# Patient Record
Sex: FEMALE | Race: WHITE | NOT HISPANIC OR LATINO | Employment: OTHER | URBAN - METROPOLITAN AREA
[De-identification: names, ages, dates, MRNs, and addresses within clinical notes are randomized per-mention and may not be internally consistent; named-entity substitution may affect disease eponyms.]

---

## 2017-02-08 RX ORDER — RIVAROXABAN 20 MG/1
TABLET, FILM COATED ORAL
Qty: 90 TABLET | Refills: 0 | Status: SHIPPED | OUTPATIENT
Start: 2017-02-08 | End: 2017-02-08 | Stop reason: SDUPTHER

## 2017-02-27 ENCOUNTER — OFFICE VISIT (OUTPATIENT)
Dept: CARDIOLOGY | Facility: CLINIC | Age: 57
End: 2017-02-27

## 2017-02-27 VITALS
WEIGHT: 293 LBS | HEIGHT: 63 IN | BODY MASS INDEX: 51.91 KG/M2 | HEART RATE: 84 BPM | SYSTOLIC BLOOD PRESSURE: 126 MMHG | DIASTOLIC BLOOD PRESSURE: 76 MMHG

## 2017-02-27 DIAGNOSIS — I74.9 PARADOXICAL EMBOLISM (HCC): ICD-10-CM

## 2017-02-27 DIAGNOSIS — I27.20 PULMONARY HYPERTENSION (HCC): ICD-10-CM

## 2017-02-27 DIAGNOSIS — I10 ESSENTIAL HYPERTENSION: ICD-10-CM

## 2017-02-27 DIAGNOSIS — Q21.12 PFO (PATENT FORAMEN OVALE): ICD-10-CM

## 2017-02-27 DIAGNOSIS — I50.32 CHRONIC DIASTOLIC CONGESTIVE HEART FAILURE (HCC): Primary | ICD-10-CM

## 2017-02-27 PROCEDURE — 99214 OFFICE O/P EST MOD 30 MIN: CPT | Performed by: INTERNAL MEDICINE

## 2017-02-27 PROCEDURE — 93000 ELECTROCARDIOGRAM COMPLETE: CPT | Performed by: INTERNAL MEDICINE

## 2017-02-27 RX ORDER — IPRATROPIUM BROMIDE AND ALBUTEROL SULFATE 2.5; .5 MG/3ML; MG/3ML
SOLUTION RESPIRATORY (INHALATION) 4 TIMES DAILY
Refills: 0 | COMMUNITY
Start: 2017-01-19 | End: 2019-06-03 | Stop reason: ALTCHOICE

## 2017-02-27 RX ORDER — LOSARTAN POTASSIUM AND HYDROCHLOROTHIAZIDE 12.5; 1 MG/1; MG/1
TABLET ORAL DAILY
Refills: 5 | COMMUNITY
Start: 2017-02-03 | End: 2017-11-15 | Stop reason: HOSPADM

## 2017-02-27 RX ORDER — FUROSEMIDE 40 MG/1
40 TABLET ORAL 3 TIMES WEEKLY
Qty: 15 TABLET | Refills: 11 | Status: SHIPPED | OUTPATIENT
Start: 2017-02-27 | End: 2018-03-17 | Stop reason: SDUPTHER

## 2017-02-27 NOTE — PROGRESS NOTES
Date of Office Visit: 2017  Encounter Provider: Brennan Rogers MD  Place of Service: [facility] CARDIOLOGY  Patient Name: Emely Solomon  :1960    Chief Complaint   Patient presents with   • Congestive Heart Failure   :     HPI: Emely Solomon is a 56 y.o. female who presents today to followup. In the summer of , she developed an acute DVT as well as a pulmonary embolus. She had a previously undiagnosed patent foramen ovale and developed paradoxical embolus to the left lower extremity (arterial) and required prolonged hospitalization for thrombectomy and anticoagulation. She was sent home on coumadin but became subtherapeutic and had recurrent pulmonary embolus after that. She was changed to rivaroxaban and has done well since then. Her IVC filter has been removed. A repeat echo in 2016 showed significant improvement in her pulmonary hypertension, right-sided enlargement and function.      She has chronic exertional dyspnea but feels that it is progressively worsening.  She has chronic leg swelling and lipoedema.  She denies bleeding.  She denies palpitations or syncope.    Past Medical History   Diagnosis Date   • Chronic diastolic congestive heart failure    • Deep venous thrombosis    • Hypertension    • Lipoedema    • Morbid obesity    • Paradoxical embolism      to the LLE due to DVT/PE and PFO   • PFO (patent foramen ovale)    • Pulmonary embolism    • Pulmonary hypertension      multifactorial (dCHF, obesity/ARIEL, hx PE), mild by echo 2016       Past Surgical History   Procedure Laterality Date   • Dilatation and curettage  2011   • Vena cava filter placement       Inferior Vena Cava Filter Placement w/Fluorosc angiogr guidance       Social History     Social History   • Marital status:      Spouse name: N/A   • Number of children: N/A   • Years of education: N/A     Occupational History   • Not on file.     Social History Main Topics   •  "Smoking status: Never Smoker   • Smokeless tobacco: Not on file      Comment: caffeine use/ weekends   • Alcohol use No   • Drug use: No   • Sexual activity: Not on file     Other Topics Concern   • Not on file     Social History Narrative       Family History   Problem Relation Age of Onset   • Hypertension Other        Review of Systems   Cardiovascular: Positive for dyspnea on exertion and leg swelling.   Respiratory: Positive for cough.    All other systems reviewed and are negative.      No Known Allergies      Current Outpatient Prescriptions:   •  fluticasone (FLONASE) 50 MCG/ACT nasal spray, into each nostril., Disp: , Rfl:   •  ipratropium-albuterol (DUO-NEB) 0.5-2.5 mg/mL nebulizer, 4 (Four) Times a Day., Disp: , Rfl: 0  •  losartan-hydrochlorothiazide (HYZAAR) 100-12.5 MG per tablet, Daily., Disp: , Rfl: 5  •  metoprolol succinate XL (TOPROL-XL) 50 MG 24 hr tablet, Take 1 tablet by mouth daily., Disp: , Rfl:   •  rivaroxaban (XARELTO) 20 MG tablet, Take 1 tablet by mouth Daily., Disp: 90 tablet, Rfl: 0  •  furosemide (LASIX) 40 MG tablet, Take 1 tablet by mouth 3 (Three) Times a Week., Disp: 15 tablet, Rfl: 11     Objective:     Vitals:    02/27/17 1018   BP: 126/76   Pulse: 84   Weight: (!) 376 lb (171 kg)   Height: 63\" (160 cm)     Body mass index is 66.61 kg/(m^2).    Physical Exam   Constitutional: She is oriented to person, place, and time.   Obese   HENT:   Head: Normocephalic.   Nose: Nose normal.   Mouth/Throat: Oropharynx is clear and moist.   Eyes: Conjunctivae and EOM are normal. Pupils are equal, round, and reactive to light.   Neck: Normal range of motion.   Cannot assess for JVD due to habitus   Cardiovascular: Normal rate and regular rhythm.  Exam reveals distant heart sounds.    No murmur heard.  Pulmonary/Chest: Effort normal.   Abdominal: Soft.   Obesity limits abdominal exam   Musculoskeletal: Normal range of motion. She exhibits edema (lipoedema, trace pitting).   Neurological: She is " alert and oriented to person, place, and time. No cranial nerve deficit.   Skin: Skin is warm and dry. No rash noted.   Psychiatric: She has a normal mood and affect. Her behavior is normal. Judgment and thought content normal.   Vitals reviewed.        ECG 12 Lead  Date/Time: 2/27/2017 1:44 PM  Performed by: BRENNAN ROGERS  Authorized by: BRENNAN ROGERS   Comparison: compared with previous ECG   Similar to previous ECG  Rhythm: sinus rhythm  Conduction: conduction normal  ST Segments: ST segments normal  T Waves: T waves normal  Other findings: LAE and PRWP  Other findings comments: RVH  Clinical impression: abnormal ECG              Assessment:       Diagnosis Plan   1. Chronic diastolic congestive heart failure     2. Pulmonary hypertension     3. Paradoxical embolism     4. PFO (patent foramen ovale)     5. Essential hypertension            Plan:       She has chronic diastolic CHF and secondary PHTN (due to diastolic dysfunction and her prior PE).  She had VTE with paradoxical embolus to the leg and is s/p thrombectomy.  She had an IVC filter which was been removed.  She is on rivaroxaban as it was too difficult to maintain a therapeutic INR on coumadin.      I suspect her dyspnea is multifactorial (especially due to her weight) but she likely needs a loop diuretic.  She knows she'll have trouble with taking it due to urinary incontinence; she still works full time.  I asked her to take it three days per week for now.      Her BP is within goal.     Sincerely,       Brennan Rogers MD

## 2017-05-08 RX ORDER — RIVAROXABAN 20 MG/1
TABLET, FILM COATED ORAL
Qty: 90 TABLET | Refills: 3 | Status: ON HOLD | OUTPATIENT
Start: 2017-05-08 | End: 2017-07-21 | Stop reason: SDUPTHER

## 2017-06-12 ENCOUNTER — HOSPITAL ENCOUNTER (OUTPATIENT)
Dept: CARDIOLOGY | Facility: HOSPITAL | Age: 57
Discharge: HOME | End: 2017-06-12
Attending: INTERNAL MEDICINE | Admitting: INTERNAL MEDICINE

## 2017-06-12 DIAGNOSIS — M79.89 LEG SWELLING: Primary | ICD-10-CM

## 2017-06-12 DIAGNOSIS — M79.89 LEG SWELLING: ICD-10-CM

## 2017-06-12 LAB
BH CV LOWER VASCULAR LEFT COMMON FEMORAL AUGMENT: NORMAL
BH CV LOWER VASCULAR LEFT COMMON FEMORAL COMPETENT: NORMAL
BH CV LOWER VASCULAR LEFT COMMON FEMORAL COMPRESS: NORMAL
BH CV LOWER VASCULAR LEFT COMMON FEMORAL PHASIC: NORMAL
BH CV LOWER VASCULAR LEFT COMMON FEMORAL SPONT: NORMAL
BH CV LOWER VASCULAR LEFT DISTAL FEMORAL COMPRESS: NORMAL
BH CV LOWER VASCULAR LEFT GASTRONEMIUS COMPRESS: NORMAL
BH CV LOWER VASCULAR LEFT GREATER SAPH AK COMPRESS: NORMAL
BH CV LOWER VASCULAR LEFT GREATER SAPH BK COMPRESS: NORMAL
BH CV LOWER VASCULAR LEFT MID FEMORAL AUGMENT: NORMAL
BH CV LOWER VASCULAR LEFT MID FEMORAL COMPETENT: NORMAL
BH CV LOWER VASCULAR LEFT MID FEMORAL COMPRESS: NORMAL
BH CV LOWER VASCULAR LEFT MID FEMORAL PHASIC: NORMAL
BH CV LOWER VASCULAR LEFT MID FEMORAL SPONT: NORMAL
BH CV LOWER VASCULAR LEFT PERONEAL COMPRESS: NORMAL
BH CV LOWER VASCULAR LEFT POPLITEAL AUGMENT: NORMAL
BH CV LOWER VASCULAR LEFT POPLITEAL COMPETENT: NORMAL
BH CV LOWER VASCULAR LEFT POPLITEAL COMPRESS: NORMAL
BH CV LOWER VASCULAR LEFT POPLITEAL PHASIC: NORMAL
BH CV LOWER VASCULAR LEFT POPLITEAL SPONT: NORMAL
BH CV LOWER VASCULAR LEFT POSTERIOR TIBIAL COMPRESS: NORMAL
BH CV LOWER VASCULAR LEFT PROXIMAL FEMORAL COMPRESS: NORMAL
BH CV LOWER VASCULAR LEFT SAPHENOFEMORAL JUNCTION AUGMENT: NORMAL
BH CV LOWER VASCULAR LEFT SAPHENOFEMORAL JUNCTION COMPETENT: NORMAL
BH CV LOWER VASCULAR LEFT SAPHENOFEMORAL JUNCTION COMPRESS: NORMAL
BH CV LOWER VASCULAR LEFT SAPHENOFEMORAL JUNCTION PHASIC: NORMAL
BH CV LOWER VASCULAR LEFT SAPHENOFEMORAL JUNCTION SPONT: NORMAL
BH CV LOWER VASCULAR RIGHT COMMON FEMORAL AUGMENT: NORMAL
BH CV LOWER VASCULAR RIGHT COMMON FEMORAL COMPETENT: NORMAL
BH CV LOWER VASCULAR RIGHT COMMON FEMORAL COMPRESS: NORMAL
BH CV LOWER VASCULAR RIGHT COMMON FEMORAL PHASIC: NORMAL
BH CV LOWER VASCULAR RIGHT COMMON FEMORAL SPONT: NORMAL

## 2017-06-12 PROCEDURE — 93971 EXTREMITY STUDY: CPT

## 2017-06-12 NOTE — PROGRESS NOTES
Mrs. Solomon is having worsening edema of the left leg with some oozing of clear yellow fluid.  Will check venous doppler.

## 2017-06-15 ENCOUNTER — APPOINTMENT (OUTPATIENT)
Dept: WOMENS IMAGING | Facility: HOSPITAL | Age: 57
End: 2017-06-15

## 2017-06-15 PROCEDURE — 77067 SCR MAMMO BI INCL CAD: CPT | Performed by: RADIOLOGY

## 2017-06-15 PROCEDURE — 77063 BREAST TOMOSYNTHESIS BI: CPT | Performed by: RADIOLOGY

## 2017-06-30 ENCOUNTER — APPOINTMENT (OUTPATIENT)
Dept: WOUND CARE | Facility: HOSPITAL | Age: 57
End: 2017-06-30
Attending: SURGERY

## 2017-06-30 PROCEDURE — G0463 HOSPITAL OUTPT CLINIC VISIT: HCPCS

## 2017-07-14 ENCOUNTER — TRANSCRIBE ORDERS (OUTPATIENT)
Dept: ADMINISTRATIVE | Facility: HOSPITAL | Age: 57
End: 2017-07-14

## 2017-07-14 ENCOUNTER — APPOINTMENT (OUTPATIENT)
Dept: WOUND CARE | Facility: HOSPITAL | Age: 57
End: 2017-07-14
Attending: SURGERY

## 2017-07-14 DIAGNOSIS — L97.221 NON-PRESSURE CHRONIC ULCER OF LEFT CALF, LIMITED TO BREAKDOWN OF SKIN (HCC): Primary | ICD-10-CM

## 2017-07-14 PROCEDURE — G0463 HOSPITAL OUTPT CLINIC VISIT: HCPCS

## 2017-07-19 ENCOUNTER — APPOINTMENT (OUTPATIENT)
Dept: WOUND CARE | Facility: HOSPITAL | Age: 57
End: 2017-07-19
Attending: SURGERY

## 2017-07-19 ENCOUNTER — HOSPITAL ENCOUNTER (OUTPATIENT)
Dept: CARDIOLOGY | Facility: HOSPITAL | Age: 57
Discharge: HOME | End: 2017-07-19
Attending: SURGERY | Admitting: SURGERY

## 2017-07-19 DIAGNOSIS — L97.221 NON-PRESSURE CHRONIC ULCER OF LEFT CALF, LIMITED TO BREAKDOWN OF SKIN (HCC): ICD-10-CM

## 2017-07-19 LAB
BH CV LOWER ARTERIAL LEFT GREAT TOE SYS MAX: 120 MMHG
BH CV LOWER ARTERIAL LEFT TBI RATIO: 1.04
BH CV LOWER ARTERIAL RIGHT GREAT TOE SYS MAX: 128 MMHG
BH CV LOWER ARTERIAL RIGHT TBI RATIO: 1.11
UPPER ARTERIAL LEFT ARM BRACHIAL SYS MAX: 100 MMHG
UPPER ARTERIAL RIGHT ARM BRACHIAL SYS MAX: 111 MMHG

## 2017-07-19 PROCEDURE — 93922 UPR/L XTREMITY ART 2 LEVELS: CPT

## 2017-07-21 ENCOUNTER — ANESTHESIA EVENT (OUTPATIENT)
Dept: GASTROENTEROLOGY | Facility: HOSPITAL | Age: 57
End: 2017-07-21

## 2017-07-21 ENCOUNTER — ANESTHESIA (OUTPATIENT)
Dept: GASTROENTEROLOGY | Facility: HOSPITAL | Age: 57
End: 2017-07-21

## 2017-07-21 ENCOUNTER — HOSPITAL ENCOUNTER (OUTPATIENT)
Facility: HOSPITAL | Age: 57
Setting detail: HOSPITAL OUTPATIENT SURGERY
Discharge: HOME | End: 2017-07-21
Attending: INTERNAL MEDICINE | Admitting: INTERNAL MEDICINE

## 2017-07-21 VITALS
HEIGHT: 63 IN | WEIGHT: 293 LBS | HEART RATE: 88 BPM | SYSTOLIC BLOOD PRESSURE: 92 MMHG | DIASTOLIC BLOOD PRESSURE: 52 MMHG | BODY MASS INDEX: 51.91 KG/M2 | OXYGEN SATURATION: 94 % | TEMPERATURE: 98.7 F | RESPIRATION RATE: 16 BRPM

## 2017-07-21 DIAGNOSIS — R05.9 COUGH: ICD-10-CM

## 2017-07-21 PROBLEM — R05.3 CHRONIC COUGH: Status: ACTIVE | Noted: 2017-07-21

## 2017-07-21 LAB
APPEARANCE FLD: ABNORMAL
COLOR FLD: COLORLESS
EOSINOPHIL NFR FLD MANUAL: 1 %
LYMPHOCYTES NFR FLD MANUAL: 26 %
METHOD: ABNORMAL
MONOS+MACROS NFR FLD: 25 %
NEUTROPHILS NFR FLD MANUAL: 48 %
NUC CELL # FLD: 94 /MM3
OTHER CELLS FLUID PER 100/WBCS: 108 /100 WBCS
RBC # FLD AUTO: 16 /MM3

## 2017-07-21 PROCEDURE — 87116 MYCOBACTERIA CULTURE: CPT | Performed by: INTERNAL MEDICINE

## 2017-07-21 PROCEDURE — 89051 BODY FLUID CELL COUNT: CPT | Performed by: INTERNAL MEDICINE

## 2017-07-21 PROCEDURE — 87102 FUNGUS ISOLATION CULTURE: CPT | Performed by: INTERNAL MEDICINE

## 2017-07-21 PROCEDURE — 88305 TISSUE EXAM BY PATHOLOGIST: CPT | Performed by: INTERNAL MEDICINE

## 2017-07-21 PROCEDURE — 87252 VIRUS INOCULATION TISSUE: CPT | Performed by: INTERNAL MEDICINE

## 2017-07-21 PROCEDURE — 88112 CYTOPATH CELL ENHANCE TECH: CPT | Performed by: INTERNAL MEDICINE

## 2017-07-21 PROCEDURE — 87205 SMEAR GRAM STAIN: CPT | Performed by: INTERNAL MEDICINE

## 2017-07-21 PROCEDURE — 25010000002 PROPOFOL 10 MG/ML EMULSION: Performed by: ANESTHESIOLOGY

## 2017-07-21 PROCEDURE — 87070 CULTURE OTHR SPECIMN AEROBIC: CPT | Performed by: INTERNAL MEDICINE

## 2017-07-21 PROCEDURE — 87206 SMEAR FLUORESCENT/ACID STAI: CPT | Performed by: INTERNAL MEDICINE

## 2017-07-21 RX ORDER — SODIUM CHLORIDE, SODIUM LACTATE, POTASSIUM CHLORIDE, CALCIUM CHLORIDE 600; 310; 30; 20 MG/100ML; MG/100ML; MG/100ML; MG/100ML
1000 INJECTION, SOLUTION INTRAVENOUS CONTINUOUS PRN
Status: DISCONTINUED | OUTPATIENT
Start: 2017-07-21 | End: 2017-07-21 | Stop reason: HOSPADM

## 2017-07-21 RX ORDER — LIDOCAINE HYDROCHLORIDE 20 MG/ML
INJECTION, SOLUTION INFILTRATION; PERINEURAL AS NEEDED
Status: DISCONTINUED | OUTPATIENT
Start: 2017-07-21 | End: 2017-07-21 | Stop reason: SURG

## 2017-07-21 RX ORDER — PROPOFOL 10 MG/ML
VIAL (ML) INTRAVENOUS AS NEEDED
Status: DISCONTINUED | OUTPATIENT
Start: 2017-07-21 | End: 2017-07-21 | Stop reason: SURG

## 2017-07-21 RX ORDER — LIDOCAINE HYDROCHLORIDE 10 MG/ML
INJECTION, SOLUTION EPIDURAL; INFILTRATION; INTRACAUDAL; PERINEURAL AS NEEDED
Status: DISCONTINUED | OUTPATIENT
Start: 2017-07-21 | End: 2017-07-21 | Stop reason: HOSPADM

## 2017-07-21 RX ORDER — PROPOFOL 10 MG/ML
VIAL (ML) INTRAVENOUS CONTINUOUS PRN
Status: DISCONTINUED | OUTPATIENT
Start: 2017-07-21 | End: 2017-07-21 | Stop reason: SURG

## 2017-07-21 RX ADMIN — SODIUM CHLORIDE, POTASSIUM CHLORIDE, SODIUM LACTATE AND CALCIUM CHLORIDE: 600; 310; 30; 20 INJECTION, SOLUTION INTRAVENOUS at 07:02

## 2017-07-21 RX ADMIN — PROPOFOL 140 MCG/KG/MIN: 10 INJECTION, EMULSION INTRAVENOUS at 07:06

## 2017-07-21 RX ADMIN — LIDOCAINE HYDROCHLORIDE 50 MG: 20 INJECTION, SOLUTION INFILTRATION; PERINEURAL at 07:06

## 2017-07-21 RX ADMIN — PROPOFOL 150 MG: 10 INJECTION, EMULSION INTRAVENOUS at 07:06

## 2017-07-21 NOTE — ANESTHESIA POSTPROCEDURE EVALUATION
Patient: Emely Solomon    Procedure Summary     Date Anesthesia Start Anesthesia Stop Room / Location    07/21/17 0702 0726  [facility]       Procedure Diagnosis Surgeon Provider    BRONCHOSCOPY with wash (N/A Bronchus) No diagnosis on file. MD Jack Ontiveros MD          Anesthesia Type: MAC  Last vitals  BP   92/52 (07/21/17 0748)    Temp   37.1 °C (98.7 °F) (07/21/17 0748)    Pulse   88 (07/21/17 0748)   Resp   16 (07/21/17 0748)    SpO2   94 % (07/21/17 0748)      Post Anesthesia Care and Evaluation    Patient location during evaluation: PACU  Patient participation: complete - patient participated  Level of consciousness: awake and alert  Pain score: 0  Pain management: adequate  Airway patency: patent  Anesthetic complications: No anesthetic complications    Cardiovascular status: acceptable  Respiratory status: acceptable  Hydration status: acceptable

## 2017-07-21 NOTE — ANESTHESIA PROCEDURE NOTES
Airway  Urgency: elective    Date/Time: 7/21/2017 7:10 AM  Airway not difficult    General Information and Staff    Patient location: endo.  Anesthesiologist: DUSTIN BHATTI    Indications and Patient Condition  Indications for airway management: airway protection    Preoxygenated: yes      Final Airway Details  Final airway type: supraglottic airway      Successful airway: classic  Size 3    Number of attempts at approach: 1

## 2017-07-21 NOTE — ANESTHESIA PREPROCEDURE EVALUATION
Anesthesia Evaluation     Patient summary reviewed          Airway   Mallampati: III  TM distance: >3 FB  Neck ROM: full  no difficulty expected  Dental - normal exam     Pulmonary     breath sounds clear to auscultation  Cardiovascular   Exercise tolerance: poor (<4 METS)    Rhythm: regular  Rate: normal        Neuro/Psych  GI/Hepatic/Renal/Endo      Musculoskeletal     Abdominal    Substance History      OB/GYN          Other                                        Anesthesia Plan    ASA 3     MAC     intravenous induction   Anesthetic plan and risks discussed with patient.

## 2017-07-23 LAB
BACTERIA SPEC RESP CULT: NORMAL
GRAM STN SPEC: NORMAL

## 2017-07-24 LAB
CYTO UR: NORMAL
LAB AP CASE REPORT: NORMAL
Lab: NORMAL
PATH REPORT.FINAL DX SPEC: NORMAL
PATH REPORT.GROSS SPEC: NORMAL

## 2017-07-26 ENCOUNTER — TRANSCRIBE ORDERS (OUTPATIENT)
Dept: PULMONOLOGY | Facility: HOSPITAL | Age: 57
End: 2017-07-26

## 2017-07-26 DIAGNOSIS — G47.33 OSA (OBSTRUCTIVE SLEEP APNEA): Primary | ICD-10-CM

## 2017-07-28 ENCOUNTER — APPOINTMENT (OUTPATIENT)
Dept: WOUND CARE | Facility: HOSPITAL | Age: 57
End: 2017-07-28
Attending: SURGERY

## 2017-07-28 PROCEDURE — G0463 HOSPITAL OUTPT CLINIC VISIT: HCPCS

## 2017-07-31 ENCOUNTER — TRANSCRIBE ORDERS (OUTPATIENT)
Dept: PHYSICAL THERAPY | Facility: HOSPITAL | Age: 57
End: 2017-07-31

## 2017-07-31 DIAGNOSIS — I89.0 LYMPHEDEMA: Primary | ICD-10-CM

## 2017-07-31 DIAGNOSIS — L97.929 IDIOPATHIC CHRONIC VENOUS HYPERTENSION OF LEFT LOWER EXTREMITY WITH ULCER (HCC): ICD-10-CM

## 2017-07-31 DIAGNOSIS — I87.312 IDIOPATHIC CHRONIC VENOUS HYPERTENSION OF LEFT LOWER EXTREMITY WITH ULCER (HCC): ICD-10-CM

## 2017-07-31 LAB — VIRAL CULTURE, GENERAL: NORMAL

## 2017-08-01 ENCOUNTER — HOSPITAL ENCOUNTER (OUTPATIENT)
Dept: SLEEP MEDICINE | Facility: HOSPITAL | Age: 57
Discharge: HOME | End: 2017-08-01
Admitting: FAMILY MEDICINE

## 2017-08-01 DIAGNOSIS — G47.33 OSA (OBSTRUCTIVE SLEEP APNEA): ICD-10-CM

## 2017-08-01 PROCEDURE — 95811 POLYSOM 6/>YRS CPAP 4/> PARM: CPT

## 2017-08-10 ENCOUNTER — HOSPITAL ENCOUNTER (OUTPATIENT)
Dept: OCCUPATIONAL THERAPY | Facility: HOSPITAL | Age: 57
Setting detail: THERAPIES SERIES
Discharge: HOME | End: 2017-08-10

## 2017-08-10 DIAGNOSIS — I89.0 LYMPHEDEMA OF BOTH LOWER EXTREMITIES: Primary | ICD-10-CM

## 2017-08-10 PROCEDURE — 97165 OT EVAL LOW COMPLEX 30 MIN: CPT

## 2017-08-10 NOTE — THERAPY EVALUATION
Outpatient Occupational Therapy Lymphedema Initial Evaluation  [facility]     Patient Name: Emely Solomon  : 1960  MRN: [id number]  Today's Date: 8/10/2017      Visit Date: 08/10/2017    Patient Active Problem List   Diagnosis   • Chronic diastolic congestive heart failure   • PFO (patent foramen ovale)   • Paradoxical embolism   • Hypertension   • Pulmonary hypertension   • Chronic cough        Past Medical History:   Diagnosis Date   • Chronic diastolic congestive heart failure    • Deep venous thrombosis    • Hypertension    • Lipoedema    • Morbid obesity    • Paradoxical embolism     to the LLE due to DVT/PE and PFO   • PFO (patent foramen ovale)    • Pulmonary embolism    • Pulmonary hypertension     multifactorial (dCHF, obesity/ARIEL, hx PE), mild by echo 2016        Past Surgical History:   Procedure Laterality Date   • BRONCHOSCOPY N/A 2017    Procedure: BRONCHOSCOPY with wash;  Surgeon: Caleb Garcia MD;  Location: [facility] ENDOSCOPY;  Service:    • DILATATION AND CURETTAGE  2011   • VENA CAVA FILTER PLACEMENT      Inferior Vena Cava Filter Placement w/Fluorosc angiogr guidance         Visit Dx:     ICD-10-CM ICD-9-CM   1. Lymphedema of both lower extremities I89.0 457.1             Patient History       08/10/17 1500          History    Chief Complaint Other 1 (comment)   bilateral LE edema  -RE      Date Current Problem(s) Began 17  -RE      Brief Description of Current Complaint Patient presents with a long history of bilateral LE lymphedema.  She had a L LE DVT and PE about 2 years ago which has resulted in a further increase in edema in the L LE.  In  of this year she  developed a wound on the left leg which [facility].   -RE      Previous treatment for THIS PROBLEM Rehabilitation;Other (comment)   Complete Decongestive Therapy - several years ago  -RE      Patient/Caregiver Goals Decrease swelling  -RE      How has  patient tried to help current problem? compression stockings  -RE      What clinical tests have you had for this problem? Other 1 (comment)   Doppler  -RE      Results of Clinical Tests negative  -RE      Are you or can you be pregnant No  -RE      Pain     Pain at Present 0  -RE      Pain at Best 0  -RE      Pain at Worst 0  -RE      Fall Risk Assessment    Any falls in the past year: No  -RE      Does patient have a fear of falling No  -RE      Services    Are you currently receiving Home Health services No  -RE      Daily Activities    Primary Language English  -RE      Are you able to read Yes  -RE      Are you able to write Yes  -RE      How does patient learn best? Listening;Reading  -RE      Teaching needs identified Home Exercise Program;Management of Condition  -RE      Patient is concerned about/has problems with Performing job responsibilities/community activities (work, school,;Walking  -RE      Does patient have problems with the following? None  -RE      Barriers to learning None  -RE      Pt Participated in POC and Goals Yes  -RE      Safety    Are you being hurt, hit, or frightened by anyone at home or in your life? No  -RE      Are you being neglected by a caregiver No  -RE        User Key  (r) = Recorded By, (t) = Taken By, (c) = Cosigned By    Initials Name Provider Type    RE RADHA Esteban Occupational Therapist                Lymphedema       08/10/17 1500          Subjective Comments    Subjective Comments Patient is frustrated by her swelling and weight gain.  -RE      Lymphedema Assessment    Lymphedema Classification RLE:;LLE:;secondary;stage 2 (Spontaneously Irreversible)  -RE      Infections or Cellulitis? no  -RE      Lymphedema Precautions CHF  -RE      Subjective Pain    Able to rate subjective pain? yes  -RE      Pre-Treatment Pain Level 0  -RE      Post-Treatment Pain Level 0  -RE      Lymphedema Edema Assessment    Ptting Edema Category By grade out of 4  -RE      Pitting Edema +  3/4;Severe  -RE      Skin Changes/Observations    Location/Assessment Lower Extremity  -RE      Lower Extremity Conditions bilateral:;scab(s);inflamed;fragile;shiny  -RE      Lower Extremity Color/Pigment left:;erythema  -RE      Lymphedema Sensation    Lymphedema Sensation Reports LLE:;numbness;tingling   in toes  -RE      Lymphedema Measurements    Measurement Type(s) Circumferential  -RE      Circumferential Areas Lower extremities  -RE      LLE Circumferential (cm)    Measurement Location 1 10 cm above knee  -RE      Left 1 93 cm  -RE      Measurement Location 2 Knee (popliteal crease)  -RE      Left 2 75 cm  -RE      Measurement Location 3 10 cm below knee  -RE      Left 3 81 cm  -RE      Measurement Location 4 20 cm below knee  -RE      Left 4 71.5 cm  -RE      Measurement Location 5 30 cm below kne  -RE      Left 5 56 cm  -RE      Measurement Location 6 Ankle  -RE      Left 6 31 cm  -RE      Measurement Location 7 Mid foot  -RE      Left 7 22.8 cm  -RE      Measurement Location 8 Total  -RE      Left 8 430.3 cm  -RE      RLE Circumferential (cm)    Right 1 89 cm  -RE      Right 2 73.5 cm  -RE      Right 3 78 cm  -RE      Right 4 66.5 cm  -RE      Right 5 59.3 cm  -RE      Right 6 28.5 cm  -RE      Right 7 22.8 cm  -RE      Right 8 417.6 cm  -RE        User Key  (r) = Recorded By, (t) = Taken By, (c) = Cosigned By    Initials Name Provider Type    RADHA Arteaga Occupational Therapist                OT Ortho       08/10/17 1500          ROM (Range of Motion)    General ROM no range of motion deficits identified   AROM is functional but limited by body habitus  -RE      MMT (Manual Muscle Testing)    General MMT Assessment Detail bilateral hip flexion is 4-/5. Otherwise LE's are 4+/5  -RE        User Key  (r) = Recorded By, (t) = Taken By, (c) = Cosigned By    Initials Name Provider Type    RADHA Arteaga Occupational Therapist                    Therapy Education       08/10/17 9432           Therapy Education    Education Details Marching (hip flexion) in sitting to fatigue  -RE      Given Symptoms/condition management;Edema management;HEP  -RE      Program New  -RE      How Provided Verbal;Demonstration  -RE      Provided to Patient;Caregiver  -RE      Level of Understanding Teach back education performed;Verbalized;Demonstrated  -RE        User Key  (r) = Recorded By, (t) = Taken By, (c) = Cosigned By    Initials Name Provider Type    RE Martha Valencia OTR Occupational Therapist                        OT Goals       08/10/17 1600       OT Short Term Goals    STG Date to Achieve 08/24/17  -RE     STG 1 Patient independent and compliant with self-wrapping techniques of compression bandages with assistance of caregiver as needed for improved self-management of condition.  -RE     STG 1 Progress New  -RE     STG 2 Patient will demonstrate decreased net edema of bilateral lower extremities >/= 10-20cm  for decrease in edema, symptoms, decreased risk of infection and improved skin care/transition to self-care of condition.   -RE     STG 2 Progress New  -RE     Long Term Goals    LTG Date to Achieve 09/10/17  -RE     LTG 1 Patient will demonstrate decreased net edema of bilateral lower extremities >/= 21--40cm  for decrease in edema, symptoms, decreased risk of infection and improved skin care/transition to self-care of condition.   -RE     LTG 1 Progress New  -RE     LTG 2 Patient independent and compliant with home exercise program (as able) focused on range of motion, flexibility to improve lymphatic flow and LE discomfort.  -RE     LTG 2 Progress New  -RE     LTG 3  Patient/family/caregivers independent and compliant with self-care techniques for self-management of condition.  -RE     LTG 3 Progress New  -RE     LTG 4 Patient independent and compliant with use and care of compression garments with assistance of a caregiver as needed to promote self-care independence.   -RE     LTG 4 Progress New  -RE      Time Calculation    OT Goal Re-Cert Due Date 09/10/17  -RE       User Key  (r) = Recorded By, (t) = Taken By, (c) = Cosigned By    Initials Name Provider Type    RADHA Arteaga Occupational Therapist                OT Assessment/Plan       08/10/17 1613       OT Assessment    Functional Limitations Impaired gait  -RE     Impairments Edema;Gait;Impaired lymphatic circulation;Impaired venous circulation;Integumentary integrity  -RE     Assessment Comments Patient presents with severe bilateral LE lymphedema which extends from feet to groin. The L LE is red, has two scabbed areas, and a history of a recently healed open wound.  Both legs have a  skin fold above the knee which hangs past the medial popliteal crease. Edema is 3+.  She could benefit from Complete Decongestive Therapy to decreasse edema, improve skin integrity, decrease the risk of infection and to learn self care strategies.      -RE     Please refer to paper survey for additional self-reported information Yes  -RE     OT Diagnosis bilateral LE lymphedema  -RE     OT Rehab Potential Good  -RE     Patient/caregiver participated in establishment of treatment plan and goals Yes  -RE     Patient would benefit from skilled therapy intervention Yes  -RE     OT Plan    OT Frequency 4x/week  -RE     Predicted Duration of Therapy Intervention (days/wks) 4-6 weeks  -RE     Planned CPT's? OT EVAL LOW COMPLEXITY: 94703;OT SELF CARE/MGMT/TRAIN 15 MIN: 18364;OT THER PROC EA 15 MIN: [id number];OT THER ACT EA 15 MIN: 06395PG;OT MANUAL THERAPY EA 15 MIN: 85986  -RE     Planned Therapy Interventions (Optional Details) manual therapy techniques;home exercise program;patient/family education;other (see comments)   skin care and compression bandaging  -RE       User Key  (r) = Recorded By, (t) = Taken By, (c) = Cosigned By    Initials Name Provider Type    RADHA Arteaga Occupational Therapist                Time Calculation:   OT Start Time: 1430  OT Stop Time:  1530  OT Time Calculation (min): 60 min     Therapy Charges for Today     Code Description Service Date Service Provider Modifiers Qty    70857974752 HC OT EVAL LOW COMPLEXITY 4 8/10/2017 RADHA Esteban GO 1                    RADHA Esteban  8/10/2017

## 2017-08-15 ENCOUNTER — TELEPHONE (OUTPATIENT)
Dept: SLEEP MEDICINE | Facility: HOSPITAL | Age: 57
End: 2017-08-15

## 2017-08-18 LAB — FUNGUS WND CULT: NORMAL

## 2017-08-28 ENCOUNTER — OFFICE VISIT (OUTPATIENT)
Dept: CARDIOLOGY | Facility: CLINIC | Age: 57
End: 2017-08-28

## 2017-08-28 VITALS
HEIGHT: 63 IN | HEART RATE: 90 BPM | DIASTOLIC BLOOD PRESSURE: 74 MMHG | SYSTOLIC BLOOD PRESSURE: 108 MMHG | WEIGHT: 293 LBS | BODY MASS INDEX: 51.91 KG/M2

## 2017-08-28 DIAGNOSIS — I50.32 CHRONIC DIASTOLIC CONGESTIVE HEART FAILURE (HCC): Primary | ICD-10-CM

## 2017-08-28 DIAGNOSIS — I27.20 PULMONARY HYPERTENSION (HCC): ICD-10-CM

## 2017-08-28 DIAGNOSIS — Q21.12 PFO (PATENT FORAMEN OVALE): ICD-10-CM

## 2017-08-28 DIAGNOSIS — I10 ESSENTIAL HYPERTENSION: ICD-10-CM

## 2017-08-28 DIAGNOSIS — I74.9 PARADOXICAL EMBOLISM (HCC): ICD-10-CM

## 2017-08-28 PROBLEM — R05.3 CHRONIC COUGH: Status: RESOLVED | Noted: 2017-07-21 | Resolved: 2017-08-28

## 2017-08-28 PROCEDURE — 93000 ELECTROCARDIOGRAM COMPLETE: CPT | Performed by: INTERNAL MEDICINE

## 2017-08-28 PROCEDURE — 99213 OFFICE O/P EST LOW 20 MIN: CPT | Performed by: INTERNAL MEDICINE

## 2017-08-28 RX ORDER — MOMETASONE FUROATE AND FORMOTEROL FUMARATE DIHYDRATE 200; 5 UG/1; UG/1
AEROSOL RESPIRATORY (INHALATION)
Refills: 5 | COMMUNITY
Start: 2017-08-11 | End: 2019-06-03 | Stop reason: ALTCHOICE

## 2017-08-28 RX ORDER — AZELASTINE HYDROCHLORIDE AND FLUTICASONE PROPIONATE 137; 50 UG/1; UG/1
SPRAY, METERED NASAL DAILY
Refills: 11 | COMMUNITY
Start: 2017-08-02 | End: 2019-06-03 | Stop reason: ALTCHOICE

## 2017-08-28 NOTE — PROGRESS NOTES
Date of Office Visit: 2017  Encounter Provider: Brennan Rogers MD  Place of Service: [facility] CARDIOLOGY  Patient Name: Emely Solomon  :1960    Chief Complaint   Patient presents with   • Congestive Heart Failure     6 month follow up    :     HPI: Emely Solomon is a 57 y.o. female who presents today to followup. In the summer of , she developed an acute DVT as well as a pulmonary embolus. She had a previously undiagnosed patent foramen ovale and developed paradoxical embolus to the left lower extremity (arterial) and required prolonged hospitalization for thrombectomy and anticoagulation. She was sent home on warfarin but became subtherapeutic and had recurrent pulmonary embolus after that. She was changed to rivaroxaban and has done well since then. Her IVC filter has been removed. A repeat echo in 2016 showed significant improvement in her pulmonary hypertension, right-sided enlargement and function.  A repeat venous doppler in 2017 was negative for DVT.    She was started on furosemide in 2017 and has had a fairly good diuresis.  Unfortunately she can't take it every day due to her job.  She is getting ready to start leg wrapping for lymphedema.      She has mild, stable exertional dyspnea.  She has leg swelling (severe) due to lymphedema/lipoedema.  She denies orthopnea, PND, chest pain, palpitations, lightheadedness, or syncope.     Past Medical History:   Diagnosis Date   • Chronic diastolic congestive heart failure    • Deep venous thrombosis    • Hypertension    • Lipoedema    • Lymphedema    • Morbid obesity    • Paradoxical embolism     to the LLE due to DVT/PE and PFO   • PFO (patent foramen ovale)    • Pulmonary embolism    • Pulmonary hypertension     multifactorial (dCHF, obesity/ARIEL, hx PE), mild by echo 2016       Past Surgical History:   Procedure Laterality Date   • BRONCHOSCOPY N/A 2017    Procedure: BRONCHOSCOPY  "with wash;  Surgeon: Caleb Garcia MD;  Location: [facility] ENDOSCOPY;  Service:    • DILATATION AND CURETTAGE  04/11/2011   • VENA CAVA FILTER PLACEMENT      Inferior Vena Cava Filter Placement w/Fluorosc angiogr guidance       Social History     Social History   • Marital status:      Spouse name: N/A   • Number of children: N/A   • Years of education: N/A     Occupational History   • Not on file.     Social History Main Topics   • Smoking status: Never Smoker   • Smokeless tobacco: Never Used      Comment: caffeine use/ weekends   • Alcohol use No   • Drug use: No   • Sexual activity: Not on file     Other Topics Concern   • Not on file     Social History Narrative       Family History   Problem Relation Age of Onset   • Hypertension Other        Review of Systems   Cardiovascular: Positive for dyspnea on exertion and leg swelling.   Respiratory: Positive for cough.    All other systems reviewed and are negative.      No Known Allergies      Current Outpatient Prescriptions:   •  DULERA 200-5 MCG/ACT inhaler, USE 2 PUFFS PO BID. RINSE MOUTH AFTER EACH USE, Disp: , Rfl: 5  •  DYMISTA 137-50 MCG/ACT suspension, Daily., Disp: , Rfl: 11  •  furosemide (LASIX) 40 MG tablet, Take 1 tablet by mouth 3 (Three) Times a Week., Disp: 15 tablet, Rfl: 11  •  ipratropium-albuterol (DUO-NEB) 0.5-2.5 mg/mL nebulizer, 4 (Four) Times a Day., Disp: , Rfl: 0  •  losartan-hydrochlorothiazide (HYZAAR) 100-12.5 MG per tablet, Daily., Disp: , Rfl: 5  •  metoprolol succinate XL (TOPROL-XL) 50 MG 24 hr tablet, Take 1 tablet by mouth daily., Disp: , Rfl:   •  rivaroxaban (XARELTO) 20 MG tablet, Take 1 tablet by mouth Daily., Disp: 90 tablet, Rfl: 0     Objective:     Vitals:    08/28/17 1012   BP: 108/74   Pulse: 90   Weight: (!) 358 lb 3.2 oz (162 kg)   Height: 63\" (160 cm)     Body mass index is 63.45 kg/(m^2).    Physical Exam   Constitutional: She is oriented to person, place, and time.   Obese   HENT:   Head: Normocephalic.   Nose: " Nose normal.   Mouth/Throat: Oropharynx is clear and moist.   Eyes: Conjunctivae and EOM are normal. Pupils are equal, round, and reactive to light.   Neck: Normal range of motion.   Cannot assess for JVD due to habitus   Cardiovascular: Normal rate and regular rhythm.  Exam reveals distant heart sounds.    No murmur heard.  Pulmonary/Chest: Effort normal.   Abdominal: Soft.   Obesity limits abdominal exam   Musculoskeletal: Normal range of motion. She exhibits edema (lipo/lymphedema, stasis changes of skin).   Neurological: She is alert and oriented to person, place, and time. No cranial nerve deficit.   Skin: Skin is warm and dry.   Psychiatric: She has a normal mood and affect. Her behavior is normal. Judgment and thought content normal.   Vitals reviewed.        ECG 12 Lead  Date/Time: 8/28/2017 11:40 AM  Performed by: BRENNAN ROGERS  Authorized by: BRENNAN ROGERS   Comparison: compared with previous ECG   Similar to previous ECG  Rhythm: sinus rhythm  Conduction: conduction normal  QRS axis: right  Other findings: LAE and PRWP  Other findings comments: [facility]  Clinical impression: abnormal ECG              Assessment:       Diagnosis Plan   1. Chronic diastolic congestive heart failure     2. Pulmonary hypertension     3. PFO (patent foramen ovale)     4. Paradoxical embolism     5. Essential hypertension            Plan:       She has chronic diastolic CHF and secondary PHTN (due to diastolic dysfunction and her prior PE).  She had VTE with paradoxical embolus to the leg and is s/p thrombectomy.  She had an IVC filter which was been removed.  She is on rivaroxaban as it was too difficult to maintain a therapeutic INR on warfarin.      She is doing well on her current regimen. She has excellent BP control.  I think leg wrapping will help her greatly.    Sincerely,       Brennan Rogers MD

## 2017-08-29 ENCOUNTER — HOSPITAL ENCOUNTER (OUTPATIENT)
Dept: OCCUPATIONAL THERAPY | Facility: HOSPITAL | Age: 57
Setting detail: THERAPIES SERIES
Discharge: HOME | End: 2017-08-29

## 2017-08-29 DIAGNOSIS — I89.0 LYMPHEDEMA OF BOTH LOWER EXTREMITIES: Primary | ICD-10-CM

## 2017-08-29 DIAGNOSIS — Z74.09 DECREASED MOBILITY AND ENDURANCE: ICD-10-CM

## 2017-08-29 DIAGNOSIS — R60.9 LIPOEDEMA: ICD-10-CM

## 2017-08-29 PROCEDURE — 97140 MANUAL THERAPY 1/> REGIONS: CPT

## 2017-08-30 ENCOUNTER — HOSPITAL ENCOUNTER (OUTPATIENT)
Dept: OCCUPATIONAL THERAPY | Facility: HOSPITAL | Age: 57
Setting detail: THERAPIES SERIES
Discharge: HOME | End: 2017-08-30

## 2017-08-30 DIAGNOSIS — I89.0 LYMPHEDEMA OF BOTH LOWER EXTREMITIES: Primary | ICD-10-CM

## 2017-08-30 DIAGNOSIS — R60.9 LIPOEDEMA: ICD-10-CM

## 2017-08-30 DIAGNOSIS — Z74.09 DECREASED MOBILITY AND ENDURANCE: ICD-10-CM

## 2017-08-30 PROCEDURE — 97535 SELF CARE MNGMENT TRAINING: CPT

## 2017-09-01 ENCOUNTER — APPOINTMENT (OUTPATIENT)
Dept: OCCUPATIONAL THERAPY | Facility: HOSPITAL | Age: 57
End: 2017-09-01

## 2017-09-01 LAB
MYCOBACTERIUM SPEC CULT: NORMAL
NIGHT BLUE STAIN TISS: NORMAL

## 2017-09-05 ENCOUNTER — HOSPITAL ENCOUNTER (OUTPATIENT)
Dept: OCCUPATIONAL THERAPY | Facility: HOSPITAL | Age: 57
Setting detail: THERAPIES SERIES
Discharge: HOME | End: 2017-09-05

## 2017-09-05 DIAGNOSIS — R60.9 LIPOEDEMA: ICD-10-CM

## 2017-09-05 DIAGNOSIS — I89.0 LYMPHEDEMA OF BOTH LOWER EXTREMITIES: Primary | ICD-10-CM

## 2017-09-05 DIAGNOSIS — L03.116 CELLULITIS OF LEFT LOWER EXTREMITY: ICD-10-CM

## 2017-09-05 DIAGNOSIS — Z74.09 DECREASED MOBILITY AND ENDURANCE: ICD-10-CM

## 2017-09-05 PROCEDURE — 97140 MANUAL THERAPY 1/> REGIONS: CPT

## 2017-09-05 NOTE — THERAPY TREATMENT NOTE
Outpatient Occupational Therapy Lymphedema Treatment Note  [facility]     Patient Name: Emely Solomon  : 1960  MRN: [id number]  Today's Date: 2017      Visit Date: 2017    Patient Active Problem List   Diagnosis   • Chronic diastolic congestive heart failure   • PFO (patent foramen ovale)   • Paradoxical embolism   • Hypertension   • Pulmonary hypertension        Past Medical History:   Diagnosis Date   • Chronic diastolic congestive heart failure    • Deep venous thrombosis    • Hypertension    • Lipoedema    • Lymphedema    • Morbid obesity    • Paradoxical embolism     to the LLE due to DVT/PE and PFO   • PFO (patent foramen ovale)    • Pulmonary embolism    • Pulmonary hypertension     multifactorial (dCHF, obesity/ARIEL, hx PE), mild by echo 2016        Past Surgical History:   Procedure Laterality Date   • BRONCHOSCOPY N/A 2017    Procedure: BRONCHOSCOPY with wash;  Surgeon: Caleb Garcia MD;  Location: [facility] ENDOSCOPY;  Service:    • DILATATION AND CURETTAGE  2011   • VENA CAVA FILTER PLACEMENT      Inferior Vena Cava Filter Placement w/Fluorosc angiogr guidance         Visit Dx:      ICD-10-CM ICD-9-CM   1. Lymphedema of both lower extremities I89.0 457.1   2. Lipoedema R60.9 782.3   3. Decreased mobility and endurance Z74.09 780.99   4. Cellulitis of left lower extremity L03.116 682.6             Lymphedema       17 1300          Subjective Comments    Subjective Comments --   No pain c/o at present BLE's. Went to see MD last week.   -CW      Subjective Pain    Able to rate subjective pain? yes  -CW      Pre-Treatment Pain Level 0  -CW      Post-Treatment Pain Level 0  -CW      Pain Assessment    Pain Assessment No/denies pain  -CW      Skin Changes/Observations    Location/Assessment Lower Extremity  -CW      Lower Extremity Conditions bilateral:;shiny;scab(s);inflamed;fragile  -CW      Lower Extremity Color/Pigment left:  -CW      Lower Extremity Skin Details --    LLE skin not as red and inflamed.   -CW      Skin Observations Comment --   Rash behind L knee red and irritated.   -CW      Manual Lymphatic Drainage    Manual Lymphatic Drainage --   Neck, axilla, abd., groin, RLE. Focused on prox. and RLE.   -CW      Compression/Skin Care    Compression/Skin Care skin care;wrapping location;bandaging;compression garment  -CW      Skin Care moisturizing lotion applied  -CW      Wrapping Location lower extremity  -CW      Wrapping Location LE right:;ankle;knee   wrapped R ankle to knee so her shoes fit.   -CW      Wrapping Comments Did not bandage LLE due to recent infection.   -CW      Bandage Layers cotton liner;soft foam- 1/2 inch;short-stretch bandages (comment size/quantity)  -CW      Bandaging Technique circumferential/spiral;moderate compression  -CW      Compression Garment Comments Pt. has garments from home, but do not fit per pt.   -CW      Bandaging Comments RLE-K1, 2- Rosidal soft, 1- 8cm. 2- 10 cm. Rosidal.   -CW        User Key  (r) = Recorded By, (t) = Taken By, (c) = Cosigned By    Initials Name Provider Type    RADHA Acosta Occupational Therapist                        OT Assessment/Plan       09/05/17 1351       OT Assessment    Assessment Comments Pt. went to see MD last week and has  LLE cellulitis. Pt. reports she is taking antibiotics and is elevating her let. No pain c/o. Applied compression bandages RLE ankle to knee (so pt. can wear her shoe). Pt. brought in bandages from home. Her LLE is not a warm/inflamed as last week. Reviewed precautions and wearing schedule for the bandages.   -CW     OT Plan    OT Plan Comments Plan to cont. tx.   -CW       User Key  (r) = Recorded By, (t) = Taken By, (c) = Cosigned By    Initials Name Provider Type    RADHA Acosta Occupational Therapist                                Therapy Education       09/05/17 4589          Therapy Education    Given Bandaging/dressing change   Reviewed skin care, bandage  precautions and wearing schedule.   -CW      Program New  -CW      How Provided Verbal;Demonstration  -CW      Provided to Patient  -CW      Level of Understanding Verbalized  -CW        User Key  (r) = Recorded By, (t) = Taken By, (c) = Cosigned By    Initials Name Provider Type    CW Colleen Hidalgo, OTR Occupational Therapist                  Time Calculation:   OT Start Time: 0912  OT Stop Time: 1006  OT Time Calculation (min): 54 min       Therapy Charges for Today     Code Description Service Date Service Provider Modifiers Qty    [id number]  OT MANUAL THERAPY EA 15 MIN 9/5/2017 RADHA Miller GO 4                      RADHA Miller  9/5/2017

## 2017-09-06 ENCOUNTER — HOSPITAL ENCOUNTER (OUTPATIENT)
Dept: OCCUPATIONAL THERAPY | Facility: HOSPITAL | Age: 57
Setting detail: THERAPIES SERIES
Discharge: HOME | End: 2017-09-06

## 2017-09-06 DIAGNOSIS — Z74.09 DECREASED MOBILITY AND ENDURANCE: ICD-10-CM

## 2017-09-06 DIAGNOSIS — R60.9 LIPOEDEMA: ICD-10-CM

## 2017-09-06 DIAGNOSIS — I89.0 LYMPHEDEMA OF BOTH LOWER EXTREMITIES: Primary | ICD-10-CM

## 2017-09-06 DIAGNOSIS — L03.116 CELLULITIS OF LEFT LOWER EXTREMITY: ICD-10-CM

## 2017-09-06 PROCEDURE — 97140 MANUAL THERAPY 1/> REGIONS: CPT

## 2017-09-06 NOTE — THERAPY TREATMENT NOTE
Outpatient Occupational Therapy Lymphedema Treatment Note  [facility]     Patient Name: Emely Solomon  : 1960  MRN: [id number]  Today's Date: 2017      Visit Date: 2017    Patient Active Problem List   Diagnosis   • Chronic diastolic congestive heart failure   • PFO (patent foramen ovale)   • Paradoxical embolism   • Hypertension   • Pulmonary hypertension        Past Medical History:   Diagnosis Date   • Chronic diastolic congestive heart failure    • Deep venous thrombosis    • Hypertension    • Lipoedema    • Lymphedema    • Morbid obesity    • Paradoxical embolism     to the LLE due to DVT/PE and PFO   • PFO (patent foramen ovale)    • Pulmonary embolism    • Pulmonary hypertension     multifactorial (dCHF, obesity/ARIEL, hx PE), mild by echo 2016        Past Surgical History:   Procedure Laterality Date   • BRONCHOSCOPY N/A 2017    Procedure: BRONCHOSCOPY with wash;  Surgeon: Caleb Garcia MD;  Location: [facility] ENDOSCOPY;  Service:    • DILATATION AND CURETTAGE  2011   • VENA CAVA FILTER PLACEMENT      Inferior Vena Cava Filter Placement w/Fluorosc angiogr guidance         Visit Dx:      ICD-10-CM ICD-9-CM   1. Lymphedema of both lower extremities I89.0 457.1   2. Lipoedema R60.9 782.3   3. Decreased mobility and endurance Z74.09 780.99   4. Cellulitis of left lower extremity L03.116 682.6             Lymphedema       17 1300          Subjective Comments    Subjective Comments Bandages did not slide down per pt. No pain c/o.   -CW      Subjective Pain    Able to rate subjective pain? yes  -CW      Pre-Treatment Pain Level 0  -CW      Post-Treatment Pain Level 0  -CW      Pain Assessment    Pain Assessment No/denies pain  -CW      Skin Changes/Observations    Location/Assessment Lower Extremity  -CW      Lower Extremity Conditions bilateral:;shiny;scab(s);inflamed;fragile  -CW      Lower Extremity Color/Pigment left:  -CW      Lower Extremity Skin Details --   LLE skin not  as red or inflamed.   -CW      Skin Observations Comment --   Rash behind L knee red and irritated.   -CW      Manual Lymphatic Drainage    Manual Lymphatic Drainage --   Neck, axilla, abd., groin, RLE. Focused on prox. and RLE.   -CW      Manual Lymphatic Drainage Comments --   No pain during MLD.   -CW      Compression/Skin Care    Compression/Skin Care skin care;wrapping location;bandaging;compression garment  -CW      Skin Care moisturizing lotion applied  -CW      Wrapping Location lower extremity  -CW      Wrapping Location LE right:;ankle;knee   wrapped R ankle to knee so her shoes fit.   -CW      Wrapping Comments Bandages were removed at home.   -CW      Bandage Layers cotton liner;soft foam- 1/2 inch;short-stretch bandages (comment size/quantity)  -CW      Bandaging Technique circumferential/spiral;moderate compression  -CW      Bandaging Comments RLE-K1, 2- Rosidal soft, 1- 8cm. 3- 10 cm. Rosidal.   -CW        User Key  (r) = Recorded By, (t) = Taken By, (c) = Cosigned By    Initials Name Provider Type    CW Colleen Hidalgo, OTR Occupational Therapist                        OT Assessment/Plan       09/06/17 1325 09/05/17 1351    OT Assessment    Assessment Comments Pt. tolerated the compression bandages well last night. No pain c/o at present. Applied the bandages RLE with moderate compression and added another Rosidal bandage.  Pt. to see MD tomorrow regarding cellulits LLE. Skin LLE not as red or inflamed today and is intact with no weeping at this time. Reviewed bandage precautions and wearing schedule.   -CW Pt. went to see MD last week and has  LLE cellulitis. Pt. reports she is taking antibiotics and is elevating her let. No pain c/o. Applied compression bandages RLE ankle to knee (so pt. can wear her shoe). Pt. brought in bandages from home. Her LLE is not a warm/inflamed as last week. Reviewed precautions and wearing schedule for the bandages.   -CW    OT Plan    OT Plan Comments Plan to cont tx.    -CW Plan to cont. tx.   -CW      User Key  (r) = Recorded By, (t) = Taken By, (c) = Cosigned By    Initials Name Provider Type    RADHA Acosta Occupational Therapist                                Therapy Education       09/06/17 1328 09/05/17 1354       Therapy Education    Given Bandaging/dressing change;Edema management   Reviewed bandage precautions and wearing schedule. Discussed skin care and importance of cleaning between skin folds..  -CW Bandaging/dressing change   Reviewed skin care, bandage precautions and wearing schedule.   -CW     Program Reinforced  -CW New  -CW     How Provided Verbal;Demonstration  -CW Verbal;Demonstration  -CW     Provided to Patient  -CW Patient  -CW     Level of Understanding Verbalized  -CW Verbalized  -CW       User Key  (r) = Recorded By, (t) = Taken By, (c) = Cosigned By    Initials Name Provider Type    RADHA Acosta Occupational Therapist                  Time Calculation:   OT Start Time: 0856  OT Stop Time: 1000  OT Time Calculation (min): 64 min       Therapy Charges for Today     Code Description Service Date Service Provider Modifiers Qty    44824242818  OT MANUAL THERAPY EA 15 MIN 9/6/2017 RADHA Miller GO 4                      RADHA Miller  9/6/2017

## 2017-09-08 ENCOUNTER — HOSPITAL ENCOUNTER (OUTPATIENT)
Dept: OCCUPATIONAL THERAPY | Facility: HOSPITAL | Age: 57
Setting detail: THERAPIES SERIES
Discharge: HOME | End: 2017-09-08

## 2017-09-08 DIAGNOSIS — Z74.09 DECREASED MOBILITY AND ENDURANCE: ICD-10-CM

## 2017-09-08 DIAGNOSIS — R60.9 LIPOEDEMA: ICD-10-CM

## 2017-09-08 DIAGNOSIS — L03.116 CELLULITIS OF LEFT LOWER EXTREMITY: ICD-10-CM

## 2017-09-08 DIAGNOSIS — I89.0 LYMPHEDEMA OF BOTH LOWER EXTREMITIES: Primary | ICD-10-CM

## 2017-09-08 PROCEDURE — 97140 MANUAL THERAPY 1/> REGIONS: CPT

## 2017-09-08 NOTE — THERAPY PROGRESS REPORT/RE-CERT
Outpatient Occupational Therapy Lymphedema Progress Note  [facility]     Patient Name: Emely Solomon  : 1960  MRN: [id number]  Today's Date: 2017      Visit Date: 2017    Patient Active Problem List   Diagnosis   • Chronic diastolic congestive heart failure   • PFO (patent foramen ovale)   • Paradoxical embolism   • Hypertension   • Pulmonary hypertension        Past Medical History:   Diagnosis Date   • Chronic diastolic congestive heart failure    • Deep venous thrombosis    • Hypertension    • Lipoedema    • Lymphedema    • Morbid obesity    • Paradoxical embolism     to the LLE due to DVT/PE and PFO   • PFO (patent foramen ovale)    • Pulmonary embolism    • Pulmonary hypertension     multifactorial (dCHF, obesity/ARIEL, hx PE), mild by echo 2016        Past Surgical History:   Procedure Laterality Date   • BRONCHOSCOPY N/A 2017    Procedure: BRONCHOSCOPY with wash;  Surgeon: Caleb Garcia MD;  Location: [facility] ENDOSCOPY;  Service:    • DILATATION AND CURETTAGE  2011   • VENA CAVA FILTER PLACEMENT      Inferior Vena Cava Filter Placement w/Fluorosc angiogr guidance         Visit Dx:      ICD-10-CM ICD-9-CM   1. Lymphedema of both lower extremities I89.0 457.1   2. Lipoedema R60.9 782.3   3. Decreased mobility and endurance Z74.09 780.99   4. Cellulitis of left lower extremity L03.116 682.6             Lymphedema       17 1300          Subjective Comments    Subjective Comments No pain c/o at present. Pt. went to see MD yesterday.   -CW      Subjective Pain    Able to rate subjective pain? yes  -CW      Pre-Treatment Pain Level 0  -CW      Post-Treatment Pain Level 0  -CW      Pain Assessment    Pain Assessment No/denies pain  -CW      Skin Changes/Observations    Location/Assessment Lower Extremity  -CW      Lower Extremity Conditions bilateral:;shiny;scab(s);inflamed;fragile  -CW      Lower Extremity Color/Pigment left:  -CW      Lower Extremity Skin Details rash behind L  knee red/irritated.    LLE redness diminished. No weeping.   -CW      Skin Observations Comment --   Very small blister noted R lower leg toward ant. ankle.   -CW      Manual Lymphatic Drainage    Manual Lymphatic Drainage --   Neck, axilla, abd., groin, RLE. Focused on prox. and RLE.   -CW      Manual Lymphatic Drainage Comments No pain during MLD.   -CW      Compression/Skin Care    Compression/Skin Care skin care;wrapping location;bandaging;compression garment  -CW      Skin Care moisturizing lotion applied  -CW      Wrapping Location lower extremity  -CW      Wrapping Location LE bilateral:;ankle;knee   wrapped R ankle to knee so her shoes fit.   -CW      Wrapping Comments Bandages were removed at home.   -CW      Bandage Layers cotton liner;soft foam- 1/2 inch;short-stretch bandages (comment size/quantity)  -CW      Bandaging Technique circumferential/spiral;moderate compression  -CW      Bandaging Comments BLE-K1, 2- Rosidal soft, 1- 8cm. 3- 10 cm. Rosidal. Artiflex 10 cm. to ankles.   -CW        User Key  (r) = Recorded By, (t) = Taken By, (c) = Cosigned By    Initials Name Provider Type    RADHA Acosta Occupational Therapist                        OT Assessment/Plan       09/08/17 1342       OT Assessment    Assessment Comments Pt. is tolerating the compression bandages well. She is able to wear the bandages all night and remove for bathing. Reviewed how to apply the bandages at home with hopefully assistance from her spouse. No  pain c/o. LLE cellulitis-skin not as red or inflamed and is intact with no drainage. Applied the bandage to BLE's today and reviewed precautions. Instructed HEP and issued handouts.     -CW     OT Plan    OT Plan Comments Plan to cont. tx.   -CW       User Key  (r) = Recorded By, (t) = Taken By, (c) = Cosigned By    Initials Name Provider Type    RADHA Acosta Occupational Therapist                            OT Goals       09/08/17 1300       OT Short Term Goals     STG Date to Achieve 09/22/17  -CW     STG 1 Patient independent and compliant with self-wrapping techniques of compression bandages with assistance of caregiver as needed for improved self-management of condition.  -CW     STG 1 Progress Progressing  -CW     STG 1 Progress Comments Instructed pt. how to apply the bandages and reviewed skin care.   -CW     STG 2 Patient will demonstrate decreased net edema of bilateral lower extremities >/= 10-20cm  for decrease in edema, symptoms, decreased risk of infection and improved skin care/transition to self-care of condition.   -CW     STG 2 Progress Progressing  -CW     Long Term Goals    LTG Date to Achieve 10/06/17  -CW     LTG 1 Patient will demonstrate decreased net edema of bilateral lower extremities >/= 21--40cm  for decrease in edema, symptoms, decreased risk of infection and improved skin care/transition to self-care of condition.   -CW     LTG 1 Progress Progressing  -CW     LTG 2 Patient independent and compliant with home exercise program (as able) focused on range of motion, flexibility to improve lymphatic flow and LE discomfort.  -CW     LTG 2 Progress New  -CW     LTG 3  Patient/family/caregivers independent and compliant with self-care techniques for self-management of condition.  -CW     LTG 3 Progress New  -CW     LTG 4 Patient independent and compliant with use and care of compression garments with assistance of a caregiver as needed to promote self-care independence.   -CW     LTG 4 Progress New  -CW       User Key  (r) = Recorded By, (t) = Taken By, (c) = Cosigned By    Initials Name Provider Type    CW RADHA Miller Occupational Therapist                Therapy Education       09/08/17 4805          Therapy Education    Given Bandaging/dressing change;HEP;Edema management   Reviewed HEP, bandage precautions, how to apply the bandages and wearing schedule.   -CW      Program Reinforced  -CW      How Provided Verbal;Demonstration;Written  -CW       Provided to Patient  -CW      Level of Understanding Verbalized  -CW        User Key  (r) = Recorded By, (t) = Taken By, (c) = Cosigned By    Initials Name Provider Type    CW RADHA Miller Occupational Therapist                  Time Calculation:   OT Start Time: 0903  OT Stop Time: 1005  OT Time Calculation (min): 62 min       Therapy Charges for Today     Code Description Service Date Service Provider Modifiers Qty    [id number] HC OT MANUAL THERAPY EA 15 MIN 9/8/2017 RADHA Miller GO 4                      RADHA Miller  9/8/2017

## 2017-09-11 ENCOUNTER — HOSPITAL ENCOUNTER (OUTPATIENT)
Dept: OCCUPATIONAL THERAPY | Facility: HOSPITAL | Age: 57
Setting detail: THERAPIES SERIES
Discharge: HOME | End: 2017-09-11

## 2017-09-11 DIAGNOSIS — L03.116 CELLULITIS OF LEFT LOWER EXTREMITY: ICD-10-CM

## 2017-09-11 DIAGNOSIS — R60.9 LIPOEDEMA: ICD-10-CM

## 2017-09-11 DIAGNOSIS — Z74.09 DECREASED MOBILITY AND ENDURANCE: ICD-10-CM

## 2017-09-11 DIAGNOSIS — I89.0 LYMPHEDEMA OF BOTH LOWER EXTREMITIES: Primary | ICD-10-CM

## 2017-09-11 PROCEDURE — 97140 MANUAL THERAPY 1/> REGIONS: CPT

## 2017-09-11 NOTE — THERAPY TREATMENT NOTE
"Outpatient Occupational Therapy Lymphedema Treatment Note  [facility]     Patient Name: Emely Solomon  : 1960  MRN: [id number]  Today's Date: 2017      Visit Date: 2017    Patient Active Problem List   Diagnosis   • Chronic diastolic congestive heart failure   • PFO (patent foramen ovale)   • Paradoxical embolism   • Hypertension   • Pulmonary hypertension        Past Medical History:   Diagnosis Date   • Chronic diastolic congestive heart failure    • Deep venous thrombosis    • Hypertension    • Lipoedema    • Lymphedema    • Morbid obesity    • Paradoxical embolism     to the LLE due to DVT/PE and PFO   • PFO (patent foramen ovale)    • Pulmonary embolism    • Pulmonary hypertension     multifactorial (dCHF, obesity/ARIEL, hx PE), mild by echo 2016        Past Surgical History:   Procedure Laterality Date   • BRONCHOSCOPY N/A 2017    Procedure: BRONCHOSCOPY with wash;  Surgeon: Caleb Garcia MD;  Location: [facility] ENDOSCOPY;  Service:    • DILATATION AND CURETTAGE  2011   • VENA CAVA FILTER PLACEMENT      Inferior Vena Cava Filter Placement w/Fluorosc angiogr guidance         Visit Dx:      ICD-10-CM ICD-9-CM   1. Lymphedema of both lower extremities I89.0 457.1   2. Lipoedema R60.9 782.3   3. Decreased mobility and endurance Z74.09 780.99   4. Cellulitis of left lower extremity L03.116 682.6             Lymphedema       17 1000          Subjective Comments    Subjective Comments No pain c/o at present. Pt. reports bandages were \"sort of ok\" over the weekend.   -CW      Subjective Pain    Able to rate subjective pain? yes  -CW      Pre-Treatment Pain Level 0  -CW      Post-Treatment Pain Level 0  -CW      Pain Assessment    Pain Assessment No/denies pain  -CW      Skin Changes/Observations    Location/Assessment Lower Extremity  -CW      Lower Extremity Conditions bilateral:;shiny;scab(s);inflamed;fragile  -CW      Lower Extremity Color/Pigment left:  -CW      Lower Extremity " Skin Details rash behind L knee not as irritated.   -CW      Skin Observations Comment --   No weeping or drainage. Blister improved-skin patch.   -CW      Manual Lymphatic Drainage    Manual Lymphatic Drainage --   Neck, axilla, abd., groin, RLE. Focused on prox. and RLE.   -CW      Manual Lymphatic Drainage Comments No pain during MLD.   -CW      Compression/Skin Care    Compression/Skin Care skin care;wrapping location;bandaging;compression garment  -CW      Skin Care moisturizing lotion applied   zinc oxide to skin folds behind L knee.   -CW      Wrapping Location lower extremity  -CW      Wrapping Location LE bilateral:;ankle;knee   wrapped R ankle to knee so her shoes fit.   -CW      Wrapping Comments Bandages were removed at home.   -CW      Bandage Layers cotton liner;soft foam- 1/2 inch;short-stretch bandages (comment size/quantity)  -CW      Bandaging Technique circumferential/spiral;moderate compression  -CW      Bandaging Comments BLE-K1, 2- Rosidal soft, 1- 8cm. 3- 10 cm. Rosidal.   -CW        User Key  (r) = Recorded By, (t) = Taken By, (c) = Cosigned By    Initials Name Provider Type    CW Colleen Hidalgo OTR Occupational Therapist                        OT Assessment/Plan       09/11/17 1033       OT Assessment    Assessment Comments  Pt. attempted to apply the bandages over the weekend herself. Reviewed the sequence on how to apply. RLE blister improved with no drainage. Pt. tolerated the compression bandages LLE. Added zinc oxide to skin folds behind L knee. Reviewed skin care with pt. and precautions for bandages.   -CW     OT Plan    OT Plan Comments Plan to cont. tx.   -CW       User Key  (r) = Recorded By, (t) = Taken By, (c) = Cosigned By    Initials Name Provider Type    GIULIANA Hidalgo OTR Occupational Therapist                                Therapy Education       09/11/17 1044          Therapy Education    Given Bandaging/dressing change;Edema management   Reviewed skin care, bandage  precautions and wearing schedule.   -CW      Program Reinforced  -CW      How Provided Verbal  -CW      Provided to Patient  -CW      Level of Understanding Verbalized  -CW        User Key  (r) = Recorded By, (t) = Taken By, (c) = Cosigned By    Initials Name Provider Type    CW Colleen Hidalgo OTR Occupational Therapist                  Time Calculation:   OT Start Time: 0913  OT Stop Time: 1013  OT Time Calculation (min): 60 min       Therapy Charges for Today     Code Description Service Date Service Provider Modifiers Qty    [id number]  OT MANUAL THERAPY EA 15 MIN 9/11/2017 RADHA Miller GO 4                      RADHA Miller  9/11/2017

## 2017-09-12 ENCOUNTER — HOSPITAL ENCOUNTER (OUTPATIENT)
Dept: OCCUPATIONAL THERAPY | Facility: HOSPITAL | Age: 57
Setting detail: THERAPIES SERIES
Discharge: HOME | End: 2017-09-12

## 2017-09-12 DIAGNOSIS — Z74.09 DECREASED MOBILITY AND ENDURANCE: ICD-10-CM

## 2017-09-12 DIAGNOSIS — I89.0 LYMPHEDEMA OF BOTH LOWER EXTREMITIES: Primary | ICD-10-CM

## 2017-09-12 DIAGNOSIS — L03.116 CELLULITIS OF LEFT LOWER EXTREMITY: ICD-10-CM

## 2017-09-12 DIAGNOSIS — R60.9 LIPOEDEMA: ICD-10-CM

## 2017-09-12 PROCEDURE — 97140 MANUAL THERAPY 1/> REGIONS: CPT

## 2017-09-12 NOTE — THERAPY TREATMENT NOTE
Outpatient Occupational Therapy Lymphedema Treatment Note  [facility]     Patient Name: Emely Solomon  : 1960  MRN: [id number]  Today's Date: 2017      Visit Date: 2017    Patient Active Problem List   Diagnosis   • Chronic diastolic congestive heart failure   • PFO (patent foramen ovale)   • Paradoxical embolism   • Hypertension   • Pulmonary hypertension        Past Medical History:   Diagnosis Date   • Chronic diastolic congestive heart failure    • Deep venous thrombosis    • Hypertension    • Lipoedema    • Lymphedema    • Morbid obesity    • Paradoxical embolism     to the LLE due to DVT/PE and PFO   • PFO (patent foramen ovale)    • Pulmonary embolism    • Pulmonary hypertension     multifactorial (dCHF, obesity/ARIEL, hx PE), mild by echo 2016        Past Surgical History:   Procedure Laterality Date   • BRONCHOSCOPY N/A 2017    Procedure: BRONCHOSCOPY with wash;  Surgeon: Caleb Garcia MD;  Location: [facility] ENDOSCOPY;  Service:    • DILATATION AND CURETTAGE  2011   • VENA CAVA FILTER PLACEMENT      Inferior Vena Cava Filter Placement w/Fluorosc angiogr guidance         Visit Dx:      ICD-10-CM ICD-9-CM   1. Lymphedema of both lower extremities I89.0 457.1   2. Lipoedema R60.9 782.3   3. Decreased mobility and endurance Z74.09 780.99   4. Cellulitis of left lower extremity L03.116 682.6             Lymphedema       17 1000          Subjective Comments    Subjective Comments No pain at present, but had some discomfort BLE's last night with the bandages.   -CW      Subjective Pain    Able to rate subjective pain? yes  -CW      Pre-Treatment Pain Level 0  -CW      Post-Treatment Pain Level 0  -CW      Pain Assessment    Pain Assessment No/denies pain  -CW      Skin Changes/Observations    Location/Assessment Lower Extremity  -CW      Lower Extremity Conditions bilateral:;shiny;scab(s);inflamed;fragile  -CW      Lower Extremity Color/Pigment left:  -CW      Lower Extremity  Skin Details --   Pt. bruises easily.Skin behind L knee red.   -CW      Skin Observations Comment No weeping or drainage. Some red skin patches noted LLE. Blister R lower leg not draining.   -CW      Manual Lymphatic Drainage    Manual Lymphatic Drainage --   Neck, axilla, abd., groin, RLE. Focused on prox. and RLE.   -CW      Manual Lymphatic Drainage Comments No pain during MLD.   -CW      Compression/Skin Care    Compression/Skin Care skin care;wrapping location;bandaging;compression garment  -CW      Skin Care moisturizing lotion applied   zinc oxide to skin folds behind L knee.   -CW      Wrapping Location lower extremity  -CW      Wrapping Location LE bilateral:;ankle;knee   wrapped R ankle to knee so her shoes fit.   -CW      Wrapping Comments Bandages were removed at home.   -CW      Bandage Layers cotton liner;soft foam- 1/2 inch;short-stretch bandages (comment size/quantity)  -CW      Bandaging Technique circumferential/spiral;moderate compression  -CW      Compression Garment Comments --   Tried on size K tubigrip LLE/thigh for possible night wear.   -CW      Bandaging Comments BLE-K1, 2- Rosidal soft, 1- 8cm. 3- 10 cm. Rosidal.    Extra Artiflex to B ankles.   -CW        User Key  (r) = Recorded By, (t) = Taken By, (c) = Cosigned By    Initials Name Provider Type    GIULIANA Hidalgo OTR Occupational Therapist                        OT Assessment/Plan       09/12/17 1330       OT Assessment    Assessment Comments Pt. was able to wear the bandages all night, but had some discomfort. No pain c/o at present. RLE blister with no drainage. Red patches noted LLE. Applied zinc oxide to skin  folds behind L knee. Discussed skin care during bathing with regard to skin folds and bruising.  LLE skin same as far as redness. Skin softer LLE during MLD.   -CW     OT Plan    OT Plan Comments Plan to cont. tx.   -CW       User Key  (r) = Recorded By, (t) = Taken By, (c) = Cosigned By    Initials Name Provider Type    CW  RADHA Miller Occupational Therapist                                Therapy Education       09/12/17 7086          Therapy Education    Given Bandaging/dressing change;Edema management   Reviewed skin care, bandage precautions and wearing schedule.   -CW      Program Reinforced  -CW      How Provided Verbal;Demonstration  -CW      Provided to Patient  -CW      Level of Understanding Verbalized  -CW        User Key  (r) = Recorded By, (t) = Taken By, (c) = Cosigned By    Initials Name Provider Type    CW RADHA Miller Occupational Therapist                  Time Calculation:   OT Start Time: 0911  OT Stop Time: 1014  OT Time Calculation (min): 63 min       Therapy Charges for Today     Code Description Service Date Service Provider Modifiers Qty    64265337619 HC OT MANUAL THERAPY EA 15 MIN 9/12/2017 RADHA Miller GO 4                      RADHA Miller  9/12/2017

## 2017-09-13 ENCOUNTER — HOSPITAL ENCOUNTER (OUTPATIENT)
Dept: OCCUPATIONAL THERAPY | Facility: HOSPITAL | Age: 57
Setting detail: THERAPIES SERIES
Discharge: HOME | End: 2017-09-13

## 2017-09-13 DIAGNOSIS — R60.9 LIPOEDEMA: ICD-10-CM

## 2017-09-13 DIAGNOSIS — I89.0 LYMPHEDEMA OF BOTH LOWER EXTREMITIES: Primary | ICD-10-CM

## 2017-09-13 DIAGNOSIS — Z74.09 DECREASED MOBILITY AND ENDURANCE: ICD-10-CM

## 2017-09-13 DIAGNOSIS — L03.116 CELLULITIS OF LEFT LOWER EXTREMITY: ICD-10-CM

## 2017-09-13 PROCEDURE — 97140 MANUAL THERAPY 1/> REGIONS: CPT

## 2017-09-13 NOTE — THERAPY TREATMENT NOTE
Outpatient Occupational Therapy Lymphedema Treatment Note  [facility]     Patient Name: Emely Solomon  : 1960  MRN: [id number]  Today's Date: 2017      Visit Date: 2017    Patient Active Problem List   Diagnosis   • Chronic diastolic congestive heart failure   • PFO (patent foramen ovale)   • Paradoxical embolism   • Hypertension   • Pulmonary hypertension        Past Medical History:   Diagnosis Date   • Chronic diastolic congestive heart failure    • Deep venous thrombosis    • Hypertension    • Lipoedema    • Lymphedema    • Morbid obesity    • Paradoxical embolism     to the LLE due to DVT/PE and PFO   • PFO (patent foramen ovale)    • Pulmonary embolism    • Pulmonary hypertension     multifactorial (dCHF, obesity/ARIEL, hx PE), mild by echo 2016        Past Surgical History:   Procedure Laterality Date   • BRONCHOSCOPY N/A 2017    Procedure: BRONCHOSCOPY with wash;  Surgeon: Caleb Garcia MD;  Location: [facility] ENDOSCOPY;  Service:    • DILATATION AND CURETTAGE  2011   • VENA CAVA FILTER PLACEMENT      Inferior Vena Cava Filter Placement w/Fluorosc angiogr guidance         Visit Dx:      ICD-10-CM ICD-9-CM   1. Lymphedema of both lower extremities I89.0 457.1   2. Lipoedema R60.9 782.3   3. Decreased mobility and endurance Z74.09 780.99   4. Cellulitis of left lower extremity L03.116 682.6             Lymphedema       17 1300          Subjective Comments    Subjective Comments Some discomfort yesterday at night, but was able to wear the bandages at work.   -CW      Subjective Pain    Able to rate subjective pain? yes  -CW      Pre-Treatment Pain Level 0  -CW      Post-Treatment Pain Level 0  -CW      Pain Assessment    Pain Assessment No/denies pain  -CW      Skin Changes/Observations    Location/Assessment Lower Extremity  -CW      Lower Extremity Conditions bilateral:;shiny;scab(s);inflamed;fragile  -CW      Lower Extremity Color/Pigment left:   skin not as inflammed.    -CW      Skin Observations Comment No weeping or drainage. Some red skin patches noted LLE. Blister R lower leg not draining.   -CW      Lymphedema Measurements    Measurement Type(s) Circumferential  -CW      Circumferential Areas Lower extremities  -CW      LLE Circumferential (cm)    Measurement Location 1 10 cm above knee  -CW      Left 1 87.8 cm  -CW      Measurement Location 2 Knee (popliteal crease)  -CW      Left 2 74.2 cm  -CW      Measurement Location 3 10 cm below knee  -CW      Left 3 76 cm  -CW      Measurement Location 4 20 cm below knee  -CW      Left 4 54 cm  -CW      Measurement Location 5 30 cm below kne  -CW      Left 5 33.3 cm  -CW      Measurement Location 6 Ankle  -CW      Left 6 33 cm  -CW      Measurement Location 7 Mid foot  -CW      Left 7 23 cm  -CW      Measurement Location 8 Total  -CW      Left 8 381.3 cm  -CW      RLE Circumferential (cm)    Measurement Location 1 10 cm above knee  -CW      Right 1 86.5 cm  -CW      Measurement Location 2 Knee (popliteal crease)  -CW      Right 2 78.7 cm  -CW      Measurement Location 3 10 cm below knee  -CW      Right 3 76.5 cm  -CW      Measurement Location 4 20 cm below knee  -CW      Right 4 59 cm  -CW      Measurement Location 5 30 cm below kne  -CW      Right 5 40 cm  -CW      Measurement Location 6 Ankle  -CW      Right 6 33 cm  -CW      Measurement Location 7 Mid foot  -CW      Right 7 23.3 cm  -CW      Measurement Location 8 Total  -CW      Right 8 397 cm  -CW      Manual Lymphatic Drainage    Manual Lymphatic Drainage --   Neck, axilla, abd., groin, RLE. Focused on prox. and RLE.   -CW      Manual Lymphatic Drainage Comments No pain during MLD.   -CW      Compression/Skin Care    Compression/Skin Care skin care;wrapping location;bandaging;compression garment  -CW      Skin Care moisturizing lotion applied   zinc oxide to skin folds behind L knee.   -CW      Wrapping Location lower extremity  -CW      Wrapping Location LE bilateral:;ankle;knee    wrapped R ankle to knee so her shoes fit.   -CW      Wrapping Comments Bandages were removed at home.   -CW      Bandage Layers cotton liner;soft foam- 1/2 inch;short-stretch bandages (comment size/quantity)  -CW      Bandaging Technique circumferential/spiral;moderate compression  -CW      Bandaging Comments BLE-K1, 2- Rosidal soft, 1- 8cm. 3- 10 cm. Rosidal. Extra Artiflex to B ankle area.   -CW        User Key  (r) = Recorded By, (t) = Taken By, (c) = Cosigned By    Initials Name Provider Type    RADHA Acosta Occupational Therapist                        OT Assessment/Plan       09/13/17 1347       OT Assessment    Assessment Comments Measurements taken. See flow sheet. Total circumference .0 cm. and .3 cm. (4.6 cm. R and 41.9 cm. L net decrease). Pt. was able to wear the bandages at work, but had some discomfort at night BLE's. No pain at present. Added  padding around B ankles for comfort. No drainage or weeping wounds. Reviewed progress and poc. Is ambulating with the bandages on, but overall endurance/mobility is decreased.   -CW     OT Plan    OT Plan Comments Plan to cont. tx.   -CW       User Key  (r) = Recorded By, (t) = Taken By, (c) = Cosigned By    Initials Name Provider Type    RADHA Acosta Occupational Therapist                            OT Goals       09/13/17 1300       OT Short Term Goals    STG Date to Achieve 09/22/17  -CW     STG 1 Patient independent and compliant with self-wrapping techniques of compression bandages with assistance of caregiver as needed for improved self-management of condition.  -CW     STG 1 Progress Progressing  -CW     STG 2 Patient will demonstrate decreased net edema of bilateral lower extremities >/= 10-20cm  for decrease in edema, symptoms, decreased risk of infection and improved skin care/transition to self-care of condition.   -CW     STG 2 Progress Progressing  -CW     STG 2 Progress Comments Total circumference .0 cm. and  .3 cm.   -CW     Long Term Goals    LTG Date to Achieve 10/06/17  -CW     LTG 1 Patient will demonstrate decreased net edema of bilateral lower extremities >/= 21--40cm  for decrease in edema, symptoms, decreased risk of infection and improved skin care/transition to self-care of condition.   -CW     LTG 1 Progress Progressing  -CW     LTG 2 Patient independent and compliant with home exercise program (as able) focused on range of motion, flexibility to improve lymphatic flow and LE discomfort.  -CW     LTG 2 Progress Progressing  -CW     LTG 3  Patient/family/caregivers independent and compliant with self-care techniques for self-management of condition.  -CW     LTG 3 Progress New  -CW     LTG 4 Patient independent and compliant with use and care of compression garments with assistance of a caregiver as needed to promote self-care independence.   -CW     LTG 4 Progress New  -CW     Time Calculation    OT Goal Re-Cert Due Date 12/06/17  -CW       User Key  (r) = Recorded By, (t) = Taken By, (c) = Cosigned By    Initials Name Provider Type    RADHA Acosta Occupational Therapist                Therapy Education       09/13/17 1351          Therapy Education    Given Bandaging/dressing change;Edema management   Reviewed progress, poc, bandage precautions and wearing schedule.   -CW      Program Reinforced  -CW      How Provided Verbal  -CW      Provided to Patient  -CW      Level of Understanding Verbalized  -CW        User Key  (r) = Recorded By, (t) = Taken By, (c) = Cosigned By    Initials Name Provider Type    RADHA Acosta Occupational Therapist                  Time Calculation:   OT Start Time: 0905  OT Stop Time: 1007  OT Time Calculation (min): 62 min       Therapy Charges for Today     Code Description Service Date Service Provider Modifiers Qty    99877367974  OT MANUAL THERAPY EA 15 MIN 9/13/2017 RADHA Miller GO 4                      RADHA Miller  9/13/2017

## 2017-09-15 ENCOUNTER — HOSPITAL ENCOUNTER (OUTPATIENT)
Dept: OCCUPATIONAL THERAPY | Facility: HOSPITAL | Age: 57
Setting detail: THERAPIES SERIES
Discharge: HOME | End: 2017-09-15

## 2017-09-15 DIAGNOSIS — Z74.09 DECREASED MOBILITY AND ENDURANCE: ICD-10-CM

## 2017-09-15 DIAGNOSIS — I89.0 LYMPHEDEMA OF BOTH LOWER EXTREMITIES: Primary | ICD-10-CM

## 2017-09-15 DIAGNOSIS — L03.116 CELLULITIS OF LEFT LOWER EXTREMITY: ICD-10-CM

## 2017-09-15 DIAGNOSIS — R60.9 LIPOEDEMA: ICD-10-CM

## 2017-09-15 PROCEDURE — 97140 MANUAL THERAPY 1/> REGIONS: CPT

## 2017-09-15 NOTE — THERAPY TREATMENT NOTE
Outpatient Occupational Therapy Lymphedema Treatment Note  [facility]     Patient Name: Emely Solomon  : 1960  MRN: [id number]  Today's Date: 9/15/2017      Visit Date: 09/15/2017    Patient Active Problem List   Diagnosis   • Chronic diastolic congestive heart failure   • PFO (patent foramen ovale)   • Paradoxical embolism   • Hypertension   • Pulmonary hypertension        Past Medical History:   Diagnosis Date   • Chronic diastolic congestive heart failure    • Deep venous thrombosis    • Hypertension    • Lipoedema    • Lymphedema    • Morbid obesity    • Paradoxical embolism     to the LLE due to DVT/PE and PFO   • PFO (patent foramen ovale)    • Pulmonary embolism    • Pulmonary hypertension     multifactorial (dCHF, obesity/ARIEL, hx PE), mild by echo 2016        Past Surgical History:   Procedure Laterality Date   • BRONCHOSCOPY N/A 2017    Procedure: BRONCHOSCOPY with wash;  Surgeon: Caleb Garcia MD;  Location: [facility] ENDOSCOPY;  Service:    • DILATATION AND CURETTAGE  2011   • VENA CAVA FILTER PLACEMENT      Inferior Vena Cava Filter Placement w/Fluorosc angiogr guidance         Visit Dx:      ICD-10-CM ICD-9-CM   1. Lymphedema of both lower extremities I89.0 457.1   2. Lipoedema R60.9 782.3   3. Decreased mobility and endurance Z74.09 780.99   4. Cellulitis of left lower extremity L03.116 682.6             Lymphedema       09/15/17 1000          Subjective Comments    Subjective Comments Mild itching per pt. Pt. reports she is able to get out of the car easier.   -CW      Subjective Pain    Able to rate subjective pain? yes  -CW      Pre-Treatment Pain Level 0  -CW      Post-Treatment Pain Level 0  -CW      Pain Assessment    Pain Assessment No/denies pain  -CW      Skin Changes/Observations    Location/Assessment Lower Extremity  -CW      Lower Extremity Conditions bilateral:;shiny;scab(s);inflamed;fragile  -CW      Lower Extremity Color/Pigment left:   skin not as inflammed.   -CW   "    Lower Extremity Skin Details red scratches from pt. itching R lower leg.   -CW      Skin Observations Comment No weeping or drainage. Some red skin patches/sores noted LLE. Blister R lower leg not draining.   -CW      Manual Lymphatic Drainage    Manual Lymphatic Drainage --   Neck, axilla, abd., groin, RLE. Focused on prox. and RLE.   -CW      Manual Lymphatic Drainage Comments No pain during MLD.   -CW      Compression/Skin Care    Compression/Skin Care skin care;wrapping location;bandaging;compression garment  -CW      Skin Care moisturizing lotion applied   zinc oxide to skin folds behind L knee and B shin area.   -CW      Wrapping Location lower extremity  -CW      Wrapping Location LE bilateral:;ankle;knee   wrapped R ankle to knee so her shoes fit.   -CW      Wrapping Comments Bandages were removed at home.   -CW      Bandage Layers cotton liner;soft foam- 1/2 inch;short-stretch bandages (comment size/quantity)  -CW      Bandaging Technique circumferential/spiral;moderate compression  -CW      Compression Garment Comments --   Showed pt. examples of velcro compression.   -CW      Bandaging Comments BLE-K1, 2- Rosidal soft, 1- 8cm. 3- 10 cm. Rosidal. Extra Artiflex to B ankle area.   -CW        User Key  (r) = Recorded By, (t) = Taken By, (c) = Cosigned By    Initials Name Provider Type    GIULIANA Hidalgo OTR Occupational Therapist                        OT Assessment/Plan       09/15/17 1352       OT Assessment    Assessment Comments Pt. is progressing with decreased edema LLE>RLE. Pt. was able to wear the bandages at work yesterday. No pain c/o at present. Skin LLE red, but not as inflammed. Pt. reports she is able to get out of the car easier and can lift her legs a \"little\"' better. Reviewed precautions and wearing schedule for the weekend.   -CW     OT Plan    OT Plan Comments Plan to cont. tx.   -CW       User Key  (r) = Recorded By, (t) = Taken By, (c) = Cosigned By    Initials Name Provider Type "    CW RADHA Miller Occupational Therapist                                Therapy Education       09/15/17 6198          Therapy Education    Given Bandaging/dressing change;Edema management   Reviewed bandage precautions, wearing schedule, and precautions.   -CW      Program Reinforced  -CW      How Provided Verbal  -CW      Provided to Patient  -CW      Level of Understanding Verbalized  -CW        User Key  (r) = Recorded By, (t) = Taken By, (c) = Cosigned By    Initials Name Provider Type    CW RADHA Miller Occupational Therapist                  Time Calculation:   OT Start Time: 0904  OT Stop Time: 1007  OT Time Calculation (min): 63 min       Therapy Charges for Today     Code Description Service Date Service Provider Modifiers Qty    53292575524 HC OT MANUAL THERAPY EA 15 MIN 9/15/2017 RADHA Miller GO 4                      RADHA Miller  9/15/2017

## 2017-09-18 ENCOUNTER — HOSPITAL ENCOUNTER (OUTPATIENT)
Dept: OCCUPATIONAL THERAPY | Facility: HOSPITAL | Age: 57
Setting detail: THERAPIES SERIES
Discharge: HOME | End: 2017-09-18

## 2017-09-18 DIAGNOSIS — Z74.09 DECREASED MOBILITY AND ENDURANCE: ICD-10-CM

## 2017-09-18 DIAGNOSIS — R60.9 LIPOEDEMA: ICD-10-CM

## 2017-09-18 DIAGNOSIS — I89.0 LYMPHEDEMA OF BOTH LOWER EXTREMITIES: Primary | ICD-10-CM

## 2017-09-18 DIAGNOSIS — L03.116 CELLULITIS OF LEFT LOWER EXTREMITY: ICD-10-CM

## 2017-09-18 PROCEDURE — 97140 MANUAL THERAPY 1/> REGIONS: CPT

## 2017-09-18 NOTE — THERAPY TREATMENT NOTE
Outpatient Occupational Therapy Lymphedema Treatment Note  [facility]     Patient Name: Emely Solomon  : 1960  MRN: [id number]  Today's Date: 2017      Visit Date: 2017    Patient Active Problem List   Diagnosis   • Chronic diastolic congestive heart failure   • PFO (patent foramen ovale)   • Paradoxical embolism   • Hypertension   • Pulmonary hypertension        Past Medical History:   Diagnosis Date   • Chronic diastolic congestive heart failure    • Deep venous thrombosis    • Hypertension    • Lipoedema    • Lymphedema    • Morbid obesity    • Paradoxical embolism     to the LLE due to DVT/PE and PFO   • PFO (patent foramen ovale)    • Pulmonary embolism    • Pulmonary hypertension     multifactorial (dCHF, obesity/ARIEL, hx PE), mild by echo 2016        Past Surgical History:   Procedure Laterality Date   • BRONCHOSCOPY N/A 2017    Procedure: BRONCHOSCOPY with wash;  Surgeon: Caleb Garcia MD;  Location: [facility] ENDOSCOPY;  Service:    • DILATATION AND CURETTAGE  2011   • VENA CAVA FILTER PLACEMENT      Inferior Vena Cava Filter Placement w/Fluorosc angiogr guidance         Visit Dx:      ICD-10-CM ICD-9-CM   1. Lymphedema of both lower extremities I89.0 457.1   2. Lipoedema R60.9 782.3   3. Decreased mobility and endurance Z74.09 780.99   4. Cellulitis of left lower extremity L03.116 682.6             Lymphedema       17 1300          Subjective Comments    Subjective Comments Pt. can lift legs out of the car better. No pain c/o at present.   -CW      Subjective Pain    Able to rate subjective pain? yes  -CW      Pre-Treatment Pain Level 0  -CW      Post-Treatment Pain Level 0  -CW      Pain Assessment    Pain Assessment No/denies pain  -CW      Skin Changes/Observations    Location/Assessment Lower Extremity  -CW      Lower Extremity Conditions bilateral:;shiny;scab(s);inflamed;fragile  -CW      Lower Extremity Color/Pigment left:   skin not as inflammed.   -CW      Lower  Extremity Skin Details Red scratches from pt. itching R lower leg diminished.   -CW      Skin Observations Comment No weeping or drainage. Some red skin patches/sores noted LLE. Blister R lower leg no drainage.   -CW      Manual Lymphatic Drainage    Manual Lymphatic Drainage --   Neck, axilla, abd., groin, RLE. Focused on prox. and RLE.   -CW      Manual Lymphatic Drainage Comments No pain during MLD.   -CW      Compression/Skin Care    Compression/Skin Care skin care;wrapping location;bandaging;compression garment  -CW      Skin Care moisturizing lotion applied   zinc oxide to skin folds behind L knee and B shin area.   -CW      Wrapping Location lower extremity  -CW      Wrapping Location LE bilateral:;ankle;knee   wrapped R ankle to knee so her shoes fit.   -CW      Wrapping Comments Bandages were removed at home.   -CW      Bandage Layers cotton liner;soft foam- 1/2 inch;short-stretch bandages (comment size/quantity)  -CW      Bandaging Technique circumferential/spiral;moderate compression  -CW      Bandaging Comments BLE-K1, 2- Rosidal soft, 1- 8cm. 4- 10 cm. Rosidal. Extra Artiflex to B ankle area.   -CW        User Key  (r) = Recorded By, (t) = Taken By, (c) = Cosigned By    Initials Name Provider Type    RADHA Acosta Occupational Therapist                        OT Assessment/Plan       09/18/17 1332       OT Assessment    Assessment Comments Added another Rosidal 10 cm. bandage to B ankles. No pain c/o at present. Asked pt. if she was doing her HEP. Pt. reports she has tried. Skin LLE is red, but not warm/hot or as inflammed. Pt. can lift her legs slightly easier on the tx. mat for positioning. Reviewed bandage precautions and wearing schedule. Filled out form for her FMLA .   -CW     OT Plan    OT Plan Comments Plan to cont. tx.   -CW       User Key  (r) = Recorded By, (t) = Taken By, (c) = Cosigned By    Initials Name Provider Type    RADHA Acosta Occupational Therapist                                 Therapy Education       09/18/17 1340          Therapy Education    Given Bandaging/dressing change;Edema management   Reviewd bandage precautions and wearing schedule . Encouraged pt. to complete HEP daily.   -CW      Program Reinforced  -CW      How Provided Verbal;Demonstration  -CW      Provided to Patient  -CW      Level of Understanding Verbalized  -CW        User Key  (r) = Recorded By, (t) = Taken By, (c) = Cosigned By    Initials Name Provider Type    CW RADHA Miller Occupational Therapist                  Time Calculation:   OT Start Time: 0907  OT Stop Time: 1009  OT Time Calculation (min): 62 min  OT Non-Billable Time (min): 15 min       Therapy Charges for Today     Code Description Service Date Service Provider Modifiers Qty    09912735922 HC OT MANUAL THERAPY EA 15 MIN 9/18/2017 RADHA Miller GO 4    [id number] HC OT CARE PLAN EA 15 MIN 9/18/2017 RADHA Miller GO 1                      RADHA Miller  9/18/2017

## 2017-09-19 ENCOUNTER — HOSPITAL ENCOUNTER (OUTPATIENT)
Dept: OCCUPATIONAL THERAPY | Facility: HOSPITAL | Age: 57
Setting detail: THERAPIES SERIES
Discharge: HOME | End: 2017-09-19

## 2017-09-19 DIAGNOSIS — I89.0 LYMPHEDEMA OF BOTH LOWER EXTREMITIES: Primary | ICD-10-CM

## 2017-09-19 DIAGNOSIS — L03.116 CELLULITIS OF LEFT LOWER EXTREMITY: ICD-10-CM

## 2017-09-19 DIAGNOSIS — R60.9 LIPOEDEMA: ICD-10-CM

## 2017-09-19 DIAGNOSIS — Z74.09 DECREASED MOBILITY AND ENDURANCE: ICD-10-CM

## 2017-09-19 PROCEDURE — 97140 MANUAL THERAPY 1/> REGIONS: CPT

## 2017-09-20 ENCOUNTER — HOSPITAL ENCOUNTER (OUTPATIENT)
Dept: OCCUPATIONAL THERAPY | Facility: HOSPITAL | Age: 57
Setting detail: THERAPIES SERIES
Discharge: HOME | End: 2017-09-20

## 2017-09-20 DIAGNOSIS — L03.116 CELLULITIS OF LEFT LOWER EXTREMITY: ICD-10-CM

## 2017-09-20 DIAGNOSIS — R60.9 LIPOEDEMA: ICD-10-CM

## 2017-09-20 DIAGNOSIS — Z74.09 DECREASED MOBILITY AND ENDURANCE: ICD-10-CM

## 2017-09-20 DIAGNOSIS — I89.0 LYMPHEDEMA OF BOTH LOWER EXTREMITIES: Primary | ICD-10-CM

## 2017-09-20 PROCEDURE — 97535 SELF CARE MNGMENT TRAINING: CPT

## 2017-09-20 PROCEDURE — 97140 MANUAL THERAPY 1/> REGIONS: CPT

## 2017-09-20 NOTE — THERAPY TREATMENT NOTE
Outpatient Occupational Therapy Lymphedema Treatment Note  [facility]     Patient Name: Emely Solomon  : 1960  MRN: [id number]  Today's Date: 2017      Visit Date: 2017    Patient Active Problem List   Diagnosis   • Chronic diastolic congestive heart failure   • PFO (patent foramen ovale)   • Paradoxical embolism   • Hypertension   • Pulmonary hypertension        Past Medical History:   Diagnosis Date   • Chronic diastolic congestive heart failure    • Deep venous thrombosis    • Hypertension    • Lipoedema    • Lymphedema    • Morbid obesity    • Paradoxical embolism     to the LLE due to DVT/PE and PFO   • PFO (patent foramen ovale)    • Pulmonary embolism    • Pulmonary hypertension     multifactorial (dCHF, obesity/ARIEL, hx PE), mild by echo 2016        Past Surgical History:   Procedure Laterality Date   • BRONCHOSCOPY N/A 2017    Procedure: BRONCHOSCOPY with wash;  Surgeon: Caleb Garcia MD;  Location: [facility] ENDOSCOPY;  Service:    • DILATATION AND CURETTAGE  2011   • VENA CAVA FILTER PLACEMENT      Inferior Vena Cava Filter Placement w/Fluorosc angiogr guidance         Visit Dx:      ICD-10-CM ICD-9-CM   1. Lymphedema of both lower extremities I89.0 457.1   2. Lipoedema R60.9 782.3   3. Decreased mobility and endurance Z74.09 780.99   4. Cellulitis of left lower extremity L03.116 682.6             Lymphedema       17 1400          Subjective Comments    Subjective Comments No pain c/o. Pt. reports she did not sleep well.   -CW      Subjective Pain    Able to rate subjective pain? yes  -CW      Pre-Treatment Pain Level 0  -CW      Post-Treatment Pain Level 0  -CW      Pain Assessment    Pain Assessment No/denies pain  -CW      Skin Changes/Observations    Location/Assessment Lower Extremity  -CW      Lower Extremity Conditions bilateral:;shiny;scab(s);inflamed;fragile  -CW      Lower Extremity Color/Pigment left:   Skin red L lower leg.   -CW      Lower Extremity Skin  Details Small 1 cm. skin tear medial L calf area with some irritation around it.   -CW      Skin Observations Comment No weeping or drainage. Some red skin patches/sores noted LLE. Blister R lower leg no drainage.   -CW      Lymphedema Measurements    Measurement Type(s) Circumferential  -CW      Circumferential Areas Lower extremities  -CW      LLE Circumferential (cm)    Measurement Location 1 10 cm above knee  -CW      Left 1 86 cm  -CW      Measurement Location 2 Knee   -CW      Left 2 71.3 cm  -CW      Measurement Location 3 10 cm below knee  -CW      Left 3 75 cm  -CW      Measurement Location 4 20 cm below knee  -CW      Left 4 56 cm  -CW      Measurement Location 5 30 cm below kne  -CW      Left 5 33.2 cm  -CW      Measurement Location 6 Ankle  -CW      Left 6 32.5 cm  -CW      Measurement Location 7 Mid foot  -CW      Left 7 230 cm  -CW      Measurement Location 8 Total  -CW      Left 8 377 cm  -CW      RLE Circumferential (cm)    Measurement Location 1 10 cm above knee  -CW      Right 1 85 cm  -CW      Measurement Location 2 Knee (popliteal crease)  -CW      Right 2 77 cm  -CW      Measurement Location 3 10 cm below knee  -CW      Right 3 75 cm  -CW      Measurement Location 4 20 cm below knee  -CW      Right 4 59 cm  -CW      Measurement Location 5 30 cm below kne  -CW      Right 5 37.5 cm  -CW      Measurement Location 6 Ankle  -CW      Right 6 32.5 cm  -CW      Measurement Location 7 Mid foot  -CW      Right 7 23.3 cm  -CW      Measurement Location 8 Total  -CW      Right 8 389.3 cm  -CW      Manual Lymphatic Drainage    Manual Lymphatic Drainage --   Neck, axilla, abd., groin, RLE. Focused on prox. and RLE.   -CW      Manual Lymphatic Drainage Comments No pain during MLD.   -CW      Compression/Skin Care    Compression/Skin Care skin care;wrapping location;bandaging;compression garment  -CW      Skin Care moisturizing lotion applied   zinc oxide to skin folds behind L knee and B shin area.   -CW       Wrapping Location lower extremity  -CW      Wrapping Location LE bilateral:;ankle;knee   wrapped R ankle to knee so her shoes fit.   -CW      Wrapping Comments Bandages were removed at home.   -CW      Bandage Layers cotton liner;soft foam- 1/2 inch;short-stretch bandages (comment size/quantity)  -CW      Bandaging Technique circumferential/spiral;moderate compression  -CW      Bandaging Comments BLE-K1, 2- Rosidal soft, 1- 8cm. 4- 10 cm. Rosidal. Extra Artiflex to B ankle area. Telfa pad and neosporin to small skin tear L ant. calf   -CW        User Key  (r) = Recorded By, (t) = Taken By, (c) = Cosigned By    Initials Name Provider Type    RADHA Acosta Occupational Therapist                        OT Assessment/Plan       09/20/17 1422       OT Assessment    Assessment Comments Measurements taken. See flow sheet. Total circumference .3 cm. and .0 cm. (12.3 cm. R and 46.2 cm. L net decrease). Pt. is progressing slowly with decreased edema LLE>RLE (LLE-recent infection). Reviewed bandage precautions and wearing schedule. Small skin tear noted medial L calf area. Pt. is tolerating the extra bandage well. Obesity is a major limiting factor. Pt. reports though she is able to lift her legs better when getting out of the car. Faxed info to [employer] regarding insurance coverage for garments. Goals updated.   -CW     OT Plan    OT Plan Comments Plan to cont. tx.   -CW       User Key  (r) = Recorded By, (t) = Taken By, (c) = Cosigned By    Initials Name Provider Type    RAHDA Acosta Occupational Therapist                            OT Goals       09/20/17 1400       OT Short Term Goals    STG Date to Achieve 09/22/17  -CW     STG 1 Patient independent and compliant with self-wrapping techniques of compression bandages with assistance of caregiver as needed for improved self-management of condition.  -CW     STG 1 Progress Progressing  -CW     STG 2 Patient will demonstrate decreased net edema of  bilateral lower extremities >/= 10-20cm  for decrease in edema, symptoms, decreased risk of infection and improved skin care/transition to self-care of condition.   -CW     STG 2 Progress Met;Ongoing  -CW     STG 2 Progress Comments Total circumference .3 cm. and .0 cm. on 9/20/17   -CW     Long Term Goals    LTG Date to Achieve 10/06/17  -CW     LTG 1 Patient will demonstrate decreased net edema of bilateral lower extremities >/= 21--40cm  for decrease in edema, symptoms, decreased risk of infection and improved skin care/transition to self-care of condition.   -CW     LTG 1 Progress Progressing;Goal Revised  -CW     LTG 1 Progress Comments Patient will demonstrate decreased net edema of LLE lower extremity >/= 21-40cm  and RLE 10-20 cm. for decrease in edema, symptoms, decreased risk of infection and improved skin care/transition to self-care of condition.   -CW     LTG 2 Patient independent and compliant with home exercise program (as able) focused on range of motion, flexibility to improve lymphatic flow and LE discomfort.  -CW     LTG 2 Progress Progressing  -CW     LTG 3  Patient/family/caregivers independent and compliant with self-care techniques for self-management of condition.  -CW     LTG 3 Progress New  -CW     LTG 4 Patient independent and compliant with use and care of compression garments with assistance of a caregiver as needed to promote self-care independence.   -CW     LTG 4 Progress New  -CW       User Key  (r) = Recorded By, (t) = Taken By, (c) = Cosigned By    Initials Name Provider Type    RADHA Acosta Occupational Therapist                Therapy Education       09/20/17 9551          Therapy Education    Given Bandaging/dressing change;Edema management   Reviewed skin care for LLE small skin tear and bandage precautions/wearing schedule. Ed on how to apply the bandages.   -CW      Program Reinforced  -CW      How Provided Verbal  -CW      Provided to Patient  -CW       Level of Understanding Verbalized  -CW        User Key  (r) = Recorded By, (t) = Taken By, (c) = Cosigned By    Initials Name Provider Type    CW RADHA Miller Occupational Therapist                  Time Calculation:   OT Start Time: 0901  OT Stop Time: 1018  OT Time Calculation (min): 77 min       Therapy Charges for Today     Code Description Service Date Service Provider Modifiers Qty    17110916895  OT MANUAL THERAPY EA 15 MIN 9/20/2017 RADHA Miller GO 4    [id number]  OT SELF CARE/MGMT/TRAIN EA 15 MIN 9/20/2017 RADHA Miller GO 1                      RADHA Miller  9/20/2017

## 2017-09-22 ENCOUNTER — APPOINTMENT (OUTPATIENT)
Dept: OCCUPATIONAL THERAPY | Facility: HOSPITAL | Age: 57
End: 2017-09-22

## 2017-09-25 ENCOUNTER — HOSPITAL ENCOUNTER (OUTPATIENT)
Dept: OCCUPATIONAL THERAPY | Facility: HOSPITAL | Age: 57
Setting detail: THERAPIES SERIES
Discharge: HOME | End: 2017-09-25

## 2017-09-25 DIAGNOSIS — Z74.09 DECREASED MOBILITY AND ENDURANCE: ICD-10-CM

## 2017-09-25 DIAGNOSIS — I89.0 LYMPHEDEMA OF BOTH LOWER EXTREMITIES: Primary | ICD-10-CM

## 2017-09-25 DIAGNOSIS — L03.116 CELLULITIS OF LEFT LOWER EXTREMITY: ICD-10-CM

## 2017-09-25 DIAGNOSIS — R60.9 LIPOEDEMA: ICD-10-CM

## 2017-09-25 PROCEDURE — 97140 MANUAL THERAPY 1/> REGIONS: CPT

## 2017-09-25 NOTE — THERAPY TREATMENT NOTE
Outpatient Occupational Therapy Lymphedema Treatment Note  [facility]     Patient Name: Emely Solomon  : 1960  MRN: [id number]  Today's Date: 2017      Visit Date: 2017    Patient Active Problem List   Diagnosis   • Chronic diastolic congestive heart failure   • PFO (patent foramen ovale)   • Paradoxical embolism   • Hypertension   • Pulmonary hypertension        Past Medical History:   Diagnosis Date   • Chronic diastolic congestive heart failure    • Deep venous thrombosis    • Hypertension    • Lipoedema    • Lymphedema    • Morbid obesity    • Paradoxical embolism     to the LLE due to DVT/PE and PFO   • PFO (patent foramen ovale)    • Pulmonary embolism    • Pulmonary hypertension     multifactorial (dCHF, obesity/ARIEL, hx PE), mild by echo 2016        Past Surgical History:   Procedure Laterality Date   • BRONCHOSCOPY N/A 2017    Procedure: BRONCHOSCOPY with wash;  Surgeon: Caleb Garcia MD;  Location: [facility] ENDOSCOPY;  Service:    • DILATATION AND CURETTAGE  2011   • VENA CAVA FILTER PLACEMENT      Inferior Vena Cava Filter Placement w/Fluorosc angiogr guidance         Visit Dx:      ICD-10-CM ICD-9-CM   1. Lymphedema of both lower extremities I89.0 457.1   2. Lipoedema R60.9 782.3   3. Decreased mobility and endurance Z74.09 780.99   4. Cellulitis of left lower extremity L03.116 682.6             Lymphedema       17 1000          Subjective Comments    Subjective Comments Pt.  went out of town over the weekend and her spouse attempted to assist with bandaging.   -CW      Subjective Pain    Able to rate subjective pain? yes  -CW      Pre-Treatment Pain Level 0  -CW      Post-Treatment Pain Level 0  -CW      Pain Assessment    Pain Assessment No/denies pain  -CW      Skin Changes/Observations    Location/Assessment Lower Extremity  -CW      Lower Extremity Conditions bilateral:;shiny;scab(s);inflamed;fragile  -CW      Lower Extremity Color/Pigment left:   Skin red L  "lower leg the same.   -CW      Lower Extremity Skin Details Red scratches from pt. itching B lower legs noted.    Sores and red patches no change.   -CW      Skin Observations Comment No weeping or drainage. Some red skin patches/sores noted LLE. Blister R lower leg no drainage.    Small skin tear L lower leg with no drainage.   -CW      Manual Lymphatic Drainage    Manual Lymphatic Drainage --   Neck, axilla, abd., groin, BLE's. Focused on proximal area.  -CW      Manual Lymphatic Drainage Comments No pain during MLD.   -CW      Compression/Skin Care    Compression/Skin Care skin care;wrapping location;bandaging;compression garment  -CW      Skin Care moisturizing lotion applied   zinc oxide to skin folds behind L knee and B shin area.   -CW      Wrapping Location lower extremity  -CW      Wrapping Location LE bilateral:;ankle;knee   wrapped R ankle to knee so her shoes fit.   -CW      Wrapping Comments Bandages were removed at home.   -CW      Bandage Layers cotton liner;soft foam- 1/2 inch;short-stretch bandages (comment size/quantity)  -CW      Bandaging Technique circumferential/spiral;moderate compression  -CW      Bandaging Comments BLE-K1, 2- Rosidal soft, 1- 8cm. 4- 10 cm. Rosidal. Extra Artiflex to B ankle area.    -CW        User Key  (r) = Recorded By, (t) = Taken By, (c) = Cosigned By    Initials Name Provider Type    CW Colleen Hidalgo OTR Occupational Therapist                        OT Assessment/Plan       09/25/17 1357       OT Assessment    Assessment Comments Pt. went out of town for a wedding over the weekend and her spouse attempted to assist with bandaging. No pain c/o at present. Small skin tear LLE lower leg with no drainage. Red patches same B lower legs. LLE skin not as red or inflammed. Encouraged pt. to  follow through with HEP at home. Was able to walk \"ok\" over the weekend  per pt.    -CW     OT Plan    OT Plan Comments Plan to cont. tx.   -CW       User Key  (r) = Recorded By, (t) = " Taken By, (c) = Cosigned By    Initials Name Provider Type    CW RADHA Miller Occupational Therapist                                Therapy Education       09/25/17 1401          Therapy Education    Given Bandaging/dressing change;Edema management;HEP  -CW      Program Reinforced  -CW      How Provided Verbal;Demonstration  -CW      Provided to Patient  -CW      Level of Understanding Verbalized  -CW        User Key  (r) = Recorded By, (t) = Taken By, (c) = Cosigned By    Initials Name Provider Type    CW RADHA Miller Occupational Therapist                  Time Calculation:   OT Start Time: 0903  OT Stop Time: 1006  OT Time Calculation (min): 63 min       Therapy Charges for Today     Code Description Service Date Service Provider Modifiers Qty    58751705273 HC OT MANUAL THERAPY EA 15 MIN 9/25/2017 RADHA Miller GO 4                      RADHA Miller  9/25/2017

## 2017-09-26 ENCOUNTER — HOSPITAL ENCOUNTER (OUTPATIENT)
Dept: OCCUPATIONAL THERAPY | Facility: HOSPITAL | Age: 57
Setting detail: THERAPIES SERIES
Discharge: HOME | End: 2017-09-26

## 2017-09-26 DIAGNOSIS — I89.0 LYMPHEDEMA OF BOTH LOWER EXTREMITIES: Primary | ICD-10-CM

## 2017-09-26 DIAGNOSIS — Z74.09 DECREASED MOBILITY AND ENDURANCE: ICD-10-CM

## 2017-09-26 DIAGNOSIS — R60.9 LIPOEDEMA: ICD-10-CM

## 2017-09-26 DIAGNOSIS — L03.116 CELLULITIS OF LEFT LOWER EXTREMITY: ICD-10-CM

## 2017-09-26 PROCEDURE — 97140 MANUAL THERAPY 1/> REGIONS: CPT

## 2017-09-26 NOTE — THERAPY TREATMENT NOTE
Outpatient Occupational Therapy Lymphedema Treatment Note  [facility]     Patient Name: Emely Solomon  : 1960  MRN: [id number]  Today's Date: 2017      Visit Date: 2017    Patient Active Problem List   Diagnosis   • Chronic diastolic congestive heart failure   • PFO (patent foramen ovale)   • Paradoxical embolism   • Hypertension   • Pulmonary hypertension        Past Medical History:   Diagnosis Date   • Chronic diastolic congestive heart failure    • Deep venous thrombosis    • Hypertension    • Lipoedema    • Lymphedema    • Morbid obesity    • Paradoxical embolism     to the LLE due to DVT/PE and PFO   • PFO (patent foramen ovale)    • Pulmonary embolism    • Pulmonary hypertension     multifactorial (dCHF, obesity/ARIEL, hx PE), mild by echo 2016        Past Surgical History:   Procedure Laterality Date   • BRONCHOSCOPY N/A 2017    Procedure: BRONCHOSCOPY with wash;  Surgeon: Caleb Garcia MD;  Location: [facility] ENDOSCOPY;  Service:    • DILATATION AND CURETTAGE  2011   • VENA CAVA FILTER PLACEMENT      Inferior Vena Cava Filter Placement w/Fluorosc angiogr guidance         Visit Dx:      ICD-10-CM ICD-9-CM   1. Lymphedema of both lower extremities I89.0 457.1   2. Lipoedema R60.9 782.3   3. Decreased mobility and endurance Z74.09 780.99   4. Cellulitis of left lower extremity L03.116 682.6             Lymphedema       17 1300          Subjective Comments    Subjective Comments Bottom R foot hurt yesterday  from shoe per pt.   -CW      Subjective Pain    Able to rate subjective pain? yes  -CW      Pre-Treatment Pain Level 0  -CW      Post-Treatment Pain Level 0  -CW      Pain Assessment    Pain Assessment No/denies pain   At present.   -CW      Skin Changes/Observations    Location/Assessment Lower Extremity  -CW      Lower Extremity Conditions bilateral:;shiny;scab(s);inflamed;fragile  -CW      Lower Extremity Color/Pigment left:   Skin red L lower leg the same.   -CW       Lower Extremity Skin Details Red scratches from pt. itching B lower legs noted.   -CW      Skin Observations Comment No weeping or drainage. Some red skin patches/sores noted LLE. Blister R lower leg no drainage.    Small skin tear no drainage L ant. shin area.   -CW      Manual Lymphatic Drainage    Manual Lymphatic Drainage --   Neck, axilla, abd., groin, BLE's. Focused on proximal area.  -CW      Manual Lymphatic Drainage Comments No pain during MLD.   -CW      Compression/Skin Care    Compression/Skin Care skin care;wrapping location;bandaging;compression garment  -CW      Skin Care moisturizing lotion applied   zinc oxide to skin folds behind L knee and B shin area.   -CW      Wrapping Location lower extremity  -CW      Wrapping Location LE bilateral:;ankle;knee   wrapped R ankle to knee so her shoes fit.   -CW      Wrapping Comments Bandages were removed at home.   -CW      Bandage Layers cotton liner;soft foam- 1/2 inch;short-stretch bandages (comment size/quantity)  -CW      Bandaging Technique circumferential/spiral;moderate compression  -CW      Bandaging Comments BLE-K1, 2- Rosidal soft, 1- 8cm. 4- 10 cm. Rosidal. Extra Artiflex to B ankle area.     Tubigrip size L to B thighs.   -CW        User Key  (r) = Recorded By, (t) = Taken By, (c) = Cosigned By    Initials Name Provider Type    CW Colleen Hidalgo OTR Occupational Therapist                        OT Assessment/Plan       09/26/17 1357       OT Assessment    Assessment Comments Pt. was able to wear the bandages all night. Applied the bandages with moderate compression and added tubigrip size L to B thighs. Reviewed skin care and recommended no scratching to prevent open areas. Discussed LE positioning at home and elevation. Pt. to cont. HEP and encouraged walking. LLE redness the same. No weeping or drainage noted.   -CW     OT Plan    OT Plan Comments Plan to cont. tx.   -CW       User Key  (r) = Recorded By, (t) = Taken By, (c) = Cosigned By     Initials Name Provider Type    CW RADHA Miller Occupational Therapist                                Therapy Education       09/26/17 1401          Therapy Education    Given Bandaging/dressing change;Edema management   Reviewed bandage precautions, skin care, and wearing schedule.  Discussed LE postioning and elevation at home.   -CW      Program Reinforced  -CW      How Provided Verbal  -CW      Provided to Patient  -CW      Level of Understanding Verbalized  -CW        User Key  (r) = Recorded By, (t) = Taken By, (c) = Cosigned By    Initials Name Provider Type    CW RADHA Miller Occupational Therapist                  Time Calculation:   OT Start Time: 0905  OT Stop Time: 1007  OT Time Calculation (min): 62 min       Therapy Charges for Today     Code Description Service Date Service Provider Modifiers Qty    59299064195 HC OT MANUAL THERAPY EA 15 MIN 9/26/2017 RADHA Miller GO 4                      RADHA Miller  9/26/2017

## 2017-09-27 ENCOUNTER — HOSPITAL ENCOUNTER (OUTPATIENT)
Dept: OCCUPATIONAL THERAPY | Facility: HOSPITAL | Age: 57
Setting detail: THERAPIES SERIES
Discharge: HOME | End: 2017-09-27

## 2017-09-27 DIAGNOSIS — R60.9 LIPOEDEMA: ICD-10-CM

## 2017-09-27 DIAGNOSIS — L03.116 CELLULITIS OF LEFT LOWER EXTREMITY: ICD-10-CM

## 2017-09-27 DIAGNOSIS — Z74.09 DECREASED MOBILITY AND ENDURANCE: ICD-10-CM

## 2017-09-27 DIAGNOSIS — I89.0 LYMPHEDEMA OF BOTH LOWER EXTREMITIES: Primary | ICD-10-CM

## 2017-09-27 PROCEDURE — 97535 SELF CARE MNGMENT TRAINING: CPT

## 2017-09-27 PROCEDURE — 97140 MANUAL THERAPY 1/> REGIONS: CPT

## 2017-09-27 NOTE — THERAPY TREATMENT NOTE
"Outpatient Occupational Therapy Lymphedema Treatment Note  [facility]     Patient Name: Emely Solomon  : 1960  MRN: [id number]  Today's Date: 2017      Visit Date: 2017    Patient Active Problem List   Diagnosis   • Chronic diastolic congestive heart failure   • PFO (patent foramen ovale)   • Paradoxical embolism   • Hypertension   • Pulmonary hypertension        Past Medical History:   Diagnosis Date   • Chronic diastolic congestive heart failure    • Deep venous thrombosis    • Hypertension    • Lipoedema    • Lymphedema    • Morbid obesity    • Paradoxical embolism     to the LLE due to DVT/PE and PFO   • PFO (patent foramen ovale)    • Pulmonary embolism    • Pulmonary hypertension     multifactorial (dCHF, obesity/ARILE, hx PE), mild by echo 2016        Past Surgical History:   Procedure Laterality Date   • BRONCHOSCOPY N/A 2017    Procedure: BRONCHOSCOPY with wash;  Surgeon: Caleb Garcia MD;  Location: [facility] ENDOSCOPY;  Service:    • DILATATION AND CURETTAGE  2011   • VENA CAVA FILTER PLACEMENT      Inferior Vena Cava Filter Placement w/Fluorosc angiogr guidance         Visit Dx:      ICD-10-CM ICD-9-CM   1. Lymphedema of both lower extremities I89.0 457.1   2. Lipoedema R60.9 782.3   3. Decreased mobility and endurance Z74.09 780.99   4. Cellulitis of left lower extremity L03.116 682.6             Lymphedema       17 1600          Subjective Comments    Subjective Comments No pain c/o at present. R heel \"irritating \" yesterday.   -CW      Subjective Pain    Able to rate subjective pain? yes  -CW      Pre-Treatment Pain Level 0  -CW      Post-Treatment Pain Level 0  -CW      Pain Assessment    Pain Assessment No/denies pain  -CW      Skin Changes/Observations    Location/Assessment Lower Extremity  -CW      Lower Extremity Conditions bilateral:;shiny;scab(s);inflamed;fragile  -CW      Lower Extremity Color/Pigment left:   Skin red L lower leg the same.   -CW      Lower " Extremity Skin Details Red scratches from pt. itching B lower legs improved/clearing. .   -CW      Skin Observations Comment No weeping or drainage. Some red skin patches/sores noted LLE. Blister R lower leg no drainage.    Small skin tear LLE/ant. with no drainage.   -CW      Lymphedema Measurements    Measurement Type(s) Circumferential  -CW      Circumferential Areas Lower extremities  -CW      LLE Circumferential (cm)    Measurement Location 1 10 cm above knee  -CW      Left 1 84.5 cm  -CW      Measurement Location 2 Knee   -CW      Left 2 71.3 cm  -CW      Measurement Location 3 10 cm below knee  -CW      Left 3 72 cm  -CW      Measurement Location 4 20 cm below knee  -CW      Left 4 55 cm  -CW      Measurement Location 5 30 cm below kne  -CW      Left 5 32.5 cm  -CW      Measurement Location 6 Ankle  -CW      Left 6 32 cm  -CW      Measurement Location 7 Mid foot  -CW      Left 7 23 cm  -CW      Measurement Location 8 Total  -CW      Left 8 370.3 cm  -CW      RLE Circumferential (cm)    Measurement Location 1 10 cm above knee  -CW      Right 1 85 cm  -CW      Measurement Location 2 Knee (popliteal crease)  -CW      Right 2 77 cm  -CW      Measurement Location 3 10 cm below knee  -CW      Right 3 73.5 cm  -CW      Measurement Location 4 20 cm below knee  -CW      Right 4 57.7 cm  -CW      Measurement Location 5 30 cm below kne  -CW      Right 5 37.5 cm  -CW      Measurement Location 6 Ankle  -CW      Right 6 32.5 cm  -CW      Measurement Location 7 Mid foot  -CW      Right 7 23.2 cm  -CW      Measurement Location 8 Total  -CW      Right 8 386.4 cm  -CW      Manual Lymphatic Drainage    Manual Lymphatic Drainage --   Neck, axilla, abd., groin, BLE's. Focused on proximal area.  -CW      Manual Lymphatic Drainage Comments No pain during MLD.   -CW      Compression/Skin Care    Compression/Skin Care skin care;wrapping location;bandaging;compression garment  -CW      Skin Care moisturizing lotion applied   zinc  oxide to skin folds behind L knee and B shin area.   -CW      Wrapping Location lower extremity  -CW      Wrapping Location LE bilateral:;ankle;knee   wrapped R ankle to knee so her shoes fit.   -CW      Wrapping Comments Bandages were removed at home.   -CW      Bandage Layers cotton liner;soft foam- 1/2 inch;short-stretch bandages (comment size/quantity)  -CW      Bandaging Technique circumferential/spiral;moderate compression  -CW      Bandaging Comments Tubigrip size K to thighs.BLE-K1, 2- Rosidal soft, 1- 8cm. 4- 10 cm. Rosidal. Extra Artiflex to B ankle area.    -CW        User Key  (r) = Recorded By, (t) = Taken By, (c) = Cosigned By    Initials Name Provider Type    RADHA Acosta Occupational Therapist                        OT Assessment/Plan       09/27/17 1625       OT Assessment    Assessment Comments Measurements taken. See flow sheet. Total circumference .4 cm. and .3 cm. (15.2 cm. R and 52.9 cm. L net decrease). Pt. tolerated the bandages all day/night including the thigh tubigrip which stayed up. Reviewed progress and poc. Pt. reports she is able to lift her legs and walk a little better. Checked measurements for a austin length thigh compression by [employer]. Called [employer] regarding coverage for stockings/velcro compression. Velcro compression is not covered per Lore Mendez at [employer].   -CW     OT Plan    OT Plan Comments Plan to cont. tx.   -CW       User Key  (r) = Recorded By, (t) = Taken By, (c) = Cosigned By    Initials Name Provider Type    RADHA Acosta Occupational Therapist                                Therapy Education       09/27/17 1632 09/27/17 1630       Therapy Education    Education Details Discussed LE positioning during self banaging and techniques to make it easier for her to bandage independently.   -CW      Given  Bandaging/dressing change;Edema management   reviewed bandage precautions, wearing schedule and skin care   -CW     Program  Reinforced  -CW      How Provided  Verbal  -CW     Provided to  Patient  -CW     Level of Understanding  Verbalized  -CW       User Key  (r) = Recorded By, (t) = Taken By, (c) = Cosigned By    Initials Name Provider Type    CW RADHA Miller Occupational Therapist                  Time Calculation:   OT Start Time: 0906  OT Stop Time: 1021  OT Time Calculation (min): 75 min       Therapy Charges for Today     Code Description Service Date Service Provider Modifiers Qty    51000968976 HC OT MANUAL THERAPY EA 15 MIN 9/27/2017 RADHA Miller GO 4    94329715714 HC OT SELF CARE/MGMT/TRAIN EA 15 MIN 9/27/2017 RADHA Milelr GO 1                      RADHA Miller  9/27/2017

## 2017-09-29 ENCOUNTER — HOSPITAL ENCOUNTER (OUTPATIENT)
Dept: OCCUPATIONAL THERAPY | Facility: HOSPITAL | Age: 57
Setting detail: THERAPIES SERIES
Discharge: HOME | End: 2017-09-29

## 2017-09-29 DIAGNOSIS — L03.116 CELLULITIS OF LEFT LOWER EXTREMITY: ICD-10-CM

## 2017-09-29 DIAGNOSIS — I89.0 LYMPHEDEMA OF BOTH LOWER EXTREMITIES: Primary | ICD-10-CM

## 2017-09-29 DIAGNOSIS — Z74.09 DECREASED MOBILITY AND ENDURANCE: ICD-10-CM

## 2017-09-29 DIAGNOSIS — R60.9 LIPOEDEMA: ICD-10-CM

## 2017-09-29 PROCEDURE — 97140 MANUAL THERAPY 1/> REGIONS: CPT

## 2017-09-29 NOTE — THERAPY TREATMENT NOTE
"Outpatient Occupational Therapy Lymphedema Treatment Note  [facility]     Patient Name: Emely Solomon  : 1960  MRN: [id number]  Today's Date: 2017      Visit Date: 2017    Patient Active Problem List   Diagnosis   • Chronic diastolic congestive heart failure   • PFO (patent foramen ovale)   • Paradoxical embolism   • Hypertension   • Pulmonary hypertension        Past Medical History:   Diagnosis Date   • Chronic diastolic congestive heart failure    • Deep venous thrombosis    • Hypertension    • Lipoedema    • Lymphedema    • Morbid obesity    • Paradoxical embolism     to the LLE due to DVT/PE and PFO   • PFO (patent foramen ovale)    • Pulmonary embolism    • Pulmonary hypertension     multifactorial (dCHF, obesity/ARIEL, hx PE), mild by echo 2016        Past Surgical History:   Procedure Laterality Date   • BRONCHOSCOPY N/A 2017    Procedure: BRONCHOSCOPY with wash;  Surgeon: Caleb Garcia MD;  Location: [facility] ENDOSCOPY;  Service:    • DILATATION AND CURETTAGE  2011   • VENA CAVA FILTER PLACEMENT      Inferior Vena Cava Filter Placement w/Fluorosc angiogr guidance         Visit Dx:      ICD-10-CM ICD-9-CM   1. Lymphedema of both lower extremities I89.0 457.1   2. Lipoedema R60.9 782.3   3. Decreased mobility and endurance Z74.09 780.99   4. Cellulitis of left lower extremity L03.116 682.6             Lymphedema       17 1400          Subjective Comments    Subjective Comments Some itching BLE's. R heel still \"irritating\" yesterday towards the end of the day.   -CW      Subjective Pain    Able to rate subjective pain? yes  -CW      Pre-Treatment Pain Level 0  -CW      Post-Treatment Pain Level 0  -CW      Pain Assessment    Pain Assessment No/denies pain  -CW      Skin Changes/Observations    Location/Assessment Lower Extremity  -CW      Lower Extremity Conditions bilateral:;shiny;scab(s);inflamed;fragile  -CW      Lower Extremity Color/Pigment left:   Skin red L lower leg " "the same.   -CW      Lower Extremity Skin Details Red scratches from pt. itching B lower legs improved/clearing.    Mild itching per pt.   -CW      Skin Observations Comment No weeping or drainage. Some red skin patches/sores noted LLE. Blister R lower leg no drainage.   -CW      Manual Lymphatic Drainage    Manual Lymphatic Drainage --   Neck, axilla, abd., groin, BLE's. Focused on proximal area.  -CW      Manual Lymphatic Drainage Comments No pain during MLD.   -CW      Compression/Skin Care    Compression/Skin Care skin care;wrapping location;bandaging;compression garment  -CW      Skin Care moisturizing lotion applied   Hydrocortisone to skin folds and B shin/ankle area.   -CW      Wrapping Location lower extremity  -CW      Wrapping Location LE bilateral:;ankle;knee   wrapped R ankle to knee so her shoes fit.   -CW      Wrapping Comments Bandages were removed at home.   -CW      Bandage Layers cotton liner;soft foam- 1/2 inch;short-stretch bandages (comment size/quantity)  -CW      Bandaging Technique circumferential/spiral;moderate compression  -CW      Bandaging Comments Tubigrip size K to thighs.BLE-K1, 2- Rosidal soft, 1- 8cm. 4- 10 cm. Rosidal. Extra Artiflex to B ankle area.    -CW        User Key  (r) = Recorded By, (t) = Taken By, (c) = Cosigned By    Initials Name Provider Type    CW Colleen Hidalgo OTR Occupational Therapist                        OT Assessment/Plan       09/29/17 2488       OT Assessment    Assessment Comments Pt. tolerated the bandages yesterday. No pain at present. Showed pt. a sample of [employer]aid Comfort Daja and discussed velcro compression. Pt.' s insurance does not cover velcro compression, but will with authorization. Tried on pt.'s old stockings from home on RLE. Overall fit seems tight around the ankle area and per pt. it tends to slide down.  Can try 'It Stays\" adhesive. Reviewed bandage precautions and wearing scheudule. Goals updated.   -CW     OT Plan    OT Plan Comments " Plan to cont. tx.   -CW       User Key  (r) = Recorded By, (t) = Taken By, (c) = Cosigned By    Initials Name Provider Type    GIULIANA Hidalgo OTR Occupational Therapist                            OT Goals       09/29/17 1400       OT Short Term Goals    STG Date to Achieve 09/22/17  -CW     STG 1 Patient independent and compliant with self-wrapping techniques of compression bandages with assistance of caregiver as needed for improved self-management of condition.  -CW     STG 1 Progress Progressing  -CW     STG 1 Progress Comments Pt. attempted to apply the bandages at home. Spouse not able to help that much per pt.   -CW     STG 2 Patient will demonstrate decreased net edema of bilateral lower extremities >/= 10-20cm  for decrease in edema, symptoms, decreased risk of infection and improved skin care/transition to self-care of condition.   -CW     STG 2 Progress Met;Ongoing  -     Long Term Goals    LTG Date to Achieve 10/06/17  -CW     LTG 1 Patient will demonstrate decreased net edema of bilateral lower extremities >/= 21--40cm  for decrease in edema, symptoms, decreased risk of infection and improved skin care/transition to self-care of condition.   -CW     LTG 1 Progress Progressing;Goal Revised  -CW     LTG 1 Progress Comments Patient will demonstrate decreased net edema of LLE lower extremity >/= 21-40cm  and RLE 10-20 cm. for decrease in edema, symptoms, decreased risk of infection and improved skin care/transition to self-care of condition.   -CW     LTG 2 Patient independent and compliant with home exercise program (as able) focused on range of motion, flexibility to improve lymphatic flow and LE discomfort.  -CW     LTG 2 Progress Progressing  -CW     LTG 3  Patient/family/caregivers independent and compliant with self-care techniques for self-management of condition.  -CW     LTG 3 Progress New  -CW     LTG 4 Patient independent and compliant with use and care of compression garments with assistance  of a caregiver as needed to promote self-care independence.   -CW     LTG 4 Progress New  -CW       User Key  (r) = Recorded By, (t) = Taken By, (c) = Cosigned By    Initials Name Provider Type    RADHA Acosta Occupational Therapist                Therapy Education       09/29/17 5732          Therapy Education    Given Edema management;Bandaging/dressing change   Reviewed skin care, bandage precautions and wearing schedule. Discussed velcro compression vs. stockings.   -CW      Program Reinforced  -CW      How Provided Verbal;Demonstration  -CW      Provided to Patient  -CW      Level of Understanding Verbalized  -CW        User Key  (r) = Recorded By, (t) = Taken By, (c) = Cosigned By    Initials Name Provider Type    RADHA Acosta Occupational Therapist                  Time Calculation:   OT Start Time: 0910  OT Stop Time: 1014  OT Time Calculation (min): 64 min       Therapy Charges for Today     Code Description Service Date Service Provider Modifiers Qty    63769279097 HC OT MANUAL THERAPY EA 15 MIN 9/29/2017 RADHA Miller GO 4                      RADHA Miller  9/29/2017

## 2017-10-02 ENCOUNTER — HOSPITAL ENCOUNTER (OUTPATIENT)
Dept: OCCUPATIONAL THERAPY | Facility: HOSPITAL | Age: 57
Setting detail: THERAPIES SERIES
Discharge: HOME | End: 2017-10-02

## 2017-10-02 DIAGNOSIS — R60.9 LIPOEDEMA: ICD-10-CM

## 2017-10-02 DIAGNOSIS — I89.0 LYMPHEDEMA OF BOTH LOWER EXTREMITIES: Primary | ICD-10-CM

## 2017-10-02 DIAGNOSIS — Z74.09 DECREASED MOBILITY AND ENDURANCE: ICD-10-CM

## 2017-10-02 DIAGNOSIS — L03.116 CELLULITIS OF LEFT LOWER EXTREMITY: ICD-10-CM

## 2017-10-02 PROCEDURE — 97140 MANUAL THERAPY 1/> REGIONS: CPT

## 2017-10-02 PROCEDURE — 97535 SELF CARE MNGMENT TRAINING: CPT

## 2017-10-02 NOTE — THERAPY TREATMENT NOTE
Outpatient Occupational Therapy Lymphedema Treatment Note  [facility]     Patient Name: Emely Solomon  : 1960  MRN: [id number]  Today's Date: 10/2/2017      Visit Date: 10/02/2017    Patient Active Problem List   Diagnosis   • Chronic diastolic congestive heart failure   • PFO (patent foramen ovale)   • Paradoxical embolism   • Hypertension   • Pulmonary hypertension        Past Medical History:   Diagnosis Date   • Chronic diastolic congestive heart failure    • Deep venous thrombosis    • Hypertension    • Lipoedema    • Lymphedema    • Morbid obesity    • Paradoxical embolism     to the LLE due to DVT/PE and PFO   • PFO (patent foramen ovale)    • Pulmonary embolism    • Pulmonary hypertension     multifactorial (dCHF, obesity/ARIEL, hx PE), mild by echo 2016        Past Surgical History:   Procedure Laterality Date   • BRONCHOSCOPY N/A 2017    Procedure: BRONCHOSCOPY with wash;  Surgeon: Caleb Garcia MD;  Location: [facility] ENDOSCOPY;  Service:    • DILATATION AND CURETTAGE  2011   • VENA CAVA FILTER PLACEMENT      Inferior Vena Cava Filter Placement w/Fluorosc angiogr guidance         Visit Dx:      ICD-10-CM ICD-9-CM   1. Lymphedema of both lower extremities I89.0 457.1   2. Lipoedema R60.9 782.3   3. Decreased mobility and endurance Z74.09 780.99   4. Cellulitis of left lower extremity L03.116 682.6             Lymphedema       10/02/17 1300          Subjective Comments    Subjective Comments No pain c/o. Tubigrip B thighs did not slide down. Itching improved.   -CW      Subjective Pain    Able to rate subjective pain? yes  -CW      Pre-Treatment Pain Level 0  -CW      Post-Treatment Pain Level 0  -CW      Pain Assessment    Pain Assessment No/denies pain  -CW      Skin Changes/Observations    Location/Assessment Lower Extremity  -CW      Lower Extremity Conditions bilateral:;shiny;scab(s);inflamed;fragile  -CW      Lower Extremity Color/Pigment left:   Skin red L lower leg the same.    -CW      Lower Extremity Skin Details Red scratches from pt. itching B lower legs improved/clearing.   -CW      Skin Observations Comment No weeping or drainage. Some red skin patches/sores noted LLE. Blister R lower leg no drainage.   -CW      Manual Lymphatic Drainage    Manual Lymphatic Drainage --   Neck, axilla, abd., groin, BLE's. Focused on proximal area.  -CW      Manual Lymphatic Drainage Comments No pain during MLD.   -CW      Compression/Skin Care    Compression/Skin Care skin care;wrapping location;bandaging;compression garment  -CW      Skin Care moisturizing lotion applied  -CW      Wrapping Location lower extremity  -CW      Wrapping Location LE bilateral:;ankle;knee   wrapped R ankle to knee so her shoes fit.   -CW      Wrapping Comments Bandages were removed at home.   -CW      Bandage Layers cotton liner;soft foam- 1/2 inch;short-stretch bandages (comment size/quantity)  -CW      Bandaging Technique circumferential/spiral;moderate compression  -CW      Compression Garment Comments Tried on pt.'s custom garment LLE.   -CW      Bandaging Comments Tubigrip size K to thighs.BLE-K1, 2- Rosidal soft, 1- 8cm. 4- 10 cm. Rosidal. Extra Artiflex to B ankle area.    -CW        User Key  (r) = Recorded By, (t) = Taken By, (c) = Cosigned By    Initials Name Provider Type    CW Colleen Hidalgo OTR Occupational Therapist                        OT Assessment/Plan       10/02/17 1401       OT Assessment    Assessment Comments Pt. was able to wear the bandages over the weekend. No pain c/o at present. Tried on pt.'s custom stockings from home LLE which seemed small and difficult to apply. The ankle area is tight and seems to irritate the skin with sliding toward the calf area. Pt. will need new custom stockings for a more precise fit. Skin LLE not as red, but is at risk for skin breakdown.   -CW     OT Plan    OT Plan Comments Plan to cont. tx.   -CW       User Key  (r) = Recorded By, (t) = Taken By, (c) = Cosigned By     Initials Name Provider Type    CW RADHA Miller Occupational Therapist                                Therapy Education       10/02/17 1406          Therapy Education    Given Bandaging/dressing change;Edema management   Reviewed bandage precautions and wearing schedule. DIscussed stockings vs. velcro compression.   -CW      Program Reinforced  -CW      How Provided Demonstration  -CW      Provided to Patient  -CW      Level of Understanding Verbalized  -CW        User Key  (r) = Recorded By, (t) = Taken By, (c) = Cosigned By    Initials Name Provider Type    CW RADHA Miller Occupational Therapist                  Time Calculation:   OT Start Time: 0907  OT Stop Time: 1020  OT Time Calculation (min): 73 min       Therapy Charges for Today     Code Description Service Date Service Provider Modifiers Qty    99489339332 HC OT MANUAL THERAPY EA 15 MIN 10/2/2017 RADHA Miller GO 4    06123088935 HC OT SELF CARE/MGMT/TRAIN EA 15 MIN 10/2/2017 RADHA Miller GO 1                      RADHA Miller  10/2/2017

## 2017-10-03 ENCOUNTER — HOSPITAL ENCOUNTER (OUTPATIENT)
Dept: OCCUPATIONAL THERAPY | Facility: HOSPITAL | Age: 57
Setting detail: THERAPIES SERIES
Discharge: HOME | End: 2017-10-03

## 2017-10-03 DIAGNOSIS — R60.9 LIPOEDEMA: ICD-10-CM

## 2017-10-03 DIAGNOSIS — Z74.09 DECREASED MOBILITY AND ENDURANCE: ICD-10-CM

## 2017-10-03 DIAGNOSIS — I89.0 LYMPHEDEMA OF BOTH LOWER EXTREMITIES: Primary | ICD-10-CM

## 2017-10-03 DIAGNOSIS — L03.116 CELLULITIS OF LEFT LOWER EXTREMITY: ICD-10-CM

## 2017-10-03 PROCEDURE — 97140 MANUAL THERAPY 1/> REGIONS: CPT

## 2017-10-03 NOTE — THERAPY TREATMENT NOTE
Outpatient Occupational Therapy Lymphedema Treatment Note  [facility]     Patient Name: Emely Solomon  : 1960  MRN: [id number]  Today's Date: 10/3/2017      Visit Date: 10/03/2017    Patient Active Problem List   Diagnosis   • Chronic diastolic congestive heart failure   • PFO (patent foramen ovale)   • Paradoxical embolism   • Hypertension   • Pulmonary hypertension        Past Medical History:   Diagnosis Date   • Chronic diastolic congestive heart failure    • Deep venous thrombosis    • Hypertension    • Lipoedema    • Lymphedema    • Morbid obesity    • Paradoxical embolism     to the LLE due to DVT/PE and PFO   • PFO (patent foramen ovale)    • Pulmonary embolism    • Pulmonary hypertension     multifactorial (dCHF, obesity/ARIEL, hx PE), mild by echo 2016        Past Surgical History:   Procedure Laterality Date   • BRONCHOSCOPY N/A 2017    Procedure: BRONCHOSCOPY with wash;  Surgeon: Caleb Garcia MD;  Location: [facility] ENDOSCOPY;  Service:    • DILATATION AND CURETTAGE  2011   • VENA CAVA FILTER PLACEMENT      Inferior Vena Cava Filter Placement w/Fluorosc angiogr guidance         Visit Dx:      ICD-10-CM ICD-9-CM   1. Lymphedema of both lower extremities I89.0 457.1   2. Lipoedema R60.9 782.3   3. Decreased mobility and endurance Z74.09 780.99   4. Cellulitis of left lower extremity L03.116 682.6             Lymphedema       10/03/17 1300          Subjective Comments    Subjective Comments No pain c/o at present. Itching decreased.   -CW      Subjective Pain    Able to rate subjective pain? yes  -CW      Pre-Treatment Pain Level 0  -CW      Post-Treatment Pain Level 0  -CW      Pain Assessment    Pain Assessment No/denies pain  -CW      Skin Changes/Observations    Location/Assessment Lower Extremity  -CW      Lower Extremity Conditions bilateral:;shiny;scab(s);inflamed;fragile  -CW      Lower Extremity Color/Pigment left:   Skin red L lower leg the same.   -CW      Lower Extremity  Skin Details Red scratches B lower legs diminished.   -CW      Skin Observations Comment No weeping or drainage. Some red skin patches/sores noted LLE. Blister R lower leg no drainage.   -CW      Manual Lymphatic Drainage    Manual Lymphatic Drainage --   Neck, axilla, abd., groin, BLE's. Focused on proximal area.  -CW      Manual Lymphatic Drainage Comments No pain during MLD.   -CW      Compression/Skin Care    Compression/Skin Care skin care;wrapping location;bandaging;compression garment  -CW      Skin Care moisturizing lotion applied  -CW      Wrapping Location lower extremity  -CW      Wrapping Location LE bilateral:;ankle;knee   wrapped R ankle to knee so her shoes fit.   -CW      Wrapping Comments Bandages were removed at home.   -CW      Bandage Layers cotton liner;soft foam- 1/2 inch;short-stretch bandages (comment size/quantity)  -CW      Bandaging Technique circumferential/spiral;moderate compression  -CW      Bandaging Comments Tubigrip size K to thighs.BLE-K1, 2- Rosidal soft, 1- 8cm. 4- 10 cm. Rosidal. Extra Artiflex to B ankle area.    -CW        User Key  (r) = Recorded By, (t) = Taken By, (c) = Cosigned By    Initials Name Provider Type    CW Colleen Hidalgo OTR Occupational Therapist                        OT Assessment/Plan       10/03/17 1322 10/02/17 1401    OT Assessment    Assessment Comments Overall itching B lower legs is decreased. No pain c/o at present. Skin intact with no wounds or skin irritation. Discussed stockings vs. velcro compression. Velcro compression will be easier for pt. to apply independently and stockings if not fitting right can cause skin irritation. Pt.'s insurance does not cover velcro compression so pt. will have to pay out of pocket. Encouraged pt. to complete HEP.  Is now able to independently don/doff socks and shoes.    -CW Pt. was able to wear the bandages over the weekend. No pain c/o at present. Tried on pt.'s custom stockings from home LLE which seemed small and  difficult to apply. The ankle area is tight and seems to irritate the skin with sliding toward the calf area. Pt. will need new custom stockings for a more precise fit. Skin LLE not as red, but is at risk for skin breakdown.   -CW    OT Plan    OT Plan Comments Plan to cont. tx.   -CW Plan to cont. tx.   -CW      User Key  (r) = Recorded By, (t) = Taken By, (c) = Cosigned By    Initials Name Provider Type    RADHA Acosta Occupational Therapist                                Therapy Education       10/03/17 1327 10/02/17 1406       Therapy Education    Given Edema management;Bandaging/dressing change   Reviewed bandage precautions, wearing schedule and skin care. Encouraged pt. to inspect  skin for pressure areas.   -CW Bandaging/dressing change;Edema management   Reviewed bandage precautions and wearing schedule. DIscussed stockings vs. velcro compression.   -CW     Program Reinforced  -CW Reinforced  -CW     How Provided Verbal  -CW Demonstration  -CW     Provided to Patient  -CW Patient  -CW     Level of Understanding Verbalized  -CW Verbalized  -CW       User Key  (r) = Recorded By, (t) = Taken By, (c) = Cosigned By    Initials Name Provider Type    RADHA Acosta Occupational Therapist                  Time Calculation:   OT Start Time: 0905  OT Stop Time: 1008  OT Time Calculation (min): 63 min       Therapy Charges for Today     Code Description Service Date Service Provider Modifiers Qty    90282170165 HC OT MANUAL THERAPY EA 15 MIN 10/3/2017 RADHA Miller GO 4                      RADHA Miller  10/3/2017

## 2017-10-04 ENCOUNTER — HOSPITAL ENCOUNTER (OUTPATIENT)
Dept: OCCUPATIONAL THERAPY | Facility: HOSPITAL | Age: 57
Setting detail: THERAPIES SERIES
Discharge: HOME | End: 2017-10-04

## 2017-10-04 DIAGNOSIS — R60.9 LIPOEDEMA: ICD-10-CM

## 2017-10-04 DIAGNOSIS — L03.116 CELLULITIS OF LEFT LOWER EXTREMITY: ICD-10-CM

## 2017-10-04 DIAGNOSIS — Z74.09 DECREASED MOBILITY AND ENDURANCE: ICD-10-CM

## 2017-10-04 DIAGNOSIS — I89.0 LYMPHEDEMA OF BOTH LOWER EXTREMITIES: Primary | ICD-10-CM

## 2017-10-04 PROCEDURE — 97535 SELF CARE MNGMENT TRAINING: CPT

## 2017-10-04 PROCEDURE — 97140 MANUAL THERAPY 1/> REGIONS: CPT

## 2017-10-04 NOTE — THERAPY TREATMENT NOTE
"Outpatient Occupational Therapy Lymphedema Treatment Note  [facility]     Patient Name: Emely Solomon  : 1960  MRN: [id number]  Today's Date: 10/4/2017      Visit Date: 10/04/2017    Patient Active Problem List   Diagnosis   • Chronic diastolic congestive heart failure   • PFO (patent foramen ovale)   • Paradoxical embolism   • Hypertension   • Pulmonary hypertension        Past Medical History:   Diagnosis Date   • Chronic diastolic congestive heart failure    • Deep venous thrombosis    • Hypertension    • Lipoedema    • Lymphedema    • Morbid obesity    • Paradoxical embolism     to the LLE due to DVT/PE and PFO   • PFO (patent foramen ovale)    • Pulmonary embolism    • Pulmonary hypertension     multifactorial (dCHF, obesity/ARIEL, hx PE), mild by echo 2016        Past Surgical History:   Procedure Laterality Date   • BRONCHOSCOPY N/A 2017    Procedure: BRONCHOSCOPY with wash;  Surgeon: Caleb Garcia MD;  Location: [facility] ENDOSCOPY;  Service:    • DILATATION AND CURETTAGE  2011   • VENA CAVA FILTER PLACEMENT      Inferior Vena Cava Filter Placement w/Fluorosc angiogr guidance         Visit Dx:      ICD-10-CM ICD-9-CM   1. Lymphedema of both lower extremities I89.0 457.1   2. Lipoedema R60.9 782.3   3. Decreased mobility and endurance Z74.09 780.99   4. Cellulitis of left lower extremity L03.116 682.6             Lymphedema       10/04/17 1400          Subjective Comments    Subjective Comments Bandages felt \"hot\" per pt. yesterday. Went for a walk at work . No muscle cramps or spasms.   -CW      Subjective Pain    Able to rate subjective pain? yes  -CW      Pre-Treatment Pain Level 0  -CW      Post-Treatment Pain Level 0  -CW      Pain Assessment    Pain Assessment No/denies pain  -CW      Skin Changes/Observations    Location/Assessment Lower Extremity  -CW      Lower Extremity Conditions bilateral:;shiny;scab(s);inflamed;fragile  -CW      Lower Extremity Color/Pigment left:   Skin red L " lower leg.  -CW      Lower Extremity Skin Details Red scratches/skin irritation L lower leg noted.   -CW      Skin Observations Comment No weeping or drainage. Some red skin patches/sores noted LLE. Blister R lower leg no drainage.   -CW      Lymphedema Measurements    Measurement Type(s) Circumferential  -CW      Circumferential Areas Lower extremities  -CW      LLE Circumferential (cm)    Measurement Location 1 10 cm above knee  -CW      Left 1 82.9 cm  -CW      Measurement Location 2 Knee   -CW      Left 2 71 cm  -CW      Measurement Location 3 10 cm below knee  -CW      Left 3 72 cm  -CW      Measurement Location 4 20 cm below knee  -CW      Left 4 57 cm  -CW      Measurement Location 5 30 cm below kne  -CW      Left 5 32.5 cm  -CW      Measurement Location 6 Ankle  -CW      Left 6 32 cm  -CW      Measurement Location 7 Mid foot  -CW      Left 7 23 cm  -CW      Measurement Location 8 Total  -CW      Left 8 370.4 cm  -CW      RLE Circumferential (cm)    Measurement Location 1 10 cm above knee  -CW      Right 1 85 cm  -CW      Measurement Location 2 Knee (popliteal crease)  -CW      Right 2 76.5 cm  -CW      Measurement Location 3 10 cm below knee  -CW      Right 3 72.4 cm  -CW      Measurement Location 4 20 cm below knee  -CW      Right 4 57.5 cm  -CW      Measurement Location 5 30 cm below kne  -CW      Right 5 36 cm  -CW      Measurement Location 6 Ankle  -CW      Right 6 32.3 cm  -CW      Measurement Location 7 Mid foot  -CW      Right 7 23 cm  -CW      Measurement Location 8 Total  -CW      Right 8 382.7 cm  -CW      Manual Lymphatic Drainage    Manual Lymphatic Drainage --   Neck, axilla, abd., groin, BLE's. Focused on proximal area.  -CW      Manual Lymphatic Drainage Comments No pain during MLD.   -CW      Compression/Skin Care    Compression/Skin Care skin care;wrapping location;bandaging;compression garment  -CW      Skin Care moisturizing lotion applied   neosporin to L lower leg scratched area.   -CW       Wrapping Location lower extremity  -CW      Wrapping Location LE bilateral:;ankle;knee   wrapped R ankle to knee so her shoes fit.   -CW      Wrapping Comments Bandages were removed at home.   -CW      Bandage Layers cotton liner;soft foam- 1/2 inch;short-stretch bandages (comment size/quantity)  -CW      Bandaging Technique circumferential/spiral;moderate compression  -CW      Bandaging Comments Tubigrip size K to thighs.BLE-K1, 2- Rosidal soft, 1- 8cm. 4- 10 cm. Rosidal. Extra Artiflex to B ankle area.    -CW        User Key  (r) = Recorded By, (t) = Taken By, (c) = Cosigned By    Initials Name Provider Type    RADHA Acosta Occupational Therapist                        OT Assessment/Plan       10/04/17 1428       OT Assessment    Assessment Comments Measurements taken.See flow sheet. Total circumference .7 cm. and .4 cm. (18.9 cm R and 52.8 cm. L net decrease). Pt. is slowly progressing with decreased edema BLE's RLE >LLE today per measurements. Skin LLE with some scratch marks and sores. Explained the importance of skin care and watching for sores/weeping B LE's.  Discussed at length the benefits of consistent compression and recommend velcro compression.   -CW     OT Plan    OT Plan Comments Plan to cont. tx.   -CW       User Key  (r) = Recorded By, (t) = Taken By, (c) = Cosigned By    Initials Name Provider Type    RADHA Acosta Occupational Therapist                            OT Goals       10/04/17 1400       OT Short Term Goals    STG Date to Achieve 10/18/17  -CW     STG 1 Patient independent and compliant with self-wrapping techniques of compression bandages with assistance of caregiver as needed for improved self-management of condition.  -CW     STG 1 Progress Ongoing;Progressing  -CW     STG 2 Patient will demonstrate decreased net edema of bilateral lower extremities >/= 10-20cm  for decrease in edema, symptoms, decreased risk of infection and improved skin  care/transition to self-care of condition.   -CW     STG 2 Progress Met;Ongoing  -CW     Long Term Goals    LTG Date to Achieve 12/07/17  -CW     LTG 1 Patient will demonstrate decreased net edema of bilateral lower extremities >/= 21--40cm  for decrease in edema, symptoms, decreased risk of infection and improved skin care/transition to self-care of condition.   -CW     LTG 1 Progress Progressing;Goal Revised   Total circumference .7 cm. and .4 cm. 10/4/17.  -CW     LTG 1 Progress Comments Patient will demonstrate decreased net edema of LLE lower extremity >/= 21-40cm  and RLE 10-20 cm. for decrease in edema, symptoms, decreased risk of infection and improved skin care/transition to self-care of condition.   -CW     LTG 2 Patient independent and compliant with home exercise program (as able) focused on range of motion, flexibility to improve lymphatic flow and LE discomfort.  -CW     LTG 2 Progress Progressing  -CW     LTG 3  Patient/family/caregivers independent and compliant with self-care techniques for self-management of condition.  -CW     LTG 3 Progress New  -CW     LTG 4 Patient independent and compliant with use and care of compression garments with assistance of a caregiver as needed to promote self-care independence.   -CW     LTG 4 Progress New  -CW       User Key  (r) = Recorded By, (t) = Taken By, (c) = Cosigned By    Initials Name Provider Type    RADHA Acosta Occupational Therapist                Therapy Education       10/04/17 1432          Therapy Education    Given Bandaging/dressing change;Edema management   Reviewed bandage precautions, wearing schedule,  skin care and recommendations for long term edema management.  -CW      Program Reinforced  -CW      How Provided Verbal  -CW      Level of Understanding Verbalized  -CW        User Key  (r) = Recorded By, (t) = Taken By, (c) = Cosigned By    Initials Name Provider Type    RADHA Acosta Occupational Therapist                   Time Calculation:   OT Start Time: 0904  OT Stop Time: 1021  OT Time Calculation (min): 77 min       Therapy Charges for Today     Code Description Service Date Service Provider Modifiers Qty    96334356404  OT MANUAL THERAPY EA 15 MIN 10/4/2017 RADHA Miller GO 4    [id number]  OT SELF CARE/MGMT/TRAIN EA 15 MIN 10/4/2017 RADHA Miller GO 1                      RADHA Miller  10/4/2017

## 2017-10-06 ENCOUNTER — HOSPITAL ENCOUNTER (OUTPATIENT)
Dept: OCCUPATIONAL THERAPY | Facility: HOSPITAL | Age: 57
Setting detail: THERAPIES SERIES
Discharge: HOME | End: 2017-10-06

## 2017-10-06 DIAGNOSIS — Z74.09 DECREASED MOBILITY AND ENDURANCE: ICD-10-CM

## 2017-10-06 DIAGNOSIS — R60.9 LIPOEDEMA: ICD-10-CM

## 2017-10-06 DIAGNOSIS — I89.0 LYMPHEDEMA OF BOTH LOWER EXTREMITIES: Primary | ICD-10-CM

## 2017-10-06 DIAGNOSIS — L03.116 CELLULITIS OF LEFT LOWER EXTREMITY: ICD-10-CM

## 2017-10-06 PROCEDURE — 97140 MANUAL THERAPY 1/> REGIONS: CPT

## 2017-10-06 NOTE — THERAPY TREATMENT NOTE
Outpatient Occupational Therapy Lymphedema Treatment Note  [facility]     Patient Name: Emely Solomon  : 1960  MRN: [id number]  Today's Date: 10/6/2017      Visit Date: 10/06/2017    Patient Active Problem List   Diagnosis   • Chronic diastolic congestive heart failure   • PFO (patent foramen ovale)   • Paradoxical embolism   • Hypertension   • Pulmonary hypertension        Past Medical History:   Diagnosis Date   • Chronic diastolic congestive heart failure    • Deep venous thrombosis    • Hypertension    • Lipoedema    • Lymphedema    • Morbid obesity    • Paradoxical embolism     to the LLE due to DVT/PE and PFO   • PFO (patent foramen ovale)    • Pulmonary embolism    • Pulmonary hypertension     multifactorial (dCHF, obesity/ARIEL, hx PE), mild by echo 2016        Past Surgical History:   Procedure Laterality Date   • BRONCHOSCOPY N/A 2017    Procedure: BRONCHOSCOPY with wash;  Surgeon: Caleb Garcia MD;  Location: [facility] ENDOSCOPY;  Service:    • DILATATION AND CURETTAGE  2011   • VENA CAVA FILTER PLACEMENT      Inferior Vena Cava Filter Placement w/Fluorosc angiogr guidance         Visit Dx:      ICD-10-CM ICD-9-CM   1. Lymphedema of both lower extremities I89.0 457.1   2. Lipoedema R60.9 782.3   3. Decreased mobility and endurance Z74.09 780.99   4. Cellulitis of left lower extremity L03.116 682.6             Lymphedema       10/06/17 1400          Subjective Comments    Subjective Comments R 4th toe sore. Was able to leave the bandages on all day yesterday.   -CW      Subjective Pain    Able to rate subjective pain? yes  -CW      Pre-Treatment Pain Level 0  -CW      Post-Treatment Pain Level 0  -CW      Pain Assessment    Pain Assessment No/denies pain  -CW      Skin Changes/Observations    Location/Assessment Lower Extremity  -CW      Lower Extremity Conditions bilateral:;shiny;scab(s);inflamed;fragile  -CW      Lower Extremity Color/Pigment left:   Skin red L lower leg.  -CW       Lower Extremity Skin Details Red scratches/skin irritation L lower leg noted.    Small blister between R 4th toe.   -CW      Skin Observations Comment No weeping or drainage. Some red skin patches/sores noted LLE. Blister R lower leg no drainage.   -CW      Manual Lymphatic Drainage    Manual Lymphatic Drainage --   Neck, axilla, abd., groin, BLE's. Focused on proximal area.  -CW      Manual Lymphatic Drainage Comments Slight tenderness R foot.   -CW      Compression/Skin Care    Compression/Skin Care skin care;wrapping location;bandaging;compression garment  -CW      Skin Care moisturizing lotion applied   neosporin to R 4th toe.   -CW      Wrapping Location lower extremity  -CW      Wrapping Location LE bilateral:;ankle;knee   wrapped R ankle to knee so her shoes fit.   -CW      Wrapping Comments Bandages were removed at home.   -CW      Bandage Layers cotton liner;soft foam- 1/2 inch;short-stretch bandages (comment size/quantity)  -CW      Bandaging Technique circumferential/spiral;moderate compression  -CW      Bandaging Comments Tubigrip size K to thighs.BLE-K1, 2- Rosidal soft, 1- 8cm. 4- 10 cm. Rosidal. Extra Artiflex to B ankle area.    -CW        User Key  (r) = Recorded By, (t) = Taken By, (c) = Cosigned By    Initials Name Provider Type    RADHA Acosta Occupational Therapist                        OT Assessment/Plan       10/06/17 1408       OT Assessment    Assessment Comments Applied the bandages with slightly more compression today. R  with small blister in between R 4th toe). Pt. was able to wear the bandages all day yesterday. Reviewed how to apply the bandages for over the weekend with technique. Discussed LE positioning,  elevation and reviewed skin care.   -CW     OT Plan    OT Plan Comments Plan to cont. tx.   -CW       User Key  (r) = Recorded By, (t) = Taken By, (c) = Cosigned By    Initials Name Provider Type    RADHA Acosta Occupational Therapist                                 Therapy Education       10/06/17 1411          Therapy Education    Given Bandaging/dressing change;Edema management  -CW      Program Reinforced  -CW      How Provided Verbal;Demonstration  -CW      Provided to Patient  -CW      Level of Understanding Verbalized  -CW        User Key  (r) = Recorded By, (t) = Taken By, (c) = Cosigned By    Initials Name Provider Type    CW RADHA Miller Occupational Therapist                  Time Calculation:   OT Start Time: 0905  OT Stop Time: 1008  OT Time Calculation (min): 63 min       Therapy Charges for Today     Code Description Service Date Service Provider Modifiers Qty    22986879420  OT MANUAL THERAPY EA 15 MIN 10/6/2017 RADHA Miller GO 4                      RADHA Miller  10/6/2017

## 2017-10-09 ENCOUNTER — HOSPITAL ENCOUNTER (OUTPATIENT)
Dept: OCCUPATIONAL THERAPY | Facility: HOSPITAL | Age: 57
Setting detail: THERAPIES SERIES
Discharge: HOME | End: 2017-10-09

## 2017-10-09 DIAGNOSIS — Z74.09 DECREASED MOBILITY AND ENDURANCE: ICD-10-CM

## 2017-10-09 DIAGNOSIS — I89.0 LYMPHEDEMA OF BOTH LOWER EXTREMITIES: Primary | ICD-10-CM

## 2017-10-09 DIAGNOSIS — L03.116 CELLULITIS OF LEFT LOWER EXTREMITY: ICD-10-CM

## 2017-10-09 DIAGNOSIS — R60.9 LIPOEDEMA: ICD-10-CM

## 2017-10-09 PROCEDURE — 97140 MANUAL THERAPY 1/> REGIONS: CPT

## 2017-10-09 NOTE — THERAPY TREATMENT NOTE
Outpatient Occupational Therapy Lymphedema Treatment Note  [facility]     Patient Name: Emely Solomon  : 1960  MRN: [id number]  Today's Date: 10/9/2017      Visit Date: 10/09/2017    Patient Active Problem List   Diagnosis   • Chronic diastolic congestive heart failure   • PFO (patent foramen ovale)   • Paradoxical embolism   • Hypertension   • Pulmonary hypertension        Past Medical History:   Diagnosis Date   • Chronic diastolic congestive heart failure    • Deep venous thrombosis    • Hypertension    • Lipoedema    • Lymphedema    • Morbid obesity    • Paradoxical embolism     to the LLE due to DVT/PE and PFO   • PFO (patent foramen ovale)    • Pulmonary embolism    • Pulmonary hypertension     multifactorial (dCHF, obesity/ARIEL, hx PE), mild by echo 2016        Past Surgical History:   Procedure Laterality Date   • BRONCHOSCOPY N/A 2017    Procedure: BRONCHOSCOPY with wash;  Surgeon: Caleb Garcia MD;  Location: [facility] ENDOSCOPY;  Service:    • DILATATION AND CURETTAGE  2011   • VENA CAVA FILTER PLACEMENT      Inferior Vena Cava Filter Placement w/Fluorosc angiogr guidance         Visit Dx:      ICD-10-CM ICD-9-CM   1. Lymphedema of both lower extremities I89.0 457.1   2. Lipoedema R60.9 782.3   3. Decreased mobility and endurance Z74.09 780.99   4. Cellulitis of left lower extremity L03.116 682.6             Lymphedema       10/09/17 1000          Subjective Comments    Subjective Comments No pain c/o at present. Itching improved B lower legs.   -CW      Subjective Pain    Able to rate subjective pain? yes  -CW      Pre-Treatment Pain Level 0  -CW      Post-Treatment Pain Level 0  -CW      Pain Assessment    Pain Assessment No/denies pain  -CW      Skin Changes/Observations    Location/Assessment Lower Extremity  -CW      Lower Extremity Conditions bilateral:;shiny;scab(s);inflamed;fragile  -CW      Lower Extremity Color/Pigment left:   Skin red L lower leg.  -CW      Lower Extremity  "Skin Details Skin improved BLE's. LLE skin is not as red and (is more clear of sores).   -CW      Skin Observations Comment Blister R 4th toe not draining at present.   -CW      Manual Lymphatic Drainage    Manual Lymphatic Drainage --   Neck, axilla, abd., groin, BLE's. Focused on proximal area.  -CW      Manual Lymphatic Drainage Comments No pain during MLD.   -CW      Compression/Skin Care    Compression/Skin Care skin care;wrapping location;bandaging;compression garment  -CW      Skin Care moisturizing lotion applied  -CW      Wrapping Location lower extremity  -CW      Wrapping Location LE bilateral:;ankle;knee   wrapped R ankle to knee so her shoes fit.   -CW      Wrapping Comments Bandages were removed at home.   -CW      Bandage Layers cotton liner;soft foam- 1/2 inch;short-stretch bandages (comment size/quantity)  -CW      Bandaging Technique circumferential/spiral;moderate compression  -CW      Bandaging Comments Tubigrip size K to thighs.BLE-K1, 2- Rosidal soft, 1- 8cm. 4- 10 cm. Rosidal. Extra Artiflex to B ankle area.    -CW        User Key  (r) = Recorded By, (t) = Taken By, (c) = Cosigned By    Initials Name Provider Type    RADHA Acosta Occupational Therapist                        OT Assessment/Plan       10/09/17 1056       OT Assessment    Assessment Comments Pt. was able to wear the compression bandages over the weekend. Skin improved B  lower legs especially LLE. LLE skin not as red and appears to be clearing of sores. Applied the bandages with moderate compression. Recommended velcro compression B lower legs and custom [place] lenght panty hose to wear during the day. Pt. says she may look at [employer] to see about [place]  panty hose. Pt. report her walking is getting a \"little\" easier.   -CW     OT Plan    OT Plan Comments Plan to cont. tx.   -CW       User Key  (r) = Recorded By, (t) = Taken By, (c) = Cosigned By    Initials Name Provider Type    RADHA Acosta Occupational " Therapist                                Therapy Education       10/09/17 1059          Therapy Education    Given Bandaging/dressing change;Edema management   Reviewed skin care, precautions, how to apply the bandages, and options for long term edema management.   -CW      Program Reinforced  -CW      How Provided Verbal;Demonstration  -CW      Provided to Patient  -CW      Level of Understanding Verbalized  -CW        User Key  (r) = Recorded By, (t) = Taken By, (c) = Cosigned By    Initials Name Provider Type    CW RADHA Miller Occupational Therapist                  Time Calculation:   OT Start Time: 0904  OT Stop Time: 1009  OT Time Calculation (min): 65 min       Therapy Charges for Today     Code Description Service Date Service Provider Modifiers Qty    11830429931 HC OT MANUAL THERAPY EA 15 MIN 10/9/2017 RADHA Miller GO 4                      RADHA Miller  10/9/2017

## 2017-10-10 ENCOUNTER — HOSPITAL ENCOUNTER (OUTPATIENT)
Dept: OCCUPATIONAL THERAPY | Facility: HOSPITAL | Age: 57
Setting detail: THERAPIES SERIES
Discharge: HOME | End: 2017-10-10

## 2017-10-10 DIAGNOSIS — Z74.09 DECREASED MOBILITY AND ENDURANCE: ICD-10-CM

## 2017-10-10 DIAGNOSIS — I89.0 LYMPHEDEMA OF BOTH LOWER EXTREMITIES: Primary | ICD-10-CM

## 2017-10-10 DIAGNOSIS — L03.116 CELLULITIS OF LEFT LOWER EXTREMITY: ICD-10-CM

## 2017-10-10 DIAGNOSIS — R60.9 LIPOEDEMA: ICD-10-CM

## 2017-10-10 PROCEDURE — 97140 MANUAL THERAPY 1/> REGIONS: CPT

## 2017-10-10 NOTE — THERAPY TREATMENT NOTE
Outpatient Occupational Therapy Lymphedema Treatment Note  [facility]     Patient Name: Emely Solomon  : 1960  MRN: [id number]  Today's Date: 10/10/2017      Visit Date: 10/10/2017    Patient Active Problem List   Diagnosis   • Chronic diastolic congestive heart failure   • PFO (patent foramen ovale)   • Paradoxical embolism   • Hypertension   • Pulmonary hypertension        Past Medical History:   Diagnosis Date   • Chronic diastolic congestive heart failure    • Deep venous thrombosis    • Hypertension    • Lipoedema    • Lymphedema    • Morbid obesity    • Paradoxical embolism     to the LLE due to DVT/PE and PFO   • PFO (patent foramen ovale)    • Pulmonary embolism    • Pulmonary hypertension     multifactorial (dCHF, obesity/ARIEL, hx PE), mild by echo 2016        Past Surgical History:   Procedure Laterality Date   • BRONCHOSCOPY N/A 2017    Procedure: BRONCHOSCOPY with wash;  Surgeon: Caleb Garcia MD;  Location: [facility] ENDOSCOPY;  Service:    • DILATATION AND CURETTAGE  2011   • VENA CAVA FILTER PLACEMENT      Inferior Vena Cava Filter Placement w/Fluorosc angiogr guidance         Visit Dx:      ICD-10-CM ICD-9-CM   1. Lymphedema of both lower extremities I89.0 457.1   2. Lipoedema R60.9 782.3   3. Decreased mobility and endurance Z74.09 780.99   4. Cellulitis of left lower extremity L03.116 682.6             Lymphedema       10/10/17 1000          Subjective Comments    Subjective Comments Bandages were bothering her yesterday.   -CW      Subjective Pain    Able to rate subjective pain? yes  -CW      Pre-Treatment Pain Level 0  -CW      Post-Treatment Pain Level 0  -CW      Pain Assessment    Pain Assessment No/denies pain  -CW      Skin Changes/Observations    Location/Assessment Lower Extremity  -CW      Lower Extremity Conditions bilateral:;shiny;scab(s);inflamed;fragile  -CW      Lower Extremity Color/Pigment left:   Skin red L lower leg.  -CW      Lower Extremity Skin Details  Skin improved BLE's. LLE skin is not as red and (is more clear of sores).   -CW      Manual Lymphatic Drainage    Manual Lymphatic Drainage --   Neck, axilla, abd., groin, BLE's. Focused on proximal area.  -CW      Manual Lymphatic Drainage Comments No pain during MLD.   -CW      Compression/Skin Care    Compression/Skin Care skin care;wrapping location;bandaging;compression garment  -CW      Skin Care moisturizing lotion applied  -CW      Wrapping Location lower extremity  -CW      Wrapping Location LE bilateral:;ankle;knee   wrapped R ankle to knee so her shoes fit.   -CW      Wrapping Comments Bandages were removed at home.    Had to remove at night.  -CW      Bandage Layers cotton liner;soft foam- 1/2 inch;short-stretch bandages (comment size/quantity)  -CW      Bandaging Technique circumferential/spiral;moderate compression  -CW      Bandaging Comments Tubigrip size K to thighs.BLE-K1, 2- Rosidal soft, 1- 8cm. 4- 10 cm. Rosidal. Extra Artiflex to B ankle area.     Extra Artiflex to behind knees and ankles.   -CW        User Key  (r) = Recorded By, (t) = Taken By, (c) = Cosigned By    Initials Name Provider Type    CW Colleen Hidalgo, OTR Occupational Therapist                        OT Assessment/Plan       10/10/17 1043 10/09/17 1056    OT Assessment    Assessment Comments Pt. had to remove the bandages last night. Added more padding to behind knees and applied  longer tubigrip to thighs for comfort. Skin not as red LLE and sores appear to be clearing up.  No pain at present, but pt. did have discomfort last night. Pt. reports her legs feel better with compression. Reviewed options for velcro compression. Pt. to check [employer] to see about austin length compression for thighs. Pt. to practice bandaging at home. Reviewed how to apply the bandages.   -CW Pt. was able to wear the compression bandages over the weekend. Skin improved B  lower legs especially LLE. LLE skin not as red and appears to be clearing of  "sores. Applied the bandages with moderate compression. Recommended velcro compression B lower legs and custom austin lenght panty hose to wear during the day. Pt. says she may look at [employer] to see about austin  panty hose. Pt. report her walking is getting a \"little\" easier.   -CW    OT Plan    OT Plan Comments Plan to cont. tx.   -CW Plan to cont. tx.   -CW      User Key  (r) = Recorded By, (t) = Taken By, (c) = Cosigned By    Initials Name Provider Type    CW Colleen Hidalgo, OTR Occupational Therapist                            OT Goals       10/10/17 1000       OT Short Term Goals    STG Date to Achieve 10/18/17  -CW     STG 1 Patient independent and compliant with self-wrapping techniques of compression bandages with assistance of caregiver as needed for improved self-management of condition.  -CW     STG 1 Progress Ongoing;Progressing  -CW     STG 2 Patient will demonstrate decreased net edema of bilateral lower extremities >/= 10-20cm  for decrease in edema, symptoms, decreased risk of infection and improved skin care/transition to self-care of condition.   -CW     STG 2 Progress Met;Ongoing  -CW     Long Term Goals    LTG Date to Achieve 12/07/17  -CW     LTG 1 Patient will demonstrate decreased net edema of bilateral lower extremities >/= 21--40cm  for decrease in edema, symptoms, decreased risk of infection and improved skin care/transition to self-care of condition.   -CW     LTG 1 Progress Progressing;Goal Revised   Total circumference .7 cm. and .4 cm. 10/4/17.  -CW     LTG 2 Patient independent and compliant with home exercise program (as able) focused on range of motion, flexibility to improve lymphatic flow and LE discomfort.  -CW     LTG 2 Progress Progressing  -CW     LTG 3  Patient/family/caregivers independent and compliant with self-care techniques for self-management of condition.  -CW     LTG 3 Progress Progressing  -CW     LTG 4 Patient independent and compliant with use and " care of compression garments with assistance of a caregiver as needed to promote self-care independence.   -CW     LTG 4 Progress New  -CW     LTG 4 Progress Comments Pending velcro compression B lower legs.   -CW       User Key  (r) = Recorded By, (t) = Taken By, (c) = Cosigned By    Initials Name Provider Type    RADHA Acosta Occupational Therapist                Therapy Education       10/10/17 1050 10/09/17 1059       Therapy Education    Given Bandaging/dressing change;Edema management   Reviewed how to apply the bandages, sequence of bandaging, skin care and techniques for extra padding. Reviewed bandage precautions.   -CW Bandaging/dressing change;Edema management   Reviewed skin care, precautions, how to apply the bandages, and options for long term edema management.   -CW     Program Reinforced  -CW Reinforced  -CW     How Provided Verbal;Demonstration  -CW Verbal;Demonstration  -CW     Provided to Patient  -CW Patient  -CW     Level of Understanding Verbalized  -CW Verbalized  -CW       User Key  (r) = Recorded By, (t) = Taken By, (c) = Cosigned By    Initials Name Provider Type    RADHA Acosta Occupational Therapist                  Time Calculation:   OT Start Time: 0905  OT Stop Time: 1008  OT Time Calculation (min): 63 min       Therapy Charges for Today     Code Description Service Date Service Provider Modifiers Qty    30250766454 HC OT MANUAL THERAPY EA 15 MIN 10/10/2017 RADHA Miller GO 4                      RADHA Miller  10/10/2017

## 2017-10-11 ENCOUNTER — APPOINTMENT (OUTPATIENT)
Dept: OCCUPATIONAL THERAPY | Facility: HOSPITAL | Age: 57
End: 2017-10-11

## 2017-10-13 ENCOUNTER — APPOINTMENT (OUTPATIENT)
Dept: OCCUPATIONAL THERAPY | Facility: HOSPITAL | Age: 57
End: 2017-10-13

## 2017-10-16 ENCOUNTER — APPOINTMENT (OUTPATIENT)
Dept: OCCUPATIONAL THERAPY | Facility: HOSPITAL | Age: 57
End: 2017-10-16

## 2017-10-17 ENCOUNTER — HOSPITAL ENCOUNTER (OUTPATIENT)
Dept: OCCUPATIONAL THERAPY | Facility: HOSPITAL | Age: 57
Setting detail: THERAPIES SERIES
Discharge: HOME | End: 2017-10-17

## 2017-10-17 DIAGNOSIS — I89.0 LYMPHEDEMA OF BOTH LOWER EXTREMITIES: Primary | ICD-10-CM

## 2017-10-17 DIAGNOSIS — Z74.09 DECREASED MOBILITY AND ENDURANCE: ICD-10-CM

## 2017-10-17 DIAGNOSIS — L03.116 CELLULITIS OF LEFT LOWER EXTREMITY: ICD-10-CM

## 2017-10-17 DIAGNOSIS — R60.9 LIPOEDEMA: ICD-10-CM

## 2017-10-17 PROCEDURE — 97140 MANUAL THERAPY 1/> REGIONS: CPT

## 2017-10-17 NOTE — THERAPY TREATMENT NOTE
Outpatient Occupational Therapy Lymphedema Treatment Note  [facility]     Patient Name: Emely Solomon  : 1960  MRN: [id number]  Today's Date: 10/17/2017      Visit Date: 10/17/2017    Patient Active Problem List   Diagnosis   • Chronic diastolic congestive heart failure   • PFO (patent foramen ovale)   • Paradoxical embolism   • Hypertension   • Pulmonary hypertension        Past Medical History:   Diagnosis Date   • Chronic diastolic congestive heart failure    • Deep venous thrombosis    • Hypertension    • Lipoedema    • Lymphedema    • Morbid obesity    • Paradoxical embolism     to the LLE due to DVT/PE and PFO   • PFO (patent foramen ovale)    • Pulmonary embolism    • Pulmonary hypertension     multifactorial (dCHF, obesity/ARIEL, hx PE), mild by echo 2016        Past Surgical History:   Procedure Laterality Date   • BRONCHOSCOPY N/A 2017    Procedure: BRONCHOSCOPY with wash;  Surgeon: Caleb Garcia MD;  Location: [facility] ENDOSCOPY;  Service:    • DILATATION AND CURETTAGE  2011   • VENA CAVA FILTER PLACEMENT      Inferior Vena Cava Filter Placement w/Fluorosc angiogr guidance         Visit Dx:      ICD-10-CM ICD-9-CM   1. Lymphedema of both lower extremities I89.0 457.1   2. Lipoedema R60.9 782.3   3. Decreased mobility and endurance Z74.09 780.99   4. Cellulitis of left lower extremity L03.116 682.6             Lymphedema       10/17/17 1400          Subjective Comments    Subjective Comments End of the day bandages would bunch around the ankle per pt.   -CW      Subjective Pain    Able to rate subjective pain? yes  -CW      Pre-Treatment Pain Level 0  -CW      Post-Treatment Pain Level 0  -CW      Pain Assessment    Pain Assessment No/denies pain  -CW      Skin Changes/Observations    Location/Assessment Lower Extremity  -CW      Lower Extremity Conditions bilateral:;shiny;scab(s);inflamed;fragile  -CW      Lower Extremity Color/Pigment left:   Skin red L lower leg.  -CW      Lower  "Extremity Skin Details LLE sore are diminshing and skin not as red   -CW      Skin Observations Comment No weeping or drainage.   -CW      Manual Lymphatic Drainage    Manual Lymphatic Drainage --   Neck, axilla, abd., groin, BLE's. Focused on proximal area.  -CW      Manual Lymphatic Drainage Comments No pain during MLD.   -CW      Compression/Skin Care    Compression/Skin Care skin care;wrapping location;bandaging;compression garment  -CW      Skin Care moisturizing lotion applied  -CW      Wrapping Location lower extremity  -CW      Wrapping Location LE bilateral:;ankle;knee   wrapped R ankle to knee so her shoes fit.   -CW      Wrapping Comments Bandages were removed at home.   -CW      Bandage Layers cotton liner;soft foam- 1/2 inch;short-stretch bandages (comment size/quantity)  -CW      Bandaging Technique circumferential/spiral;moderate compression  -CW      Bandaging Comments Tubigrip size K to thighs.BLE-K1, 2- Rosidal soft, 1- 8cm. 4- 10 cm. Rosidal. Extra Artiflex to B ankle area.    -CW        User Key  (r) = Recorded By, (t) = Taken By, (c) = Cosigned By    Initials Name Provider Type    RADHA Acosta Occupational Therapist                        OT Assessment/Plan       10/17/17 1405       OT Assessment    Assessment Comments Pt. attempted to apply the compression bandages over the weekend. Pt. reports the bandages would \"bunch around the ankle\" at the end of the day. Skin intact with no weeping or wounds. Sores LLE are clearing and skin not as red. Pt. reports again that her legs feel better with compression. Discussed options for long term edema management with velcro compression. Pt. went to [employer] and did not find any compression austin length pants.   -CW     OT Plan    OT Plan Comments Plan to cont. tx.   -CW       User Key  (r) = Recorded By, (t) = Taken By, (c) = Cosigned By    Initials Name Provider Type    RADHA Acosta Occupational Therapist                              "   Therapy Education       10/17/17 1406          Therapy Education    Given Bandaging/dressing change;Edema management  -CW      Program Reinforced  -CW      How Provided Verbal  -CW      Provided to Patient  -CW      Level of Understanding Verbalized  -CW        User Key  (r) = Recorded By, (t) = Taken By, (c) = Cosigned By    Initials Name Provider Type    CW Colleen Hidalgo, OTSIMON Occupational Therapist                  Time Calculation:   OT Start Time: 0906  OT Stop Time: 1011  OT Time Calculation (min): 65 min       Therapy Charges for Today     Code Description Service Date Service Provider Modifiers Qty    58822858493  OT MANUAL THERAPY EA 15 MIN 10/17/2017 RADHA Miller GO 4                      RADHA Miller  10/17/2017

## 2017-10-18 ENCOUNTER — HOSPITAL ENCOUNTER (OUTPATIENT)
Dept: OCCUPATIONAL THERAPY | Facility: HOSPITAL | Age: 57
Setting detail: THERAPIES SERIES
Discharge: HOME | End: 2017-10-18

## 2017-10-18 DIAGNOSIS — Z74.09 DECREASED MOBILITY AND ENDURANCE: ICD-10-CM

## 2017-10-18 DIAGNOSIS — I89.0 LYMPHEDEMA OF BOTH LOWER EXTREMITIES: Primary | ICD-10-CM

## 2017-10-18 DIAGNOSIS — L03.116 CELLULITIS OF LEFT LOWER EXTREMITY: ICD-10-CM

## 2017-10-18 DIAGNOSIS — R60.9 LIPOEDEMA: ICD-10-CM

## 2017-10-18 PROCEDURE — 97140 MANUAL THERAPY 1/> REGIONS: CPT

## 2017-10-18 NOTE — THERAPY TREATMENT NOTE
Outpatient Occupational Therapy Lymphedema Treatment Note  [facility]     Patient Name: Emely Solomon  : 1960  MRN: [id number]  Today's Date: 10/18/2017      Visit Date: 10/18/2017    Patient Active Problem List   Diagnosis   • Chronic diastolic congestive heart failure   • PFO (patent foramen ovale)   • Paradoxical embolism   • Hypertension   • Pulmonary hypertension        Past Medical History:   Diagnosis Date   • Chronic diastolic congestive heart failure    • Deep venous thrombosis    • Hypertension    • Lipoedema    • Lymphedema    • Morbid obesity    • Paradoxical embolism     to the LLE due to DVT/PE and PFO   • PFO (patent foramen ovale)    • Pulmonary embolism    • Pulmonary hypertension     multifactorial (dCHF, obesity/ARIEL, hx PE), mild by echo 2016        Past Surgical History:   Procedure Laterality Date   • BRONCHOSCOPY N/A 2017    Procedure: BRONCHOSCOPY with wash;  Surgeon: Caleb Garcia MD;  Location: [facility] ENDOSCOPY;  Service:    • DILATATION AND CURETTAGE  2011   • VENA CAVA FILTER PLACEMENT      Inferior Vena Cava Filter Placement w/Fluorosc angiogr guidance         Visit Dx:      ICD-10-CM ICD-9-CM   1. Lymphedema of both lower extremities I89.0 457.1   2. Lipoedema R60.9 782.3   3. Decreased mobility and endurance Z74.09 780.99   4. Cellulitis of left lower extremity L03.116 682.6             Lymphedema       10/18/17 1300 10/17/17 1400       Subjective Comments    Subjective Comments No pain c/o at present, but had L foot soreness yesterday.   -CW End of the day bandages would bunch around the ankle per pt.   -CW     Subjective Pain    Able to rate subjective pain? yes  -CW yes  -CW     Pre-Treatment Pain Level 0  -CW 0  -CW     Post-Treatment Pain Level 0  -CW 0  -CW     Pain Assessment    Pain Assessment No/denies pain  -CW No/denies pain  -CW     Skin Changes/Observations    Location/Assessment Lower Extremity  -CW Lower Extremity  -CW     Lower Extremity  Conditions bilateral:;shiny;scab(s);inflamed;fragile  -CW bilateral:;shiny;scab(s);inflamed;fragile  -CW     Lower Extremity Color/Pigment left:   Skin red L lower leg.  -CW left:   Skin red L lower leg.  -CW     Lower Extremity Skin Details LLE sore are diminshing and skin not as red   -CW LLE sore are diminshing and skin not as red   -CW     Skin Observations Comment No weeping or drainage.   -CW No weeping or drainage.   -CW     Manual Lymphatic Drainage    Manual Lymphatic Drainage --   Neck, axilla, abd., groin, BLE's. Focused on proximal area.  -CW --   Neck, axilla, abd., groin, BLE's. Focused on proximal area.  -CW     Manual Lymphatic Drainage Comments No pain or tenderness during MLD.   -CW No pain during MLD.   -CW     Compression/Skin Care    Compression/Skin Care skin care;wrapping location;bandaging;compression garment  -CW skin care;wrapping location;bandaging;compression garment  -CW     Skin Care moisturizing lotion applied  -CW moisturizing lotion applied  -CW     Wrapping Location lower extremity  -CW lower extremity  -CW     Wrapping Location LE bilateral:;ankle;knee   wrapped R ankle to knee so her shoes fit.   -CW bilateral:;ankle;knee   wrapped R ankle to knee so her shoes fit.   -CW     Wrapping Comments Bandages were removed at home.   -CW Bandages were removed at home.   -CW     Bandage Layers cotton liner;soft foam- 1/2 inch;short-stretch bandages (comment size/quantity)  -CW cotton liner;soft foam- 1/2 inch;short-stretch bandages (comment size/quantity)  -CW     Bandaging Technique circumferential/spiral;moderate compression  -CW circumferential/spiral;moderate compression  -CW     Bandaging Comments Tubigrip size K to thighs.BLE-K1, 2- Rosidal soft, 1- 8cm. 4- 10 cm. Rosidal. Extra Artiflex to B ankle area.     Extra padding/Artiflex to B ankles.   -CW Tubigrip size K to thighs.BLE-K1, 2- Rosidal soft, 1- 8cm. 4- 10 cm. Rosidal. Extra Artiflex to B ankle area.    -CW       User Key  (r)  "= Recorded By, (t) = Taken By, (c) = Cosigned By    Initials Name Provider Type    CW RADHA Miller Occupational Therapist                        OT Assessment/Plan       10/18/17 1316 10/17/17 1405    OT Assessment    Assessment Comments Pt. was able to wear the bandages during the day, but had to remove late last night due to L foot/ankle pain. No pain at present. Skin intact and is not as fibrotic L lower leg. Pt. states her walking is slightly easier and is able to participate with  more with housework. Pt. is interested to obtain B LE velcro compression and will see about [employer] length compression. LLE sores are clearing and skin is not as red. No weeping or drainage.    -CW Pt. attempted to apply the compression bandages over the weekend. Pt. reports the bandages would \"bunch around the ankle\" at the end of the day. Skin intact with no weeping or wounds. Sores LLE are clearing and skin not as red. Pt. reports again that her legs feel better with compression. Discussed options for long term edema management with velcro compression. Pt. went to [employer] and did not find any compression austin length pants.   -CW    OT Plan    OT Plan Comments Plan to cont. tx.   -CW Plan to cont. tx.   -CW      User Key  (r) = Recorded By, (t) = Taken By, (c) = Cosigned By    Initials Name Provider Type    CW RADHA Miller Occupational Therapist                                Therapy Education       10/18/17 1320 10/17/17 1408       Therapy Education    Given Bandaging/dressing change;Edema management   Discussed bandage precautions, wearing schedule and velcro compression.   -CW Bandaging/dressing change;Edema management  -CW     Program Reinforced  -CW Reinforced  -CW     How Provided Verbal  -CW Verbal  -CW     Provided to Patient  -CW Patient  -CW     Level of Understanding Verbalized  -CW Verbalized  -CW       User Key  (r) = Recorded By, (t) = Taken By, (c) = Cosigned By    Initials Name Provider Type    CW " RADHA Miller Occupational Therapist                  Time Calculation:   OT Start Time: 0905  OT Stop Time: 1007  OT Time Calculation (min): 62 min       Therapy Charges for Today     Code Description Service Date Service Provider Modifiers Qty    89599153319 HC OT MANUAL THERAPY EA 15 MIN 10/18/2017 RADHA Miller GO 4                      RADHA Miller  10/18/2017

## 2017-10-20 ENCOUNTER — HOSPITAL ENCOUNTER (OUTPATIENT)
Dept: OCCUPATIONAL THERAPY | Facility: HOSPITAL | Age: 57
Setting detail: THERAPIES SERIES
Discharge: HOME | End: 2017-10-20

## 2017-10-20 DIAGNOSIS — Z74.09 DECREASED MOBILITY AND ENDURANCE: ICD-10-CM

## 2017-10-20 DIAGNOSIS — L03.116 CELLULITIS OF LEFT LOWER EXTREMITY: ICD-10-CM

## 2017-10-20 DIAGNOSIS — I89.0 LYMPHEDEMA OF BOTH LOWER EXTREMITIES: Primary | ICD-10-CM

## 2017-10-20 DIAGNOSIS — R60.9 LIPOEDEMA: ICD-10-CM

## 2017-10-20 PROCEDURE — 97140 MANUAL THERAPY 1/> REGIONS: CPT

## 2017-10-20 NOTE — THERAPY TREATMENT NOTE
Outpatient Occupational Therapy Lymphedema Treatment Note  [facility]     Patient Name: Emely Solomon  : 1960  MRN: [id number]  Today's Date: 10/20/2017      Visit Date: 10/20/2017    Patient Active Problem List   Diagnosis   • Chronic diastolic congestive heart failure   • PFO (patent foramen ovale)   • Paradoxical embolism   • Hypertension   • Pulmonary hypertension        Past Medical History:   Diagnosis Date   • Chronic diastolic congestive heart failure    • Deep venous thrombosis    • Hypertension    • Lipoedema    • Lymphedema    • Morbid obesity    • Paradoxical embolism     to the LLE due to DVT/PE and PFO   • PFO (patent foramen ovale)    • Pulmonary embolism    • Pulmonary hypertension     multifactorial (dCHF, obesity/ARIEL, hx PE), mild by echo 2016        Past Surgical History:   Procedure Laterality Date   • BRONCHOSCOPY N/A 2017    Procedure: BRONCHOSCOPY with wash;  Surgeon: Caleb Garcia MD;  Location: [facility] ENDOSCOPY;  Service:    • DILATATION AND CURETTAGE  2011   • VENA CAVA FILTER PLACEMENT      Inferior Vena Cava Filter Placement w/Fluorosc angiogr guidance         Visit Dx:      ICD-10-CM ICD-9-CM   1. Lymphedema of both lower extremities I89.0 457.1   2. Lipoedema R60.9 782.3   3. Decreased mobility and endurance Z74.09 780.99   4. Cellulitis of left lower extremity L03.116 682.6             Lymphedema       10/20/17 1100          Subjective Comments    Subjective Comments No pain c/o at present when at rest.   -CW      Subjective Pain    Able to rate subjective pain? yes  -CW      Pre-Treatment Pain Level 0  -CW      Post-Treatment Pain Level 0  -CW      Pain Assessment    Pain Assessment No/denies pain  -CW      Skin Changes/Observations    Location/Assessment Lower Extremity  -CW      Lower Extremity Conditions bilateral:;shiny;scab(s);inflamed;fragile  -CW      Lower Extremity Color/Pigment left:   Skin red L lower leg.  -CW      Lower Extremity Skin Details Skin  dry LLE and inflamed.   -CW      Skin Observations Comment No weeping or drainage.   -CW      Lymphedema Measurements    Measurement Type(s) Circumferential  -CW      Circumferential Areas Lower extremities  -CW      LLE Circumferential (cm)    Measurement Location 1 10 cm above knee  -CW      Left 1 82.9 cm  -CW      Measurement Location 2 Knee   -CW      Left 2 70 cm  -CW      Measurement Location 3 10 cm below knee  -CW      Left 3 73 cm  -CW      Measurement Location 4 20 cm below knee  -CW      Left 4 57 cm  -CW      Measurement Location 5 30 cm below kne  -CW      Left 5 32.5 cm  -CW      Measurement Location 6 Ankle  -CW      Left 6 32 cm  -CW      Measurement Location 7 Mid foot  -CW      Left 7 230 cm  -CW      Measurement Location 8 Total  -CW      Left 8 370.4 cm  -CW      RLE Circumferential (cm)    Measurement Location 1 10 cm above knee  -CW      Right 1 84 cm  -CW      Measurement Location 2 Knee (popliteal crease)  -CW      Right 2 76.5 cm  -CW      Measurement Location 3 10 cm below knee  -CW      Right 3 72 cm  -CW      Measurement Location 4 20 cm below knee  -CW      Right 4 56.5 cm  -CW      Measurement Location 5 30 cm below kne  -CW      Right 5 35 cm  -CW      Measurement Location 6 Ankle  -CW      Right 6 32.3 cm  -CW      Measurement Location 7 Mid foot  -CW      Right 7 23 cm  -CW      Measurement Location 8 Total  -CW      Right 8 379.3 cm  -CW      Manual Lymphatic Drainage    Manual Lymphatic Drainage --   Neck, axilla, abd., groin, BLE's. Focused on proximal area.  -CW      Manual Lymphatic Drainage Comments No pain or tenderness during MLD.   -CW      Compression/Skin Care    Compression/Skin Care skin care;wrapping location;bandaging;compression garment  -CW      Skin Care moisturizing lotion applied  -CW      Wrapping Location lower extremity  -CW      Wrapping Location LE bilateral:;ankle;knee   wrapped R ankle to knee so her shoes fit.   -CW      Wrapping Comments Bandages were  removed at home.   -CW      Bandage Layers cotton liner;soft foam- 1/2 inch;short-stretch bandages (comment size/quantity)  -CW      Bandaging Technique circumferential/spiral;moderate compression  -CW      Bandaging Comments Tubigrip size K to thighs.BLE-K1, 2- Rosidal soft, 1- 8cm. 4- 10 cm. Rosidal. Extra Artiflex to B ankle area.     Extra  padding to B ankles for comfort.   -CW        User Key  (r) = Recorded By, (t) = Taken By, (c) = Cosigned By    Initials Name Provider Type    RADHA Acosta Occupational Therapist                        OT Assessment/Plan       10/20/17 1152       OT Assessment    Assessment Comments Measurements taken. See flow sheet. Total circumference .3 cm. and YPW204.4 cm. (22.3 cm. R and 52.8 cm. L net decrease). Pt. is tolerating the bandages well. Discussed options for long term compression at home. Pt is interested in velcro compression B lower legs, but cost is an issue since her insurance does not cover it. Pt. to get measured Monday at [facility] for velcro compression since it is close to her house. Reviewed self MLD.    -CW     OT Plan    OT Plan Comments Plan to cont. tx.   -CW       User Key  (r) = Recorded By, (t) = Taken By, (c) = Cosigned By    Initials Name Provider Type    RADHA Acosta Occupational Therapist                            OT Goals       10/20/17 1100       OT Short Term Goals    STG Date to Achieve 10/18/17  -CW     STG 1 Patient independent and compliant with self-wrapping techniques of compression bandages with assistance of caregiver as needed for improved self-management of condition.  -CW     STG 1 Progress Ongoing;Progressing  -CW     STG 2 Patient will demonstrate decreased net edema of bilateral lower extremities >/= 10-20cm  for decrease in edema, symptoms, decreased risk of infection and improved skin care/transition to self-care of condition.   -CW     STG 2 Progress Met;Ongoing  -CW     Long Term Goals    LTG Date to  Achieve 12/07/17  -CW     LTG 1 Patient will demonstrate decreased net edema of bilateral lower extremities >/= 21--40cm  for decrease in edema, symptoms, decreased risk of infection and improved skin care/transition to self-care of condition.   -CW     LTG 1 Progress Progressing;Goal Revised   Total circumference .7 cm. and .4 cm.10/10/17.  -CW     LTG 2 Patient independent and compliant with home exercise program (as able) focused on range of motion, flexibility to improve lymphatic flow and LE discomfort.  -CW     LTG 2 Progress Progressing  -CW     LTG 3  Patient/family/caregivers independent and compliant with self-care techniques for self-management of condition.  -CW     LTG 3 Progress Progressing  -CW     LTG 4 Patient independent and compliant with use and care of compression garments with assistance of a caregiver as needed to promote self-care independence.   -CW     LTG 4 Progress New  -CW     LTG 4 Progress Comments Pt. is interested in velcro compression, but cost is a problem.   -CW       User Key  (r) = Recorded By, (t) = Taken By, (c) = Cosigned By    Initials Name Provider Type    RADHA Acosta Occupational Therapist                Therapy Education       10/20/17 1155          Therapy Education    Given Bandaging/dressing change;Edema management   Self MLD, compression bandage precautions, wearing schedule and skin care. Discussed knee high velcro compression.   -CW        User Key  (r) = Recorded By, (t) = Taken By, (c) = Cosigned By    Initials Name Provider Type    RADHA Acosta Occupational Therapist                  Time Calculation:   OT Start Time: 0906  OT Stop Time: 1009  OT Time Calculation (min): 63 min       Therapy Charges for Today     Code Description Service Date Service Provider Modifiers Qty    06113860900  OT MANUAL THERAPY EA 15 MIN 10/20/2017 RADHA Miller GO 4                      RADHA Miller  10/20/2017

## 2017-10-23 ENCOUNTER — HOSPITAL ENCOUNTER (OUTPATIENT)
Dept: OCCUPATIONAL THERAPY | Facility: HOSPITAL | Age: 57
Setting detail: THERAPIES SERIES
Discharge: HOME | End: 2017-10-23

## 2017-10-23 DIAGNOSIS — I89.0 LYMPHEDEMA OF BOTH LOWER EXTREMITIES: Primary | ICD-10-CM

## 2017-10-23 DIAGNOSIS — Z74.09 DECREASED MOBILITY AND ENDURANCE: ICD-10-CM

## 2017-10-23 DIAGNOSIS — R60.9 LIPOEDEMA: ICD-10-CM

## 2017-10-23 DIAGNOSIS — L03.116 CELLULITIS OF LEFT LOWER EXTREMITY: ICD-10-CM

## 2017-10-23 PROCEDURE — 97140 MANUAL THERAPY 1/> REGIONS: CPT

## 2017-10-23 NOTE — THERAPY TREATMENT NOTE
Outpatient Occupational Therapy Lymphedema Treatment Note  [facility]     Patient Name: Emely Solomon  : 1960  MRN: [id number]  Today's Date: 10/23/2017      Visit Date: 10/23/2017    Patient Active Problem List   Diagnosis   • Chronic diastolic congestive heart failure   • PFO (patent foramen ovale)   • Paradoxical embolism   • Hypertension   • Pulmonary hypertension        Past Medical History:   Diagnosis Date   • Chronic diastolic congestive heart failure    • Deep venous thrombosis    • Hypertension    • Lipoedema    • Lymphedema    • Morbid obesity    • Paradoxical embolism     to the LLE due to DVT/PE and PFO   • PFO (patent foramen ovale)    • Pulmonary embolism    • Pulmonary hypertension     multifactorial (dCHF, obesity/ARIEL, hx PE), mild by echo 2016        Past Surgical History:   Procedure Laterality Date   • BRONCHOSCOPY N/A 2017    Procedure: BRONCHOSCOPY with wash;  Surgeon: Caleb Garcia MD;  Location: [facility] ENDOSCOPY;  Service:    • DILATATION AND CURETTAGE  2011   • VENA CAVA FILTER PLACEMENT      Inferior Vena Cava Filter Placement w/Fluorosc angiogr guidance         Visit Dx:      ICD-10-CM ICD-9-CM   1. Lymphedema of both lower extremities I89.0 457.1   2. Lipoedema R60.9 782.3   3. Decreased mobility and endurance Z74.09 780.99   4. Cellulitis of left lower extremity L03.116 682.6             Lymphedema       10/23/17 1400          Subjective Comments    Subjective Comments Removed bandages . No pain c/o at present.   -CW      Subjective Pain    Able to rate subjective pain? yes  -CW      Pre-Treatment Pain Level 0  -CW      Post-Treatment Pain Level 0  -CW      Pain Assessment    Pain Assessment No/denies pain  -CW      Skin Changes/Observations    Location/Assessment Lower Extremity  -CW      Lower Extremity Conditions bilateral:;shiny;scab(s);inflamed;fragile  -CW      Lower Extremity Color/Pigment left:   Skin red L lower leg.  -CW      Lower Extremity Skin  Details Skin dry LLE with some redness. Sores appear to be clearing up.   -CW      Skin Observations Comment No weeping or drainage.   -CW      Manual Lymphatic Drainage    Manual Lymphatic Drainage --   Neck, axilla, abd., groin, BLE's. Focused on proximal area.  -CW      Manual Lymphatic Drainage Comments No pain or tenderness during MLD.   -CW      Compression/Skin Care    Compression/Skin Care skin care;wrapping location;bandaging;compression garment  -CW      Skin Care moisturizing lotion applied  -CW      Wrapping Location lower extremity  -CW      Wrapping Location LE bilateral:;ankle;knee   wrapped R ankle to knee so her shoes fit.   -CW      Wrapping Comments Bandages were removed at home.   -CW      Bandage Layers cotton liner;soft foam- 1/2 inch;short-stretch bandages (comment size/quantity)  -CW      Bandaging Technique circumferential/spiral;moderate compression  -CW      Bandaging Comments Tubigrip size K to thighs.BLE-K1, 2- Rosidal soft, 1- 8cm. 4- 10 cm. Rosidal. Extra Artiflex to B ankle area.     More bandage layers applied to B ankles.   -CW        User Key  (r) = Recorded By, (t) = Taken By, (c) = Cosigned By    Initials Name Provider Type    RADHA Acosta Occupational Therapist                        OT Assessment/Plan       10/23/17 1413       OT Assessment    Assessment Comments Pt. was able to re-apply the bandages over the weekend. Reviewed sequence for self MLD and technique. Pt. had to cancel her appt. to get measured for velcro compression and change it to Thursday. Discussed importance of consistent compression to help maintain the edema reductions. Pt. went to [employer] to order shape wear austin length for thights. No pain c/o at present.   -CW     OT Plan    OT Plan Comments Plan to D/c pt. 10/27/17.   -CW       User Key  (r) = Recorded By, (t) = Taken By, (c) = Cosigned By    Initials Name Provider Type    RADHA Acosta Occupational Therapist                                 Therapy Education       10/23/17 2727          Therapy Education    Given Bandaging/dressing change;Edema management   Reviewed self MLD, how to apply the bandages and precautions.   -CW      Program Reinforced  -CW      How Provided Verbal  -CW      Provided to Patient  -CW      Level of Understanding Verbalized  -CW        User Key  (r) = Recorded By, (t) = Taken By, (c) = Cosigned By    Initials Name Provider Type    CW RADHA Miller Occupational Therapist                  Time Calculation:   OT Start Time: 0905  OT Stop Time: 1006  OT Time Calculation (min): 61 min       Therapy Charges for Today     Code Description Service Date Service Provider Modifiers Qty    68948367107 HC OT MANUAL THERAPY EA 15 MIN 10/23/2017 RADHA Miller GO 4                      RADHA Miller  10/23/2017

## 2017-10-24 ENCOUNTER — HOSPITAL ENCOUNTER (OUTPATIENT)
Dept: OCCUPATIONAL THERAPY | Facility: HOSPITAL | Age: 57
Setting detail: THERAPIES SERIES
Discharge: HOME | End: 2017-10-24

## 2017-10-24 DIAGNOSIS — R60.9 LIPOEDEMA: ICD-10-CM

## 2017-10-24 DIAGNOSIS — Z74.09 DECREASED MOBILITY AND ENDURANCE: ICD-10-CM

## 2017-10-24 DIAGNOSIS — I89.0 LYMPHEDEMA OF BOTH LOWER EXTREMITIES: Primary | ICD-10-CM

## 2017-10-24 DIAGNOSIS — L03.116 CELLULITIS OF LEFT LOWER EXTREMITY: ICD-10-CM

## 2017-10-24 PROCEDURE — 97140 MANUAL THERAPY 1/> REGIONS: CPT

## 2017-10-24 NOTE — THERAPY TREATMENT NOTE
Outpatient Occupational Therapy Lymphedema Treatment Note  [facility]     Patient Name: Emely Solomon  : 1960  MRN: [id number]  Today's Date: 10/24/2017      Visit Date: 10/24/2017    Patient Active Problem List   Diagnosis   • Chronic diastolic congestive heart failure   • PFO (patent foramen ovale)   • Paradoxical embolism   • Hypertension   • Pulmonary hypertension        Past Medical History:   Diagnosis Date   • Chronic diastolic congestive heart failure    • Deep venous thrombosis    • Hypertension    • Lipoedema    • Lymphedema    • Morbid obesity    • Paradoxical embolism     to the LLE due to DVT/PE and PFO   • PFO (patent foramen ovale)    • Pulmonary embolism    • Pulmonary hypertension     multifactorial (dCHF, obesity/ARIEL, hx PE), mild by echo 2016        Past Surgical History:   Procedure Laterality Date   • BRONCHOSCOPY N/A 2017    Procedure: BRONCHOSCOPY with wash;  Surgeon: Caleb Garcia MD;  Location: [facility] ENDOSCOPY;  Service:    • DILATATION AND CURETTAGE  2011   • VENA CAVA FILTER PLACEMENT      Inferior Vena Cava Filter Placement w/Fluorosc angiogr guidance         Visit Dx:      ICD-10-CM ICD-9-CM   1. Lymphedema of both lower extremities I89.0 457.1   2. Lipoedema R60.9 782.3   3. Decreased mobility and endurance Z74.09 780.99   4. Cellulitis of left lower extremity L03.116 682.6             Lymphedema       10/24/17 1100 10/23/17 1400       Subjective Comments    Subjective Comments No itching or discomfort today.   -CW Removed bandages . No pain c/o at present.   -CW     Subjective Pain    Able to rate subjective pain? yes  -CW yes  -CW     Pre-Treatment Pain Level 0  -CW 0  -CW     Post-Treatment Pain Level 0  -CW 0  -CW     Pain Assessment    Pain Assessment No/denies pain  -CW No/denies pain  -CW     Skin Changes/Observations    Location/Assessment Lower Extremity  -CW Lower Extremity  -CW     Lower Extremity Conditions  bilateral:;shiny;scab(s);inflamed;fragile  -CW bilateral:;shiny;scab(s);inflamed;fragile  -CW     Lower Extremity Color/Pigment left:   Skin red L lower leg.  -CW left:   Skin red L lower leg.  -CW     Lower Extremity Skin Details Skin dry LLE with some redness. Sores appear to be clearing up.   -CW Skin dry LLE with some redness. Sores appear to be clearing up.   -CW     Skin Observations Comment No weeping or drainage.   -CW No weeping or drainage.   -CW     Manual Lymphatic Drainage    Manual Lymphatic Drainage --   Neck, axilla, abd., groin, BLE's. Focused on proximal area.  -CW --   Neck, axilla, abd., groin, BLE's. Focused on proximal area.  -CW     Manual Lymphatic Drainage Comments No pain or tenderness during MLD.   -CW No pain or tenderness during MLD.   -CW     Compression/Skin Care    Compression/Skin Care skin care;wrapping location;bandaging;compression garment  -CW skin care;wrapping location;bandaging;compression garment  -CW     Skin Care moisturizing lotion applied  -CW moisturizing lotion applied  -CW     Wrapping Location lower extremity  -CW lower extremity  -CW     Wrapping Location LE bilateral:;ankle;knee   wrapped R ankle to knee so her shoes fit.   -CW bilateral:;ankle;knee   wrapped R ankle to knee so her shoes fit.   -CW     Wrapping Comments Bandages were removed at home.   -CW Bandages were removed at home.   -CW     Bandage Layers cotton liner;soft foam- 1/2 inch;short-stretch bandages (comment size/quantity)  -CW cotton liner;soft foam- 1/2 inch;short-stretch bandages (comment size/quantity)  -CW     Bandaging Technique circumferential/spiral;moderate compression  -CW circumferential/spiral;moderate compression  -CW     Bandaging Comments Tubigrip size K to thighs.BLE-K1, 2- Rosidal soft, 1- 8cm. 4- 10 cm. Rosidal. Extra Artiflex to B ankle area.     Applied the bandages tighter around lower calf/ankle.   -CW Tubigrip size K to thighs.BLE-K1, 2- Rosidal soft, 1- 8cm. 4- 10 cm.  Rosidal. Extra Artiflex to B ankle area.     More bandage layers applied to B ankles.   -CW       User Key  (r) = Recorded By, (t) = Taken By, (c) = Cosigned By    Initials Name Provider Type    RADHA Acosta Occupational Therapist                        OT Assessment/Plan       10/24/17 1142 10/23/17 1413    OT Assessment    Assessment Comments Pt. tolerated the bandages yesterday. Some bandage slippage yesterday, but was able to adjust. No pain c/o at present. Reviewed self MLD with sequence and technique. Instructed skin care.  LLE cellulitis-skin is much better and not as red. Sores are clearing with no scabs. Reviewed care of bandages.   -CW Pt. was able to re-apply the bandages over the weekend. Reviewed sequence for self MLD and technique. Pt. had to cancel her appt. to get measured for velcro compression and change it to Thursday. Discussed importance of consistent compression to help maintain the edema reductions. Pt. went to [employer] to order shape wear austin length for thights. No pain c/o at present.   -CW    OT Plan    OT Plan Comments Plan to D/c pt. 10/27/17.  -CW Plan to D/c pt. 10/27/17.   -CW      User Key  (r) = Recorded By, (t) = Taken By, (c) = Cosigned By    Initials Name Provider Type    RADHA Acosta Occupational Therapist                                Therapy Education       10/24/17 1147 10/23/17 1417       Therapy Education    Given Bandaging/dressing change;Edema management  -CW Bandaging/dressing change;Edema management   Reviewed self MLD, how to apply the bandages and precautions.   -CW     Program Reinforced  -CW Reinforced  -CW     How Provided Verbal;Demonstration  -CW Verbal  -CW     Provided to Patient  -CW Patient  -CW     Level of Understanding Verbalized  -CW Verbalized  -CW       User Key  (r) = Recorded By, (t) = Taken By, (c) = Cosigned By    Initials Name Provider Type    RADHA Acosta Occupational Therapist                  Time Calculation:   OT  Start Time: 0905  OT Stop Time: 1008  OT Time Calculation (min): 63 min       Therapy Charges for Today     Code Description Service Date Service Provider Modifiers Qty    23674040130 HC OT MANUAL THERAPY EA 15 MIN 10/24/2017 RADHA Miller GO 4                      RADHA Miller  10/24/2017

## 2017-10-25 ENCOUNTER — HOSPITAL ENCOUNTER (OUTPATIENT)
Dept: OCCUPATIONAL THERAPY | Facility: HOSPITAL | Age: 57
Setting detail: THERAPIES SERIES
Discharge: HOME | End: 2017-10-25

## 2017-10-25 DIAGNOSIS — Z74.09 DECREASED MOBILITY AND ENDURANCE: ICD-10-CM

## 2017-10-25 DIAGNOSIS — L03.116 CELLULITIS OF LEFT LOWER EXTREMITY: ICD-10-CM

## 2017-10-25 DIAGNOSIS — R60.9 LIPOEDEMA: ICD-10-CM

## 2017-10-25 DIAGNOSIS — I89.0 LYMPHEDEMA OF BOTH LOWER EXTREMITIES: Primary | ICD-10-CM

## 2017-10-25 PROCEDURE — 97140 MANUAL THERAPY 1/> REGIONS: CPT

## 2017-10-27 ENCOUNTER — HOSPITAL ENCOUNTER (OUTPATIENT)
Dept: OCCUPATIONAL THERAPY | Facility: HOSPITAL | Age: 57
Setting detail: THERAPIES SERIES
Discharge: HOME | End: 2017-10-27

## 2017-10-27 DIAGNOSIS — L03.116 CELLULITIS OF LEFT LOWER EXTREMITY: ICD-10-CM

## 2017-10-27 DIAGNOSIS — Z74.09 DECREASED MOBILITY AND ENDURANCE: ICD-10-CM

## 2017-10-27 DIAGNOSIS — R60.9 LIPOEDEMA: ICD-10-CM

## 2017-10-27 DIAGNOSIS — I89.0 LYMPHEDEMA OF BOTH LOWER EXTREMITIES: Primary | ICD-10-CM

## 2017-10-27 PROCEDURE — 97535 SELF CARE MNGMENT TRAINING: CPT

## 2017-10-27 PROCEDURE — 97140 MANUAL THERAPY 1/> REGIONS: CPT

## 2017-10-27 NOTE — THERAPY DISCHARGE NOTE
Outpatient Occupational Therapy Lymphedema Treatment Note/Discharge Summary  [facility]     Patient Name: Emely Solomon  : 1960  MRN: [id number]  Today's Date: 10/27/2017      Visit Date: 10/27/2017    Patient Active Problem List   Diagnosis   • Chronic diastolic congestive heart failure   • PFO (patent foramen ovale)   • Paradoxical embolism   • Hypertension   • Pulmonary hypertension        Past Medical History:   Diagnosis Date   • Chronic diastolic congestive heart failure    • Deep venous thrombosis    • Hypertension    • Lipoedema    • Lymphedema    • Morbid obesity    • Paradoxical embolism     to the LLE due to DVT/PE and PFO   • PFO (patent foramen ovale)    • Pulmonary embolism    • Pulmonary hypertension     multifactorial (dCHF, obesity/ARIEL, hx PE), mild by echo 2016        Past Surgical History:   Procedure Laterality Date   • BRONCHOSCOPY N/A 2017    Procedure: BRONCHOSCOPY with wash;  Surgeon: Caleb Garcia MD;  Location: [facility] ENDOSCOPY;  Service:    • DILATATION AND CURETTAGE  2011   • VENA CAVA FILTER PLACEMENT      Inferior Vena Cava Filter Placement w/Fluorosc angiogr guidance         Visit Dx:      ICD-10-CM ICD-9-CM   1. Lymphedema of both lower extremities I89.0 457.1   2. Lipoedema R60.9 782.3   3. Decreased mobility and endurance Z74.09 780.99   4. Cellulitis of left lower extremity L03.116 682.6             Lymphedema       10/27/17 1300          Subjective Comments    Subjective Comments No pain c/o.   -CW      Subjective Pain    Able to rate subjective pain? yes  -CW      Pre-Treatment Pain Level 0  -CW      Post-Treatment Pain Level 0  -CW      Pain Assessment    Pain Assessment No/denies pain  -CW      Skin Changes/Observations    Location/Assessment Lower Extremity  -CW      Lower Extremity Conditions bilateral:;shiny;scab(s);inflamed;fragile  -CW      Lower Extremity Color/Pigment left:   Skin red L lower leg.  -CW      Lower Extremity Skin Details Skin dry  LLE with less redness. Sores appear to be clearing up.   -CW      Skin Observations Comment No weeping or drainage.   -CW      Manual Lymphatic Drainage    Manual Lymphatic Drainage --   Neck, axilla, abd., groin, BLE's. Focused on proximal area.  -CW      Manual Lymphatic Drainage Comments Reviewed self MLD.  -CW      Compression/Skin Care    Compression/Skin Care skin care;wrapping location;bandaging;compression garment  -CW      Skin Care moisturizing lotion applied  -CW      Wrapping Location lower extremity  -CW      Wrapping Location LE bilateral:;ankle;knee   wrapped R ankle to knee so her shoes fit.   -CW      Wrapping Comments Bandages were removed at home.   -CW      Bandage Layers cotton liner;soft foam- 1/2 inch;short-stretch bandages (comment size/quantity)  -CW      Bandaging Technique circumferential/spiral;moderate compression  -CW      Bandaging Comments Tubigrip size K to thighs.BLE-K1, 2- Rosidal soft, 1- 8cm. 4- 10 cm. Rosidal. Extra Artiflex to B ankle area.    -CW        User Key  (r) = Recorded By, (t) = Taken By, (c) = Cosigned By    Initials Name Provider Type    RADHA Acosta Occupational Therapist                        OT Assessment/Plan       10/27/17 1334       OT Assessment    Assessment Comments Pt. was able to wear the compression bandages yesterday. No pain c/o at present. Enncouraged pt. to cont. HEP and walking. Reviewed self MLD, HEP, skin care, how to apply the bandages and overall lymph. management. BLE velcro compression has been ordered. Reviewed precautions, 18 steps for prevention, and infection info. Pt. to order size 3 or 4  Active short (austin length) compression from [employer].   -CW     OT Plan    OT Plan Comments D/c  -CW       User Key  (r) = Recorded By, (t) = Taken By, (c) = Cosigned By    Initials Name Provider Type    RADHA Acosta Occupational Therapist                            OT Goals       10/27/17 1300       OT Short Term Goals    STG Date  to Achieve 10/18/17  -CW     STG 1 Patient independent and compliant with self-wrapping techniques of compression bandages with assistance of caregiver as needed for improved self-management of condition.  -CW     STG 1 Progress Met  -CW     STG 2 Patient will demonstrate decreased net edema of bilateral lower extremities >/= 10-20cm  for decrease in edema, symptoms, decreased risk of infection and improved skin care/transition to self-care of condition.   -CW     STG 2 Progress Met;Ongoing  -CW     Long Term Goals    LTG Date to Achieve 12/07/17  -CW     LTG 1 Patient will demonstrate decreased net edema of bilateral lower extremities >/= 21--40cm  for decrease in edema, symptoms, decreased risk of infection and improved skin care/transition to self-care of condition.   -CW     LTG 1 Progress Progressing;Goal Revised   Total circumference .7 cm. and .4 cm.10/10/17.  -CW     LTG 2 Patient independent and compliant with home exercise program (as able) focused on range of motion, flexibility to improve lymphatic flow and LE discomfort.  -CW     LTG 2 Progress Met  -CW     LTG 3  Patient/family/caregivers independent and compliant with self-care techniques for self-management of condition.  -CW     LTG 3 Progress Met  -CW     LTG 4 Patient independent and compliant with use and care of compression garments with assistance of a caregiver as needed to promote self-care independence.   -CW     LTG 4 Progress Ongoing  -CW     LTG 4 Progress Comments Pending. BLE knee high velcro compression has been ordered.   -CW       User Key  (r) = Recorded By, (t) = Taken By, (c) = Cosigned By    Initials Name Provider Type    CW RADHA Miller Occupational Therapist                Therapy Education       10/27/17 2006          Therapy Education    Given Bandaging/dressing change;HEP;Edema management  -CW      Program Reinforced  -CW      How Provided Verbal;Demonstration;Written  -CW      Provided to Patient  -CW       Level of Understanding Verbalized  -CW        User Key  (r) = Recorded By, (t) = Taken By, (c) = Cosigned By    Initials Name Provider Type    CW RADHA Miller Occupational Therapist                  Time Calculation:   OT Start Time: 0907  OT Stop Time: 1023  OT Time Calculation (min): 76 min       Therapy Charges for Today     Code Description Service Date Service Provider Modifiers Qty    99326384684 HC OT MANUAL THERAPY EA 15 MIN 10/27/2017 RADHA Miller GO 4    54091186003 HC OT SELF CARE/MGMT/TRAIN EA 15 MIN 10/27/2017 RADHA Miller GO 1                  OP OT Discharge Summary  Date of Discharge: 10/27/17  Reason for Discharge: other (comment) (Pt. plateaued and is ready for phase 2 of CDT. )  Outcomes Achieved:  (Pt. achieved most LTG's. Velcro compression has been ordered..)  Discharge Destination: Home with home program  Discharge Instructions: Velcro compression BLE reduction kit knee high length has been ordered. Pt. to apply bandages during the interim before receiving the velcro compression. Pt. to obtain size 3 or 4 Active short (austin length) from [email or web address] for thighs. Recommend pt. to wear the velcro compression and austin bike shorts during the day and velcro compression or bandages at night as tolerated. Reviewed HEP, skin care, how to apply bandages, precaution, self MLD, and overall lymph.management. Encouraged pt. to cont. compression , HEP, and walking for improved mobility.      RADHA Miller  10/27/2017

## 2017-11-11 ENCOUNTER — APPOINTMENT (OUTPATIENT)
Dept: CT IMAGING | Facility: HOSPITAL | Age: 57
End: 2017-11-11

## 2017-11-11 ENCOUNTER — HOSPITAL ENCOUNTER (INPATIENT)
Facility: HOSPITAL | Age: 57
LOS: 3 days | Discharge: HOME | End: 2017-11-15
Attending: EMERGENCY MEDICINE | Admitting: HOSPITALIST

## 2017-11-11 ENCOUNTER — APPOINTMENT (OUTPATIENT)
Dept: GENERAL RADIOLOGY | Facility: HOSPITAL | Age: 57
End: 2017-11-11

## 2017-11-11 DIAGNOSIS — A41.9 SEPSIS, DUE TO UNSPECIFIED ORGANISM: Primary | ICD-10-CM

## 2017-11-11 LAB
ALBUMIN SERPL-MCNC: 4.4 G/DL (ref 3.5–5.2)
ALBUMIN/GLOB SERPL: 1.2 G/DL
ALP SERPL-CCNC: 96 U/L (ref 39–117)
ALT SERPL W P-5'-P-CCNC: 19 U/L (ref 1–33)
ANION GAP SERPL CALCULATED.3IONS-SCNC: 17.6 MMOL/L
AST SERPL-CCNC: 18 U/L (ref 1–32)
BASOPHILS # BLD AUTO: 0.02 10*3/MM3 (ref 0–0.2)
BASOPHILS NFR BLD AUTO: 0.1 % (ref 0–1.5)
BILIRUB SERPL-MCNC: 1.1 MG/DL (ref 0.1–1.2)
BUN BLD-MCNC: 30 MG/DL (ref 6–20)
BUN/CREAT SERPL: 20.5 (ref 7–25)
CALCIUM SPEC-SCNC: 10.1 MG/DL (ref 8.6–10.5)
CHLORIDE SERPL-SCNC: 97 MMOL/L (ref 98–107)
CO2 SERPL-SCNC: 23.4 MMOL/L (ref 22–29)
CREAT BLD-MCNC: 1.46 MG/DL (ref 0.57–1)
D-LACTATE SERPL-SCNC: 3 MMOL/L (ref 0.5–2)
DEPRECATED RDW RBC AUTO: 45.6 FL (ref 37–54)
EOSINOPHIL # BLD AUTO: 0 10*3/MM3 (ref 0–0.7)
EOSINOPHIL NFR BLD AUTO: 0 % (ref 0.3–6.2)
ERYTHROCYTE [DISTWIDTH] IN BLOOD BY AUTOMATED COUNT: 14.4 % (ref 11.7–13)
GFR SERPL CREATININE-BSD FRML MDRD: 37 ML/MIN/1.73
GLOBULIN UR ELPH-MCNC: 3.8 GM/DL
GLUCOSE BLD-MCNC: 124 MG/DL (ref 65–99)
HCT VFR BLD AUTO: 40.1 % (ref 35.6–45.5)
HGB BLD-MCNC: 12.9 G/DL (ref 11.9–15.5)
IMM GRANULOCYTES # BLD: 0.05 10*3/MM3 (ref 0–0.03)
IMM GRANULOCYTES NFR BLD: 0.3 % (ref 0–0.5)
LYMPHOCYTES # BLD AUTO: 0.27 10*3/MM3 (ref 0.9–4.8)
LYMPHOCYTES NFR BLD AUTO: 1.6 % (ref 19.6–45.3)
MCH RBC QN AUTO: 28 PG (ref 26.9–32)
MCHC RBC AUTO-ENTMCNC: 32.2 G/DL (ref 32.4–36.3)
MCV RBC AUTO: 87.2 FL (ref 80.5–98.2)
MONOCYTES # BLD AUTO: 0.57 10*3/MM3 (ref 0.2–1.2)
MONOCYTES NFR BLD AUTO: 3.4 % (ref 5–12)
NEUTROPHILS # BLD AUTO: 15.83 10*3/MM3 (ref 1.9–8.1)
NEUTROPHILS NFR BLD AUTO: 94.6 % (ref 42.7–76)
PLATELET # BLD AUTO: 255 10*3/MM3 (ref 140–500)
PMV BLD AUTO: 8.8 FL (ref 6–12)
POTASSIUM BLD-SCNC: 4 MMOL/L (ref 3.5–5.2)
PROT SERPL-MCNC: 8.2 G/DL (ref 6–8.5)
RBC # BLD AUTO: 4.6 10*6/MM3 (ref 3.9–5.2)
SODIUM BLD-SCNC: 138 MMOL/L (ref 136–145)
WBC NRBC COR # BLD: 16.74 10*3/MM3 (ref 4.5–10.7)

## 2017-11-11 PROCEDURE — 87150 DNA/RNA AMPLIFIED PROBE: CPT | Performed by: EMERGENCY MEDICINE

## 2017-11-11 PROCEDURE — 87147 CULTURE TYPE IMMUNOLOGIC: CPT | Performed by: EMERGENCY MEDICINE

## 2017-11-11 PROCEDURE — 71010 HC CHEST PA OR AP: CPT

## 2017-11-11 PROCEDURE — 80053 COMPREHEN METABOLIC PANEL: CPT | Performed by: EMERGENCY MEDICINE

## 2017-11-11 PROCEDURE — 87186 SC STD MICRODIL/AGAR DIL: CPT | Performed by: EMERGENCY MEDICINE

## 2017-11-11 PROCEDURE — 87040 BLOOD CULTURE FOR BACTERIA: CPT | Performed by: EMERGENCY MEDICINE

## 2017-11-11 PROCEDURE — 83605 ASSAY OF LACTIC ACID: CPT | Performed by: EMERGENCY MEDICINE

## 2017-11-11 PROCEDURE — 74176 CT ABD & PELVIS W/O CONTRAST: CPT

## 2017-11-11 PROCEDURE — 85025 COMPLETE CBC W/AUTO DIFF WBC: CPT | Performed by: EMERGENCY MEDICINE

## 2017-11-11 PROCEDURE — 99284 EMERGENCY DEPT VISIT MOD MDM: CPT

## 2017-11-11 RX ORDER — CEFTRIAXONE SODIUM 1 G/50ML
1 INJECTION, SOLUTION INTRAVENOUS ONCE
Status: COMPLETED | OUTPATIENT
Start: 2017-11-11 | End: 2017-11-12

## 2017-11-11 RX ORDER — SODIUM CHLORIDE 0.9 % (FLUSH) 0.9 %
10 SYRINGE (ML) INJECTION AS NEEDED
Status: DISCONTINUED | OUTPATIENT
Start: 2017-11-11 | End: 2017-11-12

## 2017-11-11 RX ADMIN — SODIUM CHLORIDE 4770 ML: 9 INJECTION, SOLUTION INTRAVENOUS at 23:47

## 2017-11-12 ENCOUNTER — APPOINTMENT (OUTPATIENT)
Dept: MRI IMAGING | Facility: HOSPITAL | Age: 57
End: 2017-11-12

## 2017-11-12 PROBLEM — A41.9 SEPSIS (HCC): Status: ACTIVE | Noted: 2017-11-12

## 2017-11-12 PROBLEM — M54.9 BACK PAIN: Status: ACTIVE | Noted: 2017-11-12

## 2017-11-12 PROBLEM — N17.9 AKI (ACUTE KIDNEY INJURY) (HCC): Status: ACTIVE | Noted: 2017-11-12

## 2017-11-12 PROBLEM — R50.9 FEVER AND CHILLS: Status: ACTIVE | Noted: 2017-11-12

## 2017-11-12 LAB
ALBUMIN SERPL-MCNC: 3.4 G/DL (ref 3.5–5.2)
ALBUMIN/GLOB SERPL: 1 G/DL
ALP SERPL-CCNC: 75 U/L (ref 39–117)
ALT SERPL W P-5'-P-CCNC: 17 U/L (ref 1–33)
ANION GAP SERPL CALCULATED.3IONS-SCNC: 15 MMOL/L
AST SERPL-CCNC: 15 U/L (ref 1–32)
B PERT DNA SPEC QL NAA+PROBE: NOT DETECTED
BILIRUB SERPL-MCNC: 0.8 MG/DL (ref 0.1–1.2)
BILIRUB UR QL STRIP: NEGATIVE
BUN BLD-MCNC: 28 MG/DL (ref 6–20)
BUN/CREAT SERPL: 20.7 (ref 7–25)
C PNEUM DNA NPH QL NAA+NON-PROBE: NOT DETECTED
CALCIUM SPEC-SCNC: 8.4 MG/DL (ref 8.6–10.5)
CHLORIDE SERPL-SCNC: 102 MMOL/L (ref 98–107)
CLARITY UR: CLEAR
CO2 SERPL-SCNC: 22 MMOL/L (ref 22–29)
COLOR UR: YELLOW
CREAT BLD-MCNC: 1.35 MG/DL (ref 0.57–1)
D-LACTATE SERPL-SCNC: 1.8 MMOL/L (ref 0.5–2)
D-LACTATE SERPL-SCNC: 1.9 MMOL/L (ref 0.5–2)
D-LACTATE SERPL-SCNC: 2.3 MMOL/L (ref 0.5–2)
DEPRECATED RDW RBC AUTO: 46.2 FL (ref 37–54)
ERYTHROCYTE [DISTWIDTH] IN BLOOD BY AUTOMATED COUNT: 14.5 % (ref 11.7–13)
FLUAV H1 2009 PAND RNA NPH QL NAA+PROBE: NOT DETECTED
FLUAV H1 HA GENE NPH QL NAA+PROBE: NOT DETECTED
FLUAV H3 RNA NPH QL NAA+PROBE: NOT DETECTED
FLUAV SUBTYP SPEC NAA+PROBE: NOT DETECTED
FLUBV RNA ISLT QL NAA+PROBE: NOT DETECTED
GFR SERPL CREATININE-BSD FRML MDRD: 40 ML/MIN/1.73
GLOBULIN UR ELPH-MCNC: 3.4 GM/DL
GLUCOSE BLD-MCNC: 121 MG/DL (ref 65–99)
GLUCOSE UR STRIP-MCNC: NEGATIVE MG/DL
HADV DNA SPEC NAA+PROBE: NOT DETECTED
HCOV 229E RNA SPEC QL NAA+PROBE: NOT DETECTED
HCOV HKU1 RNA SPEC QL NAA+PROBE: NOT DETECTED
HCOV NL63 RNA SPEC QL NAA+PROBE: NOT DETECTED
HCOV OC43 RNA SPEC QL NAA+PROBE: NOT DETECTED
HCT VFR BLD AUTO: 35 % (ref 35.6–45.5)
HGB BLD-MCNC: 11.3 G/DL (ref 11.9–15.5)
HGB UR QL STRIP.AUTO: NEGATIVE
HMPV RNA NPH QL NAA+NON-PROBE: NOT DETECTED
HOLD SPECIMEN: NORMAL
HPIV1 RNA SPEC QL NAA+PROBE: NOT DETECTED
HPIV2 RNA SPEC QL NAA+PROBE: NOT DETECTED
HPIV3 RNA NPH QL NAA+PROBE: NOT DETECTED
HPIV4 P GENE NPH QL NAA+PROBE: NOT DETECTED
KETONES UR QL STRIP: NEGATIVE
LEUKOCYTE ESTERASE UR QL STRIP.AUTO: NEGATIVE
M PNEUMO IGG SER IA-ACNC: NOT DETECTED
MCH RBC QN AUTO: 28.3 PG (ref 26.9–32)
MCHC RBC AUTO-ENTMCNC: 32.3 G/DL (ref 32.4–36.3)
MCV RBC AUTO: 87.5 FL (ref 80.5–98.2)
NITRITE UR QL STRIP: NEGATIVE
PH UR STRIP.AUTO: 5.5 [PH] (ref 5–8)
PLATELET # BLD AUTO: 190 10*3/MM3 (ref 140–500)
PMV BLD AUTO: 8.2 FL (ref 6–12)
POTASSIUM BLD-SCNC: 4.9 MMOL/L (ref 3.5–5.2)
PROT SERPL-MCNC: 6.8 G/DL (ref 6–8.5)
PROT UR QL STRIP: NEGATIVE
RBC # BLD AUTO: 4 10*6/MM3 (ref 3.9–5.2)
RHINOVIRUS RNA SPEC NAA+PROBE: NOT DETECTED
RSV RNA NPH QL NAA+NON-PROBE: NOT DETECTED
SODIUM BLD-SCNC: 139 MMOL/L (ref 136–145)
SP GR UR STRIP: 1.02 (ref 1–1.03)
UROBILINOGEN UR QL STRIP: NORMAL
WBC NRBC COR # BLD: 14.14 10*3/MM3 (ref 4.5–10.7)
WHOLE BLOOD HOLD SPECIMEN: NORMAL
WHOLE BLOOD HOLD SPECIMEN: NORMAL

## 2017-11-12 PROCEDURE — 72157 MRI CHEST SPINE W/O & W/DYE: CPT

## 2017-11-12 PROCEDURE — 36415 COLL VENOUS BLD VENIPUNCTURE: CPT | Performed by: EMERGENCY MEDICINE

## 2017-11-12 PROCEDURE — 25010000002 CEFEPIME PER 500 MG: Performed by: HOSPITALIST

## 2017-11-12 PROCEDURE — 25010000002 CEFTRIAXONE PER 250 MG: Performed by: EMERGENCY MEDICINE

## 2017-11-12 PROCEDURE — 72158 MRI LUMBAR SPINE W/O & W/DYE: CPT

## 2017-11-12 PROCEDURE — 81003 URINALYSIS AUTO W/O SCOPE: CPT | Performed by: EMERGENCY MEDICINE

## 2017-11-12 PROCEDURE — 87633 RESP VIRUS 12-25 TARGETS: CPT | Performed by: HOSPITALIST

## 2017-11-12 PROCEDURE — 83605 ASSAY OF LACTIC ACID: CPT | Performed by: INTERNAL MEDICINE

## 2017-11-12 PROCEDURE — 80053 COMPREHEN METABOLIC PANEL: CPT | Performed by: HOSPITALIST

## 2017-11-12 PROCEDURE — 94799 UNLISTED PULMONARY SVC/PX: CPT

## 2017-11-12 PROCEDURE — 94640 AIRWAY INHALATION TREATMENT: CPT

## 2017-11-12 PROCEDURE — 87486 CHLMYD PNEUM DNA AMP PROBE: CPT | Performed by: HOSPITALIST

## 2017-11-12 PROCEDURE — A9577 INJ MULTIHANCE: HCPCS | Performed by: INTERNAL MEDICINE

## 2017-11-12 PROCEDURE — 0 GADOBENATE DIMEGLUMINE 529 MG/ML SOLUTION: Performed by: INTERNAL MEDICINE

## 2017-11-12 PROCEDURE — 87581 M.PNEUMON DNA AMP PROBE: CPT | Performed by: HOSPITALIST

## 2017-11-12 PROCEDURE — 25010000002 VANCOMYCIN: Performed by: EMERGENCY MEDICINE

## 2017-11-12 PROCEDURE — 85027 COMPLETE CBC AUTOMATED: CPT | Performed by: HOSPITALIST

## 2017-11-12 PROCEDURE — 87798 DETECT AGENT NOS DNA AMP: CPT | Performed by: HOSPITALIST

## 2017-11-12 PROCEDURE — 83605 ASSAY OF LACTIC ACID: CPT | Performed by: EMERGENCY MEDICINE

## 2017-11-12 PROCEDURE — 25010000002 VANCOMYCIN 10 G RECONSTITUTED SOLUTION: Performed by: HOSPITALIST

## 2017-11-12 RX ORDER — ACETAMINOPHEN 325 MG/1
650 TABLET ORAL EVERY 6 HOURS PRN
Status: DISCONTINUED | OUTPATIENT
Start: 2017-11-12 | End: 2017-11-15 | Stop reason: HOSPADM

## 2017-11-12 RX ORDER — BUDESONIDE AND FORMOTEROL FUMARATE DIHYDRATE 160; 4.5 UG/1; UG/1
2 AEROSOL RESPIRATORY (INHALATION)
Status: DISCONTINUED | OUTPATIENT
Start: 2017-11-12 | End: 2017-11-15 | Stop reason: HOSPADM

## 2017-11-12 RX ORDER — METOPROLOL SUCCINATE 50 MG/1
50 TABLET, EXTENDED RELEASE ORAL DAILY
Status: DISCONTINUED | OUTPATIENT
Start: 2017-11-12 | End: 2017-11-15 | Stop reason: HOSPADM

## 2017-11-12 RX ORDER — ONDANSETRON 2 MG/ML
4 INJECTION INTRAMUSCULAR; INTRAVENOUS EVERY 6 HOURS PRN
Status: DISCONTINUED | OUTPATIENT
Start: 2017-11-12 | End: 2017-11-15 | Stop reason: HOSPADM

## 2017-11-12 RX ORDER — NITROGLYCERIN 0.4 MG/1
0.4 TABLET SUBLINGUAL
Status: DISCONTINUED | OUTPATIENT
Start: 2017-11-12 | End: 2017-11-15 | Stop reason: HOSPADM

## 2017-11-12 RX ORDER — IPRATROPIUM BROMIDE AND ALBUTEROL SULFATE 2.5; .5 MG/3ML; MG/3ML
1.5 SOLUTION RESPIRATORY (INHALATION)
Status: DISCONTINUED | OUTPATIENT
Start: 2017-11-12 | End: 2017-11-15 | Stop reason: HOSPADM

## 2017-11-12 RX ORDER — SODIUM CHLORIDE 9 MG/ML
75 INJECTION, SOLUTION INTRAVENOUS CONTINUOUS
Status: DISCONTINUED | OUTPATIENT
Start: 2017-11-12 | End: 2017-11-14

## 2017-11-12 RX ADMIN — IPRATROPIUM BROMIDE AND ALBUTEROL SULFATE 3 ML: .5; 3 SOLUTION RESPIRATORY (INHALATION) at 16:00

## 2017-11-12 RX ADMIN — GADOBENATE DIMEGLUMINE 20 ML: 529 INJECTION, SOLUTION INTRAVENOUS at 12:31

## 2017-11-12 RX ADMIN — CEFTRIAXONE SODIUM 1 G: 1 INJECTION, SOLUTION INTRAVENOUS at 00:20

## 2017-11-12 RX ADMIN — VANCOMYCIN HYDROCHLORIDE 3000 MG: 1 INJECTION, POWDER, LYOPHILIZED, FOR SOLUTION INTRAVENOUS at 02:27

## 2017-11-12 RX ADMIN — SODIUM CHLORIDE 150 ML/HR: 9 INJECTION, SOLUTION INTRAVENOUS at 03:21

## 2017-11-12 RX ADMIN — ACETAMINOPHEN 650 MG: 325 TABLET ORAL at 15:42

## 2017-11-12 RX ADMIN — VANCOMYCIN HYDROCHLORIDE 1750 MG: 10 INJECTION, POWDER, LYOPHILIZED, FOR SOLUTION INTRAVENOUS at 15:39

## 2017-11-12 RX ADMIN — SODIUM CHLORIDE 1000 ML: 9 INJECTION, SOLUTION INTRAVENOUS at 00:15

## 2017-11-12 RX ADMIN — IPRATROPIUM BROMIDE AND ALBUTEROL SULFATE 1.5 ML: .5; 3 SOLUTION RESPIRATORY (INHALATION) at 11:19

## 2017-11-12 RX ADMIN — WATER 2 G: 1 INJECTION INTRAMUSCULAR; INTRAVENOUS; SUBCUTANEOUS at 15:39

## 2017-11-12 RX ADMIN — ACETAMINOPHEN 650 MG: 325 TABLET ORAL at 07:55

## 2017-11-12 RX ADMIN — SODIUM CHLORIDE 1000 ML: 9 INJECTION, SOLUTION INTRAVENOUS at 00:22

## 2017-11-12 RX ADMIN — WATER 2 G: 1 INJECTION INTRAMUSCULAR; INTRAVENOUS; SUBCUTANEOUS at 04:24

## 2017-11-12 RX ADMIN — BUDESONIDE AND FORMOTEROL FUMARATE DIHYDRATE 2 PUFF: 160; 4.5 AEROSOL RESPIRATORY (INHALATION) at 20:12

## 2017-11-12 RX ADMIN — BUDESONIDE AND FORMOTEROL FUMARATE DIHYDRATE 2 PUFF: 160; 4.5 AEROSOL RESPIRATORY (INHALATION) at 11:20

## 2017-11-12 RX ADMIN — IPRATROPIUM BROMIDE AND ALBUTEROL SULFATE 1.5 ML: .5; 3 SOLUTION RESPIRATORY (INHALATION) at 20:11

## 2017-11-12 NOTE — PLAN OF CARE
Problem: Patient Care Overview (Adult)  Goal: Plan of Care Review  Outcome: Ongoing (interventions implemented as appropriate)    11/12/17 0350   Coping/Psychosocial Response Interventions   Plan Of Care Reviewed With patient   Patient Care Overview   Progress no change   Outcome Evaluation   Outcome Summary/Follow up Plan admitted tonight with fever and chills, no complaints currently, she is alert and oriented and on 1L O2 NC, will continue to monitor       Goal: Adult Individualization and Mutuality  Outcome: Ongoing (interventions implemented as appropriate)  Goal: Discharge Needs Assessment  Outcome: Ongoing (interventions implemented as appropriate)    Problem: Sepsis (Adult)  Goal: Signs and Symptoms of Listed Potential Problems Will be Absent or Manageable (Sepsis)  Outcome: Ongoing (interventions implemented as appropriate)    Problem: Fall Risk (Adult)  Goal: Identify Related Risk Factors and Signs and Symptoms  Outcome: Outcome(s) achieved Date Met:  11/12/17  Goal: Absence of Falls  Outcome: Ongoing (interventions implemented as appropriate)

## 2017-11-12 NOTE — H&P
[facility] Admission H&P    Patient Care Team:  RENAY Smith MD as PCP - General (General Practice)    Chief complaint: Chills and back pain    History of Present Illness    This is a 57-year-old white female with history of diastolic heart failure, DVT, hypertension, bilateral lower extremity lymphedema who presented to the emergency room after she began experiencing sudden onset of chills yesterday with associated mid back pain.  She felt okay when she first woke up.  She drove down to a family member[place] the [place] and at some point while she was there she noticed she started to feel very chilled.  She began to notice that she was having mid back pain that was mostly over the midline with some radiation out to the flanks bilaterally.  This pain lasted for most of the afternoon.  It is largely resolved at this time.  She states that she feels somewhat chilled still but denies any fevers.  Upon presentation to the ER she had a fever 102.9.  She denies any cough, shortness of breath, nausea, vomiting, abdominal pain, diarrhea.  No dysuria.  She does work in a  and is exposed to sick children.  As far as her lymphedema is concerned she states this is improving but does not believe that her skin is normally warm to the touch or significantly erythematous.    Past Medical History:   Diagnosis Date   • Chronic diastolic congestive heart failure    • Deep venous thrombosis    • Hypertension    • Lipoedema    • Lymphedema    • Morbid obesity    • Paradoxical embolism     to the LLE due to DVT/PE and PFO   • PFO (patent foramen ovale)    • Pulmonary embolism    • Pulmonary hypertension     multifactorial (dCHF, obesity/ARIEL, hx PE), mild by echo 1/2016     Past Surgical History:   Procedure Laterality Date   • BRONCHOSCOPY N/A 7/21/2017    Procedure: BRONCHOSCOPY with wash;  Surgeon: Caleb Garcia MD;  Location: [facility] ENDOSCOPY;  Service:    • DILATATION AND CURETTAGE  04/11/2011   • VENA CAVA FILTER PLACEMENT       Inferior Vena Cava Filter Placement w/Fluorosc angiogr guidance     Family History   Problem Relation Age of Onset   • Hypertension Other      Social History   Substance Use Topics   • Smoking status: Never Smoker   • Smokeless tobacco: Never Used      Comment: caffeine use/ weekends   • Alcohol use No     Prescriptions Prior to Admission   Medication Sig Dispense Refill Last Dose   • DULERA 200-5 MCG/ACT inhaler USE 2 PUFFS PO BID. RINSE MOUTH AFTER EACH USE  5 Taking   • DYMISTA 137-50 MCG/ACT suspension Daily.  11 Taking   • furosemide (LASIX) 40 MG tablet Take 1 tablet by mouth 3 (Three) Times a Week. 15 tablet 11 Taking   • ipratropium-albuterol (DUO-NEB) 0.5-2.5 mg/mL nebulizer 4 (Four) Times a Day.  0 Taking   • losartan-hydrochlorothiazide (HYZAAR) 100-12.5 MG per tablet Daily.  5 Taking   • metoprolol succinate XL (TOPROL-XL) 50 MG 24 hr tablet Take 1 tablet by mouth daily.   Taking   • rivaroxaban (XARELTO) 20 MG tablet Take 1 tablet by mouth Daily. 90 tablet 0 Taking     Allergies:  Review of patient's allergies indicates no known allergies.    Review of Systems   Constitutional: Positive for chills. Negative for fever.   HENT: Negative for congestion and sore throat.    Eyes: Negative for visual disturbance.   Respiratory: Positive for wheezing. Negative for cough, chest tightness and shortness of breath.    Cardiovascular: Negative for chest pain, palpitations and leg swelling.   Gastrointestinal: Negative for abdominal distention, abdominal pain, diarrhea, nausea and vomiting.   Endocrine: Negative for polydipsia and polyuria.   Genitourinary: Negative for difficulty urinating, dysuria, frequency and urgency.   Musculoskeletal: Positive for back pain. Negative for arthralgias and myalgias.   Skin: Negative for color change and rash.   Neurological: Negative for dizziness and light-headedness.        PHYSICAL EXAM    Vital Signs  tMax 24 hrs:  Temp (24hrs), Av.5 °F (38.1 °C), Min:99.4 °F (37.4  °C), Max:102.9 °F (39.4 °C)    Vitals Ranges:  Temp:  [99.4 °F (37.4 °C)-102.9 °F (39.4 °C)] 99.5 °F (37.5 °C)  Heart Rate:  [] 95  Resp:  [18-22] 20  BP: ()/(63-70) 109/66    Physical Exam   Constitutional: She is oriented to person, place, and time. She appears well-developed and well-nourished.   Sickly appearing   HENT:   Head: Normocephalic and atraumatic.   Eyes: EOM are normal. Pupils are equal, round, and reactive to light.   Neck: Neck supple. No tracheal deviation present.   Cardiovascular: Normal rate and regular rhythm.  Exam reveals no gallop.    No murmur heard.  Pulmonary/Chest: Effort normal. No respiratory distress. She has wheezes.   She has some upper airway wheezing but lung exam is clear from the posterior side.   Abdominal: Soft. Bowel sounds are normal. She exhibits no distension. There is no tenderness.   Morbidly obese   Musculoskeletal: She exhibits edema. She exhibits no tenderness.   Significant lower extremity lymphedema bilaterally.    Neurological: She is alert and oriented to person, place, and time. No cranial nerve deficit.   Skin: Skin is warm and dry.   Erythema and warmth to the bilateral distal lower extremities just above the ankles.  Left is worse than the right.   Nursing note and vitals reviewed.      Results Review:    Results from last 7 days  Lab Units 11/12/17  0313   WBC 10*3/mm3 14.14*   HEMOGLOBIN g/dL 11.3*   HEMATOCRIT % 35.0*   PLATELETS 10*3/mm3 190       Results from last 7 days  Lab Units 11/12/17  0313   SODIUM mmol/L 139   POTASSIUM mmol/L 4.9   CHLORIDE mmol/L 102   CO2 mmol/L 22.0   BUN mg/dL 28*   CREATININE mg/dL 1.35*   CALCIUM mg/dL 8.4*   BILIRUBIN mg/dL 0.8   ALK PHOS U/L 75   ALT (SGPT) U/L 17   AST (SGOT) U/L 15   GLUCOSE mg/dL 121*     Respiratory viral panel negative    Lactic acid has trended from 3.0 down to 2.3    CT scan of the abdomen and pelvis:  1.  Stable left renal atrophy with solitary nonobstructing renal  calculus.  2.  Interval increase in left inguinal region inflammatory changes at a  site of prior surgery with development of some associated adenopathy.  This will require follow-up.  3. Stable enlarged, lobular uterus, likely leiomyomas.    Chest x-ray shows no active cardiopulmonary disease     I reviewed the patient's new clinical results.  I reviewed the patient's new imaging results and agree with the interpretation.      Principal Problem:    Fever and chills  Active Problems:    Chronic diastolic congestive heart failure    Hypertension    Sepsis    Back pain    STEFANIA (acute kidney injury)      Assessment & Plan    The patient has been admitted.  The etiology of her fever and chills is not clear at this time.  She does not appear to have a urinary tract infection or pneumonia.  Respiratory viral panel is negative.  Given her back pain I will order an MRI of the thoracic and lumbar spine to rule out discitis, abscess, or other infection in that region.  It's possible she has some cellulitis of the lower extremities associated with her lymphedema.  Given her parameters on admission she classifies as sepsis and was started on IV fluids and antibiotics.  We'll continue these and await blood cultures along with additional imaging.  I will hold off on her diuretic and antihypertensives given her elevated creatinine that appears to be responding to IV fluids.  I've also placed her Xarelto on hold but if there is no evidence of infectious source on her MRI then this will be restarted.  All plans based on her clinical course.    I discussed the patients findings and my recommendations with patient    Jaime Kaur MD  11/12/17  8:59 AM

## 2017-11-12 NOTE — PROGRESS NOTES
"Pharmacokinetic Consult - Vancomycin Dosing     Emely Solomon is on day #1  pharmacy to dose vancomycin for sepsis.  Pharmacy dosing vancomycin per Dr Fajardo's request.   Goal trough: 15-20  mg/L     Relevant clinical data and objective history reviewed:  57 y.o. female 63\" (160 cm) (!) 350 lb (159 kg)    Past Medical History:   Diagnosis Date   • Chronic diastolic congestive heart failure    • Deep venous thrombosis    • Hypertension    • Lipoedema    • Lymphedema    • Morbid obesity    • Paradoxical embolism     to the LLE due to DVT/PE and PFO   • PFO (patent foramen ovale)    • Pulmonary embolism    • Pulmonary hypertension     multifactorial (dCHF, obesity/ARIEL, hx PE), mild by echo 1/2016     Creatinine   Date Value Ref Range Status   11/11/2017 1.46 (H) 0.57 - 1.00 mg/dL Final     BUN   Date Value Ref Range Status   11/11/2017 30 (H) 6 - 20 mg/dL Final     Estimated Creatinine Clearance: 63 mL/min (by C-G formula based on Cr of 1.46).    Lab Results   Component Value Date    WBC 16.74 (H) 11/11/2017     Temp Readings from Last 3 Encounters:   11/12/17 99.4 °F (37.4 °C) (Tympanic)   07/21/17 98.7 °F (37.1 °C)   09/15/16 97.2 °F (36.2 °C) (Tympanic)     Baseline culture/source/susceptibility: 11/11   BC x2  In process    IV Anti-Infectives     Ordered     Dose/Rate Route Frequency Start Stop    11/12/17 0312  vancomycin 1750 mg/500 mL 0.9% NS IVPB ([facility])     Ordering Provider:  Eduard Fajardo MD    1,750 mg  over 180 Minutes Intravenous Every 12 Hours 11/12/17 1600 11/19/17 1559    11/12/17 0305  vancomycin 3000 mg/500 mL 0.9% NS IVPB ([facility])     Ordering Provider:  Eduard Fajardo MD    20 mg/kg × 159 kg  over 300 Minutes Intravenous Once 11/12/17 0400      11/12/17 0255  ceFEPime (MAXIPIME) in SWFI 2g/10ml IV PUSH syringe     Ordering Provider:  Eduard Fajardo MD    2 g  over 5 Minutes Intravenous Every 12 Hours 11/12/17 0330 11/19/17 0329    11/12/17 0255  Pharmacy to dose vancomycin     Ordering Provider:  Eduard" MD Scotty     Does not apply Continuous PRN 11/12/17 0255 11/19/17 0254    11/12/17 0131  vancomycin 3000 mg/500 mL 0.9% NS IVPB ([facility])     Ordering Provider:  Mervin Tapia MD    20 mg/kg × 159 kg Intravenous Once 11/12/17 0133 11/12/17 0227    11/11/17 2347  cefTRIAXone (ROCEPHIN) IVPB 1 g     Ordering Provider:  Mervin Tapia MD    1 g  100 mL/hr over 30 Minutes Intravenous Once 11/11/17 2348 11/12/17 0050             Assessment/Plan   1. Vancomycin 3000mg iv load ( 20mg/kg) followed by Vancomycin 1750mg ( 11mg/kg) iv q 12 hours    Trough prior to 4th overall dose   1600  11/13   2. Monitor renal function…serum creatinine in am   3. Encourage hydration to prevent toxic accumulation; monitor for s/sx of toxicity including increase in SCr and decrease in UOP.                                                         4. Pharmacy will continue to follow and adjust accordingly.      Halina Mauro, [facility]

## 2017-11-12 NOTE — ED NOTES
"Pt reports being at [facility] today when she began to have chills and \"coudnt get warm.\" She reports taking tylenol about 1 hour ago. Reassurance given; call light in reach. Pts breathing even and unlabored. Pt appears in NAD at this time. Family at bedside.        Radha Contreras RN  11/11/17 4262    "

## 2017-11-12 NOTE — ED TRIAGE NOTES
"Upper center back pain that radiates down to hips that started without injury today. Pt able to transfer self from car to . No pain w urination. No known fevers. No cough.     Took 2 tylenol 2200 for \"shakiness\" all over  "

## 2017-11-12 NOTE — PLAN OF CARE
Problem: Patient Care Overview (Adult)  Goal: Plan of Care Review  Outcome: Ongoing (interventions implemented as appropriate)    11/12/17 0350 11/12/17 1354 11/12/17 1613   Coping/Psychosocial Response Interventions   Plan Of Care Reviewed With --  patient --    Patient Care Overview   Progress no change --  --    Outcome Evaluation   Outcome Summary/Follow up Plan --  --  LLE redden, warm to touch, celluiltis in appearance, IV vanco and cefepime, MRI of spine today, vss, will continue to monitor       Goal: Adult Individualization and Mutuality  Outcome: Ongoing (interventions implemented as appropriate)  Goal: Discharge Needs Assessment  Outcome: Ongoing (interventions implemented as appropriate)    Problem: Sepsis (Adult)  Goal: Signs and Symptoms of Listed Potential Problems Will be Absent or Manageable (Sepsis)  Outcome: Ongoing (interventions implemented as appropriate)    Problem: Fall Risk (Adult)  Goal: Absence of Falls  Outcome: Ongoing (interventions implemented as appropriate)

## 2017-11-12 NOTE — ED PROVIDER NOTES
" EMERGENCY DEPARTMENT ENCOUNTER    CHIEF COMPLAINT  Chief Complaint: back pain  History given by: patient  History limited by: none  Room Number: 31/31  PMD: PIERRE Smith MD      HPI:  Pt is a 57 y.o. female with a h/o DVT and hypertension who presents complaining of mid-thoracic back pain that gradually onset earlier today. Associated symptoms included chills, but no cough, sore throat, dysuria or other symptoms. Patient was asymptomatic yesterday and earlier this morning. She has no h/o ureterolithiasis or diabetes. Her flu-shot status is up-to-date.     Duration:  Since earlier today  Onset: gradual  Timing: constant  Location: mid-thoracic  Radiation: none stated  Quality: \"pain\"  Intensity/Severity: moderate  Progression: unchanged  Associated Symptoms: chills  Aggravating Factors: none stated  Alleviating Factors: none stated  Previous Episodes: patient has a h/o DVT and hypertension  Treatment before arrival: none stated    PAST MEDICAL HISTORY  Active Ambulatory Problems     Diagnosis Date Noted   • Chronic diastolic congestive heart failure    • PFO (patent foramen ovale)    • Paradoxical embolism    • Hypertension    • Pulmonary hypertension      Resolved Ambulatory Problems     Diagnosis Date Noted   • Chronic cough 07/21/2017     Past Medical History:   Diagnosis Date   • Chronic diastolic congestive heart failure    • Deep venous thrombosis    • Hypertension    • Lipoedema    • Lymphedema    • Morbid obesity    • Paradoxical embolism    • PFO (patent foramen ovale)    • Pulmonary embolism    • Pulmonary hypertension        PAST SURGICAL HISTORY  Past Surgical History:   Procedure Laterality Date   • BRONCHOSCOPY N/A 7/21/2017    Procedure: BRONCHOSCOPY with wash;  Surgeon: Caleb Garcia MD;  Location: [facility] ENDOSCOPY;  Service:    • DILATATION AND CURETTAGE  04/11/2011   • VENA CAVA FILTER PLACEMENT      Inferior Vena Cava Filter Placement w/Fluorosc angiogr guidance       FAMILY HISTORY  Family " History   Problem Relation Age of Onset   • Hypertension Other        SOCIAL HISTORY  Social History     Social History   • Marital status:      Spouse name: N/A   • Number of children: N/A   • Years of education: N/A     Occupational History   • Not on file.     Social History Main Topics   • Smoking status: Never Smoker   • Smokeless tobacco: Never Used      Comment: caffeine use/ weekends   • Alcohol use No   • Drug use: No   • Sexual activity: Not on file     Other Topics Concern   • Not on file     Social History Narrative       ALLERGIES  Review of patient's allergies indicates no known allergies.    REVIEW OF SYSTEMS  Review of Systems   Constitutional: Positive for chills. Negative for fever.   HENT: Negative for sore throat.    Eyes: Negative.    Respiratory: Negative for cough and shortness of breath.    Cardiovascular: Negative for chest pain.   Gastrointestinal: Negative for abdominal pain, diarrhea and vomiting.   Genitourinary: Negative for dysuria.   Musculoskeletal: Positive for back pain (mid thoracic). Negative for neck pain.   Skin: Negative for rash.   Allergic/Immunologic: Negative.    Neurological: Negative for weakness, numbness and headaches.   Hematological: Negative.    Psychiatric/Behavioral: Negative.    All other systems reviewed and are negative.      PHYSICAL EXAM  ED Triage Vitals   Temp Heart Rate Resp BP SpO2   11/11/17 2243 11/11/17 2243 11/11/17 2243 -- 11/11/17 2243   102.9 °F (39.4 °C) 113 19  95 %      Temp src Heart Rate Source Patient Position BP Location FiO2 (%)   11/11/17 2243 -- -- -- --   Tympanic           Physical Exam   Constitutional: She is oriented to person, place, and time and well-developed, well-nourished, and in no distress. She has a sickly appearance. No distress.   HENT:   Head: Normocephalic and atraumatic.   Eyes: EOM are normal. Pupils are equal, round, and reactive to light.   Neck: Normal range of motion. Neck supple.   Cardiovascular: Regular  rhythm, normal heart sounds and intact distal pulses.  Tachycardia present.    Pulmonary/Chest: Effort normal and breath sounds normal. No respiratory distress.   Abdominal: Soft. There is no tenderness. There is CVA tenderness (bilateral). There is no rebound and no guarding.   Severely obese   Musculoskeletal: Normal range of motion. She exhibits edema (chronic lymphedema bilaterally).   No midline spinal tenderness   Neurological: She is alert and oriented to person, place, and time. She has normal sensation and normal strength.   Skin: Skin is warm and dry. No rash noted.   Above the left ankle there is redness and warmth concerning for acute cellultiis    Psychiatric: Mood and affect normal.   Nursing note and vitals reviewed.      LAB RESULTS  Lab Results (last 24 hours)     Procedure Component Value Units Date/Time    CBC & Differential [[id number]] Collected:  11/11/17 2304    Specimen:  Blood Updated:  11/11/17 2319    Narrative:       The following orders were created for panel order CBC & Differential.  Procedure                               Abnormality         Status                     ---------                               -----------         ------                     CBC Auto Differential[[id number]]        Abnormal            Final result                 Please view results for these tests on the individual orders.    Comprehensive Metabolic Panel [[id number]]  (Abnormal) Collected:  11/11/17 2304    Specimen:  Blood Updated:  11/11/17 2336     Glucose 124 (H) mg/dL      BUN 30 (H) mg/dL      Creatinine 1.46 (H) mg/dL      Sodium 138 mmol/L      Potassium 4.0 mmol/L      Chloride 97 (L) mmol/L      CO2 23.4 mmol/L      Calcium 10.1 mg/dL      Total Protein 8.2 g/dL      Albumin 4.40 g/dL      ALT (SGPT) 19 U/L      AST (SGOT) 18 U/L      Alkaline Phosphatase 96 U/L      Total Bilirubin 1.1 mg/dL      eGFR Non African Amer 37 (L) mL/min/1.73      Globulin 3.8 gm/dL      A/G Ratio 1.2 g/dL       BUN/Creatinine Ratio 20.5     Anion Gap 17.6 mmol/L     Lactic Acid, Plasma [[id number]]  (Abnormal) Collected:  11/11/17 2304    Specimen:  Blood Updated:  11/11/17 2328     Lactate 3.0 (C) mmol/L     Blood Culture - Blood, [[id number]] Collected:  11/11/17 2304    Specimen:  Blood from Arm, Left Updated:  11/11/17 2308    CBC Auto Differential [[id number]]  (Abnormal) Collected:  11/11/17 2304    Specimen:  Blood Updated:  11/11/17 2319     WBC 16.74 (H) 10*3/mm3      RBC 4.60 10*6/mm3      Hemoglobin 12.9 g/dL      Hematocrit 40.1 %      MCV 87.2 fL      MCH 28.0 pg      MCHC 32.2 (L) g/dL      RDW 14.4 (H) %      RDW-SD 45.6 fl      MPV 8.8 fL      Platelets 255 10*3/mm3      Neutrophil % 94.6 (H) %      Lymphocyte % 1.6 (L) %      Monocyte % 3.4 (L) %      Eosinophil % 0.0 (L) %      Basophil % 0.1 %      Immature Grans % 0.3 %      Neutrophils, Absolute 15.83 (H) 10*3/mm3      Lymphocytes, Absolute 0.27 (L) 10*3/mm3      Monocytes, Absolute 0.57 10*3/mm3      Eosinophils, Absolute 0.00 10*3/mm3      Basophils, Absolute 0.02 10*3/mm3      Immature Grans, Absolute 0.05 (H) 10*3/mm3     Lactic Acid, Reflex Timer [[id number]] Collected:  11/11/17 2304    Specimen:  Blood Updated:  11/11/17 2328    Blood Culture - Blood, [[id number]] Collected:  11/11/17 2327    Specimen:  Blood from Arm, Left Updated:  11/11/17 2330    Urinalysis With / Culture If Indicated - Urine, Catheter [[id number]]  (Normal) Collected:  11/12/17 0043    Specimen:  Urine from Urine, Catheter Updated:  11/12/17 0056     Color, UA Yellow     Appearance, UA Clear     pH, UA 5.5     Specific Gravity, UA 1.019     Glucose, UA Negative     Ketones, UA Negative     Bilirubin, UA Negative     Blood, UA Negative     Protein, UA Negative     Leuk Esterase, UA Negative     Nitrite, UA Negative     Urobilinogen, UA 0.2 E.U./dL    Narrative:       Urine microscopic not indicated.          I ordered the above labs and reviewed the results    RADIOLOGY  CT  Abdomen Pelvis Without Contrast   Final Result   1.  Stable left renal atrophy with solitary nonobstructing renal   calculus.   2. Interval increase in left inguinal region inflammatory changes at a   site of prior surgery with development of some associated adenopathy.   This will require follow-up.   3. Stable enlarged, lobular uterus, likely leiomyomas.                   This study was performed with techniques to keep radiation doses as low   as reasonably achievable (ALARA). Individualized dose reduction   techniques using automated exposure control or adjustment of mA and/or   kV according to the patient size were employed.        This report was finalized on 11/12/2017 12:27 AM by Juan West MD.          XR Chest 1 View   Final Result       No active disease by portable imaging.       This report was finalized on 11/11/2017 11:41 PM by Juan West MD.               I ordered the above noted radiological studies. Interpreted by radiologist.  Reviewed by me in PACS.       PROCEDURES  Critical Care  Performed by: KEY ALVA  Authorized by: KEY ALVA   Total critical care time: 30 minutes  Critical care was necessary to treat or prevent imminent or life-threatening deterioration of the following conditions: sepsis.  Critical care was time spent personally by me on the following activities: blood draw for specimens, development of treatment plan with patient or surrogate, evaluation of patient's response to treatment, examination of patient, obtaining history from patient or surrogate, ordering and performing treatments and interventions, ordering and review of laboratory studies, ordering and review of radiographic studies and re-evaluation of patient's condition.            PROGRESS AND CONSULTS  ED Course   Comment By Time   The etiology of the patient's sepsis not clear.  She does have back pain, and I'm not able to exclude discitis or epidural abscess at this time.  The CT scan does not show  pyelonephritis, and the urinalysis clean.  There is chronic lymphedema with some redness and tenderness particularly on the left lower extremity, and cannot exclude cellulitis. Mervin Tapia MD 11/12 0317   2246: Ordered CBC, blood culture, lactic acid and CMP per triage protocol.     2325: Ordered UA, CXR and CT Abd/Pelvis to r/o ureterolithiasis and pneumonia. Ordered IVF for hydration and rocephin for infection due to elevated lactate and WBC.      0135: Rechecked patient who is resting in NAD. She has been given rocephin and vancomycin. The etiology of her infection is unclear. It could be cellulitis in the left leg but cannot exclude discitis or epidural abscess. Thus she will be admitted. Pt and family understand and agree with the plan. All questions answered.    0146: Discussed patient's case with SHADIA Salgado, including concerns regarding patient's pertinent evaluation and workup which indicated sepsis and possible cellulitis. He agrees to admit to telemetry.      MEDICAL DECISION MAKING  Results were reviewed/discussed with the patient and they were also made aware of online access. Pt also made aware that some labs, such as cultures, will not be resulted during ER visit and follow up with PMD is necessary.     MDM  Number of Diagnoses or Management Options     Amount and/or Complexity of Data Reviewed  Clinical lab tests: ordered and reviewed (WBC-16.74; lactate-3.0)  Tests in the radiology section of CPT®: ordered and reviewed (CT Abd/Pelvis-left renal atrophy; CXR is unremarkable)  Independent visualization of images, tracings, or specimens: yes    Patient Progress  Patient progress: stable         DIAGNOSIS  Final diagnoses:   Sepsis, due to unspecified organism       DISPOSITION  ADMISSION    Discussed treatment plan and reason for admission with pt/family and admitting physician.  Pt/family voiced understanding of the plan for admission for further testing/treatment as needed.         Latest  Documented Vital Signs:  As of 1:53 AM  BP- 105/63 HR- 97 Temp- 99.4 °F (37.4 °C) (Tympanic) O2 sat- 94%    --  Documentation assistance provided by landry Charles for Dr. Tapia.  Information recorded by the scribe was done at my direction and has been verified and validated by me.       Neda Charles  11/12/17 0155       Mervin Tapia MD  11/12/17 0317

## 2017-11-13 PROBLEM — R78.81 BACTEREMIA: Status: ACTIVE | Noted: 2017-11-13

## 2017-11-13 LAB
ANION GAP SERPL CALCULATED.3IONS-SCNC: 15.4 MMOL/L
BACTERIA BLD CULT: ABNORMAL
BASOPHILS # BLD AUTO: 0.02 10*3/MM3 (ref 0–0.2)
BASOPHILS NFR BLD AUTO: 0.2 % (ref 0–1.5)
BUN BLD-MCNC: 19 MG/DL (ref 6–20)
BUN/CREAT SERPL: 19.8 (ref 7–25)
CALCIUM SPEC-SCNC: 8.6 MG/DL (ref 8.6–10.5)
CHLORIDE SERPL-SCNC: 103 MMOL/L (ref 98–107)
CO2 SERPL-SCNC: 17.6 MMOL/L (ref 22–29)
CREAT BLD-MCNC: 0.96 MG/DL (ref 0.57–1)
DEPRECATED RDW RBC AUTO: 48.7 FL (ref 37–54)
EOSINOPHIL # BLD AUTO: 0 10*3/MM3 (ref 0–0.7)
EOSINOPHIL NFR BLD AUTO: 0 % (ref 0.3–6.2)
ERYTHROCYTE [DISTWIDTH] IN BLOOD BY AUTOMATED COUNT: 15.1 % (ref 11.7–13)
GFR SERPL CREATININE-BSD FRML MDRD: 60 ML/MIN/1.73
GLUCOSE BLD-MCNC: 106 MG/DL (ref 65–99)
HCT VFR BLD AUTO: 34.6 % (ref 35.6–45.5)
HGB BLD-MCNC: 10.8 G/DL (ref 11.9–15.5)
IMM GRANULOCYTES # BLD: 0.17 10*3/MM3 (ref 0–0.03)
IMM GRANULOCYTES NFR BLD: 1.4 % (ref 0–0.5)
LYMPHOCYTES # BLD AUTO: 0.6 10*3/MM3 (ref 0.9–4.8)
LYMPHOCYTES NFR BLD AUTO: 4.9 % (ref 19.6–45.3)
MCH RBC QN AUTO: 27.6 PG (ref 26.9–32)
MCHC RBC AUTO-ENTMCNC: 31.2 G/DL (ref 32.4–36.3)
MCV RBC AUTO: 88.5 FL (ref 80.5–98.2)
MONOCYTES # BLD AUTO: 0.43 10*3/MM3 (ref 0.2–1.2)
MONOCYTES NFR BLD AUTO: 3.5 % (ref 5–12)
NEUTROPHILS # BLD AUTO: 11.06 10*3/MM3 (ref 1.9–8.1)
NEUTROPHILS NFR BLD AUTO: 90 % (ref 42.7–76)
PLATELET # BLD AUTO: 178 10*3/MM3 (ref 140–500)
PMV BLD AUTO: 8.8 FL (ref 6–12)
POTASSIUM BLD-SCNC: 3.8 MMOL/L (ref 3.5–5.2)
RBC # BLD AUTO: 3.91 10*6/MM3 (ref 3.9–5.2)
SODIUM BLD-SCNC: 136 MMOL/L (ref 136–145)
WBC NRBC COR # BLD: 12.28 10*3/MM3 (ref 4.5–10.7)

## 2017-11-13 PROCEDURE — 87040 BLOOD CULTURE FOR BACTERIA: CPT | Performed by: INTERNAL MEDICINE

## 2017-11-13 PROCEDURE — 25010000002 CEFEPIME PER 500 MG: Performed by: HOSPITALIST

## 2017-11-13 PROCEDURE — 85025 COMPLETE CBC W/AUTO DIFF WBC: CPT | Performed by: INTERNAL MEDICINE

## 2017-11-13 PROCEDURE — 94799 UNLISTED PULMONARY SVC/PX: CPT

## 2017-11-13 PROCEDURE — 25010000002 PENICILLIN G POTASSIUM PER 600000 UNITS: Performed by: INTERNAL MEDICINE

## 2017-11-13 PROCEDURE — 80048 BASIC METABOLIC PNL TOTAL CA: CPT | Performed by: INTERNAL MEDICINE

## 2017-11-13 PROCEDURE — 25010000002 VANCOMYCIN 10 G RECONSTITUTED SOLUTION: Performed by: HOSPITALIST

## 2017-11-13 RX ADMIN — ACETAMINOPHEN 650 MG: 325 TABLET ORAL at 00:24

## 2017-11-13 RX ADMIN — IPRATROPIUM BROMIDE AND ALBUTEROL SULFATE 1.5 ML: .5; 3 SOLUTION RESPIRATORY (INHALATION) at 15:14

## 2017-11-13 RX ADMIN — IPRATROPIUM BROMIDE AND ALBUTEROL SULFATE 1.5 ML: .5; 3 SOLUTION RESPIRATORY (INHALATION) at 19:06

## 2017-11-13 RX ADMIN — IPRATROPIUM BROMIDE AND ALBUTEROL SULFATE 1.5 ML: .5; 3 SOLUTION RESPIRATORY (INHALATION) at 07:30

## 2017-11-13 RX ADMIN — IPRATROPIUM BROMIDE AND ALBUTEROL SULFATE 1.5 ML: .5; 3 SOLUTION RESPIRATORY (INHALATION) at 11:54

## 2017-11-13 RX ADMIN — SODIUM CHLORIDE 4 MILLION UNITS: 9 INJECTION, SOLUTION INTRAVENOUS at 15:41

## 2017-11-13 RX ADMIN — SODIUM CHLORIDE 4 MILLION UNITS: 9 INJECTION, SOLUTION INTRAVENOUS at 23:30

## 2017-11-13 RX ADMIN — METOPROLOL SUCCINATE 50 MG: 50 TABLET, FILM COATED, EXTENDED RELEASE ORAL at 09:00

## 2017-11-13 RX ADMIN — BUDESONIDE AND FORMOTEROL FUMARATE DIHYDRATE 2 PUFF: 160; 4.5 AEROSOL RESPIRATORY (INHALATION) at 07:30

## 2017-11-13 RX ADMIN — SODIUM CHLORIDE 100 ML/HR: 9 INJECTION, SOLUTION INTRAVENOUS at 04:12

## 2017-11-13 RX ADMIN — WATER 2 G: 1 INJECTION INTRAMUSCULAR; INTRAVENOUS; SUBCUTANEOUS at 04:12

## 2017-11-13 RX ADMIN — BUDESONIDE AND FORMOTEROL FUMARATE DIHYDRATE 2 PUFF: 160; 4.5 AEROSOL RESPIRATORY (INHALATION) at 19:07

## 2017-11-13 RX ADMIN — SODIUM CHLORIDE 4 MILLION UNITS: 9 INJECTION, SOLUTION INTRAVENOUS at 20:26

## 2017-11-13 RX ADMIN — VANCOMYCIN HYDROCHLORIDE 1750 MG: 10 INJECTION, POWDER, LYOPHILIZED, FOR SOLUTION INTRAVENOUS at 04:12

## 2017-11-13 NOTE — PLAN OF CARE
Problem: Patient Care Overview (Adult)  Goal: Plan of Care Review    11/13/17 3213   Coping/Psychosocial Response Interventions   Plan Of Care Reviewed With patient;spouse   Patient Care Overview   Progress no change   Outcome Evaluation   Outcome Summary/Follow up Plan order placed for OT lymph wraps. pt was just d/c by out patient lymph and was provided leg wraps that have velcro to make it easier for her to tomas/doff. OT signing off.

## 2017-11-13 NOTE — PROGRESS NOTES
" LOS: 1 day     Name: Emely Solomon  Age: 57 y.o.  Sex: female  :  1960  MRN: [id number]         Primary Care Physician: PIERRE Smith MD    Subjective   Subjective  Feels a little better today.  Denies fevers or chills.  No back pain since admission.    Objective   Vital Signs  Temp:  [99.2 °F (37.3 °C)-99.7 °F (37.6 °C)] 99.7 °F (37.6 °C)  Heart Rate:  [] 108  Resp:  [18-20] 18  BP: ()/(52-89) 127/89  Body mass index is 62 kg/(m^2).    Objective:  General Appearance:  Comfortable and in no acute distress.    Vital signs: (most recent): Blood pressure 127/89, pulse 108, temperature 99.7 °F (37.6 °C), temperature source Oral, resp. rate 18, height 63\" (160 cm), weight (!) 350 lb (159 kg), SpO2 91 %.    Lungs:  Normal respiratory rate and normal effort.    Heart: Normal rate.  Regular rhythm.  (She may have a faint murmur but I can't be for certain)  Abdomen: Abdomen is soft.  Bowel sounds are normal.   There is no abdominal tenderness.     Extremities: There is no local swelling or dependent edema.  (Bilateral lower extremity lymphedema, severe)  Neurological: Patient is alert and oriented to person, place and time.    Skin:  Warm and dry.  (Erythema and warmth to the distal lower extremities, left greater than right.  Question cellulitis)            Results Review:       I reviewed the patient's new clinical results.      Results from last 7 days  Lab Units 17  0609 17  0313 17  2304   WBC 10*3/mm3 12.28* 14.14* 16.74*   HEMOGLOBIN g/dL 10.8* 11.3* 12.9   PLATELETS 10*3/mm3 178 190 255       Results from last 7 days  Lab Units 17  0609 17  0313 17  2304   SODIUM mmol/L 136 139 138   POTASSIUM mmol/L 3.8 4.9 4.0   CHLORIDE mmol/L 103 102 97*   CO2 mmol/L 17.6* 22.0 23.4   BUN mg/dL 19 28* 30*   CREATININE mg/dL 0.96 1.35* 1.46*   CALCIUM mg/dL 8.6 8.4* 10.1   GLUCOSE mg/dL 106* 121* 124*                 Scheduled Meds:     budesonide-formoterol 2 puff " Inhalation BID - RT   cefepime 2 g Intravenous Q12H   ipratropium-albuterol 1.5 mL Nebulization 4x Daily - RT   metoprolol succinate XL 50 mg Oral Daily   vancomycin 1,750 mg Intravenous Q12H     PRN Meds:   •  acetaminophen  •  nitroglycerin  •  ondansetron  •  [DISCONTINUED] vancomycin **AND** Pharmacy to dose vancomycin  Continuous Infusions:    Pharmacy to dose vancomycin     sodium chloride 100 mL/hr Last Rate: 100 mL/hr (11/13/17 4606)       Assessment/Plan   Principal Problem:    Fever and chills  Active Problems:    Chronic diastolic congestive heart failure    Hypertension    Sepsis    Back pain    STEFANIA (acute kidney injury)    Bacteremia  Lymphedema    Assessment & Plan    - Fever curve has improved.  Continue current antibiotics.  MRI of the thoracic and lumbar spine did not show any definitive evidence of discitis, osteomyelitis, or abscess.  It is noted that the study was limited.  She has not had any recurrence of back pain since admission.  Blood culture from admission growing streptococcus.  I will ask infectious disease to evaluate her.  - Acute kidney injury has resolved with IV fluids.  Blood pressure still a little low at times and thus will continue fluids but decrease the rate to 75 cc per hour.  Diuretics and antihypertensives are currently on hold.  - Lactic acid has normalized and her sepsis appears to be improving with fluids and antibiotics  - Xarelto is currently on hold.  Can probably restart soon but we'll wait to see what infectious disease recommends moving forward.  - Will ask OT to see her for lymphedema    Dispo  Plans to return home at discharge    Jaime Kaur MD  [facility]  11/13/17  10:51 AM

## 2017-11-13 NOTE — PROGRESS NOTES
Discharge Planning Assessment  [facility]     Patient Name: Emely Solomon  MRN: [id number]  Today's Date: 11/13/2017    Admit Date: 11/11/2017          Discharge Needs Assessment       11/13/17 1421    Living Environment    Lives With spouse    Living Arrangements house   single story    [place] Accessibility stairs to enter home    Number of Stairs to Enter Home 3    Stair Railings at Home outside, present on right side    Type of Financial/Environmental Concern none    Transportation Available car;family or friend will provide    Living Environment    Provides Primary Care For no one    Quality Of Family Relationships supportive    Discharge Needs Assessment    Concerns To Be Addressed no discharge needs identified;denies needs/concerns at this time    Readmission Within The Last 30 Days no previous admission in last 30 days    Anticipated Changes Related to Illness none    Equipment Currently Used at Home bipap/ cpap;nebulizer    Equipment Needed After Discharge none    Discharge Disposition home or self-care            Discharge Plan       11/13/17 1422    Case Management/Social Work Plan    Plan Home with spouse    Patient/Family In Agreement With Plan yes    Additional Comments Introduced self and explained role of CCP and facesheet verified with patient, who is alert and oriented x 4, at bedside.  Patient states she lives in a single story [facility] with her spouse, Ghanshyam and is independent with all ADLs and is not current with any home health and currently uses nebulizer and CPAP and denies DME and home health needs at discharge.  Patient states she uses [facility] pharmacy on [place], [place] and denies any issues with affording or obtaining medications.  Patient states she plans to return home with spouse at discharge and he will provide transportation at discharge.  CCP will continue to follow and assist as needed.....Rohini Decker RN,CCP         Discharge Placement     No  information found                Demographic Summary       11/13/17 1420    Referral Information    Admission Type inpatient    Arrived From home or self-care    Contact Information    Permission Granted to Share Information With family/designee   Ghanshyam Solomon(spouse) [phone]    Primary Care Physician Information    Name PIERRE Smith MD            Functional Status       11/13/17 1421    Functional Status Current    Ambulation 2-->assistive person    Transferring 2-->assistive person    Toileting 2-->assistive person    Bathing 2-->assistive person    Dressing 0-->independent    Eating 0-->independent    Communication 0-->understands/communicates without difficulty    Change in Functional Status Since Onset of Current Illness/Injury yes    Functional Status Prior    Ambulation 0-->independent    Transferring 0-->independent    Toileting 0-->independent    Bathing 0-->independent    Dressing 0-->independent    Eating 0-->independent    Communication 0-->understands/communicates without difficulty    IADL    Medications independent    Meal Preparation independent    Housekeeping independent    Laundry independent    Shopping independent    Oral Care independent    Activity Tolerance    Current Activity Limitations none    Usual Activity Tolerance good    Current Activity Tolerance good    Cognitive/Perceptual/Developmental    Current Mental Status/Cognitive Functioning no deficits noted    Recent Changes in Mental Status/Cognitive Functioning no changes    Developmental Stage (Eriksson's Stages of Development) Stage 7 (35-65 years/Middle Adulthood) Generativity vs. Stagnation            Psychosocial     None            Abuse/Neglect     None            Legal     None            Substance Abuse     None            Patient Forms     None          Rohini Cuoch RN

## 2017-11-13 NOTE — SIGNIFICANT NOTE
11/13/17 1553   Rehab Treatment   Discipline occupational therapist   Rehab Evaluation   Evaluation Not Performed other (see comments)  (OT talked w. pt. pt states she was just d/c by Colleen in out patient lymph. she provided pt with a different type of wrap w. velco. ed her to have  bring it in tomorrow.  agree. OT signing off.  1423)

## 2017-11-13 NOTE — SIGNIFICANT NOTE
11/13/17 1523   Rehab Treatment   Discipline physical therapist   Rehab Evaluation   Evaluation Not Performed patient unavailable for evaluation  (Pt w/ respiratory therapy, plan to attempt again today as time allows.   )

## 2017-11-13 NOTE — CONSULTS
CONSULT NOTE    Infectious Diseases - Wendy Lieberman MD  [facility]       Patient Identification:  Name: Emely Solomon  Age: 57 y.o.  Sex: female  :  1960  MRN: [id number]             Date of Consultation:  2017       Primary Care Physician: PIERRE Smith MD                               Requesting Physician: Dr. 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  She did not have bacteremia presentation and her cellulitis could be treated with oral penicillin or cephalosporin.  Given the fact that she is bacteremic and the fact that she has endovascular device present would recommend 2-4 weeks of IV penicillin for the last negative blood culture.  Would recommend 4 weeks of IV penicillin from the last negative blood culture. The likelihood of endocarditis is low with this pathogen but needs to be watched for if she fails to resolve or shows persistent bacteremia.  She is at high risk of recurrent group B strep sepsis with bacteremia in the future.  I explained to the patient about importance of attempts to prevent future infection and that would include:   -Attempts to decrease swelling by elevating the legs above the level of the heart as I showed them which is laying in the bed flexing her hip and flexing her knees.  And when she is ambulating she needs to use graded pressure wrapping.  Following up with the lymphedema clinic is also indicated.   -Attempts to decrease bacterial  burden with whole-body Hibiclens wash on a weekly basis.    Thank you Dr. Kaur for letting me be the part of the patient care please see above impression and recommendation        History of Present Illness:   Patient is a 57-year-old female who presented on 11/11/2017 with fever chills generalized body aches including back pain nausea vomiting and not feeling very well.  She has bilateral chronic lymphedema.  All of this started various suddenly and when she arrived in the emergency room her temperature was 102.9 she underwent appropriate workup and was started on broad-spectrum antibiotic therapy with blood culture now showing evidence of gram-positive cocci identified as group B strep.  48 hours into her symptoms now she has evidence of redness of the lower extremities extending evolving cellulitis and bacteremia as a cause of her presentation.      Past Medical History:  Past Medical History:   Diagnosis Date   • Chronic diastolic congestive heart failure    • Deep venous thrombosis    • Hypertension    • Lipoedema    • Lymphedema    • Morbid obesity    • Paradoxical embolism     to the LLE due to DVT/PE and PFO   • PFO (patent foramen ovale)    • Pulmonary embolism    • Pulmonary hypertension     multifactorial (dCHF, obesity/ARIEL, hx PE), mild by echo 1/2016     Past Surgical History:  Past Surgical History:   Procedure Laterality Date   • BRONCHOSCOPY N/A 7/21/2017    Procedure: BRONCHOSCOPY with wash;  Surgeon: Caleb Garcia MD;  Location: [facility] ENDOSCOPY;  Service:    • DILATATION AND CURETTAGE  04/11/2011   • VENA CAVA FILTER PLACEMENT      Inferior Vena Cava Filter Placement w/Fluorosc angiogr guidance      Home Meds:  Prescriptions Prior to Admission   Medication Sig Dispense Refill Last Dose   • DULERA 200-5 MCG/ACT inhaler USE 2 PUFFS PO BID. RINSE MOUTH AFTER EACH USE  5 Taking   • DYMISTA 137-50 MCG/ACT suspension Daily.  11 Taking   • furosemide (LASIX) 40 MG tablet Take 1 tablet by mouth 3  (Three) Times a Week. 15 tablet 11 Taking   • ipratropium-albuterol (DUO-NEB) 0.5-2.5 mg/mL nebulizer 4 (Four) Times a Day.  0 Taking   • losartan-hydrochlorothiazide (HYZAAR) 100-12.5 MG per tablet Daily.  5 Taking   • metoprolol succinate XL (TOPROL-XL) 50 MG 24 hr tablet Take 1 tablet by mouth daily.   Taking   • rivaroxaban (XARELTO) 20 MG tablet Take 1 tablet by mouth Daily. 90 tablet 0 Taking     Current Meds:     Current Facility-Administered Medications:   •  acetaminophen (TYLENOL) tablet 650 mg, 650 mg, Oral, Q6H PRN, Eduard Fajardo MD, 650 mg at 11/13/17 0024  •  budesonide-formoterol (SYMBICORT) 160-4.5 MCG/ACT inhaler 2 puff, 2 puff, Inhalation, BID - RT, Jaime Kaur MD, 2 puff at 11/13/17 0730  •  ipratropium-albuterol (DUO-NEB) nebulizer solution 1.5 mL, 1.5 mL, Nebulization, 4x Daily - RT, Jaime Kaur MD, 1.5 mL at 11/13/17 1154  •  metoprolol succinate XL (TOPROL-XL) 24 hr tablet 50 mg, 50 mg, Oral, Daily, Jaime Kaur MD, 50 mg at 11/13/17 0900  •  nitroglycerin (NITROSTAT) SL tablet 0.4 mg, 0.4 mg, Sublingual, Q5 Min PRN, Eduard Fajardo MD  •  ondansetron (ZOFRAN) injection 4 mg, 4 mg, Intravenous, Q6H PRN, Eduard Fajardo MD  •  penicillin G potassium 4 Million Units in sodium chloride 0.9 % 100 mL IVPB, 4 Million Units, Intravenous, Q4H, Wendy Champion MD  •  sodium chloride 0.9 % infusion, 75 mL/hr, Intravenous, Continuous, Jaime Kaur MD, Last Rate: 75 mL/hr at 11/13/17 1208, 75 mL/hr at 11/13/17 1208  Allergies:  No Known Allergies  Social History:   Social History   Substance Use Topics   • Smoking status: Never Smoker   • Smokeless tobacco: Never Used      Comment: caffeine use/ weekends   • Alcohol use No      Family History:  Family History   Problem Relation Age of Onset   • Hypertension Other           Review of Systems: Overall much improved at the time of presentation her review system was as follows  See history of present illness and  "past medical history.    Constitutional: Positive for chills. Negative for fever.   HENT: Negative for congestion and sore throat.    Eyes: Negative for visual disturbance.   Respiratory: Positive for wheezing. Negative for cough, chest tightness and shortness of breath.    Cardiovascular: Negative for chest pain, palpitations and leg swelling.   Gastrointestinal: Negative for abdominal distention, abdominal pain, diarrhea, nausea and vomiting.   Endocrine: Negative for polydipsia and polyuria.   Genitourinary: Negative for difficulty urinating, dysuria, frequency and urgency.   Musculoskeletal: Positive for back pain. Negative for arthralgias and myalgias.   Skin: Negative for color change and rash.     Vitals:   /89 (BP Location: Left arm, Patient Position: Lying)  Pulse 94  Temp 99.7 °F (37.6 °C) (Oral)   Resp 18  Ht 63\" (160 cm)  Wt (!) 350 lb (159 kg)  SpO2 91%  BMI 62 kg/m2  I/O:   Intake/Output Summary (Last 24 hours) at 11/13/17 1409  Last data filed at 11/13/17 0535   Gross per 24 hour   Intake                0 ml   Output             1800 ml   Net            -1800 ml     Exam:  General Appearance:    Alert, cooperative, no distress, appears stated age, Morbidly obese female    Head:    Normocephalic, without obvious abnormality, atraumatic   Eyes:    PERRL, conjunctiva/corneas clear, EOM's intact, both eyes   Ears:    Normal external ear canals, both ears   Nose:   Nares normal, septum midline, mucosa normal, no drainage    or sinus tenderness   Throat:   Lips, tongue, gums normal; oral mucosa pink and moist   Neck:   Supple, symmetrical, trachea midline, no adenopathy;     thyroid:  no enlargement/tenderness/nodules; no carotid    bruit or JVD   Back:     Symmetric, no curvature, ROM normal, no CVA tenderness   Lungs:     Clear to auscultation bilaterally, respirations unlabored   Chest Wall:    No tenderness or deformity    Heart:    Regular rate and rhythm, S1 and S2 normal, no murmur, rub  "  or gallop   Abdomen:     Soft, non-tender, bowel sounds active all four quadrants,     no masses, no hepatomegaly, no splenomegaly   Extremities:   Bilateral lower extremity edema noted left lower extremity to the distal to third of the leg is extremely erythematous and warm with no obvious fluctuation noted    Pulses:   Pulses palpable in all extremities; symmetric all extremities   Skin:   Skin color normal, Skin is warm and dry,  no rashes or palpable lesions   Neurologic:   CNII-XII intact, motor strength grossly intact, sensation grossly intact to light touch, no focal deficits noted       Data Review:    I reviewed the patient's new clinical results.    Results from last 7 days  Lab Units 11/13/17  0609 11/12/17 0313 11/11/17  2304   WBC 10*3/mm3 12.28* 14.14* 16.74*   HEMOGLOBIN g/dL 10.8* 11.3* 12.9   PLATELETS 10*3/mm3 178 190 255       Results from last 7 days  Lab Units 11/13/17  0609 11/12/17 0313 11/11/17  2304   SODIUM mmol/L 136 139 138   POTASSIUM mmol/L 3.8 4.9 4.0   CHLORIDE mmol/L 103 102 97*   CO2 mmol/L 17.6* 22.0 23.4   BUN mg/dL 19 28* 30*   CREATININE mg/dL 0.96 1.35* 1.46*   CALCIUM mg/dL 8.6 8.4* 10.1   GLUCOSE mg/dL 106* 121* 124*     Microbiology Results (last 10 days)     Procedure Component Value - Date/Time    Respiratory Panel, PCR - Swab, Nasopharynx [[id number]]  (Normal) Collected:  11/12/17 0316    Lab Status:  Final result Specimen:  Swab from Nasopharynx Updated:  11/12/17 0841     ADENOVIRUS, PCR Not Detected     Coronavirus 229E Not Detected     Coronavirus HKU1 Not Detected     Coronavirus NL63 Not Detected     Coronavirus OC43 Not Detected     Human Metapneumovirus Not Detected     Human Rhinovirus/Enterovirus Not Detected     Influenza B PCR Not Detected     Parainfluenza Virus 1 Not Detected     Parainfluenza Virus 2 Not Detected     Parainfluenza Virus 3 Not Detected     Parainfluenza Virus 4 Not Detected     Bordetella pertussis pcr Not Detected     Influenza 2009 H1N1  by PCR Not Detected     Chlamydophila pneumoniae PCR Not Detected     Mycoplasma pneumo by PCR Not Detected     Influenza A PCR Not Detected     Influenza A H3 Not Detected     Influenza A H1 Not Detected     RSV, PCR Not Detected    Blood Culture - Blood, [[id number]]  (Normal) Collected:  11/11/17 2327    Lab Status:  Preliminary result Specimen:  Blood from Arm, Left Updated:  11/12/17 2331     Blood Culture No growth at 24 hours    Blood Culture - Blood, [[id number]]  (Abnormal) Collected:  11/11/17 2304    Lab Status:  Preliminary result Specimen:  Blood from Arm, Left Updated:  11/13/17 0726     Blood Culture Abnormal Stain (A)     Gram Stain Result Aerobic Bottle Gram positive cocci in pairs      Anaerobic Bottle Gram positive cocci in pairs    Blood Culture ID, PCR - Blood, [[id number]]  (Abnormal) Collected:  11/11/17 2304    Lab Status:  Final result Specimen:  Blood from Arm, Left Updated:  11/13/17 0936     BCID, PCR Streptococcus agalactiae (Group B). Identification by BCID PCR. (C)              Assessment:  Active Hospital Problems (** Indicates Principal Problem)    Diagnosis Date Noted   • **Fever and chills [R50.9] 11/12/2017   • Bacteremia [R78.81] 11/13/2017   • Sepsis [A41.9] 11/12/2017   • Back pain [M54.9] 11/12/2017   • STEFANIA (acute kidney injury) [N17.9] 11/12/2017   • Hypertension [I10]    • Chronic diastolic congestive heart failure [I50.32]       Resolved Hospital Problems    Diagnosis Date Noted Date Resolved   No resolved problems to display.         Plan:  See above  Wendy Champion MD   11/13/2017  2:09 PM    Much of this encounter note is an electronic transcription/translation of spoken language to printed text. The electronic translation of spoken language may permit erroneous, or at times, nonsensical words or phrases to be inadvertently transcribed; Although I have reviewed the note for such errors, some may still exist\

## 2017-11-13 NOTE — PLAN OF CARE
Problem: Patient Care Overview (Adult)  Goal: Plan of Care Review  Outcome: Ongoing (interventions implemented as appropriate)    11/13/17 0448   Coping/Psychosocial Response Interventions   Plan Of Care Reviewed With patient   Patient Care Overview   Progress improving   Outcome Evaluation   Outcome Summary/Follow up Plan no complaints, BLE still red and hot to the touch, will continue to monitor       Goal: Adult Individualization and Mutuality  Outcome: Ongoing (interventions implemented as appropriate)  Goal: Discharge Needs Assessment  Outcome: Ongoing (interventions implemented as appropriate)    Problem: Sepsis (Adult)  Goal: Signs and Symptoms of Listed Potential Problems Will be Absent or Manageable (Sepsis)  Outcome: Ongoing (interventions implemented as appropriate)    Problem: Fall Risk (Adult)  Goal: Absence of Falls  Outcome: Ongoing (interventions implemented as appropriate)

## 2017-11-13 NOTE — PLAN OF CARE
Problem: Patient Care Overview (Adult)  Goal: Plan of Care Review  Outcome: Ongoing (interventions implemented as appropriate)    11/13/17 1517   Coping/Psychosocial Response Interventions   Plan Of Care Reviewed With patient   Patient Care Overview   Progress improving   Outcome Evaluation   Outcome Summary/Follow up Plan pt fluids decreased to 75mls/hr; pt/ot consults; lle red and warm; d/c maxipime and vanc; pcn/potassium ivpb initiated r/t pos blood cult; full code         Problem: Fall Risk (Adult)  Goal: Absence of Falls  Outcome: Ongoing (interventions implemented as appropriate)

## 2017-11-14 ENCOUNTER — APPOINTMENT (OUTPATIENT)
Dept: GENERAL RADIOLOGY | Facility: HOSPITAL | Age: 57
End: 2017-11-14

## 2017-11-14 PROBLEM — R09.02 HYPOXIA: Status: ACTIVE | Noted: 2017-11-14

## 2017-11-14 PROBLEM — L03.90 CELLULITIS: Status: ACTIVE | Noted: 2017-11-14

## 2017-11-14 PROBLEM — G47.33 OSA (OBSTRUCTIVE SLEEP APNEA): Status: ACTIVE | Noted: 2017-11-14

## 2017-11-14 LAB
ANION GAP SERPL CALCULATED.3IONS-SCNC: 14.9 MMOL/L
ANISOCYTOSIS BLD QL: NORMAL
BASOPHILS # BLD AUTO: 0.03 10*3/MM3 (ref 0–0.2)
BASOPHILS NFR BLD AUTO: 0.3 % (ref 0–1.5)
BUN BLD-MCNC: 13 MG/DL (ref 6–20)
BUN/CREAT SERPL: 15.3 (ref 7–25)
CALCIUM SPEC-SCNC: 8.9 MG/DL (ref 8.6–10.5)
CHLORIDE SERPL-SCNC: 101 MMOL/L (ref 98–107)
CO2 SERPL-SCNC: 19.1 MMOL/L (ref 22–29)
CREAT BLD-MCNC: 0.85 MG/DL (ref 0.57–1)
DEPRECATED RDW RBC AUTO: 47.3 FL (ref 37–54)
EOSINOPHIL # BLD AUTO: 0.05 10*3/MM3 (ref 0–0.7)
EOSINOPHIL NFR BLD AUTO: 0.5 % (ref 0.3–6.2)
ERYTHROCYTE [DISTWIDTH] IN BLOOD BY AUTOMATED COUNT: 14.9 % (ref 11.7–13)
GFR SERPL CREATININE-BSD FRML MDRD: 69 ML/MIN/1.73
GLUCOSE BLD-MCNC: 89 MG/DL (ref 65–99)
HCT VFR BLD AUTO: 32.1 % (ref 35.6–45.5)
HGB BLD-MCNC: 10.3 G/DL (ref 11.9–15.5)
IMM GRANULOCYTES # BLD: 0.09 10*3/MM3 (ref 0–0.03)
IMM GRANULOCYTES NFR BLD: 1 % (ref 0–0.5)
LYMPHOCYTES # BLD AUTO: 0.66 10*3/MM3 (ref 0.9–4.8)
LYMPHOCYTES NFR BLD AUTO: 7.1 % (ref 19.6–45.3)
MCH RBC QN AUTO: 27.5 PG (ref 26.9–32)
MCHC RBC AUTO-ENTMCNC: 32.1 G/DL (ref 32.4–36.3)
MCV RBC AUTO: 85.6 FL (ref 80.5–98.2)
MONOCYTES # BLD AUTO: 0.65 10*3/MM3 (ref 0.2–1.2)
MONOCYTES NFR BLD AUTO: 7 % (ref 5–12)
NEUTROPHILS # BLD AUTO: 7.83 10*3/MM3 (ref 1.9–8.1)
NEUTROPHILS NFR BLD AUTO: 84.1 % (ref 42.7–76)
NRBC BLD MANUAL-RTO: 0 /100 WBC (ref 0–0)
NT-PROBNP SERPL-MCNC: 329.7 PG/ML (ref 0–900)
PLAT MORPH BLD: NORMAL
PLATELET # BLD AUTO: 219 10*3/MM3 (ref 140–500)
PMV BLD AUTO: 9.2 FL (ref 6–12)
POTASSIUM BLD-SCNC: 4.1 MMOL/L (ref 3.5–5.2)
RBC # BLD AUTO: 3.75 10*6/MM3 (ref 3.9–5.2)
SODIUM BLD-SCNC: 135 MMOL/L (ref 136–145)
WBC MORPH BLD: NORMAL
WBC NRBC COR # BLD: 9.31 10*3/MM3 (ref 4.5–10.7)

## 2017-11-14 PROCEDURE — 85007 BL SMEAR W/DIFF WBC COUNT: CPT | Performed by: INTERNAL MEDICINE

## 2017-11-14 PROCEDURE — 85025 COMPLETE CBC W/AUTO DIFF WBC: CPT | Performed by: INTERNAL MEDICINE

## 2017-11-14 PROCEDURE — 25010000003 PENICILLIN G POTASSIUM PER 600000 UNITS: Performed by: INTERNAL MEDICINE

## 2017-11-14 PROCEDURE — 71020 HC CHEST PA AND LATERAL: CPT

## 2017-11-14 PROCEDURE — 80048 BASIC METABOLIC PNL TOTAL CA: CPT | Performed by: INTERNAL MEDICINE

## 2017-11-14 PROCEDURE — 94799 UNLISTED PULMONARY SVC/PX: CPT

## 2017-11-14 PROCEDURE — 97110 THERAPEUTIC EXERCISES: CPT

## 2017-11-14 PROCEDURE — 25010000002 PENICILLIN G POTASSIUM PER 600000 UNITS: Performed by: INTERNAL MEDICINE

## 2017-11-14 PROCEDURE — 83880 ASSAY OF NATRIURETIC PEPTIDE: CPT | Performed by: INTERNAL MEDICINE

## 2017-11-14 PROCEDURE — 97161 PT EVAL LOW COMPLEX 20 MIN: CPT

## 2017-11-14 RX ORDER — BENZONATATE 200 MG/1
200 CAPSULE ORAL 3 TIMES DAILY PRN
Status: DISCONTINUED | OUTPATIENT
Start: 2017-11-14 | End: 2017-11-15 | Stop reason: HOSPADM

## 2017-11-14 RX ORDER — FUROSEMIDE 40 MG/1
40 TABLET ORAL 3 TIMES WEEKLY
Status: DISCONTINUED | OUTPATIENT
Start: 2017-11-14 | End: 2017-11-15 | Stop reason: HOSPADM

## 2017-11-14 RX ADMIN — SODIUM CHLORIDE 4 MILLION UNITS: 9 INJECTION, SOLUTION INTRAVENOUS at 14:02

## 2017-11-14 RX ADMIN — SODIUM CHLORIDE 4 MILLION UNITS: 9 INJECTION, SOLUTION INTRAVENOUS at 23:48

## 2017-11-14 RX ADMIN — RIVAROXABAN 20 MG: 20 TABLET, FILM COATED ORAL at 14:01

## 2017-11-14 RX ADMIN — IPRATROPIUM BROMIDE AND ALBUTEROL SULFATE: .5; 3 SOLUTION RESPIRATORY (INHALATION) at 11:01

## 2017-11-14 RX ADMIN — SODIUM CHLORIDE 4 MILLION UNITS: 9 INJECTION, SOLUTION INTRAVENOUS at 18:43

## 2017-11-14 RX ADMIN — SODIUM CHLORIDE 4 MILLION UNITS: 9 INJECTION, SOLUTION INTRAVENOUS at 03:45

## 2017-11-14 RX ADMIN — METOPROLOL SUCCINATE 50 MG: 50 TABLET, FILM COATED, EXTENDED RELEASE ORAL at 10:26

## 2017-11-14 RX ADMIN — IPRATROPIUM BROMIDE AND ALBUTEROL SULFATE: .5; 3 SOLUTION RESPIRATORY (INHALATION) at 07:21

## 2017-11-14 RX ADMIN — IPRATROPIUM BROMIDE AND ALBUTEROL SULFATE 1.5 ML: .5; 3 SOLUTION RESPIRATORY (INHALATION) at 19:54

## 2017-11-14 RX ADMIN — BUDESONIDE AND FORMOTEROL FUMARATE DIHYDRATE 2 PUFF: 160; 4.5 AEROSOL RESPIRATORY (INHALATION) at 19:54

## 2017-11-14 RX ADMIN — BENZONATATE 200 MG: 200 CAPSULE, LIQUID FILLED ORAL at 15:37

## 2017-11-14 RX ADMIN — FUROSEMIDE 40 MG: 40 TABLET ORAL at 14:01

## 2017-11-14 RX ADMIN — BUDESONIDE AND FORMOTEROL FUMARATE DIHYDRATE 2 PUFF: 160; 4.5 AEROSOL RESPIRATORY (INHALATION) at 07:23

## 2017-11-14 RX ADMIN — IPRATROPIUM BROMIDE AND ALBUTEROL SULFATE 1.5 ML: .5; 3 SOLUTION RESPIRATORY (INHALATION) at 15:46

## 2017-11-14 RX ADMIN — SODIUM CHLORIDE 75 ML/HR: 9 INJECTION, SOLUTION INTRAVENOUS at 03:45

## 2017-11-14 NOTE — PROGRESS NOTES
"  Infectious Diseases Progress Note    Wendy Champion MD     [facility]  [place]: 2 days  Patient Identification:  Name: Emely Solomon  Age: 57 y.o.  Sex: female  :  1960  MRN: [id number]         Primary Care Physician: PIERRE Smith MD            Subjective: Feeling better denies any fever and chills.  Interval History: See consultation note    Objective:    Scheduled Meds:  budesonide-formoterol 2 puff Inhalation BID - RT   furosemide 40 mg Oral Once per day on    ipratropium-albuterol 1.5 mL Nebulization 4x Daily - RT   metoprolol succinate XL 50 mg Oral Daily   penicillin g (potassium) 4 Million Units Intravenous Q4H   rivaroxaban 20 mg Oral Daily With Breakfast     Continuous Infusions:     Vital signs in last 24 hours:  Temp:  [98.1 °F (36.7 °C)-99.6 °F (37.6 °C)] 98.1 °F (36.7 °C)  Heart Rate:  [79-97] 80  Resp:  [18-22] 18  BP: (101-128)/(58-79) 101/61    Intake/Output:    Intake/Output Summary (Last 24 hours) at 17 1801  Last data filed at 17 0345   Gross per 24 hour   Intake             1300 ml   Output                0 ml   Net             1300 ml       Exam:  /61 (BP Location: Left arm, Patient Position: Lying)  Pulse 80  Temp 98.1 °F (36.7 °C) (Oral)   Resp 18  Ht 63\" (160 cm)  Wt (!) 350 lb (159 kg)  SpO2 92%  BMI 62 kg/m2    General Appearance:    Alert, cooperative, no distress, AAOx3, Morbidly obese                          Head:    Normocephalic, without obvious abnormality, atraumatic                           Eyes:    PERRL, conjunctiva/corneas clear, EOM's intact, both eyes                         Throat:   Lips, tongue, gums normal; oral mucosa pink and moist                           Neck:   Supple, symmetrical, trachea midline, no JVD                         Lungs:    Clear to auscultation bilaterally, respirations unlabored                 Chest Wall:    No tenderness or deformity                          Heart:    Regular rate and " rhythm, S1 and S2 normal, no murmur,no  Rub                                      or gallop                  Abdomen:     Soft, non-tender, bowel sounds active, no masses, no                                                        organomegaly                  Extremities:   Bilateral lower extremity edema with significant erythema of the left leg.                        Pulses:   Pulses palpable in all extremities                            Skin:   Skin is warm and dry,  no rashes or palpable lesions                  Data Review:    I reviewed the patient's new clinical results.    Results from last 7 days  Lab Units 11/14/17 0528 11/13/17 0609 11/12/17 0313 11/11/17  2304   WBC 10*3/mm3 9.31 12.28* 14.14* 16.74*   HEMOGLOBIN g/dL 10.3* 10.8* 11.3* 12.9   PLATELETS 10*3/mm3 219 178 190 255       Results from last 7 days  Lab Units 11/14/17 0528 11/13/17 0609 11/12/17 0313 11/11/17  2304   SODIUM mmol/L 135* 136 139 138   POTASSIUM mmol/L 4.1 3.8 4.9 4.0   CHLORIDE mmol/L 101 103 102 97*   CO2 mmol/L 19.1* 17.6* 22.0 23.4   BUN mg/dL 13 19 28* 30*   CREATININE mg/dL 0.85 0.96 1.35* 1.46*   CALCIUM mg/dL 8.9 8.6 8.4* 10.1   GLUCOSE mg/dL 89 106* 121* 124*       Microbiology Results (last 10 days)     Procedure Component Value - Date/Time    Blood Culture - Blood, [[id number]]  (Normal) Collected:  11/13/17 2001    Lab Status:  Preliminary result Specimen:  Blood from Arm, Right Updated:  11/14/17 0816     Blood Culture No growth at less than 24 hours    Blood Culture - Blood, [[id number]]  (Normal) Collected:  11/13/17 1857    Lab Status:  Preliminary result Specimen:  Blood from Arm, Left Updated:  11/14/17 0716     Blood Culture No growth at less than 24 hours    Respiratory Panel, PCR - Swab, Nasopharynx [[id number]]  (Normal) Collected:  11/12/17 0316    Lab Status:  Final result Specimen:  Swab from Nasopharynx Updated:  11/12/17 0841     ADENOVIRUS, PCR Not Detected     Coronavirus 229E Not Detected      Coronavirus HKU1 Not Detected     Coronavirus NL63 Not Detected     Coronavirus OC43 Not Detected     Human Metapneumovirus Not Detected     Human Rhinovirus/Enterovirus Not Detected     Influenza B PCR Not Detected     Parainfluenza Virus 1 Not Detected     Parainfluenza Virus 2 Not Detected     Parainfluenza Virus 3 Not Detected     Parainfluenza Virus 4 Not Detected     Bordetella pertussis pcr Not Detected     Influenza 2009 H1N1 by PCR Not Detected     Chlamydophila pneumoniae PCR Not Detected     Mycoplasma pneumo by PCR Not Detected     Influenza A PCR Not Detected     Influenza A H3 Not Detected     Influenza A H1 Not Detected     RSV, PCR Not Detected    Blood Culture - Blood, [[id number]]  (Normal) Collected:  11/11/17 2327    Lab Status:  Preliminary result Specimen:  Blood from Arm, Left Updated:  11/13/17 2331     Blood Culture No growth at 2 days    Blood Culture - Blood, [[id number]]  (Abnormal) Collected:  11/11/17 2304    Lab Status:  Preliminary result Specimen:  Blood from Arm, Left Updated:  11/14/17 0831     Blood Culture Abnormal Stain (A)      Streptococcus agalactiae (Group B) (A)     Isolated from Aerobic Bottle     STREP GROUPING B     Gram Stain Result Aerobic Bottle Gram positive cocci in pairs      Anaerobic Bottle Gram positive cocci in pairs    Blood Culture ID, PCR - Blood, [[id number]]  (Abnormal) Collected:  11/11/17 2304    Lab Status:  Final result Specimen:  Blood from Arm, Left Updated:  11/13/17 0936     BCID, PCR Streptococcus agalactiae (Group B). Identification by BCID PCR. 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  PICC line in a.m. and hopefully discharge tomorrow.    Wendy Champion MD  11/14/2017  6:01 PM    Much of this encounter note is an electronic transcription/translation of spoken language to printed text. The electronic translation of spoken language may permit erroneous, or at times, nonsensical words or phrases to be inadvertently transcribed; Although I have reviewed the note for such errors, some may still exist

## 2017-11-14 NOTE — PROGRESS NOTES
Continued Stay Note  [facility]     Patient Name: Emely Solomon  MRN: [id number]  Today's Date: 11/14/2017    Admit Date: 11/11/2017          Discharge Plan       11/14/17 1218    Case Management/Social Work Plan    Plan Home with spouse and  [facility] has been notified for IV antibiotic at home    Patient/Family In Agreement With Plan yes    Additional Comments Spoke with patient,who is alert and oriented x 4, at bedside and explained that she will probably need IV antibiotics at discharge and she is agreeable to use [facility](Crystal notified at  [phone])  Re: PCN-G  IV every 4 hours for  2- 4 weeks.  CCP will continue to follow and assist as needed..... Rohini Couch RN,CCP               Discharge Codes     None            Rohini Couch RN

## 2017-11-14 NOTE — DISCHARGE PLACEMENT REQUEST
"Emely Solomon (57 y.o. Female)     Date of Birth Social Security Number Address Home Phone MRN    1960  [id number] [phone] [id number]    [employer] Marital Status          Unknown        Admission Date Admission Type Admitting Provider Attending Provider Department, Room/Bed    11/11/17 Emergency Eduard Fajardo MD [place], Jaime Gimenez MD [address], [address]/1    Discharge Date Discharge Disposition Discharge Destination                      Attending Provider: Jaime Kaur MD     Allergies:  No Known Allergies    Isolation:  None   Infection:  None   Code Status:  FULL    Ht:  63\" (160 cm)   Wt:  350 lb (159 kg)    Admission Cmt:  None   Principal Problem:  Fever and chills [R50.9]                 Active Insurance as of 11/11/2017     Primary Coverage     Payor Plan Insurance Group Employer/Plan Group    [employer] EM[employer] [id number]     Payor Plan Address Payor Plan Phone Number Effective From Effective To    [address] [phone] 1/1/2016     [place] [address]       Subscriber Name Subscriber Birth Date Member ID       EMELY SOLOMON 1960 [id number]                 Emergency Contacts      (Rel.) Home Phone Work Phone Mobile Phone    Ghanshyam Solomon (Spouse) [phone] -- --    Adilson Cintron (Brother) -- -- [phone]              "

## 2017-11-14 NOTE — THERAPY EVALUATION
Acute Care - Physical Therapy Initial Evaluation  [facility]     Patient Name: Emely Solomon  : 1960  MRN: [id number]  Today's Date: 2017   Onset of Illness/Injury or Date of Surgery Date: 17            Admit Date: 2017     Visit Dx:    ICD-10-CM ICD-9-CM   1. Sepsis, due to unspecified organism A41.9 038.9     995.91     Patient Active Problem List   Diagnosis   • Chronic diastolic congestive heart failure   • PFO (patent foramen ovale)   • Paradoxical embolism   • Hypertension   • Pulmonary hypertension   • Sepsis   • Back pain   • Fever and chills   • STEFANIA (acute kidney injury)   • Bacteremia     Past Medical History:   Diagnosis Date   • Chronic diastolic congestive heart failure    • Deep venous thrombosis    • Hypertension    • Lipoedema    • Lymphedema    • Morbid obesity    • Paradoxical embolism     to the LLE due to DVT/PE and PFO   • PFO (patent foramen ovale)    • Pulmonary embolism    • Pulmonary hypertension     multifactorial (dCHF, obesity/ARIEL, hx PE), mild by echo 2016     Past Surgical History:   Procedure Laterality Date   • BRONCHOSCOPY N/A 2017    Procedure: BRONCHOSCOPY with wash;  Surgeon: Caleb Garcia MD;  Location: [facility] ENDOSCOPY;  Service:    • DILATATION AND CURETTAGE  2011   • VENA CAVA FILTER PLACEMENT      Inferior Vena Cava Filter Placement w/Fluorosc angiogr guidance          PT ASSESSMENT (last 72 hours)      PT Evaluation       17 1152 17 1551    Rehab Evaluation    Document Type evaluation  -AR     Subjective Information agree to therapy  -AR     Evaluation Not Performed  other (see comments)   OT talked w. pt. pt states she was just d/c by Colleen in out patient lymph. she provided pt with a different type of wrap w. velco. ed her to have  bring it in tomorrow.  agree. OT signing off.  1423  -    Patient Effort, Rehab Treatment good  -AR     Symptoms Noted During/After Treatment fatigue;shortness of breath   -AR     General Information    Patient Profile Review yes  -AR     Onset of Illness/Injury or Date of Surgery Date 11/11/17  -AR     Precautions/Limitations fall precautions  -AR     Prior Level of Function independent:;work  -AR     Equipment Currently Used at Home none  -AR     Barriers to Rehab none identified  -AR     Living Environment    Lives With spouse  -AR     Living Arrangements house  -AR     Home Accessibility stairs to enter home  -AR     Number of Stairs to Enter Home 3  -AR     Stair Railings at Home none  -AR     Clinical Impression    Patient/Family Goals Statement DC home  -AR     Criteria for Skilled Therapeutic Interventions Met yes  -AR     Pathology/Pathophysiology Noted (Describe Specifically for Each System) musculoskeletal;pulmonary  -AR     Impairments Found (describe specific impairments) aerobic capacity/endurance;gait, locomotion, and balance  -AR     Rehab Potential good, to achieve stated therapy goals  -AR     Vital Signs    O2 Delivery Pre Treatment supplemental O2  -AR     Pain Assessment    Pain Assessment No/denies pain  -AR     Cognitive Assessment/Intervention    Current Cognitive/Communication Assessment functional  -AR     Orientation Status oriented x 4  -AR     Follows Commands/Answers Questions 100% of the time  -AR     ROM (Range of Motion)    General ROM --   B LE WFL, limited by body habitus  -AR     MMT (Manual Muscle Testing)    General MMT Assessment --   B LE 4/5  -AR     Bed Mobility, Assessment/Treatment    Bed Mobility, Assistive Device head of bed elevated;bed rails  -AR     Bed Mob, Supine to Sit, [place] supervision required  -AR     Bed Mob, Sit to Supine, [place] not tested  -AR     Transfer Assessment/Treatment    Transfers, Sit-Stand [place] contact guard assist  -AR     Transfers, Stand-Sit [place] contact guard assist  -AR     Transfers, Sit-Stand-Sit, Assist Device rolling walker  -AR     Transfer, Comment 1 failed attempt to stand   -AR     Gait Assessment/Treatment    Gait, [place] contact guard assist  -AR     Gait, Assistive Device rolling walker  -AR     Gait, Distance (Feet) 150  -AR     Gait, Gait Pattern Analysis swing-through gait  -AR     Gait, Gait Deviations urvashi decreased;decreased heel strike  -AR     Gait, Safety Issues step length decreased  -AR     Gait, Impairments strength decreased;impaired balance  -AR     Gait, Comment wide MARK, increased lateral sway, 1 standing brek  -AR     Positioning and Restraints    Pre-Treatment Position in bed  -AR     Post Treatment Position bed  -AR     In Bed sitting EOB;call light within reach;encouraged to call for assist;with family/caregiver  -AR       11/13/17 1523 11/13/17 1421    Rehab Evaluation    Evaluation Not Performed patient unavailable for evaluation   Pt w/ respiratory therapy, plan to attempt again today as time allows.     -AR     General Information    Equipment Currently Used at Home  bipap/ cpap;nebulizer  -SM    Living Environment    Lives With  spouse  -SM    Living Arrangements  house   single story  -SM    Home Accessibility  stairs to enter home  -    Number of Stairs to Enter Home  3  -SM    Stair Railings at Home  outside, present on right side  -    Type of Financial/Environmental Concern  none  -SM    Transportation Available  car;family or friend will provide  -SM      11/12/17 0306       General Information    Equipment Currently Used at Home none  -CS     Living Environment    Lives With spouse  -CS     Living Arrangements house  -CS     Home Accessibility no concerns  -CS     Stair Railings at Home none  -CS     Type of Financial/Environmental Concern none  -CS     Transportation Available car;family or friend will provide  -CS       User Key  (r) = Recorded By, (t) = Taken By, (c) = Cosigned By    Initials Name Provider Type     Karmen Penn, OTR Occupational Therapist     Rohini Couch, RN Case Manager     Claudio Mcghee,  RN Registered Nurse    MARC Gimenez PT Physical Therapist          Physical Therapy Education     Title: PT OT SLP Therapies (Done)     Topic: Physical Therapy (Done)     Point: Mobility training (Done)    Learning Progress Summary    Learner Readiness Method Response Comment Documented by Status   Patient Acceptance E VU  AR 11/14/17 1157 Done   Family Acceptance E VU  AR 11/14/17 1157 Done               Point: Home exercise program (Done)    Learning Progress Summary    Learner Readiness Method Response Comment Documented by Status   Patient Acceptance E VU  AR 11/14/17 1157 Done   Family Acceptance E VU  AR 11/14/17 1157 Done               Point: Body mechanics (Done)    Learning Progress Summary    Learner Readiness Method Response Comment Documented by Status   Patient Acceptance E VU  AR 11/14/17 1157 Done   Family Acceptance E VU  AR 11/14/17 1157 Done               Point: Precautions (Done)    Learning Progress Summary    Learner Readiness Method Response Comment Documented by Status   Patient Acceptance E VU  AR 11/14/17 1157 Done   Family Acceptance E VU  AR 11/14/17 1157 Done                      User Key     Initials Effective Dates Name Provider Type Discipline    AR 06/27/16 -  Suly Gimenez PT Physical Therapist PT                PT Recommendation and Plan  Anticipated Equipment Needs At Discharge:  (possible need for RW or cane)  Anticipated Discharge Disposition: home with assist  Planned Therapy Interventions: balance training, bed mobility training, gait training, home exercise program, patient/family education, ROM (Range of Motion), stair training, strengthening  PT Frequency: 5 times/wk  Plan of Care Review  Plan Of Care Reviewed With: patient  Outcome Summary/Follow up Plan: Pt admitted 11/11 with bacteremia, sepsis, STEFANIA and h/o lymphedema.  Pt reports independence, works at  here, no use of AD.  Today, pt demonstrates generalized weakness, limited activity tolerance, impaired  standing balance and gait pattern.  Ambulating 150' w/ contact assist and requiring UE support -using RW.  Discussed activity recommendations while inpatient and PT POC.            IP PT Goals       11/14/17 1157          Transfer Training PT LTG    Transfer Training PT LTG, Date Established 11/14/17  -AR      Transfer Training PT LTG, Time to Achieve 1 wk  -AR      Transfer Training PT LTG, Activity Type sit to stand/stand to sit  -AR      Transfer Training PT LTG, [place] Level supervision required  -AR      Gait Training PT LTG    Gait Training Goal PT LTG, Date Established 11/14/17  -AR      Gait Training Goal PT LTG, Time to Achieve 1 wk  -AR      Gait Training Goal PT LTG, [place] Level supervision required  -AR      Gait Training Goal PT LTG, Distance to Achieve 300  -AR        User Key  (r) = Recorded By, (t) = Taken By, (c) = Cosigned By    Initials Name Provider Type    AR Suly Gimenez PT Physical Therapist                Outcome Measures       11/14/17 1200          How much help from another person do you currently need...    Turning from your back to your side while in flat bed without using bedrails? 3  -AR      Moving from lying on back to sitting on the side of a flat bed without bedrails? 3  -AR      Moving to and from a bed to a chair (including a wheelchair)? 3  -AR      Standing up from a chair using your arms (e.g., wheelchair, bedside chair)? 3  -AR      Climbing 3-5 steps with a railing? 2  -AR      To walk in hospital room? 3  -AR      AM-PAC 6 Clicks Score 17  -AR      Functional Assessment    Outcome Measure Options AM-PAC 6 Clicks Basic Mobility (PT)  -AR        User Key  (r) = Recorded By, (t) = Taken By, (c) = Cosigned By    Initials Name Provider Type    AR Suly Gimenez PT Physical Therapist           Time Calculation:         PT Charges       11/14/17 1200          Time Calculation    Start Time 1140  -AR      Stop Time 1201  -AR      Time Calculation (min) 21 min   -AR      PT Received On 11/14/17  -AR      PT - Next Appointment 11/15/17  -AR      PT Goal Re-Cert Due Date 11/21/17  -AR        User Key  (r) = Recorded By, (t) = Taken By, (c) = Cosigned By    Initials Name Provider Type    MARC Gimenez PT Physical Therapist          Therapy Charges for Today     Code Description Service Date Service Provider Modifiers Qty    54832185683 HC PT EVAL LOW COMPLEXITY 2 11/14/2017 Suly Gimenez, PT GP 1    08326653291 HC PT THER PROC EA 15 MIN 11/14/2017 Suly Gimenez, PT GP 1    22787065948 HC PT THER SUPP EA 15 MIN 11/14/2017 Suly Gimenez, PT GP 1          PT G-Codes  Outcome Measure Options: AM-PAC 6 Clicks Basic Mobility (PT)      Suly Gimenez PT  11/14/2017

## 2017-11-14 NOTE — PROGRESS NOTES
" LOS: 2 days     Name: Emely Solomon  Age: 57 y.o.  Sex: female  :  1960  MRN: [id number]         Primary Care Physician: PIERRE Smith MD    Subjective   Subjective  Feels better today.  Denies fevers or chills.  Denies shortness of breath but has had a mild cough.    Objective   Vital Signs  Temp:  [98.7 °F (37.1 °C)-99.6 °F (37.6 °C)] 98.7 °F (37.1 °C)  Heart Rate:  [] 79  Resp:  [20-22] 20  BP: (116-128)/(58-79) 119/61  Body mass index is 62 kg/(m^2).    Objective:  General Appearance:  Comfortable and in no acute distress.    Vital signs: (most recent): Blood pressure 119/61, pulse 79, temperature 98.7 °F (37.1 °C), temperature source Oral, resp. rate 20, height 63\" (160 cm), weight (!) 350 lb (159 kg), SpO2 93 %.    Lungs:  Normal respiratory rate and normal effort.  There are decreased breath sounds.    Heart: Normal rate.  Regular rhythm.    Abdomen: Abdomen is soft.  (Morbidly obese)Bowel sounds are normal.   There is no abdominal tenderness.     Extremities: There is no local swelling or dependent edema.  (Severe bilateral lower extremity edema)  Neurological: Patient is alert and oriented to person, place and time.    Skin:  Warm and dry.  (Left distal lower extremity cellulitis unchanged today)            Results Review:       I reviewed the patient's new clinical results.      Results from last 7 days  Lab Units 17  2304   WBC 10*3/mm3 9.31 12.28* 14.14* 16.74*   HEMOGLOBIN g/dL 10.3* 10.8* 11.3* 12.9   PLATELETS 10*3/mm3 219 178 190 255       Results from last 7 days  Lab Units 17  2304   SODIUM mmol/L 135* 136 139 138   POTASSIUM mmol/L 4.1 3.8 4.9 4.0   CHLORIDE mmol/L 101 103 102 97*   CO2 mmol/L 19.1* 17.6* 22.0 23.4   BUN mg/dL 13 19 28* 30*   CREATININE mg/dL 0.85 0.96 1.35* 1.46*   CALCIUM mg/dL 8.9 8.6 8.4* 10.1   GLUCOSE mg/dL 89 106* 121* 124*                 Scheduled Meds: "     budesonide-formoterol 2 puff Inhalation BID - RT   ipratropium-albuterol 1.5 mL Nebulization 4x Daily - RT   metoprolol succinate XL 50 mg Oral Daily   penicillin g (potassium) 4 Million Units Intravenous Q4H   rivaroxaban 20 mg Oral Daily With Breakfast     PRN Meds:   •  acetaminophen  •  benzonatate  •  nitroglycerin  •  ondansetron  Continuous Infusions:       Assessment/Plan   Principal Problem:    Fever and chills  Active Problems:    Chronic diastolic congestive heart failure    Hypertension    Sepsis    Back pain    STEFANIA (acute kidney injury)    Bacteremia  ARIEL  Hypoxia    Assessment & Plan    - Infectious disease recommends 4 weeks of IV penicillin for group B strep bacteremia and cellulitis in the presence of an endovascular device.  Repeat blood cultures are negative at 24 hours.  Place PICC line when okay with ID.  Care coordination following for coordination of outpatient antibiotics.  - Acute kidney injury resolved.  Going to stop her IV fluids.  Antihypertensives currently on hold.  - Check chest x-ray and BNP for her complaints of cough and increased oxygen requirement today.  - I will restart her diuretic.  - Continue CPAP at night for sleep apnea  - Restart Xarelto  - Continue lymphedema treatment with Velcro leg wraps    Dispo  Will return home at discharge    Jaime Kaur MD  [facility]  11/14/17  12:52 PM

## 2017-11-14 NOTE — PLAN OF CARE
Problem: Patient Care Overview (Adult)  Goal: Plan of Care Review  Outcome: Ongoing (interventions implemented as appropriate)    11/14/17 0530   Coping/Psychosocial Response Interventions   Plan Of Care Reviewed With patient   Patient Care Overview   Progress no change         Problem: Sepsis (Adult)  Goal: Signs and Symptoms of Listed Potential Problems Will be Absent or Manageable (Sepsis)  Outcome: Ongoing (interventions implemented as appropriate)    11/14/17 0530   Sepsis   Problems Assessed (Sepsis) all   Problems Present (Sepsis) situational response;progression of infection;hypoxia/hypoxemia         Problem: Fall Risk (Adult)  Goal: Absence of Falls  Outcome: Ongoing (interventions implemented as appropriate)    11/14/17 0530   Fall Risk (Adult)   Absence of Falls making progress toward outcome

## 2017-11-14 NOTE — PLAN OF CARE
Problem: Patient Care Overview (Adult)  Goal: Plan of Care Review    11/14/17 1157   Coping/Psychosocial Response Interventions   Plan Of Care Reviewed With patient   Outcome Evaluation   Outcome Summary/Follow up Plan Pt admitted 11/11 with bacteremia, sepsis, STEFANIA and h/o lymphedema. Pt reports independence, works at  here, no use of AD. Today, pt demonstrates generalized weakness, limited activity tolerance, impaired standing balance and gait pattern. Ambulating 150' w/ contact assist and requiring UE support -using RW. Discussed activity recommendations while inpatient and PT POC.          Problem: Inpatient Physical Therapy  Goal: Transfer Training Goal 1 LTG- PT    11/14/17 1157   Transfer Training PT LTG   Transfer Training PT LTG, Date Established 11/14/17   Transfer Training PT LTG, Time to Achieve 1 wk   Transfer Training PT LTG, Activity Type sit to stand/stand to sit   Transfer Training PT LTG, [place] Level supervision required       Goal: Gait Training Goal LTG- PT    11/14/17 1157   Gait Training PT LTG   Gait Training Goal PT LTG, Date Established 11/14/17   Gait Training Goal PT LTG, Time to Achieve 1 wk   Gait Training Goal PT LTG, [place] Level supervision required   Gait Training Goal PT LTG, Distance to Achieve 300

## 2017-11-15 VITALS
DIASTOLIC BLOOD PRESSURE: 84 MMHG | RESPIRATION RATE: 20 BRPM | OXYGEN SATURATION: 94 % | HEIGHT: 63 IN | TEMPERATURE: 98.1 F | WEIGHT: 293 LBS | SYSTOLIC BLOOD PRESSURE: 144 MMHG | HEART RATE: 83 BPM | BODY MASS INDEX: 51.91 KG/M2

## 2017-11-15 LAB
ANION GAP SERPL CALCULATED.3IONS-SCNC: 16.8 MMOL/L
BACTERIA SPEC AEROBE CULT: ABNORMAL
BACTERIA SPEC AEROBE CULT: ABNORMAL
BASOPHILS # BLD AUTO: 0.03 10*3/MM3 (ref 0–0.2)
BASOPHILS NFR BLD AUTO: 0.6 % (ref 0–1.5)
BUN BLD-MCNC: 12 MG/DL (ref 6–20)
BUN/CREAT SERPL: 14.8 (ref 7–25)
CALCIUM SPEC-SCNC: 9.4 MG/DL (ref 8.6–10.5)
CHLORIDE SERPL-SCNC: 103 MMOL/L (ref 98–107)
CO2 SERPL-SCNC: 19.2 MMOL/L (ref 22–29)
CREAT BLD-MCNC: 0.81 MG/DL (ref 0.57–1)
DEPRECATED RDW RBC AUTO: 47.4 FL (ref 37–54)
EOSINOPHIL # BLD AUTO: 0.23 10*3/MM3 (ref 0–0.7)
EOSINOPHIL NFR BLD AUTO: 4.5 % (ref 0.3–6.2)
ERYTHROCYTE [DISTWIDTH] IN BLOOD BY AUTOMATED COUNT: 14.9 % (ref 11.7–13)
GFR SERPL CREATININE-BSD FRML MDRD: 73 ML/MIN/1.73
GLUCOSE BLD-MCNC: 95 MG/DL (ref 65–99)
GRAM STN SPEC: ABNORMAL
GRAM STN SPEC: ABNORMAL
HCT VFR BLD AUTO: 32.7 % (ref 35.6–45.5)
HGB BLD-MCNC: 10.4 G/DL (ref 11.9–15.5)
IMM GRANULOCYTES # BLD: 0.03 10*3/MM3 (ref 0–0.03)
IMM GRANULOCYTES NFR BLD: 0.6 % (ref 0–0.5)
LYMPHOCYTES # BLD AUTO: 0.95 10*3/MM3 (ref 0.9–4.8)
LYMPHOCYTES NFR BLD AUTO: 18.5 % (ref 19.6–45.3)
MCH RBC QN AUTO: 27.6 PG (ref 26.9–32)
MCHC RBC AUTO-ENTMCNC: 31.8 G/DL (ref 32.4–36.3)
MCV RBC AUTO: 86.7 FL (ref 80.5–98.2)
MONOCYTES # BLD AUTO: 0.48 10*3/MM3 (ref 0.2–1.2)
MONOCYTES NFR BLD AUTO: 9.4 % (ref 5–12)
NEUTROPHILS # BLD AUTO: 3.41 10*3/MM3 (ref 1.9–8.1)
NEUTROPHILS NFR BLD AUTO: 66.4 % (ref 42.7–76)
PLATELET # BLD AUTO: 227 10*3/MM3 (ref 140–500)
PMV BLD AUTO: 9.2 FL (ref 6–12)
POTASSIUM BLD-SCNC: 4 MMOL/L (ref 3.5–5.2)
RBC # BLD AUTO: 3.77 10*6/MM3 (ref 3.9–5.2)
SODIUM BLD-SCNC: 139 MMOL/L (ref 136–145)
WBC NRBC COR # BLD: 5.13 10*3/MM3 (ref 4.5–10.7)

## 2017-11-15 PROCEDURE — 25010000003 PENICILLIN G POTASSIUM PER 600000 UNITS: Performed by: INTERNAL MEDICINE

## 2017-11-15 PROCEDURE — C1751 CATH, INF, PER/CENT/MIDLINE: HCPCS

## 2017-11-15 PROCEDURE — 85025 COMPLETE CBC W/AUTO DIFF WBC: CPT | Performed by: INTERNAL MEDICINE

## 2017-11-15 PROCEDURE — 97110 THERAPEUTIC EXERCISES: CPT

## 2017-11-15 PROCEDURE — 94799 UNLISTED PULMONARY SVC/PX: CPT

## 2017-11-15 PROCEDURE — 80048 BASIC METABOLIC PNL TOTAL CA: CPT | Performed by: INTERNAL MEDICINE

## 2017-11-15 PROCEDURE — 02HV33Z INSERTION OF INFUSION DEVICE INTO SUPERIOR VENA CAVA, PERCUTANEOUS APPROACH: ICD-10-PCS | Performed by: INTERNAL MEDICINE

## 2017-11-15 RX ORDER — ACETAMINOPHEN 325 MG/1
650 TABLET ORAL EVERY 6 HOURS PRN
Start: 2017-11-15 | End: 2020-11-16

## 2017-11-15 RX ADMIN — SODIUM CHLORIDE 4 MILLION UNITS: 9 INJECTION, SOLUTION INTRAVENOUS at 03:00

## 2017-11-15 RX ADMIN — METOPROLOL SUCCINATE 50 MG: 50 TABLET, FILM COATED, EXTENDED RELEASE ORAL at 08:26

## 2017-11-15 RX ADMIN — RIVAROXABAN 20 MG: 20 TABLET, FILM COATED ORAL at 08:26

## 2017-11-15 RX ADMIN — BUDESONIDE AND FORMOTEROL FUMARATE DIHYDRATE 2 PUFF: 160; 4.5 AEROSOL RESPIRATORY (INHALATION) at 07:22

## 2017-11-15 RX ADMIN — IPRATROPIUM BROMIDE AND ALBUTEROL SULFATE 1.5 ML: .5; 3 SOLUTION RESPIRATORY (INHALATION) at 07:21

## 2017-11-15 RX ADMIN — SODIUM CHLORIDE 4 MILLION UNITS: 9 INJECTION, SOLUTION INTRAVENOUS at 11:02

## 2017-11-15 RX ADMIN — IPRATROPIUM BROMIDE AND ALBUTEROL SULFATE 1.5 ML: .5; 3 SOLUTION RESPIRATORY (INHALATION) at 11:03

## 2017-11-15 RX ADMIN — SODIUM CHLORIDE 4 MILLION UNITS: 9 INJECTION, SOLUTION INTRAVENOUS at 06:47

## 2017-11-15 NOTE — PROGRESS NOTES
Continued Stay Note  [facility]     Patient Name: Emely Solomon  MRN: [id number]  Today's Date: 11/15/2017    Admit Date: 11/11/2017          Discharge Plan       11/15/17 1248    Case Management/Social Work Plan    Plan Home with spouse and [facility] for IV PCN-G for 4 weeks    Additional Comments Spoke with Cyndie at [facility] in CCP office and informed that patient will need IV PCN-G IV every 4 hours continuous or intermittent at home and will need CBC, CMP, U/A ESR and CRP and call results to Dr. Champion at [phone] and she states they are ready when patient is discharged....CCP will continue to follow and assist as needed.....Rohini Couch RN,CCP               Discharge Codes     None        Expected Discharge Date and Time     Expected Discharge Date Expected Discharge Time    Nov 15, 2017             Rohini Couch RN

## 2017-11-15 NOTE — THERAPY TREATMENT NOTE
Acute Care - Physical Therapy Treatment Note  [facility]     Patient Name: Emely Solomon  : 1960  MRN: [id number]  Today's Date: 11/15/2017  Onset of Illness/Injury or Date of Surgery Date: 17          Admit Date: 2017    Visit Dx:    ICD-10-CM ICD-9-CM   1. Sepsis, due to unspecified organism A41.9 038.9     995.91     Patient Active Problem List   Diagnosis   • Chronic diastolic congestive heart failure   • PFO (patent foramen ovale)   • Paradoxical embolism   • Hypertension   • Pulmonary hypertension   • Sepsis   • Back pain   • Fever and chills   • STEFANIA (acute kidney injury)   • Bacteremia   • ARIEL (obstructive sleep apnea)   • Hypoxia   • Cellulitis               Adult Rehabilitation Note       11/15/17 1100          Rehab Assessment/Intervention    Discipline physical therapy assistant  -CW      Document Type therapy note (daily note)  -CW      Subjective Information agree to therapy  -CW      Patient Effort, Rehab Treatment good  -CW      Precautions/Limitations fall precautions  -CW      Recorded by [CW] Mehrdad Owen PTA      Vital Signs    O2 Delivery Pre Treatment room air  -CW      Recorded by [CW] Mehrdad Owen PTA      Pain Assessment    Pain Assessment No/denies pain  -CW      Recorded by [CW] Mehrdad Owen PTA      Cognitive Assessment/Intervention    Current Cognitive/Communication Assessment functional  -CW      Orientation Status oriented x 4  -CW      Follows Commands/Answers Questions 100% of the time  -CW      Personal Safety WNL/WFL  -CW      Personal Safety Interventions fall prevention program maintained;gait belt;muscle strengthening facilitated;nonskid shoes/slippers when out of bed  -CW      Recorded by [CW] Mehrdad Owen PTA      Bed Mobility, Assessment/Treatment    Bed Mob, Sit to Supine, Geary not tested  -CW      Bed Mobility, Comment sitting EOB  -CW      Recorded by [CW] Mehrdad Owen PTA      Transfer Assessment/Treatment     Transfers, Sit-Stand Saline contact guard assist  -CW      Transfers, Stand-Sit Saline contact guard assist  -CW      Transfers, Sit-Stand-Sit, Assist Device rolling walker  -CW      Recorded by [CW] Mehrdad Owen PTA      Gait Assessment/Treatment    Gait, Saline Level contact guard assist  -CW      Gait, Assistive Device rolling walker  -CW      Gait, Distance (Feet) 150  -CW      Gait, Gait Pattern Analysis swing-through gait  -CW      Gait, Gait Deviations urvashi decreased;step length decreased;stride length decreased  -CW      Recorded by [CW] Mehrdad Owen PTA      Positioning and Restraints    Pre-Treatment Position in bed  -CW      Post Treatment Position bed  -CW      In Bed notified nsg;sitting EOB;call light within reach;encouraged to call for assist;with family/caregiver  -CW      Recorded by [CW] Mehrdad Owen PTA        User Key  (r) = Recorded By, (t) = Taken By, (c) = Cosigned By    Initials Name Effective Dates    CW Mehrdad Owen PTA 12/13/16 -                 IP PT Goals       11/14/17 1157          Transfer Training PT LTG    Transfer Training PT LTG, Date Established 11/14/17  -AR      Transfer Training PT LTG, Time to Achieve 1 wk  -AR      Transfer Training PT LTG, Activity Type sit to stand/stand to sit  -AR      Transfer Training PT LTG, Saline Level supervision required  -AR      Gait Training PT LTG    Gait Training Goal PT LTG, Date Established 11/14/17  -AR      Gait Training Goal PT LTG, Time to Achieve 1 wk  -AR      Gait Training Goal PT LTG, Saline Level supervision required  -AR      Gait Training Goal PT LTG, Distance to Achieve 300  -AR        User Key  (r) = Recorded By, (t) = Taken By, (c) = Cosigned By    Initials Name Provider Type    AR Suly Gimenez PT Physical Therapist          Physical Therapy Education     Title: PT OT SLP Therapies (Done)     Topic: Physical Therapy (Done)     Point: Mobility training (Done)     Learning Progress Summary    Learner Readiness Method Response Comment Documented by Status   Patient Acceptance E,TB VU,DU  CW 11/15/17 1136 Done    Acceptance E VU  AR 11/14/17 1157 Done   Family Acceptance E VU  AR 11/14/17 1157 Done               Point: Home exercise program (Done)    Learning Progress Summary    Learner Readiness Method Response Comment Documented by Status   Patient Acceptance E,TB VU,DU  CW 11/15/17 1136 Done    Acceptance E VU  AR 11/14/17 1157 Done   Family Acceptance E VU  AR 11/14/17 1157 Done               Point: Body mechanics (Done)    Learning Progress Summary    Learner Readiness Method Response Comment Documented by Status   Patient Acceptance E,TB VU,DU  CW 11/15/17 1136 Done    Acceptance E VU  AR 11/14/17 1157 Done   Family Acceptance E VU  AR 11/14/17 1157 Done               Point: Precautions (Done)    Learning Progress Summary    Learner Readiness Method Response Comment Documented by Status   Patient Acceptance E,TB VU,DU  CW 11/15/17 1136 Done    Acceptance E VU  AR 11/14/17 1157 Done   Family Acceptance E VU  AR 11/14/17 1157 Done                      User Key     Initials Effective Dates Name Provider Type Discipline    AR 06/27/16 -  Suly Gimenez, PT Physical Therapist PT    CW 12/13/16 -  Mehrdad Owen, PTA Physical Therapy Assistant PT                    PT Recommendation and Plan  Anticipated Equipment Needs At Discharge:  (possible need for RW or cane)  Anticipated Discharge Disposition: home with assist  Planned Therapy Interventions: balance training, bed mobility training, gait training, home exercise program, patient/family education, ROM (Range of Motion), stair training, strengthening  PT Frequency: 5 times/wk  Plan of Care Review  Plan Of Care Reviewed With: patient  Progress: improving  Outcome Summary/Follow up Plan: Pt improving with amb safety I with bed mobiltiy and amb with RWX          Outcome Measures       11/15/17 1100 11/14/17 1200        How much help from another person do you currently need...    Turning from your back to your side while in flat bed without using bedrails? 3  -CW 3  -AR     Moving from lying on back to sitting on the side of a flat bed without bedrails? 3  -CW 3  -AR     Moving to and from a bed to a chair (including a wheelchair)? 3  -CW 3  -AR     Standing up from a chair using your arms (e.g., wheelchair, bedside chair)? 3  -CW 3  -AR     Climbing 3-5 steps with a railing? 2  -CW 2  -AR     To walk in hospital room? 3  -CW 3  -AR     AM-PAC 6 Clicks Score 17  -CW 17  -AR     Functional Assessment    Outcome Measure Options AM-PAC 6 Clicks Basic Mobility (PT)  -CW AM-PAC 6 Clicks Basic Mobility (PT)  -AR       User Key  (r) = Recorded By, (t) = Taken By, (c) = Cosigned By    Initials Name Provider Type    AR Suly Gimenez, PT Physical Therapist    CW Mehrdad Owen PTA Physical Therapy Assistant           Time Calculation:         PT Charges       11/15/17 1138          Time Calculation    Start Time 1120  -CW      Stop Time 1138  -CW      Time Calculation (min) 18 min  -CW      PT Received On 11/15/17  -CW      PT - Next Appointment 11/16/17  -CW        User Key  (r) = Recorded By, (t) = Taken By, (c) = Cosigned By    Initials Name Provider Type    GIULIANA Owen PTA Physical Therapy Assistant          Therapy Charges for Today     Code Description Service Date Service Provider Modifiers Qty    62918842498 HC PT THER PROC EA 15 MIN 11/15/2017 Mehrdad Owen PTA GP 1    09936211391 HC PT THER SUPP EA 15 MIN 11/15/2017 Mehrdad Owen PTA GP 1          PT G-Codes  Outcome Measure Options: AM-PAC 6 Clicks Basic Mobility (PT)    Mehrdad Owen PTA  11/15/2017

## 2017-11-15 NOTE — PLAN OF CARE
Problem: Patient Care Overview (Adult)  Goal: Plan of Care Review  Outcome: Ongoing (interventions implemented as appropriate)    11/14/17 2426   Coping/Psychosocial Response Interventions   Plan Of Care Reviewed With patient   Patient Care Overview   Progress improving   Outcome Evaluation   Outcome Summary/Follow up Plan Pt. VS WNL. No c/o pain. Pt. recieving IV abx for infection. Pt. Will have to have PICC placed for 4 weeks of abx per MD. Pt. resting comfortably, will continue to monitor closely.       Goal: Adult Individualization and Mutuality  Outcome: Ongoing (interventions implemented as appropriate)  Goal: Discharge Needs Assessment  Outcome: Ongoing (interventions implemented as appropriate)    Problem: Sepsis (Adult)  Goal: Signs and Symptoms of Listed Potential Problems Will be Absent or Manageable (Sepsis)  Outcome: Ongoing (interventions implemented as appropriate)    Problem: Fall Risk (Adult)  Goal: Absence of Falls  Outcome: Ongoing (interventions implemented as appropriate)    Problem: Skin Integrity Impairment, Risk/Actual (Adult)  Goal: Skin Integrity/Wound Healing  Outcome: Ongoing (interventions implemented as appropriate)    Problem: Cellulitis (Adult)  Goal: Signs and Symptoms of Listed Potential Problems Will be Absent or Manageable (Cellulitis)  Outcome: Ongoing (interventions implemented as appropriate)

## 2017-11-15 NOTE — PLAN OF CARE
Problem: Patient Care Overview (Adult)  Goal: Plan of Care Review  Outcome: Ongoing (interventions implemented as appropriate)    11/15/17 9929   Coping/Psychosocial Response Interventions   Plan Of Care Reviewed With patient;spouse;family   Patient Care Overview   Progress improving   Outcome Evaluation   Outcome Summary/Follow up Plan D/C orders per Dr. Smith--PICC placement today prior to returning home w/  at home. PCN therapy x4 weeks--prevent another blood infection.       Goal: Adult Individualization and Mutuality  Outcome: Ongoing (interventions implemented as appropriate)  Goal: Discharge Needs Assessment  Outcome: Ongoing (interventions implemented as appropriate)    Problem: Sepsis (Adult)  Goal: Signs and Symptoms of Listed Potential Problems Will be Absent or Manageable (Sepsis)  Outcome: Ongoing (interventions implemented as appropriate)    Problem: Fall Risk (Adult)  Goal: Absence of Falls  Outcome: Ongoing (interventions implemented as appropriate)    Problem: Skin Integrity Impairment, Risk/Actual (Adult)  Goal: Skin Integrity/Wound Healing  Outcome: Ongoing (interventions implemented as appropriate)    Problem: Cellulitis (Adult)  Goal: Signs and Symptoms of Listed Potential Problems Will be Absent or Manageable (Cellulitis)  Outcome: Ongoing (interventions implemented as appropriate)

## 2017-11-15 NOTE — PLAN OF CARE
Problem: Patient Care Overview (Adult)  Goal: Plan of Care Review  Outcome: Ongoing (interventions implemented as appropriate)    11/14/17 1943   Coping/Psychosocial Response Interventions   Plan Of Care Reviewed With patient;spouse;family   Patient Care Overview   Progress no change   Outcome Evaluation   Outcome Summary/Follow up Plan Pt ambulated to bathroom--unsteady gait, slow speed. PICC placement soon--IV antibiotics needed for 4 weeks. 4+ edema of lower extremities--LLE redness, warmth (cellulitic infection). No c/o pain. Incessant cough--tx tessalon pearls, throat lozenges.       Goal: Adult Individualization and Mutuality  Outcome: Ongoing (interventions implemented as appropriate)  Goal: Discharge Needs Assessment  Outcome: Ongoing (interventions implemented as appropriate)    Problem: Sepsis (Adult)  Goal: Signs and Symptoms of Listed Potential Problems Will be Absent or Manageable (Sepsis)  Outcome: Ongoing (interventions implemented as appropriate)    Problem: Fall Risk (Adult)  Goal: Absence of Falls  Outcome: Ongoing (interventions implemented as appropriate)    Problem: Skin Integrity Impairment, Risk/Actual (Adult)  Goal: Identify Related Risk Factors and Signs and Symptoms  Outcome: Outcome(s) achieved Date Met:  11/14/17  Goal: Skin Integrity/Wound Healing  Outcome: Ongoing (interventions implemented as appropriate)    Problem: Cellulitis (Adult)  Goal: Signs and Symptoms of Listed Potential Problems Will be Absent or Manageable (Cellulitis)  Outcome: Ongoing (interventions implemented as appropriate)

## 2017-11-15 NOTE — PLAN OF CARE
Problem: Patient Care Overview (Adult)  Goal: Plan of Care Review  Outcome: Ongoing (interventions implemented as appropriate)    11/15/17 1137   Coping/Psychosocial Response Interventions   Plan Of Care Reviewed With patient   Patient Care Overview   Progress improving   Outcome Evaluation   Outcome Summary/Follow up Plan Pt improving with amb safety I with bed mobiltiy and amb with RWX

## 2017-11-15 NOTE — PROGRESS NOTES
Continued Stay Note  [facility]     Patient Name: Emely Solomon  MRN: [id number]  Today's Date: 11/15/2017    Admit Date: 11/11/2017          Discharge Plan       11/15/17 1315    Case Management/Social Work Plan    Plan Home with spouse and [facility] for IV PCN-G for 4 weeks    Additional Comments Informed Crystal at [facility] in CCP office that patient has discharge orders today and she states they are ready for patient.....Rohini Couch RN,[facility]                   Discharge Codes     None        Expected Discharge Date and Time     Expected Discharge Date Expected Discharge Time    Nov 15, 2017             Rohini Couch RN

## 2017-11-15 NOTE — PROGRESS NOTES
"  Infectious Diseases Progress Note    Wendy Champion MD     [facility]  [place]: 3 days  Patient Identification:  Name: Emely Solomon  Age: 57 y.o.  Sex: female  :  1960  MRN: [id number]         Primary Care Physician: PIERRE Smith MD            Subjective: Feeling better denies any fever and chills.  Interval History: See consultation note    Objective:    Scheduled Meds:    budesonide-formoterol 2 puff Inhalation BID - RT   furosemide 40 mg Oral Once per day on    ipratropium-albuterol 1.5 mL Nebulization 4x Daily - RT   metoprolol succinate XL 50 mg Oral Daily   penicillin g (potassium) 4 Million Units Intravenous Q4H   rivaroxaban 20 mg Oral Daily With Breakfast     Continuous Infusions:     Vital signs in last 24 hours:  Temp:  [98.1 °F (36.7 °C)-98.7 °F (37.1 °C)] 98.2 °F (36.8 °C)  Heart Rate:  [73-88] 88  Resp:  [18-20] 20  BP: (101-132)/(61-87) 132/87    Intake/Output:  No intake or output data in the 24 hours ending 11/15/17 1236    Exam:  /87 (BP Location: Left arm, Patient Position: Sitting)  Pulse 88  Temp 98.2 °F (36.8 °C) (Oral)   Resp 20  Ht 63\" (160 cm)  Wt (!) 350 lb (159 kg)  SpO2 95%  BMI 62 kg/m2    General Appearance:    Alert, cooperative, no distress, AAOx3, Morbidly obese                          Head:    Normocephalic, without obvious abnormality, atraumatic                           Eyes:    PERRL, conjunctiva/corneas clear, EOM's intact, both eyes                         Throat:   Lips, tongue, gums normal; oral mucosa pink and moist                           Neck:   Supple, symmetrical, trachea midline, no JVD                         Lungs:    Clear to auscultation bilaterally, respirations unlabored                 Chest Wall:    No tenderness or deformity                          Heart:    Regular rate and rhythm, S1 and S2 normal, no murmur,no  Rub                                      or gallop                  Abdomen:     Soft, " non-tender, bowel sounds active, no masses, no                                                        organomegaly                  Extremities:   Bilateral lower extremity edema with significant erythema of the left leg.                        Pulses:   Pulses palpable in all extremities                            Skin:   Skin is warm and dry,  no rashes or palpable lesions                  Data Review:    I reviewed the patient's new clinical results.    Results from last 7 days  Lab Units 11/15/17  0435 11/14/17  0528 11/13/17  0609 11/12/17 0313 11/11/17  2304   WBC 10*3/mm3 5.13 9.31 12.28* 14.14* 16.74*   HEMOGLOBIN g/dL 10.4* 10.3* 10.8* 11.3* 12.9   PLATELETS 10*3/mm3 227 219 178 190 255       Results from last 7 days  Lab Units 11/15/17  0435 11/14/17  0528 11/13/17  0609 11/12/17  0313 11/11/17  2304   SODIUM mmol/L 139 135* 136 139 138   POTASSIUM mmol/L 4.0 4.1 3.8 4.9 4.0   CHLORIDE mmol/L 103 101 103 102 97*   CO2 mmol/L 19.2* 19.1* 17.6* 22.0 23.4   BUN mg/dL 12 13 19 28* 30*   CREATININE mg/dL 0.81 0.85 0.96 1.35* 1.46*   CALCIUM mg/dL 9.4 8.9 8.6 8.4* 10.1   GLUCOSE mg/dL 95 89 106* 121* 124*       Microbiology Results (last 10 days)     Procedure Component Value - Date/Time    Blood Culture - Blood, [[id number]]  (Normal) Collected:  11/13/17 2001    Lab Status:  Preliminary result Specimen:  Blood from Arm, Right Updated:  11/14/17 2016     Blood Culture No growth at 24 hours    Blood Culture - Blood, [[id number]]  (Normal) Collected:  11/13/17 1857    Lab Status:  Preliminary result Specimen:  Blood from Arm, Left Updated:  11/14/17 1916     Blood Culture No growth at 24 hours    Respiratory Panel, PCR - Swab, Nasopharynx [[id number]]  (Normal) Collected:  11/12/17 0316    Lab Status:  Final result Specimen:  Swab from Nasopharynx Updated:  11/12/17 0841     ADENOVIRUS, PCR Not Detected     Coronavirus 229E Not Detected     Coronavirus HKU1 Not Detected     Coronavirus NL63 Not Detected      "Coronavirus OC43 Not Detected     Human Metapneumovirus Not Detected     Human Rhinovirus/Enterovirus Not Detected     Influenza B PCR Not Detected     Parainfluenza Virus 1 Not Detected     Parainfluenza Virus 2 Not Detected     Parainfluenza Virus 3 Not Detected     Parainfluenza Virus 4 Not Detected     Bordetella pertussis pcr Not Detected     Influenza 2009 H1N1 by PCR Not Detected     Chlamydophila pneumoniae PCR Not Detected     Mycoplasma pneumo by PCR Not Detected     Influenza A PCR Not Detected     Influenza A H3 Not Detected     Influenza A H1 Not Detected     RSV, PCR Not Detected    Blood Culture - Blood, [[id number]]  (Normal) Collected:  11/11/17 2327    Lab Status:  Preliminary result Specimen:  Blood from Arm, Left Updated:  11/14/17 2331     Blood Culture No growth at 3 days    Blood Culture - Blood, [[id number]]  (Abnormal)  (Susceptibility) Collected:  11/11/17 2304    Lab Status:  Final result Specimen:  Blood from Arm, Left Updated:  11/15/17 0714     Blood Culture --      Streptococcus agalactiae (Group B) (A)      D test is positive. Isolate exhibits \"inducible\" resistance to Clindamycin.          Gram Stain Result Aerobic Bottle Gram positive cocci in pairs      Anaerobic Bottle Gram positive cocci in pairs    Susceptibility      Streptococcus agalactiae (Group B)     SATNAM     Ceftriaxone <=0.12 ug/ml Susceptible     Clindamycin <=0.25 ug/ml Resistant     Erythromycin 2 ug/ml Resistant     Levofloxacin 1 ug/ml Susceptible     Penicillin G <=0.06 ug/ml Susceptible     Vancomycin 0.5 ug/ml Susceptible                    Blood Culture ID, PCR - Blood, [[id number]]  (Abnormal) Collected:  11/11/17 2304    Lab Status:  Final result Specimen:  Blood from Arm, Left Updated:  11/13/17 0936     BCID, PCR Streptococcus agalactiae (Group B). Identification by BCID PCR. 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  PICC line in a.m. and hopefully discharge tomorrow.    Wendy Champion MD  11/15/2017  12:36 PM    Much of this encounter note is an electronic transcription/translation of spoken language to printed text. The electronic translation of spoken language may permit erroneous, or at times, nonsensical words or phrases to be inadvertently transcribed; Although I have reviewed the note for such errors, some may still exist

## 2017-11-16 LAB — BACTERIA SPEC AEROBE CULT: NORMAL

## 2017-11-16 NOTE — PROGRESS NOTES
Case Management Discharge Note    Final Note: Discharged  11/15/17 to home with [facility]    Discharge Placement     Facility/Agency Request Status Selected? Address Phone Number Fax Number    [facility] Accepted    Yes [address] [place] [address] [phone]        Other: Other (family provided transportation at discharge)    Discharge Codes: 01  Discharge to home

## 2017-11-16 NOTE — DISCHARGE SUMMARY
[facility]  [phone]    DISCHARGE SUMMARY  [facility]    Patient Identification:  Name: Emely Solomon  Age: 57 y.o.  Sex: female  :  1960  MRN: [id number]  Primary Care Physician: PIERRE Smith MD    Admit date: 2017  Discharge date and time: 11/15/2017  3:32 PM     Discharge Diagnoses:Principal Problem:    Cellulitis  Active Problems:    Chronic diastolic congestive heart failure    Hypertension    Sepsis    Back pain    Fever and chills    STEFANIA (acute kidney injury)    Bacteremia    ARIEL (obstructive sleep apnea)    Hypoxia       History of present illness from H&P:    This is a 57-year-old white female with history of diastolic heart failure, DVT, hypertension, bilateral lower extremity lymphedema who presented to the emergency room after she began experiencing sudden onset of chills yesterday with associated mid back pain.  She felt okay when she first woke up.  She drove down to a family member[place] the [place] and at some point while she was there she noticed she started to feel very chilled.  She began to notice that she was having mid back pain that was mostly over the midline with some radiation out to the flanks bilaterally.  This pain lasted for most of the afternoon.  It is largely resolved at this time.  She states that she feels somewhat chilled still but denies any fevers.  Upon presentation to the ER she had a fever 102.9.  She denies any cough, shortness of breath, nausea, vomiting, abdominal pain, diarrhea.  No dysuria.  She does work in a  and is exposed to sick children.  As far as her lymphedema is concerned she states this is improving but does not believe that her skin is normally warm to the touch or significantly erythematous.    Hospital Course:     Ms Solomon was admitted to the hospital started on antibiotics found to be bacteremic with group B strep thought to be coming from cellulitis in her left  lower extremity.  Patient is feeling much better at this point is anxious for discharge.  Patient was seen in consultation by infectious disease specialist who feels the patient is stable for discharge.    Further hospital course by problem list:    Fever and chills- now resolved       Chronic diastolic congestive heart failure- stable, stable despite the large volume fluid      Hypertension      Sepsis- fluid bolus on admission      Back pain      STEFANIA (acute kidney injury)- resolved with large bolus of fluids    ARIEL-cpap on discharge    Hypoxia    Inguinal adenopathy from cellulitis    Consults:     Consults     Date and Time Order Name Status Description    11/13/2017 1051 Inpatient Consult to Infectious Diseases Completed             Results from last 7 days  Lab Units 11/15/17  0435   WBC 10*3/mm3 5.13   HEMOGLOBIN g/dL 10.4*   HEMATOCRIT % 32.7*   PLATELETS 10*3/mm3 227         Results from last 7 days  Lab Units 11/15/17  0435   SODIUM mmol/L 139   POTASSIUM mmol/L 4.0   CHLORIDE mmol/L 103   CO2 mmol/L 19.2*   BUN mg/dL 12   CREATININE mg/dL 0.81   GLUCOSE mg/dL 95   CALCIUM mg/dL 9.4       Significant Diagnostic Studies:   Lab Results   Component Value Date    WBC 5.13 11/15/2017    HGB 10.4 (L) 11/15/2017    HCT 32.7 (L) 11/15/2017     11/15/2017     Lab Results   Component Value Date     11/15/2017    K 4.0 11/15/2017     11/15/2017    CO2 19.2 (L) 11/15/2017    BUN 12 11/15/2017    CREATININE 0.81 11/15/2017    GLUCOSE 95 11/15/2017     Lab Results   Component Value Date    CALCIUM 9.4 11/15/2017     No results found for: AST, ALT, ALKPHOS  No results found for: APTT, INR  No results found for: COLORU, CLARITYU, SPECGRAV, PHUR, PROTEINUR, GLUCOSEU, KETONESU, BLOODU, NITRITE, LEUKOCYTESUR, BILIRUBINUR, UROBILINOGEN, RBCUA, WBCUA, BACTERIA  No results found for: TROPONINT, TROPONINI, BNP  No components found for: HGBA1C;2  No components found for: TSH;2    Imaging Results (all)      Procedure Component Value Units Date/Time    XR Chest 1 View [[id number]] Collected:  11/11/17 2340     Updated:  11/11/17 2344    Narrative:       PORTABLE CHEST X-RAY     CLINICAL HISTORY: fever     COMPARISON:  09/25/2015.     FINDINGS:  Single portable view of the chest obtained.  There are  various overlying monitor leads/artifacts in place. The lungs are well  expanded and clear where they can be visualized.  Cardiac size is within  normal limits.  Vascularity is normal considering technique.  No pleural  fluid is demonstrated by portable imaging.  Stable mild thoracic aortic  ectasia. Right IJ central line has been removed.                Impression:          No active disease by portable imaging.     This report was finalized on 11/11/2017 11:41 PM by Juan West MD.       CT Abdomen Pelvis Without Contrast [[id number]] Collected:  11/12/17 0021     Updated:  11/12/17 0030    Narrative:       NONCONTRAST CT SCANS ABDOMEN AND PELVIS     HISTORY: flank/back pain     COMPARISON: 07/17/2015.     TECHNIQUE:Radiation dose reduction techniques were utilized, including  automated exposure control and exposure modulation based on body size.   Axial images were obtained from the lung bases to the symphysis pubis  without oral or IV contrast per request.      FINDINGS ABDOMEN CT:  There is body habitus related artifact. The heart  is enlarged. The left kidney is small and atrophic with a solitary  nonobstructing upper pole calculus on image 51. Right kidney  unremarkable by noncontrast imaging. The remaining solid organs have an  otherwise unremarkable appearance without the benefit of intravenous  contrast. Aorta nonaneurysmal. Normal appendix. The GI tract was not  opacified for assessment but is non obstructive in appearance.     FINDINGS PELVIS CT:  Again there is body habitus related artifact.  Uterus is enlarged and somewhat lobular likely due to presence of  leiomyomas. This appears similar to previous.  Numerous calcified  phleboliths. There are inflammatory changes in the left inguinal region  adjacent to a multitude of surgical clips. There is adenopathy, largest  measuring 2.4 cm diameter. Some inflammatory changes were present on the  prior examination but the degree of inflammation has increased in the  adenopathy appears to be new. Favor infectious or inflammatory etiology  but follow-up will be needed to ensure resolution and exclude neoplastic  process.                Impression:       1.  Stable left renal atrophy with solitary nonobstructing renal  calculus.  2. Interval increase in left inguinal region inflammatory changes at a  site of prior surgery with development of some associated adenopathy.  This will require follow-up.  3. Stable enlarged, lobular uterus, likely leiomyomas.              This study was performed with techniques to keep radiation doses as low  as reasonably achievable (ALARA). Individualized dose reduction  techniques using automated exposure control or adjustment of mA and/or  kV according to the patient size were employed.      This report was finalized on 11/12/2017 12:27 AM by Juan West MD.       MRI Thoracic Spine With & Without Contrast [[id number]] Collected:  11/12/17 1340     Updated:  11/12/17 1419    Narrative:       MRI THORACIC SPINE W WO CONTRAST-, MRI LUMBAR SPINE W WO CONTRAST-     INDICATIONS:     Back pain, fever, possible discitis or abscess     TECHNIQUE: NONCONTRAST AND ENHANCED MRI OF THE THORACIC AND LUMBAR  SPINE.     COMPARISON: CT from 11/11/2017, 09/24/2015     FINDINGS:           The exam is significantly limited by artifact related to motion and body  habitus.     Multilevel endplate changes are seen. Marrow signal is otherwise in  range of normal. No acute fracture or destructive osseous lesion is  identified. Alignment is in the range of normal.     The patient has a hypoplastic 13 rib pair.     The disc spaces show no abnormal edema or  enhancement.     Cord signal is unremarkable.     No enhancing intracanalicular collection lesions are identified.     Some fluid accumulation/edema in the subcutaneous soft tissues of the  lower back appears similar from the CT from 07/17/2015, suggesting  chronic change.     On sagittal images, disc osteophyte complex C4-C7 narrows the anterior  thecal space, although these levels are not well evaluated on this exam;  if indicated, MRI of the cervical spine could be considered for further  evaluation.     Axial levels:      C7/T1, T1/2, T2/3: No significant disc bulge, central or neuroforaminal  stenosis.     T3/4: Small right paracentral endplate spurring and disc bulge mildly  narrow the right anterior thecal space. No significant neuroforaminal  narrowing.     C4/5, T5/6, T6/7, T7/8, T8/9, T9/10: No significant disc bulge, central  or neural foraminal stenosis.        T10/11: Small central disc bulge mildly narrows the anterior thecal  space, and facet hypertrophy slightly narrows the left neural foramen.  No significant right neuroforaminal narrowing.     T11/12: Slight central disc bulge without significant central or neural  foraminal stenosis.        T12/T13: Small central disc bulge mildly narrows the anterior thecal  space, without significant neuroforaminal narrowing.     T13/L1: No significant disc bulge, central or foraminal stenosis.     L1/2: Right paracentral disc bulge slightly effaces the right lateral  recess and continues to mild subarticular neuroforaminal narrowing. No  significant left neural foraminal narrowing.     L2/3: Hypertrophic overgrowth of the L2 inferior endplate results in  moderate central stenosis and, with facet and ligament flavum  hypertrophy, mild right, moderate to prominent left neural foraminal  narrowing.     L3/4: Mild broad-based disc bulge narrows the anterior thecal space.  Small focal disc bulge at the right neural foramen, in combination with  facet and ligament  flavum hypertrophy, results in mild right neural  foraminal narrowing. Endplate spurring and facet hypertrophy contribute  to mild-to-moderate left neural foraminal narrowing.     L4/5: Small central disc protrusion mildly narrows the anterior thecal  space. Endplate spurring and facet and ligamentum flavum hypertrophy  result in moderate to prominent right moderate left neuroforaminal  narrowing.     L5/S1: Endplate spurring and mild broad-based disc bulge without  significant central stenosis. Endplate spurring and facet hypertrophy  contribute to mild left, moderate to prominent right neural foraminal  narrowing.                            Impression:          The exam is limited. No definite abscess or discitis is identified.  However, owing to limitations of the exam, consider further evaluation  such as nuclear scintigraphy; additionally, short interval follow-up  imaging would be suggested if indications persist.      Multilevel spondyloarthropathy, as detailed above, likely contributing  to patient's back pain.     This report was finalized on 11/12/2017 2:16 PM by Dr. Damon Chavez MD.       MRI Lumbar Spine With & Without Contrast [[id number]] Collected:  11/12/17 1340     Updated:  11/12/17 1419    Narrative:       MRI THORACIC SPINE W WO CONTRAST-, MRI LUMBAR SPINE W WO CONTRAST-     INDICATIONS:     Back pain, fever, possible discitis or abscess     TECHNIQUE: NONCONTRAST AND ENHANCED MRI OF THE THORACIC AND LUMBAR  SPINE.     COMPARISON: CT from 11/11/2017, 09/24/2015     FINDINGS:           The exam is significantly limited by artifact related to motion and body  habitus.     Multilevel endplate changes are seen. Marrow signal is otherwise in  range of normal. No acute fracture or destructive osseous lesion is  identified. Alignment is in the range of normal.     The patient has a hypoplastic 13 rib pair.     The disc spaces show no abnormal edema or enhancement.     Cord signal is  unremarkable.     No enhancing intracanalicular collection lesions are identified.     Some fluid accumulation/edema in the subcutaneous soft tissues of the  lower back appears similar from the CT from 07/17/2015, suggesting  chronic change.     On sagittal images, disc osteophyte complex C4-C7 narrows the anterior  thecal space, although these levels are not well evaluated on this exam;  if indicated, MRI of the cervical spine could be considered for further  evaluation.     Axial levels:      C7/T1, T1/2, T2/3: No significant disc bulge, central or neuroforaminal  stenosis.     T3/4: Small right paracentral endplate spurring and disc bulge mildly  narrow the right anterior thecal space. No significant neuroforaminal  narrowing.     C4/5, T5/6, T6/7, T7/8, T8/9, T9/10: No significant disc bulge, central  or neural foraminal stenosis.        T10/11: Small central disc bulge mildly narrows the anterior thecal  space, and facet hypertrophy slightly narrows the left neural foramen.  No significant right neuroforaminal narrowing.     T11/12: Slight central disc bulge without significant central or neural  foraminal stenosis.        T12/T13: Small central disc bulge mildly narrows the anterior thecal  space, without significant neuroforaminal narrowing.     T13/L1: No significant disc bulge, central or foraminal stenosis.     L1/2: Right paracentral disc bulge slightly effaces the right lateral  recess and continues to mild subarticular neuroforaminal narrowing. No  significant left neural foraminal narrowing.     L2/3: Hypertrophic overgrowth of the L2 inferior endplate results in  moderate central stenosis and, with facet and ligament flavum  hypertrophy, mild right, moderate to prominent left neural foraminal  narrowing.     L3/4: Mild broad-based disc bulge narrows the anterior thecal space.  Small focal disc bulge at the right neural foramen, in combination with  facet and ligament flavum hypertrophy, results in  mild right neural  foraminal narrowing. Endplate spurring and facet hypertrophy contribute  to mild-to-moderate left neural foraminal narrowing.     L4/5: Small central disc protrusion mildly narrows the anterior thecal  space. Endplate spurring and facet and ligamentum flavum hypertrophy  result in moderate to prominent right moderate left neuroforaminal  narrowing.     L5/S1: Endplate spurring and mild broad-based disc bulge without  significant central stenosis. Endplate spurring and facet hypertrophy  contribute to mild left, moderate to prominent right neural foraminal  narrowing.                            Impression:          The exam is limited. No definite abscess or discitis is identified.  However, owing to limitations of the exam, consider further evaluation  such as nuclear scintigraphy; additionally, short interval follow-up  imaging would be suggested if indications persist.      Multilevel spondyloarthropathy, as detailed above, likely contributing  to patient's back pain.     This report was finalized on 11/12/2017 2:16 PM by Dr. Damon Chavez MD.       XR Chest PA & Lateral [[id number]] Collected:  11/14/17 1525     Updated:  11/14/17 1531    Narrative:       TWO-VIEW CHEST     HISTORY: Cough and shortness of breath.     The lungs are well-expanded and clear. There is mild cardiomegaly but no  evidence of overt CHF or change from 3 days ago.     This report was finalized on 11/14/2017 3:28 PM by Dr. Wade Preciado MD.             TEST  RESULTS PENDING AT DISCHARGE   Order Current Status    Blood Culture - Blood, Preliminary result    Blood Culture - Blood, Preliminary result    Blood Culture - Blood, Preliminary result          Patient Instructions:    Emely Solomon   Home Medication Instructions KENNEDI:[id number]    Printed on:11/15/17 8555   Medication Information                      acetaminophen (TYLENOL) 325 MG tablet  Take 2 tablets by mouth Every 6 (Six) Hours As Needed for Mild  Pain  (pain or symptomatic fever).             DULERA 200-5 MCG/ACT inhaler  USE 2 PUFFS PO BID. RINSE MOUTH AFTER EACH USE             DYMISTA 137-50 MCG/ACT suspension  Daily.             furosemide (LASIX) 40 MG tablet  Take 1 tablet by mouth 3 (Three) Times a Week.             ipratropium-albuterol (DUO-NEB) 0.5-2.5 mg/mL nebulizer  4 (Four) Times a Day.             metoprolol succinate XL (TOPROL-XL) 50 MG 24 hr tablet  Take 1 tablet by mouth daily.             penicillin G potassium 49340 UNIT/ML  Infuse 100 mL into a venous catheter Every 4 (Four) Hours for 26 days.             rivaroxaban (XARELTO) 20 MG tablet  Take 1 tablet by mouth Daily.               Future Appointments  Date Time Provider Department Center   5/30/2018 10:00 AM Brennan Rogers MD [facility] None     Follow-up Information     Follow up with Wendy Cox MD .    Specialty:  Infectious Diseases    Why:  Follow-up with Dr. cox at this address on 11/22/2017 at 10 AM.  Call at [phone] if there is any question.    Contact information:    [address]  [facility] [address]  [phone]          Follow up with PIERRE Smith MD .    Specialty:  General Practice    Contact information:    [address]  [facility] [id number] [phone]          Follow up with MIRTHA MARES .    Specialties:  Home Health Services, Infusion Clinic, Infusion Therapy    Contact information:    [address]  [facility] [id number] [phone]        Discharge Order     Start     Ordered    11/15/17 1244  Discharge patient  Once     Comments:  Penicillin infusion x 4 weeks   Expected Discharge Date:  11/15/17    Discharge Disposition:  Home or Self Care        11/15/17 1248        pmd      Total time spent discharging patient including evaluation, post hospitalization follow up,  medication and post hospitalization instructions and education, total time exceeds 30 minutes.    Signed:  Ghanshyam JAY  MD Sarah  11/15/2017  7:06 PM

## 2017-11-18 LAB
BACTERIA SPEC AEROBE CULT: NORMAL
BACTERIA SPEC AEROBE CULT: NORMAL

## 2017-11-22 ENCOUNTER — DOCUMENTATION (OUTPATIENT)
Dept: INTERNAL MEDICINE | Facility: HOSPITAL | Age: 57
End: 2017-11-22

## 2017-11-22 NOTE — PROGRESS NOTES
Infectious Diseases Progress Note    Wendy Champion MD     [facility]  Los: 0 days  Patient Identification:  Name: Emely Solomon  Age: 57 y.o.  Sex: female  :  1960  MRN: [id number]         Primary Care Physician: PIERRE Smith MD      Here for the follow-up.      Subjective: Feeling better denies any fever and chills.  Tolerating antibiotics without any problem.  Interval History: 57-year-old female who was discharged on 11/15/2017 after being treated in the hospital for cellulitis of the left leg in the context of lymphedema and sepsis with bacteremia due to it.  Patient repeat blood cultures were negative on 2017 and had a PICC line placed in the left arm with instructions to continue 4 weeks of IV penicillin.    Objective:    Afebrile vital signs stable    General Appearance:    Alert, cooperative, no distress, AAOx3, morbidly obese and does not appear to be in any acute distress has a pleasant and friendly.  Disposition.                          Head:    Normocephalic, without obvious abnormality, atraumatic                           Eyes:    PERRL, conjunctiva/corneas clear, EOM's intact, both eyes                         Throat:   Lips, tongue, gums normal; oral mucosa pink and moist                           Neck:   Supple, symmetrical, trachea midline, no JVD                         Lungs:    Clear to auscultation bilaterally, respirations unlabored                 Chest Wall:    No tenderness or deformity                          Heart:    Regular rate and rhythm, S1 and S2 normal, no murmur                  Abdomen:     Soft, non-tender, bowel sounds active, no masses                 Extremities:   Bilateral lower extremity lymphedema with significant improvement in erythema of the left leg.                        Pulses:   Pulses palpable in all extremities                            Skin:   Skin is warm and dry,  no rashes or palpable lesions                   Neurologic:   CNII-XII intact, motor strength grossly intact.       Data Review:    I reviewed the patient's new clinical results.  No lab data available at this visit.    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  Check weekly CBC CMP UA ESR and CRP and call abnormal results to Dr. champion at [phone].  Emphasize importance of leg wraps elevation and Hibiclens whole-body wash to prevent future episodes of systemic sepsis due to cellulitis of the leg.  Follow up with me on 12/6/2017 with lab work.  Wendy Champion MD  11/22/2017  11:44 AM    Much of this encounter note is an electronic transcription/translation of spoken language to printed text. The electronic translation of spoken language may permit erroneous, or at times, nonsensical words or phrases to be inadvertently transcribed; Although I have reviewed the note for such errors, some may still exist

## 2017-12-06 ENCOUNTER — DOCUMENTATION (OUTPATIENT)
Dept: INTERNAL MEDICINE | Facility: HOSPITAL | Age: 57
End: 2017-12-06

## 2017-12-06 NOTE — PROGRESS NOTES
Infectious Diseases Progress Note    Wendy Champion MD     [facility]  [place]: 0 days  Patient Identification:  Name: Emely Solomon  Age: 57 y.o.  Sex: female  :  1960  MRN: [id number]         Primary Care Physician: PIERRE Smith MD      Here for the follow-up for group B strep bacteremia.      Subjective: Feeling better denies any fever and chills.  Tolerating antibiotics without any problem.  Having lots of issues related to her work logistics and recent layoff that her  had to endure.  Also other hardships that was major focus of conversation with Mrs. Solomon.  Interval History: 57-year-old female who was discharged on 11/15/2017 after being treated in the hospital for cellulitis of the left leg in the context of lymphedema and sepsis with bacteremia due to it.  Patient repeat blood cultures were negative on 2017 and had a PICC line placed in the left arm with instructions to continue 4 weeks of IV penicillin.    Objective:    Afebrile vital signs stable    General Appearance:    Alert, cooperative, no distress, AAOx3, morbidly obese and does not appear to be in any acute distress has a pleasant and friendly.  Disposition.                          Head:    Normocephalic, without obvious abnormality, atraumatic                           Eyes:    PERRL, conjunctiva/corneas clear, EOM's intact, both eyes                         Throat:   Lips, tongue, gums normal; oral mucosa pink and moist                           Neck:   Supple, symmetrical, trachea midline, no JVD                         Lungs:    Clear to auscultation bilaterally, respirations unlabored                 Chest Wall:    No tenderness or deformity                          Heart:    Regular rate and rhythm, S1 and S2 normal, no murmur                  Abdomen:     Soft, non-tender, bowel sounds active, no masses                 Extremities:   Significant improvement in edema and erythema bilateral lower  extremities.                        Pulses:   Pulses palpable in all extremities                            Skin:   Skin is warm and dry,  no rashes or palpable lesions                  Neurologic:   CNII-XII intact, motor strength grossly intact.       Data Review:    I reviewed the patient's new clinical results.  No lab data available at this visit.    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  Check weekly CBC CMP UA ESR and CRP and call abnormal results to Dr. champion at [phone].    Again I have emphasized importance of leg wraps elevation and Hibiclens whole-body wash to prevent future episodes of systemic sepsis due to cellulitis of the leg.  She was discharged from the IV clinic and after removal of antibiotics I have educated her to call me if there is any question or concern at [phone].    Wendy Champion MD  12/6/2017  2:46 PM    Much of this encounter note is an electronic transcription/translation of spoken language to printed text. The electronic translation of spoken language may permit erroneous, or at times, nonsensical words or phrases to be inadvertently transcribed; Although I have reviewed the note for such errors, some may still exist

## 2018-03-19 RX ORDER — FUROSEMIDE 40 MG/1
TABLET ORAL
Qty: 15 TABLET | Refills: 2 | Status: SHIPPED | OUTPATIENT
Start: 2018-03-19 | End: 2018-05-30 | Stop reason: SDUPTHER

## 2018-05-10 RX ORDER — RIVAROXABAN 20 MG/1
TABLET, FILM COATED ORAL
Qty: 90 TABLET | Refills: 1 | Status: SHIPPED | OUTPATIENT
Start: 2018-05-10 | End: 2018-05-30 | Stop reason: SDUPTHER

## 2018-05-29 NOTE — PROGRESS NOTES
Date of Office Visit: 2018  Encounter Provider: Brennan Rogers MD  Place of Service: [facility] CARDIOLOGY  Patient Name: Emely Solomon  :1960    Chief Complaint   Patient presents with   • Congestive Heart Failure     9 month follow up    :     HPI: Emely Solomon is a 57 y.o. female who presents today to followup. In the summer of , she developed an acute DVT as well as a pulmonary embolus. She had a previously undiagnosed patent foramen ovale and developed paradoxical embolus to the left lower extremity (arterial) and required prolonged hospitalization for thrombectomy and anticoagulation. She was sent home on warfarin but became subtherapeutic and had recurrent pulmonary embolus after that. She was changed to rivaroxaban and has done well since then. Her IVC filter has been removed. A repeat echo in 2016 showed significant improvement in her pulmonary hypertension, right-sided enlargement and function.  A repeat venous doppler in 2017 was negative for DVT.    She was started on furosemide in 2017 and has had a fairly good diuresis.  Unfortunately she can't take it every day due to her job.      She has mild, stable exertional dyspnea.  She has leg swelling (severe) due to lymphedema/lipoedema.  She denies orthopnea, PND, chest pain, palpitations, lightheadedness, or syncope.     In 2017 she was admitted with sepsis and Group B strep bacteremia due to cellulitis.    Past Medical History:   Diagnosis Date   • Cellulitis     2017, with Group B Strep bacteremia and sepsis   • Chronic diastolic congestive heart failure    • Deep venous thrombosis    • Hypertension    • Lipoedema    • Lymphedema    • Morbid obesity    • Paradoxical embolism     to the LLE due to DVT/PE and PFO   • PFO (patent foramen ovale)    • Pulmonary embolism    • Pulmonary hypertension     multifactorial (dCHF, obesity/ARIEL, hx PE), mild by echo 2016       Past  Surgical History:   Procedure Laterality Date   • BRONCHOSCOPY N/A 7/21/2017    Procedure: BRONCHOSCOPY with wash;  Surgeon: Caleb Garcia MD;  Location: [facility];  Service:    • DILATATION AND CURETTAGE  04/11/2011   • VENA CAVA FILTER PLACEMENT      Inferior Vena Cava Filter Placement w/Fluorosc angiogr guidance       Social History     Social History   • Marital status:      Spouse name: N/A   • Number of children: N/A   • Years of education: N/A     Occupational History   • Not on file.     Social History Main Topics   • Smoking status: Never Smoker   • Smokeless tobacco: Never Used      Comment: caffeine use/ weekends   • Alcohol use No   • Drug use: No   • Sexual activity: Defer     Other Topics Concern   • Not on file     Social History Narrative   • No narrative on file       Family History   Problem Relation Age of Onset   • Hypertension Other        Review of Systems   Constitution: Negative for malaise/fatigue.   Cardiovascular: Positive for dyspnea on exertion and leg swelling. Negative for chest pain and palpitations.   Neurological: Negative for dizziness and light-headedness.   All other systems reviewed and are negative.      No Known Allergies      Current Outpatient Prescriptions:   •  acetaminophen (TYLENOL) 325 MG tablet, Take 2 tablets by mouth Every 6 (Six) Hours As Needed for Mild Pain  (pain or symptomatic fever)., Disp: , Rfl:   •  DYMISTA 137-50 MCG/ACT suspension, Daily., Disp: , Rfl: 11  •  furosemide (LASIX) 40 MG tablet, TAKE ONE TABLET BY MOUTH 3 TIMES A WEEK, Disp: 45 tablet, Rfl: 3  •  losartan-hydrochlorothiazide (HYZAAR) 100-25 MG per tablet, Take  by mouth Daily., Disp: , Rfl: 2  •  metoprolol succinate XL (TOPROL-XL) 50 MG 24 hr tablet, Take 1 tablet by mouth daily., Disp: , Rfl:   •  rivaroxaban (XARELTO) 20 MG tablet, Take 1 tablet by mouth Daily., Disp: 90 tablet, Rfl: 3  •  DULERA 200-5 MCG/ACT inhaler, USE 2 PUFFS PO BID. RINSE MOUTH AFTER EACH USE, Disp: , Rfl:  "5  •  ipratropium-albuterol (DUO-NEB) 0.5-2.5 mg/mL nebulizer, 4 (Four) Times a Day., Disp: , Rfl: 0     Objective:     Vitals:    05/30/18 1022   BP: 116/64   BP Location: Right arm   Pulse: 76   Weight: (!) 161 kg (354 lb)   Height: 160 cm (63\")     Body mass index is 62.71 kg/m².    Physical Exam   Constitutional: She is oriented to person, place, and time.   Obese   HENT:   Head: Normocephalic.   Nose: Nose normal.   Mouth/Throat: Oropharynx is clear and moist.   Eyes: Conjunctivae and EOM are normal. Pupils are equal, round, and reactive to light.   Neck: Normal range of motion.   Cannot assess for JVD due to habitus   Cardiovascular: Normal rate and regular rhythm.  Exam reveals distant heart sounds.    No murmur heard.  Pulmonary/Chest: Effort normal.   Abdominal: Soft.   Obesity limits abdominal exam   Musculoskeletal: Normal range of motion. She exhibits edema (lipo/lymphedema, stasis changes of skin).   Neurological: She is alert and oriented to person, place, and time. No cranial nerve deficit.   Skin: Skin is warm and dry.   Psychiatric: She has a normal mood and affect. Her behavior is normal. Judgment and thought content normal.   Vitals reviewed.        ECG 12 Lead  Date/Time: 5/30/2018 11:40 AM  Performed by: STEVE JAMA  Authorized by: STEVE JAMA   Comparison: compared with previous ECG   Similar to previous ECG  Rhythm: sinus rhythm  Conduction: LAFB  ST Segments: ST segments normal  T Waves: T waves normal  QRS axis: left  Other findings: PRWP  Clinical impression: abnormal ECG              Assessment:       Diagnosis Plan   1. Chronic diastolic congestive heart failure     2. Pulmonary hypertension     3. PFO (patent foramen ovale)     4. Paradoxical embolism     5. Essential hypertension            Plan:       She has chronic diastolic CHF and secondary PHTN (due to diastolic dysfunction and her prior PE).  She had VTE with paradoxical embolus to the leg and is s/p thrombectomy.  She had an " IVC filter which was been removed.  She is on rivaroxaban as it was too difficult to maintain a therapeutic INR on warfarin.      She is doing well on her current regimen. She has excellent BP control.      Sincerely,       Brennan Rogers MD

## 2018-05-30 ENCOUNTER — OFFICE VISIT (OUTPATIENT)
Dept: CARDIOLOGY | Facility: CLINIC | Age: 58
End: 2018-05-30

## 2018-05-30 VITALS
WEIGHT: 293 LBS | HEIGHT: 63 IN | DIASTOLIC BLOOD PRESSURE: 64 MMHG | SYSTOLIC BLOOD PRESSURE: 116 MMHG | HEART RATE: 76 BPM | BODY MASS INDEX: 51.91 KG/M2

## 2018-05-30 DIAGNOSIS — Q21.12 PFO (PATENT FORAMEN OVALE): ICD-10-CM

## 2018-05-30 DIAGNOSIS — I27.20 PULMONARY HYPERTENSION (HCC): ICD-10-CM

## 2018-05-30 DIAGNOSIS — I50.32 CHRONIC DIASTOLIC CONGESTIVE HEART FAILURE (HCC): Primary | ICD-10-CM

## 2018-05-30 DIAGNOSIS — I74.9 PARADOXICAL EMBOLISM (HCC): ICD-10-CM

## 2018-05-30 DIAGNOSIS — I10 ESSENTIAL HYPERTENSION: ICD-10-CM

## 2018-05-30 PROBLEM — A41.9 SEPSIS: Status: RESOLVED | Noted: 2017-11-12 | Resolved: 2018-05-30

## 2018-05-30 PROBLEM — R09.02 HYPOXIA: Status: RESOLVED | Noted: 2017-11-14 | Resolved: 2018-05-30

## 2018-05-30 PROBLEM — N17.9 AKI (ACUTE KIDNEY INJURY) (HCC): Status: RESOLVED | Noted: 2017-11-12 | Resolved: 2018-05-30

## 2018-05-30 PROBLEM — L03.90 CELLULITIS: Status: RESOLVED | Noted: 2017-11-14 | Resolved: 2018-05-30

## 2018-05-30 PROBLEM — R78.81 BACTEREMIA: Status: RESOLVED | Noted: 2017-11-13 | Resolved: 2018-05-30

## 2018-05-30 PROBLEM — R50.9 FEVER AND CHILLS: Status: RESOLVED | Noted: 2017-11-12 | Resolved: 2018-05-30

## 2018-05-30 PROCEDURE — 99213 OFFICE O/P EST LOW 20 MIN: CPT | Performed by: INTERNAL MEDICINE

## 2018-05-30 PROCEDURE — 93000 ELECTROCARDIOGRAM COMPLETE: CPT | Performed by: INTERNAL MEDICINE

## 2018-05-30 RX ORDER — LOSARTAN POTASSIUM AND HYDROCHLOROTHIAZIDE 25; 100 MG/1; MG/1
TABLET ORAL DAILY
Refills: 2 | Status: ON HOLD | COMMUNITY
Start: 2018-05-02 | End: 2020-06-04

## 2018-05-30 RX ORDER — FUROSEMIDE 40 MG/1
TABLET ORAL
Qty: 45 TABLET | Refills: 3 | Status: SHIPPED | OUTPATIENT
Start: 2018-05-30 | End: 2019-08-21 | Stop reason: SDUPTHER

## 2018-11-12 RX ORDER — RIVAROXABAN 20 MG/1
TABLET, FILM COATED ORAL
Qty: 90 TABLET | Refills: 1 | Status: SHIPPED | OUTPATIENT
Start: 2018-11-12 | End: 2019-05-07 | Stop reason: SDUPTHER

## 2019-02-08 ENCOUNTER — TRANSCRIBE ORDERS (OUTPATIENT)
Dept: ADMINISTRATIVE | Facility: HOSPITAL | Age: 59
End: 2019-02-08

## 2019-02-08 ENCOUNTER — HOSPITAL ENCOUNTER (OUTPATIENT)
Dept: CARDIOLOGY | Facility: HOSPITAL | Age: 59
Discharge: HOME | End: 2019-02-08
Admitting: PHYSICIAN ASSISTANT

## 2019-02-08 DIAGNOSIS — R60.0 LOCALIZED EDEMA: ICD-10-CM

## 2019-02-08 DIAGNOSIS — R60.0 LOCALIZED EDEMA: Primary | ICD-10-CM

## 2019-02-08 PROCEDURE — 93971 EXTREMITY STUDY: CPT

## 2019-02-08 NOTE — PROGRESS NOTES
Left lower extremity venous doppler performed.Preliminary was negative for dvt. Preliminary called to Devora ALVA. Devora ALVA spoke with patient. Patient was released.

## 2019-02-09 LAB
BH CV LOW VAS LEFT POPLITEAL SPONT: 1
BH CV LOWER VASCULAR LEFT COMMON FEMORAL AUGMENT: NORMAL
BH CV LOWER VASCULAR LEFT COMMON FEMORAL COMPETENT: NORMAL
BH CV LOWER VASCULAR LEFT COMMON FEMORAL COMPRESS: NORMAL
BH CV LOWER VASCULAR LEFT COMMON FEMORAL PHASIC: NORMAL
BH CV LOWER VASCULAR LEFT COMMON FEMORAL SPONT: NORMAL
BH CV LOWER VASCULAR LEFT DISTAL FEMORAL COMPRESS: NORMAL
BH CV LOWER VASCULAR LEFT GASTRONEMIUS COMPRESS: NORMAL
BH CV LOWER VASCULAR LEFT GREATER SAPH AK COMPRESS: NORMAL
BH CV LOWER VASCULAR LEFT GREATER SAPH BK COMPRESS: NORMAL
BH CV LOWER VASCULAR LEFT LESSER SAPH COMPRESS: NORMAL
BH CV LOWER VASCULAR LEFT MID FEMORAL AUGMENT: NORMAL
BH CV LOWER VASCULAR LEFT MID FEMORAL COMPETENT: NORMAL
BH CV LOWER VASCULAR LEFT MID FEMORAL COMPRESS: NORMAL
BH CV LOWER VASCULAR LEFT MID FEMORAL PHASIC: NORMAL
BH CV LOWER VASCULAR LEFT MID FEMORAL SPONT: NORMAL
BH CV LOWER VASCULAR LEFT POPLITEAL AUGMENT: NORMAL
BH CV LOWER VASCULAR LEFT POPLITEAL COMPETENT: NORMAL
BH CV LOWER VASCULAR LEFT POPLITEAL COMPRESS: NORMAL
BH CV LOWER VASCULAR LEFT POPLITEAL PHASIC: NORMAL
BH CV LOWER VASCULAR LEFT POPLITEAL SPONT: NORMAL
BH CV LOWER VASCULAR LEFT PROXIMAL FEMORAL COMPRESS: NORMAL
BH CV LOWER VASCULAR LEFT SAPHENOFEMORAL JUNCTION AUGMENT: NORMAL
BH CV LOWER VASCULAR LEFT SAPHENOFEMORAL JUNCTION COMPETENT: NORMAL
BH CV LOWER VASCULAR LEFT SAPHENOFEMORAL JUNCTION COMPRESS: NORMAL
BH CV LOWER VASCULAR LEFT SAPHENOFEMORAL JUNCTION PHASIC: NORMAL
BH CV LOWER VASCULAR LEFT SAPHENOFEMORAL JUNCTION SPONT: NORMAL
BH CV LOWER VASCULAR RIGHT COMMON FEMORAL AUGMENT: NORMAL
BH CV LOWER VASCULAR RIGHT COMMON FEMORAL COMPETENT: NORMAL
BH CV LOWER VASCULAR RIGHT COMMON FEMORAL COMPRESS: NORMAL
BH CV LOWER VASCULAR RIGHT COMMON FEMORAL PHASIC: NORMAL
BH CV LOWER VASCULAR RIGHT COMMON FEMORAL SPONT: NORMAL
BH CV POP FLUID COLLECT LEFT: 1

## 2019-03-07 ENCOUNTER — APPOINTMENT (OUTPATIENT)
Dept: WOMENS IMAGING | Facility: HOSPITAL | Age: 59
End: 2019-03-07

## 2019-03-07 PROCEDURE — 77063 BREAST TOMOSYNTHESIS BI: CPT | Performed by: RADIOLOGY

## 2019-03-07 PROCEDURE — 77067 SCR MAMMO BI INCL CAD: CPT | Performed by: RADIOLOGY

## 2019-03-20 ENCOUNTER — HOSPITAL ENCOUNTER (EMERGENCY)
Facility: HOSPITAL | Age: 59
Discharge: HOME | End: 2019-03-20
Attending: EMERGENCY MEDICINE | Admitting: EMERGENCY MEDICINE

## 2019-03-20 ENCOUNTER — APPOINTMENT (OUTPATIENT)
Dept: CT IMAGING | Facility: HOSPITAL | Age: 59
End: 2019-03-20

## 2019-03-20 VITALS
RESPIRATION RATE: 18 BRPM | HEART RATE: 83 BPM | DIASTOLIC BLOOD PRESSURE: 81 MMHG | SYSTOLIC BLOOD PRESSURE: 130 MMHG | OXYGEN SATURATION: 98 % | HEIGHT: 63 IN | TEMPERATURE: 97.2 F | BODY MASS INDEX: 51.91 KG/M2 | WEIGHT: 293 LBS

## 2019-03-20 DIAGNOSIS — S00.93XA CONTUSION OF HEAD, UNSPECIFIED PART OF HEAD, INITIAL ENCOUNTER: Primary | ICD-10-CM

## 2019-03-20 PROCEDURE — 70450 CT HEAD/BRAIN W/O DYE: CPT

## 2019-03-20 PROCEDURE — 99283 EMERGENCY DEPT VISIT LOW MDM: CPT

## 2019-03-20 RX ORDER — DEXTROSE AND SODIUM CHLORIDE 5; .9 G/100ML; G/100ML
100 INJECTION, SOLUTION INTRAVENOUS CONTINUOUS
Status: DISCONTINUED | OUTPATIENT
Start: 2019-03-20 | End: 2019-03-20

## 2019-03-20 NOTE — ED PROVIDER NOTES
EMERGENCY DEPARTMENT ENCOUNTER    CHIEF COMPLAINT  Chief Complaint: fall  History given by: patient  History limited by: none  Room Number: 12/12  PMD: RENAY Smith MD      HPI:  Pt is a 58 y.o. female who presents complaining of trip and fall that happened earlier today around 1000. Pt states she tripped and hit the back of her head on a cabinet when she fell and now c/o occipital head pain. Pt states she was carrying a baby when she fell. Pt is currently taking xarelto.     Duration: fall happened earlier today around 1000  Onset: sudden  Timing: brief  Location: n/a  Radiation: n/a  Quality: fall  Intensity/Severity: moderate  Progression: resolved  Associated Symptoms: occipital head pain  Aggravating Factors: none  Alleviating Factors: none  Previous Episodes: none  Treatment before arrival: Pt is currently taking xarelto.     PAST MEDICAL HISTORY  Active Ambulatory Problems     Diagnosis Date Noted   • Chronic diastolic congestive heart failure (CMS/HCC)    • PFO (patent foramen ovale)    • Paradoxical embolism (CMS/HCC)    • Hypertension    • Pulmonary hypertension (CMS/HCC)    • Back pain 11/12/2017   • ARIEL (obstructive sleep apnea) 11/14/2017     Resolved Ambulatory Problems     Diagnosis Date Noted   • Chronic cough 07/21/2017   • Sepsis (CMS/HCC) 11/12/2017   • Fever and chills 11/12/2017   • STEFANIA (acute kidney injury) (CMS/HCC) 11/12/2017   • Bacteremia 11/13/2017   • Hypoxia 11/14/2017   • Cellulitis 11/14/2017     Past Medical History:   Diagnosis Date   • Cellulitis    • Chronic diastolic congestive heart failure (CMS/HCC)    • Deep venous thrombosis (CMS/HCC)    • Hypertension    • Lipoedema    • Lymphedema    • Morbid obesity (CMS/HCC)    • Paradoxical embolism (CMS/HCC)    • PFO (patent foramen ovale)    • Pulmonary embolism (CMS/HCC)    • Pulmonary hypertension (CMS/HCC)        PAST SURGICAL HISTORY  Past Surgical History:   Procedure Laterality Date   • BRONCHOSCOPY N/A 7/21/2017    Procedure:  BRONCHOSCOPY with wash;  Surgeon: Caleb Garcia MD;  Location: [facility] ENDOSCOPY;  Service:    • DILATATION AND CURETTAGE  04/11/2011   • VENA CAVA FILTER PLACEMENT      Inferior Vena Cava Filter Placement w/Fluorosc angiogr guidance       FAMILY HISTORY  Family History   Problem Relation Age of Onset   • Hypertension Other        SOCIAL HISTORY      ALLERGIES  Patient has no known allergies.    REVIEW OF SYSTEMS  Review of Systems   Constitutional: Negative for fever.   HENT:        Pt c/o occipital head pain.   Respiratory: Negative for shortness of breath.    Cardiovascular: Negative for chest pain.       PHYSICAL EXAM  ED Triage Vitals   Temp Heart Rate Resp BP SpO2   03/20/19 1101 03/20/19 1101 03/20/19 1101 03/20/19 1108 03/20/19 1101   97.2 °F (36.2 °C) 80 18 124/76 94 %      Temp src Heart Rate Source Patient Position BP Location FiO2 (%)   -- -- -- -- --              Physical Exam   Constitutional: She is oriented to person, place, and time. No distress.   HENT:   Mouth/Throat: Mucous membranes are normal.   There is a hematoma on occiput and a bruise underneath pt's L cheek. Head is otherwise atraumatic.    Eyes: EOM are normal.   Neck: Normal range of motion.   Cardiovascular: Normal rate and regular rhythm.   Pulmonary/Chest: Effort normal and breath sounds normal. No respiratory distress.   Musculoskeletal:        Cervical back: She exhibits no tenderness.   Neurological: She is alert and oriented to person, place, and time. She has normal sensation and normal strength.   Skin: Skin is warm and dry.   Psychiatric: Affect normal.   Nursing note and vitals reviewed.    RADIOLOGY  CT Head Without Contrast   Preliminary Result   1. No evidence of acute infarction or hemorrhage. 6 mm area of increased   attenuation suggesting a cavernous malformation involving the   subcortical white matter of the left frontal lobe similar to the CT   examination of 09/15/2016.   2. Mildly prominent CSF overlying the right  frontal lobe and parietal   lobe superolaterally, more prominent as compared to prior examination   suggesting a small hygroma. There is no evidence of an acute subdural   hematoma.       Further evaluation could be performed with MRI examination of the brain   as indicated.       The above information was called to and discussed with Dr. Davis.               Radiation dose reduction techniques were utilized, including automated   exposure control and exposure modulation based on body size.                   I ordered the above noted radiological studies. Interpreted by radiologist. Discussed with radiologist Dr. Tamayo. Reviewed by me in PACS.       PROCEDURES  Procedures      PROGRESS AND CONSULTS     1126 CT Head ordered for further evaluation.     1246 Rechecked pt. Pt is resting comfortably. Notified pt of unremarkable Head CT (negative for bleeding and fx). Discussed the plan to discharge the pt home. I instructed the pt to follow up with her PCP in 2 days if symptoms worsen. Pt understands and agrees with the plan, all questions answered.    MEDICAL DECISION MAKING  Results were reviewed/discussed with the patient and they were also made aware of online access. Pt also made aware that some labs, such as cultures, will not be resulted during ER visit and follow up with PMD is necessary.     MDM  Number of Diagnoses or Management Options  Contusion of head, unspecified part of head, initial encounter:      Amount and/or Complexity of Data Reviewed  Tests in the radiology section of CPT®: ordered and reviewed (Head CT - negative acute)  Discussion of test results with the performing providers: yes (Dr. Tamayo (Radiologist))  Independent visualization of images, tracings, or specimens: yes           DIAGNOSIS  Final diagnoses:   Contusion of head, unspecified part of head, initial encounter       DISPOSITION  DISCHARGE    Patient discharged in stable condition.    Reviewed implications of results, diagnosis, meds,  responsibility to follow up, warning signs and symptoms of possible worsening, potential complications and reasons to return to ER.     Patient/Family voiced understanding of above instructions.    Discussed plan for discharge, as there is no emergent indication for admission. Patient referred to primary care provider for BP management due to today's BP. Pt/family is agreeable and understands need for follow up and repeat testing.  Pt is aware that discharge does not mean that nothing is wrong but it indicates no emergency is present that requires admission and they must continue care with follow-up as given below or physician of their choice.     FOLLOW-UP  RENAY Smith MD  [address] [address]  [phone]    In 2 days  If symptoms worsen         Medication List      No changes were made to your prescriptions during this visit.           Latest Documented Vital Signs:  As of 12:49 PM  BP- 124/76 HR- 80 Temp- 97.2 °F (36.2 °C) O2 sat- 94%    -  Documentation assistance provided by landry Rdz for Dr. Davis.  Information recorded by the scribe was done at my direction and has been verified and validated by me.          Eulalio Rdz  03/20/19 1250       Rusty Davis MD  03/20/19 3697

## 2019-05-07 RX ORDER — RIVAROXABAN 20 MG/1
TABLET, FILM COATED ORAL
Qty: 90 TABLET | Refills: 1 | Status: SHIPPED | OUTPATIENT
Start: 2019-05-07 | End: 2019-06-03

## 2019-06-03 ENCOUNTER — OFFICE VISIT (OUTPATIENT)
Dept: CARDIOLOGY | Facility: CLINIC | Age: 59
End: 2019-06-03

## 2019-06-03 VITALS
HEIGHT: 63 IN | BODY MASS INDEX: 51.91 KG/M2 | HEART RATE: 76 BPM | SYSTOLIC BLOOD PRESSURE: 144 MMHG | DIASTOLIC BLOOD PRESSURE: 90 MMHG | WEIGHT: 293 LBS

## 2019-06-03 DIAGNOSIS — I27.20 PULMONARY HYPERTENSION (HCC): ICD-10-CM

## 2019-06-03 DIAGNOSIS — E66.01 MORBID OBESITY (HCC): ICD-10-CM

## 2019-06-03 DIAGNOSIS — R35.0 URINARY FREQUENCY: ICD-10-CM

## 2019-06-03 DIAGNOSIS — G47.33 OSA (OBSTRUCTIVE SLEEP APNEA): ICD-10-CM

## 2019-06-03 DIAGNOSIS — I74.9 PARADOXICAL EMBOLISM (HCC): ICD-10-CM

## 2019-06-03 DIAGNOSIS — I50.32 CHRONIC DIASTOLIC CONGESTIVE HEART FAILURE (HCC): Primary | ICD-10-CM

## 2019-06-03 DIAGNOSIS — Q21.12 PFO (PATENT FORAMEN OVALE): ICD-10-CM

## 2019-06-03 DIAGNOSIS — I10 ESSENTIAL HYPERTENSION: ICD-10-CM

## 2019-06-03 PROCEDURE — 93000 ELECTROCARDIOGRAM COMPLETE: CPT | Performed by: NURSE PRACTITIONER

## 2019-06-03 PROCEDURE — 99214 OFFICE O/P EST MOD 30 MIN: CPT | Performed by: NURSE PRACTITIONER

## 2019-06-03 NOTE — PROGRESS NOTES
Date of Office Visit: 2019  Encounter Provider: LUCÍA Hernández  Place of Service: [facility] CARDIOLOGY  Patient Name: Emely Solomon  :1960      Chief Complaint   Patient presents with   • Follow-up   :     Dear Dr. Smith,     HPI: Emely Solomon is a pleasant 58 y.o. female who presents today for cardiac follow up. She is a new patient to me and her previous records have been reviewed.  She has a history of hypertension and morbid obesity.    In , she developed an acute DVT and pulmonary embolus.  During her hospitalization she was noted to have a patent he ovale per echocardiogram and developed paradoxical embolus to the left lower extremity requiring prolonged hospitalization for thrombectomy and anticoagulation.  She was sent home on warfarin but then became subtherapeutic and had a reoccurrence pulmonary embolus after that.  She has been changed to rivaroxaban and done well since then.  She had an echocardiogram in 2016 which showed normal ejection fraction, no significant improvement with pulmonary hypertension, right sided enlargement and function.  Repeat venous Doppler in 2017 was negative for DVT.  She was started on furosemide in 2017 and now takes it 3 times per week.      She is an established patient of Brennan Rogers and was last seen in our office in May 2018.  She had a repeat lower extremity duplex completed on 2019 which showed left popliteal fossa fluid collection and normal left lower extremity venous duplex scan.    She presents today for her 9-month follow-up.  Her biggest concern is that she is having to go to the bathroom frequently during the daytime and nighttime.  She is taking losartan-HCTZ daily in the mornings and furosemide in the evenings on , , and .  She feels that her urination pattern is the same even on the days when she does not take the furosemide.  She has chronic  shortness of breath when she overexerts and lower extremity edema.  She denies chest pain, PND, orthopnea, palpitations, dizziness, or syncope.  Her blood pressure is elevated today and heart rate normal.  Her weight is up about 15 pounds since her last visit.  She really wants to lose weight and is thinking about restarting Weight Watchers program in which she was successful in the past.    Past Medical History:   Diagnosis Date   • Cellulitis     11/2017, with Group B Strep bacteremia and sepsis   • Chronic diastolic congestive heart failure (CMS/HCC)    • Deep venous thrombosis (CMS/HCC)    • Hypertension    • Lipoedema    • Lymphedema    • Morbid obesity (CMS/HCC)    • Paradoxical embolism (CMS/HCC)     to the LLE due to DVT/PE and PFO   • PFO (patent foramen ovale)    • Pulmonary embolism (CMS/HCC)    • Pulmonary hypertension (CMS/HCC)     multifactorial (dCHF, obesity/ARIEL, hx PE), mild by echo 1/2016       Past Surgical History:   Procedure Laterality Date   • BRONCHOSCOPY N/A 7/21/2017    Procedure: BRONCHOSCOPY with wash;  Surgeon: Caleb Garcia MD;  Location: [facility] ENDOSCOPY;  Service:    • DILATATION AND CURETTAGE  04/11/2011   • VENA CAVA FILTER PLACEMENT      Inferior Vena Cava Filter Placement w/Fluorosc angiogr guidance       Social History     Socioeconomic History   • Marital status:      Spouse name: Not on file   • Number of children: Not on file   • Years of education: Not on file   • Highest education level: Not on file   Tobacco Use   • Smoking status: Never Smoker   • Smokeless tobacco: Never Used   Substance and Sexual Activity   • Alcohol use: Yes     Comment: caffeine use   • Drug use: No   • Sexual activity: Defer       Family History   Problem Relation Age of Onset   • Hypertension Other        The following portion of the patient's history were reviewed and updated as appropriate: past medical history, past surgical history, past social history, past family history, allergies,  current medications, and problem list.    Review of Systems   Constitution: Negative for chills, diaphoresis, fever, malaise/fatigue, night sweats, weight gain and weight loss.   HENT: Negative for hearing loss, nosebleeds, sore throat and tinnitus.    Eyes: Negative for blurred vision, double vision, pain and visual disturbance.   Cardiovascular: Positive for dyspnea on exertion and leg swelling. Negative for chest pain, claudication, cyanosis, irregular heartbeat, near-syncope, orthopnea, palpitations, paroxysmal nocturnal dyspnea and syncope.   Respiratory: Negative for cough, hemoptysis, shortness of breath, snoring and wheezing.    Endocrine: Negative for cold intolerance, heat intolerance and polyuria.   Hematologic/Lymphatic: Negative for bleeding problem. Does not bruise/bleed easily.   Skin: Negative for color change, dry skin, flushing and itching.   Musculoskeletal: Negative for falls, joint pain, joint swelling, muscle cramps, muscle weakness and myalgias.   Gastrointestinal: Negative for abdominal pain, constipation, heartburn, melena, nausea and vomiting.   Genitourinary: Positive for frequency. Negative for dysuria and hematuria.   Neurological: Negative for excessive daytime sleepiness, dizziness, light-headedness, loss of balance, numbness, paresthesias, seizures and vertigo.   Psychiatric/Behavioral: Negative for altered mental status, depression, memory loss and substance abuse. The patient does not have insomnia and is not nervous/anxious.    Allergic/Immunologic: Negative for environmental allergies.       No Known Allergies      Current Outpatient Medications:   •  acetaminophen (TYLENOL) 325 MG tablet, Take 2 tablets by mouth Every 6 (Six) Hours As Needed for Mild Pain  (pain or symptomatic fever)., Disp: , Rfl:   •  furosemide (LASIX) 40 MG tablet, TAKE ONE TABLET BY MOUTH 3 TIMES A WEEK, Disp: 45 tablet, Rfl: 3  •  losartan-hydrochlorothiazide (HYZAAR) 100-25 MG per tablet, Take  by mouth  "Daily., Disp: , Rfl: 2  •  metoprolol succinate XL (TOPROL-XL) 50 MG 24 hr tablet, Take 1 tablet by mouth daily., Disp: , Rfl:   •  rivaroxaban (XARELTO) 20 MG tablet, Take 1 tablet by mouth Daily., Disp: 90 tablet, Rfl: 3        Objective:     Vitals:    06/03/19 1006   BP: 144/90   BP Location: Left arm   Pulse: 76   Weight: (!) 168 kg (371 lb)   Height: 160 cm (63\")     Body mass index is 65.72 kg/m².    PHYSICAL EXAM:    Vitals Reviewed.   General Appearance: No acute distress, well developed and well nourished.  Morbidly obese and this limits examination.  Eyes: Conjunctiva and lids: No erythema, swelling, or discharge. Sclera non-icteric.   HENT: Atraumatic, normocephalic. External eyes, ears, and nose normal. No hearing loss noted. Mucous membranes normal. Lips not cyanotic. Neck supple with no tenderness.  Respiratory: No signs of respiratory distress. Respiration rhythm and depth normal.   Clear to auscultation. No rales, crackles, rhonchi, or wheezing auscultated.   Cardiovascular:  Jugular Venous Pressure: Normal  Heart Rate and Rhythm: Normal, Heart Sounds: Normal S1 and S2. No S3 or S4 noted.  Murmurs: No murmurs noted. No rubs, thrills, or gallops.   Arterial Pulses: Carotid pulses normal. No carotid bruit noted.   Lower Extremities: Bilateral lower extremity edema noted.  Gastrointestinal:  Abdomen soft, non-distended, non-tender. Normal bowel sounds. No hepatomegaly.   Musculoskeletal: Normal movement of extremities  Skin and Nails: General appearance normal. No pallor, cyanosis, diaphoresis. Skin temperature normal. No clubbing of fingernails.   Psychiatric: Patient alert and oriented to person, place, and time. Speech and behavior appropriate. Normal mood and affect.       ECG 12 Lead  Date/Time: 6/3/2019 10:13 AM  Performed by: Tessy Fink APRN  Authorized by: Tessy Fink APRN   Comparison: compared with previous ECG from 5/30/2018  Similar to previous ECG  Rhythm: sinus rhythm  Rate: " normal  BPM: 76  Conduction: left anterior fascicular block  QRS axis: normal  Other findings: non-specific ST-T wave changes    Clinical impression: abnormal EKG              Assessment:       Diagnosis Plan   1. Chronic diastolic congestive heart failure (CMS/HCC)     2. Essential hypertension     3. Morbid obesity (CMS/HCC)     4. Paradoxical embolism (CMS/HCC)     5. PFO (patent foramen ovale)     6. Pulmonary hypertension (CMS/HCC)     7. ARIEL (obstructive sleep apnea)     8. Urinary frequency            Plan:       1.  Chronic Diastolic Heart Failure: Appears fairly well compensated today.  I think she will always have an element of shortness of breath if she overexerts in the lower extremity edema.    Heart Failure  Assessment  • NYHA class II - There is slight limitation of physical activity. The patient is comfortable at rest, but physical activity results in fatigue, palpitations or shortness of breath.  • Beta blocker prescribed  • Diuretics prescribed  • Angiotensin receptor blocker (ARB) prescribed  • Left ventricular function is normal by qualitative assessment    Plan  • The patient has received heart failure education on the following topics: dietary sodium restriction, medication instructions, symptom management and physical activity  • The heart failure care plan was discussed with the patient today including: continuing the current program    Subjective/Objective  • Physical exam findings positive for peripheral edema.       2.  Hypertension: Blood pressure is elevated today.  She is going to continue to monitor and if it remains elevated, adjustments will need to be made to her medication regimen.    3.  Morbid Obesity: BMI 65.7.  She would benefit from exercise and weight loss.  She thinks she is going to start Weight Watchers.    4.  Paradoxical Embolism: Remains on rivaroxaban.    5.  PFO: Remains on rivaroxaban.    6.  Pulmonary Hypertension: Improved.    7.  Obstructive Sleep Apnea: Compliant  with CPAP machine.    8.  Urinary Frequency: This very well could be from both of her diuretic therapies.  Interestingly enough, she does not notice a change of her urinary frequency or pattern on the days she takes furosemide which is Monday, Wednesday, and Friday.  I have low suspicion that she is urinating all day and all night with just hydrochlorothiazide 25 mg a day.  I wonder if she could have some type of urinary issue and I recommended further discussion with her PCP.  I am really hesitant to stop her hydrochlorothiazide or furosemide because of her blood pressure and lower extremity edema.  She also informed me that she is taking the furosemide at nighttime and I recommended that she start taking that in the morning.    9.  I recommend follow-up with Brennan Rogers in 9 months, unless otherwise needed sooner.    As always, it has been a pleasure to participate in your patient's care. Thank you.       Sincerely,         LUCÍA Brito        **Dragon Disclaimer:**  Much of this encounter note is an electronic transcription/translation of spoken language to printed text. The electronic translation of spoken language may permit erroneous, or at times, nonsensical words or phrases to be inadvertently transcribed. Although I have reviewed the note for such errors, some may still exist.

## 2019-08-21 RX ORDER — FUROSEMIDE 40 MG/1
TABLET ORAL
Qty: 45 TABLET | Refills: 0 | Status: SHIPPED | OUTPATIENT
Start: 2019-08-21 | End: 2019-12-17 | Stop reason: SDUPTHER

## 2019-09-14 NOTE — THERAPY TREATMENT NOTE
Outpatient Occupational Therapy Lymphedema Treatment Note  [facility]     Patient Name: Emely Solomon  : 1960  MRN: [id number]  Today's Date: 10/25/2017      Visit Date: 10/25/2017    Patient Active Problem List   Diagnosis   • Chronic diastolic congestive heart failure   • PFO (patent foramen ovale)   • Paradoxical embolism   • Hypertension   • Pulmonary hypertension        Past Medical History:   Diagnosis Date   • Chronic diastolic congestive heart failure    • Deep venous thrombosis    • Hypertension    • Lipoedema    • Lymphedema    • Morbid obesity    • Paradoxical embolism     to the LLE due to DVT/PE and PFO   • PFO (patent foramen ovale)    • Pulmonary embolism    • Pulmonary hypertension     multifactorial (dCHF, obesity/ARIEL, hx PE), mild by echo 2016        Past Surgical History:   Procedure Laterality Date   • BRONCHOSCOPY N/A 2017    Procedure: BRONCHOSCOPY with wash;  Surgeon: Caleb Garcia MD;  Location: [facility] ENDOSCOPY;  Service:    • DILATATION AND CURETTAGE  2011   • VENA CAVA FILTER PLACEMENT      Inferior Vena Cava Filter Placement w/Fluorosc angiogr guidance         Visit Dx:      ICD-10-CM ICD-9-CM   1. Lymphedema of both lower extremities I89.0 457.1   2. Lipoedema R60.9 782.3   3. Decreased mobility and endurance Z74.09 780.99   4. Cellulitis of left lower extremity L03.116 682.6             Lymphedema       10/25/17 1300          Subjective Comments    Subjective Comments Pt. tolerated the bandages better yesterday. No pain at present BLE's   -CW      Subjective Pain    Able to rate subjective pain? yes  -CW      Pre-Treatment Pain Level 0  -CW      Post-Treatment Pain Level 0  -CW      Pain Assessment    Pain Assessment No/denies pain  -CW      Skin Changes/Observations    Location/Assessment Lower Extremity  -CW      Lower Extremity Conditions bilateral:;shiny;scab(s);inflamed;fragile  -CW      Lower Extremity Color/Pigment left:   Skin red L lower leg.  -CW       "Lower Extremity Skin Details Skin dry LLE with less redness. Sores appear to be clearing up.   -CW      Skin Observations Comment No weeping or drainage.   -CW      Manual Lymphatic Drainage    Manual Lymphatic Drainage --   Neck, axilla, abd., groin, BLE's. Focused on proximal area.  -CW      Manual Lymphatic Drainage Comments No pain or tenderness during MLD.   -CW      Compression/Skin Care    Compression/Skin Care skin care;wrapping location;bandaging;compression garment  -CW      Skin Care moisturizing lotion applied  -CW      Wrapping Location lower extremity  -CW      Wrapping Location LE bilateral:;ankle;knee   wrapped R ankle to knee so her shoes fit.   -CW      Wrapping Comments Bandages were removed at home.   -CW      Bandage Layers cotton liner;soft foam- 1/2 inch;short-stretch bandages (comment size/quantity)  -CW      Bandaging Technique circumferential/spiral;moderate compression  -CW      Bandaging Comments Tubigrip size K to thighs.BLE-K1, 2- Rosidal soft, 1- 8cm. 4- 10 cm. Rosidal. Extra Artiflex to B ankle area.    -CW        User Key  (r) = Recorded By, (t) = Taken By, (c) = Cosigned By    Initials Name Provider Type    RADHA Acosta Occupational Therapist                        OT Assessment/Plan       10/25/17 1402       OT Assessment    Assessment Comments Pt. reports she is able to squat and \"bend legs\" better when reaching towards the floor. Pt. works at [employer] and she states she can  babies easier. Skin LLE redness diminished with sores cleared. Reviewed skin care and washing between skin folds. Discussed self MLD and reviewed sequence. Pt. is interested in velcro compression and austin panty hose length compression for thighs.   -CW     OT Plan    OT Plan Comments Plan to D/c pt. 10/27/17.   -CW       User Key  (r) = Recorded By, (t) = Taken By, (c) = Cosigned By    Initials Name Provider Type    RADHA Acosta Occupational Therapist                              " [de-identified] : 22 year old female who has a history of SLE who had a flair of the lupus in the beginning of June.  \par \par Diagnosis in 8/2017, went to [facility] after she had very swollen knuckles, saw rheumatologist, and they admitted her for further work up.  After that started plaquenila nd prednisone- she took it for 1 year, and then she decided to stop in January of 2019 but stayed on the prednisone.  Before this, she had a flare with her eyes causing blurry vision and headaches.  June, had a flair - high fevers, weakness, joint pain and stiffness. \par \par In Feb 18, had a flair also, was found to be pancytopenic at that time, she was given outpatient heme follow up for further management but did not come.  At this time she was referred for anemia.  They want to put her on Benlisya. \par \par No periods since may of 2019.    Therapy Education       10/25/17 9695          Therapy Education    Given Bandaging/dressing change;Edema management   Reviewed self MLD, bandage precautions, skin care and wearing schedule.   -CW      Program Reinforced  -CW      How Provided Verbal;Demonstration  -CW      Provided to Patient  -CW      Level of Understanding Verbalized  -CW        User Key  (r) = Recorded By, (t) = Taken By, (c) = Cosigned By    Initials Name Provider Type    CW Colleen Hidalgo, FABYR Occupational Therapist                  Time Calculation:   OT Start Time: 0907  OT Stop Time: 1009  OT Time Calculation (min): 62 min       Therapy Charges for Today     Code Description Service Date Service Provider Modifiers Qty    [id number]  OT MANUAL THERAPY EA 15 MIN 10/25/2017 RADHA Miller GO 4                      RADHA Miller  10/25/2017

## 2019-10-08 RX ORDER — RIVAROXABAN 20 MG/1
TABLET, FILM COATED ORAL
Qty: 90 TABLET | Refills: 0 | Status: SHIPPED | OUTPATIENT
Start: 2019-10-08 | End: 2020-01-07 | Stop reason: SDUPTHER

## 2019-12-17 RX ORDER — FUROSEMIDE 40 MG/1
TABLET ORAL
Qty: 45 TABLET | Refills: 0 | Status: SHIPPED | OUTPATIENT
Start: 2019-12-17 | End: 2020-03-12 | Stop reason: SDUPTHER

## 2020-01-07 RX ORDER — RIVAROXABAN 20 MG/1
TABLET, FILM COATED ORAL
Qty: 90 TABLET | Refills: 0 | OUTPATIENT
Start: 2020-01-07

## 2020-03-11 NOTE — PROGRESS NOTES
RM:________     PCP: RENAY Smith MD    : 1960  AGE: 59 y.o.    REASON FOR VISIT/  CC:  CHRONIC DIASTOLIC HF   Wt Readings from Last 3 Encounters:   19 (!) 168 kg (371 lb)   19 (!) 166 kg (366 lb 9 oz)   18 (!) 161 kg (354 lb)      BP Readings from Last 3 Encounters:   19 144/90   19 130/81   18 116/64      WT: ____________ BP: __________L __________R HR______    CHEST PAIN: _____________    SOA: _____________PALPS: _______________     LIGHTHEADED: ___________FATIGUE: ________________ EDEMA __________    ALLERGIES:Patient has no known allergies. SMOKING HISTORY:  Social History     Tobacco Use   • Smoking status: Never Smoker   • Smokeless tobacco: Never Used   Substance Use Topics   • Alcohol use: Yes     Comment: caffeine use   • Drug use: No     CAFFEINE USE_________________  ALCOHOL ______________________    Below is the patient's most recent value for Albumin, ALT, AST, BUN, Calcium, Chloride, Cholesterol, CO2, Creatinine, GFR, Glucose, HDL, Hematocrit, Hemoglobin, Hemoglobin A1C, LDL, Magnesium, Phosphorus, Platelets, Potassium, PSA, Sodium, Triglycerides, TSH and WBC.   Lab Results   Component Value Date    ALBUMIN 3.40 (L) 2017    ALT 17 2017    AST 15 2017    BUN 12 11/15/2017    CALCIUM 9.4 11/15/2017     11/15/2017    CO2 19.2 (L) 11/15/2017    CREATININE 0.81 11/15/2017    HCT 32.7 (L) 11/15/2017    HGB 10.4 (L) 11/15/2017    MG 1.9 2015     11/15/2017    K 4.0 11/15/2017     11/15/2017    WBC 5.13 11/15/2017          NEW DIAGNOSIS/ SURGERY/ HOSP OR ED VISITS: ______________________    __________________________________________________________________      RECENT LABS OR DIAGNOSTIC TESTING:  _____________________________    __________________________________________________________________      ASSESSMENT/ PLAN:  _______________________________________________    __________________________________________________________________

## 2020-03-12 ENCOUNTER — OFFICE VISIT (OUTPATIENT)
Dept: CARDIOLOGY | Facility: CLINIC | Age: 60
End: 2020-03-12

## 2020-03-12 VITALS
HEART RATE: 60 BPM | HEIGHT: 63 IN | DIASTOLIC BLOOD PRESSURE: 60 MMHG | BODY MASS INDEX: 51.91 KG/M2 | SYSTOLIC BLOOD PRESSURE: 122 MMHG | WEIGHT: 293 LBS

## 2020-03-12 DIAGNOSIS — I10 ESSENTIAL HYPERTENSION: ICD-10-CM

## 2020-03-12 DIAGNOSIS — I50.32 CHRONIC DIASTOLIC CONGESTIVE HEART FAILURE (HCC): Primary | ICD-10-CM

## 2020-03-12 DIAGNOSIS — I27.20 PULMONARY HYPERTENSION (HCC): ICD-10-CM

## 2020-03-12 DIAGNOSIS — I74.9 PARADOXICAL EMBOLISM (HCC): ICD-10-CM

## 2020-03-12 DIAGNOSIS — E66.01 MORBID OBESITY (HCC): ICD-10-CM

## 2020-03-12 DIAGNOSIS — Q21.12 PFO (PATENT FORAMEN OVALE): ICD-10-CM

## 2020-03-12 PROCEDURE — 99213 OFFICE O/P EST LOW 20 MIN: CPT | Performed by: INTERNAL MEDICINE

## 2020-03-12 PROCEDURE — 93000 ELECTROCARDIOGRAM COMPLETE: CPT | Performed by: INTERNAL MEDICINE

## 2020-03-12 RX ORDER — IPRATROPIUM BROMIDE AND ALBUTEROL SULFATE 2.5; .5 MG/3ML; MG/3ML
SOLUTION RESPIRATORY (INHALATION)
COMMUNITY
End: 2020-09-10

## 2020-03-12 RX ORDER — FUROSEMIDE 40 MG/1
40 TABLET ORAL 3 TIMES WEEKLY
Qty: 45 TABLET | Refills: 3 | Status: ON HOLD | OUTPATIENT
Start: 2020-03-13 | End: 2020-06-18 | Stop reason: SDUPTHER

## 2020-03-12 NOTE — PROGRESS NOTES
Date of Office Visit: 2020  Encounter Provider: Brennan Rogers MD  Place of Service: [facility] CARDIOLOGY  Patient Name: Emely Solomon  :1960    Chief Complaint   Patient presents with   • Congestive Heart Failure   :     HPI: Emely Solomon is a 59 y.o. female who presents today to followup.     In the summer of , she developed an acute DVT as well as a pulmonary embolus. She had a previously undiagnosed patent foramen ovale and developed paradoxical embolus to the left lower extremity (arterial) and required prolonged hospitalization for thrombectomy and anticoagulation. She was sent home on warfarin but became subtherapeutic and had recurrent pulmonary embolus after that. She was changed to rivaroxaban and has done well since then. Her IVC filter has been removed. A repeat echo in 2016 showed significant improvement in her pulmonary hypertension, right-sided enlargement and function.  A repeat venous doppler in 2017 was negative for DVT.    She was started on furosemide in 2017 and has had a fairly good diuresis.  Unfortunately she can't take it every day due to urinary frequency.     She has mild, stable exertional dyspnea.  She has leg swelling (severe) due to lymphedema/lipoedema.  She denies orthopnea, PND, chest pain, palpitations, lightheadedness, or syncope.     Past Medical History:   Diagnosis Date   • Cellulitis     2017, with Group B Strep bacteremia and sepsis   • Chronic diastolic congestive heart failure (CMS/HCC)    • Deep venous thrombosis (CMS/HCC)    • Hypertension    • Lipoedema    • Lymphedema    • Morbid obesity (CMS/HCC)    • Paradoxical embolism (CMS/HCC)     to the LLE due to DVT/PE and PFO   • PFO (patent foramen ovale)    • Pulmonary embolism (CMS/HCC)    • Pulmonary hypertension (CMS/HCC)     multifactorial (dCHF, obesity/ARIEL, hx PE), mild by echo 2016       Past Surgical History:   Procedure Laterality Date    • BRONCHOSCOPY N/A 7/21/2017    Procedure: BRONCHOSCOPY with wash;  Surgeon: Caleb Garcia MD;  Location: [facility] ENDOSCOPY;  Service:    • DILATATION AND CURETTAGE  04/11/2011   • VENA CAVA FILTER PLACEMENT      Inferior Vena Cava Filter Placement w/Fluorosc angiogr guidance       Social History     Socioeconomic History   • Marital status:      Spouse name: Not on file   • Number of children: Not on file   • Years of education: Not on file   • Highest education level: Not on file   Tobacco Use   • Smoking status: Never Smoker   • Smokeless tobacco: Never Used   Substance and Sexual Activity   • Alcohol use: Yes     Comment: caffeine use:1 cup daily.    • Drug use: No   • Sexual activity: Defer       Family History   Problem Relation Age of Onset   • Hypertension Other        Review of Systems   Constitution: Negative for malaise/fatigue.   Cardiovascular: Positive for dyspnea on exertion and leg swelling. Negative for chest pain and palpitations.   Neurological: Negative for dizziness and light-headedness.   All other systems reviewed and are negative.      No Active Allergies      Current Outpatient Medications:   •  acetaminophen (TYLENOL) 325 MG tablet, Take 2 tablets by mouth Every 6 (Six) Hours As Needed for Mild Pain  (pain or symptomatic fever)., Disp: , Rfl:   •  [START ON 3/13/2020] furosemide (LASIX) 40 MG tablet, Take 1 tablet by mouth 3 (Three) Times a Week., Disp: 45 tablet, Rfl: 3  •  ipratropium-albuterol (DUO-NEB) 0.5-2.5 mg/3 ml nebulizer, ipratropium 0.5 mg-albuterol 3 mg (2.5 mg base)/3 mL nebulization soln, Disp: , Rfl:   •  losartan-hydrochlorothiazide (HYZAAR) 100-25 MG per tablet, Take  by mouth Daily., Disp: , Rfl: 2  •  metoprolol succinate XL (TOPROL-XL) 50 MG 24 hr tablet, Take 1 tablet by mouth daily., Disp: , Rfl:   •  rivaroxaban (XARELTO) 20 MG tablet, Take 1 tablet by mouth Daily., Disp: 90 tablet, Rfl: 3     Objective:     Vitals:    03/12/20 1033   BP: 122/60   BP Location:  "Right arm   Pulse: 60   Weight: (!) 167 kg (367 lb 9.6 oz)   Height: 160 cm (63\")     Body mass index is 65.12 kg/m².    Physical Exam   Constitutional: She is oriented to person, place, and time.   Obese   HENT:   Head: Normocephalic.   Nose: Nose normal.   Mouth/Throat: Oropharynx is clear and moist.   Eyes: Pupils are equal, round, and reactive to light. Conjunctivae and EOM are normal.   Neck: Normal range of motion.   Cannot assess for JVD due to habitus   Cardiovascular: Normal rate and regular rhythm. Exam reveals distant heart sounds.   No murmur heard.  Pulmonary/Chest: Effort normal.   Abdominal: Soft. There is no tenderness.   Obesity limits abdominal exam   Musculoskeletal: Normal range of motion. She exhibits edema (lipo/lymphedema, stasis changes of skin).   Neurological: She is alert and oriented to person, place, and time. No cranial nerve deficit.   Skin: Skin is warm and dry.   Psychiatric: She has a normal mood and affect. Her behavior is normal. Judgment and thought content normal.   Vitals reviewed.        ECG 12 Lead  Date/Time: 3/12/2020 2:46 PM  Performed by: Brennan Rogers MD  Authorized by: Brennan Rogers MD   Comparison: compared with previous ECG   Similar to previous ECG  Rhythm: sinus rhythm  Conduction: non-specific intraventricular conduction delay  ST Segments: ST segments normal  T Waves: T waves normal  QRS axis: normal  Other findings: low voltage and poor R wave progression    Clinical impression: abnormal EKG              Assessment:       Diagnosis Plan   1. Chronic diastolic congestive heart failure (CMS/HCC)     2. Pulmonary hypertension (CMS/HCC)     3. PFO (patent foramen ovale)     4. Paradoxical embolism (CMS/HCC)     5. Essential hypertension     6. Morbid obesity (CMS/HCC)            Plan:       She has chronic diastolic CHF and secondary PHTN (due to diastolic dysfunction and her prior PE).  She had VTE with paradoxical embolus to the leg and is s/p thrombectomy.  She " had an IVC filter which has been removed.  She is on rivaroxaban as it was too difficult to maintain a therapeutic INR on warfarin.      She is doing well on her current regimen. She has excellent BP control.      Sincerely,       Brennan Rogers MD

## 2020-06-03 PROCEDURE — 99284 EMERGENCY DEPT VISIT MOD MDM: CPT

## 2020-06-04 ENCOUNTER — APPOINTMENT (OUTPATIENT)
Dept: CARDIOLOGY | Facility: HOSPITAL | Age: 60
End: 2020-06-04

## 2020-06-04 ENCOUNTER — APPOINTMENT (OUTPATIENT)
Dept: CT IMAGING | Facility: HOSPITAL | Age: 60
End: 2020-06-04

## 2020-06-04 ENCOUNTER — APPOINTMENT (OUTPATIENT)
Dept: GENERAL RADIOLOGY | Facility: HOSPITAL | Age: 60
End: 2020-06-04

## 2020-06-04 ENCOUNTER — HOSPITAL ENCOUNTER (INPATIENT)
Facility: HOSPITAL | Age: 60
LOS: 14 days | Discharge: NURSING, REHAB OR LONG-TERM CARE | End: 2020-06-18
Attending: EMERGENCY MEDICINE | Admitting: INTERNAL MEDICINE

## 2020-06-04 DIAGNOSIS — A41.9 SEPSIS, DUE TO UNSPECIFIED ORGANISM, UNSPECIFIED WHETHER ACUTE ORGAN DYSFUNCTION PRESENT (HCC): ICD-10-CM

## 2020-06-04 DIAGNOSIS — N17.9 AKI (ACUTE KIDNEY INJURY) (HCC): ICD-10-CM

## 2020-06-04 DIAGNOSIS — L03.115 CELLULITIS OF RIGHT ANTERIOR LOWER LEG: Primary | ICD-10-CM

## 2020-06-04 DIAGNOSIS — N17.9 ACUTE RENAL FAILURE, UNSPECIFIED ACUTE RENAL FAILURE TYPE (HCC): ICD-10-CM

## 2020-06-04 DIAGNOSIS — I89.0 LYMPHEDEMA: ICD-10-CM

## 2020-06-04 PROBLEM — D63.8 ANEMIA, CHRONIC DISEASE: Status: ACTIVE | Noted: 2020-06-04

## 2020-06-04 PROBLEM — Z86.718 HISTORY OF DVT OF LOWER EXTREMITY: Status: ACTIVE | Noted: 2020-06-04

## 2020-06-04 PROBLEM — E87.1 HYPONATREMIA: Status: ACTIVE | Noted: 2020-06-04

## 2020-06-04 LAB
ALBUMIN SERPL-MCNC: 3.5 G/DL (ref 3.5–5.2)
ALBUMIN/GLOB SERPL: 0.8 G/DL
ALP SERPL-CCNC: 111 U/L (ref 39–117)
ALT SERPL W P-5'-P-CCNC: 33 U/L (ref 1–33)
ANION GAP SERPL CALCULATED.3IONS-SCNC: 18.1 MMOL/L (ref 5–15)
ANION GAP SERPL CALCULATED.3IONS-SCNC: 19.2 MMOL/L (ref 5–15)
ANION GAP SERPL CALCULATED.3IONS-SCNC: 21.6 MMOL/L (ref 5–15)
ANISOCYTOSIS BLD QL: ABNORMAL
AORTIC DIMENSIONLESS INDEX: 0.6 (DI)
ARTERIAL PATENCY WRIST A: POSITIVE
AST SERPL-CCNC: 53 U/L (ref 1–32)
ATMOSPHERIC PRESS: 741.5 MMHG
BASE EXCESS BLDA CALC-SCNC: -6 MMOL/L (ref 0–2)
BDY SITE: ABNORMAL
BH CV ECHO MEAS - ACS: 1.9 CM
BH CV ECHO MEAS - AO ARCH DIAM (PROXIMAL TRANS.): 2.7 CM
BH CV ECHO MEAS - AO MAX PG (FULL): 22.2 MMHG
BH CV ECHO MEAS - AO MAX PG: 34.3 MMHG
BH CV ECHO MEAS - AO MEAN PG (FULL): 11 MMHG
BH CV ECHO MEAS - AO MEAN PG: 18 MMHG
BH CV ECHO MEAS - AO ROOT AREA (BSA CORRECTED): 1.2
BH CV ECHO MEAS - AO ROOT AREA: 7.5 CM^2
BH CV ECHO MEAS - AO ROOT DIAM: 3.1 CM
BH CV ECHO MEAS - AO V2 MAX: 293 CM/SEC
BH CV ECHO MEAS - AO V2 MEAN: 197 CM/SEC
BH CV ECHO MEAS - AO V2 VTI: 60.6 CM
BH CV ECHO MEAS - ASC AORTA: 3.2 CM
BH CV ECHO MEAS - AVA(I,A): 1.8 CM^2
BH CV ECHO MEAS - AVA(I,D): 1.8 CM^2
BH CV ECHO MEAS - AVA(V,A): 1.9 CM^2
BH CV ECHO MEAS - AVA(V,D): 1.9 CM^2
BH CV ECHO MEAS - BSA(HAYCOCK): 2.9 M^2
BH CV ECHO MEAS - BSA: 2.5 M^2
BH CV ECHO MEAS - BZI_BMI: 67.1 KILOGRAMS/M^2
BH CV ECHO MEAS - BZI_METRIC_HEIGHT: 160 CM
BH CV ECHO MEAS - BZI_METRIC_WEIGHT: 171.9 KG
BH CV ECHO MEAS - DESC AORTA: 2 CM
BH CV ECHO MEAS - EDV(CUBED): 166.4 ML
BH CV ECHO MEAS - EDV(MOD-SP2): 145 ML
BH CV ECHO MEAS - EDV(MOD-SP4): 130 ML
BH CV ECHO MEAS - EDV(TEICH): 147.4 ML
BH CV ECHO MEAS - EF(CUBED): 64.3 %
BH CV ECHO MEAS - EF(MOD-BP): 64.5 %
BH CV ECHO MEAS - EF(MOD-SP2): 64.1 %
BH CV ECHO MEAS - EF(MOD-SP4): 63.8 %
BH CV ECHO MEAS - EF(TEICH): 55.3 %
BH CV ECHO MEAS - ESV(CUBED): 59.3 ML
BH CV ECHO MEAS - ESV(MOD-SP2): 52 ML
BH CV ECHO MEAS - ESV(MOD-SP4): 47 ML
BH CV ECHO MEAS - ESV(TEICH): 65.9 ML
BH CV ECHO MEAS - FS: 29.1 %
BH CV ECHO MEAS - IVS/LVPW: 1.3
BH CV ECHO MEAS - IVSD: 1.2 CM
BH CV ECHO MEAS - LAT PEAK E' VEL: 11.9 CM/SEC
BH CV ECHO MEAS - LV DIASTOLIC VOL/BSA (35-75): 51.2 ML/M^2
BH CV ECHO MEAS - LV MASS(C)D: 227.4 GRAMS
BH CV ECHO MEAS - LV MASS(C)DI: 89.6 GRAMS/M^2
BH CV ECHO MEAS - LV MAX PG: 12.1 MMHG
BH CV ECHO MEAS - LV MEAN PG: 7 MMHG
BH CV ECHO MEAS - LV SYSTOLIC VOL/BSA (12-30): 18.5 ML/M^2
BH CV ECHO MEAS - LV V1 MAX: 174 CM/SEC
BH CV ECHO MEAS - LV V1 MEAN: 120 CM/SEC
BH CV ECHO MEAS - LV V1 VTI: 34.9 CM
BH CV ECHO MEAS - LVIDD: 5.5 CM
BH CV ECHO MEAS - LVIDS: 3.9 CM
BH CV ECHO MEAS - LVLD AP2: 8.2 CM
BH CV ECHO MEAS - LVLD AP4: 8.4 CM
BH CV ECHO MEAS - LVLS AP2: 7 CM
BH CV ECHO MEAS - LVLS AP4: 7 CM
BH CV ECHO MEAS - LVOT AREA (M): 3.1 CM^2
BH CV ECHO MEAS - LVOT AREA: 3.1 CM^2
BH CV ECHO MEAS - LVOT DIAM: 2 CM
BH CV ECHO MEAS - LVPWD: 0.9 CM
BH CV ECHO MEAS - MED PEAK E' VEL: 7.1 CM/SEC
BH CV ECHO MEAS - MV A DUR: 0.2 SEC
BH CV ECHO MEAS - MV A MAX VEL: 97.5 CM/SEC
BH CV ECHO MEAS - MV DEC SLOPE: 408 CM/SEC^2
BH CV ECHO MEAS - MV DEC TIME: 190 SEC
BH CV ECHO MEAS - MV E MAX VEL: 99.9 CM/SEC
BH CV ECHO MEAS - MV E/A: 1
BH CV ECHO MEAS - MV MAX PG: 6.6 MMHG
BH CV ECHO MEAS - MV MEAN PG: 2 MMHG
BH CV ECHO MEAS - MV P1/2T MAX VEL: 102 CM/SEC
BH CV ECHO MEAS - MV P1/2T: 73.2 MSEC
BH CV ECHO MEAS - MV V2 MAX: 128 CM/SEC
BH CV ECHO MEAS - MV V2 MEAN: 62.1 CM/SEC
BH CV ECHO MEAS - MV V2 VTI: 33.4 CM
BH CV ECHO MEAS - MVA P1/2T LCG: 2.2 CM^2
BH CV ECHO MEAS - MVA(P1/2T): 3 CM^2
BH CV ECHO MEAS - MVA(VTI): 3.3 CM^2
BH CV ECHO MEAS - PA ACC TIME: 0.07 SEC
BH CV ECHO MEAS - PA MAX PG (FULL): 7.2 MMHG
BH CV ECHO MEAS - PA MAX PG: 10.5 MMHG
BH CV ECHO MEAS - PA PR(ACCEL): 45.7 MMHG
BH CV ECHO MEAS - PA V2 MAX: 162 CM/SEC
BH CV ECHO MEAS - PULM A REVS DUR: 0.15 SEC
BH CV ECHO MEAS - PULM A REVS VEL: 43.8 CM/SEC
BH CV ECHO MEAS - PULM DIAS VEL: 51.4 CM/SEC
BH CV ECHO MEAS - PULM S/D: 1.3
BH CV ECHO MEAS - PULM SYS VEL: 65.2 CM/SEC
BH CV ECHO MEAS - PVA(V,A): 2.5 CM^2
BH CV ECHO MEAS - PVA(V,D): 2.5 CM^2
BH CV ECHO MEAS - QP/QS: 0.54
BH CV ECHO MEAS - RAP SYSTOLE: 15 MMHG
BH CV ECHO MEAS - RV MAX PG: 3.3 MMHG
BH CV ECHO MEAS - RV MEAN PG: 1 MMHG
BH CV ECHO MEAS - RV V1 MAX: 90.9 CM/SEC
BH CV ECHO MEAS - RV V1 MEAN: 55.4 CM/SEC
BH CV ECHO MEAS - RV V1 VTI: 13 CM
BH CV ECHO MEAS - RVOT AREA: 4.5 CM^2
BH CV ECHO MEAS - RVOT DIAM: 2.4 CM
BH CV ECHO MEAS - RVSP: 65.1 MMHG
BH CV ECHO MEAS - SI(AO): 180.3 ML/M^2
BH CV ECHO MEAS - SI(CUBED): 42.2 ML/M^2
BH CV ECHO MEAS - SI(LVOT): 43.2 ML/M^2
BH CV ECHO MEAS - SI(MOD-SP2): 36.7 ML/M^2
BH CV ECHO MEAS - SI(MOD-SP4): 32.7 ML/M^2
BH CV ECHO MEAS - SI(TEICH): 32.1 ML/M^2
BH CV ECHO MEAS - SV(AO): 457.4 ML
BH CV ECHO MEAS - SV(CUBED): 107.1 ML
BH CV ECHO MEAS - SV(LVOT): 109.6 ML
BH CV ECHO MEAS - SV(MOD-SP2): 93 ML
BH CV ECHO MEAS - SV(MOD-SP4): 83 ML
BH CV ECHO MEAS - SV(RVOT): 58.8 ML
BH CV ECHO MEAS - SV(TEICH): 81.5 ML
BH CV ECHO MEAS - TAPSE (>1.6): 3.2 CM
BH CV ECHO MEAS - TR MAX VEL: 354 CM/SEC
BH CV ECHO MEASUREMENTS AVERAGE E/E' RATIO: 10.52
BH CV VAS BP LEFT ARM: NORMAL MMHG
BH CV XLRA - RV BASE: 4.9 CM
BH CV XLRA - RV LENGTH: 9.1 CM
BH CV XLRA - RV MID: 3.5 CM
BH CV XLRA - TDI S': 19 CM/SEC
BILIRUB SERPL-MCNC: 1 MG/DL (ref 0.2–1.2)
BUN BLD-MCNC: 77 MG/DL (ref 8–23)
BUN BLD-MCNC: 78 MG/DL (ref 8–23)
BUN BLD-MCNC: 80 MG/DL (ref 8–23)
BUN/CREAT SERPL: 13.2 (ref 7–25)
BUN/CREAT SERPL: 14.1 (ref 7–25)
BUN/CREAT SERPL: 15.4 (ref 7–25)
CALCIUM SPEC-SCNC: 8 MG/DL (ref 8.6–10.5)
CALCIUM SPEC-SCNC: 8.2 MG/DL (ref 8.6–10.5)
CALCIUM SPEC-SCNC: 8.8 MG/DL (ref 8.6–10.5)
CHLORIDE SERPL-SCNC: 84 MMOL/L (ref 98–107)
CHLORIDE SERPL-SCNC: 88 MMOL/L (ref 98–107)
CHLORIDE SERPL-SCNC: 91 MMOL/L (ref 98–107)
CO2 SERPL-SCNC: 16.4 MMOL/L (ref 22–29)
CO2 SERPL-SCNC: 16.8 MMOL/L (ref 22–29)
CO2 SERPL-SCNC: 17.9 MMOL/L (ref 22–29)
CREAT BLD-MCNC: 5.05 MG/DL (ref 0.57–1)
CREAT BLD-MCNC: 5.69 MG/DL (ref 0.57–1)
CREAT BLD-MCNC: 5.85 MG/DL (ref 0.57–1)
CRP SERPL-MCNC: 34.57 MG/DL (ref 0–0.5)
D-LACTATE SERPL-SCNC: 1.3 MMOL/L (ref 0.5–2)
D-LACTATE SERPL-SCNC: 4.3 MMOL/L (ref 0.5–2)
DEPRECATED RDW RBC AUTO: 42.3 FL (ref 37–54)
DEPRECATED RDW RBC AUTO: 44.9 FL (ref 37–54)
ERYTHROCYTE [DISTWIDTH] IN BLOOD BY AUTOMATED COUNT: 14.3 % (ref 12.3–15.4)
ERYTHROCYTE [DISTWIDTH] IN BLOOD BY AUTOMATED COUNT: 15 % (ref 12.3–15.4)
GAS FLOW AIRWAY: 3 LPM
GFR SERPL CREATININE-BSD FRML MDRD: 7 ML/MIN/1.73
GFR SERPL CREATININE-BSD FRML MDRD: 8 ML/MIN/1.73
GFR SERPL CREATININE-BSD FRML MDRD: 9 ML/MIN/1.73
GFR SERPL CREATININE-BSD FRML MDRD: ABNORMAL ML/MIN/{1.73_M2}
GLOBULIN UR ELPH-MCNC: 4.4 GM/DL
GLUCOSE BLD-MCNC: 100 MG/DL (ref 65–99)
GLUCOSE BLD-MCNC: 114 MG/DL (ref 65–99)
GLUCOSE BLD-MCNC: 90 MG/DL (ref 65–99)
GLUCOSE BLDC GLUCOMTR-MCNC: 110 MG/DL (ref 70–130)
GLUCOSE BLDC GLUCOMTR-MCNC: 123 MG/DL (ref 70–130)
GLUCOSE BLDC GLUCOMTR-MCNC: 142 MG/DL (ref 70–130)
HCO3 BLDA-SCNC: 18.8 MMOL/L (ref 22–28)
HCT VFR BLD AUTO: 28.9 % (ref 34–46.6)
HCT VFR BLD AUTO: 31.5 % (ref 34–46.6)
HGB BLD-MCNC: 10.7 G/DL (ref 12–15.9)
HGB BLD-MCNC: 9.8 G/DL (ref 12–15.9)
INR PPP: 2.02 (ref 0.9–1.1)
LACTATE HOLD SPECIMEN: NORMAL
LEFT ATRIUM VOLUME INDEX: 28 ML/M2
LEFT ATRIUM VOLUME: 69 CM3
LYMPHOCYTES # BLD MANUAL: 0.32 10*3/MM3 (ref 0.7–3.1)
LYMPHOCYTES # BLD MANUAL: 0.39 10*3/MM3 (ref 0.7–3.1)
LYMPHOCYTES NFR BLD MANUAL: 2 % (ref 19.6–45.3)
LYMPHOCYTES NFR BLD MANUAL: 2 % (ref 19.6–45.3)
LYMPHOCYTES NFR BLD MANUAL: 4 % (ref 5–12)
LYMPHOCYTES NFR BLD MANUAL: 5.1 % (ref 5–12)
MAXIMAL PREDICTED HEART RATE: 160 BPM
MCH RBC QN AUTO: 27.5 PG (ref 26.6–33)
MCH RBC QN AUTO: 28.2 PG (ref 26.6–33)
MCHC RBC AUTO-ENTMCNC: 33.9 G/DL (ref 31.5–35.7)
MCHC RBC AUTO-ENTMCNC: 34 G/DL (ref 31.5–35.7)
MCV RBC AUTO: 81.2 FL (ref 79–97)
MCV RBC AUTO: 83.1 FL (ref 79–97)
MODALITY: ABNORMAL
MONOCYTES # BLD AUTO: 0.77 10*3/MM3 (ref 0.1–0.9)
MONOCYTES # BLD AUTO: 0.81 10*3/MM3 (ref 0.1–0.9)
NEUTROPHILS # BLD AUTO: 14.76 10*3/MM3 (ref 1.7–7)
NEUTROPHILS # BLD AUTO: 17.96 10*3/MM3 (ref 1.7–7)
NEUTROPHILS NFR BLD MANUAL: 92.9 % (ref 42.7–76)
NEUTROPHILS NFR BLD MANUAL: 93 % (ref 42.7–76)
NT-PROBNP SERPL-MCNC: 1598 PG/ML (ref 5–900)
PCO2 BLDA: 33.8 MM HG (ref 35–45)
PH BLDA: 7.36 PH UNITS (ref 7.35–7.45)
PLAT MORPH BLD: NORMAL
PLAT MORPH BLD: NORMAL
PLATELET # BLD AUTO: 215 10*3/MM3 (ref 140–450)
PLATELET # BLD AUTO: 257 10*3/MM3 (ref 140–450)
PMV BLD AUTO: 8.9 FL (ref 6–12)
PMV BLD AUTO: 9.4 FL (ref 6–12)
PO2 BLDA: 100.1 MM HG (ref 80–100)
POLYCHROMASIA BLD QL SMEAR: ABNORMAL
POTASSIUM BLD-SCNC: 3.5 MMOL/L (ref 3.5–5.2)
POTASSIUM BLD-SCNC: 3.6 MMOL/L (ref 3.5–5.2)
POTASSIUM BLD-SCNC: 3.9 MMOL/L (ref 3.5–5.2)
PROT SERPL-MCNC: 7.9 G/DL (ref 6–8.5)
PROTHROMBIN TIME: 22.6 SECONDS (ref 11.7–14.2)
RBC # BLD AUTO: 3.56 10*6/MM3 (ref 3.77–5.28)
RBC # BLD AUTO: 3.79 10*6/MM3 (ref 3.77–5.28)
RBC MORPH BLD: NORMAL
SAO2 % BLDCOA: 97.5 % (ref 92–99)
SARS-COV-2 RNA PNL SPEC NAA+PROBE: NOT DETECTED
SODIUM BLD-SCNC: 122 MMOL/L (ref 136–145)
SODIUM BLD-SCNC: 124 MMOL/L (ref 136–145)
SODIUM BLD-SCNC: 127 MMOL/L (ref 136–145)
STRESS TARGET HR: 136 BPM
TOTAL RATE: 20 BREATHS/MINUTE
VANCOMYCIN SERPL-MCNC: 27.2 MCG/ML (ref 5–40)
VARIANT LYMPHS NFR BLD MANUAL: 1 % (ref 0–5)
WBC MORPH BLD: NORMAL
WBC MORPH BLD: NORMAL
WBC NRBC COR # BLD: 15.89 10*3/MM3 (ref 3.4–10.8)
WBC NRBC COR # BLD: 19.31 10*3/MM3 (ref 3.4–10.8)

## 2020-06-04 PROCEDURE — 25010000002 MORPHINE PER 10 MG: Performed by: INTERNAL MEDICINE

## 2020-06-04 PROCEDURE — 93306 TTE W/DOPPLER COMPLETE: CPT | Performed by: INTERNAL MEDICINE

## 2020-06-04 PROCEDURE — 87147 CULTURE TYPE IMMUNOLOGIC: CPT | Performed by: EMERGENCY MEDICINE

## 2020-06-04 PROCEDURE — 25010000002 FUROSEMIDE PER 20 MG: Performed by: INTERNAL MEDICINE

## 2020-06-04 PROCEDURE — 83605 ASSAY OF LACTIC ACID: CPT | Performed by: EMERGENCY MEDICINE

## 2020-06-04 PROCEDURE — 87186 SC STD MICRODIL/AGAR DIL: CPT | Performed by: EMERGENCY MEDICINE

## 2020-06-04 PROCEDURE — 87635 SARS-COV-2 COVID-19 AMP PRB: CPT | Performed by: NURSE PRACTITIONER

## 2020-06-04 PROCEDURE — 87205 SMEAR GRAM STAIN: CPT | Performed by: EMERGENCY MEDICINE

## 2020-06-04 PROCEDURE — 93306 TTE W/DOPPLER COMPLETE: CPT

## 2020-06-04 PROCEDURE — 85025 COMPLETE CBC W/AUTO DIFF WBC: CPT | Performed by: NURSE PRACTITIONER

## 2020-06-04 PROCEDURE — 99254 IP/OBS CNSLTJ NEW/EST MOD 60: CPT | Performed by: SURGERY

## 2020-06-04 PROCEDURE — 85610 PROTHROMBIN TIME: CPT | Performed by: NURSE PRACTITIONER

## 2020-06-04 PROCEDURE — 74176 CT ABD & PELVIS W/O CONTRAST: CPT

## 2020-06-04 PROCEDURE — 93010 ELECTROCARDIOGRAM REPORT: CPT | Performed by: INTERNAL MEDICINE

## 2020-06-04 PROCEDURE — 80048 BASIC METABOLIC PNL TOTAL CA: CPT | Performed by: NURSE PRACTITIONER

## 2020-06-04 PROCEDURE — 82803 BLOOD GASES ANY COMBINATION: CPT

## 2020-06-04 PROCEDURE — 87040 BLOOD CULTURE FOR BACTERIA: CPT | Performed by: EMERGENCY MEDICINE

## 2020-06-04 PROCEDURE — 82962 GLUCOSE BLOOD TEST: CPT

## 2020-06-04 PROCEDURE — 85007 BL SMEAR W/DIFF WBC COUNT: CPT | Performed by: EMERGENCY MEDICINE

## 2020-06-04 PROCEDURE — 80048 BASIC METABOLIC PNL TOTAL CA: CPT | Performed by: INTERNAL MEDICINE

## 2020-06-04 PROCEDURE — 73700 CT LOWER EXTREMITY W/O DYE: CPT

## 2020-06-04 PROCEDURE — 25010000002 VANCOMYCIN 10 G RECONSTITUTED SOLUTION: Performed by: NURSE PRACTITIONER

## 2020-06-04 PROCEDURE — 86140 C-REACTIVE PROTEIN: CPT | Performed by: INTERNAL MEDICINE

## 2020-06-04 PROCEDURE — 99255 IP/OBS CONSLTJ NEW/EST HI 80: CPT | Performed by: INTERNAL MEDICINE

## 2020-06-04 PROCEDURE — 71045 X-RAY EXAM CHEST 1 VIEW: CPT

## 2020-06-04 PROCEDURE — 36600 WITHDRAWAL OF ARTERIAL BLOOD: CPT

## 2020-06-04 PROCEDURE — 99254 IP/OBS CNSLTJ NEW/EST MOD 60: CPT | Performed by: INTERNAL MEDICINE

## 2020-06-04 PROCEDURE — 25010000002 CEFTRIAXONE PER 250 MG: Performed by: EMERGENCY MEDICINE

## 2020-06-04 PROCEDURE — 93005 ELECTROCARDIOGRAM TRACING: CPT | Performed by: INTERNAL MEDICINE

## 2020-06-04 PROCEDURE — 80202 ASSAY OF VANCOMYCIN: CPT | Performed by: INTERNAL MEDICINE

## 2020-06-04 PROCEDURE — 85025 COMPLETE CBC W/AUTO DIFF WBC: CPT | Performed by: EMERGENCY MEDICINE

## 2020-06-04 PROCEDURE — 25010000002 PIPERACILLIN SOD-TAZOBACTAM PER 1 G: Performed by: NURSE PRACTITIONER

## 2020-06-04 PROCEDURE — 87070 CULTURE OTHR SPECIMN AEROBIC: CPT | Performed by: EMERGENCY MEDICINE

## 2020-06-04 PROCEDURE — 25010000002 HYDROMORPHONE 1 MG/ML SOLUTION: Performed by: INTERNAL MEDICINE

## 2020-06-04 PROCEDURE — 83880 ASSAY OF NATRIURETIC PEPTIDE: CPT | Performed by: NURSE PRACTITIONER

## 2020-06-04 PROCEDURE — 25010000003 CEFTRIAXONE PER 250 MG: Performed by: INTERNAL MEDICINE

## 2020-06-04 PROCEDURE — 85007 BL SMEAR W/DIFF WBC COUNT: CPT | Performed by: NURSE PRACTITIONER

## 2020-06-04 PROCEDURE — 80053 COMPREHEN METABOLIC PANEL: CPT | Performed by: EMERGENCY MEDICINE

## 2020-06-04 PROCEDURE — 94799 UNLISTED PULMONARY SVC/PX: CPT

## 2020-06-04 RX ORDER — SODIUM CHLORIDE 9 MG/ML
125 INJECTION, SOLUTION INTRAVENOUS CONTINUOUS
Status: DISCONTINUED | OUTPATIENT
Start: 2020-06-04 | End: 2020-06-05

## 2020-06-04 RX ORDER — OXYBUTYNIN CHLORIDE 5 MG/1
5 TABLET ORAL DAILY
Status: DISCONTINUED | OUTPATIENT
Start: 2020-06-04 | End: 2020-06-04

## 2020-06-04 RX ORDER — ACETAMINOPHEN 325 MG/1
650 TABLET ORAL EVERY 4 HOURS PRN
Status: DISCONTINUED | OUTPATIENT
Start: 2020-06-04 | End: 2020-06-18 | Stop reason: HOSPADM

## 2020-06-04 RX ORDER — ACETAMINOPHEN 650 MG/1
650 SUPPOSITORY RECTAL EVERY 4 HOURS PRN
Status: DISCONTINUED | OUTPATIENT
Start: 2020-06-04 | End: 2020-06-18 | Stop reason: HOSPADM

## 2020-06-04 RX ORDER — MIDODRINE HYDROCHLORIDE 5 MG/1
10 TABLET ORAL
Status: DISCONTINUED | OUTPATIENT
Start: 2020-06-04 | End: 2020-06-05

## 2020-06-04 RX ORDER — IPRATROPIUM BROMIDE AND ALBUTEROL SULFATE 2.5; .5 MG/3ML; MG/3ML
3 SOLUTION RESPIRATORY (INHALATION) EVERY 6 HOURS PRN
Status: DISCONTINUED | OUTPATIENT
Start: 2020-06-04 | End: 2020-06-18 | Stop reason: HOSPADM

## 2020-06-04 RX ORDER — SODIUM CHLORIDE 0.9 % (FLUSH) 0.9 %
10 SYRINGE (ML) INJECTION AS NEEDED
Status: DISCONTINUED | OUTPATIENT
Start: 2020-06-04 | End: 2020-06-16

## 2020-06-04 RX ORDER — HYDROCHLOROTHIAZIDE 25 MG/1
25 TABLET ORAL DAILY
COMMUNITY
End: 2020-06-18 | Stop reason: HOSPADM

## 2020-06-04 RX ORDER — OXYBUTYNIN CHLORIDE 5 MG/1
5 TABLET ORAL DAILY
COMMUNITY
End: 2020-11-18

## 2020-06-04 RX ORDER — SODIUM CHLORIDE 9 MG/ML
125 INJECTION, SOLUTION INTRAVENOUS CONTINUOUS
Status: DISCONTINUED | OUTPATIENT
Start: 2020-06-04 | End: 2020-06-04

## 2020-06-04 RX ORDER — CEFTRIAXONE SODIUM 2 G/50ML
2 INJECTION, SOLUTION INTRAVENOUS EVERY 24 HOURS
Status: DISCONTINUED | OUTPATIENT
Start: 2020-06-04 | End: 2020-06-05

## 2020-06-04 RX ORDER — LOSARTAN POTASSIUM 50 MG/1
100 TABLET ORAL DAILY
COMMUNITY
End: 2020-06-18 | Stop reason: HOSPADM

## 2020-06-04 RX ORDER — MORPHINE SULFATE 2 MG/ML
4 INJECTION, SOLUTION INTRAMUSCULAR; INTRAVENOUS EVERY 4 HOURS PRN
Status: DISCONTINUED | OUTPATIENT
Start: 2020-06-04 | End: 2020-06-04

## 2020-06-04 RX ORDER — CLINDAMYCIN PHOSPHATE 900 MG/50ML
900 INJECTION INTRAVENOUS EVERY 8 HOURS
Status: DISCONTINUED | OUTPATIENT
Start: 2020-06-04 | End: 2020-06-05

## 2020-06-04 RX ORDER — NITROGLYCERIN 0.4 MG/1
0.4 TABLET SUBLINGUAL
Status: DISCONTINUED | OUTPATIENT
Start: 2020-06-04 | End: 2020-06-18 | Stop reason: HOSPADM

## 2020-06-04 RX ORDER — ONDANSETRON 2 MG/ML
4 INJECTION INTRAMUSCULAR; INTRAVENOUS EVERY 6 HOURS PRN
Status: DISCONTINUED | OUTPATIENT
Start: 2020-06-04 | End: 2020-06-18 | Stop reason: HOSPADM

## 2020-06-04 RX ORDER — ACETAMINOPHEN 160 MG/5ML
650 SOLUTION ORAL EVERY 4 HOURS PRN
Status: DISCONTINUED | OUTPATIENT
Start: 2020-06-04 | End: 2020-06-18 | Stop reason: HOSPADM

## 2020-06-04 RX ORDER — SODIUM CHLORIDE 0.9 % (FLUSH) 0.9 %
10 SYRINGE (ML) INJECTION EVERY 12 HOURS SCHEDULED
Status: DISCONTINUED | OUTPATIENT
Start: 2020-06-04 | End: 2020-06-18 | Stop reason: HOSPADM

## 2020-06-04 RX ORDER — FUROSEMIDE 10 MG/ML
40 INJECTION INTRAMUSCULAR; INTRAVENOUS ONCE
Status: COMPLETED | OUTPATIENT
Start: 2020-06-04 | End: 2020-06-04

## 2020-06-04 RX ORDER — CEFTRIAXONE SODIUM 1 G/50ML
1 INJECTION, SOLUTION INTRAVENOUS ONCE
Status: COMPLETED | OUTPATIENT
Start: 2020-06-04 | End: 2020-06-04

## 2020-06-04 RX ADMIN — TAZOBACTAM SODIUM AND PIPERACILLIN SODIUM 3.38 G: 375; 3 INJECTION, SOLUTION INTRAVENOUS at 04:09

## 2020-06-04 RX ADMIN — MIDODRINE HYDROCHLORIDE 10 MG: 5 TABLET ORAL at 15:00

## 2020-06-04 RX ADMIN — CLINDAMYCIN IN 5 PERCENT DEXTROSE 900 MG: 18 INJECTION, SOLUTION INTRAVENOUS at 18:47

## 2020-06-04 RX ADMIN — HYDROMORPHONE HYDROCHLORIDE 1 MG: 10 INJECTION INTRAMUSCULAR; INTRAVENOUS; SUBCUTANEOUS at 17:55

## 2020-06-04 RX ADMIN — FUROSEMIDE 40 MG: 10 INJECTION, SOLUTION INTRAMUSCULAR; INTRAVENOUS at 16:14

## 2020-06-04 RX ADMIN — HYDROMORPHONE HYDROCHLORIDE 1 MG: 10 INJECTION INTRAMUSCULAR; INTRAVENOUS; SUBCUTANEOUS at 20:15

## 2020-06-04 RX ADMIN — SODIUM CHLORIDE 75 ML/HR: 9 INJECTION, SOLUTION INTRAVENOUS at 18:47

## 2020-06-04 RX ADMIN — MORPHINE SULFATE 4 MG: 2 INJECTION, SOLUTION INTRAMUSCULAR; INTRAVENOUS at 16:14

## 2020-06-04 RX ADMIN — SODIUM CHLORIDE, PRESERVATIVE FREE 10 ML: 5 INJECTION INTRAVENOUS at 20:16

## 2020-06-04 RX ADMIN — SODIUM CHLORIDE 1572 ML: 9 INJECTION, SOLUTION INTRAVENOUS at 01:41

## 2020-06-04 RX ADMIN — CEFTRIAXONE SODIUM 2 G: 2 INJECTION, SOLUTION INTRAVENOUS at 16:23

## 2020-06-04 RX ADMIN — SODIUM CHLORIDE 1000 ML: 9 INJECTION, SOLUTION INTRAVENOUS at 01:21

## 2020-06-04 RX ADMIN — SODIUM CHLORIDE, PRESERVATIVE FREE 10 ML: 5 INJECTION INTRAVENOUS at 03:20

## 2020-06-04 RX ADMIN — CEFTRIAXONE SODIUM 1 G: 1 INJECTION, SOLUTION INTRAVENOUS at 01:58

## 2020-06-04 RX ADMIN — SODIUM CHLORIDE, PRESERVATIVE FREE 10 ML: 5 INJECTION INTRAVENOUS at 08:09

## 2020-06-04 RX ADMIN — SODIUM CHLORIDE 125 ML/HR: 9 INJECTION, SOLUTION INTRAVENOUS at 03:20

## 2020-06-04 RX ADMIN — VANCOMYCIN HYDROCHLORIDE 2500 MG: 10 INJECTION, POWDER, LYOPHILIZED, FOR SOLUTION INTRAVENOUS at 03:20

## 2020-06-04 NOTE — NURSING NOTE
CWOCN consult- patient with chronic lymphedema to BLE. She has not used wraps for a while, she states. Right leg larger than left and with cellulitis and open, weeping blister. Patient being transferred to ICU and not all consults have assessed the wound yet. Placed an absorbent dressing, abd, and kerlix to the wound. Will plan to wrap with a 3 layer compression tomorrow so that providers may assess. Compression should then stay on until Monday or Tues if patient tolerates it. Long term, would recommend OT lymphedema eval. They could actually see patient any time for the LLE lymphedema wrapping. While RLE continues to have this much drainage, lymphedema wraps are not appropriate.   Discussed plan with patient and Dr Arrington.      06/04/20 1101   Wound Right calf Blisters   No Date first assessed or Time first assessed found.   Side: Right  Location: calf  Primary Wound Type: Blisters   Dressing Appearance open to air   Base maroon/purple;moist;red   Periwound macerated;moist;redness;swelling   Periwound Temperature warm   Periwound Skin Turgor firm   Edges irregular   Wound Length (cm) 15 cm   Wound Width (cm) 18 cm   Drainage Characteristics/Odor serous   Drainage Amount large   Care, Wound cleansed with;sterile normal saline   Dressing Care, Wound dressing applied;hydrofiber

## 2020-06-04 NOTE — ED NOTES
IV team paged at this time. MD notified of cyanotic fingertips      Eduard Tapia, RN  06/04/20 0125       Eduard Tapia RN  06/04/20 0126

## 2020-06-04 NOTE — PROGRESS NOTES
"Pharmacokinetic Evaluation - Vancomycin    Emely Solomon is a 59 y.o. female on vancomycin pharmacy to dose.  MRN: [id number]  : 1960    Day of vancomycin therapy: 1  Indication: RLE cellultitis  Consulted by: Dr. Flores  Goal trough: 15-20    Current dose: intermittent dosing  Other antimicrobials: CTX 2g q24h, clindamycin 900mg q8h    Blood pressure 96/49, pulse 84, temperature 98.8 °F (37.1 °C), temperature source Oral, resp. rate 22, height 160 cm (63\"), weight (!) 172 kg (379 lb), SpO2 95 %.  Results from last 7 days   Lab Units 20  1530 20  0434 20  0035   CREATININE mg/dL 5.05* 5.69* 5.85*     Estimated Creatinine Clearance: 18.7 mL/min (A) (by C-G formula based on SCr of 5.05 mg/dL (H)).  Results from last 7 days   Lab Units 20  0434 20  0035   WBC 10*3/mm3 15.89* 19.31*   HEMOGLOBIN g/dL 9.8* 10.7*   HEMATOCRIT % 28.9* 31.5*   PLATELETS 10*3/mm3 215 257     Cultures:    COVID: negative   bcx: in process   Wcx: in process    H/x of group b strep in bcx in     Dosing hx (include troughs if drawn):  Vancomycin 2500mg loading dose    0320     1734 random: 27.2mcg/mL (14 hrs from last dose)    Assessment:  Level is supra therapeutic after one dose, but only has been 14 hrs from last dose.   Renal function is unstable. No CRRT at this time however nephro following closely. Will plan to continue with intermittent dosing and check another random with am labs.     Plan:  1) Continue vancomycin intermittent dosing.  2) Next random in am   3) Monitor for UOP and scr .    Bhanu Moore, [facility]    "

## 2020-06-04 NOTE — NURSING NOTE
Rapid called early this morning d/t low BP readings. 3 different RNs tried to get manual BPs on pt and could not hear or palpate the rate in pts arm; pt is cold and clammy; since putting 4L NC O2 on pt her fingers are no longer cyanotic - pt does not use O2 at home but does use CPAP; pt received 1.5L bolus in ER to get BP up before coming to floor; when pt arrived to floor she began a very loud, croupy sounding cough that was productive with white and yellow sputum; I paged [facility] - rapid called;

## 2020-06-04 NOTE — CONSULTS
Group: [facility]         CONSULT NOTE    Patient Identification:  Emely Solomon  59 y.o.  female  1960  [id number]            Requesting physician: Dr. Arrington, hospitalist    Reason for Consultation: Hypotension, sepsis    CC: Lower extremity swelling, redness and pain    History of Present Illness:  59-year-old morbidly obese female who presents with leg pain.  This started several days ago.  Still present.  She has swelling of both legs, has had lymphedema for a long time but does not see anybody at the lymphedema clinic.  She is complaining of swelling and pain and redness as well as oozing and blistering of the skin of her right leg.  The symptoms are still present, moderate, worsening.  Associated with hypotension.  The patient has a history of streptococcal cellulitis diagnosed November 2017.  She was admitted to telemetry where the patient developed worsening hypotension despite IV fluid boluses.  Due to her morbid obesity it is very difficult to obtain accurate blood pressure measurements with a noninvasive cuff in either upper extremity.  Patient has been seen by nephrology.  She is currently with acute renal failure and and septic shock.  She has been transferred to our intensive care unit.  She does complain of some pain in her right leg.  She has massive lymphedema.  I reviewed H&P dictated earlier by Dr. Arrington, as well as cardiology consult note and consultation by Dr. Flores from infectious diseases.    Review of Systems   Constitutional: Positive for fatigue. Negative for diaphoresis and fever.   HENT: Negative for ear discharge and sore throat.    Eyes: Negative for pain and visual disturbance.   Respiratory: Positive for shortness of breath. Negative for cough.    Cardiovascular: Positive for leg swelling. Negative for chest pain.   Gastrointestinal: Negative for abdominal pain and diarrhea.   Endocrine: Negative for cold intolerance and polyuria.   Genitourinary:  Negative for dysuria and hematuria.   Musculoskeletal: Negative for joint swelling and myalgias.        Lymphedema and swelling of both legs, pain in the right leg   Skin: Negative for rash and wound.        Redness, blistering of the skin of the right leg   Neurological: Positive for weakness. Negative for speech difficulty and numbness.   Hematological: Negative for adenopathy. Does not bruise/bleed easily.   Psychiatric/Behavioral: Negative for agitation and confusion.       Past Medical History:  Past Medical History:   Diagnosis Date   • Cellulitis     11/2017, with Group B Strep bacteremia and sepsis   • Chronic diastolic congestive heart failure (CMS/HCC)    • Deep venous thrombosis (CMS/HCC)    • Hypertension    • Lipoedema    • Lymphedema    • Morbid obesity (CMS/HCC)    • Paradoxical embolism (CMS/HCC)     to the LLE due to DVT/PE and PFO   • PFO (patent foramen ovale)    • Pulmonary embolism (CMS/HCC)    • Pulmonary hypertension (CMS/HCC)     multifactorial (dCHF, obesity/ARIEL, hx PE), mild by echo 1/2016       Past Surgical History:  Past Surgical History:   Procedure Laterality Date   • BRONCHOSCOPY N/A 7/21/2017    Procedure: BRONCHOSCOPY with wash;  Surgeon: Caleb Garcia MD;  Location: [facility] ENDOSCOPY;  Service:    • DILATATION AND CURETTAGE  04/11/2011   • VENA CAVA FILTER PLACEMENT      Inferior Vena Cava Filter Placement w/Fluorosc angiogr guidance        Home Meds:  Medications Prior to Admission   Medication Sig Dispense Refill Last Dose   • acetaminophen (TYLENOL) 325 MG tablet Take 2 tablets by mouth Every 6 (Six) Hours As Needed for Mild Pain  (pain or symptomatic fever).   Taking   • furosemide (LASIX) 40 MG tablet Take 1 tablet by mouth 3 (Three) Times a Week. 45 tablet 3    • hydroCHLOROthiazide (HYDRODIURIL) 25 MG tablet Take 25 mg by mouth Daily.      • ipratropium-albuterol (DUO-NEB) 0.5-2.5 mg/3 ml nebulizer ipratropium 0.5 mg-albuterol 3 mg (2.5 mg base)/3 mL nebulization soln   Taking  "  • losartan (COZAAR) 50 MG tablet Take 100 mg by mouth Daily.      • metoprolol succinate XL (TOPROL-XL) 50 MG 24 hr tablet Take 1 tablet by mouth 2 (Two) Times a Day.   Taking   • oxybutynin (DITROPAN) 5 MG tablet Take 5 mg by mouth Daily.      • rivaroxaban (XARELTO) 20 MG tablet Take 1 tablet by mouth Daily. 90 tablet 3        Allergies:  No Known Allergies    Social History:   Social History     Socioeconomic History   • Marital status:      Spouse name: Not on file   • Number of children: Not on file   • Years of education: Not on file   • Highest education level: Not on file   Tobacco Use   • Smoking status: Never Smoker   • Smokeless tobacco: Never Used   Substance and Sexual Activity   • Alcohol use: Yes     Comment: caffeine use:1 cup daily.    • Drug use: No   • Sexual activity: Defer       Family History:  Family History   Problem Relation Age of Onset   • Hypertension Other        Physical Exam:  BP (!) 82/45 (BP Location: Left arm, Patient Position: Lying)   Pulse 83   Temp 97.3 °F (36.3 °C) (Temporal)   Resp 20   Ht 160 cm (63\")   Wt (!) 172 kg (379 lb)   SpO2 100%   BMI 67.14 kg/m²  Body mass index is 67.14 kg/m². 100% (!) 172 kg (379 lb)  Physical Exam   Constitutional: She is oriented to person, place, and time.   Morbidly obese patient, in no distress   HENT:   Right Ear: External ear normal.   Left Ear: External ear normal.   Nose: Nose normal.   Mouth/Throat: Oropharynx is clear and moist.   Eyes: Pupils are equal, round, and reactive to light. Conjunctivae and EOM are normal.   Neck: No JVD present. No tracheal deviation present. No thyromegaly present.   Cardiovascular:   Diminished and distant heart sounds bilaterally.  I do not hear any murmurs.  Massive lymphedema bilaterally   Pulmonary/Chest: Effort normal and breath sounds normal.   Diminished breath sounds bilaterally, no crackles.  She was coughing during interview.  Some mild wheezes.  No use of accessory muscles, " percussion is hampered due to very large body habitus.   Abdominal:   Morbid obesity.  Soft, large body habitus hampers rest of the examination   Musculoskeletal: She exhibits deformity.   Massive lymphedema and limited range of motion in both lower extremities   Neurological: She is alert and oriented to person, place, and time. No cranial nerve deficit or sensory deficit.   Skin:   She has redness and swelling and blisters over both lower extremities, most of these areas are wrapped with bandage.  She has massive lymphedema in both legs   Psychiatric: She has a normal mood and affect. Thought content normal.       LABS:  COVID19   Date Value Ref Range Status   06/04/2020 Not Detected Not Detected - Ref. Range Final       Lab Results   Component Value Date    CALCIUM 8.0 (L) 06/04/2020     Results from last 7 days   Lab Units 06/04/20 0434 06/04/20  0035   SODIUM mmol/L 124* 122*   POTASSIUM mmol/L 3.5 3.9   CHLORIDE mmol/L 88* 84*   CO2 mmol/L 16.8* 16.4*   BUN mg/dL 80* 77*   CREATININE mg/dL 5.69* 5.85*   GLUCOSE mg/dL 100* 114*   CALCIUM mg/dL 8.0* 8.8   WBC 10*3/mm3 15.89* 19.31*   HEMOGLOBIN g/dL 9.8* 10.7*   PLATELETS 10*3/mm3 215 257   ALT (SGPT) U/L  --  33   AST (SGOT) U/L  --  53*   PROBNP pg/mL 1,598.0*  --      Lab Results   Component Value Date    TROPONINT <0.01 07/13/2015             Results from last 7 days   Lab Units 06/04/20 0434 06/04/20  0035   LACTATE mmol/L 1.3 4.3*     Results from last 7 days   Lab Units 06/04/20  0556   PH, ARTERIAL pH units 7.355   PCO2, ARTERIAL mm Hg 33.8*   PO2 ART mm Hg 100.1*   FLOW RATE lpm 3   MODALITY  Cannula   O2 SATURATION CALC % 97.5         Results from last 7 days   Lab Units 06/04/20  0434   INR  2.02*         No results found for: TSH  Estimated Creatinine Clearance: 16.6 mL/min (A) (by C-G formula based on SCr of 5.69 mg/dL (H)).         Imaging: I personally visualized the images of chest x-ray showing right perihilar infiltrate.  There is some  cephalization of pulmonary vasculature.      Assessment:  Sepsis with septic shock  Hyponatremia  Cellulitis lower extremities  Super morbid obesity  Acute kidney injury  Anemia, chronicity unknown  Chronic lymphedema  History of DVT in lower extremities  Chronic right heart failure with pulmonary hypertension likely due to previous pulmonary emboli.      Recommendations:  Patient is critically ill.  She will be admitted to the intensive care unit.  We will request anesthesiology to place arterial line.  Infectious diseases has started antibiotics.  We will monitor the patient closely and place a Salvador urinary bladder catheter for accurate urine output measurements.  Nephrology believes the patient may need some form of renal replacement therapy soon  Chest x-ray shows right perihilar infiltrate.  I think the patient does have a pneumonia.  IV Rocephin should cover both skin venus causing cellulitis as well as respiratory pathogens causing pneumonia especially community-acquired pneumonia.  We will follow blood culture results.    Total critical care time 50 minutes, excluding any separately billable procedure time      Adryan King MD  6/4/2020  14:57      Much of this encounter note is an electronic transcription/translation of spoken language to printed text using Dragon Software.

## 2020-06-04 NOTE — ED PROVIDER NOTES
EMERGENCY DEPARTMENT ENCOUNTER    Room Number:  21/21  Date of encounter:  6/4/2020  PCP: RENAY Smith MD  Historian: Patient      HPI:  Chief Complaint: Bilateral leg redness  A complete HPI/ROS/PMH/PSH/SH/FH are unobtainable due to: N/A    Context: Emely Solomon is a 59 y.o. female who presents to the ED c/o bilateral leg redness.  She has a history of chronic lymphedema in her bilateral lower extremities.  She states they have seemed a little more red the past few days.  No known injury.  She denies chest pain.  She is chronically short of breath due to her obesity, but states this is no worse than normal.  She states she is compliant with all her medications, including anticoagulation.  She has had mild nonbloody diarrhea that seems to be getting better after taking Pepto-Bismol.  No abdominal pain.  No nausea or vomiting.  No fevers or chills.      PAST MEDICAL HISTORY  Active Ambulatory Problems     Diagnosis Date Noted   • Chronic diastolic CHF (congestive heart failure) (CMS/HCC)    • PFO (patent foramen ovale)    • Paradoxical embolism (CMS/HCC)    • Hypertension    • Pulmonary hypertension (CMS/HCC)    • Sepsis (CMS/HCC) 11/12/2017   • Back pain 11/12/2017   • ARIEL (obstructive sleep apnea) 11/14/2017   • Morbid obesity (CMS/HCC) 06/03/2019   • Urinary frequency 06/03/2019     Resolved Ambulatory Problems     Diagnosis Date Noted   • Chronic cough 07/21/2017   • Fever and chills 11/12/2017   • STEFANIA (acute kidney injury) (CMS/HCC) 11/12/2017   • Bacteremia 11/13/2017   • Hypoxia 11/14/2017   • Cellulitis 11/14/2017     Past Medical History:   Diagnosis Date   • Chronic diastolic congestive heart failure (CMS/HCC)    • Deep venous thrombosis (CMS/HCC)    • Lipoedema    • Lymphedema    • Pulmonary embolism (CMS/HCC)          PAST SURGICAL HISTORY  Past Surgical History:   Procedure Laterality Date   • BRONCHOSCOPY N/A 7/21/2017    Procedure: BRONCHOSCOPY with wash;  Surgeon: Caleb Garcia MD;  Location:   [facility];  Service:    • DILATATION AND CURETTAGE  04/11/2011   • VENA CAVA FILTER PLACEMENT      Inferior Vena Cava Filter Placement w/Fluorosc angiogr guidance         FAMILY HISTORY  Family History   Problem Relation Age of Onset   • Hypertension Other          SOCIAL HISTORY  Social History     Socioeconomic History   • Marital status:      Spouse name: Not on file   • Number of children: Not on file   • Years of education: Not on file   • Highest education level: Not on file   Tobacco Use   • Smoking status: Never Smoker   • Smokeless tobacco: Never Used   Substance and Sexual Activity   • Alcohol use: Yes     Comment: caffeine use:1 cup daily.    • Drug use: No   • Sexual activity: Defer         ALLERGIES  Patient has no known allergies.        REVIEW OF SYSTEMS  Review of Systems   Constitutional: Negative for fever.   HENT: Negative for sore throat.    Eyes: Negative.    Respiratory: Negative for cough and shortness of breath.    Cardiovascular: Negative for chest pain.   Gastrointestinal: Positive for diarrhea. Negative for abdominal pain, blood in stool, nausea and vomiting.   Genitourinary: Negative for dysuria.   Musculoskeletal: Negative for neck pain.   Skin: Positive for color change and wound. Negative for rash.   Allergic/Immunologic: Negative.    Neurological: Negative for weakness, numbness and headaches.   Hematological: Negative.    Psychiatric/Behavioral: Negative.    All other systems reviewed and are negative.       All systems reviewed and negative except for those discussed in HPI.       PHYSICAL EXAM    I have reviewed the triage vital signs and nursing notes.    ED Triage Vitals [06/04/20 0001]   Temp Heart Rate Resp BP SpO2   97.4 °F (36.3 °C) 100 20 -- 96 %      Temp src Heart Rate Source Patient Position BP Location FiO2 (%)   Tympanic Monitor -- -- --       Physical Exam   Constitutional: Pt. is oriented to person, place, and time and well-developed, well-nourished, and in  no distress. No distress.   HENT: Normocephalic and atraumatic,  EOM are normal. Pupils are equal, round, and reactive to light. Oropharynx moist/nonerythematous.  Neck: Normal range of motion. Neck supple. No JVD present. No tracheal deviation present. No thyromegaly present.   Cardiovascular: Normal rate, regular rhythm and normal heart sounds. Exam reveals no gallop and no friction rub.   No murmur heard.  Pulmonary/Chest: Effort normal and breath sounds normal. No stridor. No respiratory distress. No wheezes, no rales.   Abdominal: Soft, morbidly obese. Bowel sounds are normal. No distension. There is no tenderness. There is no rebound and no guarding.   Musculoskeletal: The lower extremities exhibit marked severe lymphedema.  There is a large blister over the right distal medial lower extremity that has ruptured and soaked her close.  There is surrounding erythema on this area that is tender to touch.  There is only mild erythema of the left lower extremity.  Pulses are palpable.    Neurological: Pt. is alert and oriented to person, place, and time. Pt. has normal sensation and normal strength. No cranial nerve deficit. GCS score is 15.   Skin: Skin described above. No rash noted. Pt. is not diaphoretic. No erythema.   Psychiatric: Mood, affect and judgment normal.   Nursing note and vitals reviewed.        LAB RESULTS  Recent Results (from the past 24 hour(s))   Comprehensive Metabolic Panel    Collection Time: 06/04/20 12:35 AM   Result Value Ref Range    Glucose 114 (H) 65 - 99 mg/dL    BUN 77 (H) 8 - 23 mg/dL    Creatinine 5.85 (H) 0.57 - 1.00 mg/dL    Sodium 122 (L) 136 - 145 mmol/L    Potassium 3.9 3.5 - 5.2 mmol/L    Chloride 84 (L) 98 - 107 mmol/L    CO2 16.4 (L) 22.0 - 29.0 mmol/L    Calcium 8.8 8.6 - 10.5 mg/dL    Total Protein 7.9 6.0 - 8.5 g/dL    Albumin 3.50 3.50 - 5.20 g/dL    ALT (SGPT) 33 1 - 33 U/L    AST (SGOT) 53 (H) 1 - 32 U/L    Alkaline Phosphatase 111 39 - 117 U/L    Total Bilirubin 1.0  0.2 - 1.2 mg/dL    eGFR Non African Amer 7 (L) >60 mL/min/1.73    eGFR  African Amer      Globulin 4.4 gm/dL    A/G Ratio 0.8 g/dL    BUN/Creatinine Ratio 13.2 7.0 - 25.0    Anion Gap 21.6 (H) 5.0 - 15.0 mmol/L   Lactic Acid, Plasma    Collection Time: 06/04/20 12:35 AM   Result Value Ref Range    Lactate 4.3 (C) 0.5 - 2.0 mmol/L   CBC Auto Differential    Collection Time: 06/04/20 12:35 AM   Result Value Ref Range    WBC 19.31 (H) 3.40 - 10.80 10*3/mm3    RBC 3.79 3.77 - 5.28 10*6/mm3    Hemoglobin 10.7 (L) 12.0 - 15.9 g/dL    Hematocrit 31.5 (L) 34.0 - 46.6 %    MCV 83.1 79.0 - 97.0 fL    MCH 28.2 26.6 - 33.0 pg    MCHC 34.0 31.5 - 35.7 g/dL    RDW 15.0 12.3 - 15.4 %    RDW-SD 44.9 37.0 - 54.0 fl    MPV 9.4 6.0 - 12.0 fL    Platelets 257 140 - 450 10*3/mm3   Manual Differential    Collection Time: 06/04/20 12:35 AM   Result Value Ref Range    Neutrophil % 93.0 (H) 42.7 - 76.0 %    Lymphocyte % 2.0 (L) 19.6 - 45.3 %    Monocyte % 4.0 (L) 5.0 - 12.0 %    Atypical Lymphocyte % 1.0 0.0 - 5.0 %    Neutrophils Absolute 17.96 (H) 1.70 - 7.00 10*3/mm3    Lymphocytes Absolute 0.39 (L) 0.70 - 3.10 10*3/mm3    Monocytes Absolute 0.77 0.10 - 0.90 10*3/mm3    Anisocytosis Mod/2+ None Seen    Polychromasia Mod/2+ None Seen    WBC Morphology Normal Normal    Platelet Morphology Normal Normal       Ordered the above labs and independently reviewed the results.        RADIOLOGY  No Radiology Exams Resulted Within Past 24 Hours    I ordered the above noted radiological studies. Reviewed by me and discussed with radiologist.  See dictation for official radiology interpretation.      PROCEDURES    Procedures      MEDICATIONS GIVEN IN ER    Medications   cefTRIAXone (ROCEPHIN) IVPB 1 g (1 g Intravenous New Bag 6/4/20 0158)   sodium chloride 0.9 % infusion (has no administration in time range)   sodium chloride 0.9% - IBW for BMI > 30 bolus 1,572 mL (1,572 mL Intravenous New Bag 6/4/20 0141)   sodium chloride 0.9 % flush 10 mL (has no  administration in time range)   sodium chloride 0.9 % flush 10 mL (has no administration in time range)   nitroglycerin (NITROSTAT) SL tablet 0.4 mg (has no administration in time range)   sodium chloride 0.9 % infusion (has no administration in time range)   piperacillin-tazobactam (ZOSYN) 3.375 g in iso-osmotic dextrose 50 ml (premix) (has no administration in time range)   piperacillin-tazobactam (ZOSYN) 3.375 g in iso-osmotic dextrose 50 ml (premix) (has no administration in time range)   Pharmacy to dose vancomycin (has no administration in time range)   acetaminophen (TYLENOL) tablet 650 mg (has no administration in time range)     Or   acetaminophen (TYLENOL) 160 MG/5ML solution 650 mg (has no administration in time range)     Or   acetaminophen (TYLENOL) suppository 650 mg (has no administration in time range)   ondansetron (ZOFRAN) injection 4 mg (has no administration in time range)   vancomycin 2500 mg/500 mL 0.9% NS IVPB ([facility]) (has no administration in time range)   sodium chloride 0.9 % bolus 1,000 mL (1,000 mL Intravenous New Bag 6/4/20 0121)         PROGRESS, DATA ANALYSIS, CONSULTS, AND MEDICAL DECISION MAKING    Any/all labs have been independently reviewed by me.  Any/all radiology studies have been reviewed by me and discussed with radiologist dictating the report.   EKG's independently viewed and interpreted by me.  Discussion below represents my analysis of pertinent findings related to patient's condition, differential diagnosis, treatment plan and final disposition.      ED Course as of Jun 04 0211   Thu Jun 04, 2020   0032 Prior record review: The patient has history of group B step bacteremia due to lower extremity cellulitis in the past.     CBC, CMP, lactic acid and a wound culture will be obtained.  Blood cultures will be drawn as well.    [WC]   0113 BUN(!): 77 [WC]   0114 Creatinine(!): 5.85 [WC]   0114 Last BUN and creatinine on epic were 2 years ago and were normal.      [WC]   0114  Elevated   WBC(!): 19.31 [WC]   0114 Elevated   Lactate(!!): 4.3 [WC]   0114 IV Rocephin has been ordered.    [WC]   0114 Blood pressure is on the low side-I am going to order the normal saline sepsis bolus and continue IV fluids..  She will need to be admitted.    [WC]   0125 Case discussed with LETICIA Garcia (on-call for A)-she accepts the patient on behalf of Dr. Smith.    [WC]   0204 BP 99/74.      [WC]   0211 /43.    [WC]      ED Course User Index  [WC] Louie Sutton MD       AS OF 02:11 VITALS:    BP - 99/74  HR - 80  TEMP - 97.4 °F (36.3 °C) (Tympanic)  02 SATS - 96%        DIAGNOSIS  Final diagnoses:   Cellulitis of right anterior lower leg   STEFANIA (acute kidney injury) (CMS/HCC)   Lymphedema   Sepsis, due to unspecified organism, unspecified whether acute organ dysfunction present (CMS/[facility])         DISPOSITION  Admitted           Louie Sutton MD  06/04/20 0212

## 2020-06-04 NOTE — ED NOTES
Pt just mentioned to nurse that she has a h/a now and blurred vision.     Jocelyn Ocampo, RN  06/04/20 0001

## 2020-06-04 NOTE — ED NOTES
Nursing report ED to floor  Emely Solomon  59 y.o.  female    HPI (triage note):   Chief Complaint   Patient presents with   • Diarrhea   • Legs Swollen       Admitting doctor:   Ghanshyam Smith MD    Admitting diagnosis:   The primary encounter diagnosis was Cellulitis of right anterior lower leg. Diagnoses of STEFANIA (acute kidney injury) (CMS/HCC), Lymphedema, and Sepsis, due to unspecified organism, unspecified whether acute organ dysfunction present (CMS/HCC) were also pertinent to this visit.    Code status:   Current Code Status     Date Active Code Status Order ID Comments User Context       6/4/2020 0143 CPR [id number]  Radha Salmeron, APRN ED       Questions for Current Code Status     Question Answer Comment    Code Status CPR     Medical Interventions (Level of Support Prior to Arrest) Full           Allergies:   Patient has no known allergies.    Weight:       06/04/20  0040   Weight: (!) 176 kg (388 lb 12.8 oz)       Most recent vitals:   Vitals:    06/04/20 0119 06/04/20 0120 06/04/20 0125 06/04/20 0130   BP:  (!) 76/33 (!) 81/22 (!) 84/53   BP Location:   Left arm    Patient Position:   Lying    Pulse:    83   Resp:       Temp:       TempSrc:       SpO2: 100%   96%   Weight:       Height:           Active LDAs/IV Access:   Lines, Drains & Airways    Active LDAs     Name:   Placement date:   Placement time:   Site:   Days:    Peripheral IV 06/04/20 0035 Right Antecubital   06/04/20    0035    Antecubital   less than 1    Peripheral IV 06/04/20 0141 Right Wrist   06/04/20    0141    Wrist   less than 1                Labs (abnormal labs have a star):   Labs Reviewed   COMPREHENSIVE METABOLIC PANEL - Abnormal; Notable for the following components:       Result Value    Glucose 114 (*)     BUN 77 (*)     Creatinine 5.85 (*)     Sodium 122 (*)     Chloride 84 (*)     CO2 16.4 (*)     AST (SGOT) 53 (*)     eGFR Non  Amer 7 (*)     Anion Gap 21.6 (*)     All other components within normal limits     Narrative:     GFR Normal >60  Chronic Kidney Disease <60  Kidney Failure <15     LACTIC ACID, PLASMA - Abnormal; Notable for the following components:    Lactate 4.3 (*)     All other components within normal limits   CBC WITH AUTO DIFFERENTIAL - Abnormal; Notable for the following components:    WBC 19.31 (*)     Hemoglobin 10.7 (*)     Hematocrit 31.5 (*)     All other components within normal limits   MANUAL DIFFERENTIAL - Abnormal; Notable for the following components:    Neutrophil % 93.0 (*)     Lymphocyte % 2.0 (*)     Monocyte % 4.0 (*)     Neutrophils Absolute 17.96 (*)     Lymphocytes Absolute 0.39 (*)     All other components within normal limits   BLOOD CULTURE   BLOOD CULTURE   WOUND CULTURE   LACTIC ACID REFLEX TIMER   BASIC METABOLIC PANEL   CBC WITH AUTO DIFFERENTIAL   PROTIME-INR   CBC AND DIFFERENTIAL    Narrative:     The following orders were created for panel order CBC & Differential.  Procedure                               Abnormality         Status                     ---------                               -----------         ------                     CBC Auto Differential[[id number]]        Abnormal            Final result                 Please view results for these tests on the individual orders.       EKG:   No orders to display       Meds given in ED:   Medications   cefTRIAXone (ROCEPHIN) IVPB 1 g (has no administration in time range)   sodium chloride 0.9 % infusion (has no administration in time range)   sodium chloride 0.9 % bolus 1,000 mL (1,000 mL Intravenous New Bag 6/4/20 0121)   sodium chloride 0.9% - IBW for BMI > 30 bolus 1,572 mL (1,572 mL Intravenous New Bag 6/4/20 0141)   sodium chloride 0.9 % flush 10 mL (has no administration in time range)   sodium chloride 0.9 % flush 10 mL (has no administration in time range)   nitroglycerin (NITROSTAT) SL tablet 0.4 mg (has no administration in time range)   sodium chloride 0.9 % infusion (has no administration in time range)    piperacillin-tazobactam (ZOSYN) 3.375 g in iso-osmotic dextrose 50 ml (premix) (has no administration in time range)   piperacillin-tazobactam (ZOSYN) 3.375 g in iso-osmotic dextrose 50 ml (premix) (has no administration in time range)   vancomycin 3000 mg/500 mL 0.9% NS IVPB ([facility]) (has no administration in time range)     And   Pharmacy to dose vancomycin (has no administration in time range)   acetaminophen (TYLENOL) tablet 650 mg (has no administration in time range)     Or   acetaminophen (TYLENOL) 160 MG/5ML solution 650 mg (has no administration in time range)     Or   acetaminophen (TYLENOL) suppository 650 mg (has no administration in time range)   ondansetron (ZOFRAN) injection 4 mg (has no administration in time range)       Imaging results:  No radiology results for the last day    Ambulatory status:   - bedrest    Social issues:   Social History     Socioeconomic History   • Marital status:      Spouse name: Not on file   • Number of children: Not on file   • Years of education: Not on file   • Highest education level: Not on file   Tobacco Use   • Smoking status: Never Smoker   • Smokeless tobacco: Never Used   Substance and Sexual Activity   • Alcohol use: Yes     Comment: caffeine use:1 cup daily.    • Drug use: No   • Sexual activity: Eduard Arce RN  06/04/20 0145

## 2020-06-04 NOTE — PROGRESS NOTES
"Pharmacokinetic Consult - Vancomycin Dosing (Initial Note)    Pharmacy has been consulted to dose vancomycin for Emely Solomon for an indication of sepsis secondary to RLE cellulitis per LUCÍA Salmeron's request. Goal trough:    Duration of Therapy: 5 days    Other Antimicrobials: Zosyn 4.5 g IV q12h (dose optimized based on BMI >40)    Relevant clinical data and objective history reviewed:  59 y.o. female 160 cm (63\") (!) 176 kg (388 lb 12.8 oz)    Vitals:    06/04/20 0200 06/04/20 0210 06/04/20 0213 06/04/20 0421   BP: 99/74 100/43 100/43 (!) 88/61   BP Location:       Pulse: 80 81  80   Resp:    20   Temp:    97.3 °F (36.3 °C)   TempSrc:    Temporal   SpO2: 96% 96%  99%     Creatinine   Date Value Ref Range Status   06/04/2020 5.85 (H) 0.57 - 1.00 mg/dL Final     BUN   Date Value Ref Range Status   06/04/2020 77 (H) 8 - 23 mg/dL Final     Estimated Creatinine Clearance: 16.5 mL/min (A) (by C-G formula based on SCr of 5.85 mg/dL (H)).    Lab Results   Component Value Date    WBC 19.31 (H) 06/04/2020     Temp Readings from Last 3 Encounters:   06/04/20 97.3 °F (36.3 °C) (Temporal)      Baseline culture/source/susceptibility:   6/4: Bcx-pending  6/4: R leg [id number]    Assessment/Plan    1. Will give a vancomycin dose of 2500 mg (~14 mg/kg) IV now, then schedule a 12-hr level for this afternoon at 1530 to determine further dosing (pulse dosing) due to STEFANIA.    2. Will monitor serum creatinine every 24 hours since the patient is on both vancomycin and Zosyn. SCr early this morning was 5.85 (very elevated from normal renal function) before any antibiotics were given. Encourage adequate hydration if appropriate to prevent nephrotoxicities. Monitor for decreased UOP, rash or other signs of vancomycin intolerance.    3. Pharmacy will continue to follow daily while on vancomycin and adjust as needed.     Thank you for allowing me to participate in your patient's care,  Irenesonam Charles Pharm.D., [facility]  Clinical " Staff Pharmacist

## 2020-06-04 NOTE — CONSULTS
Referring Provider: LUCÍA Whitney    Reason for Consultation: sepsis, cellulitis    History of present illness:  Emely Solomon is a 59 y.o. who I am asked to evaluate and give opinion for sepsis, cellulitis. History is obtained from the patient, her , and review of the old medical records which I summarize/synthesize as follows: She has a longstanding history of super obesity and BLE lymphedema. She has been followed at the [facility], [place], operated by [facility] OT lymphedema clinic in the past. Back in November 2017 she had Group B Strep septicemia due to cellulitis and was treated with IV penicillin with full resolution.     She says 2-3 days ago she developed worsening erythema, pain, swelling, and heat in the right leg. Her left leg is at its baseline. She felt alternating sensation of being hot and cold. No alleviating factors. No injury to the leg. She's had a dry cough. No COVID contacts and COVID test was negative.     She came to the ER last night and was afebrile but tachycardic and hypotensive. Labs were notable for WBC 19, Na 122, Crt 5.85 (baseline 0.8) and LA 4.3. She was given ceftriaxone in the ER and then transitioned to vancomycin and Zosyn by admitting team.     ID asked to provide further recommendations.     PMH:  Group B Strep septicemia and cellulitis  Diastolic heart failure  DVT and PE  Lymphedema  Super obesity  ARIEL   Pulmonary HTN  PFO    Past Surgical History:   Procedure Laterality Date   • BRONCHOSCOPY N/A 7/21/2017    Procedure: BRONCHOSCOPY with wash;  Surgeon: Caleb Garcia MD;  Location: [facility] ENDOSCOPY;  Service:    • DILATATION AND CURETTAGE  04/11/2011   • VENA CAVA FILTER PLACEMENT      Inferior Vena Cava Filter Placement w/Fluorosc angiogr guidance       Social History:    Retired from  at [employer], [place], operated by [facility]    Family History:  No 1st degree relatives w/ recurrent cellulitis    Antibiotic allergies and intolerances:  None    Medications:    Current Facility-Administered Medications:   •   acetaminophen (TYLENOL) tablet 650 mg, 650 mg, Oral, Q4H PRN **OR** acetaminophen (TYLENOL) 160 MG/5ML solution 650 mg, 650 mg, Oral, Q4H PRN **OR** acetaminophen (TYLENOL) suppository 650 mg, 650 mg, Rectal, Q4H PRN, Radha Salmeron APRN  •  ipratropium-albuterol (DUO-NEB) nebulizer solution 3 mL, 3 mL, Nebulization, Q6H PRN, Barbra Lee APRN  •  nitroglycerin (NITROSTAT) SL tablet 0.4 mg, 0.4 mg, Sublingual, Q5 Min PRN, Radha Salmeron APRN  •  ondansetron (ZOFRAN) injection 4 mg, 4 mg, Intravenous, Q6H PRN, Radha Salmeron APRN  •  [DISCONTINUED] vancomycin 3000 mg/500 mL 0.9% NS IVPB ([facility]), 20 mg/kg, Intravenous, Once **AND** Pharmacy to dose vancomycin, , Does not apply, Continuous PRN, Radha Salmeron APRN  •  piperacillin-tazobactam (ZOSYN) 4.5 g in iso-osmotic dextrose 100 mL IVPB (premix), 4.5 g, Intravenous, Q12H, Radha Salmeron APRN  •  sodium chloride 0.9 % flush 10 mL, 10 mL, Intravenous, Q12H, Radha Salmeron APRN, 10 mL at 06/04/20 0809  •  sodium chloride 0.9 % flush 10 mL, 10 mL, Intravenous, PRN, Radha Salmeron APRN  •  sodium chloride 0.9 % infusion, 125 mL/hr, Intravenous, Continuous, Radha Salmeron APRN, Last Rate: 125 mL/hr at 06/04/20 0320, 125 mL/hr at 06/04/20 0320  •  Vancomycin Pharmacy Intermittent Dosing, , Does not apply, Daily, Radha Salmeron APRN    Review of Systems  All systems were reviewed and are negative unless otherwise stated above in the HPI    Objective   Vital Signs   Temp:  [97.3 °F (36.3 °C)-97.4 °F (36.3 °C)] 97.3 °F (36.3 °C)  Heart Rate:  [] 86  Resp:  [20] 20  BP: ()/(20-74) 104/43    Physical Exam:   General: awake, alert, very nice   Head: Normocephalic, atraumatic  Eyes: Pupils reactive, no scleral icterus  ENT: MMM, OP clear, no thrush  Neck: Supple  Cardiovascular: NR, RR, no murmurs, massive BLE lymphedema  Respiratory: Lungs with faint wheeze, coughing; on 3-4L NC  GI: Abdomen is obese but soft,  non-tender  : no Salvador catheter present  Musculoskeletal: normal musculature  Skin: right lower extremity is hot, red, with a superficial ulceration; no purulent drainage; left lower extremity has venous stasis changes  Neurological: Alert and oriented x 3  Psychiatric: Normal mood and affect   Vasc: no cyanosis; PIV w/o erythema    Labs:     Lab Results   Component Value Date    WBC 15.89 (H) 06/04/2020    HGB 9.8 (L) 06/04/2020    HCT 28.9 (L) 06/04/2020    MCV 81.2 06/04/2020     06/04/2020       Lab Results   Component Value Date    GLUCOSE 100 (H) 06/04/2020    BUN 80 (H) 06/04/2020    CREATININE 5.69 (H) 06/04/2020    EGFRIFNONA 8 (L) 06/04/2020    EGFRIFAFRI  06/04/2020      Comment:      <15 Indicative of kidney failure.    BCR 14.1 06/04/2020    CO2 16.8 (L) 06/04/2020    CALCIUM 8.0 (L) 06/04/2020    ALBUMIN 3.50 06/04/2020    AST 53 (H) 06/04/2020    ALT 33 06/04/2020     No results found for: HGBA1C    Microbiology:  6/4 BCx: pending  6/4 Wound Cx Right Leg: pending; gram stain negative  6/4 COVID: negative    Radiology (personally reviewed images and report):  CXR with cardiomegaly and vascular congestion; patchy perihilar densities could be asymmetric edema or pneumonia per radiology    Assessment/Plan   1. Sepsis due to right lower extremity cellulitis  2. Chronic lymphedema  3. Super obesity  3. Acute renal failure  4. Hyponatremia  5. History of DVT in LLE    I recommend adjusting antibiotic to ceftriaxone 2 g IV q24h. This will provide broad coverage for common skin and soft tissue pathogens without the potential nephrotoxic effects of vancomycin and Zosyn. She has no history reported or documented history of MRSA. I think there's a very good chance that Group B Strep could be the pathogen as it was back in 2017. Follow-up blood cultures. Repeat CBC and BMP in AM. I'll follow-up cardiology and nephrology recommendations. I agree with wound care consultation. Given the large size of her  leg, this is likely something that is going to take 1-2 weeks to see substantial improvement.     Thank you for this consult. ID will follow.

## 2020-06-04 NOTE — ED NOTES
I wore full protective equipment throughout this patient encounter including a face mask, eye shield and gloves. Hand hygiene/washing of hands was performed before donning protective equipment and after removal when leaving the room.     Katherine Arevalo RN  06/04/20 0210

## 2020-06-04 NOTE — CONSULTS
General Surgery Consultation    Consulting Physician: Radha Bashir MD  Referring Physician: Adryan King MD    Reason for consultation: Possible necrotizing fasciitis of right leg    CC: Right leg erythema    HPI:   The patient is a very pleasant 59 y.o. female that presented to the hospital emergency room today for right leg pain and worsening erythema that began within the last few days.  She has chronic lymphedema and always has some rickey erythematous discoloration of her bilateral ankles but she noticed a couple of days ago that the erythema began spreading more proximally up the right calf to the level of the knee.  She then developed a large blister/bulla of the medial right calf that popped and has had serous fluid discharge.  She came to the emergency room and was admitted to a telemetry floor but quickly transferred to the intensive care unit due to hypotension and cold/clammy appearance.  Since transferring to the ICU, her blood pressure has been stable although difficult to obtain given her morbid obesity.  Her heart rate is stable in the 80s and she is awake, alert, and pleasantly conversant.  She says that she has never had symptoms like this before and states that her right leg is mildly tender to the touch mostly along the posterior ankle.  She can wiggle her toes and has full sensation in the bilateral lower extremities.  She just had a CT of the abdomen/pelvis, which demonstrated bulky lymphadenopathy within the right inguinal region concerning for a distal severe infection.  I was consulted to evaluate for any signs of necrotizing fasciitis.  She is currently in acute renal failure with profound hyponatremia, although her creatinine and sodium levels are improving with IV fluid resuscitation.    Past Medical History:  Lymphedema  Hypertension  Morbid obesity  History of DVT/PE  History of embolism to the left lower extremity through PFO  Pulmonary hypertension  Patent foramen ovale    Past  Surgical History:  Dilation and curettage  IVC filter placement  Bronchoscopy    Medications:  Medications Prior to Admission   Medication Sig Dispense Refill Last Dose   • acetaminophen (TYLENOL) 325 MG tablet Take 2 tablets by mouth Every 6 (Six) Hours As Needed for Mild Pain  (pain or symptomatic fever).   Taking   • furosemide (LASIX) 40 MG tablet Take 1 tablet by mouth 3 (Three) Times a Week. 45 tablet 3    • hydroCHLOROthiazide (HYDRODIURIL) 25 MG tablet Take 25 mg by mouth Daily.      • ipratropium-albuterol (DUO-NEB) 0.5-2.5 mg/3 ml nebulizer ipratropium 0.5 mg-albuterol 3 mg (2.5 mg base)/3 mL nebulization soln   Taking   • losartan (COZAAR) 50 MG tablet Take 100 mg by mouth Daily.      • metoprolol succinate XL (TOPROL-XL) 50 MG 24 hr tablet Take 1 tablet by mouth 2 (Two) Times a Day.   Taking   • oxybutynin (DITROPAN) 5 MG tablet Take 5 mg by mouth Daily.      • rivaroxaban (XARELTO) 20 MG tablet Take 1 tablet by mouth Daily. 90 tablet 3        Allergies: No known drug allergies    Social History: , non-smoker, no regular alcohol use    Family History: No family history of gastrointestinal malignancy, skin malignancy, etc. in her parents or siblings    Review of Systems:  Constitutional: Positive for chronic weight gain; denies any fevers or chills.  Eyes: denies blurred or double vision; denies scleral icterus  Cardiovascular: denies chest pain, palpitations, edemas.  Respiratory: Positive for shortness of breath this morning that has since resolved; denies cough, sputum, or wheezing  Gastrointestinal:  denies nausea, vomiting, abdominal pain, diarrhea, or constipation  Genitourinary: Positive for oliguria and trouble initiating urination today; denies dysuria or hematuria.  Endocrine: denies cold intolerance, lethargy and flushing.  Hematologic: denies excessive bruising or bleeding.  Musculoskeletal: Positive for lymphedema of the bilateral lower extremities (chronic) as well as acute difficulty  with ambulation; denies joint stiffness.  Neurologic: denies seizures, CVA, paresthesia, or peripheral neuropathy.   Skin: Positive for erythema of the bilateral lower extremities, right greater than left     All other systems reviewed and were negative.    Physical Exam:   Vitals:    06/04/20 1700   BP: 96/49   Pulse: 89   Resp: 22   Temp:    SpO2: 98%     Height: 160 cm  Weight: 172 kg  BMI: 67  GENERAL: awake and alert, no acute distress, oriented to person, place, and time  HEENT: normocephalic, atraumatic, no scleral icterus, moist mucous membranes.  NECK: Supple, there is no thyromegaly or lymphadenopathy  RESPIRATORY: clear to auscultation, no wheezes, rales or rhonchi, symmetric air entry  CARDIOVASCULAR: regular rate and rhythm    GASTROINTESTINAL: soft, morbidly obese, nontender, nondistended  MUSCULOSKELETAL: bilateral legs with significant chronic lymphedema and chronic skin thickening as well as rickey induration along the ankles and lower calves, there is blanching erythema of the right foot and lower calf with a medial large blister that has popped and there is serous fluid on the overlying gauze, the erythema extends along her posterior right leg to her mid thigh but is nonblanching posteriorly most consistent with pressure changes from laying supine, the is no palpable crepitus in the right leg, pulses are not palpable due to the pitting edema bilaterally from her lymphedema  NEUROLOGIC: alert and oriented, normal speech, cranial nerves 2-12 grossly intact, no focal deficits   SKIN: Moist, warm, no rashes, no jaundice.      Diagnostic workup:     Pertinent labs:   Results from last 7 days   Lab Units 06/04/20  0434 06/04/20  0035   WBC 10*3/mm3 15.89* 19.31*   HEMOGLOBIN g/dL 9.8* 10.7*   HEMATOCRIT % 28.9* 31.5*   PLATELETS 10*3/mm3 215 257     Results from last 7 days   Lab Units 06/04/20  1530 06/04/20  0434 06/04/20  0035   SODIUM mmol/L 127* 124* 122*   POTASSIUM mmol/L 3.6 3.5 3.9   CHLORIDE  mmol/L 91* 88* 84*   CO2 mmol/L 17.9* 16.8* 16.4*   BUN mg/dL 78* 80* 77*   CREATININE mg/dL 5.05* 5.69* 5.85*   CALCIUM mg/dL 8.2* 8.0* 8.8   BILIRUBIN mg/dL  --   --  1.0   ALK PHOS U/L  --   --  111   ALT (SGPT) U/L  --   --  33   AST (SGOT) U/L  --   --  53*   GLUCOSE mg/dL 90 100* 114*       IMAGING:  CT ABD/PELVIS:  CONCLUSION:  1. No obstructive uropathy, the bladder is moderately distended without intraluminal filling defect.  2. There is right inguinal lymphadenopathy with edema/phlegmon along the common femoral region of the upper thigh extending to the external iliac chain where there is a vague, sizable masslike area of phlegmon or adenopathy or ill-defined tumor enveloping the external iliac artery and vein. Favor phlegmon, the appearance is worrisome for potential necrotizing fasciitis.    I personally have reviewed the above imaging and found the following additional findings: The CT abdomen/pelvis does demonstrate some bulky lymphadenopathy within the right groin, most likely reactive to the severe cellulitis of the right leg.  I see no sign of gas in her soft tissues within the groin    Assessment and plan:     The patient is a 59 y.o. female with severe cellulitis of the right leg, with no current signs of necrotizing fasciitis on exam although the exam is significantly limited by her morbid obesity and lower extremity lymphedema.  I have ordered a stat CT of the right lower extremity to assess for any gas pockets within the subcutaneous tissues of the right lower extremity.  If there are none, this likely represents a severe case of cellulitis which is already responding well to IV antibiotics and fluid resuscitation.  Her hyponatremia, acidosis, and elevated lactate are all improving with IV fluids.  I will follow-up on the CT results tonight and if there is any sign of gas accumulation in the soft tissues of the right leg I will plan for wide debridement of any devitalized tissues.  She  understands that this operation would carry a significantly high morbidity due to her underlying lymphedema and significant impedance to healing the lymphedema creates.    Radha Bashir MD  General and Endoscopic Surgery  [facility]    [address], [address]  [place], [place], [address]  P: [phone]  F: [phone]

## 2020-06-04 NOTE — ED NOTES
Lab notified to collect second set of blood cultures. Lab acknowledged orders at this time.      Tapia, Eduard, RN  06/04/20 0114

## 2020-06-04 NOTE — PROGRESS NOTES
"Pharmacokinetic Consult - Vancomycin Dosing    Emely Solomon is a 59 y.o. female who is on day 1 pharmacy to dose vancomycin for sepsis, right lower extremity cellulitis.  Pharmacy dosing vancomycin per Dr. Flores's request.   Other antimicrobials: Ceftrriaxone 2 g q24h, clindamycin 900 mg q8h  Goal trough: 15-20 mg/L    Current Vancomycin dose: Vancomycin pulse dosing    CC:   Sepsis (CMS/HCC)    Chronic diastolic CHF (congestive heart failure) (CMS/HCC)    PFO (patent foramen ovale)    Hypertension    Pulmonary hypertension (CMS/HCC)    ARIEL (obstructive sleep apnea)    Morbid obesity (CMS/HCC)    History of DVT of lower extremity    Cellulitis of right anterior lower leg    Lymphedema    Cellulitis of right lower extremity    Hyponatremia    Acute renal failure (ARF) (CMS/HCC)    Anemia, chronic disease      Relevant clinical data and objective history reviewed:  160 cm (63\")  (!) 172 kg (379 lb)  Body mass index is 67.14 kg/m².     She has a past medical history of Cellulitis, Chronic diastolic congestive heart failure (CMS/HCC), Deep venous thrombosis (CMS/HCC), Hypertension, Lipoedema, Lymphedema, Morbid obesity (CMS/HCC), Paradoxical embolism (CMS/HCC), PFO (patent foramen ovale), Pulmonary embolism (CMS/HCC), and Pulmonary hypertension (CMS/HCC).    Allergies as of 06/03/2020   • (No Known Allergies)     Vital Signs (last 24 hours)       06/03 0700  -  06/04 0659 06/04 0700  -  06/04 1615   Most Recent    Temp (°F) 97.3 -  97.4      98.8     98.8 (37.1)    Heart Rate 79 -  100    77 -  87     85    Resp   20    18 -  22     22    BP 73/64 -  100/43    82/45 -  104/43     96/48    SpO2 (%) 94 -  100    97 -  100     100        Estimated Creatinine Clearance: 16.6 mL/min (A) (by C-G formula based on SCr of 5.69 mg/dL (H)).      Baseline culture/source/susceptibility:   Microbiology Results (last 10 days)     Procedure Component Value - Date/Time    COVID PRE-OP / PRE-PROCEDURE SCREENING ORDER (NO " ISOLATION) - Swab, Nasopharynx [[id number]] Collected:  20 0550    Lab Status:  Final result Specimen:  Swab from Nasopharynx Updated:  20    Narrative:       The following orders were created for panel order COVID PRE-OP / PRE-PROCEDURE SCREENING ORDER (NO ISOLATION) - Swab, Nasopharynx.  Procedure                               Abnormality         Status                     ---------                               -----------         ------                     COVID-19,[facility]-HOUSE...[[id number]]  Normal              Final result                 Please view results for these tests on the individual orders.    COVID-19,[facility], NP SWAB IN TRANSPORT MEDIA 8-12 HR TAT - Swab, Nasopharynx [[id number]]  (Normal) Collected:  20 0550    Lab Status:  Final result Specimen:  Swab from Nasopharynx Updated:  20     COVID19 Not Detected    Wound Culture - Swab, Leg, Right [[id number]] Collected:  20 0049    Lab Status:  Preliminary result Specimen:  Swab from Leg, Right Updated:  20 0409     Gram Stain No WBCs or organisms seen      : BC x 2 in process, wound cx NGTD    Imagin/4   CT abdomen:  FINDINGS: No obstructive uropathy is demonstrated. The left kidney is  small in size similar to the previous examination. Compensatory  hypertrophy of the right kidney. No right or left sided renal calculus.  The ureters are satisfactory in course and caliber. Urinary bladder is  moderately distended. No intraluminal filling defect.     The uterus is enlarged for the patient's age measuring 11 cm transverse  and at least 7.8 cm AP. Size and shape is quite similar to the previous  examination. Suspect underlying fibroids. No adnexal abnormality.     The right inguinal lymph nodes are enlarged compared to the previous  examination. There is vague subcutaneous edema in the common femoral  region extending to the external iliac chain. At this location there is  an ill-defined  masslike density enveloping the external iliac artery and  vein. This area measures approximately 10 cm AP, 5.4 cm transverse and  approximately 8 cm craniocaudal. This could simply represent focal  edema/phlegmon, adenopathy or ill-defined tumor. The enlarged left  inguinal lymph nodes seen on the previous examination have diminished in  size within the interim.     There is a sliding-type small hiatal hernia. There is cardiac  enlargement. The stomach is collapsed, its contour is within normal  limits. The pancreas is satisfactory in appearance. The liver and spleen  have a satisfactory appearance. The gallbladder is unremarkable, no  biliary duct dilatation. Diameter of the aorta is within normal limits.  No free air or free intraperitoneal fluid. Caliber the large and small  bowel is normal. No bowel wall thickening or induration of the mesentery  to suggest an inflammatory process.    TTE:  · Left ventricular wall thickness is consistent with mild concentric hypertrophy.  · Left ventricular systolic function is normal.  · Calculated EF = 64.5%.  · Right ventricular cavity is moderately dilated.  · Right atrial cavity size is mild-to-moderately dilated.  · Mild tricuspid valve regurgitation is present.  · There is calcification of the aortic valve.  · Severe pulmonary hypertension is present.      Labs:  Results from last 7 days   Lab Units 06/04/20  0434 06/04/20  0035   CREATININE mg/dL 5.69* 5.85*     Results from last 7 days   Lab Units 06/04/20  0434 06/04/20  0035   WBC 10*3/mm3 15.89* 19.31*   HEMOGLOBIN g/dL 9.8* 10.7*   PLATELETS 10*3/mm3 215 257   LACTATE mmol/L 1.3 4.3*           Recent Vancomycin dosing history:  6/4: Vancomycin 2500 mg (~14 mg/kg) loading dose @0320      No results found for: VANCOTROUGH  No results found for: VANCORANDOM    Assessment/Plan  It was noted that the patient has history of Group B Strep septicemia due to cellulitis (2017) and that patient's presentation this  hospitalization may paint a similar clinical picture. Patient's baseline Scr is 0.8 - 1.1 mg/dL and today it is 5.05 mg/dL. Vancomycin pulse dosing has been initiated since the patient is in acute renal failure. Given the severity of the patient's infection, I have ordered a random ~13 hour vancomycin level to be drawn today at 1630 to assess the clearance of her vancomycin loading dose that was given this morning. If the random level is >20 mcg/mL which I suspect that it will be, a ~24 hour level will be drawn with AM labs to guide further dosing requirements.     1) Vancomycin intermittent pulse dosing    2) Will monitor serum creatinine at least every 24h for first 72h followed by at least q48 hours per dosing recommendations.      3) Encourage hydration as allowed by MD to help prevent toxic accumulation; monitor for s/sxn of toxicity including increase in SCr and decrease in UOP.    Pharmacy will continue to follow daily while on vancomycin and adjust as needed.     Thanks, Usha Castro, PharmD

## 2020-06-04 NOTE — CONSULTS
Patient Name: Emely Solomon  :1960  59 y.o.    Date of Admission: 2020  Date of Consultation:  20  Encounter Provider: Neena Rosenthal MD  Place of Service: [facility] CARDIOLOGY  Referring Provider: Ghanshyam Smith MD  Patient Care Team:  RENAY Smith MD as PCP - General (General Practice)      Chief complaint: Sepsis    Reason for Consult:  CHF    History of Present Illness:    This is a 59-year-old patient follows Dr. Rogers.  She has a history of congestive heart failure, hypertension and patent foramen ovale.  She has had previous DVT and pulmonary embolism for which she is on rivaroxaban.      She had a DVT and pulmonary embolism in .  She developed a paradoxical arterial embolus to her left lower extremity requiring thrombectomy and this is when her PFO was diagnosed.  She was initially treated with warfarin but her warfarin levels were subtherapeutic and she had recurrent pulmonary embolism.  That is when she was placed on rivaroxaban.  At one point she had an IVC filter placed which has subsequently been removed.      She is on Lasix for congestive heart failure but she does not take this regularly due to urinary frequency.  She has chronic lower extremity swelling due to lymphedema.  She was evaluated in the office 2020 was doing well.  No changes were made at that time.    She was admitted on 2020 with bilateral leg redness and worsening short windedness.  She had been taking her medications as directed.  In the past, she has had bacteremia due to cellulitis.  On arrival, her creatinine was 5.5, BUN 77, white count 19, lactate 4.3.  Her blood pressure systolic was 80-90 and she was given IV fluid.  She was admitted to the floor and had rapid response called her this morning due to hypotension.  Her systolic blood pressure was as low as 65 mmHg.  She was cold and clammy.  She got 1.5 L of IV fluid in the emergency department.  She  has been started on IV antibiotics.  Chest x-ray shows cardiomegaly and vascular congestion.  There are patchy perihilar densities.  Her COVID-19 is negative.  Her blood and wound cultures are pending.      We were then consulted for heart failure and hypotension.  She is being transferred to the intensive care unit.  She has had normal renal function in the past, last creatinine 2 years ago.    She has been having progressive weakness and short windedness.  In addition she is noticed her legs becoming more swollen and erythematous.  In addition she had a cough at home which seem to be getting worse.  She denied fevers, chills, vomiting or diarrhea.  Her  was urging her to come to the hospital but she did not want to come finally when she got here, it appeared that she was septic.    She had an echocardiogram done today which shows ejection fraction 65% but RVSP 65 mmHg right ventricular dilation and normal function and right atrial dilation.  There was no pericardial effusion or valve disease.     Previous Cardiac Testing  ECHO 01/2016          Past Medical History:   Diagnosis Date   • Cellulitis     11/2017, with Group B Strep bacteremia and sepsis   • Chronic diastolic congestive heart failure (CMS/HCC)    • Deep venous thrombosis (CMS/HCC)    • Hypertension    • Lipoedema    • Lymphedema    • Morbid obesity (CMS/HCC)    • Paradoxical embolism (CMS/HCC)     to the LLE due to DVT/PE and PFO   • PFO (patent foramen ovale)    • Pulmonary embolism (CMS/HCC)    • Pulmonary hypertension (CMS/HCC)     multifactorial (dCHF, obesity/ARIEL, hx PE), mild by echo 1/2016       Past Surgical History:   Procedure Laterality Date   • BRONCHOSCOPY N/A 7/21/2017    Procedure: BRONCHOSCOPY with wash;  Surgeon: Caleb Garcia MD;  Location: [facility] ENDOSCOPY;  Service:    • DILATATION AND CURETTAGE  04/11/2011   • VENA CAVA FILTER PLACEMENT      Inferior Vena Cava Filter Placement w/Fluorosc angiogr guidance         Prior to  Admission medications    Medication Sig Start Date End Date Taking? Authorizing Provider   acetaminophen (TYLENOL) 325 MG tablet Take 2 tablets by mouth Every 6 (Six) Hours As Needed for Mild Pain  (pain or symptomatic fever). 11/15/17  Yes Ghanshyam Smith MD   furosemide (LASIX) 40 MG tablet Take 1 tablet by mouth 3 (Three) Times a Week. 3/13/20  Yes Brennan Rogers MD   hydroCHLOROthiazide (HYDRODIURIL) 25 MG tablet Take 25 mg by mouth Daily.   Yes Jerome Hancock MD   ipratropium-albuterol (DUO-NEB) 0.5-2.5 mg/3 ml nebulizer ipratropium 0.5 mg-albuterol 3 mg (2.5 mg base)/3 mL nebulization soln   Yes Jerome Hancock MD   losartan (COZAAR) 50 MG tablet Take 100 mg by mouth Daily.   Yes Jerome Hancock MD   metoprolol succinate XL (TOPROL-XL) 50 MG 24 hr tablet Take 1 tablet by mouth 2 (Two) Times a Day. 3/13/13  Yes Bhanu Corbin MD   oxybutynin (DITROPAN) 5 MG tablet Take 5 mg by mouth Daily.   Yes Jerome Hancock MD   rivaroxaban (XARELTO) 20 MG tablet Take 1 tablet by mouth Daily. 3/12/20  Yes Brennan Rogers MD   losartan-hydrochlorothiazide (HYZAAR) 100-25 MG per tablet Take  by mouth Daily. 5/2/18 6/4/20  Jerome Hancock MD       No Known Allergies    Social History     Socioeconomic History   • Marital status:      Spouse name: Not on file   • Number of children: Not on file   • Years of education: Not on file   • Highest education level: Not on file   Tobacco Use   • Smoking status: Never Smoker   • Smokeless tobacco: Never Used   Substance and Sexual Activity   • Alcohol use: Yes     Comment: caffeine use:1 cup daily.    • Drug use: No   • Sexual activity: Defer       Family History   Problem Relation Age of Onset   • Hypertension Other        REVIEW OF SYSTEMS:   All systems reviewed.  Pertinent positives identified in HPI.  All other systems are negative.      Objective:     Vitals:    06/04/20 0551 06/04/20 0553 06/04/20 0554 06/04/20 0644   BP:  95/50     BP  Location:  Left arm     Patient Position:       Pulse: 85  83    Resp:       Temp:       TempSrc:       SpO2: 96%  95%    Weight:    (!) 172 kg (378 lb 12 oz)   Height:         Body mass index is 67.09 kg/m².    General Appearance:    Alert, cooperative, in no acute distress   Head:    Normocephalic, without obvious abnormality, atraumatic   Eyes:            Lids and lashes normal, conjunctivae and sclerae normal, no icterus, no pallor, corneas clear   Ears:    Ears appear intact with no abnormalities noted   Throat:   No oral lesions, no thrush, oral mucosa moist   Neck:  Cannot see JVP due to obesity       Lungs:    Expiratory rhonchi, respirations regular, even and unlabored    Heart:    Regular rhythm and normal rate, normal S1 and S2, 2/6 ARIELLE at right upper sternal border, no gallop, no rub, no click   Chest Wall:    No abnormalities observed   Abdomen:     Normal bowel sounds, no masses, no organomegaly, soft, nontender, nondistended, no guarding, no rebound  tenderness   Extremities:   Moves all extremities well, 2+ edema edema, no cyanosis, bilateral lower extremity erythema   Pulses:   Pulses palpable and equal bilaterally. Normal radial, carotid, dorsalis pedis and posterior tibial pulses bilaterally.    Skin:  Psychiatric:   No bleeding, bruising or rash    Alert and oriented x 3, normal mood and affect   Lab Review:     Results from last 7 days   Lab Units 06/04/20 0434 06/04/20  0035   SODIUM mmol/L 124* 122*   POTASSIUM mmol/L 3.5 3.9   CHLORIDE mmol/L 88* 84*   CO2 mmol/L 16.8* 16.4*   BUN mg/dL 80* 77*   CREATININE mg/dL 5.69* 5.85*   CALCIUM mg/dL 8.0* 8.8   BILIRUBIN mg/dL  --  1.0   ALK PHOS U/L  --  111   ALT (SGPT) U/L  --  33   AST (SGOT) U/L  --  53*   GLUCOSE mg/dL 100* 114*         Results from last 7 days   Lab Units 06/04/20 0434   WBC 10*3/mm3 15.89*   HEMOGLOBIN g/dL 9.8*   HEMATOCRIT % 28.9*   PLATELETS 10*3/mm3 215     Results from last 7 days   Lab Units 06/04/20 0434   INR  2.02*                      Telemetry      EKG baseline    I personally viewed and interpreted the patient's EKG/Telemetry data.        Assessment and Plan:       1. Sepsis syndrome likely due to cellulitis.  2. Cellulitis bilateral lower extremities.  3. Acute kidney injury.  4. Hypotension.  5. 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  6. Morbid obesity  7. Chronic right heart failure with pulmonary hypertension likely due to previous pulmonary emboli.      Would continue IV fluid to maintain her pressure.  We will treat her underlying infection.  Her cardiac status right now is stable.  Will follow.    Neena Rosenthal MD  06/04/20  10:02

## 2020-06-04 NOTE — CONSULTS
Referring Provider: A  Reason for Consultation: STEFANIA    Subjective     Chief complaint   Chief Complaint   Patient presents with   • Diarrhea   • Legs Swollen       History of present illness:  59 year old WF with severe morbid obesity and trouble walking due to massive lymphedema, diastolic dysfunction, hypertension, history of DVT, pulmonary hypertension, history of streptococcal cellulitis with sepsis 11/2017.  She has had very little routine health care in the past several years.  Her  brought her to the hospital due to weakness and lethargy and decreased mobility over baseline.  She is mostly chair bound at baseline, but it had worsened over the past few months.  She has evidence today of severe cellulitis.  Due to her severe STEFANIA she under went noncontrast CT abd and no hydronephrosis was found but she does have a phlegmon worrisome for necrotizing fasciitis.  She has been moved to the ICU due to hypotension and confusion, vanc/zosyn and rocephin given.  IVF going and she has made 200cc uop.    Past Medical History:   Diagnosis Date   • Cellulitis     11/2017, with Group B Strep bacteremia and sepsis   • Chronic diastolic congestive heart failure (CMS/HCC)    • Deep venous thrombosis (CMS/HCC)    • Hypertension    • Lipoedema    • Lymphedema    • Morbid obesity (CMS/HCC)    • Paradoxical embolism (CMS/HCC)     to the LLE due to DVT/PE and PFO   • PFO (patent foramen ovale)    • Pulmonary embolism (CMS/HCC)    • Pulmonary hypertension (CMS/HCC)     multifactorial (dCHF, obesity/ARIEL, hx PE), mild by echo 1/2016     Past Surgical History:   Procedure Laterality Date   • BRONCHOSCOPY N/A 7/21/2017    Procedure: BRONCHOSCOPY with wash;  Surgeon: Caleb Garcia MD;  Location: [facility] ENDOSCOPY;  Service:    • DILATATION AND CURETTAGE  04/11/2011   • VENA CAVA FILTER PLACEMENT      Inferior Vena Cava Filter Placement w/Fluorosc angiogr guidance     Family History   Problem Relation Age of Onset   • Hypertension  "Other      Lives with  and used to work in the [employer].  Social History     Tobacco Use   • Smoking status: Never Smoker   • Smokeless tobacco: Never Used   Substance Use Topics   • Alcohol use: Yes     Comment: caffeine use:1 cup daily.    • Drug use: No     Medications Prior to Admission   Medication Sig Dispense Refill Last Dose   • acetaminophen (TYLENOL) 325 MG tablet Take 2 tablets by mouth Every 6 (Six) Hours As Needed for Mild Pain  (pain or symptomatic fever).   Taking   • furosemide (LASIX) 40 MG tablet Take 1 tablet by mouth 3 (Three) Times a Week. 45 tablet 3    • hydroCHLOROthiazide (HYDRODIURIL) 25 MG tablet Take 25 mg by mouth Daily.      • ipratropium-albuterol (DUO-NEB) 0.5-2.5 mg/3 ml nebulizer ipratropium 0.5 mg-albuterol 3 mg (2.5 mg base)/3 mL nebulization soln   Taking   • losartan (COZAAR) 50 MG tablet Take 100 mg by mouth Daily.      • metoprolol succinate XL (TOPROL-XL) 50 MG 24 hr tablet Take 1 tablet by mouth 2 (Two) Times a Day.   Taking   • oxybutynin (DITROPAN) 5 MG tablet Take 5 mg by mouth Daily.      • rivaroxaban (XARELTO) 20 MG tablet Take 1 tablet by mouth Daily. 90 tablet 3      Allergies:  Patient has no known allergies.    Review of Systems  Limited due to confusion and sleepiness.      Objective     Vital Signs  Temp:  [97.3 °F (36.3 °C)-98.8 °F (37.1 °C)] 98.8 °F (37.1 °C)  Heart Rate:  [] 89  Resp:  [18-22] 22  BP: ()/(20-79) 96/49    Flowsheet Rows      First Filed Value   Admission Height  160 cm (63\") Documented at 06/04/2020 0001   Admission Weight  (!) 176 kg (388 lb 12.8 oz) Documented at 06/04/2020 0040           I/O this shift:  In: 480 [P.O.:480]  Out: -   No intake/output data recorded.    Intake/Output Summary (Last 24 hours) at 6/4/2020 1744  Last data filed at 6/4/2020 0940  Gross per 24 hour   Intake 480 ml   Output --   Net 480 ml       Physical Exam:   General Appearance: alert, oriented x 3, no acute distress, a " little confused and sleepy.  Skin: warm and dry  HEENT: pupils round and reactive to light, oral mucosa normal,   Neck: supple, no JVD, trachea midline  Lungs: diminished, unlabored breathing effort  Heart: RRR, normal S1 and S2, no S3, no rub  Abdomen: soft, non-tender,  present bowel sounds to auscultation  : no palpable bladder,  Extremities: massive legs with anasarca, redness bilaterally but worse on the right. cyanosis or clubbing  Neuro: normal speech and mental status      Results Review:  Results for orders placed or performed during the hospital encounter of 06/04/20   Wound Culture - Swab, Leg, Right   Result Value Ref Range    Gram Stain No WBCs or organisms seen    COVID-19,[facility]-HOUSE, NP SWAB IN TRANSPORT MEDIA 8-12 HR TAT - Swab, Nasopharynx   Result Value Ref Range    COVID19 Not Detected Not Detected - Ref. Range   Comprehensive Metabolic Panel   Result Value Ref Range    Glucose 114 (H) 65 - 99 mg/dL    BUN 77 (H) 8 - 23 mg/dL    Creatinine 5.85 (H) 0.57 - 1.00 mg/dL    Sodium 122 (L) 136 - 145 mmol/L    Potassium 3.9 3.5 - 5.2 mmol/L    Chloride 84 (L) 98 - 107 mmol/L    CO2 16.4 (L) 22.0 - 29.0 mmol/L    Calcium 8.8 8.6 - 10.5 mg/dL    Total Protein 7.9 6.0 - 8.5 g/dL    Albumin 3.50 3.50 - 5.20 g/dL    ALT (SGPT) 33 1 - 33 U/L    AST (SGOT) 53 (H) 1 - 32 U/L    Alkaline Phosphatase 111 39 - 117 U/L    Total Bilirubin 1.0 0.2 - 1.2 mg/dL    eGFR Non African Amer 7 (L) >60 mL/min/1.73    eGFR  African Amer      Globulin 4.4 gm/dL    A/G Ratio 0.8 g/dL    BUN/Creatinine Ratio 13.2 7.0 - 25.0    Anion Gap 21.6 (H) 5.0 - 15.0 mmol/L   Lactic Acid, Plasma   Result Value Ref Range    Lactate 4.3 (C) 0.5 - 2.0 mmol/L   CBC Auto Differential   Result Value Ref Range    WBC 19.31 (H) 3.40 - 10.80 10*3/mm3    RBC 3.79 3.77 - 5.28 10*6/mm3    Hemoglobin 10.7 (L) 12.0 - 15.9 g/dL    Hematocrit 31.5 (L) 34.0 - 46.6 %    MCV 83.1 79.0 - 97.0 fL    MCH 28.2 26.6 - 33.0 pg    MCHC 34.0 31.5 - 35.7 g/dL     RDW 15.0 12.3 - 15.4 %    RDW-SD 44.9 37.0 - 54.0 fl    MPV 9.4 6.0 - 12.0 fL    Platelets 257 140 - 450 10*3/mm3   Manual Differential   Result Value Ref Range    Neutrophil % 93.0 (H) 42.7 - 76.0 %    Lymphocyte % 2.0 (L) 19.6 - 45.3 %    Monocyte % 4.0 (L) 5.0 - 12.0 %    Atypical Lymphocyte % 1.0 0.0 - 5.0 %    Neutrophils Absolute 17.96 (H) 1.70 - 7.00 10*3/mm3    Lymphocytes Absolute 0.39 (L) 0.70 - 3.10 10*3/mm3    Monocytes Absolute 0.77 0.10 - 0.90 10*3/mm3    Anisocytosis Mod/2+ None Seen    Polychromasia Mod/2+ None Seen    WBC Morphology Normal Normal    Platelet Morphology Normal Normal   Lactic Acid, Reflex Timer (This will reflex a repeat order 3-3:15 hours after ordered.)   Result Value Ref Range    Hold Tube Hold for add-ons.    Basic Metabolic Panel   Result Value Ref Range    Glucose 100 (H) 65 - 99 mg/dL    BUN 80 (H) 8 - 23 mg/dL    Creatinine 5.69 (H) 0.57 - 1.00 mg/dL    Sodium 124 (L) 136 - 145 mmol/L    Potassium 3.5 3.5 - 5.2 mmol/L    Chloride 88 (L) 98 - 107 mmol/L    CO2 16.8 (L) 22.0 - 29.0 mmol/L    Calcium 8.0 (L) 8.6 - 10.5 mg/dL    eGFR  African Amer      eGFR Non African Amer 8 (L) >60 mL/min/1.73    BUN/Creatinine Ratio 14.1 7.0 - 25.0    Anion Gap 19.2 (H) 5.0 - 15.0 mmol/L   CBC Auto Differential   Result Value Ref Range    WBC 15.89 (H) 3.40 - 10.80 10*3/mm3    RBC 3.56 (L) 3.77 - 5.28 10*6/mm3    Hemoglobin 9.8 (L) 12.0 - 15.9 g/dL    Hematocrit 28.9 (L) 34.0 - 46.6 %    MCV 81.2 79.0 - 97.0 fL    MCH 27.5 26.6 - 33.0 pg    MCHC 33.9 31.5 - 35.7 g/dL    RDW 14.3 12.3 - 15.4 %    RDW-SD 42.3 37.0 - 54.0 fl    MPV 8.9 6.0 - 12.0 fL    Platelets 215 140 - 450 10*3/mm3   Protime-INR   Result Value Ref Range    Protime 22.6 (H) 11.7 - 14.2 Seconds    INR 2.02 (H) 0.90 - 1.10   Lactic Acid, Reflex   Result Value Ref Range    Lactate 1.3 0.5 - 2.0 mmol/L   BNP   Result Value Ref Range    proBNP 1,598.0 (H) 5.0 - 900.0 pg/mL   Manual Differential   Result Value Ref Range    Neutrophil  % 92.9 (H) 42.7 - 76.0 %    Lymphocyte % 2.0 (L) 19.6 - 45.3 %    Monocyte % 5.1 5.0 - 12.0 %    Neutrophils Absolute 14.76 (H) 1.70 - 7.00 10*3/mm3    Lymphocytes Absolute 0.32 (L) 0.70 - 3.10 10*3/mm3    Monocytes Absolute 0.81 0.10 - 0.90 10*3/mm3    RBC Morphology Normal Normal    WBC Morphology Normal Normal    Platelet Morphology Normal Normal   Blood Gas, Arterial   Result Value Ref Range    Site Arterial: left radial     Hammad's Test Positive     pH, Arterial 7.355 7.350 - 7.450 pH units    pCO2, Arterial 33.8 (L) 35.0 - 45.0 mm Hg    pO2, Arterial 100.1 (H) 80.0 - 100.0 mm Hg    HCO3, Arterial 18.8 (L) 22.0 - 28.0 mmol/L    Base Excess, Arterial -6.0 (L) 0.0 - 2.0 mmol/L    O2 Saturation Calculated 97.5 92.0 - 99.0 %    Barometric Pressure for Blood Gas 741.5 mmHg    Modality Cannula     Flow Rate 3 lpm    Rate 20 Breaths/minute   C-reactive Protein   Result Value Ref Range    C-Reactive Protein 34.57 (H) 0.00 - 0.50 mg/dL   Basic Metabolic Panel   Result Value Ref Range    Glucose 90 65 - 99 mg/dL    BUN 78 (H) 8 - 23 mg/dL    Creatinine 5.05 (H) 0.57 - 1.00 mg/dL    Sodium 127 (L) 136 - 145 mmol/L    Potassium 3.6 3.5 - 5.2 mmol/L    Chloride 91 (L) 98 - 107 mmol/L    CO2 17.9 (L) 22.0 - 29.0 mmol/L    Calcium 8.2 (L) 8.6 - 10.5 mg/dL    eGFR  African Amer      eGFR Non African Amer 9 (L) >60 mL/min/1.73    BUN/Creatinine Ratio 15.4 7.0 - 25.0    Anion Gap 18.1 (H) 5.0 - 15.0 mmol/L   POC Glucose Once   Result Value Ref Range    Glucose 110 70 - 130 mg/dL   POC Glucose Once   Result Value Ref Range    Glucose 123 70 - 130 mg/dL   STAT Adult Transthoracic Echo Complete W/ Cont if Necessary Per Protocol   Result Value Ref Range    BSA 2.5 m^2    IVSd 1.2 cm    LVIDd 5.5 cm    LVIDs 3.9 cm    LVPWd 0.9 cm    IVS/LVPW 1.3     FS 29.1 %    EDV(Teich) 147.4 ml    ESV(Teich) 65.9 ml    EF(Teich) 55.3 %    EDV(cubed) 166.4 ml    ESV(cubed) 59.3 ml    EF(cubed) 64.3 %    LV mass(C)d 227.4 grams    LV mass(C)dI  89.6 grams/m^2    SV(Teich) 81.5 ml    SI(Teich) 32.1 ml/m^2    SV(cubed) 107.1 ml    SI(cubed) 42.2 ml/m^2    Ao root diam 3.1 cm    Ao root area 7.5 cm^2    ACS 1.9 cm    asc Aorta Diam 3.2 cm    Ao Arch Diam (Proximal trans.) 2.7 cm    desc Ao Diam 2.0 cm    LVOT diam 2.0 cm    LVOT area 3.1 cm^2    LVOT area(traced) 3.1 cm^2    RVOT diam 2.4 cm    RVOT area 4.5 cm^2    LVLd ap4 8.4 cm    EDV(MOD-sp4) 130.0 ml    LVLs ap4 7.0 cm    ESV(MOD-sp4) 47.0 ml    EF(MOD-sp4) 63.8 %    LVLd ap2 8.2 cm    EDV(MOD-sp2) 145.0 ml    LVLs ap2 7.0 cm    ESV(MOD-sp2) 52.0 ml    EF(MOD-sp2) 64.1 %    SV(MOD-sp4) 83.0 ml    SI(MOD-sp4) 32.7 ml/m^2    SV(MOD-sp2) 93.0 ml    SI(MOD-sp2) 36.7 ml/m^2    Ao root area (BSA corrected) 1.2     LV Brown Vol (BSA corrected) 51.2 ml/m^2    LV Sys Vol (BSA corrected) 18.5 ml/m^2    TAPSE (>1.6) 3.2 cm    EF(MOD-bp) 64.5 %    MV A dur 0.2 sec    MV E max scooby 99.9 cm/sec    MV A max scooby 97.5 cm/sec    MV E/A 1.0     MV V2 max 128.0 cm/sec    MV max PG 6.6 mmHg    MV V2 mean 62.1 cm/sec    MV mean PG 2.0 mmHg    MV V2 VTI 33.4 cm    MVA(VTI) 3.3 cm^2    MV P1/2t max scooby 102.0 cm/sec    MV P1/2t 73.2 msec    MVA(P1/2t) 3.0 cm^2    MV dec slope 408.0 cm/sec^2    MV dec time 190 sec    Ao pk scooby 293.0 cm/sec    Ao max PG 34.3 mmHg    Ao max PG (full) 22.2 mmHg    Ao V2 mean 197.0 cm/sec    Ao mean PG 18.0 mmHg    Ao mean PG (full) 11.0 mmHg    Ao V2 VTI 60.6 cm    KEN(I,A) 1.8 cm^2    KEN(I,D) 1.8 cm^2    KEN(V,A) 1.9 cm^2    KEN(V,D) 1.9 cm^2    LV V1 max PG 12.1 mmHg    LV V1 mean PG 7.0 mmHg    LV V1 max 174.0 cm/sec    LV V1 mean 120.0 cm/sec    LV V1 VTI 34.9 cm    SV(Ao) 457.4 ml    SI(Ao) 180.3 ml/m^2    SV(LVOT) 109.6 ml    SV(RVOT) 58.8 ml    SI(LVOT) 43.2 ml/m^2    PA V2 max 162.0 cm/sec    PA max PG 10.5 mmHg    PA max PG (full) 7.2 mmHg    BH CV ECHO JEOVANNY - PVA(V,A) 2.5 cm^2    BH CV ECHO JEOVANNY - PVA(V,D) 2.5 cm^2    PA acc time 0.07 sec    RV V1 max PG 3.3 mmHg    RV V1 mean PG 1.0 mmHg     RV V1 max 90.9 cm/sec    RV V1 mean 55.4 cm/sec    RV V1 VTI 13.0 cm    TR max camacho 354.0 cm/sec    RVSP(TR) 65.1 mmHg    RAP systole 15.0 mmHg    PA pr(Accel) 45.7 mmHg    Pulm Sys Camacho 65.2 cm/sec    Pulm Brown Camacho 51.4 cm/sec    Pulm S/D 1.3     Qp/Qs 0.54     Pulm A Revs Dur 0.15 sec    Pulm A Revs Camacho 43.8 cm/sec    MVA P1/2T LCG 2.2 cm^2    Lat Peak E' Camacho 11.9 cm/sec    Med Peak E' Camacho 7.1 cm/sec     CV ECHO JEOVANNY - BZI_BMI 67.1 kilograms/m^2     CV ECHO JEOVANNY - BSA(HAYCOCK) 2.9 m^2     CV ECHO JEOVANNY - BZI_METRIC_WEIGHT 171.9 kg     CV ECHO JEOVANNY - BZI_METRIC_HEIGHT 160.0 cm    Avg E/e' ratio 10.52     Target HR (85%) 136 bpm    Max. Pred. HR (100%) 160 bpm     CV VAS BP LEFT /43 mmHg    RV S' 19.00 cm/sec    RV Base 4.90 cm    RV Length 9.10 cm    RV Mid 3.50 cm    LA volume 69.0 cm3    Dimensionless Index 0.6 (DI)    LA Volume Index 28.0 mL/m2     Imaging Results (Last 72 Hours)     Procedure Component Value Units Date/Time    CT Abdomen Pelvis Without Contrast [[id number]] Collected:  06/04/20 1516     Updated:  06/04/20 1602    Narrative:       CT ABDOMEN PELVIS WO CONTRAST-     Radiation dose reduction techniques were utilized, including automated  exposure control and exposure modulation based on body size.     Clinical: Acute renal failure, erythema and pain right leg. Morbid  obesity.     COMPARISON 11/11/2017     FINDINGS: No obstructive uropathy is demonstrated. The left kidney is  small in size similar to the previous examination. Compensatory  hypertrophy of the right kidney. No right or left sided renal calculus.  The ureters are satisfactory in course and caliber. Urinary bladder is  moderately distended. No intraluminal filling defect.     The uterus is enlarged for the patient's age measuring 11 cm transverse  and at least 7.8 cm AP. Size and shape is quite similar to the previous  examination. Suspect underlying fibroids. No adnexal abnormality.     The right inguinal lymph nodes  are enlarged compared to the previous  examination. There is vague subcutaneous edema in the common femoral  region extending to the external iliac chain. At this location there is  an ill-defined masslike density enveloping the external iliac artery and  vein. This area measures approximately 10 cm AP, 5.4 cm transverse and  approximately 8 cm craniocaudal. This could simply represent focal  edema/phlegmon, adenopathy or ill-defined tumor. The enlarged left  inguinal lymph nodes seen on the previous examination have diminished in  size within the interim.     There is a sliding-type small hiatal hernia. There is cardiac  enlargement. The stomach is collapsed, its contour is within normal  limits. The pancreas is satisfactory in appearance. The liver and spleen  have a satisfactory appearance. The gallbladder is unremarkable, no  biliary duct dilatation. Diameter of the aorta is within normal limits.  No free air or free intraperitoneal fluid. Caliber the large and small  bowel is normal. No bowel wall thickening or induration of the mesentery  to suggest an inflammatory process.     CONCLUSION:  1. No obstructive uropathy, the bladder is moderately distended without  intraluminal filling defect.  2. There is right inguinal lymphadenopathy with edema/phlegmon along the  common femoral region of the upper thigh extending to the external iliac  chain where there is a vague, sizable masslike area of phlegmon or  adenopathy or ill-defined tumor enveloping the external iliac artery and  vein. Favor phlegmon, the appearance is worrisome for potential  necrotizing fasciitis.     FINDINGS of this report called to the ICU nursing station at the time of  completion, 3:40 PM. Dr. Flores also notified.        This report was finalized on 6/4/2020 3:58 PM by Dr. Dhruv Sanchez M.D.       XR Chest 1 View [[id number]] Collected:  06/04/20 0504     Updated:  06/04/20 0509    Narrative:       PORTABLE CHEST RADIOGRAPH      HISTORY: Cough and congestion     COMPARISON: 11/11/2017     FINDINGS:  Cardiomegaly is present. There may be some vascular congestion. Patchy  perihilar infiltrates are seen. Appearance is nonspecific, and may  reflect asymmetric edema, although pneumonia is also in the  differential. No pneumothorax is seen. There is some blunting of the  left costophrenic angle, which may reflect a small effusion. Right hilum  does appear enlarged, which can be seen with pulmonary arterial  hypertension.       Impression:          1. Cardiomegaly and vascular congestion.  2. Patchy perihilar densities are nonspecific. Asymmetric edema and  pneumonia would be in the differential.     This report was finalized on 6/4/2020 5:06 AM by Dr. Rose Zamudio M.D.                 cefTRIAXone 2 g Intravenous Q24H   clindamycin 900 mg Intravenous Q8H   midodrine 10 mg Oral TID AC   sodium chloride 10 mL Intravenous Q12H   Vancomycin Pharmacy Intermittent Dosing  Does not apply Daily       Pharmacy to dose vancomycin    sodium chloride 75 mL/hr       Assessment/Plan       Sepsis (CMS/HCC)    Chronic diastolic CHF (congestive heart failure) (CMS/HCC)    PFO (patent foramen ovale)    Hypertension    Pulmonary hypertension (CMS/HCC)    ARIEL (obstructive sleep apnea)    Morbid obesity (CMS/HCC)    History of DVT of lower extremity    Cellulitis of right anterior lower leg    Lymphedema    Cellulitis of right lower extremity    Hyponatremia    Acute renal failure (ARF) (CMS/HCC)    Anemia, chronic disease  Ct abd/pelvis  1. No obstructive uropathy, the bladder is moderately distended without  intraluminal filling defect.  2. There is right inguinal lymphadenopathy with edema/phlegmon along the  common femoral region of the upper thigh extending to the external iliac  chain where there is a vague, sizable masslike area of phlegmon or  adenopathy or ill-defined tumor enveloping the external iliac artery and  vein. Favor phlegmon, the appearance  is worrisome for potential  necrotizing fasciitis.    AP:    1.  STEFANIA severe, making some urine.  She has some overload but is so hypotensive will continue to gently hydrate, oxygenation seems fine.  No hydronephrosis.  Cardiology on board.  Concern for additional antibiotic injury from vancomycin/zosyn.    2.  Sepsis syndrome:  Likely from cellulitis, CT worrisome for phlegmon.  She has leukocytosis.  ID switched her to rocephin from vanc/zosyn and general surgery has been consulted.  3.  Hyponatremia:  Due to ARF, monitor with serial labs  4.  Diastolic heart failure/ PFO:  Appreciate Dr. Rosenthal's recs  5.  History of DVT/PE agree hold xeralto  6.  Severe morbid obesity  7.  Poor adherence to health care maintenance    Plan:  Pt is critically ill and may have serious infection in her leg.  Keep in ICU.  No indication at present for CRRT but patient may end up requiring it.  Prognosis is guarded.    I discussed the patients findings and my recommendations with patient    Raquel Hemphill MD  06/04/20  17:44      Much of this encounter note is an electronic transcription/translation of spoken language to printed text. The electronic translation of spoken language may permit erroneous, or at times, nonsensical words or phrases to be inadvertently transcribed; Although I have reviewed the note for such errors, some may still exist

## 2020-06-04 NOTE — PROGRESS NOTES
Was called the rapid response for patient.  Upon my assessment of patient, she appeared to be stable, in no acute distress.  As reported by the nurse they were having trouble getting patient's blood pressure, she is very obese and not sure all blood pressures were accurate.  Taking blood pressure on left forearm seem to have the best readings, her blood pressure was 95/50, she did not feel dizzy, she was alert and oriented, complaining of no chest pain or shortness of breath.  She is on 2 L nasal cannula with sats 97%.  While I was in room stat ABGs were done and they were within normal limits.  Rapid response team, to critical care nurses also in the room during evaluation.  Reviewed labs from emergency room looks like patient with an acute renal failure with creatinine 5.85.  She does have sepsis and was given fluid boluses in the emergency room, has a history of chronic CHF, so there was some worry if patient was fluid overloaded, she began to desat and was coughing prior to my arrival, I did order stat chest x-ray which showed some cardiomegaly and mild vascular congestion.  Consult placed to nephrology at this time to try to manage fluid volume.  Patient also did have a large blister on her right lower leg that is draining fluid, hard to tell whether patient is fluid overload or dehydrated at this point.  With discussion with the critical care nurses, patient seems stable at this time to remain on telemetry unit, with close monitoring of her blood pressure.  Notified RN on the unit that if blood pressure began to drop more to page nephrology for recommendation on treating blood pressure given elevated creatinine.  Awaiting creatinine results from this a.m. patient's lactate is much improved was 4.3, this a.m. is 1.3.

## 2020-06-04 NOTE — ED TRIAGE NOTES
Pt assisted out of car into w/c, placed in mask, nurse in mask. Pt reports for several days she has had marked redness and a rash to both legs. Pt has hx of DVT and lymphedema but does not get any treatment for her lymphedema. Pt is on blood thinners. Denies pain.

## 2020-06-04 NOTE — ED NOTES
Lab called for second time regarding 2nd set of cultures. IV team at bedside.       Eduard Tapia RN  06/04/20 0132

## 2020-06-05 ENCOUNTER — APPOINTMENT (OUTPATIENT)
Dept: GENERAL RADIOLOGY | Facility: HOSPITAL | Age: 60
End: 2020-06-05

## 2020-06-05 LAB
ALBUMIN SERPL-MCNC: 2.2 G/DL (ref 3.5–5.2)
ALBUMIN SERPL-MCNC: 2.6 G/DL (ref 3.5–5.2)
ANION GAP SERPL CALCULATED.3IONS-SCNC: 15.8 MMOL/L (ref 5–15)
ANION GAP SERPL CALCULATED.3IONS-SCNC: 16.1 MMOL/L (ref 5–15)
ANION GAP SERPL CALCULATED.3IONS-SCNC: 16.1 MMOL/L (ref 5–15)
BUN BLD-MCNC: 81 MG/DL (ref 8–23)
BUN BLD-MCNC: 83 MG/DL (ref 8–23)
BUN BLD-MCNC: 84 MG/DL (ref 8–23)
BUN/CREAT SERPL: 15.6 (ref 7–25)
BUN/CREAT SERPL: 16.7 (ref 7–25)
BUN/CREAT SERPL: 17 (ref 7–25)
CA-I BLD-MCNC: 4.6 MG/DL (ref 4.6–5.4)
CA-I SERPL ISE-MCNC: 1.14 MMOL/L (ref 1.15–1.35)
CALCIUM SPEC-SCNC: 7.7 MG/DL (ref 8.6–10.5)
CALCIUM SPEC-SCNC: 7.8 MG/DL (ref 8.6–10.5)
CALCIUM SPEC-SCNC: 8 MG/DL (ref 8.6–10.5)
CHLORIDE SERPL-SCNC: 90 MMOL/L (ref 98–107)
CHLORIDE SERPL-SCNC: 92 MMOL/L (ref 98–107)
CHLORIDE SERPL-SCNC: 93 MMOL/L (ref 98–107)
CK SERPL-CCNC: 1204 U/L (ref 20–180)
CO2 SERPL-SCNC: 16.9 MMOL/L (ref 22–29)
CO2 SERPL-SCNC: 17.9 MMOL/L (ref 22–29)
CO2 SERPL-SCNC: 19.2 MMOL/L (ref 22–29)
CREAT BLD-MCNC: 4.94 MG/DL (ref 0.57–1)
CREAT BLD-MCNC: 4.97 MG/DL (ref 0.57–1)
CREAT BLD-MCNC: 5.18 MG/DL (ref 0.57–1)
D-LACTATE SERPL-SCNC: 0.8 MMOL/L (ref 0.5–2)
DEPRECATED RDW RBC AUTO: 43.3 FL (ref 37–54)
ERYTHROCYTE [DISTWIDTH] IN BLOOD BY AUTOMATED COUNT: 14.7 % (ref 12.3–15.4)
GFR SERPL CREATININE-BSD FRML MDRD: 8 ML/MIN/1.73
GFR SERPL CREATININE-BSD FRML MDRD: 9 ML/MIN/1.73
GFR SERPL CREATININE-BSD FRML MDRD: 9 ML/MIN/1.73
GFR SERPL CREATININE-BSD FRML MDRD: ABNORMAL ML/MIN/{1.73_M2}
GLUCOSE BLD-MCNC: 100 MG/DL (ref 65–99)
GLUCOSE BLD-MCNC: 109 MG/DL (ref 65–99)
GLUCOSE BLD-MCNC: 114 MG/DL (ref 65–99)
GLUCOSE BLDC GLUCOMTR-MCNC: 106 MG/DL (ref 70–130)
GLUCOSE BLDC GLUCOMTR-MCNC: 136 MG/DL (ref 70–130)
HBA1C MFR BLD: 5.4 % (ref 4.8–5.6)
HBV SURFACE AG SERPL QL IA: NORMAL
HCT VFR BLD AUTO: 25.2 % (ref 34–46.6)
HGB BLD-MCNC: 8.7 G/DL (ref 12–15.9)
INR PPP: 1.41 (ref 0.9–1.1)
MAGNESIUM SERPL-MCNC: 2.2 MG/DL (ref 1.6–2.4)
MCH RBC QN AUTO: 28.1 PG (ref 26.6–33)
MCHC RBC AUTO-ENTMCNC: 34.5 G/DL (ref 31.5–35.7)
MCV RBC AUTO: 81.3 FL (ref 79–97)
PHOSPHATE SERPL-MCNC: 5.7 MG/DL (ref 2.5–4.5)
PHOSPHATE SERPL-MCNC: 6.1 MG/DL (ref 2.5–4.5)
PLATELET # BLD AUTO: 258 10*3/MM3 (ref 140–450)
PMV BLD AUTO: 9.2 FL (ref 6–12)
POTASSIUM BLD-SCNC: 3.4 MMOL/L (ref 3.5–5.2)
POTASSIUM BLD-SCNC: 3.5 MMOL/L (ref 3.5–5.2)
POTASSIUM BLD-SCNC: 3.5 MMOL/L (ref 3.5–5.2)
PROTHROMBIN TIME: 16.9 SECONDS (ref 11.7–14.2)
RBC # BLD AUTO: 3.1 10*6/MM3 (ref 3.77–5.28)
SODIUM BLD-SCNC: 123 MMOL/L (ref 136–145)
SODIUM BLD-SCNC: 127 MMOL/L (ref 136–145)
SODIUM BLD-SCNC: 127 MMOL/L (ref 136–145)
VANCOMYCIN SERPL-MCNC: 21.9 MCG/ML (ref 5–40)
VANCOMYCIN SERPL-MCNC: 24.3 MCG/ML (ref 5–40)
WBC NRBC COR # BLD: 16.28 10*3/MM3 (ref 3.4–10.8)

## 2020-06-05 PROCEDURE — 02HV33Z INSERTION OF INFUSION DEVICE INTO SUPERIOR VENA CAVA, PERCUTANEOUS APPROACH: ICD-10-PCS | Performed by: SURGERY

## 2020-06-05 PROCEDURE — 25010000002 HYDROMORPHONE 1 MG/ML SOLUTION: Performed by: INTERNAL MEDICINE

## 2020-06-05 PROCEDURE — 85027 COMPLETE CBC AUTOMATED: CPT | Performed by: NURSE PRACTITIONER

## 2020-06-05 PROCEDURE — 5A1D70Z PERFORMANCE OF URINARY FILTRATION, INTERMITTENT, LESS THAN 6 HOURS PER DAY: ICD-10-PCS | Performed by: INTERNAL MEDICINE

## 2020-06-05 PROCEDURE — 87340 HEPATITIS B SURFACE AG IA: CPT | Performed by: INTERNAL MEDICINE

## 2020-06-05 PROCEDURE — 83605 ASSAY OF LACTIC ACID: CPT | Performed by: INTERNAL MEDICINE

## 2020-06-05 PROCEDURE — 25010000002 CEFEPIME PER 500 MG: Performed by: INTERNAL MEDICINE

## 2020-06-05 PROCEDURE — 82962 GLUCOSE BLOOD TEST: CPT

## 2020-06-05 PROCEDURE — 80048 BASIC METABOLIC PNL TOTAL CA: CPT | Performed by: INTERNAL MEDICINE

## 2020-06-05 PROCEDURE — 83036 HEMOGLOBIN GLYCOSYLATED A1C: CPT | Performed by: INTERNAL MEDICINE

## 2020-06-05 PROCEDURE — 25010000002 HEPARIN (PORCINE) PER 1000 UNITS: Performed by: INTERNAL MEDICINE

## 2020-06-05 PROCEDURE — 80069 RENAL FUNCTION PANEL: CPT | Performed by: INTERNAL MEDICINE

## 2020-06-05 PROCEDURE — 83735 ASSAY OF MAGNESIUM: CPT | Performed by: INTERNAL MEDICINE

## 2020-06-05 PROCEDURE — 99233 SBSQ HOSP IP/OBS HIGH 50: CPT | Performed by: SURGERY

## 2020-06-05 PROCEDURE — 80202 ASSAY OF VANCOMYCIN: CPT | Performed by: INTERNAL MEDICINE

## 2020-06-05 PROCEDURE — 82330 ASSAY OF CALCIUM: CPT | Performed by: INTERNAL MEDICINE

## 2020-06-05 PROCEDURE — 25010000002 FUROSEMIDE PER 20 MG: Performed by: INTERNAL MEDICINE

## 2020-06-05 PROCEDURE — 82550 ASSAY OF CK (CPK): CPT | Performed by: INTERNAL MEDICINE

## 2020-06-05 PROCEDURE — 99232 SBSQ HOSP IP/OBS MODERATE 35: CPT | Performed by: INTERNAL MEDICINE

## 2020-06-05 PROCEDURE — 25010000002 PHENYLEPHRINE 10 MG/ML SOLUTION 5 ML VIAL: Performed by: INTERNAL MEDICINE

## 2020-06-05 PROCEDURE — 85610 PROTHROMBIN TIME: CPT | Performed by: INTERNAL MEDICINE

## 2020-06-05 PROCEDURE — 99233 SBSQ HOSP IP/OBS HIGH 50: CPT | Performed by: INTERNAL MEDICINE

## 2020-06-05 RX ORDER — POTASSIUM CHLORIDE 29.8 MG/ML
20 INJECTION INTRAVENOUS AS NEEDED
Status: DISCONTINUED | OUTPATIENT
Start: 2020-06-05 | End: 2020-06-18 | Stop reason: HOSPADM

## 2020-06-05 RX ORDER — MAGNESIUM SULFATE HEPTAHYDRATE 40 MG/ML
2 INJECTION, SOLUTION INTRAVENOUS AS NEEDED
Status: ACTIVE | OUTPATIENT
Start: 2020-06-05 | End: 2020-06-07

## 2020-06-05 RX ORDER — ALBUMIN (HUMAN) 12.5 G/50ML
12.5 SOLUTION INTRAVENOUS AS NEEDED
Status: CANCELLED | OUTPATIENT
Start: 2020-06-05 | End: 2020-06-06

## 2020-06-05 RX ORDER — HEPARIN SODIUM 5000 [USP'U]/ML
40-58.5 INJECTION, SOLUTION INTRAVENOUS; SUBCUTANEOUS EVERY 6 HOURS PRN
Status: DISCONTINUED | OUTPATIENT
Start: 2020-06-05 | End: 2020-06-10

## 2020-06-05 RX ORDER — VANCOMYCIN HYDROCHLORIDE 1 G/200ML
1000 INJECTION, SOLUTION INTRAVENOUS ONCE
Status: COMPLETED | OUTPATIENT
Start: 2020-06-05 | End: 2020-06-06

## 2020-06-05 RX ORDER — MIDODRINE HYDROCHLORIDE 5 MG/1
5 TABLET ORAL ONCE
Status: COMPLETED | OUTPATIENT
Start: 2020-06-05 | End: 2020-06-05

## 2020-06-05 RX ORDER — ALBUMIN (HUMAN) 12.5 G/50ML
12.5 SOLUTION INTRAVENOUS AS NEEDED
Status: DISCONTINUED | OUTPATIENT
Start: 2020-06-05 | End: 2020-06-18 | Stop reason: HOSPADM

## 2020-06-05 RX ORDER — HEPARIN SODIUM 1000 [USP'U]/ML
3000 INJECTION, SOLUTION INTRAVENOUS; SUBCUTANEOUS ONCE
Status: DISCONTINUED | OUTPATIENT
Start: 2020-06-05 | End: 2020-06-10

## 2020-06-05 RX ORDER — HEPARIN SODIUM 10000 [USP'U]/100ML
18 INJECTION, SOLUTION INTRAVENOUS
Status: DISCONTINUED | OUTPATIENT
Start: 2020-06-05 | End: 2020-06-09

## 2020-06-05 RX ORDER — FUROSEMIDE 10 MG/ML
40 INJECTION INTRAMUSCULAR; INTRAVENOUS ONCE
Status: COMPLETED | OUTPATIENT
Start: 2020-06-05 | End: 2020-06-05

## 2020-06-05 RX ORDER — HEPARIN SODIUM 1000 [USP'U]/ML
INJECTION, SOLUTION INTRAVENOUS; SUBCUTANEOUS AS NEEDED
Status: DISCONTINUED | OUTPATIENT
Start: 2020-06-05 | End: 2020-06-18 | Stop reason: HOSPADM

## 2020-06-05 RX ORDER — MIDODRINE HYDROCHLORIDE 2.5 MG/1
15 TABLET ORAL
Status: DISCONTINUED | OUTPATIENT
Start: 2020-06-05 | End: 2020-06-18 | Stop reason: HOSPADM

## 2020-06-05 RX ADMIN — CLINDAMYCIN IN 5 PERCENT DEXTROSE 900 MG: 18 INJECTION, SOLUTION INTRAVENOUS at 08:46

## 2020-06-05 RX ADMIN — MIDODRINE HYDROCHLORIDE 15 MG: 5 TABLET ORAL at 13:23

## 2020-06-05 RX ADMIN — CEFEPIME HYDROCHLORIDE 2 G: 2 INJECTION, POWDER, FOR SOLUTION INTRAVENOUS at 10:24

## 2020-06-05 RX ADMIN — MIDODRINE HYDROCHLORIDE 10 MG: 5 TABLET ORAL at 06:33

## 2020-06-05 RX ADMIN — CLINDAMYCIN IN 5 PERCENT DEXTROSE 900 MG: 18 INJECTION, SOLUTION INTRAVENOUS at 00:31

## 2020-06-05 RX ADMIN — HEPARIN SODIUM 18 UNITS/KG/HR: 10000 INJECTION, SOLUTION INTRAVENOUS at 20:53

## 2020-06-05 RX ADMIN — PHENYLEPHRINE HYDROCHLORIDE 0.5 MCG/KG/MIN: 10 INJECTION INTRAVENOUS at 19:42

## 2020-06-05 RX ADMIN — MIDODRINE HYDROCHLORIDE 5 MG: 5 TABLET ORAL at 07:57

## 2020-06-05 RX ADMIN — SODIUM CHLORIDE, PRESERVATIVE FREE 10 ML: 5 INJECTION INTRAVENOUS at 12:10

## 2020-06-05 RX ADMIN — HYDROMORPHONE HYDROCHLORIDE 1 MG: 10 INJECTION INTRAMUSCULAR; INTRAVENOUS; SUBCUTANEOUS at 06:34

## 2020-06-05 RX ADMIN — FUROSEMIDE 40 MG: 10 INJECTION, SOLUTION INTRAMUSCULAR; INTRAVENOUS at 15:21

## 2020-06-05 NOTE — PLAN OF CARE
Problem: Patient Care Overview  Goal: Plan of Care Review  Outcome: Ongoing (interventions implemented as appropriate)  Note:   Patient remains in ICU overnight. Blood pressures remain difficult to obtain and are reading low, Dr. Hendricks notified- said to monitor for symptoms- awaiting A-line placement. VSS. Assessments are as charted.   Goal: Individualization and Mutuality  Outcome: Ongoing (interventions implemented as appropriate)  Goal: Discharge Needs Assessment  Outcome: Ongoing (interventions implemented as appropriate)  Goal: Interprofessional Rounds/Family Conf  Outcome: Ongoing (interventions implemented as appropriate)     Problem: Fall Risk (Adult)  Goal: Identify Related Risk Factors and Signs and Symptoms  Outcome: Ongoing (interventions implemented as appropriate)  Goal: Absence of Fall  Outcome: Ongoing (interventions implemented as appropriate)     Problem: Skin Injury Risk (Adult)  Goal: Identify Related Risk Factors and Signs and Symptoms  Outcome: Ongoing (interventions implemented as appropriate)  Goal: Skin Health and Integrity  Outcome: Ongoing (interventions implemented as appropriate)

## 2020-06-05 NOTE — SIGNIFICANT NOTE
06/05/20 1152   Rehab Time/Intention   Evaluation Not Performed other (see comments)  (pt is not appropriate for PT. She is 400lbs and it takes 2 people to lift her leg. She is bed bound at baseline. Will take pt off list.)   Rehab Treatment   Discipline physical therapist

## 2020-06-05 NOTE — PROGRESS NOTES
"   LOS: 1 day    Patient Care Team:  RENAY Smith MD as PCP - General (General Practice)    Chief Complaint:    Chief Complaint   Patient presents with   • Diarrhea   • Legs Swollen     Follow UP ARF  Subjective     Interval History:   She is more awake and alert.   at bedside.   RN at bedside.  She has had weeping from her open ankle bulla.    Objective     Vital Signs  Temp:  [98.3 °F (36.8 °C)-99.5 °F (37.5 °C)] 98.3 °F (36.8 °C)  Heart Rate:  [70-91] 83  Resp:  [20-22] 22  BP: ()/(30-85) 103/32    Flowsheet Rows      First Filed Value   Admission Height  160 cm (63\") Documented at 06/04/2020 0001   Admission Weight  (!) 176 kg (388 lb 12.8 oz) Documented at 06/04/2020 0040          No intake/output data recorded.  I/O last 3 completed shifts:  In: 480 [P.O.:480]  Out: 1550 [Urine:1550]    Intake/Output Summary (Last 24 hours) at 6/5/2020 1303  Last data filed at 6/5/2020 0700  Gross per 24 hour   Intake --   Output 1550 ml   Net -1550 ml       Physical Exam:  General Appearance: alert, oriented x 3, no acute distress, a little confused and sleepy.  Skin: warm and dry  HEENT: pupils round and reactive to light, oral mucosa normal,   Neck: supple, no JVD, trachea midline  Lungs: diminished, unlabored breathing effort  Heart: RRR, normal S1 and S2, no S3, no rub  Abdomen: soft, non-tender,  present bowel sounds to auscultation  : no palpable bladder,  Extremities: massive legs with anasarca, redness bilaterally but worse on the right. cyanosis or clubbing  Neuro: normal speech and mental status         Results Review:    Results from last 7 days   Lab Units 06/05/20  0608 06/04/20  1530 06/04/20  0434 06/04/20  0035   SODIUM mmol/L 127* 127* 124* 122*   POTASSIUM mmol/L 3.5 3.6 3.5 3.9   CHLORIDE mmol/L 92* 91* 88* 84*   CO2 mmol/L 19.2* 17.9* 16.8* 16.4*   BUN mg/dL 81* 78* 80* 77*   CREATININE mg/dL 5.18* 5.05* 5.69* 5.85*   CALCIUM mg/dL 8.0* 8.2* 8.0* 8.8   BILIRUBIN mg/dL  --   --   --  1.0 "   ALK PHOS U/L  --   --   --  111   ALT (SGPT) U/L  --   --   --  33   AST (SGOT) U/L  --   --   --  53*   GLUCOSE mg/dL 114* 90 100* 114*       Estimated Creatinine Clearance: 18.2 mL/min (A) (by C-G formula based on SCr of 5.18 mg/dL (H)).    Results from last 7 days   Lab Units 06/05/20  0608   PHOSPHORUS mg/dL 6.1*             Results from last 7 days   Lab Units 06/05/20  0608 06/04/20  0434 06/04/20  0035   WBC 10*3/mm3 16.28* 15.89* 19.31*   HEMOGLOBIN g/dL 8.7* 9.8* 10.7*   PLATELETS 10*3/mm3 258 215 257       Results from last 7 days   Lab Units 06/04/20  0434   INR  2.02*         Imaging Results (Last 24 Hours)     Procedure Component Value Units Date/Time    CT Lower Extremity Right Without Contrast [[id number]] Collected:  06/04/20 1852     Updated:  06/04/20 1911    Narrative:       CT LOWER EXTREMITY RIGHT WO CONTRAST-     INDICATIONS: Redness, swelling, possible necrotizing fasciitis of the  right leg  Radiation dose reduction techniques were utilized, including  automated exposure control and exposure modulation based on body size.     TECHNIQUE: Unenhanced CT of the right leg     COMPARISON: Correlated with CT of the abdomen and pelvis from 06/04/2020     FINDINGS:     Extensive subcutaneous edema/fat stranding is seen in the right leg,  with diffuse skin thickening in the right lower leg and foot, and  medially in the right thigh; this appearance is similar to that of the  partly included medial left thigh, and suggests diffuse body wall  edema/anasarca, although an infectious process such as cellulitis or  fasciitis could be associated with similar appearance. No soft tissue  gas is noted. Some areas of fluid tracking along fascial are noted, for  example medially in the left lower leg on image 321 of the axial series  4, laterally in the thigh on image 172, and areas of borderline fascial  thickening are apparent, for example laterally in the distal thigh, 3 mm  on image 243, that certainly could  be associated with fasciitis,  including the possibility of necrotizing fasciitis, which is not  excluded on the basis of this exam. For further evaluation, MRI is  advised if not contraindicated, close interval follow-up can  characterize change.     On the CT of the abdomen and pelvis from earlier today, right groin  adenopathy and edema/phlegmon along the right common femoral region  extending to the external iliac chain, where a masslike area of phlegmon  or adenopathy or ill-defined tumor was noted enveloping the right  external iliac artery and vein; no interval change in this appearance is  noted.     No acute fracture is identified in right leg. Moderate to prominent  degenerative changes are seen at the right knee.             Impression:          As described.     Discussed by telephone with the patient's nurse, Mallika, at time of  interpretation, 1902, 06/04/2020.           This report was finalized on 6/4/2020 7:08 PM by Dr. Damon Chavez M.D.       CT Abdomen Pelvis Without Contrast [[id number]] Collected:  06/04/20 1516     Updated:  06/04/20 1602    Narrative:       CT ABDOMEN PELVIS WO CONTRAST-     Radiation dose reduction techniques were utilized, including automated  exposure control and exposure modulation based on body size.     Clinical: Acute renal failure, erythema and pain right leg. Morbid  obesity.     COMPARISON 11/11/2017     FINDINGS: No obstructive uropathy is demonstrated. The left kidney is  small in size similar to the previous examination. Compensatory  hypertrophy of the right kidney. No right or left sided renal calculus.  The ureters are satisfactory in course and caliber. Urinary bladder is  moderately distended. No intraluminal filling defect.     The uterus is enlarged for the patient's age measuring 11 cm transverse  and at least 7.8 cm AP. Size and shape is quite similar to the previous  examination. Suspect underlying fibroids. No adnexal abnormality.     The right  inguinal lymph nodes are enlarged compared to the previous  examination. There is vague subcutaneous edema in the common femoral  region extending to the external iliac chain. At this location there is  an ill-defined masslike density enveloping the external iliac artery and  vein. This area measures approximately 10 cm AP, 5.4 cm transverse and  approximately 8 cm craniocaudal. This could simply represent focal  edema/phlegmon, adenopathy or ill-defined tumor. The enlarged left  inguinal lymph nodes seen on the previous examination have diminished in  size within the interim.     There is a sliding-type small hiatal hernia. There is cardiac  enlargement. The stomach is collapsed, its contour is within normal  limits. The pancreas is satisfactory in appearance. The liver and spleen  have a satisfactory appearance. The gallbladder is unremarkable, no  biliary duct dilatation. Diameter of the aorta is within normal limits.  No free air or free intraperitoneal fluid. Caliber the large and small  bowel is normal. No bowel wall thickening or induration of the mesentery  to suggest an inflammatory process.     CONCLUSION:  1. No obstructive uropathy, the bladder is moderately distended without  intraluminal filling defect.  2. There is right inguinal lymphadenopathy with edema/phlegmon along the  common femoral region of the upper thigh extending to the external iliac  chain where there is a vague, sizable masslike area of phlegmon or  adenopathy or ill-defined tumor enveloping the external iliac artery and  vein. Favor phlegmon, the appearance is worrisome for potential  necrotizing fasciitis.     FINDINGS of this report called to the ICU nursing station at the time of  completion, 3:40 PM. Dr. Flores also notified.        This report was finalized on 6/4/2020 3:58 PM by Dr. Dhruv Sanchez M.D.             cefepime 2 g Intravenous Q24H   midodrine 15 mg Oral TID AC   sodium chloride 10 mL Intravenous Q12H    Vancomycin Pharmacy Intermittent Dosing  Does not apply Daily       Pharmacy to dose vancomycin     sodium chloride 125 mL/hr Last Rate: 125 mL/hr (06/05/20 0725)       Medication Review:   Current Facility-Administered Medications   Medication Dose Route Frequency Provider Last Rate Last Dose   • acetaminophen (TYLENOL) tablet 650 mg  650 mg Oral Q4H PRN Radha Salmeron APRN        Or   • acetaminophen (TYLENOL) 160 MG/5ML solution 650 mg  650 mg Oral Q4H PRN Radha Salmeron APRN        Or   • acetaminophen (TYLENOL) suppository 650 mg  650 mg Rectal Q4H PRN Radha Salmeron APRN       • cefepime (MAXIPIME) 2 g/100 mL 0.9% NS (mbp)  2 g Intravenous Q24H Tariq Flores MD   2 g at 06/05/20 1024   • HYDROmorphone (DILAUDID) injection 1 mg  1 mg Intravenous Q2H PRN Adryan King MD   1 mg at 06/05/20 0634   • ipratropium-albuterol (DUO-NEB) nebulizer solution 3 mL  3 mL Nebulization Q6H PRN Barbra Lee APRN       • midodrine (PROAMATINE) tablet 15 mg  15 mg Oral TID AC Raquel Hemphill MD       • nitroglycerin (NITROSTAT) SL tablet 0.4 mg  0.4 mg Sublingual Q5 Min PRN Radha Salmeron APRN       • ondansetron (ZOFRAN) injection 4 mg  4 mg Intravenous Q6H PRN Radha Salmeron APRN       • Pharmacy to dose vancomycin   Does not apply Continuous PRN Tariq Flores MD       • sodium chloride 0.9 % flush 10 mL  10 mL Intravenous Q12H Radha Salmeron APRN   10 mL at 06/05/20 1210   • sodium chloride 0.9 % flush 10 mL  10 mL Intravenous PRN Radha Salmeron APRN       • sodium chloride 0.9 % infusion  125 mL/hr Intravenous Continuous Raquel Hemphill  mL/hr at 06/05/20 0725 125 mL/hr at 06/05/20 0725   • Vancomycin Pharmacy Intermittent Dosing   Does not apply Daily Tariq Flores MD           Assessment/Plan         Sepsis (CMS/[facility])    Chronic diastolic CHF (congestive heart failure) (CMS/[facility])    PFO (patent foramen ovale)     Hypertension    Pulmonary hypertension (CMS/[facility])    ARIEL (obstructive sleep apnea)    Morbid obesity (CMS/[facility])    History of DVT of lower extremity    Cellulitis of right anterior lower leg    Lymphedema    Cellulitis of right lower extremity    Hyponatremia    Acute renal failure (ARF) (CMS/[facility])    Anemia, chronic disease       1.  STEFANIA severe, making some urine.  She has some overload but is so hypotensive will continue to gently hydrate, oxygenation seems fine.  No hydronephrosis seen on noncontrast CT. Concern for additional antibiotic injury from vancomycin, following levels for dosing.      2.  Sepsis syndrome:  Likely from cellulitis, CT worrisome for phlegmon.  She has leukocytosis.  ID switched her to rocephin from vanc/zosyn and general surgery has been consulted.  3.  Hyponatremia:  Due to ARF, monitor with serial labs  4.  Diastolic heart failure/ PFO:  Appreciate Dr. Rosenthal's recs  5.  History of DVT/PE agree hold xeralto  6.  Severe morbid obesity  7.  Poor adherence to health care maintenance     Plan:  Pt is critically ill with severe cellulitis and acute renal failure.  Continue IVF and try to keep bp up.  No indication at present for CRRT but patient may end up requiring it.  Bmp ordered for noon.  Prognosis is guarded.     I discussed the patients findings and my recommendations with patient, RN, and .       Raquel Hemphill MD  06/05/20  13:03  Patient has fluid overload, not responding well to measures in terms of urine output and correction of electrolytes.  Sodium dropping and likely more acidemic.    Will ask vascular to place shiley and proceed with CRRT, may be able to convert to hemodialysis.  Understand bp not measured easily due to body habitus.

## 2020-06-05 NOTE — NURSING NOTE
CWOCN follow up for right leg compression.  Received verbal authorization from Dr. Flores and Dr. Bashir to proceed with compression wrap.  Large open blistered areas to right medial leg and thigh cleaned with saline.  Large amount of serous drainage present.  Covered areas with Maxorb AG sheets for absorption, covered with ABD pads.  Applied layer of unna wrap, then profore compression layers 2,3,and 4.    Blistered areas to right upper thigh covered with opticell ag and pad for absorption.  If compression wraps have to be removed prior to Monday follow up; please keep leg clean and use dry flow pads under legs to absorb drainage.   Will follow up on Monday.

## 2020-06-05 NOTE — PROCEDURES
"Insert Central Line At Bedside  Date/Time: 6/5/2020 4:48 PM  Performed by: Abilio Rock MD  Authorized by: Abilio Rock MD   Consent: Verbal consent obtained. Written consent obtained.  Risks and benefits: risks, benefits and alternatives were discussed  Consent given by: patient and spouse  Patient identity confirmed: verbally with patient and arm band  Time out: Immediately prior to procedure a \"time out\" was called to verify the correct patient, procedure, equipment, support staff and site/side marked as required.  Indications: vascular access  Anesthesia: local infiltration    Anesthesia:  Local Anesthetic: lidocaine 1% without epinephrine  Anesthetic total: 10 mL    Sedation:  Patient sedated: no    Preparation: skin prepped with 2% chlorhexidine  Sterile barriers: all five maximum sterile barriers used - cap, mask, sterile gown, sterile gloves, and large sterile sheet  Hand hygiene: hand hygiene performed prior to central venous catheter insertion  Location details: right internal jugular  Patient position: flat  Catheter type: triple lumen  Pre-procedure: landmarks identified  Ultrasound guidance: yes  Sterile ultrasound techniques: sterile gel and sterile probe covers were used  Number of attempts: 1  Post-procedure: line sutured and dressing applied  Assessment: blood return through all ports and placement verified by x-ray  Patient tolerance: Patient tolerated the procedure well with no immediate complications  Comments: Chest x-ray obtained immediately following procedure.  Catheter in appropriate position.  No pneumothorax.        "

## 2020-06-05 NOTE — PROGRESS NOTES
LOS: 1 day   Patient Care Team:  RENAY Smith MD as PCP - General (General Practice)    Chief Complaint:     F/u right heart failure and hypotension.     Interval History:     She denies chest pain, nausea, tachycardia, dizziness or syncope.    Echo 6/4/20  Interpretation Summary     · Left ventricular wall thickness is consistent with mild concentric hypertrophy.  · Left ventricular systolic function is normal.  · Calculated EF = 64.5%.  · Right ventricular cavity is moderately dilated.  · Right atrial cavity size is mild-to-moderately dilated.  · Mild tricuspid valve regurgitation is present.  · There is calcification of the aortic valve.  · Severe pulmonary hypertension is present.         Objective   Vital Signs  Temp:  [98.6 °F (37 °C)-99.5 °F (37.5 °C)] 98.8 °F (37.1 °C)  Heart Rate:  [70-91] 83  Resp:  [18-22] 22  BP: ()/(30-85) 103/32    Intake/Output Summary (Last 24 hours) at 6/5/2020 1113  Last data filed at 6/5/2020 0700  Gross per 24 hour   Intake --   Output 1550 ml   Net -1550 ml       Comfortable NAD  conjunctivae clear  Neck supple, no JVD or thyromegaly appreciated  S1/S2 RRR, no m/r/g  Lungs CTA B, normal effort  Abdomen S/NT/ND (+) BS, no HSM appreciated  Extremities warm, no clubbing, cyanosis, 2+ right lower extremity edema  Weeping skin lesions over the right leg  A/Ox4, mood and affect appropriate    Results Review:      Results from last 7 days   Lab Units 06/05/20  0608 06/04/20  1530 06/04/20  0434   SODIUM mmol/L 127* 127* 124*   POTASSIUM mmol/L 3.5 3.6 3.5   CHLORIDE mmol/L 92* 91* 88*   CO2 mmol/L 19.2* 17.9* 16.8*   BUN mg/dL 81* 78* 80*   CREATININE mg/dL 5.18* 5.05* 5.69*   GLUCOSE mg/dL 114* 90 100*   CALCIUM mg/dL 8.0* 8.2* 8.0*         Results from last 7 days   Lab Units 06/05/20  0608 06/04/20  0434 06/04/20  0035   WBC 10*3/mm3 16.28* 15.89* 19.31*   HEMOGLOBIN g/dL 8.7* 9.8* 10.7*   HEMATOCRIT % 25.2* 28.9* 31.5*   PLATELETS 10*3/mm3 258 215 257     Results from  last 7 days   Lab Units 06/04/20  0434   INR  2.02*                   I reviewed the patient's new clinical results.  I personally viewed and interpreted the patient's EKG/Telemetry data        Medication Review:     cefepime 2 g Intravenous Q24H   midodrine 15 mg Oral TID AC   sodium chloride 10 mL Intravenous Q12H   Vancomycin Pharmacy Intermittent Dosing  Does not apply Daily         Pharmacy to dose vancomycin     sodium chloride 125 mL/hr Last Rate: 125 mL/hr (06/05/20 0725)       Assessment/Plan       Sepsis (CMS/[facility])    Chronic diastolic CHF (congestive heart failure) (CMS/[facility])    PFO (patent foramen ovale)    Hypertension    Pulmonary hypertension (CMS/[facility])    ARIEL (obstructive sleep apnea)    Morbid obesity (CMS/[facility])    History of DVT of lower extremity    Cellulitis of right anterior lower leg    Lymphedema    Cellulitis of right lower extremity    Hyponatremia    Acute renal failure (ARF) (CMS/[facility])    Anemia, chronic disease    1. Sepsis syndrome likely due to cellulitis.  2. Cellulitis bilateral lower extremities. CT shows phlegmon right thigh but no soft tissue gas.  ID and surgery following.   3. Acute kidney injury, stable.  May be due to sepsis and hypotension.  Nephrology following.   4. Hypotension.  Very hypotensive this am.  Midodrine started.   5. 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  6. Morbid obesity  7. Chronic right heart failure with pulmonary hypertension likely due to previous pulmonary emboli.  8. Anemia, worsening.     Her cardiac status at this point is stable except for the hypotension which I agree with Midodrine.  Hopefully this will help her renal function improve as well.  We will follow.    Neena Rosenthal MD  06/05/20  11:13

## 2020-06-05 NOTE — CONSULTS
Name: Emely Solomon ADMIT: 2020   : 1960  PCP: RENAY Smith MD    MRN: [id number] LOS: 1 days   AGE/SEX: 59 y.o. female  ROOM: [facility]/[address] [place] [address]    Patient Care Team:  RENAY Smith MD as PCP - General (General Practice)  Chief Complaint   Patient presents with   • Diarrhea   • Legs Swollen     CC: Acute kidney injury    Subjective     Consults  Emely Solomon is a 59-year-old woman with right heart failure and lower extremity swelling with cellulitis, who has sepsis syndrome with acute kidney injury.  Has worsening metabolic acidosis, and is felt to require renal replacement therapy.  Asked to place temporary catheter for CRRT or hemodialysis.  Patient is somewhat confused at this time.  She may have knowledge of chronic kidney disease as well.  Has history of DVT and pulmonary embolism, previously anticoagulated.  Has not received anticoagulants recently.  I telephoned the patient's  to explain the situation and obtain informed consent for temporary dialysis catheter placement.    Review of Systems   Unable to perform ROS: Mental status change     Past Medical History:   Diagnosis Date   • Cellulitis     2017, with Group B Strep bacteremia and sepsis   • Chronic diastolic congestive heart failure (CMS/HCC)    • Deep venous thrombosis (CMS/HCC)    • Hypertension    • Lipoedema    • Lymphedema    • Morbid obesity (CMS/HCC)    • Paradoxical embolism (CMS/HCC)     to the LLE due to DVT/PE and PFO   • PFO (patent foramen ovale)    • Pulmonary embolism (CMS/HCC)    • Pulmonary hypertension (CMS/HCC)     multifactorial (dCHF, obesity/ARIEL, hx PE), mild by echo 2016     Past Surgical History:   Procedure Laterality Date   • BRONCHOSCOPY N/A 2017    Procedure: BRONCHOSCOPY with wash;  Surgeon: Caleb Garcia MD;  Location: [facility] ENDOSCOPY;  Service:    • DILATATION AND CURETTAGE  2011   • VENA CAVA FILTER PLACEMENT      Inferior Vena Cava Filter Placement w/Fluorosc angiogr  guidance     Family History   Problem Relation Age of Onset   • Hypertension Other      Social History     Tobacco Use   • Smoking status: Never Smoker   • Smokeless tobacco: Never Used   Substance Use Topics   • Alcohol use: Yes     Comment: caffeine use:1 cup daily.    • Drug use: No     Medications Prior to Admission   Medication Sig Dispense Refill Last Dose   • acetaminophen (TYLENOL) 325 MG tablet Take 2 tablets by mouth Every 6 (Six) Hours As Needed for Mild Pain  (pain or symptomatic fever).   Taking   • furosemide (LASIX) 40 MG tablet Take 1 tablet by mouth 3 (Three) Times a Week. 45 tablet 3    • hydroCHLOROthiazide (HYDRODIURIL) 25 MG tablet Take 25 mg by mouth Daily.      • ipratropium-albuterol (DUO-NEB) 0.5-2.5 mg/3 ml nebulizer ipratropium 0.5 mg-albuterol 3 mg (2.5 mg base)/3 mL nebulization soln   Taking   • losartan (COZAAR) 50 MG tablet Take 100 mg by mouth Daily.      • metoprolol succinate XL (TOPROL-XL) 50 MG 24 hr tablet Take 1 tablet by mouth 2 (Two) Times a Day.   Taking   • oxybutynin (DITROPAN) 5 MG tablet Take 5 mg by mouth Daily.      • rivaroxaban (XARELTO) 20 MG tablet Take 1 tablet by mouth Daily. 90 tablet 3        cefepime 2 g Intravenous Q24H   heparin (porcine) 3,000 Units Intravenous Once   midodrine 15 mg Oral TID AC   sodium chloride 10 mL Intravenous Q12H   vancomycin 1,000 mg Intravenous Once   Vancomycin Pharmacy Intermittent Dosing  Does not apply Daily       Pharmacy to dose vancomycin    Phoxillum 4 K/2.5 CA 1,000 mL/hr   Phoxillum 4 K/2.5 CA 1,000 mL/hr   Phoxillum 4 K/2.5 CA 1,000 mL/hr     Patient has no known allergies.    Objective     Physical Exam:  Physical Exam   Constitutional: She appears well-developed and well-nourished. No distress.   HENT:   Head: Normocephalic and atraumatic.   Right Ear: External ear normal.   Left Ear: External ear normal.   Nose: Nose normal.   Eyes: Pupils are equal, round, and reactive to light. EOM are normal.   Neck: Normal range of  motion. Neck supple.   B-mode ultrasound demonstrates right internal jugular vein is patent.   Cardiovascular: Normal rate and regular rhythm.   Pulmonary/Chest: Effort normal. No stridor. No respiratory distress.   Abdominal: Soft.   Exam limited by body habitus.   Neurological: She is alert. No cranial nerve deficit.   Confused, somewhat perseverating.   Skin: Skin is warm and dry. Capillary refill takes less than 2 seconds. No rash noted. She is not diaphoretic. No erythema. No pallor.   Psychiatric: She has a normal mood and affect. Her behavior is normal.     Vital Signs and Labs:  Vital Signs Patient Vitals for the past 24 hrs:   BP Temp Temp src Pulse Resp SpO2 Weight   06/05/20 1300 -- 98.7 °F (37.1 °C) Oral -- -- -- --   06/05/20 1100 -- 98.3 °F (36.8 °C) Oral -- -- -- --   06/05/20 0800 (!) 103/32 -- -- 83 -- 94 % --   06/05/20 0757 92/42 -- -- 82 -- -- --   06/05/20 0600 -- -- -- -- -- -- (!) 171 kg (377 lb 3.3 oz)   06/05/20 0545 93/45 -- -- 75 -- 97 % --   06/05/20 0515 104/60 -- -- 80 -- (!) 88 % --   06/05/20 0500 92/65 -- -- 79 -- 96 % --   06/05/20 0445 97/54 -- -- 80 -- 98 % --   06/05/20 0430 111/46 -- -- 79 -- 97 % --   06/05/20 0415 117/85 -- -- 80 -- 99 % --   06/05/20 0400 105/61 -- -- 79 -- 96 % --   06/05/20 0315 (!) 72/52 -- -- 91 -- 90 % --   06/05/20 0300 -- 98.8 °F (37.1 °C) -- -- -- -- --   06/05/20 0215 (!) 71/41 -- -- 83 -- 91 % --   06/05/20 0115 (!) 57/33 -- -- 80 -- 96 % --   06/05/20 0045 (!) 83/30 -- -- 70 -- 98 % --   06/05/20 0015 (!) 77/51 -- -- 75 -- 93 % --   06/04/20 2345 (!) 93/39 -- -- 80 -- 91 % --   06/04/20 2300 -- 99.5 °F (37.5 °C) -- -- -- -- --   06/04/20 2230 (!) 84/40 -- -- 80 -- 90 % --   06/04/20 2100 (!) 80/41 -- -- 81 -- 92 % --   06/04/20 1945 (!) 89/46 -- -- 86 -- 99 % --   06/04/20 1900 (!) 74/36 98.6 °F (37 °C) -- 76 -- 99 % --   06/04/20 1800 -- -- -- 84 -- 95 % --   06/04/20 1700 96/49 -- -- 89 22 98 % --     BMI:  Body mass index is 66.82 kg/m².    CBC     Results from last 7 days   Lab Units 06/05/20  0608 06/04/20  0434 06/04/20  0035   WBC 10*3/mm3 16.28* 15.89* 19.31*   HEMOGLOBIN g/dL 8.7* 9.8* 10.7*   PLATELETS 10*3/mm3 258 215 257     BMP   Results from last 7 days   Lab Units 06/05/20  1220 06/05/20  0608 06/04/20  1530 06/04/20  0434 06/04/20  0035   SODIUM mmol/L 123* 127* 127* 124* 122*   POTASSIUM mmol/L 3.5 3.5 3.6 3.5 3.9   CHLORIDE mmol/L 90* 92* 91* 88* 84*   CO2 mmol/L 16.9* 19.2* 17.9* 16.8* 16.4*   BUN mg/dL 83* 81* 78* 80* 77*   CREATININE mg/dL 4.97* 5.18* 5.05* 5.69* 5.85*   GLUCOSE mg/dL 100* 114* 90 100* 114*   PHOSPHORUS mg/dL  --  6.1*  --   --   --      Coag   Results from last 7 days   Lab Units 06/05/20  1458 06/04/20  0434   INR  1.41* 2.02*       Active Hospital Problems    Diagnosis  POA   • **Sepsis (CMS/[facility]) [A41.9]  Yes   • History of DVT of lower extremity [Z86.718]  Not Applicable   • Cellulitis of right anterior lower leg [L03.115]  Yes   • Lymphedema [I89.0]  Yes   • Cellulitis of right lower extremity [L03.115]  Yes   • Hyponatremia [E87.1]  Yes   • Acute renal failure (ARF) (CMS/[facility]) [N17.9]  Yes   • Anemia, chronic disease [D63.8]  Yes   • Morbid obesity (CMS/[facility]) [E66.01]  Yes   • ARIEL (obstructive sleep apnea) [G47.33]  Yes   • Hypertension [I10]  Yes   • PFO (patent foramen ovale) [Q21.1]  Not Applicable   • Chronic diastolic CHF (congestive heart failure) (CMS/[facility]) [I50.32]  Unknown   • Pulmonary hypertension (CMS/[facility]) [I27.20]  Yes      Resolved Hospital Problems   No resolved problems to display.     Problem Points:  2:  Patient has an established problem, worsening  Total problem points:2    Data Points:  1:  I have reviewed or order clinical lab test  1:  I have personally decided to obtain of records  Total data points:2    Risk Points:  Moderate: New diagnosis with unknown prognosis    MDM requires 2/3 (Problem points, Data points and Risk)  MDM Prob point Data point Risk   SF 1 1 Minimal   Low 2 2 Low   Mod 3 3  Moderate   High 4 4 High     Code requires 3/3 (MDM, History and Exam)  Code MDM History Exam Time   42707 SF/Low Detailed Detailed 30   60115 Mod Comprehensive Comprehensive 50   70552 High Comprehensive Comprehensive 70     Detailed history:  4 elements HPI or status of 3 chronic problems; 2-9 system ROS  Comprehensive:  4 elements HPI or status of 3 chronic problems;  10 system ROS    Detailed Exam:  12 findings from any organ system  Comprehensive Exam:  2 findings from each of 9 systems.   18471    Assessment/Plan       Sepsis (CMS/[facility])    Chronic diastolic CHF (congestive heart failure) (CMS/[facility])    PFO (patent foramen ovale)    Hypertension    Pulmonary hypertension (CMS/[facility])    ARIEL (obstructive sleep apnea)    Morbid obesity (CMS/[facility])    History of DVT of lower extremity    Cellulitis of right anterior lower leg    Lymphedema    Cellulitis of right lower extremity    Hyponatremia    Acute renal failure (ARF) (CMS/[facility])    Anemia, chronic disease      59 y.o. female with lower extremity cellulitis, sepsis syndrome and acute kidney injury.  Patient requires renal replacement therapy and we have been asked to place a temporary dialysis catheter.  I have discussed this with both the patient and her , and both agreed to proceed.    I discussed the patients findings and my recommendations with patient, family and nursing staff.    Abilio Rock MD  06/05/20  16:39    Please call my office with any question: [phone]

## 2020-06-05 NOTE — NURSING NOTE
Pt has significant blistering on the front and back of her right calf. She has copious volume loss from weeping at ruptured blister spots. Absorbant pads changed at lease Q1hour. Pt unable to lift her own legs but can wiggle toes bilaterally. Two nurses required to life her leg for dressing changes. Pt has barking constant cough. Negative for covid. She also drops her oxygen levels quickly if she drifts off to sleep. Pt educated on hypoxia related to obstructive sleep apnea. Pt has cpap at bedside. She is reminded to wear cpap during any periods of sleep.

## 2020-06-05 NOTE — PROGRESS NOTES
LOS: 1 day     Chief Complaint:  Follow-up RLE cellulitis    Interval History:  Moved to ICU last evening. She is awake and eating breakfast today. She denies much in the way of leg pain. She is tolerating antibiotics w/o rash or diarrhea. Nec fasc felt to be unlikely. There is more weeping from the skin today. Na and Crt not much better.     ROS: no n/v/d; no chest pain    Vital Signs  Temp:  [98.6 °F (37 °C)-99.5 °F (37.5 °C)] 98.8 °F (37.1 °C)  Heart Rate:  [70-91] 83  Resp:  [18-22] 22  BP: ()/(30-85) 103/32    Physical Exam:  General: awake, alert, very nice   Head: atraumatic  Eyes: no scleral icterus  ENT: MMM, OP clear, no thrush  Cardiovascular: NR, RR, no murmurs, massive BLE lymphedema  Respiratory: Lungs clear, mild dry cough; on nasal cannula  GI: Abdomen is obese but soft, non-tender  : no Salvador catheter present  Musculoskeletal: normal musculature  Skin: right lower extremity is hot, red, swollen with a large superficial ulceration; blister formation and weeping drainage today; left lower extremity has venous stasis changes  Neurological: Alert and oriented x 3  Psychiatric: Normal mood and affect   Vasc:  PIV w/o erythema    Antibiotics:  •  cefTRIAXone (ROCEPHIN) IVPB 2 g, 2 g, Intravenous, Q24H  •  clindamycin (CLEOCIN) 900 mg in dextrose 5% 50 mL IVPB (premix), 900 mg, Intravenous, Q8H  •  Vancomycin Pharmacy Intermittent Dosing    LABS:  CBC, BMP, A1c, VTr, CRP, micro reviewed today  Lab Results   Component Value Date    WBC 16.28 (H) 06/05/2020    HGB 8.7 (L) 06/05/2020    HCT 25.2 (L) 06/05/2020    MCV 81.3 06/05/2020     06/05/2020     Lab Results   Component Value Date    GLUCOSE 114 (H) 06/05/2020    CALCIUM 8.0 (L) 06/05/2020     (L) 06/05/2020    K 3.5 06/05/2020    CO2 19.2 (L) 06/05/2020    CL 92 (L) 06/05/2020    BUN 81 (H) 06/05/2020    CREATININE 5.18 (H) 06/05/2020    EGFRIFAFRI  06/05/2020      Comment:      <15 Indicative of kidney failure.    EGFRIFNONA 8 (L)  06/05/2020    BCR 15.6 06/05/2020    ANIONGAP 15.8 (H) 06/05/2020     Lab Results   Component Value Date    CRP 34.57 (H) 06/04/2020     Lab Results   Component Value Date    HGBA1C 5.40 06/05/2020     Lab Results   Component Value Date    VANCORANDOM 24.30 06/05/2020     Microbiology:  6/4 BCx: NGTD  6/4 Wound Cx Right Leg: pending; gram stain negative  6/4 COVID: negative    Radiology (personally reviewed report):   CT RLE with extensive s ub-q edema and fat stranding with skin thickening; no gas; fluid tracking along fascia are noted; phlegmon in the area of right common femoral artery, right external iliac artery and vein, and external iliac chain    Assessment/Plan   1. Sepsis due to right lower extremity cellulitis with possible phlegmon  2. Chronic lymphedema  3. Super obesity  3. Acute renal failure  4. Hyponatremia  5. History of DVT in E     Busy afternoon yesterday. CT A/P showed possible phlegmon in the right thigh area and couldn't exclude necrotizing fasciitis. Patient promptly seen by general surgeon and extremity scan ordered which did not have any soft tissue gas present. No immediate indication for the OR. She will be monitored closely. She is afebrile. WBC about the same today (down from admission). Due to swelling, there are some blisters and oozing on the lower extremity today more pronounced than yesterday. Several reasonable options with her antibiotics. I am going to continue intermittent vancomycin dosing given poor renal function, stop ceftriaxone and clindamycin, then start cefepime 2 g IV q24h for slightly broader gram negative coverage. Follow-up blood and wound cultures.     Thank you for this consult. ID will follow.

## 2020-06-05 NOTE — PROGRESS NOTES
Danilo Croft MD                          [phone]      Patient ID:    Name:  Emely Solomon    MRN:  [id number]    1960   59 y.o.  female            Patient Care Team:  RENAY Smith MD as PCP - General (General Practice)    CC/ Reason for visit: Acute right lower extremity cellulitis, septic shock, respiratory failure, pulmonary hypertension, renal failure    Subjective: Pt seen and examined this AM.  Patient underwent extensive work-up yesterday including CT abdomen pelvis and lower extremity.  There is no evidence of necrotizing fasciitis but does show evidence of cellulitis and anasarca.  She is not requiring vasopressor support now but is requiring midodrine and noted some concern for low blood pressures but unsure of much of this is reliable.  She does have improvement in urine output of 1.5 L.  Still getting IV fluids due to concern for hypotension.  Noted to have severe hypoxemia when not on CPAP.  Echocardiogram showed severe pulmonary hypertension    ROS: Denies any subjective fevers, syncope or presyncopal events, new neurological deficits, nausea or vomiting currently    Objective     Vital Signs past 24hrs    BP range: BP: ()/(30-85) 103/32  Pulse range: Heart Rate:  [70-91] 83  Resp rate range: Resp:  [20-22] 22  Temp range: Temp (24hrs), Av.8 °F (37.1 °C), Min:98.3 °F (36.8 °C), Max:99.5 °F (37.5 °C)      Ventilator/Non-Invasive Ventilation Settings (From admission, onward)     Start     Ordered    20 1735  NIPPV (CPAP or BIPAP)  At Bedtime As Needed-RT     Comments:  Home CPAP   Question Answer Comment   Indication: Sleep Apnea    Type: CPAP    NIPPV Mask Interface: Per Patient Preference    Titrate for SPO2 90%        20 1734                Device (Oxygen Therapy): nasal cannula       (!) 171 kg (377 lb 3.3 oz); Body mass index is 66.82 kg/m².      Intake/Output Summary (Last 24 hours) at 2020 1342  Last data filed at  6/5/2020 0700  Gross per 24 hour   Intake --   Output 1550 ml   Net -1550 ml       PHYSICAL EXAM   Constitutional: Middle aged super morbidly obese white female pt in bed, No acute respiratory distress, + accessory muscle use  Head: - NCAT  Eyes: No pallor.  Anicteric sclerae, EOMI.  ENMT:  Mallampati 4, no oral thrush. Moist MM.   NECK: Trachea midline, No thyromegaly, no palpable cervical lymphadenopathy  Heart: RRR, no murmur. 2+ pedal edema   Lungs: YANNA +, decreased breath sounds at the bases, no wheezes/ crackles heard    Abdomen: Soft. No tenderness, guarding or rigidity. No palpable masses  Extremities: Extremities warm and well perfused.  Right lower extremity with significant erythema and some tenderness to palpation along with some subcutaneous discharge.  Neuro: Conscious, answers appropriately, no gross focal neuro deficits  Psych: Mood and affect appropriate    Scheduled meds:    cefepime 2 g Intravenous Q24H   midodrine 15 mg Oral TID AC   sodium chloride 10 mL Intravenous Q12H   Vancomycin Pharmacy Intermittent Dosing  Does not apply Daily       IV meds:                        Pharmacy to dose vancomycin     sodium chloride 125 mL/hr Last Rate: 125 mL/hr (06/05/20 0789)       Data Review:      Results from last 7 days   Lab Units 06/05/20  0608 06/04/20  1530 06/04/20  0434 06/04/20  0035   SODIUM mmol/L 127* 127* 124* 122*   POTASSIUM mmol/L 3.5 3.6 3.5 3.9   CHLORIDE mmol/L 92* 91* 88* 84*   CO2 mmol/L 19.2* 17.9* 16.8* 16.4*   BUN mg/dL 81* 78* 80* 77*   CREATININE mg/dL 5.18* 5.05* 5.69* 5.85*   CALCIUM mg/dL 8.0* 8.2* 8.0* 8.8   BILIRUBIN mg/dL  --   --   --  1.0   ALK PHOS U/L  --   --   --  111   ALT (SGPT) U/L  --   --   --  33   AST (SGOT) U/L  --   --   --  53*   GLUCOSE mg/dL 114* 90 100* 114*   WBC 10*3/mm3 16.28*  --  15.89* 19.31*   HEMOGLOBIN g/dL 8.7*  --  9.8* 10.7*   PLATELETS 10*3/mm3 258  --  215 257   INR   --   --  2.02*  --    PROBNP pg/mL  --   --  1,598.0*  --        Lab  Results   Component Value Date    CALCIUM 8.0 (L) 06/05/2020    PHOS 6.1 (H) 06/05/2020       Results from last 7 days   Lab Units 06/04/20  0153 06/04/20  0049 06/04/20  0030   BLOODCX  No growth at 24 hours  --  No growth at 24 hours   WOUNDCX   --  Scant growth (1+) Gram Positive Cocci*  Scant growth (1+) Streptococcus, Beta Hemolytic, Group C*  Scant growth (1+) Normal Skin Aliyah  --        Results from last 7 days   Lab Units 06/04/20  0556   PH, ARTERIAL pH units 7.355   PO2 ART mm Hg 100.1*   PCO2, ARTERIAL mm Hg 33.8*   HCO3 ART mmol/L 18.8*        Results Review:    I have reviewed the relevant laboratory results and independently reviewed the chest imaging from this hospitalization including the available echocardiogram reports personally and summarized it if/ when appropriate below    Assessment    Septic shock; resolved   Beta-hemolytic streptococcal SSI  RLE cellulitis  Acute renal failure; severe  Anasarca  Right groin adenopathy/ Phlegmon along the right common femoral vein  Chronic lymphedema  Hyponatremia  Pulmonary hypertension per echo -RVSP 65  Severe ARIEL on CPAP -Dr. Garcia  History of DVT/PE on rivaroxaban    PLAN:  Patient transferred to the ICU due to concern for persistent hypotension and concern for septic shock in the setting of bilateral lower extremity cellulitis.  Lactic acid was elevated but this has resolved now. blood pressure has been low but this is not reliable due to her body habitus and multiple issues with getting an accurate blood pressure.  Lactic acid done and is normal. I do not suspect any hypoperfusion at this point.  She is significantly volume overloaded and also has pulmonary hypertension which will contribute to worsening lower extremity edema and difficulty in healing cellulitis.  Hence will discontinue IV fluids.  Acute renal failure is significantly worse and this is being managed by nephrology service.  Understand the concern for possible need for dialysis  soon.  Chest x-ray read noted and does not show obvious pneumonia concerns.  We will wean supplemental oxygen.  She will need to be compliant with her CPAP for her severe ARIEL.  Cellulitis with no evidence of necrotizing fasciitis but with some right femoral area phlegmon per CT. noted general surgery input.  Patient is a set up for DVT/PE.  Will recheck INR and start anticoagulation if subtherapeutic with heparin drip.  Likely not a candidate for oral anticoagulation with Xarelto given her weight.  Lovenox contraindicated due to renal failure.    We will continue to watch the patient closely in ICU given concern for limb loss.    CC - 32 mins    I have discussed my findings and recommendations with patient and nursing staff.     Danilo Croft MD  6/5/2020

## 2020-06-05 NOTE — PROGRESS NOTES
"Pharmacokinetic Evaluation - Vancomycin    Emely Solomon is a 59 y.o. female on vancomycin pharmacy to dose.  MRN: [id number]  : 1960    Day of vancomycin therapy: 2  Indication: RLE cellultitis  Consulted by: Dr. Flores  Goal trough: 15-20     Current dose: intermittent dosing  Other antimicrobials: cefepime 2g iv q24    Blood pressure (!) 103/32, pulse 83, temperature 98.3 °F (36.8 °C), temperature source Oral, resp. rate 22, height 160 cm (63\"), weight (!) 171 kg (377 lb 3.3 oz), SpO2 94 %.  Results from last 7 days   Lab Units 20  0608 20  1530 20  0434   CREATININE mg/dL 5.18* 5.05* 5.69*     Estimated Creatinine Clearance: 18.2 mL/min (A) (by C-G formula based on SCr of 5.18 mg/dL (H)).  Results from last 7 days   Lab Units 20  1220 20  0608 20  0434 20  0035   WBC 10*3/mm3  --  16.28* 15.89* 19.31*   HEMOGLOBIN g/dL  --  8.7* 9.8* 10.7*   HEMATOCRIT %  --  25.2* 28.9* 31.5*   PLATELETS 10*3/mm3  --  258 215 257   LACTATE mmol/L 0.8  --  1.3 4.3*     Cultures:    COVID: negative   bcx: in process   Wcx: in process     H/x of group b strep in bcx in      Dosing hx (include troughs if drawn):  Vancomycin 2500mg loading dose               0320                           1734 random: 27.2mcg/mL (14 hrs from last dose)     0608 random=24.1 mcg/ml.  1220 random=21.9 mcg/ml       Assessment:   Patient transferred to ICU  and is on vanc/cefepime for sepsis due to RLE cellulitis.  CRRT is not currently planned but it has been discussed as a potential option.  Random level drawn at 1220 is 21.9 mcg/ml.New plan is for HD today after cath placed. Will redose w/ 1g after HD and check random level tomorrow am.    Plan:  1) Give vancomycin 1g iv after HD today.  2) random vancomycin level tomorrow am.  3) Encourage adequate hydration if appropriate. Monitor for decreased UOP, rash or other signs of vancomycin intolerance.    Thanks for this " consult, will follow until dc,  Miguel Brown Pharm.D, BCCCP

## 2020-06-05 NOTE — PROGRESS NOTES
"General Surgery  Progress Note    CC: Follow-up right leg cellulitis and open wound        S: She is resting comfortably and states that she has only a mild amount of discomfort in her right ankle but is otherwise feeling fine.  There has been weeping of serous fluid from the open bulla along the medial right ankle but no real change in the erythema along the right foot and calf.    ROS: Positive for erythema, open wound, and serous fluid drainage from the right ankle    O:BP (!) 103/32 Comment: difficult to obtain accurate bp r/t morbid obesity  Pulse 83   Temp 98.3 °F (36.8 °C) (Oral)   Resp 22   Ht 160 cm (63\")   Wt (!) 171 kg (377 lb 3.3 oz)   SpO2 94%   BMI 66.82 kg/m²     Intake & Output:  UOP: 1550 cc/24 hrs    GENERAL: alert, well appearing, and in no distress  HEENT: normocephalic, atraumatic, moist mucous membranes, clear sclerae   CHEST: clear to auscultation, no wheezes, rales or rhonchi, symmetric air entry  CARDIAC: regular rate and rhythm    ABDOMEN: Soft, obese, nondistended, nontender  EXTREMITIES: Significant bilateral lower extremity lymphedema with chronic skin thickening, rickey induration of the bilateral ankles, and pitting edema of the feet with unchanged blanching erythema of the right foot and right medial ankle surrounding an open bulla with serous fluid drainage, no palpable crepitus or areas of skin necrosis    LABS  Results from last 7 days   Lab Units 06/05/20  0608 06/04/20  0434 06/04/20  0035   WBC 10*3/mm3 16.28* 15.89* 19.31*   HEMOGLOBIN g/dL 8.7* 9.8* 10.7*   HEMATOCRIT % 25.2* 28.9* 31.5*   PLATELETS 10*3/mm3 258 215 257   MONOCYTES % %  --  5.1 4.0*     Results from last 7 days   Lab Units 06/05/20  0608 06/04/20  1530 06/04/20  0434 06/04/20  0035   SODIUM mmol/L 127* 127* 124* 122*   POTASSIUM mmol/L 3.5 3.6 3.5 3.9   CHLORIDE mmol/L 92* 91* 88* 84*   CO2 mmol/L 19.2* 17.9* 16.8* 16.4*   BUN mg/dL 81* 78* 80* 77*   CREATININE mg/dL 5.18* 5.05* 5.69* 5.85*   CALCIUM " mg/dL 8.0* 8.2* 8.0* 8.8   BILIRUBIN mg/dL  --   --   --  1.0   ALK PHOS U/L  --   --   --  111   ALT (SGPT) U/L  --   --   --  33   AST (SGOT) U/L  --   --   --  53*   GLUCOSE mg/dL 114* 90 100* 114*     Results from last 7 days   Lab Units 06/04/20  0434   INR  2.02*         A/P: 59 y.o. female with severe cellulitis of the right lower extremity and underlying chronic lymphedema    I reviewed her CT right lower extremity done last night, which shows no signs of necrotizing fasciitis.  This coincides with my clinical exam which seems to demonstrate only a severe cellulitis but no signs of necrotizing fasciitis warranting surgical intervention.  Continue IV antibiotics.  I spoke with Dr. Flores this morning and also DNI with the wound care team who plans to apply compression stockings to her bilateral lower extremities to minimize fluid weepage due to the lymphedema swelling.  I will continue to follow along for now in case there is any acute change in her exam.    Radha Bashir MD  General and Endoscopic Surgery  [facility]    [address], [address]  [place], [place], [address]  P: [phone]  F: [phone]

## 2020-06-05 NOTE — PAYOR COMM NOTE
"Emely Solomon (59 y.o. Female)                          ATTENTION;    INITIAL CLINICAL FOR NURSE REVIEW, AUTH [id number]                         REPLY TO UR DEPT, MULUGETA RODRÍGUEZ LPN  [phone] OR CALL          Date of Birth Social Security Number Address Home Phone MRN    1960  [id number] [facility] [phone] [id number]    Restoration Marital Status          Non-Jewish        Admission Date Admission Type Admitting Provider Attending Provider Department, Room/Bed    6/4/20 Emergency Daria Arrington MD Hayden, Juliana, MD [facility] INTENSIVE CARE, I389/1    Discharge Date Discharge Disposition Discharge Destination                       Attending Provider:  Daria Arrington MD    Allergies:  No Known Allergies    Isolation:  None   Infection:  None   Code Status:  CPR    Ht:  160 cm (63\")   Wt:  171 kg (377 lb 3.3 oz)    Admission Cmt:  None   Principal Problem:  Sepsis (CMS/[facility]) [A41.9]                 Active Insurance as of 6/4/2020     Primary Coverage     Payor Plan Insurance Group Employer/Plan Group    [facility] [employer] [id number]     Payor Plan Address Payor Plan Phone Number Payor Plan Fax Number Effective Dates    [address] [phone]  5/1/2019 - None Entered    [place] [address]       Subscriber Name Subscriber Birth Date Member ID       EMELY SOLOMON 1960 [id number]                 Emergency Contacts      (Rel.) Home Phone Work Phone Mobile Phone    Ghanshyam Solomon (Spouse) [phone] -- --    Adilson Cintron (Brother) -- -- [phone]               History & Physical      Barbra Lee APRN at 06/04/20 0917     Attestation signed by Daria Arrington MD at 06/04/20 1309      Brief Attending Admission Attestation     I have seen and examined the patient, performing an independent face-to-face diagnostic evaluation with plan of care reviewed and developed with the " "advanced practice registered nurse (APRN).      Brief Summary Statement/HPI  Ms. Solomon is a 59 y.o. woman admitted early this morning for right leg pain and redness.  She also had complaints of diarrhea.  I saw the patient this morning on 6N, prior to her transfer to the unit.   present at the bedside.   was unaware that the right leg was so bad.  Patient has been very short of breath-\"she cannot take 2 steps\".  Remainder of detailed HPI is as noted above and has been reviewed and/or edited by me for completeness.    Attending Physical Exam  Temp:  [97.3 °F (36.3 °C)-97.4 °F (36.3 °C)] 97.3 °F (36.3 °C)  Heart Rate:  [] 85  Resp:  [18-20] 18  BP: ()/(20-74) 94/57  Constitutional: alert, appears older than stated age, cooperative, no distress and morbidly obese  Neck: no JVD.  Neck is thick.  I did not see any JVD.  Supple.  Trachea midline  Respiratory: clear to auscultation, unlabored and diminished breath sounds  Cardiovascular: regular rate and rhythm, S1, S2 normal, no murmur  Abdominal: Obese and nontender.  Difficult to determine if any organomegaly because of its size.  No mass.  Soft with positive bowel sounds  Musculoskeletal: Very prominent erythema and swelling right leg with broken blister.  Mild erythema left distal leg  Neurological: alert and oriented.  Not able to lift either leg on her own in the bed    Assessment/Plan  --Sepsis secondary to cellulitis right leg.  Previous history of streptococcal cellulitis and sepsis November 2017.  Hypotension with acute kidney injury and impending shock.  Nephrology is considering CRRT for patient.  She is critically ill and has been moved to the unit.  Antibiotics changed to Rocephin.  Appreciate all consultants.  We will not see her in the unit; await her transfer back to the floor when stable.  See above section for further detailed assessment and plan developed with APRN which I have reviewed and/or edited.      Electronically " "signed by Daria Arrington MD, 6/4/2020, 13:00.                           Patient Name:  Emely Solomon  YOB: 1960  MRN:  [id number]  Admit Date:  6/4/2020  Patient Care Team:  RENAY Smith MD as PCP - General (General Practice)      Subjective   History Present Illness     Chief Complaint   Patient presents with   • Diarrhea   • Legs Swollen     History of Present Illness   Ms. Solomon is a 59 y.o. 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 The history is somewhat limited as the patient is a poor historian as to the details leading up to her hospitalization. Her  at bedside helps with history. The patient states she has had new diarrhea for the last couple days. This was non-bloody in nature and not associated with nausea, vomiting or abdominal pain. She states she was able to get some \"pink medicine\" (Pepto-Bismol) for this and it helped.    She has also had some worsening right leg redness and swelling with a new open ulceration to the lower calf. The patient and her  are unsure how long it was been like this, but think maybe a week or so. She has had a prior DVT in the left leg and has chronic venous stasis to that area as a result. She denies any known fever, but has had chills. She denies any chest pain, but is short of breath at baseline. She does not wear any oxygen at home, but has been having worsening dyspnea on exertion per her  as she states the patient can barely move around in their home without getting winded. He states the patient has had some productive sputum as well and states this was \"black.\" The patient denies any reduced urination, but states she has had frequency and foggy urine.    In the ED, the patient's creatinine was noted to be 5.85 " (prior 0.8 in 2017) and BUN 77. Sodium 122 and potassium 3.9. Her WBC were 19.31 and lactic 4.3. She was given a sepsis fluid bolus and started on IV antibiotics. She was sent to the floor where RRT was called for low blood pressures. It was unclear how accurate these were related to her obesity. CXR was performed and showed cardiomegaly with vascular congestion. Patchy perihilar densities were noted and nonspecific. ABGs were drawn and showed normal pH, CO2 33.8 and P02 of 100. COVID19 testing was negative. She remained stable on the floor, but given her STEFANIA, Nephrology plans on transfer to the ICU for initiation of CRRT.     Review of Systems   Constitutional: Positive for activity change and chills. Negative for fever.   HENT: Negative for congestion and ear pain.    Eyes: Negative for pain and discharge.   Respiratory: Positive for cough and shortness of breath. Negative for choking and chest tightness.    Cardiovascular: Positive for leg swelling. Negative for chest pain.   Gastrointestinal: Positive for diarrhea. Negative for abdominal distention, abdominal pain, constipation, nausea and vomiting.   Endocrine: Negative for cold intolerance and heat intolerance.   Genitourinary: Positive for frequency. Negative for difficulty urinating and dysuria.   Musculoskeletal: Positive for gait problem. Negative for arthralgias and back pain.   Skin: Positive for color change and wound.   Neurological: Positive for weakness. Negative for speech difficulty and headaches.   Psychiatric/Behavioral: Negative for confusion. The patient is not nervous/anxious.       Personal History     Past Medical History:   Diagnosis Date   • Cellulitis     11/2017, with Group B Strep bacteremia and sepsis   • Chronic diastolic congestive heart failure (CMS/HCC)    • Deep venous thrombosis (CMS/HCC)    • Hypertension    • Lipoedema    • Lymphedema    • Morbid obesity (CMS/HCC)    • Paradoxical embolism (CMS/HCC)     to the LLE due to DVT/PE  and PFO   • PFO (patent foramen ovale)    • Pulmonary embolism (CMS/HCC)    • Pulmonary hypertension (CMS/HCC)     multifactorial (dCHF, obesity/ARIEL, hx PE), mild by echo 1/2016     Past Surgical History:   Procedure Laterality Date   • BRONCHOSCOPY N/A 7/21/2017    Procedure: BRONCHOSCOPY with wash;  Surgeon: Caleb Garcia MD;  Location: [facility] ENDOSCOPY;  Service:    • DILATATION AND CURETTAGE  04/11/2011   • VENA CAVA FILTER PLACEMENT      Inferior Vena Cava Filter Placement w/Fluorosc angiogr guidance     Family History   Problem Relation Age of Onset   • Hypertension Other      Social History     Tobacco Use   • Smoking status: Never Smoker   • Smokeless tobacco: Never Used   Substance Use Topics   • Alcohol use: Yes     Comment: caffeine use:1 cup daily.    • Drug use: No     Medications Prior to Admission   Medication Sig Dispense Refill Last Dose   • acetaminophen (TYLENOL) 325 MG tablet Take 2 tablets by mouth Every 6 (Six) Hours As Needed for Mild Pain  (pain or symptomatic fever).   Taking   • furosemide (LASIX) 40 MG tablet Take 1 tablet by mouth 3 (Three) Times a Week. 45 tablet 3    • hydroCHLOROthiazide (HYDRODIURIL) 25 MG tablet Take 25 mg by mouth Daily.      • ipratropium-albuterol (DUO-NEB) 0.5-2.5 mg/3 ml nebulizer ipratropium 0.5 mg-albuterol 3 mg (2.5 mg base)/3 mL nebulization soln   Taking   • losartan (COZAAR) 50 MG tablet Take 100 mg by mouth Daily.      • metoprolol succinate XL (TOPROL-XL) 50 MG 24 hr tablet Take 1 tablet by mouth 2 (Two) Times a Day.   Taking   • oxybutynin (DITROPAN) 5 MG tablet Take 5 mg by mouth Daily.      • rivaroxaban (XARELTO) 20 MG tablet Take 1 tablet by mouth Daily. 90 tablet 3      Allergies:  No Known Allergies    Objective    Objective     Vital Signs  Temp:  [97.3 °F (36.3 °C)-97.4 °F (36.3 °C)] 97.3 °F (36.3 °C)  Heart Rate:  [] 83  Resp:  [20] 20  BP: ()/(20-74) 95/50  SpO2:  [94 %-100 %] 95 %  on  Flow (L/min):  [3-4] 3;   Device (Oxygen  Therapy): nasal cannula  Body mass index is 67.09 kg/m².    Physical Exam   Constitutional: She is oriented to person, place, and time. She appears well-developed. No distress.   Acutely ill appearing.   HENT:   Head: Normocephalic and atraumatic.   Nose: Nose normal.   Mouth/Throat: Oropharynx is clear and moist.   Eyes: Conjunctivae are normal. Right eye exhibits no discharge. Left eye exhibits no discharge.   Neck: Normal range of motion. Neck supple.   Cardiovascular: Normal rate, regular rhythm, normal heart sounds and intact distal pulses.   Pulmonary/Chest: Effort normal and breath sounds normal. No respiratory distress.   Abdominal: Soft. Bowel sounds are normal. She exhibits no distension. There is no tenderness.   obese   Musculoskeletal: She exhibits edema (R>L swelling noted in lower extremity). She exhibits no tenderness.   Bilateral lower extremity weakness. Can move lower extremities, but does not lift them.   Neurological: She is alert and oriented to person, place, and time. She exhibits normal muscle tone. Coordination normal.   Skin: Skin is warm and dry. She is not diaphoretic. There is erythema.   Ulceration present to RLE with serous drainage on pad. Wound bed beefy red. Significant erythema and warmth on calf and some extension to medial thigh.   Psychiatric: She has a normal mood and affect. Her behavior is normal.   Nursing note and vitals reviewed.     Results Review:  I reviewed the patient's new clinical results.  I reviewed the patient's new imaging results and agree with the interpretation.  I reviewed the patient's other test results and agree with the interpretation  I personally viewed and interpreted the patient's EKG/Telemetry data    Lab Results (last 24 hours)     Procedure Component Value Units Date/Time    Blood Culture - Blood, Arm, Right [[id number]] Collected:  06/04/20 0030    Specimen:  Blood from Arm, Right Updated:  06/04/20 0044    CBC & Differential [[id number]]  Collected:  06/04/20 0035    Specimen:  Blood from Arm, Right Updated:  06/04/20 0056    Narrative:       The following orders were created for panel order CBC & Differential.  Procedure                               Abnormality         Status                     ---------                               -----------         ------                     CBC Auto Differential[[id number]]        Abnormal            Final result                 Please view results for these tests on the individual orders.    Comprehensive Metabolic Panel [[id number]]  (Abnormal) Collected:  06/04/20 0035    Specimen:  Blood from Arm, Right Updated:  06/04/20 0111     Glucose 114 mg/dL      BUN 77 mg/dL      Creatinine 5.85 mg/dL      Sodium 122 mmol/L      Potassium 3.9 mmol/L      Chloride 84 mmol/L      CO2 16.4 mmol/L      Calcium 8.8 mg/dL      Total Protein 7.9 g/dL      Albumin 3.50 g/dL      ALT (SGPT) 33 U/L      AST (SGOT) 53 U/L      Alkaline Phosphatase 111 U/L      Total Bilirubin 1.0 mg/dL      eGFR Non African Amer 7 mL/min/1.73      Comment: <15 Indicative of kidney failure.        eGFR   Amer --     Comment: <15 Indicative of kidney failure.        Globulin 4.4 gm/dL      A/G Ratio 0.8 g/dL      BUN/Creatinine Ratio 13.2     Anion Gap 21.6 mmol/L     Narrative:       GFR Normal >60  Chronic Kidney Disease <60  Kidney Failure <15      Lactic Acid, Plasma [[id number]]  (Abnormal) Collected:  06/04/20 0035    Specimen:  Blood from Arm, Right Updated:  06/04/20 0108     Lactate 4.3 mmol/L     CBC Auto Differential [[id number]]  (Abnormal) Collected:  06/04/20 0035    Specimen:  Blood from Arm, Right Updated:  06/04/20 0056     WBC 19.31 10*3/mm3      RBC 3.79 10*6/mm3      Hemoglobin 10.7 g/dL      Hematocrit 31.5 %      MCV 83.1 fL      MCH 28.2 pg      MCHC 34.0 g/dL      RDW 15.0 %      RDW-SD 44.9 fl      MPV 9.4 fL      Platelets 257 10*3/mm3     Manual Differential [[id number]]  (Abnormal) Collected:  06/04/20 0035     Specimen:  Blood from Arm, Right Updated:  06/04/20 0120     Neutrophil % 93.0 %      Lymphocyte % 2.0 %      Monocyte % 4.0 %      Atypical Lymphocyte % 1.0 %      Neutrophils Absolute 17.96 10*3/mm3      Lymphocytes Absolute 0.39 10*3/mm3      Monocytes Absolute 0.77 10*3/mm3      Anisocytosis Mod/2+     Polychromasia Mod/2+     WBC Morphology Normal     Platelet Morphology Normal    Lactic Acid, Reflex Timer (This will reflex a repeat order 3-3:15 hours after ordered.) [[id number]] Collected:  06/04/20 0035    Specimen:  Blood from Arm, Right Updated:  06/04/20 0415     Hold Tube Hold for add-ons.     Comment: Auto resulted.       Wound Culture - Swab, Leg, Right [[id number]] Collected:  06/04/20 0049    Specimen:  Swab from Leg, Right Updated:  06/04/20 0409     Gram Stain No WBCs or organisms seen    Blood Culture - Blood, Arm, Left [[id number]] Collected:  06/04/20 0153    Specimen:  Blood from Arm, Left Updated:  06/04/20 0200    Basic Metabolic Panel [[id number]]  (Abnormal) Collected:  06/04/20 0434    Specimen:  Blood Updated:  06/04/20 0543     Glucose 100 mg/dL      BUN 80 mg/dL      Creatinine 5.69 mg/dL      Sodium 124 mmol/L      Potassium 3.5 mmol/L      Chloride 88 mmol/L      CO2 16.8 mmol/L      Calcium 8.0 mg/dL      eGFR   Amer --     Comment: <15 Indicative of kidney failure.        eGFR Non African Amer 8 mL/min/1.73      Comment: <15 Indicative of kidney failure.        BUN/Creatinine Ratio 14.1     Anion Gap 19.2 mmol/L     Narrative:       GFR Normal >60  Chronic Kidney Disease <60  Kidney Failure <15      CBC Auto Differential [[id number]]  (Abnormal) Collected:  06/04/20 0434    Specimen:  Blood Updated:  06/04/20 0549     WBC 15.89 10*3/mm3      RBC 3.56 10*6/mm3      Hemoglobin 9.8 g/dL      Hematocrit 28.9 %      MCV 81.2 fL      MCH 27.5 pg      MCHC 33.9 g/dL      RDW 14.3 %      RDW-SD 42.3 fl      MPV 8.9 fL      Platelets 215 10*3/mm3     Protime-INR [[id number]]  (Abnormal)  Collected:  06/04/20 0434    Specimen:  Blood Updated:  06/04/20 0520     Protime 22.6 Seconds      INR 2.02    Lactic Acid, Reflex [[id number]]  (Normal) Collected:  06/04/20 0434    Specimen:  Blood Updated:  06/04/20 0513     Lactate 1.3 mmol/L     BNP [[id number]]  (Abnormal) Collected:  06/04/20 0434    Specimen:  Blood Updated:  06/04/20 0539     proBNP 1,598.0 pg/mL     Narrative:       Among patients with dyspnea, NT-proBNP is highly sensitive for the detection of acute congestive heart failure. In addition NT-proBNP of <300 pg/ml effectively rules out acute congestive heart failure with 99% negative predictive value.    Results may be falsely decreased if patient taking Biotin.      Manual Differential [[id number]]  (Abnormal) Collected:  06/04/20 0434    Specimen:  Blood Updated:  06/04/20 0549     Neutrophil % 92.9 %      Lymphocyte % 2.0 %      Monocyte % 5.1 %      Neutrophils Absolute 14.76 10*3/mm3      Lymphocytes Absolute 0.32 10*3/mm3      Monocytes Absolute 0.81 10*3/mm3      RBC Morphology Normal     WBC Morphology Normal     Platelet Morphology Normal    POC Glucose Once [[id number]]  (Normal) Collected:  06/04/20 0507    Specimen:  Blood Updated:  06/04/20 0509     Glucose 110 mg/dL     COVID PRE-OP / PRE-PROCEDURE SCREENING ORDER (NO ISOLATION) - Swab, Nasopharynx [[id number]] Collected:  06/04/20 0550    Specimen:  Swab from Nasopharynx Updated:  06/04/20 0730    Narrative:       The following orders were created for panel order COVID PRE-OP / PRE-PROCEDURE SCREENING ORDER (NO ISOLATION) - Swab, Nasopharynx.  Procedure                               Abnormality         Status                     ---------                               -----------         ------                     COVID-19,[facility]HOU[facility]...[[id number]]  Normal              Final result                 Please view results for these tests on the individual orders.    COVID-19,[facility]-HOU[facility], NP SWAB IN TRANSPORT MEDIA 8-12 HR TAT -  Swab, Nasopharynx [[id number]]  (Normal) Collected:  06/04/20 0550    Specimen:  Swab from Nasopharynx Updated:  06/04/20 0730     COVID19 Not Detected    Blood Gas, Arterial [[id number]]  (Abnormal) Collected:  06/04/20 0556    Specimen:  Arterial Blood Updated:  06/04/20 0558     Site Arterial: left radial     Hammad's Test Positive     pH, Arterial 7.355 pH units      pCO2, Arterial 33.8 mm Hg      pO2, Arterial 100.1 mm Hg      HCO3, Arterial 18.8 mmol/L      Base Excess, Arterial -6.0 mmol/L      O2 Saturation Calculated 97.5 %      Barometric Pressure for Blood Gas 741.5 mmHg      Modality Cannula     Flow Rate 3 lpm      Rate 20 Breaths/minute           Imaging Results (Last 24 Hours)     Procedure Component Value Units Date/Time    XR Chest 1 View [[id number]] Collected:  06/04/20 0504     Updated:  06/04/20 0509    Narrative:       PORTABLE CHEST RADIOGRAPH     HISTORY: Cough and congestion     COMPARISON: 11/11/2017     FINDINGS:  Cardiomegaly is present. There may be some vascular congestion. Patchy  perihilar infiltrates are seen. Appearance is nonspecific, and may  reflect asymmetric edema, although pneumonia is also in the  differential. No pneumothorax is seen. There is some blunting of the  left costophrenic angle, which may reflect a small effusion. Right hilum  does appear enlarged, which can be seen with pulmonary arterial  hypertension.       Impression:          1. Cardiomegaly and vascular congestion.  2. Patchy perihilar densities are nonspecific. Asymmetric edema and  pneumonia would be in the differential.     This report was finalized on 6/4/2020 5:06 AM by Dr. Rose Zamudio M.D.              Assessment/Plan     Active Hospital Problems    Diagnosis POA   • **Sepsis (CMS/HCC) [A41.9] Yes   • History of DVT of lower extremity [Z86.718] Not Applicable   • Cellulitis of right anterior lower leg [L03.115] Yes   • Lymphedema [I89.0] Yes   • Cellulitis of right lower extremity [L03.115] Yes   •  Hyponatremia [E87.1] Yes   • Acute renal failure (ARF) (CMS/[facility]) [N17.9] Yes   • Anemia, chronic disease [D63.8] Yes   • Morbid obesity (CMS/[facility]) [E66.01] Yes   • ARIEL (obstructive sleep apnea) [G47.33] Yes   • Hypertension [I10] Yes   • PFO (patent foramen ovale) [Q21.1] Not Applicable   • Chronic diastolic CHF (congestive heart failure) (CMS/[facility]) [I50.32] Unknown   • Pulmonary hypertension (CMS/[facility]) [I27.20] Yes     multifactorial (dCHF, obesity/ARIEL, hx PE), mild by echo 1/2016       Sepsis/cellulitis of right lower extremity  -IV Zosyn and Vancomycin initiated. Pharmacy dosing.  -IVF boluses given on arrival. BP borderline. Given STEFANIA/chronic diastolic heart failure, plans to transfer to ICU for CRRT today. Nephrology consulted and managing.   -Wound culture with no WBCs. Blood cultures pending at this time. History of group B bactermia from cellulitis in the past. Will ask Infectious disease to weigh in.  -WOCN to see for local wound care.  -Diarrhea x 2 days prior. Currently resolved. Monitor.    ARF  -Creatinine 5.85 on admission. Nephrology consulted.  -Transfer to ICU for CRRT today.  -Renally dose medications. Hold ARB and diuretics from home.  -Bladder scan.    History of DVT/PE  -On Xarelto, but kidney function currently impaired for this. Will defer further anticoagulation to intensivist.    Chronic diastolic heart failure/PFO  -Cardiology consulted. Follows with Dr. Rogers.  -Daily weights. Strict intake and output.  -Hold home lasix, HCTZ and ARB given low BP/STEFANIA.    ARIEL/pulmonary HTN  -BiPAP at bedside.  -Wean oxygen as able.    Hyponatremia  -Nephrology to manage.  -Monitor closely with labs.    · I discussed the patients findings and my recommendations with patient, family, nursing staff and Dr. Arrington.    VTE Prophylaxis - Xarelto (home med).  Code Status - Full code. Confirmed with patient and .       LUCÍA Lloyd  [facility]  06/04/20  09:17      Electronically  signed by Daria Arrington MD at 06/04/20 1309          Emergency Department Notes      Jocelyn Ocampo, RN at 06/03/20 2356        Pt assisted out of car into w/c, placed in mask, nurse in mask. Pt reports for several days she has had marked redness and a rash to both legs. Pt has hx of DVT and lymphedema but does not get any treatment for her lymphedema. Pt is on blood thinners. Denies pain.    Electronically signed by Jocelyn Ocampo RN at 06/03/20 2359     Jocelyn Ocampo RN at 06/04/20 0001        Pt just mentioned to nurse that she has a h/a now and blurred vision.     Jocelyn Ocampo RN  06/04/20 0001      Electronically signed by Jocelyn Ocampo RN at 06/04/20 0001     Louie Sutton MD at 06/04/20 0029           EMERGENCY DEPARTMENT ENCOUNTER    Room Number:  21/21  Date of encounter:  6/4/2020  PCP: RENAY Smith MD  Historian: Patient      HPI:  Chief Complaint: Bilateral leg redness  A complete HPI/ROS/PMH/PSH/SH/FH are unobtainable due to: N/A    Context: Emely Solomon is a 59 y.o. female who presents to the ED c/o bilateral leg redness.  She has a history of chronic lymphedema in her bilateral lower extremities.  She states they have seemed a little more red the past few days.  No known injury.  She denies chest pain.  She is chronically short of breath due to her obesity, but states this is no worse than normal.  She states she is compliant with all her medications, including anticoagulation.  She has had mild nonbloody diarrhea that seems to be getting better after taking Pepto-Bismol.  No abdominal pain.  No nausea or vomiting.  No fevers or chills.      PAST MEDICAL HISTORY  Active Ambulatory Problems     Diagnosis Date Noted   • Chronic diastolic CHF (congestive heart failure) (CMS/HCC)    • PFO (patent foramen ovale)    • Paradoxical embolism (CMS/HCC)    • Hypertension    • Pulmonary hypertension (CMS/HCC)    • Sepsis (CMS/HCC) 11/12/2017   • Back pain 11/12/2017   • ARIEL  (obstructive sleep apnea) 11/14/2017   • Morbid obesity (CMS/HCC) 06/03/2019   • Urinary frequency 06/03/2019       Past Medical History:   Diagnosis Date   • Chronic diastolic congestive heart failure (CMS/HCC)    • Deep venous thrombosis (CMS/HCC)    • Lipoedema    • Lymphedema    • Pulmonary embolism (CMS/HCC)          PAST SURGICAL HISTORY  Past Surgical History:   Procedure Laterality Date   • BRONCHOSCOPY N/A 7/21/2017    Procedure: BRONCHOSCOPY with wash;  Surgeon: Caleb Garcia MD;  Location: [place] [facility] END[facility]COPY;  Service:    • DILATATION AND CURETTAGE  04/11/2011   • VENA CAVA FILTER PLACEMENT      Inferior Vena Cava Filter Placement w/Fluorosc angiogr guidance         FAMILY HISTORY  Family History   Problem Relation Age of Onset   • Hypertension Other          SOCIAL HISTORY  Social History     Socioeconomic History   • Marital status:      Spouse name: Not on file   • Number of children: Not on file   • Years of education: Not on file   • Highest education level: Not on file   Tobacco Use   • Smoking status: Never Smoker   • Smokeless tobacco: Never Used   Substance and Sexual Activity   • Alcohol use: Yes     Comment: caffeine use:1 cup daily.    • Drug use: No   • Sexual activity: Defer         ALLERGIES  Patient has no known allergies.        REVIEW OF SYSTEMS  Review of Systems   Constitutional: Negative for fever.   HENT: Negative for sore throat.    Eyes: Negative.    Respiratory: Negative for cough and shortness of breath.    Cardiovascular: Negative for chest pain.   Gastrointestinal: Positive for diarrhea. Negative for abdominal pain, blood in stool, nausea and vomiting.   Genitourinary: Negative for dysuria.   Musculoskeletal: Negative for neck pain.   Skin: Positive for color change and wound. Negative for rash.   Allergic/Immunologic: Negative.    Neurological: Negative for weakness, numbness and headaches.   Hematological: Negative.    Psychiatric/Behavioral: Negative.    All other systems  reviewed and are negative.       All systems reviewed and negative except for those discussed in HPI.       PHYSICAL EXAM    I have reviewed the triage vital signs and nursing notes.    ED Triage Vitals [06/04/20 0001]   Temp Heart Rate Resp BP SpO2   97.4 °F (36.3 °C) 100 20 -- 96 %      Temp src Heart Rate Source Patient Position BP Location FiO2 (%)   Tympanic Monitor -- -- --       Physical Exam   Constitutional: Pt. is oriented to person, place, and time and well-developed, well-nourished, and in no distress. No distress.   HENT: Normocephalic and atraumatic,  EOM are normal. Pupils are equal, round, and reactive to light. Oropharynx moist/nonerythematous.  Neck: Normal range of motion. Neck supple. No JVD present. No tracheal deviation present. No thyromegaly present.   Cardiovascular: Normal rate, regular rhythm and normal heart sounds. Exam reveals no gallop and no friction rub.   No murmur heard.  Pulmonary/Chest: Effort normal and breath sounds normal. No stridor. No respiratory distress. No wheezes, no rales.   Abdominal: Soft, morbidly obese. Bowel sounds are normal. No distension. There is no tenderness. There is no rebound and no guarding.   Musculoskeletal: The lower extremities exhibit marked severe lymphedema.  There is a large blister over the right distal medial lower extremity that has ruptured and soaked her close.  There is surrounding erythema on this area that is tender to touch.  There is only mild erythema of the left lower extremity.  Pulses are palpable.    Neurological: Pt. is alert and oriented to person, place, and time. Pt. has normal sensation and normal strength. No cranial nerve deficit. GCS score is 15.   Skin: Skin described above. No rash noted. Pt. is not diaphoretic. No erythema.   Psychiatric: Mood, affect and judgment normal.   Nursing note and vitals reviewed.        LAB RESULTS  Recent Results (from the past 24 hour(s))   Comprehensive Metabolic Panel    Collection Time:  06/04/20 12:35 AM   Result Value Ref Range    Glucose 114 (H) 65 - 99 mg/dL    BUN 77 (H) 8 - 23 mg/dL    Creatinine 5.85 (H) 0.57 - 1.00 mg/dL    Sodium 122 (L) 136 - 145 mmol/L    Potassium 3.9 3.5 - 5.2 mmol/L    Chloride 84 (L) 98 - 107 mmol/L    CO2 16.4 (L) 22.0 - 29.0 mmol/L    Calcium 8.8 8.6 - 10.5 mg/dL    Total Protein 7.9 6.0 - 8.5 g/dL    Albumin 3.50 3.50 - 5.20 g/dL    ALT (SGPT) 33 1 - 33 U/L    AST (SGOT) 53 (H) 1 - 32 U/L    Alkaline Phosphatase 111 39 - 117 U/L    Total Bilirubin 1.0 0.2 - 1.2 mg/dL    eGFR Non African Amer 7 (L) >60 mL/min/1.73    eGFR  African Amer      Globulin 4.4 gm/dL    A/G Ratio 0.8 g/dL    BUN/Creatinine Ratio 13.2 7.0 - 25.0    Anion Gap 21.6 (H) 5.0 - 15.0 mmol/L   Lactic Acid, Plasma    Collection Time: 06/04/20 12:35 AM   Result Value Ref Range    Lactate 4.3 (C) 0.5 - 2.0 mmol/L   CBC Auto Differential    Collection Time: 06/04/20 12:35 AM   Result Value Ref Range    WBC 19.31 (H) 3.40 - 10.80 10*3/mm3    RBC 3.79 3.77 - 5.28 10*6/mm3    Hemoglobin 10.7 (L) 12.0 - 15.9 g/dL    Hematocrit 31.5 (L) 34.0 - 46.6 %    MCV 83.1 79.0 - 97.0 fL    MCH 28.2 26.6 - 33.0 pg    MCHC 34.0 31.5 - 35.7 g/dL    RDW 15.0 12.3 - 15.4 %    RDW-SD 44.9 37.0 - 54.0 fl    MPV 9.4 6.0 - 12.0 fL    Platelets 257 140 - 450 10*3/mm3   Manual Differential    Collection Time: 06/04/20 12:35 AM   Result Value Ref Range    Neutrophil % 93.0 (H) 42.7 - 76.0 %    Lymphocyte % 2.0 (L) 19.6 - 45.3 %    Monocyte % 4.0 (L) 5.0 - 12.0 %    Atypical Lymphocyte % 1.0 0.0 - 5.0 %    Neutrophils Absolute 17.96 (H) 1.70 - 7.00 10*3/mm3    Lymphocytes Absolute 0.39 (L) 0.70 - 3.10 10*3/mm3    Monocytes Absolute 0.77 0.10 - 0.90 10*3/mm3    Anisocytosis Mod/2+ None Seen    Polychromasia Mod/2+ None Seen    WBC Morphology Normal Normal    Platelet Morphology Normal Normal       Ordered the above labs and independently reviewed the results.        RADIOLOGY  No Radiology Exams Resulted Within Past 24 Hours    I  ordered the above noted radiological studies. Reviewed by me and discussed with radiologist.  See dictation for official radiology interpretation.      PROCEDURES    Procedures      MEDICATIONS GIVEN IN ER    Medications   cefTRIAXone (ROCEPHIN) IVPB 1 g (1 g Intravenous New Bag 6/4/20 0158)   sodium chloride 0.9 % infusion (has no administration in time range)   sodium chloride 0.9% - IBW for BMI > 30 bolus 1,572 mL (1,572 mL Intravenous New Bag 6/4/20 0141)   sodium chloride 0.9 % flush 10 mL (has no administration in time range)   sodium chloride 0.9 % flush 10 mL (has no administration in time range)   nitroglycerin (NITROSTAT) SL tablet 0.4 mg (has no administration in time range)   sodium chloride 0.9 % infusion (has no administration in time range)   piperacillin-tazobactam (ZOSYN) 3.375 g in iso-osmotic dextrose 50 ml (premix) (has no administration in time range)   piperacillin-tazobactam (ZOSYN) 3.375 g in iso-osmotic dextrose 50 ml (premix) (has no administration in time range)   Pharmacy to dose vancomycin (has no administration in time range)   acetaminophen (TYLENOL) tablet 650 mg (has no administration in time range)     Or   acetaminophen (TYLENOL) 160 MG/5ML solution 650 mg (has no administration in time range)     Or   acetaminophen (TYLENOL) suppository 650 mg (has no administration in time range)   ondansetron (ZOFRAN) injection 4 mg (has no administration in time range)   vancomycin 2500 mg/500 mL 0.9% NS IVPB ([facility]) (has no administration in time range)   sodium chloride 0.9 % bolus 1,000 mL (1,000 mL Intravenous New Bag 6/4/20 0121)         PROGRESS, DATA ANALYSIS, CONSULTS, AND MEDICAL DECISION MAKING    Any/all labs have been independently reviewed by me.  Any/all radiology studies have been reviewed by me and discussed with radiologist dictating the report.   EKG's independently viewed and interpreted by me.  Discussion below represents my analysis of pertinent findings related to patient's  condition, differential diagnosis, treatment plan and final disposition.      ED Course as of Jun 04 0211   Thu Jun 04, 2020   0032 Prior record review: The patient has history of group B step bacteremia due to lower extremity cellulitis in the past.     CBC, CMP, lactic acid and a wound culture will be obtained.  Blood cultures will be drawn as well.    [WC]   0113 BUN(!): 77 [WC]   0114 Creatinine(!): 5.85 [WC]   0114 Last BUN and creatinine on epic were 2 years ago and were normal.      [WC]   0114 Elevated   WBC(!): 19.31 [WC]   0114 Elevated   Lactate(!!): 4.3 [WC]   0114 IV Rocephin has been ordered.    [WC]   0114 Blood pressure is on the low side-I am going to order the normal saline sepsis bolus and continue IV fluids..  She will need to be admitted.    [WC]   0125 Case discussed with LETICIA Garcia (on-call for A)-she accepts the patient on behalf of Dr. Smith.    [WC]   0204 BP 99/74.      [WC]   0211 /43.    [WC]      ED Course User Index  [WC] Louie Sutton MD       AS OF 02:11 VITALS:    BP - 99/74  HR - 80  TEMP - 97.4 °F (36.3 °C) (Tympanic)  02 SATS - 96%        DIAGNOSIS  Final diagnoses:   Cellulitis of right anterior lower leg   STEFANIA (acute kidney injury) (CMS/HCC)   Lymphedema   Sepsis, due to unspecified organism, unspecified whether acute organ dysfunction present (CMS/[facility])         DISPOSITION  Admitted           Louie Sutton MD  06/04/20 0212      Electronically signed by Louie Sutton MD at 06/04/20 0212     Katherine Arevalo RN at 06/04/20 0035        I wore full protective equipment throughout this patient encounter including a face mask, eye shield and gloves. Hand hygiene/washing of hands was performed before donning protective equipment and after removal when leaving the room.     Katherine Arevalo RN  06/04/20 0218      Electronically signed by Katherine Arevalo RN at 06/04/20 0218                       Vital Signs     Date/Time   Temp   Temp src   Pulse    Resp   BP   Patient Position   SpO2    06/05/20 0800   --   --   83   --   (!) 103/32 difficult to obtain accurate bp r/t morbid obesity   --   94    BP: difficult to obtain accurate bp r/t morbid obesity at 06/05/20 0800    06/05/20 0757   --   --   82   --   92/42   --   --    06/05/20 0545   --   --   75   --   93/45   --   97    06/05/20 0515   --   --   80   --   104/60   --   (!) 88    06/05/20 0500   --   --   79   --   92/65   --   96    06/05/20 0445   --   --   80   --   97/54   --   98    06/05/20 0430   --   --   79   --   111/46   --   97    06/05/20 0415   --   --   80   --   117/85   --   99    06/05/20 0400   --   --   79   --   105/61   --   96    06/05/20 0315   --   --   91   --   (!) 72/52   --   90    06/05/20 0300   98.8 (37.1)   --   --   --   --   --   --    06/05/20 0215   --   --   83   --   (!) 71/41   --   91    06/05/20 0115   --   --   80   --   (!) 57/33   --   96    06/05/20 0045   --   --   70   --   (!) 83/30   --   98    06/05/20 0015   --   --   75   --   (!) 77/51   --   93    06/04/20 2345   --   --   80   --   (!) 93/39   --   91    06/04/20 2300   99.5 (37.5)   --   --   --   --   --   --    06/04/20 2230   --   --   80   --   (!) 84/40   --   90    06/04/20 2100   --   --   81   --   (!) 80/41   --   92    06/04/20 1945   --   --   86   --   (!) 89/46   --   99    06/04/20 1900   98.6 (37)   --   76   --   (!) 74/36   Lying   99    06/04/20 1800   --   --   84   --   --   Lying   95    06/04/20 1700   --   --   89   22   96/49   Lying   98    06/04/20 1600   --   --   85   22   96/48   Lying   100    06/04/20 1541   98.8 (37.1)   Oral   --   --   --   --   --    06/04/20 1500   --   --   87   22   96/79   Lying   98    06/04/20 1400   --   --   83   20   (!) 82/45   Lying   100    06/04/20 1300   --   --   77   20   (!) 83/62   Lying   97    06/04/20 1200   --   --   85   18   94/57   Lying   100    06/04/20 0950   --   --   86   --   104/43   --   100    06/04/20 0554   --    --   83   --   --   --   95 06/04/20 05:53:59   --   --   --   --   95/50   --   --    06/04/20 0551   --   --   85   --   --   --   96 06/04/20 0548   --   --   87   --   --   --   97 06/04/20 0545   --   --   91   --   --   --   98 06/04/20 0543   --   --   84   --   --   --   --    06/04/20 0542   --   --   --   --   --   --   96 06/04/20 0539   --   --   79   --   --   --   95 06/04/20 0536   --   --   79   --   96/51   --   97 06/04/20 0533   --   --   83   --   --   --   96 06/04/20 0530   --   --   82   --   --   --   94 06/04/20 0527   --   --   85   --   --   --   95 06/04/20 0525   --   --   86   --   --   --   --    06/04/20 0524   --   --   --   --   --   --   94 06/04/20 0521   --   --   84   --   --   --   97 06/04/20 0520   --   --   --   --   (!) 98/20   --   --    06/04/20 0519   --   --   84   --   --   --   98 06/04/20 0515   --   --   86   --   --   --   94 06/04/20 0513   --   --   86   --   --   --   --    06/04/20 0512   --   --   --   --   (!) 86/33   --   99    06/04/20 0509   --   --   88   --   (!) 89/20   --   99    06/04/20 0421   97.3 (36.3)   Temporal   80   20   (!) 88/61   --   99    06/04/20 02:13:06   --   --   --   --   100/43   --   --    06/04/20 0210   --   --   81   --   100/43   --   96    06/04/20 0200   --   --   80   --   99/74   --   96    06/04/20 01:59:24   --   --   83   20   (!) 73/64   Lying   96    06/04/20 0130   --   --   83   --   (!) 84/53   --   96    06/04/20 01:25:33   --   --   --   --   (!) 81/22   Lying   --    06/04/20 0120   --   --   --   --   (!) 76/33   --   --    06/04/20 0119   --   --   --   --   --   --   100    06/04/20 0117   --   --   --   --   (!) 81/22   --   --    06/04/20 0100   --   --   --   --   97/44   --   96    06/04/20 0001   97.4 (36.3)   Tympanic   100   20   --   --   96              Oxygen Therapy (last 3 days)     Date/Time   SpO2   Device (Oxygen Therapy)   Flow (L/min)   Oxygen  Concentration (%)   ETCO2 (mmHg)    06/05/20 0810   --   nasal cannula   3   --   --    06/05/20 0800   94   nasal cannula   3   --   --    06/05/20 0545   97   --   --   --   --    06/05/20 0515   (!) 88   --   --   --   --    06/05/20 0500   96   --   --   --   --    06/05/20 0445   98   --   --   --   --    06/05/20 0430   97   --   --   --   --    06/05/20 0415   99   --   --   --   --    06/05/20 0400   96   nasal cannula   3   --   --    06/05/20 0315   90   --   --   --   --    06/05/20 0215   91   --   --   --   --    06/05/20 0115   96   --   --   --   --    06/05/20 0045   98   --   --   --   --    06/05/20 0015   93   --   --   --   --    06/05/20 0000   --   nasal cannula   3   --   --    06/04/20 2345   91   --   --   --   --    06/04/20 2230   90   --   --   --   --    06/04/20 2100   92   --   --   --   --    06/04/20 2000   --   nasal cannula   3   --   --    06/04/20 1945   99   --   --   --   --    06/04/20 1900   99   nasal cannula   3   --   --    06/04/20 1800   95   nasal cannula   3   --   --    06/04/20 1700   98   nasal cannula   3   --   --    06/04/20 1600   100   nasal cannula   3   --   --    06/04/20 1500   98   nasal cannula   3   --   --    06/04/20 1400   100   nasal cannula   3   --   --    06/04/20 1300   97   nasal cannula   3   --   --    06/04/20 1200   100   nasal cannula   3   --   --    06/04/20 0950   100   --   --   --   --    06/04/20 0800   --   nasal cannula   3   --   --    06/04/20 0554   95   --   --   --   --    06/04/20 0551   96   --   --   --   --    06/04/20 0548   97   --   --   --   --    06/04/20 0545   98   --   --   --   --    06/04/20 0542   96   --   --   --   --    06/04/20 0539   95   --   --   --   --    06/04/20 0536   97   --   --   --   --    06/04/20 0533   96   --   --   --   --    06/04/20 0530   94   --   --   --   --    06/04/20 0527   95   --   --   --   --    06/04/20 0524   94   --   --   --   --    06/04/20 0521   97   --   --   --   --     06/04/20 0519   98   --   --   --   --    06/04/20 0515   94   --   --   --   --    06/04/20 0512   99   --   --   --   --    06/04/20 0509   99   --   --   --   --    06/04/20 0421   99   nasal cannula   4   --   --    06/04/20 0310   --   nasal cannula   4   --   --    06/04/20 0210   96   --   --   --   --    06/04/20 0200   96   --   --   --   --    06/04/20 01:59:24   96   room air   --   --   --    06/04/20 0130   96   --   --   --   --    06/04/20 0119   100   --   --   --   --    06/04/20 0100   96   --   --   --   --    06/04/20 0001   96   --   --   --   --            Lines, Drains & Airways    Active LDAs     Name:   Placement date:   Placement time:   Site:   Days:    Peripheral IV 06/04/20 0035 Right Antecubital   06/04/20    0035    Antecubital   1    Peripheral IV 06/04/20 0141 Right Wrist   06/04/20    0141    Wrist   1         Inactive LDAs     None                  Facility-Administered Medications as of 6/5/2020   Medication Dose Route Frequency Provider Last Rate Last Dose   • acetaminophen (TYLENOL) tablet 650 mg  650 mg Oral Q4H PRN Radha Salmeron APRN        Or   • acetaminophen (TYLENOL) 160 MG/5ML solution 650 mg  650 mg Oral Q4H PRN Radha Salmeron APRN        Or   • acetaminophen (TYLENOL) suppository 650 mg  650 mg Rectal Q4H PRN Radha Salmeron APRN       • cefepime (MAXIPIME) 2 g/100 mL 0.9% NS (mbp)  2 g Intravenous Q24H Tariq Flores MD       • [COMPLETED] cefTRIAXone (ROCEPHIN) IVPB 1 g  1 g Intravenous Once Louie Sutton  mL/hr at 06/04/20 0158 1 g at 06/04/20 0158   • [COMPLETED] furosemide (LASIX) injection 40 mg  40 mg Intravenous Once Raquel Hemphill MD   40 mg at 06/04/20 1614   • HYDROmorphone (DILAUDID) injection 1 mg  1 mg Intravenous Q2H PRN Adryan King MD   1 mg at 06/05/20 0634   • ipratropium-albuterol (DUO-NEB) nebulizer solution 3 mL  3 mL Nebulization Q6H PRN Barbra Lee, APRN       • midodrine (PROAMATINE)  tablet 15 mg  15 mg Oral TID AC Raquel Hemphill MD       • [COMPLETED] midodrine (PROAMATINE) tablet 5 mg  5 mg Oral Once Raquel Hemphill MD   5 mg at 06/05/20 0757   • nitroglycerin (NITROSTAT) SL tablet 0.4 mg  0.4 mg Sublingual Q5 Min PRN Radha Salmeron APRN       • ondansetron (ZOFRAN) injection 4 mg  4 mg Intravenous Q6H PRN Radha Salmeron APRN       • Pharmacy to dose vancomycin   Does not apply Continuous PRN Tariq Flores MD       • [COMPLETED] piperacillin-tazobactam (ZOSYN) 3.375 g in iso-osmotic dextrose 50 ml (premix)  3.375 g Intravenous Once Radha Salmeron APRN   3.375 g at 06/04/20 0409   • [COMPLETED] sodium chloride 0.9 % bolus 1,000 mL  1,000 mL Intravenous Once Louie Sutton MD 2,000 mL/hr at 06/04/20 0121 1,000 mL at 06/04/20 0121   • sodium chloride 0.9 % flush 10 mL  10 mL Intravenous Q12H Radha Salmeron APRN   10 mL at 06/04/20 2016   • sodium chloride 0.9 % flush 10 mL  10 mL Intravenous PRN Radha Salmeron APRN       • sodium chloride 0.9 % infusion  125 mL/hr Intravenous Continuous Raquel Hemphill  mL/hr at 06/05/20 0725 125 mL/hr at 06/05/20 0725   • [COMPLETED] sodium chloride 0.9% - IBW for BMI > 30 bolus 1,572 mL  30 mL/kg (Ideal) Intravenous Once Louie Sutton MD 1,572 mL/hr at 06/04/20 0141 1,572 mL at 06/04/20 0141   • [COMPLETED] vancomycin 2500 mg/500 mL 0.9% NS IVPB ([facility])  2,500 mg Intravenous Once Radha Salmeron APRN   2,500 mg at 06/04/20 0320   • Vancomycin Pharmacy Intermittent Dosing   Does not apply Daily Tariq Flores MD             Imaging Results (Last 24 Hours)     Procedure Component Value Units Date/Time    CT Lower Extremity Right Without Contrast [[id number]] Collected:  06/04/20 1852     Updated:  06/04/20 1911    Narrative:       CT LOWER EXTREMITY RIGHT WO CONTRAST-     INDICATIONS: Redness, swelling, possible necrotizing fasciitis of the  right leg  Radiation  dose reduction techniques were utilized, including  automated exposure control and exposure modulation based on body size.     TECHNIQUE: Unenhanced CT of the right leg     COMPARISON: Correlated with CT of the abdomen and pelvis from 06/04/2020     FINDINGS:     Extensive subcutaneous edema/fat stranding is seen in the right leg,  with diffuse skin thickening in the right lower leg and foot, and  medially in the right thigh; this appearance is similar to that of the  partly included medial left thigh, and suggests diffuse body wall  edema/anasarca, although an infectious process such as cellulitis or  fasciitis could be associated with similar appearance. No soft tissue  gas is noted. Some areas of fluid tracking along fascial are noted, for  example medially in the left lower leg on image 321 of the axial series  4, laterally in the thigh on image 172, and areas of borderline fascial  thickening are apparent, for example laterally in the distal thigh, 3 mm  on image 243, that certainly could be associated with fasciitis,  including the possibility of necrotizing fasciitis, which is not  excluded on the basis of this exam. For further evaluation, MRI is  advised if not contraindicated, close interval follow-up can  characterize change.     On the CT of the abdomen and pelvis from earlier today, right groin  adenopathy and edema/phlegmon along the right common femoral region  extending to the external iliac chain, where a masslike area of phlegmon  or adenopathy or ill-defined tumor was noted enveloping the right  external iliac artery and vein; no interval change in this appearance is  noted.     No acute fracture is identified in right leg. Moderate to prominent  degenerative changes are seen at the right knee.             Impression:          As described.     Discussed by telephone with the patient's nurse, Mallika, at time of  interpretation, 1902, 06/04/2020.           This report was finalized on 6/4/2020 7:08  PM by Dr. Damon Chavez M.D.       CT Abdomen Pelvis Without Contrast [[id number]] Collected:  06/04/20 1516     Updated:  06/04/20 1602    Narrative:       CT ABDOMEN PELVIS WO CONTRAST-     Radiation dose reduction techniques were utilized, including automated  exposure control and exposure modulation based on body size.     Clinical: Acute renal failure, erythema and pain right leg. Morbid  obesity.     COMPARISON 11/11/2017     FINDINGS: No obstructive uropathy is demonstrated. The left kidney is  small in size similar to the previous examination. Compensatory  hypertrophy of the right kidney. No right or left sided renal calculus.  The ureters are satisfactory in course and caliber. Urinary bladder is  moderately distended. No intraluminal filling defect.     The uterus is enlarged for the patient's age measuring 11 cm transverse  and at least 7.8 cm AP. Size and shape is quite similar to the previous  examination. Suspect underlying fibroids. No adnexal abnormality.     The right inguinal lymph nodes are enlarged compared to the previous  examination. There is vague subcutaneous edema in the common femoral  region extending to the external iliac chain. At this location there is  an ill-defined masslike density enveloping the external iliac artery and  vein. This area measures approximately 10 cm AP, 5.4 cm transverse and  approximately 8 cm craniocaudal. This could simply represent focal  edema/phlegmon, adenopathy or ill-defined tumor. The enlarged left  inguinal lymph nodes seen on the previous examination have diminished in  size within the interim.     There is a sliding-type small hiatal hernia. There is cardiac  enlargement. The stomach is collapsed, its contour is within normal  limits. The pancreas is satisfactory in appearance. The liver and spleen  have a satisfactory appearance. The gallbladder is unremarkable, no  biliary duct dilatation. Diameter of the aorta is within normal limits.  No free  air or free intraperitoneal fluid. Caliber the large and small  bowel is normal. No bowel wall thickening or induration of the mesentery  to suggest an inflammatory process.     CONCLUSION:  1. No obstructive uropathy, the bladder is moderately distended without  intraluminal filling defect.  2. There is right inguinal lymphadenopathy with edema/phlegmon along the  common femoral region of the upper thigh extending to the external iliac  chain where there is a vague, sizable masslike area of phlegmon or  adenopathy or ill-defined tumor enveloping the external iliac artery and  vein. Favor phlegmon, the appearance is worrisome for potential  necrotizing fasciitis.     FINDINGS of this report called to the ICU nursing station at the time of  completion, 3:40 PM. Dr. Flores also notified.        This report was finalized on 6/4/2020 3:58 PM by Dr. Dhruv Sanchez M.D.           Orders (last 72 hrs)      Start     Ordered    06/06/20 0600  CBC (No Diff)  Morning Draw      06/05/20 0857    06/06/20 0600  Basic Metabolic Panel  Morning Draw      06/05/20 0857    06/05/20 1130  midodrine (PROAMATINE) tablet 15 mg  3 Times Daily Before Meals      06/05/20 0650    06/05/20 1000  Compression  Continuous     Comments:  WOCN to initiate compression wrap on Friday after assessment.    06/04/20 1313    06/05/20 1000  cefepime (MAXIPIME) 2 g/100 mL 0.9% NS (mbp)  Every 24 Hours      06/05/20 0856    06/05/20 0830  midodrine (PROAMATINE) tablet 5 mg  Once      06/05/20 0650    06/05/20 0804  POC Glucose Once  Once      06/05/20 0758    06/05/20 0600  Basic Metabolic Panel  Morning Draw,   Status:  Canceled      06/04/20 1010    06/05/20 0600  CBC (No Diff)  Morning Draw      06/04/20 1010    06/05/20 0600  Hemoglobin A1c  Morning Draw      06/04/20 1146    06/05/20 0600  Renal Function Panel  Morning Draw      06/04/20 1807    06/05/20 0600  Vancomycin, Random  Morning Draw      06/04/20 1903    06/05/20 0534  POC Glucose Once   Once      06/05/20 0517    06/04/20 2313  POC Glucose Once  Once      06/04/20 2309    06/04/20 1830  sodium chloride 0.9 % infusion  Continuous      06/04/20 1741    06/04/20 1735  HYDROmorphone (DILAUDID) injection 1 mg  Every 2 Hours PRN      06/04/20 1735    06/04/20 1735  NIPPV (CPAP or BIPAP)  At Bedtime As Needed-RT     Comments:  Home CPAP    06/04/20 1734    06/04/20 1715  Vancomycin Pharmacy Intermittent Dosing  Daily      06/04/20 1626    06/04/20 1701  Opioid Administration - Continuous Pulse Oximetry (SpO2) Monitoring  Continuous      06/04/20 1700    06/04/20 1701  Opioid Administration - Document SpO2 Value With Each Set of Vitals & Any Change in Patient Status  Continuous      06/04/20 1700    06/04/20 1701  Opioid Administration - Notify Provider Pulse Oximetry (SpO2)  Until Discontinued      06/04/20 1700    06/04/20 1700  clindamycin (CLEOCIN) 900 mg in dextrose 5% 50 mL IVPB (premix)  Every 8 Hours,   Status:  Discontinued      06/04/20 1610    06/04/20 1630  Vancomycin, Random  Once      06/04/20 1627    06/04/20 1629  CT Lower Extremity Right Without Contrast  1 Time Imaging      06/04/20 1629    06/04/20 1608  Pharmacy to dose vancomycin  Continuous PRN      06/04/20 1610    06/04/20 1604  Inpatient General Surgery Consult  Once     Specialty:  General Surgery  Provider:  Radha Bashir MD    06/04/20 1603    06/04/20 1600  piperacillin-tazobactam (ZOSYN) 3.375 g in iso-osmotic dextrose 50 ml (premix)  Every 12 Hours,   Status:  Discontinued      06/04/20 0143    06/04/20 1600  piperacillin-tazobactam (ZOSYN) 4.5 g in iso-osmotic dextrose 100 mL IVPB (premix)  Every 12 Hours,   Status:  Discontinued      06/04/20 0521    06/04/20 1557  morphine injection 4 mg  Every 4 Hours PRN,   Status:  Discontinued      06/04/20 1558    06/04/20 1530  Vancomycin, Random  Timed,   Status:  Canceled     Comments:  Please draw at specified time      06/04/20 0517    06/04/20 1528  Urinalysis With  Microscopic If Indicated (No Culture) - Urine, Clean Catch  Once      06/04/20 1527    06/04/20 1528  Sodium, Urine, Random - Urine, Clean Catch  Once      06/04/20 1528    06/04/20 1528  Creatinine, Urine, Random - Urine, Clean Catch  Once      06/04/20 1528    06/04/20 1528  Protein, Urine, Random - Urine, Clean Catch  Once      06/04/20 1528    06/04/20 1500  furosemide (LASIX) injection 40 mg  Once      06/04/20 1331    06/04/20 1430  midodrine (PROAMATINE) tablet 10 mg  3 Times Daily Before Meals,   Status:  Discontinued      06/04/20 1331    06/04/20 1331  CT Abdomen Pelvis Without Contrast  1 Time Imaging      06/04/20 1331    06/04/20 1330  Basic Metabolic Panel  STAT      06/04/20 1331    06/04/20 1300  cefTRIAXone (ROCEPHIN) IVPB 2 g  Every 24 Hours,   Status:  Discontinued      06/04/20 1118    06/04/20 1201  POC Glucose Once  Once      06/04/20 1157    06/04/20 1147  C-reactive Protein  Once      06/04/20 1146    06/04/20 1100  oxybutynin (DITROPAN) tablet 5 mg  Daily,   Status:  Discontinued      06/04/20 1008    06/04/20 1054  Inpatient Infectious Diseases Consult  Once     Specialty:  Infectious Diseases  Provider:  Geoff Cordon MD    06/04/20 1053    06/04/20 1011  Bladder Scan  Once      06/04/20 1010    06/04/20 1009  ECG 12 Lead  Once      06/04/20 1008    06/04/20 1006  ipratropium-albuterol (DUO-NEB) nebulizer solution 3 mL  Every 6 Hours PRN      06/04/20 1008    06/04/20 0930  Discontinue Patient Isolation  Once      06/04/20 0929    06/04/20 0900  Vancomycin Pharmacy Intermittent Dosing  Daily,   Status:  Discontinued      06/04/20 0518    06/04/20 0854  STAT Adult Transthoracic Echo Complete W/ Cont if Necessary Per Protocol  Once     Comments:  Going to ICU    06/04/20 0854    06/04/20 0826  Transfer Patient  Once      06/04/20 0829    06/04/20 0811  Inpatient Cardiology Consult  STAT     Specialty:  Cardiology  Provider:  Brennan Rogers MD    06/04/20 0810    06/04/20 0702   Inpatient Nephrology Consult  IN AM     Specialty:  Nephrology  Provider:  Ashutosh Garcia MD    06/04/20 0556    06/04/20 0600  Basic Metabolic Panel  Morning Draw      06/04/20 0143    06/04/20 0600  CBC Auto Differential  Morning Draw      06/04/20 0143    06/04/20 0600  Protime-INR  Morning Draw      06/04/20 0143    06/04/20 0559  Blood Gas, Arterial  Once      06/04/20 0556    06/04/20 0535  COVID PRE-OP / PRE-PROCEDURE SCREENING ORDER (NO ISOLATION) - Swab, Nasopharynx  STAT      06/04/20 0534    06/04/20 0535  COVID-19,[facility], NP SWAB IN TRANSPORT MEDIA 8-12 HR TAT - Swab, Nasopharynx  PROCEDURE ONCE      06/04/20 0534    06/04/20 0534  Blood Gas, Arterial  Once      06/04/20 0534    06/04/20 0510  POC Glucose Once  Once      06/04/20 0507    06/04/20 0510  Manual Differential  Once      06/04/20 0509    06/04/20 0425  XR Chest 1 View  1 Time Imaging      06/04/20 0425    06/04/20 0425  BNP  Once      06/04/20 0425    06/04/20 0425  PT Consult: Eval & Treat As Tolerated; Functional Mobility Below Baseline  Once     Comments:  Reason Why PT Needed: weakness from leg swelling    06/04/20 0425    06/04/20 0416  Lactic Acid, Reflex  STAT      06/04/20 0415    06/04/20 0400  Vital Signs  Every 4 Hours      06/04/20 0143    06/04/20 0159  vancomycin 2500 mg/500 mL 0.9% NS IVPB ([facility])  Once      06/04/20 0157    06/04/20 0145  sodium chloride 0.9 % flush 10 mL  Every 12 Hours Scheduled      06/04/20 0143    06/04/20 0145  sodium chloride 0.9 % infusion  Continuous,   Status:  Discontinued      06/04/20 0143    06/04/20 0145  piperacillin-tazobactam (ZOSYN) 3.375 g in iso-osmotic dextrose 50 ml (premix)  Once      06/04/20 0143    06/04/20 0145  vancomycin 3000 mg/500 mL 0.9% NS IVPB ([facility])  Once,   Status:  Discontinued      06/04/20 0143    06/04/20 0144  Daily Weights  Daily      06/04/20 0143    06/04/20 0141  Wound Ostomy Eval & Treat  Once      06/04/20 0143    06/04/20 0139  ondansetron (ZOFRAN)  injection 4 mg  Every 6 Hours PRN      06/04/20 0143    06/04/20 0138  acetaminophen (TYLENOL) tablet 650 mg  Every 4 Hours PRN      06/04/20 0143    06/04/20 0138  acetaminophen (TYLENOL) 160 MG/5ML solution 650 mg  Every 4 Hours PRN      06/04/20 0143    06/04/20 0138  acetaminophen (TYLENOL) suppository 650 mg  Every 4 Hours PRN      06/04/20 0143    06/04/20 0137  Pharmacy to dose vancomycin  Continuous PRN,   Status:  Discontinued      06/04/20 0143    06/04/20 0136  Pulse Oximetry, Continuous  Continuous,   Status:  Canceled      06/04/20 0143    06/04/20 0136  Fall Precautions  Continuous      06/04/20 0143    06/04/20 0136  Diet Regular; Cardiac  Diet Effective Now      06/04/20 0143    06/04/20 0135  VTE Prophylaxis Not Indicated: Other: Patient Currently Anticoagulated / Receiving Prophylaxis  Once      06/04/20 0143    06/04/20 0135  Telemetry - Maintain IV Access  Continuous      06/04/20 0143    06/04/20 0135  Continuous Cardiac Monitoring  Continuous      06/04/20 0143    06/04/20 0135  May Be Off Telemetry for Tests  Continuous      06/04/20 0143    06/04/20 0135  ACLS Protocol For Life Threatening Dysrhythmias (Unless Code Status Indicates Otherwise)  Continuous      06/04/20 0143    06/04/20 0135  Notify Provider if ACLS Protocol Activated  Until Discontinued      06/04/20 0143    06/04/20 0134  Telemetry - Pulse Oximetry  Continuous PRN,   Status:  Canceled     Comments:  If Patient Develops Unresponsiveness, Acute Dyspnea, Cyanosis or Suspected Hypoxemia Start Continuous Pulse Ox Monitoring, Apply Oxygen & Notify Provider    06/04/20 0143    06/04/20 0134  nitroglycerin (NITROSTAT) SL tablet 0.4 mg  Every 5 Minutes PRN      06/04/20 0143    06/04/20 0134  Code Status and Medical Interventions:  Continuous      06/04/20 0143    06/04/20 0134  Intake & Output  Every Shift      06/04/20 0143    06/04/20 0134  Weigh Patient  Once      06/04/20 0143    06/04/20 0134  Oxygen Therapy- Nasal Cannula;  Titrate for SPO2: 90% - 95%  Continuous      06/04/20 0143    06/04/20 0134  Insert Peripheral IV  Once      06/04/20 0143    06/04/20 0134  Saline Lock & Maintain IV Access  Continuous,   Status:  Canceled      06/04/20 0143    06/04/20 0133  sodium chloride 0.9 % flush 10 mL  As Needed      06/04/20 0143    06/04/20 0128  sodium chloride 0.9% - IBW for BMI > 30 bolus 1,572 mL  Once      06/04/20 0126    06/04/20 0128  Inpatient Admission  Once      06/04/20 0128    06/04/20 0119  sodium chloride 0.9 % bolus 1,000 mL  Once      06/04/20 0117    06/04/20 0115  sodium chloride 0.9 % infusion  Continuous,   Status:  Discontinued      06/04/20 0113    06/04/20 0115  sodium chloride 0.9 % bolus 500 mL  Once,   Status:  Discontinued      06/04/20 0113    06/04/20 0114  [facility] (on-call MD unless specified) Details  Once     Specialty:  Hospitalist  Provider:  (Not yet assigned)    06/04/20 0113    06/04/20 0109  Lactic Acid, Reflex Timer (This will reflex a repeat order 3-3:15 hours after ordered.)  Once      06/04/20 0108    06/04/20 0106  cefTRIAXone (ROCEPHIN) IVPB 1 g  Once      06/04/20 0104    06/04/20 0053  Manual Differential  Once      06/04/20 0052    06/04/20 0030  CBC & Differential  Once      06/04/20 0029    06/04/20 0030  Comprehensive Metabolic Panel  Once      06/04/20 0029    06/04/20 0030  Blood Culture - Blood,  Once      06/04/20 0029    06/04/20 0030  Blood Culture - Blood,  Once      06/04/20 0029    06/04/20 0030  Lactic Acid, Plasma  Once      06/04/20 0029    06/04/20 0030  Wound Culture - Swab, Leg, Right  Once      06/04/20 0029    06/04/20 0030  CBC Auto Differential  PROCEDURE ONCE      06/04/20 0029    Unscheduled  Oxygen Therapy- Nasal Cannula; Titrate for SPO2: 90% - 95%  Continuous PRN     Comments:  If Patient Develops Unresponsiveness, Acute Dyspnea, Cyanosis or Suspected Hypoxemia Start Continuous Pulse Ox Monitoring, Apply Oxygen & Notify Provider    06/04/20 0140                 Physician Progress Notes         Radha Salmeron APRN at 06/04/20 4760        Was called the rapid response for patient.  Upon my assessment of patient, she appeared to be stable, in no acute distress.  As reported by the nurse they were having trouble getting patient's blood pressure, she is very obese and not sure all blood pressures were accurate.  Taking blood pressure on left forearm seem to have the best readings, her blood pressure was 95/50, she did not feel dizzy, she was alert and oriented, complaining of no chest pain or shortness of breath.  She is on 2 L nasal cannula with sats 97%.  While I was in room stat ABGs were done and they were within normal limits.  Rapid response team, to critical care nurses also in the room during evaluation.  Reviewed labs from emergency room looks like patient with an acute renal failure with creatinine 5.85.  She does have sepsis and was given fluid boluses in the emergency room, has a history of chronic CHF, so there was some worry if patient was fluid overloaded, she began to desat and was coughing prior to my arrival, I did order stat chest x-ray which showed some cardiomegaly and mild vascular congestion.  Consult placed to nephrology at this time to try to manage fluid volume.  Patient also did have a large blister on her right lower leg that is draining fluid, hard to tell whether patient is fluid overload or dehydrated at this point.  With discussion with the critical care nurses, patient seems stable at this time to remain on telemetry unit, with close monitoring of her blood pressure.  Notified RN on the unit that if blood pressure began to drop more to page nephrology for recommendation on treating blood pressure given elevated creatinine.  Awaiting creatinine results from this a.m. patient's lactate is much improved was 4.3, this a.m. is 1.3.    Electronically signed by Radha Salmeron APRN at 06/04/20 2767          Consult Notes       Kanchan  Raquel Jolley MD at 06/04/20 1744      Consult Orders    1. Inpatient Nephrology Consult [[id number]] ordered by Radha Salmeron APRN at 06/04/20 0556                  Referring Provider: A  Reason for Consultation: STEFANIA    Subjective     Chief complaint   Chief Complaint   Patient presents with   • Diarrhea   • Legs Swollen       History of present illness:  59 year old WF with severe morbid obesity and trouble walking due to massive lymphedema, diastolic dysfunction, hypertension, history of DVT, pulmonary hypertension, history of streptococcal cellulitis with sepsis 11/2017.  She has had very little routine health care in the past several years.  Her  brought her to the hospital due to weakness and lethargy and decreased mobility over baseline.  She is mostly chair bound at baseline, but it had worsened over the past few months.  She has evidence today of severe cellulitis.  Due to her severe STEFANIA she under went noncontrast CT abd and no hydronephrosis was found but she does have a phlegmon worrisome for necrotizing fasciitis.  She has been moved to the ICU due to hypotension and confusion, vanc/zosyn and rocephin given.  IVF going and she has made 200cc uop.    Past Medical History:   Diagnosis Date   • Cellulitis     11/2017, with Group B Strep bacteremia and sepsis   • Chronic diastolic congestive heart failure (CMS/HCC)    • Deep venous thrombosis (CMS/HCC)    • Hypertension    • Lipoedema    • Lymphedema    • Morbid obesity (CMS/HCC)    • Paradoxical embolism (CMS/HCC)     to the LLE due to DVT/PE and PFO   • PFO (patent foramen ovale)    • Pulmonary embolism (CMS/HCC)    • Pulmonary hypertension (CMS/HCC)     multifactorial (dCHF, obesity/ARIEL, hx PE), mild by echo 1/2016   Lives with  and used to work in the [employer].  Social History     Allergies:  Patient has no known allergies.    Review of Systems  Limited due to confusion and sleepiness.      Objective  "    Vital Signs  Temp:  [97.3 °F (36.3 °C)-98.8 °F (37.1 °C)] 98.8 °F (37.1 °C)  Heart Rate:  [] 89  Resp:  [18-22] 22  BP: ()/(20-79) 96/49    Flowsheet Rows      First Filed Value   Admission Height  160 cm (63\") Documented at 06/04/2020 0001   Admission Weight  (!) 176 kg (388 lb 12.8 oz) Documented at 06/04/2020 0040           I/O this shift:  In: 480 [P.O.:480]  Out: -   No intake/output data recorded.    Intake/Output Summary (Last 24 hours) at 6/4/2020 1744  Last data filed at 6/4/2020 0940  Gross per 24 hour   Intake 480 ml   Output --   Net 480 ml       Physical Exam:   General Appearance: alert, oriented x 3, no acute distress, a little confused and sleepy.  Skin: warm and dry  HEENT: pupils round and reactive to light, oral mucosa normal,   Neck: supple, no JVD, trachea midline  Lungs: diminished, unlabored breathing effort  Heart: RRR, normal S1 and S2, no S3, no rub  Abdomen: soft, non-tender,  present bowel sounds to auscultation  : no palpable bladder,  Extremities: massive legs with anasarca, redness bilaterally but worse on the right. cyanosis or clubbing  Neuro: normal speech and mental status      Results Review:  Results for orders placed or performed during the hospital encounter of 06/04/20   Wound Culture - Swab, Leg, Right   Result Value Ref Range    Gram Stain No WBCs or organisms seen    COVID-19, [facility], NP SWAB IN TRANSPORT MEDIA 8-12 HR TAT - Swab, Nasopharynx   Result Value Ref Range    COVID19 Not Detected Not Detected - Ref. Range   Comprehensive Metabolic Panel   Result Value Ref Range    Glucose 114 (H) 65 - 99 mg/dL    BUN 77 (H) 8 - 23 mg/dL    Creatinine 5.85 (H) 0.57 - 1.00 mg/dL    Sodium 122 (L) 136 - 145 mmol/L    Potassium 3.9 3.5 - 5.2 mmol/L    Chloride 84 (L) 98 - 107 mmol/L    CO2 16.4 (L) 22.0 - 29.0 mmol/L    Calcium 8.8 8.6 - 10.5 mg/dL    Total Protein 7.9 6.0 - 8.5 g/dL    Albumin 3.50 3.50 - 5.20 g/dL    ALT (SGPT) 33 1 - 33 U/L    AST (SGOT) " 53 (H) 1 - 32 U/L    Alkaline Phosphatase 111 39 - 117 U/L    Total Bilirubin 1.0 0.2 - 1.2 mg/dL    eGFR Non African Amer 7 (L) >60 mL/min/1.73    eGFR  African Amer      Globulin 4.4 gm/dL    A/G Ratio 0.8 g/dL    BUN/Creatinine Ratio 13.2 7.0 - 25.0    Anion Gap 21.6 (H) 5.0 - 15.0 mmol/L   Lactic Acid, Plasma   Result Value Ref Range    Lactate 4.3 (C) 0.5 - 2.0 mmol/L   CBC Auto Differential   Result Value Ref Range    WBC 19.31 (H) 3.40 - 10.80 10*3/mm3    RBC 3.79 3.77 - 5.28 10*6/mm3    Hemoglobin 10.7 (L) 12.0 - 15.9 g/dL    Hematocrit 31.5 (L) 34.0 - 46.6 %    MCV 83.1 79.0 - 97.0 fL    MCH 28.2 26.6 - 33.0 pg    MCHC 34.0 31.5 - 35.7 g/dL    RDW 15.0 12.3 - 15.4 %    RDW-SD 44.9 37.0 - 54.0 fl    MPV 9.4 6.0 - 12.0 fL    Platelets 257 140 - 450 10*3/mm3   Manual Differential   Result Value Ref Range    Neutrophil % 93.0 (H) 42.7 - 76.0 %    Lymphocyte % 2.0 (L) 19.6 - 45.3 %    Monocyte % 4.0 (L) 5.0 - 12.0 %    Atypical Lymphocyte % 1.0 0.0 - 5.0 %    Neutrophils Absolute 17.96 (H) 1.70 - 7.00 10*3/mm3    Lymphocytes Absolute 0.39 (L) 0.70 - 3.10 10*3/mm3    Monocytes Absolute 0.77 0.10 - 0.90 10*3/mm3    Anisocytosis Mod/2+ None Seen    Polychromasia Mod/2+ None Seen    WBC Morphology Normal Normal    Platelet Morphology Normal Normal   Lactic Acid, Reflex Timer (This will reflex a repeat order 3-3:15 hours after ordered.)   Result Value Ref Range    Hold Tube Hold for add-ons.    Basic Metabolic Panel   Result Value Ref Range    Glucose 100 (H) 65 - 99 mg/dL    BUN 80 (H) 8 - 23 mg/dL    Creatinine 5.69 (H) 0.57 - 1.00 mg/dL    Sodium 124 (L) 136 - 145 mmol/L    Potassium 3.5 3.5 - 5.2 mmol/L    Chloride 88 (L) 98 - 107 mmol/L    CO2 16.8 (L) 22.0 - 29.0 mmol/L    Calcium 8.0 (L) 8.6 - 10.5 mg/dL    eGFR  African Amer      eGFR Non African Amer 8 (L) >60 mL/min/1.73    BUN/Creatinine Ratio 14.1 7.0 - 25.0    Anion Gap 19.2 (H) 5.0 - 15.0 mmol/L   CBC Auto Differential   Result Value Ref Range    WBC  15.89 (H) 3.40 - 10.80 10*3/mm3    RBC 3.56 (L) 3.77 - 5.28 10*6/mm3    Hemoglobin 9.8 (L) 12.0 - 15.9 g/dL    Hematocrit 28.9 (L) 34.0 - 46.6 %    MCV 81.2 79.0 - 97.0 fL    MCH 27.5 26.6 - 33.0 pg    MCHC 33.9 31.5 - 35.7 g/dL    RDW 14.3 12.3 - 15.4 %    RDW-SD 42.3 37.0 - 54.0 fl    MPV 8.9 6.0 - 12.0 fL    Platelets 215 140 - 450 10*3/mm3   Protime-INR   Result Value Ref Range    Protime 22.6 (H) 11.7 - 14.2 Seconds    INR 2.02 (H) 0.90 - 1.10   Lactic Acid, Reflex   Result Value Ref Range    Lactate 1.3 0.5 - 2.0 mmol/L   BNP   Result Value Ref Range    proBNP 1,598.0 (H) 5.0 - 900.0 pg/mL   Manual Differential   Result Value Ref Range    Neutrophil % 92.9 (H) 42.7 - 76.0 %    Lymphocyte % 2.0 (L) 19.6 - 45.3 %    Monocyte % 5.1 5.0 - 12.0 %    Neutrophils Absolute 14.76 (H) 1.70 - 7.00 10*3/mm3    Lymphocytes Absolute 0.32 (L) 0.70 - 3.10 10*3/mm3    Monocytes Absolute 0.81 0.10 - 0.90 10*3/mm3    RBC Morphology Normal Normal    WBC Morphology Normal Normal    Platelet Morphology Normal Normal   Blood Gas, Arterial   Result Value Ref Range    Site Arterial: left radial     Hammad's Test Positive     pH, Arterial 7.355 7.350 - 7.450 pH units    pCO2, Arterial 33.8 (L) 35.0 - 45.0 mm Hg    pO2, Arterial 100.1 (H) 80.0 - 100.0 mm Hg    HCO3, Arterial 18.8 (L) 22.0 - 28.0 mmol/L    Base Excess, Arterial -6.0 (L) 0.0 - 2.0 mmol/L    O2 Saturation Calculated 97.5 92.0 - 99.0 %    Barometric Pressure for Blood Gas 741.5 mmHg    Modality Cannula     Flow Rate 3 lpm    Rate 20 Breaths/minute   C-reactive Protein   Result Value Ref Range    C-Reactive Protein 34.57 (H) 0.00 - 0.50 mg/dL   Basic Metabolic Panel   Result Value Ref Range    Glucose 90 65 - 99 mg/dL    BUN 78 (H) 8 - 23 mg/dL    Creatinine 5.05 (H) 0.57 - 1.00 mg/dL    Sodium 127 (L) 136 - 145 mmol/L    Potassium 3.6 3.5 - 5.2 mmol/L    Chloride 91 (L) 98 - 107 mmol/L    CO2 17.9 (L) 22.0 - 29.0 mmol/L    Calcium 8.2 (L) 8.6 - 10.5 mg/dL    eGFR  African  Amer      eGFR Non African Amer 9 (L) >60 mL/min/1.73    BUN/Creatinine Ratio 15.4 7.0 - 25.0    Anion Gap 18.1 (H) 5.0 - 15.0 mmol/L   POC Glucose Once   Result Value Ref Range    Glucose 110 70 - 130 mg/dL   POC Glucose Once   Result Value Ref Range    Glucose 123 70 - 130 mg/dL                cefTRIAXone 2 g Intravenous Q24H   clindamycin 900 mg Intravenous Q8H   midodrine 10 mg Oral TID AC   sodium chloride 10 mL Intravenous Q12H   Vancomycin Pharmacy Intermittent Dosing  Does not apply Daily       Pharmacy to dose vancomycin    sodium chloride 75 mL/hr       Assessment/Plan       Sepsis (CMS/[facility])    Chronic diastolic CHF (congestive heart failure) (CMS/[facility])    PFO (patent foramen ovale)    Hypertension    Pulmonary hypertension (CMS/[facility])    ARIEL (obstructive sleep apnea)    Morbid obesity (CMS/[facility])    History of DVT of lower extremity    Cellulitis of right anterior lower leg    Lymphedema    Cellulitis of right lower extremity    Hyponatremia    Acute renal failure (ARF) (CMS/[facility])    Anemia, chronic disease  Ct abd/pelvis  1. No obstructive uropathy, the bladder is moderately distended without  intraluminal filling defect.  2. There is right inguinal lymphadenopathy with edema/phlegmon along the  common femoral region of the upper thigh extending to the external iliac  chain where there is a vague, sizable masslike area of phlegmon or  adenopathy or ill-defined tumor enveloping the external iliac artery and  vein. Favor phlegmon, the appearance is worrisome for potential  necrotizing fasciitis.    AP:    1.  STEFANIA severe, making some urine.  She has some overload but is so hypotensive will continue to gently hydrate, oxygenation seems fine.  No hydronephrosis.  Cardiology on board.  Concern for additional antibiotic injury from vancomycin/zosyn.    2.  Sepsis syndrome:  Likely from cellulitis, CT worrisome for phlegmon.  She has leukocytosis.  ID switched her to rocephin from vanc/zosyn and general surgery has  been consulted.  3.  Hyponatremia:  Due to ARF, monitor with serial labs  4.  Diastolic heart failure/ PFO:  Appreciate Dr. Rosenthal's recs  5.  History of DVT/PE agree hold xeralto  6.  Severe morbid obesity  7.  Poor adherence to health care maintenance    Plan:  Pt is critically ill and may have serious infection in her leg.  Keep in ICU.  No indication at present for CRRT but patient may end up requiring it.  Prognosis is guarded.    I discussed the patients findings and my recommendations with patient    Raquel Hemphill MD  06/04/20  17:44      Much of this encounter note is an electronic transcription/translation of spoken language to printed text. The electronic translation of spoken language may permit erroneous, or at times, nonsensical words or phrases to be inadvertently transcribed; Although I have reviewed the note for such errors, some may still exist        Electronically signed by Raquel Hemphill MD at 06/04/20 1806     Radha Bashir MD at 06/04/20 1743      Consult Orders    1. Inpatient General Surgery Consult [[id number]] ordered by Adryan King MD at 06/04/20 1603                General Surgery Consultation    Consulting Physician: Radha Bashir MD  Referring Physician: Adryan King MD    Reason for consultation: Possible necrotizing fasciitis of right leg    CC: Right leg erythema    HPI:   The patient is a very pleasant 59 y.o. female that presented to the hospital emergency room today for right leg pain and worsening erythema that began within the last few days.  She has chronic lymphedema and always has some rickey erythematous discoloration of her bilateral ankles but she noticed a couple of days ago that the erythema began spreading more proximally up the right calf to the level of the knee.  She then developed a large blister/bulla of the medial right calf that popped and has had serous fluid discharge.  She came to the emergency room and was admitted to a  telemetry floor but quickly transferred to the intensive care unit due to hypotension and cold/clammy appearance.  Since transferring to the ICU, her blood pressure has been stable although difficult to obtain given her morbid obesity.  Her heart rate is stable in the 80s and she is awake, alert, and pleasantly conversant.  She says that she has never had symptoms like this before and states that her right leg is mildly tender to the touch mostly along the posterior ankle.  She can wiggle her toes and has full sensation in the bilateral lower extremities.  She just had a CT of the abdomen/pelvis, which demonstrated bulky lymphadenopathy within the right inguinal region concerning for a distal severe infection.  I was consulted to evaluate for any signs of necrotizing fasciitis.  She is currently in acute renal failure with profound hyponatremia, although her creatinine and sodium levels are improving with IV fluid resuscitation.    Past Medical History:  Lymphedema  Hypertension  Morbid obesity  History of DVT/PE  History of embolism to the left lower extremity through PFO  Pulmonary hypertension  Patent foramen ovale    Past Surgical History:  Dilation and curettage  IVC filter placement  Bronchoscopy    Medications:  Medications Prior to Admission   Medication Sig Dispense Refill Last Dose   • acetaminophen (TYLENOL) 325 MG tablet Take 2 tablets by mouth Every 6 (Six) Hours As Needed for Mild Pain  (pain or symptomatic fever).   Taking   • furosemide (LASIX) 40 MG tablet Take 1 tablet by mouth 3 (Three) Times a Week. 45 tablet 3    • hydroCHLOROthiazide (HYDRODIURIL) 25 MG tablet Take 25 mg by mouth Daily.      • ipratropium-albuterol (DUO-NEB) 0.5-2.5 mg/3 ml nebulizer ipratropium 0.5 mg-albuterol 3 mg (2.5 mg base)/3 mL nebulization soln   Taking   • losartan (COZAAR) 50 MG tablet Take 100 mg by mouth Daily.      • metoprolol succinate XL (TOPROL-XL) 50 MG 24 hr tablet Take 1 tablet by mouth 2 (Two) Times a Day.    Taking   • oxybutynin (DITROPAN) 5 MG tablet Take 5 mg by mouth Daily.      • rivaroxaban (XARELTO) 20 MG tablet Take 1 tablet by mouth Daily. 90 tablet 3        Allergies: No known drug allergies    Social History: , non-smoker, no regular alcohol use    Family History: No family history of gastrointestinal malignancy, skin malignancy, etc. in her parents or siblings    Review of Systems:  Constitutional: Positive for chronic weight gain; denies any fevers or chills.  Eyes: denies blurred or double vision; denies scleral icterus  Cardiovascular: denies chest pain, palpitations, edemas.  Respiratory: Positive for shortness of breath this morning that has since resolved; denies cough, sputum, or wheezing  Gastrointestinal:  denies nausea, vomiting, abdominal pain, diarrhea, or constipation  Genitourinary: Positive for oliguria and trouble initiating urination today; denies dysuria or hematuria.  Endocrine: denies cold intolerance, lethargy and flushing.  Hematologic: denies excessive bruising or bleeding.  Musculoskeletal: Positive for lymphedema of the bilateral lower extremities (chronic) as well as acute difficulty with ambulation; denies joint stiffness.  Neurologic: denies seizures, CVA, paresthesia, or peripheral neuropathy.   Skin: Positive for erythema of the bilateral lower extremities, right greater than left     All other systems reviewed and were negative.    Physical Exam:   Vitals:    06/04/20 1700   BP: 96/49   Pulse: 89   Resp: 22   Temp:    SpO2: 98%     Height: 160 cm  Weight: 172 kg  BMI: 67  GENERAL: awake and alert, no acute distress, oriented to person, place, and time  HEENT: normocephalic, atraumatic, no scleral icterus, moist mucous membranes.  NECK: Supple, there is no thyromegaly or lymphadenopathy  RESPIRATORY: clear to auscultation, no wheezes, rales or rhonchi, symmetric air entry  CARDIOVASCULAR: regular rate and rhythm    GASTROINTESTINAL: soft, morbidly obese, nontender,  nondistended  MUSCULOSKELETAL: bilateral legs with significant chronic lymphedema and chronic skin thickening as well as rickey induration along the ankles and lower calves, there is blanching erythema of the right foot and lower calf with a medial large blister that has popped and there is serous fluid on the overlying gauze, the erythema extends along her posterior right leg to her mid thigh but is nonblanching posteriorly most consistent with pressure changes from laying supine, the is no palpable crepitus in the right leg, pulses are not palpable due to the pitting edema bilaterally from her lymphedema  NEUROLOGIC: alert and oriented, normal speech, cranial nerves 2-12 grossly intact, no focal deficits   SKIN: Moist, warm, no rashes, no jaundice.      Diagnostic workup:     Pertinent labs:   Results from last 7 days   Lab Units 06/04/20  0434 06/04/20  0035   WBC 10*3/mm3 15.89* 19.31*   HEMOGLOBIN g/dL 9.8* 10.7*   HEMATOCRIT % 28.9* 31.5*   PLATELETS 10*3/mm3 215 257     Results from last 7 days   Lab Units 06/04/20  1530 06/04/20  0434 06/04/20  0035   SODIUM mmol/L 127* 124* 122*   POTASSIUM mmol/L 3.6 3.5 3.9   CHLORIDE mmol/L 91* 88* 84*   CO2 mmol/L 17.9* 16.8* 16.4*   BUN mg/dL 78* 80* 77*   CREATININE mg/dL 5.05* 5.69* 5.85*   CALCIUM mg/dL 8.2* 8.0* 8.8   BILIRUBIN mg/dL  --   --  1.0   ALK PHOS U/L  --   --  111   ALT (SGPT) U/L  --   --  33   AST (SGOT) U/L  --   --  53*   GLUCOSE mg/dL 90 100* 114*       IMAGING:  CT ABD/PELVIS:  CONCLUSION:  1. No obstructive uropathy, the bladder is moderately distended without intraluminal filling defect.  2. There is right inguinal lymphadenopathy with edema/phlegmon along the common femoral region of the upper thigh extending to the external iliac chain where there is a vague, sizable masslike area of phlegmon or adenopathy or ill-defined tumor enveloping the external iliac artery and vein. Favor phlegmon, the appearance is worrisome for potential necrotizing  fasciitis.    I personally have reviewed the above imaging and found the following additional findings: The CT abdomen/pelvis does demonstrate some bulky lymphadenopathy within the right groin, most likely reactive to the severe cellulitis of the right leg.  I see no sign of gas in her soft tissues within the groin    Assessment and plan:     The patient is a 59 y.o. female with severe cellulitis of the right leg, with no current signs of necrotizing fasciitis on exam although the exam is significantly limited by her morbid obesity and lower extremity lymphedema.  I have ordered a stat CT of the right lower extremity to assess for any gas pockets within the subcutaneous tissues of the right lower extremity.  If there are none, this likely represents a severe case of cellulitis which is already responding well to IV antibiotics and fluid resuscitation.  Her hyponatremia, acidosis, and elevated lactate are all improving with IV fluids.  I will follow-up on the CT results tonight and if there is any sign of gas accumulation in the soft tissues of the right leg I will plan for wide debridement of any devitalized tissues.  She understands that this operation would carry a significantly high morbidity due to her underlying lymphedema and significant impedance to healing the lymphedema creates.    Radha Bashir MD  General and Endoscopic Surgery  [facility]    [address], [address]  [place], [place], [address]  P: [phone]  F: [phone]       Electronically signed by Radha Bashir MD at 06/04/20 9819     Adryan King MD at 06/04/20 0670          Group: [facility]         CONSULT NOTE    Patient Identification:  Emely Solomon  59 y.o.  female  1960  [id number]            Requesting physician: Dr. Arrington, hospitalist    Reason for Consultation: Hypotension, sepsis    CC: Lower extremity swelling, redness and pain    History of Present Illness:  59-year-old morbidly  obese female who presents with leg pain.  This started several days ago.  Still present.  She has swelling of both legs, has had lymphedema for a long time but does not see anybody at the lymphedema clinic.  She is complaining of swelling and pain and redness as well as oozing and blistering of the skin of her right leg.  The symptoms are still present, moderate, worsening.  Associated with hypotension.  The patient has a history of streptococcal cellulitis diagnosed November 2017.  She was admitted to telemetry where the patient developed worsening hypotension despite IV fluid boluses.  Due to her morbid obesity it is very difficult to obtain accurate blood pressure measurements with a noninvasive cuff in either upper extremity.  Patient has been seen by nephrology.  She is currently with acute renal failure and and septic shock.  She has been transferred to our intensive care unit.  She does complain of some pain in her right leg.  She has massive lymphedema.  I reviewed H&P dictated earlier by Dr. Arrington, as well as cardiology consult note and consultation by Dr. Flores from infectious diseases.    Review of Systems   Constitutional: Positive for fatigue. Negative for diaphoresis and fever.   HENT: Negative for ear discharge and sore throat.    Eyes: Negative for pain and visual disturbance.   Respiratory: Positive for shortness of breath. Negative for cough.    Cardiovascular: Positive for leg swelling. Negative for chest pain.   Gastrointestinal: Negative for abdominal pain and diarrhea.   Endocrine: Negative for cold intolerance and polyuria.   Genitourinary: Negative for dysuria and hematuria.   Musculoskeletal: Negative for joint swelling and myalgias.        Lymphedema and swelling of both legs, pain in the right leg   Skin: Negative for rash and wound.        Redness, blistering of the skin of the right leg   Neurological: Positive for weakness. Negative for speech difficulty and numbness.  "  Hematological: Negative for adenopathy. Does not bruise/bleed easily.   Psychiatric/Behavioral: Negative for agitation and confusion.       Past Medical History:  Past Medical History:   Diagnosis Date   • Cellulitis     11/2017, with Group B Strep bacteremia and sepsis   • Chronic diastolic congestive heart failure (CMS/HCC)    • Deep venous thrombosis (CMS/HCC)    • Hypertension    • Lipoedema    • Lymphedema    • Morbid obesity (CMS/HCC)    • Paradoxical embolism (CMS/HCC)     to the LLE due to DVT/PE and PFO   • PFO (patent foramen ovale)    • Pulmonary embolism (CMS/HCC)    • Pulmonary hypertension (CMS/HCC)     multifactorial (dCHF, obesity/ARIEL, hx PE), mild by echo 1/2016       Past Surgical History:  Past Surgical History:   Procedure Laterality Date   • BRONCHOSCOPY N/A 7/21/2017    Procedure: BRONCHOSCOPY with wash;  Surgeon: Caleb Garcia MD;  Location: [facility] ENDOSCOPY;  Service:    • DILATATION AND CURETTAGE  04/11/2011   • VENA CAVA FILTER PLACEMENT      Inferior Vena Cava Filter Placement w/Fluorosc angiogr guidance            Allergies:  No Known Allergies    Physical Exam:  BP (!) 82/45 (BP Location: Left arm, Patient Position: Lying)   Pulse 83   Temp 97.3 °F (36.3 °C) (Temporal)   Resp 20   Ht 160 cm (63\")   Wt (!) 172 kg (379 lb)   SpO2 100%   BMI 67.14 kg/m²   Body mass index is 67.14 kg/m². 100% (!) 172 kg (379 lb)  Physical Exam   Constitutional: She is oriented to person, place, and time.   Morbidly obese patient, in no distress   HENT:   Right Ear: External ear normal.   Left Ear: External ear normal.   Nose: Nose normal.   Mouth/Throat: Oropharynx is clear and moist.   Eyes: Pupils are equal, round, and reactive to light. Conjunctivae and EOM are normal.   Neck: No JVD present. No tracheal deviation present. No thyromegaly present.   Cardiovascular:   Diminished and distant heart sounds bilaterally.  I do not hear any murmurs.  Massive lymphedema bilaterally   Pulmonary/Chest: Effort " normal and breath sounds normal.   Diminished breath sounds bilaterally, no crackles.  She was coughing during interview.  Some mild wheezes.  No use of accessory muscles, percussion is hampered due to very large body habitus.   Abdominal:   Morbid obesity.  Soft, large body habitus hampers rest of the examination   Musculoskeletal: She exhibits deformity.   Massive lymphedema and limited range of motion in both lower extremities   Neurological: She is alert and oriented to person, place, and time. No cranial nerve deficit or sensory deficit.   Skin:   She has redness and swelling and blisters over both lower extremities, most of these areas are wrapped with bandage.  She has massive lymphedema in both legs   Psychiatric: She has a normal mood and affect. Thought content normal.       LABS:  COVID19   Date Value Ref Range Status   06/04/2020 Not Detected Not Detected - Ref. Range Final       Lab Results   Component Value Date    CALCIUM 8.0 (L) 06/04/2020     Results from last 7 days   Lab Units 06/04/20  0434 06/04/20  0035   SODIUM mmol/L 124* 122*   POTASSIUM mmol/L 3.5 3.9   CHLORIDE mmol/L 88* 84*   CO2 mmol/L 16.8* 16.4*   BUN mg/dL 80* 77*   CREATININE mg/dL 5.69* 5.85*   GLUCOSE mg/dL 100* 114*   CALCIUM mg/dL 8.0* 8.8   WBC 10*3/mm3 15.89* 19.31*   HEMOGLOBIN g/dL 9.8* 10.7*   PLATELETS 10*3/mm3 215 257   ALT (SGPT) U/L  --  33   AST (SGOT) U/L  --  53*   PROBNP pg/mL 1,598.0*  --      Lab Results   Component Value Date    TROPONINT <0.01 07/13/2015             Results from last 7 days   Lab Units 06/04/20  0434 06/04/20  0035   LACTATE mmol/L 1.3 4.3*     Results from last 7 days   Lab Units 06/04/20  0556   PH, ARTERIAL pH units 7.355   PCO2, ARTERIAL mm Hg 33.8*   PO2 ART mm Hg 100.1*   FLOW RATE lpm 3   MODALITY  Cannula   O2 SATURATION CALC % 97.5         Results from last 7 days   Lab Units 06/04/20  0434   INR  2.02*         No results found for: TSH  Estimated Creatinine Clearance: 16.6 mL/min (A)  (by C-G formula based on SCr of 5.69 mg/dL (H)).         Imaging: I personally visualized the images of chest x-ray showing right perihilar infiltrate.  There is some cephalization of pulmonary vasculature.      Assessment:  Sepsis with septic shock  Hyponatremia  Cellulitis lower extremities  Super morbid obesity  Acute kidney injury  Anemia, chronicity unknown  Chronic lymphedema  History of DVT in lower extremities  Chronic right heart failure with pulmonary hypertension likely due to previous pulmonary emboli.      Recommendations:  Patient is critically ill.  She will be admitted to the intensive care unit.  We will request anesthesiology to place arterial line.  Infectious diseases has started antibiotics.  We will monitor the patient closely and place a Salvador urinary bladder catheter for accurate urine output measurements.  Nephrology believes the patient may need some form of renal replacement therapy soon  Chest x-ray shows right perihilar infiltrate.  I think the patient does have a pneumonia.  IV Rocephin should cover both skin venus causing cellulitis as well as respiratory pathogens causing pneumonia especially community-acquired pneumonia.  We will follow blood culture results.    Total critical care time 50 minutes, excluding any separately billable procedure time      Adryan King MD  6/4/2020  14:57      Much of this encounter note is an electronic transcription/translation of spoken language to printed text using Dragon Software.    Electronically signed by Adryan King MD at 06/04/20 1516     Tariq Flores MD at 06/04/20 1112      Consult Orders    1. Inpatient Infectious Diseases Consult [[id number]] ordered by Barbra Lee APRN at 06/04/20 1053                Referring Provider: LUCÍA Whitney    Reason for Consultation: sepsis, cellulitis    History of present illness:  Emely Solomon is a 59 y.o. who I am asked to evaluate and give opinion for sepsis, cellulitis.  History is obtained from the patient, her , and review of the old medical records which I summarize/synthesize as follows: She has a longstanding history of super obesity and BLE lymphedema. She has been followed at the [facility] OT lymphedema clinic in the past. Back in November 2017 she had Group B Strep septicemia due to cellulitis and was treated with IV penicillin with full resolution.     She says 2-3 days ago she developed worsening erythema, pain, swelling, and heat in the right leg. Her left leg is at its baseline. She felt alternating sensation of being hot and cold. No alleviating factors. No injury to the leg. She's had a dry cough. No COVID contacts and COVID test was negative.     She came to the ER last night and was afebrile but tachycardic and hypotensive. Labs were notable for WBC 19, Na 122, Crt 5.85 (baseline 0.8) and LA 4.3. She was given ceftriaxone in the ER and then transitioned to vancomycin and Zosyn by admitting team.     ID asked to provide further recommendations.     PMH:  Group B Strep septicemia and cellulitis  Diastolic heart failure  DVT and PE  Lymphedema  Super obesity  ARIEL   Pulmonary HTN  PFO    Past Surgical History:   Procedure Laterality Date   • BRONCHOSCOPY N/A 7/21/2017    Procedure: BRONCHOSCOPY with wash;  Surgeon: Caleb Garcia MD;  Location: [facility] ENDOSCOPY;  Service:    • DILATATION AND CURETTAGE  04/11/2011   • VENA CAVA FILTER PLACEMENT      Inferior Vena Cava Filter Placement w/Fluorosc angiogr guidance       Social History:    Retired from  at [employer]    Family History:  No 1st degree relatives w/ recurrent cellulitis    Antibiotic allergies and intolerances:  None    Medications:    Current Facility-Administered Medications:   •  acetaminophen (TYLENOL) tablet 650 mg, 650 mg, Oral, Q4H PRN **OR** acetaminophen (TYLENOL) 160 MG/5ML solution 650 mg, 650 mg, Oral, Q4H PRN **OR** acetaminophen (TYLENOL) suppository 650 mg, 650 mg, Rectal, Q4H  PRN, Radha Salmeron APRN  •  ipratropium-albuterol (DUO-NEB) nebulizer solution 3 mL, 3 mL, Nebulization, Q6H PRN, Barbra Lee APRN  •  nitroglycerin (NITROSTAT) SL tablet 0.4 mg, 0.4 mg, Sublingual, Q5 Min PRN, Radha Salmeron APRN  •  ondansetron (ZOFRAN) injection 4 mg, 4 mg, Intravenous, Q6H PRN, Radha Salmeron APRN  •  [DISCONTINUED] vancomycin 3000 mg/500 mL 0.9% NS IVPB ([facility]), 20 mg/kg, Intravenous, Once **AND** Pharmacy to dose vancomycin, , Does not apply, Continuous PRN, Radha Salmeron APRN  •  piperacillin-tazobactam (ZOSYN) 4.5 g in iso-osmotic dextrose 100 mL IVPB (premix), 4.5 g, Intravenous, Q12H, Radha Salmeron APRN  •  sodium chloride 0.9 % flush 10 mL, 10 mL, Intravenous, Q12H, Radha Salmeron APRN, 10 mL at 06/04/20 0809  •  sodium chloride 0.9 % flush 10 mL, 10 mL, Intravenous, PRN, Radha Salmeron APRN  •  sodium chloride 0.9 % infusion, 125 mL/hr, Intravenous, Continuous, Radha Salmeron APRN, Last Rate: 125 mL/hr at 06/04/20 0320, 125 mL/hr at 06/04/20 0320  •  Vancomycin Pharmacy Intermittent Dosing, , Does not apply, Daily, Radha Salmeron APRN    Review of Systems  All systems were reviewed and are negative unless otherwise stated above in the HPI    Objective   Vital Signs   Temp:  [97.3 °F (36.3 °C)-97.4 °F (36.3 °C)] 97.3 °F (36.3 °C)  Heart Rate:  [] 86  Resp:  [20] 20  BP: ()/(20-74) 104/43    Physical Exam:   General: awake, alert, very nice   Head: Normocephalic, atraumatic  Eyes: Pupils reactive, no scleral icterus  ENT: MMM, OP clear, no thrush  Neck: Supple  Cardiovascular: NR, RR, no murmurs, massive BLE lymphedema  Respiratory: Lungs with faint wheeze, coughing; on 3-4L NC  GI: Abdomen is obese but soft, non-tender  : no Salvador catheter present  Musculoskeletal: normal musculature  Skin: right lower extremity is hot, red, with a superficial ulceration; no purulent drainage; left lower extremity has venous stasis  changes  Neurological: Alert and oriented x 3  Psychiatric: Normal mood and affect   Vasc: no cyanosis; PIV w/o erythema    Labs:     Lab Results   Component Value Date    WBC 15.89 (H) 06/04/2020    HGB 9.8 (L) 06/04/2020    HCT 28.9 (L) 06/04/2020    MCV 81.2 06/04/2020     06/04/2020       Lab Results   Component Value Date    GLUCOSE 100 (H) 06/04/2020    BUN 80 (H) 06/04/2020    CREATININE 5.69 (H) 06/04/2020    EGFRIFNONA 8 (L) 06/04/2020    EGFRIFAFRI  06/04/2020      Comment:      <15 Indicative of kidney failure.    BCR 14.1 06/04/2020    CO2 16.8 (L) 06/04/2020    CALCIUM 8.0 (L) 06/04/2020    ALBUMIN 3.50 06/04/2020    AST 53 (H) 06/04/2020    ALT 33 06/04/2020     No results found for: HGBA1C    Microbiology:  6/4 BCx: pending  6/4 Wound Cx Right Leg: pending; gram stain negative  6/4 COVID: negative    Radiology (personally reviewed images and report):  CXR with cardiomegaly and vascular congestion; patchy perihilar densities could be asymmetric edema or pneumonia per radiology    Assessment/Plan   1. Sepsis due to right lower extremity cellulitis  2. Chronic lymphedema  3. Super obesity  3. Acute renal failure  4. Hyponatremia  5. History of DVT in LLE    I recommend adjusting antibiotic to ceftriaxone 2 g IV q24h. This will provide broad coverage for common skin and soft tissue pathogens without the potential nephrotoxic effects of vancomycin and Zosyn. She has no history reported or documented history of MRSA. I think there's a very good chance that Group B Strep could be the pathogen as it was back in 2017. Follow-up blood cultures. Repeat CBC and BMP in AM. I'll follow-up cardiology and nephrology recommendations. I agree with wound care consultation. Given the large size of her leg, this is likely something that is going to take 1-2 weeks to see substantial improvement.     Thank you for this consult. ID will follow.       Electronically signed by Tariq Flores MD at 06/04/20  1145     Neena Rosenthal MD at 20 0843              Patient Name: Emely Solomon  :1960  59 y.o.    Date of Admission: 2020  Date of Consultation:  20  Encounter Provider: Neena Rosenthal MD  Place of Service: [facility] CARDIOLOGY  Referring Provider: Ghanshyam Smith MD  Patient Care Team:  RENAY Smith MD as PCP - General (General Practice)      Chief complaint: Sepsis    Reason for Consult:  CHF    History of Present Illness:    This is a 59-year-old patient follows Dr. Rogers.  She has a history of congestive heart failure, hypertension and patent foramen ovale.  She has had previous DVT and pulmonary embolism for which she is on rivaroxaban.      She had a DVT and pulmonary embolism in .  She developed a paradoxical arterial embolus to her left lower extremity requiring thrombectomy and this is when her PFO was diagnosed.  She was initially treated with warfarin but her warfarin levels were subtherapeutic and she had recurrent pulmonary embolism.  That is when she was placed on rivaroxaban.  At one point she had an IVC filter placed which has subsequently been removed.      She is on Lasix for congestive heart failure but she does not take this regularly due to urinary frequency.  She has chronic lower extremity swelling due to lymphedema.  She was evaluated in the office 2020 was doing well.  No changes were made at that time.    She was admitted on 2020 with bilateral leg redness and worsening short windedness.  She had been taking her medications as directed.  In the past, she has had bacteremia due to cellulitis.  On arrival, her creatinine was 5.5, BUN 77, white count 19, lactate 4.3.  Her blood pressure systolic was 80-90 and she was given IV fluid.  She was admitted to the floor and had rapid response called her this morning due to hypotension.  Her systolic blood pressure was as low as 65 mmHg.  She was cold and clammy.   She got 1.5 L of IV fluid in the emergency department.  She has been started on IV antibiotics.  Chest x-ray shows cardiomegaly and vascular congestion.  There are patchy perihilar densities.  Her COVID-19 is negative.  Her blood and wound cultures are pending.      We were then consulted for heart failure and hypotension.  She is being transferred to the intensive care unit.  She has had normal renal function in the past, last creatinine 2 years ago.    She has been having progressive weakness and short windedness.  In addition she is noticed her legs becoming more swollen and erythematous.  In addition she had a cough at home which seem to be getting worse.  She denied fevers, chills, vomiting or diarrhea.  Her  was urging her to come to the hospital but she did not want to come finally when she got here, it appeared that she was septic.    She had an echocardiogram done today which shows ejection fraction 65% but RVSP 65 mmHg right ventricular dilation and normal function and right atrial dilation.  There was no pericardial effusion or valve disease.     Previous Cardiac Testing  ECHO 01/2016          Past Medical History:   Diagnosis Date   • Cellulitis     11/2017, with Group B Strep bacteremia and sepsis   • Chronic diastolic congestive heart failure (CMS/HCC)    • Deep venous thrombosis (CMS/HCC)    • Hypertension    • Lipoedema    • Lymphedema    • Morbid obesity (CMS/HCC)    • Paradoxical embolism (CMS/HCC)     to the LLE due to DVT/PE and PFO   • PFO (patent foramen ovale)    • Pulmonary embolism (CMS/HCC)    • Pulmonary hypertension (CMS/HCC)     multifactorial (dCHF, obesity/ARIEL, hx PE), mild by echo 1/2016       Past Surgical History:   Procedure Laterality Date   • BRONCHOSCOPY N/A 7/21/2017    Procedure: BRONCHOSCOPY with wash;  Surgeon: Caleb Garcia MD;  Location: [facility] ENDOSCOPY;  Service:    • DILATATION AND CURETTAGE  04/11/2011   • VENA CAVA FILTER PLACEMENT      Inferior Vena Cava Filter  Placement w/Fluorosc angiogr guidance           No Known Allergies    Social History     Socioeconomic History   • Marital status:      Spouse name: Not on file   • Number of children: Not on file   • Years of education: Not on file   • Highest education level: Not on file   Tobacco Use   • Smoking status: Never Smoker   • Smokeless tobacco: Never Used   Substance and Sexual Activity   • Alcohol use: Yes     Comment: caffeine use:1 cup daily.    • Drug use: No   • Sexual activity: Defer       Family History   Problem Relation Age of Onset   • Hypertension Other        REVIEW OF SYSTEMS:   All systems reviewed.  Pertinent positives identified in HPI.  All other systems are negative.      Objective:     Vitals:    06/04/20 0551 06/04/20 0553 06/04/20 0554 06/04/20 0644   BP:  95/50     BP Location:  Left arm     Patient Position:       Pulse: 85  83    Resp:       Temp:       TempSrc:       SpO2: 96%  95%    Weight:    (!) 172 kg (378 lb 12 oz)   Height:         Body mass index is 67.09 kg/m².    General Appearance:    Alert, cooperative, in no acute distress   Head:    Normocephalic, without obvious abnormality, atraumatic   Eyes:            Lids and lashes normal, conjunctivae and sclerae normal, no icterus, no pallor, corneas clear   Ears:    Ears appear intact with no abnormalities noted   Throat:   No oral lesions, no thrush, oral mucosa moist   Neck:  Cannot see JVP due to obesity       Lungs:    Expiratory rhonchi, respirations regular, even and unlabored    Heart:    Regular rhythm and normal rate, normal S1 and S2, 2/6 ARIELLE at right upper sternal border, no gallop, no rub, no click   Chest Wall:    No abnormalities observed   Abdomen:     Normal bowel sounds, no masses, no organomegaly, soft, nontender, nondistended, no guarding, no rebound  tenderness   Extremities:   Moves all extremities well, 2+ edema edema, no cyanosis, bilateral lower extremity erythema   Pulses:   Pulses palpable and equal  bilaterally. Normal radial, carotid, dorsalis pedis and posterior tibial pulses bilaterally.    Skin:  Psychiatric:   No bleeding, bruising or rash    Alert and oriented x 3, normal mood and affect   Lab Review:     Results from last 7 days   Lab Units 06/04/20 0434 06/04/20  0035   SODIUM mmol/L 124* 122*   POTASSIUM mmol/L 3.5 3.9   CHLORIDE mmol/L 88* 84*   CO2 mmol/L 16.8* 16.4*   BUN mg/dL 80* 77*   CREATININE mg/dL 5.69* 5.85*   CALCIUM mg/dL 8.0* 8.8   BILIRUBIN mg/dL  --  1.0   ALK PHOS U/L  --  111   ALT (SGPT) U/L  --  33   AST (SGOT) U/L  --  53*   GLUCOSE mg/dL 100* 114*         Results from last 7 days   Lab Units 06/04/20  0434   WBC 10*3/mm3 15.89*   HEMOGLOBIN g/dL 9.8*   HEMATOCRIT % 28.9*   PLATELETS 10*3/mm3 215     Results from last 7 days   Lab Units 06/04/20  0434   INR  2.02*                     Telemetry      EKG baseline    I personally viewed and interpreted the patient's EKG/Telemetry data.        Assessment and Plan:       1. Sepsis syndrome likely due to cellulitis.  2. Cellulitis bilateral lower extremities.  3. Acute kidney injury.  4. Hypotension.  5. 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  6. Morbid obesity  7. Chronic right heart failure with pulmonary hypertension likely due to previous pulmonary emboli.      Would continue IV fluid to maintain her pressure.  We will treat her underlying infection.  Her cardiac status right now is stable.  Will follow.    Neena Rosenthal MD  06/04/20  10:02                Electronically signed by Neena Rosenthal MD at 06/04/20 0745

## 2020-06-06 ENCOUNTER — APPOINTMENT (OUTPATIENT)
Dept: GENERAL RADIOLOGY | Facility: HOSPITAL | Age: 60
End: 2020-06-06

## 2020-06-06 LAB
ALBUMIN SERPL-MCNC: 2.4 G/DL (ref 3.5–5.2)
ALBUMIN SERPL-MCNC: 2.6 G/DL (ref 3.5–5.2)
ALBUMIN/GLOB SERPL: 0.7 G/DL
ALP SERPL-CCNC: 152 U/L (ref 39–117)
ALT SERPL W P-5'-P-CCNC: 30 U/L (ref 1–33)
ANION GAP SERPL CALCULATED.3IONS-SCNC: 10.8 MMOL/L (ref 5–15)
ANION GAP SERPL CALCULATED.3IONS-SCNC: 12.4 MMOL/L (ref 5–15)
APTT PPP: 70.4 SECONDS (ref 22.7–35.4)
APTT PPP: 94.5 SECONDS (ref 22.7–35.4)
AST SERPL-CCNC: 30 U/L (ref 1–32)
BILIRUB SERPL-MCNC: 0.7 MG/DL (ref 0.2–1.2)
BUN BLD-MCNC: 38 MG/DL (ref 8–23)
BUN BLD-MCNC: 41 MG/DL (ref 8–23)
BUN/CREAT SERPL: 19.4 (ref 7–25)
BUN/CREAT SERPL: 20.8 (ref 7–25)
CALCIUM SPEC-SCNC: 8.6 MG/DL (ref 8.6–10.5)
CALCIUM SPEC-SCNC: 8.7 MG/DL (ref 8.6–10.5)
CHLORIDE SERPL-SCNC: 96 MMOL/L (ref 98–107)
CHLORIDE SERPL-SCNC: 97 MMOL/L (ref 98–107)
CO2 SERPL-SCNC: 26.6 MMOL/L (ref 22–29)
CO2 SERPL-SCNC: 28.2 MMOL/L (ref 22–29)
CREAT BLD-MCNC: 1.83 MG/DL (ref 0.57–1)
CREAT BLD-MCNC: 2.11 MG/DL (ref 0.57–1)
DEPRECATED RDW RBC AUTO: 43.7 FL (ref 37–54)
ERYTHROCYTE [DISTWIDTH] IN BLOOD BY AUTOMATED COUNT: 14.7 % (ref 12.3–15.4)
GFR SERPL CREATININE-BSD FRML MDRD: 24 ML/MIN/1.73
GFR SERPL CREATININE-BSD FRML MDRD: 28 ML/MIN/1.73
GLOBULIN UR ELPH-MCNC: 4 GM/DL
GLUCOSE BLD-MCNC: 138 MG/DL (ref 65–99)
GLUCOSE BLD-MCNC: 165 MG/DL (ref 65–99)
HCT VFR BLD AUTO: 28.2 % (ref 34–46.6)
HGB BLD-MCNC: 9.5 G/DL (ref 12–15.9)
HOLD SPECIMEN: NORMAL
INR PPP: 1.29 (ref 0.9–1.1)
MAGNESIUM SERPL-MCNC: 1.8 MG/DL (ref 1.6–2.4)
MAGNESIUM SERPL-MCNC: 1.9 MG/DL (ref 1.6–2.4)
MCH RBC QN AUTO: 27.6 PG (ref 26.6–33)
MCHC RBC AUTO-ENTMCNC: 33.7 G/DL (ref 31.5–35.7)
MCV RBC AUTO: 82 FL (ref 79–97)
NT-PROBNP SERPL-MCNC: 2359 PG/ML (ref 5–900)
PHOSPHATE SERPL-MCNC: 2.7 MG/DL (ref 2.5–4.5)
PHOSPHATE SERPL-MCNC: 2.7 MG/DL (ref 2.5–4.5)
PHOSPHATE SERPL-MCNC: 3 MG/DL (ref 2.5–4.5)
PLATELET # BLD AUTO: 314 10*3/MM3 (ref 140–450)
PMV BLD AUTO: 8.7 FL (ref 6–12)
POTASSIUM BLD-SCNC: 3 MMOL/L (ref 3.5–5.2)
POTASSIUM BLD-SCNC: 3.1 MMOL/L (ref 3.5–5.2)
PROT SERPL-MCNC: 6.6 G/DL (ref 6–8.5)
PROTHROMBIN TIME: 15.8 SECONDS (ref 11.7–14.2)
RBC # BLD AUTO: 3.44 10*6/MM3 (ref 3.77–5.28)
SODIUM BLD-SCNC: 135 MMOL/L (ref 136–145)
SODIUM BLD-SCNC: 136 MMOL/L (ref 136–145)
VANCOMYCIN SERPL-MCNC: 42.7 MCG/ML (ref 5–40)
WBC NRBC COR # BLD: 22.14 10*3/MM3 (ref 3.4–10.8)

## 2020-06-06 PROCEDURE — 80053 COMPREHEN METABOLIC PANEL: CPT | Performed by: INTERNAL MEDICINE

## 2020-06-06 PROCEDURE — 93010 ELECTROCARDIOGRAM REPORT: CPT | Performed by: INTERNAL MEDICINE

## 2020-06-06 PROCEDURE — 83880 ASSAY OF NATRIURETIC PEPTIDE: CPT | Performed by: INTERNAL MEDICINE

## 2020-06-06 PROCEDURE — 80202 ASSAY OF VANCOMYCIN: CPT | Performed by: INTERNAL MEDICINE

## 2020-06-06 PROCEDURE — 85730 THROMBOPLASTIN TIME PARTIAL: CPT | Performed by: INTERNAL MEDICINE

## 2020-06-06 PROCEDURE — 93005 ELECTROCARDIOGRAM TRACING: CPT | Performed by: INTERNAL MEDICINE

## 2020-06-06 PROCEDURE — 80069 RENAL FUNCTION PANEL: CPT | Performed by: INTERNAL MEDICINE

## 2020-06-06 PROCEDURE — 85610 PROTHROMBIN TIME: CPT | Performed by: INTERNAL MEDICINE

## 2020-06-06 PROCEDURE — 94799 UNLISTED PULMONARY SVC/PX: CPT

## 2020-06-06 PROCEDURE — 83735 ASSAY OF MAGNESIUM: CPT | Performed by: INTERNAL MEDICINE

## 2020-06-06 PROCEDURE — 25010000002 PHENYLEPHRINE 10 MG/ML SOLUTION 5 ML VIAL: Performed by: INTERNAL MEDICINE

## 2020-06-06 PROCEDURE — 71045 X-RAY EXAM CHEST 1 VIEW: CPT

## 2020-06-06 PROCEDURE — 99232 SBSQ HOSP IP/OBS MODERATE 35: CPT | Performed by: INTERNAL MEDICINE

## 2020-06-06 PROCEDURE — 99232 SBSQ HOSP IP/OBS MODERATE 35: CPT | Performed by: SURGERY

## 2020-06-06 PROCEDURE — 25010000002 VANCOMYCIN PER 500 MG: Performed by: INTERNAL MEDICINE

## 2020-06-06 PROCEDURE — 84100 ASSAY OF PHOSPHORUS: CPT | Performed by: INTERNAL MEDICINE

## 2020-06-06 PROCEDURE — 85027 COMPLETE CBC AUTOMATED: CPT | Performed by: INTERNAL MEDICINE

## 2020-06-06 PROCEDURE — 25010000002 CEFEPIME PER 500 MG: Performed by: INTERNAL MEDICINE

## 2020-06-06 PROCEDURE — 25010000002 HEPARIN (PORCINE) PER 1000 UNITS: Performed by: INTERNAL MEDICINE

## 2020-06-06 RX ORDER — SACCHAROMYCES BOULARDII 250 MG
500 CAPSULE ORAL 2 TIMES DAILY
Status: DISPENSED | OUTPATIENT
Start: 2020-06-06 | End: 2020-06-15

## 2020-06-06 RX ORDER — PANTOPRAZOLE SODIUM 40 MG/10ML
40 INJECTION, POWDER, LYOPHILIZED, FOR SOLUTION INTRAVENOUS EVERY 24 HOURS
Status: DISCONTINUED | OUTPATIENT
Start: 2020-06-06 | End: 2020-06-07

## 2020-06-06 RX ORDER — POTASSIUM CHLORIDE 750 MG/1
40 CAPSULE, EXTENDED RELEASE ORAL ONCE
Status: COMPLETED | OUTPATIENT
Start: 2020-06-06 | End: 2020-06-06

## 2020-06-06 RX ADMIN — CEFEPIME HYDROCHLORIDE 2 G: 2 INJECTION, POWDER, FOR SOLUTION INTRAVENOUS at 11:08

## 2020-06-06 RX ADMIN — POTASSIUM CHLORIDE 40 MEQ: 10 CAPSULE, COATED, EXTENDED RELEASE ORAL at 22:18

## 2020-06-06 RX ADMIN — MIDODRINE HYDROCHLORIDE 15 MG: 5 TABLET ORAL at 17:04

## 2020-06-06 RX ADMIN — MIDODRINE HYDROCHLORIDE 15 MG: 5 TABLET ORAL at 13:09

## 2020-06-06 RX ADMIN — PHENYLEPHRINE HYDROCHLORIDE 0.5 MCG/KG/MIN: 10 INJECTION INTRAVENOUS at 01:55

## 2020-06-06 RX ADMIN — HEPARIN SODIUM 20 UNITS/KG/HR: 10000 INJECTION, SOLUTION INTRAVENOUS at 11:56

## 2020-06-06 RX ADMIN — MIDODRINE HYDROCHLORIDE 15 MG: 5 TABLET ORAL at 09:14

## 2020-06-06 RX ADMIN — POTASSIUM CHLORIDE 40 MEQ: 10 CAPSULE, COATED, EXTENDED RELEASE ORAL at 13:06

## 2020-06-06 RX ADMIN — PHENYLEPHRINE HYDROCHLORIDE 0.8 MCG/KG/MIN: 10 INJECTION INTRAVENOUS at 10:49

## 2020-06-06 RX ADMIN — PANTOPRAZOLE SODIUM 40 MG: 40 INJECTION, POWDER, FOR SOLUTION INTRAVENOUS at 13:12

## 2020-06-06 RX ADMIN — SODIUM CHLORIDE, PRESERVATIVE FREE 10 ML: 5 INJECTION INTRAVENOUS at 22:19

## 2020-06-06 RX ADMIN — POTASSIUM CHLORIDE 40 MEQ: 750 CAPSULE, EXTENDED RELEASE ORAL at 19:55

## 2020-06-06 RX ADMIN — VANCOMYCIN HYDROCHLORIDE 1000 MG: 1 INJECTION, SOLUTION INTRAVENOUS at 01:18

## 2020-06-06 RX ADMIN — Medication 500 MG: at 22:19

## 2020-06-06 RX ADMIN — BUMETANIDE 1 MG/HR: 0.25 INJECTION INTRAMUSCULAR; INTRAVENOUS at 11:53

## 2020-06-06 RX ADMIN — HEPARIN SODIUM 20 UNITS/KG/HR: 10000 INJECTION, SOLUTION INTRAVENOUS at 23:56

## 2020-06-06 RX ADMIN — BUMETANIDE 1 MG/HR: 0.25 INJECTION INTRAMUSCULAR; INTRAVENOUS at 23:53

## 2020-06-06 NOTE — PROGRESS NOTES
LOS: 2 days     Chief Complaint:  Follow-up RLE cellulitis    Interval History:  Afebrile, states she feels a little bit better.  Denies any leg pain.  Denies any rashes or diarrhea.  Denies shortness of breath or cough    Vital Signs  Temp:  [98.3 °F (36.8 °C)-99.1 °F (37.3 °C)] 98.8 °F (37.1 °C)  Heart Rate:  [64-89] 85  Resp:  [20-21] 21  BP: ()/() 111/65    Physical Exam:  General: in NAD  Cardiovascular: RRR,  BLE lymphedema  Respiratory: Lungs clear  GI: Abdomen is obese but soft, non-tender  Skin: right lower extremity is hot, red, swollen with a large superficial ulceration; blister formation and weeping drainage today; left lower extremity has venous stasis changes    Antibiotics:  Cefepime 2 g IV every 24 hours  •  Vancomycin Pharmacy Intermittent Dosing    LABS:  CBC, BMP, A1c, VTr, CRP, micro reviewed today  Lab Results   Component Value Date    WBC 22.14 (H) 06/06/2020    HGB 9.5 (L) 06/06/2020    HCT 28.2 (L) 06/06/2020    MCV 82.0 06/06/2020     06/06/2020     Lab Results   Component Value Date    GLUCOSE 109 (H) 06/05/2020    CALCIUM 7.8 (L) 06/05/2020     (L) 06/05/2020    K 3.4 (L) 06/05/2020    CO2 17.9 (L) 06/05/2020    CL 93 (L) 06/05/2020    BUN 84 (H) 06/05/2020    CREATININE 4.94 (H) 06/05/2020    EGFRIFAFRI  06/05/2020      Comment:      <15 Indicative of kidney failure.    EGFRIFNONA 9 (L) 06/05/2020    BCR 17.0 06/05/2020    ANIONGAP 16.1 (H) 06/05/2020     Microbiology:  6/4 BCx: NGTD c2  6/4 Wound Cx Right Leg: Staph aureus and group C strep  6/4 COVID: negative    Assessment/Plan   1. Sepsis due to right lower extremity cellulitis with possible phlegmon  2. Chronic lymphedema  3. Super obesity  3. Acute renal failure  4. Hyponatremia  5. History of DVT in LLE     Patient remains afebrile but white blood cell count is climbing.  Cultures with staph aureus and group C strep.  Continue vancomycin dosing per pharmacy.  If there is no evidence of gram-negative's we  will discontinue cefepime in the next 24 hours.  Continue to monitor white blood cell count.

## 2020-06-06 NOTE — PROGRESS NOTES
"General Surgery  Progress Note    CC: Follow-up right leg cellulitis and open wound        S: The wound nurses wrapped the right lower extremity in standard lymphedema compression wrap yesterday.  She says that she has always struggled to get an adequate compression wrap to stay in place, so has never really used it routinely to treat her lymphedema.  She denies any new pains and states that the mild discomfort in her ankle is tolerable.    ROS: Positive for erythema, open wound, and serous fluid drainage from the right ankle    O:/65   Pulse 85   Temp 98.8 °F (37.1 °C) (Oral)   Resp 21   Ht 160 cm (63\")   Wt (!) 171 kg (377 lb 3.3 oz)   SpO2 93%   BMI 66.82 kg/m²     GENERAL: alert, well appearing, and in no distress  HEENT: normocephalic, atraumatic, moist mucous membranes, clear sclerae   CHEST: clear to auscultation, no wheezes, rales or rhonchi, symmetric air entry  CARDIAC: regular rate and rhythm    ABDOMEN: Soft, obese, nondistended, nontender  EXTREMITIES: Significant bilateral lower extremity lymphedema with chronic skin thickening, rickey induration of the left ankle, and pitting edema of the feet, right lower extremity is wrapped from the foot up to the knee with some exposed bulla along the right medial knee showing serous fluid beneath the skin    LABS  Results from last 7 days   Lab Units 06/06/20  0316 06/05/20  0608 06/04/20  0434 06/04/20  0035   WBC 10*3/mm3 22.14* 16.28* 15.89* 19.31*   HEMOGLOBIN g/dL 9.5* 8.7* 9.8* 10.7*   HEMATOCRIT % 28.2* 25.2* 28.9* 31.5*   PLATELETS 10*3/mm3 314 258 215 257   MONOCYTES % %  --   --  5.1 4.0*     Results from last 7 days   Lab Units 06/06/20  1036 06/05/20  1600 06/05/20  1220  06/04/20  0035   SODIUM mmol/L 136 127* 123*   < > 122*   POTASSIUM mmol/L 3.0* 3.4* 3.5   < > 3.9   CHLORIDE mmol/L 97* 93* 90*   < > 84*   CO2 mmol/L 26.6 17.9* 16.9*   < > 16.4*   BUN mg/dL 41* 84* 83*   < > 77*   CREATININE mg/dL 2.11* 4.94* 4.97*   < > 5.85* "   CALCIUM mg/dL 8.7 7.8* 7.7*   < > 8.8   BILIRUBIN mg/dL 0.7  --   --   --  1.0   ALK PHOS U/L 152*  --   --   --  111   ALT (SGPT) U/L 30  --   --   --  33   AST (SGOT) U/L 30  --   --   --  53*   GLUCOSE mg/dL 165* 109* 100*   < > 114*    < > = values in this interval not displayed.     Results from last 7 days   Lab Units 06/06/20  1036 06/06/20  0316 06/05/20  1458 06/04/20  0434   INR   --  1.29* 1.41* 2.02*   APTT seconds 94.5* 70.4*  --   --          A/P: 59 y.o. female with severe cellulitis of the right lower extremity and underlying chronic lymphedema    I will sign off but be available if she has any change in her clinical status warranting a second look at the wounds along her right lower extremity.  She has shown no signs of necrotizing fasciitis, but she will have significant issues moving forward with wound healing given her underlying lymphedema.  Please call back if there is any further concern regarding possible need of surgical debridement.    Radha Bashir MD  General and Endoscopic Surgery  [facility]    [address], [address]  [place], [place], [address]  P: [phone]  F: [phone]

## 2020-06-06 NOTE — PROGRESS NOTES
"   LOS: 2 days    Patient Care Team:  RENAY Smith MD as PCP - General (General Practice)    Chief Complaint:    Chief Complaint   Patient presents with   • Diarrhea   • Legs Swollen     Follow UP ARF  Subjective     Interval History:   She is more awake and alert.   at bedside.   Had dialysis last night. On Andry today. Urine output is picking up. Not sure if BP is reliable      Objective     Vital Signs  Temp:  [98.3 °F (36.8 °C)-99.1 °F (37.3 °C)] 98.8 °F (37.1 °C)  Heart Rate:  [64-89] 85  Resp:  [20-21] 21  BP: ()/() 111/65    Flowsheet Rows      First Filed Value   Admission Height  160 cm (63\") Documented at 06/04/2020 0001   Admission Weight  (!) 176 kg (388 lb 12.8 oz) Documented at 06/04/2020 0040          No intake/output data recorded.  I/O last 3 completed shifts:  In: 1592.1 [P.O.:840; I.V.:652.1; IV Piggyback:100]  Out: 6450 [Urine:3450; Other:3000]    Intake/Output Summary (Last 24 hours) at 6/6/2020 1030  Last data filed at 6/6/2020 0600  Gross per 24 hour   Intake 1592.08 ml   Output 5500 ml   Net -3907.92 ml       Physical Exam:  General Appearance: alert, oriented x 3, no acute distress  Skin: warm and dry  HEENT: pupils round and reactive to light, oral mucosa normal,   Neck: supple, no JVD, trachea midline  Lungs: diminished, unlabored breathing effort  Heart: RRR, normal S1 and S2, no S3, no rub  Abdomen: soft, non-tender,  present bowel sounds to auscultation  : no palpable bladder,  Extremities: massive legs with anasarca, redness bilaterally but worse on the right. No cyanosis or clubbing  Neuro: normal speech and mental status         Results Review:    Results from last 7 days   Lab Units 06/05/20  1600 06/05/20  1220 06/05/20  0608  06/04/20  0035   SODIUM mmol/L 127* 123* 127*   < > 122*   POTASSIUM mmol/L 3.4* 3.5 3.5   < > 3.9   CHLORIDE mmol/L 93* 90* 92*   < > 84*   CO2 mmol/L 17.9* 16.9* 19.2*   < > 16.4*   BUN mg/dL 84* 83* 81*   < > 77*   CREATININE mg/dL " 4.94* 4.97* 5.18*   < > 5.85*   CALCIUM mg/dL 7.8* 7.7* 8.0*   < > 8.8   BILIRUBIN mg/dL  --   --   --   --  1.0   ALK PHOS U/L  --   --   --   --  111   ALT (SGPT) U/L  --   --   --   --  33   AST (SGOT) U/L  --   --   --   --  53*   GLUCOSE mg/dL 109* 100* 114*   < > 114*    < > = values in this interval not displayed.       Estimated Creatinine Clearance: 19.1 mL/min (A) (by C-G formula based on SCr of 4.94 mg/dL (H)).    Results from last 7 days   Lab Units 06/05/20  1600 06/05/20  0608   MAGNESIUM mg/dL 2.2  --    PHOSPHORUS mg/dL 5.7* 6.1*             Results from last 7 days   Lab Units 06/06/20  0316 06/05/20  0608 06/04/20  0434 06/04/20  0035   WBC 10*3/mm3 22.14* 16.28* 15.89* 19.31*   HEMOGLOBIN g/dL 9.5* 8.7* 9.8* 10.7*   PLATELETS 10*3/mm3 314 258 215 257       Results from last 7 days   Lab Units 06/06/20  0316 06/05/20  1458 06/04/20  0434   INR  1.29* 1.41* 2.02*         Imaging Results (Last 24 Hours)     Procedure Component Value Units Date/Time    XR Chest Post CVA Port [[id number]] Collected:  06/05/20 1645     Updated:  06/05/20 1650    Narrative:       XR CHEST POST CVA PORT-     INDICATIONS: Central line placement     TECHNIQUE: Frontal view of the chest     COMPARISON: 06/04/2020     FINDINGS:     Right IJ dual-lumen central venous catheter extends to the superior vena  cava. Heart is enlarged. Aorta is tortuous. Mild prominence of vascular  and interstitial markings. Previously noted perihilar patchy density  show no obvious interval change. No pleural effusion, or pneumothorax.  Otherwise stable.             Impression:          Central line placement. No pneumothorax. Otherwise, no significant  change.                 This report was finalized on 6/5/2020 4:47 PM by Dr. Damon Chavez M.D.             cefepime 2 g Intravenous Q24H   heparin (porcine) 3,000 Units Intravenous Once   midodrine 15 mg Oral TID AC   sodium chloride 10 mL Intravenous Q12H   Vancomycin Pharmacy Intermittent  Dosing  Does not apply Daily       bumetanide 1 mg/hr    heparin (porcine) 18 Units/kg/hr Last Rate: 20 Units/kg/hr (06/06/20 0416)   Pharmacy to dose vancomycin     phenylephrine 0.5-3 mcg/kg/min Last Rate: 0.8 mcg/kg/min (06/06/20 0911)   Phoxillum 4 K/2.5 CA 1,000 mL/hr    Phoxillum 4 K/2.5 CA 1,000 mL/hr    Phoxillum 4 K/2.5 CA 1,000 mL/hr        Medication Review:   Current Facility-Administered Medications   Medication Dose Route Frequency Provider Last Rate Last Dose   • acetaminophen (TYLENOL) tablet 650 mg  650 mg Oral Q4H PRN Radha Salmeron APRN        Or   • acetaminophen (TYLENOL) 160 MG/5ML solution 650 mg  650 mg Oral Q4H PRN Radha Salmeron APRN        Or   • acetaminophen (TYLENOL) suppository 650 mg  650 mg Rectal Q4H PRN Radha Salmeron APRN       • albumin human 25 % IV SOLN 12.5 g  12.5 g Intravenous PRN Raquel Hemphill MD       • bumetanide (BUMEX) 12.5 mg in sodium chloride 0.9 % 125 mL (0.1 mg/mL) infusion  1 mg/hr Intravenous Continuous Agnes Lutz MD       • calcium gluconate 1 g in sodium chloride 0.9 % 50 mL IVPB  1 g Intravenous PRN Raquel Hemphill MD        Or   • calcium gluconate 2 g in sodium chloride 0.9 % 50 mL IVPB  2 g Intravenous PRN Raquel Hemphill MD       • cefepime (MAXIPIME) 2 g/100 mL 0.9% NS (mbp)  2 g Intravenous Q24H Tariq Flores MD   2 g at 06/05/20 1024   • heparin (porcine) 5000 UNIT/ML injection 6,800-10,000 Units  40-58.5 Units/kg Intravenous Q6H PRN Danilo Croft MD       • heparin (porcine) injection 1,000-2,000 Units  1,000-2,000 Units Intravenous PRN Raquel Hemphill MD       • heparin (porcine) injection 3,000 Units  3,000 Units Intravenous Once Abilio Rock MD       • heparin 57064 units/250 mL (100 units/mL) in 0.45 % NaCl infusion  18 Units/kg/hr Intravenous Titrated Danilo Croft MD 34.2 mL/hr at 06/06/20 0416 20 Units/kg/hr at 06/06/20 0416   •  HYDROmorphone (DILAUDID) injection 1 mg  1 mg Intravenous Q2H PRN Adryan King MD   1 mg at 06/05/20 0634   • ipratropium-albuterol (DUO-NEB) nebulizer solution 3 mL  3 mL Nebulization Q6H PRN Barbra Lee APRN       • magnesium sulfate 2g/50 mL (PREMIX) infusion  2 g Intravenous PRN Raquel Hemphill MD       • midodrine (PROAMATINE) tablet 15 mg  15 mg Oral TID AC Raquel Hemphill MD   15 mg at 06/06/20 0914   • nitroglycerin (NITROSTAT) SL tablet 0.4 mg  0.4 mg Sublingual Q5 Min PRN Radha Salmeron APRN       • ondansetron (ZOFRAN) injection 4 mg  4 mg Intravenous Q6H PRN Radha Salmeron APRN       • Pharmacy to dose vancomycin   Does not apply Continuous PRN Tariq lFores MD       • phenylephrine (ANNABEL-SYNEPHRINE) 50 mg in sodium chloride 0.9 % 250 mL (0.2 mg/mL) infusion  0.5-3 mcg/kg/min Intravenous Titrated Danilo Croft MD 41 mL/hr at 06/06/20 0911 0.8 mcg/kg/min at 06/06/20 0911   • Phoxillum 4 K/2.5 CA dialysis solution  1,000 mL/hr CRRT Continuous Raquel Hemphill MD       • Phoxillum 4 K/2.5 CA dialysis solution  1,000 mL/hr CRRT Continuous Raquel Hemphill MD       • Phoxillum 4 K/2.5 CA dialysis solution  1,000 mL/hr CRRT Continuous Raquel Hemphill MD       • potassium chloride 20 mEq in 50 mL IVPB  20 mEq Intravenous PRN Raquel Hemphill MD       • sodium chloride 0.9 % bolus 200 mL  200 mL Intravenous PRN Raquel Hemphill MD       • sodium chloride 0.9 % flush 10 mL  10 mL Intravenous Q12H Radha Salmeron APRN   Stopped at 06/06/20 0317   • sodium chloride 0.9 % flush 10 mL  10 mL Intravenous PRN Jaron, Radha M, APRN       • sodium phosphates 10 mmol in sodium chloride 0.9 % 100 mL IVPB  10 mmol Intravenous PRN Raquel Hemphill MD       • Vancomycin Pharmacy Intermittent Dosing   Does not apply Daily Tariq Flores MD           Assessment/Plan         Sepsis (CMS/[facility])     Chronic diastolic CHF (congestive heart failure) (CMS/[facility])    PFO (patent foramen ovale)    Hypertension    Pulmonary hypertension (CMS/[facility])    ARIEL (obstructive sleep apnea)    Morbid obesity (CMS/[facility])    History of DVT of lower extremity    Cellulitis of right anterior lower leg    Lymphedema    Cellulitis of right lower extremity    Hyponatremia    Acute renal failure (ARF) (CMS/[facility])    Anemia, chronic disease       1.  TSEFANIA severe, making better urine. No hydronephrosis seen on noncontrast CT. Concern for additional antibiotic injury from vancomycin. Had dialysis 06/05    2.  Sepsis syndrome:  Likely from cellulitis, CT worrisome for phlegmon.    3.  Hyponatremia:  Due to ARF, monitor with serial labs  4.  Diastolic heart failure/ PFO:  Appreciate Dr. Rosenthal's recs  5.  History of DVT/PE   6.  Severe morbid obesity  7.  Poor adherence to health care maintenance     Plan:    Will hold on dialysis for now.  Start patient on Bumex drip.  Repeat lab and replete potassium as needed.  Blood pressure is likely unreliable and we do not need to push on pressors unless there is change in her other vital.  Discussed with nurse and pulmonary   surveillance labs      Agnes Lutz MD  06/06/20  10:30

## 2020-06-06 NOTE — PROGRESS NOTES
Danilo Croft MD                          [phone]      Patient ID:    Name:  Emely Solomon    MRN:  [id number]    1960   59 y.o.  female            Patient Care Team:  RENAY Smith MD as PCP - General (General Practice)    CC/ Reason for visit: Acute right lower extremity cellulitis, septic shock, respiratory failure, pulmonary hypertension, renal failure    Subjective: Pt seen and examined this AM.  Patient underwent dialysis yesterday with the help of HD catheter placed by vascular surgery.  Significant volume removal done successfully with some response.  Plan today is to hold dialysis and consider Bumex drip.  She was placed on vasopressor support and we will try to wean that down.  Denies any new concerns.  Started on heparin drip    ROS: Denies any subjective fevers, syncope or presyncopal events, new neurological deficits, nausea or vomiting currently    Objective     Vital Signs past 24hrs    BP range: BP: ()/() 111/65  Pulse range: Heart Rate:  [64-89] 85  Resp rate range: Resp:  [20-21] 21  Temp range: Temp (24hrs), Av.7 °F (37.1 °C), Min:98 °F (36.7 °C), Max:99.1 °F (37.3 °C)      Ventilator/Non-Invasive Ventilation Settings (From admission, onward)     Start     Ordered    20 1735  NIPPV (CPAP or BIPAP)  At Bedtime As Needed-RT     Comments:  Home CPAP   Question Answer Comment   Indication: Sleep Apnea    Type: CPAP    NIPPV Mask Interface: Per Patient Preference    Titrate for SPO2 90%        20 1734                Device (Oxygen Therapy): CPAP       (!) 171 kg (377 lb 3.3 oz); Body mass index is 66.82 kg/m².      Intake/Output Summary (Last 24 hours) at 2020 1551  Last data filed at 2020 0600  Gross per 24 hour   Intake 1592.08 ml   Output 5500 ml   Net -3907.92 ml       PHYSICAL EXAM   Constitutional: Middle aged super morbidly obese white female pt in bed, No acute respiratory distress, + accessory muscle  use  Head: - NCAT  Eyes: No pallor.  Anicteric sclerae, EOMI.  ENMT:  Mallampati 4, no oral thrush. Moist MM.   NECK: Trachea midline, No thyromegaly, no palpable cervical lymphadenopathy  Heart: RRR, no murmur. 2+ pedal edema   Lungs: YANNA +, decreased breath sounds at the bases, no wheezes/ crackles heard    Abdomen: Soft. No tenderness, guarding or rigidity. No palpable masses  Extremities: Extremities warm and well perfused.  Right lower extremity with significant erythema and some tenderness to palpation along with some subcutaneous discharge.  Neuro: Conscious, answers appropriately, no gross focal neuro deficits  Psych: Mood and affect appropriate    Scheduled meds:      cefepime 2 g Intravenous Q24H   heparin (porcine) 3,000 Units Intravenous Once   midodrine 15 mg Oral TID AC   pantoprazole 40 mg Intravenous Q24H   saccharomyces boulardii 500 mg Oral BID   sodium chloride 10 mL Intravenous Q12H   Vancomycin Pharmacy Intermittent Dosing  Does not apply Daily       IV meds:                          bumetanide 1 mg/hr Last Rate: 1 mg/hr (06/06/20 1153)   heparin (porcine) 18 Units/kg/hr Last Rate: 20 Units/kg/hr (06/06/20 1156)   Pharmacy to dose vancomycin     phenylephrine 0.5-3 mcg/kg/min Last Rate: Stopped (06/06/20 1127)   Phoxillum 4 K/2.5 CA 1,000 mL/hr    Phoxillum 4 K/2.5 CA 1,000 mL/hr    Phoxillum 4 K/2.5 CA 1,000 mL/hr        Data Review:      Results from last 7 days   Lab Units 06/06/20  1036 06/06/20  0316 06/05/20  1600 06/05/20  1458 06/05/20  1220 06/05/20  0608  06/04/20  0434 06/04/20  0035   SODIUM mmol/L 136  --  127*  --  123* 127*   < > 124* 122*   POTASSIUM mmol/L 3.0*  --  3.4*  --  3.5 3.5   < > 3.5 3.9   CHLORIDE mmol/L 97*  --  93*  --  90* 92*   < > 88* 84*   CO2 mmol/L 26.6  --  17.9*  --  16.9* 19.2*   < > 16.8* 16.4*   BUN mg/dL 41*  --  84*  --  83* 81*   < > 80* 77*   CREATININE mg/dL 2.11*  --  4.94*  --  4.97* 5.18*   < > 5.69* 5.85*   CALCIUM mg/dL 8.7  --  7.8*  --  7.7*  8.0*   < > 8.0* 8.8   BILIRUBIN mg/dL 0.7  --   --   --   --   --   --   --  1.0   ALK PHOS U/L 152*  --   --   --   --   --   --   --  111   ALT (SGPT) U/L 30  --   --   --   --   --   --   --  33   AST (SGOT) U/L 30  --   --   --   --   --   --   --  53*   GLUCOSE mg/dL 165*  --  109*  --  100* 114*   < > 100* 114*   WBC 10*3/mm3  --  22.14*  --   --   --  16.28*  --  15.89* 19.31*   HEMOGLOBIN g/dL  --  9.5*  --   --   --  8.7*  --  9.8* 10.7*   PLATELETS 10*3/mm3  --  314  --   --   --  258  --  215 257   INR   --  1.29*  --  1.41*  --   --   --  2.02*  --    PROBNP pg/mL  --   --   --   --   --   --   --  1,598.0*  --     < > = values in this interval not displayed.       Lab Results   Component Value Date    CALCIUM 8.7 06/06/2020    PHOS 3.0 06/06/2020       Results from last 7 days   Lab Units 06/04/20  0153 06/04/20  0049 06/04/20  0030   BLOODCX  No growth at 2 days  --  No growth at 2 days   WOUNDCX   --  Scant growth (1+) Staphylococcus aureus*  Scant growth (1+) Streptococcus, Beta Hemolytic, Group C*  Scant growth (1+) Normal Skin Aliyah  --        Results from last 7 days   Lab Units 06/04/20  0556   PH, ARTERIAL pH units 7.355   PO2 ART mm Hg 100.1*   PCO2, ARTERIAL mm Hg 33.8*   HCO3 ART mmol/L 18.8*        Results Review:    I have reviewed the relevant laboratory results and independently reviewed the chest imaging from this hospitalization including the available echocardiogram reports personally and summarized it if/ when appropriate below    Assessment    Septic shock  Beta-hemolytic streptococcal and staph SSI  RLE cellulitis  Acute renal failure; severe  Anasarca  Right groin adenopathy/ Phlegmon along the right common femoral vein  Chronic lymphedema  Hyponatremia  Pulmonary hypertension per echo -RVSP 65  Severe ARIEL on CPAP -Dr. 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  Noted to be hypotensive and was placed on  vasopressor support to support dialysis and volume removal.  Still think that her blood pressure is not reliable with a cuff but would not consider arterial line placement at this point given risk for wound infection and nonhealing due to anasarca.  We will need to wean that down and add midodrine.  Lactic acid has been negative at last check  Discussed with nephrology and plan is to hold dialysis for now and start Bumex drip.  Agree with assessment.  She will need to be significantly volume negative to make recovery  Appreciate general surgery and ID input.  ID is managing antibiotics.  Appreciate cardiology input and agree with the plan and need for volume removal.  Pulmonary pretension likely multifactorial from history of DVT/PE and severe sleep apnea.  She will need to continue follow-up with Dr. Garcia  Continue with heparin drip given DVT/PE.  C/w CPAP for her severe ARIEL.    We will continue to watch the patient closely in ICU given concern for severe sepsis along with vasopressor need and possible risk of limb loss.    CC - 32 mins    I have discussed my findings and recommendations with patient and nursing staff.     Danilo Croft MD  6/6/2020

## 2020-06-06 NOTE — PROGRESS NOTES
LOS: 2 days   Patient Care Team:  RENAY Smith MD as PCP - General (General Practice)    Chief Complaint:     F/u right heart failure and hypotension.     Interval History:     She denies chest pain, nausea, tachycardia, dizziness or syncope.    Echo 6/4/20  Interpretation Summary     · Left ventricular wall thickness is consistent with mild concentric hypertrophy.  · Left ventricular systolic function is normal.  · Calculated EF = 64.5%.  · Right ventricular cavity is moderately dilated.  · Right atrial cavity size is mild-to-moderately dilated.  · Mild tricuspid valve regurgitation is present.  · There is calcification of the aortic valve.  · Severe pulmonary hypertension is present.         Objective   Vital Signs  Temp:  [98 °F (36.7 °C)-99.1 °F (37.3 °C)] 98 °F (36.7 °C)  Heart Rate:  [64-89] 85  Resp:  [20-21] 21  BP: ()/() 111/65    Intake/Output Summary (Last 24 hours) at 6/6/2020 1509  Last data filed at 6/6/2020 0600  Gross per 24 hour   Intake 1592.08 ml   Output 5500 ml   Net -3907.92 ml       Comfortable NAD  conjunctivae clear  Neck supple, no JVD or thyromegaly appreciated  S1/S2 RRR, no m/r/g  Lungs CTA B, normal effort  Abdomen S/NT/ND (+) BS, no HSM appreciated  Extremities warm, no clubbing, cyanosis, 2+ right lower extremity edema  Weeping skin lesions over the right leg  A/Ox4, mood and affect appropriate    Results Review:      Results from last 7 days   Lab Units 06/06/20  1036 06/05/20  1600 06/05/20  1220   SODIUM mmol/L 136 127* 123*   POTASSIUM mmol/L 3.0* 3.4* 3.5   CHLORIDE mmol/L 97* 93* 90*   CO2 mmol/L 26.6 17.9* 16.9*   BUN mg/dL 41* 84* 83*   CREATININE mg/dL 2.11* 4.94* 4.97*   GLUCOSE mg/dL 165* 109* 100*   CALCIUM mg/dL 8.7 7.8* 7.7*     Results from last 7 days   Lab Units 06/05/20  1220   CK TOTAL U/L 1,204*     Results from last 7 days   Lab Units 06/06/20  0316 06/05/20  0608 06/04/20  0434   WBC 10*3/mm3 22.14* 16.28* 15.89*   HEMOGLOBIN g/dL 9.5* 8.7* 9.8*    HEMATOCRIT % 28.2* 25.2* 28.9*   PLATELETS 10*3/mm3 314 258 215     Results from last 7 days   Lab Units 06/06/20  1036 06/06/20  0316 06/05/20  1458 06/04/20  0434   INR   --  1.29* 1.41* 2.02*   APTT seconds 94.5* 70.4*  --   --          Results from last 7 days   Lab Units 06/06/20  1036   MAGNESIUM mg/dL 1.9           I reviewed the patient's new clinical results.  I personally viewed and interpreted the patient's EKG/Telemetry data        Medication Review:     cefepime 2 g Intravenous Q24H   heparin (porcine) 3,000 Units Intravenous Once   midodrine 15 mg Oral TID AC   pantoprazole 40 mg Intravenous Q24H   saccharomyces boulardii 500 mg Oral BID   sodium chloride 10 mL Intravenous Q12H   Vancomycin Pharmacy Intermittent Dosing  Does not apply Daily         bumetanide 1 mg/hr Last Rate: 1 mg/hr (06/06/20 1153)   heparin (porcine) 18 Units/kg/hr Last Rate: 20 Units/kg/hr (06/06/20 1156)   Pharmacy to dose vancomycin     phenylephrine 0.5-3 mcg/kg/min Last Rate: Stopped (06/06/20 1127)   Phoxillum 4 K/2.5 CA 1,000 mL/hr    Phoxillum 4 K/2.5 CA 1,000 mL/hr    Phoxillum 4 K/2.5 CA 1,000 mL/hr        Assessment/Plan       Sepsis (CMS/[facility])    Chronic diastolic CHF (congestive heart failure) (CMS/HCC)    PFO (patent foramen ovale)    Hypertension    Pulmonary hypertension (CMS/HCC)    ARIEL (obstructive sleep apnea)    Morbid obesity (CMS/[facility])    History of DVT of lower extremity    Cellulitis of right anterior lower leg    Lymphedema    Cellulitis of right lower extremity    Hyponatremia    Acute renal failure (ARF) (CMS/[facility])    Anemia, chronic disease    1. Sepsis syndrome likely due to cellulitis.  2. Cellulitis bilateral lower extremities. CT shows phlegmon right thigh but no soft tissue gas.  ID and surgery following.   3. Acute kidney injury, stable.  May be due to sepsis and hypotension.  Nephrology following.   4. Hypotension.  Very hypotensive this am.  Midodrine started.   5. History of DVTs, pulmonary  embolisms and paradoxical arterial emboli on chronic rivaroxaban due to this.  6. Morbid obesity  7. Chronic right heart failure with pulmonary hypertension likely due to previous pulmonary emboli.  8. Anemia, worsening.     Patient still have generalized anasarca now on the Bumex drip    Patient has developed cough may be due to volume overload    We will check EKG and troponin in the morning    Silvana Jeff MD  06/06/20  15:09

## 2020-06-06 NOTE — PROGRESS NOTES
"Pharmacokinetic Evaluation - Vancomycin    Emely Solomon is a 59 y.o. female on vancomycin pharmacy to dose.  MRN: [id number]  : 1960    Day of vancomycin therapy: 3  Indication: RLE cellultitis  Consulted by: Dr. Flores  Goal pre-IHD level: 20-25 mcg/ml     Current dose: intermittent dosing  Other antimicrobials: cefepime 2g iv q24    Blood pressure 111/65, pulse 85, temperature 98.9 °F (37.2 °C), resp. rate 21, height 160 cm (63\"), weight (!) 171 kg (377 lb 3.3 oz), SpO2 93 %.  Results from last 7 days   Lab Units 20  1600 20  1220 20  0608   CREATININE mg/dL 4.94* 4.97* 5.18*     Estimated Creatinine Clearance: 19.1 mL/min (A) (by C-G formula based on SCr of 4.94 mg/dL (H)).  Results from last 7 days   Lab Units 20  0316 20  1220 20  0608 20  0434 20  0035   WBC 10*3/mm3 22.14*  --  16.28* 15.89* 19.31*   HEMOGLOBIN g/dL 9.5*  --  8.7* 9.8* 10.7*   HEMATOCRIT % 28.2*  --  25.2* 28.9* 31.5*   PLATELETS 10*3/mm3 314  --  258 215 257   LACTATE mmol/L  --  0.8  --  1.3 4.3*       Cultures:    COVID: negative   bcx: ng x 2 days   Wcx: 1+ staph aureus, 1+ strep group c     H/x of group b strep in bcx in 2017     Dosing hx (include troughs if drawn):  Vancomycin 2500mg loading dose               0320                           1734 random: 27.2mcg/mL (14 hrs from last dose)               0608 random=24.1 mcg/ml.  1220 random=21.9 mcg/ml    0118 1 g.  0316 \"random\"=42.7 mcg/ml     Assessment:   AM \"random\"  level is high at 42.7 mcg/ml. However, level is actually a peak since dose was just infused at 0118 and 1g was given after HD according to our dosing protocol.  No plans for CRRT or HD today per Dr. Del Valle.  Will repeat random vanc level tomorrow am. Note that no scr drawn this am.        Plan:  1) No vanc needed today.  2)  Random vanc level tomorrow am  3) Encourage adequate hydration if appropriate. Monitor for decreased UOP, rash or " other signs of vancomycin intolerance.    Thanks for this consult, will follow until jenna,  Miguel Brown Pharm.D, BCCCP

## 2020-06-07 LAB
ALBUMIN SERPL-MCNC: 2.6 G/DL (ref 3.5–5.2)
ANION GAP SERPL CALCULATED.3IONS-SCNC: 11.6 MMOL/L (ref 5–15)
ANION GAP SERPL CALCULATED.3IONS-SCNC: 13.4 MMOL/L (ref 5–15)
APTT PPP: 70.6 SECONDS (ref 22.7–35.4)
BUN BLD-MCNC: 30 MG/DL (ref 8–23)
BUN BLD-MCNC: 34 MG/DL (ref 8–23)
BUN/CREAT SERPL: 21.3 (ref 7–25)
BUN/CREAT SERPL: 21.3 (ref 7–25)
CALCIUM SPEC-SCNC: 8.5 MG/DL (ref 8.6–10.5)
CALCIUM SPEC-SCNC: 9 MG/DL (ref 8.6–10.5)
CHLORIDE SERPL-SCNC: 90 MMOL/L (ref 98–107)
CHLORIDE SERPL-SCNC: 95 MMOL/L (ref 98–107)
CO2 SERPL-SCNC: 28.6 MMOL/L (ref 22–29)
CO2 SERPL-SCNC: 32.4 MMOL/L (ref 22–29)
CREAT BLD-MCNC: 1.41 MG/DL (ref 0.57–1)
CREAT BLD-MCNC: 1.6 MG/DL (ref 0.57–1)
DACRYOCYTES BLD QL SMEAR: ABNORMAL
DEPRECATED RDW RBC AUTO: 44.6 FL (ref 37–54)
EOSINOPHIL # BLD MANUAL: 0.19 10*3/MM3 (ref 0–0.4)
EOSINOPHIL NFR BLD MANUAL: 1 % (ref 0.3–6.2)
ERYTHROCYTE [DISTWIDTH] IN BLOOD BY AUTOMATED COUNT: 14.5 % (ref 12.3–15.4)
GFR SERPL CREATININE-BSD FRML MDRD: 33 ML/MIN/1.73
GFR SERPL CREATININE-BSD FRML MDRD: 38 ML/MIN/1.73
GLUCOSE BLD-MCNC: 113 MG/DL (ref 65–99)
GLUCOSE BLD-MCNC: 118 MG/DL (ref 65–99)
HCT VFR BLD AUTO: 29.5 % (ref 34–46.6)
HGB BLD-MCNC: 9.6 G/DL (ref 12–15.9)
HYPOCHROMIA BLD QL: ABNORMAL
INR PPP: 1.3 (ref 0.9–1.1)
LYMPHOCYTES # BLD MANUAL: 1.49 10*3/MM3 (ref 0.7–3.1)
LYMPHOCYTES NFR BLD MANUAL: 8 % (ref 19.6–45.3)
MAGNESIUM SERPL-MCNC: 1.6 MG/DL (ref 1.6–2.4)
MCH RBC QN AUTO: 27.4 PG (ref 26.6–33)
MCHC RBC AUTO-ENTMCNC: 32.5 G/DL (ref 31.5–35.7)
MCV RBC AUTO: 84 FL (ref 79–97)
METAMYELOCYTES NFR BLD MANUAL: 2 % (ref 0–0)
NEUTROPHILS # BLD AUTO: 16.55 10*3/MM3 (ref 1.7–7)
NEUTROPHILS NFR BLD MANUAL: 89 % (ref 42.7–76)
NT-PROBNP SERPL-MCNC: 1311 PG/ML (ref 5–900)
PHOSPHATE SERPL-MCNC: 2.5 MG/DL (ref 2.5–4.5)
PLAT MORPH BLD: NORMAL
PLATELET # BLD AUTO: 394 10*3/MM3 (ref 140–450)
PMV BLD AUTO: 9.1 FL (ref 6–12)
POTASSIUM BLD-SCNC: 3.1 MMOL/L (ref 3.5–5.2)
POTASSIUM BLD-SCNC: 3.4 MMOL/L (ref 3.5–5.2)
PROTHROMBIN TIME: 15.9 SECONDS (ref 11.7–14.2)
RBC # BLD AUTO: 3.51 10*6/MM3 (ref 3.77–5.28)
SODIUM BLD-SCNC: 134 MMOL/L (ref 136–145)
SODIUM BLD-SCNC: 137 MMOL/L (ref 136–145)
TROPONIN T SERPL-MCNC: <0.01 NG/ML (ref 0–0.03)
VANCOMYCIN SERPL-MCNC: 15.1 MCG/ML (ref 5–40)
WBC MORPH BLD: NORMAL
WBC NRBC COR # BLD: 18.6 10*3/MM3 (ref 3.4–10.8)

## 2020-06-07 PROCEDURE — 80202 ASSAY OF VANCOMYCIN: CPT | Performed by: INTERNAL MEDICINE

## 2020-06-07 PROCEDURE — 94799 UNLISTED PULMONARY SVC/PX: CPT

## 2020-06-07 PROCEDURE — 80069 RENAL FUNCTION PANEL: CPT | Performed by: INTERNAL MEDICINE

## 2020-06-07 PROCEDURE — 83880 ASSAY OF NATRIURETIC PEPTIDE: CPT | Performed by: INTERNAL MEDICINE

## 2020-06-07 PROCEDURE — C1751 CATH, INF, PER/CENT/MIDLINE: HCPCS

## 2020-06-07 PROCEDURE — 84484 ASSAY OF TROPONIN QUANT: CPT | Performed by: INTERNAL MEDICINE

## 2020-06-07 PROCEDURE — 25010000002 HEPARIN (PORCINE) 25000-0.45 UT/250ML-% SOLUTION: Performed by: INTERNAL MEDICINE

## 2020-06-07 PROCEDURE — 85025 COMPLETE CBC W/AUTO DIFF WBC: CPT | Performed by: INTERNAL MEDICINE

## 2020-06-07 PROCEDURE — 85007 BL SMEAR W/DIFF WBC COUNT: CPT | Performed by: INTERNAL MEDICINE

## 2020-06-07 PROCEDURE — 80048 BASIC METABOLIC PNL TOTAL CA: CPT | Performed by: INTERNAL MEDICINE

## 2020-06-07 PROCEDURE — 83735 ASSAY OF MAGNESIUM: CPT | Performed by: INTERNAL MEDICINE

## 2020-06-07 PROCEDURE — 85610 PROTHROMBIN TIME: CPT | Performed by: INTERNAL MEDICINE

## 2020-06-07 PROCEDURE — 25010000002 MAGNESIUM SULFATE 2 GM/50ML SOLUTION: Performed by: INTERNAL MEDICINE

## 2020-06-07 PROCEDURE — 99232 SBSQ HOSP IP/OBS MODERATE 35: CPT | Performed by: INTERNAL MEDICINE

## 2020-06-07 PROCEDURE — 02HV33Z INSERTION OF INFUSION DEVICE INTO SUPERIOR VENA CAVA, PERCUTANEOUS APPROACH: ICD-10-PCS | Performed by: HOSPITALIST

## 2020-06-07 PROCEDURE — 25010000002 VANCOMYCIN 10 G RECONSTITUTED SOLUTION: Performed by: INTERNAL MEDICINE

## 2020-06-07 PROCEDURE — 25010000002 CEFEPIME PER 500 MG: Performed by: INTERNAL MEDICINE

## 2020-06-07 PROCEDURE — 85730 THROMBOPLASTIN TIME PARTIAL: CPT | Performed by: INTERNAL MEDICINE

## 2020-06-07 PROCEDURE — 25010000002 HEPARIN (PORCINE) PER 1000 UNITS: Performed by: INTERNAL MEDICINE

## 2020-06-07 RX ORDER — POTASSIUM CHLORIDE 750 MG/1
40 CAPSULE, EXTENDED RELEASE ORAL DAILY
Status: DISCONTINUED | OUTPATIENT
Start: 2020-06-07 | End: 2020-06-07

## 2020-06-07 RX ORDER — PANTOPRAZOLE SODIUM 40 MG/1
40 TABLET, DELAYED RELEASE ORAL
Status: DISCONTINUED | OUTPATIENT
Start: 2020-06-07 | End: 2020-06-18 | Stop reason: HOSPADM

## 2020-06-07 RX ORDER — POTASSIUM CHLORIDE 750 MG/1
40 CAPSULE, EXTENDED RELEASE ORAL ONCE
Status: COMPLETED | OUTPATIENT
Start: 2020-06-07 | End: 2020-06-07

## 2020-06-07 RX ORDER — POTASSIUM CHLORIDE 750 MG/1
60 CAPSULE, EXTENDED RELEASE ORAL DAILY
Status: DISCONTINUED | OUTPATIENT
Start: 2020-06-07 | End: 2020-06-07

## 2020-06-07 RX ORDER — SODIUM CHLORIDE 0.9 % (FLUSH) 0.9 %
10 SYRINGE (ML) INJECTION AS NEEDED
Status: DISCONTINUED | OUTPATIENT
Start: 2020-06-07 | End: 2020-06-18 | Stop reason: HOSPADM

## 2020-06-07 RX ORDER — MAGNESIUM SULFATE HEPTAHYDRATE 40 MG/ML
2 INJECTION, SOLUTION INTRAVENOUS ONCE
Status: COMPLETED | OUTPATIENT
Start: 2020-06-07 | End: 2020-06-07

## 2020-06-07 RX ORDER — POTASSIUM CHLORIDE 750 MG/1
60 CAPSULE, EXTENDED RELEASE ORAL ONCE
Status: COMPLETED | OUTPATIENT
Start: 2020-06-07 | End: 2020-06-07

## 2020-06-07 RX ORDER — SODIUM CHLORIDE 0.9 % (FLUSH) 0.9 %
10 SYRINGE (ML) INJECTION EVERY 12 HOURS SCHEDULED
Status: DISCONTINUED | OUTPATIENT
Start: 2020-06-07 | End: 2020-06-18 | Stop reason: HOSPADM

## 2020-06-07 RX ORDER — SODIUM CHLORIDE 0.9 % (FLUSH) 0.9 %
20 SYRINGE (ML) INJECTION AS NEEDED
Status: DISCONTINUED | OUTPATIENT
Start: 2020-06-07 | End: 2020-06-18 | Stop reason: HOSPADM

## 2020-06-07 RX ADMIN — Medication 500 MG: at 11:58

## 2020-06-07 RX ADMIN — SODIUM CHLORIDE, PRESERVATIVE FREE 10 ML: 5 INJECTION INTRAVENOUS at 21:35

## 2020-06-07 RX ADMIN — SODIUM CHLORIDE, PRESERVATIVE FREE 10 ML: 5 INJECTION INTRAVENOUS at 21:36

## 2020-06-07 RX ADMIN — SODIUM CHLORIDE, PRESERVATIVE FREE 10 ML: 5 INJECTION INTRAVENOUS at 09:00

## 2020-06-07 RX ADMIN — POTASSIUM CHLORIDE 60 MEQ: 10 CAPSULE, COATED, EXTENDED RELEASE ORAL at 18:03

## 2020-06-07 RX ADMIN — MIDODRINE HYDROCHLORIDE 15 MG: 5 TABLET ORAL at 07:05

## 2020-06-07 RX ADMIN — MIDODRINE HYDROCHLORIDE 15 MG: 5 TABLET ORAL at 18:04

## 2020-06-07 RX ADMIN — POTASSIUM CHLORIDE 40 MEQ: 10 CAPSULE, COATED, EXTENDED RELEASE ORAL at 07:05

## 2020-06-07 RX ADMIN — MIDODRINE HYDROCHLORIDE 15 MG: 5 TABLET ORAL at 11:58

## 2020-06-07 RX ADMIN — PANTOPRAZOLE SODIUM 40 MG: 40 TABLET, DELAYED RELEASE ORAL at 09:18

## 2020-06-07 RX ADMIN — POTASSIUM CHLORIDE 60 MEQ: 10 CAPSULE, COATED, EXTENDED RELEASE ORAL at 09:18

## 2020-06-07 RX ADMIN — HEPARIN SODIUM 20 UNITS/KG/HR: 10000 INJECTION, SOLUTION INTRAVENOUS at 07:04

## 2020-06-07 RX ADMIN — SODIUM CHLORIDE, PRESERVATIVE FREE 10 ML: 5 INJECTION INTRAVENOUS at 21:34

## 2020-06-07 RX ADMIN — BUMETANIDE 1 MG/HR: 0.25 INJECTION INTRAMUSCULAR; INTRAVENOUS at 23:17

## 2020-06-07 RX ADMIN — HEPARIN SODIUM 20 UNITS/KG/HR: 10000 INJECTION, SOLUTION INTRAVENOUS at 14:42

## 2020-06-07 RX ADMIN — HEPARIN SODIUM 20 UNITS/KG/HR: 10000 INJECTION, SOLUTION INTRAVENOUS at 21:35

## 2020-06-07 RX ADMIN — VANCOMYCIN HYDROCHLORIDE 1500 MG: 10 INJECTION, POWDER, LYOPHILIZED, FOR SOLUTION INTRAVENOUS at 12:44

## 2020-06-07 RX ADMIN — MAGNESIUM SULFATE HEPTAHYDRATE 2 G: 40 INJECTION, SOLUTION INTRAVENOUS at 09:18

## 2020-06-07 RX ADMIN — CEFEPIME HYDROCHLORIDE 2 G: 2 INJECTION, POWDER, FOR SOLUTION INTRAVENOUS at 09:20

## 2020-06-07 RX ADMIN — POTASSIUM CHLORIDE 40 MEQ: 10 CAPSULE, COATED, EXTENDED RELEASE ORAL at 21:30

## 2020-06-07 RX ADMIN — BUMETANIDE 1 MG/HR: 0.25 INJECTION INTRAMUSCULAR; INTRAVENOUS at 12:28

## 2020-06-07 NOTE — PROGRESS NOTES
LOS: 3 days   Patient Care Team:  RENAY Smith MD as PCP - General (General Practice)    Chief Complaint:     F/u right heart failure     terval History:     She denies chest pain, nausea, tachycardia, dizziness or syncope.    Better with the Bumex drip    Echo 6/4/20  Interpretation Summary     · Left ventricular wall thickness is consistent with mild concentric hypertrophy.  · Left ventricular systolic function is normal.  · Calculated EF = 64.5%.  · Right ventricular cavity is moderately dilated.  · Right atrial cavity size is mild-to-moderately dilated.  · Mild tricuspid valve regurgitation is present.  · There is calcification of the aortic valve.  · Severe pulmonary hypertension is present.         Objective   Vital Signs  Temp:  [98.4 °F (36.9 °C)-98.6 °F (37 °C)] 98.4 °F (36.9 °C)  Heart Rate:  [71-92] 92  Resp:  [20] 20  BP: ()/(34-66) 118/63    Intake/Output Summary (Last 24 hours) at 6/7/2020 1351  Last data filed at 6/7/2020 0704  Gross per 24 hour   Intake 1403.41 ml   Output 6700 ml   Net -5296.59 ml       Comfortable NAD  conjunctivae clear  Neck supple, no JVD or thyromegaly appreciated  S1/S2 RRR, no m/r/g  Lungs CTA B, normal effort  Abdomen S/NT/ND (+) BS, no HSM appreciated  Extremities warm, no clubbing, cyanosis, 2+ right lower extremity edema  Weeping skin lesions over the right leg  A/Ox4, mood and affect appropriate    Results Review:      Results from last 7 days   Lab Units 06/07/20  0201 06/06/20  1633 06/06/20  1036   SODIUM mmol/L 137 135* 136   POTASSIUM mmol/L 3.1* 3.1* 3.0*   CHLORIDE mmol/L 95* 96* 97*   CO2 mmol/L 28.6 28.2 26.6   BUN mg/dL 34* 38* 41*   CREATININE mg/dL 1.60* 1.83* 2.11*   GLUCOSE mg/dL 113* 138* 165*   CALCIUM mg/dL 8.5* 8.6 8.7     Results from last 7 days   Lab Units 06/07/20  0201 06/05/20  1220   CK TOTAL U/L  --  1,204*   TROPONIN T ng/mL <0.010  --      Results from last 7 days   Lab Units 06/07/20  1202 06/06/20  0316 06/05/20  0608   WBC  10*3/mm3 18.60* 22.14* 16.28*   HEMOGLOBIN g/dL 9.6* 9.5* 8.7*   HEMATOCRIT % 29.5* 28.2* 25.2*   PLATELETS 10*3/mm3 394 314 258     Results from last 7 days   Lab Units 06/07/20  0201 06/06/20  1036 06/06/20  0316 06/05/20  1458   INR  1.30*  --  1.29* 1.41*   APTT seconds 70.6* 94.5* 70.4*  --          Results from last 7 days   Lab Units 06/07/20  0201   MAGNESIUM mg/dL 1.6           I reviewed the patient's new clinical results.  I personally viewed and interpreted the patient's EKG/Telemetry data        Medication Review:     heparin (porcine) 3,000 Units Intravenous Once   midodrine 15 mg Oral TID AC   pantoprazole 40 mg Oral Q AM   saccharomyces boulardii 500 mg Oral BID   sodium chloride 10 mL Intravenous Q12H   vancomycin 1,500 mg Intravenous Q12H         bumetanide 1 mg/hr Last Rate: 1 mg/hr (06/07/20 1228)   heparin (porcine) 18 Units/kg/hr Last Rate: 20 Units/kg/hr (06/07/20 0704)   Pharmacy to dose vancomycin     phenylephrine 0.5-3 mcg/kg/min Last Rate: Stopped (06/06/20 1127)       Assessment/Plan       Sepsis (CMS/[facility])    Chronic diastolic CHF (congestive heart failure) (CMS/[facility])    PFO (patent foramen ovale)    Hypertension    Pulmonary hypertension (CMS/[facility])    ARIEL (obstructive sleep apnea)    Morbid obesity (CMS/[facility])    History of DVT of lower extremity    Cellulitis of right anterior lower leg    Lymphedema    Cellulitis of right lower extremity    Hyponatremia    Acute renal failure (ARF) (CMS/[facility])    Anemia, chronic disease    1. Sepsis syndrome likely due to cellulitis.  2. Cellulitis bilateral lower extremities. CT shows phlegmon right thigh but no soft tissue gas.  ID and surgery following.   3. Acute kidney injury, stable.  May be due to sepsis and hypotension.  Nephrology following.   4. Hypotension.  Very hypotensive this am.  Midodrine started.   5. 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  6. Morbid obesity  7. Chronic  right heart failure with pulmonary hypertension likely due to previous pulmonary emboli.  8. Anemia, worsening.     Patient still have generalized anasarca now on the Bumex drip    Patient has developed cough may be due to volume overload    Continue diuretics has markedly improved with Bumex drip    BNP in the morning    Silvana Jeff MD  06/07/20  13:51

## 2020-06-07 NOTE — PROGRESS NOTES
"Pharmacokinetic Evaluation - Vancomycin    Emely Solomon is a 59 y.o. female on vancomycin pharmacy to dose.  MRN: [id number]  : 1960    Day of vancomycin therapy: 4  Indication: RLE cellultitis  Consulted by: Dr. Flores  Goal pre-IHD level: 20-25 mcg/ml     Current dose: intermittent dosing  Other antimicrobials: cefepime 2g iv q24    Blood pressure 118/63, pulse 85, temperature 98.4 °F (36.9 °C), temperature source Temporal, resp. rate 21, height 160 cm (63\"), weight (!) 171 kg (377 lb 3.3 oz), SpO2 90 %.  Results from last 7 days   Lab Units 20  0201 20  1633 20  1036   CREATININE mg/dL 1.60* 1.83* 2.11*     Estimated Creatinine Clearance: 58.9 mL/min (A) (by C-G formula based on SCr of 1.6 mg/dL (H)).  Results from last 7 days   Lab Units 20  0316 20  1220 20  0608 20  0434 20  0035   WBC 10*3/mm3 22.14*  --  16.28* 15.89* 19.31*   HEMOGLOBIN g/dL 9.5*  --  8.7* 9.8* 10.7*   HEMATOCRIT % 28.2*  --  25.2* 28.9* 31.5*   PLATELETS 10*3/mm3 314  --  258 215 257   LACTATE mmol/L  --  0.8  --  1.3 4.3*     Cultures:    COVID: negative   bcx: ng x 3 days   Wcx: 1+ staph aureus, 1+ strep group c     H/x of group b strep in bcx in 2017     Dosing hx (include troughs if drawn):  Vancomycin 2500mg loading dose               0320                           1734 random: 27.2mcg/mL (14 hrs from last dose)               06 random=24.1 mcg/ml.  HD x 2.5 hrs.          0118    1 g after HD.      0316 \"random\"=42.7 mcg/ml (peak level drawn incorrectly)    0201 random=15.1 mcg/ml. No hd/crrt. 1500 q12: 1244     Assessment:   AM \"random\"  level was high at 42.7 mcg/ml. However, level was actually a peak since dose was just infused at 0118.  Today, dialysis cath has been removed, renal function is improved and patient will transfer out of icu. Scr 1.6 and Clcr estimated at 59 ml/min.  AM random vanc=15.1 mcg/ml, drawn 23 hrs after  level of " 42.7 mcg/ml.   Will start 1500 mg q12 this am and check trough level prior to 2nd dose  to be safe. RN to hold iv >20 mcg/ml,        Plan:  1) Start vancomycin 1500 mg every 12 hours.  2) check trough prior to 2nd dose, RN to hold iv >20 mcg/ml   3) Encourage adequate hydration if appropriate. Monitor for decreased UOP, rash or other signs of vancomycin intolerance.    Thanks for this consult, will follow until Miguel west Pharm.D, BCCCP

## 2020-06-07 NOTE — PROGRESS NOTES
"   LOS: 3 days    Patient Care Team:  RENAY Smith MD as PCP - General (General Practice)    Chief Complaint:    Chief Complaint   Patient presents with   • Diarrhea   • Legs Swollen     Follow UP ARF  Subjective     Interval History:   She is more awake and alert.   at bedside.   Had dialysis last night. On Andry today. Urine output is picking up. Not sure if BP is reliable      Objective     Vital Signs  Temp:  [98 °F (36.7 °C)-98.8 °F (37.1 °C)] 98.4 °F (36.9 °C)  Heart Rate:  [68-92] 85  BP: ()/(34-73) 118/63    Flowsheet Rows      First Filed Value   Admission Height  160 cm (63\") Documented at 06/04/2020 0001   Admission Weight  (!) 176 kg (388 lb 12.8 oz) Documented at 06/04/2020 0040          I/O this shift:  In: -   Out: 2000 [Urine:2000]  I/O last 3 completed shifts:  In: 2995.5 [P.O.:1190; I.V.:1548.5; IV Piggyback:257]  Out: 56665 [Urine:7200; Other:3000]    Intake/Output Summary (Last 24 hours) at 6/7/2020 0803  Last data filed at 6/7/2020 0704  Gross per 24 hour   Intake 1403.41 ml   Output 6700 ml   Net -5296.59 ml       Physical Exam:  General Appearance: alert, oriented x 3, no acute distress  Skin: warm and dry  HEENT: pupils round and reactive to light, oral mucosa normal,   Neck: supple, no JVD, trachea midline  Lungs: diminished, unlabored breathing effort  Heart: RRR, normal S1 and S2, no S3, no rub  Abdomen: soft, non-tender,  present bowel sounds to auscultation  : no palpable bladder,  Extremities: massive legs with anasarca, redness bilaterally but worse on the right. No cyanosis or clubbing  Neuro: normal speech and mental status         Results Review:    Results from last 7 days   Lab Units 06/07/20  0201 06/06/20  1633 06/06/20  1036  06/04/20  0035   SODIUM mmol/L 137 135* 136   < > 122*   POTASSIUM mmol/L 3.1* 3.1* 3.0*   < > 3.9   CHLORIDE mmol/L 95* 96* 97*   < > 84*   CO2 mmol/L 28.6 28.2 26.6   < > 16.4*   BUN mg/dL 34* 38* 41*   < > 77*   CREATININE mg/dL 1.60* " 1.83* 2.11*   < > 5.85*   CALCIUM mg/dL 8.5* 8.6 8.7   < > 8.8   BILIRUBIN mg/dL  --   --  0.7  --  1.0   ALK PHOS U/L  --   --  152*  --  111   ALT (SGPT) U/L  --   --  30  --  33   AST (SGOT) U/L  --   --  30  --  53*   GLUCOSE mg/dL 113* 138* 165*   < > 114*    < > = values in this interval not displayed.       Estimated Creatinine Clearance: 58.9 mL/min (A) (by C-G formula based on SCr of 1.6 mg/dL (H)).    Results from last 7 days   Lab Units 06/07/20  0201 06/06/20  1633 06/06/20  1036   MAGNESIUM mg/dL 1.6 1.8 1.9   PHOSPHORUS mg/dL 2.5 2.7  2.7 3.0             Results from last 7 days   Lab Units 06/06/20  0316 06/05/20  0608 06/04/20  0434 06/04/20  0035   WBC 10*3/mm3 22.14* 16.28* 15.89* 19.31*   HEMOGLOBIN g/dL 9.5* 8.7* 9.8* 10.7*   PLATELETS 10*3/mm3 314 258 215 257       Results from last 7 days   Lab Units 06/07/20  0201 06/06/20  0316 06/05/20  1458 06/04/20  0434   INR  1.30* 1.29* 1.41* 2.02*         Imaging Results (Last 24 Hours)     Procedure Component Value Units Date/Time    XR Chest 1 View [[id number]] Collected:  06/06/20 1452     Updated:  06/06/20 1523    Narrative:       SINGLE VIEW OF THE CHEST     HISTORY: 59-year-old female with history of a cough.     FINDINGS: An upright AP portable chest radiograph was obtained.  Comparison is made to a chest radiograph dated 06/05/2020. The lungs are  normal in volume and are clear of consolidation. A right internal  jugular catheter is positioned with the tip in the SVC. The  cardiomediastinal silhouette is stable with mild prominence of the  cardiac silhouette. The visualized osseous structures appear normal.       Impression:       There is no evidence for an acute cardiopulmonary process.  Stable mild cardiomegaly is noted.     This report was finalized on 6/6/2020 3:20 PM by Dr. Phil Griffith M.D.             cefepime 2 g Intravenous Q24H   heparin (porcine) 3,000 Units Intravenous Once   magnesium sulfate 2 g Intravenous Once   midodrine  15 mg Oral TID AC   pantoprazole 40 mg Intravenous Q24H   potassium chloride 60 mEq Oral Once   saccharomyces boulardii 500 mg Oral BID   sodium chloride 10 mL Intravenous Q12H   Vancomycin Pharmacy Intermittent Dosing  Does not apply Daily       bumetanide 1 mg/hr Last Rate: 1 mg/hr (06/06/20 2353)   heparin (porcine) 18 Units/kg/hr Last Rate: 20 Units/kg/hr (06/07/20 0704)   Pharmacy to dose vancomycin     phenylephrine 0.5-3 mcg/kg/min Last Rate: Stopped (06/06/20 1127)   Phoxillum 4 K/2.5 CA 1,000 mL/hr    Phoxillum 4 K/2.5 CA 1,000 mL/hr    Phoxillum 4 K/2.5 CA 1,000 mL/hr        Medication Review:   Current Facility-Administered Medications   Medication Dose Route Frequency Provider Last Rate Last Dose   • acetaminophen (TYLENOL) tablet 650 mg  650 mg Oral Q4H PRN Radha Salmeron APRN        Or   • acetaminophen (TYLENOL) 160 MG/5ML solution 650 mg  650 mg Oral Q4H PRN Radha Salmeron APRN        Or   • acetaminophen (TYLENOL) suppository 650 mg  650 mg Rectal Q4H PRN Radha Salmeron APRN       • albumin human 25 % IV SOLN 12.5 g  12.5 g Intravenous PRN Raquel Hemphill MD       • bumetanide (BUMEX) 12.5 mg in sodium chloride 0.9 % 125 mL (0.1 mg/mL) infusion  1 mg/hr Intravenous Continuous Agnes Lutz MD 10 mL/hr at 06/06/20 2353 1 mg/hr at 06/06/20 2353   • calcium gluconate 1 g in sodium chloride 0.9 % 50 mL IVPB  1 g Intravenous PRN Raquel Hemphill MD        Or   • calcium gluconate 2 g in sodium chloride 0.9 % 50 mL IVPB  2 g Intravenous PRN Raquel Hemphill MD       • cefepime (MAXIPIME) 2 g/100 mL 0.9% NS (mbp)  2 g Intravenous Q24H Tariq Flores MD   2 g at 06/06/20 1108   • heparin (porcine) 5000 UNIT/ML injection 6,800-10,000 Units  40-58.5 Units/kg Intravenous Q6H PRN Danilo Croft MD       • heparin (porcine) injection 1,000-2,000 Units  1,000-2,000 Units Intravenous PRN Raquel Hemphill MD       • heparin (porcine)  injection 3,000 Units  3,000 Units Intravenous Once Abilio Rock MD       • heparin 46376 units/250 mL (100 units/mL) in 0.45 % NaCl infusion  18 Units/kg/hr Intravenous Titrated Danilo Croft MD 34.2 mL/hr at 06/07/20 0704 20 Units/kg/hr at 06/07/20 0704   • HYDROmorphone (DILAUDID) injection 1 mg  1 mg Intravenous Q2H PRN Adryan King MD   1 mg at 06/05/20 0634   • ipratropium-albuterol (DUO-NEB) nebulizer solution 3 mL  3 mL Nebulization Q6H PRN Barbra Lee, APRN       • magnesium sulfate 2g/50 mL (PREMIX) infusion  2 g Intravenous PRN Raquel Hemphill MD       • magnesium sulfate 2g/50 mL (PREMIX) infusion  2 g Intravenous Once Agnes Lutz MD       • midodrine (PROAMATINE) tablet 15 mg  15 mg Oral TID AC Raquel Hemphill MD   15 mg at 06/07/20 0705   • nitroglycerin (NITROSTAT) SL tablet 0.4 mg  0.4 mg Sublingual Q5 Min PRN Radha Salmeron APRN       • ondansetron (ZOFRAN) injection 4 mg  4 mg Intravenous Q6H PRN Radha Salmeron APRN       • pantoprazole (PROTONIX) injection 40 mg  40 mg Intravenous Q24H Danilo Croft MD   40 mg at 06/06/20 1312   • Pharmacy to dose vancomycin   Does not apply Continuous PRN Tariq Flores MD       • phenylephrine (ANNABEL-SYNEPHRINE) 50 mg in sodium chloride 0.9 % 250 mL (0.2 mg/mL) infusion  0.5-3 mcg/kg/min Intravenous Titrated Danilo Croft MD   Stopped at 06/06/20 1127   • Phoxillum 4 K/2.5 CA dialysis solution  1,000 mL/hr CRRT Continuous Raquel Hemphill MD       • Phoxillum 4 K/2.5 CA dialysis solution  1,000 mL/hr CRRT Continuous Raquel Hemphill MD       • Phoxillum 4 K/2.5 CA dialysis solution  1,000 mL/hr CRRT Continuous Raquel Hemphill MD       • potassium chloride (MICRO-K) CR capsule 60 mEq  60 mEq Oral Once Agnes Lutz MD       • potassium chloride 20 mEq in 50 mL IVPB  20 mEq Intravenous PRN Raquel Hemphill MD       •  saccharomyces boulardii (FLORASTOR) capsule 500 mg  500 mg Oral BID Tania Nunez MD   500 mg at 06/06/20 2219   • sodium chloride 0.9 % bolus 200 mL  200 mL Intravenous PRN Raquel Hemphill MD       • sodium chloride 0.9 % flush 10 mL  10 mL Intravenous Q12H Radha Salmeron APRN   10 mL at 06/06/20 2219   • sodium chloride 0.9 % flush 10 mL  10 mL Intravenous PRN Radha Salmeron APRN       • sodium phosphates 10 mmol in sodium chloride 0.9 % 100 mL IVPB  10 mmol Intravenous PRN Raquel Hemphill MD       • Vancomycin Pharmacy Intermittent Dosing   Does not apply Daily Tariq Flores MD           Assessment/Plan         Sepsis (CMS/[facility])    Chronic diastolic CHF (congestive heart failure) (CMS/[facility])    PFO (patent foramen ovale)    Hypertension    Pulmonary hypertension (CMS/[facility])    ARIEL (obstructive sleep apnea)    Morbid obesity (CMS/[facility])    History of DVT of lower extremity    Cellulitis of right anterior lower leg    Lymphedema    Cellulitis of right lower extremity    Hyponatremia    Acute renal failure (ARF) (CMS/[facility])    Anemia, chronic disease       1.  STEFANIA severe, making better urine. No hydronephrosis seen on noncontrast CT. Concern for additional antibiotic injury from vancomycin. Had dialysis 06/05    2.  Sepsis syndrome:  Likely from cellulitis, CT worrisome for phlegmon.    3.  Hyponatremia:  Due to ARF, Improving  4.  Diastolic heart failure/ PFO:  Appreciate Dr. Rosenthal's recs  5. Severe cellulitis of the right lower extremity and underlying chronic lymphedema  6.  Severe morbid obesity  7.  Poor adherence to health care maintenance  8. Hx of DVT/PE     Plan:  Kidney function is stable. No further need for dialysis. Will remove shiley   Responsing very well to Bumex, We will continue drip for now.   Will place blackburn catheter given her severe cellulitis  Will replete potassium and magnesium  Repeat labs in the afternoon  Discussed with nurse and pulmonary    surveillance labs      Agnes Lutz MD  06/07/20  08:03

## 2020-06-07 NOTE — SIGNIFICANT NOTE
06/07/20 1345   PICC Triple Lumen 06/07/20 Left Basilic   Placement Date/Time: 06/07/20 1345   Hand Hygiene Completed: Yes  Size (Fr): 5  Description (optional): triple lumen  Length (cm): 55 cm  Orientation: Left  Location: Basilic  Site Prep: Chlorhexidine  All 5 Sterile Barriers Used (Gloves, Gown, Cap, M...   Site Assessment Clean;Dry;Intact   #1 Lumen Status Blood return noted;Capped;Flushed;Normal saline locked   #2 Lumen Status Blood return noted;Capped;Flushed;Normal saline locked   #3 Lumen Status Blood return noted;Capped;Flushed;Normal saline locked   Length richelle (cm) 0 cm   Extremity Circumference (cm) 58 cm   Dressing Type Border Dressing;Securing device;Antimicrobial dressing/disc   Dressing Status Clean;Dry;Intact   Dressing Intervention New dressing   Liquid Adhesive Applied   Dressing Change Due 06/14/20   Indication/Daily Review of Necessity blood sampling;intravenous fluid therapy;intravenous medication therapy   LOT# [id number] EXPIRES 2021-06-30    PICC PLACED WITHOUT DIFFICULTY SO JAY COULD BE REMOVED DUE TO NO MORE DAILYSIS. DR SÁNCHEZ NEPHROLOGIST OK'D PICC PLACEMENT. PICC TIP WAS CONFIRMED SVC VIA JIM BOWIE RN AWARE PICC IS OK TO USE NOW.

## 2020-06-07 NOTE — PROGRESS NOTES
Danilo Croft MD                          [phone]      Patient ID:    Name:  Emely Solomon    MRN:  [id number]    1960   59 y.o.  female            Patient Care Team:  RENAY Smith MD as PCP - General (General Practice)    CC/ Reason for visit: Acute right lower extremity cellulitis, septic shock, respiratory failure, pulmonary hypertension, renal failure    Subjective: Pt seen and examined this AM.  Patient continues to make progress with Bumex drip and has diuresed very well with negative close to 6 L.  She is responding well with the current treatment plan.  Not requiring vasopressor support.  No new fevers noted.  Tolerating antibiotics well.  Using APAP at night when she remembers.    ROS: Denies any subjective fevers, syncope or presyncopal events, new neurological deficits, nausea or vomiting currently    Objective     Vital Signs past 24hrs    BP range: BP: ()/(34-66) 118/63  Pulse range: Heart Rate:  [72-92] 92  Resp rate range: Resp:  [20] 20  Temp range: Temp (24hrs), Av.5 °F (36.9 °C), Min:98.4 °F (36.9 °C), Max:98.6 °F (37 °C)      Ventilator/Non-Invasive Ventilation Settings (From admission, onward)     Start     Ordered    20 1735  NIPPV (CPAP or BIPAP)  At Bedtime As Needed-RT     Comments:  Home CPAP   Question Answer Comment   Indication: Sleep Apnea    Type: CPAP    NIPPV Mask Interface: Per Patient Preference    Titrate for SPO2 90%        20 1734                Device (Oxygen Therapy): room air       (!) 171 kg (377 lb 3.3 oz); Body mass index is 66.82 kg/m².      Intake/Output Summary (Last 24 hours) at 2020 1526  Last data filed at 2020 0704  Gross per 24 hour   Intake 1403.41 ml   Output 6700 ml   Net -5296.59 ml       PHYSICAL EXAM   Constitutional: Middle aged super morbidly obese white female pt in bed, No acute respiratory distress, + accessory muscle use  Head: - NCAT  Eyes: No pallor.  Anicteric sclerae,  EOMI.  ENMT:  Mallampati 4, no oral thrush. Moist MM.   NECK: Trachea midline, No thyromegaly, no palpable cervical lymphadenopathy  Heart: RRR, no murmur. 2+ pedal edema   Lungs: YANNA +, decreased breath sounds at the bases, no wheezes/ crackles heard    Abdomen: Soft. No tenderness, guarding or rigidity. No palpable masses  Extremities: Extremities warm and well perfused.  Right lower extremity with significant erythema and some tenderness to palpation along with some subcutaneous discharge.  Neuro: Conscious, answers appropriately, no gross focal neuro deficits  Psych: Mood and affect appropriate    Scheduled meds:      heparin (porcine) 3,000 Units Intravenous Once   midodrine 15 mg Oral TID AC   pantoprazole 40 mg Oral Q AM   saccharomyces boulardii 500 mg Oral BID   sodium chloride 10 mL Intravenous Q12H   vancomycin 1,500 mg Intravenous Q12H       IV meds:                          bumetanide 1 mg/hr Last Rate: 1 mg/hr (06/07/20 1228)   heparin (porcine) 18 Units/kg/hr Last Rate: 20 Units/kg/hr (06/07/20 1442)   Pharmacy to dose vancomycin     phenylephrine 0.5-3 mcg/kg/min Last Rate: Stopped (06/06/20 1127)       Data Review:      Results from last 7 days   Lab Units 06/07/20  1202 06/07/20  0201 06/06/20  1633 06/06/20  1036 06/06/20  0316  06/05/20  1458  06/05/20  0608  06/04/20  0434  06/04/20  0035   SODIUM mmol/L  --  137 135* 136  --    < >  --    < > 127*   < > 124*  --  122*   POTASSIUM mmol/L  --  3.1* 3.1* 3.0*  --    < >  --    < > 3.5   < > 3.5  --  3.9   CHLORIDE mmol/L  --  95* 96* 97*  --    < >  --    < > 92*   < > 88*  --  84*   CO2 mmol/L  --  28.6 28.2 26.6  --    < >  --    < > 19.2*   < > 16.8*  --  16.4*   BUN mg/dL  --  34* 38* 41*  --    < >  --    < > 81*   < > 80*  --  77*   CREATININE mg/dL  --  1.60* 1.83* 2.11*  --    < >  --    < > 5.18*   < > 5.69*  --  5.85*   CALCIUM mg/dL  --  8.5* 8.6 8.7  --    < >  --    < > 8.0*   < > 8.0*  --  8.8   BILIRUBIN mg/dL  --   --   --  0.7  --    --   --   --   --   --   --   --  1.0   ALK PHOS U/L  --   --   --  152*  --   --   --   --   --   --   --   --  111   ALT (SGPT) U/L  --   --   --  30  --   --   --   --   --   --   --   --  33   AST (SGOT) U/L  --   --   --  30  --   --   --   --   --   --   --   --  53*   GLUCOSE mg/dL  --  113* 138* 165*  --    < >  --    < > 114*   < > 100*  --  114*   WBC 10*3/mm3 18.60*  --   --   --  22.14*  --   --   --  16.28*  --  15.89*  --  19.31*   HEMOGLOBIN g/dL 9.6*  --   --   --  9.5*  --   --   --  8.7*  --  9.8*  --  10.7*   PLATELETS 10*3/mm3 394  --   --   --  314  --   --   --  258  --  215  --  257   INR   --  1.30*  --   --  1.29*  --  1.41*  --   --   --  2.02*   < >  --    PROBNP pg/mL  --   --  2,359.0*  --   --   --   --   --   --   --  1,598.0*  --   --     < > = values in this interval not displayed.       Lab Results   Component Value Date    CALCIUM 8.5 (L) 06/07/2020    PHOS 2.5 06/07/2020       Results from last 7 days   Lab Units 06/04/20  0153 06/04/20  0049 06/04/20  0030   BLOODCX  No growth at 3 days  --  No growth at 3 days   WOUNDCX   --  Scant growth (1+) Staphylococcus aureus*  Scant growth (1+) Streptococcus, Beta Hemolytic, Group C*  Scant growth (1+) Normal Skin Aliyah  --        Results from last 7 days   Lab Units 06/04/20  0556   PH, ARTERIAL pH units 7.355   PO2 ART mm Hg 100.1*   PCO2, ARTERIAL mm Hg 33.8*   HCO3 ART mmol/L 18.8*        Results Review:    I have reviewed the relevant laboratory results and independently reviewed the chest imaging from this hospitalization including the available echocardiogram reports personally and summarized it if/ when appropriate below    Assessment    Septic shock  Beta-hemolytic streptococcal and staph SSI  RLE cellulitis  Acute renal failure; severe  Anasarca  Right groin adenopathy/ Phlegmon along the right common femoral vein  Chronic lymphedema  Hyponatremia  Pulmonary hypertension per echo -RVSP 65  Severe ARIEL on CPAP -  Jose  History of DVT/PE on rivaroxaban    PLAN:  Patient diuresing very well and has responded to Bumex drip.  Electrolytes have been corrected appropriately as well.  Appreciate nephrology input.  Noted plan for discontinuing dialysis catheter and agree with that.  Agree with Salvador cath  We will place PICC line given likely need for long-term antibiotics.  No further hypotension noted.  Pulmonary hypertension improving with aggressive diuresis.  Multifactorial.  Continue with compliance with PAP nocturnally.  Appreciate general surgery and ID input.  ID is managing antibiotics.  Appreciate cardiology input and agree with the plan and need for volume removal.  Continue with heparin drip given DVT/PE.  Will transition to Xarelto if she continues to make progress    We will transfer to the floor and [facility] will take over as primary.  We will continue for pulmonary issues    I have discussed my findings and recommendations with patient and nursing staff.     Danilo Croft MD  6/7/2020

## 2020-06-08 LAB
APTT PPP: 76.7 SECONDS (ref 22.7–35.4)
APTT PPP: 98.5 SECONDS (ref 22.7–35.4)
BACTERIA SPEC AEROBE CULT: ABNORMAL
GRAM STN SPEC: ABNORMAL
INR PPP: 1.23 (ref 0.9–1.1)
PROTHROMBIN TIME: 15.2 SECONDS (ref 11.7–14.2)
VANCOMYCIN TROUGH SERPL-MCNC: 22 MCG/ML (ref 5–20)

## 2020-06-08 PROCEDURE — 85730 THROMBOPLASTIN TIME PARTIAL: CPT | Performed by: INTERNAL MEDICINE

## 2020-06-08 PROCEDURE — 99232 SBSQ HOSP IP/OBS MODERATE 35: CPT | Performed by: INTERNAL MEDICINE

## 2020-06-08 PROCEDURE — 25010000002 CEFAZOLIN PER 500 MG: Performed by: INTERNAL MEDICINE

## 2020-06-08 PROCEDURE — 97110 THERAPEUTIC EXERCISES: CPT

## 2020-06-08 PROCEDURE — 97162 PT EVAL MOD COMPLEX 30 MIN: CPT

## 2020-06-08 PROCEDURE — 25010000002 HEPARIN (PORCINE) PER 1000 UNITS: Performed by: INTERNAL MEDICINE

## 2020-06-08 PROCEDURE — 80202 ASSAY OF VANCOMYCIN: CPT | Performed by: INTERNAL MEDICINE

## 2020-06-08 PROCEDURE — 85610 PROTHROMBIN TIME: CPT | Performed by: INTERNAL MEDICINE

## 2020-06-08 RX ORDER — CEFAZOLIN SODIUM 2 G/100ML
2 INJECTION, SOLUTION INTRAVENOUS EVERY 8 HOURS
Status: DISCONTINUED | OUTPATIENT
Start: 2020-06-08 | End: 2020-06-08

## 2020-06-08 RX ORDER — BUMETANIDE 0.25 MG/ML
4 INJECTION INTRAMUSCULAR; INTRAVENOUS EVERY 8 HOURS
Status: COMPLETED | OUTPATIENT
Start: 2020-06-08 | End: 2020-06-09

## 2020-06-08 RX ORDER — CEFAZOLIN SODIUM IN 0.9 % NACL 3 G/100 ML
3 INTRAVENOUS SOLUTION, PIGGYBACK (ML) INTRAVENOUS EVERY 8 HOURS
Status: COMPLETED | OUTPATIENT
Start: 2020-06-08 | End: 2020-06-11

## 2020-06-08 RX ADMIN — Medication 500 MG: at 21:18

## 2020-06-08 RX ADMIN — MIDODRINE HYDROCHLORIDE 15 MG: 5 TABLET ORAL at 11:12

## 2020-06-08 RX ADMIN — SODIUM CHLORIDE, PRESERVATIVE FREE 10 ML: 5 INJECTION INTRAVENOUS at 21:19

## 2020-06-08 RX ADMIN — PANTOPRAZOLE SODIUM 40 MG: 40 TABLET, DELAYED RELEASE ORAL at 06:37

## 2020-06-08 RX ADMIN — CEFAZOLIN 3 G: 10 INJECTION, POWDER, FOR SOLUTION INTRAVENOUS at 18:15

## 2020-06-08 RX ADMIN — BUMETANIDE 1 MG/HR: 0.25 INJECTION INTRAMUSCULAR; INTRAVENOUS at 01:58

## 2020-06-08 RX ADMIN — SODIUM CHLORIDE, PRESERVATIVE FREE 10 ML: 5 INJECTION INTRAVENOUS at 09:03

## 2020-06-08 RX ADMIN — MIDODRINE HYDROCHLORIDE 15 MG: 5 TABLET ORAL at 18:15

## 2020-06-08 RX ADMIN — BUMETANIDE 4 MG: 0.25 INJECTION INTRAMUSCULAR; INTRAVENOUS at 18:15

## 2020-06-08 RX ADMIN — MIDODRINE HYDROCHLORIDE 15 MG: 5 TABLET ORAL at 09:02

## 2020-06-08 RX ADMIN — HEPARIN SODIUM 18 UNITS/KG/HR: 10000 INJECTION, SOLUTION INTRAVENOUS at 22:58

## 2020-06-08 RX ADMIN — SODIUM CHLORIDE, PRESERVATIVE FREE 10 ML: 5 INJECTION INTRAVENOUS at 21:20

## 2020-06-08 RX ADMIN — SODIUM CHLORIDE, PRESERVATIVE FREE 10 ML: 5 INJECTION INTRAVENOUS at 11:13

## 2020-06-08 RX ADMIN — BUMETANIDE 4 MG: 0.25 INJECTION INTRAMUSCULAR; INTRAVENOUS at 11:12

## 2020-06-08 RX ADMIN — HEPARIN SODIUM 18 UNITS/KG/HR: 10000 INJECTION, SOLUTION INTRAVENOUS at 04:57

## 2020-06-08 RX ADMIN — HEPARIN SODIUM 18 UNITS/KG/HR: 10000 INJECTION, SOLUTION INTRAVENOUS at 14:37

## 2020-06-08 RX ADMIN — Medication 500 MG: at 09:02

## 2020-06-08 RX ADMIN — CEFAZOLIN 3 G: 10 INJECTION, POWDER, FOR SOLUTION INTRAVENOUS at 11:12

## 2020-06-08 RX ADMIN — SODIUM CHLORIDE, PRESERVATIVE FREE 10 ML: 5 INJECTION INTRAVENOUS at 09:02

## 2020-06-08 NOTE — THERAPY EVALUATION
Patient Name: Emely Solomon  : 1960    MRN: [id number]                              Today's Date: 2020       Admit Date: 2020    Visit Dx:     ICD-10-CM ICD-9-CM   1. Cellulitis of right anterior lower leg L03.115 682.6   2. STEFANIA (acute kidney injury) (CMS/[facility]) N17.9 584.9   3. Lymphedema I89.0 457.1   4. Sepsis, due to unspecified organism, unspecified whether acute organ dysfunction present (CMS/[facility]) A41.9 038.9     995.91   5. Acute renal failure, unspecified acute renal failure type (CMS/[facility]) N17.9 584.9     Patient Active Problem List   Diagnosis   • Chronic diastolic CHF (congestive heart failure) (CMS/[facility])   • PFO (patent foramen ovale)   • Paradoxical embolism (CMS/[facility])   • Hypertension   • Pulmonary hypertension (CMS/[facility])   • Sepsis (CMS/[facility])   • Back pain   • ARIEL (obstructive sleep apnea)   • Morbid obesity (CMS/[facility])   • Urinary frequency   • History of DVT of lower extremity   • Cellulitis of right anterior lower leg   • Lymphedema   • Cellulitis of right lower extremity   • Hyponatremia   • Acute renal failure (ARF) (CMS/[facility])   • Anemia, chronic disease     Past Medical History:   Diagnosis Date   • Cellulitis     2017, with Group B Strep bacteremia and sepsis   • Chronic diastolic congestive heart failure (CMS/[facility])    • Deep venous thrombosis (CMS/[facility])    • Hypertension    • Lipoedema    • Lymphedema    • Morbid obesity (CMS/[facility])    • Paradoxical embolism (CMS/[facility])     to the LLE due to DVT/PE and PFO   • PFO (patent foramen ovale)    • Pulmonary embolism (CMS/[facility])    • Pulmonary hypertension (CMS/[facility])     multifactorial (dCHF, obesity/ARIEL, hx PE), mild by echo 2016     Past Surgical History:   Procedure Laterality Date   • BRONCHOSCOPY N/A 2017    Procedure: BRONCHOSCOPY with wash;  Surgeon: Caleb Garcia MD;  Location: [facility] ENDOSCOPY;  Service:    • DILATATION AND CURETTAGE  2011   • VENA CAVA FILTER PLACEMENT      Inferior Vena Cava Filter Placement w/Fluorosc angiogr  guidance     General Information     Row Name 06/08/20 1516          PT Evaluation Time/Intention    Document Type  evaluation  -CS     Mode of Treatment  physical therapy  -CS     Row Name 06/08/20 1516          General Information    Patient Profile Reviewed?  yes  -CS     Existing Precautions/Restrictions  fall  -CS     Row Name 06/08/20 1516          Relationship/Environment    Lives With  spouse  -CS     Row Name 06/08/20 1516          Resource/Environmental Concerns    Current Living Arrangements  home/apartment/condo  -CS     Row Name 06/08/20 1516          Cognitive Assessment/Intervention- PT/OT    Orientation Status (Cognition)  oriented x 3  -CS     Row Name 06/08/20 1516          Safety Issues, Functional Mobility    Impairments Affecting Function (Mobility)  range of motion (ROM);endurance/activity tolerance  -CS       User Key  (r) = Recorded By, (t) = Taken By, (c) = Cosigned By    Initials Name Provider Type    Tank Edwards, PT Physical Therapist        Mobility     Row Name 06/08/20 1517          Bed Mobility Assessment/Treatment    Bed Mobility Assessment/Treatment  supine-sit;sit-supine  -CS     Supine-Sit [place] (Bed Mobility)  maximum assist (25% patient effort);moderate assist (50% patient effort);2 person assist  -CS     Sit-Supine [place] (Bed Mobility)  moderate assist (50% patient effort);maximum assist (25% patient effort);2 person assist  -CS     Assistive Device (Bed Mobility)  bed rails;head of bed elevated  -     Row Name 06/08/20 1517          Sit-Stand Transfer    Sit-Stand [place] (Transfers)  not tested  -CS       User Key  (r) = Recorded By, (t) = Taken By, (c) = Cosigned By    Initials Name Provider Type    Tank Edwards, PT Physical Therapist        Obj/Interventions     Row Name 06/08/20 1519          General ROM    GENERAL ROM COMMENTS  BLE Lymphedema limits ROM (R side>L)  -CS     Row Name 06/08/20 1519          MMT (Manual Muscle Testing)     General MMT Comments  Generally 2+/5  -CS     Row Name 06/08/20 1519          Therapeutic Exercise    Comment (Therapeutic Exercise)  shd punches, shd abd, shd rolls, AP x10 bilateral  -CS       User Key  (r) = Recorded By, (t) = Taken By, (c) = Cosigned By    Initials Name Provider Type    Tank Edwards, PT Physical Therapist        Goals/Plan     Row Name 06/08/20 1521          Bed Mobility Goal 1 (PT)    Activity/Assistive Device (Bed Mobility Goal 1, PT)  sit to supine;supine to sit  -CS     [place] Level/Cues Needed (Bed Mobility Goal 1, PT)  minimum assist (75% or more patient effort)  -CS     Time Frame (Bed Mobility Goal 1, PT)  1 week  -CS     Row Name 06/08/20 1521          Transfer Goal 1 (PT)    Activity/Assistive Device (Transfer Goal 1, PT)  bed-to-chair/chair-to-bed;sit-to-stand/stand-to-sit  -CS     [place] Level/Cues Needed (Transfer Goal 1, PT)  minimum assist (75% or more patient effort)  -CS     Time Frame (Transfer Goal 1, PT)  1 week  -CS     Row Name 06/08/20 1521          Gait Training Goal 1 (PT)    Activity/Assistive Device (Gait Training Goal 1, PT)  assistive device use  -CS     [place] Level (Gait Training Goal 1, PT)  minimum assist (75% or more patient effort);moderate assist (50-74% patient effort)  -CS     Distance (Gait Goal 1, PT)  10  -CS     Time Frame (Gait Training Goal 1, PT)  1 week  -CS       User Key  (r) = Recorded By, (t) = Taken By, (c) = Cosigned By    Initials Name Provider Type    Tank Edwards, PT Physical Therapist        Clinical Impression     Row Name 06/08/20 1520          Pain Assessment    Additional Documentation  Pain Scale: FACES Pre/Post-Treatment (Group)  -     Row Name 06/08/20 1520          Pain Scale: Numbers Pre/Post-Treatment    Pain Location - Side  Right  -CS     Pain Location - Orientation  lower  -CS     Pain Location  extremity  -CS     Pain Intervention(s)  Repositioned;Ambulation/increased activity  -     Row  Name 06/08/20 1520          Pain Scale: FACES Pre/Post-Treatment    Pain: FACES Scale, Pretreatment  4-->hurts little more  -CS     Pain: FACES Scale, Post-Treatment  4-->hurts little more  -CS     Row Name 06/08/20 1520          Plan of Care Review    Plan of Care Reviewed With  patient  -CS     Row Name 06/08/20 1520          Physical Therapy Clinical Impression    Patient/Family Goals Statement (PT Clinical Impression)  home  -CS     Criteria for Skilled Interventions Met (PT Clinical Impression)  yes  -CS     Rehab Potential (PT Clinical Summary)  good, to achieve stated therapy goals  -CS     Row Name 06/08/20 1520          Vital Signs    O2 Delivery Pre Treatment  room air  -CS     Row Name 06/08/20 1520          Positioning and Restraints    Pre-Treatment Position  in bed  -CS     Post Treatment Position  bed  -CS     In Bed  supine;call light within reach;encouraged to call for assist;exit alarm on  -CS       User Key  (r) = Recorded By, (t) = Taken By, (c) = Cosigned By    Initials Name Provider Type    Tank Edwards, PT Physical Therapist        Outcome Measures     Row Name 06/08/20 1521          How much help from another person do you currently need...    Turning from your back to your side while in flat bed without using bedrails?  2  -CS     Moving from lying on back to sitting on the side of a flat bed without bedrails?  2  -CS     Moving to and from a bed to a chair (including a wheelchair)?  1  -CS     Standing up from a chair using your arms (e.g., wheelchair, bedside chair)?  1  -CS     Climbing 3-5 steps with a railing?  1  -CS     To walk in hospital room?  1  -CS     AM-PAC 6 Clicks Score (PT)  8  -CS     Row Name 06/08/20 1521          Functional Assessment    Outcome Measure Options  AM-PAC 6 Clicks Basic Mobility (PT)  -CS       User Key  (r) = Recorded By, (t) = Taken By, (c) = Cosigned By    Initials Name Provider Type    Tank Edwards, PT Physical Therapist        Physical  Therapy Education                 Title: PT OT SLP Therapies (Done)     Topic: Physical Therapy (Done)     Point: Mobility training (Done)     Description:   Instruct learner(s) on safety and technique for assisting patient out of bed, chair or wheelchair.  Instruct in the proper use of assistive devices, such as walker, crutches, cane or brace.              Patient Friendly Description:   It's important to get you on your feet again, but we need to do so in a way that is safe for you. Falling has serious consequences, and your personal safety is the most important thing of all.        When it's time to get out of bed, one of us or a family member will sit next to you on the bed to give you support.     If your doctor or nurse tells you to use a walker, crutches, a cane, or a brace, be sure you use it every time you get out of bed, even if you think you don't need it.    Learning Progress Summary           Patient Acceptance, E,TB, VU,NR by  at 6/8/2020 1522                   Point: Home exercise program (Done)     Description:   Instruct learner(s) on appropriate technique for monitoring, assisting and/or progressing patient with therapeutic exercises and activities.              Learning Progress Summary           Patient Acceptance, E,TB, VU,NR by  at 6/8/2020 1522                   Point: Body mechanics (Done)     Description:   Instruct learner(s) on proper positioning and spine alignment for patient and/or caregiver during mobility tasks and/or exercises.              Learning Progress Summary           Patient Acceptance, E,TB, VU,NR by  at 6/8/2020 1522                   Point: Precautions (Done)     Description:   Instruct learner(s) on prescribed precautions during mobility and gait tasks              Learning Progress Summary           Patient Acceptance, E,TB, VU,NR by  at 6/8/2020 1522                               User Key     Initials Effective Dates Name Provider Type Discipline    CS  05/14/18 -  Tank Lucas PT Physical Therapist PT              PT Recommendation and Plan  Planned Therapy Interventions (PT Eval): balance training, bed mobility training, gait training, transfer training  Outcome Summary/Treatment Plan (PT)  Anticipated Discharge Disposition (PT): home with 24/7 care, skilled nursing facility  Plan of Care Reviewed With: patient     Time Calculation:   PT Charges     Row Name 06/08/20 1531             Time Calculation    Start Time  1501  -CS      Stop Time  1520  -CS      Time Calculation (min)  19 min  -CS      PT Received On  06/08/20  -      PT - Next Appointment  06/09/20  -      PT Goal Re-Cert Due Date  06/15/20  -        User Key  (r) = Recorded By, (t) = Taken By, (c) = Cosigned By    Initials Name Provider Type    CS Tank Lucas, PT Physical Therapist        Therapy Charges for Today     Code Description Service Date Service Provider Modifiers Qty    84776115623 HC PT EVAL MOD COMPLEXITY 2 6/8/2020 Tank Lucas, PT GP 1    95837338552 HC PT THER PROC EA 15 MIN 6/8/2020 Tank Lucas, PT GP 1    06084693815 HC PT THER SUPP EA 15 MIN 6/8/2020 Tank Lucas, PT GP 1          PT G-Codes  Outcome Measure Options: AM-PAC 6 Clicks Basic Mobility (PT)  AM-PAC 6 Clicks Score (PT): 8    Tank Lucas PT  6/8/2020

## 2020-06-08 NOTE — PROGRESS NOTES
Discharge Planning Assessment  [facility]     Patient Name: Emely Solomon  MRN: [id number]  Today's Date: 6/8/2020    Admit Date: 6/4/2020    Discharge Needs Assessment     Row Name 06/08/20 1412       Living Environment    Lives With  spouse    Current Living Arrangements  home/apartment/condo    Primary Care Provided by  self    Provides Primary Care For  no one    Family Caregiver if Needed  spouse    Family Caregiver Names  Ghanshyam Solomon, spouse, [phone]    Quality of Family Relationships  unable to assess    Able to Return to Prior Arrangements  yes       Resource/Environmental Concerns    Resource/Environmental Concerns  home accessibility    Home Accessibility Concerns  stairs to enter home       Transition Planning    Patient/Family Anticipates Transition to  home with family;inpatient rehabilitation facility    Patient/Family Anticipated Services at Transition  home health care;skilled nursing    Transportation Anticipated  family or friend will provide       Discharge Needs Assessment    Readmission Within the Last 30 Days  no previous admission in last 30 days    Concerns to be Addressed  care coordination/care conferences;decision making;discharge planning    Equipment Currently Used at Home  bipap/cpap;nebulizer;cane, straight    Anticipated Changes Related to Illness  none    Equipment Needed After Discharge  none None at this time    Discharge Facility/Level of Care Needs  home with home health;nursing facility, skilled    Provided Post Acute Provider List?  Refused    Refused Provider List Comment  Refused at this time stating she wants everything to be [facility]         Discharge Plan     Row Name 06/08/20 1517       Plan    Plan  Home with spouse and [facility] HH vs SNF. Follow PT/OT for D/C needs.     Patient/Family in Agreement with Plan  yes    Plan Comments  Met with pt. at bedside. Explained roll of . Face sheet and pharmacy verified. Pt lives with Ghanshyam Solomon, spouse,  "[phone]. There are 3 steps to enter home.  DME equipment includes a CPAP, nebulizer, and cane. Pt states she is independent with ADLs. Pt states she has been [facility] in the past. States she has used HH in the past but does not recall which agency used. Requests referral for [facility] at D/C. Referral placed in [facility].  Pt's PCP is Dr. Smith. Uses [facility] on [address]. Pt drives herself to appointments. At discharge, family will transport. Pt worked here at [employer] in the  for 30 yrs. States she recently retired. Pt saw pt. on 6/5. PT notes state \"Pt is bed bound at baseline.\" with PT signing off on PT.  Pt is not bed bound and walks without assistive device at home. New order for PT/OT placed to evaluate for discharge need.  Explained that CCP would follow to assess for discharge needs.  Red Doe RN-BC        Destination      Coordination has not been started for this encounter.      Durable Medical Equipment      Coordination has not been started for this encounter.      Dialysis/Infusion      Coordination has not been started for this encounter.      Home Medical Care      Service Provider Request Status Selected Services Address Phone Number Fax Number    [facility] Pending - Request Sent N/A [address] [address] [phone]      Therapy      Coordination has not been started for this encounter.      Community Resources      Coordination has not been started for this encounter.          Demographic Summary     Row Name 06/08/20 1407       General Information    Admission Type  inpatient    Arrived From  emergency department    Reason for Consult  decision making;care coordination/care conference;discharge planning    Preferred Language  English     Used During This Interaction  no        Functional Status     Row Name 06/08/20 1410       Functional Status    Usual Activity Tolerance "  moderate    Current Activity Tolerance  poor       Functional Status, IADL    Medications  independent    Meal Preparation  independent    Housekeeping  independent    Laundry  independent    Shopping  independent       Mental Status    General Appearance WDL  WDL       Mental Status Summary    Recent Changes in Mental Status/Cognitive Functioning  no changes       Employment/    Employment Status  retired    Current or Previous Occupation  other (see comments) Worked in the  at [employer] for 30 yrs before retiring        Psychosocial    No documentation.       Abuse/Neglect    No documentation.       Legal    No documentation.       Substance Abuse    No documentation.       Patient Forms    No documentation.           Red Doe RN

## 2020-06-08 NOTE — PROGRESS NOTES
"   LOS: 4 days    Patient Care Team:  RENAY Smith MD as PCP - General (General Practice)    Chief Complaint:    Chief Complaint   Patient presents with   • Diarrhea   • Legs Swollen     Follow-up acute kidney injury  Subjective     Interval History:   The patient is feeling better, no shortness of air, no abdominal pain, she has Salvador catheter anchored in place, she continued to have significant edema, urine output in the past 24 hours was 9525 cc she has negative net fluid balance of 15,950 cc.  He is currently on Bumex    Objective     Vital Signs  Temp:  [98.5 °F (36.9 °C)-98.9 °F (37.2 °C)] 98.5 °F (36.9 °C)  Heart Rate:  [80-98] 91  Resp:  [18-20] 18  BP: (105-134)/() 134/69    Flowsheet Rows      First Filed Value   Admission Height  160 cm (63\") Documented at 06/04/2020 0001   Admission Weight  (!) 176 kg (388 lb 12.8 oz) Documented at 06/04/2020 0040          No intake/output data recorded.  I/O last 3 completed shifts:  In: 1884 [P.O.:740; I.V.:446; IV Piggyback:698]  Out: 43749 [Urine:23335]    Intake/Output Summary (Last 24 hours) at 6/8/2020 0919  Last data filed at 6/8/2020 0500  Gross per 24 hour   Intake 1884 ml   Output 7525 ml   Net -5641 ml       Physical Exam:  General Appearance: alert, oriented x 3, no acute distress  Skin: warm and dry  HEENT: pupils round and reactive to light, oral mucosa normal,   Neck: supple, no JVD, trachea midline  Lungs: diminished, unlabored breathing effort  Heart: RRR, normal S1 and S2, no S3, no rub  Abdomen: soft, non-tender,  present bowel sounds to auscultation, abdominal wall edema  : no palpable bladder, Salvador catheter is anchored in place  Extremities: massive legs with anasarca, redness bilaterally but worse on the right. No cyanosis or clubbing  Neuro: normal speech and mental status         Results Review:    Results from last 7 days   Lab Units 06/07/20  1532 06/07/20  0201 06/06/20  1633 06/06/20  1036  06/04/20  0035   SODIUM mmol/L 134* 137 " 135* 136   < > 122*   POTASSIUM mmol/L 3.4* 3.1* 3.1* 3.0*   < > 3.9   CHLORIDE mmol/L 90* 95* 96* 97*   < > 84*   CO2 mmol/L 32.4* 28.6 28.2 26.6   < > 16.4*   BUN mg/dL 30* 34* 38* 41*   < > 77*   CREATININE mg/dL 1.41* 1.60* 1.83* 2.11*   < > 5.85*   CALCIUM mg/dL 9.0 8.5* 8.6 8.7   < > 8.8   BILIRUBIN mg/dL  --   --   --  0.7  --  1.0   ALK PHOS U/L  --   --   --  152*  --  111   ALT (SGPT) U/L  --   --   --  30  --  33   AST (SGOT) U/L  --   --   --  30  --  53*   GLUCOSE mg/dL 118* 113* 138* 165*   < > 114*    < > = values in this interval not displayed.       Estimated Creatinine Clearance: 75.7 mL/min (A) (by C-G formula based on SCr of 1.41 mg/dL (H)).    Results from last 7 days   Lab Units 06/07/20  0201 06/06/20  1633 06/06/20  1036   MAGNESIUM mg/dL 1.6 1.8 1.9   PHOSPHORUS mg/dL 2.5 2.7  2.7 3.0             Results from last 7 days   Lab Units 06/07/20  1202 06/06/20  0316 06/05/20  0608 06/04/20  0434 06/04/20  0035   WBC 10*3/mm3 18.60* 22.14* 16.28* 15.89* 19.31*   HEMOGLOBIN g/dL 9.6* 9.5* 8.7* 9.8* 10.7*   PLATELETS 10*3/mm3 394 314 258 215 257       Results from last 7 days   Lab Units 06/08/20  0205 06/07/20  0201 06/06/20  0316 06/05/20  1458 06/04/20  0434   INR  1.23* 1.30* 1.29* 1.41* 2.02*         Imaging Results (Last 24 Hours)     ** No results found for the last 24 hours. **          ceFAZolin 2 g Intravenous Q8H   heparin (porcine) 3,000 Units Intravenous Once   midodrine 15 mg Oral TID AC   pantoprazole 40 mg Oral Q AM   saccharomyces boulardii 500 mg Oral BID   sodium chloride 10 mL Intravenous Q12H   sodium chloride 10 mL Intravenous Q12H   sodium chloride 10 mL Intravenous Q12H   sodium chloride 10 mL Intravenous Q12H       bumetanide 1 mg/hr Last Rate: 1 mg/hr (06/08/20 0158)   heparin (porcine) 18 Units/kg/hr Last Rate: 18 Units/kg/hr (06/08/20 0457)   phenylephrine 0.5-3 mcg/kg/min Last Rate: Stopped (06/06/20 1127)       Medication Review:   Current Facility-Administered  Medications   Medication Dose Route Frequency Provider Last Rate Last Dose   • acetaminophen (TYLENOL) tablet 650 mg  650 mg Oral Q4H PRN Radha Salmeron APRN        Or   • acetaminophen (TYLENOL) 160 MG/5ML solution 650 mg  650 mg Oral Q4H PRN Radha Salmeron APRN        Or   • acetaminophen (TYLENOL) suppository 650 mg  650 mg Rectal Q4H PRN Radha Salmeron APRN       • albumin human 25 % IV SOLN 12.5 g  12.5 g Intravenous PRN Raquel Hemphill MD       • bumetanide (BUMEX) 12.5 mg in sodium chloride 0.9 % 125 mL (0.1 mg/mL) infusion  1 mg/hr Intravenous Continuous Agnes Lutz MD 10 mL/hr at 06/08/20 0158 1 mg/hr at 06/08/20 0158   • calcium gluconate 1 g in sodium chloride 0.9 % 50 mL IVPB  1 g Intravenous PRN Raquel Hemphill MD        Or   • calcium gluconate 2 g in sodium chloride 0.9 % 50 mL IVPB  2 g Intravenous PRN Raquel Hemphill MD       • ceFAZolin in dextrose (ANCEF) IVPB solution 2 g  2 g Intravenous Q8H Tariq Flores MD       • heparin (porcine) 5000 UNIT/ML injection 6,800-10,000 Units  40-58.5 Units/kg Intravenous Q6H PRN Danilo Croft MD       • heparin (porcine) injection 1,000-2,000 Units  1,000-2,000 Units Intravenous PRN Raquel Hemphill MD       • heparin (porcine) injection 3,000 Units  3,000 Units Intravenous Once Abilio Rock MD       • heparin 74251 units/250 mL (100 units/mL) in 0.45 % NaCl infusion  18 Units/kg/hr Intravenous Titrated Danilo Croft MD 30.7 mL/hr at 06/08/20 0457 18 Units/kg/hr at 06/08/20 0457   • HYDROmorphone (DILAUDID) injection 1 mg  1 mg Intravenous Q2H PRN Adryan King MD   1 mg at 06/05/20 0634   • ipratropium-albuterol (DUO-NEB) nebulizer solution 3 mL  3 mL Nebulization Q6H PRN Barbra Lee APRN       • midodrine (PROAMATINE) tablet 15 mg  15 mg Oral TID AC Raquel Hemphill MD   15 mg at 06/08/20 0902   • nitroglycerin (NITROSTAT) SL tablet 0.4 mg   0.4 mg Sublingual Q5 Min PRN Radha Salmeron APRN       • ondansetron (ZOFRAN) injection 4 mg  4 mg Intravenous Q6H PRN Radha Salmeron APRN       • pantoprazole (PROTONIX) EC tablet 40 mg  40 mg Oral Q AM Danilo Croft MD   40 mg at 06/08/20 0637   • phenylephrine (ANNABEL-SYNEPHRINE) 50 mg in sodium chloride 0.9 % 250 mL (0.2 mg/mL) infusion  0.5-3 mcg/kg/min Intravenous Titrated Danilo Croft MD   Stopped at 06/06/20 1127   • potassium chloride 20 mEq in 50 mL IVPB  20 mEq Intravenous PRN Raquel Hemphill MD       • saccharomyces boulardii (FLORASTOR) capsule 500 mg  500 mg Oral BID Tania Nunez MD   500 mg at 06/08/20 0902   • sodium chloride 0.9 % bolus 200 mL  200 mL Intravenous PRN Raquel Hemphill MD       • sodium chloride 0.9 % flush 10 mL  10 mL Intravenous Q12H Radha Salmeron APRN   10 mL at 06/07/20 2135   • sodium chloride 0.9 % flush 10 mL  10 mL Intravenous PRN Radha Salmeron APRN       • sodium chloride 0.9 % flush 10 mL  10 mL Intravenous Q12H Danilo Croft MD   10 mL at 06/08/20 0903   • sodium chloride 0.9 % flush 10 mL  10 mL Intravenous Q12H Danilo Croft MD   10 mL at 06/08/20 0903   • sodium chloride 0.9 % flush 10 mL  10 mL Intravenous Q12H Danilo Croft MD   10 mL at 06/08/20 0902   • sodium chloride 0.9 % flush 10 mL  10 mL Intravenous PRN Danilo Croft MD       • sodium chloride 0.9 % flush 20 mL  20 mL Intravenous PRN Danilo Croft MD       • sodium phosphates 10 mmol in sodium chloride 0.9 % 100 mL IVPB  10 mmol Intravenous PRN Raquel Hemphill MD           Assessment/Plan   1.  Acute kidney injury, improving, creatinine down to 1.41, electrolytes within acceptable range with increased total CO2 up to 32.4 and potassium down to 3.4.  2.  Hyponatremia, associated with fluid excess, sodium is 134.  3.  Sepsis with cellulitis, of the right lower extremity  4.  Morbid  obesity  5.  Medical noncompliance  6.  Diastolic heart failure, with PFO  7.  History of DVT/PE          Plan:  1.  We will switch to intermittent bumetanide 4 mg every 8 hours x 3 doses and readjust as needed  2.  Replete potassium  3.  Restrict sodium to 2 g daily  4.  Surveillance labs        Ashutosh Garcia MD  06/08/20  09:19

## 2020-06-08 NOTE — PROGRESS NOTES
[facility]  [phone]  Caleb Garcia MD    Subjective:  LOS: 4    Appears comfortable. Using her CPAP at night. Denies pain or soa at this time.    Objective   Vital Signs past 24hrs    Temp range: Temp (24hrs), Av.6 °F (37 °C), Min:98.4 °F (36.9 °C), Max:98.9 °F (37.2 °C)    BP range: BP: (105-134)/(56-79) 118/67  Pulse range: Heart Rate:  [] 100  Resp rate range: Resp:  [18-20] 18    Device (Oxygen Therapy): nasal cannulaFlow (L/min):  [2] 2  Oxygen range:SpO2:  [90 %-98 %] 93 %      (!) 204 kg (448 lb 10.2 oz); Body mass index is 79.47 kg/m².    Intake/Output Summary (Last 24 hours) at 2020 1452  Last data filed at 2020 1042  Gross per 24 hour   Intake 1884 ml   Output 9175 ml   Net -7291 ml       Physical Exam   Constitutional: She appears well-developed.   HENT:   Head: Normocephalic.   Cardiovascular: Normal rate and regular rhythm.   No murmur heard.  Pulmonary/Chest: Effort normal. No respiratory distress. She has no wheezes. She has no rales.   Abdominal: Soft. Bowel sounds are normal. She exhibits no mass. There is no tenderness.   Musculoskeletal: She exhibits edema (Lymphedema with wraps).   Skin: No rash noted.     Results Review:    I have reviewed the laboratory and imaging data since the last note by [facility] physician.  My annotations are noted in assessment and plan.    Medication Review:  I have reviewed the current MAR.  My annotations are noted in assessment and plan.      heparin (porcine) 18 Units/kg/hr Last Rate: 18 Units/kg/hr (20 1437)     Plan   PCCM Problems  Septic shock, resolved  Beta-hemolytic streptococcal and staph SSI  RLE cellulitis  Acute renal failure; severe  Anasarca  Right groin adenopathy/ Phlegmon along the right common femoral vein  Chronic lymphedema  Hyponatremia  Pulmonary hypertension per echo -RVSP 65  Severe ARIEL on CPAP -Dr. 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    Plan of Treatment    On  midodrine to support BP.    Continue abx per ID for cellulitis.    ARF. Now diuresing with Bumex. Continue same per renal.    Hx DVT. On heparin drip. Transition back to Xarelto. Previously failed warfarin.    PHT on echo. Multifactorial.    Severe ARIEL. Compliant with CPAP. Does not have OHV.    Caleb Garcia MD  06/08/20  14:52    Part of this note may be an electronic transcription/translation of spoken language to printed text using the Dragon Dictation System.

## 2020-06-08 NOTE — PROGRESS NOTES
Name: Emely Solomon ADMIT: 2020   : 1960  PCP: RENAY Smith MD    MRN: [id number] LOS: 4 days   AGE/SEX: 59 y.o. female  ROOM: [facility]/     Subjective   Subjective   I saw the patient earlier this afternoon.  No shortness of breath.  No chest pain.    Review of Systems     Objective   Objective   Vital Signs  Temp:  [98.4 °F (36.9 °C)-98.9 °F (37.2 °C)] 98.4 °F (36.9 °C)  Heart Rate:  [] 100  Resp:  [18-20] 18  BP: (105-134)/(56-79) 118/67  SpO2:  [90 %-98 %] 93 %  on  Flow (L/min):  [2] 2;   Device (Oxygen Therapy): nasal cannula  Body mass index is 79.47 kg/m².  Physical Exam   Constitutional: She appears well-developed and well-nourished. No distress.   Morbid obesity with BMI 79.  Looks much older than age   HENT:   Head: Normocephalic.   Neck: Neck supple. No JVD present.   Cardiovascular: Normal rate and regular rhythm.   Pulmonary/Chest: No stridor. No respiratory distress. She has no wheezes. She has no rales.   Diminished but clear   Abdominal: Soft. Bowel sounds are normal. She exhibits no distension. There is no tenderness.   Obese.  Unable to detect any mass but exam is difficult   Musculoskeletal: She exhibits edema.   Right leg is wrapped.  Tender to touch   Neurological: She is alert.   Skin: She is not diaphoretic.   Nursing note and vitals reviewed.      Results Review:       I reviewed the patient's new clinical results.  Results from last 7 days   Lab Units 20  1202 20  0316 20  0608 20  0434   WBC 10*3/mm3 18.60* 22.14* 16.28* 15.89*   HEMOGLOBIN g/dL 9.6* 9.5* 8.7* 9.8*   PLATELETS 10*3/mm3 394 314 258 215     Results from last 7 days   Lab Units 20  1532 20  0201 20  1633 20  1036   SODIUM mmol/L 134* 137 135* 136   POTASSIUM mmol/L 3.4* 3.1* 3.1* 3.0*   CHLORIDE mmol/L 90* 95* 96* 97*   CO2 mmol/L 32.4* 28.6 28.2 26.6   BUN mg/dL 30* 34* 38* 41*   CREATININE mg/dL 1.41* 1.60* 1.83* 2.11*   GLUCOSE mg/dL 118* 113* 138*  165*   Estimated Creatinine Clearance: 75.7 mL/min (A) (by C-G formula based on SCr of 1.41 mg/dL (H)).  Results from last 7 days   Lab Units 06/07/20  0201 06/06/20  1633 06/06/20  1036 06/05/20  1600  06/04/20  0035   ALBUMIN g/dL 2.60* 2.40* 2.60* 2.20*   < > 3.50   BILIRUBIN mg/dL  --   --  0.7  --   --  1.0   ALK PHOS U/L  --   --  152*  --   --  111   AST (SGOT) U/L  --   --  30  --   --  53*   ALT (SGPT) U/L  --   --  30  --   --  33    < > = values in this interval not displayed.     Results from last 7 days   Lab Units 06/07/20  1532 06/07/20  0201 06/06/20  1633 06/06/20  1036 06/05/20  1600   CALCIUM mg/dL 9.0 8.5* 8.6 8.7 7.8*   ALBUMIN g/dL  --  2.60* 2.40* 2.60* 2.20*   MAGNESIUM mg/dL  --  1.6 1.8 1.9 2.2   PHOSPHORUS mg/dL  --  2.5 2.7  2.7 3.0 5.7*     Results from last 7 days   Lab Units 06/05/20  1220 06/04/20  0434 06/04/20  0035   LACTATE mmol/L 0.8 1.3 4.3*     No results found for: HGBA1C, POCGLU      bumetanide 4 mg Intravenous Q8H   ceFAZolin 3 g Intravenous Q8H   heparin (porcine) 3,000 Units Intravenous Once   midodrine 15 mg Oral TID AC   pantoprazole 40 mg Oral Q AM   saccharomyces boulardii 500 mg Oral BID   sodium chloride 10 mL Intravenous Q12H   sodium chloride 10 mL Intravenous Q12H   sodium chloride 10 mL Intravenous Q12H   sodium chloride 10 mL Intravenous Q12H       heparin (porcine) 18 Units/kg/hr Last Rate: 18 Units/kg/hr (06/08/20 1437)   Diet Regular; Cardiac, Low Sodium; 2,000 mg Na       Assessment/Plan     Active Hospital Problems    Diagnosis  POA   • **Sepsis (CMS/HCC) [A41.9]  Yes   • History of DVT of lower extremity [Z86.718]  Not Applicable   • Cellulitis of right anterior lower leg [L03.115]  Yes   • Lymphedema [I89.0]  Yes   • Cellulitis of right lower extremity [L03.115]  Yes   • Hyponatremia [E87.1]  Yes   • Acute renal failure (ARF) (CMS/[facility]) [N17.9]  Yes   • Anemia, chronic disease [D63.8]  Yes   • Morbid obesity (CMS/[facility]) [E66.01]  Yes   • ARIEL (obstructive  sleep apnea) [G47.33]  Yes   • Hypertension [I10]  Yes   • PFO (patent foramen ovale) [Q21.1]  Not Applicable   • Chronic diastolic CHF (congestive heart failure) (CMS/HCC) [I50.32]  Unknown   • Pulmonary hypertension (CMS/HCC) [I27.20]  Yes      Resolved Hospital Problems   No resolved problems to display.       · Septic shock secondary to group C beta hemolytic strep and MSSA cellulitis right leg.  Out of the unit now.  Vancomycin and cefazolin while inpatient.  Change to oral antibiotic at discharge.  10-14 day course recommended by ID  · History of PE and DVT.  Reported to have failed Coumadin.  Was on Xarelto although her BMI is so high, not sure it is effective.  On IV heparin at this time.  Limited options  · Acute on chronic diastolic congestive heart failure.  Pulmonary hypertension.  IV Bumex per nephrology.  Sodium restriction  · Acute kidney injury, improving.    Appreciate all consultants    Daria Arrington MD  [facility]  06/08/20  15:54

## 2020-06-08 NOTE — PROGRESS NOTES
"Pharmacokinetic Consult - Vancomycin Dosing (Follow-up Note)    Emely Solomon is a 59 y.o. female on vancomycin pharmacy to dose.  MRN: [id number]  : 1960     Day of vancomycin therapy: 5  Indication: RLE cellultitis  Consulted by: Dr. Flores  Goal level: 15-20 mcg/mL     Current dose: vancomycin 1500 mg IV q12h  Other antimicrobials: cefepime 2g iv q24L     Relevant clinical data and objective history reviewed:  59 y.o. female 160 cm (63\") (!) 171 kg (377 lb 3.3 oz)    Past Medical History:   Diagnosis Date   • Cellulitis     2017, with Group B Strep bacteremia and sepsis   • Chronic diastolic congestive heart failure (CMS/HCC)    • Deep venous thrombosis (CMS/HCC)    • Hypertension    • Lipoedema    • Lymphedema    • Morbid obesity (CMS/HCC)    • Paradoxical embolism (CMS/HCC)     to the LLE due to DVT/PE and PFO   • PFO (patent foramen ovale)    • Pulmonary embolism (CMS/HCC)    • Pulmonary hypertension (CMS/HCC)     multifactorial (dCHF, obesity/ARIEL, hx PE), mild by echo 2016     Creatinine   Date Value Ref Range Status   2020 1.41 (H) 0.57 - 1.00 mg/dL Final   2020 1.60 (H) 0.57 - 1.00 mg/dL Final   2020 1.83 (H) 0.57 - 1.00 mg/dL Final     BUN   Date Value Ref Range Status   2020 30 (H) 8 - 23 mg/dL Final     Estimated Creatinine Clearance: 66.8 mL/min (A) (by C-G formula based on SCr of 1.41 mg/dL (H)).    Lab Results   Component Value Date    WBC 18.60 (H) 2020     Temp Readings from Last 3 Encounters:   20 98.6 °F (37 °C) (Oral)   19 97.2 °F (36.2 °C)   11/15/17 98.1 °F (36.7 °C) (Oral)     Cultures:    COVID: negative   bcx: ng x 3 days   Wcx: 1+ staph aureus, 1+ strep group c     H/x of group b strep in bcx in 2017     Dosing hx (include troughs if drawn):  Vancomycin 2500mg loading dose               173 random: 27.2mcg/mL (14 hrs from last dose)               06 random=24.1 mcg/ml.  HD x 2.5 " "hrs.   6/6       0118    1 g after HD.      0316 \"random\"=42.7 mcg/ml (peak level drawn incorrectly)  6/7                   0201 random=15.1 mcg/ml. No hd/crrt.   1500 q12: 1244   6/8 0205 trough= 22.0 mcg/mL (~13.5 hr level)       Lab Results   Component Value Date    [facility] 22.00 (H) 06/08/2020         Assessment/Plan  6/8 trough level was drawn ~1.5 hours past due time and resulted above goal range of 15-20 mcg/mL.     1. Discontinue current dosing regimen and begin intermittent dosing.   2. Will check a random level today 6/8 @ 1400.   3. Will monitor serum creatinine at least every 48 hours per dosing recommendations.   4. Pharmacy will continue to follow daily while on vancomycin and adjust as needed.     Thank you for allowing me to participate in the care of your patient,    Pete Wasserman, PharmD  6/8/2020      "

## 2020-06-08 NOTE — PROGRESS NOTES
"Emely Solomon  1960 59 y.o.  [id number]      Patient Care Team:  RENAY Smith MD as PCP - General (General Practice)    CC: Morbid obesity, history of pulmonary emboli, pulmonary hypertension, right heart failure    Interval History: No significant change little bit less weight      Objective   Vital Signs  Temp:  [98.5 °F (36.9 °C)-98.9 °F (37.2 °C)] 98.5 °F (36.9 °C)  Heart Rate:  [80-98] 91  Resp:  [18-20] 18  BP: (105-134)/() 134/69    Intake/Output Summary (Last 24 hours) at 6/8/2020 1258  Last data filed at 6/8/2020 1042  Gross per 24 hour   Intake 1884 ml   Output 9175 ml   Net -7291 ml     Flowsheet Rows      First Filed Value   Admission Height  160 cm (63\") Documented at 06/04/2020 0001   Admission Weight  (!) 176 kg (388 lb 12.8 oz) Documented at 06/04/2020 0040          Physical Exam:   General Appearance:    Alert,oriented, in no acute distress   Lungs:     Clear to auscultation,BS are equal    Heart:    Normal S1 and S2, RRR without murmur, gallop or rub   HEENT:    Sclerae are clear, no JVD or adenopathy   Abdomen:     Normal bowel sounds, soft non-tender, non-distended, no HSM   Extremities:   Moves all extremities well, no edema, no cyanosis, no             Redness, no rash, morbidly obese     Medication Review:        bumetanide 4 mg Intravenous Q8H   ceFAZolin 3 g Intravenous Q8H   heparin (porcine) 3,000 Units Intravenous Once   midodrine 15 mg Oral TID AC   pantoprazole 40 mg Oral Q AM   saccharomyces boulardii 500 mg Oral BID   sodium chloride 10 mL Intravenous Q12H   sodium chloride 10 mL Intravenous Q12H   sodium chloride 10 mL Intravenous Q12H   sodium chloride 10 mL Intravenous Q12H       heparin (porcine) 18 Units/kg/hr Last Rate: 18 Units/kg/hr (06/08/20 8876)         I reviewed the patient's new clinical results.  I personally viewed and interpreted the patient's EKG/Telemetry data    Assessment/Plan  Active Hospital Problems    Diagnosis  POA   • **Sepsis (CMS/HCC) " [A41.9]  Yes   • History of DVT of lower extremity [Z86.718]  Not Applicable   • Cellulitis of right anterior lower leg [L03.115]  Yes   • Lymphedema [I89.0]  Yes   • Cellulitis of right lower extremity [L03.115]  Yes   • Hyponatremia [E87.1]  Yes   • Acute renal failure (ARF) (CMS/[facility]) [N17.9]  Yes   • Anemia, chronic disease [D63.8]  Yes   • Morbid obesity (CMS/[facility]) [E66.01]  Yes   • ARIEL (obstructive sleep apnea) [G47.33]  Yes   • Hypertension [I10]  Yes   • PFO (patent foramen ovale) [Q21.1]  Not Applicable   • Chronic diastolic CHF (congestive heart failure) (CMS/[facility]) [I50.32]  Unknown   • Pulmonary hypertension (CMS/[facility]) [I27.20]  Yes      Resolved Hospital Problems   No resolved problems to display.       This is a really difficult situation she has severe pulmonary hypertension possibly related to thromboembolic disease morbidly obese certainly has a degree of lymphedema and right heart failure and she is being diuresed she is on Midrin to support her blood pressure will continue with IV diuretics today    Louie Oconnell MD  06/08/20  12:58

## 2020-06-08 NOTE — NURSING NOTE
CWOCN follow up for right lower extremity.  Profore wrap removed for MD team assessment today.  Skin sloughing over large blistered area.  Wound bed red, moist.  Edema and erythema lessened.    Will plan to wrap leg later after seen by physicians.

## 2020-06-08 NOTE — PLAN OF CARE
Problem: Patient Care Overview  Goal: Plan of Care Review  Outcome: Ongoing (interventions implemented as appropriate)  Flowsheets (Taken 6/8/2020 3733)  Progress: improving  Outcome Summary: VSS. Bumex gtt d/c and started IV bumex. Heparin gtt therapuetic. Recheck PTT in am. Pt worked with PT. Legs rewrapped by wound care nurses. Salvador catheter continues. Will continue to monitor.

## 2020-06-08 NOTE — NURSING NOTE
CWOCN follow up for compression wrap to right leg.    Leg wounds cleaned with saline.  Open blistered areas covered with maxorb ag.  ABD pads over wound and around ankle for shaping.  Profore compression wraps (4 layer) applied.  Will plan to change again on Thursday.

## 2020-06-08 NOTE — PLAN OF CARE
Pt admitted with RLE cellulitis, rapid was called on 6/4/20 for hypotension, history of chronic BLE lymphedema. This visit she needed mod-maxAx2 with supine<>sit and sit<>supine, she did exercises sitting EOB mostly with her BUE. Her BLE range of motion limited by lymphedema. PLOF is walks independently at home, and lives with . Plan is d/c snf vs home with v24/7 care and HHPT depending on progression with mobility.

## 2020-06-08 NOTE — DISCHARGE PLACEMENT REQUEST
"Emely Solomon (59 y.o. Female)     Date of Birth Social Security Number Address Home Phone MRN    1960  [id number] [id number] [phone] [id number]    [place] Marital Status          Non-Adventist        Admission Date Admission Type Admitting Provider Attending Provider Department, Room/Bed    6/4/20 Emergency Daria Arrington MD Hayden, Juliana, MD [address], [address]/1    Discharge Date Discharge Disposition Discharge Destination                       Attending Provider:  Daria Arrington MD    Allergies:  No Known Allergies    Isolation:  None   Infection:  None   Code Status:  CPR    Ht:  160 cm (63\")   Wt:  204 kg (448 lb 10.2 oz)    Admission Cmt:  None   Principal Problem:  Sepsis (CMS/HCC) [A41.9]                 Active Insurance as of 6/4/2020     Primary Coverage     Payor Plan Insurance Group Employer/Plan Group    [facility] BLUE [facility] EMPL[employer]EE [id number]     Payor Plan Address Payor Plan Phone Number Payor Plan Fax Number Effective Dates    [address] [phone]  5/1/2019 - None Entered    [facility] [id number]       Subscriber Name Subscriber Birth Date Member ID       EMELY SOLOMON 1960 [id number]                 Emergency Contacts      (Rel.) Home Phone Work Phone Mobile Phone    Ghanshyam Solomon (Spouse) [phone] -- --    Adilson Cintron (Brother) -- -- [phone]              "

## 2020-06-08 NOTE — PROGRESS NOTES
LOS: 4 days     Chief Complaint:  Follow-up RLE cellulitis due to MSSA and Group C Strep    Interval History:  No fever. WBC down to 18 on 6/7 compared to 22 about two days ago. Overall she says she is feeling much improved. She is tolerating vancomycin w/o diarrhea.     ROS: no n/v; no rash    Vital Signs  Temp:  [98.5 °F (36.9 °C)-98.9 °F (37.2 °C)] 98.5 °F (36.9 °C)  Heart Rate:  [80-98] 91  Resp:  [18-20] 18  BP: (105-134)/() 134/69    Physical Exam:  General: awake, alert, very nice, in NAD  Cardiovascular: NR, RR,  BLE lymphedema  Respiratory: no wheezing; on 1-2L NC  GI: Abdomen is obese but soft, non-tender  Skin: right leg is wrapped, there is improved erythema and induration above the knee    Antibiotics:  •  Vancomycin Pharmacy Intermittent Dosing    LABS:  CBC, BMP, VTr, micro reviewed today  Lab Results   Component Value Date    WBC 18.60 (H) 06/07/2020    HGB 9.6 (L) 06/07/2020    HCT 29.5 (L) 06/07/2020    MCV 84.0 06/07/2020     06/07/2020     Lab Results   Component Value Date    GLUCOSE 118 (H) 06/07/2020    CALCIUM 9.0 06/07/2020     (L) 06/07/2020    K 3.4 (L) 06/07/2020    CO2 32.4 (H) 06/07/2020    CL 90 (L) 06/07/2020    BUN 30 (H) 06/07/2020    CREATININE 1.41 (H) 06/07/2020    EGFRIFAFRI  06/05/2020      Comment:      <15 Indicative of kidney failure.    EGFRIFNONA 38 (L) 06/07/2020    BCR 21.3 06/07/2020    ANIONGAP 11.6 06/07/2020     Lab Results   Component Value Date    CRP 34.57 (H) 06/04/2020     Lab Results   Component Value Date    VANCOTROUGH 22.00 (H) 06/08/2020    VANCORANDOM 15.10 06/07/2020       Microbiology:  6/4 BCx: NGTD  6/4 Wound Cx Right Leg: MSSA and Group C Strep  6/4 COVID: negative    Assessment/Plan   1. Sepsis due to right lower extremity cellulitis  2. Chronic lymphedema  3. Super obesity  3. Acute renal failure  4. Hyponatremia  5. History of DVT in LLE     She is improving. WBC trending down and no fever. Wound culture with MSSA and Group C  Strep. Therefore stop vancomycin and start cefazolin 3 g IV q8h which is dosed for obesity. I'll plan to keep her on this while in the hospital but closer to time of discharge will switch to oral antibiotic therapy to complete a 10-14 day course pending clinical recovery. Thanks to wound care team and general surgery for their care of the patient's leg also.     ID will follow.

## 2020-06-08 NOTE — PROGRESS NOTES
"Continued Stay Note  [facility]     Patient Name: Emely Solomon  MRN: [id number]  Today's Date: 6/8/2020    Admit Date: 6/4/2020    Discharge Plan     Row Name 06/08/20 1517       Plan    Plan  Home with spouse and [facility] vs SNF. Follow PT/OT for D/C needs.     Patient/Family in Agreement with Plan  yes    Plan Comments  Met with pt. at bedside. Explained roll of . Face sheet and pharmacy verified. Pt lives with Ghanshyam Solomon, spouse, [phone]. There are 3 steps to enter home.  DME equipment includes a CPAP, nebulizer, and cane. Pt states she is independent with ADLs. Pt states she has been [facility] in the past. States she has used HH in the past but does not recall which agency used. Requests referral for [facility] at D/C. Referral placed in [facility].  Pt's PCP is Dr. Smith. Uses [facility] on [address] Pt drives herself to appointments. At discharge, family will transport. Pt worked here at [employer] in the  for 30 yrs. States she recently retired. Pt saw pt. on 6/5. PT notes state \"Pt is bed bound at baseline.\" with PT signing off on PT.  Pt is not bed bound and walks without assistive device at home. New order for PT/OT placed to evaluate for discharge need.  Explained that CCP would follow to assess for discharge needs.  Red Doe RN-BC        Discharge Codes    No documentation.             Red Doe RN    "

## 2020-06-09 LAB
ALBUMIN SERPL-MCNC: 3 G/DL (ref 3.5–5.2)
ALBUMIN/GLOB SERPL: 0.7 G/DL
ALP SERPL-CCNC: 136 U/L (ref 39–117)
ALT SERPL W P-5'-P-CCNC: 15 U/L (ref 1–33)
ANION GAP SERPL CALCULATED.3IONS-SCNC: 14.3 MMOL/L (ref 5–15)
APTT PPP: 106.9 SECONDS (ref 22.7–35.4)
APTT PPP: 68.8 SECONDS (ref 22.7–35.4)
APTT PPP: >200 SECONDS (ref 22.7–35.4)
AST SERPL-CCNC: 19 U/L (ref 1–32)
BACTERIA SPEC AEROBE CULT: NORMAL
BACTERIA SPEC AEROBE CULT: NORMAL
BILIRUB SERPL-MCNC: 0.5 MG/DL (ref 0.2–1.2)
BUN BLD-MCNC: 35 MG/DL (ref 8–23)
BUN/CREAT SERPL: 26.3 (ref 7–25)
CALCIUM SPEC-SCNC: 8.9 MG/DL (ref 8.6–10.5)
CHLORIDE SERPL-SCNC: 86 MMOL/L (ref 98–107)
CO2 SERPL-SCNC: 33.7 MMOL/L (ref 22–29)
CREAT BLD-MCNC: 1.33 MG/DL (ref 0.57–1)
DEPRECATED RDW RBC AUTO: 46.5 FL (ref 37–54)
EOSINOPHIL # BLD MANUAL: 0.46 10*3/MM3 (ref 0–0.4)
EOSINOPHIL NFR BLD MANUAL: 3 % (ref 0.3–6.2)
ERYTHROCYTE [DISTWIDTH] IN BLOOD BY AUTOMATED COUNT: 14.6 % (ref 12.3–15.4)
GFR SERPL CREATININE-BSD FRML MDRD: 41 ML/MIN/1.73
GLOBULIN UR ELPH-MCNC: 4.5 GM/DL
GLUCOSE BLD-MCNC: 97 MG/DL (ref 65–99)
HCT VFR BLD AUTO: 29.8 % (ref 34–46.6)
HGB BLD-MCNC: 9.6 G/DL (ref 12–15.9)
INR PPP: 1.46 (ref 0.9–1.1)
LYMPHOCYTES # BLD MANUAL: 2.01 10*3/MM3 (ref 0.7–3.1)
LYMPHOCYTES NFR BLD MANUAL: 10 % (ref 5–12)
LYMPHOCYTES NFR BLD MANUAL: 13 % (ref 19.6–45.3)
MAGNESIUM SERPL-MCNC: 1.5 MG/DL (ref 1.6–2.4)
MCH RBC QN AUTO: 27.7 PG (ref 26.6–33)
MCHC RBC AUTO-ENTMCNC: 32.2 G/DL (ref 31.5–35.7)
MCV RBC AUTO: 85.9 FL (ref 79–97)
MONOCYTES # BLD AUTO: 1.55 10*3/MM3 (ref 0.1–0.9)
NEUTROPHILS # BLD AUTO: 11.44 10*3/MM3 (ref 1.7–7)
NEUTROPHILS NFR BLD MANUAL: 74 % (ref 42.7–76)
PHOSPHATE SERPL-MCNC: 3.7 MG/DL (ref 2.5–4.5)
PLAT MORPH BLD: NORMAL
PLATELET # BLD AUTO: 379 10*3/MM3 (ref 140–450)
PMV BLD AUTO: 9.2 FL (ref 6–12)
POTASSIUM BLD-SCNC: 3.5 MMOL/L (ref 3.5–5.2)
PROT SERPL-MCNC: 7.5 G/DL (ref 6–8.5)
PROTHROMBIN TIME: 17.4 SECONDS (ref 11.7–14.2)
RBC # BLD AUTO: 3.47 10*6/MM3 (ref 3.77–5.28)
RBC MORPH BLD: NORMAL
SODIUM BLD-SCNC: 134 MMOL/L (ref 136–145)
URATE SERPL-MCNC: 9.8 MG/DL (ref 2.4–5.7)
WBC MORPH BLD: NORMAL
WBC NRBC COR # BLD: 15.46 10*3/MM3 (ref 3.4–10.8)

## 2020-06-09 PROCEDURE — 99255 IP/OBS CONSLTJ NEW/EST HI 80: CPT | Performed by: INTERNAL MEDICINE

## 2020-06-09 PROCEDURE — 85007 BL SMEAR W/DIFF WBC COUNT: CPT | Performed by: INTERNAL MEDICINE

## 2020-06-09 PROCEDURE — 85610 PROTHROMBIN TIME: CPT | Performed by: INTERNAL MEDICINE

## 2020-06-09 PROCEDURE — 97110 THERAPEUTIC EXERCISES: CPT

## 2020-06-09 PROCEDURE — 85025 COMPLETE CBC W/AUTO DIFF WBC: CPT | Performed by: INTERNAL MEDICINE

## 2020-06-09 PROCEDURE — 83735 ASSAY OF MAGNESIUM: CPT | Performed by: INTERNAL MEDICINE

## 2020-06-09 PROCEDURE — 84100 ASSAY OF PHOSPHORUS: CPT | Performed by: INTERNAL MEDICINE

## 2020-06-09 PROCEDURE — 80053 COMPREHEN METABOLIC PANEL: CPT | Performed by: INTERNAL MEDICINE

## 2020-06-09 PROCEDURE — 97535 SELF CARE MNGMENT TRAINING: CPT

## 2020-06-09 PROCEDURE — 99232 SBSQ HOSP IP/OBS MODERATE 35: CPT | Performed by: INTERNAL MEDICINE

## 2020-06-09 PROCEDURE — 25010000002 CEFAZOLIN PER 500 MG: Performed by: INTERNAL MEDICINE

## 2020-06-09 PROCEDURE — 85730 THROMBOPLASTIN TIME PARTIAL: CPT | Performed by: INTERNAL MEDICINE

## 2020-06-09 PROCEDURE — 97166 OT EVAL MOD COMPLEX 45 MIN: CPT

## 2020-06-09 PROCEDURE — 25010000002 HEPARIN (PORCINE) PER 1000 UNITS: Performed by: INTERNAL MEDICINE

## 2020-06-09 PROCEDURE — 84550 ASSAY OF BLOOD/URIC ACID: CPT | Performed by: INTERNAL MEDICINE

## 2020-06-09 PROCEDURE — 25010000002 MAGNESIUM SULFATE IN D5W 1G/100ML (PREMIX) 1-5 GM/100ML-% SOLUTION: Performed by: INTERNAL MEDICINE

## 2020-06-09 RX ORDER — TORSEMIDE 100 MG/1
100 TABLET ORAL
Status: DISCONTINUED | OUTPATIENT
Start: 2020-06-09 | End: 2020-06-10

## 2020-06-09 RX ORDER — POTASSIUM CHLORIDE 750 MG/1
40 CAPSULE, EXTENDED RELEASE ORAL ONCE
Status: COMPLETED | OUTPATIENT
Start: 2020-06-09 | End: 2020-06-09

## 2020-06-09 RX ORDER — MAGNESIUM SULFATE 1 G/100ML
1 INJECTION INTRAVENOUS
Status: COMPLETED | OUTPATIENT
Start: 2020-06-09 | End: 2020-06-09

## 2020-06-09 RX ADMIN — HEPARIN SODIUM 16 UNITS/KG/HR: 10000 INJECTION, SOLUTION INTRAVENOUS at 07:14

## 2020-06-09 RX ADMIN — ACETAMINOPHEN 650 MG: 325 TABLET, FILM COATED ORAL at 03:00

## 2020-06-09 RX ADMIN — Medication 500 MG: at 09:28

## 2020-06-09 RX ADMIN — SODIUM CHLORIDE, PRESERVATIVE FREE 10 ML: 5 INJECTION INTRAVENOUS at 20:04

## 2020-06-09 RX ADMIN — MAGNESIUM SULFATE HEPTAHYDRATE 1 G: 1 INJECTION, SOLUTION INTRAVENOUS at 09:28

## 2020-06-09 RX ADMIN — SODIUM CHLORIDE, PRESERVATIVE FREE 10 ML: 5 INJECTION INTRAVENOUS at 09:29

## 2020-06-09 RX ADMIN — MAGNESIUM SULFATE HEPTAHYDRATE 1 G: 1 INJECTION, SOLUTION INTRAVENOUS at 11:47

## 2020-06-09 RX ADMIN — SODIUM CHLORIDE, PRESERVATIVE FREE 10 ML: 5 INJECTION INTRAVENOUS at 20:05

## 2020-06-09 RX ADMIN — TORSEMIDE 100 MG: 100 TABLET ORAL at 17:16

## 2020-06-09 RX ADMIN — CEFAZOLIN 3 G: 10 INJECTION, POWDER, FOR SOLUTION INTRAVENOUS at 20:04

## 2020-06-09 RX ADMIN — MAGNESIUM SULFATE HEPTAHYDRATE 1 G: 1 INJECTION, SOLUTION INTRAVENOUS at 10:55

## 2020-06-09 RX ADMIN — MIDODRINE HYDROCHLORIDE 15 MG: 5 TABLET ORAL at 11:47

## 2020-06-09 RX ADMIN — CEFAZOLIN 3 G: 10 INJECTION, POWDER, FOR SOLUTION INTRAVENOUS at 02:13

## 2020-06-09 RX ADMIN — RIVAROXABAN 20 MG: 20 TABLET, FILM COATED ORAL at 15:40

## 2020-06-09 RX ADMIN — POTASSIUM CHLORIDE 40 MEQ: 10 CAPSULE, COATED, EXTENDED RELEASE ORAL at 09:28

## 2020-06-09 RX ADMIN — MIDODRINE HYDROCHLORIDE 15 MG: 5 TABLET ORAL at 06:31

## 2020-06-09 RX ADMIN — Medication 500 MG: at 20:03

## 2020-06-09 RX ADMIN — PANTOPRAZOLE SODIUM 40 MG: 40 TABLET, DELAYED RELEASE ORAL at 06:16

## 2020-06-09 RX ADMIN — BUMETANIDE 4 MG: 0.25 INJECTION INTRAMUSCULAR; INTRAVENOUS at 02:13

## 2020-06-09 RX ADMIN — TORSEMIDE 100 MG: 100 TABLET ORAL at 09:28

## 2020-06-09 RX ADMIN — CEFAZOLIN 3 G: 10 INJECTION, POWDER, FOR SOLUTION INTRAVENOUS at 10:55

## 2020-06-09 RX ADMIN — MIDODRINE HYDROCHLORIDE 15 MG: 5 TABLET ORAL at 17:16

## 2020-06-09 NOTE — PROGRESS NOTES
LOS: 5 days   Patient Care Team:  RENAY Smith MD as PCP - General (General Practice)    Chief Complaint: Follow-up for right-sided heart failure, pulmonary hypertension.    Interval History: Her main complaint continues to be fatigue.  She denied any shortness of breath or chest pain today.    Vital Signs:  Temp:  [97.4 °F (36.3 °C)-98.8 °F (37.1 °C)] 98.5 °F (36.9 °C)  Heart Rate:  [84-99] 92  Resp:  [18] 18  BP: ()/(59-72) 123/72    Intake/Output Summary (Last 24 hours) at 6/9/2020 1636  Last data filed at 6/9/2020 1420  Gross per 24 hour   Intake 520 ml   Output 2900 ml   Net -2380 ml       Physical Exam:   General Appearance:    No acute distress, alert and oriented x4, morbid obesity   Lungs:     Clear to auscultation bilaterally     Heart:    Regular rhythm and normal rate.  No murmurs, gallops, or       rubs.   Abdomen:     Soft, non-tender, non-distended, obese   Extremities:   Lymphedema with venous stasis changes     Results Review:    Results from last 7 days   Lab Units 06/09/20  0315   SODIUM mmol/L 134*   POTASSIUM mmol/L 3.5   CHLORIDE mmol/L 86*   CO2 mmol/L 33.7*   BUN mg/dL 35*   CREATININE mg/dL 1.33*   GLUCOSE mg/dL 97   CALCIUM mg/dL 8.9     Results from last 7 days   Lab Units 06/07/20  0201 06/05/20  1220   CK TOTAL U/L  --  1,204*   TROPONIN T ng/mL <0.010  --      Results from last 7 days   Lab Units 06/09/20  0315   WBC 10*3/mm3 15.46*   HEMOGLOBIN g/dL 9.6*   HEMATOCRIT % 29.8*   PLATELETS 10*3/mm3 379     Results from last 7 days   Lab Units 06/09/20  1207 06/09/20  0538 06/09/20  0315  06/08/20  0205 06/07/20  0201   INR   --   --  1.46*  --  1.23* 1.30*   APTT seconds 68.8* 106.9* >200.0*   < > 98.5* 70.6*    < > = values in this interval not displayed.         Results from last 7 days   Lab Units 06/09/20  0315   MAGNESIUM mg/dL 1.5*           I reviewed the patient's new clinical results.        Assessment:  1.  Septic shock secondary to group C beta-hemolytic strep and  MSSA  2.  Right lower extremity cellulitis  3.  Severe morbid obesity  4.  History of DVT and pulmonary embolism remotely  5.  Acute on chronic right-sided CHF  6.  Pulmonary hypertension  7.  Chronic lymphedema  8.  Acute kidney injury  9.  Severe obstructive sleep apnea, on CPAP    Plan:  -Transition from heparin drip to Xarelto.    -Nephrology stopped IV diuretics and started torsemide 100 mg twice a day.  Obviously, her volume status will need to be watched closely.    -Her morbid obesity and lymphedema definitely contribute to her overall picture.  At this point, I think most of the lower extremity issues are from lymphedema.    -She is on Midodrine 15 mg 3 times a day to keep her blood pressure up.    Gamaliel Lawton MD  06/09/20  16:36

## 2020-06-09 NOTE — PLAN OF CARE
Problem: Patient Care Overview  Goal: Plan of Care Review  Outcome: Ongoing (interventions implemented as appropriate)  Flowsheets (Taken 6/9/2020 1801)  Progress: improving  Outcome Summary: VSS. D/c heparin gtt and started xerelto. Pt worked with PT and OT. IV abx continue. Will continue to monitor.

## 2020-06-09 NOTE — THERAPY TREATMENT NOTE
Patient Name: Emely Solomon  : 1960    MRN: [id number]                              Today's Date: 2020       Admit Date: 2020    Visit Dx:     ICD-10-CM ICD-9-CM   1. Cellulitis of right anterior lower leg L03.115 682.6   2. STEFANIA (acute kidney injury) (CMS/[facility]) N17.9 584.9   3. Lymphedema I89.0 457.1   4. Sepsis, due to unspecified organism, unspecified whether acute organ dysfunction present (CMS/[facility]) A41.9 038.9     995.91   5. Acute renal failure, unspecified acute renal failure type (CMS/[facility]) N17.9 584.9     Patient Active Problem List   Diagnosis   • Chronic diastolic CHF (congestive heart failure) (CMS/[facility])   • PFO (patent foramen ovale)   • Paradoxical embolism (CMS/[facility])   • Hypertension   • Pulmonary hypertension (CMS/[facility])   • Sepsis (CMS/[facility])   • Back pain   • ARIEL (obstructive sleep apnea)   • Morbid obesity (CMS/[facility])   • Urinary frequency   • History of DVT of lower extremity   • Cellulitis of right anterior lower leg   • Lymphedema   • Cellulitis of right lower extremity   • Hyponatremia   • Acute renal failure (ARF) (CMS/[facility])   • Anemia, chronic disease     Past Medical History:   Diagnosis Date   • Cellulitis     2017, with Group B Strep bacteremia and sepsis   • Chronic diastolic congestive heart failure (CMS/[facility])    • Deep venous thrombosis (CMS/[facility])    • Hypertension    • Lipoedema    • Lymphedema    • Morbid obesity (CMS/[facility])    • Paradoxical embolism (CMS/[facility])     to the LLE due to DVT/PE and PFO   • PFO (patent foramen ovale)    • Pulmonary embolism (CMS/[facility])    • Pulmonary hypertension (CMS/[facility])     multifactorial (dCHF, obesity/ARIEL, hx PE), mild by echo 2016     Past Surgical History:   Procedure Laterality Date   • BRONCHOSCOPY N/A 2017    Procedure: BRONCHOSCOPY with wash;  Surgeon: Caleb Garcia MD;  [facility] ENDOSCOPY;  Service:    • DILATATION AND CURETTAGE  2011   • VENA CAVA FILTER PLACEMENT      Inferior Vena Cava Filter Placement w/Fluorosc angiogr  guidance     General Information     Row Name 06/09/20 1252          PT Evaluation Time/Intention    Document Type  therapy note (daily note)  -PC     Mode of Treatment  physical therapy  -PC     Row Name 06/09/20 1252          General Information    Existing Precautions/Restrictions  fall  -PC     Row Name 06/09/20 1252          Cognitive Assessment/Intervention- PT/OT    Orientation Status (Cognition)  oriented x 3  -PC       User Key  (r) = Recorded By, (t) = Taken By, (c) = Cosigned By    Initials Name Provider Type    PC Esperanza Larios PT Physical Therapist        Mobility     Row Name 06/09/20 1252          Bed Mobility Assessment/Treatment    Supine-Sit [place] (Bed Mobility)  moderate assist (50% patient effort);2 person assist  -PC     Sit-Supine [place] (Bed Mobility)  moderate assist (50% patient effort);maximum assist (25% patient effort);2 person assist  -PC     [facility] Name 06/09/20 1252          Transfer Assessment/Treatment    Comment (Transfers)  3 sit to stand transfers, not able to come to full stand on first two attempts, able to stand on 3rd attempt  -PC     [facility] Name 06/09/20 1252          Sit-Stand Transfer    Sit-Stand [place] (Transfers)  moderate assist (50% patient effort);2 person assist;maximum assist (25% patient effort)  -PC       User Key  (r) = Recorded By, (t) = Taken By, (c) = Cosigned By    Initials Name Provider Type    PC Esperanza Larios, PT Physical Therapist        Obj/Interventions    No documentation.       Goals/Plan    No documentation.       Clinical Impression     Row Name 06/09/20 1254          Pain Assessment    Additional Documentation  Pain Scale: Numbers Pre/Post-Treatment (Group)  -PC     [facility] Name 06/09/20 1254          Pain Scale: Numbers Pre/Post-Treatment    Pain Location - Side  Right  -PC     Pain Location - Orientation  lower  -PC     Pain Location  extremity  -PC     Pain Intervention(s)  Medication (See MAR);Repositioned  -PC     Row Name  06/09/20 1254          Pain Scale: FACES Pre/Post-Treatment    Pain: FACES Scale, Pretreatment  4-->hurts little more  -PC     Pain: FACES Scale, Post-Treatment  4-->hurts little more  -PC     Row Name 06/09/20 1254          Plan of Care Review    Plan of Care Reviewed With  patient  -PC     Progress  improving  -PC     Outcome Summary  pt showing steady progress with mobility, able to stand at bedside with assist, will progress as tolerated  -PC     Row Name 06/09/20 1254          Positioning and Restraints    Pre-Treatment Position  in bed  -PC     Post Treatment Position  bed  -PC     In Bed  supine;call light within reach;encouraged to call for assist  -PC       User Key  (r) = Recorded By, (t) = Taken By, (c) = Cosigned By    Initials Name Provider Type    PC Esperanza Larios PT Physical Therapist        Outcome Measures     Row Name 06/09/20 1255          How much help from another person do you currently need...    Turning from your back to your side while in flat bed without using bedrails?  2  -PC     Moving from lying on back to sitting on the side of a flat bed without bedrails?  2  -PC     Moving to and from a bed to a chair (including a wheelchair)?  1  -PC     Standing up from a chair using your arms (e.g., wheelchair, bedside chair)?  1  -PC     Climbing 3-5 steps with a railing?  1  -PC     To walk in hospital room?  1  -PC     AM-PAC 6 Clicks Score (PT)  8  -PC       User Key  (r) = Recorded By, (t) = Taken By, (c) = Cosigned By    Initials Name Provider Type    PC Esperanza Larios PT Physical Therapist        Physical Therapy Education                 Title: PT OT SLP Therapies (In Progress)     Topic: Physical Therapy (In Progress)     Point: Mobility training (In Progress)     Description:   Instruct learner(s) on safety and technique for assisting patient out of bed, chair or wheelchair.  Instruct in the proper use of assistive devices, such as walker, crutches, cane or brace.               Patient Friendly Description:   It's important to get you on your feet again, but we need to do so in a way that is safe for you. Falling has serious consequences, and your personal safety is the most important thing of all.        When it's time to get out of bed, one of us or a family member will sit next to you on the bed to give you support.     If your doctor or nurse tells you to use a walker, crutches, a cane, or a brace, be sure you use it every time you get out of bed, even if you think you don't need it.    Learning Progress Summary           Patient Acceptance, E,D, NR by PC at 6/9/2020 1255    Acceptance, E,TB, VU,NR by CS at 6/8/2020 1522                   Point: Home exercise program (In Progress)     Description:   Instruct learner(s) on appropriate technique for monitoring, assisting and/or progressing patient with therapeutic exercises and activities.              Learning Progress Summary           Patient Acceptance, E,D, NR by PC at 6/9/2020 1255    Acceptance, E,TB, VU,NR by CS at 6/8/2020 1522                   Point: Body mechanics (In Progress)     Description:   Instruct learner(s) on proper positioning and spine alignment for patient and/or caregiver during mobility tasks and/or exercises.              Learning Progress Summary           Patient Acceptance, E,D, NR by PC at 6/9/2020 1255    Acceptance, E,TB, VU,NR by  at 6/8/2020 1522                   Point: Precautions (In Progress)     Description:   Instruct learner(s) on prescribed precautions during mobility and gait tasks              Learning Progress Summary           Patient Acceptance, E,D, NR by  at 6/9/2020 1255    Acceptance, E,TB, VU,NR by  at 6/8/2020 1522                               User Key     Initials Effective Dates Name Provider Type Discipline    PC 04/03/18 -  Esperanza Larios, PT Physical Therapist PT    CS 05/14/18 -  Tank Lucas, PT Physical Therapist PT              PT Recommendation and Plan     Plan  of Care Reviewed With: patient  Progress: improving  Outcome Summary: pt showing steady progress with mobility, able to stand at bedside with assist, will progress as tolerated     Time Calculation:   PT Charges     Row Name 06/09/20 1200             Time Calculation    Start Time  1131  -PC      Stop Time  1154  -PC      Time Calculation (min)  23 min  -PC      PT Received On  06/09/20  -PC      PT - Next Appointment  06/10/20  -PC        User Key  (r) = Recorded By, (t) = Taken By, (c) = Cosigned By    Initials Name Provider Type    PC Esperanza Lariso, PT Physical Therapist        Therapy Charges for Today     Code Description Service Date Service Provider Modifiers Qty    [id number]  PT THER PROC EA 15 MIN 6/9/2020 Esperanza Larios, PT GP 2    38704632172 HC PT THER SUPP EA 15 MIN 6/9/2020 Esperanza Larios, PT GP 2          PT G-Codes  Outcome Measure Options: AM-PAC 6 Clicks Basic Mobility (PT)  AM-PAC 6 Clicks Score (PT): 8    Esperanza Larios PT  6/9/2020

## 2020-06-09 NOTE — PROGRESS NOTES
"Adult Nutrition  Assessment/PES    Patient Name:  Emely Solomon  YOB: 1960  MRN: [id number]  Admit Date:  6/4/2020    Assessment Date:  6/9/2020    Comments:  Nutri. Screen: B=11 w/skin issues. R calf blisters & devora perineal skin tear with RLE cellulitis. Intake % on 2gNa/HH diet. 2-3+ BLE edema with wt increase from 388# on admission to CBW of 454#. Hx of lymphedema. Plan for pt to d/c to SNF. Will continue to follow.     Reason for Assessment     Row Name 06/09/20 1627          Reason for Assessment    Reason For Assessment  identified at risk by screening criteria     Diagnosis  infection/sepsis Septic shock d/t Group C beta hemolytic strep and MSSA RLE cellulitis with hyponatremia, lymphedema, A/C CHF, ARF, anemia, ARIEL, and HTN with hx of PE and DVT.      Identified At Risk by Screening Criteria  large or nonhealing wound, burn or pressure injury         Nutrition/Diet History     Row Name 06/09/20 1629          Nutrition/Diet History    Typical Food/Fluid Intake  B=11 w/skin issues. R calf blisters & devora perineal skin tear. Intake % on 2gNa/HH diet. 2-3+ BLE edema with wt increase from 388# on adm. to CBW of 454#.          Anthropometrics     Row Name 06/09/20 1634          Anthropometrics    Height  160 cm (63\")        Ideal Body Weight (IBW)    Ideal Body Weight (IBW) (kg)  52.72         Labs/Tests/Procedures/Meds     Row Name 06/09/20 1630          Labs/Procedures/Meds    Lab Results Reviewed  reviewed, pertinent     Lab Results Comments  Na 134, BUN 34, Cr 1.33, GFR 41, , Alb 3, Mg 1.5, WBC 15.46, Hgb 9.6/Hct 29.8        Diagnostic Tests/Procedures    Diagnostic Test/Procedure Reviewed  reviewed        Medications    Pertinent Medications Reviewed  reviewed, pertinent     Pertinent Medications Comments  IV ancef, protonix, florastor         Physical Findings     Row Name 06/09/20 1632          Physical Findings    Overall Physical Appearance  edematous;obese;on oxygen " "therapy     Skin  other (see comments);non-healing wound(s) RLE celllulitis w/R calf blisters & Noé perineal skin tear, B=11         Estimated/Assessed Needs     Row Name 06/09/20 1634          Calculation Measurements    Weight Used For Calculations  52.4 kg (115 lb 8.3 oz)     Height  160 cm (63\")        Estimated/Assessed Needs    Additional Documentation  Fluid Requirements (Group);Protein Requirements (Group);KCAL/KG (Group)        KCAL/KG    KCAL/KG  30 Kcal/Kg (kcal);35 Kcal/Kg (kcal)     30 Kcal/Kg (kcal)  1572     35 Kcal/Kg (kcal)  1834        Protein Requirements    Weight Used For Protein Calculations  52.4 kg (115 lb 8.3 oz)     Est Protein Requirement Amount (gms/kg)  2.0 gm protein     Estimated Protein Requirements (gms/day)  104.8        Fluid Requirements    Estimated Fluid Requirements (mL/day)  1834     Estimated Fluid Requirement Method  RDA Method     RDA Method (mL)  1834         Nutrition Prescription Ordered     Row Name 06/09/20 1634          Nutrition Prescription PO    Current PO Diet  Regular     Fluid Consistency  Thin     Common Modifiers  Cardiac;Low Sodium     Low Sodium Details  2,000 mg Sodium         Problem/Interventions:  Problem 1     Row Name 06/09/20 1634          Nutrition Diagnoses Problem 1    Problem 1  Impaired Nutrient Utilization     Etiology (related to)  Medical Diagnosis     Endocrine  Other (comment) Lymphedema     Infectious Disease  Septic shock;MSSA     Skin  Cellulitis     Signs/Symptoms (evidenced by)  Unintended Weight Change     Unintended Weight Change  Gain     Number of Pounds Gained  66#     Weight gain time period  5 days           Intervention Goal     Row Name 06/09/20 1636          Intervention Goal    General  Maintain nutrition;Improved nutrition related lab(s);Reduce/improve symptoms;Meet nutritional needs for age/condition;Disease management/therapy     PO  Maintain intake;Meet estimated needs     Weight  Appropriate weight loss         Nutrition " Intervention     Row Name 06/09/20 1636          Nutrition Intervention    RD/Tech Action  Care plan reviewd;Follow Tx progress           Education/Evaluation     Row Name 06/09/20 1636          Education    Education  Will Instruct as appropriate        Monitor/Evaluation    Monitor  Per protocol;PO intake;Pertinent labs;Weight;Skin status;Symptoms     Education Follow-up  Reinforce PRN           Electronically signed by:  Amanda Gottlieb MS,RD,LD  06/09/20 16:37

## 2020-06-09 NOTE — PROGRESS NOTES
"    DAILY PROGRESS NOTE  [facility]    Patient Identification:  Name: Emely Solomon  Age: 59 y.o.  Sex: female  :  1960  MRN: [id number]         Primary Care Physician: RENAY Smith MD    Subjective:  Interval History: Still complains of generalized weakness.  Denies any chest pain fever chills or night sweats.  Complain of any current leg pain.  Denies diarrhea    Objective: Resting in bariatric bed comfortably.  No family at bedside.  Nontoxic-appearing    Scheduled Meds:  ceFAZolin 3 g Intravenous Q8H   heparin (porcine) 3,000 Units Intravenous Once   midodrine 15 mg Oral TID AC   pantoprazole 40 mg Oral Q AM   saccharomyces boulardii 500 mg Oral BID   sodium chloride 10 mL Intravenous Q12H   sodium chloride 10 mL Intravenous Q12H   sodium chloride 10 mL Intravenous Q12H   sodium chloride 10 mL Intravenous Q12H   torsemide 100 mg Oral BID     Continuous Infusions:  heparin (porcine) 18 Units/kg/hr Last Rate: 16 Units/kg/hr (20 0714)       Vital signs in last 24 hours:  Temp:  [97.4 °F (36.3 °C)-98.8 °F (37.1 °C)] 97.4 °F (36.3 °C)  Heart Rate:  [] 84  Resp:  [18] 18  BP: ()/(59-67) 113/61    Intake/Output:    Intake/Output Summary (Last 24 hours) at 2020 1207  Last data filed at 2020 1029  Gross per 24 hour   Intake 520 ml   Output 3550 ml   Net -3030 ml       Exam:  /61 (BP Location: Right arm, Patient Position: Lying)   Pulse 84   Temp 97.4 °F (36.3 °C) (Oral)   Resp 18   Ht 160 cm (63\")   Wt (!) 206 kg (454 lb 2.4 oz)   SpO2 95%   BMI 80.45 kg/m²     General Appearance:    Alert, cooperative, no distress, AAOx3                          Head:    Normocephalic, without obvious abnormality, atraumatic                        Throat:   Oral mucosa pink and moist                           Neck: Unable to visualize JVD secondary to morbid obesity                         Lungs:    Decreased bases otherwise clear to auscultation bilaterally, " respirations unlabored                          Heart:    Regular rate and rhythm, S1 and S2 normal                  Abdomen:     Soft, nontender, bowel sounds active, morbidly obese               extremities: RLE is wrapped with extensive lymphedema changes noted in bilateral legs                        Pulses:   Pulses palpable in all extremities                  Neurologic:   CNII-XII intact, grossly weak though no focal deficits noted     Data Review:  Labs in chart were reviewed.    Assessment:  Active Hospital Problems    Diagnosis  POA   • **Sepsis (CMS/[facility]) [A41.9]  Yes   • History of DVT of lower extremity [Z86.718]  Not Applicable   • Cellulitis of right anterior lower leg [L03.115]  Yes   • Lymphedema [I89.0]  Yes   • Cellulitis of right lower extremity [L03.115]  Yes   • Hyponatremia [E87.1]  Yes   • Acute renal failure (ARF) (CMS/[facility]) [N17.9]  Yes   • Anemia, chronic disease [D63.8]  Yes   • Morbid obesity (CMS/[facility]) [E66.01]  Yes   • ARIEL (obstructive sleep apnea) [G47.33]  Yes   • Hypertension [I10]  Yes   • PFO (patent foramen ovale) [Q21.1]  Not Applicable   • Chronic diastolic CHF (congestive heart failure) (CMS/[facility]) [I50.32]  Unknown   • Pulmonary hypertension (CMS/[facility]) [I27.20]  Yes      Resolved Hospital Problems   No resolved problems to display.       Plan:    Resolved septic shock secondary to group C beta-hemolytic strep/MSSA due to cellulitis of the right leg compounded by severe morbid obesity with resulting lymphedema   -ID dosing antibiotics and will maintain on cefazolin while inpatient completing a 14-day course of total antibiotics switching to Keflex with a stop date of 6/18/2020.  Also on Florastor to help prevent GI aversion   -Agree with necessity for ongoing wound care as well as management of lymphedema    ARF resolving as sepsis is also resolving   -Diuretic switch to p.o. today per nephrology for A/C D-CHF/Pulm HTN    -On Midodrine drain 3 times daily    Morbid obesity/medical  noncompliance complicates all the above    Has history of PE and DVT in which the patient tells me this is nearly 5 years removed.  She denies any known cardiac history.  She tells me she is a case of Coumadin failure   -Heparin drip currently in place and previously was switched to Xarelto but her BMI is too high.  I would consider switching to Eliquis but to be honest I cannot determine exactly where the Coumadin failure came from.  I placed a consult for hematology to ask their opinion on what the safest choice of anticoagulation would be going forward versus even if she needs any further anticoagulation.  She does have severe morbid obesity and lymphedema so assuming she has very bad valves in her lower extremities which will increase her risk of recurrence.  We will otherwise appreciate hematological recommendations.   -Patient denies ever being anticoagulated due to cardiac arrhythmia      Input and assistance appreciated from all    Disposition -patient continues to clinically improve.  CCP working on discharge needs.  I anticipate this patient will definitely need a subacute rehab as currently a 2 person assist    Toño Giron MD  6/9/2020  12:07

## 2020-06-09 NOTE — PROGRESS NOTES
"   LOS: 5 days    Patient Care Team:  RENAY Smith MD as PCP - General (General Practice)    Chief Complaint:    Chief Complaint   Patient presents with   • Diarrhea   • Legs Swollen     Follow-up acute kidney injury  Subjective     Interval History:   The patient is feeling better, no shortness of air, no abdominal pain, she has Salvador catheter anchored in place, she continued to have significant edema, urine output in the past 24 hours was 4700 cc she has negative net fluid balance of 20,295 cc.  She was switched to intermittent diuretics yesterday morning    Objective     Vital Signs  Temp:  [98.4 °F (36.9 °C)-98.8 °F (37.1 °C)] 98.8 °F (37.1 °C)  Heart Rate:  [] 96  Resp:  [18] 18  BP: ()/(59-67) 114/59    Flowsheet Rows      First Filed Value   Admission Height  160 cm (63\") Documented at 06/04/2020 0001   Admission Weight  (!) 176 kg (388 lb 12.8 oz) Documented at 06/04/2020 0040          I/O this shift:  In: -   Out: 200 [Urine:200]  I/O last 3 completed shifts:  In: 760 [P.O.:660; IV Piggyback:100]  Out: 7400 [Urine:7400]    Intake/Output Summary (Last 24 hours) at 6/9/2020 0724  Last data filed at 6/9/2020 0715  Gross per 24 hour   Intake 520 ml   Output 4900 ml   Net -4380 ml       Physical Exam:  General Appearance: alert, oriented x 3, no acute distress  Skin: warm and dry  HEENT: pupils round and reactive to light, oral mucosa normal,   Neck: supple, no JVD, trachea midline  Lungs: diminished, unlabored breathing effort  Heart: RRR, normal S1 and S2, no S3, no rub  Abdomen: soft, non-tender,  present bowel sounds to auscultation, abdominal wall edema  : no palpable bladder, Salvador catheter is anchored in place  Extremities: massive legs with anasarca, redness bilaterally but worse on the left. No cyanosis or clubbing  Neuro: normal speech and mental status         Results Review:    Results from last 7 days   Lab Units 06/09/20  0315 06/07/20  1532 06/07/20  0201  06/06/20  1036  " 06/04/20  0035   SODIUM mmol/L 134* 134* 137   < > 136   < > 122*   POTASSIUM mmol/L 3.5 3.4* 3.1*   < > 3.0*   < > 3.9   CHLORIDE mmol/L 86* 90* 95*   < > 97*   < > 84*   CO2 mmol/L 33.7* 32.4* 28.6   < > 26.6   < > 16.4*   BUN mg/dL 35* 30* 34*   < > 41*   < > 77*   CREATININE mg/dL 1.33* 1.41* 1.60*   < > 2.11*   < > 5.85*   CALCIUM mg/dL 8.9 9.0 8.5*   < > 8.7   < > 8.8   BILIRUBIN mg/dL 0.5  --   --   --  0.7  --  1.0   ALK PHOS U/L 136*  --   --   --  152*  --  111   ALT (SGPT) U/L 15  --   --   --  30  --  33   AST (SGOT) U/L 19  --   --   --  30  --  53*   GLUCOSE mg/dL 97 118* 113*   < > 165*   < > 114*    < > = values in this interval not displayed.       Estimated Creatinine Clearance: 81 mL/min (A) (by C-G formula based on SCr of 1.33 mg/dL (H)).    Results from last 7 days   Lab Units 06/09/20 0315 06/07/20  0201 06/06/20  1633   MAGNESIUM mg/dL 1.5* 1.6 1.8   PHOSPHORUS mg/dL 3.7 2.5 2.7  2.7       Results from last 7 days   Lab Units 06/09/20  0315   URIC ACID mg/dL 9.8*       Results from last 7 days   Lab Units 06/09/20  0315 06/07/20  1202 06/06/20  0316 06/05/20  0608 06/04/20  0434   WBC 10*3/mm3 15.46* 18.60* 22.14* 16.28* 15.89*   HEMOGLOBIN g/dL 9.6* 9.6* 9.5* 8.7* 9.8*   PLATELETS 10*3/mm3 379 394 314 258 215       Results from last 7 days   Lab Units 06/09/20 0315 06/08/20  0205 06/07/20  0201 06/06/20  0316 06/05/20  1458   INR  1.46* 1.23* 1.30* 1.29* 1.41*         Imaging Results (Last 24 Hours)     ** No results found for the last 24 hours. **          ceFAZolin 3 g Intravenous Q8H   heparin (porcine) 3,000 Units Intravenous Once   midodrine 15 mg Oral TID AC   pantoprazole 40 mg Oral Q AM   saccharomyces boulardii 500 mg Oral BID   sodium chloride 10 mL Intravenous Q12H   sodium chloride 10 mL Intravenous Q12H   sodium chloride 10 mL Intravenous Q12H   sodium chloride 10 mL Intravenous Q12H       heparin (porcine) 18 Units/kg/hr Last Rate: 16 Units/kg/hr (06/09/20 0714)        Medication Review:   Current Facility-Administered Medications   Medication Dose Route Frequency Provider Last Rate Last Dose   • acetaminophen (TYLENOL) tablet 650 mg  650 mg Oral Q4H PRN Danilo Croft MD   650 mg at 06/09/20 0300    Or   • acetaminophen (TYLENOL) 160 MG/5ML solution 650 mg  650 mg Oral Q4H PRN Danilo Croft MD        Or   • acetaminophen (TYLENOL) suppository 650 mg  650 mg Rectal Q4H PRN Danilo Croft MD       • albumin human 25 % IV SOLN 12.5 g  12.5 g Intravenous PRN Danilo Croft MD       • calcium gluconate 1 g in sodium chloride 0.9 % 50 mL IVPB  1 g Intravenous PRN Danilo Croft MD        Or   • calcium gluconate 2 g in sodium chloride 0.9 % 50 mL IVPB  2 g Intravenous PRN Danilo Croft MD       • ceFAZolin in Sodium Chloride (ANCEF) IVPB solution 3 g  3 g Intravenous Q8H Tariq Flores  mL/hr at 06/09/20 0213 3 g at 06/09/20 0213   • heparin (porcine) 5000 UNIT/ML injection 6,800-10,000 Units  40-58.5 Units/kg Intravenous Q6H PRN Danilo Croft MD       • heparin (porcine) injection 1,000-2,000 Units  1,000-2,000 Units Intravenous PRN Danilo Croft MD       • heparin (porcine) injection 3,000 Units  3,000 Units Intravenous Once Danilo Croft MD       • heparin 39682 units/250 mL (100 units/mL) in 0.45 % NaCl infusion  18 Units/kg/hr Intravenous Titrated Danilo Croft MD 27.3 mL/hr at 06/09/20 0714 16 Units/kg/hr at 06/09/20 0714   • ipratropium-albuterol (DUO-NEB) nebulizer solution 3 mL  3 mL Nebulization Q6H PRN Danilo Croft MD       • midodrine (PROAMATINE) tablet 15 mg  15 mg Oral TID AC Danilo Croft MD   15 mg at 06/09/20 0631   • nitroglycerin (NITROSTAT) SL tablet 0.4 mg  0.4 mg Sublingual Q5 Min PRN Danilo Croft MD       • ondansetron (ZOFRAN) injection 4 mg  4 mg Intravenous Q6H PRN Danilo Croft MD        • pantoprazole (PROTONIX) EC tablet 40 mg  40 mg Oral Q AM Danilo Croft MD   40 mg at 06/09/20 0616   • potassium chloride 20 mEq in 50 mL IVPB  20 mEq Intravenous PRN Danilo Croft MD       • saccharomyces boulardii (FLORASTOR) capsule 500 mg  500 mg Oral BID Tariq Flores MD   500 mg at 06/08/20 2118   • sodium chloride 0.9 % bolus 200 mL  200 mL Intravenous PRN Danilo Croft MD       • sodium chloride 0.9 % flush 10 mL  10 mL Intravenous Q12H Danilo Croft MD   10 mL at 06/08/20 2120   • sodium chloride 0.9 % flush 10 mL  10 mL Intravenous PRN Danilo Croft MD       • sodium chloride 0.9 % flush 10 mL  10 mL Intravenous Q12H Danilo Croft MD   10 mL at 06/08/20 2120   • sodium chloride 0.9 % flush 10 mL  10 mL Intravenous Q12H Danilo Croft MD   10 mL at 06/08/20 2119   • sodium chloride 0.9 % flush 10 mL  10 mL Intravenous Q12H Danilo Croft MD   10 mL at 06/08/20 2119   • sodium chloride 0.9 % flush 10 mL  10 mL Intravenous PRN Danilo Croft MD       • sodium chloride 0.9 % flush 20 mL  20 mL Intravenous PRN Danilo Croft MD       • sodium phosphates 10 mmol in sodium chloride 0.9 % 100 mL IVPB  10 mmol Intravenous PRN Danilo Croft MD           Assessment/Plan   1.  Acute kidney injury, improving, creatinine down to 1.33, electrolytes within acceptable range with increased total CO2 up to 33.7 and potassium stable today at 3.5  2.  Hyponatremia, associated with fluid excess, sodium is 134.  3.  Sepsis with cellulitis, of the right lower extremity  4.  Morbid obesity  5.  Medical noncompliance  6.  Diastolic heart failure, with PFO  7.  History of DVT/PE  8.  Hypomagnesemia associated with diuretics, magnesium today is 1.5          Plan:  1.  To switch to torsemide today 100 mg twice daily  2.  Replete potassium and magnesium  3.  Surveillance labs        Ashutosh Garcia,  MD  06/09/20  07:24

## 2020-06-09 NOTE — PROGRESS NOTES
LOS: 5 days     Chief Complaint:  Follow-up RLE cellulitis due to MSSA and Group C Strep    Interval History:  No fever. WBC down to 15. She denies leg pain. She is tolerating cefazolin w/o rash or diarrhea. She is being diuresed and feels less SOA.     ROS: no n/v; no rash    Vital Signs  Temp:  [97.4 °F (36.3 °C)-98.8 °F (37.1 °C)] 97.4 °F (36.3 °C)  Heart Rate:  [] 84  Resp:  [18] 18  BP: ()/(59-67) 113/61    Physical Exam:  General: awake, alert, very nice, in NAD  Cardiovascular: NR, RR,  BLE lymphedema  Respiratory: no wheezing; on 2L NC  GI: Abdomen is obese but soft, non-tender  Skin: right leg is wrapped, there is improved erythema and induration above the knee    Antibiotics:  Cefazolin 3 g IV q8h    LABS:  CBC, BMP reviewed today  Lab Results   Component Value Date    WBC 15.46 (H) 06/09/2020    HGB 9.6 (L) 06/09/2020    HCT 29.8 (L) 06/09/2020    MCV 85.9 06/09/2020     06/09/2020     Lab Results   Component Value Date    GLUCOSE 97 06/09/2020    CALCIUM 8.9 06/09/2020     (L) 06/09/2020    K 3.5 06/09/2020    CO2 33.7 (H) 06/09/2020    CL 86 (L) 06/09/2020    BUN 35 (H) 06/09/2020    CREATININE 1.33 (H) 06/09/2020    EGFRIFAFRI  06/05/2020      Comment:      <15 Indicative of kidney failure.    EGFRIFNONA 41 (L) 06/09/2020    BCR 26.3 (H) 06/09/2020    ANIONGAP 14.3 06/09/2020     Lab Results   Component Value Date    CRP 34.57 (H) 06/04/2020     Microbiology:  6/4 BCx: negative  6/4 Wound Cx Right Leg: MSSA and Group C Strep  6/4 COVID: negative    Assessment/Plan   1. Sepsis due to right lower extremity cellulitis  2. Chronic lymphedema  3. Super obesity  3. Acute renal failure  4. Hyponatremia  5. History of DVT in LLE     She is improving. WBC trending down and no fever. Wound culture with MSSA and Group C Strep. Continue cefazolin 3 g IV q8h which is dosed for obesity. I'll plan to keep her on this while in the hospital but closer to time of discharge will switch to  cephalexin 1 g PO q6h with stop date 6/18/20 which will complete a 14 day course of antibiotic therapy. Thanks to wound care team for their recommendations. Ongoing local wound care and management of lymphedema will be of the utmost importance.     ID will follow.

## 2020-06-09 NOTE — CONSULTS
Subjective     REASON FOR CONSULTATION: chronic anticoagulation for dvt, morbid obesity weight 450 lb,question coumadin failure, extensive cellulitis rle and lymphedema bilaterally   Provide an opinion on any further workup or treatment                             REQUESTING PHYSICIAN:  DR MOJGAN SWEET    RECORDS OBTAINED:  Records of the patients history including those obtained from the referring provider were reviewed and summarized in detail.    HISTORY OF PRESENT ILLNESS:  The patient is a 59 y.o. year old female who is here for an opinion about the above issue.    I have been asked to see this patient in consultation today a 59-year-old white female morbidly obese, weight 450 pounds who is lying in a bariatric bed after she was admitted to the hospital 5 days ago with a blister and infection, cellulitis in the right lower extremity. She has been taking care of this locally by Dr. Jane and primary team. In any event I have been consulted to answer the question in regard anticoagulation in this patient who has remained on Xarelto since a blood clot was diagnosed on her 4-5 years ago. Pertinent information to this is not available in Epic as far as I can tell. In any event the patient states that she has had 1 single blood clot in the right lower extremity. She initially was placed on heparin then transferred to Coumadin and according to her she was a Coumadin failure. On further questioning documents that the dose was all over the place and the INR was all over the place without any full anticoagulation. Given that fact the patient was placed on Xarelto and she has been taking this medicine since then not having any other episodes. In this hospitalization the patient has been receiving IV heparin. She has not had any documented new blood clots. Obviously clinically with the size of her legs and her lymphedema it will be impossible to tell if she has any new blood clots anyway.    The patient states that her  weight remains about the same, she is able to function at home up to a certain degree walking around and taking care of her special needs. Her bowel function is appropriate, she has NO bleeding. Her urination is ongoing with no infection or problems. She has no shortness of breath. She has sleep apnea and she remains with a CPAP machine. The patient is fully competent and capable of making all of her decisions.          Past Medical History:   Diagnosis Date   • Cellulitis     11/2017, with Group B Strep bacteremia and sepsis   • Chronic diastolic congestive heart failure (CMS/HCC)    • Deep venous thrombosis (CMS/HCC)    • Hypertension    • Lipoedema    • Lymphedema    • Morbid obesity (CMS/HCC)    • Paradoxical embolism (CMS/HCC)     to the LLE due to DVT/PE and PFO   • PFO (patent foramen ovale)    • Pulmonary embolism (CMS/HCC)    • Pulmonary hypertension (CMS/HCC)     multifactorial (dCHF, obesity/ARIEL, hx PE), mild by echo 1/2016        Past Surgical History:   Procedure Laterality Date   • BRONCHOSCOPY N/A 7/21/2017    Procedure: BRONCHOSCOPY with wash;  Surgeon: Caleb Garcia MD;  Location: [facility] ENDOSCOPY;  Service:    • DILATATION AND CURETTAGE  04/11/2011   • VENA CAVA FILTER PLACEMENT      Inferior Vena Cava Filter Placement w/Fluorosc angiogr guidance        No current facility-administered medications on file prior to encounter.      Current Outpatient Medications on File Prior to Encounter   Medication Sig Dispense Refill   • acetaminophen (TYLENOL) 325 MG tablet Take 2 tablets by mouth Every 6 (Six) Hours As Needed for Mild Pain  (pain or symptomatic fever).     • furosemide (LASIX) 40 MG tablet Take 1 tablet by mouth 3 (Three) Times a Week. 45 tablet 3   • hydroCHLOROthiazide (HYDRODIURIL) 25 MG tablet Take 25 mg by mouth Daily.     • ipratropium-albuterol (DUO-NEB) 0.5-2.5 mg/3 ml nebulizer ipratropium 0.5 mg-albuterol 3 mg (2.5 mg base)/3 mL nebulization soln     • losartan (COZAAR) 50 MG tablet  Take 100 mg by mouth Daily.     • metoprolol succinate XL (TOPROL-XL) 50 MG 24 hr tablet Take 1 tablet by mouth 2 (Two) Times a Day.     • oxybutynin (DITROPAN) 5 MG tablet Take 5 mg by mouth Daily.     • rivaroxaban (XARELTO) 20 MG tablet Take 1 tablet by mouth Daily. 90 tablet 3        ALLERGIES:  No Known Allergies     Social History     Socioeconomic History   • Marital status:      Spouse name: Not on file   • Number of children: Not on file   • Years of education: Not on file   • Highest education level: Not on file   Tobacco Use   • Smoking status: Never Smoker   • Smokeless tobacco: Never Used   Substance and Sexual Activity   • Alcohol use: Yes     Comment: caffeine use:1 cup daily.    • Drug use: No   • Sexual activity: Defer        Family History   Problem Relation Age of Onset   • Hypertension Other         Review of Systems   Constitutional: Positive for fatigue.   HENT: Negative.    Respiratory: Positive for shortness of breath.    Cardiovascular: Negative.    Gastrointestinal: Negative.    Endocrine: Negative.    Genitourinary: Negative.    Musculoskeletal:        LYMPHEDEMA BOTH LE   Skin: Positive for wound.        R LEG BLISTER PAIN AND ERYTHEMA PICTURE SEEN   Neurological: Negative.    Hematological: Negative.    Psychiatric/Behavioral: Negative.         Objective     Vitals:    06/09/20 0215 06/09/20 0532 06/09/20 0632 06/09/20 0738   BP: 119/65  114/59 113/61   BP Location: Right arm  Right arm Right arm   Patient Position: Lying  Lying Lying   Pulse: 92  96 84   Resp:    18   Temp:    97.4 °F (36.3 °C)   TempSrc:    Oral   SpO2: 94%  93% 95%   Weight:  (!) 206 kg (454 lb 2.4 oz)     Height:         No flowsheet data found.    Physical Exam   Constitutional: She is oriented to person, place, and time. She appears well-developed. No distress.   HENT:   Head: Normocephalic.   Eyes: Pupils are equal, round, and reactive to light. EOM are normal. Right eye exhibits no discharge. Left eye  exhibits no discharge. No scleral icterus.   Neck: No JVD present. No tracheal deviation present. No thyromegaly present.   Cardiovascular: Normal rate, regular rhythm and normal heart sounds.   Pulmonary/Chest: Effort normal.   Abdominal: Soft. She exhibits no distension and no mass. There is no tenderness. There is no guarding.   Musculoskeletal:   LYMPHEDEMA GRADE 3 BOTH LE   Lymphadenopathy:     She has no cervical adenopathy.   Neurological: She is alert and oriented to person, place, and time.   Skin: Skin is warm and dry. She is not diaphoretic.   Psychiatric: She has a normal mood and affect. Her behavior is normal. Judgment and thought content normal.         RECENT LABS:  Hematology WBC   Date Value Ref Range Status   06/09/2020 15.46 (H) 3.40 - 10.80 10*3/mm3 Final     RBC   Date Value Ref Range Status   06/09/2020 3.47 (L) 3.77 - 5.28 10*6/mm3 Final     Hemoglobin   Date Value Ref Range Status   06/09/2020 9.6 (L) 12.0 - 15.9 g/dL Final     Hematocrit   Date Value Ref Range Status   06/09/2020 29.8 (L) 34.0 - 46.6 % Final     Platelets   Date Value Ref Range Status   06/09/2020 379 140 - 450 10*3/mm3 Final      CT ABDOMEN PELVIS WO CONTRAST-     Radiation dose reduction techniques were utilized, including automated  exposure control and exposure modulation based on body size.     Clinical: Acute renal failure, erythema and pain right leg. Morbid  obesity.     COMPARISON 11/11/2017     FINDINGS: No obstructive uropathy is demonstrated. The left kidney is  small in size similar to the previous examination. Compensatory  hypertrophy of the right kidney. No right or left sided renal calculus.  The ureters are satisfactory in course and caliber. Urinary bladder is  moderately distended. No intraluminal filling defect.     The uterus is enlarged for the patient's age measuring 11 cm transverse  and at least 7.8 cm AP. Size and shape is quite similar to the previous  examination. Suspect underlying fibroids. No  adnexal abnormality.     The right inguinal lymph nodes are enlarged compared to the previous  examination. There is vague subcutaneous edema in the common femoral  region extending to the external iliac chain. At this location there is  an ill-defined masslike density enveloping the external iliac artery and  vein. This area measures approximately 10 cm AP, 5.4 cm transverse and  approximately 8 cm craniocaudal. This could simply represent focal  edema/phlegmon, adenopathy or ill-defined tumor. The enlarged left  inguinal lymph nodes seen on the previous examination have diminished in  size within the interim.     There is a sliding-type small hiatal hernia. There is cardiac  enlargement. The stomach is collapsed, its contour is within normal  limits. The pancreas is satisfactory in appearance. The liver and spleen  have a satisfactory appearance. The gallbladder is unremarkable, no  biliary duct dilatation. Diameter of the aorta is within normal limits.  No free air or free intraperitoneal fluid. Caliber the large and small  bowel is normal. No bowel wall thickening or induration of the mesentery  to suggest an inflammatory process.     CONCLUSION:  1. No obstructive uropathy, the bladder is moderately distended without  intraluminal filling defect.  2. There is right inguinal lymphadenopathy with edema/phlegmon along the  common femoral region of the upper thigh extending to the external iliac  chain where there is a vague, sizable masslike area of phlegmon or  adenopathy or ill-defined tumor enveloping the external iliac artery and  vein. Favor phlegmon, the appearance is worrisome for potential  necrotizing fasciitis.     FINDINGS of this report called to the ICU nursing station at the time of  completion, 3:40 PM. Dr. Flores also notified.        This report was finalized on 6/4/2020 3:58 PM by Dr. Dhruv Sanchez M.D.     CT LOWER EXTREMITY RIGHT WO CONTRAST-     INDICATIONS: Redness, swelling, possible  necrotizing fasciitis of the  right leg  Radiation dose reduction techniques were utilized, including  automated exposure control and exposure modulation based on body size.     TECHNIQUE: Unenhanced CT of the right leg     COMPARISON: Correlated with CT of the abdomen and pelvis from 06/04/2020     FINDINGS:     Extensive subcutaneous edema/fat stranding is seen in the right leg,  with diffuse skin thickening in the right lower leg and foot, and  medially in the right thigh; this appearance is similar to that of the  partly included medial left thigh, and suggests diffuse body wall  edema/anasarca, although an infectious process such as cellulitis or  fasciitis could be associated with similar appearance. No soft tissue  gas is noted. Some areas of fluid tracking along fascial are noted, for  example medially in the left lower leg on image 321 of the axial series  4, laterally in the thigh on image 172, and areas of borderline fascial  thickening are apparent, for example laterally in the distal thigh, 3 mm  on image 243, that certainly could be associated with fasciitis,  including the possibility of necrotizing fasciitis, which is not  excluded on the basis of this exam. For further evaluation, MRI is  advised if not contraindicated, close interval follow-up can  characterize change.     On the CT of the abdomen and pelvis from earlier today, right groin  adenopathy and edema/phlegmon along the right common femoral region  extending to the external iliac chain, where a masslike area of phlegmon  or adenopathy or ill-defined tumor was noted enveloping the right  external iliac artery and vein; no interval change in this appearance is  noted.     No acute fracture is identified in right leg. Moderate to prominent  degenerative changes are seen at the right knee.           IMPRESSION:     As described.     Discussed by telephone with the patient's nurse, Mallika, at time of  interpretation, 1902, 06/04/2020.        duplex lle 2/8/19  R60.0 (ICD-10-CM)]   Interpretation Summary     · Left popliteal fossa fluid collection.  · Normal left lower extremity venous duplex scan.     PATHOLOGY 7/16/15 ARTERIAL CLOTH LLE : MATERIAL CONSISTENT WITH EMBOLUS, PROCEDURE DONE BY DR CAMPO    Assessment/Plan     In summary, this patient has thrombophilia associated with morbid obesity.  She has had not only had a blood clot in the right leg, according to her happened at [facility], I do not have any documentation of this available in Epic as far as I was able to see until 2016.  The patient says it was more or less about that time.  I am not sure if she is confused in regards to what happened to a clot that was removed by Dr. Campo in the arterial system in the left lower extremity where the pathology report in 07/16/2015 documented embolus material.  In any event, the patient was surrendered to be Coumadin failure, but I do not think it was Coumadin failure; it was lack of consistency in regards to Coumadin dosing more than anything else.  Obviously, I will not put this patient who has issues pertinent to compliance back on Coumadin, because this will be more than a nightmare handling this situation at this point.  It would be tremendously difficulty in the logistics of Coumadin dosing.  It would be almost impossible to follow her through.  Her morbid obesity will remain a major problem and I think this patient will be better off instead of receiving heparin in the hospital to go back on Xarelto; that is the medicine that she has been taking since the event 3 or 4 years ago.  She has not had any recurrence of arterial clots as far as we know and she has not had any knew deep vein thrombosis.  Obviously, analysis on clinical settings of vein thrombosis in the lower extremities is useless because the physical examination of the extremities is so modified by the lymphedema that nobody will be able to document on clinical grounds,  thrombophlebitis, DVT, or anything of this nature.  On the other hand arterial issues will be easy to perceive because obviously the clinical consequences.  In any event, I do not venice the need for any thrombophilia workup as this is not going to change anything.  I think the obesity is the one that plays most of the role in regards to her condition.  The patient also has anemia associated with infection and her inflammatory condition related to this.    I do not believe that the patient needs to be on heparin anymore and I will discontinue this medicine.  I discussed this with the nurse taking care of the patient and discussed this with the patient and she is happy to go back on Xarelto.  I will not follow this patient as an outpatient in the future for any reasons.  The patient is in the process of getting acquainted with [facility] and she will find a new primary physician.    I will not see her in the hospital anymore.  I went ahead and wrote the dose of Xarelto 20 mg a day.  Finally, Xarelto has no issues in regards to kidney dysfunction or weight, different than Eliquis.  This medicine is safe under these circumstances.  I think this dose does not need to be modified.    DURING THE VISIT WITH THE PATIENT TODAY , I HAD PROPPER PROTECTIVE EQUIPMENT, AND I DID HAND HYGIENE WITH SOAP AND WATER BEFORE AND AFTER THE VISIT.

## 2020-06-09 NOTE — PROGRESS NOTES
[facility]  [phone]  Caleb Garcia MD    Subjective:  LOS: 5    No complaints. Plan is rehab. On low flow O2. Using her CPAP at night.    Objective   Vital Signs past 24hrs    Temp range: Temp (24hrs), Av.3 °F (36.8 °C), Min:97.4 °F (36.3 °C), Max:98.8 °F (37.1 °C)    BP range: BP: ()/(59-72) 123/72  Pulse range: Heart Rate:  [84-99] 92  Resp rate range: Resp:  [18] 18    Device (Oxygen Therapy): nasal cannulaFlow (L/min):  [2] 2  Oxygen range:SpO2:  [90 %-99 %] 90 %      (!) 206 kg (454 lb 2.4 oz); Body mass index is 80.45 kg/m².    Intake/Output Summary (Last 24 hours) at 2020 1545  Last data filed at 2020 1420  Gross per 24 hour   Intake 520 ml   Output 2900 ml   Net -2380 ml       Physical Exam   Constitutional: She appears well-developed.   HENT:   Head: Normocephalic.   Cardiovascular: Normal rate and regular rhythm.   No murmur heard.  Pulmonary/Chest: Effort normal. No respiratory distress. She has no wheezes. She has no rales.   Abdominal: Soft. Bowel sounds are normal. She exhibits no mass. There is no tenderness.   Musculoskeletal: She exhibits edema (Lymphedema with wraps).   Skin: No rash noted.     Results Review:    I have reviewed the laboratory and imaging data since the last note by [facility] physician.  My annotations are noted in assessment and plan.    Medication Review:  I have reviewed the current MAR.  My annotations are noted in assessment and plan.       Plan   PCCM Problems  Septic shock, resolved  Beta-hemolytic streptococcal and staph SSI  RLE cellulitis  Acute renal failure; severe  Anasarca  Right groin adenopathy/ Phlegmon along the right common femoral vein  Chronic lymphedema  Hyponatremia  Pulmonary hypertension per echo -RVSP 65  Severe ARIEL on CPAP -Dr. Garcia  History of DVT/PE on rivaroxaban      Plan of Treatment    On midodrine to support BP.    Continue abx per ID for cellulitis.    ARF. Now diuresing with Bumex. Continue same per renal.    Hx DVT.  On heparin drip. Transition back to Xarelto. Previously failed warfarin.    PHT on echo. Multifactorial.    Severe ARIEL. Compliant with CPAP. Does not have OHV.    Caleb Garcia MD  06/09/20  15:45    Part of this note may be an electronic transcription/translation of spoken language to printed text using the Dragon Dictation System.

## 2020-06-09 NOTE — THERAPY EVALUATION
Acute Care - Occupational Therapy Initial Evaluation  [facility]     Patient Name: Emely Solomon  : 1960  MRN: [id number]  Today's Date: 2020             Admit Date: 2020       ICD-10-CM ICD-9-CM   1. Cellulitis of right anterior lower leg L03.115 682.6   2. STEFANIA (acute kidney injury) (CMS/[facility]) N17.9 584.9   3. Lymphedema I89.0 457.1   4. Sepsis, due to unspecified organism, unspecified whether acute organ dysfunction present (CMS/[facility]) A41.9 038.9     995.91   5. Acute renal failure, unspecified acute renal failure type (CMS/[facility]) N17.9 584.9     Patient Active Problem List   Diagnosis   • Chronic diastolic CHF (congestive heart failure) (CMS/[facility])   • PFO (patent foramen ovale)   • Paradoxical embolism (CMS/[facility])   • Hypertension   • Pulmonary hypertension (CMS/[facility])   • Sepsis (CMS/[facility])   • Back pain   • ARIEL (obstructive sleep apnea)   • Morbid obesity (CMS/[facility])   • Urinary frequency   • History of DVT of lower extremity   • Cellulitis of right anterior lower leg   • Lymphedema   • Cellulitis of right lower extremity   • Hyponatremia   • Acute renal failure (ARF) (CMS/[facility])   • Anemia, chronic disease     Past Medical History:   Diagnosis Date   • Cellulitis     2017, with Group B Strep bacteremia and sepsis   • Chronic diastolic congestive heart failure (CMS/[facility])    • Deep venous thrombosis (CMS/[facility])    • Hypertension    • Lipoedema    • Lymphedema    • Morbid obesity (CMS/[facility])    • Paradoxical embolism (CMS/[facility])     to the LLE due to DVT/PE and PFO   • PFO (patent foramen ovale)    • Pulmonary embolism (CMS/[facility])    • Pulmonary hypertension (CMS/[facility])     multifactorial (dCHF, obesity/ARIEL, hx PE), mild by echo 2016     Past Surgical History:   Procedure Laterality Date   • BRONCHOSCOPY N/A 2017    Procedure: BRONCHOSCOPY with wash;  Surgeon: Caleb Garcai MD;  Location: [facility] ENDOSCOPY;  Service:    • DILATATION AND CURETTAGE  2011   • VENA CAVA FILTER PLACEMENT      Inferior Vena  Cava Filter Placement w/Fluorosc angiogr guidance          OT ASSESSMENT FLOWSHEET (last 12 hours)      Occupational Therapy Evaluation     Row Name 06/09/20 1100                   OT Evaluation Time/Intention    Subjective Information  complains of;weakness;fatigue  -CW        Document Type  evaluation  -CW        Mode of Treatment  occupational therapy  -CW        Patient Effort  good  -CW        Symptoms Noted During/After Treatment  none  -CW           General Information    Patient Profile Reviewed?  yes  -CW        Patient Observations  alert;cooperative;agree to therapy  -CW        General Observations of Patient  Pt. awake supine bed level.   -CW        Prior Level of Function  independent:;ADL's  -CW        Equipment Currently Used at Home  none per pt.   -CW        Existing Precautions/Restrictions  fall  -CW           Cognitive Assessment/Intervention- PT/OT    Orientation Status (Cognition)  oriented x 3  -CW        Follows Commands (Cognition)  WNL  -CW           Bed Mobility Assessment/Treatment    Supine-Sit [place] (Bed Mobility)  verbal cues;maximum assist (25% patient effort)  -CW        Sit-Supine [place] (Bed Mobility)  verbal cues;moderate assist (50% patient effort)  -CW        Assistive Device (Bed Mobility)  bed rails;head of bed elevated;overhead trapeze  -CW           ADL Assessment/Intervention    BADL Assessment/Intervention  lower body dressing;grooming  -CW           Lower Body Dressing Assessment/Training    Lower Body Dressing [place] Level  lower body dressing skills;doff;socks;dependent (less than 25% patient effort)  -CW        Lower Body Dressing Position  edge of bed sitting  -CW           Grooming Assessment/Training    [place] Level (Grooming)  hair care, combing/brushing;wash face, hands;set up;supervision  -CW        Grooming Position  sitting up in bed  -CW           General ROM    GENERAL ROM COMMENTS  BUE AROM WFL's  -CW           MMT (Manual Muscle  Testing)    General MMT Comments  3+/4 BUE's weak  -CW           Motor Assessment/Interventions    Additional Documentation  Functional Endurance Training (Group)  -CW           Functional Endurance Training    Comment, Functional Endurance  fair-  -CW           Positioning and Restraints    Pre-Treatment Position  in bed  -CW        Post Treatment Position  bed  -CW        In Bed  supine;call light within reach;encouraged to call for assist;with nsg  -CW           Pain Assessment    Additional Documentation  -- No LE pain c/o at rest.   -CW           Edema Assessment & Management    Additional Documentation  -- Severe BLE lymphedema noted. RLE with coban wrap.   -CW           Wound Right calf Blisters    Wound - Properties Group Side: Right  -OSMANY Location: calf  -OSMANY Primary Wound Type: Blisters  -OSMANY       Wound 06/04/20 0330 Bilateral perineum Skin Tear    Wound - Properties Group Date first assessed: 06/04/20  -OSMANY Time first assessed: 0330  -OSMANY Present on Hospital Admission: Y  -OSMANY Side: Bilateral  -OSMANY Location: perineum  -OSMANY Primary Wound Type: Skin tear  -OSMANY       Plan of Care Review    Plan of Care Reviewed With  patient  -CW           Clinical Impression (OT)    Therapy Frequency (OT Eval)  5 times/wk  -CW        Anticipated Discharge Disposition (OT)  inpatient rehabilitation facility;skilled nursing facility  -CW           Vital Signs    O2 Delivery Pre Treatment  supplemental O2  -CW        O2 Delivery Intra Treatment  supplemental O2  -CW        O2 Delivery Post Treatment  supplemental O2  -CW           Planned OT Interventions    Planned Therapy Interventions (OT Eval)  activity tolerance training;BADL retraining;transfer/mobility retraining  -CW           OT Goals    Bed Mobility Goal Selection (OT)  bed mobility, OT goal 1  -CW        Dressing Goal Selection (OT)  dressing, OT goal 1  -CW        Endurance Goal Selection (OT)  endurance, OT goal 1  -CW        Additional Documentation  Endurance Goal  Selection (OT) (Row)  -CW           Bed Mobility Goal 1 (OT)    Activity/Assistive Device (Bed Mobility Goal 1, OT)  rolling to left;rolling to right;sit to supine/supine to sit;bed rails  -CW        [place] Level/Cues Needed (Bed Mobility Goal 1, OT)  minimum assist (75% or more patient effort)  -CW        Time Frame (Bed Mobility Goal 1, OT)  5 days  -CW           Dressing Goal 1 (OT)    Activity/Assistive Device (Dressing Goal 1, OT)  upper body dressing;lower body dressing  -CW        [place]/Cues Needed (Dressing Goal 1, OT)  set-up required;moderate assist (50-74% patient effort)  -CW        Time Frame (Dressing Goal 1, OT)  5 days  -CW            Endurance Goal 1 (OT)    Endurance Goal 1 (OT)  Increasce endurance to F+ during self care seated EOB.   -CW        Activity Level (Endurance Goal 1, OT)  endurance 2 fair +  -CW        Time Frame (Endurance Goal 1, OT)  5 days  -CW          User Key  (r) = Recorded By, (t) = Taken By, (c) = Cosigned By    Initials Name Effective Dates     Colleen Hidalgo OTR 06/08/18 -     Raquel Corrales RN 01/21/17 -          Occupational Therapy Education                 Title: PT OT SLP Therapies (In Progress)     Topic: Occupational Therapy (Done)     Point: ADL training (Done)     Description:   Instruct learner(s) on proper safety adaptation and remediation techniques during self care or transfers.   Instruct in proper use of assistive devices.              Learning Progress Summary           Patient Acceptance, E, VU by  at 6/9/2020 5484    Comment:  Reviewed role of OT and poc. Discussed safety. Pt. declines having DME at home.                               User Key     Initials Effective Dates Name Provider Type Discipline     06/08/18 -  Colleen Hidalgo OTR Occupational Therapist OT                  OT Recommendation and Plan  Outcome Summary/Treatment Plan (OT)  Anticipated Discharge Disposition (OT): inpatient rehabilitation facility, skilled nursing  facility  Planned Therapy Interventions (OT Eval): activity tolerance training, BADL retraining, transfer/mobility retraining  Therapy Frequency (OT Eval): 5 times/wk  Plan of Care Review  Plan of Care Reviewed With: patient  Plan of Care Reviewed With: patient  Outcome Summary: OT eval completed. Pt. presents with overall weakness, decreased endurance, self care/ mobility. LE edema severe. Grooming completed bed level with set up and supervision. Bed mobility mod/max A. LE dressing dependent.    Outcome Measures     Row Name 06/09/20 1300             How much help from another is currently needed...    Putting on and taking off regular lower body clothing?  1  -CW      Bathing (including washing, rinsing, and drying)  2  -CW      Toileting (which includes using toilet bed pan or urinal)  1  -CW      Putting on and taking off regular upper body clothing  2  -CW      Taking care of personal grooming (such as brushing teeth)  3  -CW      Eating meals  3  -CW      AM-PAC 6 Clicks Score (OT)  12  -CW         Functional Assessment    Outcome Measure Options  AM-PAC 6 Clicks Daily Activity (OT)  -CW        User Key  (r) = Recorded By, (t) = Taken By, (c) = Cosigned By    Initials Name Provider Type    Colleen Cleary OTR Occupational Therapist          Time Calculation:   Time Calculation- OT     Row Name 06/09/20 1327             Time Calculation- OT    OT Start Time  0855  -CW      OT Stop Time  0920  -CW      OT Time Calculation (min)  25 min  -CW      Total Timed Code Minutes- OT  15 minute(s)  -CW        User Key  (r) = Recorded By, (t) = Taken By, (c) = Cosigned By    Initials Name Provider Type    Colleen Cleary OTR Occupational Therapist        Therapy Charges for Today     Code Description Service Date Service Provider Modifiers Qty    57669011703  OT EVAL MOD COMPLEXITY 2 6/9/2020 Colleen Hidalgo OTR GO 1    [id number]  OT SELF CARE/MGMT/TRAIN EA 15 MIN 6/9/2020 Colleen Hidalgo OTR GO 1                Colleen Hidalgo, OTR  6/9/2020

## 2020-06-09 NOTE — PLAN OF CARE
Problem: Patient Care Overview  Goal: Plan of Care Review  Flowsheets (Taken 6/9/2020 1320)  Plan of Care Reviewed With: patient  Outcome Summary: OT eval completed. Pt. presents with overall weakness, decreased endurance, self care/ mobility. LE edema is severe. Grooming completed bed level with set up and supervision. Bed mobility mod/max A using trapeze bar and side rails. LE dressing dependent. Pt reports no LE pain at rest. Per pt she has no DME or AE at home. Lives with spouse who may be able to assist her at home. Anticipate D/c to rehab or SNF. Pt may benefit from OT to address deficits and maximize indep with self care/functional mobility.    Patient was not wearing a face mask during this therapy encounter. Therapist used appropriate personal protective equipment including mask and gloves.  Mask used was standard procedure mask. Appropriate PPE was worn during the entire therapy session. Hand hygiene was completed before and after therapy session. Patient is not in enhanced droplet precautions.

## 2020-06-09 NOTE — PLAN OF CARE
Problem: Patient Care Overview  Goal: Plan of Care Review  Outcome: Ongoing (interventions implemented as appropriate)  Flowsheets (Taken 6/9/2020 7942)  Progress: no change  Plan of Care Reviewed With: patient  Outcome Summary: meds per order and request, fc in place, picc in place, skin care per order and protocol, no falls, see labs and vs

## 2020-06-09 NOTE — PROGRESS NOTES
Continued Stay Note  [facility]     Patient Name: Emely Solomon  MRN: [id number]  Today's Date: 6/9/2020    Admit Date: 6/4/2020    Discharge Plan     Row Name 06/09/20 1259       Plan    Plan  Anticipate SNF, follow up for selection of referrals    Provided Post Acute Provider List?  Yes    Post Acute Provider List  Nursing Home;Home Health    Provided Post Acute Provider Quality & Resource List?  Yes    Post Acute Provider Quality and Resource List  Nursing Home    Delivered To  Patient    Method of Delivery  In person    Plan Comments  CCP met with patient at bedside. CCP discussed recommendations for SNF with patient. CCP provided HH/SNF list and printout of CMS ratings of nursing facilities. Patient requested time to review the ratings and facilities with her spouse. CCP will follow up with patient for selection of referrals for SNF. Antonieta DSOUZA        Discharge Codes    No documentation.             NEENA Jaramillo

## 2020-06-09 NOTE — PLAN OF CARE
Problem: Patient Care Overview  Goal: Plan of Care Review  Outcome: Ongoing (interventions implemented as appropriate)  Flowsheets (Taken 6/9/2020 1921)  Outcome Summary: pt showing steady progress with mobility, able to stand at bedside with assist, will progress as tolerated   ... Therapist used appropriate personal protective equipment including mask and gloves.  Mask used was standard procedure mask. Appropriate PPE was worn during the entire therapy session. Hand hygiene was completed before and after therapy session. Patient is not in enhanced droplet precautions.

## 2020-06-10 LAB
ALBUMIN SERPL-MCNC: 3 G/DL (ref 3.5–5.2)
ANION GAP SERPL CALCULATED.3IONS-SCNC: 14.9 MMOL/L (ref 5–15)
APTT PPP: 38.8 SECONDS (ref 22.7–35.4)
BUN BLD-MCNC: 38 MG/DL (ref 8–23)
BUN/CREAT SERPL: 24.8 (ref 7–25)
CALCIUM SPEC-SCNC: 9.2 MG/DL (ref 8.6–10.5)
CHLORIDE SERPL-SCNC: 86 MMOL/L (ref 98–107)
CO2 SERPL-SCNC: 34.1 MMOL/L (ref 22–29)
CREAT BLD-MCNC: 1.53 MG/DL (ref 0.57–1)
GFR SERPL CREATININE-BSD FRML MDRD: 35 ML/MIN/1.73
GLUCOSE BLD-MCNC: 97 MG/DL (ref 65–99)
INR PPP: 1.62 (ref 0.9–1.1)
MAGNESIUM SERPL-MCNC: 2.1 MG/DL (ref 1.6–2.4)
PHOSPHATE SERPL-MCNC: 4.5 MG/DL (ref 2.5–4.5)
POTASSIUM BLD-SCNC: 3.5 MMOL/L (ref 3.5–5.2)
PROTHROMBIN TIME: 18.9 SECONDS (ref 11.7–14.2)
SODIUM BLD-SCNC: 135 MMOL/L (ref 136–145)
URATE SERPL-MCNC: 11.4 MG/DL (ref 2.4–5.7)

## 2020-06-10 PROCEDURE — 80069 RENAL FUNCTION PANEL: CPT | Performed by: INTERNAL MEDICINE

## 2020-06-10 PROCEDURE — 97110 THERAPEUTIC EXERCISES: CPT

## 2020-06-10 PROCEDURE — 99232 SBSQ HOSP IP/OBS MODERATE 35: CPT | Performed by: INTERNAL MEDICINE

## 2020-06-10 PROCEDURE — 83735 ASSAY OF MAGNESIUM: CPT | Performed by: INTERNAL MEDICINE

## 2020-06-10 PROCEDURE — 97535 SELF CARE MNGMENT TRAINING: CPT

## 2020-06-10 PROCEDURE — 85730 THROMBOPLASTIN TIME PARTIAL: CPT | Performed by: INTERNAL MEDICINE

## 2020-06-10 PROCEDURE — 84550 ASSAY OF BLOOD/URIC ACID: CPT | Performed by: INTERNAL MEDICINE

## 2020-06-10 PROCEDURE — 85610 PROTHROMBIN TIME: CPT | Performed by: INTERNAL MEDICINE

## 2020-06-10 PROCEDURE — 25010000002 CEFAZOLIN PER 500 MG: Performed by: INTERNAL MEDICINE

## 2020-06-10 RX ORDER — POTASSIUM CHLORIDE 750 MG/1
40 CAPSULE, EXTENDED RELEASE ORAL ONCE
Status: COMPLETED | OUTPATIENT
Start: 2020-06-10 | End: 2020-06-10

## 2020-06-10 RX ORDER — CEPHALEXIN 500 MG/1
1000 CAPSULE ORAL EVERY 6 HOURS SCHEDULED
Status: DISCONTINUED | OUTPATIENT
Start: 2020-06-11 | End: 2020-06-17

## 2020-06-10 RX ORDER — TORSEMIDE 100 MG/1
100 TABLET ORAL DAILY
Status: DISCONTINUED | OUTPATIENT
Start: 2020-06-11 | End: 2020-06-11

## 2020-06-10 RX ADMIN — RIVAROXABAN 20 MG: 20 TABLET, FILM COATED ORAL at 18:00

## 2020-06-10 RX ADMIN — SODIUM CHLORIDE, PRESERVATIVE FREE 10 ML: 5 INJECTION INTRAVENOUS at 08:27

## 2020-06-10 RX ADMIN — PANTOPRAZOLE SODIUM 40 MG: 40 TABLET, DELAYED RELEASE ORAL at 05:11

## 2020-06-10 RX ADMIN — Medication 500 MG: at 08:26

## 2020-06-10 RX ADMIN — SODIUM CHLORIDE, PRESERVATIVE FREE 10 ML: 5 INJECTION INTRAVENOUS at 21:06

## 2020-06-10 RX ADMIN — CEFAZOLIN 3 G: 10 INJECTION, POWDER, FOR SOLUTION INTRAVENOUS at 18:00

## 2020-06-10 RX ADMIN — TORSEMIDE 100 MG: 100 TABLET ORAL at 08:26

## 2020-06-10 RX ADMIN — CEFAZOLIN 3 G: 10 INJECTION, POWDER, FOR SOLUTION INTRAVENOUS at 02:31

## 2020-06-10 RX ADMIN — CEFAZOLIN 3 G: 10 INJECTION, POWDER, FOR SOLUTION INTRAVENOUS at 11:54

## 2020-06-10 RX ADMIN — SODIUM CHLORIDE, PRESERVATIVE FREE 10 ML: 5 INJECTION INTRAVENOUS at 08:26

## 2020-06-10 RX ADMIN — MIDODRINE HYDROCHLORIDE 15 MG: 5 TABLET ORAL at 18:00

## 2020-06-10 RX ADMIN — MIDODRINE HYDROCHLORIDE 15 MG: 5 TABLET ORAL at 08:26

## 2020-06-10 RX ADMIN — MIDODRINE HYDROCHLORIDE 15 MG: 5 TABLET ORAL at 11:51

## 2020-06-10 RX ADMIN — SODIUM CHLORIDE, PRESERVATIVE FREE 10 ML: 5 INJECTION INTRAVENOUS at 21:05

## 2020-06-10 RX ADMIN — Medication 500 MG: at 21:05

## 2020-06-10 RX ADMIN — POTASSIUM CHLORIDE 40 MEQ: 10 CAPSULE, COATED, EXTENDED RELEASE ORAL at 11:51

## 2020-06-10 NOTE — PLAN OF CARE
Problem: Patient Care Overview  Goal: Plan of Care Review  Outcome: Ongoing (interventions implemented as appropriate)  Flowsheets (Taken 6/10/2020 0156)  Outcome Summary: Pt has no complaints of pain. Triple lumen PICC. Right leg wrapped. PO bumex started. Xarelto started and heparin gtt dc.VSS. Continue to monitor. Safety maintained.

## 2020-06-10 NOTE — PROGRESS NOTES
"   LOS: 6 days    Patient Care Team:  RENAY Smith MD as PCP - General (General Practice)    Chief Complaint:    Chief Complaint   Patient presents with   • Diarrhea   • Legs Swollen     Follow-up acute kidney injury  Subjective     Interval History:   The patient is feeling better, no shortness of air, no abdominal pain, she has Salvador catheter anchored in place, she continued to have significant edema, urine output in the past 24 hours was 3650 cc she has negative net fluid balance of 22,095 cc.  She was switched to oral diuretics yesterday morning    Objective     Vital Signs  Temp:  [97.6 °F (36.4 °C)-99.3 °F (37.4 °C)] 97.6 °F (36.4 °C)  Heart Rate:  [92-96] 96  Resp:  [18] 18  BP: (123-126)/(59-72) 124/59    Flowsheet Rows      First Filed Value   Admission Height  160 cm (63\") Documented at 06/04/2020 0001   Admission Weight  (!) 176 kg (388 lb 12.8 oz) Documented at 06/04/2020 0040          No intake/output data recorded.  I/O last 3 completed shifts:  In: 2170 [P.O.:2070; IV Piggyback:100]  Out: 5300 [Urine:5300]    Intake/Output Summary (Last 24 hours) at 6/10/2020 0856  Last data filed at 6/10/2020 0742  Gross per 24 hour   Intake 1650 ml   Output 3450 ml   Net -1800 ml       Physical Exam:  General Appearance: alert, oriented x 3, no acute distress  Skin: warm and dry  HEENT: pupils round and reactive to light, oral mucosa normal,   Neck: supple, no JVD, trachea midline  Lungs: diminished, unlabored breathing effort  Heart: RRR, normal S1 and S2, no S3, no rub  Abdomen: soft, non-tender,  present bowel sounds to auscultation, abdominal wall edema  : no palpable bladder, Salvador catheter is anchored in place  Extremities: massive legs with anasarca, redness bilaterally but worse on the left. No cyanosis or clubbing, the edema is better  Neuro: normal speech and mental status         Results Review:    Results from last 7 days   Lab Units 06/10/20  0633 06/09/20  0315 06/07/20  1532  06/06/20  1036  " 06/04/20  0035   SODIUM mmol/L 135* 134* 134*   < > 136   < > 122*   POTASSIUM mmol/L 3.5 3.5 3.4*   < > 3.0*   < > 3.9   CHLORIDE mmol/L 86* 86* 90*   < > 97*   < > 84*   CO2 mmol/L 34.1* 33.7* 32.4*   < > 26.6   < > 16.4*   BUN mg/dL 38* 35* 30*   < > 41*   < > 77*   CREATININE mg/dL 1.53* 1.33* 1.41*   < > 2.11*   < > 5.85*   CALCIUM mg/dL 9.2 8.9 9.0   < > 8.7   < > 8.8   BILIRUBIN mg/dL  --  0.5  --   --  0.7  --  1.0   ALK PHOS U/L  --  136*  --   --  152*  --  111   ALT (SGPT) U/L  --  15  --   --  30  --  33   AST (SGOT) U/L  --  19  --   --  30  --  53*   GLUCOSE mg/dL 97 97 118*   < > 165*   < > 114*    < > = values in this interval not displayed.       Estimated Creatinine Clearance: 70.4 mL/min (A) (by C-G formula based on SCr of 1.53 mg/dL (H)).    Results from last 7 days   Lab Units 06/10/20  0633 06/09/20  0315 06/07/20  0201   MAGNESIUM mg/dL 2.1 1.5* 1.6   PHOSPHORUS mg/dL 4.5 3.7 2.5       Results from last 7 days   Lab Units 06/10/20  0633 06/09/20  0315   URIC ACID mg/dL 11.4* 9.8*       Results from last 7 days   Lab Units 06/09/20 0315 06/07/20  1202 06/06/20  0316 06/05/20  0608 06/04/20  0434   WBC 10*3/mm3 15.46* 18.60* 22.14* 16.28* 15.89*   HEMOGLOBIN g/dL 9.6* 9.6* 9.5* 8.7* 9.8*   PLATELETS 10*3/mm3 379 394 314 258 215       Results from last 7 days   Lab Units 06/09/20 0315 06/08/20  0205 06/07/20  0201 06/06/20  0316 06/05/20  1458   INR  1.46* 1.23* 1.30* 1.29* 1.41*         Imaging Results (Last 24 Hours)     ** No results found for the last 24 hours. **          ceFAZolin 3 g Intravenous Q8H   heparin (porcine) 3,000 Units Intravenous Once   midodrine 15 mg Oral TID AC   pantoprazole 40 mg Oral Q AM   rivaroxaban 20 mg Oral Daily With Dinner   saccharomyces boulardii 500 mg Oral BID   sodium chloride 10 mL Intravenous Q12H   sodium chloride 10 mL Intravenous Q12H   sodium chloride 10 mL Intravenous Q12H   sodium chloride 10 mL Intravenous Q12H   torsemide 100 mg Oral BID           Medication Review:   Current Facility-Administered Medications   Medication Dose Route Frequency Provider Last Rate Last Dose   • acetaminophen (TYLENOL) tablet 650 mg  650 mg Oral Q4H PRN Danilo Croft MD   650 mg at 06/09/20 0300    Or   • acetaminophen (TYLENOL) 160 MG/5ML solution 650 mg  650 mg Oral Q4H PRN Danilo Croft MD        Or   • acetaminophen (TYLENOL) suppository 650 mg  650 mg Rectal Q4H PRN Danilo Croft MD       • albumin human 25 % IV SOLN 12.5 g  12.5 g Intravenous PRN Danilo Croft MD       • calcium gluconate 1 g in sodium chloride 0.9 % 50 mL IVPB  1 g Intravenous PRN Danilo Croft MD        Or   • calcium gluconate 2 g in sodium chloride 0.9 % 50 mL IVPB  2 g Intravenous PRN Danilo Croft MD       • ceFAZolin in Sodium Chloride (ANCEF) IVPB solution 3 g  3 g Intravenous Q8H Tariq Flores  mL/hr at 06/10/20 0231 3 g at 06/10/20 0231   • heparin (porcine) 5000 UNIT/ML injection 6,800-10,000 Units  40-58.5 Units/kg Intravenous Q6H PRN Danilo Croft MD       • heparin (porcine) injection 1,000-2,000 Units  1,000-2,000 Units Intravenous PRN Danilo Croft MD       • heparin (porcine) injection 3,000 Units  3,000 Units Intravenous Once Danilo Croft MD       • ipratropium-albuterol (DUO-NEB) nebulizer solution 3 mL  3 mL Nebulization Q6H PRN Danilo Croft MD       • midodrine (PROAMATINE) tablet 15 mg  15 mg Oral TID AC Danilo Croft MD   15 mg at 06/10/20 0826   • nitroglycerin (NITROSTAT) SL tablet 0.4 mg  0.4 mg Sublingual Q5 Min PRN Danilo Croft MD       • ondansetron (ZOFRAN) injection 4 mg  4 mg Intravenous Q6H PRN Danilo Croft MD       • pantoprazole (PROTONIX) EC tablet 40 mg  40 mg Oral Q AM Danilo Croft MD   40 mg at 06/10/20 0511   • potassium chloride 20 mEq in 50 mL IVPB  20 mEq Intravenous PRN Lang,  Danilo Mcpherson MD       • rivaroxaban (XARELTO) tablet 20 mg  20 mg Oral Daily With Dinner Nelson Orellana MD   20 mg at 06/09/20 1540   • saccharomyces boulardii (FLORASTOR) capsule 500 mg  500 mg Oral BID Tariq Flores MD   500 mg at 06/10/20 0826   • sodium chloride 0.9 % bolus 200 mL  200 mL Intravenous PRN Danilo Croft MD       • sodium chloride 0.9 % flush 10 mL  10 mL Intravenous Q12H MarimarvolDanilo huerta MD   10 mL at 06/10/20 0827   • sodium chloride 0.9 % flush 10 mL  10 mL Intravenous PRN Danilo Croft MD       • sodium chloride 0.9 % flush 10 mL  10 mL Intravenous Q12H MarimarvoluDanilo MD   10 mL at 06/10/20 0827   • sodium chloride 0.9 % flush 10 mL  10 mL Intravenous Q12H MarimarvoluDanilo MD   10 mL at 06/10/20 0827   • sodium chloride 0.9 % flush 10 mL  10 mL Intravenous Q12H MarimarvoluDanilo MD   10 mL at 06/10/20 0826   • sodium chloride 0.9 % flush 10 mL  10 mL Intravenous PRN Danilo Croft MD       • sodium chloride 0.9 % flush 20 mL  20 mL Intravenous PRN Danilo Croft MD       • sodium phosphates 10 mmol in sodium chloride 0.9 % 100 mL IVPB  10 mmol Intravenous PRN Danilo Croft MD       • torsemide (DEMADEX) tablet 100 mg  100 mg Oral BID Ashutosh Garcia MD   100 mg at 06/10/20 0826       Assessment/Plan   1.  Acute kidney injury, improving, creatinine is up to 1.53, the total CO2 increased up to 34.1, potassium 3.5.  2.  Hyponatremia, associated with fluid excess, sodium is 135.  3.  Sepsis with cellulitis, of the right lower extremity  4.  Morbid obesity  5.  Medical noncompliance  6.  Diastolic heart failure, with PFO  7.  History of DVT/PE  8.  Hypomagnesemia associated with diuretics, magnesium today is up to 2.1          Plan:  1.  Decrease the torsemide to once daily  2.  Replete potassium  3.  Surveillance labs        Ashutosh Garcia MD  06/10/20  08:56

## 2020-06-10 NOTE — PLAN OF CARE
Problem: Patient Care Overview  Goal: Plan of Care Review  Flowsheets (Taken 6/10/2020 1223)  Plan of Care Reviewed With: patient  Outcome Summary: OT-Pt. participated  with UE therap ex. bed level. Endurance fair-. Grooming hair and oral hygiene completed with set up/supervision. Bed mobility improved to min A supine to sit using bed rails and trapeze bar. O2 sats dropped to 87 after sitting, but increased shortly after. Plan to cont. OT to increase functional endurance and indep with self care.      Patient was not wearing a face mask during this therapy encounter. Therapist used appropriate personal protective equipment including mask and gloves.  Mask used was standard procedure mask. Appropriate PPE was worn during the entire therapy session. Hand hygiene was completed before and after therapy session. Patient is not in enhanced droplet precautions.

## 2020-06-10 NOTE — THERAPY TREATMENT NOTE
Acute Care - Physical Therapy Treatment Note  [facility]     Patient Name: Emely Solomon  : 1960  MRN: [id number]  Today's Date: 6/10/2020             Admit Date: 2020    Visit Dx:    ICD-10-CM ICD-9-CM   1. Cellulitis of right anterior lower leg L03.115 682.6   2. STEFANIA (acute kidney injury) (CMS/HCC) N17.9 584.9   3. Lymphedema I89.0 457.1   4. Sepsis, due to unspecified organism, unspecified whether acute organ dysfunction present (CMS/HCC) A41.9 038.9     995.91   5. Acute renal failure, unspecified acute renal failure type (CMS/HCC) N17.9 584.9     Patient Active Problem List   Diagnosis   • Chronic diastolic CHF (congestive heart failure) (CMS/HCC)   • PFO (patent foramen ovale)   • Paradoxical embolism (CMS/HCC)   • Hypertension   • Pulmonary hypertension (CMS/HCC)   • Sepsis (CMS/HCC)   • Back pain   • ARIEL (obstructive sleep apnea)   • Morbid obesity (CMS/HCC)   • Urinary frequency   • History of DVT of lower extremity   • Cellulitis of right anterior lower leg   • Lymphedema   • Cellulitis of right lower extremity   • Hyponatremia   • Acute renal failure (ARF) (CMS/HCC)   • Anemia, chronic disease       Therapy Treatment    Rehabilitation Treatment Summary     Row Name 06/10/20 1700 06/10/20 1213          Treatment Time/Intention    Discipline  physical therapy assistant  -  occupational therapist  -     Document Type  therapy note (daily note)  -  therapy note (daily note)  -     Subjective Information  complains of;weakness;fatigue;pain  -  --     Mode of Treatment  individual therapy;physical therapy  -  occupational therapy  -CW     Patient/Family Observations  --  Spouse present. Pt. resting supine bed level   -     Care Plan Review  patient/other agree to care plan  -  --     Patient Effort  --  good  -     Comment  decr skin integrity  -  --     Existing Precautions/Restrictions  fall  -  fall  -     Treatment Considerations/Comments  on marcus bed  -  --      Recorded by [JM] Raquel Todd, AZEB 06/10/20 1712 [CW] Colleen Hidalgo OTR 06/10/20 1218     Row Name 06/10/20 1213             Vital Signs    O2 Delivery Pre Treatment  supplemental O2  -CW      O2 Delivery Intra Treatment  supplemental O2  -CW      O2 Delivery Post Treatment  supplemental O2  -CW      Recorded by [CW] Colleen Hidalgo OTR 06/10/20 1218      Row Name 06/10/20 1213             Cognitive Assessment/Intervention- PT/OT    Orientation Status (Cognition)  oriented x 3  -CW      Follows Commands (Cognition)  WNL  -CW      Recorded by [CW] Colleen Hidalgo OTR 06/10/20 1218      Row Name 06/10/20 1700 06/10/20 1213          Bed Mobility Assessment/Treatment    Bed Mobility Assessment/Treatment  --  rolling left  -CW     Rolling Left [place] (Bed Mobility)  contact guard  -JM  verbal cues;contact guard  -CW     Rolling Right [place] (Bed Mobility)  contact guard  -  --     Supine-Sit [place] (Bed Mobility)  2 person assist;minimum assist (75% patient effort);moderate assist (50% patient effort)  -  minimum assist (75% patient effort)  -     Sit-Supine [place] (Bed Mobility)  2 person assist;maximum assist (25% patient effort) pt scooted too far to EOB, req more assist to scoot safely  -  verbal cues;minimum assist (75% patient effort)  -CW     Bed Mobility, Safety Issues  decreased use of arms for pushing/pulling;decreased use of legs for bridging/pushing;impaired trunk control for bed mobility  -  --     Assistive Device (Bed Mobility)  bed rails;draw sheet;head of bed elevated;overhead trapeze  -  bed rails;head of bed elevated;overhead trapeze  -     Comment (Bed Mobility)  pt having diff using RLE to assist due to pain on back of leg, skin irritated area  -  --     Recorded by [JM] Raquel Todd, AZEB 06/10/20 1712 [CW] Colleen Hidalgo OTR 06/10/20 1218     Row Name 06/10/20 1700             Sit-Stand Transfer    Sit-Stand [place] (Transfers)  2 person  assist;maximum assist (25% patient effort);verbal cues;nonverbal cues (demo/gesture) perf x2, difficulty getting safely back on EOB ea time  -JM      Recorded by [JM] Raquel Todd PTA 06/10/20 1712      Row Name 06/10/20 1700             Gait/Stairs Assessment/Training    Comment (Gait/Stairs)  unsafe to attempt from high marcus bed  -JM      Recorded by [JM] Raquel Todd PTA 06/10/20 1712      Row Name 06/10/20 1213             Lower Body Dressing Assessment/Training    Lower Body Dressing [place] Level  lower body dressing skills;doff;don;socks;dependent (less than 25% patient effort)  -CW      Lower Body Dressing Position  edge of bed sitting  -CW      Recorded by [CW] Colleen Hidalgo OTR 06/10/20 1218      Row Name 06/10/20 1213             Grooming Assessment/Training    [place] Level (Grooming)  grooming skills;hair care, combing/brushing;oral care regimen;set up;supervision  -CW      Grooming Position  sitting up in bed  -CW      Recorded by [CW] Colleen Hidalgo OTR 06/10/20 1234      Row Name 06/10/20 1213             Motor Skills Assessment/Interventions    Additional Documentation  Therapeutic Exercise (Group);Therapeutic Exercise Interventions (Group)  -CW      Recorded by [CW] Colleen Hidalgo OTR 06/10/20 1221      Row Name 06/10/20 1213             Therapeutic Exercise    Upper Extremity Range of Motion (Therapeutic Exercise)  shoulder flexion/extension, bilateral;shoulder horizontal abduction/adduction, bilateral;elbow flexion/extension, bilateral;forearm supination/pronation, bilateral;wrist flexion/extension, bilateral  -CW      Hand (Therapeutic Exercise)  finger flexion/extension, bilateral;thumb finger opposition, bilateral  -CW      Exercise Type (Therapeutic Exercise)  AROM (active range of motion)  -CW      Position (Therapeutic Exercise)  supine  -CW      Sets/Reps (Therapeutic Exercise)  10x/set with rest breaks  -CW      Expected Outcome (Therapeutic Exercise)  improve  functional tolerance, self-care activity;improve performance, BADLs  -CW      Recorded by [CW] Colleen Hidalgo OTR 06/10/20 1221      Row Name 06/10/20 1213             Functional Endurance Training    Comment, Functional Endurance  fair-  -CW      Recorded by [CW] Colleen Hidalgo OTR 06/10/20 1221      Row Name 06/10/20 1700 06/10/20 1213          Positioning and Restraints    Pre-Treatment Position  in bed  -  in bed  -CW     Post Treatment Position  bed  -  bed  -CW     In Bed  supine;call light within reach;encouraged to call for assist;notified nsg  -JM  supine;call light within reach;encouraged to call for assist;with family/caregiver  -CW     Recorded by [JM] Raquel Todd PTA 06/10/20 1712 [CW] Colleen Hidalgo OTR 06/10/20 1221     Row Name 06/10/20 1213             Pain Assessment    Additional Documentation  -- No pain c/o at rest.   -CW      Recorded by [CW] Colleen Hidalgo OTR 06/10/20 1221      Row Name 06/10/20 1700             Pain Scale: Numbers Pre/Post-Treatment    Pain Location - Side  Right  -      Pain Location - Orientation  lower  -      Pain Location  extremity  -      Pain Intervention(s)  Repositioned alerted RN as pt had not c/o earlier when asked  -      Recorded by [JM] Raquel Todd PTA 06/10/20 1712      Row Name 06/10/20 1700             Pain Scale: Word Pre/Post-Treatment    Pain: Word Scale, Pretreatment  4 - moderate pain  -      Pain: Word Scale, Post-Treatment  6 - moderate-severe pain  -      Recorded by [JM] Raquel Todd PTA 06/10/20 1712      Row Name                Wound Right calf Blisters    Wound - Properties Group Side: Right [OSMANY] Location: calf [OSMANY] Primary Wound Type: Blisters [OSMANY] Recorded by:  [OSMANY] Raquel Webb RN 06/04/20 0536    Row Name                Wound 06/04/20 0330 Bilateral perineum Skin Tear    Wound - Properties Group Date first assessed: 06/04/20 [OSMANY] Time first assessed: 0330 [OSMANY] Present on Hospital Admission: Y [OSMANY] Side:  Bilateral [OSMANY] Location: perineum [OSMANY] Primary Wound Type: Skin tear [OSMANY] Recorded by:  [OSMANY] Raquel Webb RN 06/04/20 0537    Row Name 06/10/20 1213             Plan of Care Review    Plan of Care Reviewed With  patient  -CW      Recorded by [CW] Colleen Hidalgo OTR 06/10/20 1221        User Key  (r) = Recorded By, (t) = Taken By, (c) = Cosigned By    Initials Name Effective Dates Discipline    CW Colleen Hidalgo OTR 06/08/18 -  OT    Raquel Matt PTA 03/07/18 -  PT    Raquel Corrales RN 01/21/17 -  Nurse          Wound Right calf Blisters (Active)   Dressing Appearance dry;intact 6/10/2020  1:55 PM   Closure JOYCE 6/10/2020  1:55 PM   Drainage Amount none 6/10/2020  1:55 PM       Wound 06/04/20 0330 Bilateral perineum Skin Tear (Active)   Dressing Appearance open to air 6/10/2020 12:57 AM   Closure None 6/10/2020 12:57 AM   Drainage Amount scant 6/10/2020 12:57 AM           Physical Therapy Education                 Title: PT OT SLP Therapies (Done)     Topic: Physical Therapy (Done)     Point: Mobility training (Done)     Description:   Instruct learner(s) on safety and technique for assisting patient out of bed, chair or wheelchair.  Instruct in the proper use of assistive devices, such as walker, crutches, cane or brace.              Patient Friendly Description:   It's important to get you on your feet again, but we need to do so in a way that is safe for you. Falling has serious consequences, and your personal safety is the most important thing of all.        When it's time to get out of bed, one of us or a family member will sit next to you on the bed to give you support.     If your doctor or nurse tells you to use a walker, crutches, a cane, or a brace, be sure you use it every time you get out of bed, even if you think you don't need it.    Learning Progress Summary           Patient Acceptance, E,D, VU,NR by PINA at 6/10/2020 1715    Acceptance, E,D, NR by ZULY at 6/9/2020 1255    Acceptance,  E,TB, VU,NR by  at 6/8/2020 1522                   Point: Home exercise program (Done)     Description:   Instruct learner(s) on appropriate technique for monitoring, assisting and/or progressing patient with therapeutic exercises and activities.              Learning Progress Summary           Patient Acceptance, E,D, VU,NR by  at 6/10/2020 1715    Acceptance, E,D, NR by  at 6/9/2020 1255    Acceptance, E,TB, VU,NR by CS at 6/8/2020 1522                   Point: Body mechanics (Done)     Description:   Instruct learner(s) on proper positioning and spine alignment for patient and/or caregiver during mobility tasks and/or exercises.              Learning Progress Summary           Patient Acceptance, E,D, VU,NR by  at 6/10/2020 1715    Acceptance, E,D, NR by  at 6/9/2020 1255    Acceptance, E,TB, VU,NR by  at 6/8/2020 1522                   Point: Precautions (Done)     Description:   Instruct learner(s) on prescribed precautions during mobility and gait tasks              Learning Progress Summary           Patient Acceptance, E,D, VU,NR by  at 6/10/2020 1715    Acceptance, E,D, NR by  at 6/9/2020 1255    Acceptance, E,TB, VU,NR by  at 6/8/2020 1522                               User Key     Initials Effective Dates Name Provider Type Discipline     04/03/18 -  Esperanza Larios, PT Physical Therapist PT     03/07/18 -  Raquel Todd PTA Physical Therapy Assistant PT     05/14/18 -  Tank Lucas, PT Physical Therapist PT                PT Recommendation and Plan     Plan of Care Reviewed With: patient  Outcome Summary: pt able to stand x2 w/max assist of 2 required; height of bed and wkness in LEs making this task more difficult due to safety; pt very willing to try but fatigued quickly; plans SNU at [place]  Outcome Measures     Row Name 06/10/20 1700 06/10/20 1200 06/09/20 1300       How much help from another person do you currently need...    Turning from your back to your side while in  flat bed without using bedrails?  3  -JM  --  --    Moving from lying on back to sitting on the side of a flat bed without bedrails?  2  -JM  --  --    Moving to and from a bed to a chair (including a wheelchair)?  1  -JM  --  --    Standing up from a chair using your arms (e.g., wheelchair, bedside chair)?  2  -JM  --  --    Climbing 3-5 steps with a railing?  1  -JM  --  --    To walk in hospital room?  1  -JM  --  --    AM-PAC 6 Clicks Score (PT)  10  -JM  --  --       How much help from another is currently needed...    Putting on and taking off regular lower body clothing?  --  1  -CW  1  -CW    Bathing (including washing, rinsing, and drying)  --  2  -CW  2  -CW    Toileting (which includes using toilet bed pan or urinal)  --  1  -CW  1  -CW    Putting on and taking off regular upper body clothing  --  2  -CW  2  -CW    Taking care of personal grooming (such as brushing teeth)  --  3  -CW  3  -CW    Eating meals  --  4  -CW  3  -CW    AM-PAC 6 Clicks Score (OT)  --  13  -CW  12  -CW       Functional Assessment    Outcome Measure Options  --  AM-PAC 6 Clicks Daily Activity (OT)  -CW  AM-PAC 6 Clicks Daily Activity (OT)  -CW      User Key  (r) = Recorded By, (t) = Taken By, (c) = Cosigned By    Initials Name Provider Type    Colleen Cleary, OTR Occupational Therapist    Raquel Matt PTA Physical Therapy Assistant         Time Calculation:   PT Charges     Row Name 06/10/20 1706             Time Calculation    Start Time  1541  -      Stop Time  1605  -      Time Calculation (min)  24 min  -PINA      PT Received On  06/10/20  -      PT - Next Appointment  06/11/20  -PINA        User Key  (r) = Recorded By, (t) = Taken By, (c) = Cosigned By    Initials Name Provider Type    Raquel Matt PTA Physical Therapy Assistant        Therapy Charges for Today     Code Description Service Date Service Provider Modifiers Qty    [id number] HC PT THER PROC EA 15 MIN 6/10/2020 Raquel Todd PTA GP 2     [id number]  PT THER SUPP EA 15 MIN 6/10/2020 Raquel Todd, AZEB GP 2          PT G-Codes  Outcome Measure Options: AM-PAC 6 Clicks Daily Activity (OT)  AM-PAC 6 Clicks Score (PT): 10  AM-PAC 6 Clicks Score (OT): 13    Raquel Todd PTA  6/10/2020

## 2020-06-10 NOTE — PLAN OF CARE
Problem: Patient Care Overview  Goal: Plan of Care Review  Outcome: Ongoing (interventions implemented as appropriate)  Flowsheets (Taken 6/10/2020 2507)  Plan of Care Reviewed With: patient  Outcome Summary: pt able to stand x2 w/max assist of 2 required; height of bed and wkness in LEs making this task more difficult due to safety; pt very willing to try but fatigued quickly; plans SNU at [place]

## 2020-06-10 NOTE — THERAPY TREATMENT NOTE
Acute Care - Occupational Therapy Treatment Note  [facility]     Patient Name: Emely Solomon  : 1960  MRN: [id number]  Today's Date: 6/10/2020             Admit Date: 2020       ICD-10-CM ICD-9-CM   1. Cellulitis of right anterior lower leg L03.115 682.6   2. STEFANIA (acute kidney injury) (CMS/[facility]) N17.9 584.9   3. Lymphedema I89.0 457.1   4. Sepsis, due to unspecified organism, unspecified whether acute organ dysfunction present (CMS/[facility]) A41.9 038.9     995.91   5. Acute renal failure, unspecified acute renal failure type (CMS/[facility]) N17.9 584.9     Patient Active Problem List   Diagnosis   • Chronic diastolic CHF (congestive heart failure) (CMS/[facility])   • PFO (patent foramen ovale)   • Paradoxical embolism (CMS/[facility])   • Hypertension   • Pulmonary hypertension (CMS/[facility])   • Sepsis (CMS/[facility])   • Back pain   • ARIEL (obstructive sleep apnea)   • Morbid obesity (CMS/[facility])   • Urinary frequency   • History of DVT of lower extremity   • Cellulitis of right anterior lower leg   • Lymphedema   • Cellulitis of right lower extremity   • Hyponatremia   • Acute renal failure (ARF) (CMS/[facility])   • Anemia, chronic disease     Past Medical History:   Diagnosis Date   • Cellulitis     2017, with Group B Strep bacteremia and sepsis   • Chronic diastolic congestive heart failure (CMS/[facility])    • Deep venous thrombosis (CMS/[facility])    • Hypertension    • Lipoedema    • Lymphedema    • Morbid obesity (CMS/[facility])    • Paradoxical embolism (CMS/[facility])     to the LLE due to DVT/PE and PFO   • PFO (patent foramen ovale)    • Pulmonary embolism (CMS/[facility])    • Pulmonary hypertension (CMS/[facility])     multifactorial (dCHF, obesity/ARIEL, hx PE), mild by echo 2016     Past Surgical History:   Procedure Laterality Date   • BRONCHOSCOPY N/A 2017    Procedure: BRONCHOSCOPY with wash;  Surgeon: Caleb Garcia MD;  Location: [facility] ENDOSCOPY;  Service:    • DILATATION AND CURETTAGE  2011   • VENA CAVA FILTER PLACEMENT      Inferior Vena Cava  Filter Placement w/Fluorosc angiogr guidance       Therapy Treatment    Rehabilitation Treatment Summary     Row Name 06/10/20 1213             Treatment Time/Intention    Discipline  occupational therapist  -CW      Document Type  therapy note (daily note)  -CW      Mode of Treatment  occupational therapy  -CW      Patient/Family Observations  Spouse present. Pt. resting supine bed level   -CW      Patient Effort  good  -CW      Existing Precautions/Restrictions  fall  -CW      Recorded by [CW] Colleen Hidalgo OTR 06/10/20 1218      Row Name 06/10/20 1213             Vital Signs    O2 Delivery Pre Treatment  supplemental O2  -CW      O2 Delivery Intra Treatment  supplemental O2  -CW      O2 Delivery Post Treatment  supplemental O2  -CW      Recorded by [CW] Colleen Hidalgo OTR 06/10/20 1218      Row Name 06/10/20 1213             Cognitive Assessment/Intervention- PT/OT    Orientation Status (Cognition)  oriented x 3  -CW      Follows Commands (Cognition)  WNL  -CW      Recorded by [CW] Colleen Hidalgo OTR 06/10/20 1218      Row Name 06/10/20 1213             Bed Mobility Assessment/Treatment    Bed Mobility Assessment/Treatment  rolling left  -CW      Rolling Left [place] (Bed Mobility)  verbal cues;contact guard  -CW      Supine-Sit [place] (Bed Mobility)  minimum assist (75% patient effort)  -CW      Sit-Supine [place] (Bed Mobility)  verbal cues;minimum assist (75% patient effort)  -CW      Assistive Device (Bed Mobility)  bed rails;head of bed elevated;overhead trapeze  -CW      Recorded by [CW] Colleen Hidalgo OTR 06/10/20 1218      Row Name 06/10/20 1213             Lower Body Dressing Assessment/Training    Lower Body Dressing [place] Level  lower body dressing skills;doff;don;socks;dependent (less than 25% patient effort)  -CW      Lower Body Dressing Position  edge of bed sitting  -CW      Recorded by [CW] Colleen Hidalgo OTR 06/10/20 1218      Row Name 06/10/20 1213             Grooming  Assessment/Training    [place] Level (Grooming)  grooming skills;hair care, combing/brushing;oral care regimen;set up;supervision  -CW      Grooming Position  sitting up in bed  -CW      Recorded by [CW] Colleen Hidalgo OTR 06/10/20 1234      Row Name 06/10/20 1213             Motor Skills Assessment/Interventions    Additional Documentation  Therapeutic Exercise (Group);Therapeutic Exercise Interventions (Group)  -CW      Recorded by [CW] Colleen Hidalgo OTR 06/10/20 1221      Row Name 06/10/20 1213             Therapeutic Exercise    Upper Extremity Range of Motion (Therapeutic Exercise)  shoulder flexion/extension, bilateral;shoulder horizontal abduction/adduction, bilateral;elbow flexion/extension, bilateral;forearm supination/pronation, bilateral;wrist flexion/extension, bilateral  -CW      Hand (Therapeutic Exercise)  finger flexion/extension, bilateral;thumb finger opposition, bilateral  -CW      Exercise Type (Therapeutic Exercise)  AROM (active range of motion)  -CW      Position (Therapeutic Exercise)  supine  -CW      Sets/Reps (Therapeutic Exercise)  10x/set with rest breaks  -CW      Expected Outcome (Therapeutic Exercise)  improve functional tolerance, self-care activity;improve performance, BADLs  -CW      Recorded by [CW] Colleen Hidalgo OTR 06/10/20 1221      Row Name 06/10/20 1213             Functional Endurance Training    Comment, Functional Endurance  fair-  -CW      Recorded by [CW] Colleen Hidalgo OTR 06/10/20 1221      Row Name 06/10/20 1213             Positioning and Restraints    Pre-Treatment Position  in bed  -CW      Post Treatment Position  bed  -CW      In Bed  supine;call light within reach;encouraged to call for assist;with family/caregiver  -CW      Recorded by [CW] Colleen Hidalgo OTR 06/10/20 1221      Row Name 06/10/20 1213             Pain Assessment    Additional Documentation  -- No pain c/o at rest.   -CW      Recorded by [CW] Colleen Hidalgo OTR 06/10/20 1221      Row Name                 Wound Right calf Blisters    Wound - Properties Group Side: Right [OSMANY] Location: calf [OSMANY] Primary Wound Type: Blisters [OSMANY] Recorded by:  [OSMANY] Raquel Webb RN 06/04/20 0536    Row Name                Wound 06/04/20 0330 Bilateral perineum Skin Tear    Wound - Properties Group Date first assessed: 06/04/20 [OSMANY] Time first assessed: 0330 [OSMANY] Present on Hospital Admission: Y [OSMANY] Side: Bilateral [OSMANY] Location: perineum [OSMANY] Primary Wound Type: Skin tear [OSMANY] Recorded by:  [OSMANY] Raquel Webb RN 06/04/20 0537    Row Name 06/10/20 1213             Plan of Care Review    Plan of Care Reviewed With  patient  -CW      Recorded by [GIULIANA] Colleen Hidalgo OTR 06/10/20 1221        User Key  (r) = Recorded By, (t) = Taken By, (c) = Cosigned By    Initials Name Effective Dates Discipline    CW Colleen Hidalgo OTR 06/08/18 -  OT    Raquel Corrales RN 01/21/17 -  Nurse        Wound Right calf Blisters (Active)   Dressing Appearance dry;intact 6/10/2020  8:26 AM   Closure JOYCE 6/10/2020  8:26 AM   Drainage Amount none 6/10/2020  8:26 AM       Wound 06/04/20 0330 Bilateral perineum Skin Tear (Active)   Dressing Appearance open to air 6/10/2020 12:57 AM   Closure None 6/10/2020 12:57 AM   Drainage Characteristics/Odor serosanguineous 6/9/2020  1:44 PM   Drainage Amount scant 6/10/2020 12:57 AM       Occupational Therapy Education                 Title: PT OT SLP Therapies (In Progress)     Topic: Occupational Therapy (Done)     Point: ADL training (Done)     Description:   Instruct learner(s) on proper safety adaptation and remediation techniques during self care or transfers.   Instruct in proper use of assistive devices.              Learning Progress Summary           Patient Acceptance, E, VU by CW at 6/10/2020 1221    Comment:  Reviewed safety sitting EOB. Encouraged increased indep with self care.    Acceptance, E, VU by CW at 6/9/2020 1319    Comment:  Reviewed role of OT and poc. Discussed safety. Pt.  declines having DME at home.                               User Key     Initials Effective Dates Name Provider Type Discipline     06/08/18 -  Colleen Hidalgo OTR Occupational Therapist OT                OT Recommendation and Plan  Outcome Summary/Treatment Plan (OT)  Anticipated Discharge Disposition (OT): inpatient rehabilitation facility, skilled nursing facility  Planned Therapy Interventions (OT Eval): activity tolerance training, BADL retraining, transfer/mobility retraining  Therapy Frequency (OT Eval): 5 times/wk  Plan of Care Review  Plan of Care Reviewed With: patient  Plan of Care Reviewed With: patient  Outcome Summary: OT-Pt. participated  with UE therap ex. bed level. Endurance fair-. Grooming hair and oral hygiene completed with set up and supervision. Bed mobility improved to min A supine to sit using bed rails and trapeze bar. O2 sats dropped to 87 after sitting, but increased shortly after.  Outcome Measures     Row Name 06/10/20 1200 06/09/20 1300          How much help from another is currently needed...    Putting on and taking off regular lower body clothing?  1  -CW  1  -CW     Bathing (including washing, rinsing, and drying)  2  -CW  2  -CW     Toileting (which includes using toilet bed pan or urinal)  1  -CW  1  -CW     Putting on and taking off regular upper body clothing  2  -CW  2  -CW     Taking care of personal grooming (such as brushing teeth)  3  -CW  3  -CW     Eating meals  4  -CW  3  -CW     AM-PAC 6 Clicks Score (OT)  13  -CW  12  -CW        Functional Assessment    Outcome Measure Options  AM-PAC 6 Clicks Daily Activity (OT)  -CW  AM-PAC 6 Clicks Daily Activity (OT)  -CW       User Key  (r) = Recorded By, (t) = Taken By, (c) = Cosigned By    Initials Name Provider Type    CW Colleen Hidalgo OTR Occupational Therapist           Time Calculation:   Time Calculation- OT     Row Name 06/10/20 1232             Time Calculation- OT    OT Start Time  1137  -CW      OT Stop Time  1203   -CW      OT Time Calculation (min)  26 min  -CW      Total Timed Code Minutes- OT  26 minute(s)  -CW        User Key  (r) = Recorded By, (t) = Taken By, (c) = Cosigned By    Initials Name Provider Type    Colleen Cleary OTR Occupational Therapist        Therapy Charges for Today     Code Description Service Date Service Provider Modifiers Qty    31691635986 HC OT EVAL MOD COMPLEXITY 2 6/9/2020 Colleen Hidalgo OTR GO 1    17640141359 HC OT SELF CARE/MGMT/TRAIN EA 15 MIN 6/9/2020 Colleen Hidalgo OTR GO 1    96843095558 HC OT SELF CARE/MGMT/TRAIN EA 15 MIN 6/10/2020 Colleen Hidalgo OTR GO 1    34795866603 HC OT THER PROC EA 15 MIN 6/10/2020 Colleen Hidalgo OTR GO 1               RADHA Miller  6/10/2020

## 2020-06-10 NOTE — PROGRESS NOTES
"    DAILY PROGRESS NOTE  [facility]    Patient Identification:  Name: Emely Solomon  Age: 59 y.o.  Sex: female  :  1960  MRN: [id number]         Primary Care Physician: RENAY Smith MD    Subjective:  Interval History: No new complaints today per patient.  She is overall feeling better.  Still some pronounced generalized weakness.  Denies any nausea vomiting chest pain or troubles breathing.    Objective: Resting in bed.  No family at bedside.  Patient conversational and nontoxic-appearing    Scheduled Meds:  ceFAZolin 3 g Intravenous Q8H   [START ON 2020] cephalexin 1,000 mg Oral Q6H   midodrine 15 mg Oral TID AC   pantoprazole 40 mg Oral Q AM   rivaroxaban 20 mg Oral Daily With Dinner   saccharomyces boulardii 500 mg Oral BID   sodium chloride 10 mL Intravenous Q12H   sodium chloride 10 mL Intravenous Q12H   sodium chloride 10 mL Intravenous Q12H   sodium chloride 10 mL Intravenous Q12H   [START ON 2020] torsemide 100 mg Oral Daily     Continuous Infusions:     Vital signs in last 24 hours:  Temp:  [97.6 °F (36.4 °C)-99.3 °F (37.4 °C)] 98.7 °F (37.1 °C)  Heart Rate:  [87-96] 87  Resp:  [18] 18  BP: (119-126)/(59-79) 119/79    Intake/Output:    Intake/Output Summary (Last 24 hours) at 6/10/2020 1419  Last data filed at 6/10/2020 1300  Gross per 24 hour   Intake 1870 ml   Output 3150 ml   Net -1280 ml       Exam:  /79 (BP Location: Right arm, Patient Position: Lying)   Pulse 87   Temp 98.7 °F (37.1 °C) (Oral)   Resp 18   Ht 160 cm (63\")   Wt (!) 207 kg (456 lb 12.7 oz)   SpO2 90%   BMI 80.92 kg/m²     General Appearance:    Alert, cooperative, no distress, AAOx3                          Throat:   Oral mucosa pink and moist                           Neck:   No JVD                         Lungs:    Clear to auscultation bilaterally, respirations unlabored                          Heart:    Regular rate and rhythm, S1 and S2 normal                 Abdomen:     Soft, " nontender, bowel sounds active                 Extremities: Right lower extremity wrapped, lymphedema noted though additional fluid retention is definitely improved                         Data Review:  Labs in chart were reviewed.    Assessment:  Active Hospital Problems    Diagnosis  POA   • **Sepsis (CMS/[facility]) [A41.9]  Yes   • History of DVT of lower extremity [Z86.718]  Not Applicable   • Cellulitis of right anterior lower leg [L03.115]  Yes   • Lymphedema [I89.0]  Yes   • Cellulitis of right lower extremity [L03.115]  Yes   • Hyponatremia [E87.1]  Yes   • Acute renal failure (ARF) (CMS/[facility]) [N17.9]  Yes   • Anemia, chronic disease [D63.8]  Yes   • Morbid obesity (CMS/[facility]) [E66.01]  Yes   • ARIEL (obstructive sleep apnea) [G47.33]  Yes   • Hypertension [I10]  Yes   • PFO (patent foramen ovale) [Q21.1]  Not Applicable   • Chronic diastolic CHF (congestive heart failure) (CMS/[facility]) [I50.32]  Unknown   • Pulmonary hypertension (CMS/[facility]) [I27.20]  Yes      Resolved Hospital Problems   No resolved problems to display.       Plan:    Continue antibiotics per ID maintaining cefazolin while inpatient with plans to switch to Keflex with a stop date of 6/18/2020    ARF resolving   -Diuretic switch to p.o. and decreased   -K replacement per renal   -Remains on 3 times daily midodrine    Past history of PE/DVT   -Xarelto reinstated per heme      Disposition - CCP working on needs but I anticipate this patient will be ready by tomorrow if no objection from any consultants involved    Toño Giron MD  6/10/2020  14:19

## 2020-06-10 NOTE — PLAN OF CARE
Problem: Patient Care Overview  Goal: Plan of Care Review  Outcome: Ongoing (interventions implemented as appropriate)   Vss. F/c remains in place. Worked with PT. No c/o pain or n/v. Possible d/c in am

## 2020-06-10 NOTE — PROGRESS NOTES
[facility]  [phone]  Caleb Garcia MD    Subjective:  LOS: 6    Denies new complaints.  Remains on low-flow oxygen.  Diuresing with oral meds now.    Objective   Vital Signs past 24hrs    Temp range: Temp (24hrs), Av.4 °F (36.9 °C), Min:97.6 °F (36.4 °C), Max:99.3 °F (37.4 °C)    BP range: BP: (119-126)/(59-79) 119/79  Pulse range: Heart Rate:  [87-96] 87  Resp rate range: Resp:  [18] 18    Device (Oxygen Therapy): nasal cannulaFlow (L/min):  [2] 2  Oxygen range:       (!) 207 kg (456 lb 12.7 oz); Body mass index is 80.92 kg/m².    Intake/Output Summary (Last 24 hours) at 6/10/2020 1710  Last data filed at 6/10/2020 1300  Gross per 24 hour   Intake 1870 ml   Output 2400 ml   Net -530 ml       Physical Exam   Constitutional: She appears well-developed.   HENT:   Head: Normocephalic.   Cardiovascular: Normal rate and regular rhythm.   No murmur heard.  Pulmonary/Chest: Effort normal. No respiratory distress. She has no wheezes. She has no rales.   Abdominal: Soft. Bowel sounds are normal. She exhibits no mass. There is no tenderness.   Musculoskeletal: She exhibits edema (Lymphedema with wraps).   Skin: No rash noted.     Results Review:    I have reviewed the laboratory and imaging data since the last note by LPC physician.  My annotations are noted in assessment and plan.    Medication Review:  I have reviewed the current MAR.  My annotations are noted in assessment and plan.       Plan   PCCM Problems  Septic shock, resolved  Beta-hemolytic streptococcal and staph SSI  RLE cellulitis  Acute renal failure; severe  Anasarca  Right groin adenopathy/ Phlegmon along the right common femoral vein  Chronic lymphedema  Hyponatremia  Pulmonary hypertension per echo -RVSP 65  Severe ARIEL on CPAP -Dr. Garcia  History of DVT/PE on rivaroxaban      Plan of Treatment    On midodrine to support BP.    Continue abx per ID for cellulitis.    ARF. Now diuresing with Bumex. Continue same per renal.    Hx DVT. On  heparin drip. Transition back to Xarelto. Previously failed warfarin.    PHT on echo. Multifactorial.    Severe ARIEL. Compliant with CPAP. Does not have OHV.    Nothing much else to add.  We will sign off and see as needed.  Note patient is possibly being discharged tomorrow or day after.    Caleb Garcia MD  06/10/20  17:10    Part of this note may be an electronic transcription/translation of spoken language to printed text using the Dragon Dictation System.

## 2020-06-10 NOTE — DISCHARGE PLACEMENT REQUEST
"Emely Solomon (59 y.o. Female)     Date of Birth Social Security Number Address [place] Phone MRN    1960  [address] [address] [phone] [id number]    [employer] Marital Status          Non-Hindu        Admission Date Admission Type Admitting Provider Attending Provider Department, Room/Bed    6/4/20 Emergency Daria Arrington MD Masden, Troy Andrew, MD [address], [address]    Discharge Date Discharge Disposition Discharge Destination                       Attending Provider:  Toño Giron MD    Allergies:  No Known Allergies    Isolation:  None   Infection:  None   Code Status:  CPR    Ht:  160 cm (63\")   Wt:  207 kg (456 lb 12.7 oz)    Admission Cmt:  None   Principal Problem:  Sepsis (CMS/HCC) [A41.9]                 Active Insurance as of 6/4/2020     Primary Coverage     Payor Plan Insurance Group Employer/Plan Group    [employer] [facility] [id number]     Payor Plan Address Payor Plan Phone Number Payor Plan Fax Number Effective Dates    [address] [phone]  5/1/2019 - None Entered    [place] [address]       Subscriber Name Subscriber Birth Date Member ID       EMELY SOLOMON 1960 [id number]                 Emergency Contacts      (Rel.) Home Phone Work Phone Mobile Phone    Ghanshyam Solomon (Spouse) [phone] -- --    Adilson Cintron (Brother) -- -- [phone]              "

## 2020-06-10 NOTE — PROGRESS NOTES
"   LOS: 6 days   Patient Care Team:  RENAY Smith MD as PCP - General (General Practice)    Chief Complaint: sepsis     Interval History: Weak, but doing okay in general.  RLE is wrapped; marked  Improvement in pain/swelling.        Objective   Vital Signs  Temp:  [97.6 °F (36.4 °C)-99.3 °F (37.4 °C)] 97.6 °F (36.4 °C)  Heart Rate:  [92-96] 96  Resp:  [18] 18  BP: (123-126)/(59-72) 124/59    Intake/Output Summary (Last 24 hours) at 6/10/2020 1048  Last data filed at 6/10/2020 0742  Gross per 24 hour   Intake 1650 ml   Output 3150 ml   Net -1500 ml       Last Weight and Admission Weight        06/10/20  0500   Weight: (!) 207 kg (456 lb 12.7 oz)     Flowsheet Rows      First Filed Value   Admission Height  160 cm (63\") Documented at 06/04/2020 0001   Admission Weight  (!) 176 kg (388 lb 12.8 oz) Documented at 06/04/2020 0040                  Physical Exam   Constitutional: She is oriented to person, place, and time.   obese   HENT:   Head: Normocephalic.   Nose: Nose normal.   Mouth/Throat: Oropharynx is clear and moist.   Eyes: Pupils are equal, round, and reactive to light. Conjunctivae and EOM are normal.   Neck: Normal range of motion.   Cannot assess JVD due to body habitus   Cardiovascular: Normal rate and regular rhythm. Exam reveals distant heart sounds. Gallop: severe lipoedema/lymphedema; RLE wrapped.   Pulmonary/Chest: Effort normal.   Difficult exam   Abdominal: Soft. There is no tenderness.   Cannot feel organs or aorta   Musculoskeletal: She exhibits edema.   Neurological: She is alert and oriented to person, place, and time. No cranial nerve deficit.   Skin: Skin is warm and dry. No erythema.   Psychiatric: She has a normal mood and affect. Her behavior is normal. Judgment and thought content normal.   Vitals reviewed.      Results Review:      Results from last 7 days   Lab Units 06/10/20  0633 06/09/20  0315 06/07/20  1532   SODIUM mmol/L 135* 134* 134*   POTASSIUM mmol/L 3.5 3.5 3.4*   CHLORIDE " mmol/L 86* 86* 90*   CO2 mmol/L 34.1* 33.7* 32.4*   BUN mg/dL 38* 35* 30*   CREATININE mg/dL 1.53* 1.33* 1.41*   GLUCOSE mg/dL 97 97 118*   CALCIUM mg/dL 9.2 8.9 9.0     Results from last 7 days   Lab Units 06/07/20  0201 06/05/20  1220   CK TOTAL U/L  --  1,204*   TROPONIN T ng/mL <0.010  --      Results from last 7 days   Lab Units 06/09/20  0315 06/07/20  1202 06/06/20  0316   WBC 10*3/mm3 15.46* 18.60* 22.14*   HEMOGLOBIN g/dL 9.6* 9.6* 9.5*   HEMATOCRIT % 29.8* 29.5* 28.2*   PLATELETS 10*3/mm3 379 394 314     Results from last 7 days   Lab Units 06/10/20  0633 06/09/20  1207 06/09/20  0538 06/09/20  0315  06/08/20  0205   INR  1.62*  --   --  1.46*  --  1.23*   APTT seconds 38.8* 68.8* 106.9* >200.0*   < > 98.5*    < > = values in this interval not displayed.         Results from last 7 days   Lab Units 06/10/20  0633   MAGNESIUM mg/dL 2.1           I reviewed the patient's new clinical results.  I personally viewed and interpreted the patient's EKG/Telemetry data        Medication Review:     ceFAZolin 3 g Intravenous Q8H   [START ON 6/11/2020] cephalexin 1,000 mg Oral Q6H   heparin (porcine) 3,000 Units Intravenous Once   midodrine 15 mg Oral TID AC   pantoprazole 40 mg Oral Q AM   potassium chloride 40 mEq Oral Once   rivaroxaban 20 mg Oral Daily With Dinner   saccharomyces boulardii 500 mg Oral BID   sodium chloride 10 mL Intravenous Q12H   sodium chloride 10 mL Intravenous Q12H   sodium chloride 10 mL Intravenous Q12H   sodium chloride 10 mL Intravenous Q12H   [START ON 6/11/2020] torsemide 100 mg Oral Daily            Assessment/Plan     1.  Sepsis (CMS/HCC)  2.  Cellulitis  3.  Lymphedema AND lipoedema  4.  Morbid obesity  5.  STEFANIA  6.  PFO/DVT and history of paradoxical embolism to leg   7.  HTN  8.  PHTN    She did not do well with warfarin AT ALL.  Her INRs were all over the place, and she had a second event while subtherapeutic.  Her immobility and lack of a PCP complicated matters.  That is why she's  on rivaroxaban, despite her weight.    At home she was on metoprolol, losartan, HCTZ, and furosemide.  Currently she's on midodrine and torsemide, with normal BP.      Appreciate nephrology's assistance in diuresis.     Brennan Rogers MD  06/10/20  10:48

## 2020-06-10 NOTE — PROGRESS NOTES
LOS: 6 days     Chief Complaint:  Follow-up RLE cellulitis due to MSSA and Group C Strep    Interval History:  No fever. Leg feels about the same. Tolerating cefazolin w/o rash. On RA-2L.     ROS: no n/v; no rash    Vital Signs  Temp:  [97.6 °F (36.4 °C)-99.3 °F (37.4 °C)] 97.6 °F (36.4 °C)  Heart Rate:  [92-96] 96  Resp:  [18] 18  BP: (123-126)/(59-72) 124/59    Physical Exam:  General: awake, alert, very nice, in NAD, watching TV  Cardiovascular: NR, BLE lymphedema  Respiratory: no wheezing; on RA-2L NC  GI: Abdomen is obese but soft, non-tender  Skin: right leg is wrapped, there is improved erythema and induration above the knee    Antibiotics:  Cefazolin 3 g IV q8h    LABS:  BMP reviewed today  Lab Results   Component Value Date    WBC 15.46 (H) 06/09/2020    HGB 9.6 (L) 06/09/2020    HCT 29.8 (L) 06/09/2020    MCV 85.9 06/09/2020     06/09/2020     Lab Results   Component Value Date    GLUCOSE 97 06/10/2020    CALCIUM 9.2 06/10/2020     (L) 06/10/2020    K 3.5 06/10/2020    CO2 34.1 (H) 06/10/2020    CL 86 (L) 06/10/2020    BUN 38 (H) 06/10/2020    CREATININE 1.53 (H) 06/10/2020    EGFRIFAFRI  06/05/2020      Comment:      <15 Indicative of kidney failure.    EGFRIFNONA 35 (L) 06/10/2020    BCR 24.8 06/10/2020    ANIONGAP 14.9 06/10/2020     Lab Results   Component Value Date    CRP 34.57 (H) 06/04/2020     Microbiology:  6/4 BCx: negative  6/4 Wound Cx Right Leg: MSSA and Group C Strep  6/4 COVID: negative    Assessment/Plan   1. Sepsis due to right lower extremity cellulitis  2. Chronic lymphedema  3. Super obesity BMI 80  3. Acute renal failure  4. Hyponatremia  5. History of DVT in LLE     She is improving from an infection standpoint. WBC has been trending down and she has not had fever. Wound culture with MSSA and Group C Strep. Continue cefazolin 3 g IV q8h through tomorrow's dose around 0300. Then tomorrow around 1100 I have set her to start cephalexin 1 g PO q6h with stop date 6/18/20  which will complete a 14 day course of antibiotic therapy. Thanks to wound care team for their recommendations. Ongoing local wound care and management of lymphedema will be of the utmost importance.     Thank you for allowing me to be involved in the care of this patient. Infectious diseases will sign off at this time with antibiotics plan in place but please call me at [phone] if any further questions.

## 2020-06-11 LAB
ALBUMIN SERPL-MCNC: 3.2 G/DL (ref 3.5–5.2)
ANION GAP SERPL CALCULATED.3IONS-SCNC: 14.3 MMOL/L (ref 5–15)
APTT PPP: 42.1 SECONDS (ref 22.7–35.4)
BUN BLD-MCNC: 38 MG/DL (ref 8–23)
BUN/CREAT SERPL: 25.5 (ref 7–25)
CALCIUM SPEC-SCNC: 9.2 MG/DL (ref 8.6–10.5)
CHLORIDE SERPL-SCNC: 88 MMOL/L (ref 98–107)
CO2 SERPL-SCNC: 32.7 MMOL/L (ref 22–29)
CREAT BLD-MCNC: 1.49 MG/DL (ref 0.57–1)
GFR SERPL CREATININE-BSD FRML MDRD: 36 ML/MIN/1.73
GLUCOSE BLD-MCNC: 94 MG/DL (ref 65–99)
INR PPP: 1.88 (ref 0.9–1.1)
MAGNESIUM SERPL-MCNC: 2.1 MG/DL (ref 1.6–2.4)
PHOSPHATE SERPL-MCNC: 4.4 MG/DL (ref 2.5–4.5)
POTASSIUM BLD-SCNC: 3.6 MMOL/L (ref 3.5–5.2)
PROTHROMBIN TIME: 21.3 SECONDS (ref 11.7–14.2)
SODIUM BLD-SCNC: 135 MMOL/L (ref 136–145)
URATE SERPL-MCNC: 11.8 MG/DL (ref 2.4–5.7)

## 2020-06-11 PROCEDURE — 97530 THERAPEUTIC ACTIVITIES: CPT

## 2020-06-11 PROCEDURE — 85730 THROMBOPLASTIN TIME PARTIAL: CPT | Performed by: INTERNAL MEDICINE

## 2020-06-11 PROCEDURE — 80069 RENAL FUNCTION PANEL: CPT | Performed by: INTERNAL MEDICINE

## 2020-06-11 PROCEDURE — 85610 PROTHROMBIN TIME: CPT | Performed by: INTERNAL MEDICINE

## 2020-06-11 PROCEDURE — 84550 ASSAY OF BLOOD/URIC ACID: CPT | Performed by: INTERNAL MEDICINE

## 2020-06-11 PROCEDURE — 83735 ASSAY OF MAGNESIUM: CPT | Performed by: INTERNAL MEDICINE

## 2020-06-11 PROCEDURE — 99232 SBSQ HOSP IP/OBS MODERATE 35: CPT | Performed by: INTERNAL MEDICINE

## 2020-06-11 PROCEDURE — 97110 THERAPEUTIC EXERCISES: CPT

## 2020-06-11 PROCEDURE — 25010000002 CEFAZOLIN PER 500 MG: Performed by: INTERNAL MEDICINE

## 2020-06-11 RX ORDER — TORSEMIDE 20 MG/1
40 TABLET ORAL
Status: DISCONTINUED | OUTPATIENT
Start: 2020-06-11 | End: 2020-06-12

## 2020-06-11 RX ORDER — POTASSIUM CHLORIDE 750 MG/1
40 CAPSULE, EXTENDED RELEASE ORAL ONCE
Status: COMPLETED | OUTPATIENT
Start: 2020-06-11 | End: 2020-06-11

## 2020-06-11 RX ADMIN — RIVAROXABAN 20 MG: 20 TABLET, FILM COATED ORAL at 17:10

## 2020-06-11 RX ADMIN — PANTOPRAZOLE SODIUM 40 MG: 40 TABLET, DELAYED RELEASE ORAL at 06:16

## 2020-06-11 RX ADMIN — SODIUM CHLORIDE, PRESERVATIVE FREE 10 ML: 5 INJECTION INTRAVENOUS at 09:58

## 2020-06-11 RX ADMIN — SODIUM CHLORIDE, PRESERVATIVE FREE 10 ML: 5 INJECTION INTRAVENOUS at 20:49

## 2020-06-11 RX ADMIN — SODIUM CHLORIDE, PRESERVATIVE FREE 10 ML: 5 INJECTION INTRAVENOUS at 20:51

## 2020-06-11 RX ADMIN — TORSEMIDE 40 MG: 20 TABLET ORAL at 09:55

## 2020-06-11 RX ADMIN — CEFAZOLIN 3 G: 10 INJECTION, POWDER, FOR SOLUTION INTRAVENOUS at 03:13

## 2020-06-11 RX ADMIN — MIDODRINE HYDROCHLORIDE 15 MG: 5 TABLET ORAL at 12:09

## 2020-06-11 RX ADMIN — TORSEMIDE 40 MG: 20 TABLET ORAL at 17:23

## 2020-06-11 RX ADMIN — SODIUM CHLORIDE, PRESERVATIVE FREE 10 ML: 5 INJECTION INTRAVENOUS at 09:57

## 2020-06-11 RX ADMIN — SODIUM CHLORIDE, PRESERVATIVE FREE 10 ML: 5 INJECTION INTRAVENOUS at 20:50

## 2020-06-11 RX ADMIN — CEPHALEXIN 1000 MG: 500 CAPSULE ORAL at 09:55

## 2020-06-11 RX ADMIN — POTASSIUM CHLORIDE 40 MEQ: 10 CAPSULE, COATED, EXTENDED RELEASE ORAL at 10:00

## 2020-06-11 RX ADMIN — SODIUM CHLORIDE, PRESERVATIVE FREE 10 ML: 5 INJECTION INTRAVENOUS at 20:48

## 2020-06-11 RX ADMIN — MIDODRINE HYDROCHLORIDE 15 MG: 5 TABLET ORAL at 09:55

## 2020-06-11 RX ADMIN — Medication 500 MG: at 20:50

## 2020-06-11 RX ADMIN — MIDODRINE HYDROCHLORIDE 15 MG: 5 TABLET ORAL at 17:10

## 2020-06-11 RX ADMIN — CEPHALEXIN 1000 MG: 500 CAPSULE ORAL at 17:10

## 2020-06-11 RX ADMIN — Medication 500 MG: at 09:55

## 2020-06-11 NOTE — PROGRESS NOTES
"   LOS: 7 days    Patient Care Team:  RENAY Smith MD as PCP - General (General Practice)    Chief Complaint:    Chief Complaint   Patient presents with   • Diarrhea   • Legs Swollen     Follow-up acute kidney injury  Subjective     Interval History:   The patient is feeling better, no shortness of air, no abdominal pain, she has Salvador catheter anchored in place, she continued to have significant edema, urine output in the past 24 hours was 2200 cc she has negative net fluid balance of 24,075 cc.      Objective     Vital Signs  Temp:  [97.6 °F (36.4 °C)-98.7 °F (37.1 °C)] 98 °F (36.7 °C)  Heart Rate:  [85-96] 85  Resp:  [18] 18  BP: (105-124)/(59-79) 117/75    Flowsheet Rows      First Filed Value   Admission Height  160 cm (63\") Documented at 06/04/2020 0001   Admission Weight  (!) 176 kg (388 lb 12.8 oz) Documented at 06/04/2020 0040          No intake/output data recorded.  I/O last 3 completed shifts:  In: 1870 [P.O.:1870]  Out: 3900 [Urine:3900]    Intake/Output Summary (Last 24 hours) at 6/11/2020 0733  Last data filed at 6/11/2020 0722  Gross per 24 hour   Intake 220 ml   Output 2200 ml   Net -1980 ml       Physical Exam:  General Appearance: alert, oriented x 3, no acute distress  Skin: warm and dry  HEENT: pupils round and reactive to light, oral mucosa normal,   Neck: supple, no JVD, trachea midline  Lungs: diminished, unlabored breathing effort  Heart: RRR, normal S1 and S2, no S3, no rub  Abdomen: soft, non-tender,  present bowel sounds to auscultation, abdominal wall edema  : no palpable bladder, Salvador catheter is anchored in place  Extremities:  4+ lower extremity edema, slowly improving. No cyanosis or clubbing.  Neuro: normal speech and mental status         Results Review:    Results from last 7 days   Lab Units 06/11/20  0302 06/10/20  0633 06/09/20  0315  06/06/20  1036   SODIUM mmol/L 135* 135* 134*   < > 136   POTASSIUM mmol/L 3.6 3.5 3.5   < > 3.0*   CHLORIDE mmol/L 88* 86* 86*   < > 97* "   CO2 mmol/L 32.7* 34.1* 33.7*   < > 26.6   BUN mg/dL 38* 38* 35*   < > 41*   CREATININE mg/dL 1.49* 1.53* 1.33*   < > 2.11*   CALCIUM mg/dL 9.2 9.2 8.9   < > 8.7   BILIRUBIN mg/dL  --   --  0.5  --  0.7   ALK PHOS U/L  --   --  136*  --  152*   ALT (SGPT) U/L  --   --  15  --  30   AST (SGOT) U/L  --   --  19  --  30   GLUCOSE mg/dL 94 97 97   < > 165*    < > = values in this interval not displayed.       Estimated Creatinine Clearance: 70.4 mL/min (A) (by C-G formula based on SCr of 1.49 mg/dL (H)).    Results from last 7 days   Lab Units 06/11/20  0302 06/10/20  0633 06/09/20  0315   MAGNESIUM mg/dL 2.1 2.1 1.5*   PHOSPHORUS mg/dL 4.4 4.5 3.7       Results from last 7 days   Lab Units 06/11/20  0302 06/10/20  0633 06/09/20  0315   URIC ACID mg/dL 11.8* 11.4* 9.8*       Results from last 7 days   Lab Units 06/09/20  0315 06/07/20  1202 06/06/20  0316 06/05/20  0608   WBC 10*3/mm3 15.46* 18.60* 22.14* 16.28*   HEMOGLOBIN g/dL 9.6* 9.6* 9.5* 8.7*   PLATELETS 10*3/mm3 379 394 314 258       Results from last 7 days   Lab Units 06/11/20  0302 06/10/20  0633 06/09/20  0315 06/08/20  0205 06/07/20  0201   INR  1.88* 1.62* 1.46* 1.23* 1.30*         Imaging Results (Last 24 Hours)     ** No results found for the last 24 hours. **          cephalexin 1,000 mg Oral Q6H   midodrine 15 mg Oral TID AC   pantoprazole 40 mg Oral Q AM   rivaroxaban 20 mg Oral Daily With Dinner   saccharomyces boulardii 500 mg Oral BID   sodium chloride 10 mL Intravenous Q12H   sodium chloride 10 mL Intravenous Q12H   sodium chloride 10 mL Intravenous Q12H   sodium chloride 10 mL Intravenous Q12H   torsemide 100 mg Oral Daily          Medication Review:   Current Facility-Administered Medications   Medication Dose Route Frequency Provider Last Rate Last Dose   • acetaminophen (TYLENOL) tablet 650 mg  650 mg Oral Q4H PRN Danilo Croft MD   650 mg at 06/09/20 0300    Or   • acetaminophen (TYLENOL) 160 MG/5ML solution 650 mg  650 mg Oral  Q4H PRN Danilo Croft MD        Or   • acetaminophen (TYLENOL) suppository 650 mg  650 mg Rectal Q4H PRN Danilo Croft MD       • albumin human 25 % IV SOLN 12.5 g  12.5 g Intravenous PRN Danilo Croft MD       • calcium gluconate 1 g in sodium chloride 0.9 % 50 mL IVPB  1 g Intravenous PRN Danilo Croft MD        Or   • calcium gluconate 2 g in sodium chloride 0.9 % 50 mL IVPB  2 g Intravenous PRN Danilo Croft MD       • cephalexin (KEFLEX) capsule 1,000 mg  1,000 mg Oral Q6H Tariq Flores MD       • heparin (porcine) injection 1,000-2,000 Units  1,000-2,000 Units Intravenous PRN Danilo Croft MD       • ipratropium-albuterol (DUO-NEB) nebulizer solution 3 mL  3 mL Nebulization Q6H PRN Danilo Croft MD       • midodrine (PROAMATINE) tablet 15 mg  15 mg Oral TID AC Danilo Croft MD   15 mg at 06/10/20 1800   • nitroglycerin (NITROSTAT) SL tablet 0.4 mg  0.4 mg Sublingual Q5 Min PRN Danilo Croft MD       • ondansetron (ZOFRAN) injection 4 mg  4 mg Intravenous Q6H PRN Danilo Croft MD       • pantoprazole (PROTONIX) EC tablet 40 mg  40 mg Oral Q AM Danilo Croft MD   40 mg at 06/11/20 0616   • potassium chloride 20 mEq in 50 mL IVPB  20 mEq Intravenous PRN Danilo Croft MD       • rivaroxaban (XARELTO) tablet 20 mg  20 mg Oral Daily With Dinner Nelson Orellana MD   20 mg at 06/10/20 1800   • saccharomyces boulardii (FLORASTOR) capsule 500 mg  500 mg Oral BID Tariq Flores MD   500 mg at 06/10/20 2105   • sodium chloride 0.9 % bolus 200 mL  200 mL Intravenous PRN Danilo Croft MD       • sodium chloride 0.9 % flush 10 mL  10 mL Intravenous Q12H Danilo Croft MD   10 mL at 06/10/20 2106   • sodium chloride 0.9 % flush 10 mL  10 mL Intravenous PRN Danilo Croft MD       • sodium chloride 0.9 % flush 10 mL  10 mL Intravenous Q12H  Danilo Croft MD   10 mL at 06/10/20 2106   • sodium chloride 0.9 % flush 10 mL  10 mL Intravenous Q12H Danilo Croft MD   10 mL at 06/10/20 2106   • sodium chloride 0.9 % flush 10 mL  10 mL Intravenous Q12H Danilo Croft MD   10 mL at 06/10/20 2105   • sodium chloride 0.9 % flush 10 mL  10 mL Intravenous PRN Danilo Croft MD       • sodium chloride 0.9 % flush 20 mL  20 mL Intravenous PRN Danilo Croft MD       • sodium phosphates 10 mmol in sodium chloride 0.9 % 100 mL IVPB  10 mmol Intravenous PRN Danilo Croft MD       • torsemide (DEMADEX) tablet 100 mg  100 mg Oral Daily Ashutosh Garcia MD           Assessment/Plan   1.  Acute kidney injury, improving, creatinine is 1.49, stable, the total CO2 increased slightly down to 32.7, potassium 3.6.  2.  Hyponatremia, associated with fluid excess, sodium is 135.  3.  Sepsis with cellulitis, of the right lower extremity  4.  Morbid obesity  5.  Medical noncompliance  6.  Diastolic heart failure, with PFO  7.  History of DVT/PE  8.  Hypomagnesemia associated with diuretics, magnesium today is 2.1  9.  Hyperuricemia, uric acid up to 11.8, associated with diuresis and decrease effective enteral arterial volume, will monitor  10.  Anemia, hemoglobin is 9.6.          Plan:  1.  Change torsemide to 40 mg twice daily  2.  Replete potassium  3.  Surveillance labs        Ashutosh Garcia MD  06/11/20  07:33

## 2020-06-11 NOTE — PLAN OF CARE
Problem: Patient Care Overview  Goal: Plan of Care Review  Outcome: Ongoing (interventions implemented as appropriate)  Flowsheets  Taken 6/9/2020 1801 by Ilana Armstrong, RN  Progress: improving  Taken 6/10/2020 0639 by Queta Pacheco, RN  Plan of Care Reviewed With: patient  Taken 6/11/2020 0338 by Queta Pacheco, RN  Outcome Summary: No PRN meds given; Wound RN to see and change dressing today per notes; Pt still has FC and PICC line; no s/s of distress

## 2020-06-11 NOTE — THERAPY TREATMENT NOTE
Acute Care - Occupational Therapy Progress Note  [facility]     Patient Name: Emely Solomon  : 1960  MRN: [id number]  Today's Date: 2020             Admit Date: 2020       ICD-10-CM ICD-9-CM   1. Cellulitis of right anterior lower leg L03.115 682.6   2. STEFANIA (acute kidney injury) (CMS/[facility]) N17.9 584.9   3. Lymphedema I89.0 457.1   4. Sepsis, due to unspecified organism, unspecified whether acute organ dysfunction present (CMS/[facility]) A41.9 038.9     995.91   5. Acute renal failure, unspecified acute renal failure type (CMS/[facility]) N17.9 584.9     Patient Active Problem List   Diagnosis   • Chronic diastolic CHF (congestive heart failure) (CMS/[facility])   • PFO (patent foramen ovale)   • Paradoxical embolism (CMS/[facility])   • Hypertension   • Pulmonary hypertension (CMS/[facility])   • Sepsis (CMS/[facility])   • Back pain   • ARIEL (obstructive sleep apnea)   • Morbid obesity (CMS/[facility])   • Urinary frequency   • History of DVT of lower extremity   • Cellulitis of right anterior lower leg   • Lymphedema   • Cellulitis of right lower extremity   • Hyponatremia   • Acute renal failure (ARF) (CMS/[facility])   • Anemia, chronic disease     Past Medical History:   Diagnosis Date   • Cellulitis     2017, with Group B Strep bacteremia and sepsis   • Chronic diastolic congestive heart failure (CMS/[facility])    • Deep venous thrombosis (CMS/[facility])    • Hypertension    • Lipoedema    • Lymphedema    • Morbid obesity (CMS/[facility])    • Paradoxical embolism (CMS/[facility])     to the LLE due to DVT/PE and PFO   • PFO (patent foramen ovale)    • Pulmonary embolism (CMS/[facility])    • Pulmonary hypertension (CMS/[facility])     multifactorial (dCHF, obesity/ARIEL, hx PE), mild by echo 2016     Past Surgical History:   Procedure Laterality Date   • BRONCHOSCOPY N/A 2017    Procedure: BRONCHOSCOPY with wash;  Surgeon: Caleb Garcia MD;  [facility] ENDOSCOPY;  Service:    • DILATATION AND CURETTAGE  2011   • VENA CAVA FILTER PLACEMENT      Inferior Vena Cava  Filter Placement w/Fluorosc angiogr guidance       Therapy Treatment    Rehabilitation Treatment Summary     Row Name 06/11/20 1544 06/11/20 1514          Treatment Time/Intention    Discipline  physical therapy assistant  -PINA  occupational therapist  -CANDACE     Document Type  therapy note (daily note)  -PINA  therapy note (daily note)  -CANDACE     Subjective Information  complains of;weakness;fatigue  -PINA  --     Mode of Treatment  co-treatment;physical therapy  -  individual therapy;occupational therapy;co-treatment co tx last 10 min with PT for mobility to EOB  -LE2     Patient/Family Observations  --  fowlers, bariatric air bed  -LE     Care Plan Review  --  care plan/treatment goals reviewed  -LE2     Patient Effort  --  good  -LE     Existing Precautions/Restrictions  fall;oxygen therapy device and L/min  -PINA  fall  -LE     Recorded by [JM] Raquel Todd, AZEB 06/11/20 1545 [LE] Antonia Gaviria, OTR 06/11/20 1528  [LE2] Antonia Gaviria, OTR 06/11/20 1544     Row Name 06/11/20 1514             Vital Signs    Pre SpO2 (%)  91  -LE      O2 Delivery Pre Treatment  room air O2 around neck when enter  -LE      Intra SpO2 (%)  94  -LE      O2 Delivery Intra Treatment  supplemental O2  -LE      O2 Delivery Post Treatment  room air  -LE      Pre Patient Position  Supine  -LE      Recorded by [LE] Antonia Gaviria, OTR 06/11/20 1528      Row Name 06/11/20 1514             Cognitive Assessment/Intervention- PT/OT    Orientation Status (Cognition)  oriented x 4  -LE      Recorded by [LE] Antonia Gaviria OTR 06/11/20 1528      Row Name 06/11/20 1514             Bed Mobility Assessment/Treatment    Supine-Sit [place] (Bed Mobility)  minimum assist (75% patient effort);2 person assist  -LE      Sit-Supine [place] (Bed Mobility)  maximum assist (25% patient effort);2 person assist  -LE      Assistive Device (Bed Mobility)  bed rails;draw sheet;head of bed elevated  -LE      Recorded by [LE] Antonia Gaviria, OTR 06/11/20 1544      Row  Name 06/11/20 1514             Functional Mobility    Functional Mobility- Ind. Level  minimum assist (75% patient effort);2 person assist required;moderate assist (50% patient effort);1 person + 1 person to manage equipment few steps forward and back  -LE      Functional Mobility- Device  rolling walker  -LE      Recorded by [LE] Antonia Gaviria OTSIMON 06/11/20 1544      Row Name 06/11/20 1514             Therapeutic Exercise    Upper Extremity Range of Motion (Therapeutic Exercise)  shoulder flexion/extension, bilateral;elbow flexion/extension, bilateral;forearm supination/pronation, bilateral;wrist flexion/extension, bilateral 2 sets of 10  -LE      Exercise Type (Therapeutic Exercise)  AROM (active range of motion) cues for tech. soap used as light weightfor wrist  -LE      Position (Therapeutic Exercise)  supine  -LE      Expected Outcome (Therapeutic Exercise)  improve functional tolerance, household activity to help position self in bed.   -LE      Recorded by [LE] Antonia Gaviria OTR 06/11/20 1528      Row Name 06/11/20 1514             Positioning and Restraints    Pre-Treatment Position  in bed  -LE      Post Treatment Position  bed  -LE2      In Bed  fowlers;call light within reach;encouraged to call for assist;RLE elevated  -LE2      Recorded by [LE] Antonia Gaviria OTR 06/11/20 1528  [LE2] Antonia Gaviria OTR 06/11/20 1544      Row Name 06/11/20 1514             Pain Scale: Numbers Pre/Post-Treatment    Pain Location - Side  Right  -LE      Pain Location - Orientation  lower  -LE      Pain Location  extremity  -LE      Pain Intervention(s)  Rest  -LE      Recorded by [LE] Antonia Gaviria OTR 06/11/20 1528      Row Name                Wound Right calf Blisters    Wound - Properties Group Side: Right [OSMANY] Location: calf [OSMANY] Primary Wound Type: Blisters [OSMANY] Recorded by:  [OSMANY] Raquel Webb RN 06/04/20 5125    Row Name                Wound 06/04/20 0330 Bilateral perineum Skin Tear    Wound - Properties Group Date  first assessed: 06/04/20 [OSMANY] Time first assessed: 0330 [OSMANY] Present on Hospital Admission: Y [OSMANY] Side: Bilateral [OSMANY] Location: perineum [OSMANY] Primary Wound Type: Skin tear [OSMANY] Recorded by:  [OSMANY] Raquel Webb RN 06/04/20 0537    Row Name 06/11/20 1514             Plan of Care Review    Plan of Care Reviewed With  patient  -LE      Outcome Summary  Pt seen for B UE exercises and for standing at EOB with assit of 2.  Pt with improving tolerance to activity and mobility.  -LE2      Recorded by [LE] Antonia Gaviria, OTR 06/11/20 1528  [LE2] Antonia Gaviria, OTR 06/11/20 1544        User Key  (r) = Recorded By, (t) = Taken By, (c) = Cosigned By    Initials Name Effective Dates Discipline    LE Antonia Gaviria, OTR 06/08/18 -  OT    Raquel Matt, [facility] 03/07/18 -  PT    Raquel Corrales RN 01/21/17 -  Nurse        Wound Right calf Blisters (Active)   Dressing Appearance intact 6/11/2020 11:49 AM   Closure Open to air 6/11/2020  9:22 AM   Base epithelialization;moist;pink;red 6/11/2020 11:49 AM   Periwound dry 6/11/2020 11:49 AM   Periwound Temperature warm 6/11/2020 11:49 AM   Periwound Skin Turgor soft 6/11/2020 11:49 AM   Edges irregular 6/11/2020 11:49 AM   Wound Length (cm) 10 cm 6/11/2020 11:49 AM   Wound Width (cm) 15 cm 6/11/2020 11:49 AM   Drainage Characteristics/Odor serosanguineous 6/11/2020 11:49 AM   Drainage Amount small 6/11/2020 11:49 AM   Care, Wound cleansed with;sterile normal saline 6/11/2020 11:49 AM   Dressing Care, Wound dressing changed;hydrofiber;multi-layer wrap;silver impregnated 6/11/2020 11:49 AM   Periwound Care, Wound dry periwound area maintained 6/11/2020 11:49 AM       Wound 06/04/20 0330 Bilateral perineum Skin Tear (Active)   Dressing Appearance open to air 6/11/2020  9:22 AM   Base red 6/10/2020  6:01 PM   Drainage Characteristics/Odor serosanguineous 6/11/2020  9:22 AM   Drainage Amount scant 6/11/2020  9:22 AM   Dressing Care, Wound open to air 6/11/2020  9:22 AM        Occupational Therapy Education                 Title: PT OT SLP Therapies (Done)     Topic: Occupational Therapy (Done)     Point: ADL training (Done)     Description:   Instruct learner(s) on proper safety adaptation and remediation techniques during self care or transfers.   Instruct in proper use of assistive devices.              Learning Progress Summary           Patient Acceptance, E, VU by  at 6/10/2020 1221    Comment:  Reviewed safety sitting EOB. Encouraged increased indep with self care.    Acceptance, E, VU by  at 6/9/2020 1319    Comment:  Reviewed role of OT and poc. Discussed safety. Pt. declines having DME at home.                               User Key     Initials Effective Dates Name Provider Type Discipline     06/08/18 -  Colleen Hidalgo, OTR Occupational Therapist OT                OT Recommendation and Plan     Plan of Care Review  Plan of Care Reviewed With: patient  Plan of Care Reviewed With: patient  Outcome Summary: Pt seen for B UE exercises and for standing at EOB with assit of 2.  Pt with improving tolerance to activity and mobility.  Outcome Measures     Row Name 06/11/20 1500 06/10/20 1700 06/10/20 1200       How much help from another person do you currently need...    Turning from your back to your side while in flat bed without using bedrails?  --  3  -JM  --    Moving from lying on back to sitting on the side of a flat bed without bedrails?  --  2  -JM  --    Moving to and from a bed to a chair (including a wheelchair)?  --  1  -JM  --    Standing up from a chair using your arms (e.g., wheelchair, bedside chair)?  --  2  -JM  --    Climbing 3-5 steps with a railing?  --  1  -JM  --    To walk in hospital room?  --  1  -JM  --    AM-PAC 6 Clicks Score (PT)  --  10  -JM  --       How much help from another is currently needed...    Putting on and taking off regular lower body clothing?  1  -LE  --  1  -CW    Bathing (including washing, rinsing, and drying)  2  -LE  --  2   -CW    Toileting (which includes using toilet bed pan or urinal)  1  -LE  --  1  -CW    Putting on and taking off regular upper body clothing  2  -LE  --  2  -CW    Taking care of personal grooming (such as brushing teeth)  3  -LE  --  3  -CW    Eating meals  4  -LE  --  4  -CW    AM-PAC 6 Clicks Score (OT)  13  -LE  --  13  -CW       Functional Assessment    Outcome Measure Options  AM-PAC 6 Clicks Daily Activity (OT)  -LE  --  AM-PAC 6 Clicks Daily Activity (OT)  -CW    Row Name 06/09/20 1300             How much help from another is currently needed...    Putting on and taking off regular lower body clothing?  1  -CW      Bathing (including washing, rinsing, and drying)  2  -CW      Toileting (which includes using toilet bed pan or urinal)  1  -CW      Putting on and taking off regular upper body clothing  2  -CW      Taking care of personal grooming (such as brushing teeth)  3  -CW      Eating meals  3  -CW      AM-PAC 6 Clicks Score (OT)  12  -CW         Functional Assessment    Outcome Measure Options  AM-PAC 6 Clicks Daily Activity (OT)  -CW        User Key  (r) = Recorded By, (t) = Taken By, (c) = Cosigned By    Initials Name Provider Type    nAtonia Mcleod OTR Occupational Therapist    Colleen Cleary OTR Occupational Therapist    Raquel Matt, PTA Physical Therapy Assistant           Time Calculation:   Time Calculation- OT     Row Name 06/11/20 1544             Time Calculation- OT    OT Start Time  1507  -LE      OT Stop Time  1535  -LE      OT Time Calculation (min)  28 min  -LE      Total Timed Code Minutes- OT  28 minute(s)  -LE      OT Received On  06/11/20  -LE      OT - Next Appointment  06/15/20  -LE        User Key  (r) = Recorded By, (t) = Taken By, (c) = Cosigned By    Initials Name Provider Type    Antonia Mcleod OTR Occupational Therapist        Therapy Charges for Today     Code Description Service Date Service Provider Modifiers Qty    39261762315  OT THER PROC EA 15 MIN  6/11/2020 Antonia Gaviria OTR GO 1    [id number]  OT THERAPEUTIC ACT EA 15 MIN 6/11/2020 Antonia Gaviria OTR GO 1               RADHA Russell  6/11/2020

## 2020-06-11 NOTE — PLAN OF CARE
Problem: Patient Care Overview  Goal: Plan of Care Review  Outcome: Ongoing (interventions implemented as appropriate)  Flowsheets (Taken 6/11/2020 1701)  Progress: improving  Plan of Care Reviewed With: patient  Outcome Summary: VSS. On PO abx and diueretics. Waiting for precert and placement. Will continue to monitor.

## 2020-06-11 NOTE — PROGRESS NOTES
Continued Stay Note  [facility]     Patient Name: Emely Solomon  MRN: [id number]  Today's Date: 6/11/2020    Admit Date: 6/4/2020    Discharge Plan     Row Name 06/11/20 1656       Plan    Plan  Pt will need skilled care, but is difficult placement due to weight and needing max assist x2.  Updated pt @ bedside.  Facilities would reconsider if pt improves with PT. Latasha Melo RN        Discharge Codes    No documentation.             Latasha Melo RN

## 2020-06-11 NOTE — NURSING NOTE
CWOCN follow up- continued the 4 layer compression to patient's RLE. Wound is improving greatly. Over all skin is more dry, the only moist area is the open wound, which is much smaller. The periwound is not weeping. Patient tolerating the dressing changes with less pain. Leg is soft, no longer firm. Today we switched to opticel ag (2 needed) since there is less drainage. Prior we had used 4 Maxorb, super absorbent dressings. Also, using 1 box of supplies instead of 2.   Continue 2x/week dressing changes. Patient should follow up with Lymphedema group outpatient after discharge.      06/11/20 1149   Wound Right calf Blisters   No Date first assessed or Time first assessed found.   Side: Right  Location: calf  Primary Wound Type: Blisters   Dressing Appearance intact   Base epithelialization;moist;pink;red   Periwound dry   Periwound Temperature warm   Periwound Skin Turgor soft   Edges irregular   Wound Length (cm) 10 cm   Wound Width (cm) 15 cm   Wound Depth (cm)   (partial thickness)   Drainage Characteristics/Odor serosanguineous   Drainage Amount small   Care, Wound cleansed with;sterile normal saline   Dressing Care, Wound dressing changed;hydrofiber;multi-layer wrap;silver impregnated   Periwound Care, Wound dry periwound area maintained

## 2020-06-11 NOTE — THERAPY TREATMENT NOTE
Acute Care - Physical Therapy Treatment Note  [facility]     Patient Name: Emely Solomon  : 1960  MRN: [id number]  Today's Date: 2020             Admit Date: 2020    Visit Dx:    ICD-10-CM ICD-9-CM   1. Cellulitis of right anterior lower leg L03.115 682.6   2. STEFANIA (acute kidney injury) (CMS/HCC) N17.9 584.9   3. Lymphedema I89.0 457.1   4. Sepsis, due to unspecified organism, unspecified whether acute organ dysfunction present (CMS/HCC) A41.9 038.9     995.91   5. Acute renal failure, unspecified acute renal failure type (CMS/HCC) N17.9 584.9     Patient Active Problem List   Diagnosis   • Chronic diastolic CHF (congestive heart failure) (CMS/HCC)   • PFO (patent foramen ovale)   • Paradoxical embolism (CMS/HCC)   • Hypertension   • Pulmonary hypertension (CMS/HCC)   • Sepsis (CMS/HCC)   • Back pain   • ARIEL (obstructive sleep apnea)   • Morbid obesity (CMS/[facility])   • Urinary frequency   • History of DVT of lower extremity   • Cellulitis of right anterior lower leg   • Lymphedema   • Cellulitis of right lower extremity   • Hyponatremia   • Acute renal failure (ARF) (CMS/[facility])   • Anemia, chronic disease       Therapy Treatment    Rehabilitation Treatment Summary     Row Name 20 1544 20 1514          Treatment Time/Intention    Discipline  physical therapy assistant  -PINA  occupational therapist  -CANDACE     Document Type  therapy note (daily note)  -PINA  therapy note (daily note)  -LE     Subjective Information  complains of;weakness;fatigue  -PINA  --     Mode of Treatment  co-treatment;physical therapy  -PINA  individual therapy;occupational therapy;co-treatment co tx last 10 min with PT for mobility to EOB  -LE2     Patient/Family Observations  --  fowlers, bariatric air bed  -LE     Care Plan Review  --  care plan/treatment goals reviewed  -LE2     Patient Effort  --  good  -LE     Existing Precautions/Restrictions  fall;oxygen therapy device and L/min  -PINA  fall  -LE     Recorded by [PINA]  Raquel Todd, [facility] 06/11/20 1545 [LE] Antonia Gaviria, OTR 06/11/20 1528  [LE2] Antonia Gaviria, OTR 06/11/20 1544     Row Name 06/11/20 1514             Vital Signs    Pre SpO2 (%)  91  -LE      O2 Delivery Pre Treatment  room air O2 around neck when enter  -LE      Intra SpO2 (%)  94  -LE      O2 Delivery Intra Treatment  supplemental O2  -LE      O2 Delivery Post Treatment  room air  -LE      Pre Patient Position  Supine  -LE      Recorded by [LE] Tony Gaviriaa, OTR 06/11/20 1528      Row Name 06/11/20 1514             Cognitive Assessment/Intervention- PT/OT    Orientation Status (Cognition)  oriented x 4  -LE      Recorded by [LE] Everette Antonia, OTR 06/11/20 1528      Row Name 06/11/20 1544 06/11/20 1514          Bed Mobility Assessment/Treatment    Supine-Sit Dequincy (Bed Mobility)  contact guard;verbal cues  -  minimum assist (75% patient effort);2 person assist  -LE     Sit-Supine Dequincy (Bed Mobility)  2 person assist;maximum assist (25% patient effort)  -  maximum assist (25% patient effort);2 person assist  -LE     Assistive Device (Bed Mobility)  bed rails;overhead trapeze  -  bed rails;draw sheet;head of bed elevated  -LE     Recorded by [JM] Raquel Todd, [facility] 06/11/20 1836 [LE] Antonia Gaviria, OTR 06/11/20 1544     Row Name 06/11/20 1514             Functional Mobility    Functional Mobility- Ind. Level  minimum assist (75% patient effort);2 person assist required;moderate assist (50% patient effort);1 person + 1 person to manage equipment few steps forward and back  -LE      Functional Mobility- Device  rolling walker  -LE      Recorded by [LE] Tony Gaviriaa, OTR 06/11/20 1544      Row Name 06/11/20 1544             Gait/Stairs Assessment/Training    Dequincy Level (Gait)  2 person assist;minimum assist (75% patient effort)  -      Assistive Device (Gait)  -- held onto back of chair, scooted forw w/pt  -      Comment (Gait/Stairs)  3 steps forw and back, lines limiting  -       Recorded by [JM] Raquel Todd, PTA 06/11/20 1836      Row Name 06/11/20 1514             Therapeutic Exercise    Upper Extremity Range of Motion (Therapeutic Exercise)  shoulder flexion/extension, bilateral;elbow flexion/extension, bilateral;forearm supination/pronation, bilateral;wrist flexion/extension, bilateral 2 sets of 10  -LE      Exercise Type (Therapeutic Exercise)  AROM (active range of motion) cues for tech. soap used as light weightfor wrist  -LE      Position (Therapeutic Exercise)  supine  -LE      Expected Outcome (Therapeutic Exercise)  improve functional tolerance, household activity to help position self in bed.   -LE      Recorded by [LE] Antonia Gaviria, OTR 06/11/20 1528      Row Name 06/11/20 1544 06/11/20 1514          Positioning and Restraints    Pre-Treatment Position  in bed  -JM  in bed  -LE     Post Treatment Position  --  bed  -LE2     In Bed  supine;call light within reach;encouraged to call for assist;notified nsg on marcus bed  -JM  fowlers;call light within reach;encouraged to call for assist;RLE elevated  -LE2     Recorded by [JM] Raquel Todd, [facility] 06/11/20 1836 [LE] Antonia Gaviria, OTR 06/11/20 1528  [LE2] Antonia Gaviria, OTR 06/11/20 1544     Row Name 06/11/20 1544 06/11/20 1514          Pain Scale: Numbers Pre/Post-Treatment    Pain Location - Side  --  Right  -LE     Pain Location - Orientation  generalized  -  lower  -LE     Pain Location  --  extremity  -LE     Pain Intervention(s)  --  Rest  -LE     Recorded by [JM] Raquel Todd, AZEB 06/11/20 1836 [LE] Antonia Gaviria, OTR 06/11/20 1528     Row Name 06/11/20 1544             Pain Scale: Word Pre/Post-Treatment    Pain: Word Scale, Pretreatment  0 - no pain  -      Pain: Word Scale, Post-Treatment  2 - mild pain  -      Recorded by [] Raquel Todd, PTA 06/11/20 1836      Row Name                Wound Right calf Blisters    Wound - Properties Group Side: Right [OSMANY] Location: calf [OSMANY] Primary Wound Type: Blisters  [OSMANY] Recorded by:  [OSMANY] Raquel Webb RN 06/04/20 0536    Row Name                Wound 06/04/20 0330 Bilateral perineum Skin Tear    Wound - Properties Group Date first assessed: 06/04/20 [OSMANY] Time first assessed: 0330 [OSMANY] Present on Hospital Admission: Y [OSMANY] Side: Bilateral [OSMANY] Location: perineum [OSMANY] Primary Wound Type: Skin tear [OSMANY] Recorded by:  [OSMANY] Raquel Webb RN 06/04/20 0537    Row Name 06/11/20 1514             Plan of Care Review    Plan of Care Reviewed With  patient  -LE      Outcome Summary  Pt seen for B UE exercises and for standing at EOB with assit of 2.  Pt with improving tolerance to activity and mobility.  -LE2      Recorded by [LE] Atnonia Gaviria, OTR 06/11/20 1528  [LE2] Antonia Gaviria OTR 06/11/20 1544        User Key  (r) = Recorded By, (t) = Taken By, (c) = Cosigned By    Initials Name Effective Dates Discipline    LE Antonia Gaviria, OTSIMON 06/08/18 -  OT    Raquel Matt PTA 03/07/18 -  PT    Raquel Corrales RN 01/21/17 -  Nurse          Wound Right calf Blisters (Active)   Dressing Appearance intact 6/11/2020 11:49 AM   Closure Open to air 6/11/2020  2:02 PM   Base epithelialization;moist;pink;red 6/11/2020 11:49 AM   Periwound dry 6/11/2020 11:49 AM   Periwound Temperature warm 6/11/2020 11:49 AM   Periwound Skin Turgor soft 6/11/2020 11:49 AM   Edges irregular 6/11/2020 11:49 AM   Wound Length (cm) 10 cm 6/11/2020 11:49 AM   Wound Width (cm) 15 cm 6/11/2020 11:49 AM   Drainage Characteristics/Odor serosanguineous 6/11/2020 11:49 AM   Drainage Amount small 6/11/2020 11:49 AM   Care, Wound cleansed with;sterile normal saline 6/11/2020 11:49 AM   Dressing Care, Wound dressing changed;hydrofiber;multi-layer wrap;silver impregnated 6/11/2020 11:49 AM   Periwound Care, Wound dry periwound area maintained 6/11/2020 11:49 AM       Wound 06/04/20 0330 Bilateral perineum Skin Tear (Active)   Dressing Appearance open to air 6/11/2020  2:02 PM   Drainage Characteristics/Odor  serosanguineous 6/11/2020  2:02 PM   Drainage Amount scant 6/11/2020  2:02 PM   Dressing Care, Wound open to air 6/11/2020  9:22 AM           Physical Therapy Education                 Title: PT OT SLP Therapies (Done)     Topic: Physical Therapy (Done)     Point: Mobility training (Done)     Description:   Instruct learner(s) on safety and technique for assisting patient out of bed, chair or wheelchair.  Instruct in the proper use of assistive devices, such as walker, crutches, cane or brace.              Patient Friendly Description:   It's important to get you on your feet again, but we need to do so in a way that is safe for you. Falling has serious consequences, and your personal safety is the most important thing of all.        When it's time to get out of bed, one of us or a family member will sit next to you on the bed to give you support.     If your doctor or nurse tells you to use a walker, crutches, a cane, or a brace, be sure you use it every time you get out of bed, even if you think you don't need it.    Learning Progress Summary           Patient Eager, E,TB,D, VU by  at 6/11/2020 1838    Acceptance, E,D, VU,NR by  at 6/10/2020 1715    Acceptance, E,D, NR by PC at 6/9/2020 1255    Acceptance, E,TB, VU,NR by  at 6/8/2020 1522                   Point: Home exercise program (Done)     Description:   Instruct learner(s) on appropriate technique for monitoring, assisting and/or progressing patient with therapeutic exercises and activities.              Learning Progress Summary           Patient Eager, E,TB,D, VU by  at 6/11/2020 1838    Acceptance, E,D, VU,NR by  at 6/10/2020 1715    Acceptance, E,D, NR by PC at 6/9/2020 1255    Acceptance, E,TB, VU,NR by  at 6/8/2020 1522                   Point: Body mechanics (Done)     Description:   Instruct learner(s) on proper positioning and spine alignment for patient and/or caregiver during mobility tasks and/or exercises.              Learning  Progress Summary           Patient Eager, E,TB,D, VU by  at 6/11/2020 1838    Acceptance, E,D, VU,NR by  at 6/10/2020 1715    Acceptance, E,D, NR by  at 6/9/2020 1255    Acceptance, E,TB, VU,NR by  at 6/8/2020 1522                   Point: Precautions (Done)     Description:   Instruct learner(s) on prescribed precautions during mobility and gait tasks              Learning Progress Summary           Patient Eager, E,TB,D, VU by  at 6/11/2020 1838    Acceptance, E,D, VU,NR by  at 6/10/2020 1715    Acceptance, E,D, NR by  at 6/9/2020 1255    Acceptance, E,TB, VU,NR by  at 6/8/2020 1522                               User Key     Initials Effective Dates Name Provider Type Discipline     04/03/18 -  Esperanza Larios, PT Physical Therapist PT     03/07/18 -  Raquel Todd, PTA Physical Therapy Assistant PT     05/14/18 -  Tank Lucas, PT Physical Therapist PT                PT Recommendation and Plan     Plan of Care Reviewed With: patient  Progress: improving  Outcome Summary: pt able to amb a few steps forw and back w/assist of 2; improved OOB tfer to sitting; will try marcus-rwx next session; plans RHB/SNU at [place]  Outcome Measures     Row Name 06/11/20 1700 06/11/20 1500 06/10/20 1700       How much help from another person do you currently need...    Turning from your back to your side while in flat bed without using bedrails?  3  -JM  --  3  -JM    Moving from lying on back to sitting on the side of a flat bed without bedrails?  3  -JM  --  2  -JM    Moving to and from a bed to a chair (including a wheelchair)?  1  -JM  --  1  -JM    Standing up from a chair using your arms (e.g., wheelchair, bedside chair)?  3  -JM  --  2  -JM    Climbing 3-5 steps with a railing?  1  -JM  --  1  -JM    To walk in hospital room?  1  -JM  --  1  -JM    AM-PAC 6 Clicks Score (PT)  12  -JM  --  10  -JM       How much help from another is currently needed...    Putting on and taking off regular lower body  clothing?  --  1  -LE  --    Bathing (including washing, rinsing, and drying)  --  2  -LE  --    Toileting (which includes using toilet bed pan or urinal)  --  1  -LE  --    Putting on and taking off regular upper body clothing  --  2  -LE  --    Taking care of personal grooming (such as brushing teeth)  --  3  -LE  --    Eating meals  --  4  -LE  --    AM-PAC 6 Clicks Score (OT)  --  13  -LE  --       Functional Assessment    Outcome Measure Options  --  AM-PAC 6 Clicks Daily Activity (OT)  -LE  --    Row Name 06/10/20 1200 06/09/20 1300          How much help from another is currently needed...    Putting on and taking off regular lower body clothing?  1  -CW  1  -CW     Bathing (including washing, rinsing, and drying)  2  -CW  2  -CW     Toileting (which includes using toilet bed pan or urinal)  1  -CW  1  -CW     Putting on and taking off regular upper body clothing  2  -CW  2  -CW     Taking care of personal grooming (such as brushing teeth)  3  -CW  3  -CW     Eating meals  4  -CW  3  -CW     AM-PAC 6 Clicks Score (OT)  13  -CW  12  -CW        Functional Assessment    Outcome Measure Options  AM-PAC 6 Clicks Daily Activity (OT)  -CW  AM-PAC 6 Clicks Daily Activity (OT)  -CW       User Key  (r) = Recorded By, (t) = Taken By, (c) = Cosigned By    Initials Name Provider Type    Antonia Mcleod, OTR Occupational Therapist    Colleen Cleary, OTR Occupational Therapist    Raquel Matt PTA Physical Therapy Assistant         Time Calculation:   PT Charges     Row Name 06/11/20 1544             Time Calculation    Start Time  1520  -PINA      Stop Time  1535  -PINA      Time Calculation (min)  15 min  -PINA      PT Received On  06/11/20  -PINA      PT - Next Appointment  06/12/20  -PINA        User Key  (r) = Recorded By, (t) = Taken By, (c) = Cosigned By    Initials Name Provider Type    Raquel Matt PTA Physical Therapy Assistant        Therapy Charges for Today     Code Description Service Date Service  Provider Modifiers Qty    80544434362 HC PT THER PROC EA 15 MIN 6/10/2020 Raquel Todd, PTA GP 2    27873732428 HC PT THER SUPP EA 15 MIN 6/10/2020 Raquel Todd, PTA GP 2    40740904068 HC PT THER PROC EA 15 MIN 6/11/2020 Raquel Todd, PTA GP 1    74836354217 HC PT THER SUPP EA 15 MIN 6/11/2020 Raquel Todd, PTA GP 1          PT G-Codes  Outcome Measure Options: AM-PAC 6 Clicks Daily Activity (OT)  AM-PAC 6 Clicks Score (PT): 12  AM-PAC 6 Clicks Score (OT): 13    Raquel Todd PTA  6/11/2020

## 2020-06-11 NOTE — PROGRESS NOTES
"    DAILY PROGRESS NOTE  [facility]    Patient Identification:  Name: Emely Solomon  Age: 59 y.o.  Sex: female  :  1960  MRN: [id number]         Primary Care Physician: RENAY Smith MD    Subjective:  Interval History: Not voicing any new complaints.  She is feeling that she is breathing much better and denies any complaints of chest pain.    Objective: No family at bedside.  Discussed case with CCP    Scheduled Meds:  cephalexin 1,000 mg Oral Q6H   midodrine 15 mg Oral TID AC   pantoprazole 40 mg Oral Q AM   rivaroxaban 20 mg Oral Daily With Dinner   saccharomyces boulardii 500 mg Oral BID   sodium chloride 10 mL Intravenous Q12H   sodium chloride 10 mL Intravenous Q12H   sodium chloride 10 mL Intravenous Q12H   sodium chloride 10 mL Intravenous Q12H   torsemide 40 mg Oral BID     Continuous Infusions:     Vital signs in last 24 hours:  Temp:  [98 °F (36.7 °C)-98.7 °F (37.1 °C)] 98.7 °F (37.1 °C)  Heart Rate:  [79-90] 79  Resp:  [18] 18  BP: (105-117)/(60-76) 115/63    Intake/Output:    Intake/Output Summary (Last 24 hours) at 2020 1646  Last data filed at 2020 1301  Gross per 24 hour   Intake 240 ml   Output 3000 ml   Net -2760 ml       Exam:  /63 (BP Location: Right arm, Patient Position: Lying)   Pulse 79   Temp 98.7 °F (37.1 °C) (Oral)   Resp 18   Ht 160 cm (63\")   Wt (!) 199 kg (438 lb 11.5 oz)   SpO2 94%   BMI 77.71 kg/m²     General Appearance:    Alert, cooperative, no distress, AAOx3, nontoxic                          head:    Normocephalic, without obvious abnormality, atraumatic                        Lungs:    Decreased bases otherwise clear to auscultation bilaterally, respirations unlabored.  No JVD                 Chest Wall:    No tenderness or deformity                          Heart:    Regular rate and rhythm, S1 and S2 normal                  Abdomen:     Soft, nontender, bowel sounds active, morbidly obese               extremities:   " Lymphedema with right lower extremity wrapped                           Data Review:  Labs in chart were reviewed.    Assessment:  Active Hospital Problems    Diagnosis  POA   • **Sepsis (CMS/[facility]) [A41.9]  Yes   • History of DVT of lower extremity [Z86.718]  Not Applicable   • Cellulitis of right anterior lower leg [L03.115]  Yes   • Lymphedema [I89.0]  Yes   • Cellulitis of right lower extremity [L03.115]  Yes   • Hyponatremia [E87.1]  Yes   • Acute renal failure (ARF) (CMS/[facility]) [N17.9]  Yes   • Anemia, chronic disease [D63.8]  Yes   • Morbid obesity (CMS/[facility]) [E66.01]  Yes   • ARIEL (obstructive sleep apnea) [G47.33]  Yes   • Hypertension [I10]  Yes   • PFO (patent foramen ovale) [Q21.1]  Not Applicable   • Chronic diastolic CHF (congestive heart failure) (CMS/[facility]) [I50.32]  Unknown   • Pulmonary hypertension (CMS/[facility]) [I27.20]  Yes      Resolved Hospital Problems   No resolved problems to display.       Plan:    Following ID recommendations for antibiotics    Xarelto reinstated for past history of DVT/PE    ARF resolving -renal has adjusted torsemide to twice daily      Input and assistance by all consultants is greatly appreciated        Discussed case with CCP and disposition is being held for logistical issues at this time secondary to trying to find a facility that will accept this patient based on 2 person assist as well as body habitus    Toño Giron MD  6/11/2020  16:46

## 2020-06-11 NOTE — PLAN OF CARE
Problem: Patient Care Overview  Goal: Plan of Care Review  Outcome: Ongoing (interventions implemented as appropriate)  Flowsheets  Taken 6/11/2020 1544  Plan of Care Reviewed With: patient  Taken 6/11/2020 1514  Outcome Summary: Pt seen for B UE exercises and for standing at EOB with assit of 2.  Pt with improving tolerance to activity and mobility.       Patient was wearing a face mask during this therapy encounter. Therapist used appropriate personal protective equipment including mask and gloves.  Mask used was standard procedure mask. Appropriate PPE was worn during the entire therapy session. Hand hygiene was completed before and after therapy session. Patient is not in enhanced droplet precautions.

## 2020-06-11 NOTE — PLAN OF CARE
Problem: Patient Care Overview  Goal: Plan of Care Review  Outcome: Ongoing (interventions implemented as appropriate)  Flowsheets (Taken 6/11/2020 8940)  Progress: improving  Plan of Care Reviewed With: patient  Outcome Summary: pt able to amb a few steps forw and back w/assist of 2; improved OOB tfer to sitting; will try marcus-rwx next session; plans RHB/SNU at [place]

## 2020-06-11 NOTE — PROGRESS NOTES
"Emely Solomon  1960 59 y.o.  [id number]      Patient Care Team:  RENAY Smith MD as PCP - General (General Practice)    CC: Morbid obesity, history of pulmonary emboli, pulmonary hypertension, right heart failure    Interval History: She says she feels pretty good      Objective   Vital Signs  Temp:  [98 °F (36.7 °C)-98.7 °F (37.1 °C)] 98.7 °F (37.1 °C)  Heart Rate:  [79-90] 79  Resp:  [18] 18  BP: (105-117)/(60-76) 115/63    Intake/Output Summary (Last 24 hours) at 6/11/2020 1402  Last data filed at 6/11/2020 1301  Gross per 24 hour   Intake 240 ml   Output 3000 ml   Net -2760 ml     Flowsheet Rows      First Filed Value   Admission Height  160 cm (63\") Documented at 06/04/2020 0001   Admission Weight  (!) 176 kg (388 lb 12.8 oz) Documented at 06/04/2020 0040          Physical Exam:   General Appearance:    Alert,oriented, in no acute distress   Lungs:     Clear to auscultation,BS are equal    Heart:    Normal S1 and S2, RRR without murmur, gallop or rub   HEENT:    Sclerae are clear, no JVD or adenopathy   Abdomen:     Normal bowel sounds, soft non-tender, non-distended, no HSM   Extremities:   Moves all extremities well, massive lymph edema, no cyanosis, no             Redness, no rash, morbidly obese     Medication Review:        cephalexin 1,000 mg Oral Q6H   midodrine 15 mg Oral TID AC   pantoprazole 40 mg Oral Q AM   rivaroxaban 20 mg Oral Daily With Dinner   saccharomyces boulardii 500 mg Oral BID   sodium chloride 10 mL Intravenous Q12H   sodium chloride 10 mL Intravenous Q12H   sodium chloride 10 mL Intravenous Q12H   sodium chloride 10 mL Intravenous Q12H   torsemide 40 mg Oral BID            I reviewed the patient's new clinical results.  I personally viewed and interpreted the patient's EKG/Telemetry data    Assessment/Plan  Active Hospital Problems    Diagnosis  POA   • **Sepsis (CMS/HCC) [A41.9]  Yes   • History of DVT of lower extremity [Z86.718]  Not Applicable   • Cellulitis of right " anterior lower leg [L03.115]  Yes   • Lymphedema [I89.0]  Yes   • Cellulitis of right lower extremity [L03.115]  Yes   • Hyponatremia [E87.1]  Yes   • Acute renal failure (ARF) (CMS/[facility]) [N17.9]  Yes   • Anemia, chronic disease [D63.8]  Yes   • Morbid obesity (CMS/[facility]) [E66.01]  Yes   • ARIEL (obstructive sleep apnea) [G47.33]  Yes   • Hypertension [I10]  Yes   • PFO (patent foramen ovale) [Q21.1]  Not Applicable   • Chronic diastolic CHF (congestive heart failure) (CMS/[facility]) [I50.32]  Unknown   • Pulmonary hypertension (CMS/[facility]) [I27.20]  Yes      Resolved Hospital Problems   No resolved problems to display.       This is a really difficult situation she has severe pulmonary hypertension possibly related to thromboembolic disease morbidly obese.  She has severe lymphedema and has been aggressively diuresed it is hard to know because her weights are all over the place.  I would increase her activity and look to try discharge her I think she is maximized the benefit of and in hospital time we could try and resume some of her medicines as an outpatient I think she is intravascularly dry we will sign off and he can have her follow-up with Dr. Rogers in a month after she leaves    Louie Oconnell MD  06/11/20  14:02

## 2020-06-12 PROBLEM — R55 SYNCOPE: Status: ACTIVE | Noted: 2020-06-12

## 2020-06-12 PROBLEM — E87.1 HYPONATREMIA: Status: RESOLVED | Noted: 2020-06-04 | Resolved: 2020-06-12

## 2020-06-12 LAB
ALBUMIN SERPL-MCNC: 3 G/DL (ref 3.5–5.2)
ANION GAP SERPL CALCULATED.3IONS-SCNC: 10.9 MMOL/L (ref 5–15)
APTT PPP: 41.1 SECONDS (ref 22.7–35.4)
BUN BLD-MCNC: 40 MG/DL (ref 8–23)
BUN/CREAT SERPL: 26.8 (ref 7–25)
CALCIUM SPEC-SCNC: 9.3 MG/DL (ref 8.6–10.5)
CHLORIDE SERPL-SCNC: 91 MMOL/L (ref 98–107)
CO2 SERPL-SCNC: 34.1 MMOL/L (ref 22–29)
CREAT BLD-MCNC: 1.49 MG/DL (ref 0.57–1)
GFR SERPL CREATININE-BSD FRML MDRD: 36 ML/MIN/1.73
GLUCOSE BLD-MCNC: 104 MG/DL (ref 65–99)
INR PPP: 2.14 (ref 0.9–1.1)
MAGNESIUM SERPL-MCNC: 2.1 MG/DL (ref 1.6–2.4)
PHOSPHATE SERPL-MCNC: 4.8 MG/DL (ref 2.5–4.5)
POTASSIUM BLD-SCNC: 3.7 MMOL/L (ref 3.5–5.2)
PROTHROMBIN TIME: 23.6 SECONDS (ref 11.7–14.2)
SODIUM BLD-SCNC: 136 MMOL/L (ref 136–145)
URATE SERPL-MCNC: 11.5 MG/DL (ref 2.4–5.7)

## 2020-06-12 PROCEDURE — 97116 GAIT TRAINING THERAPY: CPT

## 2020-06-12 PROCEDURE — 85730 THROMBOPLASTIN TIME PARTIAL: CPT | Performed by: INTERNAL MEDICINE

## 2020-06-12 PROCEDURE — 83735 ASSAY OF MAGNESIUM: CPT | Performed by: INTERNAL MEDICINE

## 2020-06-12 PROCEDURE — 80069 RENAL FUNCTION PANEL: CPT | Performed by: INTERNAL MEDICINE

## 2020-06-12 PROCEDURE — 85610 PROTHROMBIN TIME: CPT | Performed by: INTERNAL MEDICINE

## 2020-06-12 PROCEDURE — 84550 ASSAY OF BLOOD/URIC ACID: CPT | Performed by: INTERNAL MEDICINE

## 2020-06-12 RX ORDER — SODIUM CHLORIDE 9 MG/ML
100 INJECTION, SOLUTION INTRAVENOUS CONTINUOUS
Status: ACTIVE | OUTPATIENT
Start: 2020-06-12 | End: 2020-06-13

## 2020-06-12 RX ADMIN — CEPHALEXIN 1000 MG: 500 CAPSULE ORAL at 17:36

## 2020-06-12 RX ADMIN — SODIUM CHLORIDE, PRESERVATIVE FREE 10 ML: 5 INJECTION INTRAVENOUS at 08:45

## 2020-06-12 RX ADMIN — CEPHALEXIN 1000 MG: 500 CAPSULE ORAL at 00:04

## 2020-06-12 RX ADMIN — CEPHALEXIN 1000 MG: 500 CAPSULE ORAL at 23:55

## 2020-06-12 RX ADMIN — MIDODRINE HYDROCHLORIDE 15 MG: 5 TABLET ORAL at 17:36

## 2020-06-12 RX ADMIN — CEPHALEXIN 1000 MG: 500 CAPSULE ORAL at 06:27

## 2020-06-12 RX ADMIN — SODIUM CHLORIDE 100 ML/HR: 9 INJECTION, SOLUTION INTRAVENOUS at 23:55

## 2020-06-12 RX ADMIN — Medication 500 MG: at 22:19

## 2020-06-12 RX ADMIN — SODIUM CHLORIDE, PRESERVATIVE FREE 10 ML: 5 INJECTION INTRAVENOUS at 08:46

## 2020-06-12 RX ADMIN — MIDODRINE HYDROCHLORIDE 15 MG: 5 TABLET ORAL at 12:38

## 2020-06-12 RX ADMIN — MIDODRINE HYDROCHLORIDE 15 MG: 5 TABLET ORAL at 08:44

## 2020-06-12 RX ADMIN — RIVAROXABAN 20 MG: 20 TABLET, FILM COATED ORAL at 17:36

## 2020-06-12 RX ADMIN — SODIUM CHLORIDE, PRESERVATIVE FREE 10 ML: 5 INJECTION INTRAVENOUS at 20:49

## 2020-06-12 RX ADMIN — TORSEMIDE 40 MG: 20 TABLET ORAL at 08:44

## 2020-06-12 RX ADMIN — PANTOPRAZOLE SODIUM 40 MG: 40 TABLET, DELAYED RELEASE ORAL at 06:27

## 2020-06-12 RX ADMIN — Medication 500 MG: at 08:44

## 2020-06-12 RX ADMIN — SODIUM CHLORIDE 100 ML/HR: 9 INJECTION, SOLUTION INTRAVENOUS at 12:38

## 2020-06-12 RX ADMIN — CEPHALEXIN 1000 MG: 500 CAPSULE ORAL at 12:38

## 2020-06-12 NOTE — THERAPY TREATMENT NOTE
Patient Name: Emely Solomon  : 1960    MRN: [id number]                              Today's Date: 2020       Admit Date: 2020    Visit Dx:     ICD-10-CM ICD-9-CM   1. Cellulitis of right anterior lower leg L03.115 682.6   2. STEFANIA (acute kidney injury) (CMS/Formerly [facility]) N17.9 584.9   3. Lymphedema I89.0 457.1   4. Sepsis, due to unspecified organism, unspecified whether acute organ dysfunction present (CMS/[facility]ly [facility]) A41.9 038.9     995.91   5. Acute renal failure, unspecified acute renal failure type (CMS/[facility]ly [facility]) N17.9 584.9     Patient Active Problem List   Diagnosis   • Chronic diastolic CHF (congestive heart failure) (CMS/[facility]ly [facility])   • PFO (patent foramen ovale)   • Paradoxical embolism (CMS/[facility])   • Hypertension   • Pulmonary hypertension (CMS/[facility])   • Sepsis (CMS/[facility]ly [facility])   • Back pain   • ARIEL (obstructive sleep apnea)   • Morbid obesity (CMS/[facility])   • Urinary frequency   • History of DVT of lower extremity   • Cellulitis of right anterior lower leg   • Lymphedema   • Cellulitis of right lower extremity   • Hyponatremia   • Acute renal failure (ARF) (CMS/[facility])   • Anemia, chronic disease     Past Medical History:   Diagnosis Date   • Cellulitis     2017, with Group B Strep bacteremia and sepsis   • Chronic diastolic congestive heart failure (CMS/[facility])    • Deep venous thrombosis (CMS/[facility])    • Hypertension    • Lipoedema    • Lymphedema    • Morbid obesity (CMS/[facility])    • Paradoxical embolism (CMS/[facility])     to the LLE due to DVT/PE and PFO   • PFO (patent foramen ovale)    • Pulmonary embolism (CMS/[facility])    • Pulmonary hypertension (CMS/[facility])     multifactorial (dCHF, obesity/ARIEL, hx PE), mild by echo 2016     Past Surgical History:   Procedure Laterality Date   • BRONCHOSCOPY N/A 2017    Procedure: BRONCHOSCOPY with wash;  Surgeon: Caleb Garcia MD;  Location: [facility] ENDOSCOPY;  Service:    • DILATATION AND CURETTAGE  2011   • VENA CAVA FILTER PLACEMENT      Inferior Vena Cava Filter Placement w/Fluorosc  angiogr guidance     General Information     Row Name 06/12/20 0935          PT Evaluation Time/Intention    Document Type  therapy note (daily note)  -     Mode of Treatment  physical therapy;individual therapy  -     Row Name 06/12/20 0935          General Information    Patient Profile Reviewed?  yes  -     Existing Precautions/Restrictions  fall;oxygen therapy device and L/min  -     Row Name 06/12/20 0935          Cognitive Assessment/Intervention- PT/OT    Orientation Status (Cognition)  oriented x 4  -     Cognitive Assessment/Intervention Comment  Pt agreeable to PT  -       User Key  (r) = Recorded By, (t) = Taken By, (c) = Cosigned By    Initials Name Provider Type     Dany, [place], PT Physical Therapist        Mobility     Row Name 06/12/20 0935          Bed Mobility Assessment/Treatment    Supine-Sit Terry (Bed Mobility)  supervision  -     Assistive Device (Bed Mobility)  bed rails  -     Comment (Bed Mobility)  Pt supine to sit EOB mod I with use of handrails and HOB slightly elevated  -     Row Name 06/12/20 0935          Transfer Assessment/Treatment    Comment (Transfers)  SBA to stand up to walker on first try today  -     Row Name 06/12/20 0935          Sit-Stand Transfer    Sit-Stand Terry (Transfers)  stand by assist  -     Assistive Device (Sit-Stand Transfers)  walker, front-wheeled;bariatric  -     Row Name 06/12/20 0935          Gait/Stairs Assessment/Training    Gait/Stairs Assessment/Training  gait/ambulation independence;gait/ambulation assistive device  -     Terry Level (Gait)  contact guard  -     Assistive Device (Gait)  walker, front-wheeled;bariatric  -     Distance in Feet (Gait)  45ft x 2 with multiple standing rest breaks  -     Pattern (Gait)  step-through  -     Deviations/Abnormal Patterns (Gait)  base of support, wide;gait speed decreased;stride length decreased  -     Bilateral Gait Deviations  forward flexed posture   -     Comment (Gait/Stairs)  Pt leaned forward on the walker a few times but corrected herself  -       User Key  (r) = Recorded By, (t) = Taken By, (c) = Cosigned By    Initials Name Provider Type    [place] Sweeney, CATRINA Physical Therapist        Obj/Interventions     Row Name 06/12/20 0938          Static Sitting Balance    Level of [place] (Unsupported Sitting, Static Balance)  independent  -     Sitting Position (Unsupported Sitting, Static Balance)  sitting on edge of bed;sitting in chair  -     Row Name 06/12/20 0938          Dynamic Sitting Balance    Level of [place], Reaches Outside Midline (Sitting, Dynamic Balance)  independent  -     Sitting Position, Reaches Outside Midline (Sitting, Dynamic Balance)  sitting on edge of bed;sitting in chair  -     Row Name 06/12/20 0938          Static Standing Balance    Level of [place] (Supported Standing, Static Balance)  standby assist  -     Assistive Device Utilized (Supported Standing, Static Balance)  walker, rolling  -       User Key  (r) = Recorded By, (t) = Taken By, (c) = Cosigned By    Initials Name Provider Type    [place] Sweeney PT Physical Therapist        Goals/Plan    No documentation.       Clinical Impression     Row Name 06/12/20 0938          Pain Scale: Numbers Pre/Post-Treatment    Pain Scale: Numbers, Pretreatment  0/10 - no pain  -     Pain Scale: Numbers, Post-Treatment  0/10 - no pain  -     Row Name 06/12/20 0938          Plan of Care Review    Plan of Care Reviewed With  patient  -     Progress  improving  -     Outcome Summary  Pt demonstrated significant improvement with mobility today. She performed bed mobility with modified independence and was able to stand and walk a total of 90ft with CGA to SBA. She is appropriate for DC to SNF at this time.   -     Row Name 06/12/20 0938          Physical Therapy Clinical Impression    Criteria for Skilled Interventions Met (PT Clinical Impression)   yes;treatment indicated  -LC     Rehab Potential (PT Clinical Summary)  good, to achieve stated therapy goals  -LC     Row Name 06/12/20 0938          Vital Signs    O2 Delivery Pre Treatment  supplemental O2  -LC     O2 Delivery Intra Treatment  supplemental O2  -LC     O2 Delivery Post Treatment  supplemental O2  -LC     Row Name 06/12/20 0938          Positioning and Restraints    Pre-Treatment Position  in bed  -LC     Post Treatment Position  chair  -LC     In Chair  notified nsg;sitting;call light within reach;encouraged to call for assist;exit alarm on  -LC       User Key  (r) = Recorded By, (t) = Taken By, (c) = Cosigned By    Initials Name Provider Type    [place] Sweeney, CATRINA Physical Therapist        Outcome Measures     Row Name 06/12/20 0940          How much help from another person do you currently need...    Turning from your back to your side while in flat bed without using bedrails?  3  -LC     Moving from lying on back to sitting on the side of a flat bed without bedrails?  3  -LC     Moving to and from a bed to a chair (including a wheelchair)?  3  -LC     Standing up from a chair using your arms (e.g., wheelchair, bedside chair)?  3  -LC     Climbing 3-5 steps with a railing?  2  -LC     To walk in hospital room?  3  -LC     AM-PAC 6 Clicks Score (PT)  17  -LC       User Key  (r) = Recorded By, (t) = Taken By, (c) = Cosigned By    Initials Name Provider Type    [place] Sweeney, PT Physical Therapist        Physical Therapy Education                 Title: PT OT SLP Therapies (Done)     Topic: Physical Therapy (Done)     Point: Mobility training (Done)     Description:   Instruct learner(s) on safety and technique for assisting patient out of bed, chair or wheelchair.  Instruct in the proper use of assistive devices, such as walker, crutches, cane or brace.              Patient Friendly Description:   It's important to get you on your feet again, but we need to do so in a way that is safe for you.  Falling has serious consequences, and your personal safety is the most important thing of all.        When it's time to get out of bed, one of us or a family member will sit next to you on the bed to give you support.     If your doctor or nurse tells you to use a walker, crutches, a cane, or a brace, be sure you use it every time you get out of bed, even if you think you don't need it.    Learning Progress Summary           Patient Acceptance, E,TB, VU by IVON at 6/12/2020 0940    Eager, E,TB,D, VU by PINA at 6/11/2020 1838    Acceptance, E,D, VU,NR by PINA at 6/10/2020 1715    Acceptance, E,D, NR by PC at 6/9/2020 1255    Acceptance, E,TB, VU,NR by CS at 6/8/2020 1522                   Point: Home exercise program (Done)     Description:   Instruct learner(s) on appropriate technique for monitoring, assisting and/or progressing patient with therapeutic exercises and activities.              Learning Progress Summary           Patient Eager, E,TB,D, VU by PINA at 6/11/2020 1838    Acceptance, E,D, VU,NR by PINA at 6/10/2020 1715    Acceptance, E,D, NR by PC at 6/9/2020 1255    Acceptance, E,TB, VU,NR by  at 6/8/2020 1522                   Point: Body mechanics (Done)     Description:   Instruct learner(s) on proper positioning and spine alignment for patient and/or caregiver during mobility tasks and/or exercises.              Learning Progress Summary           Patient Acceptance, E,TB, VU by IVON at 6/12/2020 0940    Eager, E,TB,D, VU by PINA at 6/11/2020 1838    Acceptance, E,D, VU,NR by PINA at 6/10/2020 1715    Acceptance, E,D, NR by PC at 6/9/2020 1255    Acceptance, E,TB, VU,NR by CS at 6/8/2020 1522                   Point: Precautions (Done)     Description:   Instruct learner(s) on prescribed precautions during mobility and gait tasks              Learning Progress Summary           Patient Acceptance, E,TB, VU by IVON at 6/12/2020 0940    Eager, E,TB,D, VU by PINA at 6/11/2020 1838    Acceptance, E,D, VU,NR by PINA at  6/10/2020 1715    Acceptance, E,D, NR by PC at 6/9/2020 1255    Acceptance, E,TB, VU,NR by CS at 6/8/2020 1522                               User Key     Initials Effective Dates Name Provider Type Discipline    PC 04/03/18 -  Esperanza Larios, PT Physical Therapist PT    JM 03/07/18 -  Raquel Todd, AZEB Physical Therapy Assistant PT    CS 05/14/18 -  Tank Lucas, PT Physical Therapist PT     08/23/19 -  [place] Saenz, CATRINA Physical Therapist PT              PT Recommendation and Plan     Outcome Summary/Treatment Plan (PT)  Anticipated Discharge Disposition (PT): skilled nursing facility  Plan of Care Reviewed With: patient  Progress: improving  Outcome Summary: Pt demonstrated significant improvement with mobility today. She performed bed mobility with modified independence and was able to stand and walk a total of 90ft with CGA to SBA. She is appropriate for DC to SNF at this time.      Time Calculation:   PT Charges     Row Name 06/12/20 0941             Time Calculation    Start Time  0843  -      Stop Time  0859  -      Time Calculation (min)  16 min  -      PT Received On  06/12/20  -      PT - Next Appointment  06/13/20  -        User Key  (r) = Recorded By, (t) = Taken By, (c) = Cosigned By    Initials Name Provider Type     [place] Saenz, PT Physical Therapist        Therapy Charges for Today     Code Description Service Date Service Provider Modifiers Qty    68732919250 HC PT THER SUPP EA 15 MIN 6/12/2020 [place] Saenz, PT GP 1    80650006401 HC GAIT TRAINING EA 15 MIN 6/12/2020 [place] Saenz, PT GP 1          PT G-Codes  Outcome Measure Options: AM-PAC 6 Clicks Daily Activity (OT)  AM-PAC 6 Clicks Score (PT): 17  AM-PAC 6 Clicks Score (OT): 13    [place] Saenz PT  6/12/2020

## 2020-06-12 NOTE — PLAN OF CARE
Problem: Patient Care Overview  Goal: Plan of Care Review  Outcome: Ongoing (interventions implemented as appropriate)  Flowsheets  Taken 6/12/2020 0413  Progress: no change  Outcome Summary: Pt needs to be assist x 1 for DC to facility; pt currently assisst x 2; PICC and Salvador still in place; Leg wraps done by Wound RN; no PRN meds given; no s/s of distress noted  Taken 6/12/2020 0004  Plan of Care Reviewed With: patient

## 2020-06-12 NOTE — PROGRESS NOTES
Name: Emely Solomon ADMIT: 2020   : 1960  PCP: RENAY Smith MD    MRN: [id number] LOS: 8 days   AGE/SEX: 59 y.o. female  ROOM: [facility]     Subjective   Subjective   Earlier events noted. Pt was getting onto BSC when she became unresponsive. Pt does not recall events. Denies chest pain, palpitations, dyspnea, dizziness/lightheadedenss, HA. She is back to baseline now. No focal neurodeficits.     Review of Systems   Constitutional: Negative.    HENT: Negative.    Respiratory: Negative.    Cardiovascular: Positive for leg swelling.   Gastrointestinal: Negative.    Genitourinary:        Indwelling catheter in place   Musculoskeletal: Negative.    Skin: Negative.    Neurological: Positive for syncope.   Psychiatric/Behavioral: Negative.         Objective   Objective   Vital Signs  Temp:  [97.6 °F (36.4 °C)-98.2 °F (36.8 °C)] 97.7 °F (36.5 °C)  Heart Rate:  [77-99] 84  Resp:  [18] 18  BP: (105-118)/(61-90) 118/81  SpO2:  [96 %-97 %] 97 %  on  Flow (L/min):  [2] 2;   Device (Oxygen Therapy): nasal cannula  Body mass index is 75.23 kg/m².  Physical Exam   Constitutional: She is oriented to person, place, and time. No distress.   Morbidly obese   HENT:   Head: Normocephalic.   Eyes: EOM are normal.   Neck: Neck supple.   Cardiovascular: Normal rate and regular rhythm.   Pulmonary/Chest: Effort normal and breath sounds normal. No respiratory distress.   Abdominal: Soft. Bowel sounds are normal.   Musculoskeletal: She exhibits edema.   RLE wrapped  Lymphedema BLE   Neurological: She is alert and oriented to person, place, and time.   Skin: Skin is warm and dry.   Psychiatric: She has a normal mood and affect.   Vitals reviewed.      Results Review:       I reviewed the patient's new clinical results.  Results from last 7 days   Lab Units 20  0315 20  1202 20  0316   WBC 10*3/mm3 15.46* 18.60* 22.14*   HEMOGLOBIN g/dL 9.6* 9.6* 9.5*   PLATELETS 10*3/mm3 379 394 314     Results from last 7  days   Lab Units 06/12/20  0335 06/11/20  0302 06/10/20  0633 06/09/20  0315   SODIUM mmol/L 136 135* 135* 134*   POTASSIUM mmol/L 3.7 3.6 3.5 3.5   CHLORIDE mmol/L 91* 88* 86* 86*   CO2 mmol/L 34.1* 32.7* 34.1* 33.7*   BUN mg/dL 40* 38* 38* 35*   CREATININE mg/dL 1.49* 1.49* 1.53* 1.33*   GLUCOSE mg/dL 104* 94 97 97   Estimated Creatinine Clearance: 69.1 mL/min (A) (by C-G formula based on SCr of 1.49 mg/dL (H)).  Results from last 7 days   Lab Units 06/12/20  0335 06/11/20  0302 06/10/20  0633 06/09/20  0315  06/06/20  1036   ALBUMIN g/dL 3.00* 3.20* 3.00* 3.00*   < > 2.60*   BILIRUBIN mg/dL  --   --   --  0.5  --  0.7   ALK PHOS U/L  --   --   --  136*  --  152*   AST (SGOT) U/L  --   --   --  19  --  30   ALT (SGPT) U/L  --   --   --  15  --  30    < > = values in this interval not displayed.     Results from last 7 days   Lab Units 06/12/20  0335 06/11/20  0302 06/10/20  0633 06/09/20  0315   CALCIUM mg/dL 9.3 9.2 9.2 8.9   ALBUMIN g/dL 3.00* 3.20* 3.00* 3.00*   MAGNESIUM mg/dL 2.1 2.1 2.1 1.5*   PHOSPHORUS mg/dL 4.8* 4.4 4.5 3.7       No results found for: HGBA1C, POCGLU    XR Chest 1 View  Narrative: SINGLE VIEW OF THE CHEST     HISTORY: 59-year-old female with history of a cough.     FINDINGS: An upright AP portable chest radiograph was obtained.  Comparison is made to a chest radiograph dated 06/05/2020. The lungs are  normal in volume and are clear of consolidation. A right internal  jugular catheter is positioned with the tip in the SVC. The  cardiomediastinal silhouette is stable with mild prominence of the  cardiac silhouette. The visualized osseous structures appear normal.     Impression: There is no evidence for an acute cardiopulmonary process.  Stable mild cardiomegaly is noted.     This report was finalized on 6/6/2020 3:20 PM by Dr. Phil Griffith M.D.           cephalexin 1,000 mg Oral Q6H   midodrine 15 mg Oral TID AC   pantoprazole 40 mg Oral Q AM   rivaroxaban 20 mg Oral Daily With Dinner    saccharomyces boulardii 500 mg Oral BID   sodium chloride 10 mL Intravenous Q12H   sodium chloride 10 mL Intravenous Q12H   sodium chloride 10 mL Intravenous Q12H   sodium chloride 10 mL Intravenous Q12H       sodium chloride 100 mL/hr Last Rate: 100 mL/hr (06/12/20 1238)   Diet Regular; Cardiac, Low Sodium; 2,000 mg Na       Assessment/Plan     Active Hospital Problems    Diagnosis  POA   • **Sepsis (CMS/[facility]) [A41.9]  Yes   • Syncope [R55]  Yes   • History of DVT of lower extremity [Z86.718]  Not Applicable   • Cellulitis of right anterior lower leg [L03.115]  Yes   • Lymphedema [I89.0]  Yes   • Cellulitis of right lower extremity [L03.115]  Yes   • Acute renal failure (ARF) (CMS/[facility]) [N17.9]  Yes   • Anemia, chronic disease [D63.8]  Yes   • Morbid obesity (CMS/[facility]) [E66.01]  Yes   • ARIEL (obstructive sleep apnea) [G47.33]  Yes   • Hypertension [I10]  Yes   • PFO (patent foramen ovale) [Q21.1]  Not Applicable   • Chronic diastolic CHF (congestive heart failure) (CMS/[facility]) [I50.32]  Unknown   • Pulmonary hypertension (CMS/[facility]) [I27.20]  Yes      Resolved Hospital Problems    Diagnosis Date Resolved POA   • Hyponatremia [E87.1] 06/12/2020 Yes       59 y.o. female admitted with Sepsis (CMS/[facility]).    · Syncopal episode: vasovagal vs volume depletion. Torsemide dc'd. IV hydration  · Na level wnl 136  · Continue cephalexin for cellulitis  · Cardiology has signed off; f/u Dr. Rogers in 1 month  · Xarelto for DVT  · Renal function stable  ·   · Full code.  · Discussed with patient and Dr. Giron.  · Anticipate discharge home vs SNF timing yet to be determined.      LUCÍA Antonio  [facility]  06/12/20  13:58

## 2020-06-12 NOTE — PLAN OF CARE
Problem: Patient Care Overview  Goal: Plan of Care Review  Outcome: Ongoing (interventions implemented as appropriate)  Flowsheets (Taken 6/12/2020 1804)  Progress: improving  Plan of Care Reviewed With: patient  Outcome Summary: No c/o pain or soa. Up with assist x1 and walker with PT, then into chair. Syncopal episode after BM, Pt went unresponsive and lowered to floor. Torsemide discontinued and IVf started. No other complains since.  Goal: Individualization and Mutuality  Outcome: Ongoing (interventions implemented as appropriate)  Goal: Discharge Needs Assessment  Outcome: Ongoing (interventions implemented as appropriate)  Goal: Interprofessional Rounds/Family Conf  Outcome: Ongoing (interventions implemented as appropriate)     Problem: Fall Risk (Adult)  Goal: Absence of Fall  Outcome: Ongoing (interventions implemented as appropriate)     Problem: Skin Injury Risk (Adult)  Goal: Skin Health and Integrity  Outcome: Ongoing (interventions implemented as appropriate)     Problem: Sepsis/Septic Shock (Adult)  Goal: Signs and Symptoms of Listed Potential Problems Will be Absent, Minimized or Managed (Sepsis/Septic Shock)  Outcome: Ongoing (interventions implemented as appropriate)     Problem: Infection, Risk/Actual (Adult)  Goal: Infection Prevention/Resolution  Outcome: Ongoing (interventions implemented as appropriate)

## 2020-06-12 NOTE — PROGRESS NOTES
"   LOS: 8 days    Patient Care Team:  RENAY Smith MD as PCP - General (General Practice)    Chief Complaint:    Chief Complaint   Patient presents with   • Diarrhea   • Legs Swollen     Follow-up acute kidney injury  Subjective     Interval History:   The patient had a syncopal episode earlier today when she was getting up to the bathroom, currently feeling the same, denies chest pain or shortness of air, no nausea or vomiting, she had chronic edema but has improved significantly and she has Salvador catheter anchored in place, her urine output in the past 24 hours was 2300 and she has net loss since admission 26 L  Objective     Vital Signs  Temp:  [97.6 °F (36.4 °C)-98.7 °F (37.1 °C)] 97.6 °F (36.4 °C)  Heart Rate:  [77-99] 99  Resp:  [18] 18  BP: (105-115)/(61-90) 105/90    Flowsheet Rows      First Filed Value   Admission Height  160 cm (63\") Documented at 06/04/2020 0001   Admission Weight  (!) 176 kg (388 lb 12.8 oz) Documented at 06/04/2020 0040          No intake/output data recorded.  I/O last 3 completed shifts:  In: 240 [P.O.:240]  Out: 3600 [Urine:3600]    Intake/Output Summary (Last 24 hours) at 6/12/2020 1145  Last data filed at 6/12/2020 0344  Gross per 24 hour   Intake --   Output 2300 ml   Net -2300 ml       Physical Exam:  General Appearance: alert, oriented x 3, no acute distress  Skin: warm and dry  HEENT: pupils round and reactive to light, oral mucosa dry  Neck: supple, no JVD, trachea midline  Lungs: diminished, unlabored breathing effort  Heart: RRR, normal S1 and S2, no S3, no rub  Abdomen: soft, non-tender,  present bowel sounds to auscultation, abdominal wall edema  : no palpable bladder, Salvador catheter is anchored in place  Extremities:  Nonpitting lower extremity edema significantly improved since admission  Neuro: normal speech and mental status         Results Review:    Results from last 7 days   Lab Units 06/12/20  0335 06/11/20  0302 06/10/20  0633 06/09/20  0315  06/06/20  1036 "   SODIUM mmol/L 136 135* 135* 134*   < > 136   POTASSIUM mmol/L 3.7 3.6 3.5 3.5   < > 3.0*   CHLORIDE mmol/L 91* 88* 86* 86*   < > 97*   CO2 mmol/L 34.1* 32.7* 34.1* 33.7*   < > 26.6   BUN mg/dL 40* 38* 38* 35*   < > 41*   CREATININE mg/dL 1.49* 1.49* 1.53* 1.33*   < > 2.11*   CALCIUM mg/dL 9.3 9.2 9.2 8.9   < > 8.7   BILIRUBIN mg/dL  --   --   --  0.5  --  0.7   ALK PHOS U/L  --   --   --  136*  --  152*   ALT (SGPT) U/L  --   --   --  15  --  30   AST (SGOT) U/L  --   --   --  19  --  30   GLUCOSE mg/dL 104* 94 97 97   < > 165*    < > = values in this interval not displayed.       Estimated Creatinine Clearance: 69.1 mL/min (A) (by C-G formula based on SCr of 1.49 mg/dL (H)).    Results from last 7 days   Lab Units 06/12/20  0335 06/11/20  0302 06/10/20  0633   MAGNESIUM mg/dL 2.1 2.1 2.1   PHOSPHORUS mg/dL 4.8* 4.4 4.5       Results from last 7 days   Lab Units 06/12/20  0335 06/11/20  0302 06/10/20  0633 06/09/20  0315   URIC ACID mg/dL 11.5* 11.8* 11.4* 9.8*       Results from last 7 days   Lab Units 06/09/20  0315 06/07/20  1202 06/06/20  0316   WBC 10*3/mm3 15.46* 18.60* 22.14*   HEMOGLOBIN g/dL 9.6* 9.6* 9.5*   PLATELETS 10*3/mm3 379 394 314       Results from last 7 days   Lab Units 06/12/20  0335 06/11/20  0302 06/10/20  0633 06/09/20  0315 06/08/20  0205   INR  2.14* 1.88* 1.62* 1.46* 1.23*         Imaging Results (Last 24 Hours)     ** No results found for the last 24 hours. **          cephalexin 1,000 mg Oral Q6H   midodrine 15 mg Oral TID AC   pantoprazole 40 mg Oral Q AM   rivaroxaban 20 mg Oral Daily With Dinner   saccharomyces boulardii 500 mg Oral BID   sodium chloride 10 mL Intravenous Q12H   sodium chloride 10 mL Intravenous Q12H   sodium chloride 10 mL Intravenous Q12H   sodium chloride 10 mL Intravenous Q12H   torsemide 40 mg Oral BID          Medication Review:   Current Facility-Administered Medications   Medication Dose Route Frequency Provider Last Rate Last Dose   • acetaminophen  (TYLENOL) tablet 650 mg  650 mg Oral Q4H PRN Danilo Croft MD   650 mg at 06/09/20 0300    Or   • acetaminophen (TYLENOL) 160 MG/5ML solution 650 mg  650 mg Oral Q4H PRN Danilo Croft MD        Or   • acetaminophen (TYLENOL) suppository 650 mg  650 mg Rectal Q4H PRN Danilo Croft MD       • albumin human 25 % IV SOLN 12.5 g  12.5 g Intravenous PRN Danilo Croft MD       • calcium gluconate 1 g in sodium chloride 0.9 % 50 mL IVPB  1 g Intravenous PRN Danilo Croft MD        Or   • calcium gluconate 2 g in sodium chloride 0.9 % 50 mL IVPB  2 g Intravenous PRN Danilo Croft MD       • cephalexin (KEFLEX) capsule 1,000 mg  1,000 mg Oral Q6H Tariq Flores MD   1,000 mg at 06/12/20 0627   • heparin (porcine) injection 1,000-2,000 Units  1,000-2,000 Units Intravenous PRN Danilo Croft MD       • ipratropium-albuterol (DUO-NEB) nebulizer solution 3 mL  3 mL Nebulization Q6H PRN Danilo Croft MD       • midodrine (PROAMATINE) tablet 15 mg  15 mg Oral TID AC Danilo Croft MD   15 mg at 06/12/20 0844   • nitroglycerin (NITROSTAT) SL tablet 0.4 mg  0.4 mg Sublingual Q5 Min PRN Danilo Croft MD       • ondansetron (ZOFRAN) injection 4 mg  4 mg Intravenous Q6H PRN Danilo Croft MD       • pantoprazole (PROTONIX) EC tablet 40 mg  40 mg Oral Q AM Danilo Croft MD   40 mg at 06/12/20 0627   • potassium chloride 20 mEq in 50 mL IVPB  20 mEq Intravenous PRN Danilo Croft MD       • rivaroxaban (XARELTO) tablet 20 mg  20 mg Oral Daily With Dinner Nelson Orellana MD   20 mg at 06/11/20 1710   • saccharomyces boulardii (FLORASTOR) capsule 500 mg  500 mg Oral BID Tariq Flores MD   500 mg at 06/12/20 0844   • sodium chloride 0.9 % bolus 200 mL  200 mL Intravenous PRN Danilo Croft MD       • sodium chloride 0.9 % flush 10 mL  10 mL Intravenous Q12H  Danilo Croft MD   10 mL at 06/12/20 0846   • sodium chloride 0.9 % flush 10 mL  10 mL Intravenous PRN Danilo Croft MD       • sodium chloride 0.9 % flush 10 mL  10 mL Intravenous Q12H Danilo Croft MD   10 mL at 06/12/20 0846   • sodium chloride 0.9 % flush 10 mL  10 mL Intravenous Q12H Danilo Croft MD   10 mL at 06/12/20 0846   • sodium chloride 0.9 % flush 10 mL  10 mL Intravenous Q12H UmeruDanilo MD   10 mL at 06/12/20 0845   • sodium chloride 0.9 % flush 10 mL  10 mL Intravenous PRN Danilo Croft MD       • sodium chloride 0.9 % flush 20 mL  20 mL Intravenous PRN Danilo Croft MD       • sodium phosphates 10 mmol in sodium chloride 0.9 % 100 mL IVPB  10 mmol Intravenous PRN Danilo Croft MD       • torsemide (DEMADEX) tablet 40 mg  40 mg Oral BID Ashutosh Garcia MD   40 mg at 06/12/20 0844       Assessment/Plan   1.  Acute kidney injury, improving, creatinine is 1.49, stable, the total CO2 increased to 34.1 potassium 3.6.  2.  Hyponatremia, associated with fluid excess, sodium is 136.  3.  Sepsis with cellulitis, of the right lower extremity  4.  Morbid obesity  5.  Medical noncompliance  6.  Diastolic heart failure, with PFO  7.  History of DVT/PE  8.  Hypomagnesemia associated with diuretics, magnesium today is 2.1  9.  Hyperuricemia, uric acid today is 11.5, associated with diuresis and decrease effective enteral arterial volume, will monitor  10.  Anemia, last hemoglobin on 6/9/2020 was 9.6  11.  Syncopal episode most likely associated with overdiuresis and volume depletion          Plan:  1.  Discontinue torsemide and gently hydrate the patient  2.  Surveillance labs        Ashutosh Garcia MD  06/12/20  11:45

## 2020-06-12 NOTE — DISCHARGE PLACEMENT REQUEST
"Emely Solomon (59 y.o. Female)     Date of Birth Social Security Number Address Home Phone MRN    1960  [id number] [facility] [address] [phone] [id number]    Congregational Marital Status          Non-Rastafarian        Admission Date Admission Type Admitting Provider Attending Provider Department, Room/Bed    6/4/20 Emergency Daria Arrington MD Masden, Troy Andrew, MD [address], [address]    Discharge Date Discharge Disposition Discharge Destination                       Attending Provider:  Toño Giron MD    Allergies:  No Known Allergies    Isolation:  None   Infection:  None   Code Status:  CPR    Ht:  160 cm (63\")   Wt:  197 kg (435 lb)    Admission Cmt:  None   Principal Problem:  Sepsis (CMS/HCC) [A41.9]                 Active Insurance as of 6/4/2020     Primary Coverage     Payor Plan Insurance Group Employer/Plan Group    [facility] BLUE [facility] EMPLOYEE [id number]     Payor Plan Address Payor Plan Phone Number Payor Plan Fax Number Effective Dates    [address] [phone]  5/1/2019 - None Entered    [facility] [id number]       Subscriber Name Subscriber Birth Date Member ID       EMELY SOLOMON 1960 [id number]                 Emergency Contacts      (Rel.) Home Phone Work Phone Mobile Phone    Ghanshyam Solomon (Spouse) [phone] -- --    Adilson Cintron (Brother) -- -- [phone]              "

## 2020-06-12 NOTE — PLAN OF CARE
Problem: Patient Care Overview  Goal: Plan of Care Review  Outcome: Ongoing (interventions implemented as appropriate)  Flowsheets (Taken 6/12/2020 9551)  Progress: improving  Plan of Care Reviewed With: patient  Outcome Summary: Pt demonstrated significant improvement with mobility today. She performed bed mobility with modified independence and was able to stand and walk a total of 90ft with CGA to SBA. She is appropriate for DC to SNF at this time.

## 2020-06-13 PROBLEM — A41.9 SEPSIS: Status: RESOLVED | Noted: 2017-11-12 | Resolved: 2020-06-13

## 2020-06-13 LAB
ALBUMIN SERPL-MCNC: 3 G/DL (ref 3.5–5.2)
ANION GAP SERPL CALCULATED.3IONS-SCNC: 9.9 MMOL/L (ref 5–15)
BASOPHILS # BLD AUTO: 0.04 10*3/MM3 (ref 0–0.2)
BASOPHILS NFR BLD AUTO: 0.6 % (ref 0–1.5)
BUN BLD-MCNC: 37 MG/DL (ref 8–23)
BUN/CREAT SERPL: 27.4 (ref 7–25)
CALCIUM SPEC-SCNC: 9.1 MG/DL (ref 8.6–10.5)
CHLORIDE SERPL-SCNC: 94 MMOL/L (ref 98–107)
CO2 SERPL-SCNC: 32.1 MMOL/L (ref 22–29)
CREAT BLD-MCNC: 1.35 MG/DL (ref 0.57–1)
DEPRECATED RDW RBC AUTO: 43.3 FL (ref 37–54)
EOSINOPHIL # BLD AUTO: 0.29 10*3/MM3 (ref 0–0.4)
EOSINOPHIL NFR BLD AUTO: 4 % (ref 0.3–6.2)
ERYTHROCYTE [DISTWIDTH] IN BLOOD BY AUTOMATED COUNT: 13.9 % (ref 12.3–15.4)
GFR SERPL CREATININE-BSD FRML MDRD: 40 ML/MIN/1.73
GLUCOSE BLD-MCNC: 111 MG/DL (ref 65–99)
HCT VFR BLD AUTO: 27.4 % (ref 34–46.6)
HGB BLD-MCNC: 8.7 G/DL (ref 12–15.9)
IMM GRANULOCYTES # BLD AUTO: 0.09 10*3/MM3 (ref 0–0.05)
IMM GRANULOCYTES NFR BLD AUTO: 1.2 % (ref 0–0.5)
LYMPHOCYTES # BLD AUTO: 1.01 10*3/MM3 (ref 0.7–3.1)
LYMPHOCYTES NFR BLD AUTO: 14 % (ref 19.6–45.3)
MAGNESIUM SERPL-MCNC: 2.1 MG/DL (ref 1.6–2.4)
MCH RBC QN AUTO: 27.4 PG (ref 26.6–33)
MCHC RBC AUTO-ENTMCNC: 31.8 G/DL (ref 31.5–35.7)
MCV RBC AUTO: 86.4 FL (ref 79–97)
MONOCYTES # BLD AUTO: 0.46 10*3/MM3 (ref 0.1–0.9)
MONOCYTES NFR BLD AUTO: 6.4 % (ref 5–12)
NEUTROPHILS # BLD AUTO: 5.34 10*3/MM3 (ref 1.7–7)
NEUTROPHILS NFR BLD AUTO: 73.8 % (ref 42.7–76)
NRBC BLD AUTO-RTO: 0 /100 WBC (ref 0–0.2)
PHOSPHATE SERPL-MCNC: 4.3 MG/DL (ref 2.5–4.5)
PLATELET # BLD AUTO: 351 10*3/MM3 (ref 140–450)
PMV BLD AUTO: 8.9 FL (ref 6–12)
POTASSIUM BLD-SCNC: 3.6 MMOL/L (ref 3.5–5.2)
RBC # BLD AUTO: 3.17 10*6/MM3 (ref 3.77–5.28)
SODIUM BLD-SCNC: 136 MMOL/L (ref 136–145)
URATE SERPL-MCNC: 10.7 MG/DL (ref 2.4–5.7)
WBC NRBC COR # BLD: 7.23 10*3/MM3 (ref 3.4–10.8)

## 2020-06-13 PROCEDURE — 25010000002 ALTEPLASE: Performed by: NURSE PRACTITIONER

## 2020-06-13 PROCEDURE — 84550 ASSAY OF BLOOD/URIC ACID: CPT | Performed by: INTERNAL MEDICINE

## 2020-06-13 PROCEDURE — 25010000003 POTASSIUM CHLORIDE PER 2 MEQ: Performed by: INTERNAL MEDICINE

## 2020-06-13 PROCEDURE — 85025 COMPLETE CBC W/AUTO DIFF WBC: CPT | Performed by: INTERNAL MEDICINE

## 2020-06-13 PROCEDURE — 80069 RENAL FUNCTION PANEL: CPT | Performed by: INTERNAL MEDICINE

## 2020-06-13 PROCEDURE — 97110 THERAPEUTIC EXERCISES: CPT

## 2020-06-13 PROCEDURE — 94799 UNLISTED PULMONARY SVC/PX: CPT

## 2020-06-13 PROCEDURE — 83735 ASSAY OF MAGNESIUM: CPT | Performed by: INTERNAL MEDICINE

## 2020-06-13 RX ADMIN — SODIUM CHLORIDE, PRESERVATIVE FREE 10 ML: 5 INJECTION INTRAVENOUS at 20:21

## 2020-06-13 RX ADMIN — SODIUM CHLORIDE, PRESERVATIVE FREE 10 ML: 5 INJECTION INTRAVENOUS at 08:37

## 2020-06-13 RX ADMIN — MIDODRINE HYDROCHLORIDE 15 MG: 5 TABLET ORAL at 17:49

## 2020-06-13 RX ADMIN — Medication 500 MG: at 20:20

## 2020-06-13 RX ADMIN — PANTOPRAZOLE SODIUM 40 MG: 40 TABLET, DELAYED RELEASE ORAL at 06:40

## 2020-06-13 RX ADMIN — RIVAROXABAN 20 MG: 20 TABLET, FILM COATED ORAL at 17:49

## 2020-06-13 RX ADMIN — CEPHALEXIN 1000 MG: 500 CAPSULE ORAL at 11:56

## 2020-06-13 RX ADMIN — CEPHALEXIN 1000 MG: 500 CAPSULE ORAL at 06:40

## 2020-06-13 RX ADMIN — ALTEPLASE: 2.2 INJECTION, POWDER, LYOPHILIZED, FOR SOLUTION INTRAVENOUS at 22:48

## 2020-06-13 RX ADMIN — MIDODRINE HYDROCHLORIDE 15 MG: 5 TABLET ORAL at 08:36

## 2020-06-13 RX ADMIN — POTASSIUM CHLORIDE 20 MEQ: 29.8 INJECTION, SOLUTION INTRAVENOUS at 05:32

## 2020-06-13 RX ADMIN — Medication 500 MG: at 08:37

## 2020-06-13 RX ADMIN — CEPHALEXIN 1000 MG: 500 CAPSULE ORAL at 17:49

## 2020-06-13 RX ADMIN — MIDODRINE HYDROCHLORIDE 15 MG: 5 TABLET ORAL at 11:56

## 2020-06-13 NOTE — PROGRESS NOTES
"    DAILY PROGRESS NOTE  [facility]    Patient Identification:  Name: Emely Solomon  Age: 59 y.o.  Sex: female  :  1960  MRN: [id number]         Primary Care Physician: RENAY Smith MD    Subjective:  Interval History: Feeling much better today.  Generalized weakness improving and she actually did a little bit better with physical therapy.  Denies any chest pain nausea vomiting or altered mentation    Objective: Resting comfortably in bed.  Interactive and conversational and nontoxic-appearing.  No family at bedside    Scheduled Meds:    cephalexin 1,000 mg Oral Q6H   midodrine 15 mg Oral TID AC   pantoprazole 40 mg Oral Q AM   rivaroxaban 20 mg Oral Daily With Dinner   saccharomyces boulardii 500 mg Oral BID   sodium chloride 10 mL Intravenous Q12H   sodium chloride 10 mL Intravenous Q12H   sodium chloride 10 mL Intravenous Q12H   sodium chloride 10 mL Intravenous Q12H     Continuous Infusions:     Vital signs in last 24 hours:  Temp:  [97.3 °F (36.3 °C)-98.5 °F (36.9 °C)] 98.5 °F (36.9 °C)  Heart Rate:  [77-84] 77  Resp:  [18] 18  BP: ()/(60-66) 98/60    Intake/Output:    Intake/Output Summary (Last 24 hours) at 2020 1327  Last data filed at 2020 1315  Gross per 24 hour   Intake 2202 ml   Output 2500 ml   Net -298 ml       Exam:  BP 98/60 (BP Location: Right arm, Patient Position: Sitting)   Pulse 77   Temp 98.5 °F (36.9 °C) (Oral)   Resp 18   Ht 160 cm (63\")   Wt (!) 189 kg (416 lb 9.6 oz)   SpO2 96%   BMI 73.80 kg/m²     General Appearance:    Alert, cooperative, no distress, AAOx3                         Throat:   Oral mucosa pink and moist                         Lungs:    Clear to auscultation bilaterally, respirations unlabored                          Heart:    Regular rate and rhythm, S1 and S2 normal                  Abdomen:     Soft, nontender, bowel sounds active                 Extremities: Chronic lymphedema with right lower extremity wrapped         "                  Data Review:  Labs in chart were reviewed.    Assessment:  Active Hospital Problems    Diagnosis  POA   • **Cellulitis of right lower extremity [L03.115]  Yes   • Syncope [R55]  Yes   • History of DVT of lower extremity [Z86.718]  Not Applicable   • Lymphedema [I89.0]  Yes   • Acute renal failure (ARF) (CMS/[facility]) [N17.9]  Yes   • Anemia, chronic disease [D63.8]  Yes   • Morbid obesity (CMS/[facility]) [E66.01]  Yes   • ARIEL (obstructive sleep apnea) [G47.33]  Yes   • Hypertension [I10]  Yes   • PFO (patent foramen ovale) [Q21.1]  Not Applicable   • Chronic diastolic CHF (congestive heart failure) (CMS/[facility]) [I50.32]  Unknown   • Pulmonary hypertension (CMS/[facility]) [I27.20]  Yes      Resolved Hospital Problems    Diagnosis Date Resolved POA   • Hyponatremia [E87.1] 06/12/2020 Yes   • Sepsis (CMS/[facility]) [A41.9] 06/13/2020 Yes       Plan:    Appreciate nephrology/agree with holding diuretics today but clinically she looks much improved   -On midodrine 3 times daily    Xarelto for past history of DVT/PE    Remains on Keflex per ID recommendations with Florastor for GI protection    Working better with physical therapy    Hopeful [facility] will be able to coordinate subacute rehab on Monday    Toño Giron MD  6/13/2020  13:27

## 2020-06-13 NOTE — THERAPY TREATMENT NOTE
Patient Name: Emely Solomon  : 1960    MRN: [id number]                              Today's Date: 2020       Admit Date: 2020    Visit Dx:     ICD-10-CM ICD-9-CM   1. Cellulitis of right anterior lower leg L03.115 682.6   2. STEFANIA (acute kidney injury) (CMS/[facility]) N17.9 584.9   3. Lymphedema I89.0 457.1   4. Sepsis, due to unspecified organism, unspecified whether acute organ dysfunction present (CMS/[facility]) A41.9 038.9     995.91   5. Acute renal failure, unspecified acute renal failure type (CMS/[facility]) N17.9 584.9     Patient Active Problem List   Diagnosis   • Chronic diastolic CHF (congestive heart failure) (CMS/[facility])   • PFO (patent foramen ovale)   • Paradoxical embolism (CMS/[facility])   • Hypertension   • Pulmonary hypertension (CMS/[facility])   • Sepsis (CMS/[facility])   • Back pain   • ARIEL (obstructive sleep apnea)   • Morbid obesity (CMS/[facility])   • Urinary frequency   • History of DVT of lower extremity   • Cellulitis of right anterior lower leg   • Lymphedema   • Cellulitis of right lower extremity   • Acute renal failure (ARF) (CMS/[facility])   • Anemia, chronic disease   • Syncope     Past Medical History:   Diagnosis Date   • Cellulitis     2017, with Group B Strep bacteremia and sepsis   • Chronic diastolic congestive heart failure (CMS/[facility])    • Deep venous thrombosis (CMS/[facility])    • Hypertension    • Lipoedema    • Lymphedema    • Morbid obesity (CMS/[facility])    • Paradoxical embolism (CMS/[facility])     to the LLE due to DVT/PE and PFO   • PFO (patent foramen ovale)    • Pulmonary embolism (CMS/[facility])    • Pulmonary hypertension (CMS/[facility])     multifactorial (dCHF, obesity/ARIEL, hx PE), mild by echo 2016     Past Surgical History:   Procedure Laterality Date   • BRONCHOSCOPY N/A 2017    Procedure: BRONCHOSCOPY with wash;  Surgeon: Caleb Garcia MD;  Location: [facility] ENDOSCOPY;  Service:    • DILATATION AND CURETTAGE  2011   • VENA CAVA FILTER PLACEMENT      Inferior Vena Cava Filter Placement w/Fluorosc angiogr  guidance     General Information     Row Name 06/13/20 1010          PT Evaluation Time/Intention    Document Type  therapy note (daily note)  -DB     Mode of Treatment  physical therapy;individual therapy  -DB     Row Name 06/13/20 1010          General Information    Patient Profile Reviewed?  yes  -DB     Existing Precautions/Restrictions  fall;oxygen therapy device and L/min  -DB     Row Name 06/13/20 1010          Cognitive Assessment/Intervention- PT/OT    Orientation Status (Cognition)  oriented x 4  -DB       User Key  (r) = Recorded By, (t) = Taken By, (c) = Cosigned By    Initials Name Provider Type    DB Radha Chairez, PT Physical Therapist        Mobility     Row Name 06/13/20 1010          Bed Mobility Assessment/Treatment    Rolling Left [place] (Bed Mobility)  conditional independence  -DB     Supine-Sit [place] (Bed Mobility)  conditional independence  -DB     Sit-Supine [place] (Bed Mobility)  minimum assist (75% patient effort) assist with 1 LE  -DB     Assistive Device (Bed Mobility)  bed rails;head of bed elevated;overhead trapeze  -DB     Row Name 06/13/20 1010          Sit-Stand Transfer    Sit-Stand [place] (Transfers)  stand by assist  -DB     Assistive Device (Sit-Stand Transfers)  walker, front-wheeled;bariatric  -DB     Row Name 06/13/20 1010          Gait/Stairs Assessment/Training    [place] Level (Gait)  contact guard  -DB     Assistive Device (Gait)  walker, front-wheeled;bariatric  -DB     Distance in Feet (Gait)  50'  -DB     Pattern (Gait)  step-through  -DB     Deviations/Abnormal Patterns (Gait)  base of support, wide;gait speed decreased;stride length decreased  -DB     Bilateral Gait Deviations  forward flexed posture  -DB       User Key  (r) = Recorded By, (t) = Taken By, (c) = Cosigned By    Initials Name Provider Type    DB Radha Chairez PT Physical Therapist        Obj/Interventions     Row Name 06/13/20 1012          Static Sitting Balance     Level of [place] (Unsupported Sitting, Static Balance)  independent  -DB     Sitting Position (Unsupported Sitting, Static Balance)  sitting on edge of bed  -DB     Time Able to Maintain Position (Unsupported Sitting, Static Balance)  30 to 45 seconds  -DB     Row Name 06/13/20 1012          Dynamic Sitting Balance    Level of [place], Reaches Outside Midline (Sitting, Dynamic Balance)  independent  -DB     Sitting Position, Reaches Outside Midline (Sitting, Dynamic Balance)  sitting on edge of bed  -DB     Row Name 06/13/20 1012          Static Standing Balance    Level of [place] (Supported Standing, Static Balance)  supervision  -DB     Time Able to Maintain Position (Supported Standing, Static Balance)  1 to 2 minutes  -DB     Assistive Device Utilized (Supported Standing, Static Balance)  walker, rolling  -DB       User Key  (r) = Recorded By, (t) = Taken By, (c) = Cosigned By    Initials Name Provider Type    DB Rdaha Chairez, PT Physical Therapist        Goals/Plan    No documentation.       Clinical Impression     Row Name 06/13/20 1013          Pain Scale: Numbers Pre/Post-Treatment    Pain Scale: Numbers, Pretreatment  0/10 - no pain  -DB     Pain Scale: Numbers, Post-Treatment  0/10 - no pain  -DB     Pain Intervention(s)  Repositioned;Ambulation/increased activity  -DB     Row Name 06/13/20 1013          Plan of Care Review    Plan of Care Reviewed With  patient  -DB     Progress  improving  -DB     Outcome Summary  RN reported pt had a syncopal episode last night following PT sitting up in the chair. Pt was given fluids last night. Pt was agreeable to therapy this date. Pt performed supine <> sit mod I. Pt was able to stand to RW and walk 50' with CGA. Pt did not want to go as far today d/t episode last night. Pt needed Cirilo to help with LE getting back into bed. Pt continues to benefit from PT.   -DB     Row Name 06/13/20 1013          Vital Signs    O2 Delivery Pre Treatment   supplemental O2 2L  -DB     O2 Delivery Intra Treatment  supplemental O2 2L  -DB     O2 Delivery Post Treatment  supplemental O2 2L  -DB     Pre Patient Position  Supine  -DB     Intra Patient Position  Standing  -DB     Post Patient Position  Supine  -DB     Row Name 06/13/20 1013          Positioning and Restraints    Pre-Treatment Position  in bed  -DB     Post Treatment Position  bed  -DB     In Bed  notified nsg;supine;call light within reach;encouraged to call for assist  -DB       User Key  (r) = Recorded By, (t) = Taken By, (c) = Cosigned By    Initials Name Provider Type    Radha Sandoval PT Physical Therapist        Outcome Measures     Row Name 06/13/20 1018          How much help from another person do you currently need...    Turning from your back to your side while in flat bed without using bedrails?  2  -DB     Moving from lying on back to sitting on the side of a flat bed without bedrails?  2  -DB     Moving to and from a bed to a chair (including a wheelchair)?  3  -DB     Standing up from a chair using your arms (e.g., wheelchair, bedside chair)?  3  -DB     Climbing 3-5 steps with a railing?  2  -DB     To walk in hospital room?  3  -DB     AM-PAC 6 Clicks Score (PT)  15  -DB     Row Name 06/13/20 1018          Functional Assessment    Outcome Measure Options  AM-PAC 6 Clicks Basic Mobility (PT)  -DB       User Key  (r) = Recorded By, (t) = Taken By, (c) = Cosigned By    Initials Name Provider Type    Radha Sandoval PT Physical Therapist        Physical Therapy Education                 Title: PT OT SLP Therapies (Done)     Topic: Physical Therapy (Done)     Point: Mobility training (Done)     Description:   Instruct learner(s) on safety and technique for assisting patient out of bed, chair or wheelchair.  Instruct in the proper use of assistive devices, such as walker, crutches, cane or brace.              Patient Friendly Description:   It's important to get you on your feet again,  but we need to do so in a way that is safe for you. Falling has serious consequences, and your personal safety is the most important thing of all.        When it's time to get out of bed, one of us or a family member will sit next to you on the bed to give you support.     If your doctor or nurse tells you to use a walker, crutches, a cane, or a brace, be sure you use it every time you get out of bed, even if you think you don't need it.    Learning Progress Summary           Patient Acceptance, E, VU by DB at 6/13/2020 1019    Acceptance, E,TB, VU by IVON at 6/12/2020 0940    Eager, E,TB,D, VU by PINA at 6/11/2020 1838    Acceptance, E,D, VU,NR by PINA at 6/10/2020 1715    Acceptance, E,D, NR by PC at 6/9/2020 1255    Acceptance, E,TB, VU,NR by CS at 6/8/2020 1522                   Point: Home exercise program (Done)     Description:   Instruct learner(s) on appropriate technique for monitoring, assisting and/or progressing patient with therapeutic exercises and activities.              Learning Progress Summary           Patient Acceptance, E, VU by DB at 6/13/2020 1019    Eager, E,TB,D, VU by PINA at 6/11/2020 1838    Acceptance, E,D, VU,NR by PINA at 6/10/2020 1715    Acceptance, E,D, NR by PC at 6/9/2020 1255    Acceptance, E,TB, VU,NR by CS at 6/8/2020 1522                   Point: Body mechanics (Done)     Description:   Instruct learner(s) on proper positioning and spine alignment for patient and/or caregiver during mobility tasks and/or exercises.              Learning Progress Summary           Patient Acceptance, E, VU by DB at 6/13/2020 1019    Acceptance, E,TB, VU by IVON at 6/12/2020 0940    Eager, E,TB,D, VU by PINA at 6/11/2020 1838    Acceptance, E,D, VU,NR by PINA at 6/10/2020 1715    Acceptance, E,D, NR by PC at 6/9/2020 1255    Acceptance, E,TB, VU,NR by CS at 6/8/2020 1522                   Point: Precautions (Done)     Description:   Instruct learner(s) on prescribed precautions during mobility and gait tasks               Learning Progress Summary           Patient Acceptance, E, VU by DB at 6/13/2020 1019    Acceptance, E,TB, VU by  at 6/12/2020 0940    Eager, E,TB,D, VU by  at 6/11/2020 1838    Acceptance, E,D, VU,NR by  at 6/10/2020 1715    Acceptance, E,D, NR by  at 6/9/2020 1255    Acceptance, E,TB, VU,NR by  at 6/8/2020 1522                               User Key     Initials Effective Dates Name Provider Type Discipline    PC 04/03/18 -  Esperanza Larios, PT Physical Therapist PT     03/07/18 -  Raquel Todd PTA Physical Therapy Assistant PT     05/14/18 -  Tank Lucas, PT Physical Therapist PT     01/22/20 -  Radha Chairez, PT Physical Therapist PT     08/23/19 -  [place] Saenz PT Physical Therapist PT              PT Recommendation and Plan     Plan of Care Reviewed With: patient  Progress: improving  Outcome Summary: RN reported pt had a syncopal episode last night following PT sitting up in the chair. Pt was given fluids last night. Pt was agreeable to therapy this date. Pt performed supine <> sit mod I. Pt was able to stand to RW and walk 50' with CGA. Pt did not want to go as far today d/t episode last night. Pt needed Cirilo to help with LE getting back into bed. Pt continues to benefit from PT.      Time Calculation:   PT Charges     Row Name 06/13/20 1020             Time Calculation    Start Time  0951  -DB      Stop Time  1009  -DB      Time Calculation (min)  18 min  -DB      PT Received On  06/13/20  -DB      PT - Next Appointment  06/14/20  -DB         Time Calculation- PT    Total Timed Code Minutes- PT  18 minute(s)  -DB        User Key  (r) = Recorded By, (t) = Taken By, (c) = Cosigned By    Initials Name Provider Type    DB Radha Chairez, PT Physical Therapist        Therapy Charges for Today     Code Description Service Date Service Provider Modifiers Qty    70602408766 HC PT THER PROC EA 15 MIN 6/13/2020 Radha Chairez, PT GP 1          PT G-Codes  Outcome  Measure Options: AM-PAC 6 Clicks Basic Mobility (PT)  AM-PAC 6 Clicks Score (PT): 15  AM-PAC 6 Clicks Score (OT): 13    Radha Chairez, PT  6/13/2020

## 2020-06-13 NOTE — PLAN OF CARE
Problem: Patient Care Overview  Goal: Plan of Care Review  Outcome: Ongoing (interventions implemented as appropriate)  Flowsheets  Taken 6/12/2020 1804 by Kamilla Daly RN  Progress: improving  Taken 6/13/2020 0010 by Katelyn Valles, RN  Plan of Care Reviewed With: patient  Note:   VSS afebrile. No complaints, small bm on bedpan without any syncope. Remains on IV fluids. Am labs drawn, pending results. Will continue to monitor.

## 2020-06-13 NOTE — PLAN OF CARE
Problem: Patient Care Overview  Goal: Plan of Care Review  Outcome: Ongoing (interventions implemented as appropriate)  Flowsheets (Taken 6/13/2020 1013)  Progress: improving  Plan of Care Reviewed With: patient  Outcome Summary: RN reported pt had a syncopal episode last night following PT sitting up in the chair. Pt was given fluids last night. Pt was agreeable to therapy this date. Pt performed supine <> sit mod I. Pt was able to stand to RW and walk 50' with CGA. Pt did not want to go as far today d/t episode last night. Pt needed Cirilo to help with LE getting back into bed. Pt continues to benefit from PT.

## 2020-06-13 NOTE — PROGRESS NOTES
"   LOS: 9 days    Patient Care Team:  RENAY Smith MD as PCP - General (General Practice)    Chief Complaint:    Chief Complaint   Patient presents with   • Diarrhea   • Legs Swollen     Follow-up acute kidney injury  Subjective     Interval History:   Feeling better, no further syncopal episode, received IV fluid, currently feeling the same, denies chest pain or shortness of air, no nausea or vomiting, she had chronic edema but has improved significantly and she has Salvador catheter anchored in place, her urine output in the past 24 hours was 1900 and she has net loss since admission 26 L  Objective     Vital Signs  Temp:  [97.3 °F (36.3 °C)-98.4 °F (36.9 °C)] 98.4 °F (36.9 °C)  Heart Rate:  [77-84] 77  Resp:  [18] 18  BP: ()/(60-81) 98/60    Flowsheet Rows      First Filed Value   Admission Height  160 cm (63\") Documented at 06/04/2020 0001   Admission Weight  (!) 176 kg (388 lb 12.8 oz) Documented at 06/04/2020 0040          I/O this shift:  In: 240 [P.O.:240]  Out: -   I/O last 3 completed shifts:  In: 1722 [I.V.:1672; IV Piggyback:50]  Out: 3400 [Urine:3400]    Intake/Output Summary (Last 24 hours) at 6/13/2020 1047  Last data filed at 6/13/2020 0833  Gross per 24 hour   Intake 1962 ml   Output 1900 ml   Net 62 ml       Physical Exam:  General Appearance: alert, oriented x 3, no acute distress  Skin: warm and dry  HEENT: pupils round and reactive to light, oral mucosa dry  Neck: supple, no JVD, trachea midline  Lungs: diminished, unlabored breathing effort  Heart: RRR, normal S1 and S2, no S3, no rub  Abdomen: soft, non-tender,  present bowel sounds to auscultation, abdominal wall edema  : no palpable bladder, Salvador catheter is anchored in place  Extremities:  Nonpitting lower extremity edema significantly improved since admission  Neuro: normal speech and mental status         Results Review:    Results from last 7 days   Lab Units 06/13/20  0353 06/12/20  0335 06/11/20  0302  06/09/20  0315   SODIUM " mmol/L 136 136 135*   < > 134*   POTASSIUM mmol/L 3.6 3.7 3.6   < > 3.5   CHLORIDE mmol/L 94* 91* 88*   < > 86*   CO2 mmol/L 32.1* 34.1* 32.7*   < > 33.7*   BUN mg/dL 37* 40* 38*   < > 35*   CREATININE mg/dL 1.35* 1.49* 1.49*   < > 1.33*   CALCIUM mg/dL 9.1 9.3 9.2   < > 8.9   BILIRUBIN mg/dL  --   --   --   --  0.5   ALK PHOS U/L  --   --   --   --  136*   ALT (SGPT) U/L  --   --   --   --  15   AST (SGOT) U/L  --   --   --   --  19   GLUCOSE mg/dL 111* 104* 94   < > 97    < > = values in this interval not displayed.       Estimated Creatinine Clearance: 74.9 mL/min (A) (by C-G formula based on SCr of 1.35 mg/dL (H)).    Results from last 7 days   Lab Units 06/13/20  0353 06/12/20  0335 06/11/20  0302   MAGNESIUM mg/dL 2.1 2.1 2.1   PHOSPHORUS mg/dL 4.3 4.8* 4.4       Results from last 7 days   Lab Units 06/13/20  0353 06/12/20  0335 06/11/20  0302 06/10/20  0633 06/09/20  0315   URIC ACID mg/dL 10.7* 11.5* 11.8* 11.4* 9.8*       Results from last 7 days   Lab Units 06/13/20  0353 06/09/20  0315 06/07/20  1202   WBC 10*3/mm3 7.23 15.46* 18.60*   HEMOGLOBIN g/dL 8.7* 9.6* 9.6*   PLATELETS 10*3/mm3 351 379 394       Results from last 7 days   Lab Units 06/12/20  0335 06/11/20  0302 06/10/20  0633 06/09/20  0315 06/08/20  0205   INR  2.14* 1.88* 1.62* 1.46* 1.23*         Imaging Results (Last 24 Hours)     ** No results found for the last 24 hours. **          cephalexin 1,000 mg Oral Q6H   midodrine 15 mg Oral TID AC   pantoprazole 40 mg Oral Q AM   rivaroxaban 20 mg Oral Daily With Dinner   saccharomyces boulardii 500 mg Oral BID   sodium chloride 10 mL Intravenous Q12H   sodium chloride 10 mL Intravenous Q12H   sodium chloride 10 mL Intravenous Q12H   sodium chloride 10 mL Intravenous Q12H       sodium chloride 100 mL/hr Last Rate: 100 mL/hr (06/12/20 7126)       Medication Review:   Current Facility-Administered Medications   Medication Dose Route Frequency Provider Last Rate Last Dose   • acetaminophen (TYLENOL)  tablet 650 mg  650 mg Oral Q4H PRN Danilo Croft MD   650 mg at 06/09/20 0300    Or   • acetaminophen (TYLENOL) 160 MG/5ML solution 650 mg  650 mg Oral Q4H PRN Danilo Croft MD        Or   • acetaminophen (TYLENOL) suppository 650 mg  650 mg Rectal Q4H PRN Danilo Croft MD       • albumin human 25 % IV SOLN 12.5 g  12.5 g Intravenous PRN Danilo Croft MD       • calcium gluconate 1 g in sodium chloride 0.9 % 50 mL IVPB  1 g Intravenous PRN Danilo Croft MD        Or   • calcium gluconate 2 g in sodium chloride 0.9 % 50 mL IVPB  2 g Intravenous PRN Danilo Croft MD       • cephalexin (KEFLEX) capsule 1,000 mg  1,000 mg Oral Q6H Tariq Flores MD   1,000 mg at 06/13/20 0640   • heparin (porcine) injection 1,000-2,000 Units  1,000-2,000 Units Intravenous PRN Danilo Croft MD       • ipratropium-albuterol (DUO-NEB) nebulizer solution 3 mL  3 mL Nebulization Q6H PRN Danilo Croft MD       • midodrine (PROAMATINE) tablet 15 mg  15 mg Oral TID AC Danilo Croft MD   15 mg at 06/13/20 0836   • nitroglycerin (NITROSTAT) SL tablet 0.4 mg  0.4 mg Sublingual Q5 Min PRN aDnilo Croft MD       • ondansetron (ZOFRAN) injection 4 mg  4 mg Intravenous Q6H PRN Danilo Croft MD       • pantoprazole (PROTONIX) EC tablet 40 mg  40 mg Oral Q AM Danilo Croft MD   40 mg at 06/13/20 0640   • potassium chloride 20 mEq in 50 mL IVPB  20 mEq Intravenous PRN Danilo Croft MD 50 mL/hr at 06/13/20 0532 20 mEq at 06/13/20 0532   • rivaroxaban (XARELTO) tablet 20 mg  20 mg Oral Daily With Dinner Nelson Orellana MD   20 mg at 06/12/20 1736   • saccharomyces boulardii (FLORASTOR) capsule 500 mg  500 mg Oral BID Tariq Flores MD   500 mg at 06/13/20 0837   • sodium chloride 0.9 % bolus 200 mL  200 mL Intravenous PRN Danilo Croft MD       • sodium chloride 0.9 % flush 10  mL  10 mL Intravenous Q12H Danilo Croft MD   10 mL at 06/12/20 2049   • sodium chloride 0.9 % flush 10 mL  10 mL Intravenous PRN Danilo Croft MD       • sodium chloride 0.9 % flush 10 mL  10 mL Intravenous Q12H Danilo Croft MD   10 mL at 06/13/20 0837   • sodium chloride 0.9 % flush 10 mL  10 mL Intravenous Q12H Danilo Croft MD   10 mL at 06/13/20 0837   • sodium chloride 0.9 % flush 10 mL  10 mL Intravenous Q12H Marimarvolu, Danilo Mcpherson MD   10 mL at 06/13/20 0837   • sodium chloride 0.9 % flush 10 mL  10 mL Intravenous PRN Danilo Croft MD       • sodium chloride 0.9 % flush 20 mL  20 mL Intravenous PRN Danilo Croft MD       • sodium chloride 0.9 % infusion  100 mL/hr Intravenous Continuous Ashutosh Garcia  mL/hr at 06/12/20 2355 100 mL/hr at 06/12/20 2355   • sodium phosphates 10 mmol in sodium chloride 0.9 % 100 mL IVPB  10 mmol Intravenous PRDanilo Castro MD           Assessment/Plan   1.  Acute kidney injury, improving, creatinine is down to 1.35, stable, the total CO2 is down to 32.1 potassium 3.6.  2.  Hyponatremia, associated with fluid excess, sodium is 136.  3.  Sepsis with cellulitis, of the right lower extremity  4.  Morbid obesity  5.  Medical noncompliance  6.  Diastolic heart failure, with PFO  7.  History of DVT/PE  8.  Hypomagnesemia associated with diuretics, magnesium today is 2.1  9.  Hyperuricemia, uric acid today is down to 10.7, associated with diuresis and decrease effective enteral arterial volume, slight improvement with hydration  10.  Anemia, globin today is 8.7  11.  Syncopal episode most likely associated with overdiuresis and volume depletion, resolved          Plan:  1.  No diuretics today  2.  Surveillance labs        Ashutosh Garcia MD  06/13/20  10:47

## 2020-06-14 LAB
ALBUMIN SERPL-MCNC: 2.8 G/DL (ref 3.5–5.2)
ANION GAP SERPL CALCULATED.3IONS-SCNC: 13.8 MMOL/L (ref 5–15)
BUN BLD-MCNC: 26 MG/DL (ref 8–23)
BUN/CREAT SERPL: 21.3 (ref 7–25)
CALCIUM SPEC-SCNC: 8.9 MG/DL (ref 8.6–10.5)
CHLORIDE SERPL-SCNC: 98 MMOL/L (ref 98–107)
CO2 SERPL-SCNC: 27.2 MMOL/L (ref 22–29)
CREAT BLD-MCNC: 1.22 MG/DL (ref 0.57–1)
GFR SERPL CREATININE-BSD FRML MDRD: 45 ML/MIN/1.73
GLUCOSE BLD-MCNC: 87 MG/DL (ref 65–99)
MAGNESIUM SERPL-MCNC: 2.1 MG/DL (ref 1.6–2.4)
PHOSPHATE SERPL-MCNC: 3.3 MG/DL (ref 2.5–4.5)
POTASSIUM BLD-SCNC: 4 MMOL/L (ref 3.5–5.2)
SODIUM BLD-SCNC: 139 MMOL/L (ref 136–145)
URATE SERPL-MCNC: 9.6 MG/DL (ref 2.4–5.7)

## 2020-06-14 PROCEDURE — 80069 RENAL FUNCTION PANEL: CPT | Performed by: INTERNAL MEDICINE

## 2020-06-14 PROCEDURE — 84550 ASSAY OF BLOOD/URIC ACID: CPT | Performed by: INTERNAL MEDICINE

## 2020-06-14 PROCEDURE — 97110 THERAPEUTIC EXERCISES: CPT | Performed by: PHYSICAL THERAPIST

## 2020-06-14 PROCEDURE — 83735 ASSAY OF MAGNESIUM: CPT | Performed by: INTERNAL MEDICINE

## 2020-06-14 RX ADMIN — SODIUM CHLORIDE, PRESERVATIVE FREE 10 ML: 5 INJECTION INTRAVENOUS at 21:34

## 2020-06-14 RX ADMIN — SODIUM CHLORIDE, PRESERVATIVE FREE 10 ML: 5 INJECTION INTRAVENOUS at 09:40

## 2020-06-14 RX ADMIN — CEPHALEXIN 1000 MG: 500 CAPSULE ORAL at 00:16

## 2020-06-14 RX ADMIN — RIVAROXABAN 20 MG: 20 TABLET, FILM COATED ORAL at 17:45

## 2020-06-14 RX ADMIN — SODIUM CHLORIDE, PRESERVATIVE FREE 10 ML: 5 INJECTION INTRAVENOUS at 21:35

## 2020-06-14 RX ADMIN — Medication 500 MG: at 21:34

## 2020-06-14 RX ADMIN — CEPHALEXIN 1000 MG: 500 CAPSULE ORAL at 06:13

## 2020-06-14 RX ADMIN — CEPHALEXIN 1000 MG: 500 CAPSULE ORAL at 12:36

## 2020-06-14 RX ADMIN — CEPHALEXIN 1000 MG: 500 CAPSULE ORAL at 17:45

## 2020-06-14 RX ADMIN — PANTOPRAZOLE SODIUM 40 MG: 40 TABLET, DELAYED RELEASE ORAL at 06:13

## 2020-06-14 RX ADMIN — Medication 500 MG: at 09:39

## 2020-06-14 RX ADMIN — MIDODRINE HYDROCHLORIDE 15 MG: 5 TABLET ORAL at 12:34

## 2020-06-14 NOTE — THERAPY TREATMENT NOTE
Patient Name: Emely Solomon  : 1960    MRN: [id number]                              Today's Date: 2020       Admit Date: 2020    Visit Dx:     ICD-10-CM ICD-9-CM   1. Cellulitis of right anterior lower leg L03.115 682.6   2. STEFANIA (acute kidney injury) (CMS/[facility]) N17.9 584.9   3. Lymphedema I89.0 457.1   4. Sepsis, due to unspecified organism, unspecified whether acute organ dysfunction present (CMS/[facility]) A41.9 038.9     995.91   5. Acute renal failure, unspecified acute renal failure type (CMS/[facility]) N17.9 584.9     Patient Active Problem List   Diagnosis   • Chronic diastolic CHF (congestive heart failure) (CMS/[facility])   • PFO (patent foramen ovale)   • Paradoxical embolism (CMS/[facility])   • Hypertension   • Pulmonary hypertension (CMS/[facility])   • Back pain   • ARIEL (obstructive sleep apnea)   • Morbid obesity (CMS/[facility])   • Urinary frequency   • History of DVT of lower extremity   • Cellulitis of right anterior lower leg   • Lymphedema   • Cellulitis of right lower extremity   • Acute renal failure (ARF) (CMS/[facility])   • Anemia, chronic disease   • Syncope     Past Medical History:   Diagnosis Date   • Cellulitis     2017, with Group B Strep bacteremia and sepsis   • Chronic diastolic congestive heart failure (CMS/[facility])    • Deep venous thrombosis (CMS/[facility])    • Hypertension    • Lipoedema    • Lymphedema    • Morbid obesity (CMS/[facility])    • Paradoxical embolism (CMS/[facility])     to the LLE due to DVT/PE and PFO   • PFO (patent foramen ovale)    • Pulmonary embolism (CMS/[facility])    • Pulmonary hypertension (CMS/[facility])     multifactorial (dCHF, obesity/ARIEL, hx PE), mild by echo 2016     Past Surgical History:   Procedure Laterality Date   • BRONCHOSCOPY N/A 2017    Procedure: BRONCHOSCOPY with wash;  Surgeon: Caleb Garcia MD;  Location: [facility] ENDOSCOPY;  Service:    • DILATATION AND CURETTAGE  2011   • VENA CAVA FILTER PLACEMENT      Inferior Vena Cava Filter Placement w/Fluorosc angiogr guidance     General  Information     Row Name 06/14/20 [facility]7          PT Evaluation Time/Intention    Document Type  therapy note (daily note)  -     Mode of Treatment  physical therapy;individual therapy  -       User Key  (r) = Recorded By, (t) = Taken By, (c) = Cosigned By    Initials Name Provider Type    Ale Pina, CATRINA Physical Therapist        Mobility     Row Name 06/14/20 1417          Bed Mobility Assessment/Treatment    Rolling Left Brown (Bed Mobility)  conditional independence  -     Supine-Sit Brown (Bed Mobility)  conditional independence  -     Sit-Supine Brown (Bed Mobility)  minimum assist (75% patient effort)  -     Assistive Device (Bed Mobility)  bed rails;head of bed elevated;overhead trapeze  -     Row Name 06/14/20 1417          Sit-Stand Transfer    Sit-Stand Brown (Transfers)  contact guard  -     Assistive Device (Sit-Stand Transfers)  walker, front-wheeled  -[facility] Name 06/14/20 1417          Gait/Stairs Assessment/Training    Gait/Stairs Assessment/Training  gait/ambulation independence;gait/ambulation assistive device  -     Brown Level (Gait)  contact guard  -     Assistive Device (Gait)  walker, front-wheeled;bariatric  -     Distance in Feet (Gait)  65  -     Deviations/Abnormal Patterns (Gait)  base of support, wide;gait speed decreased;stride length decreased  -     Bilateral Gait Deviations  forward flexed posture  -       User Key  (r) = Recorded By, (t) = Taken By, (c) = Cosigned By    Initials Name Provider Type    Ale Pina, PT Physical Therapist        Obj/Interventions    No documentation.       Goals/Plan    No documentation.       Clinical Impression     Row Name 06/14/20 1418          Pain Assessment    Additional Documentation  Pain Scale: Numbers Pre/Post-Treatment (Group)  -[facility] Name 06/14/20 1418          Pain Scale: Numbers Pre/Post-Treatment    Pain Scale: Numbers, Pretreatment  0/10 -  no pain  -     Pain Scale: Numbers, Post-Treatment  0/10 - no pain  -     Row Name 06/14/20 1418          Plan of Care Review    Plan of Care Reviewed With  patient;spouse  -CAMERON     Outcome Summary  Pt ambulated well with Rwx. She tolerated a slightly increased ambulation distance today, but was afraid to push herself too much further as her legs were fatiguing.  -     Row Name 06/14/20 1418          Positioning and Restraints    Pre-Treatment Position  in bed  -     Post Treatment Position  bed  -KH     In Bed  fowlers;call light within reach;encouraged to call for assist;with family/caregiver  -       User Key  (r) = Recorded By, (t) = Taken By, (c) = Cosigned By    Initials Name Provider Type    Ale Pina PT Physical Therapist        Outcome Measures     Row Name 06/14/20 1429          How much help from another person do you currently need...    Turning from your back to your side while in flat bed without using bedrails?  4  -KH     Moving from lying on back to sitting on the side of a flat bed without bedrails?  3  -KH     Moving to and from a bed to a chair (including a wheelchair)?  3  -KH     Standing up from a chair using your arms (e.g., wheelchair, bedside chair)?  3  -KH     Climbing 3-5 steps with a railing?  2  -KH     To walk in hospital room?  3  -KH     AM-PAC 6 Clicks Score (PT)  18  -     Row Name 06/14/20 1429          Functional Assessment    Outcome Measure Options  AM-PAC 6 Clicks Basic Mobility (PT)  -       User Key  (r) = Recorded By, (t) = Taken By, (c) = Cosigned By    Initials Name Provider Type    Ale Pina PT Physical Therapist        Physical Therapy Education                 Title: PT OT SLP Therapies (Done)     Topic: Physical Therapy (Done)     Point: Mobility training (Done)     Description:   Instruct learner(s) on safety and technique for assisting patient out of bed, chair or wheelchair.  Instruct in the proper use of assistive  devices, such as walker, crutches, cane or brace.              Patient Friendly Description:   It's important to get you on your feet again, but we need to do so in a way that is safe for you. Falling has serious consequences, and your personal safety is the most important thing of all.        When it's time to get out of bed, one of us or a family member will sit next to you on the bed to give you support.     If your doctor or nurse tells you to use a walker, crutches, a cane, or a brace, be sure you use it every time you get out of bed, even if you think you don't need it.    Learning Progress Summary           Patient Acceptance, E, VU,NR by CAMERON at 6/14/2020 1431    Acceptance, E, VU by ERLIN at 6/13/2020 1019    Acceptance, E,TB, VU by IVON at 6/12/2020 0940    Eager, E,TB,D, VU by PINA at 6/11/2020 1838    Acceptance, E,D, VU,NR by PINA at 6/10/2020 1715    Acceptance, E,D, NR by PC at 6/9/2020 1255    Acceptance, E,TB, VU,NR by  at 6/8/2020 1522                   Point: Home exercise program (Done)     Description:   Instruct learner(s) on appropriate technique for monitoring, assisting and/or progressing patient with therapeutic exercises and activities.              Learning Progress Summary           Patient Acceptance, E, VU,NR by CAMERON at 6/14/2020 1431    Acceptance, E, VU by ERLIN at 6/13/2020 1019    Eager, E,TB,D, VU by PINA at 6/11/2020 1838    Acceptance, E,D, VU,NR by PINA at 6/10/2020 1715    Acceptance, E,D, NR by PC at 6/9/2020 1255    Acceptance, E,TB, VU,NR by CS at 6/8/2020 1522                   Point: Body mechanics (Done)     Description:   Instruct learner(s) on proper positioning and spine alignment for patient and/or caregiver during mobility tasks and/or exercises.              Learning Progress Summary           Patient Acceptance, E, VU,NR by CAMERON at 6/14/2020 1431    Acceptance, E, VU by ERLIN at 6/13/2020 1019    Acceptance, E,TB, VU by IVON at 6/12/2020 0940    Eager, E,TB,D, VU by PINA at 6/11/2020 1839     Acceptance, E,D, VU,NR by  at 6/10/2020 1715    Acceptance, E,D, NR by PC at 6/9/2020 1255    Acceptance, E,TB, VU,NR by  at 6/8/2020 1522                   Point: Precautions (Done)     Description:   Instruct learner(s) on prescribed precautions during mobility and gait tasks              Learning Progress Summary           Patient Acceptance, E, VU,NR by  at 6/14/2020 1431    Acceptance, E, VU by DB at 6/13/2020 1019    Acceptance, E,TB, VU by  at 6/12/2020 0940    Eager, E,TB,D, VU by  at 6/11/2020 1838    Acceptance, E,D, VU,NR by  at 6/10/2020 1715    Acceptance, E,D, NR by PC at 6/9/2020 1255    Acceptance, E,TB, VU,NR by  at 6/8/2020 1522                               User Key     Initials Effective Dates Name Provider Type Discipline     02/05/19 -  Ale Guzman, PT Physical Therapist PT    PC 04/03/18 -  Esperanza Larios, PT Physical Therapist PT     03/07/18 -  Raquel Todd, PTA Physical Therapy Assistant PT     05/14/18 -  Tank Lucas, PT Physical Therapist PT     01/22/20 -  Radha Chairez, PT Physical Therapist PT     08/23/19 -  [place] Saenz, PT Physical Therapist PT              PT Recommendation and Plan     Outcome Summary/Treatment Plan (PT)  Anticipated Discharge Disposition (PT): skilled nursing facility  Plan of Care Reviewed With: patient, spouse  Outcome Summary: Pt ambulated well with Rwx. She tolerated a slightly increased ambulation distance today, but was afraid to push herself too much further as her legs were fatiguing.     Time Calculation:   PT Charges     Row Name 06/14/20 1416             Time Calculation    Start Time  1153  -      Stop Time  1210  -      Time Calculation (min)  17 min  -      PT Received On  06/14/20  -      PT - Next Appointment  06/15/20  -         Time Calculation- PT    Total Timed Code Minutes- PT  17 minute(s)  -        User Key  (r) = Recorded By, (t) = Taken By, (c) = Cosigned By    Initials Name  Provider Type     Ale Guzman, PT Physical Therapist        Therapy Charges for Today     Code Description Service Date Service Provider Modifiers Qty    09201215047 HC PT THER PROC EA 15 MIN 6/14/2020 Ale Guzman, PT GP 1          PT G-Codes  Outcome Measure Options: AM-PAC 6 Clicks Basic Mobility (PT)  AM-PAC 6 Clicks Score (PT): 18  AM-PAC 6 Clicks Score (OT): 13    Ale Guzman, PT  6/14/2020
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
## 2020-06-14 NOTE — PLAN OF CARE
Problem: Patient Care Overview  Goal: Plan of Care Review  Outcome: Ongoing (interventions implemented as appropriate)  Flowsheets  Taken 6/13/2020 1013 by Radha Chairez PT  Progress: improving  Taken 6/14/2020 0005 by Katelyn Valles RN  Plan of Care Reviewed With: patient  Note:   VSS afebrile pt assisted to bed at beginning of shift from chair x3 staff members. Drsg to RLE readjusted for comfort. Cathflo ordered for TLC picc to E, one port occluded. Now working well with blood return. No complaints of pain or discomfort. Will continue to monitor.

## 2020-06-14 NOTE — PLAN OF CARE
Problem: Patient Care Overview  Goal: Plan of Care Review  Flowsheets (Taken 6/14/2020 7532)  Outcome Summary: Pt ambulated well with Rwx. She tolerated a slightly increased ambulation distance today, but was afraid to push herself too much further as her legs were fatiguing.

## 2020-06-14 NOTE — PROGRESS NOTES
"   LOS: 10 days    Patient Care Team:  RENAY Smith MD as PCP - General (General Practice)    Chief Complaint:    Chief Complaint   Patient presents with   • Diarrhea   • Legs Swollen     Follow-up acute kidney injury  Subjective     Interval History:   The patient is feeling better, denies any chest pain or shortness of air, no orthopnea or PND, no nausea or vomiting, no abdominal pain, continue to have Salvador catheter anchored in place her lower extremity edema has been stable and she had no syncopal episode.  Urine output yesterday was 2100 net fluid loss since admission is 27.7 L     Objective     Vital Signs  Temp:  [97.5 °F (36.4 °C)-98.7 °F (37.1 °C)] 97.5 °F (36.4 °C)  Heart Rate:  [80-85] 80  Resp:  [16-18] 18  BP: (110-126)/(70-74) 114/70    Flowsheet Rows      First Filed Value   Admission Height  160 cm (63\") Documented at 06/04/2020 0001   Admission Weight  (!) 176 kg (388 lb 12.8 oz) Documented at 06/04/2020 0040          No intake/output data recorded.  I/O last 3 completed shifts:  In: 2422 [P.O.:700; I.V.:1672; IV Piggyback:50]  Out: 2900 [Urine:2900]    Intake/Output Summary (Last 24 hours) at 6/14/2020 1147  Last data filed at 6/14/2020 0500  Gross per 24 hour   Intake 460 ml   Output 2100 ml   Net -1640 ml       Physical Exam:  General Appearance: alert, oriented x 3, no acute distress  Skin: warm and dry  HEENT: pupils round and reactive to light, oral mucosa dry  Neck: supple, no JVD, trachea midline  Lungs: diminished, unlabored breathing effort  Heart: RRR, normal S1 and S2, no S3, no rub  Abdomen: soft, non-tender,  present bowel sounds to auscultation, abdominal wall edema  : no palpable bladder, Salvador catheter is anchored in place  Extremities:  Nonpitting lower extremity edema significantly improved since admission  Neuro: normal speech and mental status         Results Review:    Results from last 7 days   Lab Units 06/14/20  0436 06/13/20  0353 06/12/20  0335  06/09/20  0315 "   SODIUM mmol/L 139 136 136   < > 134*   POTASSIUM mmol/L 4.0 3.6 3.7   < > 3.5   CHLORIDE mmol/L 98 94* 91*   < > 86*   CO2 mmol/L 27.2 32.1* 34.1*   < > 33.7*   BUN mg/dL 26* 37* 40*   < > 35*   CREATININE mg/dL 1.22* 1.35* 1.49*   < > 1.33*   CALCIUM mg/dL 8.9 9.1 9.3   < > 8.9   BILIRUBIN mg/dL  --   --   --   --  0.5   ALK PHOS U/L  --   --   --   --  136*   ALT (SGPT) U/L  --   --   --   --  15   AST (SGOT) U/L  --   --   --   --  19   GLUCOSE mg/dL 87 111* 104*   < > 97    < > = values in this interval not displayed.       Estimated Creatinine Clearance: 82.8 mL/min (A) (by C-G formula based on SCr of 1.22 mg/dL (H)).    Results from last 7 days   Lab Units 06/14/20  0436 06/13/20  0353 06/12/20  0335   MAGNESIUM mg/dL 2.1 2.1 2.1   PHOSPHORUS mg/dL 3.3 4.3 4.8*       Results from last 7 days   Lab Units 06/14/20  0436 06/13/20  0353 06/12/20  0335 06/11/20  0302 06/10/20  0633 06/09/20  0315   URIC ACID mg/dL 9.6* 10.7* 11.5* 11.8* 11.4* 9.8*       Results from last 7 days   Lab Units 06/13/20  0353 06/09/20  0315 06/07/20  1202   WBC 10*3/mm3 7.23 15.46* 18.60*   HEMOGLOBIN g/dL 8.7* 9.6* 9.6*   PLATELETS 10*3/mm3 351 379 394       Results from last 7 days   Lab Units 06/12/20  0335 06/11/20  0302 06/10/20  0633 06/09/20  0315 06/08/20  0205   INR  2.14* 1.88* 1.62* 1.46* 1.23*         Imaging Results (Last 24 Hours)     ** No results found for the last 24 hours. **          cephalexin 1,000 mg Oral Q6H   midodrine 15 mg Oral TID AC   pantoprazole 40 mg Oral Q AM   rivaroxaban 20 mg Oral Daily With Dinner   saccharomyces boulardii 500 mg Oral BID   sodium chloride 10 mL Intravenous Q12H   sodium chloride 10 mL Intravenous Q12H   sodium chloride 10 mL Intravenous Q12H   sodium chloride 10 mL Intravenous Q12H          Medication Review:   Current Facility-Administered Medications   Medication Dose Route Frequency Provider Last Rate Last Dose   • acetaminophen (TYLENOL) tablet 650 mg  650 mg Oral Q4H PRN  Danilo Croft MD   650 mg at 06/09/20 0300    Or   • acetaminophen (TYLENOL) 160 MG/5ML solution 650 mg  650 mg Oral Q4H PRN Danilo Croft MD        Or   • acetaminophen (TYLENOL) suppository 650 mg  650 mg Rectal Q4H PRN Danilo Croft MD       • albumin human 25 % IV SOLN 12.5 g  12.5 g Intravenous PRN Danilo Croft MD       • calcium gluconate 1 g in sodium chloride 0.9 % 50 mL IVPB  1 g Intravenous PRN Danilo Croft MD        Or   • calcium gluconate 2 g in sodium chloride 0.9 % 50 mL IVPB  2 g Intravenous PRN Danilo Croft MD       • cephalexin (KEFLEX) capsule 1,000 mg  1,000 mg Oral Q6H Tariq Flores MD   1,000 mg at 06/14/20 0613   • heparin (porcine) injection 1,000-2,000 Units  1,000-2,000 Units Intravenous PRN Danilo Croft MD       • ipratropium-albuterol (DUO-NEB) nebulizer solution 3 mL  3 mL Nebulization Q6H PRN Danilo Croft MD       • midodrine (PROAMATINE) tablet 15 mg  15 mg Oral TID AC Danilo Croft MD   15 mg at 06/13/20 1749   • nitroglycerin (NITROSTAT) SL tablet 0.4 mg  0.4 mg Sublingual Q5 Min PRN Danilo Croft MD       • ondansetron (ZOFRAN) injection 4 mg  4 mg Intravenous Q6H PRN Danilo Croft MD       • pantoprazole (PROTONIX) EC tablet 40 mg  40 mg Oral Q AM Danilo Croft MD   40 mg at 06/14/20 0613   • potassium chloride 20 mEq in 50 mL IVPB  20 mEq Intravenous PRN Danilo Croft MD 50 mL/hr at 06/13/20 0532 20 mEq at 06/13/20 0532   • rivaroxaban (XARELTO) tablet 20 mg  20 mg Oral Daily With Dinner Nelson Orellana MD   20 mg at 06/13/20 1749   • saccharomyces boulardii (FLORASTOR) capsule 500 mg  500 mg Oral BID Tariq Flores MD   500 mg at 06/14/20 0939   • sodium chloride 0.9 % bolus 200 mL  200 mL Intravenous PRN Danilo Croft MD       • sodium chloride 0.9 % flush 10 mL  10 mL Intravenous Q12H  Danilo Croft MD   10 mL at 06/13/20 2021   • sodium chloride 0.9 % flush 10 mL  10 mL Intravenous PRN Danilo Croft MD       • sodium chloride 0.9 % flush 10 mL  10 mL Intravenous Q12H Danilo Croft MD   10 mL at 06/14/20 0940   • sodium chloride 0.9 % flush 10 mL  10 mL Intravenous Q12H Danilo Croft MD   10 mL at 06/14/20 0940   • sodium chloride 0.9 % flush 10 mL  10 mL Intravenous Q12H UmeruDanilo MD   10 mL at 06/14/20 0940   • sodium chloride 0.9 % flush 10 mL  10 mL Intravenous PRN Danilo Croft MD       • sodium chloride 0.9 % flush 20 mL  20 mL Intravenous PRN Danilo Croft MD       • sodium phosphates 10 mmol in sodium chloride 0.9 % 100 mL IVPB  10 mmol Intravenous PRN Danilo Croft MD           Assessment/Plan   1.  Acute kidney injury, improving, creatinine is down to 1.22, stable, the total CO2 is down to 27.2 potassium 4.  The metabolic alkalosis which was associated with overdiuresis is improving  2.  Hyponatremia, associated with fluid excess, sodium is 139.  3.  Sepsis with cellulitis, of the right lower extremity  4.  Morbid obesity  5.  Medical noncompliance  6.  Diastolic heart failure, with PFO  7.  History of DVT/PE  8.  Hypomagnesemia associated with diuretics, magnesium today is 2.1  9.  Hyperuricemia, uric acid today is down to 10.7, associated with diuresis and decrease effective enteral arterial volume, slight improvement with hydration  10.  Anemia, hemoglobin yesterday was 8.7  11.  Syncopal episode most likely associated with overdiuresis and volume depletion, resolved          Plan:  1.  No diuretics today, will reevaluate tomorrow and probably will start her on oral diuretics again tomorrow  2.  Surveillance labs        Ashutosh Garcia MD  06/14/20  11:47

## 2020-06-14 NOTE — PLAN OF CARE
Problem: Patient Care Overview  Goal: Plan of Care Review  Outcome: Ongoing (interventions implemented as appropriate)  Flowsheets (Taken 6/14/2020 4976)  Progress: improving  Plan of Care Reviewed With: patient  Outcome Summary: OOB to hallway with PT, declined to chair, Picc/Madeleine D/W Dr. Giron, PO ABX, DC planning.

## 2020-06-15 LAB
ALBUMIN SERPL-MCNC: 3.3 G/DL (ref 3.5–5.2)
ANION GAP SERPL CALCULATED.3IONS-SCNC: 9.7 MMOL/L (ref 5–15)
BUN BLD-MCNC: 19 MG/DL (ref 8–23)
BUN/CREAT SERPL: 18.8 (ref 7–25)
CALCIUM SPEC-SCNC: 8.9 MG/DL (ref 8.6–10.5)
CHLORIDE SERPL-SCNC: 97 MMOL/L (ref 98–107)
CO2 SERPL-SCNC: 28.3 MMOL/L (ref 22–29)
CREAT BLD-MCNC: 1.01 MG/DL (ref 0.57–1)
GFR SERPL CREATININE-BSD FRML MDRD: 56 ML/MIN/1.73
GLUCOSE BLD-MCNC: 100 MG/DL (ref 65–99)
MAGNESIUM SERPL-MCNC: 2.1 MG/DL (ref 1.6–2.4)
PHOSPHATE SERPL-MCNC: 3.3 MG/DL (ref 2.5–4.5)
POTASSIUM BLD-SCNC: 3.6 MMOL/L (ref 3.5–5.2)
SODIUM BLD-SCNC: 135 MMOL/L (ref 136–145)
URATE SERPL-MCNC: 7.9 MG/DL (ref 2.4–5.7)

## 2020-06-15 PROCEDURE — 80069 RENAL FUNCTION PANEL: CPT | Performed by: INTERNAL MEDICINE

## 2020-06-15 PROCEDURE — 97116 GAIT TRAINING THERAPY: CPT

## 2020-06-15 PROCEDURE — 84550 ASSAY OF BLOOD/URIC ACID: CPT | Performed by: INTERNAL MEDICINE

## 2020-06-15 PROCEDURE — 83735 ASSAY OF MAGNESIUM: CPT | Performed by: INTERNAL MEDICINE

## 2020-06-15 PROCEDURE — 97530 THERAPEUTIC ACTIVITIES: CPT

## 2020-06-15 RX ORDER — FUROSEMIDE 40 MG/1
40 TABLET ORAL DAILY
Status: DISCONTINUED | OUTPATIENT
Start: 2020-06-15 | End: 2020-06-18

## 2020-06-15 RX ADMIN — PANTOPRAZOLE SODIUM 40 MG: 40 TABLET, DELAYED RELEASE ORAL at 06:36

## 2020-06-15 RX ADMIN — CEPHALEXIN 1000 MG: 500 CAPSULE ORAL at 11:28

## 2020-06-15 RX ADMIN — CEPHALEXIN 1000 MG: 500 CAPSULE ORAL at 23:33

## 2020-06-15 RX ADMIN — RIVAROXABAN 20 MG: 20 TABLET, FILM COATED ORAL at 17:28

## 2020-06-15 RX ADMIN — FUROSEMIDE 40 MG: 40 TABLET ORAL at 11:29

## 2020-06-15 RX ADMIN — SODIUM CHLORIDE, PRESERVATIVE FREE 10 ML: 5 INJECTION INTRAVENOUS at 20:38

## 2020-06-15 RX ADMIN — SODIUM CHLORIDE, PRESERVATIVE FREE 10 ML: 5 INJECTION INTRAVENOUS at 08:46

## 2020-06-15 RX ADMIN — SODIUM CHLORIDE, PRESERVATIVE FREE 10 ML: 5 INJECTION INTRAVENOUS at 08:43

## 2020-06-15 RX ADMIN — CEPHALEXIN 1000 MG: 500 CAPSULE ORAL at 17:28

## 2020-06-15 RX ADMIN — CEPHALEXIN 1000 MG: 500 CAPSULE ORAL at 06:36

## 2020-06-15 RX ADMIN — Medication 500 MG: at 20:37

## 2020-06-15 RX ADMIN — CEPHALEXIN 1000 MG: 500 CAPSULE ORAL at 00:05

## 2020-06-15 RX ADMIN — MIDODRINE HYDROCHLORIDE 15 MG: 5 TABLET ORAL at 06:36

## 2020-06-15 RX ADMIN — MIDODRINE HYDROCHLORIDE 15 MG: 5 TABLET ORAL at 11:29

## 2020-06-15 RX ADMIN — Medication 500 MG: at 08:43

## 2020-06-15 RX ADMIN — SODIUM CHLORIDE, PRESERVATIVE FREE 10 ML: 5 INJECTION INTRAVENOUS at 20:37

## 2020-06-15 RX ADMIN — SODIUM CHLORIDE, PRESERVATIVE FREE 10 ML: 5 INJECTION INTRAVENOUS at 08:45

## 2020-06-15 RX ADMIN — MIDODRINE HYDROCHLORIDE 15 MG: 5 TABLET ORAL at 17:28

## 2020-06-15 NOTE — PROGRESS NOTES
"    DAILY PROGRESS NOTE  [facility]    Patient Identification:  Name: Emely Solomon  Age: 59 y.o.  Sex: female  :  1960  MRN: [id number]         Primary Care Physician: RENAY Smith MD    Subjective:  Interval History: No new complaints this patient has been baseline and stable for multiple days.  Denies chest pain shortness of breath nausea vomiting.  She does not admit to any new complaints and feels just fine.  She would like to increase her activity.    Objective: No family at bedside.  Interactive and conversational and nontoxic.      Scheduled Meds:  cephalexin 1,000 mg Oral Q6H   furosemide 40 mg Oral Daily   midodrine 15 mg Oral TID AC   pantoprazole 40 mg Oral Q AM   rivaroxaban 20 mg Oral Daily With Dinner   saccharomyces boulardii 500 mg Oral BID   sodium chloride 10 mL Intravenous Q12H   sodium chloride 10 mL Intravenous Q12H   sodium chloride 10 mL Intravenous Q12H   sodium chloride 10 mL Intravenous Q12H     Continuous Infusions:     Vital signs in last 24 hours:  Temp:  [98.2 °F (36.8 °C)-98.9 °F (37.2 °C)] 98.9 °F (37.2 °C)  Heart Rate:  [76-89] 89  Resp:  [16] 16  BP: (103-112)/(58-69) 112/69    Intake/Output:    Intake/Output Summary (Last 24 hours) at 6/15/2020 1319  Last data filed at 6/15/2020 1100  Gross per 24 hour   Intake --   Output 1700 ml   Net -1700 ml       Exam:  /69 (BP Location: Right arm, Patient Position: Lying)   Pulse 89   Temp 98.9 °F (37.2 °C) (Oral)   Resp 16   Ht 160 cm (63\")   Wt (!) 191 kg (422 lb 2.9 oz)   SpO2 96%   BMI 74.79 kg/m²     General Appearance:    Alert, cooperative, no distress, AAOx3                         Throat:   Oral mucosa pink and moist                           Neck:   No JVD                         Lungs:    Clear to auscultation bilaterally, respirations unlabored                          Heart:    Regular rate and rhythm, S1 and S2 normal                 Abdomen:     Soft, nontender, bowel sounds active      "            Extremities: Lymphedema with right lower extremity wrapped                           Data Review:  Labs in chart were reviewed.    Assessment:  Active Hospital Problems    Diagnosis  POA   • **Cellulitis of right lower extremity [L03.115]  Yes   • Syncope [R55]  Yes   • History of DVT of lower extremity [Z86.718]  Not Applicable   • Lymphedema [I89.0]  Yes   • Acute renal failure (ARF) (CMS/[facility]) [N17.9]  Yes   • Anemia, chronic disease [D63.8]  Yes   • Morbid obesity (CMS/[facility]) [E66.01]  Yes   • ARIEL (obstructive sleep apnea) [G47.33]  Yes   • Hypertension [I10]  Yes   • PFO (patent foramen ovale) [Q21.1]  Not Applicable   • Chronic diastolic CHF (congestive heart failure) (CMS/[facility]) [I50.32]  Unknown   • Pulmonary hypertension (CMS/[facility]) [I27.20]  Yes      Resolved Hospital Problems    Diagnosis Date Resolved POA   • Hyponatremia [E87.1] 06/12/2020 Yes   • Sepsis (CMS/[facility]) [A41.9] 06/13/2020 Yes       Plan:    Diuretics resumed today per nephrology given improvement in renal function/uric    Xarelto for past history of DVT and PE    Keflex per ID recommendations with Florastor for GI protection    Right lower extremity leg rewrapped which looks much better today    CCP coordinating discharge needs in regards to subacute rehab    Toño Giron MD  6/15/2020  13:19  \

## 2020-06-15 NOTE — NURSING NOTE
CWOCN follow up- compression was removed over the weekend as it was sliding down. I think this is related to the edema improving and because we had to use abd pads for the drainage. At this point, the wound is healing so well we do not need the abds and can apply the 4 layer wrap directly over the opticel ag. The wound bed continues to improve- it is smaller with less drainage and more superficial. There are epithelial buds throughout the wound. There is increased edema to the lateral shin, likely from having the compression off. Reiterated to patient that she needs to continue to be in a compressive bandage or wrap after discharge. She has 2 types of lymphedema wraps at home. I would recommend that she continue these at the facility. She did not take a lot of interest in this conversation so I reiterated again that her family should at least bring her velcro wraps to the facility. She said that she does not really like wearing wraps. My coworker and I discussed how much the wound and her leg have improved this admission and that with the antibiotics and compression her leg has almost healed. Without compression she will again have wounds and cellulitis. Patient needs ongoing education and does not take interest in her care.   Replaced the 4 layer compression with versatel and opticel ag. Also, there is a small wound that has opened on her inner thigh that is moist, pink, with thin yellow tissue. Placed opticel ag and an optifoam dressing 6x6 to manage the wound and drainage for healing. Patient tolerated well and did not complain of pain once the dressing was removed- it was sticking to the wound bed and moistening helped.   Recommend to continue the 4 layer compression while patient in hospital. Outpatient, she really needs to resume with the Lymphedema clinic. At the facility, patient should be in a 4 layer compression, or lymphedema compression (can use patient's from home or facility's if they have it).       06/15/20 1005   Wound Right calf Blisters   No Date first assessed or Time first assessed found.   Side: Right  Location: calf  Primary Wound Type: Blisters   Dressing Appearance dried drainage   Base epithelialization;moist;pink   Periwound dry   Periwound Temperature warm   Periwound Skin Turgor soft   Edges irregular   Wound Length (cm) 10 cm   Wound Width (cm) 10 cm   Wound Depth (cm)   (partial thickness)   Drainage Characteristics/Odor serosanguineous   Drainage Amount small   Care, Wound cleansed with;sterile normal saline   Dressing Care, Wound dressing changed;hydrofiber;multi-layer wrap;silver impregnated   Periwound Care, Wound dry periwound area maintained

## 2020-06-15 NOTE — PROGRESS NOTES
"   LOS: 11 days    Patient Care Team:  RENAY Smith MD as PCP - General (General Practice)    Chief Complaint:    Chief Complaint   Patient presents with   • Diarrhea   • Legs Swollen     Follow-up acute kidney injury  Subjective     Interval History:   The patient is feeling better, denies any chest pain or shortness of air, no orthopnea or PND, no nausea or vomiting, no abdominal pain, continue to have Salvador catheter anchored in place her lower extremity edema has been stable and she had no syncopal episode.  Urine output yesterday was 1225 cc net fluid loss since admission is 28.9 L    Objective     Vital Signs  Temp:  [97.8 °F (36.6 °C)-98.8 °F (37.1 °C)] 98.8 °F (37.1 °C)  Heart Rate:  [76-87] 76  Resp:  [16] 16  BP: (101-106)/(58-66) 103/61    Flowsheet Rows      First Filed Value   Admission Height  160 cm (63\") Documented at 06/04/2020 0001   Admission Weight  (!) 176 kg (388 lb 12.8 oz) Documented at 06/04/2020 0040          No intake/output data recorded.  I/O last 3 completed shifts:  In: 0   Out: 1825 [Urine:1825]    Intake/Output Summary (Last 24 hours) at 6/15/2020 0956  Last data filed at 6/15/2020 0310  Gross per 24 hour   Intake --   Output 1225 ml   Net -1225 ml       Physical Exam:  General Appearance: alert, oriented x 3, no acute distress  Skin: warm and dry  HEENT: pupils round and reactive to light, oral mucosa dry  Neck: supple, no JVD, trachea midline  Lungs: diminished, unlabored breathing effort  Heart: RRR, normal S1 and S2, no S3, no rub  Abdomen: soft, non-tender,  present bowel sounds to auscultation, abdominal wall edema  : no palpable bladder, Salvador catheter is anchored in place  Extremities:  Nonpitting lower extremity edema significantly improved since admission  Neuro: normal speech and mental status         Results Review:    Results from last 7 days   Lab Units 06/15/20  0341 06/14/20  0436 06/13/20  0353  06/09/20  0315   SODIUM mmol/L 135* 139 136   < > 134*   POTASSIUM " mmol/L 3.6 4.0 3.6   < > 3.5   CHLORIDE mmol/L 97* 98 94*   < > 86*   CO2 mmol/L 28.3 27.2 32.1*   < > 33.7*   BUN mg/dL 19 26* 37*   < > 35*   CREATININE mg/dL 1.01* 1.22* 1.35*   < > 1.33*   CALCIUM mg/dL 8.9 8.9 9.1   < > 8.9   BILIRUBIN mg/dL  --   --   --   --  0.5   ALK PHOS U/L  --   --   --   --  136*   ALT (SGPT) U/L  --   --   --   --  15   AST (SGOT) U/L  --   --   --   --  19   GLUCOSE mg/dL 100* 87 111*   < > 97    < > = values in this interval not displayed.       Estimated Creatinine Clearance: 101 mL/min (A) (by C-G formula based on SCr of 1.01 mg/dL (H)).    Results from last 7 days   Lab Units 06/15/20  0341 06/14/20  0436 06/13/20  0353   MAGNESIUM mg/dL 2.1 2.1 2.1   PHOSPHORUS mg/dL 3.3 3.3 4.3       Results from last 7 days   Lab Units 06/15/20  0341 06/14/20  0436 06/13/20  0353 06/12/20  0335 06/11/20  0302 06/10/20  0633 06/09/20  0315   URIC ACID mg/dL 7.9* 9.6* 10.7* 11.5* 11.8* 11.4* 9.8*       Results from last 7 days   Lab Units 06/13/20 0353 06/09/20  0315   WBC 10*3/mm3 7.23 15.46*   HEMOGLOBIN g/dL 8.7* 9.6*   PLATELETS 10*3/mm3 351 379       Results from last 7 days   Lab Units 06/12/20  0335 06/11/20  0302 06/10/20  0633 06/09/20  0315   INR  2.14* 1.88* 1.62* 1.46*         Imaging Results (Last 24 Hours)     ** No results found for the last 24 hours. **          cephalexin 1,000 mg Oral Q6H   midodrine 15 mg Oral TID AC   pantoprazole 40 mg Oral Q AM   rivaroxaban 20 mg Oral Daily With Dinner   saccharomyces boulardii 500 mg Oral BID   sodium chloride 10 mL Intravenous Q12H   sodium chloride 10 mL Intravenous Q12H   sodium chloride 10 mL Intravenous Q12H   sodium chloride 10 mL Intravenous Q12H          Medication Review:   Current Facility-Administered Medications   Medication Dose Route Frequency Provider Last Rate Last Dose   • acetaminophen (TYLENOL) tablet 650 mg  650 mg Oral Q4H PRN Danilo Croft MD   650 mg at 06/09/20 0300    Or   • acetaminophen (TYLENOL) 160  MG/5ML solution 650 mg  650 mg Oral Q4H PRN Danilo Croft MD        Or   • acetaminophen (TYLENOL) suppository 650 mg  650 mg Rectal Q4H PRN Danilo Croft MD       • albumin human 25 % IV SOLN 12.5 g  12.5 g Intravenous PRN Danilo Croft MD       • calcium gluconate 1 g in sodium chloride 0.9 % 50 mL IVPB  1 g Intravenous PRN Danilo Croft MD        Or   • calcium gluconate 2 g in sodium chloride 0.9 % 50 mL IVPB  2 g Intravenous PRN Danilo Croft MD       • cephalexin (KEFLEX) capsule 1,000 mg  1,000 mg Oral Q6H Tariq Flores MD   1,000 mg at 06/15/20 0636   • heparin (porcine) injection 1,000-2,000 Units  1,000-2,000 Units Intravenous PRN Danilo Croft MD       • ipratropium-albuterol (DUO-NEB) nebulizer solution 3 mL  3 mL Nebulization Q6H PRN Danilo Croft MD       • midodrine (PROAMATINE) tablet 15 mg  15 mg Oral TID AC Danilo Croft MD   15 mg at 06/15/20 0636   • nitroglycerin (NITROSTAT) SL tablet 0.4 mg  0.4 mg Sublingual Q5 Min PRN Danilo Croft MD       • ondansetron (ZOFRAN) injection 4 mg  4 mg Intravenous Q6H PRN Danilo Croft MD       • pantoprazole (PROTONIX) EC tablet 40 mg  40 mg Oral Q AM Danilo Croft MD   40 mg at 06/15/20 0636   • potassium chloride 20 mEq in 50 mL IVPB  20 mEq Intravenous PRN Danilo Croft MD 50 mL/hr at 06/13/20 0532 20 mEq at 06/13/20 0532   • rivaroxaban (XARELTO) tablet 20 mg  20 mg Oral Daily With Dinner Nelson Orellana MD   20 mg at 06/14/20 1745   • saccharomyces boulardii (FLORASTOR) capsule 500 mg  500 mg Oral BID Tariq Flores MD   500 mg at 06/15/20 0843   • sodium chloride 0.9 % bolus 200 mL  200 mL Intravenous PRN Danilo Croft MD       • sodium chloride 0.9 % flush 10 mL  10 mL Intravenous Q12H Danilo Croft MD   10 mL at 06/15/20 0846   • sodium chloride 0.9 % flush 10 mL  10 mL  Intravenous PRN Danilo Croft MD       • sodium chloride 0.9 % flush 10 mL  10 mL Intravenous Q12H Danilo Croft MD   10 mL at 06/15/20 0846   • sodium chloride 0.9 % flush 10 mL  10 mL Intravenous Q12H UmeruDanilo MD   10 mL at 06/15/20 0845   • sodium chloride 0.9 % flush 10 mL  10 mL Intravenous Q12H Marimarvolu, Danilo Mcpherson MD   10 mL at 06/15/20 0843   • sodium chloride 0.9 % flush 10 mL  10 mL Intravenous PRN Danilo Croft MD   10 mL at 06/14/20 2134   • sodium chloride 0.9 % flush 20 mL  20 mL Intravenous PRN Danilo Croft MD       • sodium phosphates 10 mmol in sodium chloride 0.9 % 100 mL IVPB  10 mmol Intravenous PRN Danilo Croft MD           Assessment/Plan   1.  Acute kidney injury, improving, creatinine is down to 1.01, stable, the total CO2 now within normal range potassium 3.6.  The metabolic alkalosis has resolved  2.  Hyponatremia, associated with fluid excess, sodium is 135.  3.  Sepsis with cellulitis, of the right lower extremity  4.  Morbid obesity  5.  Medical noncompliance  6.  Diastolic heart failure, with PFO  7.  History of DVT/PE  8.  Hypomagnesemia associated with diuretics, magnesium today is 2.1  9.  Hyperuricemia, uric acid today is down to 10.7, associated with diuresis and decrease effective enteral arterial volume, slight improvement with hydration  10.  Anemia, hemoglobin on 6/13/2020 8.7  11.  Syncopal episode most likely associated with overdiuresis and volume depletion, resolved          Plan:  1.  Start the patient on furosemide 40 mg daily  2.  Surveillance labs        Ashutosh Garcia MD  06/15/20  09:56

## 2020-06-15 NOTE — PLAN OF CARE
Problem: Patient Care Overview  Goal: Plan of Care Review  Outcome: Ongoing (interventions implemented as appropriate)  Flowsheets (Taken 6/15/2020 1707)  Progress: improving  Plan of Care Reviewed With: patient  Outcome Summary: No c/o pain or soa. Lasix restarted today. Salvador removed, purwick placed, pt unsure of it though. Ambulated with PT, no c/o of dizziness. Plan for [facility] at discharge, precert started this morning.  Goal: Individualization and Mutuality  Outcome: Ongoing (interventions implemented as appropriate)  Goal: Discharge Needs Assessment  Outcome: Ongoing (interventions implemented as appropriate)  Goal: Interprofessional Rounds/Family Conf  Outcome: Ongoing (interventions implemented as appropriate)     Problem: Fall Risk (Adult)  Goal: Absence of Fall  Outcome: Ongoing (interventions implemented as appropriate)     Problem: Skin Injury Risk (Adult)  Goal: Skin Health and Integrity  Outcome: Ongoing (interventions implemented as appropriate)     Problem: Sepsis/Septic Shock (Adult)  Goal: Signs and Symptoms of Listed Potential Problems Will be Absent, Minimized or Managed (Sepsis/Septic Shock)  Outcome: Ongoing (interventions implemented as appropriate)     Problem: Infection, Risk/Actual (Adult)  Goal: Infection Prevention/Resolution  Outcome: Ongoing (interventions implemented as appropriate)

## 2020-06-15 NOTE — PROGRESS NOTES
Continued Stay Note  [facility]     Patient Name: Emely Solomon  MRN: [id number]  Today's Date: 6/15/2020    Admit Date: 6/4/2020    Discharge Plan     Row Name 06/15/20 1146       Plan    Plan  [facility] pending precert started 6/15.     Patient/Family in Agreement with Plan  yes    Plan Comments  Received call from Anny/Soham and they can accept at [facility] pending precert (started this AM). Met with pt at bedside at updated on D/C plan. Red Doe RN-BC     Discharge Codes    No documentation.             Red Doe, RN

## 2020-06-15 NOTE — THERAPY TREATMENT NOTE
Patient Name: Emely Solomon  : 1960    MRN: [id number]                              Today's Date: 6/15/2020       Admit Date: 2020    Visit Dx:     ICD-10-CM ICD-9-CM   1. Cellulitis of right anterior lower leg L03.115 682.6   2. STEFANIA (acute kidney injury) (CMS/[facility]) N17.9 584.9   3. Lymphedema I89.0 457.1   4. Sepsis, due to unspecified organism, unspecified whether acute organ dysfunction present (CMS/[facility]) A41.9 038.9     995.91   5. Acute renal failure, unspecified acute renal failure type (CMS/[facility]) N17.9 584.9     Patient Active Problem List   Diagnosis   • Chronic diastolic CHF (congestive heart failure) (CMS/[facility])   • PFO (patent foramen ovale)   • Paradoxical embolism (CMS/[facility])   • Hypertension   • Pulmonary hypertension (CMS/[facility])   • Back pain   • ARIEL (obstructive sleep apnea)   • Morbid obesity (CMS/[facility])   • Urinary frequency   • History of DVT of lower extremity   • Cellulitis of right anterior lower leg   • Lymphedema   • Cellulitis of right lower extremity   • Acute renal failure (ARF) (CMS/[facility])   • Anemia, chronic disease   • Syncope     Past Medical History:   Diagnosis Date   • Cellulitis     2017, with Group B Strep bacteremia and sepsis   • Chronic diastolic congestive heart failure (CMS/[facility])    • Deep venous thrombosis (CMS/[facility])    • Hypertension    • Lipoedema    • Lymphedema    • Morbid obesity (CMS/[facility])    • Paradoxical embolism (CMS/[facility])     to the LLE due to DVT/PE and PFO   • PFO (patent foramen ovale)    • Pulmonary embolism (CMS/[facility])    • Pulmonary hypertension (CMS/[facility])     multifactorial (dCHF, obesity/ARIEL, hx PE), mild by echo 2016     Past Surgical History:   Procedure Laterality Date   • BRONCHOSCOPY N/A 2017    Procedure: BRONCHOSCOPY with wash;  Surgeon: Caleb Garcia MD;  Location: [facility] ENDOSCOPY;  Service:    • DILATATION AND CURETTAGE  2011   • VENA CAVA FILTER PLACEMENT      Inferior Vena Cava Filter Placement w/Fluorosc angiogr guidance     General  Information     Row Name 06/15/20 1453 06/15/20 1435       PT Evaluation Time/Intention    Document Type  progress note/recertification  -  therapy note (daily note)  -    Mode of Treatment  physical therapy;individual therapy  -  physical therapy;individual therapy  -    Row Name 06/15/20 1453 06/15/20 1435       General Information    Patient Profile Reviewed?  yes  -  yes  -    Existing Precautions/Restrictions  fall  -  fall  -    Row Name 06/15/20 1453 06/15/20 1435       Cognitive Assessment/Intervention- PT/OT    Orientation Status (Cognition)  oriented x 4  -LC  oriented x 4  -    Cognitive Assessment/Intervention Comment  Pt agreeable to PT.  -LC  Pt agreeable to PT.  -      User Key  (r) = Recorded By, (t) = Taken By, (c) = Cosigned By    Initials Name Provider Type     [place] Saenz, PT Physical Therapist        Mobility     Row Name 06/15/20 1454          Bed Mobility Assessment/Treatment    Supine-Sit [place] (Bed Mobility)  conditional independence  -     Assistive Device (Bed Mobility)  bed rails;head of bed elevated  -     Row Name 06/15/20 1502          Transfer Assessment/Treatment    Comment (Transfers)  STS from bed and from [facility]  -     Row Name 06/15/20 1454          Sit-Stand Transfer    Sit-Stand [place] (Transfers)  stand by assist  -     Assistive Device (Sit-Stand Transfers)  walker, front-wheeled  -     Row Name 06/15/20 1454          Gait/Stairs Assessment/Training    Gait/Stairs Assessment/Training  gait/ambulation independence;gait/ambulation assistive device  -     [place] Level (Gait)  contact guard  -     Assistive Device (Gait)  walker, front-wheeled;bariatric  -     Distance in Feet (Gait)  60' x2 with standing rest break between  -     Pattern (Gait)  step-through  -     Deviations/Abnormal Patterns (Gait)  base of support, wide;gait speed decreased;stride length decreased  -     Bilateral Gait Deviations  forward flexed  posture;weight shift ability decreased  -       User Key  (r) = Recorded By, (t) = Taken By, (c) = Cosigned By    Initials Name Provider Type    [place] Sweeney PT Physical Therapist        Obj/Interventions    No documentation.       Goals/Plan     Row Name 06/15/20 1500          Bed Mobility Goal 1 (PT)    Progress/Outcomes (Bed Mobility Goal 1, PT)  goal met  -     Row Name 06/15/20 1500          Transfer Goal 1 (PT)    Progress/Outcome (Transfer Goal 1, PT)  goal met  -     Row Name 06/15/20 1500          Gait Training Goal 1 (PT)    [place] Level (Gait Training Goal 1, PT)  contact guard assist  -LC     Distance (Gait Goal 1, PT)  100'  -LC     Barriers (Gait Training Goal 1, PT)  no rest break  -     Progress/Outcome (Gait Training Goal 1, PT)  goal revised this date  -     Row Name 06/15/20 1500          ROM Goal 1 (PT)    Time Frame (ROM Goal 1, PT)  long term goal (LTG);by discharge  -       User Key  (r) = Recorded By, (t) = Taken By, (c) = Cosigned By    Initials Name Provider Type    [place] Sweeney, CATRINA Physical Therapist        Clinical Impression     Row Name 06/15/20 7137          Plan of Care Review    Plan of Care Reviewed With  patient  -     Progress  improving  -     Outcome Summary  Pt demonstrated improved ambulation distance today, walking 60' x 2with RWx and CGA. She took one standing rest break due ot mild fatigue and SOA as she is no longer on supplemental O2. She also performed STS from [facility] with supervision and is mod I with bed mobility. Pt will cont to benefit from PT.   -     Row Name 06/15/20 1007          Physical Therapy Clinical Impression    Criteria for Skilled Interventions Met (PT Clinical Impression)  yes;treatment indicated  -     Rehab Potential (PT Clinical Summary)  good, to achieve stated therapy goals  -     Row Name 06/15/20 9999          Vital Signs    O2 Delivery Pre Treatment  room air  -     O2 Delivery Intra Treatment  room air  -      O2 Delivery Post Treatment  room air  -LC     Row Name 06/15/20 1459          Positioning and Restraints    Pre-Treatment Position  in bed  -     Post Treatment Position  chair  -LC     In Chair  notified nsg;sitting;call light within reach;encouraged to call for assist;exit alarm on  -LC       User Key  (r) = Recorded By, (t) = Taken By, (c) = Cosigned By    Initials Name Provider Type    [place] Sweeney, CATRINA Physical Therapist        Outcome Measures     Row Name 06/15/20 7567          How much help from another person do you currently need...    Turning from your back to your side while in flat bed without using bedrails?  4  -LC     Moving from lying on back to sitting on the side of a flat bed without bedrails?  3  -LC     Moving to and from a bed to a chair (including a wheelchair)?  3  -LC     Standing up from a chair using your arms (e.g., wheelchair, bedside chair)?  3  -LC     Climbing 3-5 steps with a railing?  2  -LC     To walk in hospital room?  3  -LC     AM-PAC 6 Clicks Score (PT)  18  -LC       User Key  (r) = Recorded By, (t) = Taken By, (c) = Cosigned By    Initials Name Provider Type    [place] Sweeney PT Physical Therapist        Physical Therapy Education                 Title: PT OT SLP Therapies (Done)     Topic: Physical Therapy (Done)     Point: Mobility training (Done)     Description:   Instruct learner(s) on safety and technique for assisting patient out of bed, chair or wheelchair.  Instruct in the proper use of assistive devices, such as walker, crutches, cane or brace.              Patient Friendly Description:   It's important to get you on your feet again, but we need to do so in a way that is safe for you. Falling has serious consequences, and your personal safety is the most important thing of all.        When it's time to get out of bed, one of us or a family member will sit next to you on the bed to give you support.     If your doctor or nurse tells you to use a walker,  crutches, a cane, or a brace, be sure you use it every time you get out of bed, even if you think you don't need it.    Learning Progress Summary           Patient Acceptance, E,TB, VU by IVON at 6/15/2020 1459    Acceptance, E, VU,NR by CAMERON at 6/14/2020 1431    Acceptance, E, VU by ERLIN at 6/13/2020 1019    Acceptance, E,TB, VU by IVON at 6/12/2020 0940    Eager, E,TB,D, VU by PINA at 6/11/2020 1838    Acceptance, E,D, VU,NR by PINA at 6/10/2020 1715    Acceptance, E,D, NR by PC at 6/9/2020 1255    Acceptance, E,TB, VU,NR by CS at 6/8/2020 1522                   Point: Home exercise program (Done)     Description:   Instruct learner(s) on appropriate technique for monitoring, assisting and/or progressing patient with therapeutic exercises and activities.              Learning Progress Summary           Patient Acceptance, E, VU,NR by CAMERON at 6/14/2020 1431    Acceptance, E, VU by ERLIN at 6/13/2020 1019    Eager, E,TB,D, VU by PINA at 6/11/2020 1838    Acceptance, E,D, VU,NR by PINA at 6/10/2020 1715    Acceptance, E,D, NR by PC at 6/9/2020 1255    Acceptance, E,TB, VU,NR by CS at 6/8/2020 1522                   Point: Body mechanics (Done)     Description:   Instruct learner(s) on proper positioning and spine alignment for patient and/or caregiver during mobility tasks and/or exercises.              Learning Progress Summary           Patient Acceptance, E,TB, VU by IVON at 6/15/2020 1459    Acceptance, E, VU,NR by CAMERON at 6/14/2020 1431    Acceptance, E, VU by ERLIN at 6/13/2020 1019    Acceptance, E,TB, VU by IVON at 6/12/2020 0940    Eager, E,TB,D, VU by PINA at 6/11/2020 1838    Acceptance, E,D, VU,NR by PINA at 6/10/2020 1715    Acceptance, E,D, NR by PC at 6/9/2020 1255    Acceptance, E,TB, VU,NR by CS at 6/8/2020 1522                   Point: Precautions (Done)     Description:   Instruct learner(s) on prescribed precautions during mobility and gait tasks              Learning Progress Summary           Patient Acceptance, E,TB, VU by LC  at 6/15/2020 1459    Acceptance, E, VU,NR by  at 6/14/2020 1431    Acceptance, E, VU by DB at 6/13/2020 1019    Acceptance, E,TB, VU by  at 6/12/2020 0940    Eager, E,TB,D, VU by  at 6/11/2020 1838    Acceptance, E,D, VU,NR by JM at 6/10/2020 1715    Acceptance, E,D, NR by PC at 6/9/2020 1255    Acceptance, E,TB, VU,NR by  at 6/8/2020 1522                               User Key     Initials Effective Dates Name Provider Type Discipline     02/05/19 -  Ale Guzman, PT Physical Therapist PT    PC 04/03/18 -  Esperanza Larios, PT Physical Therapist PT     03/07/18 -  Raquel Todd PTA Physical Therapy Assistant PT     05/14/18 -  Tank Lucas, PT Physical Therapist PT    DB 01/22/20 -  Radha Chairez, PT Physical Therapist PT     08/23/19 -  [place] Saenz, PT Physical Therapist PT              PT Recommendation and Plan     Outcome Summary/Treatment Plan (PT)  Anticipated Discharge Disposition (PT): skilled nursing facility  Plan of Care Reviewed With: patient  Progress: improving  Outcome Summary: Pt demonstrated improved ambulation distance today, walking 60' x 2with RWx and CGA. She took one standing rest break due ot mild fatigue and SOA as she is no longer on supplemental O2. She also performed STS from [facility] with supervision and is mod I with bed mobility. Pt will cont to benefit from PT.      Time Calculation:   PT Charges     Row Name 06/15/20 1459             Time Calculation    Start Time  1419  -      Stop Time  1443  -      Time Calculation (min)  24 min  -      PT Received On  06/15/20  -      PT - Next Appointment  06/16/20  -      PT Goal Re-Cert Due Date  06/22/20  -        User Key  (r) = Recorded By, (t) = Taken By, (c) = Cosigned By    Initials Name Provider Type    LC [place] Saenz, PT Physical Therapist        Therapy Charges for Today     Code Description Service Date Service Provider Modifiers Qty    59117681759 HC GAIT TRAINING EA 15 MIN 6/15/2020  [place] Saenz, PT GP 1    [id number]  PT THERAPEUTIC ACT EA 15 MIN 6/15/2020 [place] Saenz, PT GP 1          PT G-Codes  Outcome Measure Options: AM-PAC 6 Clicks Basic Mobility (PT)  AM-PAC 6 Clicks Score (PT): 18  AM-PAC 6 Clicks Score (OT): 13    [place] Saenz, PT  6/15/2020

## 2020-06-15 NOTE — PLAN OF CARE
Problem: Patient Care Overview  Goal: Plan of Care Review  Outcome: Ongoing (interventions implemented as appropriate)  Flowsheets (Taken 6/15/2020 6447)  Progress: improving  Plan of Care Reviewed With: patient  Outcome Summary: Pt demonstrated improved ambulation distance today, walking 60' x 2with RWx and CGA. She took one standing rest break due ot mild fatigue and SOA as she is no longer on supplemental O2. She also performed STS from [facility] with supervision and is mod I with bed mobility. Pt will cont to benefit from PT.

## 2020-06-15 NOTE — PLAN OF CARE
Problem: Patient Care Overview  Goal: Plan of Care Review  Outcome: Ongoing (interventions implemented as appropriate)  Flowsheets  Taken 6/14/2020 1732 by Jaime Weiner Jr. RN  Progress: improving  Taken 6/15/2020 0010 by Katelyn Valles RN  Plan of Care Reviewed With: patient  Note:   VSS, pt alternated between home cpap and O2 via nasal cannula during sleep. Drsg to LLE clean dry and intact. No complaints of pain. Pending lab results. Will continue to monitor.

## 2020-06-15 NOTE — PROGRESS NOTES
Continued Stay Note  [facility]     Patient Name: Emely Solomon  MRN: [id number]  Today's Date: 6/15/2020    Admit Date: 6/4/2020    Discharge Plan     Row Name 06/15/20 1634       Plan    Plan  [facility] pending precert started 6/15. Does not need repeat Covid 19 test.     Patient/Family in Agreement with Plan  yes    Plan Comments  Called [facility] and precert remains pending. on 6/4 Covid 19 neg. Per [facility], does not need repeat Covid 19 test. Red Doe RN-BC        Discharge Codes    No documentation.             Red Doe, RN

## 2020-06-15 NOTE — PROGRESS NOTES
Continued Stay Note  [facility]     Patient Name: Emely Solomon  MRN: [id number]  Today's Date: 6/15/2020    Admit Date: 6/4/2020    Discharge Plan     Row Name 06/15/20 0916       Plan    Plan  SNF. Followed up on Referrals and awaiting return call. Needs [place] Precert. Will need ambulance transport    Patient/Family in Agreement with Plan  yes    Plan Comments  Calls placed to SNF referrals. Pt ambulating with rolling walker with contact guard per PT. SNF aware of pt's mobility improvement.  Plan is to get SNF acceptance today and have facility start precert today.  Awaiting return calls. Red Doe RN-BC        Discharge Codes    No documentation.             Red Doe, RN

## 2020-06-16 LAB
ALBUMIN SERPL-MCNC: 3.1 G/DL (ref 3.5–5.2)
ANION GAP SERPL CALCULATED.3IONS-SCNC: 8.3 MMOL/L (ref 5–15)
BACTERIA UR QL AUTO: ABNORMAL /HPF
BILIRUB UR QL STRIP: NEGATIVE
BUN BLD-MCNC: 17 MG/DL (ref 8–23)
BUN/CREAT SERPL: 17 (ref 7–25)
CALCIUM SPEC-SCNC: 8.7 MG/DL (ref 8.6–10.5)
CHLORIDE SERPL-SCNC: 97 MMOL/L (ref 98–107)
CLARITY UR: CLEAR
CO2 SERPL-SCNC: 28.7 MMOL/L (ref 22–29)
COD CRY URNS QL: ABNORMAL /HPF
COLOR UR: YELLOW
CREAT BLD-MCNC: 1 MG/DL (ref 0.57–1)
GFR SERPL CREATININE-BSD FRML MDRD: 57 ML/MIN/1.73
GLUCOSE BLD-MCNC: 96 MG/DL (ref 65–99)
GLUCOSE UR STRIP-MCNC: NEGATIVE MG/DL
HGB UR QL STRIP.AUTO: ABNORMAL
HYALINE CASTS UR QL AUTO: ABNORMAL /LPF
KETONES UR QL STRIP: NEGATIVE
LEUKOCYTE ESTERASE UR QL STRIP.AUTO: ABNORMAL
MAGNESIUM SERPL-MCNC: 2 MG/DL (ref 1.6–2.4)
NITRITE UR QL STRIP: NEGATIVE
PH UR STRIP.AUTO: <=5 [PH] (ref 5–8)
PHOSPHATE SERPL-MCNC: 3.9 MG/DL (ref 2.5–4.5)
POTASSIUM BLD-SCNC: 3.5 MMOL/L (ref 3.5–5.2)
PROT UR QL STRIP: ABNORMAL
RBC # UR: ABNORMAL /HPF
REF LAB TEST METHOD: ABNORMAL
SODIUM BLD-SCNC: 134 MMOL/L (ref 136–145)
SP GR UR STRIP: 1.02 (ref 1–1.03)
SQUAMOUS #/AREA URNS HPF: ABNORMAL /HPF
URATE SERPL-MCNC: 6.9 MG/DL (ref 2.4–5.7)
UROBILINOGEN UR QL STRIP: ABNORMAL
WBC UR QL AUTO: ABNORMAL /HPF
YEAST URNS QL MICRO: ABNORMAL /HPF

## 2020-06-16 PROCEDURE — 83735 ASSAY OF MAGNESIUM: CPT | Performed by: INTERNAL MEDICINE

## 2020-06-16 PROCEDURE — 97110 THERAPEUTIC EXERCISES: CPT

## 2020-06-16 PROCEDURE — 81001 URINALYSIS AUTO W/SCOPE: CPT | Performed by: INTERNAL MEDICINE

## 2020-06-16 PROCEDURE — 80069 RENAL FUNCTION PANEL: CPT | Performed by: INTERNAL MEDICINE

## 2020-06-16 PROCEDURE — 97535 SELF CARE MNGMENT TRAINING: CPT

## 2020-06-16 PROCEDURE — 25010000002 ONDANSETRON PER 1 MG: Performed by: INTERNAL MEDICINE

## 2020-06-16 PROCEDURE — U0004 COV-19 TEST NON-CDC HGH THRU: HCPCS | Performed by: NURSE PRACTITIONER

## 2020-06-16 PROCEDURE — 84550 ASSAY OF BLOOD/URIC ACID: CPT | Performed by: INTERNAL MEDICINE

## 2020-06-16 RX ADMIN — SODIUM CHLORIDE, PRESERVATIVE FREE 10 ML: 5 INJECTION INTRAVENOUS at 08:28

## 2020-06-16 RX ADMIN — ACETAMINOPHEN 650 MG: 325 TABLET, FILM COATED ORAL at 15:33

## 2020-06-16 RX ADMIN — CEPHALEXIN 1000 MG: 500 CAPSULE ORAL at 12:12

## 2020-06-16 RX ADMIN — SODIUM CHLORIDE, PRESERVATIVE FREE 10 ML: 5 INJECTION INTRAVENOUS at 08:29

## 2020-06-16 RX ADMIN — MIDODRINE HYDROCHLORIDE 15 MG: 5 TABLET ORAL at 06:35

## 2020-06-16 RX ADMIN — SODIUM CHLORIDE, PRESERVATIVE FREE 10 ML: 5 INJECTION INTRAVENOUS at 23:46

## 2020-06-16 RX ADMIN — RIVAROXABAN 20 MG: 20 TABLET, FILM COATED ORAL at 17:08

## 2020-06-16 RX ADMIN — MIDODRINE HYDROCHLORIDE 15 MG: 5 TABLET ORAL at 17:08

## 2020-06-16 RX ADMIN — FUROSEMIDE 40 MG: 40 TABLET ORAL at 08:28

## 2020-06-16 RX ADMIN — CEPHALEXIN 1000 MG: 500 CAPSULE ORAL at 06:35

## 2020-06-16 RX ADMIN — ONDANSETRON 4 MG: 2 INJECTION INTRAMUSCULAR; INTRAVENOUS at 15:34

## 2020-06-16 RX ADMIN — MIDODRINE HYDROCHLORIDE 15 MG: 5 TABLET ORAL at 12:12

## 2020-06-16 RX ADMIN — CEPHALEXIN 1000 MG: 500 CAPSULE ORAL at 17:08

## 2020-06-16 RX ADMIN — CEPHALEXIN 1000 MG: 500 CAPSULE ORAL at 23:44

## 2020-06-16 RX ADMIN — SODIUM CHLORIDE, PRESERVATIVE FREE 10 ML: 5 INJECTION INTRAVENOUS at 23:45

## 2020-06-16 RX ADMIN — PANTOPRAZOLE SODIUM 40 MG: 40 TABLET, DELAYED RELEASE ORAL at 06:35

## 2020-06-16 NOTE — PLAN OF CARE
Problem: Patient Care Overview  Goal: Plan of Care Review  Flowsheets  Taken 6/16/2020 1430 by Kamilla Daly RN  Plan of Care Reviewed With: patient  Taken 6/16/2020 1505 by Antonia Muniz, PT  Outcome Summary: pt agreeable to PT this date, ambulated 75ft CGA rwx, 2 standing rest breaks req 2' dyspnea and fatigue, rm air. assisted to chair CGA. noted plans for SNU.   ..Patient was wearing a face mask during this therapy encounter. Therapist used appropriate personal protective equipment including mask and gloves.  Mask used was standard procedure mask. Appropriate PPE was worn during the entire therapy session. Hand hygiene was completed before and after therapy session. Patient is not in enhanced droplet precautions.

## 2020-06-16 NOTE — PROGRESS NOTES
Continued Stay Note  [facility]     Patient Name: Emely Solomon  MRN: [id number]  Today's Date: 6/16/2020    Admit Date: 6/4/2020    Discharge Plan     Row Name 06/16/20 1518       Plan    Plan  [facility] pending precert started 6/15. Needs Covid 19 test prior to D/C due to temp 100.1F 6/16    Patient/Family in Agreement with Plan  yes    Plan Comments  Received call from Haylee/[facility] stating pt had temp of 100.1F around 1400.  Asked if pt would now need a new Covid 19 test before facility accepts. Called [facility] regarding temp today and was told that pt would need a negative Covid 19 test prior to coming to [facility]. Notified Haylee/A that pt would now require a Covid 19 test.  [place] [employer] precert remains pending. Red Doe RN-BC    Row Name 06/16/20 6467       Plan    Plan  [facility] pending precert started 6/15. Does not need repeat Covid 19 test.     Patient/Family in Agreement with Plan  yes    Plan Comments  Called and spoke to [facility] to check on precert status. Ortiz stated she had just checked and precert remains pending. Red Doe RN-BC        Discharge Codes    No documentation.             Red Doe RN

## 2020-06-16 NOTE — PLAN OF CARE
Problem: Patient Care Overview  Goal: Plan of Care Review  Outcome: Ongoing (interventions implemented as appropriate)  Flowsheets (Taken 6/16/2020 2799)  Progress: improving  Plan of Care Reviewed With: patient  Outcome Summary: No c/o pain or soa. Anxious about discharge. Percert still pending for [facility]. Low grade temp of 100.5 and episode of dry-heaving. Tylenol and Zofran given. COVID swab now required for rehab, obtained and awaiting results. Ambulated in hallway and up in chair today with PT.  Goal: Individualization and Mutuality  Outcome: Ongoing (interventions implemented as appropriate)  Goal: Discharge Needs Assessment  Outcome: Ongoing (interventions implemented as appropriate)  Goal: Interprofessional Rounds/Family Conf  Outcome: Ongoing (interventions implemented as appropriate)     Problem: Fall Risk (Adult)  Goal: Absence of Fall  Outcome: Ongoing (interventions implemented as appropriate)     Problem: Skin Injury Risk (Adult)  Goal: Skin Health and Integrity  Outcome: Ongoing (interventions implemented as appropriate)     Problem: Sepsis/Septic Shock (Adult)  Goal: Signs and Symptoms of Listed Potential Problems Will be Absent, Minimized or Managed (Sepsis/Septic Shock)  Outcome: Ongoing (interventions implemented as appropriate)     Problem: Infection, Risk/Actual (Adult)  Goal: Infection Prevention/Resolution  Outcome: Ongoing (interventions implemented as appropriate)

## 2020-06-16 NOTE — PROGRESS NOTES
Adult Nutrition  Assessment/PES    Patient Name:  Emely Solomon  YOB: 1960  MRN: [id number]  Admit Date:  6/4/2020    Assessment Date:  6/16/2020    Comments:  PO follow up: Intake remains adequate at % on HH/2gNa diet. 2-3+ edema, #. Lasix restarted 6/15. Plans for SNF at d/c. Will continue to follow as needed.     Reason for Assessment     Row Name 06/16/20 1301          Reason for Assessment    Reason For Assessment  follow-up protocol         Nutrition/Diet History     Row Name 06/16/20 1301          Nutrition/Diet History    Typical Food/Fluid Intake  Intake remains adequate at % on HH/2gNa diet. 2-3+ edema, #. Lasix restarted 6/15.            Labs/Tests/Procedures/Meds     Row Name 06/16/20 1303          Labs/Procedures/Meds    Lab Results Reviewed  reviewed     Lab Results Comments  Na 134, Cl 97, GFR 57, UA 6.9, Alb 3.1        Diagnostic Tests/Procedures    Diagnostic Test/Procedure Reviewed  reviewed        Medications    Pertinent Medications Reviewed  reviewed, pertinent     Pertinent Medications Comments  lasix, protonix         Physical Findings     Row Name 06/16/20 1304          Physical Findings    Overall Physical Appearance  edematous;obese     Skin  edema;other (see comments) 2-3+ edema, RLE cellulitis w/R calf blisters & Noé perineal skin tear, B=18         Problem/Interventions:    Intervention Goal     Row Name 06/16/20 1306          Intervention Goal    General  Maintain nutrition;Improved nutrition related lab(s);Meet nutritional needs for age/condition;Disease management/therapy     PO  Maintain intake;Meet estimated needs     Weight  Appropriate weight loss         Nutrition Intervention     Row Name 06/16/20 1306          Nutrition Intervention    RD/Tech Action  Care plan reviewd;Follow Tx progress           Education/Evaluation     Row Name 06/16/20 1303          Education    Education  Will Instruct as appropriate        Monitor/Evaluation     Monitor  Per protocol;PO intake;Pertinent labs;Weight;Skin status;Symptoms     Education Follow-up  Reinforce PRN           Electronically signed by:  Amanda Gottlieb MS,RD,LD  06/16/20 13:08

## 2020-06-16 NOTE — PROGRESS NOTES
Name: Emely Solomon ADMIT: 2020   : 1960  PCP: RENAY Smith MD    MRN: [id number] LOS: 12 days   AGE/SEX: 59 y.o. female  ROOM: [facility]/     Subjective   Subjective   no new complaints or events overnight.    Review of Systems   Constitutional: Negative for chills and fever.   Respiratory: Negative for chest tightness and shortness of breath.    Cardiovascular: Negative for chest pain and palpitations.   Gastrointestinal: Negative for abdominal pain, nausea and vomiting.        Objective   Objective   Vital Signs  Temp:  [98.1 °F (36.7 °C)-100.1 °F (37.8 °C)] 100.1 °F (37.8 °C)  Heart Rate:  [] 102  Resp:  [16-18] 16  BP: (108-120)/(59-74) 108/67  SpO2:  [91 %-94 %] 94 %  on   ;   Device (Oxygen Therapy): room air  Body mass index is 75.37 kg/m².  Physical Exam   Constitutional: She is oriented to person, place, and time. She appears well-developed and well-nourished. No distress.   morbidly obese   Cardiovascular: Normal rate and regular rhythm.   Pulmonary/Chest: Effort normal. No respiratory distress.   Abdominal: Soft. Bowel sounds are normal. She exhibits no distension.   Neurological: She is alert and oriented to person, place, and time.   Psychiatric: She has a normal mood and affect. Her behavior is normal.   Nursing note and vitals reviewed.      Results Review:       I reviewed the patient's new clinical results.  Results from last 7 days   Lab Units 20  0353   WBC 10*3/mm3 7.23   HEMOGLOBIN g/dL 8.7*   PLATELETS 10*3/mm3 351     Results from last 7 days   Lab Units 20  0457 06/15/20  0341 20  0436 20  0353   SODIUM mmol/L 134* 135* 139 136   POTASSIUM mmol/L 3.5 3.6 4.0 3.6   CHLORIDE mmol/L 97* 97* 98 94*   CO2 mmol/L 28.7 28.3 27.2 32.1*   BUN mg/dL 17 19 26* 37*   CREATININE mg/dL 1.00 1.01* 1.22* 1.35*   GLUCOSE mg/dL 96 100* 87 111*   Estimated Creatinine Clearance: 102.9 mL/min (by C-G formula based on SCr of 1 mg/dL).  Results from last 7 days   Lab  Units 06/16/20  0457 06/15/20  0341 06/14/20  0436 06/13/20  0353   ALBUMIN g/dL 3.10* 3.30* 2.80* 3.00*     Results from last 7 days   Lab Units 06/16/20  0457 06/15/20  0341 06/14/20  0436 06/13/20  0353   CALCIUM mg/dL 8.7 8.9 8.9 9.1   ALBUMIN g/dL 3.10* 3.30* 2.80* 3.00*   MAGNESIUM mg/dL 2.0 2.1 2.1 2.1   PHOSPHORUS mg/dL 3.9 3.3 3.3 4.3       No results found for: HGBA1C, POCGLU    XR Chest 1 View  Narrative: SINGLE VIEW OF THE CHEST     HISTORY: 59-year-old female with history of a cough.     FINDINGS: An upright AP portable chest radiograph was obtained.  Comparison is made to a chest radiograph dated 06/05/2020. The lungs are  normal in volume and are clear of consolidation. A right internal  jugular catheter is positioned with the tip in the SVC. The  cardiomediastinal silhouette is stable with mild prominence of the  cardiac silhouette. The visualized osseous structures appear normal.     Impression: There is no evidence for an acute cardiopulmonary process.  Stable mild cardiomegaly is noted.     This report was finalized on 6/6/2020 3:20 PM by Dr. Phil Griffith M.D.           cephalexin 1,000 mg Oral Q6H   furosemide 40 mg Oral Daily   midodrine 15 mg Oral TID AC   pantoprazole 40 mg Oral Q AM   rivaroxaban 20 mg Oral Daily With Dinner   sodium chloride 10 mL Intravenous Q12H   sodium chloride 10 mL Intravenous Q12H   sodium chloride 10 mL Intravenous Q12H   sodium chloride 10 mL Intravenous Q12H      Diet Regular; Cardiac, Low Sodium; 2,000 mg Na       Assessment/Plan     Active Hospital Problems    Diagnosis  POA   • **Cellulitis of right lower extremity [L03.115]  Yes   • Syncope [R55]  Yes   • History of DVT of lower extremity [Z86.718]  Not Applicable   • Lymphedema [I89.0]  Yes   • Acute renal failure (ARF) (CMS/HCC) [N17.9]  Yes   • Anemia, chronic disease [D63.8]  Yes   • Morbid obesity (CMS/HCC) [E66.01]  Yes   • ARIEL (obstructive sleep apnea) [G47.33]  Yes   • Hypertension [I10]  Yes   •  PFO (patent foramen ovale) [Q21.1]  Not Applicable   • Chronic diastolic CHF (congestive heart failure) (CMS/[facility]) [I50.32]  Unknown   • Pulmonary hypertension (CMS/[facility]) [I27.20]  Yes      Resolved Hospital Problems    Diagnosis Date Resolved POA   • Hyponatremia [E87.1] 06/12/2020 Yes   • Sepsis (CMS/[facility]) [A41.9] 06/13/2020 Yes       59 y.o. female admitted with Cellulitis of right lower extremity.    · diuretics per nephrology, restarted PO. creatinine stable.  · Keflex per ID recs with florastor for GI prophylaxis    · monitor labs  · Xarelto (home med) for DVT prophylaxis.  · Full code.  · Discussed with patient and nursing staff.  · Anticipate discharge when precert obtained       LUCÍA Kan  [facility]  06/16/20  14:43

## 2020-06-16 NOTE — PROGRESS NOTES
"   LOS: 12 days    Patient Care Team:  RENAY Smith MD as PCP - General (General Practice)    Chief Complaint:    Chief Complaint   Patient presents with   • Diarrhea   • Legs Swollen     Follow-up acute kidney injury  Subjective     Interval History:   Feels fine, but right leg irritated.  Low grade temp.  100.5.  No chills.  Occas cough. No dysuria. Eating. Bowels moving.     Objective     Vital Signs  Temp:  [98.1 °F (36.7 °C)-100.5 °F (38.1 °C)] 100.3 °F (37.9 °C)  Heart Rate:  [] 102  Resp:  [16-18] 16  BP: (108-120)/(59-74) 108/67    Flowsheet Rows      First Filed Value   Admission Height  160 cm (63\") Documented at 06/04/2020 0001   Admission Weight  (!) 176 kg (388 lb 12.8 oz) Documented at 06/04/2020 0040          I/O this shift:  In: -   Out: 800 [Urine:800]  I/O last 3 completed shifts:  In: -   Out: 1800 [Urine:1800]    Intake/Output Summary (Last 24 hours) at 6/16/2020 1717  Last data filed at 6/16/2020 1342  Gross per 24 hour   Intake --   Output 1200 ml   Net -1200 ml       Physical Exam:  General Appearance: alert, oriented x 3, no acute distress. Morbidly obese.  Skin: warm and dry  HEENT: pupils round and reactive to light, oral mucosa dry  Neck: supple, no JVD, trachea midline  Lungs: diminished, unlabored breathing effort  Heart: RRR, normal S1 and S2, no S3, no rub  Abdomen: soft, non-tender,  + bs. Minimal abdominal wall edema  : purwik  Extremities:  Nonpitting lower extremity edema significantly improved since admission.  Wounds lower ext. Dressed.   Neuro: normal speech and mental status         Results Review:    Results from last 7 days   Lab Units 06/16/20  0457 06/15/20  0341 06/14/20  0436   SODIUM mmol/L 134* 135* 139   POTASSIUM mmol/L 3.5 3.6 4.0   CHLORIDE mmol/L 97* 97* 98   CO2 mmol/L 28.7 28.3 27.2   BUN mg/dL 17 19 26*   CREATININE mg/dL 1.00 1.01* 1.22*   CALCIUM mg/dL 8.7 8.9 8.9   GLUCOSE mg/dL 96 100* 87       Estimated Creatinine Clearance: 102.9 mL/min (by C-G " formula based on SCr of 1 mg/dL).    Results from last 7 days   Lab Units 06/16/20  0457 06/15/20  0341 06/14/20  0436   MAGNESIUM mg/dL 2.0 2.1 2.1   PHOSPHORUS mg/dL 3.9 3.3 3.3       Results from last 7 days   Lab Units 06/16/20  0457 06/15/20  0341 06/14/20  0436 06/13/20  0353 06/12/20  0335 06/11/20  0302 06/10/20  0633   URIC ACID mg/dL 6.9* 7.9* 9.6* 10.7* 11.5* 11.8* 11.4*       Results from last 7 days   Lab Units 06/13/20  0353   WBC 10*3/mm3 7.23   HEMOGLOBIN g/dL 8.7*   PLATELETS 10*3/mm3 351       Results from last 7 days   Lab Units 06/12/20  0335 06/11/20  0302 06/10/20  0633   INR  2.14* 1.88* 1.62*         Imaging Results (Last 24 Hours)     ** No results found for the last 24 hours. **          cephalexin 1,000 mg Oral Q6H   furosemide 40 mg Oral Daily   midodrine 15 mg Oral TID AC   pantoprazole 40 mg Oral Q AM   rivaroxaban 20 mg Oral Daily With Dinner   sodium chloride 10 mL Intravenous Q12H   sodium chloride 10 mL Intravenous Q12H   sodium chloride 10 mL Intravenous Q12H   sodium chloride 10 mL Intravenous Q12H          Medication Review:   Current Facility-Administered Medications   Medication Dose Route Frequency Provider Last Rate Last Dose   • acetaminophen (TYLENOL) tablet 650 mg  650 mg Oral Q4H PRN Danilo Croft MD   650 mg at 06/16/20 1533    Or   • acetaminophen (TYLENOL) 160 MG/5ML solution 650 mg  650 mg Oral Q4H PRN Danilo Croft MD        Or   • acetaminophen (TYLENOL) suppository 650 mg  650 mg Rectal Q4H PRN Danilo Croft MD       • albumin human 25 % IV SOLN 12.5 g  12.5 g Intravenous PRN Danilo Croft MD       • calcium gluconate 1 g in sodium chloride 0.9 % 50 mL IVPB  1 g Intravenous PRN Danilo Croft MD        Or   • calcium gluconate 2 g in sodium chloride 0.9 % 50 mL IVPB  2 g Intravenous PRN Danilo Croft MD       • cephalexin (KEFLEX) capsule 1,000 mg  1,000 mg Oral Q6H Tariq Flores MD    1,000 mg at 06/16/20 1708   • furosemide (LASIX) tablet 40 mg  40 mg Oral Daily Ashutosh Garcia MD   40 mg at 06/16/20 0828   • heparin (porcine) injection 1,000-2,000 Units  1,000-2,000 Units Intravenous PRN Danilo Croft MD       • ipratropium-albuterol (DUO-NEB) nebulizer solution 3 mL  3 mL Nebulization Q6H PRN Dnailo Croft MD       • midodrine (PROAMATINE) tablet 15 mg  15 mg Oral TID AC Danilo Croft MD   15 mg at 06/16/20 1708   • nitroglycerin (NITROSTAT) SL tablet 0.4 mg  0.4 mg Sublingual Q5 Min PRN Danilo Croft MD       • ondansetron (ZOFRAN) injection 4 mg  4 mg Intravenous Q6H PRN Danilo Croft MD   4 mg at 06/16/20 1534   • pantoprazole (PROTONIX) EC tablet 40 mg  40 mg Oral Q AM Danilo Croft MD   40 mg at 06/16/20 0635   • potassium chloride 20 mEq in 50 mL IVPB  20 mEq Intravenous PRN Danilo Croft MD 50 mL/hr at 06/13/20 0532 20 mEq at 06/13/20 0532   • rivaroxaban (XARELTO) tablet 20 mg  20 mg Oral Daily With Dinner Nelson Orellana MD   20 mg at 06/16/20 1708   • sodium chloride 0.9 % bolus 200 mL  200 mL Intravenous PRN Danilo Croft MD       • sodium chloride 0.9 % flush 10 mL  10 mL Intravenous Q12H Danilo Croft MD   10 mL at 06/16/20 0829   • sodium chloride 0.9 % flush 10 mL  10 mL Intravenous Q12H Danilo Croft MD   10 mL at 06/16/20 0828   • sodium chloride 0.9 % flush 10 mL  10 mL Intravenous Q12H Danilo Croft MD   10 mL at 06/16/20 0828   • sodium chloride 0.9 % flush 10 mL  10 mL Intravenous Q12H MarimarvolDanilo huerta MD   10 mL at 06/16/20 0828   • sodium chloride 0.9 % flush 10 mL  10 mL Intravenous PRN YashadavoluDanilo MD   10 mL at 06/14/20 2134   • sodium chloride 0.9 % flush 20 mL  20 mL Intravenous PRN MarimarvolDanilo huerta MD       • sodium phosphates 10 mmol in sodium chloride 0.9 % 100 mL IVPB  10 mmol Intravenous PRN Nidadavolu,  Danilo Mcpherson MD           Assessment/Plan   1.  Acute kidney injury, resolved.   2.  Hyponatremia, associated with fluid excess, sodium is 134.  Continue po diuretic .  3.  Sepsis with cellulitis, of the right lower extremity.  New low grade temp.   4.  Morbid obesity  5.  Medical noncompliance  6.  Diastolic heart failure, with PFO  7.  History of DVT/PE  8.  Hypomagnesemia associated with diuretics, now replete.   9.  Hyperuricemia, uric acid today is down to 6.9.  10.  Anemia, hemoglobin on 6/13/2020 8.7  11.  Syncopal episode most likely associated with overdiuresis and volume depletion, resolved.          Plan:  1.  Check UA        Latasha Song MD  06/16/20  17:17

## 2020-06-16 NOTE — PLAN OF CARE
Problem: Patient Care Overview  Goal: Plan of Care Review  Outcome: Ongoing (interventions implemented as appropriate)  Flowsheets  Taken 6/15/2020 1707 by Kamilla Daly, RN  Progress: improving  Taken 6/16/2020 0010 by Katelyn Valles, RN  Plan of Care Reviewed With: patient  Note:   VSS, voiding well without FC. Labs drawn, uneventul night. Will continue to monitor.

## 2020-06-16 NOTE — THERAPY TREATMENT NOTE
Acute Care - Physical Therapy Treatment Note  [facility]     Patient Name: Emely Solomon  : 1960  MRN: [id number]  Today's Date: 2020             Admit Date: 2020    Visit Dx:    ICD-10-CM ICD-9-CM   1. Cellulitis of right anterior lower leg L03.115 682.6   2. STEFANIA (acute kidney injury) (CMS/HCC) N17.9 584.9   3. Lymphedema I89.0 457.1   4. Sepsis, due to unspecified organism, unspecified whether acute organ dysfunction present (CMS/HCC) A41.9 038.9     995.91   5. Acute renal failure, unspecified acute renal failure type (CMS/HCC) N17.9 584.9     Patient Active Problem List   Diagnosis   • Chronic diastolic CHF (congestive heart failure) (CMS/HCC)   • PFO (patent foramen ovale)   • Paradoxical embolism (CMS/HCC)   • Hypertension   • Pulmonary hypertension (CMS/HCC)   • Back pain   • ARIEL (obstructive sleep apnea)   • Morbid obesity (CMS/HCC)   • Urinary frequency   • History of DVT of lower extremity   • Cellulitis of right anterior lower leg   • Lymphedema   • Cellulitis of right lower extremity   • Acute renal failure (ARF) (CMS/HCC)   • Anemia, chronic disease   • Syncope       Therapy Treatment    Rehabilitation Treatment Summary     Row Name 20 1501 20 0800          Treatment Time/Intention    Discipline  physical therapist  -  occupational therapist  -     Document Type  therapy note (daily note)  -  therapy note (daily note)  -CW     Subjective Information  no complaints  -  complains of;fatigue  -     Mode of Treatment  individual therapy;physical therapy  -  occupational therapy  -CW     Patient/Family Observations  pt supine in bed no acute distress  -  Pt. awake supine bed level.   -CW     Therapy Frequency (PT Clinical Impression)  daily  -2  --     Patient Effort  good  -2  good  -     Existing Precautions/Restrictions  fall  -2  fall  -     Patient Response to Treatment  2nd person SBA for safety  -3  --     Recorded by [] Antonia Muniz, PT  06/16/20 1502  [LH2] Antonia Muniz, PT 06/16/20 1503  [LH3] Antonia Muniz, PT 06/16/20 1513 [CW] Colleen Hidalgo, OTR 06/16/20 0834     Row Name 06/16/20 1501 06/16/20 0800          Vital Signs    O2 Delivery Pre Treatment  room air  -LH  room air  -CW     O2 Delivery Intra Treatment  room air  -LH  room air  -CW     O2 Delivery Post Treatment  --  supplemental O2  -CW     Pre Patient Position  Supine  -  --     Post Patient Position  Sitting  -LH  --     Recorded by [LH] Antonia Muniz, PT 06/16/20 1503 [CW] Colleen Hidalgo, OTR 06/16/20 0834     Row Name 06/16/20 1501 06/16/20 0800          Cognitive Assessment/Intervention- PT/OT    Orientation Status (Cognition)  oriented x 4  -LH  oriented x 4  -CW     Follows Commands (Cognition)  WFL  -LH  WNL  -CW     Recorded by [LH] Antonia Muniz, PT 06/16/20 1503 [CW] Colleen Hidalgo, OTR 06/16/20 0834     Row Name 06/16/20 1501 06/16/20 0800          Bed Mobility Assessment/Treatment    Rolling Right [place] (Bed Mobility)  --  conditional independence;verbal cues  -     Supine-Sit [place] (Bed Mobility)  supervision  -  verbal cues;contact guard  -     Sit-Supine [place] (Bed Mobility)  not tested  -  minimum assist (75% patient effort)  -     Bed Mobility, Safety Issues  --  decreased use of arms for pushing/pulling;decreased use of legs for bridging/pushing;impaired trunk control for bed mobility  -     Assistive Device (Bed Mobility)  bed rails;head of bed elevated  -  bed rails;head of bed elevated  -     Comment (Bed Mobility)  --  Assist to reposition LE's on bed from sit to supine.   -CW     Recorded by [LH] Antonia Muniz, PT 06/16/20 1504 [CW] Colleen Hidalgo, OTR 06/16/20 0834     Row Name 06/16/20 1501             Sit-Stand Transfer    Sit-Stand [place] (Transfers)  stand by assist  -      Assistive Device (Sit-Stand Transfers)  bariatric;walker, front-wheeled  -LH      Recorded by [LH] Antonia Muniz, PT 06/16/20 1504      Row  Name 06/16/20 1501             Gait/Stairs Assessment/Training    [place] Level (Gait)  contact guard  -LH      Assistive Device (Gait)  bariatric;walker, front-wheeled  -LH      Distance in Feet (Gait)  75  -LH      Pattern (Gait)  step-through  -LH      Deviations/Abnormal Patterns (Gait)  base of support, wide  -LH      Bilateral Gait Deviations  forward flexed posture;heel strike decreased  -LH      Comment (Gait/Stairs)  2 standing rest breaks- leaned fwd onto walker, encouraged standing upright vs leaning over  -LH      Recorded by [LH] Antonia Muniz, PT 06/16/20 1504      Row Name 06/16/20 0800             Lower Body Dressing Assessment/Training    Lower Body Dressing [place] Level  lower body dressing skills;doff;don;socks;maximum assist (25% patient effort)  -CW      Lower Body Dressing Position  edge of bed sitting;supported sitting  -CW      Recorded by [CW] Colleen Hidalgo OTR 06/16/20 0834      Row Name 06/16/20 0800             Grooming Assessment/Training    [place] Level (Grooming)  hair care, combing/brushing;set up  -CW      Grooming Position  edge of bed sitting  -CW      Recorded by [CW] Colleen Hidalgo OTR 06/16/20 0834      Row Name 06/16/20 0800             Therapeutic Exercise    Upper Extremity Range of Motion (Therapeutic Exercise)  shoulder flexion/extension, bilateral;shoulder horizontal abduction/adduction, bilateral;elbow flexion/extension, bilateral;forearm supination/pronation, bilateral;wrist flexion/extension, bilateral  -CW      Hand (Therapeutic Exercise)  finger flexion/extension, bilateral;thumb finger opposition, bilateral;hand , bilateral  -CW      Exercise Type (Therapeutic Exercise)  AROM (active range of motion)  -CW      Position (Therapeutic Exercise)  seated;supine 1/2 therap ex completed seated EOB  -CW      Sets/Reps (Therapeutic Exercise)  10x/set with rest breaks  -CW      Expected Outcome (Therapeutic Exercise)  improve functional tolerance,  self-care activity;improve performance, BADLs  -CW      Recorded by [CW] Colleen Hidalgo OTR 06/16/20 0834      Row Name 06/16/20 0800             Static Sitting Balance    Level of [place] (Unsupported Sitting, Static Balance)  supervision  -CW      Sitting Position (Unsupported Sitting, Static Balance)  sitting on edge of bed  -CW      Time Able to Maintain Position (Unsupported Sitting, Static Balance)  4 to 5 minutes  -CW      Recorded by [CW] Colleen Hidalgo OTR 06/16/20 0834      Row Name 06/16/20 0800             Functional Endurance Training    Comment, Functional Endurance  fair  -CW      Recorded by [CW] Colleen Hidalgo OTR 06/16/20 0834      Row Name 06/16/20 1501 06/16/20 0800          Positioning and Restraints    Pre-Treatment Position  in bed  -LH  in bed  -CW     Post Treatment Position  chair  -LH  bed  -CW     In Bed  --  fowlers;call light within reach;encouraged to call for assist  -CW     In Chair  reclined;call light within reach;encouraged to call for assist;exit alarm on;legs elevated  -LH  --     Recorded by [LH] Antonia Muniz, PT 06/16/20 1504 [CW] Colleen Hidalgo, FABYR 06/16/20 0834     Row Name 06/16/20 1501 06/16/20 0800          Pain Scale: Numbers Pre/Post-Treatment    Pain Scale: Numbers, Pretreatment  0/10 - no pain  -LH  0/10 - no pain  -CW     Pain Scale: Numbers, Post-Treatment  0/10 - no pain  -LH  0/10 - no pain  -CW     Recorded by [LH] Antonia Muniz, PT 06/16/20 1504 [CW] Colleen Hidalgo, OTR 06/16/20 0834     Row Name                Wound Right calf Blisters    Wound - Properties Group Side: Right [OSMANY] Location: calf [OSMANY] Primary Wound Type: Blisters [OSMANY] Recorded by:  [OSMANY] Raquel Webb RN 06/04/20 0536    Row Name                Wound 06/04/20 0330 Bilateral perineum Skin Tear    Wound - Properties Group Date first assessed: 06/04/20 [OSMANY] Time first assessed: 0330 [OSMANY] Present on Hospital Admission: Y [OSMANY] Side: Bilateral [OSMANY] Location: perineum [OSMANY] Primary Wound Type:  Skin tear [OSMANY] Recorded by:  [OSMANY] Raquel Webb RN 06/04/20 0537    Row Name 06/16/20 0800             Plan of Care Review    Plan of Care Reviewed With  patient  -CW      Recorded by [CW] Colleen Hidalgo OTR 06/16/20 0834        User Key  (r) = Recorded By, (t) = Taken By, (c) = Cosigned By    Initials Name Effective Dates Discipline    CW Colleen Hidalgo OTR 06/08/18 -  OT    Antonia Maki PT 04/03/18 -  PT    Raquel Corrales RN 01/21/17 -  Nurse          Wound Right calf Blisters (Active)   Dressing Appearance dry;intact;no drainage 6/16/2020  2:30 PM   Closure JOYCE 6/16/2020  2:30 PM   Base dressing in place, unable to visualize 6/16/2020  2:30 PM   Drainage Amount none 6/16/2020  2:30 PM   Dressing Care, Wound gauze;elastic bandage 6/16/2020  2:30 PM       Wound 06/04/20 0330 Bilateral perineum Skin Tear (Active)   Dressing Appearance open to air 6/16/2020  2:30 PM   Closure None 6/16/2020  2:30 PM   Base red 6/16/2020  2:30 PM   Drainage Amount scant 6/16/2020  2:30 PM   Dressing Care, Wound open to air 6/16/2020  2:30 PM           Physical Therapy Education                 Title: PT OT SLP Therapies (In Progress)     Topic: Physical Therapy (In Progress)     Point: Mobility training (In Progress)     Description:   Instruct learner(s) on safety and technique for assisting patient out of bed, chair or wheelchair.  Instruct in the proper use of assistive devices, such as walker, crutches, cane or brace.              Patient Friendly Description:   It's important to get you on your feet again, but we need to do so in a way that is safe for you. Falling has serious consequences, and your personal safety is the most important thing of all.        When it's time to get out of bed, one of us or a family member will sit next to you on the bed to give you support.     If your doctor or nurse tells you to use a walker, crutches, a cane, or a brace, be sure you use it every time you get out of bed, even if you  think you don't need it.    Learning Progress Summary           Patient Acceptance, E, NR by LH at 6/16/2020 1505    Acceptance, E, VU by CW at 6/16/2020 0834    Comment:  Reviewed safety during LE dressing seated EOB for socks. Encouraged increased participation with self care.    Acceptance, E,TB, VU by IVON at 6/15/2020 1459    Acceptance, E, VU,NR by KH at 6/14/2020 1431    Acceptance, E, VU by DB at 6/13/2020 1019    Acceptance, E,TB, VU by IVON at 6/12/2020 0940    Eager, E,TB,D, VU by JM at 6/11/2020 1838    Acceptance, E,D, VU,NR by JM at 6/10/2020 1715    Acceptance, E,D, NR by PC at 6/9/2020 1255    Acceptance, E,TB, VU,NR by CS at 6/8/2020 1522                   Point: Home exercise program (In Progress)     Description:   Instruct learner(s) on appropriate technique for monitoring, assisting and/or progressing patient with therapeutic exercises and activities.              Learning Progress Summary           Patient Acceptance, E, NR by  at 6/16/2020 1505    Acceptance, E, VU by CW at 6/16/2020 0834    Comment:  Reviewed safety during LE dressing seated EOB for socks. Encouraged increased participation with self care.    Acceptance, E, VU,NR by  at 6/14/2020 1431    Acceptance, E, VU by DB at 6/13/2020 1019    Eager, E,TB,D, VU by PINA at 6/11/2020 1838    Acceptance, E,D, VU,NR by JM at 6/10/2020 1715    Acceptance, E,D, NR by PC at 6/9/2020 1255    Acceptance, E,TB, VU,NR by CS at 6/8/2020 1522                   Point: Body mechanics (In Progress)     Description:   Instruct learner(s) on proper positioning and spine alignment for patient and/or caregiver during mobility tasks and/or exercises.              Learning Progress Summary           Patient Acceptance, E, NR by  at 6/16/2020 1505    Acceptance, E, VU by CW at 6/16/2020 0834    Comment:  Reviewed safety during LE dressing seated EOB for socks. Encouraged increased participation with self care.    Acceptance, E,TB, VU by IVON at 6/15/2020 1459     Acceptance, E, VU,NR by CAMERON at 6/14/2020 1431    Acceptance, E, VU by DB at 6/13/2020 1019    Acceptance, E,TB, VU by IVON at 6/12/2020 0940    Eager, E,TB,D, VU by PINA at 6/11/2020 1838    Acceptance, E,D, VU,NR by PINA at 6/10/2020 1715    Acceptance, E,D, NR by PC at 6/9/2020 1255    Acceptance, E,TB, VU,NR by CS at 6/8/2020 1522                   Point: Precautions (In Progress)     Description:   Instruct learner(s) on prescribed precautions during mobility and gait tasks              Learning Progress Summary           Patient Acceptance, E, NR by  at 6/16/2020 1505    Acceptance, E, VU by  at 6/16/2020 0834    Comment:  Reviewed safety during LE dressing seated EOB for socks. Encouraged increased participation with self care.    Acceptance, E,TB, VU by IVON at 6/15/2020 1459    Acceptance, E, VU,NR by CAMERON at 6/14/2020 1431    Acceptance, E, VU by DB at 6/13/2020 1019    Acceptance, E,TB, VU by IVON at 6/12/2020 0940    Eager, E,TB,D, VU by PINA at 6/11/2020 1838    Acceptance, E,D, VU,NR by PINA at 6/10/2020 1715    Acceptance, E,D, NR by PC at 6/9/2020 1255    Acceptance, E,TB, VU,NR by CS at 6/8/2020 1522                               User Key     Initials Effective Dates Name Provider Type Discipline     02/05/19 -  Ale Guzman, PT Physical Therapist PT    CW 06/08/18 -  Colleen Hidalgo OTR Occupational Therapist OT     04/03/18 -  Antonia Muniz, PT Physical Therapist PT    PC 04/03/18 -  Esperanza Larios, PT Physical Therapist PT    JM 03/07/18 -  Raquel Todd, PTA Physical Therapy Assistant PT    CS 05/14/18 -  Tank Lucas, PT Physical Therapist PT    DB 01/22/20 -  Radha Chairez, PT Physical Therapist PT    LC 08/23/19 -  [place] Saenz, PT Physical Therapist PT                PT Recommendation and Plan  Therapy Frequency (PT Clinical Impression): daily  Outcome Summary: pt agreeable to PT this date, ambulated 75ft CGA rwx, 2 standing rest breaks req 2' dyspnea. assisted to chair CGA. noted  plans for SNU.  Outcome Measures     Row Name 06/16/20 1500 06/16/20 0800          How much help from another person do you currently need...    Turning from your back to your side while in flat bed without using bedrails?  4  -LH  --     Moving from lying on back to sitting on the side of a flat bed without bedrails?  3  -LH  --     Moving to and from a bed to a chair (including a wheelchair)?  3  -LH  --     Standing up from a chair using your arms (e.g., wheelchair, bedside chair)?  3  -LH  --     Climbing 3-5 steps with a railing?  2  -LH  --     To walk in hospital room?  3  -LH  --     AM-PAC 6 Clicks Score (PT)  18  -LH  --        How much help from another is currently needed...    Putting on and taking off regular lower body clothing?  --  1  -CW     Bathing (including washing, rinsing, and drying)  --  2  -CW     Toileting (which includes using toilet bed pan or urinal)  --  2  -CW     Putting on and taking off regular upper body clothing  --  2  -CW     Taking care of personal grooming (such as brushing teeth)  --  3  -CW     Eating meals  --  4  -CW     AM-PAC 6 Clicks Score (OT)  --  14  -CW        Functional Assessment    Outcome Measure Options  AM-PAC 6 Clicks Basic Mobility (PT)  -  AM-PAC 6 Clicks Daily Activity (OT)  -CW       User Key  (r) = Recorded By, (t) = Taken By, (c) = Cosigned By    Initials Name Provider Type     Colleen Hidalgo OTR Occupational Therapist     Antonia Muniz, PT Physical Therapist         Time Calculation:   PT Charges     Row Name 06/16/20 1513             Time Calculation    Start Time  1324  -      Stop Time  1337  -      Time Calculation (min)  13 min  -      PT Received On  06/16/20  -      PT - Next Appointment  06/17/20  -        User Key  (r) = Recorded By, (t) = Taken By, (c) = Cosigned By    Initials Name Provider Type     Antonia Muniz, PT Physical Therapist        Therapy Charges for Today     Code Description Service Date Service Provider  Modifiers Qty    60609033458 HC PT THER SUPP EA 15 MIN 6/16/2020 Antonia Muniz, PT GP 1    59802724385 HC PT THER PROC EA 15 MIN 6/16/2020 Antonia Muniz, PT GP 1          PT G-Codes  Outcome Measure Options: AM-PAC 6 Clicks Basic Mobility (PT)  AM-PAC 6 Clicks Score (PT): 18  AM-PAC 6 Clicks Score (OT): 14    Antonia Muniz, PT  6/16/2020

## 2020-06-16 NOTE — PROGRESS NOTES
Continued Stay Note  [facility]     Patient Name: Emely Solomon  MRN: [id number]  Today's Date: 6/16/2020    Admit Date: 6/4/2020    Discharge Plan     Row Name 06/16/20 1237       Plan    Plan  [facility] pending precert started 6/15. Does not need repeat Covid 19 test.     Patient/Family in Agreement with Plan  yes    Plan Comments  Called and spoke to Anny/Soham to check on preert status. Anny/Soham stated she had just checked and precert remains pending. Red Doe RN-BC        Discharge Codes    No documentation.             Red Doe, RN

## 2020-06-16 NOTE — PLAN OF CARE
Problem: Patient Care Overview  Goal: Plan of Care Review  Flowsheets (Taken 6/16/2020 0838)  Outcome Summary: OT tx. completed. Pt. was able to complete 1/2 UE therap ex. seated EOB and 1/2 sitting up bed level. Endurance fair and has improved on room air. Grooming completed with set up. LE dressing maxA to apply socks. Bed mobility with CGA. Lola to reposition LE's on bed. Plan to cont. OT tx. to maximize indep with self care and increase functional endurance.

## 2020-06-16 NOTE — THERAPY TREATMENT NOTE
Acute Care - Occupational Therapy Treatment Note  [facility]     Patient Name: Emely Solomon  : 1960  MRN: [id number]  Today's Date: 2020             Admit Date: 2020       ICD-10-CM ICD-9-CM   1. Cellulitis of right anterior lower leg L03.115 682.6   2. STEFANIA (acute kidney injury) (CMS/[facility]) N17.9 584.9   3. Lymphedema I89.0 457.1   4. Sepsis, due to unspecified organism, unspecified whether acute organ dysfunction present (CMS/[facility]) A41.9 038.9     995.91   5. Acute renal failure, unspecified acute renal failure type (CMS/[facility]) N17.9 584.9     Patient Active Problem List   Diagnosis   • Chronic diastolic CHF (congestive heart failure) (CMS/[facility])   • PFO (patent foramen ovale)   • Paradoxical embolism (CMS/[facility])   • Hypertension   • Pulmonary hypertension (CMS/[facility])   • Back pain   • ARIEL (obstructive sleep apnea)   • Morbid obesity (CMS/[facility])   • Urinary frequency   • History of DVT of lower extremity   • Cellulitis of right anterior lower leg   • Lymphedema   • Cellulitis of right lower extremity   • Acute renal failure (ARF) (CMS/[facility])   • Anemia, chronic disease   • Syncope     Past Medical History:   Diagnosis Date   • Cellulitis     2017, with Group B Strep bacteremia and sepsis   • Chronic diastolic congestive heart failure (CMS/[facility])    • Deep venous thrombosis (CMS/[facility])    • Hypertension    • Lipoedema    • Lymphedema    • Morbid obesity (CMS/[facility])    • Paradoxical embolism (CMS/[facility])     to the LLE due to DVT/PE and PFO   • PFO (patent foramen ovale)    • Pulmonary embolism (CMS/[facility])    • Pulmonary hypertension (CMS/[facility])     multifactorial (dCHF, obesity/ARIEL, hx PE), mild by echo 2016     Past Surgical History:   Procedure Laterality Date   • BRONCHOSCOPY N/A 2017    Procedure: BRONCHOSCOPY with wash;  Surgeon: Caleb Garcia MD;  Location: [facility];  Service:    • DILATATION AND CURETTAGE  2011   • VENA CAVA FILTER PLACEMENT      Inferior Vena Cava Filter Placement w/Fluorosc  angiogr guidance       Therapy Treatment    Rehabilitation Treatment Summary     Row Name 06/16/20 0800             Treatment Time/Intention    Discipline  occupational therapist  -CW      Document Type  therapy note (daily note)  -CW      Subjective Information  complains of;fatigue  -CW      Mode of Treatment  occupational therapy  -CW      Patient/Family Observations  Pt. awake supine bed level.   -CW      Patient Effort  good  -CW      Existing Precautions/Restrictions  fall  -CW      Recorded by [CW] Colleen Hidalgo OTR 06/16/20 0834      Row Name 06/16/20 0800             Vital Signs    O2 Delivery Pre Treatment  room air  -CW      O2 Delivery Intra Treatment  room air  -CW      O2 Delivery Post Treatment  supplemental O2  -CW      Recorded by [CW] Colleen Hidalgo OTR 06/16/20 0834      Row Name 06/16/20 0800             Cognitive Assessment/Intervention- PT/OT    Orientation Status (Cognition)  oriented x 4  -CW      Follows Commands (Cognition)  WNL  -CW      Recorded by [CW] Colleen Hidalgo OTR 06/16/20 0834      Row Name 06/16/20 0800             Bed Mobility Assessment/Treatment    Rolling Right [place] (Bed Mobility)  conditional independence;verbal cues  -CW      Supine-Sit [place] (Bed Mobility)  verbal cues;contact guard  -CW      Sit-Supine [place] (Bed Mobility)  minimum assist (75% patient effort)  -CW      Bed Mobility, Safety Issues  decreased use of arms for pushing/pulling;decreased use of legs for bridging/pushing;impaired trunk control for bed mobility  -CW      Assistive Device (Bed Mobility)  bed rails;head of bed elevated  -CW      Comment (Bed Mobility)  Assist to reposition LE's on bed from sit to supine.   -CW      Recorded by [CW] Colleen Hidalgo OTR 06/16/20 0834      Row Name 06/16/20 0800             Lower Body Dressing Assessment/Training    Lower Body Dressing [place] Level  lower body dressing skills;doff;don;socks;maximum assist (25% patient effort)  -CW       Lower Body Dressing Position  edge of bed sitting;supported sitting  -CW      Recorded by [CW] Colleen Hidalgo OTR 06/16/20 0834      Row Name 06/16/20 0800             Grooming Assessment/Training    [place] Level (Grooming)  hair care, combing/brushing;set up  -CW      Grooming Position  edge of bed sitting  -CW      Recorded by [CW] Colleen Hidalgo OTR 06/16/20 0834      Row Name 06/16/20 0800             Therapeutic Exercise    Upper Extremity Range of Motion (Therapeutic Exercise)  shoulder flexion/extension, bilateral;shoulder horizontal abduction/adduction, bilateral;elbow flexion/extension, bilateral;forearm supination/pronation, bilateral;wrist flexion/extension, bilateral  -CW      Hand (Therapeutic Exercise)  finger flexion/extension, bilateral;thumb finger opposition, bilateral;hand , bilateral  -CW      Exercise Type (Therapeutic Exercise)  AROM (active range of motion)  -CW      Position (Therapeutic Exercise)  seated;supine 1/2 therap ex completed seated EOB  -CW      Sets/Reps (Therapeutic Exercise)  10x/set with rest breaks  -CW      Expected Outcome (Therapeutic Exercise)  improve functional tolerance, self-care activity;improve performance, BADLs  -CW      Recorded by [CW] Colleen Hidalgo OTR 06/16/20 0834      Row Name 06/16/20 0800             Static Sitting Balance    Level of [place] (Unsupported Sitting, Static Balance)  supervision  -CW      Sitting Position (Unsupported Sitting, Static Balance)  sitting on edge of bed  -CW      Time Able to Maintain Position (Unsupported Sitting, Static Balance)  4 to 5 minutes  -CW      Recorded by [CW] Colleen Hidalgo OTR 06/16/20 0834      Row Name 06/16/20 0800             Functional Endurance Training    Comment, Functional Endurance  fair  -CW      Recorded by [CW] Colleen Hidalgo OTR 06/16/20 0834      Row Name 06/16/20 0800             Positioning and Restraints    Pre-Treatment Position  in bed  -CW      Post Treatment Position  bed  -CW       In Bed  fowlers;call light within reach;encouraged to call for assist  -CW      Recorded by [CW] Colleen Hidalgo OTR 06/16/20 0834      Row Name 06/16/20 0800             Pain Scale: Numbers Pre/Post-Treatment    Pain Scale: Numbers, Pretreatment  0/10 - no pain  -CW      Pain Scale: Numbers, Post-Treatment  0/10 - no pain  -CW      Recorded by [CW] Colleen Hidalgo OTR 06/16/20 0834      Row Name                Wound Right calf Blisters    Wound - Properties Group Side: Right [OSMANY] Location: calf [OSMANY] Primary Wound Type: Blisters [OSMANY] Recorded by:  [OSMANY] Raquel Webb RN 06/04/20 0536    Row Name                Wound 06/04/20 0330 Bilateral perineum Skin Tear    Wound - Properties Group Date first assessed: 06/04/20 [OSMANY] Time first assessed: 0330 [OSMANY] Present on Hospital Admission: Y [OSMANY] Side: Bilateral [OSMANY] Location: perineum [OSMANY] Primary Wound Type: Skin tear [OSMANY] Recorded by:  [OSMANY] Raquel Webb RN 06/04/20 0537    Row Name 06/16/20 0800             Plan of Care Review    Plan of Care Reviewed With  patient  -CW      Recorded by [CW] Colleen Hidalgo OTR 06/16/20 0834        User Key  (r) = Recorded By, (t) = Taken By, (c) = Cosigned By    Initials Name Effective Dates Discipline    CW Colleen Hidalgo OTR 06/08/18 -  OT    Raquel Corrales RN 01/21/17 -  Nurse        Wound Right calf Blisters (Active)   Dressing Appearance dry;intact;no drainage 6/16/2020 12:10 AM   Closure JOYCE 6/16/2020 12:10 AM   Base dressing in place, unable to visualize 6/16/2020 12:10 AM   Periwound dry 6/15/2020 10:05 AM   Periwound Temperature warm 6/15/2020 10:05 AM   Periwound Skin Turgor soft 6/15/2020 10:05 AM   Edges irregular 6/15/2020 10:05 AM   Wound Length (cm) 10 cm 6/15/2020 10:05 AM   Wound Width (cm) 10 cm 6/15/2020 10:05 AM   Drainage Characteristics/Odor serosanguineous 6/15/2020 10:05 AM   Drainage Amount none 6/16/2020 12:10 AM   Care, Wound cleansed with;sterile normal saline 6/15/2020 10:05 AM   Dressing Care,  Wound dressing changed;gauze;elastic bandage 6/15/2020  1:39 PM   Periwound Care, Wound dry periwound area maintained 6/15/2020 10:05 AM       Wound 06/04/20 0330 Bilateral perineum Skin Tear (Active)   Dressing Appearance open to air 6/16/2020 12:10 AM   Closure None 6/16/2020 12:10 AM   Base red 6/16/2020 12:10 AM   Drainage Amount none 6/16/2020 12:10 AM   Dressing Care, Wound dressing applied;open to air 6/15/2020  1:39 PM       Occupational Therapy Education                 Title: PT OT SLP Therapies (Done)     Topic: Occupational Therapy (Done)     Point: ADL training (Done)     Description:   Instruct learner(s) on proper safety adaptation and remediation techniques during self care or transfers.   Instruct in proper use of assistive devices.              Learning Progress Summary           Patient Acceptance, E, VU by CW at 6/16/2020 0834    Comment:  Reviewed safety during LE dressing seated EOB for socks. Encouraged increased participation with self care.    Acceptance, E, VU by CW at 6/10/2020 1221    Comment:  Reviewed safety sitting EOB. Encouraged increased indep with self care.    Acceptance, E, VU by CW at 6/9/2020 1319    Comment:  Reviewed role of OT and poc. Discussed safety. Pt. declines having DME at home.                               User Key     Initials Effective Dates Name Provider Type Discipline    GIULIANA 06/08/18 -  Colleen Hidalgo OTR Occupational Therapist OT                OT Recommendation and Plan  Outcome Summary/Treatment Plan (OT)  Anticipated Discharge Disposition (OT): inpatient rehabilitation facility, skilled nursing facility  Planned Therapy Interventions (OT Eval): activity tolerance training, BADL retraining, transfer/mobility retraining  Therapy Frequency (OT Eval): 5 times/wk  Plan of Care Review  Plan of Care Reviewed With: patient  Plan of Care Reviewed With: patient  Outcome Summary: OT tx. completed. Pt. was able to complete 1/2 UE therap ex. seated EOB and 1/2 sitting up  bed level. Endurance fair and has improved on room air. Grooming completed with set up. LE dressing maxA to apply socks. Bed mobility with CGA. Lola to reposition LE's on bed. Plan to cont. OT tx. to maximize indep with self care and increase functional endurance.  Outcome Measures     Row Name 06/16/20 0800             How much help from another is currently needed...    Putting on and taking off regular lower body clothing?  1  -CW      Bathing (including washing, rinsing, and drying)  2  -CW      Toileting (which includes using toilet bed pan or urinal)  2  -CW      Putting on and taking off regular upper body clothing  2  -CW      Taking care of personal grooming (such as brushing teeth)  3  -CW      Eating meals  4  -CW      AM-PAC 6 Clicks Score (OT)  14  -CW         Functional Assessment    Outcome Measure Options  AM-PAC 6 Clicks Daily Activity (OT)  -CW        User Key  (r) = Recorded By, (t) = Taken By, (c) = Cosigned By    Initials Name Provider Type    Colleen Cleary OTR Occupational Therapist           Time Calculation:   Time Calculation- OT     Row Name 06/16/20 0842             Time Calculation- OT    OT Start Time  0803  -CW      OT Stop Time  0827  -CW      OT Time Calculation (min)  24 min  -CW      Total Timed Code Minutes- OT  24 minute(s)  -CW        User Key  (r) = Recorded By, (t) = Taken By, (c) = Cosigned By    Initials Name Provider Type    Colleen Cleary OTR Occupational Therapist        Therapy Charges for Today     Code Description Service Date Service Provider Modifiers Qty    [id number]  OT THER PROC EA 15 MIN 6/16/2020 Colleen Hidalgo OTR GO 1    [id number]  OT SELF CARE/MGMT/TRAIN EA 15 MIN 6/16/2020 Colleen Hidalgo OTR GO 1               RADHA Miller  6/16/2020

## 2020-06-17 ENCOUNTER — APPOINTMENT (OUTPATIENT)
Dept: GENERAL RADIOLOGY | Facility: HOSPITAL | Age: 60
End: 2020-06-17

## 2020-06-17 LAB
ALBUMIN SERPL-MCNC: 3.2 G/DL (ref 3.5–5.2)
ALBUMIN/GLOB SERPL: 0.7 G/DL
ALP SERPL-CCNC: 93 U/L (ref 39–117)
ALT SERPL W P-5'-P-CCNC: 6 U/L (ref 1–33)
ANION GAP SERPL CALCULATED.3IONS-SCNC: 14.9 MMOL/L (ref 5–15)
AST SERPL-CCNC: 16 U/L (ref 1–32)
B PARAPERT DNA SPEC QL NAA+PROBE: NOT DETECTED
B PERT DNA SPEC QL NAA+PROBE: NOT DETECTED
BILIRUB SERPL-MCNC: 0.5 MG/DL (ref 0.2–1.2)
BUN BLD-MCNC: 16 MG/DL (ref 8–23)
BUN/CREAT SERPL: 14.2 (ref 7–25)
C PNEUM DNA NPH QL NAA+NON-PROBE: NOT DETECTED
CALCIUM SPEC-SCNC: 9.1 MG/DL (ref 8.6–10.5)
CHLORIDE SERPL-SCNC: 97 MMOL/L (ref 98–107)
CO2 SERPL-SCNC: 23.1 MMOL/L (ref 22–29)
CREAT BLD-MCNC: 1.13 MG/DL (ref 0.57–1)
DEPRECATED RDW RBC AUTO: 42.3 FL (ref 37–54)
ERYTHROCYTE [DISTWIDTH] IN BLOOD BY AUTOMATED COUNT: 13.6 % (ref 12.3–15.4)
FLUAV H1 2009 PAND RNA NPH QL NAA+PROBE: NOT DETECTED
FLUAV H1 HA GENE NPH QL NAA+PROBE: NOT DETECTED
FLUAV H3 RNA NPH QL NAA+PROBE: NOT DETECTED
FLUAV SUBTYP SPEC NAA+PROBE: NOT DETECTED
FLUBV RNA ISLT QL NAA+PROBE: NOT DETECTED
GFR SERPL CREATININE-BSD FRML MDRD: 49 ML/MIN/1.73
GLOBULIN UR ELPH-MCNC: 4.9 GM/DL
GLUCOSE BLD-MCNC: 97 MG/DL (ref 65–99)
HADV DNA SPEC NAA+PROBE: NOT DETECTED
HCOV 229E RNA SPEC QL NAA+PROBE: NOT DETECTED
HCOV HKU1 RNA SPEC QL NAA+PROBE: NOT DETECTED
HCOV NL63 RNA SPEC QL NAA+PROBE: NOT DETECTED
HCOV OC43 RNA SPEC QL NAA+PROBE: NOT DETECTED
HCT VFR BLD AUTO: 28.5 % (ref 34–46.6)
HGB BLD-MCNC: 9.1 G/DL (ref 12–15.9)
HMPV RNA NPH QL NAA+NON-PROBE: NOT DETECTED
HPIV1 RNA SPEC QL NAA+PROBE: NOT DETECTED
HPIV2 RNA SPEC QL NAA+PROBE: NOT DETECTED
HPIV3 RNA NPH QL NAA+PROBE: NOT DETECTED
HPIV4 P GENE NPH QL NAA+PROBE: NOT DETECTED
M PNEUMO IGG SER IA-ACNC: NOT DETECTED
MCH RBC QN AUTO: 27.2 PG (ref 26.6–33)
MCHC RBC AUTO-ENTMCNC: 31.9 G/DL (ref 31.5–35.7)
MCV RBC AUTO: 85.1 FL (ref 79–97)
PLATELET # BLD AUTO: 404 10*3/MM3 (ref 140–450)
PMV BLD AUTO: 8 FL (ref 6–12)
POTASSIUM BLD-SCNC: 4.1 MMOL/L (ref 3.5–5.2)
PROT SERPL-MCNC: 8.1 G/DL (ref 6–8.5)
RBC # BLD AUTO: 3.35 10*6/MM3 (ref 3.77–5.28)
REF LAB TEST METHOD: NORMAL
RHINOVIRUS RNA SPEC NAA+PROBE: NOT DETECTED
RSV RNA NPH QL NAA+NON-PROBE: NOT DETECTED
SARS-COV-2 RNA RESP QL NAA+PROBE: NOT DETECTED
SODIUM BLD-SCNC: 135 MMOL/L (ref 136–145)
WBC NRBC COR # BLD: 5.23 10*3/MM3 (ref 3.4–10.8)

## 2020-06-17 PROCEDURE — 94640 AIRWAY INHALATION TREATMENT: CPT

## 2020-06-17 PROCEDURE — 97110 THERAPEUTIC EXERCISES: CPT

## 2020-06-17 PROCEDURE — 0100U HC BIOFIRE FILMARRAY RESP PANEL 2: CPT | Performed by: NURSE PRACTITIONER

## 2020-06-17 PROCEDURE — 94799 UNLISTED PULMONARY SVC/PX: CPT

## 2020-06-17 PROCEDURE — 99232 SBSQ HOSP IP/OBS MODERATE 35: CPT | Performed by: INTERNAL MEDICINE

## 2020-06-17 PROCEDURE — 97535 SELF CARE MNGMENT TRAINING: CPT

## 2020-06-17 PROCEDURE — 71046 X-RAY EXAM CHEST 2 VIEWS: CPT

## 2020-06-17 PROCEDURE — 80053 COMPREHEN METABOLIC PANEL: CPT | Performed by: HOSPITALIST

## 2020-06-17 PROCEDURE — 85027 COMPLETE CBC AUTOMATED: CPT | Performed by: HOSPITALIST

## 2020-06-17 PROCEDURE — 87040 BLOOD CULTURE FOR BACTERIA: CPT | Performed by: NURSE PRACTITIONER

## 2020-06-17 RX ORDER — BENZONATATE 100 MG/1
100 CAPSULE ORAL 3 TIMES DAILY PRN
Status: DISCONTINUED | OUTPATIENT
Start: 2020-06-17 | End: 2020-06-18 | Stop reason: HOSPADM

## 2020-06-17 RX ADMIN — SODIUM CHLORIDE, PRESERVATIVE FREE 10 ML: 5 INJECTION INTRAVENOUS at 20:19

## 2020-06-17 RX ADMIN — PANTOPRAZOLE SODIUM 40 MG: 40 TABLET, DELAYED RELEASE ORAL at 06:27

## 2020-06-17 RX ADMIN — MIDODRINE HYDROCHLORIDE 15 MG: 5 TABLET ORAL at 12:19

## 2020-06-17 RX ADMIN — SODIUM CHLORIDE, PRESERVATIVE FREE 10 ML: 5 INJECTION INTRAVENOUS at 20:20

## 2020-06-17 RX ADMIN — FUROSEMIDE 40 MG: 40 TABLET ORAL at 08:26

## 2020-06-17 RX ADMIN — IPRATROPIUM BROMIDE AND ALBUTEROL SULFATE 3 ML: .5; 3 SOLUTION RESPIRATORY (INHALATION) at 20:53

## 2020-06-17 RX ADMIN — ACETAMINOPHEN 650 MG: 325 TABLET, FILM COATED ORAL at 22:34

## 2020-06-17 RX ADMIN — SODIUM CHLORIDE, PRESERVATIVE FREE 10 ML: 5 INJECTION INTRAVENOUS at 08:29

## 2020-06-17 RX ADMIN — ACETAMINOPHEN 650 MG: 325 TABLET, FILM COATED ORAL at 14:30

## 2020-06-17 RX ADMIN — CEPHALEXIN 1000 MG: 500 CAPSULE ORAL at 06:27

## 2020-06-17 RX ADMIN — MIDODRINE HYDROCHLORIDE 15 MG: 5 TABLET ORAL at 17:19

## 2020-06-17 RX ADMIN — MIDODRINE HYDROCHLORIDE 15 MG: 5 TABLET ORAL at 06:28

## 2020-06-17 RX ADMIN — RIVAROXABAN 20 MG: 20 TABLET, FILM COATED ORAL at 17:19

## 2020-06-17 NOTE — PLAN OF CARE
Problem: Patient Care Overview  Goal: Plan of Care Review  Flowsheets  Taken 6/17/2020 1215 by Jaime Weiner Jr. RN  Plan of Care Reviewed With: patient  Taken 6/17/2020 1344 by Antonia Muniz, PT  Outcome Summary: pt reports she had a fever yesterday and now a rash. pt agreeable to get OOB to chair CGA baratric rwx. pt hopeful to DC SNU soon.   ..Patient was wearing a face mask during this therapy encounter. Therapist used appropriate personal protective equipment including mask and gloves.  Mask used was standard procedure mask. Appropriate PPE was worn during the entire therapy session. Hand hygiene was completed before and after therapy session. Patient is not in enhanced droplet precautions.

## 2020-06-17 NOTE — PROGRESS NOTES
LOS: 13 days     Chief Complaint:  Follow-up RLE cellulitis due to MSSA and Group C Strep    Interval History:  Asked to see patient today for fever to 102.5 F and new rash on the chest, stomach, and back. She says it started today. It is neither painful nor pruritic. No new symptoms besides the fever. She says her mild dry cough is chronic. She is on room air and is not short of air.  She says her legs are healing well. There is dry skin but induration and dark erythema are resolved. No dysuria or flank pain.     ROS: no headache, no wheezing, no blurry vision    Vital Signs  Temp:  [98.8 °F (37.1 °C)-102.5 °F (39.2 °C)] 102.5 °F (39.2 °C)  Heart Rate:  [] 106  Resp:  [16-18] 18  BP: (100-122)/(62-83) 108/64    Physical Exam:  General: awake, alert, sitting in bedside chair  Cardiovascular: tachycardic, BLE lymphedema  Respiratory: no wheezing; on RA  GI: Abdomen is obese but soft, non-tender  Skin: +lymphedema; erythema markedly improved; no heat; no abscess; very dry, peeling skin; diffuse blanching papular rash on chest, abdomen, and back; non pruritic    Antibiotics:  Cephalexin 1 g PO q6h    LABS:  CBC, BMP, UA, micro reviewed today  Lab Results   Component Value Date    WBC 5.23 06/17/2020    HGB 9.1 (L) 06/17/2020    HCT 28.5 (L) 06/17/2020    MCV 85.1 06/17/2020     06/17/2020     Lab Results   Component Value Date    GLUCOSE 97 06/17/2020    CALCIUM 9.1 06/17/2020     (L) 06/17/2020    K 4.1 06/17/2020    CO2 23.1 06/17/2020    CL 97 (L) 06/17/2020    BUN 16 06/17/2020    CREATININE 1.13 (H) 06/17/2020    EGFRIFAFRI  06/05/2020      Comment:      <15 Indicative of kidney failure.    EGFRIFNONA 49 (L) 06/17/2020    BCR 14.2 06/17/2020    ANIONGAP 14.9 06/17/2020     Lab Results   Component Value Date    CRP 34.57 (H) 06/04/2020     UA 3-5 WBCs, 3-5 RBCs, 3-6 squamous cells    Microbiology:  6/4 BCx: negative  6/4 Wound Cx Right Leg: MSSA and Group C Strep  6/4 COVID: negative  6/16 COVID  19: pending  6/17 BCx: pending    Radiology:  CXR ordered    Assessment/Plan   1. Sepsis due to right lower extremity cellulitis - resolved  2. Chronic lymphedema  3. Super obesity BMI 76  4. Acute renal failure - better  5. Drug rash - new problem  6. History of DVT in LLE on rivaroxaban    Her RLE cellulitis is markedly improved compared to admission. She has now received 14 days of antibiotic therapy so I agree w/ stopping her cephalexin today. I think her rash is a drug rash. In review of her MAR, I do not see any other obvious culprits besides the cephalexin. I think there's a good chance her fever could be an inflammatory response to the rash or a drug fever. I think new infection is less likely. She is breathing comfortably on room air, has a normal WBC, no dysuria with a benign UA, and has no new symptoms besides the fever and rash. I'll follow-up her blood cultures and CXR.      Continue local wound care and management of lymphedema after discharge will be of the utmost importance to decrease frequency of recurrent skin infections.

## 2020-06-17 NOTE — PROGRESS NOTES
"   LOS: 13 days    Patient Care Team:  RENAY Smith MD as PCP - General (General Practice)    Chief Complaint:    Chief Complaint   Patient presents with   • Diarrhea   • Legs Swollen     Follow-up acute kidney injury  Subjective     Interval History:   No fever after 100.5 yesterday afternoon.  Legs feel fine.  Worked with OT this am.  Not soa.      Objective     Vital Signs  Temp:  [98.8 °F (37.1 °C)-100.5 °F (38.1 °C)] 98.8 °F (37.1 °C)  Heart Rate:  [] 111  Resp:  [16-18] 18  BP: (100-122)/(62-83) 122/83    Flowsheet Rows      First Filed Value   Admission Height  160 cm (63\") Documented at 06/04/2020 0001   Admission Weight  (!) 176 kg (388 lb 12.8 oz) Documented at 06/04/2020 0040          No intake/output data recorded.  I/O last 3 completed shifts:  In: 0   Out: 1500 [Urine:1500]    Intake/Output Summary (Last 24 hours) at 6/17/2020 1054  Last data filed at 6/17/2020 0500  Gross per 24 hour   Intake 0 ml   Output 1100 ml   Net -1100 ml       Physical Exam:  General Appearance: alert, oriented x 3, no acute distress. Morbidly obese.  Skin: warm and dry  HEENT: pupils round and reactive to light, oral mucosa dry  Neck: supple, no JVD, trachea midline  Lungs: diminished, unlabored breathing effort  Heart: RRR, normal S1 and S2, no S3, no rub  Abdomen: soft, non-tender,  + bs. Minimal abdominal wall edema  Extremities:  Nonpitting lower extremity edema better. Wounds lower ext. Dressed.   Neuro: normal speech and mental status         Results Review:    Results from last 7 days   Lab Units 06/17/20  0832 06/16/20  0457 06/15/20  0341   SODIUM mmol/L 135* 134* 135*   POTASSIUM mmol/L 4.1 3.5 3.6   CHLORIDE mmol/L 97* 97* 97*   CO2 mmol/L 23.1 28.7 28.3   BUN mg/dL 16 17 19   CREATININE mg/dL 1.13* 1.00 1.01*   CALCIUM mg/dL 9.1 8.7 8.9   BILIRUBIN mg/dL 0.5  --   --    ALK PHOS U/L 93  --   --    ALT (SGPT) U/L 6  --   --    AST (SGOT) U/L 16  --   --    GLUCOSE mg/dL 97 96 100*       Estimated " Creatinine Clearance: 91.9 mL/min (A) (by C-G formula based on SCr of 1.13 mg/dL (H)).    Results from last 7 days   Lab Units 06/16/20  0457 06/15/20  0341 06/14/20  0436   MAGNESIUM mg/dL 2.0 2.1 2.1   PHOSPHORUS mg/dL 3.9 3.3 3.3       Results from last 7 days   Lab Units 06/16/20  0457 06/15/20  0341 06/14/20  0436 06/13/20  0353 06/12/20  0335 06/11/20  0302   URIC ACID mg/dL 6.9* 7.9* 9.6* 10.7* 11.5* 11.8*       Results from last 7 days   Lab Units 06/17/20  0832 06/13/20  0353   WBC 10*3/mm3 5.23 7.23   HEMOGLOBIN g/dL 9.1* 8.7*   PLATELETS 10*3/mm3 404 351       Results from last 7 days   Lab Units 06/12/20  0335 06/11/20  0302   INR  2.14* 1.88*         Imaging Results (Last 24 Hours)     ** No results found for the last 24 hours. **          cephalexin 1,000 mg Oral Q6H   furosemide 40 mg Oral Daily   midodrine 15 mg Oral TID AC   pantoprazole 40 mg Oral Q AM   rivaroxaban 20 mg Oral Daily With Dinner   sodium chloride 10 mL Intravenous Q12H   sodium chloride 10 mL Intravenous Q12H   sodium chloride 10 mL Intravenous Q12H   sodium chloride 10 mL Intravenous Q12H          Medication Review:   Current Facility-Administered Medications   Medication Dose Route Frequency Provider Last Rate Last Dose   • acetaminophen (TYLENOL) tablet 650 mg  650 mg Oral Q4H PRN Danilo Croft MD   650 mg at 06/16/20 1533    Or   • acetaminophen (TYLENOL) 160 MG/5ML solution 650 mg  650 mg Oral Q4H PRN Danilo Croft MD        Or   • acetaminophen (TYLENOL) suppository 650 mg  650 mg Rectal Q4H PRN Danilo Croft MD       • albumin human 25 % IV SOLN 12.5 g  12.5 g Intravenous PRN Danilo Croft MD       • calcium gluconate 1 g in sodium chloride 0.9 % 50 mL IVPB  1 g Intravenous PRN Danilo Croft MD        Or   • calcium gluconate 2 g in sodium chloride 0.9 % 50 mL IVPB  2 g Intravenous PRN Danilo Croft MD       • cephalexin (KEFLEX) capsule 1,000 mg  1,000 mg Oral  Q6H Tariq Flores MD   1,000 mg at 06/17/20 0627   • furosemide (LASIX) tablet 40 mg  40 mg Oral Daily Ashutosh Garcia MD   40 mg at 06/17/20 0826   • heparin (porcine) injection 1,000-2,000 Units  1,000-2,000 Units Intravenous PRN Danilo Croft MD       • ipratropium-albuterol (DUO-NEB) nebulizer solution 3 mL  3 mL Nebulization Q6H PRN Danilo Croft MD       • midodrine (PROAMATINE) tablet 15 mg  15 mg Oral TID AC Danilo Croft MD   15 mg at 06/17/20 0628   • nitroglycerin (NITROSTAT) SL tablet 0.4 mg  0.4 mg Sublingual Q5 Min PRN Danilo Croft MD       • ondansetron (ZOFRAN) injection 4 mg  4 mg Intravenous Q6H PRN Daniol Croft MD   4 mg at 06/16/20 1534   • pantoprazole (PROTONIX) EC tablet 40 mg  40 mg Oral Q AM Danilo Croft MD   40 mg at 06/17/20 0627   • potassium chloride 20 mEq in 50 mL IVPB  20 mEq Intravenous PRN Danilo Croft MD 50 mL/hr at 06/13/20 0532 20 mEq at 06/13/20 0532   • rivaroxaban (XARELTO) tablet 20 mg  20 mg Oral Daily With Dinner Nelson Orellana MD   20 mg at 06/16/20 1708   • sodium chloride 0.9 % bolus 200 mL  200 mL Intravenous PRN Danilo Croft MD       • sodium chloride 0.9 % flush 10 mL  10 mL Intravenous Q12H Danilo Croft MD   10 mL at 06/16/20 2346   • sodium chloride 0.9 % flush 10 mL  10 mL Intravenous Q12H Danilo Croft MD   10 mL at 06/17/20 0829   • sodium chloride 0.9 % flush 10 mL  10 mL Intravenous Q12H Danilo Croft MD   10 mL at 06/17/20 0829   • sodium chloride 0.9 % flush 10 mL  10 mL Intravenous Q12H aDnilo Croft MD   10 mL at 06/17/20 0829   • sodium chloride 0.9 % flush 10 mL  10 mL Intravenous PRN Danilo Croft MD   10 mL at 06/14/20 2134   • sodium chloride 0.9 % flush 20 mL  20 mL Intravenous PRN Danilo Croft MD       • sodium phosphates 10 mmol in sodium chloride 0.9 % 100 mL IVPB  10  mmol Intravenous PRN Danilo Croft MD           Assessment/Plan   1.  Acute kidney injury, resolved. UA with Leuk est and blood, but very few WBC and RBC.  30 mg/dl protein.  No symptoms of UTI.   Low grade temp resolved.   2.  Hyponatremia, associated with fluid excess, sodium 135  Continue po diuretic .  3.  Sepsis with cellulitis, of the right lower extremity.  On keflex with stop date 6/18.   4.  Morbid obesity  5.  Medical noncompliance  6.  Diastolic heart failure, with PFO  7.  History of DVT/PE  8.  Hypomagnesemia associated with diuretics, now replete.   9.  Hyperuricemia, uric acid  6.9.  10.  Anemia, hemoglobin stable.             Plan:  1. Ok with renal for dc.     2. Would repeat UA as outpt in a week.  Can follow up with PCP.       Latasha Song MD  06/17/20  10:54

## 2020-06-17 NOTE — PLAN OF CARE
Problem: Patient Care Overview  Goal: Plan of Care Review  Flowsheets  Taken 6/17/2020 0800  Progress: improving  Taken 6/17/2020 0836  Plan of Care Reviewed With: patient  Outcome Summary: OT-No pain c/o. Overall functional endurance has improved to fair with decreased SOB. LE dressing long sitting [place] to don/doff socks. UE therap ex. completed seated EOB with rest breaks. Sit to stand with CGA and cues using rolling wx. Discussed DME at home. Pt. has a tub seat, but no hand rails. Plan to cont. OT to increased indep with self care and functional endurance.     Patient was not wearing a face mask during this therapy encounter. Therapist used appropriate personal protective equipment including mask and gloves.  Mask used was standard procedure mask. Appropriate PPE was worn during the entire therapy session. Hand hygiene was completed before and after therapy session. Patient is not in enhanced droplet precautions.

## 2020-06-17 NOTE — PROGRESS NOTES
Continued Stay Note  [facility]     Patient Name: Emely Solomon  MRN: [id number]  Today's Date: 6/17/2020    Admit Date: 6/4/2020    Discharge Plan     Row Name 06/17/20 1315       Plan    Plan  [facility] pending precert started 6/15. Covid 19 test pending    Patient/Family in Agreement with Plan  yes    Plan Comments  Called [facility]  States precert for [facility] remains pending. Covid 19 test remains pending. Red Doe RN-BC        Discharge Codes    No documentation.             Red Doe, RN

## 2020-06-17 NOTE — THERAPY TREATMENT NOTE
Acute Care - Physical Therapy Treatment Note  [facility]     Patient Name: Emely Solomon  : 1960  MRN: [id number]  Today's Date: 2020             Admit Date: 2020    Visit Dx:    ICD-10-CM ICD-9-CM   1. Cellulitis of right anterior lower leg L03.115 682.6   2. STEFANIA (acute kidney injury) (CMS/HCC) N17.9 584.9   3. Lymphedema I89.0 457.1   4. Sepsis, due to unspecified organism, unspecified whether acute organ dysfunction present (CMS/HCC) A41.9 038.9     995.91   5. Acute renal failure, unspecified acute renal failure type (CMS/HCC) N17.9 584.9     Patient Active Problem List   Diagnosis   • Chronic diastolic CHF (congestive heart failure) (CMS/HCC)   • PFO (patent foramen ovale)   • Paradoxical embolism (CMS/HCC)   • Hypertension   • Pulmonary hypertension (CMS/HCC)   • Back pain   • ARIEL (obstructive sleep apnea)   • Morbid obesity (CMS/HCC)   • Urinary frequency   • History of DVT of lower extremity   • Cellulitis of right anterior lower leg   • Lymphedema   • Cellulitis of right lower extremity   • Acute renal failure (ARF) (CMS/HCC)   • Anemia, chronic disease   • Syncope       Therapy Treatment    Rehabilitation Treatment Summary     Row Name 20 1344 20 0800          Treatment Time/Intention    Discipline  physical therapist  -  occupational therapist  -CW     Document Type  therapy note (daily note)  -  therapy note (daily note)  -CW     Subjective Information  complains of fever, rash and coughing  -  no complaints  -     Mode of Treatment  individual therapy;physical therapy  -  occupational therapy  -CW     Patient/Family Observations  pt supine in bed no acute distress  -  Pt. resting supine bed level.   -CW     Patient Effort  good  -  good  -CW     Existing Precautions/Restrictions  fall  -  fall  -CW     Recorded by [] Antonia Muniz, PT 20 1346 [CW] Colleen Hidalgo OTR 20 0883     Row Name 20 0800             Vital Signs    O2 Delivery  Pre Treatment  supplemental O2  -CW      O2 Delivery Intra Treatment  supplemental O2  -CW      O2 Delivery Post Treatment  supplemental O2  -CW      Recorded by [CW] Colleen Hidalgo OTR 06/17/20 0834      Row Name 06/17/20 1344 06/17/20 0800          Cognitive Assessment/Intervention- PT/OT    Orientation Status (Cognition)  oriented x 4  -LH  --     Follows Commands (Cognition)  WFL  -LH  WNL  -CW     Recorded by [LH] Antonia Muniz, PT 06/17/20 1349 [CW] Colleen Hidalgo OTR 06/17/20 0834     Row Name 06/17/20 1344 06/17/20 0800          Bed Mobility Assessment/Treatment    Rolling Right [place] (Bed Mobility)  --  conditional independence;verbal cues  -     Supine-Sit [place] (Bed Mobility)  conditional independence  -  verbal cues;contact guard  -     Sit-Supine [place] (Bed Mobility)  not tested  -  minimum assist (75% patient effort)  -     Bed Mobility, Safety Issues  --  decreased use of arms for pushing/pulling;decreased use of legs for bridging/pushing;impaired trunk control for bed mobility  -     Assistive Device (Bed Mobility)  head of bed elevated;bed rails  -  bed rails;draw sheet;head of bed elevated;overhead trapeze  -     Comment (Bed Mobility)  --  Assist to reposition LE's on bed from sitting EOB.   -CW     Recorded by [] Antonia Muniz, PT 06/17/20 1349 [CW] Colleen Hidalgo OTR 06/17/20 0834     Row Name 06/17/20 1344 06/17/20 0800          Sit-Stand Transfer    Sit-Stand [place] (Transfers)  supervision  -  verbal cues;contact guard;1 person assist  -     Assistive Device (Sit-Stand Transfers)  bariatric;walker, front-wheeled  -  bariatric;walker, front-wheeled  -CW     Recorded by [LH] Antonia Muniz, PT 06/17/20 1349 [CW] Colleen Hidalgo OTR 06/17/20 0834     Row Name 06/17/20 1344             Gait/Stairs Assessment/Training    [place] Level (Gait)  stand by assist  -      Assistive Device (Gait)  bariatric;walker, front-wheeled  -      Distance  in Feet (Gait)  5  -LH      Pattern (Gait)  step-through  -LH      Deviations/Abnormal Patterns (Gait)  base of support, wide  -LH      Bilateral Gait Deviations  forward flexed posture;heel strike decreased  -      Comment (Gait/Stairs)  bed to chair  -LH      Recorded by [LH] Antonia Muniz, PT 06/17/20 1349      Row Name 06/17/20 0800             Lower Body Dressing Assessment/Training    Lower Body Dressing [place] Level  lower body dressing skills;doff;don;socks;verbal cues;minimum assist (75% patient effort)  -      Lower Body Dressing Position  sitting up in bed  -CW      Recorded by [CW] Colleen Hidalgo OTR 06/17/20 0834      Row Name 06/17/20 0800             Grooming Assessment/Training    [place] Level (Grooming)  grooming skills;hair care, combing/brushing;set up  -      Grooming Position  sitting up in bed  -CW      Recorded by [CW] Colleen Hidalgo OTR 06/17/20 0834      Row Name 06/17/20 0800             Therapeutic Exercise    Upper Extremity Range of Motion (Therapeutic Exercise)  shoulder flexion/extension, bilateral;shoulder horizontal abduction/adduction, bilateral;elbow flexion/extension, bilateral;forearm supination/pronation, bilateral;wrist flexion/extension, bilateral  -CW      Hand (Therapeutic Exercise)  finger flexion/extension, bilateral;thumb finger opposition, bilateral;hand , bilateral  -CW      Exercise Type (Therapeutic Exercise)  AROM (active range of motion)  -CW      Position (Therapeutic Exercise)  seated  -CW      Sets/Reps (Therapeutic Exercise)  10x/set with rest breaks  -CW      Expected Outcome (Therapeutic Exercise)  improve functional tolerance, self-care activity;improve object manipulation, self-care activity  -CW      Recorded by [CW] Colleen Hidalgo OTR 06/17/20 0834      Row Name 06/17/20 1344 06/17/20 0800          Static Sitting Balance    Level of [place] (Unsupported Sitting, Static Balance)  supervision  -  supervision  -     Sitting  Position (Unsupported Sitting, Static Balance)  sitting on edge of bed  -  sitting on edge of bed  -CW     Time Able to Maintain Position (Unsupported Sitting, Static Balance)  more than 5 minutes  -LH  4 to 5 minutes  -CW     Recorded by [LH] Antonia Muniz, PT 06/17/20 1349 [CW] Colleen Hidalgo OTR 06/17/20 0834     Row Name 06/17/20 1344             Static Standing Balance    Level of [place] (Supported Standing, Static Balance)  supervision  -      Assistive Device Utilized (Supported Standing, Static Balance)  walker, rolling  -LH      Recorded by [LH] Antonia Muniz, PT 06/17/20 1349      Row Name 06/17/20 0800             Functional Endurance Training    Comment, Functional Endurance  Fair-Improved. Less SOB.   -CW      Recorded by [CW] Colleen Hidalgo OTR 06/17/20 0834      Row Name 06/17/20 1344 06/17/20 0800          Positioning and Restraints    Pre-Treatment Position  in bed  -LH  in bed  -CW     Post Treatment Position  chair  -  bed  -CW     In Bed  --  supine;call light within reach;encouraged to call for assist;with other staff  -CW     In Chair  reclined;call light within reach;encouraged to call for assist;exit alarm on  -  --     Recorded by [] Antonia Muniz, PT 06/17/20 1349 [CW] Colleen Hidalgo OTR 06/17/20 0834     Row Name 06/17/20 1344 06/17/20 0800          Pain Scale: Numbers Pre/Post-Treatment    Pain Scale: Numbers, Pretreatment  0/10 - no pain  -  0/10 - no pain  -CW     Pain Scale: Numbers, Post-Treatment  0/10 - no pain  -  0/10 - no pain  -CW     Recorded by [LH] Antonia Muniz, PT 06/17/20 1349 [CW] Colleen Hidalgo OTR 06/17/20 0834     Row Name                Wound Right calf Blisters    Wound - Properties Group Side: Right [OSMANY] Location: calf [OSMANY] Primary Wound Type: Blisters [OSMANY] Recorded by:  [OSMANY] Raquel Webb RN 06/04/20 0536    Row Name                Wound 06/04/20 0330 Bilateral perineum Skin Tear    Wound - Properties Group Date first assessed: 06/04/20 [OSMANY]  Time first assessed: 0330 [OSMANY] Present on Hospital Admission: Y [OSMANY] Side: Bilateral [OSMANY] Location: perineum [OSMANY] Primary Wound Type: Skin tear [OSMANY] Recorded by:  [OSMANY] Raquel Webb RN 06/04/20 0537    Row Name 06/17/20 0800             Plan of Care Review    Plan of Care Reviewed With  patient  -CW      Progress  improving  -CW      Recorded by [CW] Colleen Hidalgo OTSIMON 06/17/20 0834        User Key  (r) = Recorded By, (t) = Taken By, (c) = Cosigned By    Initials Name Effective Dates Discipline    CW Colleen Hidalgo, OTR 06/08/18 -  OT    LH Antonia Muniz, PT 04/03/18 -  PT    Raquel Corrales RN 01/21/17 -  Nurse          Rash 06/17/20 0810 transverse chest patch (Active)   Distribution generalized 6/17/2020  8:20 AM   Configuration/Shape oval 6/17/2020  8:20 AM   Borders irregular;raised 6/17/2020  8:20 AM   Color pink;red 6/17/2020  8:20 AM   Lesion Size pinpoint 6/17/2020  8:20 AM   Care, Rash open to air 6/17/2020  8:20 AM       Wound Right calf Blisters (Active)   Dressing Appearance dry;intact;no drainage 6/17/2020  8:20 AM   Closure None 6/17/2020  8:20 AM   Base dressing in place, unable to visualize 6/17/2020  8:20 AM   Periwound dry 6/17/2020  8:20 AM   Periwound Temperature warm 6/17/2020  8:20 AM   Periwound Skin Turgor soft 6/17/2020  8:20 AM   Edges irregular 6/17/2020  8:20 AM   Drainage Amount none 6/17/2020  8:20 AM   Dressing Care, Wound elastic bandage 6/17/2020  8:20 AM       Wound 06/04/20 0330 Bilateral perineum Skin Tear (Active)   Dressing Appearance open to air 6/17/2020  8:20 AM   Closure None 6/17/2020  8:20 AM   Base red 6/17/2020  8:20 AM   Drainage Characteristics/Odor serosanguineous 6/17/2020  8:20 AM   Drainage Amount scant 6/17/2020  8:20 AM   Care, Wound cleansed with;soap and water;barrier applied 6/16/2020  8:00 PM   Dressing Care, Wound open to air 6/17/2020  8:20 AM   Periwound Care, Wound barrier ointment applied 6/16/2020  8:00 PM           Physical Therapy Education                  Title: PT OT SLP Therapies (In Progress)     Topic: Physical Therapy (In Progress)     Point: Mobility training (In Progress)     Description:   Instruct learner(s) on safety and technique for assisting patient out of bed, chair or wheelchair.  Instruct in the proper use of assistive devices, such as walker, crutches, cane or brace.              Patient Friendly Description:   It's important to get you on your feet again, but we need to do so in a way that is safe for you. Falling has serious consequences, and your personal safety is the most important thing of all.        When it's time to get out of bed, one of us or a family member will sit next to you on the bed to give you support.     If your doctor or nurse tells you to use a walker, crutches, a cane, or a brace, be sure you use it every time you get out of bed, even if you think you don't need it.    Learning Progress Summary           Patient Acceptance, E, NR by  at 6/17/2020 1350    Acceptance, E, NR by  at 6/16/2020 1505    Acceptance, E, VU by  at 6/16/2020 0834    Comment:  Reviewed safety during LE dressing seated EOB for socks. Encouraged increased participation with self care.    Acceptance, E,TB, VU by IVON at 6/15/2020 1459    Acceptance, E, VU,NR by KH at 6/14/2020 1431    Acceptance, E, VU by DB at 6/13/2020 1019    Acceptance, E,TB, VU by IVON at 6/12/2020 0940    Eager, E,TB,D, VU by PINA at 6/11/2020 1838    Acceptance, E,D, VU,NR by JM at 6/10/2020 1715    Acceptance, E,D, NR by PC at 6/9/2020 1255    Acceptance, E,TB, VU,NR by CS at 6/8/2020 1522                   Point: Home exercise program (In Progress)     Description:   Instruct learner(s) on appropriate technique for monitoring, assisting and/or progressing patient with therapeutic exercises and activities.              Learning Progress Summary           Patient Acceptance, E, NR by  at 6/17/2020 1350    Acceptance, E, NR by  at 6/16/2020 1505    Acceptance, E, VU  by CW at 6/16/2020 0834    Comment:  Reviewed safety during LE dressing seated EOB for socks. Encouraged increased participation with self care.    Acceptance, E, VU,NR by CAMERON at 6/14/2020 1431    Acceptance, E, VU by DB at 6/13/2020 1019    Eager, E,TB,D, VU by PINA at 6/11/2020 1838    Acceptance, E,D, VU,NR by JM at 6/10/2020 1715    Acceptance, E,D, NR by PC at 6/9/2020 1255    Acceptance, E,TB, VU,NR by CS at 6/8/2020 1522                   Point: Body mechanics (In Progress)     Description:   Instruct learner(s) on proper positioning and spine alignment for patient and/or caregiver during mobility tasks and/or exercises.              Learning Progress Summary           Patient Acceptance, E, NR by  at 6/16/2020 1505    Acceptance, E, VU by CW at 6/16/2020 0834    Comment:  Reviewed safety during LE dressing seated EOB for socks. Encouraged increased participation with self care.    Acceptance, E,TB, VU by IVON at 6/15/2020 1459    Acceptance, E, VU,NR by CAMERON at 6/14/2020 1431    Acceptance, E, VU by DB at 6/13/2020 1019    Acceptance, E,TB, VU by IVON at 6/12/2020 0940    Eager, E,TB,D, VU by PINA at 6/11/2020 1838    Acceptance, E,D, VU,NR by PINA at 6/10/2020 1715    Acceptance, E,D, NR by PC at 6/9/2020 1255    Acceptance, E,TB, VU,NR by CS at 6/8/2020 1522                   Point: Precautions (In Progress)     Description:   Instruct learner(s) on prescribed precautions during mobility and gait tasks              Learning Progress Summary           Patient Acceptance, E, NR by LH at 6/16/2020 1505    Acceptance, E, VU by CW at 6/16/2020 0834    Comment:  Reviewed safety during LE dressing seated EOB for socks. Encouraged increased participation with self care.    Acceptance, E,TB, VU by IVON at 6/15/2020 1459    Acceptance, E, VU,NR by CAMERON at 6/14/2020 1431    Acceptance, E, VU by DB at 6/13/2020 1019    Acceptance, E,TB, VU by LC at 6/12/2020 0940    RISSA Lisa TB, D, VU by PINA at 6/11/2020 1838    Kia, YOANA KWOK VU,NR  by  at 6/10/2020 1715    Acceptance, E,D, NR by PC at 6/9/2020 1255    Acceptance, E,TB, VU,NR by  at 6/8/2020 1522                               User Key     Initials Effective Dates Name Provider Type Discipline     02/05/19 -  Ale Guzman, PT Physical Therapist PT    CW 06/08/18 -  Colleen Hidalgo, OTR Occupational Therapist OT     04/03/18 -  Antonia Muniz, PT Physical Therapist PT    PC 04/03/18 -  Esperanza Larios, PT Physical Therapist PT    JM 03/07/18 -  Raquel Todd, PTA Physical Therapy Assistant PT    CS 05/14/18 -  Tank Lucas, PT Physical Therapist PT    DB 01/22/20 -  Radha Chairez, PT Physical Therapist PT     08/23/19 -  [place] Saenz, PT Physical Therapist PT                PT Recommendation and Plan  Therapy Frequency (PT Clinical Impression): daily  Outcome Summary: pt reports she had a fever yesterday and now a rash. pt agreeable to get OOB to chair CGA baratric rwx. pt hopeful to DC SNU soon.  Outcome Measures     Row Name 06/17/20 1300 06/17/20 0800 06/16/20 1500       How much help from another person do you currently need...    Turning from your back to your side while in flat bed without using bedrails?  4  -LH  --  4  -LH    Moving from lying on back to sitting on the side of a flat bed without bedrails?  4  -LH  --  3  -LH    Moving to and from a bed to a chair (including a wheelchair)?  3  -LH  --  3  -LH    Standing up from a chair using your arms (e.g., wheelchair, bedside chair)?  3  -LH  --  3  -LH    Climbing 3-5 steps with a railing?  3  -LH  --  2  -LH    To walk in hospital room?  3  -LH  --  3  -LH    AM-PAC 6 Clicks Score (PT)  20  -LH  --  18  -LH       How much help from another is currently needed...    Putting on and taking off regular lower body clothing?  --  2  -CW  --    Bathing (including washing, rinsing, and drying)  --  2  -CW  --    Toileting (which includes using toilet bed pan or urinal)  --  2  -CW  --    Putting on and taking off  regular upper body clothing  --  2  -CW  --    Taking care of personal grooming (such as brushing teeth)  --  3  -CW  --    Eating meals  --  4  -CW  --    AM-PAC 6 Clicks Score (OT)  --  15  -CW  --       Functional Assessment    Outcome Measure Options  AM-PAC 6 Clicks Basic Mobility (PT)  -  AM-PAC 6 Clicks Daily Activity (OT)  -CW  AM-PAC 6 Clicks Basic Mobility (PT)  -    Row Name 06/16/20 0800             How much help from another is currently needed...    Putting on and taking off regular lower body clothing?  1  -CW      Bathing (including washing, rinsing, and drying)  2  -CW      Toileting (which includes using toilet bed pan or urinal)  2  -CW      Putting on and taking off regular upper body clothing  2  -CW      Taking care of personal grooming (such as brushing teeth)  3  -CW      Eating meals  4  -CW      AM-PAC 6 Clicks Score (OT)  14  -CW         Functional Assessment    Outcome Measure Options  AM-PAC 6 Clicks Daily Activity (OT)  -        User Key  (r) = Recorded By, (t) = Taken By, (c) = Cosigned By    Initials Name Provider Type     Colleen Hidalgo, OTR Occupational Therapist     Antonia Muniz, PT Physical Therapist         Time Calculation:   PT Charges     Row Name 06/17/20 1350             Time Calculation    Start Time  1245  -      Stop Time  1257  -      Time Calculation (min)  12 min  -      PT Received On  06/17/20  -      PT - Next Appointment  06/18/20  -        User Key  (r) = Recorded By, (t) = Taken By, (c) = Cosigned By    Initials Name Provider Type     Antonia Muniz, PT Physical Therapist        Therapy Charges for Today     Code Description Service Date Service Provider Modifiers Qty    92844154372 HC PT THER SUPP EA 15 MIN 6/16/2020 Antonia Muniz, PT GP 1    83650116991 HC PT THER PROC EA 15 MIN 6/16/2020 Antonia Muniz, PT GP 1    87077820251 HC PT THER PROC EA 15 MIN 6/17/2020 Antonia Muniz, PT GP 1          PT G-Codes  Outcome Measure Options: AM-PAC 6  Clicks Basic Mobility (PT)  AM-PAC 6 Clicks Score (PT): 20  AM-PAC 6 Clicks Score (OT): 15    Antonia Muniz, PT  6/17/2020

## 2020-06-17 NOTE — PROGRESS NOTES
Name: Emely Solomon ADMIT: 2020   : 1960  PCP: RENAY Smith MD    MRN: [id number] LOS: 13 days   AGE/SEX: 59 y.o. female  ROOM: [facility]     Subjective   Subjective   patient has developed a body rash but denies pruritis and respiratory issues. she also now has a hacky dry cough with some sputum production.      Review of Systems   Constitutional: Negative for chills and fever.   Respiratory: Negative for chest tightness and shortness of breath.    Cardiovascular: Negative for chest pain and palpitations.   Gastrointestinal: Negative for abdominal pain, nausea and vomiting.   Skin: Positive for rash.   Hematological: Positive for adenopathy.        Objective   Objective   Vital Signs  Temp:  [98.8 °F (37.1 °C)-102.5 °F (39.2 °C)] 102.5 °F (39.2 °C)  Heart Rate:  [] 103  Resp:  [16-18] 18  BP: (100-122)/(62-83) 108/64  SpO2:  [89 %-97 %] 90 %  on  Flow (L/min):  [2] 2;   Device (Oxygen Therapy): room air  Body mass index is 76.82 kg/m².  Physical Exam   Constitutional: She is oriented to person, place, and time. She appears well-developed and well-nourished. No distress.   morbidly obese   Cardiovascular: Normal rate and regular rhythm.   Pulmonary/Chest: Effort normal. No respiratory distress.   Abdominal: Soft. Bowel sounds are normal. She exhibits no distension.   Neurological: She is alert and oriented to person, place, and time.   Psychiatric: She has a normal mood and affect. Her behavior is normal.   Nursing note and vitals reviewed.      Results Review:       I reviewed the patient's new clinical results.  Results from last 7 days   Lab Units 20  0832 20  0353   WBC 10*3/mm3 5.23 7.23   HEMOGLOBIN g/dL 9.1* 8.7*   PLATELETS 10*3/mm3 404 351     Results from last 7 days   Lab Units 20  0832 20  0457 06/15/20  0341 20  0436   SODIUM mmol/L 135* 134* 135* 139   POTASSIUM mmol/L 4.1 3.5 3.6 4.0   CHLORIDE mmol/L 97* 97* 97* 98   CO2 mmol/L 23.1 28.7 28.3 27.2    BUN mg/dL 16 17 19 26*   CREATININE mg/dL 1.13* 1.00 1.01* 1.22*   GLUCOSE mg/dL 97 96 100* 87   Estimated Creatinine Clearance: 91.9 mL/min (A) (by C-G formula based on SCr of 1.13 mg/dL (H)).  Results from last 7 days   Lab Units 06/17/20  0832 06/16/20  0457 06/15/20  0341 06/14/20  0436   ALBUMIN g/dL 3.20* 3.10* 3.30* 2.80*   BILIRUBIN mg/dL 0.5  --   --   --    ALK PHOS U/L 93  --   --   --    AST (SGOT) U/L 16  --   --   --    ALT (SGPT) U/L 6  --   --   --      Results from last 7 days   Lab Units 06/17/20  0832 06/16/20  0457 06/15/20  0341 06/14/20  0436 06/13/20  0353   CALCIUM mg/dL 9.1 8.7 8.9 8.9 9.1   ALBUMIN g/dL 3.20* 3.10* 3.30* 2.80* 3.00*   MAGNESIUM mg/dL  --  2.0 2.1 2.1 2.1   PHOSPHORUS mg/dL  --  3.9 3.3 3.3 4.3       No results found for: HGBA1C, POCGLU    XR Chest 1 View  Narrative: SINGLE VIEW OF THE CHEST     HISTORY: 59-year-old female with history of a cough.     FINDINGS: An upright AP portable chest radiograph was obtained.  Comparison is made to a chest radiograph dated 06/05/2020. The lungs are  normal in volume and are clear of consolidation. A right internal  jugular catheter is positioned with the tip in the SVC. The  cardiomediastinal silhouette is stable with mild prominence of the  cardiac silhouette. The visualized osseous structures appear normal.     Impression: There is no evidence for an acute cardiopulmonary process.  Stable mild cardiomegaly is noted.     This report was finalized on 6/6/2020 3:20 PM by Dr. Phil Griffith M.D.           cephalexin 1,000 mg Oral Q6H   furosemide 40 mg Oral Daily   midodrine 15 mg Oral TID AC   pantoprazole 40 mg Oral Q AM   rivaroxaban 20 mg Oral Daily With Dinner   sodium chloride 10 mL Intravenous Q12H   sodium chloride 10 mL Intravenous Q12H   sodium chloride 10 mL Intravenous Q12H   sodium chloride 10 mL Intravenous Q12H      Diet Regular; Cardiac, Low Sodium; 2,000 mg Na       Assessment/Plan     Active Hospital Problems     Diagnosis  POA   • **Cellulitis of right lower extremity [L03.115]  Yes   • Syncope [R55]  Yes   • History of DVT of lower extremity [Z86.718]  Not Applicable   • Lymphedema [I89.0]  Yes   • Acute renal failure (ARF) (CMS/[facility]) [N17.9]  Yes   • Anemia, chronic disease [D63.8]  Yes   • Morbid obesity (CMS/[facility]) [E66.01]  Yes   • ARIEL (obstructive sleep apnea) [G47.33]  Yes   • Hypertension [I10]  Yes   • PFO (patent foramen ovale) [Q21.1]  Not Applicable   • Chronic diastolic CHF (congestive heart failure) (CMS/[facility]) [I50.32]  Unknown   • Pulmonary hypertension (CMS/[facility]) [I27.20]  Yes      Resolved Hospital Problems    Diagnosis Date Resolved POA   • Hyponatremia [E87.1] 06/12/2020 Yes   • Sepsis (CMS/[facility]) [A41.9] 06/13/2020 Yes       59 y.o. female admitted with Cellulitis of right lower extremity.    · repeat blood cultures, RVP, CXR and sputum sample ordered. re-consult ID regarding fever and new body rash (likely secondary  to abx). ? PICC line infection vs respiratory. COVID-19 pending. UA not really impressive and likely contaminated.  · diuretics per nephrology, restarted PO. creatinine stable.  · DC Keflex for drug rash  · monitor labs  · Xarelto (home med) for DVT prophylaxis.  · Full code.  · Discussed with patient and nursing staff.  · Anticipate discharge when precert obtained       LUCÍA Kan  [facility]  06/17/20  14:17

## 2020-06-17 NOTE — PLAN OF CARE
Problem: Patient Care Overview  Goal: Plan of Care Review  Outcome: Ongoing (interventions implemented as appropriate)  Flowsheets (Taken 6/17/2020 0512)  Progress: improving  Plan of Care Reviewed With: patient  Outcome Summary: Pt denies pain or shortness of breath. Pt has nonproductive cough. Low grade temp. Awating covid results to return to [facility] and also  awaiting precert. Last Covid test negative. Pt ABX changed to po. Picc line to SID to remain until pt ready to discharge. Labs to be obtained this morning. Will continue to monitor.

## 2020-06-17 NOTE — THERAPY TREATMENT NOTE
Acute Care - Occupational Therapy Lymphedema Treatment Note  [facility]     Patient Name: Emely Solomon  : 1960  MRN: [id number]  Today's Date: 2020                 Admit Date: 2020    Visit Dx:    ICD-10-CM ICD-9-CM   1. Cellulitis of right anterior lower leg L03.115 682.6   2. STEFANIA (acute kidney injury) (CMS/HCC) N17.9 584.9   3. Lymphedema I89.0 457.1   4. Sepsis, due to unspecified organism, unspecified whether acute organ dysfunction present (CMS/HCC) A41.9 038.9     995.91   5. Acute renal failure, unspecified acute renal failure type (CMS/HCC) N17.9 584.9       Patient Active Problem List   Diagnosis   • Chronic diastolic CHF (congestive heart failure) (CMS/HCC)   • PFO (patent foramen ovale)   • Paradoxical embolism (CMS/HCC)   • Hypertension   • Pulmonary hypertension (CMS/HCC)   • Back pain   • ARIEL (obstructive sleep apnea)   • Morbid obesity (CMS/HCC)   • Urinary frequency   • History of DVT of lower extremity   • Cellulitis of right anterior lower leg   • Lymphedema   • Cellulitis of right lower extremity   • Acute renal failure (ARF) (CMS/HCC)   • Anemia, chronic disease   • Syncope             Therapy Treatment  Rehabilitation Treatment Summary     Row Name 20 0800             Treatment Time/Intention    Discipline  occupational therapist  -CW      Document Type  therapy note (daily note)  -CW      Subjective Information  no complaints  -CW      Mode of Treatment  occupational therapy  -CW      Patient/Family Observations  Pt. resting supine bed level.   -CW      Patient Effort  good  -CW      Existing Precautions/Restrictions  fall  -CW      Recorded by [CW] Colleen Hidalgo OTR 20 0834      Row Name 20 0800             Vital Signs    O2 Delivery Pre Treatment  supplemental O2  -CW      O2 Delivery Intra Treatment  supplemental O2  -CW      O2 Delivery Post Treatment  supplemental O2  -CW      Recorded by [CW] Colleen Hidalgo OTR 2034      Row Name 20  0800             Cognitive Assessment/Intervention- PT/OT    Follows Commands (Cognition)  WNL  -CW      Recorded by [CW] Colleen Hidalgo, OTR 06/17/20 0834      Row Name 06/17/20 0800             Bed Mobility Assessment/Treatment    Rolling Right Dexter (Bed Mobility)  conditional independence;verbal cues  -CW      Supine-Sit Dexter (Bed Mobility)  verbal cues;contact guard  -CW      Sit-Supine Dexter (Bed Mobility)  minimum assist (75% patient effort)  -CW      Bed Mobility, Safety Issues  decreased use of arms for pushing/pulling;decreased use of legs for bridging/pushing;impaired trunk control for bed mobility  -CW      Assistive Device (Bed Mobility)  bed rails;draw sheet;head of bed elevated;overhead trapeze  -CW      Comment (Bed Mobility)  Assist to reposition LE's on bed from sitting EOB.   -CW      Recorded by [CW] Colleen Hidalgo OTR 06/17/20 0834      Row Name 06/17/20 0800             Sit-Stand Transfer    Sit-Stand Dexter (Transfers)  verbal cues;contact guard;1 person assist  -CW      Assistive Device (Sit-Stand Transfers)  bariatric;walker, front-wheeled  -CW      Recorded by [CW] Colleen Hidalgo OTR 06/17/20 0834      Row Name 06/17/20 0800             Lower Body Dressing Assessment/Training    Lower Body Dressing Dexter Level  lower body dressing skills;doff;don;socks;verbal cues;minimum assist (75% patient effort)  -CW      Lower Body Dressing Position  sitting up in bed  -CW      Recorded by [CW] Colleen Hidalgo OTR 06/17/20 0834      Row Name 06/17/20 0800             Grooming Assessment/Training    Dexter Level (Grooming)  grooming skills;hair care, combing/brushing;set up  -CW      Grooming Position  sitting up in bed  -CW      Recorded by [CW] Colleen Hidalgo OTR 06/17/20 0834      Row Name 06/17/20 0800             Therapeutic Exercise    Upper Extremity Range of Motion (Therapeutic Exercise)  shoulder flexion/extension, bilateral;shoulder horizontal  abduction/adduction, bilateral;elbow flexion/extension, bilateral;forearm supination/pronation, bilateral;wrist flexion/extension, bilateral  -CW      Hand (Therapeutic Exercise)  finger flexion/extension, bilateral;thumb finger opposition, bilateral;hand , bilateral  -CW      Exercise Type (Therapeutic Exercise)  AROM (active range of motion)  -CW      Position (Therapeutic Exercise)  seated  -CW      Sets/Reps (Therapeutic Exercise)  10x/set with rest breaks  -CW      Expected Outcome (Therapeutic Exercise)  improve functional tolerance, self-care activity;improve object manipulation, self-care activity  -CW      Recorded by [CW] Colleen Hidalgo OTR 06/17/20 0834      Row Name 06/17/20 0800             Static Sitting Balance    Level of Barber (Unsupported Sitting, Static Balance)  supervision  -CW      Sitting Position (Unsupported Sitting, Static Balance)  sitting on edge of bed  -CW      Time Able to Maintain Position (Unsupported Sitting, Static Balance)  4 to 5 minutes  -CW      Recorded by [CW] Colleen Hidalgo OTR 06/17/20 0834      Row Name 06/17/20 0800             Functional Endurance Training    Comment, Functional Endurance  Fair-Improved. Less SOB.   -CW      Recorded by [CW] Colleen Hidalgo OTR 06/17/20 0834      Row Name 06/17/20 0800             Positioning and Restraints    Pre-Treatment Position  in bed  -CW      Post Treatment Position  bed  -CW      In Bed  supine;call light within reach;encouraged to call for assist;with other staff  -CW      Recorded by [CW] Colleen Hidalgo OTR 06/17/20 0834      Row Name 06/17/20 0800             Pain Scale: Numbers Pre/Post-Treatment    Pain Scale: Numbers, Pretreatment  0/10 - no pain  -CW      Pain Scale: Numbers, Post-Treatment  0/10 - no pain  -CW      Recorded by [CW] Colleen Hidalgo OTR 06/17/20 0834      Row Name                Wound Right calf Blisters    Wound - Properties Group Side: Right [OSMANY] Location: calf [OSMANY] Primary Wound Type: Blisters  [OSMANY] Recorded by:  [OSMANY] Raquel Webb RN 06/04/20 0536    Row Name                Wound 06/04/20 0330 Bilateral perineum Skin Tear    Wound - Properties Group Date first assessed: 06/04/20 [OSMANY] Time first assessed: 0330 [OSMANY] Present on Hospital Admission: Y [OSMANY] Side: Bilateral [OSMANY] Location: perineum [OSMANY] Primary Wound Type: Skin tear [OSMANY] Recorded by:  [OSMANY] Raquel Webb RN 06/04/20 0537    Row Name 06/17/20 0800             Plan of Care Review    Plan of Care Reviewed With  patient  -CW      Progress  improving  -CW      Recorded by [CW] Colleen Hidalgo OTSIMON 06/17/20 0834        User Key  (r) = Recorded By, (t) = Taken By, (c) = Cosigned By    Initials Name Effective Dates Discipline    CW Colleen Hidalgo, OTR 06/08/18 -  OT    Raquel Corrales RN 01/21/17 -  Nurse          Wound Right calf Blisters (Active)   Dressing Appearance dry;intact;no drainage 6/17/2020 12:00 AM   Closure JOYCE 6/17/2020 12:00 AM   Base dressing in place, unable to visualize 6/17/2020 12:00 AM   Drainage Amount none 6/16/2020  2:30 PM   Dressing Care, Wound elastic bandage;gauze 6/17/2020 12:00 AM       Wound 06/04/20 0330 Bilateral perineum Skin Tear (Active)   Dressing Appearance open to air 6/17/2020 12:00 AM   Closure None 6/16/2020  2:30 PM   Base red 6/17/2020 12:00 AM   Drainage Amount none 6/17/2020 12:00 AM   Care, Wound cleansed with;soap and water;barrier applied 6/16/2020  8:00 PM   Dressing Care, Wound open to air 6/17/2020 12:00 AM   Periwound Care, Wound barrier ointment applied 6/16/2020  8:00 PM       Outcome Summary           Occupational Therapy Education                 Title: PT OT SLP Therapies (In Progress)     Topic: Occupational Therapy (Done)     Point: ADL training (Done)     Description:   Instruct learner(s) on proper safety adaptation and remediation techniques during self care or transfers.   Instruct in proper use of assistive devices.              Learning Progress Summary           Patient  Acceptance, E, VU by CW at 6/17/2020 0834    Comment:  Reviewed DME at home and safety seated EOB.    Acceptance, E, VU by CW at 6/16/2020 0834    Comment:  Reviewed safety during LE dressing seated EOB for socks. Encouraged increased participation with self care.    Acceptance, E, VU by CW at 6/10/2020 1221    Comment:  Reviewed safety sitting EOB. Encouraged increased indep with self care.    Acceptance, E, VU by CW at 6/9/2020 1319    Comment:  Reviewed role of OT and poc. Discussed safety. Pt. declines having DME at home.                               User Key     Initials Effective Dates Name Provider Type Discipline    GIULIANA 06/08/18 -  Colleen Hidalgo, RADHA Occupational Therapist OT                   OT Recommendation and Plan  Outcome Summary/Treatment Plan (OT)  Anticipated Discharge Disposition (OT): inpatient rehabilitation facility, skilled nursing facility  Planned Therapy Interventions (OT Eval): activity tolerance training, BADL retraining, transfer/mobility retraining  Therapy Frequency (OT Eval): 5 times/wk  Plan of Care Review  Plan of Care Reviewed With: patient  Plan of Care Reviewed With: patient  Outcome Summary: OT-No pain c/o. Overall functional endurance has improved to fair with decreased SOB. LE dressing long sitting Cirilo to don/doff socks. UE therap ex. completed seated EOB with rest breaks. Sit to stand with CGA and cues using rolling wx. Discussed DME at home. Pt. has a tub seat, but no hand rails. Plan to cont. OT to increased indep with self care and functional endurance.    Outcome Measures     Row Name 06/17/20 0800 06/16/20 1500 06/16/20 0800       How much help from another person do you currently need...    Turning from your back to your side while in flat bed without using bedrails?  --  4  -LH  --    Moving from lying on back to sitting on the side of a flat bed without bedrails?  --  3  -LH  --    Moving to and from a bed to a chair (including a wheelchair)?  --  3  -LH  --     Standing up from a chair using your arms (e.g., wheelchair, bedside chair)?  --  3  -LH  --    Climbing 3-5 steps with a railing?  --  2  -LH  --    To walk in hospital room?  --  3  -LH  --    AM-PAC 6 Clicks Score (PT)  --  18  -LH  --       How much help from another is currently needed...    Putting on and taking off regular lower body clothing?  2  -CW  --  1  -CW    Bathing (including washing, rinsing, and drying)  2  -CW  --  2  -CW    Toileting (which includes using toilet bed pan or urinal)  2  -CW  --  2  -CW    Putting on and taking off regular upper body clothing  2  -CW  --  2  -CW    Taking care of personal grooming (such as brushing teeth)  3  -CW  --  3  -CW    Eating meals  4  -CW  --  4  -CW    AM-PAC 6 Clicks Score (OT)  15  -CW  --  14  -CW       Functional Assessment    Outcome Measure Options  AM-PAC 6 Clicks Daily Activity (OT)  -CW  AM-PAC 6 Clicks Basic Mobility (PT)  -LH  AM-PAC 6 Clicks Daily Activity (OT)  -CW      User Key  (r) = Recorded By, (t) = Taken By, (c) = Cosigned By    Initials Name Provider Type    Colleen Cleary OTR Occupational Therapist     Antonia Muniz, PT Physical Therapist            Time Calculation:   Time Calculation- OT     Row Name 06/17/20 0840             Time Calculation- OT    OT Start Time  0753  -CW      OT Stop Time  0819  -CW      OT Time Calculation (min)  26 min  -CW      Total Timed Code Minutes- OT  26 minute(s)  -CW        User Key  (r) = Recorded By, (t) = Taken By, (c) = Cosigned By    Initials Name Provider Type    Colleen Cleary OTR Occupational Therapist          Therapy Charges for Today     Code Description Service Date Service Provider Modifiers Qty    82829526343 HC OT THER PROC EA 15 MIN 6/16/2020 Colleen Hidalgo OTR GO 1    57843358644 HC OT SELF CARE/MGMT/TRAIN EA 15 MIN 6/16/2020 Colleen Hidalgo OTR GO 1    27051853273 HC OT SELF CARE/MGMT/TRAIN EA 15 MIN 6/17/2020 Colleen Hidalgo OTR GO 1    42429361417 HC OT THER PROC EA 15 MIN  6/17/2020 Colleen Hidalgo, OTR GO 1                   Colleen Hidalgo, OTR  6/17/2020

## 2020-06-18 VITALS
RESPIRATION RATE: 20 BRPM | SYSTOLIC BLOOD PRESSURE: 109 MMHG | TEMPERATURE: 98.8 F | BODY MASS INDEX: 51.91 KG/M2 | HEART RATE: 85 BPM | DIASTOLIC BLOOD PRESSURE: 60 MMHG | HEIGHT: 63 IN | WEIGHT: 293 LBS | OXYGEN SATURATION: 92 %

## 2020-06-18 LAB
ANION GAP SERPL CALCULATED.3IONS-SCNC: 7.6 MMOL/L (ref 5–15)
ANION GAP SERPL CALCULATED.3IONS-SCNC: 9.8 MMOL/L (ref 5–15)
BUN BLD-MCNC: 14 MG/DL (ref 8–23)
BUN BLD-MCNC: 15 MG/DL (ref 8–23)
BUN/CREAT SERPL: 13 (ref 7–25)
BUN/CREAT SERPL: 15.1 (ref 7–25)
CALCIUM SPEC-SCNC: 7 MG/DL (ref 8.6–10.5)
CALCIUM SPEC-SCNC: 8.8 MG/DL (ref 8.6–10.5)
CHLORIDE SERPL-SCNC: 105 MMOL/L (ref 98–107)
CHLORIDE SERPL-SCNC: 95 MMOL/L (ref 98–107)
CO2 SERPL-SCNC: 23.4 MMOL/L (ref 22–29)
CO2 SERPL-SCNC: 27.2 MMOL/L (ref 22–29)
CREAT BLD-MCNC: 0.93 MG/DL (ref 0.57–1)
CREAT BLD-MCNC: 1.15 MG/DL (ref 0.57–1)
DEPRECATED RDW RBC AUTO: 43.8 FL (ref 37–54)
EOSINOPHIL # BLD MANUAL: 0.13 10*3/MM3 (ref 0–0.4)
EOSINOPHIL NFR BLD MANUAL: 3 % (ref 0.3–6.2)
ERYTHROCYTE [DISTWIDTH] IN BLOOD BY AUTOMATED COUNT: 14.1 % (ref 12.3–15.4)
GFR SERPL CREATININE-BSD FRML MDRD: 48 ML/MIN/1.73
GFR SERPL CREATININE-BSD FRML MDRD: 61 ML/MIN/1.73
GLUCOSE BLD-MCNC: 83 MG/DL (ref 65–99)
GLUCOSE BLD-MCNC: 93 MG/DL (ref 65–99)
HCT VFR BLD AUTO: 26.5 % (ref 34–46.6)
HGB BLD-MCNC: 8.5 G/DL (ref 12–15.9)
LYMPHOCYTES # BLD MANUAL: 0.58 10*3/MM3 (ref 0.7–3.1)
LYMPHOCYTES NFR BLD MANUAL: 13 % (ref 19.6–45.3)
LYMPHOCYTES NFR BLD MANUAL: 4 % (ref 5–12)
MCH RBC QN AUTO: 27.7 PG (ref 26.6–33)
MCHC RBC AUTO-ENTMCNC: 32.1 G/DL (ref 31.5–35.7)
MCV RBC AUTO: 86.3 FL (ref 79–97)
MONOCYTES # BLD AUTO: 0.18 10*3/MM3 (ref 0.1–0.9)
NEUTROPHILS # BLD AUTO: 3.58 10*3/MM3 (ref 1.7–7)
NEUTROPHILS NFR BLD MANUAL: 80 % (ref 42.7–76)
PLAT MORPH BLD: NORMAL
PLATELET # BLD AUTO: 323 10*3/MM3 (ref 140–450)
PMV BLD AUTO: 8 FL (ref 6–12)
POLYCHROMASIA BLD QL SMEAR: ABNORMAL
POTASSIUM BLD-SCNC: 3.2 MMOL/L (ref 3.5–5.2)
POTASSIUM BLD-SCNC: 4.5 MMOL/L (ref 3.5–5.2)
RBC # BLD AUTO: 3.07 10*6/MM3 (ref 3.77–5.28)
SODIUM BLD-SCNC: 132 MMOL/L (ref 136–145)
SODIUM BLD-SCNC: 136 MMOL/L (ref 136–145)
WBC MORPH BLD: NORMAL
WBC NRBC COR # BLD: 4.47 10*3/MM3 (ref 3.4–10.8)

## 2020-06-18 PROCEDURE — 94799 UNLISTED PULMONARY SVC/PX: CPT

## 2020-06-18 PROCEDURE — 99232 SBSQ HOSP IP/OBS MODERATE 35: CPT | Performed by: INTERNAL MEDICINE

## 2020-06-18 PROCEDURE — 80048 BASIC METABOLIC PNL TOTAL CA: CPT | Performed by: INTERNAL MEDICINE

## 2020-06-18 PROCEDURE — 85007 BL SMEAR W/DIFF WBC COUNT: CPT | Performed by: NURSE PRACTITIONER

## 2020-06-18 PROCEDURE — 80048 BASIC METABOLIC PNL TOTAL CA: CPT | Performed by: NURSE PRACTITIONER

## 2020-06-18 PROCEDURE — 85025 COMPLETE CBC W/AUTO DIFF WBC: CPT | Performed by: NURSE PRACTITIONER

## 2020-06-18 RX ORDER — MIDODRINE HYDROCHLORIDE 5 MG/1
15 TABLET ORAL
Start: 2020-06-18 | End: 2020-07-23

## 2020-06-18 RX ORDER — POTASSIUM CHLORIDE 1.5 G/1.77G
40 POWDER, FOR SOLUTION ORAL AS NEEDED
Status: DISCONTINUED | OUTPATIENT
Start: 2020-06-18 | End: 2020-06-18 | Stop reason: HOSPADM

## 2020-06-18 RX ORDER — FUROSEMIDE 40 MG/1
40 TABLET ORAL DAILY
Qty: 45 TABLET | Refills: 3
Start: 2020-06-18 | End: 2020-07-23

## 2020-06-18 RX ORDER — PANTOPRAZOLE SODIUM 40 MG/1
40 TABLET, DELAYED RELEASE ORAL DAILY
Start: 2020-06-18 | End: 2020-07-23

## 2020-06-18 RX ORDER — POTASSIUM CHLORIDE 750 MG/1
40 CAPSULE, EXTENDED RELEASE ORAL AS NEEDED
Status: DISCONTINUED | OUTPATIENT
Start: 2020-06-18 | End: 2020-06-18 | Stop reason: HOSPADM

## 2020-06-18 RX ORDER — METOPROLOL SUCCINATE 50 MG/1
25 TABLET, EXTENDED RELEASE ORAL DAILY
Start: 2020-06-18 | End: 2020-11-18 | Stop reason: SDUPTHER

## 2020-06-18 RX ADMIN — MIDODRINE HYDROCHLORIDE 15 MG: 5 TABLET ORAL at 12:39

## 2020-06-18 RX ADMIN — SODIUM CHLORIDE, PRESERVATIVE FREE 10 ML: 5 INJECTION INTRAVENOUS at 08:54

## 2020-06-18 RX ADMIN — PANTOPRAZOLE SODIUM 40 MG: 40 TABLET, DELAYED RELEASE ORAL at 06:15

## 2020-06-18 RX ADMIN — MIDODRINE HYDROCHLORIDE 15 MG: 5 TABLET ORAL at 06:15

## 2020-06-18 RX ADMIN — CAMPHOR, MENTHOL: .5; .5 LOTION TOPICAL at 12:39

## 2020-06-18 RX ADMIN — SODIUM CHLORIDE, PRESERVATIVE FREE 10 ML: 5 INJECTION INTRAVENOUS at 08:55

## 2020-06-18 RX ADMIN — POTASSIUM CHLORIDE 40 MEQ: 10 CAPSULE, COATED, EXTENDED RELEASE ORAL at 06:49

## 2020-06-18 RX ADMIN — POTASSIUM CHLORIDE 40 MEQ: 10 CAPSULE, COATED, EXTENDED RELEASE ORAL at 12:39

## 2020-06-18 NOTE — PROGRESS NOTES
"   LOS: 14 days    Patient Care Team:  RENAY Smith MD as PCP - General (General Practice)    Chief Complaint:    Chief Complaint   Patient presents with   • Diarrhea   • Legs Swollen     Follow-up acute kidney injury  Subjective     Interval History:   Fever to 102.5 yesterday afternoon. 100.4 this am.  Diffuse rash, not really pruritic.  Slept fine. Not soa. Cough nonproductive.  Bowels moving. Urinating.   Eating.     Objective     Vital Signs  Temp:  [98.1 °F (36.7 °C)-102.5 °F (39.2 °C)] 100.4 °F (38 °C)  Heart Rate:  [] 85  Resp:  [18-20] 20  BP: ()/(60-80) 109/60    Flowsheet Rows      First Filed Value   Admission Height  160 cm (63\") Documented at 06/04/2020 0001   Admission Weight  (!) 176 kg (388 lb 12.8 oz) Documented at 06/04/2020 0040          No intake/output data recorded.  I/O last 3 completed shifts:  In: 0   Out: 300 [Urine:300]  No intake or output data in the 24 hours ending 06/18/20 0958    Physical Exam:  General Appearance: alert, oriented x 3, no acute distress. Morbidly obese.  Skin: hot and dry.  Diffuse erythematous macular rash over ext, trunk.    HEENT: pupils round and reactive to light, oral mucosa dry  Neck: supple, no JVD, trachea midline  Lungs: diminished, unlabored breathing effort  Heart: RRR, normal S1 and S2, no S3, no rub  Abdomen: soft, non-tender,  + bs. Minimal abdominal wall edema  Extremities:  Nonpitting lower extremity edema better. Wounds lower ext. Dressed.   Neuro: normal speech and mental status         Results Review:    Results from last 7 days   Lab Units 06/18/20  0449 06/17/20  0832 06/16/20  0457   SODIUM mmol/L 136 135* 134*   POTASSIUM mmol/L 3.2* 4.1 3.5   CHLORIDE mmol/L 105 97* 97*   CO2 mmol/L 23.4 23.1 28.7   BUN mg/dL 14 16 17   CREATININE mg/dL 0.93 1.13* 1.00   CALCIUM mg/dL 7.0* 9.1 8.7   BILIRUBIN mg/dL  --  0.5  --    ALK PHOS U/L  --  93  --    ALT (SGPT) U/L  --  6  --    AST (SGOT) U/L  --  16  --    GLUCOSE mg/dL 83 97 96 "       Estimated Creatinine Clearance: 111.7 mL/min (by C-G formula based on SCr of 0.93 mg/dL).    Results from last 7 days   Lab Units 06/16/20  0457 06/15/20  0341 06/14/20  0436   MAGNESIUM mg/dL 2.0 2.1 2.1   PHOSPHORUS mg/dL 3.9 3.3 3.3       Results from last 7 days   Lab Units 06/16/20  0457 06/15/20  0341 06/14/20  0436 06/13/20  0353 06/12/20  0335   URIC ACID mg/dL 6.9* 7.9* 9.6* 10.7* 11.5*       Results from last 7 days   Lab Units 06/17/20  0832 06/13/20  0353   WBC 10*3/mm3 5.23 7.23   HEMOGLOBIN g/dL 9.1* 8.7*   PLATELETS 10*3/mm3 404 351       Results from last 7 days   Lab Units 06/12/20  0335   INR  2.14*         Imaging Results (Last 24 Hours)     Procedure Component Value Units Date/Time    XR Chest PA & Lateral [[id number]] Collected:  06/17/20 1815     Updated:  06/17/20 2014    Narrative:       XR CHEST PA AND LATERAL-     HISTORY: 59-year-old female with a cough.     FINDINGS: No airspace consolidations are seen and there are no effusions  or evidence for CHF. Left MediPort catheter tip is within the SVC. If  symptoms persist or worsen, consider further evaluation with a chest CT.     This report was finalized on 6/17/2020 8:11 PM by Dr. Tania Barlow M.D.             camphor-menthol  Topical Daily   midodrine 15 mg Oral TID AC   pantoprazole 40 mg Oral Q AM   rivaroxaban 20 mg Oral Daily With Dinner   sodium chloride 10 mL Intravenous Q12H   sodium chloride 10 mL Intravenous Q12H   sodium chloride 10 mL Intravenous Q12H   sodium chloride 10 mL Intravenous Q12H          Medication Review:   Current Facility-Administered Medications   Medication Dose Route Frequency Provider Last Rate Last Dose   • acetaminophen (TYLENOL) tablet 650 mg  650 mg Oral Q4H PRN Danilo Croft MD   650 mg at 06/17/20 2234    Or   • acetaminophen (TYLENOL) 160 MG/5ML solution 650 mg  650 mg Oral Q4H PRN Danilo Croft MD        Or   • acetaminophen (TYLENOL) suppository 650 mg  650 mg Rectal Q4H PRN  Danilo Croft MD       • albumin human 25 % IV SOLN 12.5 g  12.5 g Intravenous PRN Danilo Croft MD       • benzonatate (TESSALON) capsule 100 mg  100 mg Oral TID PRN Irene Douglas, APRN       • calcium gluconate 1 g in sodium chloride 0.9 % 50 mL IVPB  1 g Intravenous PRN Danilo Croft MD        Or   • calcium gluconate 2 g in sodium chloride 0.9 % 50 mL IVPB  2 g Intravenous PRN Danilo Croft MD       • camphor-menthol (SARNA) 0.5-0.5 % lotion   Topical Daily Toño Giron MD       • heparin (porcine) injection 1,000-2,000 Units  1,000-2,000 Units Intravenous PRN Danilo Croft MD       • ipratropium-albuterol (DUO-NEB) nebulizer solution 3 mL  3 mL Nebulization Q6H PRN Danilo Croft MD   3 mL at 06/17/20 2053   • midodrine (PROAMATINE) tablet 15 mg  15 mg Oral TID AC Danilo Croft MD   15 mg at 06/18/20 0615   • nitroglycerin (NITROSTAT) SL tablet 0.4 mg  0.4 mg Sublingual Q5 Min PRN Danilo Croft MD       • ondansetron (ZOFRAN) injection 4 mg  4 mg Intravenous Q6H PRN Danilo Croft MD   4 mg at 06/16/20 1534   • pantoprazole (PROTONIX) EC tablet 40 mg  40 mg Oral Q AM Danilo Croft MD   40 mg at 06/18/20 0615   • potassium chloride (KLOR-CON) packet 40 mEq  40 mEq Oral PRN Latasha Song MD       • potassium chloride (MICRO-K) CR capsule 40 mEq  40 mEq Oral PRN Latasha Song MD   40 mEq at 06/18/20 0649   • potassium chloride 20 mEq in 50 mL IVPB  20 mEq Intravenous PRN Danilo Croft MD 50 mL/hr at 06/13/20 0532 20 mEq at 06/13/20 0532   • rivaroxaban (XARELTO) tablet 20 mg  20 mg Oral Daily With Dinner Nelson Orellana MD   20 mg at 06/17/20 1719   • sodium chloride 0.9 % bolus 200 mL  200 mL Intravenous PRN Danilo Croft MD       • sodium chloride 0.9 % flush 10 mL  10 mL Intravenous Q12H Danilo Croft MD   10 mL at 06/18/20 0855   • sodium  chloride 0.9 % flush 10 mL  10 mL Intravenous Q12H Danilo Croft MD   10 mL at 06/18/20 0855   • sodium chloride 0.9 % flush 10 mL  10 mL Intravenous Q12H UmeruDanilo MD   10 mL at 06/18/20 0855   • sodium chloride 0.9 % flush 10 mL  10 mL Intravenous Q12H UmeruDanilo MD   10 mL at 06/18/20 0854   • sodium chloride 0.9 % flush 10 mL  10 mL Intravenous PRN Danilo Croft MD   10 mL at 06/14/20 2134   • sodium chloride 0.9 % flush 20 mL  20 mL Intravenous PRN Danilo Croft MD       • sodium phosphates 10 mmol in sodium chloride 0.9 % 100 mL IVPB  10 mmol Intravenous PRN Danilo Croft MD           Assessment/Plan   1.  Acute kidney injury, resolved. UA with Leuk est and blood, but very few WBC and RBC.  30 mg/dl protein.  Replace K.   2.  Hyponatremia, resolved .  Dc diuretic for now with fever and rash.   3.  Sepsis with cellulitis, of the right lower extremity.  Complete ab.  Fever and rash thought med related.    4.  Morbid obesity  5.  Medical noncompliance  6.  Diastolic heart failure, with PFO. Compensated.   7.  History of DVT/PE  8.  Anemia, hemoglobin stable.             Plan:  1. Replace K per protocol.   2. Repeat UA at PCP office 2 weeks.   3. Stop diuretic for now with insensible loss.   4. Will sign off .  Please call with questions .      Latasha Song MD  06/18/20  09:58

## 2020-06-18 NOTE — DISCHARGE SUMMARY
[place] [facility]    Date of Discharge:  6/18/2020    PCP: RENAY Smith MD    Discharge Diagnosis:   Active Hospital Problems    Diagnosis  POA   • **Cellulitis of right lower extremity [L03.115]  Yes   • Syncope [R55]  Yes   • History of DVT of lower extremity [Z86.718]  Not Applicable   • Lymphedema [I89.0]  Yes   • Acute renal failure (ARF) (CMS/[facility]) [N17.9]  Yes   • Anemia, chronic disease [D63.8]  Yes   • Morbid obesity (CMS/[facility]) [E66.01]  Yes   • ARIEL (obstructive sleep apnea) [G47.33]  Yes   • Hypertension [I10]  Yes   • PFO (patent foramen ovale) [Q21.1]  Not Applicable   • Chronic diastolic CHF (congestive heart failure) (CMS/[facility]) [I50.32]  Unknown   • Pulmonary hypertension (CMS/[facility]) [I27.20]  Yes      Resolved Hospital Problems    Diagnosis Date Resolved POA   • Hyponatremia [E87.1] 06/12/2020 Yes   • Sepsis (CMS/[facility]) [A41.9] 06/13/2020 Yes     Procedures Performed       Consults     Date and Time Order Name Status Description    6/17/2020 1416 Inpatient Infectious Diseases Consult      6/9/2020 1301 Hematology & Oncology Inpatient Consult Completed     6/5/2020 1524 Inpatient Vascular Surgery Consult Completed     6/4/2020 1603 Inpatient General Surgery Consult Completed     6/4/2020 1053 Inpatient Infectious Diseases Consult Completed     6/4/2020 0810 Inpatient Cardiology Consult      6/4/2020 0556 Inpatient Nephrology Consult Completed     6/4/2020 0113 [facility] (on-call MD unless specified) Details Completed         Hospital Course  Please see history and physical for details. Patient is a 59 y.o. 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  Please see the  admitting history and physical for further details.  She was found to have septic shock and was admitted to the hospital for further evaluation and treatment. She was seen by cardiology, pulmonology, infectious disease, vascular surgery, general surgery and nephrology. She immediately required time in the ICU for septic shock, hypotension, acute kidney injury, acute respiratory failure, . She underwent emergent CRRT and dialysis. She went into  respiratory failure  She was also found to have right lower extremity cellulitis that grew group C strep and MSSA. ID tailored abx. She nearly finished the remainder of her antibiotics with p.o. Keflex but then developed a drug rash and subsequent fever and infectious disease came back on board and stated that it was okay to stop antibiotics a little bit short.  Fever was felt to be due to is a secondary result from inflammation secondary to that diffuse drug rash.  Her diuretics have been adjusted back and forth and at this juncture she will go home on p.o. Lasix and she is also requiring midodrine 3 times daily so her fluid status needs to be watched closely as her blood pressures run low normal.  Lymphedema wraps have been utilized while here and were changing them about every 3 days.  Please watch these closely because they are very difficult to keep up on her legs secondary to body habitus.  I strongly encourage OT and lymphedema to follow-up on transition to rehab.  Nephrology assisted with diuretics that she did have acute renal failure that has resolved.  Her hyponatremia is much improved.  At this point she will go home on the p.o. Lasix and they suggest a repeat UA with BMP in about a week's time.  Basically the patient has a pronounced generalized weakness at which she needs subacute rehab.  I discussed case with CCP today and she now has precertification with a COVID negative test.  All questions answered the patient prior to disposition she is very much amenable  to the above plan as well as thankful for the care she received while here.    On a side note I will resume her metoprolol of which she was on twice daily dosing prior to admission.  I reduce the dose as well as the frequency to just 25 mg daily.  Monitor blood pressure closely upon transition.  She was formally on losartan and that has been discontinued secondary to blood pressures running low normal.  Cardiology is aware of the above changes.  Previous HCTZ is also been discontinued as patient is already on Lasix again.  Cardiology plans further outpatient follow-up as they also help manage BP.        Condition on Discharge: Improved.     Temp:  [98.1 °F (36.7 °C)-102.5 °F (39.2 °C)] 100.4 °F (38 °C)  Heart Rate:  [] 85  Resp:  [18-20] 20  BP: ()/(60-80) 109/60  Body mass index is 76.82 kg/m².    Physical Exam   Constitutional: She is oriented to person, place, and time. She appears well-developed and well-nourished.   Morbidly obese with BMI of 76   HENT:   Head: Normocephalic.   Eyes: Conjunctivae are normal. No scleral icterus.   Neck: No JVD present.   Cardiovascular: Normal rate and regular rhythm.   Pulmonary/Chest: Effort normal and breath sounds normal. No respiratory distress.   Abdominal: Soft. Bowel sounds are normal. She exhibits no distension. There is no tenderness.   Musculoskeletal:   Lymphedema with resolved cellulitis to right lower extremity   Neurological: She is alert and oriented to person, place, and time.        Discharge Medications      New Medications      Instructions Start Date   camphor-menthol 0.5-0.5 % lotion  Commonly known as:  SARNA   Topical, Daily      midodrine 5 MG tablet  Commonly known as:  PROAMATINE   15 mg, Oral, 3 Times Daily Before Meals      pantoprazole 40 MG EC tablet  Commonly known as:  PROTONIX   40 mg, Oral, Daily         Changes to Medications      Instructions Start Date   furosemide 40 MG tablet  Commonly known as:  LASIX  What changed:  when to  take this   40 mg, Oral, Daily      metoprolol succinate XL 50 MG 24 hr tablet  Commonly known as:  TOPROL-XL  What changed:    · how much to take  · when to take this   25 mg, Oral, Daily         Continue These Medications      Instructions Start Date   acetaminophen 325 MG tablet  Commonly known as:  TYLENOL   650 mg, Oral, Every 6 Hours PRN      ipratropium-albuterol 0.5-2.5 mg/3 ml nebulizer  Commonly known as:  DUO-NEB   ipratropium 0.5 mg-albuterol 3 mg (2.5 mg base)/3 mL nebulization soln      oxybutynin 5 MG tablet  Commonly known as:  DITROPAN   5 mg, Oral, Daily      rivaroxaban 20 MG tablet  Commonly known as:  Xarelto   20 mg, Oral, Daily         Stop These Medications    hydroCHLOROthiazide 25 MG tablet  Commonly known as:  HYDRODIURIL     losartan 50 MG tablet  Commonly known as:  COZAAR             Additional Instructions for the Follow-ups that You Need to Schedule     Discharge Follow-up with PCP   As directed       Currently Documented PCP:    RENAY Smith MD    PCP Phone Number:    [phone]     Follow Up Details:  pcp post rehab - Cards/Renal/ID/Pulm per their recs            Contact information for follow-up providers     RENAY Smith MD .    Specialty:  General Practice  Why:  pcp post rehab - Cards/Renal/ID/Pulm per their recs  Contact information:  [address] [facility] [address]  [facility] [address]  [phone]                   Contact information for after-discharge care     Destination     [facility] .    Service:  Skilled Nursing  Contact information:  [address] [facility] [address] [phone]                 [facility]     [facility] .    Service:  Home Health Services  Contact information:  [address] [facility] [address]  [facility] [address] [phone]                           Test Results Pending at Discharge   Order Current Status    Blood Culture - Blood, Arm, Left In process     Blood Culture - Blood, Blood, Central Line In process         Toño Giron MD  06/18/20  10:01    Discharge time spent greater than 30 minutes.

## 2020-06-18 NOTE — PROGRESS NOTES
Continued Stay Note  [facility]     Patient Name: Emely Solomon  MRN: [id number]  Today's Date: 6/18/2020    Admit Date: 6/4/2020    Discharge Plan     Row Name 06/18/20 0917       Plan    Plan  [facility] pending precert started 6/15. Covid 19 test Neg.  Spouse to transport    Patient/Family in Agreement with Plan  yes    Plan Comments  Called Halie/[facility] to follow up on precert. Halie/[facility] stated precert has not been obtained yet. Notified Haylee/[facility] nurse of pending precert. Results for Covid 19 test on 6/16 was negative. Red Doe RN-BC        Discharge Codes    No documentation.             Red Doe, RN

## 2020-06-18 NOTE — PROGRESS NOTES
Case Management Discharge Note      Final Note: [facility] Covid 19 test Neg.  Spouse to transport    Provided Post Acute Provider List?: Yes  Post Acute Provider List: Nursing Home, Home Health  Refused Provider List Comment: Refused at this time stating she wants everything to be [employer]   Provided Post Acute Provider Quality & Resource List?: Yes  Post Acute Provider Quality and Resource List: Nursing Home  Delivered To: Patient  Method of Delivery: In person    Destination - Selection Complete      Service Provider Request Status Selected Services Address Phone Number Fax Number    [facility] Selected Skilled Nursing [address] [facility] [address] [phone]      Durable Medical Equipment      No service has been selected for the patient.      Dialysis/Infusion      No service has been selected for the patient.      Home Medical Care - Selection Complete      Service Provider Request Status Selected Services Address Phone Number Fax Number    [facility] [place] Selected Home Health Services [address] [address] [phone]      Therapy      No service has been selected for the patient.      Community Resources      No service has been selected for the patient.        Transportation Services  Private: Car    Final Discharge Disposition Code: 03 - skilled nursing facility (SNF)

## 2020-06-18 NOTE — PROGRESS NOTES
LOS: 14 days     Chief Complaint:  Follow-up RLE cellulitis due to MSSA and Group C Strep    Interval History:  Last fever at 7 pm last night. Rash with some progression. Slept with O2 but doesn't feel short of air. RPP and COVID tests were negative. CXR was clear. Eager for discharge.     ROS: no headache, no wheezing, no blurry vision    Vital Signs  Temp:  [98.1 °F (36.7 °C)-102.5 °F (39.2 °C)] 100.4 °F (38 °C)  Heart Rate:  [] 85  Resp:  [18-20] 20  BP: ()/(60-80) 109/60    Physical Exam:  General: awake, alert, very nice  Cardiovascular: NR BLE lymphedema  Respiratory: no wheezing; on 2L NC  GI: Abdomen is obese but soft, non-tender  Skin: +lymphedema; erythema markedly improved; no heat; no abscess; very dry, peeling skin; diffuse blanching papular rash on chest, abdomen, and back; non pruritic  Vasc: LUE PICC w/o erythema    Antibiotics:  none    LABS:  BMP, RPP, micro reviewed today  Lab Results   Component Value Date    WBC 5.23 06/17/2020    HGB 9.1 (L) 06/17/2020    HCT 28.5 (L) 06/17/2020    MCV 85.1 06/17/2020     06/17/2020     Lab Results   Component Value Date    GLUCOSE 83 06/18/2020    CALCIUM 7.0 (L) 06/18/2020     06/18/2020    K 3.2 (L) 06/18/2020    CO2 23.4 06/18/2020     06/18/2020    BUN 14 06/18/2020    CREATININE 0.93 06/18/2020    EGFRIFAFRI  06/05/2020      Comment:      <15 Indicative of kidney failure.    EGFRIFNONA 61 06/18/2020    BCR 15.1 06/18/2020    ANIONGAP 7.6 06/18/2020     UA 3-5 WBCs, 3-5 RBCs, 3-6 squamous cells    Microbiology:  6/4 BCx: negative  6/4 Wound Cx Right Leg: MSSA and Group C Strep  6/4 COVID: negative  6/16 COVID 19: negative  6/17 RPP: negative  6/17 BCx: NGTD    Radiology:  CXR report reviewed and is negative for consolidations    Assessment/Plan   1. Sepsis due to right lower extremity cellulitis - resolved  2. Chronic lymphedema  3. Super obesity BMI 76  4. Acute renal failure - resolved  5. Drug rash   6. History of DVT in  LLE on rivaroxaban    Her RLE cellulitis is markedly improved compared to admission. She received 14 days of antibiotic therapy so I agree w/ stopping her cephalexin yesterday as it was the most likely culprit of her rash based on my review of the MAR. I told her to expect that it might progress for another day or two and would be followed by dry peeling skin.     Fever appears resolved and additional infection work up has been negative. I suspect fever was due to inflammation related to the diffuse rash.     Thank you for allowing me to be involved in the care of this patient. Infectious diseases will sign off at this time but please call me at [phone] if any further questions.

## 2020-06-18 NOTE — PLAN OF CARE
Problem: Patient Care Overview  Goal: Plan of Care Review  Outcome: Ongoing (interventions implemented as appropriate)  Flowsheets  Taken 6/18/2020 0430  Progress: no change  Taken 6/18/2020 0020  Plan of Care Reviewed With: patient  Note:   Max temp 101.8, medicated with tylenol. Rash remains, non itching. Tolerating po well, no complaints. Will continue to monitor.

## 2020-06-22 LAB
BACTERIA SPEC AEROBE CULT: NORMAL
BACTERIA SPEC AEROBE CULT: NORMAL

## 2020-06-23 ENCOUNTER — EPISODE CHANGES (OUTPATIENT)
Dept: CASE MANAGEMENT | Facility: OTHER | Age: 60
End: 2020-06-23

## 2020-07-09 ENCOUNTER — PATIENT OUTREACH (OUTPATIENT)
Dept: CASE MANAGEMENT | Facility: OTHER | Age: 60
End: 2020-07-09

## 2020-07-09 ENCOUNTER — EPISODE CHANGES (OUTPATIENT)
Dept: CASE MANAGEMENT | Facility: OTHER | Age: 60
End: 2020-07-09

## 2020-07-16 ENCOUNTER — EPISODE CHANGES (OUTPATIENT)
Dept: CASE MANAGEMENT | Facility: OTHER | Age: 60
End: 2020-07-16

## 2020-07-16 ENCOUNTER — PATIENT OUTREACH (OUTPATIENT)
Dept: CASE MANAGEMENT | Facility: OTHER | Age: 60
End: 2020-07-16

## 2020-07-16 ENCOUNTER — TRANSCRIBE ORDERS (OUTPATIENT)
Dept: PHYSICAL THERAPY | Facility: HOSPITAL | Age: 60
End: 2020-07-16

## 2020-07-16 DIAGNOSIS — R60.0 LOWER EXTREMITY EDEMA: Primary | ICD-10-CM

## 2020-07-16 NOTE — OUTREACH NOTE
"Patient Outreach Note    Patient is doing well.  Since our last conversation she had her appointment at Dr Smith's office.  She is in the process of switching to Dr Wilkerson who is a [facility] Physcian  She is doing well and feeling well.  The wound on her hip is doing so well they have discontinued HH.  She has an appointment for lymphoedema therapy on 7/20th.  I reviewed some of her most recent labs with her (June labs).  She stated the MD plans to check them again soon.  Encouraged her to take a photo of the place on her hip, which she explained as \"scabbed up\".  I suggested this so that she could compare it if things change.      Educated on s/s of wound infection including drainage, hot to touch, pain, redness or fever.  If these things happened advised her to call her MD immediately.  Patient sounded like she was feeling well.  States she is not tired and they are starting to get out a little.  She is attending her a relatives 1 year old birthday this weekend.  Educated on the importance of wearing mask, using antibacterial ointment, and confirmed she is aware we are seeing an upturn in cases.      No issue with social determinants.  No inabilities to obtain food or medications or transportation to MD appointments. Educated on participating in habits that prevent the spread of COVID virus with home & work hygiene. Patient verbalizes understanding.    Educated patient on benefits of Employee CM program and invited to call with any new needs.  Encouraged use of [facility] nurseline if needed.      Florida Escalante RN     Ambulatory     7/16/2020, 16:19     Florida FRANKLINN, RN, [facility]   RN Case Manager  [address] [address]     [phone] cell   [phone] office  [phone] fax  [email or web address]            "

## 2020-07-23 ENCOUNTER — OFFICE VISIT (OUTPATIENT)
Dept: FAMILY MEDICINE CLINIC | Facility: CLINIC | Age: 60
End: 2020-07-23

## 2020-07-23 VITALS
WEIGHT: 293 LBS | TEMPERATURE: 98 F | HEART RATE: 88 BPM | OXYGEN SATURATION: 97 % | DIASTOLIC BLOOD PRESSURE: 82 MMHG | SYSTOLIC BLOOD PRESSURE: 122 MMHG | BODY MASS INDEX: 62 KG/M2

## 2020-07-23 DIAGNOSIS — N17.9 ACUTE RENAL FAILURE, UNSPECIFIED ACUTE RENAL FAILURE TYPE (HCC): Primary | ICD-10-CM

## 2020-07-23 DIAGNOSIS — I50.32 CHRONIC DIASTOLIC CHF (CONGESTIVE HEART FAILURE) (HCC): ICD-10-CM

## 2020-07-23 DIAGNOSIS — I89.0 LYMPHEDEMA: ICD-10-CM

## 2020-07-23 DIAGNOSIS — E66.01 CLASS 3 SEVERE OBESITY DUE TO EXCESS CALORIES WITH SERIOUS COMORBIDITY AND BODY MASS INDEX (BMI) GREATER THAN OR EQUAL TO 70 IN ADULT (HCC): ICD-10-CM

## 2020-07-23 DIAGNOSIS — I10 ESSENTIAL HYPERTENSION: ICD-10-CM

## 2020-07-23 PROCEDURE — 99204 OFFICE O/P NEW MOD 45 MIN: CPT | Performed by: FAMILY MEDICINE

## 2020-07-23 RX ORDER — FUROSEMIDE 40 MG/1
TABLET ORAL
Qty: 45 TABLET | Refills: 2 | Status: ON HOLD | OUTPATIENT
Start: 2020-07-23 | End: 2020-09-29 | Stop reason: SDUPTHER

## 2020-07-23 RX ORDER — HYDROCHLOROTHIAZIDE 25 MG/1
TABLET ORAL
COMMUNITY
End: 2020-08-07 | Stop reason: SDUPTHER

## 2020-07-23 RX ORDER — LOSARTAN POTASSIUM 100 MG/1
TABLET ORAL
COMMUNITY
End: 2020-08-07 | Stop reason: SDUPTHER

## 2020-07-23 NOTE — PROGRESS NOTES
Emely Solomon is a 59 y.o. female.     Chief Complaint   Patient presents with   • Hypertension       HPI     Here to establish care and for f/u after a recent hospitalization. Was admitted to [facility] on 06/05 with general malaise and progressive deconditioning. Found to be septic and required ICU care for some time. Discharged after two weeks with diagnosis of ARF and exacerbation of CHF. Has an established cardiologist but doesn't yet have f/u with them. Meds were changed on d/c though it appears she continues on her meds as she had been prior to hospitalization. BP is tolerating continued HCTZ and losartan as well as continued metoprolol 50 mg twice daily.      Was discharged from rehab with a walker, would prefer not to use it, though admits that she's too weak to not use it just yet. Did have one visit from home PT but didn't find it useful. Feels she's had enough PT and OT recently.     Was discharged with decreasing Cr but hasn't yet had recheck of labs. Unsure if she has nephrology f/u. Has continued chronic BLE swelling and lymphedema. Seeing lymphedema clinic but hasn't had any lasting relief. Weight has been somewhat difficult to track due to changes in scales.     The following portions of the patient's history were reviewed and updated as appropriate: allergies, current medications, past family history, past medical history, past social history, past surgical history and problem list.    Review of Systems   Constitutional: Positive for fatigue. Negative for chills, fever and unexpected weight change.   HENT: Negative for sore throat.    Respiratory: Positive for shortness of breath. Negative for cough and wheezing.    Cardiovascular: Positive for leg swelling. Negative for chest pain and palpitations.   Gastrointestinal: Negative for abdominal distention, abdominal pain, constipation, diarrhea, nausea and vomiting.   Genitourinary: Negative for dysuria.   Musculoskeletal: Positive for arthralgias,  gait problem and myalgias.   Skin: Negative for rash.   Neurological: Positive for weakness. Negative for dizziness.   Psychiatric/Behavioral: Negative for confusion.     I have reviewed and confirmed the ROS as documented by the MA. Adilson Wilkerson MD    Objective  Vitals:    07/23/20 1050   BP: 122/82   Pulse: 88   Temp: 98 °F (36.7 °C)   SpO2: 97%     Body mass index is 62 kg/m².    Physical Exam   Constitutional: She is oriented to person, place, and time. She appears well-developed and well-nourished. No distress.   HENT:   Nose: Nose normal.   Mouth/Throat: Oropharynx is clear and moist.   Eyes: Pupils are equal, round, and reactive to light. EOM are normal.   Neck: Normal range of motion. Neck supple.   Cardiovascular: Normal rate, regular rhythm and normal pulses. Exam reveals distant heart sounds.   No murmur heard.  Pulmonary/Chest: Effort normal and breath sounds normal. No respiratory distress. She has no wheezes.   Abdominal: Soft. Bowel sounds are normal. There is no tenderness. There is no rebound and no guarding.   Musculoskeletal: Normal range of motion.   Lymphadenopathy:   Generalized lymphedema in BLE   Neurological: She is alert and oriented to person, place, and time.   Skin: Skin is warm and dry.   Psychiatric: She has a normal mood and affect. Her behavior is normal.   Nursing note and vitals reviewed.        Current Outpatient Medications:   •  acetaminophen (TYLENOL) 325 MG tablet, Take 2 tablets by mouth Every 6 (Six) Hours As Needed for Mild Pain  (pain or symptomatic fever)., Disp: , Rfl:   •  camphor-menthol (SARNA) 0.5-0.5 % lotion, Apply  topically to the appropriate area as directed Daily., Disp: , Rfl:   •  hydroCHLOROthiazide (HYDRODIURIL) 25 MG tablet, hydrochlorothiazide 25 mg tablet, Disp: , Rfl:   •  ipratropium-albuterol (DUO-NEB) 0.5-2.5 mg/3 ml nebulizer, ipratropium 0.5 mg-albuterol 3 mg (2.5 mg base)/3 mL nebulization soln, Disp: , Rfl:   •  metoprolol succinate XL  (TOPROL-XL) 50 MG 24 hr tablet, Take 0.5 tablets by mouth Daily. (Patient taking differently: Take 25 mg by mouth 2 (two) times a day.), Disp: , Rfl:   •  oxybutynin (DITROPAN) 5 MG tablet, Take 5 mg by mouth Daily., Disp: , Rfl:   •  rivaroxaban (XARELTO) 20 MG tablet, Take 1 tablet by mouth Daily., Disp: 90 tablet, Rfl: 3  •  furosemide (LASIX) 40 MG tablet, TAKE 1 TABLET BY MOUTH 3 TIMES A WEEK, Disp: 45 tablet, Rfl: 2  •  losartan (COZAAR) 100 MG tablet, losartan 100 mg tablet, Disp: , Rfl:   Current outpatient and discharge medications have been reconciled for the patient.  Reviewed by: Adilson Wilkerson MD      Procedures    Lab Results (most recent)     None                  Emely was seen today for hypertension.    Diagnoses and all orders for this visit:    Acute renal failure, unspecified acute renal failure type (CMS/[facility])  -     Comprehensive Metabolic Panel  -     Ambulatory Referral to Nephrology    Chronic diastolic CHF (congestive heart failure) (CMS/[facility])  -     Comprehensive Metabolic Panel  -     Ambulatory Referral to Nephrology    Essential hypertension  -     Comprehensive Metabolic Panel  -     Ambulatory Referral to Nephrology    Class 3 severe obesity due to excess calories with serious comorbidity and body mass index (BMI) greater than or equal to 70 in adult (CMS/[facility])  -     Comprehensive Metabolic Panel  -     Ambulatory Referral to Nephrology    Lymphedema  -     Comprehensive Metabolic Panel  -     Ambulatory Referral to Nephrology    Check of kidney function today. Will need to see nephrology, referral as above. Also recommended she contact cardiology for prompt outpatient f/u to manage meds. Recommended close f/u in the short term.     Any information loaded into the AVS was placed there by CHIP Wilkerson MD, and patient was counseled on the same.   Return in about 4 weeks (around 8/20/2020).      CHIP Wilkerson MD

## 2020-07-24 PROBLEM — E66.813 CLASS 3 SEVERE OBESITY DUE TO EXCESS CALORIES WITH SERIOUS COMORBIDITY AND BODY MASS INDEX (BMI) GREATER THAN OR EQUAL TO 70 IN ADULT: Status: ACTIVE | Noted: 2019-06-03

## 2020-07-24 LAB
ALBUMIN SERPL-MCNC: 4.3 G/DL (ref 3.5–5.2)
ALBUMIN/GLOB SERPL: 1.1 G/DL
ALP SERPL-CCNC: 127 U/L (ref 39–117)
ALT SERPL-CCNC: 10 U/L (ref 1–33)
AST SERPL-CCNC: 12 U/L (ref 1–32)
BILIRUB SERPL-MCNC: 0.6 MG/DL (ref 0–1.2)
BUN SERPL-MCNC: 26 MG/DL (ref 8–23)
BUN/CREAT SERPL: 21.5 (ref 7–25)
CALCIUM SERPL-MCNC: 9.9 MG/DL (ref 8.6–10.5)
CHLORIDE SERPL-SCNC: 98 MMOL/L (ref 98–107)
CO2 SERPL-SCNC: 24.9 MMOL/L (ref 22–29)
CREAT SERPL-MCNC: 1.21 MG/DL (ref 0.57–1)
GLOBULIN SER CALC-MCNC: 3.9 GM/DL
GLUCOSE SERPL-MCNC: 98 MG/DL (ref 65–99)
POTASSIUM SERPL-SCNC: 4.3 MMOL/L (ref 3.5–5.2)
PROT SERPL-MCNC: 8.2 G/DL (ref 6–8.5)
SODIUM SERPL-SCNC: 136 MMOL/L (ref 136–145)

## 2020-07-27 NOTE — PROGRESS NOTES
Please call patient to let them know labs are back.     Creatinine is still elevated. I've put in a referral to nephrology. To see the kidney doctor she saw in the hospital.    Please encourage her to sign up for MyChart.

## 2020-07-29 ENCOUNTER — EPISODE CHANGES (OUTPATIENT)
Dept: CASE MANAGEMENT | Facility: OTHER | Age: 60
End: 2020-07-29

## 2020-07-29 ENCOUNTER — TELEPHONE (OUTPATIENT)
Dept: FAMILY MEDICINE CLINIC | Facility: CLINIC | Age: 60
End: 2020-07-29

## 2020-07-29 NOTE — TELEPHONE ENCOUNTER
Patient called in requesting to go over the results of the blood test she had done.    Best call back number [phone]

## 2020-08-03 ENCOUNTER — HOSPITAL ENCOUNTER (OUTPATIENT)
Dept: PHYSICAL THERAPY | Facility: HOSPITAL | Age: 60
Setting detail: THERAPIES SERIES
Discharge: HOME | End: 2020-08-03

## 2020-08-03 ENCOUNTER — EPISODE CHANGES (OUTPATIENT)
Dept: CASE MANAGEMENT | Facility: OTHER | Age: 60
End: 2020-08-03

## 2020-08-03 DIAGNOSIS — I89.0 LYMPHEDEMA: Primary | ICD-10-CM

## 2020-08-03 PROCEDURE — 97162 PT EVAL MOD COMPLEX 30 MIN: CPT

## 2020-08-03 NOTE — THERAPY EVALUATION
Physical Therapy Lymphedema Initial Evaluation  [facility]     Patient Name: Emely Solomon  : 1960  MRN: [id number]  Today's Date: 8/3/2020      Visit Date: 2020    Visit Dx:    ICD-10-CM ICD-9-CM   1. Lymphedema I89.0 457.1       Patient Active Problem List   Diagnosis   • Chronic diastolic CHF (congestive heart failure) (CMS/HCC)   • PFO (patent foramen ovale)   • Paradoxical embolism (CMS/HCC)   • Hypertension   • Pulmonary hypertension (CMS/HCC)   • Back pain   • ARIEL (obstructive sleep apnea)   • Class 3 severe obesity due to excess calories with serious comorbidity and body mass index (BMI) greater than or equal to 70 in adult (CMS/HCC)   • Urinary frequency   • History of DVT of lower extremity   • Cellulitis of right anterior lower leg   • Lymphedema   • Cellulitis of right lower extremity   • Acute renal failure (ARF) (CMS/HCC)   • Anemia, chronic disease   • Syncope        Past Medical History:   Diagnosis Date   • Cellulitis     2017, with Group B Strep bacteremia and sepsis   • Chronic diastolic congestive heart failure (CMS/HCC)    • Deep venous thrombosis (CMS/HCC)    • Hypertension    • Lipoedema    • Lymphedema    • Morbid obesity (CMS/HCC)    • Paradoxical embolism (CMS/HCC)     to the LLE due to DVT/PE and PFO   • PFO (patent foramen ovale)    • Pulmonary embolism (CMS/HCC)    • Pulmonary hypertension (CMS/HCC)     multifactorial (dCHF, obesity/ARIEL, hx PE), mild by echo 2016        Past Surgical History:   Procedure Laterality Date   • BRONCHOSCOPY N/A 2017    Procedure: BRONCHOSCOPY with wash;  Surgeon: Caleb Garcia MD;  Location: [facility] ENDOSCOPY;  Service:    • DILATATION AND CURETTAGE  2011   • VENA CAVA FILTER PLACEMENT      Inferior Vena Cava Filter Placement w/Fluorosc angiogr guidance       Visit Dx:    ICD-10-CM ICD-9-CM   1. Lymphedema I89.0 457.1       Patient History     Row Name 20 1500             History    Chief Complaint  Swelling  -PC       Date Current Problem(s) Began  06/03/20  -PC      Brief Description of Current Complaint  Pt reports several yr hx of Lymphedema in her legs.  She has a history of LLE DVT in 2015. She has had open wounds also.  Other medical problems incl CHF, kidney failure, and cellulitis.  Was in hospital 6/3 for 2 weeks with cellulitis and then rehab for 2 weeks.  -PC      Previous treatment for THIS PROBLEM  -- Treatment here several yrs ago  -PC      Patient/Caregiver Goals  Decrease swelling  -PC      How has patient tried to help current problem?  ace wraps  -PC         Pain     Pain at Present  0  -PC      Pain at Best  0  -PC      Pain at Worst  0  -PC      Difficulties at work?  retired  -PC      Difficulties with ADL's?  Able to bathe, dress, light housework. Needs assistance with heavy cleaning. Has difficulty walking.  -PC         Fall Risk Assessment    Any falls in the past year:  No  -PC         Services    Are you currently receiving Home Health services  No  -PC         Daily Activities    Primary Language  English  -PC      Are you able to read  Yes  -PC      Are you able to write  Yes  -PC      How does patient learn best?  Listening;Reading  -PC      Teaching needs identified  Management of Condition  -PC      Patient is concerned about/has problems with  Performing home management (household chores, shopping, care of dependents);Walking  -PC      Does patient have problems with the following?  None  -PC      Pt Participated in POC and Goals  Yes  -PC         Safety    Are you being hurt, hit, or frightened by anyone at home or in your life?  No  -PC      Are you being neglected by a caregiver  No  -PC        User Key  (r) = Recorded By, (t) = Taken By, (c) = Cosigned By    Initials Name Provider Type    Alyssa Almonte PT Physical Therapist          Lymphedema     Row Name 08/03/20 1500             Subjective Pain    Able to rate subjective pain?  yes  -PC      Pre-Treatment Pain Level  0  -PC       Post-Treatment Pain Level  0  -PC         Subjective Comments    Subjective Comments  States she doesn't know if she can afford to do this.  -PC         Lymphedema Assessment    Lymphedema Classification  RLE:;LLE:;stage 3 (Lymphostatic Elephantiasis)  -PC      Infections or Cellulitis?  yes  -PC      Infection/Cellulitis Treatment  hospitalized, antibiotics  -PC         Posture/Observations    Posture/Observations Comments  Pt ambulates slowly and with great effort into clinic using cane.  -PC         General ROM    GENERAL ROM COMMENTS  WFL, knees limited by edema  -PC         Lymphedema Edema Assessment    Edema Assessment Comment  No pitting  -PC         Skin Changes/Observations    Location/Assessment  Lower Extremity  -PC      Lower Extremity Conditions  bilateral:;dry  -PC      Lower Extremity Color/Pigment  red  -PC      Lower Extremity Skin Details  Skin is dry, legs are red ankles to knees  -PC      Skin Observations Comment  No weeping or drainage  -PC         Lymphedema Sensation    Lymphedema Sensation Reports  numbness left lateral 3 toes  -PC      Lymphedema Sensation Tests  light touch  -PC      Lymphedema Light Touch  mild impairment  -PC         Lymphedema Measurements    Measurement Type(s)  Circumferential  -PC      Circumferential Areas  Lower extremities  -PC         BLE Circumferential (cm)    Measurement Location 1  Knee  -PC      Left 1  65 cm  -PC      Right 1  66.5 cm  -PC      Measurement Location 2  10cm below knee  -PC      Left 2  78 cm  -PC      Right 2  80 cm  -PC      Measurement Location 3  20cm below knee  -PC      Left 3  61 cm  -PC      Right 3  73.5 cm  -PC      Measurement Location 4  30cm below knee  -PC      Left 4  55 cm  -PC      Right 4  58.5 cm  -PC      Measurement Location 5  Ankle  -PC      Left 5  31.4 cm  -PC      Right 5  30.5 cm  -PC      Measurement Location 6  Midfoot  -PC      Left 6  23 cm  -PC      Right 6  24 cm  -PC      LLE Circumferential Total  313.4 cm   -PC      RLE Circumferential Total  333 cm  -PC        User Key  (r) = Recorded By, (t) = Taken By, (c) = Cosigned By    Initials Name Provider Type    Alyssa Almonte, CATRINA Physical Therapist                          Therapy Education  Education Details: Reviewed treatment program.  Given: Edema management, Symptoms/condition management  Program: Reinforced  How Provided: Verbal  Provided to: Patient  Level of Understanding: Verbalized      OP Exercises     Row Name 08/03/20 1500             Subjective Comments    Subjective Comments  States she doesn't know if she can afford to do this.  -PC         Subjective Pain    Able to rate subjective pain?  yes  -PC      Pre-Treatment Pain Level  0  -PC      Post-Treatment Pain Level  0  -PC        User Key  (r) = Recorded By, (t) = Taken By, (c) = Cosigned By    Initials Name Provider Type    Alyssa Almonte, PT Physical Therapist                      PT OP Goals     Row Name 08/03/20 1600          PT Short Term Goals    STG Date to Achieve  08/17/20  -PC     STG 1  Patient will demonstrate awareness of condition and precautions for improved prevention, management, care of symptoms, and ease of transition to self-care of condition.  -PC     STG 1 Progress  New  -PC     STG 2  Patient will be independent with self-wrapping techniques of compression bandages as indicated for improved self-management of condition.    -PC     STG 2 Progress  New  -     STG 3  Patient will demonstrate decreased net edema of >/=10-15cm for decreased edema symptoms, decreased risk of infection, and improved skin care.    -PC     STG 3 Progress  New  -        Long Term Goals    LTG Date to Achieve  09/03/20  -PC     LTG 1  Patient/family independent with self-care techniques for self-management of condition.   -PC     LTG 1 Progress  New  -     LTG 2  Patient will demonstrate decreased net edema of >/=16-30cm for decreased edema symptoms, decreased risk of infection, and improved skin  care.    -PC     LTG 2 Progress  New  -PC     LTG 3  Patient will be independent with compression garments as indicated for self-management of condition.    -PC     LTG 3 Progress  New  -PC        Time Calculation    PT Goal Re-Cert Due Date  11/03/20  -PC       User Key  (r) = Recorded By, (t) = Taken By, (c) = Cosigned By    Initials Name Provider Type    PC Alyssa Lockwood, PT Physical Therapist          PT Assessment/Plan     Row Name 08/03/20 7034          PT Assessment    Functional Limitations  Limitation in home management;Performance in leisure activities;Impaired gait;Limitations in community activities  -PC     Impairments  Edema;Endurance;Gait;Impaired lymphatic circulation  -PC     Assessment Comments  Mrs. Solomon is a 60 yr old female with a long history of Lymphedema which has been complicated by a DVT, Cellulitis, CHF, obesity, and renal failure.  SHe presents with severe edema B LE's ankles to knees.  SHe has minimal swelling in her feet.  SHe has no pitting edema.  Edema is firm from ankle to lower calf, and soft calf to knee.  Her skin is dry and dark red from ankle to calf. She ambulated slowly and with great effort into clinic.  SHe was hospitalized on [facility] for 2 weeks with cellulitis and then went to rehab for 2 weeks.  She has been wrapping her legs at home with ace wraps.  Pt was successfully treated here about 3 yrs ago, but she was unable to keep the swelling down using velcro devices at home. She would benefit from complete decongestive therapy to re-gain control of her lymphedema and to review her home instructions.   -PC     Please refer to paper survey for additional self-reported information  Yes  -PC     Rehab Potential  Good  -PC     Patient/caregiver participated in establishment of treatment plan and goals  Yes  -PC     Patient would benefit from skilled therapy intervention  Yes  -PC        PT Plan    PT Frequency  5x/week  -PC     Predicted Duration of Therapy Intervention  (Therapy Eval)  4 weeks  -PC     Planned CPT's?  PT EVAL MOD COMPLELITY: 06324;PT MANUAL THERAPY EA 15 MIN: 00649;PT SELF CARE/HOME MGMT/TRAIN EA 15: 65561  -PC     Physical Therapy Interventions (Optional Details)  bandaging;home exercise program;manual lymphatic drainage;patient/family education  -PC     PT Plan Comments  See POC.  -PC       User Key  (r) = Recorded By, (t) = Taken By, (c) = Cosigned By    Initials Name Provider Type    PC Alyssa Lockwood, PT Physical Therapist                       Time Calculation:   Start Time: 1530  Stop Time: 1620  Time Calculation (min): 50 min   Therapy Charges for Today     Code Description Service Date Service Provider Modifiers Qty    [id number]  PT EVAL MOD COMPLEXITY 3 8/3/2020 Alyssa Lockwood, PT GP 1                    Alyssa Lockwood, PT  8/3/2020

## 2020-08-07 RX ORDER — HYDROCHLOROTHIAZIDE 25 MG/1
25 TABLET ORAL DAILY
Qty: 90 TABLET | Refills: 0 | Status: SHIPPED | OUTPATIENT
Start: 2020-08-07 | End: 2020-09-29 | Stop reason: HOSPADM

## 2020-08-07 RX ORDER — LOSARTAN POTASSIUM 100 MG/1
100 TABLET ORAL DAILY
Qty: 90 TABLET | Refills: 0 | Status: SHIPPED | OUTPATIENT
Start: 2020-08-07 | End: 2020-09-29 | Stop reason: HOSPADM

## 2020-08-07 NOTE — TELEPHONE ENCOUNTER
Caller: Emely Solomon    Relationship: Self    Best call back number: [phone]    Medication needed:   Requested Prescriptions     Pending Prescriptions Disp Refills   • hydroCHLOROthiazide (HYDRODIURIL) 25 MG tablet     • losartan (COZAAR) 100 MG tablet       [id number] - [facility] [address] [facility] - [phone]  - [phone] FX

## 2020-08-27 ENCOUNTER — PATIENT OUTREACH (OUTPATIENT)
Dept: CASE MANAGEMENT | Facility: OTHER | Age: 60
End: 2020-08-27

## 2020-08-27 ENCOUNTER — EPISODE CHANGES (OUTPATIENT)
Dept: CASE MANAGEMENT | Facility: OTHER | Age: 60
End: 2020-08-27

## 2020-08-27 NOTE — OUTREACH NOTE
Patient Outreach Note    Patient is feeling well.  She has had her new patient visit with Dr Wilkerson (she switched because he is a [employer] MD).  Most of her labs came back WNL with the exception of her Creatinine.  He referred her to a hemologist who she has had 1 visit with .  He is not a [employer] md.  He has ordered labs on 9/8 and I suggested she have her labs  done at a [employer] facility to possibly save her money.      BP WNL and no other concerns at this time.     On the next call offer ACP paperwork.      No issue with social determinants.  No inabilities to obtain food or medications or transportation to MD appointments. Educated on participating in habits that prevent the spread of COVID virus with home & work hygiene. Patient verbalizes understanding.     Educated patient on benefits of Employee CM program and invited to call with any new needs.  Encouraged use of [facility] nurseline if needed.          Florida Escalante RN  Ambulatory     8/27/2020, 16:21     Florida ALEXANDER, RN, [facility]   [place] [address]     [phone] cell   [phone] office  [phone] fax  [email or web address]  [facility]

## 2020-09-10 RX ORDER — IPRATROPIUM BROMIDE AND ALBUTEROL SULFATE 2.5; .5 MG/3ML; MG/3ML
SOLUTION RESPIRATORY (INHALATION)
Qty: 90 ML | Refills: 0 | Status: SHIPPED | OUTPATIENT
Start: 2020-09-10 | End: 2020-11-18

## 2020-09-11 ENCOUNTER — HOSPITAL ENCOUNTER (EMERGENCY)
Facility: HOSPITAL | Age: 60
Discharge: LEFT BEFORE DISCHARGE | End: 2020-09-11

## 2020-09-11 ENCOUNTER — TELEPHONE (OUTPATIENT)
Dept: FAMILY MEDICINE CLINIC | Facility: CLINIC | Age: 60
End: 2020-09-11

## 2020-09-11 ENCOUNTER — APPOINTMENT (OUTPATIENT)
Dept: GENERAL RADIOLOGY | Facility: HOSPITAL | Age: 60
End: 2020-09-11

## 2020-09-11 VITALS
HEART RATE: 120 BPM | SYSTOLIC BLOOD PRESSURE: 113 MMHG | BODY MASS INDEX: 62 KG/M2 | DIASTOLIC BLOOD PRESSURE: 78 MMHG | OXYGEN SATURATION: 96 % | HEIGHT: 63 IN | TEMPERATURE: 99.6 F | RESPIRATION RATE: 20 BRPM

## 2020-09-11 PROCEDURE — 99211 OFF/OP EST MAY X REQ PHY/QHP: CPT

## 2020-09-11 PROCEDURE — 71046 X-RAY EXAM CHEST 2 VIEWS: CPT

## 2020-09-11 RX ORDER — SODIUM CHLORIDE 0.9 % (FLUSH) 0.9 %
10 SYRINGE (ML) INJECTION AS NEEDED
Status: DISCONTINUED | OUTPATIENT
Start: 2020-09-11 | End: 2020-09-12 | Stop reason: HOSPADM

## 2020-09-11 NOTE — TELEPHONE ENCOUNTER
"Received call from patient stating that she hasfever of 101.6. She reports that she took \"acetominophen\" and that it really has not relieved her fever. She denies any shortness of breath, cough or nasal congestion. She reports that her \"leg is red\" and her sister states that it does feel warm to touch, but was unable to tell me if it was swollen.  Pt's sister is concerned because medication is not \"bringing down the fever\".  Pt was alert and oriented during discussion. She denies any urinary frequency or burning. Advised to go to ER for evaluation.   "

## 2020-09-12 NOTE — ED TRIAGE NOTES
Pt reminded to wear her mask in waiting room, nurse in mask. Pt updated on status. Breathing is regular and nonlabored. Pt able to speak full sentences. Pt appears anxious, but otherwise in no obvious distress.

## 2020-09-18 ENCOUNTER — TELEPHONE (OUTPATIENT)
Dept: FAMILY MEDICINE CLINIC | Facility: CLINIC | Age: 60
End: 2020-09-18

## 2020-09-18 ENCOUNTER — HOSPITAL ENCOUNTER (INPATIENT)
Facility: HOSPITAL | Age: 60
LOS: 11 days | Discharge: NURSING, REHAB OR LONG-TERM CARE | End: 2020-09-29
Attending: EMERGENCY MEDICINE | Admitting: HOSPITALIST

## 2020-09-18 ENCOUNTER — APPOINTMENT (OUTPATIENT)
Dept: GENERAL RADIOLOGY | Facility: HOSPITAL | Age: 60
End: 2020-09-18

## 2020-09-18 DIAGNOSIS — E87.1 HYPONATREMIA: ICD-10-CM

## 2020-09-18 DIAGNOSIS — U07.1 COVID-19 VIRUS INFECTION: ICD-10-CM

## 2020-09-18 DIAGNOSIS — L03.116 CELLULITIS OF LEFT LOWER EXTREMITY: ICD-10-CM

## 2020-09-18 DIAGNOSIS — A41.9 SEPSIS, DUE TO UNSPECIFIED ORGANISM, UNSPECIFIED WHETHER ACUTE ORGAN DYSFUNCTION PRESENT (HCC): Primary | ICD-10-CM

## 2020-09-18 DIAGNOSIS — N17.9 ACUTE KIDNEY INJURY (HCC): ICD-10-CM

## 2020-09-18 LAB
ALBUMIN SERPL-MCNC: 3 G/DL (ref 3.5–5.2)
ALBUMIN/GLOB SERPL: 0.6 G/DL
ALP SERPL-CCNC: 171 U/L (ref 39–117)
ALT SERPL W P-5'-P-CCNC: 30 U/L (ref 1–33)
ANION GAP SERPL CALCULATED.3IONS-SCNC: 16.9 MMOL/L (ref 5–15)
ANION GAP SERPL CALCULATED.3IONS-SCNC: 17.5 MMOL/L (ref 5–15)
ARTERIAL PATENCY WRIST A: POSITIVE
ARTERIAL PATENCY WRIST A: POSITIVE
AST SERPL-CCNC: 28 U/L (ref 1–32)
ATMOSPHERIC PRESS: 755.8 MMHG
ATMOSPHERIC PRESS: 756.7 MMHG
B PARAPERT DNA SPEC QL NAA+PROBE: NOT DETECTED
B PERT DNA SPEC QL NAA+PROBE: NOT DETECTED
BACTERIA UR QL AUTO: ABNORMAL /HPF
BACTERIA UR QL AUTO: ABNORMAL /HPF
BASE EXCESS BLDA CALC-SCNC: -5.3 MMOL/L (ref 0–2)
BASE EXCESS BLDA CALC-SCNC: -6 MMOL/L (ref 0–2)
BDY SITE: ABNORMAL
BDY SITE: ABNORMAL
BILIRUB SERPL-MCNC: 1.4 MG/DL (ref 0–1.2)
BILIRUB UR QL STRIP: NEGATIVE
BILIRUB UR QL STRIP: NEGATIVE
BUN SERPL-MCNC: 117 MG/DL (ref 8–23)
BUN SERPL-MCNC: 121 MG/DL (ref 8–23)
BUN/CREAT SERPL: 16.5 (ref 7–25)
BUN/CREAT SERPL: 19.1 (ref 7–25)
C PNEUM DNA NPH QL NAA+NON-PROBE: NOT DETECTED
CALCIUM SPEC-SCNC: 9.3 MG/DL (ref 8.6–10.5)
CALCIUM SPEC-SCNC: 9.4 MG/DL (ref 8.6–10.5)
CHLORIDE SERPL-SCNC: 91 MMOL/L (ref 98–107)
CHLORIDE SERPL-SCNC: 98 MMOL/L (ref 98–107)
CK SERPL-CCNC: 382 U/L (ref 20–180)
CLARITY UR: ABNORMAL
CLARITY UR: CLEAR
CO2 SERPL-SCNC: 17.5 MMOL/L (ref 22–29)
CO2 SERPL-SCNC: 18.1 MMOL/L (ref 22–29)
COLOR UR: YELLOW
COLOR UR: YELLOW
CREAT SERPL-MCNC: 6.11 MG/DL (ref 0.57–1)
CREAT SERPL-MCNC: 7.33 MG/DL (ref 0.57–1)
D-LACTATE SERPL-SCNC: 1.8 MMOL/L (ref 0.5–2)
D-LACTATE SERPL-SCNC: 2.3 MMOL/L (ref 0.5–2)
DEPRECATED RDW RBC AUTO: 41.8 FL (ref 37–54)
ERYTHROCYTE [DISTWIDTH] IN BLOOD BY AUTOMATED COUNT: 14.6 % (ref 12.3–15.4)
FINE GRAN CASTS URNS QL MICRO: ABNORMAL /LPF
FLUAV H1 2009 PAND RNA NPH QL NAA+PROBE: NOT DETECTED
FLUAV H1 HA GENE NPH QL NAA+PROBE: NOT DETECTED
FLUAV H3 RNA NPH QL NAA+PROBE: NOT DETECTED
FLUAV SUBTYP SPEC NAA+PROBE: NOT DETECTED
FLUBV RNA ISLT QL NAA+PROBE: NOT DETECTED
GAS FLOW AIRWAY: 4 LPM
GAS FLOW AIRWAY: 4 LPM
GFR SERPL CREATININE-BSD FRML MDRD: 6 ML/MIN/1.73
GFR SERPL CREATININE-BSD FRML MDRD: 7 ML/MIN/1.73
GFR SERPL CREATININE-BSD FRML MDRD: ABNORMAL ML/MIN/{1.73_M2}
GFR SERPL CREATININE-BSD FRML MDRD: ABNORMAL ML/MIN/{1.73_M2}
GLOBULIN UR ELPH-MCNC: 4.9 GM/DL
GLUCOSE SERPL-MCNC: 100 MG/DL (ref 65–99)
GLUCOSE SERPL-MCNC: 92 MG/DL (ref 65–99)
GLUCOSE UR STRIP-MCNC: NEGATIVE MG/DL
GLUCOSE UR STRIP-MCNC: NEGATIVE MG/DL
HADV DNA SPEC NAA+PROBE: NOT DETECTED
HCO3 BLDA-SCNC: 18.6 MMOL/L (ref 22–28)
HCO3 BLDA-SCNC: 19.2 MMOL/L (ref 22–28)
HCOV 229E RNA SPEC QL NAA+PROBE: NOT DETECTED
HCOV HKU1 RNA SPEC QL NAA+PROBE: NOT DETECTED
HCOV NL63 RNA SPEC QL NAA+PROBE: NOT DETECTED
HCOV OC43 RNA SPEC QL NAA+PROBE: NOT DETECTED
HCT VFR BLD AUTO: 31.6 % (ref 34–46.6)
HCT VFR BLD CALC: ABNORMAL %
HGB BLD-MCNC: 10.3 G/DL (ref 12–15.9)
HGB UR QL STRIP.AUTO: ABNORMAL
HGB UR QL STRIP.AUTO: ABNORMAL
HMPV RNA NPH QL NAA+NON-PROBE: NOT DETECTED
HPIV1 RNA SPEC QL NAA+PROBE: NOT DETECTED
HPIV2 RNA SPEC QL NAA+PROBE: NOT DETECTED
HPIV3 RNA NPH QL NAA+PROBE: NOT DETECTED
HPIV4 P GENE NPH QL NAA+PROBE: NOT DETECTED
HYALINE CASTS UR QL AUTO: ABNORMAL /LPF
HYALINE CASTS UR QL AUTO: ABNORMAL /LPF
KETONES UR QL STRIP: NEGATIVE
KETONES UR QL STRIP: NEGATIVE
LACTATE HOLD SPECIMEN: NORMAL
LEUKOCYTE ESTERASE UR QL STRIP.AUTO: ABNORMAL
LEUKOCYTE ESTERASE UR QL STRIP.AUTO: ABNORMAL
LYMPHOCYTES # BLD MANUAL: 0.77 10*3/MM3 (ref 0.7–3.1)
LYMPHOCYTES NFR BLD MANUAL: 2 % (ref 5–12)
LYMPHOCYTES NFR BLD MANUAL: 3 % (ref 19.6–45.3)
M PNEUMO IGG SER IA-ACNC: NOT DETECTED
MCH RBC QN AUTO: 25.9 PG (ref 26.6–33)
MCHC RBC AUTO-ENTMCNC: 32.6 G/DL (ref 31.5–35.7)
MCV RBC AUTO: 79.4 FL (ref 79–97)
MODALITY: ABNORMAL
MODALITY: ABNORMAL
MONOCYTES # BLD AUTO: 0.51 10*3/MM3 (ref 0.1–0.9)
NEUTROPHILS # BLD AUTO: 24.27 10*3/MM3 (ref 1.7–7)
NEUTROPHILS NFR BLD MANUAL: 95 % (ref 42.7–76)
NITRITE UR QL STRIP: NEGATIVE
NITRITE UR QL STRIP: NEGATIVE
PCO2 BLDA: 33 MM HG (ref 35–45)
PCO2 BLDA: 33.1 MM HG (ref 35–45)
PH BLDA: 7.36 PH UNITS (ref 7.35–7.45)
PH BLDA: 7.37 PH UNITS (ref 7.35–7.45)
PH UR STRIP.AUTO: <=5 [PH] (ref 5–8)
PH UR STRIP.AUTO: <=5 [PH] (ref 5–8)
PLAT MORPH BLD: NORMAL
PLATELET # BLD AUTO: 570 10*3/MM3 (ref 140–450)
PMV BLD AUTO: 9.2 FL (ref 6–12)
PO2 BLDA: 83.9 MM HG (ref 80–100)
PO2 BLDA: 99 MM HG (ref 80–100)
POIKILOCYTOSIS BLD QL SMEAR: ABNORMAL
POTASSIUM SERPL-SCNC: 3.4 MMOL/L (ref 3.5–5.2)
POTASSIUM SERPL-SCNC: 3.6 MMOL/L (ref 3.5–5.2)
PROT SERPL-MCNC: 7.9 G/DL (ref 6–8.5)
PROT UR QL STRIP: ABNORMAL
PROT UR QL STRIP: ABNORMAL
RBC # BLD AUTO: 3.98 10*6/MM3 (ref 3.77–5.28)
RBC # UR: ABNORMAL /HPF
RBC # UR: ABNORMAL /HPF
REF LAB TEST METHOD: ABNORMAL
REF LAB TEST METHOD: ABNORMAL
RHINOVIRUS RNA SPEC NAA+PROBE: NOT DETECTED
RSV RNA NPH QL NAA+NON-PROBE: NOT DETECTED
SAO2 % BLDCOA: 96.1 % (ref 92–99)
SAO2 % BLDCOA: 97.5 % (ref 92–99)
SARS-COV-2 RNA NPH QL NAA+NON-PROBE: DETECTED
SODIUM SERPL-SCNC: 126 MMOL/L (ref 136–145)
SODIUM SERPL-SCNC: 133 MMOL/L (ref 136–145)
SP GR UR STRIP: 1.01 (ref 1–1.03)
SP GR UR STRIP: 1.01 (ref 1–1.03)
SQUAMOUS #/AREA URNS HPF: ABNORMAL /HPF
SQUAMOUS #/AREA URNS HPF: ABNORMAL /HPF
TOTAL RATE: 17 BREATHS/MINUTE
TOTAL RATE: 22 BREATHS/MINUTE
TOXIC GRANULATION: ABNORMAL
UROBILINOGEN UR QL STRIP: ABNORMAL
UROBILINOGEN UR QL STRIP: ABNORMAL
WBC # BLD AUTO: 25.55 10*3/MM3 (ref 3.4–10.8)
WBC UR QL AUTO: ABNORMAL /HPF
WBC UR QL AUTO: ABNORMAL /HPF

## 2020-09-18 PROCEDURE — G0378 HOSPITAL OBSERVATION PER HR: HCPCS

## 2020-09-18 PROCEDURE — 87205 SMEAR GRAM STAIN: CPT | Performed by: PHYSICIAN ASSISTANT

## 2020-09-18 PROCEDURE — 87147 CULTURE TYPE IMMUNOLOGIC: CPT | Performed by: PHYSICIAN ASSISTANT

## 2020-09-18 PROCEDURE — 85025 COMPLETE CBC W/AUTO DIFF WBC: CPT | Performed by: PHYSICIAN ASSISTANT

## 2020-09-18 PROCEDURE — 83605 ASSAY OF LACTIC ACID: CPT | Performed by: PHYSICIAN ASSISTANT

## 2020-09-18 PROCEDURE — 82436 ASSAY OF URINE CHLORIDE: CPT | Performed by: INTERNAL MEDICINE

## 2020-09-18 PROCEDURE — 80053 COMPREHEN METABOLIC PANEL: CPT | Performed by: PHYSICIAN ASSISTANT

## 2020-09-18 PROCEDURE — 85007 BL SMEAR W/DIFF WBC COUNT: CPT | Performed by: PHYSICIAN ASSISTANT

## 2020-09-18 PROCEDURE — 0202U NFCT DS 22 TRGT SARS-COV-2: CPT | Performed by: PHYSICIAN ASSISTANT

## 2020-09-18 PROCEDURE — 25010000002 DEXAMETHASONE PER 1 MG: Performed by: INTERNAL MEDICINE

## 2020-09-18 PROCEDURE — 25010000002 PIPERACILLIN SOD-TAZOBACTAM PER 1 G: Performed by: INTERNAL MEDICINE

## 2020-09-18 PROCEDURE — 87186 SC STD MICRODIL/AGAR DIL: CPT | Performed by: INTERNAL MEDICINE

## 2020-09-18 PROCEDURE — 87040 BLOOD CULTURE FOR BACTERIA: CPT | Performed by: PHYSICIAN ASSISTANT

## 2020-09-18 PROCEDURE — 81001 URINALYSIS AUTO W/SCOPE: CPT | Performed by: INTERNAL MEDICINE

## 2020-09-18 PROCEDURE — 87086 URINE CULTURE/COLONY COUNT: CPT | Performed by: INTERNAL MEDICINE

## 2020-09-18 PROCEDURE — 25010000002 VANCOMYCIN 10 G RECONSTITUTED SOLUTION: Performed by: INTERNAL MEDICINE

## 2020-09-18 PROCEDURE — 82803 BLOOD GASES ANY COMBINATION: CPT

## 2020-09-18 PROCEDURE — 87186 SC STD MICRODIL/AGAR DIL: CPT | Performed by: PHYSICIAN ASSISTANT

## 2020-09-18 PROCEDURE — 81001 URINALYSIS AUTO W/SCOPE: CPT | Performed by: PHYSICIAN ASSISTANT

## 2020-09-18 PROCEDURE — 36600 WITHDRAWAL OF ARTERIAL BLOOD: CPT | Performed by: PHYSICIAN ASSISTANT

## 2020-09-18 PROCEDURE — 82550 ASSAY OF CK (CPK): CPT | Performed by: PHYSICIAN ASSISTANT

## 2020-09-18 PROCEDURE — 73030 X-RAY EXAM OF SHOULDER: CPT

## 2020-09-18 PROCEDURE — 71045 X-RAY EXAM CHEST 1 VIEW: CPT

## 2020-09-18 PROCEDURE — 87088 URINE BACTERIA CULTURE: CPT | Performed by: PHYSICIAN ASSISTANT

## 2020-09-18 PROCEDURE — 84156 ASSAY OF PROTEIN URINE: CPT | Performed by: INTERNAL MEDICINE

## 2020-09-18 PROCEDURE — 36600 WITHDRAWAL OF ARTERIAL BLOOD: CPT

## 2020-09-18 PROCEDURE — 87150 DNA/RNA AMPLIFIED PROBE: CPT | Performed by: PHYSICIAN ASSISTANT

## 2020-09-18 PROCEDURE — 82570 ASSAY OF URINE CREATININE: CPT | Performed by: INTERNAL MEDICINE

## 2020-09-18 PROCEDURE — 84300 ASSAY OF URINE SODIUM: CPT | Performed by: INTERNAL MEDICINE

## 2020-09-18 PROCEDURE — 36415 COLL VENOUS BLD VENIPUNCTURE: CPT | Performed by: PHYSICIAN ASSISTANT

## 2020-09-18 PROCEDURE — 82803 BLOOD GASES ANY COMBINATION: CPT | Performed by: PHYSICIAN ASSISTANT

## 2020-09-18 PROCEDURE — 99285 EMERGENCY DEPT VISIT HI MDM: CPT

## 2020-09-18 PROCEDURE — P9612 CATHETERIZE FOR URINE SPEC: HCPCS

## 2020-09-18 PROCEDURE — 25010000002 CEFTRIAXONE PER 250 MG: Performed by: PHYSICIAN ASSISTANT

## 2020-09-18 PROCEDURE — 80048 BASIC METABOLIC PNL TOTAL CA: CPT | Performed by: INTERNAL MEDICINE

## 2020-09-18 PROCEDURE — 87070 CULTURE OTHR SPECIMN AEROBIC: CPT | Performed by: PHYSICIAN ASSISTANT

## 2020-09-18 PROCEDURE — 87077 CULTURE AEROBIC IDENTIFY: CPT | Performed by: INTERNAL MEDICINE

## 2020-09-18 PROCEDURE — 87086 URINE CULTURE/COLONY COUNT: CPT | Performed by: PHYSICIAN ASSISTANT

## 2020-09-18 RX ORDER — ACETAMINOPHEN 650 MG/1
650 SUPPOSITORY RECTAL EVERY 4 HOURS PRN
Status: DISCONTINUED | OUTPATIENT
Start: 2020-09-18 | End: 2020-09-29 | Stop reason: HOSPADM

## 2020-09-18 RX ORDER — ACETAMINOPHEN 160 MG/5ML
650 SOLUTION ORAL EVERY 4 HOURS PRN
Status: DISCONTINUED | OUTPATIENT
Start: 2020-09-18 | End: 2020-09-29 | Stop reason: HOSPADM

## 2020-09-18 RX ORDER — CEFTRIAXONE SODIUM 1 G/50ML
1 INJECTION, SOLUTION INTRAVENOUS ONCE
Status: COMPLETED | OUTPATIENT
Start: 2020-09-18 | End: 2020-09-18

## 2020-09-18 RX ORDER — DEXAMETHASONE SODIUM PHOSPHATE 4 MG/ML
4 INJECTION, SOLUTION INTRA-ARTICULAR; INTRALESIONAL; INTRAMUSCULAR; INTRAVENOUS; SOFT TISSUE DAILY
Status: DISCONTINUED | OUTPATIENT
Start: 2020-09-18 | End: 2020-09-19

## 2020-09-18 RX ORDER — SODIUM CHLORIDE 0.9 % (FLUSH) 0.9 %
10 SYRINGE (ML) INJECTION EVERY 12 HOURS SCHEDULED
Status: DISCONTINUED | OUTPATIENT
Start: 2020-09-18 | End: 2020-09-29 | Stop reason: HOSPADM

## 2020-09-18 RX ORDER — ACETAMINOPHEN 325 MG/1
650 TABLET ORAL EVERY 4 HOURS PRN
Status: DISCONTINUED | OUTPATIENT
Start: 2020-09-18 | End: 2020-09-29 | Stop reason: HOSPADM

## 2020-09-18 RX ORDER — SODIUM CHLORIDE, SODIUM LACTATE, POTASSIUM CHLORIDE, CALCIUM CHLORIDE 600; 310; 30; 20 MG/100ML; MG/100ML; MG/100ML; MG/100ML
100 INJECTION, SOLUTION INTRAVENOUS CONTINUOUS
Status: DISCONTINUED | OUTPATIENT
Start: 2020-09-18 | End: 2020-09-21

## 2020-09-18 RX ORDER — ONDANSETRON 2 MG/ML
4 INJECTION INTRAMUSCULAR; INTRAVENOUS EVERY 6 HOURS PRN
Status: DISCONTINUED | OUTPATIENT
Start: 2020-09-18 | End: 2020-09-29 | Stop reason: HOSPADM

## 2020-09-18 RX ORDER — SODIUM CHLORIDE 0.9 % (FLUSH) 0.9 %
10 SYRINGE (ML) INJECTION AS NEEDED
Status: DISCONTINUED | OUTPATIENT
Start: 2020-09-18 | End: 2020-09-29 | Stop reason: HOSPADM

## 2020-09-18 RX ADMIN — SODIUM CHLORIDE, POTASSIUM CHLORIDE, SODIUM LACTATE AND CALCIUM CHLORIDE 100 ML/HR: 600; 310; 30; 20 INJECTION, SOLUTION INTRAVENOUS at 20:39

## 2020-09-18 RX ADMIN — DEXAMETHASONE SODIUM PHOSPHATE 4 MG: 4 INJECTION, SOLUTION INTRAMUSCULAR; INTRAVENOUS at 22:44

## 2020-09-18 RX ADMIN — SODIUM CHLORIDE 500 ML: 9 INJECTION, SOLUTION INTRAVENOUS at 13:21

## 2020-09-18 RX ADMIN — SODIUM CHLORIDE, PRESERVATIVE FREE 10 ML: 5 INJECTION INTRAVENOUS at 22:45

## 2020-09-18 RX ADMIN — CEFTRIAXONE SODIUM 1 G: 1 INJECTION, SOLUTION INTRAVENOUS at 12:52

## 2020-09-18 RX ADMIN — VANCOMYCIN HYDROCHLORIDE 2500 MG: 10 INJECTION, POWDER, LYOPHILIZED, FOR SOLUTION INTRAVENOUS at 23:05

## 2020-09-18 RX ADMIN — TAZOBACTAM SODIUM AND PIPERACILLIN SODIUM 3.38 G: 375; 3 INJECTION, SOLUTION INTRAVENOUS at 22:45

## 2020-09-18 RX ADMIN — SODIUM CHLORIDE 500 ML: 9 INJECTION, SOLUTION INTRAVENOUS at 15:11

## 2020-09-18 NOTE — TELEPHONE ENCOUNTER
Adilson called in and is requesting  Possible places patient can go for rehab and out of her apartment. Adilson wants to also know the best was way to get a hold of her  who is in [facility] .           Please call  Adilson and advise at  [phone]

## 2020-09-19 PROBLEM — N18.30 CKD (CHRONIC KIDNEY DISEASE) STAGE 3, GFR 30-59 ML/MIN (HCC): Status: ACTIVE | Noted: 2020-09-19

## 2020-09-19 PROBLEM — U07.1 COVID-19 VIRUS DETECTED: Status: ACTIVE | Noted: 2020-09-19

## 2020-09-19 PROBLEM — B95.5 BACTEREMIA DUE TO STREPTOCOCCUS: Status: ACTIVE | Noted: 2017-11-13

## 2020-09-19 LAB
ALBUMIN SERPL-MCNC: 2.4 G/DL (ref 3.5–5.2)
ALBUMIN/GLOB SERPL: 0.5 G/DL
ALP SERPL-CCNC: 208 U/L (ref 39–117)
ALT SERPL W P-5'-P-CCNC: 24 U/L (ref 1–33)
ANION GAP SERPL CALCULATED.3IONS-SCNC: 16.1 MMOL/L (ref 5–15)
AST SERPL-CCNC: 19 U/L (ref 1–32)
BACTERIA BLD CULT: ABNORMAL
BASOPHILS # BLD AUTO: 0.13 10*3/MM3 (ref 0–0.2)
BASOPHILS NFR BLD AUTO: 0.6 % (ref 0–1.5)
BILIRUB SERPL-MCNC: 0.9 MG/DL (ref 0–1.2)
BOTTLE TYPE: ABNORMAL
BUN SERPL-MCNC: 101 MG/DL (ref 8–23)
BUN/CREAT SERPL: 24.6 (ref 7–25)
CALCIUM SPEC-SCNC: 9.1 MG/DL (ref 8.6–10.5)
CHLORIDE SERPL-SCNC: 99 MMOL/L (ref 98–107)
CHLORIDE UR-SCNC: 42 MMOL/L
CK SERPL-CCNC: 81 U/L (ref 20–180)
CO2 SERPL-SCNC: 16.9 MMOL/L (ref 22–29)
CREAT SERPL-MCNC: 4.11 MG/DL (ref 0.57–1)
CREAT UR-MCNC: 46.5 MG/DL
CRP SERPL-MCNC: 32.45 MG/DL (ref 0–0.5)
DEPRECATED RDW RBC AUTO: 40.8 FL (ref 37–54)
EOSINOPHIL # BLD AUTO: 0 10*3/MM3 (ref 0–0.4)
EOSINOPHIL NFR BLD AUTO: 0 % (ref 0.3–6.2)
ERYTHROCYTE [DISTWIDTH] IN BLOOD BY AUTOMATED COUNT: 14.5 % (ref 12.3–15.4)
FERRITIN SERPL-MCNC: 1313 NG/ML (ref 13–150)
GFR SERPL CREATININE-BSD FRML MDRD: 11 ML/MIN/1.73
GFR SERPL CREATININE-BSD FRML MDRD: ABNORMAL ML/MIN/{1.73_M2}
GLOBULIN UR ELPH-MCNC: 4.8 GM/DL
GLUCOSE SERPL-MCNC: 149 MG/DL (ref 65–99)
HCT VFR BLD AUTO: 29.5 % (ref 34–46.6)
HGB BLD-MCNC: 9.8 G/DL (ref 12–15.9)
IMM GRANULOCYTES # BLD AUTO: 0.72 10*3/MM3 (ref 0–0.05)
IMM GRANULOCYTES NFR BLD AUTO: 3.4 % (ref 0–0.5)
LDH SERPL-CCNC: 231 U/L (ref 135–214)
LYMPHOCYTES # BLD AUTO: 0.43 10*3/MM3 (ref 0.7–3.1)
LYMPHOCYTES NFR BLD AUTO: 2 % (ref 19.6–45.3)
MAGNESIUM SERPL-MCNC: 2.3 MG/DL (ref 1.6–2.4)
MCH RBC QN AUTO: 26.2 PG (ref 26.6–33)
MCHC RBC AUTO-ENTMCNC: 33.2 G/DL (ref 31.5–35.7)
MCV RBC AUTO: 78.9 FL (ref 79–97)
MONOCYTES # BLD AUTO: 0.27 10*3/MM3 (ref 0.1–0.9)
MONOCYTES NFR BLD AUTO: 1.3 % (ref 5–12)
NEUTROPHILS NFR BLD AUTO: 19.76 10*3/MM3 (ref 1.7–7)
NEUTROPHILS NFR BLD AUTO: 92.7 % (ref 42.7–76)
NRBC BLD AUTO-RTO: 0 /100 WBC (ref 0–0.2)
PHOSPHATE SERPL-MCNC: 4.9 MG/DL (ref 2.5–4.5)
PLATELET # BLD AUTO: 514 10*3/MM3 (ref 140–450)
PMV BLD AUTO: 9.5 FL (ref 6–12)
POTASSIUM SERPL-SCNC: 3.7 MMOL/L (ref 3.5–5.2)
PROCALCITONIN SERPL-MCNC: 4.38 NG/ML (ref 0–0.25)
PROT SERPL-MCNC: 7.2 G/DL (ref 6–8.5)
PROT UR-MCNC: 21 MG/DL
PROT/CREAT UR: 451.6 MG/G CREA (ref 0–200)
RBC # BLD AUTO: 3.74 10*6/MM3 (ref 3.77–5.28)
SODIUM SERPL-SCNC: 132 MMOL/L (ref 136–145)
SODIUM UR-SCNC: 54 MMOL/L
URATE SERPL-MCNC: 13.6 MG/DL (ref 2.4–5.7)
VANCOMYCIN SERPL-MCNC: 22.8 MCG/ML (ref 5–40)
WBC # BLD AUTO: 21.31 10*3/MM3 (ref 3.4–10.8)

## 2020-09-19 PROCEDURE — 83615 LACTATE (LD) (LDH) ENZYME: CPT | Performed by: INTERNAL MEDICINE

## 2020-09-19 PROCEDURE — 84145 PROCALCITONIN (PCT): CPT | Performed by: INTERNAL MEDICINE

## 2020-09-19 PROCEDURE — 99223 1ST HOSP IP/OBS HIGH 75: CPT | Performed by: INTERNAL MEDICINE

## 2020-09-19 PROCEDURE — G0008 ADMIN INFLUENZA VIRUS VAC: HCPCS | Performed by: INTERNAL MEDICINE

## 2020-09-19 PROCEDURE — 80202 ASSAY OF VANCOMYCIN: CPT | Performed by: INTERNAL MEDICINE

## 2020-09-19 PROCEDURE — 25010000002 INFLUENZA VAC SPLIT QUAD 0.5 ML SUSPENSION PREFILLED SYRINGE: Performed by: INTERNAL MEDICINE

## 2020-09-19 PROCEDURE — 90686 IIV4 VACC NO PRSV 0.5 ML IM: CPT | Performed by: INTERNAL MEDICINE

## 2020-09-19 PROCEDURE — 82550 ASSAY OF CK (CPK): CPT | Performed by: INTERNAL MEDICINE

## 2020-09-19 PROCEDURE — 80053 COMPREHEN METABOLIC PANEL: CPT | Performed by: INTERNAL MEDICINE

## 2020-09-19 PROCEDURE — 85025 COMPLETE CBC W/AUTO DIFF WBC: CPT | Performed by: INTERNAL MEDICINE

## 2020-09-19 PROCEDURE — 83735 ASSAY OF MAGNESIUM: CPT | Performed by: INTERNAL MEDICINE

## 2020-09-19 PROCEDURE — 82728 ASSAY OF FERRITIN: CPT | Performed by: INTERNAL MEDICINE

## 2020-09-19 PROCEDURE — 84550 ASSAY OF BLOOD/URIC ACID: CPT | Performed by: INTERNAL MEDICINE

## 2020-09-19 PROCEDURE — 86140 C-REACTIVE PROTEIN: CPT | Performed by: INTERNAL MEDICINE

## 2020-09-19 PROCEDURE — 84100 ASSAY OF PHOSPHORUS: CPT | Performed by: INTERNAL MEDICINE

## 2020-09-19 PROCEDURE — 25010000003 PENICILLIN G POTASSIUM PER 600000 UNITS: Performed by: INTERNAL MEDICINE

## 2020-09-19 PROCEDURE — 25010000002 PIPERACILLIN SOD-TAZOBACTAM PER 1 G: Performed by: INTERNAL MEDICINE

## 2020-09-19 PROCEDURE — 25010000002 VANCOMYCIN 10 G RECONSTITUTED SOLUTION: Performed by: INTERNAL MEDICINE

## 2020-09-19 RX ORDER — POTASSIUM CHLORIDE 750 MG/1
40 CAPSULE, EXTENDED RELEASE ORAL ONCE
Status: COMPLETED | OUTPATIENT
Start: 2020-09-19 | End: 2020-09-19

## 2020-09-19 RX ADMIN — INFLUENZA VIRUS VACCINE 0.5 ML: 15; 15; 15; 15 SUSPENSION INTRAMUSCULAR at 23:11

## 2020-09-19 RX ADMIN — PENICILLIN G POTASSIUM 4 MILLION UNITS: 20000000 INJECTION, POWDER, FOR SOLUTION INTRAVENOUS at 16:12

## 2020-09-19 RX ADMIN — SODIUM CHLORIDE, PRESERVATIVE FREE 10 ML: 5 INJECTION INTRAVENOUS at 23:12

## 2020-09-19 RX ADMIN — PENICILLIN G POTASSIUM 4 MILLION UNITS: 20000000 INJECTION, POWDER, FOR SOLUTION INTRAVENOUS at 12:00

## 2020-09-19 RX ADMIN — PENICILLIN G POTASSIUM 4 MILLION UNITS: 20000000 INJECTION, POWDER, FOR SOLUTION INTRAVENOUS at 23:11

## 2020-09-19 RX ADMIN — VANCOMYCIN HYDROCHLORIDE 2500 MG: 10 INJECTION, POWDER, LYOPHILIZED, FOR SOLUTION INTRAVENOUS at 12:59

## 2020-09-19 RX ADMIN — SODIUM CHLORIDE, PRESERVATIVE FREE 10 ML: 5 INJECTION INTRAVENOUS at 08:29

## 2020-09-19 RX ADMIN — TAZOBACTAM SODIUM AND PIPERACILLIN SODIUM 3.38 G: 375; 3 INJECTION, SOLUTION INTRAVENOUS at 05:25

## 2020-09-19 RX ADMIN — POTASSIUM CHLORIDE 40 MEQ: 10 CAPSULE, COATED, EXTENDED RELEASE ORAL at 12:00

## 2020-09-20 LAB
ALBUMIN SERPL-MCNC: 2.6 G/DL (ref 3.5–5.2)
ANION GAP SERPL CALCULATED.3IONS-SCNC: 10.6 MMOL/L (ref 5–15)
BACTERIA SPEC AEROBE CULT: ABNORMAL
BASOPHILS # BLD AUTO: 0.08 10*3/MM3 (ref 0–0.2)
BASOPHILS NFR BLD AUTO: 0.5 % (ref 0–1.5)
BUN SERPL-MCNC: 82 MG/DL (ref 8–23)
BUN/CREAT SERPL: 35.2 (ref 7–25)
CALCIUM SPEC-SCNC: 8.7 MG/DL (ref 8.6–10.5)
CHLORIDE SERPL-SCNC: 104 MMOL/L (ref 98–107)
CO2 SERPL-SCNC: 21.4 MMOL/L (ref 22–29)
CREAT SERPL-MCNC: 2.33 MG/DL (ref 0.57–1)
DEPRECATED RDW RBC AUTO: 42.2 FL (ref 37–54)
EOSINOPHIL # BLD AUTO: 0.02 10*3/MM3 (ref 0–0.4)
EOSINOPHIL NFR BLD AUTO: 0.1 % (ref 0.3–6.2)
ERYTHROCYTE [DISTWIDTH] IN BLOOD BY AUTOMATED COUNT: 14.7 % (ref 12.3–15.4)
GFR SERPL CREATININE-BSD FRML MDRD: 21 ML/MIN/1.73
GLUCOSE SERPL-MCNC: 137 MG/DL (ref 65–99)
HCT VFR BLD AUTO: 27 % (ref 34–46.6)
HGB BLD-MCNC: 9 G/DL (ref 12–15.9)
IMM GRANULOCYTES # BLD AUTO: 0.76 10*3/MM3 (ref 0–0.05)
IMM GRANULOCYTES NFR BLD AUTO: 4.3 % (ref 0–0.5)
IRON 24H UR-MRATE: 50 MCG/DL (ref 37–145)
IRON SATN MFR SERPL: 28 % (ref 20–50)
LYMPHOCYTES # BLD AUTO: 0.66 10*3/MM3 (ref 0.7–3.1)
LYMPHOCYTES NFR BLD AUTO: 3.7 % (ref 19.6–45.3)
MAGNESIUM SERPL-MCNC: 2 MG/DL (ref 1.6–2.4)
MCH RBC QN AUTO: 26.3 PG (ref 26.6–33)
MCHC RBC AUTO-ENTMCNC: 33.3 G/DL (ref 31.5–35.7)
MCV RBC AUTO: 78.9 FL (ref 79–97)
MONOCYTES # BLD AUTO: 0.58 10*3/MM3 (ref 0.1–0.9)
MONOCYTES NFR BLD AUTO: 3.3 % (ref 5–12)
NEUTROPHILS NFR BLD AUTO: 15.63 10*3/MM3 (ref 1.7–7)
NEUTROPHILS NFR BLD AUTO: 88.1 % (ref 42.7–76)
NRBC BLD AUTO-RTO: 0.1 /100 WBC (ref 0–0.2)
PHOSPHATE SERPL-MCNC: 3.7 MG/DL (ref 2.5–4.5)
PLATELET # BLD AUTO: 525 10*3/MM3 (ref 140–450)
PMV BLD AUTO: 9.3 FL (ref 6–12)
POTASSIUM SERPL-SCNC: 3.7 MMOL/L (ref 3.5–5.2)
RBC # BLD AUTO: 3.42 10*6/MM3 (ref 3.77–5.28)
RETICS # AUTO: 0.03 10*6/MM3 (ref 0.02–0.13)
RETICS/RBC NFR AUTO: 0.97 % (ref 0.7–1.9)
SODIUM SERPL-SCNC: 136 MMOL/L (ref 136–145)
TIBC SERPL-MCNC: 180 MCG/DL (ref 298–536)
TRANSFERRIN SERPL-MCNC: 121 MG/DL (ref 200–360)
URATE SERPL-MCNC: 11.4 MG/DL (ref 2.4–5.7)
VANCOMYCIN SERPL-MCNC: 32.1 MCG/ML (ref 5–40)
WBC # BLD AUTO: 17.73 10*3/MM3 (ref 3.4–10.8)

## 2020-09-20 PROCEDURE — 83735 ASSAY OF MAGNESIUM: CPT | Performed by: INTERNAL MEDICINE

## 2020-09-20 PROCEDURE — 97110 THERAPEUTIC EXERCISES: CPT | Performed by: PHYSICAL THERAPIST

## 2020-09-20 PROCEDURE — 85045 AUTOMATED RETICULOCYTE COUNT: CPT | Performed by: NURSE PRACTITIONER

## 2020-09-20 PROCEDURE — 85025 COMPLETE CBC W/AUTO DIFF WBC: CPT | Performed by: INTERNAL MEDICINE

## 2020-09-20 PROCEDURE — 80069 RENAL FUNCTION PANEL: CPT | Performed by: INTERNAL MEDICINE

## 2020-09-20 PROCEDURE — 80202 ASSAY OF VANCOMYCIN: CPT | Performed by: INTERNAL MEDICINE

## 2020-09-20 PROCEDURE — 97163 PT EVAL HIGH COMPLEX 45 MIN: CPT | Performed by: PHYSICAL THERAPIST

## 2020-09-20 PROCEDURE — 84466 ASSAY OF TRANSFERRIN: CPT | Performed by: NURSE PRACTITIONER

## 2020-09-20 PROCEDURE — 99232 SBSQ HOSP IP/OBS MODERATE 35: CPT | Performed by: INTERNAL MEDICINE

## 2020-09-20 PROCEDURE — 83540 ASSAY OF IRON: CPT | Performed by: NURSE PRACTITIONER

## 2020-09-20 PROCEDURE — 87040 BLOOD CULTURE FOR BACTERIA: CPT | Performed by: INTERNAL MEDICINE

## 2020-09-20 PROCEDURE — 84550 ASSAY OF BLOOD/URIC ACID: CPT | Performed by: INTERNAL MEDICINE

## 2020-09-20 PROCEDURE — 25010000003 PENICILLIN G POTASSIUM PER 600000 UNITS: Performed by: INTERNAL MEDICINE

## 2020-09-20 RX ORDER — NYSTATIN 100000 [USP'U]/G
POWDER TOPICAL EVERY 12 HOURS SCHEDULED
Status: DISCONTINUED | OUTPATIENT
Start: 2020-09-20 | End: 2020-09-21

## 2020-09-20 RX ORDER — POLYETHYLENE GLYCOL 3350 17 G/17G
17 POWDER, FOR SOLUTION ORAL 2 TIMES DAILY
Status: DISCONTINUED | OUTPATIENT
Start: 2020-09-20 | End: 2020-09-29 | Stop reason: HOSPADM

## 2020-09-20 RX ORDER — AMOXICILLIN 250 MG
1 CAPSULE ORAL NIGHTLY PRN
Status: DISCONTINUED | OUTPATIENT
Start: 2020-09-20 | End: 2020-09-29 | Stop reason: HOSPADM

## 2020-09-20 RX ADMIN — PENICILLIN G POTASSIUM 4 MILLION UNITS: 20000000 INJECTION, POWDER, FOR SOLUTION INTRAVENOUS at 21:35

## 2020-09-20 RX ADMIN — SODIUM CHLORIDE, PRESERVATIVE FREE 10 ML: 5 INJECTION INTRAVENOUS at 08:27

## 2020-09-20 RX ADMIN — NYSTATIN 1 APPLICATION: 100000 POWDER TOPICAL at 21:35

## 2020-09-20 RX ADMIN — PENICILLIN G POTASSIUM 4 MILLION UNITS: 20000000 INJECTION, POWDER, FOR SOLUTION INTRAVENOUS at 16:29

## 2020-09-20 RX ADMIN — SODIUM CHLORIDE, PRESERVATIVE FREE 10 ML: 5 INJECTION INTRAVENOUS at 21:35

## 2020-09-20 RX ADMIN — SODIUM CHLORIDE, POTASSIUM CHLORIDE, SODIUM LACTATE AND CALCIUM CHLORIDE 100 ML/HR: 600; 310; 30; 20 INJECTION, SOLUTION INTRAVENOUS at 16:28

## 2020-09-20 RX ADMIN — NYSTATIN: 100000 POWDER TOPICAL at 16:29

## 2020-09-20 RX ADMIN — PENICILLIN G POTASSIUM 4 MILLION UNITS: 20000000 INJECTION, POWDER, FOR SOLUTION INTRAVENOUS at 02:58

## 2020-09-20 RX ADMIN — ACETAMINOPHEN 650 MG: 325 TABLET, FILM COATED ORAL at 12:30

## 2020-09-20 RX ADMIN — PENICILLIN G POTASSIUM 4 MILLION UNITS: 20000000 INJECTION, POWDER, FOR SOLUTION INTRAVENOUS at 10:15

## 2020-09-20 RX ADMIN — POLYETHYLENE GLYCOL 3350 17 G: 17 POWDER, FOR SOLUTION ORAL at 16:32

## 2020-09-21 LAB
ALBUMIN SERPL-MCNC: 2.7 G/DL (ref 3.5–5.2)
ANION GAP SERPL CALCULATED.3IONS-SCNC: 7.3 MMOL/L (ref 5–15)
BACTERIA SPEC AEROBE CULT: ABNORMAL
BUN SERPL-MCNC: 56 MG/DL (ref 8–23)
BUN/CREAT SERPL: 47.5 (ref 7–25)
CALCIUM SPEC-SCNC: 8.8 MG/DL (ref 8.6–10.5)
CHLORIDE SERPL-SCNC: 105 MMOL/L (ref 98–107)
CO2 SERPL-SCNC: 25.7 MMOL/L (ref 22–29)
CREAT SERPL-MCNC: 1.18 MG/DL (ref 0.57–1)
DEPRECATED RDW RBC AUTO: 41.4 FL (ref 37–54)
EOSINOPHIL # BLD MANUAL: 0.28 10*3/MM3 (ref 0–0.4)
EOSINOPHIL NFR BLD MANUAL: 3 % (ref 0.3–6.2)
ERYTHROCYTE [DISTWIDTH] IN BLOOD BY AUTOMATED COUNT: 14.2 % (ref 12.3–15.4)
FOLATE SERPL-MCNC: 5.41 NG/ML (ref 4.78–24.2)
GFR SERPL CREATININE-BSD FRML MDRD: 47 ML/MIN/1.73
GLUCOSE SERPL-MCNC: 90 MG/DL (ref 65–99)
GRAM STN SPEC: ABNORMAL
HCT VFR BLD AUTO: 28.5 % (ref 34–46.6)
HGB BLD-MCNC: 9.3 G/DL (ref 12–15.9)
ISOLATED FROM: ABNORMAL
ISOLATED FROM: ABNORMAL
LYMPHOCYTES # BLD MANUAL: 0.47 10*3/MM3 (ref 0.7–3.1)
LYMPHOCYTES NFR BLD MANUAL: 2 % (ref 5–12)
LYMPHOCYTES NFR BLD MANUAL: 5.1 % (ref 19.6–45.3)
MAGNESIUM SERPL-MCNC: 1.8 MG/DL (ref 1.6–2.4)
MCH RBC QN AUTO: 26.2 PG (ref 26.6–33)
MCHC RBC AUTO-ENTMCNC: 32.6 G/DL (ref 31.5–35.7)
MCV RBC AUTO: 80.3 FL (ref 79–97)
MONOCYTES # BLD AUTO: 0.19 10*3/MM3 (ref 0.1–0.9)
NEUTROPHILS # BLD AUTO: 8.34 10*3/MM3 (ref 1.7–7)
NEUTROPHILS NFR BLD MANUAL: 89.9 % (ref 42.7–76)
PHOSPHATE SERPL-MCNC: 3.6 MG/DL (ref 2.5–4.5)
PLAT MORPH BLD: NORMAL
PLATELET # BLD AUTO: 462 10*3/MM3 (ref 140–450)
PMV BLD AUTO: 8.8 FL (ref 6–12)
POTASSIUM SERPL-SCNC: 4 MMOL/L (ref 3.5–5.2)
RBC # BLD AUTO: 3.55 10*6/MM3 (ref 3.77–5.28)
RBC MORPH BLD: NORMAL
SODIUM SERPL-SCNC: 138 MMOL/L (ref 136–145)
URATE SERPL-MCNC: 8.9 MG/DL (ref 2.4–5.7)
VANCOMYCIN SERPL-MCNC: 17.1 MCG/ML (ref 5–40)
VIT B12 BLD-MCNC: 1601 PG/ML (ref 211–946)
WBC # BLD AUTO: 9.28 10*3/MM3 (ref 3.4–10.8)
WBC MORPH BLD: NORMAL

## 2020-09-21 PROCEDURE — 85007 BL SMEAR W/DIFF WBC COUNT: CPT | Performed by: INTERNAL MEDICINE

## 2020-09-21 PROCEDURE — 80202 ASSAY OF VANCOMYCIN: CPT | Performed by: HOSPITALIST

## 2020-09-21 PROCEDURE — 82746 ASSAY OF FOLIC ACID SERUM: CPT | Performed by: NURSE PRACTITIONER

## 2020-09-21 PROCEDURE — 85025 COMPLETE CBC W/AUTO DIFF WBC: CPT | Performed by: INTERNAL MEDICINE

## 2020-09-21 PROCEDURE — 99232 SBSQ HOSP IP/OBS MODERATE 35: CPT | Performed by: INTERNAL MEDICINE

## 2020-09-21 PROCEDURE — 97535 SELF CARE MNGMENT TRAINING: CPT

## 2020-09-21 PROCEDURE — 97166 OT EVAL MOD COMPLEX 45 MIN: CPT

## 2020-09-21 PROCEDURE — 83735 ASSAY OF MAGNESIUM: CPT | Performed by: INTERNAL MEDICINE

## 2020-09-21 PROCEDURE — 25010000003 PENICILLIN G POTASSIUM PER 600000 UNITS: Performed by: INTERNAL MEDICINE

## 2020-09-21 PROCEDURE — 84550 ASSAY OF BLOOD/URIC ACID: CPT | Performed by: INTERNAL MEDICINE

## 2020-09-21 PROCEDURE — 82607 VITAMIN B-12: CPT | Performed by: NURSE PRACTITIONER

## 2020-09-21 PROCEDURE — 80069 RENAL FUNCTION PANEL: CPT | Performed by: INTERNAL MEDICINE

## 2020-09-21 RX ADMIN — SODIUM CHLORIDE, PRESERVATIVE FREE 10 ML: 5 INJECTION INTRAVENOUS at 09:54

## 2020-09-21 RX ADMIN — NYSTATIN: 100000 POWDER TOPICAL at 09:54

## 2020-09-21 RX ADMIN — PENICILLIN G POTASSIUM 4 MILLION UNITS: 20000000 INJECTION, POWDER, FOR SOLUTION INTRAVENOUS at 15:58

## 2020-09-21 RX ADMIN — Medication 1 APPLICATION: at 21:01

## 2020-09-21 RX ADMIN — RIVAROXABAN 20 MG: 20 TABLET, FILM COATED ORAL at 12:21

## 2020-09-21 RX ADMIN — SODIUM CHLORIDE, POTASSIUM CHLORIDE, SODIUM LACTATE AND CALCIUM CHLORIDE 100 ML/HR: 600; 310; 30; 20 INJECTION, SOLUTION INTRAVENOUS at 01:05

## 2020-09-21 RX ADMIN — MICONAZOLE NITRATE: 2 POWDER TOPICAL at 21:00

## 2020-09-21 RX ADMIN — SODIUM CHLORIDE, PRESERVATIVE FREE 10 ML: 5 INJECTION INTRAVENOUS at 21:00

## 2020-09-21 RX ADMIN — POLYETHYLENE GLYCOL 3350 17 G: 17 POWDER, FOR SOLUTION ORAL at 09:54

## 2020-09-21 RX ADMIN — PENICILLIN G POTASSIUM 4 MILLION UNITS: 20000000 INJECTION, POWDER, FOR SOLUTION INTRAVENOUS at 21:00

## 2020-09-21 RX ADMIN — PENICILLIN G POTASSIUM 4 MILLION UNITS: 20000000 INJECTION, POWDER, FOR SOLUTION INTRAVENOUS at 11:04

## 2020-09-21 RX ADMIN — PENICILLIN G POTASSIUM 4 MILLION UNITS: 20000000 INJECTION, POWDER, FOR SOLUTION INTRAVENOUS at 03:35

## 2020-09-22 LAB
ALBUMIN SERPL-MCNC: 2.5 G/DL (ref 3.5–5.2)
ANION GAP SERPL CALCULATED.3IONS-SCNC: 7.4 MMOL/L (ref 5–15)
BACTERIA SPEC AEROBE CULT: ABNORMAL
BUN SERPL-MCNC: 34 MG/DL (ref 8–23)
BUN/CREAT SERPL: 37.8 (ref 7–25)
CALCIUM SPEC-SCNC: 8.3 MG/DL (ref 8.6–10.5)
CHLORIDE SERPL-SCNC: 103 MMOL/L (ref 98–107)
CO2 SERPL-SCNC: 24.6 MMOL/L (ref 22–29)
CREAT SERPL-MCNC: 0.9 MG/DL (ref 0.57–1)
GFR SERPL CREATININE-BSD FRML MDRD: 64 ML/MIN/1.73
GLUCOSE SERPL-MCNC: 95 MG/DL (ref 65–99)
GRAM STN SPEC: ABNORMAL
GRAM STN SPEC: ABNORMAL
MAGNESIUM SERPL-MCNC: 1.5 MG/DL (ref 1.6–2.4)
PHOSPHATE SERPL-MCNC: 2.8 MG/DL (ref 2.5–4.5)
POTASSIUM SERPL-SCNC: 4.1 MMOL/L (ref 3.5–5.2)
SODIUM SERPL-SCNC: 135 MMOL/L (ref 136–145)
URATE SERPL-MCNC: 7 MG/DL (ref 2.4–5.7)

## 2020-09-22 PROCEDURE — 25010000003 PENICILLIN G POTASSIUM PER 600000 UNITS: Performed by: HOSPITALIST

## 2020-09-22 PROCEDURE — 25010000003 PENICILLIN G POTASSIUM PER 600000 UNITS: Performed by: INTERNAL MEDICINE

## 2020-09-22 PROCEDURE — 97530 THERAPEUTIC ACTIVITIES: CPT

## 2020-09-22 PROCEDURE — 25010000002 MAGNESIUM SULFATE IN D5W 1G/100ML (PREMIX) 1-5 GM/100ML-% SOLUTION: Performed by: INTERNAL MEDICINE

## 2020-09-22 PROCEDURE — 97110 THERAPEUTIC EXERCISES: CPT

## 2020-09-22 PROCEDURE — 84550 ASSAY OF BLOOD/URIC ACID: CPT | Performed by: INTERNAL MEDICINE

## 2020-09-22 PROCEDURE — 83735 ASSAY OF MAGNESIUM: CPT | Performed by: INTERNAL MEDICINE

## 2020-09-22 PROCEDURE — 80069 RENAL FUNCTION PANEL: CPT | Performed by: INTERNAL MEDICINE

## 2020-09-22 RX ORDER — MAGNESIUM SULFATE 1 G/100ML
1 INJECTION INTRAVENOUS ONCE
Status: COMPLETED | OUTPATIENT
Start: 2020-09-22 | End: 2020-09-22

## 2020-09-22 RX ADMIN — Medication 1 APPLICATION: at 21:25

## 2020-09-22 RX ADMIN — PENICILLIN G POTASSIUM 4 MILLION UNITS: 20000000 INJECTION, POWDER, FOR SOLUTION INTRAVENOUS at 04:21

## 2020-09-22 RX ADMIN — PENICILLIN G POTASSIUM 4 MILLION UNITS: 20000000 INJECTION, POWDER, FOR SOLUTION INTRAVENOUS at 21:26

## 2020-09-22 RX ADMIN — SODIUM CHLORIDE, PRESERVATIVE FREE 10 ML: 5 INJECTION INTRAVENOUS at 21:24

## 2020-09-22 RX ADMIN — MICONAZOLE NITRATE: 2 POWDER TOPICAL at 10:14

## 2020-09-22 RX ADMIN — SODIUM CHLORIDE, PRESERVATIVE FREE 10 ML: 5 INJECTION INTRAVENOUS at 10:08

## 2020-09-22 RX ADMIN — MAGNESIUM SULFATE HEPTAHYDRATE 1 G: 1 INJECTION, SOLUTION INTRAVENOUS at 12:39

## 2020-09-22 RX ADMIN — PENICILLIN G POTASSIUM 4 MILLION UNITS: 20000000 INJECTION, POWDER, FOR SOLUTION INTRAVENOUS at 12:32

## 2020-09-22 RX ADMIN — RIVAROXABAN 20 MG: 20 TABLET, FILM COATED ORAL at 12:34

## 2020-09-22 RX ADMIN — PENICILLIN G POTASSIUM 4 MILLION UNITS: 20000000 INJECTION, POWDER, FOR SOLUTION INTRAVENOUS at 18:30

## 2020-09-22 RX ADMIN — MICONAZOLE NITRATE: 2 POWDER TOPICAL at 21:25

## 2020-09-22 RX ADMIN — PENICILLIN G POTASSIUM 4 MILLION UNITS: 20000000 INJECTION, POWDER, FOR SOLUTION INTRAVENOUS at 15:33

## 2020-09-23 PROBLEM — H10.31 ACUTE CONJUNCTIVITIS OF RIGHT EYE: Status: ACTIVE | Noted: 2020-09-23

## 2020-09-23 LAB
ALBUMIN SERPL-MCNC: 2.5 G/DL (ref 3.5–5.2)
ANION GAP SERPL CALCULATED.3IONS-SCNC: 6.6 MMOL/L (ref 5–15)
BUN SERPL-MCNC: 21 MG/DL (ref 8–23)
BUN/CREAT SERPL: 32.3 (ref 7–25)
CALCIUM SPEC-SCNC: 8 MG/DL (ref 8.6–10.5)
CHLORIDE SERPL-SCNC: 100 MMOL/L (ref 98–107)
CO2 SERPL-SCNC: 24.4 MMOL/L (ref 22–29)
CREAT SERPL-MCNC: 0.65 MG/DL (ref 0.57–1)
GFR SERPL CREATININE-BSD FRML MDRD: 93 ML/MIN/1.73
GLUCOSE SERPL-MCNC: 98 MG/DL (ref 65–99)
MAGNESIUM SERPL-MCNC: 1.6 MG/DL (ref 1.6–2.4)
PHOSPHATE SERPL-MCNC: 2.8 MG/DL (ref 2.5–4.5)
POTASSIUM SERPL-SCNC: 4.5 MMOL/L (ref 3.5–5.2)
SODIUM SERPL-SCNC: 131 MMOL/L (ref 136–145)

## 2020-09-23 PROCEDURE — 83735 ASSAY OF MAGNESIUM: CPT | Performed by: INTERNAL MEDICINE

## 2020-09-23 PROCEDURE — 80069 RENAL FUNCTION PANEL: CPT | Performed by: INTERNAL MEDICINE

## 2020-09-23 PROCEDURE — 25010000003 PENICILLIN G POTASSIUM PER 600000 UNITS: Performed by: HOSPITALIST

## 2020-09-23 RX ORDER — POLYMYXIN B SULFATE AND TRIMETHOPRIM 1; 10000 MG/ML; [USP'U]/ML
1 SOLUTION OPHTHALMIC EVERY 6 HOURS SCHEDULED
Status: DISCONTINUED | OUTPATIENT
Start: 2020-09-23 | End: 2020-09-23

## 2020-09-23 RX ORDER — GENTAMICIN SULFATE 3 MG/ML
1 SOLUTION/ DROPS OPHTHALMIC
Status: DISCONTINUED | OUTPATIENT
Start: 2020-09-23 | End: 2020-09-29 | Stop reason: HOSPADM

## 2020-09-23 RX ADMIN — ACETAMINOPHEN 650 MG: 325 TABLET, FILM COATED ORAL at 20:23

## 2020-09-23 RX ADMIN — GENTAMICIN SULFATE 1 DROP: 3 SOLUTION OPHTHALMIC at 17:07

## 2020-09-23 RX ADMIN — MICONAZOLE NITRATE: 2 POWDER TOPICAL at 08:40

## 2020-09-23 RX ADMIN — SODIUM CHLORIDE, PRESERVATIVE FREE 10 ML: 5 INJECTION INTRAVENOUS at 20:26

## 2020-09-23 RX ADMIN — GENTAMICIN SULFATE 1 DROP: 3 SOLUTION OPHTHALMIC at 22:00

## 2020-09-23 RX ADMIN — MICONAZOLE NITRATE: 2 POWDER TOPICAL at 20:23

## 2020-09-23 RX ADMIN — PENICILLIN G POTASSIUM 4 MILLION UNITS: 20000000 INJECTION, POWDER, FOR SOLUTION INTRAVENOUS at 02:20

## 2020-09-23 RX ADMIN — PENICILLIN G POTASSIUM 4 MILLION UNITS: 20000000 INJECTION, POWDER, FOR SOLUTION INTRAVENOUS at 17:07

## 2020-09-23 RX ADMIN — Medication 1 APPLICATION: at 08:40

## 2020-09-23 RX ADMIN — SODIUM CHLORIDE, PRESERVATIVE FREE 10 ML: 5 INJECTION INTRAVENOUS at 08:41

## 2020-09-23 RX ADMIN — RIVAROXABAN 20 MG: 20 TABLET, FILM COATED ORAL at 13:03

## 2020-09-23 RX ADMIN — PENICILLIN G POTASSIUM 4 MILLION UNITS: 20000000 INJECTION, POWDER, FOR SOLUTION INTRAVENOUS at 08:47

## 2020-09-23 RX ADMIN — PENICILLIN G POTASSIUM 4 MILLION UNITS: 20000000 INJECTION, POWDER, FOR SOLUTION INTRAVENOUS at 13:03

## 2020-09-23 RX ADMIN — PENICILLIN G POTASSIUM 4 MILLION UNITS: 20000000 INJECTION, POWDER, FOR SOLUTION INTRAVENOUS at 22:00

## 2020-09-23 RX ADMIN — PENICILLIN G POTASSIUM 4 MILLION UNITS: 20000000 INJECTION, POWDER, FOR SOLUTION INTRAVENOUS at 06:03

## 2020-09-23 RX ADMIN — ACETAMINOPHEN 650 MG: 325 TABLET, FILM COATED ORAL at 08:40

## 2020-09-23 RX ADMIN — Medication 1 APPLICATION: at 20:23

## 2020-09-23 RX ADMIN — POLYETHYLENE GLYCOL 3350 17 G: 17 POWDER, FOR SOLUTION ORAL at 20:27

## 2020-09-24 LAB
ANION GAP SERPL CALCULATED.3IONS-SCNC: 9.1 MMOL/L (ref 5–15)
BUN SERPL-MCNC: 19 MG/DL (ref 8–23)
BUN/CREAT SERPL: 25.7 (ref 7–25)
CALCIUM SPEC-SCNC: 8.2 MG/DL (ref 8.6–10.5)
CHLORIDE SERPL-SCNC: 102 MMOL/L (ref 98–107)
CO2 SERPL-SCNC: 23.9 MMOL/L (ref 22–29)
CREAT SERPL-MCNC: 0.74 MG/DL (ref 0.57–1)
CREAT UR-MCNC: 65.3 MG/DL
DEPRECATED RDW RBC AUTO: 44.4 FL (ref 37–54)
ERYTHROCYTE [DISTWIDTH] IN BLOOD BY AUTOMATED COUNT: 14.4 % (ref 12.3–15.4)
FERRITIN SERPL-MCNC: 590 NG/ML (ref 13–150)
GFR SERPL CREATININE-BSD FRML MDRD: 80 ML/MIN/1.73
GLUCOSE SERPL-MCNC: 88 MG/DL (ref 65–99)
HCT VFR BLD AUTO: 26.8 % (ref 34–46.6)
HGB BLD-MCNC: 8.3 G/DL (ref 12–15.9)
IRON 24H UR-MRATE: 19 MCG/DL (ref 37–145)
IRON SATN MFR SERPL: 8 % (ref 20–50)
MAGNESIUM SERPL-MCNC: 1.7 MG/DL (ref 1.6–2.4)
MCH RBC QN AUTO: 26.2 PG (ref 26.6–33)
MCHC RBC AUTO-ENTMCNC: 31 G/DL (ref 31.5–35.7)
MCV RBC AUTO: 84.5 FL (ref 79–97)
OSMOLALITY UR: 529 MOSM/KG
PLATELET # BLD AUTO: 361 10*3/MM3 (ref 140–450)
PMV BLD AUTO: 9 FL (ref 6–12)
POTASSIUM SERPL-SCNC: 4.7 MMOL/L (ref 3.5–5.2)
RBC # BLD AUTO: 3.17 10*6/MM3 (ref 3.77–5.28)
RETICS # AUTO: 0.08 10*6/MM3 (ref 0.02–0.13)
RETICS/RBC NFR AUTO: 2.41 % (ref 0.7–1.9)
SODIUM SERPL-SCNC: 135 MMOL/L (ref 136–145)
SODIUM UR-SCNC: 36 MMOL/L
TIBC SERPL-MCNC: 228 MCG/DL (ref 298–536)
TRANSFERRIN SERPL-MCNC: 153 MG/DL (ref 200–360)
WBC # BLD AUTO: 7.97 10*3/MM3 (ref 3.4–10.8)

## 2020-09-24 PROCEDURE — 83935 ASSAY OF URINE OSMOLALITY: CPT | Performed by: NURSE PRACTITIONER

## 2020-09-24 PROCEDURE — 85045 AUTOMATED RETICULOCYTE COUNT: CPT | Performed by: NURSE PRACTITIONER

## 2020-09-24 PROCEDURE — 80048 BASIC METABOLIC PNL TOTAL CA: CPT | Performed by: NURSE PRACTITIONER

## 2020-09-24 PROCEDURE — 83735 ASSAY OF MAGNESIUM: CPT | Performed by: NURSE PRACTITIONER

## 2020-09-24 PROCEDURE — 82570 ASSAY OF URINE CREATININE: CPT | Performed by: INTERNAL MEDICINE

## 2020-09-24 PROCEDURE — 25010000003 PENICILLIN G POTASSIUM PER 600000 UNITS: Performed by: HOSPITALIST

## 2020-09-24 PROCEDURE — 97535 SELF CARE MNGMENT TRAINING: CPT

## 2020-09-24 PROCEDURE — 83540 ASSAY OF IRON: CPT | Performed by: NURSE PRACTITIONER

## 2020-09-24 PROCEDURE — 84300 ASSAY OF URINE SODIUM: CPT | Performed by: NURSE PRACTITIONER

## 2020-09-24 PROCEDURE — 84466 ASSAY OF TRANSFERRIN: CPT | Performed by: NURSE PRACTITIONER

## 2020-09-24 PROCEDURE — 85027 COMPLETE CBC AUTOMATED: CPT | Performed by: NURSE PRACTITIONER

## 2020-09-24 PROCEDURE — 82728 ASSAY OF FERRITIN: CPT | Performed by: NURSE PRACTITIONER

## 2020-09-24 RX ADMIN — PENICILLIN G POTASSIUM 4 MILLION UNITS: 20000000 INJECTION, POWDER, FOR SOLUTION INTRAVENOUS at 08:32

## 2020-09-24 RX ADMIN — RIVAROXABAN 20 MG: 20 TABLET, FILM COATED ORAL at 12:58

## 2020-09-24 RX ADMIN — SODIUM CHLORIDE, PRESERVATIVE FREE 10 ML: 5 INJECTION INTRAVENOUS at 08:30

## 2020-09-24 RX ADMIN — GENTAMICIN SULFATE 1 DROP: 3 SOLUTION OPHTHALMIC at 08:32

## 2020-09-24 RX ADMIN — Medication 1 APPLICATION: at 20:59

## 2020-09-24 RX ADMIN — GENTAMICIN SULFATE 1 DROP: 3 SOLUTION OPHTHALMIC at 21:00

## 2020-09-24 RX ADMIN — PENICILLIN G POTASSIUM 4 MILLION UNITS: 20000000 INJECTION, POWDER, FOR SOLUTION INTRAVENOUS at 13:00

## 2020-09-24 RX ADMIN — PENICILLIN G POTASSIUM 4 MILLION UNITS: 20000000 INJECTION, POWDER, FOR SOLUTION INTRAVENOUS at 21:00

## 2020-09-24 RX ADMIN — PENICILLIN G POTASSIUM 4 MILLION UNITS: 20000000 INJECTION, POWDER, FOR SOLUTION INTRAVENOUS at 17:02

## 2020-09-24 RX ADMIN — SODIUM CHLORIDE, PRESERVATIVE FREE 10 ML: 5 INJECTION INTRAVENOUS at 20:59

## 2020-09-24 RX ADMIN — GENTAMICIN SULFATE 1 DROP: 3 SOLUTION OPHTHALMIC at 13:00

## 2020-09-24 RX ADMIN — ACETAMINOPHEN 650 MG: 325 TABLET, FILM COATED ORAL at 18:32

## 2020-09-24 RX ADMIN — PENICILLIN G POTASSIUM 4 MILLION UNITS: 20000000 INJECTION, POWDER, FOR SOLUTION INTRAVENOUS at 02:10

## 2020-09-24 RX ADMIN — ACETAMINOPHEN 650 MG: 325 TABLET, FILM COATED ORAL at 03:41

## 2020-09-24 RX ADMIN — Medication 1 APPLICATION: at 08:29

## 2020-09-24 RX ADMIN — GENTAMICIN SULFATE 1 DROP: 3 SOLUTION OPHTHALMIC at 05:33

## 2020-09-24 RX ADMIN — MICONAZOLE NITRATE: 2 POWDER TOPICAL at 08:29

## 2020-09-24 RX ADMIN — PENICILLIN G POTASSIUM 4 MILLION UNITS: 20000000 INJECTION, POWDER, FOR SOLUTION INTRAVENOUS at 06:52

## 2020-09-24 RX ADMIN — ACETAMINOPHEN 650 MG: 325 TABLET, FILM COATED ORAL at 08:29

## 2020-09-24 RX ADMIN — GENTAMICIN SULFATE 1 DROP: 3 SOLUTION OPHTHALMIC at 17:02

## 2020-09-24 RX ADMIN — MICONAZOLE NITRATE: 2 POWDER TOPICAL at 21:01

## 2020-09-25 ENCOUNTER — EPISODE CHANGES (OUTPATIENT)
Dept: CASE MANAGEMENT | Facility: OTHER | Age: 60
End: 2020-09-25

## 2020-09-25 ENCOUNTER — TELEPHONE (OUTPATIENT)
Dept: FAMILY MEDICINE CLINIC | Facility: CLINIC | Age: 60
End: 2020-09-25

## 2020-09-25 LAB
ANION GAP SERPL CALCULATED.3IONS-SCNC: 9.6 MMOL/L (ref 5–15)
BACTERIA SPEC AEROBE CULT: NORMAL
BACTERIA SPEC AEROBE CULT: NORMAL
BUN SERPL-MCNC: 16 MG/DL (ref 8–23)
BUN/CREAT SERPL: 20 (ref 7–25)
CALCIUM SPEC-SCNC: 8.5 MG/DL (ref 8.6–10.5)
CHLORIDE SERPL-SCNC: 100 MMOL/L (ref 98–107)
CO2 SERPL-SCNC: 23.4 MMOL/L (ref 22–29)
CREAT SERPL-MCNC: 0.8 MG/DL (ref 0.57–1)
DEPRECATED RDW RBC AUTO: 43.4 FL (ref 37–54)
ERYTHROCYTE [DISTWIDTH] IN BLOOD BY AUTOMATED COUNT: 14.3 % (ref 12.3–15.4)
FOLATE SERPL-MCNC: 7 NG/ML (ref 4.78–24.2)
GFR SERPL CREATININE-BSD FRML MDRD: 73 ML/MIN/1.73
GLUCOSE SERPL-MCNC: 87 MG/DL (ref 65–99)
HCT VFR BLD AUTO: 27 % (ref 34–46.6)
HGB BLD-MCNC: 8.5 G/DL (ref 12–15.9)
MCH RBC QN AUTO: 26.5 PG (ref 26.6–33)
MCHC RBC AUTO-ENTMCNC: 31.5 G/DL (ref 31.5–35.7)
MCV RBC AUTO: 84.1 FL (ref 79–97)
PLATELET # BLD AUTO: 349 10*3/MM3 (ref 140–450)
PMV BLD AUTO: 8.9 FL (ref 6–12)
POTASSIUM SERPL-SCNC: 4.5 MMOL/L (ref 3.5–5.2)
RBC # BLD AUTO: 3.21 10*6/MM3 (ref 3.77–5.28)
SODIUM SERPL-SCNC: 133 MMOL/L (ref 136–145)
VIT B12 BLD-MCNC: 1103 PG/ML (ref 211–946)
WBC # BLD AUTO: 5.95 10*3/MM3 (ref 3.4–10.8)

## 2020-09-25 PROCEDURE — 25010000003 PENICILLIN G POTASSIUM PER 600000 UNITS: Performed by: HOSPITALIST

## 2020-09-25 PROCEDURE — 80048 BASIC METABOLIC PNL TOTAL CA: CPT | Performed by: NURSE PRACTITIONER

## 2020-09-25 PROCEDURE — 82746 ASSAY OF FOLIC ACID SERUM: CPT | Performed by: NURSE PRACTITIONER

## 2020-09-25 PROCEDURE — 85027 COMPLETE CBC AUTOMATED: CPT | Performed by: NURSE PRACTITIONER

## 2020-09-25 PROCEDURE — 82607 VITAMIN B-12: CPT | Performed by: NURSE PRACTITIONER

## 2020-09-25 RX ORDER — FERROUS SULFATE 325(65) MG
325 TABLET ORAL
Status: DISCONTINUED | OUTPATIENT
Start: 2020-09-26 | End: 2020-09-29 | Stop reason: HOSPADM

## 2020-09-25 RX ORDER — METOPROLOL SUCCINATE 25 MG/1
25 TABLET, EXTENDED RELEASE ORAL DAILY
Status: DISCONTINUED | OUTPATIENT
Start: 2020-09-25 | End: 2020-09-29 | Stop reason: HOSPADM

## 2020-09-25 RX ORDER — OXYBUTYNIN CHLORIDE 5 MG/1
5 TABLET ORAL DAILY
Status: DISCONTINUED | OUTPATIENT
Start: 2020-09-25 | End: 2020-09-29 | Stop reason: HOSPADM

## 2020-09-25 RX ORDER — FUROSEMIDE 40 MG/1
40 TABLET ORAL DAILY
Status: DISCONTINUED | OUTPATIENT
Start: 2020-09-25 | End: 2020-09-29 | Stop reason: HOSPADM

## 2020-09-25 RX ADMIN — GENTAMICIN SULFATE 1 DROP: 3 SOLUTION OPHTHALMIC at 05:18

## 2020-09-25 RX ADMIN — GENTAMICIN SULFATE 1 DROP: 3 SOLUTION OPHTHALMIC at 22:21

## 2020-09-25 RX ADMIN — PENICILLIN G POTASSIUM 4 MILLION UNITS: 20000000 INJECTION, POWDER, FOR SOLUTION INTRAVENOUS at 22:21

## 2020-09-25 RX ADMIN — ACETAMINOPHEN 650 MG: 325 TABLET, FILM COATED ORAL at 17:52

## 2020-09-25 RX ADMIN — ACETAMINOPHEN 650 MG: 325 TABLET, FILM COATED ORAL at 23:17

## 2020-09-25 RX ADMIN — PENICILLIN G POTASSIUM 4 MILLION UNITS: 20000000 INJECTION, POWDER, FOR SOLUTION INTRAVENOUS at 05:18

## 2020-09-25 RX ADMIN — PENICILLIN G POTASSIUM 4 MILLION UNITS: 20000000 INJECTION, POWDER, FOR SOLUTION INTRAVENOUS at 17:52

## 2020-09-25 RX ADMIN — SODIUM CHLORIDE, PRESERVATIVE FREE 10 ML: 5 INJECTION INTRAVENOUS at 20:03

## 2020-09-25 RX ADMIN — PENICILLIN G POTASSIUM 4 MILLION UNITS: 20000000 INJECTION, POWDER, FOR SOLUTION INTRAVENOUS at 09:34

## 2020-09-25 RX ADMIN — GENTAMICIN SULFATE 1 DROP: 3 SOLUTION OPHTHALMIC at 17:53

## 2020-09-25 RX ADMIN — ACETAMINOPHEN 650 MG: 325 TABLET, FILM COATED ORAL at 05:18

## 2020-09-25 RX ADMIN — SODIUM CHLORIDE, PRESERVATIVE FREE 10 ML: 5 INJECTION INTRAVENOUS at 09:35

## 2020-09-25 RX ADMIN — METOPROLOL SUCCINATE 25 MG: 25 TABLET, EXTENDED RELEASE ORAL at 17:52

## 2020-09-25 RX ADMIN — RIVAROXABAN 20 MG: 20 TABLET, FILM COATED ORAL at 12:32

## 2020-09-25 RX ADMIN — MICONAZOLE NITRATE: 2 POWDER TOPICAL at 20:05

## 2020-09-25 RX ADMIN — FUROSEMIDE 40 MG: 40 TABLET ORAL at 17:52

## 2020-09-25 RX ADMIN — GENTAMICIN SULFATE 1 DROP: 3 SOLUTION OPHTHALMIC at 13:23

## 2020-09-25 RX ADMIN — MICONAZOLE NITRATE: 2 POWDER TOPICAL at 09:35

## 2020-09-25 RX ADMIN — PENICILLIN G POTASSIUM 4 MILLION UNITS: 20000000 INJECTION, POWDER, FOR SOLUTION INTRAVENOUS at 13:23

## 2020-09-25 RX ADMIN — ACETAMINOPHEN 650 MG: 325 TABLET, FILM COATED ORAL at 01:12

## 2020-09-25 RX ADMIN — OXYBUTYNIN CHLORIDE 5 MG: 5 TABLET ORAL at 17:52

## 2020-09-25 RX ADMIN — Medication 1 APPLICATION: at 09:35

## 2020-09-25 RX ADMIN — PENICILLIN G POTASSIUM 4 MILLION UNITS: 20000000 INJECTION, POWDER, FOR SOLUTION INTRAVENOUS at 01:12

## 2020-09-25 RX ADMIN — Medication 1 APPLICATION: at 20:03

## 2020-09-25 RX ADMIN — GENTAMICIN SULFATE 1 DROP: 3 SOLUTION OPHTHALMIC at 09:34

## 2020-09-25 NOTE — TELEPHONE ENCOUNTER
Brother's pt, Tamra Cintron called twice, and would like some information about her sister. He is not in the information release form, but says he fixed this at the hospital. States waiting for a call for information about her sister health status.

## 2020-09-26 PROCEDURE — 25010000003 PENICILLIN G POTASSIUM PER 600000 UNITS: Performed by: HOSPITALIST

## 2020-09-26 RX ADMIN — GENTAMICIN SULFATE 1 DROP: 3 SOLUTION OPHTHALMIC at 13:11

## 2020-09-26 RX ADMIN — MICONAZOLE NITRATE: 2 POWDER TOPICAL at 21:36

## 2020-09-26 RX ADMIN — PENICILLIN G POTASSIUM 4 MILLION UNITS: 20000000 INJECTION, POWDER, FOR SOLUTION INTRAVENOUS at 05:18

## 2020-09-26 RX ADMIN — SODIUM CHLORIDE, PRESERVATIVE FREE 10 ML: 5 INJECTION INTRAVENOUS at 21:36

## 2020-09-26 RX ADMIN — PENICILLIN G POTASSIUM 4 MILLION UNITS: 20000000 INJECTION, POWDER, FOR SOLUTION INTRAVENOUS at 01:05

## 2020-09-26 RX ADMIN — Medication 1 APPLICATION: at 09:06

## 2020-09-26 RX ADMIN — GENTAMICIN SULFATE 1 DROP: 3 SOLUTION OPHTHALMIC at 18:15

## 2020-09-26 RX ADMIN — FERROUS SULFATE TAB 325 MG (65 MG ELEMENTAL FE) 325 MG: 325 (65 FE) TAB at 09:06

## 2020-09-26 RX ADMIN — SODIUM CHLORIDE, PRESERVATIVE FREE 10 ML: 5 INJECTION INTRAVENOUS at 09:08

## 2020-09-26 RX ADMIN — MICONAZOLE NITRATE: 2 POWDER TOPICAL at 09:07

## 2020-09-26 RX ADMIN — METOPROLOL SUCCINATE 25 MG: 25 TABLET, EXTENDED RELEASE ORAL at 09:07

## 2020-09-26 RX ADMIN — PENICILLIN G POTASSIUM 4 MILLION UNITS: 20000000 INJECTION, POWDER, FOR SOLUTION INTRAVENOUS at 18:15

## 2020-09-26 RX ADMIN — RIVAROXABAN 20 MG: 20 TABLET, FILM COATED ORAL at 13:11

## 2020-09-26 RX ADMIN — PENICILLIN G POTASSIUM 4 MILLION UNITS: 20000000 INJECTION, POWDER, FOR SOLUTION INTRAVENOUS at 09:07

## 2020-09-26 RX ADMIN — FUROSEMIDE 40 MG: 40 TABLET ORAL at 09:07

## 2020-09-26 RX ADMIN — Medication 1 APPLICATION: at 21:35

## 2020-09-26 RX ADMIN — PENICILLIN G POTASSIUM 4 MILLION UNITS: 20000000 INJECTION, POWDER, FOR SOLUTION INTRAVENOUS at 21:35

## 2020-09-26 RX ADMIN — GENTAMICIN SULFATE 1 DROP: 3 SOLUTION OPHTHALMIC at 21:35

## 2020-09-26 RX ADMIN — GENTAMICIN SULFATE 1 DROP: 3 SOLUTION OPHTHALMIC at 09:06

## 2020-09-26 RX ADMIN — OXYBUTYNIN CHLORIDE 5 MG: 5 TABLET ORAL at 09:07

## 2020-09-26 RX ADMIN — PENICILLIN G POTASSIUM 4 MILLION UNITS: 20000000 INJECTION, POWDER, FOR SOLUTION INTRAVENOUS at 13:11

## 2020-09-26 RX ADMIN — GENTAMICIN SULFATE 1 DROP: 3 SOLUTION OPHTHALMIC at 05:18

## 2020-09-27 PROCEDURE — 25010000003 PENICILLIN G POTASSIUM PER 600000 UNITS: Performed by: HOSPITALIST

## 2020-09-27 RX ADMIN — GENTAMICIN SULFATE 1 DROP: 3 SOLUTION OPHTHALMIC at 08:47

## 2020-09-27 RX ADMIN — MICONAZOLE NITRATE: 2 POWDER TOPICAL at 21:05

## 2020-09-27 RX ADMIN — FUROSEMIDE 40 MG: 40 TABLET ORAL at 08:46

## 2020-09-27 RX ADMIN — RIVAROXABAN 20 MG: 20 TABLET, FILM COATED ORAL at 13:36

## 2020-09-27 RX ADMIN — METOPROLOL SUCCINATE 25 MG: 25 TABLET, EXTENDED RELEASE ORAL at 08:46

## 2020-09-27 RX ADMIN — OXYBUTYNIN CHLORIDE 5 MG: 5 TABLET ORAL at 08:46

## 2020-09-27 RX ADMIN — PENICILLIN G POTASSIUM 4 MILLION UNITS: 20000000 INJECTION, POWDER, FOR SOLUTION INTRAVENOUS at 21:04

## 2020-09-27 RX ADMIN — PENICILLIN G POTASSIUM 4 MILLION UNITS: 20000000 INJECTION, POWDER, FOR SOLUTION INTRAVENOUS at 13:36

## 2020-09-27 RX ADMIN — PENICILLIN G POTASSIUM 4 MILLION UNITS: 20000000 INJECTION, POWDER, FOR SOLUTION INTRAVENOUS at 17:16

## 2020-09-27 RX ADMIN — Medication 1 APPLICATION: at 21:04

## 2020-09-27 RX ADMIN — FERROUS SULFATE TAB 325 MG (65 MG ELEMENTAL FE) 325 MG: 325 (65 FE) TAB at 08:46

## 2020-09-27 RX ADMIN — GENTAMICIN SULFATE 1 DROP: 3 SOLUTION OPHTHALMIC at 13:36

## 2020-09-27 RX ADMIN — MICONAZOLE NITRATE: 2 POWDER TOPICAL at 08:47

## 2020-09-27 RX ADMIN — SODIUM CHLORIDE, PRESERVATIVE FREE 10 ML: 5 INJECTION INTRAVENOUS at 21:05

## 2020-09-27 RX ADMIN — Medication 1 APPLICATION: at 08:46

## 2020-09-27 RX ADMIN — GENTAMICIN SULFATE 1 DROP: 3 SOLUTION OPHTHALMIC at 21:05

## 2020-09-27 RX ADMIN — ACETAMINOPHEN 650 MG: 325 TABLET, FILM COATED ORAL at 17:16

## 2020-09-27 RX ADMIN — GENTAMICIN SULFATE 1 DROP: 3 SOLUTION OPHTHALMIC at 17:16

## 2020-09-27 RX ADMIN — PENICILLIN G POTASSIUM 4 MILLION UNITS: 20000000 INJECTION, POWDER, FOR SOLUTION INTRAVENOUS at 01:59

## 2020-09-27 RX ADMIN — PENICILLIN G POTASSIUM 4 MILLION UNITS: 20000000 INJECTION, POWDER, FOR SOLUTION INTRAVENOUS at 05:02

## 2020-09-27 RX ADMIN — SODIUM CHLORIDE, PRESERVATIVE FREE 10 ML: 5 INJECTION INTRAVENOUS at 08:47

## 2020-09-27 RX ADMIN — PENICILLIN G POTASSIUM 4 MILLION UNITS: 20000000 INJECTION, POWDER, FOR SOLUTION INTRAVENOUS at 08:46

## 2020-09-27 RX ADMIN — GENTAMICIN SULFATE 1 DROP: 3 SOLUTION OPHTHALMIC at 05:02

## 2020-09-28 LAB
ANION GAP SERPL CALCULATED.3IONS-SCNC: 7 MMOL/L (ref 5–15)
BUN SERPL-MCNC: 14 MG/DL (ref 8–23)
BUN/CREAT SERPL: 19.2 (ref 7–25)
CALCIUM SPEC-SCNC: 8.4 MG/DL (ref 8.6–10.5)
CHLORIDE SERPL-SCNC: 99 MMOL/L (ref 98–107)
CO2 SERPL-SCNC: 27 MMOL/L (ref 22–29)
CREAT SERPL-MCNC: 0.73 MG/DL (ref 0.57–1)
DEPRECATED RDW RBC AUTO: 41.7 FL (ref 37–54)
ERYTHROCYTE [DISTWIDTH] IN BLOOD BY AUTOMATED COUNT: 14 % (ref 12.3–15.4)
GFR SERPL CREATININE-BSD FRML MDRD: 81 ML/MIN/1.73
GLUCOSE SERPL-MCNC: 90 MG/DL (ref 65–99)
HCT VFR BLD AUTO: 26 % (ref 34–46.6)
HGB BLD-MCNC: 8.2 G/DL (ref 12–15.9)
MCH RBC QN AUTO: 26 PG (ref 26.6–33)
MCHC RBC AUTO-ENTMCNC: 31.5 G/DL (ref 31.5–35.7)
MCV RBC AUTO: 82.5 FL (ref 79–97)
PLATELET # BLD AUTO: 431 10*3/MM3 (ref 140–450)
PMV BLD AUTO: 8.3 FL (ref 6–12)
POTASSIUM SERPL-SCNC: 4.4 MMOL/L (ref 3.5–5.2)
RBC # BLD AUTO: 3.15 10*6/MM3 (ref 3.77–5.28)
SODIUM SERPL-SCNC: 133 MMOL/L (ref 136–145)
WBC # BLD AUTO: 5.85 10*3/MM3 (ref 3.4–10.8)

## 2020-09-28 PROCEDURE — 80048 BASIC METABOLIC PNL TOTAL CA: CPT | Performed by: HOSPITALIST

## 2020-09-28 PROCEDURE — 97110 THERAPEUTIC EXERCISES: CPT

## 2020-09-28 PROCEDURE — 25010000003 PENICILLIN G POTASSIUM PER 600000 UNITS: Performed by: HOSPITALIST

## 2020-09-28 PROCEDURE — 85027 COMPLETE CBC AUTOMATED: CPT | Performed by: HOSPITALIST

## 2020-09-28 PROCEDURE — 97530 THERAPEUTIC ACTIVITIES: CPT

## 2020-09-28 RX ORDER — PENICILLIN V POTASSIUM 500 MG/1
500 TABLET ORAL EVERY 6 HOURS SCHEDULED
Status: DISCONTINUED | OUTPATIENT
Start: 2020-09-28 | End: 2020-09-29 | Stop reason: HOSPADM

## 2020-09-28 RX ADMIN — PENICILLIN G POTASSIUM 4 MILLION UNITS: 20000000 INJECTION, POWDER, FOR SOLUTION INTRAVENOUS at 12:40

## 2020-09-28 RX ADMIN — PENICILLIN G POTASSIUM 4 MILLION UNITS: 20000000 INJECTION, POWDER, FOR SOLUTION INTRAVENOUS at 08:24

## 2020-09-28 RX ADMIN — GENTAMICIN SULFATE 1 DROP: 3 SOLUTION OPHTHALMIC at 22:31

## 2020-09-28 RX ADMIN — SODIUM CHLORIDE, PRESERVATIVE FREE 10 ML: 5 INJECTION INTRAVENOUS at 22:32

## 2020-09-28 RX ADMIN — PENICILLIN G POTASSIUM 4 MILLION UNITS: 20000000 INJECTION, POWDER, FOR SOLUTION INTRAVENOUS at 18:29

## 2020-09-28 RX ADMIN — GENTAMICIN SULFATE 1 DROP: 3 SOLUTION OPHTHALMIC at 08:24

## 2020-09-28 RX ADMIN — GENTAMICIN SULFATE 1 DROP: 3 SOLUTION OPHTHALMIC at 12:40

## 2020-09-28 RX ADMIN — MICONAZOLE NITRATE: 2 POWDER TOPICAL at 22:32

## 2020-09-28 RX ADMIN — PENICILLIN V POTASSIUM 500 MG: 500 TABLET, FILM COATED ORAL at 22:31

## 2020-09-28 RX ADMIN — MICONAZOLE NITRATE: 2 POWDER TOPICAL at 08:27

## 2020-09-28 RX ADMIN — PENICILLIN G POTASSIUM 4 MILLION UNITS: 20000000 INJECTION, POWDER, FOR SOLUTION INTRAVENOUS at 01:43

## 2020-09-28 RX ADMIN — GENTAMICIN SULFATE 1 DROP: 3 SOLUTION OPHTHALMIC at 05:18

## 2020-09-28 RX ADMIN — GENTAMICIN SULFATE 1 DROP: 3 SOLUTION OPHTHALMIC at 19:30

## 2020-09-28 RX ADMIN — SODIUM CHLORIDE, PRESERVATIVE FREE 10 ML: 5 INJECTION INTRAVENOUS at 08:26

## 2020-09-28 RX ADMIN — Medication 1 APPLICATION: at 22:31

## 2020-09-28 RX ADMIN — POLYETHYLENE GLYCOL 3350 17 G: 17 POWDER, FOR SOLUTION ORAL at 08:26

## 2020-09-28 RX ADMIN — METOPROLOL SUCCINATE 25 MG: 25 TABLET, EXTENDED RELEASE ORAL at 08:25

## 2020-09-28 RX ADMIN — PENICILLIN G POTASSIUM 4 MILLION UNITS: 20000000 INJECTION, POWDER, FOR SOLUTION INTRAVENOUS at 05:18

## 2020-09-28 RX ADMIN — RIVAROXABAN 20 MG: 20 TABLET, FILM COATED ORAL at 12:40

## 2020-09-28 RX ADMIN — FERROUS SULFATE TAB 325 MG (65 MG ELEMENTAL FE) 325 MG: 325 (65 FE) TAB at 08:25

## 2020-09-28 RX ADMIN — Medication 1 APPLICATION: at 08:25

## 2020-09-28 RX ADMIN — OXYBUTYNIN CHLORIDE 5 MG: 5 TABLET ORAL at 08:25

## 2020-09-28 RX ADMIN — FUROSEMIDE 40 MG: 40 TABLET ORAL at 08:25

## 2020-09-28 RX ADMIN — ACETAMINOPHEN 650 MG: 325 TABLET, FILM COATED ORAL at 19:30

## 2020-09-29 VITALS
DIASTOLIC BLOOD PRESSURE: 76 MMHG | HEART RATE: 78 BPM | WEIGHT: 293 LBS | TEMPERATURE: 98 F | HEIGHT: 63 IN | RESPIRATION RATE: 18 BRPM | SYSTOLIC BLOOD PRESSURE: 128 MMHG | OXYGEN SATURATION: 96 % | BODY MASS INDEX: 51.91 KG/M2

## 2020-09-29 PROBLEM — R78.81 BACTEREMIA DUE TO STREPTOCOCCUS: Status: RESOLVED | Noted: 2017-11-13 | Resolved: 2020-09-29

## 2020-09-29 PROBLEM — B95.5 BACTEREMIA DUE TO STREPTOCOCCUS: Status: RESOLVED | Noted: 2017-11-13 | Resolved: 2020-09-29

## 2020-09-29 PROBLEM — N17.9 ACUTE KIDNEY INJURY (HCC): Status: RESOLVED | Noted: 2017-11-12 | Resolved: 2020-09-29

## 2020-09-29 PROCEDURE — 99232 SBSQ HOSP IP/OBS MODERATE 35: CPT | Performed by: INTERNAL MEDICINE

## 2020-09-29 RX ORDER — FUROSEMIDE 40 MG/1
40 TABLET ORAL DAILY
Start: 2020-09-29 | End: 2020-11-18 | Stop reason: SDUPTHER

## 2020-09-29 RX ORDER — AMOXICILLIN 250 MG
1 CAPSULE ORAL NIGHTLY PRN
Start: 2020-09-29 | End: 2020-11-18

## 2020-09-29 RX ORDER — POLYETHYLENE GLYCOL 3350 17 G/17G
17 POWDER, FOR SOLUTION ORAL 2 TIMES DAILY
Start: 2020-09-29 | End: 2020-11-18

## 2020-09-29 RX ORDER — PENICILLIN V POTASSIUM 500 MG/1
500 TABLET ORAL EVERY 6 HOURS SCHEDULED
Qty: 21 TABLET | Refills: 0 | Status: SHIPPED | OUTPATIENT
Start: 2020-09-29 | End: 2020-10-05

## 2020-09-29 RX ORDER — ACETAMINOPHEN 325 MG/1
650 TABLET ORAL EVERY 4 HOURS PRN
Start: 2020-09-29

## 2020-09-29 RX ORDER — GENTAMICIN SULFATE 3 MG/ML
1 SOLUTION/ DROPS OPHTHALMIC
Qty: 10 ML | Refills: 0 | Status: SHIPPED | OUTPATIENT
Start: 2020-09-29 | End: 2020-09-30

## 2020-09-29 RX ORDER — FERROUS SULFATE 325(65) MG
325 TABLET ORAL
Start: 2020-09-30 | End: 2020-11-16 | Stop reason: SDUPTHER

## 2020-09-29 RX ADMIN — RIVAROXABAN 20 MG: 20 TABLET, FILM COATED ORAL at 11:52

## 2020-09-29 RX ADMIN — Medication 1 APPLICATION: at 08:21

## 2020-09-29 RX ADMIN — MICONAZOLE NITRATE: 2 POWDER TOPICAL at 08:32

## 2020-09-29 RX ADMIN — FERROUS SULFATE TAB 325 MG (65 MG ELEMENTAL FE) 325 MG: 325 (65 FE) TAB at 08:20

## 2020-09-29 RX ADMIN — PENICILLIN V POTASSIUM 500 MG: 500 TABLET, FILM COATED ORAL at 05:08

## 2020-09-29 RX ADMIN — METOPROLOL SUCCINATE 25 MG: 25 TABLET, EXTENDED RELEASE ORAL at 08:20

## 2020-09-29 RX ADMIN — GENTAMICIN SULFATE 1 DROP: 3 SOLUTION OPHTHALMIC at 05:08

## 2020-09-29 RX ADMIN — GENTAMICIN SULFATE 1 DROP: 3 SOLUTION OPHTHALMIC at 11:52

## 2020-09-29 RX ADMIN — POLYETHYLENE GLYCOL 3350 17 G: 17 POWDER, FOR SOLUTION ORAL at 08:20

## 2020-09-29 RX ADMIN — SODIUM CHLORIDE, PRESERVATIVE FREE 10 ML: 5 INJECTION INTRAVENOUS at 08:33

## 2020-09-29 RX ADMIN — OXYBUTYNIN CHLORIDE 5 MG: 5 TABLET ORAL at 08:20

## 2020-09-29 RX ADMIN — GENTAMICIN SULFATE 1 DROP: 3 SOLUTION OPHTHALMIC at 14:35

## 2020-09-29 RX ADMIN — FUROSEMIDE 40 MG: 40 TABLET ORAL at 08:20

## 2020-09-29 RX ADMIN — PENICILLIN V POTASSIUM 500 MG: 500 TABLET, FILM COATED ORAL at 11:53

## 2020-10-08 ENCOUNTER — PATIENT OUTREACH (OUTPATIENT)
Dept: CASE MANAGEMENT | Facility: OTHER | Age: 60
End: 2020-10-08

## 2020-10-08 NOTE — OUTREACH NOTE
Patient Outreach Note    Spoke briefly with Emely.  She is still admitted to the SNF [facility].  She was in the process of having her leg wrapped (cellulitis).  She agreed to a call back at a more convenient time.        Florida Escalante RN  Ambulatory     10/8/2020, 11:36 EDT    Florida ALEXANDER, RN, [facility]   RN Case Manager  [address] [phone] cell   [phone] office  [phone] fax  [email or web address]

## 2020-11-03 ENCOUNTER — PATIENT OUTREACH (OUTPATIENT)
Dept: CASE MANAGEMENT | Facility: OTHER | Age: 60
End: 2020-11-03

## 2020-11-03 NOTE — OUTREACH NOTE
Patient Outreach Note    Emely is still inpt at SNF.  She should be discharged on 11/10 and will either go home with home health or lymphedema therapy.   She agreed to a call when she gets home to see if there are any new needs.      Florida Escalante RN  Ambulatory     11/3/2020, 13:19 EST   Florida ALEXANDER, RN, [facility]   [place] [address]     [phone] cell   [phone] office  [phone] fax  [email or web address]

## 2020-11-04 ENCOUNTER — PATIENT OUTREACH (OUTPATIENT)
Dept: CASE MANAGEMENT | Facility: OTHER | Age: 60
End: 2020-11-04

## 2020-11-04 NOTE — OUTREACH NOTE
Patient Outreach Note    Returned patients call.  She had called to verify our next follow up call.  She shared with me that she is not available on 11/11 because she was meeting with a  to draw up a will.  I educated her on the importance of also having a ACP/AD in place.  She didn't really want to discuss at this time.  I also offered to send the paperwork and she declined at this time.  I also suggested she could also ask to speak with the SW at her SNF and they could provide her with information and answer questions.      I encouraged her to call and speak with her  because she said she felt like they needed to discuss it before meeting with an .  Suggested she call with questions or concerns and otherwise I will will follow up with her on 11/12 to make sure there are no needs and that either HH or lymphedema therapy has been scheduled.    No issue with social determinants.  No inabilities to obtain food or medications or transportation to MD appointments. Educated on participating in habits that prevent the spread of COVID virus with home & work hygiene. Patient verbalizes understanding.     Educated patient on benefits of Employee CM program and invited to call with any new needs.  Encouraged use of [facility] nurseline if needed.      Florida Escalante, RAMIN  Ambulatory     11/4/2020, 10:56 EST   Florida FRANKLINN, RN, [facility]   [place] [address]     [phone] cell   [phone] office  [phone] fax  [email or web address]  [facility]

## 2020-11-12 ENCOUNTER — PATIENT OUTREACH (OUTPATIENT)
Dept: CASE MANAGEMENT | Facility: OTHER | Age: 60
End: 2020-11-12

## 2020-11-12 NOTE — OUTREACH NOTE
Patient Outreach Note    Emely was discharged from SNF on 11/10.  She is feeling well and was visited by a [facility] HH representative yesterday.  They are still determining if she needs HH/PT or lymphedema therapy.  Her  was also discharged from a hospital/rehab after inquinal hernia surgery & Covid.   Emely is also recovering from COVID.      Today I educated on healthy eating made simple.  Encouraged foods high in vitamin C.  Suggested they boil a few eggs they can grab as snacks.  Suggested having bananas on hand and whole wheat bread (listed as the first ingredient) and peanut butter.  Stressed the importance of a diet high in good protein and fruits and vegetables.  We also discussed her  making a big pot of healthy vegetable soup.      [facility].     No issue with social determinants.  No inabilities to obtain food or medications or transportation to MD appointments. Educated on participating in habits that prevent the spread of COVID virus with home & work hygiene. Patient verbalizes understanding.     Educated patient on benefits of Employee CM program and invited to call with any new needs.  Encouraged use of [facility] if needed.            Florida Escalante, RN  Ambulatory     11/12/2020, 12:11 EST  Florida ALEXANDER, RN, [facility]   [place] [address]     [phone] cell   [phone] office  [phone] fax  [email or web address]  [facility]

## 2020-11-16 ENCOUNTER — OFFICE VISIT (OUTPATIENT)
Dept: FAMILY MEDICINE CLINIC | Facility: CLINIC | Age: 60
End: 2020-11-16

## 2020-11-16 VITALS
HEIGHT: 63 IN | HEART RATE: 82 BPM | WEIGHT: 293 LBS | DIASTOLIC BLOOD PRESSURE: 84 MMHG | OXYGEN SATURATION: 99 % | SYSTOLIC BLOOD PRESSURE: 128 MMHG | TEMPERATURE: 96.4 F | RESPIRATION RATE: 18 BRPM | BODY MASS INDEX: 51.91 KG/M2

## 2020-11-16 DIAGNOSIS — E61.1 IRON DEFICIENCY: ICD-10-CM

## 2020-11-16 DIAGNOSIS — I89.0 LYMPHEDEMA: ICD-10-CM

## 2020-11-16 DIAGNOSIS — L03.115 CELLULITIS OF RIGHT LOWER EXTREMITY: ICD-10-CM

## 2020-11-16 DIAGNOSIS — E66.01 CLASS 3 SEVERE OBESITY DUE TO EXCESS CALORIES WITH SERIOUS COMORBIDITY AND BODY MASS INDEX (BMI) GREATER THAN OR EQUAL TO 70 IN ADULT (HCC): ICD-10-CM

## 2020-11-16 DIAGNOSIS — D63.8 ANEMIA, CHRONIC DISEASE: Primary | ICD-10-CM

## 2020-11-16 PROBLEM — A41.9 SEPSIS: Status: RESOLVED | Noted: 2020-09-18 | Resolved: 2020-11-16

## 2020-11-16 PROBLEM — L03.90 CELLULITIS: Status: RESOLVED | Noted: 2017-11-14 | Resolved: 2020-11-16

## 2020-11-16 PROCEDURE — 99214 OFFICE O/P EST MOD 30 MIN: CPT | Performed by: FAMILY MEDICINE

## 2020-11-16 RX ORDER — LANOLIN ALCOHOL/MO/W.PET/CERES
325 CREAM (GRAM) TOPICAL
Qty: 90 TABLET | Refills: 3 | Status: SHIPPED | OUTPATIENT
Start: 2020-11-16 | End: 2021-12-12 | Stop reason: SDUPTHER

## 2020-11-16 RX ORDER — CHOLECALCIFEROL (VITAMIN D3) 50 MCG
2000 TABLET ORAL DAILY
COMMUNITY
End: 2022-03-01

## 2020-11-16 NOTE — PROGRESS NOTES
Emely Solomon is 60 y.o. and presents today for:     Chief Complaint   Patient presents with   • Head Injury   • Hospital f/u     Covid-19       HPI     Here today for follow-up after hospitalization for Covid and extended rehab stay.  Has now been discharged home and is doing fairly well.  Has home health following up although they have only seen her twice so far.    Main concerns today are a scab appearing lesion on her forehead that has been there for quite some time.  She is frustrated that rehab did not seem to take care of it.  She is concerned about what it might represent.  Does not hurt, has not bothered her much overall.    Has lost a significant amount of weight since her hospitalization.  Still suffers from lymphedema and is hoping to get into the lymphedema clinic as soon as possible.  She heard about getting an appointment just prior to getting admitted to the hospital.  Home health is not yet offered this sort of help.  Continues to apply moisturizers and watch for any further skin breakage.  She does have 2 small spots that are slowly healing.  Part of her admission was sepsis related to ongoing cellulitis.  She suffered from this multiple times due to poor healing related to her lymphedema.    Reports good adherence and tolerance to her current regimen.  Believes she needs only a prescription for iron supplementation at this time.    Does not have follow-up scheduled with cardiology until next March.  Will be calling their office to follow-up sooner.    The following portions of the patient's history were reviewed and updated as appropriate: allergies, current medications, past family history, past medical history, past social history, past surgical history and problem list.    Review of Systems   Constitutional: Negative for activity change, chills, fatigue and fever.   Respiratory: Positive for cough and shortness of breath. Negative for chest tightness.    Cardiovascular: Positive for leg  swelling. Negative for chest pain and palpitations.   Musculoskeletal: Positive for gait problem. Negative for arthralgias and back pain.   Skin: Positive for color change and rash.       Objective  Vitals:    11/16/20 1613   BP: 128/84   Pulse: 82   Resp: 18   Temp: 96.4 °F (35.8 °C)   SpO2: 99%     Body mass index is 58.81 kg/m².    Physical Exam  Vitals signs and nursing note reviewed.   Constitutional:       General: She is not in acute distress.     Appearance: Normal appearance. She is obese. She is not ill-appearing.   Cardiovascular:      Rate and Rhythm: Normal rate and regular rhythm.      Pulses: Normal pulses.      Heart sounds: Normal heart sounds. No murmur.   Pulmonary:      Effort: Pulmonary effort is normal. No respiratory distress.      Breath sounds: Normal breath sounds. No rales.   Musculoskeletal:      Right lower leg: Edema present.      Left lower leg: Edema present.   Lymphadenopathy:      Comments: Gross lymphedema in bilateral lower extremities.   Skin:     General: Skin is warm and dry.      Comments: Erythema and flaking of the skin on bilateral lower extremities.  Well-healing ulcers noted.  No signs of current infection.    She has what appears to be a matted eschar on the front of her head involving a fair amount of hair.  I was able to remove some of the hair but not able to remove the entire eschar   Neurological:      Mental Status: She is alert.           Current Outpatient Medications:   •  acetaminophen (TYLENOL) 325 MG tablet, Take 2 tablets by mouth Every 4 (Four) Hours As Needed for Mild Pain ., Disp:  , Rfl:   •  camphor-menthol (SARNA) 0.5-0.5 % lotion, Apply  topically to the appropriate area as directed Daily., Disp: , Rfl:   •  Cholecalciferol (Vitamin D) 50 MCG (2000 UT) tablet, Take 2,000 Units by mouth Daily., Disp: , Rfl:   •  ferrous sulfate 325 (65 FE) MG tablet, Take 1 tablet by mouth Daily With Breakfast for 360 days., Disp: 90 tablet, Rfl: 3  •  furosemide  (LASIX) 40 MG tablet, Take 1 tablet by mouth Daily., Disp: , Rfl:   •  hydrocortisone-bacitracin-zinc oxide-nystatin (MAGIC BARRIER), Apply 1 application topically to the appropriate area as directed 2 (two) times a day., Disp:  , Rfl:   •  ipratropium-albuterol (DUO-NEB) 0.5-2.5 mg/3 ml nebulizer, INHALE 1 AMPULE VIA MINI NEBULIZER AS DIRECTED ONCE DAILY, Disp: 90 mL, Rfl: 0  •  metoprolol succinate XL (TOPROL-XL) 50 MG 24 hr tablet, Take 0.5 tablets by mouth Daily. (Patient taking differently: Take 25 mg by mouth 2 (two) times a day.), Disp: , Rfl:   •  miconazole (MICOTIN) 2 % powder, Apply  topically to the appropriate area as directed Every 12 (Twelve) Hours., Disp:  , Rfl:   •  oxybutynin (DITROPAN) 5 MG tablet, Take 5 mg by mouth Daily., Disp: , Rfl:   •  polyethylene glycol (MIRALAX) 17 g packet, Take 17 g by mouth 2 (two) times a day., Disp:  , Rfl:   •  rivaroxaban (XARELTO) 20 MG tablet, Take 1 tablet by mouth Daily., Disp: 90 tablet, Rfl: 3  •  sennosides-docusate (PERICOLACE) 8.6-50 MG per tablet, Take 1 tablet by mouth At Night As Needed for Constipation., Disp:  , Rfl:   Current outpatient and discharge medications have been reconciled for the patient.  Reviewed by: Adilson Wilkerson MD      Procedures    Lab Results (most recent)     None                Diagnoses and all orders for this visit:    1. Anemia, chronic disease (Primary)  -     ferrous sulfate 325 (65 FE) MG tablet; Take 1 tablet by mouth Daily With Breakfast for 360 days.  Dispense: 90 tablet; Refill: 3    2. Iron deficiency  -     ferrous sulfate 325 (65 FE) MG tablet; Take 1 tablet by mouth Daily With Breakfast for 360 days.  Dispense: 90 tablet; Refill: 3    3. Lymphedema    4. Cellulitis of right lower extremity    5. Class 3 severe obesity due to excess calories with serious comorbidity and body mass index (BMI) greater than or equal to 70 in adult (CMS/[facility])    Refill as above.  Otherwise continue her current regimen as  prescribed.    I will call home health to make sure that she is getting all available resources.  We will also see about getting her into lymphedema clinic as soon as possible.    Recommended she use warm water soaks and some baby shampoo to slowly remove the eschar on her forehead.  Her daughter-in-law will then take a picture and send it to me for further evaluation and recommendations.    Encouraged close follow-up in the short-term.  Encouraged her to call with any questions or concerns.    Any information loaded into the AVS was placed there by CHIP Wilkerson MD, and patient was counseled on the same.   Return in about 1 month (around 12/16/2020), or if symptoms worsen or fail to improve.      CHIP Wilkerson MD

## 2020-11-17 ENCOUNTER — TELEPHONE (OUTPATIENT)
Dept: FAMILY MEDICINE CLINIC | Facility: CLINIC | Age: 60
End: 2020-11-17

## 2020-11-17 DIAGNOSIS — L03.115 CELLULITIS OF RIGHT ANTERIOR LOWER LEG: Primary | ICD-10-CM

## 2020-11-17 RX ORDER — SULFAMETHOXAZOLE AND TRIMETHOPRIM 800; 160 MG/1; MG/1
1 TABLET ORAL 2 TIMES DAILY
Qty: 14 TABLET | Refills: 0 | Status: SHIPPED | OUTPATIENT
Start: 2020-11-17 | End: 2020-11-18

## 2020-11-17 NOTE — TELEPHONE ENCOUNTER
MATTHEW CALLED FROM [facility] STATING SHE NOTICED WHAT LOOKS LIKE CELLULITIS AROUND THE WOUND ON HER LEG, AND IT IS RED, SWOLLEN, AND HOT TO THE TOUCH.    SHE WANTS TO KNOW IF DR. GASPAR LOOKED AT IT YESTERDAY, AND IF HE COULD PRESCRIBE ANTIBIOTICS FOR THE PT.    CALLBACK NUMBER: [phone]

## 2020-11-17 NOTE — PROGRESS NOTES
RM:________     PCP: Adilson Wilkerson MD    : 1960  AGE: 60 y.o.  EST PATIENT   REASON FOR VISIT/  CC:    BP Readings from Last 3 Encounters:   20 128/84   20 128/76   20 122/82        WT: ____________ BP: __________L __________R HR______    CHEST PAIN: _____________    SOA: _____________PALPS: _______________     LIGHTHEADED: ___________FATIGUE: ________________ EDEMA __________    ALLERGIES:Cephalexin SMOKING HISTORY:  Social History     Tobacco Use   • Smoking status: Never Smoker   • Smokeless tobacco: Never Used   Substance Use Topics   • Alcohol use: Yes     Comment: caffeine use:1 cup daily.    • Drug use: No     CAFFEINE USE_________________  ALCOHOL ______________________    Below is the patient's most recent value for Albumin, ALT, AST, BUN, Calcium, Chloride, Cholesterol, CO2, Creatinine, GFR, Glucose, HDL, Hematocrit, Hemoglobin, Hemoglobin A1C, LDL, Magnesium, Phosphorus, Platelets, Potassium, PSA, Sodium, Triglycerides, TSH and WBC.   Lab Results   Component Value Date    ALBUMIN 2.50 (L) 2020    ALT 24 2020    AST 19 2020    BUN 14 2020    CALCIUM 8.4 (L) 2020    CL 99 2020    CO2 27.0 2020    CREATININE 0.73 2020    GLU 98 2020    HCT 26.0 (L) 2020    HGB 8.2 (L) 2020    HGBA1C 5.40 2020    MG 1.7 2020    PHOS 2.8 2020     2020    K 4.4 2020     (L) 2020    WBC 5.85 2020          NEW DIAGNOSIS/ SURGERY/ HOSP OR ED VISITS: ______________________    __________________________________________________________________      RECENT LABS OR DIAGNOSTIC TESTING:  _____________________________    __________________________________________________________________      ASSESSMENT/ PLAN: _______________________________________________    __________________________________________________________________

## 2020-11-18 ENCOUNTER — OFFICE VISIT (OUTPATIENT)
Dept: CARDIOLOGY | Facility: CLINIC | Age: 60
End: 2020-11-18

## 2020-11-18 VITALS
HEART RATE: 62 BPM | WEIGHT: 293 LBS | SYSTOLIC BLOOD PRESSURE: 118 MMHG | BODY MASS INDEX: 51.91 KG/M2 | HEIGHT: 63 IN | DIASTOLIC BLOOD PRESSURE: 76 MMHG

## 2020-11-18 DIAGNOSIS — I27.20 PULMONARY HYPERTENSION (HCC): ICD-10-CM

## 2020-11-18 DIAGNOSIS — N18.30 STAGE 3 CHRONIC KIDNEY DISEASE, UNSPECIFIED WHETHER STAGE 3A OR 3B CKD (HCC): ICD-10-CM

## 2020-11-18 DIAGNOSIS — I50.32 CHRONIC DIASTOLIC CHF (CONGESTIVE HEART FAILURE) (HCC): Primary | ICD-10-CM

## 2020-11-18 DIAGNOSIS — I74.9 PARADOXICAL EMBOLISM (HCC): ICD-10-CM

## 2020-11-18 DIAGNOSIS — Q21.12 PFO (PATENT FORAMEN OVALE): ICD-10-CM

## 2020-11-18 DIAGNOSIS — I10 ESSENTIAL HYPERTENSION: ICD-10-CM

## 2020-11-18 PROBLEM — U07.1 COVID-19 VIRUS DETECTED: Status: RESOLVED | Noted: 2020-09-19 | Resolved: 2020-11-18

## 2020-11-18 PROBLEM — L03.115 CELLULITIS OF RIGHT ANTERIOR LOWER LEG: Status: RESOLVED | Noted: 2020-06-04 | Resolved: 2020-11-18

## 2020-11-18 PROBLEM — H10.31 ACUTE CONJUNCTIVITIS OF RIGHT EYE: Status: RESOLVED | Noted: 2020-09-23 | Resolved: 2020-11-18

## 2020-11-18 PROBLEM — R55 SYNCOPE: Status: RESOLVED | Noted: 2020-06-12 | Resolved: 2020-11-18

## 2020-11-18 PROBLEM — L03.115 CELLULITIS OF RIGHT LOWER EXTREMITY: Status: RESOLVED | Noted: 2020-06-04 | Resolved: 2020-11-18

## 2020-11-18 PROCEDURE — 93000 ELECTROCARDIOGRAM COMPLETE: CPT | Performed by: INTERNAL MEDICINE

## 2020-11-18 PROCEDURE — 99213 OFFICE O/P EST LOW 20 MIN: CPT | Performed by: INTERNAL MEDICINE

## 2020-11-18 RX ORDER — FUROSEMIDE 40 MG/1
40 TABLET ORAL DAILY
Qty: 90 TABLET | Refills: 3 | Status: SHIPPED | OUTPATIENT
Start: 2020-11-18 | End: 2022-01-20 | Stop reason: SDUPTHER

## 2020-11-18 RX ORDER — METOPROLOL SUCCINATE 50 MG/1
50 TABLET, EXTENDED RELEASE ORAL DAILY
Qty: 90 TABLET | Refills: 3 | Status: SHIPPED | OUTPATIENT
Start: 2021-07-12 | End: 2022-04-11 | Stop reason: SDUPTHER

## 2020-11-18 NOTE — PROGRESS NOTES
Date of Office Visit: 2020  Encounter Provider: Brennan Rogers MD  Place of Service: [facility] CARDIOLOGY  Patient Name: Emely Solomon  :1960    Chief Complaint   Patient presents with   • Congestive Heart Failure   :     HPI: Emely Solomon is a 60 y.o. female who presents today to followup.     In the summer of , she developed an acute DVT as well as a pulmonary embolus. She had a previously undiagnosed patent foramen ovale and developed paradoxical embolus to the left lower extremity (arterial) and required prolonged hospitalization for thrombectomy and anticoagulation. She was sent home on warfarin but became subtherapeutic and had recurrent pulmonary embolus after that. She was changed to rivaroxaban and has done well since then. Her IVC filter has been removed. A repeat echo in 2016 showed significant improvement in her pulmonary hypertension, right-sided enlargement and function.  A repeat venous doppler in 2017 was negative for DVT.    She has been hospitalized several times with sepsis due to cellulitis; she developed acute renal failure during her last hospitalization and her losartan and HCTZ were stopped.     She has mild, stable exertional dyspnea.  She has leg swelling (severe) due to lymphedema/lipoedema.  She denies orthopnea, PND, chest pain, palpitations, lightheadedness, or syncope.     Past Medical History:   Diagnosis Date   • Cellulitis     2017, with Group B Strep bacteremia and sepsis   • Chronic diastolic congestive heart failure (CMS/HCC)    • Deep venous thrombosis (CMS/HCC)    • Hypertension    • Lipoedema    • Lymphedema    • Morbid obesity (CMS/HCC)    • Paradoxical embolism (CMS/HCC)     to the LLE due to DVT/PE and PFO   • PFO (patent foramen ovale)    • Pulmonary embolism (CMS/HCC)    • Pulmonary hypertension (CMS/HCC)     multifactorial (dCHF, obesity/ARIEL, hx PE), mild by echo 2016   • Sepsis (CMS/HCC) 2020        Past Surgical History:   Procedure Laterality Date   • BRONCHOSCOPY N/A 7/21/2017    Procedure: BRONCHOSCOPY with wash;  Surgeon: Caleb Garcia MD;  Location: [facility] ENDOSCOPY;  Service:    • DILATATION AND CURETTAGE  04/11/2011   • VENA CAVA FILTER PLACEMENT      Inferior Vena Cava Filter Placement w/Fluorosc angiogr guidance       Social History     Socioeconomic History   • Marital status:      Spouse name: Not on file   • Number of children: Not on file   • Years of education: Not on file   • Highest education level: Not on file   Tobacco Use   • Smoking status: Never Smoker   • Smokeless tobacco: Never Used   Substance and Sexual Activity   • Alcohol use: Yes     Comment: caffeine use:1 cup daily.    • Drug use: No   • Sexual activity: Defer       Family History   Problem Relation Age of Onset   • Hypertension Other        Review of Systems   Constitution: Positive for weight loss. Negative for malaise/fatigue.   Cardiovascular: Positive for dyspnea on exertion and leg swelling. Negative for chest pain and palpitations.   Neurological: Negative for dizziness and light-headedness.   All other systems reviewed and are negative.      Allergies   Allergen Reactions   • Cephalexin Hives and Rash     Diffuse rash on cephalexin 1 g PO q6h on 6/17/20  Tolerated zosyn and PCN G September 2020 without issue         Current Outpatient Medications:   •  acetaminophen (TYLENOL) 325 MG tablet, Take 2 tablets by mouth Every 4 (Four) Hours As Needed for Mild Pain ., Disp:  , Rfl:   •  Cholecalciferol (Vitamin D) 50 MCG (2000 UT) tablet, Take 2,000 Units by mouth Daily., Disp: , Rfl:   •  ferrous sulfate 325 (65 FE) MG tablet, Take 1 tablet by mouth Daily With Breakfast for 360 days., Disp: 90 tablet, Rfl: 3  •  furosemide (LASIX) 40 MG tablet, Take 1 tablet by mouth Daily., Disp: , Rfl:   •  metoprolol succinate XL (TOPROL-XL) 50 MG 24 hr tablet, Take 0.5 tablets by mouth Daily. (Patient taking differently: Take 50  "mg by mouth Daily.), Disp: , Rfl:   •  rivaroxaban (XARELTO) 20 MG tablet, Take 1 tablet by mouth Daily., Disp: 90 tablet, Rfl: 3     Objective:     Vitals:    11/18/20 1007   BP: 118/76   BP Location: Right arm   Pulse: 62   Weight: (!) 150 kg (330 lb)   Height: 160 cm (63\")     Body mass index is 58.46 kg/m².    Physical Exam   Constitutional: She is oriented to person, place, and time.   Obese   HENT:   Head: Normocephalic.   Masked   Eyes: Pupils are equal, round, and reactive to light. Conjunctivae and EOM are normal.   Neck: Normal range of motion. No JVD present.   Cardiovascular: Normal rate and regular rhythm. Exam reveals distant heart sounds.   No murmur heard.  Pulmonary/Chest: Effort normal.   Abdominal: Soft. There is no abdominal tenderness.   Obesity limits abdominal exam   Musculoskeletal: Normal range of motion.         General: Edema (lipo/lymphedema, stasis changes of skin) present.   Neurological: She is alert and oriented to person, place, and time. No cranial nerve deficit.   Skin: Skin is warm and dry.   Psychiatric: She has a normal mood and affect. Her behavior is normal. Judgment and thought content normal.   Vitals reviewed.        ECG 12 Lead    Date/Time: 11/18/2020 10:27 AM  Performed by: Brennan Rogers MD  Authorized by: Brennan Rgoers MD   Comparison: compared with previous ECG   Similar to previous ECG  Rhythm: sinus rhythm  Conduction: conduction normal  ST Segments: ST segments normal  ST Flattening: V6, V5, V3, V4, V2, V1 and III  QRS axis: normal  Other findings: low voltage and poor R wave progression    Clinical impression: non-specific ECG              Assessment:       Diagnosis Plan   1. Chronic diastolic CHF (congestive heart failure) (CMS/HCC)     2. Pulmonary hypertension (CMS/HCC)     3. Essential hypertension     4. Stage 3 chronic kidney disease, unspecified whether stage 3a or 3b CKD     5. Paradoxical embolism (CMS/HCC)     6. PFO (patent foramen ovale)          "   Plan:       She has chronic diastolic CHF and secondary PHTN (due to diastolic dysfunction and her prior PE).   She has well controlled HTN and stable CKD. She developed acute renal failure when septic; losartan and HCTZ were stopped.  We'll continue with metoprolol and furosemide.    She had VTE with paradoxical embolus to the leg and is s/p thrombectomy.  She had an IVC filter which has been removed.  Despite her weight, she is on rivaroxaban as it was too difficult to maintain a therapeutic INR on warfarin.      She inquired about refills -- I will refill her metoprolol, furosemide, and rivaroxaban.  I will defer her vitamin D and ferrous sulfate to Dr Wilkerson.    Sincerely,       Brennan Rogers MD

## 2020-11-23 ENCOUNTER — TELEPHONE (OUTPATIENT)
Dept: FAMILY MEDICINE CLINIC | Facility: CLINIC | Age: 60
End: 2020-11-23

## 2020-11-23 ENCOUNTER — HOSPITAL ENCOUNTER (OUTPATIENT)
Dept: OCCUPATIONAL THERAPY | Facility: HOSPITAL | Age: 60
Setting detail: THERAPIES SERIES
Discharge: HOME | End: 2020-11-23

## 2020-11-23 DIAGNOSIS — I89.0 LYMPHEDEMA OF BOTH LOWER EXTREMITIES: Primary | ICD-10-CM

## 2020-11-23 DIAGNOSIS — I89.0 LYMPHEDEMA, NOT ELSEWHERE CLASSIFIED: ICD-10-CM

## 2020-11-23 PROCEDURE — 97535 SELF CARE MNGMENT TRAINING: CPT

## 2020-11-23 PROCEDURE — 97166 OT EVAL MOD COMPLEX 45 MIN: CPT

## 2020-11-23 NOTE — TELEPHONE ENCOUNTER
Called pt to know if Home Health is taking care of the injury. ATB was prescribed past 11/17/20. Pt never picked up med. Pt provided me the phone of the Home Nurse visiting her a couple days ago.   Discussed with Chelsea, ph [phone],  from Home Health, and they will check her case to see if they can keep care on pt, or will need to get Wound Care taking care of the wound. She will call to let us know.

## 2020-11-23 NOTE — TELEPHONE ENCOUNTER
Colleen with [facility] statesPatient has wound on left lateral lower calf for 1 month, scaring, yellow discharge on bandage, no fowl odor, patient is not on antibiotics.    Colleen thought you should be aware of this wound and wonders if you wanted to refer her to wound care.  [phone]

## 2020-11-23 NOTE — THERAPY EVALUATION
Outpatient Occupational Therapy Lymphedema Initial Evaluation  [facility]     Patient Name: Emely Solomon  : 1960  MRN: [id number]  Today's Date: 2020      Visit Date: 2020    Patient Active Problem List   Diagnosis   • Chronic diastolic CHF (congestive heart failure) (CMS/HCC)   • PFO (patent foramen ovale)   • Paradoxical embolism (CMS/HCC)   • Essential hypertension   • Pulmonary hypertension (CMS/HCC)   • Back pain   • ARIEL (obstructive sleep apnea)   • Class 3 severe obesity due to excess calories with serious comorbidity and body mass index (BMI) greater than or equal to 70 in adult (CMS/HCC)   • History of DVT of lower extremity   • Lymphedema   • Anemia, chronic disease   • CKD (chronic kidney disease) stage 3, GFR 30-59 ml/min   • Iron deficiency        Past Medical History:   Diagnosis Date   • Cellulitis     2017, with Group B Strep bacteremia and sepsis   • Chronic diastolic congestive heart failure (CMS/HCC)    • Deep venous thrombosis (CMS/HCC)    • Hypertension    • Lipoedema    • Lymphedema    • Morbid obesity (CMS/HCC)    • Paradoxical embolism (CMS/HCC)     to the LLE due to DVT/PE and PFO   • PFO (patent foramen ovale)    • Pulmonary embolism (CMS/HCC)    • Pulmonary hypertension (CMS/HCC)     multifactorial (dCHF, obesity/ARIEL, hx PE), mild by echo 2016   • Sepsis (CMS/HCC) 2020        Past Surgical History:   Procedure Laterality Date   • BRONCHOSCOPY N/A 2017    Procedure: BRONCHOSCOPY with wash;  Surgeon: Caleb Garcia MD;  Location: [facility] ENDOSCOPY;  Service:    • DILATATION AND CURETTAGE  2011   • VENA CAVA FILTER PLACEMENT      Inferior Vena Cava Filter Placement w/Fluorosc angiogr guidance         Visit Dx:     ICD-10-CM ICD-9-CM   1. Lymphedema of both lower extremities  I89.0 457.1   2. Lymphedema, not elsewhere classified  I89.0 457.1       Patient History     Row Name 20 0900             History    Chief Complaint  Swelling  -CW       Date Current Problem(s) Began  -- Per pt. > 5 years  -CW      Brief Description of Current Complaint  Pt reports several yr hx of Lymphema BLE's.  She has a history of LLE DVT in 2015 and 6/4/20.  She has a wound L lateral calf area. Her spouse assists with wound dressings.  Other medical problems incl CHF, kidney failure, and cellulitis. Was recently hospitalized for COVID 9/18/20 and went to [place] for rehab. Was also hospitalized 6/3 for 2 weeks with cellulitis and then rehab for 2 weeks. Hx IVC filter that was removed.   -CW      Previous treatment for THIS PROBLEM  -- Treatment here several yrs ago  -CW      Patient/Caregiver Goals  Decrease swelling  -CW      How has patient tried to help current problem?  elevation  -CW      What clinical tests have you had for this problem?  Other 1 (comment) Doppler  -CW      Results of Clinical Tests  negative Hx DVT 6/4/20. No recent blood clots.   -CW      Are you or can you be pregnant  No  -CW         Fall Risk Assessment    Any falls in the past year:  No  -CW         Services    Prior Rehab/Home Health Experiences  Yes  -CW      Are you currently receiving Home Health services  -- Per pt. received HH recently.   -CW         Daily Activities    Primary Language  English  -CW      Are you able to read  Yes  -CW      Are you able to write  Yes  -CW      How does patient learn best?  Listening;Reading  -CW      Teaching needs identified  Management of Condition  -CW      Patient is concerned about/has problems with  Performing home management (household chores, shopping, care of dependents);Walking  -CW      Does patient have problems with the following?  None  -CW      Barriers to learning  None  -CW      Explanation of Functional Status Problem  Pt. is now using a rolling walker. She has diffiiculty with steps and walking distances. Pt. has a shower seat, but does not use it. Occasional assist for LE dressing.   -CW      Pt Participated in POC and Goals  Yes  -CW          Safety    Are you being hurt, hit, or frightened by anyone at home or in your life?  No  -CW      Are you being neglected by a caregiver  No  -CW        User Key  (r) = Recorded By, (t) = Taken By, (c) = Cosigned By    Initials Name Provider Type    Colleen Cleary OTR Occupational Therapist          Lymphedema     Row Name 11/23/20 0900             Subjective Pain    Able to rate subjective pain?  yes  -CW      Pre-Treatment Pain Level  0  -CW      Post-Treatment Pain Level  0  -CW         Subjective Comments    Subjective Comments  Tender LLE wound area. No pain at rest.   -CW         Lymphedema Assessment    Lymphedema Classification  RLE:;LLE:;stage 3 (Lymphostatic Elephantiasis)  -CW      Infections or Cellulitis?  yes  -CW      Lymphedema Precautions  CHF wound  -CW         Posture/Observations    Posture/Observations Comments  Pt. ambulates with a wx. Endurance decreased.   -CW         General ROM    GENERAL ROM COMMENTS  BLE AROM is limited due to edema.   -CW         MMT (Manual Muscle Testing)    General MMT Comments  4/5 BLE's general  -CW         Skin Changes/Observations    Location/Assessment  Lower Extremity  -CW      Lower Extremity Conditions  bilateral:;dry;shiny;scaly  -CW      Lower Extremity Color/Pigment  bilateral:;red;fibrosis  -CW      Lower Extremity Skin Details  6 cm. wound L lower leg-lateral. Yellow drainage on wound dressing. No active bleeding.   -CW         Lymphedema Sensation    Lymphedema Sensation Reports  numbness left lateral 3 toes  -CW      Lymphedema Sensation Tests  light touch  -CW      Lymphedema Light Touch  mild impairment  -CW         Lymphedema Measurements    Measurement Type(s)  Circumferential  -CW      Circumferential Areas  Lower extremities  -CW         BLE Circumferential (cm)    Measurement Location 1  20 cm. above knee  -CW      Left 1  81 cm  -CW      Right 1  87 cm  -CW      Measurement Location 2  10cm above knee  -CW      Left 2  86.7 cm  -CW       Right 2  89 cm  -CW      Measurement Location 3  knee  -CW      Left 3  75.4 cm  -CW      Right 3  83 cm  -CW      Measurement Location 4  10 cm. below knee  -CW      Left 4  83.5 cm  -CW      Right 4  74.6 cm  -CW      Measurement Location 5  20 cm. below knee  -CW      Left 5  64 cm  -CW      Right 5  65 cm  -CW      Measurement Location 6  30 cm below knee  -CW      Left 6  40 cm  -CW      Right 6  39.7 cm  -CW      Measurement Location 7  ankle  -CW      Left 7  34.5 cm  -CW      Right 7  34.6 cm  -CW      Measurement Location 8  mid foot   -CW      Left 8  24.7 cm  -CW      Right 8  23.7 cm  -CW      LLE Circumferential Total  489.8 cm  -CW      RLE Circumferential Total  496.6 cm  -CW        User Key  (r) = Recorded By, (t) = Taken By, (c) = Cosigned By    Initials Name Provider Type    Colleen Cleary OTR Occupational Therapist                  Therapy Education  Education Details: Discussed lymph tx program and plan. Reviewed LE positioning and elevation. Discussed long term edema management.  Given: Symptoms/condition management, Edema management  Program: New  How Provided: Verbal  Provided to: Patient  Level of Understanding: Verbalized  61804 - OT Self Care/Mgmt Minutes: 10        OT Goals     Row Name 11/23/20 1300          OT Short Term Goals    STG Date to Achieve  12/07/20  -CW     STG 1  Patient to demonstrate proper awareness of “What is Lymphedema” and DO’s and Don’ts” for improved prevention, management, care of symptoms, and ease of transition to self-care of condition.  -CW     STG 1 Progress  New  -CW     STG 2  Patient independent and compliant with self-wrapping techniques of compression bandages with family member or caregiver as indicated for improved self-management of condition.  -CW     STG 2 Progress  New  -CW     STG 3  Patient demonstrate decreased net edema of  BLE's   >/5-10 cm. for decreased in edema, symptoms, decreased risk of infection, and improved skin-care/transition to  self-care of condition.  -CW     STG 3 Progress  New  -CW     STG 4  Patient independent and compliant with initial home exercise program focused on diaphragmatic breathing, range of motion, flexibility, to decrease edema, improve lymphatic flow for decreased edema and decreased risk of infection.  -CW     STG 4 Progress  New  -CW        Long Term Goals    LTG Date to Achieve  12/21/20  -CW     LTG 1  Patient will demonstrate decreased net edema of  BLE's >/=10-20 cm. for decrease in symptoms, decreased risk of infection, and improved skin-care/transition to self-care of condition.   -CW     LTG 1 Progress  New  -CW     LTG 2  Patient/family/caregivers independent with self-care techniques for self-management of condition.  -CW     LTG 2 Progress  New  -CW     LTG 3  Patient independent and compliant with use and care of compression (example garments, inelastic velcro compression) with assistance of a caregiver as needed to promote self-care independence.    -CW     LTG 3 Progress  New  -CW        Time Calculation    OT Goal Re-Cert Due Date  02/15/21  -CW       User Key  (r) = Recorded By, (t) = Taken By, (c) = Cosigned By    Initials Name Provider Type    CW Colleen Hidalgo OTR Occupational Therapist          OT Assessment/Plan     Row Name 11/23/20 1335 11/23/20 1331       OT Assessment    Functional Limitations  --  Limitations in functional capacity and performance;Limitations in community activities;Performance in self-care ADL;Limitation in home management  -CW    Impairments  --  Edema;Integumentary integrity;Impaired venous circulation;Impaired lymphatic circulation;Gait  -CW    Assessment Comments  Pt. presents 60 y.o. female with severe increased edema/lipidema BLE’s. Edema is 4+ ankles to  above knees/thighs. Pt. reports edema started >5 years ago. She has a hx. DVT's, cellulitis and wound. LLE lateral calf 6 cm. wound noted. Pt. was recently hospitalized for COVID and went to [place] for therapy. Pt.  was receiving HH after D/c from [place]. Her spouse assists with wound dressing changes. Total circumference .6 cm. and .8 cm. Skin is dry and red lower legs with multiple skin folds. Mobility including AROM is limited due to edema. Pt. would benefit from full Complete Decongestive Therapy (CDT) to decrease edema, decrease risk of infection, improve skin integrity, and to learn independent self-care edema management. This also may enable increased functional mobility and independence with ADL’s. Pt. has velcro compression at home, but is not wearing it at  present. Called pt.'s primary MD office regarding LLE wound and possible wound clinic.  -CW  --    Please refer to paper survey for additional self-reported information  --  Yes  -CW    OT Rehab Potential  --  Good  -CW    Patient/caregiver participated in establishment of treatment plan and goals  --  Yes  -CW    Patient would benefit from skilled therapy intervention  --  Yes  -CW       OT Plan    OT Frequency  --  4x/week  -CW    Predicted Duration of Therapy Intervention (OT)  --  1 month  -CW    Planned CPT's?  --  OT SELF CARE/MGMT/TRAIN 15 MIN: 15511;OT THER PROC EA 15 MIN: [id number];OT MANUAL THERAPY EA 15 MIN: 58651  -CW    Planned Therapy Interventions (Optional Details)  --  home exercise program;manual therapy techniques;patient/family education  -CW    OT Plan Comments  --  Plan to cont. tx. as advised by MD.  -CW      User Key  (r) = Recorded By, (t) = Taken By, (c) = Cosigned By    Initials Name Provider Type    CW Colleen Hidalgo OTR Occupational Therapist                    Time Calculation:   OT Start Time: 0847  OT Stop Time: 0949  OT Time Calculation (min): 62 min  Total Timed Code Minutes- OT: 10 minute(s)     Therapy Charges for Today     Code Description Service Date Service Provider Modifiers Qty    [id number]  OT SELF CARE/MGMT/TRAIN EA 15 MIN 11/23/2020 Colleen Hidalgo OTR GO 1    [id number]  OT EVAL MOD COMPLEXITY 3  11/23/2020 Colleen Hidalgo, OTR GO 1                    Colleen Hidlago, OTR  11/23/2020

## 2020-11-24 ENCOUNTER — HOSPITAL ENCOUNTER (OUTPATIENT)
Dept: OCCUPATIONAL THERAPY | Facility: HOSPITAL | Age: 60
Setting detail: THERAPIES SERIES
Discharge: HOME | End: 2020-11-24

## 2020-11-24 DIAGNOSIS — I89.0 LYMPHEDEMA, NOT ELSEWHERE CLASSIFIED: ICD-10-CM

## 2020-11-24 DIAGNOSIS — I89.0 LYMPHEDEMA OF BOTH LOWER EXTREMITIES: Primary | ICD-10-CM

## 2020-11-24 PROCEDURE — 97140 MANUAL THERAPY 1/> REGIONS: CPT

## 2020-11-24 NOTE — THERAPY TREATMENT NOTE
Outpatient Occupational Therapy Lymphedema Treatment Note  [facility]     Patient Name: Emely Solomon  : 1960  MRN: [id number]  Today's Date: 2020      Visit Date: 2020    Patient Active Problem List   Diagnosis   • Chronic diastolic CHF (congestive heart failure) (CMS/HCC)   • PFO (patent foramen ovale)   • Paradoxical embolism (CMS/HCC)   • Essential hypertension   • Pulmonary hypertension (CMS/HCC)   • Back pain   • ARIEL (obstructive sleep apnea)   • Class 3 severe obesity due to excess calories with serious comorbidity and body mass index (BMI) greater than or equal to 70 in adult (CMS/HCC)   • History of DVT of lower extremity   • Lymphedema   • Anemia, chronic disease   • CKD (chronic kidney disease) stage 3, GFR 30-59 ml/min   • Iron deficiency        Past Medical History:   Diagnosis Date   • Cellulitis     2017, with Group B Strep bacteremia and sepsis   • Chronic diastolic congestive heart failure (CMS/HCC)    • Deep venous thrombosis (CMS/HCC)    • Hypertension    • Lipoedema    • Lymphedema    • Morbid obesity (CMS/HCC)    • Paradoxical embolism (CMS/HCC)     to the LLE due to DVT/PE and PFO   • PFO (patent foramen ovale)    • Pulmonary embolism (CMS/HCC)    • Pulmonary hypertension (CMS/HCC)     multifactorial (dCHF, obesity/ARIEL, hx PE), mild by echo 2016   • Sepsis (CMS/HCC) 2020        Past Surgical History:   Procedure Laterality Date   • BRONCHOSCOPY N/A 2017    Procedure: BRONCHOSCOPY with wash;  Surgeon: Caleb Garcia MD;  Location: [facility] ENDOSCOPY;  Service:    • DILATATION AND CURETTAGE  2011   • VENA CAVA FILTER PLACEMENT      Inferior Vena Cava Filter Placement w/Fluorosc angiogr guidance         Visit Dx:      ICD-10-CM ICD-9-CM   1. Lymphedema of both lower extremities  I89.0 457.1   2. Lymphedema, not elsewhere classified  I89.0 457.1       Lymphedema     Row Name 20 0800             Subjective Pain    Able to rate subjective pain?  yes  -CW       Pre-Treatment Pain Level  0  -CW      Post-Treatment Pain Level  0  -CW         Subjective Comments    Subjective Comments  Spouse was able to  do dressing changes this morning LLE. Pt. started to take antiibiotics.   -CW         Skin Changes/Observations    Location/Assessment  Lower Extremity  -CW      Lower Extremity Conditions  bilateral:;dry;shiny;scaly  -CW      Lower Extremity Color/Pigment  bilateral:;red;fibrosis  -CW         Manual Lymphatic Drainage    Manual Lymphatic Drainage  initial sequence;opened regional lymph nodes;opened anastamoses;extremity treatment  -CW      Initial Sequence  short neck  -CW      Opened Regional Lymph Nodes  axillary;inguinal  -CW      Axillary  right;left  -CW      Inguinal  right;left  -CW      Opened Anastamoses  inguino-axillary  -CW      Inguino-Axillary  right;left  -CW      Extremity Treatment  MLD to full limb  -CW      MLD to Full Limb  RLE  -CW         Compression/Skin Care    Compression/Skin Care  skin care;wrapping location;bandaging;compression garment;remove bandages  -CW      Skin Care  moisturizing lotion applied  -CW      Wrapping Location  lower extremity  -CW      Wrapping Location LE  right:;foot to knee  -CW      Bandage Layers  cotton liner;padding/fluff layer;soft foam- 1/4 inch;short-stretch bandages (comment size/quantity)  -CW      Bandaging Technique  circumferential/spiral;moderate compression  -CW        User Key  (r) = Recorded By, (t) = Taken By, (c) = Cosigned By    Initials Name Provider Type    Colleen Cleary OTR Occupational Therapist                  OT Assessment/Plan     Row Name 11/24/20 1010          OT Assessment    Assessment Comments  Pt.'s spouse assisted with dressing changes for LLE wound this morning. Skin intact RLE. LLE skin red/pink L lower leg mid calf to ankle. Multiple skin folds noted. Applied the bandages RLE ankle to knee with moderate compression. Did not wrap LLE due to wound. Reviewed skin care, bandage  precautions and wearing schedule. Recommended LE elevation when possible. Called pt.'s MD office regarding wound f/u.  -CW        OT Plan    OT Plan Comments  Plan to cont. tx. SAMUEL RN to contact pt. regarding wound care follow up.  -CW       User Key  (r) = Recorded By, (t) = Taken By, (c) = Cosigned By    Initials Name Provider Type    Colleen Cleary, OTR Occupational Therapist                 Manual Rx (last 36 hours)      Manual Treatments     Row Name 11/24/20 1017             Total Minutes    37763 - OT Manual Therapy Minutes  63  -CW        User Key  (r) = Recorded By, (t) = Taken By, (c) = Cosigned By    Initials Name Provider Type    CW Colleen Hidalgo, OTR Occupational Therapist            Therapy Education  Given: Bandaging/dressing change, Edema management  Program: New  How Provided: Verbal, Demonstration  Provided to: Patient  Level of Understanding: Verbalized, Teach back education performed                Time Calculation:   OT Start Time: 0850  OT Stop Time: 0953  OT Time Calculation (min): 63 min  Total Timed Code Minutes- OT: 63 minute(s)     Therapy Charges for Today     Code Description Service Date Service Provider Modifiers Qty    [id number]  OT MANUAL THERAPY EA 15 MIN 11/24/2020 Colleen Hidalgo OTSIMON GO 4                      Colleen Hidalgo OTSIMON  11/24/2020

## 2020-11-25 ENCOUNTER — HOSPITAL ENCOUNTER (OUTPATIENT)
Dept: OCCUPATIONAL THERAPY | Facility: HOSPITAL | Age: 60
Setting detail: THERAPIES SERIES
Discharge: HOME | End: 2020-11-25

## 2020-11-25 DIAGNOSIS — I89.0 LYMPHEDEMA OF BOTH LOWER EXTREMITIES: Primary | ICD-10-CM

## 2020-11-25 DIAGNOSIS — I89.0 LYMPHEDEMA, NOT ELSEWHERE CLASSIFIED: ICD-10-CM

## 2020-11-25 PROCEDURE — 97535 SELF CARE MNGMENT TRAINING: CPT

## 2020-11-25 PROCEDURE — 97140 MANUAL THERAPY 1/> REGIONS: CPT

## 2020-11-25 NOTE — THERAPY TREATMENT NOTE
Outpatient Occupational Therapy Lymphedema Treatment Note  [facility]     Patient Name: Emely Solomon  : 1960  MRN: [id number]  Today's Date: 2020      Visit Date: 2020    Patient Active Problem List   Diagnosis   • Chronic diastolic CHF (congestive heart failure) (CMS/HCC)   • PFO (patent foramen ovale)   • Paradoxical embolism (CMS/HCC)   • Essential hypertension   • Pulmonary hypertension (CMS/HCC)   • Back pain   • ARIEL (obstructive sleep apnea)   • Class 3 severe obesity due to excess calories with serious comorbidity and body mass index (BMI) greater than or equal to 70 in adult (CMS/HCC)   • History of DVT of lower extremity   • Lymphedema   • Anemia, chronic disease   • CKD (chronic kidney disease) stage 3, GFR 30-59 ml/min   • Iron deficiency        Past Medical History:   Diagnosis Date   • Cellulitis     2017, with Group B Strep bacteremia and sepsis   • Chronic diastolic congestive heart failure (CMS/HCC)    • Deep venous thrombosis (CMS/HCC)    • Hypertension    • Lipoedema    • Lymphedema    • Morbid obesity (CMS/HCC)    • Paradoxical embolism (CMS/HCC)     to the LLE due to DVT/PE and PFO   • PFO (patent foramen ovale)    • Pulmonary embolism (CMS/HCC)    • Pulmonary hypertension (CMS/HCC)     multifactorial (dCHF, obesity/ARIEL, hx PE), mild by echo 2016   • Sepsis (CMS/HCC) 2020        Past Surgical History:   Procedure Laterality Date   • BRONCHOSCOPY N/A 2017    Procedure: BRONCHOSCOPY with wash;  Surgeon: Caleb Garcia MD;  Location: [facility] ENDOSCOPY;  Service:    • DILATATION AND CURETTAGE  2011   • VENA CAVA FILTER PLACEMENT      Inferior Vena Cava Filter Placement w/Fluorosc angiogr guidance         Visit Dx:      ICD-10-CM ICD-9-CM   1. Lymphedema of both lower extremities  I89.0 457.1   2. Lymphedema, not elsewhere classified  I89.0 457.1       Lymphedema     Row Name 20 0800             Subjective Pain    Able to rate subjective pain?  yes  -CW       Pre-Treatment Pain Level  2  -CW      Post-Treatment Pain Level  2  -CW      Subjective Pain Comment  Mild burning L lower leg.   -CW         Subjective Comments    Subjective Comments  Pt. reports her spouse was able to re-apply the wound dressings.   -CW         Skin Changes/Observations    Location/Assessment  Lower Extremity  -CW      Lower Extremity Conditions  bilateral:;dry;shiny;scaly  -CW      Lower Extremity Color/Pigment  bilateral:;red;fibrosis  -CW      Lower Extremity Skin Details  6 cm. wound L lower leg-lateral. Yellow drainage on wound dressing. No active bleeding at present.  -CW         Manual Lymphatic Drainage    Manual Lymphatic Drainage  initial sequence;opened regional lymph nodes;opened anastamoses;extremity treatment  -CW      Initial Sequence  short neck  -CW      Opened Regional Lymph Nodes  axillary;inguinal  -CW      Axillary  right;left  -CW      Inguinal  right;left  -CW      Opened Anastamoses  inguino-axillary  -CW      Inguino-Axillary  right;left  -CW      Extremity Treatment  MLD to full limb  -CW      MLD to Full Limb  RLE  -CW         Compression/Skin Care    Compression/Skin Care  skin care;wrapping location;bandaging;compression garment;remove bandages  -CW      Skin Care  moisturizing lotion applied Applied lotion to both legs.   -CW      Wrapping Location  lower extremity  -CW      Wrapping Location LE  right:;foot to knee  -CW      Wrapping Comments  K1, 1- 10cm. Artiflex, 1- 8cm. 3-10cm. rosidal bandages.    -CW      Bandage Layers  cotton liner;padding/fluff layer;soft foam- 1/4 inch;short-stretch bandages (comment size/quantity)  -CW      Bandaging Technique  circumferential/spiral;moderate compression  -CW      Remove Bandages  Bandages were removed at home.  -CW        User Key  (r) = Recorded By, (t) = Taken By, (c) = Cosigned By    Initials Name Provider Type    Colleen Cleary OTR Occupational Therapist                  OT Assessment/Plan     Row Name  11/25/20 1113          OT Assessment    Assessment Comments  Pt. was able to wear the bandages day and night. Pt.'s spouse re-applied the dressing changes for L leg wound. Mild yellow drainage on wound pad. Called  nurse regarding wound clinic. Pt. to be discharged from .  RN to follow up to get pt. scheduled for wound clinic consult. Applied the bandages with moderate compression RLE. Reviewed how to apply and sequence. Discussed skin care and LE elevation. Pt. to find her old velcro wraps from home.  -CW        OT Plan    OT Plan Comments  Plan to cont. tx. RN to follow up with pt. regarding possible wound clinic consult.  -CW       User Key  (r) = Recorded By, (t) = Taken By, (c) = Cosigned By    Initials Name Provider Type    Colleen Cleary OTR Occupational Therapist                 Manual Rx (last 36 hours)      Manual Treatments     Row Name 11/25/20 1121             Total Minutes    87952 - OT Manual Therapy Minutes  62  -CW        User Key  (r) = Recorded By, (t) = Taken By, (c) = Cosigned By    Initials Name Provider Type    Colleen Cleary OTR Occupational Therapist            Therapy Education  Given: Bandaging/dressing change, Edema management  Program: Reinforced  How Provided: Verbal, Demonstration  Provided to: Patient  Level of Understanding: Verbalized, Teach back education performed  36250 - OT Self Care/Mgmt Minutes: 10                Time Calculation:   OT Start Time: 0847  OT Stop Time: 0959  OT Time Calculation (min): 72 min  Total Timed Code Minutes- OT: 72 minute(s)     Therapy Charges for Today     Code Description Service Date Service Provider Modifiers Qty    [id number]  OT MANUAL THERAPY EA 15 MIN 11/25/2020 Colleen Hidalgo OTR GO 4    [id number]  OT SELF CARE/MGMT/TRAIN EA 15 MIN 11/25/2020 Colleen Hidalgo OTR GO 1                      RADHA Miller  11/25/2020

## 2020-11-25 NOTE — TELEPHONE ENCOUNTER
Called pt, and states Home care nurse Chelsea has been today with her, and from now she will f/u with wound care. Pt says they will fix it.

## 2020-11-25 NOTE — TELEPHONE ENCOUNTER
OK FOR HUB TO HELP.   Called Chelsea  and left a VM to follow up on pt,  asking for a call back. I need to know if Home care will care of pt, or Wound Care will care of the wound. Pt was not sure during a conversation a few days ago if insurance would cover both Lympho clinic and Home Care.

## 2020-11-27 ENCOUNTER — APPOINTMENT (OUTPATIENT)
Dept: OCCUPATIONAL THERAPY | Facility: HOSPITAL | Age: 60
End: 2020-11-27

## 2020-11-30 ENCOUNTER — HOSPITAL ENCOUNTER (OUTPATIENT)
Dept: OCCUPATIONAL THERAPY | Facility: HOSPITAL | Age: 60
Setting detail: THERAPIES SERIES
Discharge: HOME | End: 2020-11-30

## 2020-11-30 DIAGNOSIS — I89.0 LYMPHEDEMA, NOT ELSEWHERE CLASSIFIED: ICD-10-CM

## 2020-11-30 DIAGNOSIS — I89.0 LYMPHEDEMA OF BOTH LOWER EXTREMITIES: Primary | ICD-10-CM

## 2020-11-30 PROCEDURE — 97140 MANUAL THERAPY 1/> REGIONS: CPT

## 2020-11-30 PROCEDURE — 97110 THERAPEUTIC EXERCISES: CPT

## 2020-12-01 ENCOUNTER — TELEPHONE (OUTPATIENT)
Dept: FAMILY MEDICINE CLINIC | Facility: CLINIC | Age: 60
End: 2020-12-01

## 2020-12-01 ENCOUNTER — HOSPITAL ENCOUNTER (OUTPATIENT)
Dept: OCCUPATIONAL THERAPY | Facility: HOSPITAL | Age: 60
Setting detail: THERAPIES SERIES
Discharge: HOME | End: 2020-12-01

## 2020-12-01 ENCOUNTER — OFFICE VISIT (OUTPATIENT)
Dept: FAMILY MEDICINE CLINIC | Facility: CLINIC | Age: 60
End: 2020-12-01

## 2020-12-01 DIAGNOSIS — I89.0 LYMPHEDEMA, NOT ELSEWHERE CLASSIFIED: ICD-10-CM

## 2020-12-01 DIAGNOSIS — I89.0 LYMPHEDEMA: Primary | ICD-10-CM

## 2020-12-01 DIAGNOSIS — L03.115 CELLULITIS OF RIGHT LOWER EXTREMITY: ICD-10-CM

## 2020-12-01 DIAGNOSIS — I89.0 LYMPHEDEMA OF BOTH LOWER EXTREMITIES: Primary | ICD-10-CM

## 2020-12-01 PROCEDURE — 99441 PR PHYS/QHP TELEPHONE EVALUATION 5-10 MIN: CPT | Performed by: FAMILY MEDICINE

## 2020-12-01 PROCEDURE — 97140 MANUAL THERAPY 1/> REGIONS: CPT

## 2020-12-01 RX ORDER — SULFAMETHOXAZOLE AND TRIMETHOPRIM 800; 160 MG/1; MG/1
1 TABLET ORAL 2 TIMES DAILY
Qty: 10 TABLET | Refills: 0 | Status: SHIPPED | OUTPATIENT
Start: 2020-12-01 | End: 2020-12-06

## 2020-12-01 NOTE — PROGRESS NOTES
You have chosen to receive care through a telephone visit. Do you consent to use a telephone visit for your medical care today? Yes    TELEPHONE VISIT    Emely Solomon is a 60 y.o. who was contacted today by phone to address ongoing lymphedema and cellulitis of her R leg. Has been on Bactrim DS for the last few days with improvement, but is beginning to run low. Wound care nurse who has been visiting her home and reporting back to us suggested a few more days of antibiotics. Reports good adherence and tolerance. Has an appointment with the wound clinic on 12/8.            Diagnoses and all orders for this visit:    1. Lymphedema (Primary)  -     sulfamethoxazole-trimethoprim (BACTRIM DS,SEPTRA DS) 800-160 MG per tablet; Take 1 tablet by mouth 2 (Two) Times a Day for 5 days.  Dispense: 10 tablet; Refill: 0    2. Cellulitis of right lower extremity  -     sulfamethoxazole-trimethoprim (BACTRIM DS,SEPTRA DS) 800-160 MG per tablet; Take 1 tablet by mouth 2 (Two) Times a Day for 5 days.  Dispense: 10 tablet; Refill: 0    Refilled antibiotic for 5 more days. She'll let me know if she develops any worsening symptoms or side effects.       Return in about 1 month (around 1/1/2021), or if symptoms worsen or fail to improve.    Time spent caring for the patient was 5 - 10 min.

## 2020-12-01 NOTE — THERAPY TREATMENT NOTE
Outpatient Occupational Therapy Lymphedema Treatment Note  [facility]     Patient Name: Emely Solomon  : 1960  MRN: [id number]  Today's Date: 2020      Visit Date: 2020    Patient Active Problem List   Diagnosis   • Chronic diastolic CHF (congestive heart failure) (CMS/HCC)   • PFO (patent foramen ovale)   • Paradoxical embolism (CMS/HCC)   • Essential hypertension   • Pulmonary hypertension (CMS/HCC)   • Back pain   • ARIEL (obstructive sleep apnea)   • Class 3 severe obesity due to excess calories with serious comorbidity and body mass index (BMI) greater than or equal to 70 in adult (CMS/HCC)   • History of DVT of lower extremity   • Lymphedema   • Anemia, chronic disease   • CKD (chronic kidney disease) stage 3, GFR 30-59 ml/min   • Iron deficiency        Past Medical History:   Diagnosis Date   • Cellulitis     2017, with Group B Strep bacteremia and sepsis   • Chronic diastolic congestive heart failure (CMS/HCC)    • Deep venous thrombosis (CMS/HCC)    • Hypertension    • Lipoedema    • Lymphedema    • Morbid obesity (CMS/HCC)    • Paradoxical embolism (CMS/HCC)     to the LLE due to DVT/PE and PFO   • PFO (patent foramen ovale)    • Pulmonary embolism (CMS/HCC)    • Pulmonary hypertension (CMS/HCC)     multifactorial (dCHF, obesity/ARIEL, hx PE), mild by echo 2016   • Sepsis (CMS/HCC) 2020        Past Surgical History:   Procedure Laterality Date   • BRONCHOSCOPY N/A 2017    Procedure: BRONCHOSCOPY with wash;  Surgeon: Caleb Garcia MD;  Location: [facility] ENDOSCOPY;  Service:    • DILATATION AND CURETTAGE  2011   • VENA CAVA FILTER PLACEMENT      Inferior Vena Cava Filter Placement w/Fluorosc angiogr guidance         Visit Dx:      ICD-10-CM ICD-9-CM   1. Lymphedema of both lower extremities  I89.0 457.1   2. Lymphedema, not elsewhere classified  I89.0 457.1       Lymphedema     Row Name 20 0900             Subjective Pain    Able to rate subjective pain?  yes  -CW       Pre-Treatment Pain Level  2  -CW      Post-Treatment Pain Level  2  -CW         Subjective Comments    Subjective Comments  LLE pain decreased today at rest. Not as much burning.   -CW         Skin Changes/Observations    Location/Assessment  Lower Extremity  -CW      Lower Extremity Conditions  bilateral:;dry;shiny;scaly  -CW      Lower Extremity Color/Pigment  bilateral:;red;fibrosis  -CW      Lower Extremity Skin Details  6 cm. wound L lower leg-lateral. Yellow drainage on wound dressing. No active bleeding at present.Recommended pt. to see MD for follow up.   -CW         Manual Lymphatic Drainage    Manual Lymphatic Drainage  initial sequence;opened regional lymph nodes;opened anastamoses;extremity treatment  -CW      Initial Sequence  short neck  -CW      Opened Regional Lymph Nodes  axillary;inguinal  -CW      Axillary  right;left  -CW      Inguinal  right;left  -CW      Opened Anastamoses  inguino-axillary  -CW      Inguino-Axillary  right;left  -CW      Extremity Treatment  MLD to full limb  -CW      MLD to Full Limb  RLE  -CW         Compression/Skin Care    Compression/Skin Care  skin care;wrapping location;bandaging;compression garment;remove bandages  -CW      Skin Care  moisturizing lotion applied Applied lotion to both legs.   -CW      Wrapping Location  lower extremity  -CW      Wrapping Location LE  right:;foot to knee  -CW      Wrapping Comments  K1, 1- 10cm. Artiflex, 1- 8cm. 3-10cm. rosidal bandages.  Added tubigrip to RLE ankle to thigh.   -CW      Bandage Layers  cotton liner;padding/fluff layer;soft foam- 1/4 inch;short-stretch bandages (comment size/quantity)  -CW      Bandaging Technique  circumferential/spiral;moderate compression  -CW      Remove Bandages  Bandages were removed at home.  -CW        User Key  (r) = Recorded By, (t) = Taken By, (c) = Cosigned By    Initials Name Provider Type    CW Colleen Hidalgo OTR Occupational Therapist                  OT Assessment/Plan     Row Name  12/01/20 1031          OT Assessment    Assessment Comments  Pt. was able to wear the bandages day/night and removed in the morning. Pt. is now able to walk with a cane. No pain c/o from the bandages at present. LLE wound appearance about the same. Min yellow drainage LLE on wound pad. Pt. reports she is almost done taking her antibiotics.Recommend pt. to follow up with MD to check wound. RLE skin softer. Added tubigrip to R LE ankle to thigh. Reviewed bandage precautions and wearing schedule. Discussed long term edema management. Pt. is not able to find her in-elastic velcro compression at home.  -CW        OT Plan    OT Plan Comments  Plan to cont. tx. Will re-measure tomorrow. Pt. scheduled for wound clinic 12/8. Advised pt. to see and check with MD regarding wound/antibiotics.  -CW       User Key  (r) = Recorded By, (t) = Taken By, (c) = Cosigned By    Initials Name Provider Type    Colleen Cleary OTR Occupational Therapist                 Manual Rx (last 36 hours)      Manual Treatments     Row Name 12/01/20 1037             Total Minutes    59651 - OT Manual Therapy Minutes  66  -CW        User Key  (r) = Recorded By, (t) = Taken By, (c) = Cosigned By    Initials Name Provider Type    Colleen Cleary OTR Occupational Therapist            Therapy Education  Given: Bandaging/dressing change, Edema management  Program: Reinforced, Progressed  How Provided: Verbal, Demonstration  Provided to: Patient  Level of Understanding: Verbalized                Time Calculation:   OT Start Time: 0850  OT Stop Time: 0956  OT Time Calculation (min): 66 min  Total Timed Code Minutes- OT: 66 minute(s)     Therapy Charges for Today     Code Description Service Date Service Provider Modifiers Qty    64555435841  OT MANUAL THERAPY EA 15 MIN 12/1/2020 Colleen Hidalgo OTR GO 4                      RADHA Miller  12/1/2020

## 2020-12-02 ENCOUNTER — HOSPITAL ENCOUNTER (OUTPATIENT)
Dept: OCCUPATIONAL THERAPY | Facility: HOSPITAL | Age: 60
Setting detail: THERAPIES SERIES
Discharge: HOME | End: 2020-12-02

## 2020-12-02 DIAGNOSIS — I89.0 LYMPHEDEMA OF BOTH LOWER EXTREMITIES: Primary | ICD-10-CM

## 2020-12-02 PROCEDURE — 97140 MANUAL THERAPY 1/> REGIONS: CPT

## 2020-12-02 PROCEDURE — 97535 SELF CARE MNGMENT TRAINING: CPT

## 2020-12-02 NOTE — THERAPY TREATMENT NOTE
Outpatient Occupational Therapy Lymphedema Treatment Note/Weekly progress note  [facility]     Patient Name: Emely Solomon  : 1960  MRN: [id number]  Today's Date: 2020      Visit Date: 2020    Patient Active Problem List   Diagnosis   • Chronic diastolic CHF (congestive heart failure) (CMS/HCC)   • PFO (patent foramen ovale)   • Paradoxical embolism (CMS/HCC)   • Essential hypertension   • Pulmonary hypertension (CMS/HCC)   • Back pain   • ARIEL (obstructive sleep apnea)   • Class 3 severe obesity due to excess calories with serious comorbidity and body mass index (BMI) greater than or equal to 70 in adult (CMS/HCC)   • History of DVT of lower extremity   • Lymphedema   • Anemia, chronic disease   • CKD (chronic kidney disease) stage 3, GFR 30-59 ml/min   • Iron deficiency        Past Medical History:   Diagnosis Date   • Cellulitis     2017, with Group B Strep bacteremia and sepsis   • Chronic diastolic congestive heart failure (CMS/HCC)    • Deep venous thrombosis (CMS/HCC)    • Hypertension    • Lipoedema    • Lymphedema    • Morbid obesity (CMS/HCC)    • Paradoxical embolism (CMS/HCC)     to the LLE due to DVT/PE and PFO   • PFO (patent foramen ovale)    • Pulmonary embolism (CMS/HCC)    • Pulmonary hypertension (CMS/HCC)     multifactorial (dCHF, obesity/ARIEL, hx PE), mild by echo 2016   • Sepsis (CMS/HCC) 2020        Past Surgical History:   Procedure Laterality Date   • BRONCHOSCOPY N/A 2017    Procedure: BRONCHOSCOPY with wash;  Surgeon: Caleb Garcia MD;  Location: [facility] ENDOSCOPY;  Service:    • DILATATION AND CURETTAGE  2011   • VENA CAVA FILTER PLACEMENT      Inferior Vena Cava Filter Placement w/Fluorosc angiogr guidance         Visit Dx:      ICD-10-CM ICD-9-CM   1. Lymphedema of both lower extremities  I89.0 457.1       Lymphedema     Row Name 20 0800             Subjective Pain    Able to rate subjective pain?  yes  -CW      Pre-Treatment Pain Level  2   -CW      Post-Treatment Pain Level  2  -CW         Subjective Comments    Subjective Comments  Burning not as bad L lower leg per pt. last night. Spouse assisted with the dressing changes 2X.   -CW         Skin Changes/Observations    Location/Assessment  Lower Extremity  -CW      Lower Extremity Conditions  bilateral:;dry;shiny;scaly  -CW      Lower Extremity Color/Pigment  bilateral:;red;fibrosis  -CW      Lower Extremity Skin Details  6 cm. wound L lower leg-lateral. Yellow drainage on wound dressing. No active bleeding at present.Recommended pt. to see MD for follow up.   -CW         Lymphedema Measurements    Measurement Type(s)  Circumferential  -CW      Circumferential Areas  Lower extremities  -CW         BLE Circumferential (cm)    Measurement Location 1  20 cm. above knee  -CW      Left 1  82 cm  -CW      Right 1  85.5 cm  -CW      Measurement Location 2  10cm above knee  -CW      Left 2  85.2 cm  -CW      Right 2  87.5 cm  -CW      Measurement Location 3  knee  -CW      Left 3  74.5 cm  -CW      Right 3  76 cm  -CW      Measurement Location 4  10 cm. below knee  -CW      Left 4  81.3 cm  -CW      Right 4  70 cm  -CW      Measurement Location 5  20 cm. below knee  -CW      Left 5  62.3 cm  -CW      Right 5  61 cm  -CW      Measurement Location 6  30 cm below knee  -CW      Left 6  40 cm  -CW      Right 6  39.5 cm  -CW      Measurement Location 7  ankle  -CW      Left 7  34 cm  -CW      Right 7  32.5 cm  -CW      Measurement Location 8  mid foot   -CW      Left 8  24 cm  -CW      Right 8  23.7 cm  -CW      LLE Circumferential Total  483.3 cm  -CW      RLE Circumferential Total  475.7 cm  -CW         Manual Lymphatic Drainage    Manual Lymphatic Drainage  initial sequence;opened regional lymph nodes;opened anastamoses;extremity treatment  -CW      Initial Sequence  short neck  -CW      Opened Regional Lymph Nodes  axillary;inguinal  -CW      Axillary  right;left  -CW      Inguinal  right;left  -CW       Opened Anastamoses  inguino-axillary  -CW      Inguino-Axillary  right;left  -CW      Extremity Treatment  MLD to full limb  -CW      MLD to Full Limb  RLE  -CW         Compression/Skin Care    Compression/Skin Care  skin care;wrapping location;bandaging;compression garment;remove bandages  -CW      Skin Care  moisturizing lotion applied Applied lotion to both legs.   -CW      Wrapping Location  lower extremity  -CW      Wrapping Location LE  right:;foot to knee  -CW      Wrapping Comments  K1, 1- 10cm. Artiflex, 1- 8cm. 3-10cm. rosidal bandages.  Added tubigrip to RLE ankle to thigh.   -CW      Bandage Layers  cotton liner;padding/fluff layer;soft foam- 1/4 inch;short-stretch bandages (comment size/quantity)  -CW      Bandaging Technique  circumferential/spiral;moderate compression  -CW      Remove Bandages  Bandages were removed at home.  -CW        User Key  (r) = Recorded By, (t) = Taken By, (c) = Cosigned By    Initials Name Provider Type    Colleen Cleary OTR Occupational Therapist                  OT Assessment/Plan     Row Name 12/02/20 1033          OT Assessment    Assessment Comments  No pain complaints at present. Burning decreased LLE. Her spouse assisted with wound dressing changes yesterday and this morning. suggested pt. do dressing changes 2x/day if the drainage increases and saturates. Pt. was able to wear the bandages day and night without discomfort. Applied lotion to BLE's. Pt. called MD regarding antibiotics. Measurements taken. See flow sheet. Total circumference .7 cm. and .3 cm. (20.9  cm. R and 6.5 cm.. L net decrease). Pt. is progressing with decreased edema BLE’s. Pt. to see wound clinic next Tuesday. Goals updated and reviewed POC with pt.  -CW        OT Plan    OT Plan Comments  Plan to cont. tx. Pt. scheduled for wound clinic 12/8/20.  -CW       User Key  (r) = Recorded By, (t) = Taken By, (c) = Cosigned By    Initials Name Provider Type    Colleen Cleary OTR  Occupational Therapist                 Manual Rx (last 36 hours)      Manual Treatments     Row Name 12/02/20 1039             Total Minutes    17401 - OT Manual Therapy Minutes  62  -CW        User Key  (r) = Recorded By, (t) = Taken By, (c) = Cosigned By    Initials Name Provider Type    Colleen Cleary OTR Occupational Therapist        OT Goals     Row Name 12/02/20 1000          OT Short Term Goals    STG Date to Achieve  12/07/20  -CW     STG 1  Patient to demonstrate proper awareness of “What is Lymphedema” and DO’s and Don’ts” for improved prevention, management, care of symptoms, and ease of transition to self-care of condition.  -CW     STG 1 Progress  Ongoing;Progressing  -CW     STG 2  Patient independent and compliant with self-wrapping techniques of compression bandages with family member or caregiver as indicated for improved self-management of condition.  -CW     STG 2 Progress  Ongoing;Progressing  -CW     STG 3  Patient demonstrate decreased net edema of  BLE's   >/5-10 cm. for decreased in edema, symptoms, decreased risk of infection, and improved skin-care/transition to self-care of condition.  -CW     STG 3 Progress  Met  -CW     STG 4  Patient independent and compliant with initial home exercise program focused on diaphragmatic breathing, range of motion, flexibility, to decrease edema, improve lymphatic flow for decreased edema and decreased risk of infection.  -CW     STG 4 Progress  Ongoing;Progressing  -CW        Long Term Goals    LTG Date to Achieve  12/21/20  -CW     LTG 1  Patient will demonstrate decreased net edema of  BLE's >/=10-20 cm. for decrease in symptoms, decreased risk of infection, and improved skin-care/transition to self-care of condition.   -CW     LTG 1 Progress  Ongoing;Progressing  -CW     LTG 2  Patient/family/caregivers independent with self-care techniques for self-management of condition.  -CW     LTG 2 Progress  Ongoing;Progressing  -CW     LTG 3  Patient  independent and compliant with use and care of compression (example garments, inelastic velcro compression) with assistance of a caregiver as needed to promote self-care independence.    -CW     LTG 3 Progress  Ongoing  -CW     LTG 4  Patient independent and compliant with use and care of compression garments with assistance of a caregiver as needed to promote self-care independence.   -CW     LTG 4 Progress  Ongoing  -CW       User Key  (r) = Recorded By, (t) = Taken By, (c) = Cosigned By    Initials Name Provider Type    CW Colleen Hidalgo OTR Occupational Therapist          Therapy Education  Education Details: reviewed self care for  lymph., skin care and how to apply bandages  Given: Bandaging/dressing change, Edema management  Program: Reinforced  How Provided: Verbal, Demonstration  Provided to: Patient  Level of Understanding: Verbalized, Teach back education performed  78519 - OT Self Care/Mgmt Minutes: 10                Time Calculation:   OT Start Time: 0852  OT Stop Time: 1004  OT Time Calculation (min): 72 min  Total Timed Code Minutes- OT: 72 minute(s)     Therapy Charges for Today     Code Description Service Date Service Provider Modifiers Qty    54552794352 HC OT MANUAL THERAPY EA 15 MIN 12/2/2020 Colleen Hidalgo OTR GO 4    73140726545 HC OT SELF CARE/MGMT/TRAIN EA 15 MIN 12/2/2020 Colleen Hidalgo OTR GO 1                      RADHA Miller  12/2/2020

## 2020-12-04 ENCOUNTER — HOSPITAL ENCOUNTER (OUTPATIENT)
Dept: OCCUPATIONAL THERAPY | Facility: HOSPITAL | Age: 60
Setting detail: THERAPIES SERIES
Discharge: HOME | End: 2020-12-04

## 2020-12-04 DIAGNOSIS — I89.0 LYMPHEDEMA, NOT ELSEWHERE CLASSIFIED: ICD-10-CM

## 2020-12-04 DIAGNOSIS — I89.0 LYMPHEDEMA OF BOTH LOWER EXTREMITIES: Primary | ICD-10-CM

## 2020-12-04 PROCEDURE — 97535 SELF CARE MNGMENT TRAINING: CPT

## 2020-12-04 PROCEDURE — 97140 MANUAL THERAPY 1/> REGIONS: CPT

## 2020-12-04 NOTE — THERAPY TREATMENT NOTE
Outpatient Occupational Therapy Lymphedema Treatment Note  [facility]     Patient Name: Emely Solomon  : 1960  MRN: [id number]  Today's Date: 2020      Visit Date: 2020    Patient Active Problem List   Diagnosis   • Chronic diastolic CHF (congestive heart failure) (CMS/HCC)   • PFO (patent foramen ovale)   • Paradoxical embolism (CMS/HCC)   • Essential hypertension   • Pulmonary hypertension (CMS/HCC)   • Back pain   • ARIEL (obstructive sleep apnea)   • Class 3 severe obesity due to excess calories with serious comorbidity and body mass index (BMI) greater than or equal to 70 in adult (CMS/HCC)   • History of DVT of lower extremity   • Lymphedema   • Anemia, chronic disease   • CKD (chronic kidney disease) stage 3, GFR 30-59 ml/min   • Iron deficiency        Past Medical History:   Diagnosis Date   • Cellulitis     2017, with Group B Strep bacteremia and sepsis   • Chronic diastolic congestive heart failure (CMS/HCC)    • Deep venous thrombosis (CMS/HCC)    • Hypertension    • Lipoedema    • Lymphedema    • Morbid obesity (CMS/HCC)    • Paradoxical embolism (CMS/HCC)     to the LLE due to DVT/PE and PFO   • PFO (patent foramen ovale)    • Pulmonary embolism (CMS/HCC)    • Pulmonary hypertension (CMS/HCC)     multifactorial (dCHF, obesity/ARIEL, hx PE), mild by echo 2016   • Sepsis (CMS/HCC) 2020        Past Surgical History:   Procedure Laterality Date   • BRONCHOSCOPY N/A 2017    Procedure: BRONCHOSCOPY with wash;  Surgeon: Caleb Garcia MD;  Location: [facility] ENDOSCOPY;  Service:    • DILATATION AND CURETTAGE  2011   • VENA CAVA FILTER PLACEMENT      Inferior Vena Cava Filter Placement w/Fluorosc angiogr guidance         Visit Dx:      ICD-10-CM ICD-9-CM   1. Lymphedema of both lower extremities  I89.0 457.1   2. Lymphedema, not elsewhere classified  I89.0 457.1       Lymphedema     Row Name 20 0800             Subjective Pain    Able to rate subjective pain?  yes  -CW       Pre-Treatment Pain Level  2  -CW      Post-Treatment Pain Level  2  -CW         Subjective Comments    Subjective Comments  Burning some at night. No pain at rest.   -CW         Skin Changes/Observations    Location/Assessment  Lower Extremity  -CW      Lower Extremity Conditions  bilateral:;dry;shiny;scaly  -CW      Lower Extremity Color/Pigment  bilateral:;red;fibrosis  -CW      Lower Extremity Skin Details  6 cm. wound L lower leg-lateral. Yellow drainage on wound dressing. No active bleeding at present.  -CW         Manual Lymphatic Drainage    Manual Lymphatic Drainage  initial sequence;opened regional lymph nodes;opened anastamoses;extremity treatment  -CW      Initial Sequence  short neck  -CW      Opened Regional Lymph Nodes  axillary;inguinal  -CW      Axillary  right;left  -CW      Inguinal  right;left  -CW      Opened Anastamoses  inguino-axillary  -CW      Inguino-Axillary  right;left  -CW      Extremity Treatment  MLD to full limb  -CW      MLD to Full Limb  RLE  -CW         Compression/Skin Care    Compression/Skin Care  skin care;wrapping location;bandaging;compression garment;remove bandages  -CW      Skin Care  moisturizing lotion applied Applied lotion to both legs.   -CW      Wrapping Location  lower extremity  -CW      Wrapping Location LE  right:;foot to knee  -CW      Wrapping Comments  K1, 1- 10cm. Artiflex, 1- 8cm. 3-10cm. rosidal bandages.  Added tubigrip to RLE ankle to thigh.   -CW      Bandage Layers  cotton liner;padding/fluff layer;soft foam- 1/4 inch;short-stretch bandages (comment size/quantity)  -CW      Bandaging Technique  circumferential/spiral;moderate compression  -CW      Remove Bandages  Bandages were removed at home.  -CW        User Key  (r) = Recorded By, (t) = Taken By, (c) = Cosigned By    Initials Name Provider Type    CW Colleen Hidalgo OTR Occupational Therapist                  OT Assessment/Plan     Row Name 12/04/20 1020          OT Assessment    Assessment  Comments  Pt. was able to tolerate the compression bandages day and night. Removed the bandages for bathing.  No pain complaints at present when at rest, but has some burning L wound area when she leans on that side.  Skin dry. LLE wound appearance is about the same. Min drainage on wound pad. Pt's spouse is assistinng with dressing changes 2x/ day.  Applied the bandages with slightly more compression as tolerated with no discomfort. Reviewed bandage precautions, wearing schedule and skin care. Reviewed sequence and technique on how to apply the compression bandages. Pt. can decrease compression or remove a bandage layer at night if needed for comfort. Discussed long term edema management.  -CW        OT Plan    OT Plan Comments  Plan to cont. tx. Will cont. work with pt. towards indep. lymph. management.  -CW       User Key  (r) = Recorded By, (t) = Taken By, (c) = Cosigned By    Initials Name Provider Type    Colleen Cleary OTR Occupational Therapist                 Manual Rx (last 36 hours)      Manual Treatments     Row Name 12/04/20 1024             Total Minutes    21763 - OT Manual Therapy Minutes  60  -CW        User Key  (r) = Recorded By, (t) = Taken By, (c) = Cosigned By    Initials Name Provider Type    Colleen Cleary OTR Occupational Therapist            Therapy Education  Given: Bandaging/dressing change, Edema management  Program: Reinforced  How Provided: Verbal, Demonstration  Provided to: Patient  Level of Understanding: Teach back education performed, Verbalized  29804 - OT Self Care/Mgmt Minutes: 10                Time Calculation:   OT Start Time: 0850  OT Stop Time: 1000  OT Time Calculation (min): 70 min  Total Timed Code Minutes- OT: 70 minute(s)     Therapy Charges for Today     Code Description Service Date Service Provider Modifiers Qty    62805471928  OT MANUAL THERAPY EA 15 MIN 12/4/2020 Colleen Hidalgo OTR GO 4    88945695205 HC OT SELF CARE/MGMT/TRAIN EA 15 MIN 12/4/2020 Rocky  Colleen, OTR GO 1                      Colleen Hidalgo, OTR  12/4/2020

## 2020-12-07 ENCOUNTER — HOSPITAL ENCOUNTER (OUTPATIENT)
Dept: OCCUPATIONAL THERAPY | Facility: HOSPITAL | Age: 60
Setting detail: THERAPIES SERIES
Discharge: HOME | End: 2020-12-07

## 2020-12-07 DIAGNOSIS — I89.0 LYMPHEDEMA OF BOTH LOWER EXTREMITIES: Primary | ICD-10-CM

## 2020-12-07 DIAGNOSIS — I89.0 LYMPHEDEMA, NOT ELSEWHERE CLASSIFIED: ICD-10-CM

## 2020-12-07 PROCEDURE — 97140 MANUAL THERAPY 1/> REGIONS: CPT

## 2020-12-07 PROCEDURE — 97535 SELF CARE MNGMENT TRAINING: CPT

## 2020-12-07 NOTE — THERAPY TREATMENT NOTE
Outpatient Occupational Therapy Lymphedema Treatment Note  [facility]     Patient Name: Emely Solomon  : 1960  MRN: [id number]  Today's Date: 2020      Visit Date: 2020    Patient Active Problem List   Diagnosis   • Chronic diastolic CHF (congestive heart failure) (CMS/HCC)   • PFO (patent foramen ovale)   • Paradoxical embolism (CMS/HCC)   • Essential hypertension   • Pulmonary hypertension (CMS/HCC)   • Back pain   • ARIEL (obstructive sleep apnea)   • Class 3 severe obesity due to excess calories with serious comorbidity and body mass index (BMI) greater than or equal to 70 in adult (CMS/HCC)   • History of DVT of lower extremity   • Lymphedema   • Anemia, chronic disease   • CKD (chronic kidney disease) stage 3, GFR 30-59 ml/min   • Iron deficiency        Past Medical History:   Diagnosis Date   • Cellulitis     2017, with Group B Strep bacteremia and sepsis   • Chronic diastolic congestive heart failure (CMS/HCC)    • Deep venous thrombosis (CMS/HCC)    • Hypertension    • Lipoedema    • Lymphedema    • Morbid obesity (CMS/HCC)    • Paradoxical embolism (CMS/HCC)     to the LLE due to DVT/PE and PFO   • PFO (patent foramen ovale)    • Pulmonary embolism (CMS/HCC)    • Pulmonary hypertension (CMS/HCC)     multifactorial (dCHF, obesity/ARIEL, hx PE), mild by echo 2016   • Sepsis (CMS/HCC) 2020        Past Surgical History:   Procedure Laterality Date   • BRONCHOSCOPY N/A 2017    Procedure: BRONCHOSCOPY with wash;  Surgeon: Caleb Garcia MD;  Location: [facility] ENDOSCOPY;  Service:    • DILATATION AND CURETTAGE  2011   • VENA CAVA FILTER PLACEMENT      Inferior Vena Cava Filter Placement w/Fluorosc angiogr guidance         Visit Dx:      ICD-10-CM ICD-9-CM   1. Lymphedema of both lower extremities  I89.0 457.1   2. Lymphedema, not elsewhere classified  I89.0 457.1       Lymphedema     Row Name 20 0800             Subjective Pain    Able to rate subjective pain?  yes  -CW       Pre-Treatment Pain Level  2  -CW      Post-Treatment Pain Level  2  -CW      Subjective Pain Comment  Burning wound area.   -CW         Subjective Comments    Subjective Comments  Pt.'s spouse was able to assist with dressing changes over the weekend.   -CW         Skin Changes/Observations    Location/Assessment  Lower Extremity  -CW      Lower Extremity Conditions  bilateral:;dry;shiny;scaly  -CW      Lower Extremity Color/Pigment  bilateral:;red;fibrosis  -CW      Lower Extremity Skin Details  6 cm. wound L lower leg-lateral. Yellow drainage on wound dressing. No active bleeding at present.  -CW         Manual Lymphatic Drainage    Manual Lymphatic Drainage  initial sequence;opened regional lymph nodes;opened anastamoses;extremity treatment  -CW      Initial Sequence  short neck  -CW      Opened Regional Lymph Nodes  axillary;inguinal  -CW      Axillary  right;left  -CW      Inguinal  right;left  -CW      Opened Anastamoses  inguino-axillary  -CW      Inguino-Axillary  right;left  -CW      Extremity Treatment  MLD to full limb  -CW      MLD to Full Limb  RLE  -CW         Compression/Skin Care    Compression/Skin Care  skin care;wrapping location;bandaging;compression garment;remove bandages  -CW      Skin Care  moisturizing lotion applied Applied lotion to both legs.   -CW      Wrapping Location  lower extremity  -CW      Wrapping Location LE  right:;foot to knee  -CW      Wrapping Comments  K1, 1- 10cm. Artiflex, 1- 8cm. 3-10cm. rosidal bandages.  Added tubigrip to RLE ankle to thigh.   -CW      Bandage Layers  cotton liner;padding/fluff layer;soft foam- 1/4 inch;short-stretch bandages (comment size/quantity)  -CW      Bandaging Technique  circumferential/spiral;moderate compression  -CW      Remove Bandages  Bandages were removed at home.  -CW        User Key  (r) = Recorded By, (t) = Taken By, (c) = Cosigned By    Initials Name Provider Type    CW Colleen Hidalgo, OTR Occupational Therapist                   OT Assessment/Plan     Row Name 12/07/20 1052          OT Assessment    Assessment Comments  Pt. reports her spouse has been doing dressing changes 2x/day. States she has some burning L lateral leg wound. Min yellow drainage on wound pad. Skin dry B lower legs. Pt. tolerated MLD well supine for comfort. Spoke  with [employer] rep regarding BLE velcro compression Reduction kit. Will fax pt.s info to [employer] and [employer]. Discussed long term edema managment and possibly a compression pump. Reviewed bandage precautions and wearing schedule. Pt. to go to the wound clinic tomorrow 12:30.  -CW        OT Plan    OT Plan Comments  Plan to con,.tx. Will cont. to work with pt. to achieve indep lymph management.  -CW       User Key  (r) = Recorded By, (t) = Taken By, (c) = Cosigned By    Initials Name Provider Type    Colleen Cleary OTR Occupational Therapist                 Manual Rx (last 36 hours)      Manual Treatments     Row Name 12/07/20 1057             Total Minutes    86607 - OT Manual Therapy Minutes  60  -CW        User Key  (r) = Recorded By, (t) = Taken By, (c) = Cosigned By    Initials Name Provider Type    Colleen Cleary OTR Occupational Therapist            Therapy Education  Education Details: Reviewed how to apply bandages, skin care and self care edema managememt.  Given: Edema management, Bandaging/dressing change  Program: Reinforced  94294 - OT Self Care/Mgmt Minutes: 10                Time Calculation:   OT Start Time: 0850  OT Stop Time: 1000  OT Time Calculation (min): 70 min  Total Timed Code Minutes- OT: 70 minute(s)     Therapy Charges for Today     Code Description Service Date Service Provider Modifiers Qty    32363108642 HC OT MANUAL THERAPY EA 15 MIN 12/7/2020 Colleen Hidalgo OTR GO 4    78790222973 HC OT SELF CARE/MGMT/TRAIN EA 15 MIN 12/7/2020 Colleen Hidalgo OTR GO 1                      RADHA Miller  12/7/2020

## 2020-12-08 ENCOUNTER — LAB REQUISITION (OUTPATIENT)
Dept: LAB | Facility: HOSPITAL | Age: 60
End: 2020-12-08

## 2020-12-08 ENCOUNTER — OFFICE VISIT (OUTPATIENT)
Dept: WOUND CARE | Facility: HOSPITAL | Age: 60
End: 2020-12-08

## 2020-12-08 ENCOUNTER — HOSPITAL ENCOUNTER (OUTPATIENT)
Dept: OCCUPATIONAL THERAPY | Facility: HOSPITAL | Age: 60
Setting detail: THERAPIES SERIES
Discharge: HOME | End: 2020-12-08

## 2020-12-08 DIAGNOSIS — I89.0 LYMPHEDEMA OF BOTH LOWER EXTREMITIES: Primary | ICD-10-CM

## 2020-12-08 DIAGNOSIS — I89.0 LYMPHEDEMA, NOT ELSEWHERE CLASSIFIED: ICD-10-CM

## 2020-12-08 DIAGNOSIS — L97.822 NON-PRESSURE CHRONIC ULCER OF OTHER PART OF LEFT LOWER LEG WITH FAT LAYER EXPOSED (HCC): ICD-10-CM

## 2020-12-08 PROCEDURE — G0463 HOSPITAL OUTPT CLINIC VISIT: HCPCS

## 2020-12-08 PROCEDURE — 87015 SPECIMEN INFECT AGNT CONCNTJ: CPT | Performed by: SURGERY

## 2020-12-08 PROCEDURE — 87186 SC STD MICRODIL/AGAR DIL: CPT | Performed by: SURGERY

## 2020-12-08 PROCEDURE — 97140 MANUAL THERAPY 1/> REGIONS: CPT

## 2020-12-08 PROCEDURE — 97535 SELF CARE MNGMENT TRAINING: CPT

## 2020-12-08 PROCEDURE — 87147 CULTURE TYPE IMMUNOLOGIC: CPT | Performed by: SURGERY

## 2020-12-08 PROCEDURE — 87205 SMEAR GRAM STAIN: CPT | Performed by: SURGERY

## 2020-12-08 PROCEDURE — 87075 CULTR BACTERIA EXCEPT BLOOD: CPT | Performed by: SURGERY

## 2020-12-08 PROCEDURE — 87070 CULTURE OTHR SPECIMN AEROBIC: CPT | Performed by: SURGERY

## 2020-12-08 NOTE — THERAPY TREATMENT NOTE
Outpatient Occupational Therapy Lymphedema Treatment Note  [facility]     Patient Name: Emely Solomon  : 1960  MRN: [id number]  Today's Date: 2020      Visit Date: 2020    Patient Active Problem List   Diagnosis   • Chronic diastolic CHF (congestive heart failure) (CMS/HCC)   • PFO (patent foramen ovale)   • Paradoxical embolism (CMS/HCC)   • Essential hypertension   • Pulmonary hypertension (CMS/HCC)   • Back pain   • ARIEL (obstructive sleep apnea)   • Class 3 severe obesity due to excess calories with serious comorbidity and body mass index (BMI) greater than or equal to 70 in adult (CMS/HCC)   • History of DVT of lower extremity   • Lymphedema   • Anemia, chronic disease   • CKD (chronic kidney disease) stage 3, GFR 30-59 ml/min   • Iron deficiency        Past Medical History:   Diagnosis Date   • Cellulitis     2017, with Group B Strep bacteremia and sepsis   • Chronic diastolic congestive heart failure (CMS/HCC)    • Deep venous thrombosis (CMS/HCC)    • Hypertension    • Lipoedema    • Lymphedema    • Morbid obesity (CMS/HCC)    • Paradoxical embolism (CMS/HCC)     to the LLE due to DVT/PE and PFO   • PFO (patent foramen ovale)    • Pulmonary embolism (CMS/HCC)    • Pulmonary hypertension (CMS/HCC)     multifactorial (dCHF, obesity/ARIEL, hx PE), mild by echo 2016   • Sepsis (CMS/HCC) 2020        Past Surgical History:   Procedure Laterality Date   • BRONCHOSCOPY N/A 2017    Procedure: BRONCHOSCOPY with wash;  Surgeon: Caleb Garcia MD;  Location: [facility] ENDOSCOPY;  Service:    • DILATATION AND CURETTAGE  2011   • VENA CAVA FILTER PLACEMENT      Inferior Vena Cava Filter Placement w/Fluorosc angiogr guidance         Visit Dx:      ICD-10-CM ICD-9-CM   1. Lymphedema of both lower extremities  I89.0 457.1   2. Lymphedema, not elsewhere classified  I89.0 457.1       Lymphedema     Row Name 20 0800             Subjective Pain    Able to rate subjective pain?  yes  -CW       Pre-Treatment Pain Level  2  -CW      Post-Treatment Pain Level  2  -CW         Subjective Comments    Subjective Comments  Some burning L lower leg wound area.   -CW         Skin Changes/Observations    Location/Assessment  Lower Extremity  -CW      Lower Extremity Conditions  bilateral:;dry;shiny;scaly  -CW      Lower Extremity Color/Pigment  bilateral:;red;fibrosis  -CW      Lower Extremity Skin Details  Wound L lower leg-lateral. Yellow drainage on wound dressing. No active bleeding at present.  -CW         Manual Lymphatic Drainage    Manual Lymphatic Drainage  initial sequence;opened regional lymph nodes;opened anastamoses;extremity treatment  -CW      Initial Sequence  short neck  -CW      Opened Regional Lymph Nodes  axillary;inguinal  -CW      Axillary  right;left  -CW      Inguinal  right;left  -CW      Opened Anastamoses  inguino-axillary  -CW      Inguino-Axillary  right;left  -CW      Extremity Treatment  MLD to full limb  -CW      MLD to Full Limb  RLE  -CW         Compression/Skin Care    Compression/Skin Care  skin care;wrapping location;bandaging;compression garment;remove bandages  -CW      Skin Care  moisturizing lotion applied Applied lotion to both legs.   -CW      Wrapping Location  lower extremity  -CW      Wrapping Location LE  right:;foot to knee  -CW      Wrapping Comments  K1, 1- 10cm. Artiflex, 1- 8cm. 3-10cm. rosidal bandages.  Added tubigrip to RLE ankle to thigh.   -CW      Bandage Layers  cotton liner;padding/fluff layer;soft foam- 1/4 inch;short-stretch bandages (comment size/quantity)  -CW      Bandaging Technique  circumferential/spiral;moderate compression  -CW      Remove Bandages  Bandages were removed at home.  -CW        User Key  (r) = Recorded By, (t) = Taken By, (c) = Cosigned By    Initials Name Provider Type    CW Colleen Hidalgo OTR Occupational Therapist                  OT Assessment/Plan     Row Name 12/08/20 9901          OT Assessment    Assessment Comments   Pt.'s spouse was able to assist with the wound dressing change this morning. Pt. c/o burning pain L lower leg. Pt. has an appt. for wound clinic today at 12:30. Applied the bandages RLE with moderate compression. Skin intact R lower leg. Min yellow drainage on wound pad LLE wound. Pt. reports she is taking antibiotics. Skin dry and flaking B lower legs. Reviewed bandage precautions and wearing schedule.  -CW        OT Plan    OT Plan Comments  Plan to cont. tx. Will re-measure tomorrow.  -CW       User Key  (r) = Recorded By, (t) = Taken By, (c) = Cosigned By    Initials Name Provider Type    Colleen Cleary OTR Occupational Therapist                 Manual Rx (last 36 hours)      Manual Treatments     Row Name 12/08/20 1155             Total Minutes    45561 - OT Manual Therapy Minutes  61  -CW        User Key  (r) = Recorded By, (t) = Taken By, (c) = Cosigned By    Initials Name Provider Type    Colleen Cleary OTR Occupational Therapist            Therapy Education  Given: Bandaging/dressing change, Edema management  Program: Reinforced  How Provided: Verbal  Provided to: Patient  Level of Understanding: Verbalized  06362 - OT Self Care/Mgmt Minutes: 10                Time Calculation:   OT Start Time: 0848  OT Stop Time: 0959  OT Time Calculation (min): 71 min  Total Timed Code Minutes- OT: 71 minute(s)     Therapy Charges for Today     Code Description Service Date Service Provider Modifiers Qty    65790725243 HC OT MANUAL THERAPY EA 15 MIN 12/8/2020 Colleen Hidalgo OTR GO 4    47690121357 HC OT SELF CARE/MGMT/TRAIN EA 15 MIN 12/8/2020 Colleen Hidalgo OTR GO 1                      RADHA Miller  12/8/2020

## 2020-12-09 ENCOUNTER — HOSPITAL ENCOUNTER (OUTPATIENT)
Dept: OCCUPATIONAL THERAPY | Facility: HOSPITAL | Age: 60
Setting detail: THERAPIES SERIES
Discharge: HOME | End: 2020-12-09

## 2020-12-09 DIAGNOSIS — I89.0 LYMPHEDEMA OF BOTH LOWER EXTREMITIES: Primary | ICD-10-CM

## 2020-12-09 DIAGNOSIS — I89.0 LYMPHEDEMA, NOT ELSEWHERE CLASSIFIED: ICD-10-CM

## 2020-12-09 PROCEDURE — 97140 MANUAL THERAPY 1/> REGIONS: CPT

## 2020-12-09 PROCEDURE — 97535 SELF CARE MNGMENT TRAINING: CPT

## 2020-12-09 NOTE — THERAPY TREATMENT NOTE
Outpatient Occupational Therapy Lymphedema Treatment Note/weekly progress note  [facility]     Patient Name: Emely Solomon  : 1960  MRN: [id number]  Today's Date: 2020      Visit Date: 2020    Patient Active Problem List   Diagnosis   • Chronic diastolic CHF (congestive heart failure) (CMS/HCC)   • PFO (patent foramen ovale)   • Paradoxical embolism (CMS/HCC)   • Essential hypertension   • Pulmonary hypertension (CMS/HCC)   • Back pain   • ARIEL (obstructive sleep apnea)   • Class 3 severe obesity due to excess calories with serious comorbidity and body mass index (BMI) greater than or equal to 70 in adult (CMS/HCC)   • History of DVT of lower extremity   • Lymphedema   • Anemia, chronic disease   • CKD (chronic kidney disease) stage 3, GFR 30-59 ml/min   • Iron deficiency        Past Medical History:   Diagnosis Date   • Cellulitis     2017, with Group B Strep bacteremia and sepsis   • Chronic diastolic congestive heart failure (CMS/HCC)    • Deep venous thrombosis (CMS/HCC)    • Hypertension    • Lipoedema    • Lymphedema    • Morbid obesity (CMS/HCC)    • Paradoxical embolism (CMS/HCC)     to the LLE due to DVT/PE and PFO   • PFO (patent foramen ovale)    • Pulmonary embolism (CMS/HCC)    • Pulmonary hypertension (CMS/HCC)     multifactorial (dCHF, obesity/ARIEL, hx PE), mild by echo 2016   • Sepsis (CMS/HCC) 2020        Past Surgical History:   Procedure Laterality Date   • BRONCHOSCOPY N/A 2017    Procedure: BRONCHOSCOPY with wash;  Surgeon: Caleb Garcia MD;  Location: [facility] ENDOSCOPY;  Service:    • DILATATION AND CURETTAGE  2011   • VENA CAVA FILTER PLACEMENT      Inferior Vena Cava Filter Placement w/Fluorosc angiogr guidance         Visit Dx:      ICD-10-CM ICD-9-CM   1. Lymphedema of both lower extremities  I89.0 457.1   2. Lymphedema, not elsewhere classified  I89.0 457.1       Lymphedema     Row Name 20 0800             Subjective Pain    Able to rate  subjective pain?  yes  -CW      Pre-Treatment Pain Level  2  -CW      Post-Treatment Pain Level  2  -CW         Subjective Comments    Subjective Comments  Mild burning L lower leg. Pt. went to the wound clinic yesterday. Pt. says her wound supplies are coming in the mail.   -CW         Skin Changes/Observations    Location/Assessment  Lower Extremity  -CW      Lower Extremity Conditions  bilateral:;dry;shiny;scaly  -CW      Lower Extremity Color/Pigment  bilateral:;red;fibrosis  -CW      Lower Extremity Skin Details  Wound L lower leg-lateral. Yellow drainage on wound dressing. No active bleeding at present.   -CW         Lymphedema Measurements    Measurement Type(s)  Circumferential  -CW      Circumferential Areas  Lower extremities  -CW         BLE Circumferential (cm)    Measurement Location 1  20 cm. above knee  -CW      Left 1  84 cm  -CW      Right 1  83.7 cm  -CW      Measurement Location 2  10cm above knee  -CW      Left 2  87.5 cm  -CW      Right 2  86 cm  -CW      Measurement Location 3  knee  -CW      Left 3  74 cm  -CW      Right 3  78.5 cm  -CW      Measurement Location 4  10 cm. below knee  -CW      Left 4  77.5 cm  -CW      Right 4  69.8 cm  -CW      Measurement Location 5  20 cm. below knee  -CW      Left 5  71.3 cm  -CW      Right 5  56.7 cm  -CW      Measurement Location 6  30 cm below knee  -CW      Left 6  39 cm  -CW      Right 6  39.2 cm  -CW      Measurement Location 7  ankle  -CW      Left 7  31.2 cm  -CW      Right 7  31.5 cm  -CW      Measurement Location 8  mid foot   -CW      Left 8  23.8 cm  -CW      Right 8  23.4 cm  -CW      LLE Circumferential Total  488.3 cm  -CW      RLE Circumferential Total  468.8 cm  -CW         Manual Lymphatic Drainage    Manual Lymphatic Drainage  initial sequence;opened regional lymph nodes;opened anastamoses;extremity treatment  -CW      Initial Sequence  short neck  -CW      Opened Regional Lymph Nodes  axillary;inguinal  -CW      Axillary  right;left  -CW       Inguinal  right;left  -CW      Opened Anastamoses  inguino-axillary  -CW      Inguino-Axillary  right;left  -CW      Extremity Treatment  MLD to full limb  -CW      MLD to Full Limb  RLE/LLE  -CW         Compression/Skin Care    Compression/Skin Care  skin care;wrapping location;bandaging;compression garment;remove bandages  -CW      Skin Care  moisturizing lotion applied Applied lotion to both legs.   -CW      Wrapping Location  lower extremity  -CW      Wrapping Location LE  right:;foot to knee  -CW      Wrapping Comments  K1, 1- 10cm. Artiflex, 1- 8cm. 3-10cm. rosidal bandages.  Added tubigrip to RLE ankle to thigh. Kerlex and ABD pad to LLE. Washed L wound with Betadine.   -CW      Bandage Layers  cotton liner;padding/fluff layer;soft foam- 1/4 inch;short-stretch bandages (comment size/quantity)  -CW      Bandaging Technique  circumferential/spiral;moderate compression  -CW      Remove Bandages  Bandages were removed at home.  -CW        User Key  (r) = Recorded By, (t) = Taken By, (c) = Cosigned By    Initials Name Provider Type    CW Colleen Hidalgo OTR Occupational Therapist                  OT Assessment/Plan     Row Name 12/09/20 8173          OT Assessment    Assessment Comments  Mild pain L LE wound area. Pt. went to the wound clinic yesterday. Per pt. her wound supplied have been ordered.  Pt. was able to wear the bandages day and night without discomfort. Skin dry B lower legs. Betadine, ABD pads and Kerlix to LLE wound then Ace wraps over wound dressings.  Measurements taken. See flow sheet. Total circumference .8  cm. and .3 cm. (27.8 cm. R and 1.5 cm. L net decrease). Pt. is progressing with decreased edema RLE’s. Goals updated and reviewed POC with pt. Discussed skin care and reviewed how to apply the bandages sequence and technique. Called wound clinic regarding bandaging LLE.  -CW        OT Plan    OT Plan Comments  Plan to cont. tx. Pt. can remove bandages for bathing then  re-apply. Will cont. to work with pt. for indep edema management.  -CW       User Key  (r) = Recorded By, (t) = Taken By, (c) = Cosigned By    Initials Name Provider Type    Colleen Cleary OTR Occupational Therapist                 Manual Rx (last 36 hours)      Manual Treatments     Row Name 12/09/20 1239             Total Minutes    56152 - OT Manual Therapy Minutes  60  -CW        User Key  (r) = Recorded By, (t) = Taken By, (c) = Cosigned By    Initials Name Provider Type    Colleen Cleary OTR Occupational Therapist        OT Goals     Row Name 12/09/20 1200          OT Short Term Goals    STG Date to Achieve  12/07/20  -CW     STG 1  Patient to demonstrate proper awareness of “What is Lymphedema” and DO’s and Don’ts” for improved prevention, management, care of symptoms, and ease of transition to self-care of condition.  -CW     STG 1 Progress  Ongoing;Progressing  -CW     STG 2  Patient independent and compliant with self-wrapping techniques of compression bandages with family member or caregiver as indicated for improved self-management of condition.  -CW     STG 2 Progress  Ongoing;Progressing  -CW     STG 3  Patient demonstrate decreased net edema of  BLE's   >/5-10 cm. for decreased in edema, symptoms, decreased risk of infection, and improved skin-care/transition to self-care of condition.  -CW     STG 3 Progress  Met  -CW     STG 4  Patient independent and compliant with initial home exercise program focused on diaphragmatic breathing, range of motion, flexibility, to decrease edema, improve lymphatic flow for decreased edema and decreased risk of infection.  -CW     STG 4 Progress  Ongoing;Progressing  -CW        Long Term Goals    LTG Date to Achieve  12/21/20  -CW     LTG 1  Patient will demonstrate decreased net edema of  BLE's >/=10-20 cm. for decrease in symptoms, decreased risk of infection, and improved skin-care/transition to self-care of condition.   -CW     LTG 1 Progress   Ongoing;Progressing  -CW     LTG 2  Patient/family/caregivers independent with self-care techniques for self-management of condition.  -CW     LTG 2 Progress  Ongoing;Progressing  -CW     LTG 3  Patient independent and compliant with use and care of compression (example garments, inelastic velcro compression) with assistance of a caregiver as needed to promote self-care independence.    -CW     LTG 3 Progress  Ongoing  -CW     LTG 4  Patient independent and compliant with use and care of compression garments with assistance of a caregiver as needed to promote self-care independence.   -CW     LTG 4 Progress  Ongoing  -CW       User Key  (r) = Recorded By, (t) = Taken By, (c) = Cosigned By    Initials Name Provider Type    CW Colleen Hidalgo OTR Occupational Therapist          Therapy Education  Given: Bandaging/dressing change, Edema management  Program: Reinforced, Progressed  How Provided: Verbal, Demonstration  Provided to: Patient  Level of Understanding: Verbalized  58443 - OT Self Care/Mgmt Minutes: 10                Time Calculation:   OT Start Time: 0848  OT Stop Time: 0958  OT Time Calculation (min): 70 min  Total Timed Code Minutes- OT: 70 minute(s)     Therapy Charges for Today     Code Description Service Date Service Provider Modifiers Qty    88762611142 HC OT MANUAL THERAPY EA 15 MIN 12/9/2020 Colleen Hidalgo OTR GO 4    53640648299 HC OT SELF CARE/MGMT/TRAIN EA 15 MIN 12/9/2020 Colleen Hidalgo OTR GO 1                      RADHA Miller  12/9/2020

## 2020-12-11 ENCOUNTER — HOSPITAL ENCOUNTER (OUTPATIENT)
Dept: OCCUPATIONAL THERAPY | Facility: HOSPITAL | Age: 60
Setting detail: THERAPIES SERIES
Discharge: HOME | End: 2020-12-11

## 2020-12-11 DIAGNOSIS — I89.0 LYMPHEDEMA, NOT ELSEWHERE CLASSIFIED: ICD-10-CM

## 2020-12-11 DIAGNOSIS — I89.0 LYMPHEDEMA OF BOTH LOWER EXTREMITIES: Primary | ICD-10-CM

## 2020-12-11 PROCEDURE — 97535 SELF CARE MNGMENT TRAINING: CPT

## 2020-12-11 PROCEDURE — 97140 MANUAL THERAPY 1/> REGIONS: CPT

## 2020-12-11 NOTE — THERAPY TREATMENT NOTE
Outpatient Occupational Therapy Lymphedema Treatment Note  [facility]     Patient Name: Emely Solomon  : 1960  MRN: [id number]  Today's Date: 2020      Visit Date: 2020    Patient Active Problem List   Diagnosis   • Chronic diastolic CHF (congestive heart failure) (CMS/HCC)   • PFO (patent foramen ovale)   • Paradoxical embolism (CMS/HCC)   • Essential hypertension   • Pulmonary hypertension (CMS/HCC)   • Back pain   • ARIEL (obstructive sleep apnea)   • Class 3 severe obesity due to excess calories with serious comorbidity and body mass index (BMI) greater than or equal to 70 in adult (CMS/HCC)   • History of DVT of lower extremity   • Lymphedema   • Anemia, chronic disease   • CKD (chronic kidney disease) stage 3, GFR 30-59 ml/min   • Iron deficiency        Past Medical History:   Diagnosis Date   • Cellulitis     2017, with Group B Strep bacteremia and sepsis   • Chronic diastolic congestive heart failure (CMS/HCC)    • Deep venous thrombosis (CMS/HCC)    • Hypertension    • Lipoedema    • Lymphedema    • Morbid obesity (CMS/HCC)    • Paradoxical embolism (CMS/HCC)     to the LLE due to DVT/PE and PFO   • PFO (patent foramen ovale)    • Pulmonary embolism (CMS/HCC)    • Pulmonary hypertension (CMS/HCC)     multifactorial (dCHF, obesity/ARIEL, hx PE), mild by echo 2016   • Sepsis (CMS/HCC) 2020        Past Surgical History:   Procedure Laterality Date   • BRONCHOSCOPY N/A 2017    Procedure: BRONCHOSCOPY with wash;  Surgeon: Caleb Garcia MD;  Location: [facility] ENDOSCOPY;  Service:    • DILATATION AND CURETTAGE  2011   • VENA CAVA FILTER PLACEMENT      Inferior Vena Cava Filter Placement w/Fluorosc angiogr guidance         Visit Dx:      ICD-10-CM ICD-9-CM   1. Lymphedema of both lower extremities  I89.0 457.1   2. Lymphedema, not elsewhere classified  I89.0 457.1       Lymphedema     Row Name 20 0800             Subjective Pain    Able to rate subjective pain?  yes  -CW       Pre-Treatment Pain Level  2  -CW      Post-Treatment Pain Level  2  -CW      Subjective Pain Comment  Mild discomfort LLE wound area.   -CW         Subjective Comments    Subjective Comments  Pt.'s spouse assisted with the wound dressing changes this morning.   -CW         Skin Changes/Observations    Location/Assessment  Lower Extremity  -CW      Lower Extremity Conditions  bilateral:;dry;shiny;scaly  -CW      Lower Extremity Color/Pigment  bilateral:;red;fibrosis  -CW      Lower Extremity Skin Details  Wound L lower leg-lateral. Yellow drainage on wound dressing. No active bleeding at present.   -CW         Manual Lymphatic Drainage    Manual Lymphatic Drainage  initial sequence;opened regional lymph nodes;opened anastamoses;extremity treatment  -CW      Initial Sequence  short neck  -CW      Opened Regional Lymph Nodes  axillary;inguinal  -CW      Axillary  right;left  -CW      Inguinal  right;left  -CW      Opened Anastamoses  inguino-axillary  -CW      Inguino-Axillary  right;left  -CW      Extremity Treatment  MLD to full limb  -CW      MLD to Full Limb  BLE's  -CW         Compression/Skin Care    Compression/Skin Care  skin care;wrapping location;bandaging;compression garment;remove bandages  -CW      Skin Care  moisturizing lotion applied Applied lotion to both legs.   -CW      Wrapping Location  lower extremity  -CW      Wrapping Location LE  bilateral:;ankle;knee  -CW      Wrapping Comments  RLE-K1, 1- 10cm. Artiflex, 1- 8cm. 3-10cm. rosidal bandages.  Added tubigrip to RLE ankle to thigh. Kerlex and ABD pad to LLE. LLE added 3- 10 cm. Rosidal.   -CW      Bandage Layers  cotton liner;padding/fluff layer;soft foam- 1/4 inch;short-stretch bandages (comment size/quantity)  -CW      Bandaging Technique  circumferential/spiral;moderate compression  -CW      Remove Bandages  Bandages were removed at home.  -CW        User Key  (r) = Recorded By, (t) = Taken By, (c) = Cosigned By    Initials Name Provider  Type    Colleen Cleary OTR Occupational Therapist                  OT Assessment/Plan     Row Name 12/11/20 1032          OT Assessment    Assessment Comments  Cont. mild discomfort LLE wound area. Pt.'s spouse was able to assist with the wound dressing change this morning. Pt. tolerated MLD well B lower legs. Per phone message ok to bandage LLE from wound clinic. Applied the bandages BLE's with mod compression. Reviewed how to apply the bandages at home sequence and technique. Discussed skin care and dressing changes. Pt. to wear the bandages during the day, remove at night to completed wound care, then complete dressing changes and apply the bandages in the morning. Encouraged pt. to elevate BLE's when possible.  -CW        OT Plan    OT Plan Comments  Plan to cont. tx. Will cont. to work with pt. to achieve indep lymph. management.  -CW       User Key  (r) = Recorded By, (t) = Taken By, (c) = Cosigned By    Initials Name Provider Type    Colleen Cleary OTR Occupational Therapist                 Manual Rx (last 36 hours)      Manual Treatments     Row Name 12/11/20 1037             Total Minutes    97051 - OT Manual Therapy Minutes  64  -CW        User Key  (r) = Recorded By, (t) = Taken By, (c) = Cosigned By    Initials Name Provider Type    Colleen Cleary OTR Occupational Therapist            Therapy Education  Given: Bandaging/dressing change, Edema management  Program: Reinforced  How Provided: Verbal, Demonstration  Provided to: Patient  Level of Understanding: Teach back education performed, Verbalized  77333 - OT Self Care/Mgmt Minutes: 12                Time Calculation:   OT Start Time: 0848  OT Stop Time: 1004  OT Time Calculation (min): 76 min  Total Timed Code Minutes- OT: 76 minute(s)     Therapy Charges for Today     Code Description Service Date Service Provider Modifiers Qty    27032555163 HC OT MANUAL THERAPY EA 15 MIN 12/11/2020 Colleen Hidalgo OTR GO 4    50655666790 HC OT SELF  CARE/MGMT/TRAIN EA 15 MIN 12/11/2020 Colleen Hidalgo, OTR GO 1                      Colleen Hidalgo, OTSIMON  12/11/2020

## 2020-12-13 LAB
BACTERIA SPEC AEROBE CULT: ABNORMAL
BACTERIA SPEC AEROBE CULT: ABNORMAL
BACTERIA SPEC ANAEROBE CULT: NORMAL
GRAM STN SPEC: ABNORMAL

## 2020-12-14 ENCOUNTER — HOSPITAL ENCOUNTER (OUTPATIENT)
Dept: OCCUPATIONAL THERAPY | Facility: HOSPITAL | Age: 60
Setting detail: THERAPIES SERIES
Discharge: HOME | End: 2020-12-14

## 2020-12-14 DIAGNOSIS — I89.0 LYMPHEDEMA, NOT ELSEWHERE CLASSIFIED: ICD-10-CM

## 2020-12-14 DIAGNOSIS — I89.0 LYMPHEDEMA OF BOTH LOWER EXTREMITIES: Primary | ICD-10-CM

## 2020-12-14 PROCEDURE — 97535 SELF CARE MNGMENT TRAINING: CPT

## 2020-12-14 PROCEDURE — 97140 MANUAL THERAPY 1/> REGIONS: CPT

## 2020-12-15 ENCOUNTER — OFFICE VISIT (OUTPATIENT)
Dept: WOUND CARE | Facility: HOSPITAL | Age: 60
End: 2020-12-15

## 2020-12-15 ENCOUNTER — HOSPITAL ENCOUNTER (OUTPATIENT)
Dept: OCCUPATIONAL THERAPY | Facility: HOSPITAL | Age: 60
Setting detail: THERAPIES SERIES
Discharge: HOME | End: 2020-12-15

## 2020-12-15 DIAGNOSIS — I89.0 LYMPHEDEMA OF BOTH LOWER EXTREMITIES: Primary | ICD-10-CM

## 2020-12-15 DIAGNOSIS — I89.0 LYMPHEDEMA, NOT ELSEWHERE CLASSIFIED: ICD-10-CM

## 2020-12-15 PROCEDURE — 97535 SELF CARE MNGMENT TRAINING: CPT

## 2020-12-15 PROCEDURE — G0463 HOSPITAL OUTPT CLINIC VISIT: HCPCS

## 2020-12-15 PROCEDURE — 97140 MANUAL THERAPY 1/> REGIONS: CPT

## 2020-12-16 ENCOUNTER — PATIENT OUTREACH (OUTPATIENT)
Dept: CASE MANAGEMENT | Facility: OTHER | Age: 60
End: 2020-12-16

## 2020-12-16 ENCOUNTER — APPOINTMENT (OUTPATIENT)
Dept: OCCUPATIONAL THERAPY | Facility: HOSPITAL | Age: 60
End: 2020-12-16

## 2020-12-16 NOTE — OUTREACH NOTE
Patient Outreach Note    Emely had a  nurse visiting for wound care on her foot .  Now she is attending lymphedema therapy and visiting the wound care center.  She has graduated from a walker to a garber.  She had a cardiologist visit recently and has lost a few pounds.  She will not confirm that the lymphedema therapy is making a very big difference.      She had no other issues she was concerned about today.  On our next call I will educated on the COVID vaccination.      No issue with social determinants.  No inabilities to obtain food or medications or transportation to MD appointments. Educated on participating in habits that prevent the spread of COVID virus with home & work hygiene. Patient verbalizes understanding.     Educated patient on benefits of Employee CM program and invited to call with any new needs.  Encouraged use of [facility] nurseline if needed.      [place] Escalante, RAMIN  Ambulatory     12/16/2020, 15:41 EST   [place] ALEXANDER, RN, [facility]   [place] [address]     [phone] cell   [phone] office  [phone] fax  [email or web address]  [facility]

## 2020-12-18 ENCOUNTER — HOSPITAL ENCOUNTER (OUTPATIENT)
Dept: OCCUPATIONAL THERAPY | Facility: HOSPITAL | Age: 60
Setting detail: THERAPIES SERIES
Discharge: HOME | End: 2020-12-18

## 2020-12-18 DIAGNOSIS — I89.0 LYMPHEDEMA, NOT ELSEWHERE CLASSIFIED: ICD-10-CM

## 2020-12-18 DIAGNOSIS — I89.0 LYMPHEDEMA OF BOTH LOWER EXTREMITIES: Primary | ICD-10-CM

## 2020-12-18 PROCEDURE — 97535 SELF CARE MNGMENT TRAINING: CPT

## 2020-12-18 PROCEDURE — 97140 MANUAL THERAPY 1/> REGIONS: CPT

## 2020-12-18 NOTE — THERAPY TREATMENT NOTE
Outpatient Occupational Therapy Lymphedema Treatment Note  [facility]     Patient Name: Emely Solomon  : 1960  MRN: [id number]  Today's Date: 2020      Visit Date: 2020    Patient Active Problem List   Diagnosis   • Chronic diastolic CHF (congestive heart failure) (CMS/HCC)   • PFO (patent foramen ovale)   • Paradoxical embolism (CMS/HCC)   • Essential hypertension   • Pulmonary hypertension (CMS/HCC)   • Back pain   • ARIEL (obstructive sleep apnea)   • Class 3 severe obesity due to excess calories with serious comorbidity and body mass index (BMI) greater than or equal to 70 in adult (CMS/HCC)   • History of DVT of lower extremity   • Lymphedema   • Anemia, chronic disease   • CKD (chronic kidney disease) stage 3, GFR 30-59 ml/min   • Iron deficiency        Past Medical History:   Diagnosis Date   • Cellulitis     2017, with Group B Strep bacteremia and sepsis   • Chronic diastolic congestive heart failure (CMS/HCC)    • Deep venous thrombosis (CMS/HCC)    • Hypertension    • Lipoedema    • Lymphedema    • Morbid obesity (CMS/HCC)    • Paradoxical embolism (CMS/HCC)     to the LLE due to DVT/PE and PFO   • PFO (patent foramen ovale)    • Pulmonary embolism (CMS/HCC)    • Pulmonary hypertension (CMS/HCC)     multifactorial (dCHF, obesity/ARIEL, hx PE), mild by echo 2016   • Sepsis (CMS/HCC) 2020        Past Surgical History:   Procedure Laterality Date   • BRONCHOSCOPY N/A 2017    Procedure: BRONCHOSCOPY with wash;  Surgeon: Caleb Garcia MD;  Location: [facility] ENDOSCOPY;  Service:    • DILATATION AND CURETTAGE  2011   • VENA CAVA FILTER PLACEMENT      Inferior Vena Cava Filter Placement w/Fluorosc angiogr guidance         Visit Dx:      ICD-10-CM ICD-9-CM   1. Lymphedema of both lower extremities  I89.0 457.1   2. Lymphedema, not elsewhere classified  I89.0 457.1       Lymphedema     Row Name 20 0800             Subjective Pain    Able to rate subjective pain?  yes  -CW       Pre-Treatment Pain Level  0  -CW      Post-Treatment Pain Level  0  -CW         Subjective Comments    Subjective Comments  No pain c/o at present.   -CW         Skin Changes/Observations    Location/Assessment  Lower Extremity  -CW      Lower Extremity Conditions  bilateral:;dry;shiny;scaly  -CW      Lower Extremity Color/Pigment  bilateral:;red;fibrosis  -CW      Lower Extremity Skin Details  Wound dressings completed by pt./spouse this morning. LLE wound with min drainage   -CW         Manual Lymphatic Drainage    Manual Lymphatic Drainage  initial sequence;opened regional lymph nodes;opened anastamoses;extremity treatment  -CW      Initial Sequence  short neck  -CW      Opened Regional Lymph Nodes  axillary;inguinal  -CW      Axillary  right;left  -CW      Inguinal  right;left  -CW      Opened Anastamoses  inguino-axillary  -CW      Inguino-Axillary  right;left  -CW      Extremity Treatment  MLD to full limb  -CW      MLD to Full Limb  BLE's  -CW         Compression/Skin Care    Compression/Skin Care  skin care;wrapping location;bandaging;compression garment;remove bandages  -CW      Skin Care  moisturizing lotion applied Applied lotion to both legs.   -CW      Wrapping Location  lower extremity  -CW      Wrapping Location LE  bilateral:;ankle;knee  -CW      Wrapping Comments  RLE-K1, 2- Rosidal soft, 1- 10cm. Artiflex, 1- 8cm. 3-10cm. rosidal bandages.  Added tubigrip to R/LLE ankle to thigh. Kerlex and ABD pad to LLE. LLE added 3- 10 cm. Rosidal.   -CW      Bandage Layers  cotton liner;padding/fluff layer;soft foam- 1/4 inch;short-stretch bandages (comment size/quantity)  -CW      Bandaging Technique  circumferential/spiral;moderate compression  -CW      Remove Bandages  Bandages were removed at home.  -CW        User Key  (r) = Recorded By, (t) = Taken By, (c) = Cosigned By    Initials Name Provider Type    Colleen Cleary OTR Occupational Therapist                  OT Assessment/Plan     Row Name  12/18/20 1446          OT Assessment    Assessment Comments  Pt. was able to tolerate the compression bandages and had to remove LLE wraps for wound dresssings. No pain complaints at present, but has wound tenderness LLE at times.  Skin dry LLE, but not as flaking. Applied the bandages with slightly more compression as tolerated with no discomfort. Reviewed bandage precautions, wearing schedule and skin care. Reviewed sequence and technique on how to apply the compression bandages. Recommended pt.'s sister to help with wraps over the weekend  if able.  Pt. can decrease compression or remove a bandage layer at night if needed for comfort. Discussed long term edema management with velcro compression. Pt. will try to find her old velcro Lower leg compression in-elastic at home this weekend.  -CW        OT Plan    OT Plan Comments  Plan to con.tx. Pt. to complete LE bandage changes daily and LLE wound dressings 2x/day.  -CW       User Key  (r) = Recorded By, (t) = Taken By, (c) = Cosigned By    Initials Name Provider Type    Colleen Cleary OTR Occupational Therapist                 Manual Rx (last 36 hours)      Manual Treatments     Row Name 12/18/20 1458             Total Minutes    64000 - OT Manual Therapy Minutes  59  -CW        User Key  (r) = Recorded By, (t) = Taken By, (c) = Cosigned By    Initials Name Provider Type    Colleen Cleary OTR Occupational Therapist            Therapy Education  Given: Bandaging/dressing change, Edema management  Program: Reinforced, Progressed  How Provided: Verbal, Demonstration  Provided to: Patient  Level of Understanding: Verbalized, Teach back education performed  91770 - OT Self Care/Mgmt Minutes: 10                Time Calculation:   OT Start Time: 0850  OT Stop Time: 0959  OT Time Calculation (min): 69 min  Total Timed Code Minutes- OT: 69 minute(s)     Therapy Charges for Today     Code Description Service Date Service Provider Modifiers Qty    35384437903  OT SELF  CARE/MGMT/TRAIN EA 15 MIN 12/18/2020 Colleen Hidalgo OTR GO 1    32086500238 HC OT MANUAL THERAPY EA 15 MIN 12/18/2020 Colleen Hidalgo OTR GO 4                      Colleen Hidalgo, OTR  12/18/2020

## 2020-12-21 ENCOUNTER — HOSPITAL ENCOUNTER (OUTPATIENT)
Dept: OCCUPATIONAL THERAPY | Facility: HOSPITAL | Age: 60
Setting detail: THERAPIES SERIES
Discharge: HOME | End: 2020-12-21

## 2020-12-21 DIAGNOSIS — I89.0 LYMPHEDEMA OF BOTH LOWER EXTREMITIES: Primary | ICD-10-CM

## 2020-12-21 DIAGNOSIS — I89.0 LYMPHEDEMA, NOT ELSEWHERE CLASSIFIED: ICD-10-CM

## 2020-12-21 PROCEDURE — 97535 SELF CARE MNGMENT TRAINING: CPT

## 2020-12-21 PROCEDURE — 97140 MANUAL THERAPY 1/> REGIONS: CPT

## 2020-12-21 NOTE — THERAPY TREATMENT NOTE
Outpatient Occupational Therapy Lymphedema Treatment Note  [facility]     Patient Name: Emely Solomon  : 1960  MRN: [id number]  Today's Date: 2020      Visit Date: 2020    Patient Active Problem List   Diagnosis   • Chronic diastolic CHF (congestive heart failure) (CMS/HCC)   • PFO (patent foramen ovale)   • Paradoxical embolism (CMS/HCC)   • Essential hypertension   • Pulmonary hypertension (CMS/HCC)   • Back pain   • ARIEL (obstructive sleep apnea)   • Class 3 severe obesity due to excess calories with serious comorbidity and body mass index (BMI) greater than or equal to 70 in adult (CMS/HCC)   • History of DVT of lower extremity   • Lymphedema   • Anemia, chronic disease   • CKD (chronic kidney disease) stage 3, GFR 30-59 ml/min   • Iron deficiency        Past Medical History:   Diagnosis Date   • Cellulitis     2017, with Group B Strep bacteremia and sepsis   • Chronic diastolic congestive heart failure (CMS/HCC)    • Deep venous thrombosis (CMS/HCC)    • Hypertension    • Lipoedema    • Lymphedema    • Morbid obesity (CMS/HCC)    • Paradoxical embolism (CMS/HCC)     to the LLE due to DVT/PE and PFO   • PFO (patent foramen ovale)    • Pulmonary embolism (CMS/HCC)    • Pulmonary hypertension (CMS/HCC)     multifactorial (dCHF, obesity/ARIEL, hx PE), mild by echo 2016   • Sepsis (CMS/HCC) 2020        Past Surgical History:   Procedure Laterality Date   • BRONCHOSCOPY N/A 2017    Procedure: BRONCHOSCOPY with wash;  Surgeon: Caleb Garcia MD;  Location: [facility] ENDOSCOPY;  Service:    • DILATATION AND CURETTAGE  2011   • VENA CAVA FILTER PLACEMENT      Inferior Vena Cava Filter Placement w/Fluorosc angiogr guidance         Visit Dx:      ICD-10-CM ICD-9-CM   1. Lymphedema of both lower extremities  I89.0 457.1   2. Lymphedema, not elsewhere classified  I89.0 457.1       Lymphedema     Row Name 20 0800             Subjective Pain    Able to rate subjective pain?  yes  -CW       Pre-Treatment Pain Level  0  -CW      Post-Treatment Pain Level  0  -CW         Subjective Comments    Subjective Comments  Some tenderness at times L leg wound area.   -CW         Skin Changes/Observations    Location/Assessment  Lower Extremity  -CW      Lower Extremity Conditions  bilateral:;dry;shiny;scaly  -CW      Lower Extremity Color/Pigment  bilateral:;red;fibrosis  -CW      Lower Extremity Skin Details  Wound dressings completed by pt./spouse this morning. LLE wound with min drainage. Margins appear to be healing.   -CW         Manual Lymphatic Drainage    Manual Lymphatic Drainage  initial sequence;opened regional lymph nodes;opened anastamoses;extremity treatment  -CW      Initial Sequence  short neck  -CW      Opened Regional Lymph Nodes  axillary;inguinal  -CW      Axillary  right;left  -CW      Inguinal  right;left  -CW      Opened Anastamoses  inguino-axillary  -CW      Inguino-Axillary  right;left  -CW      Extremity Treatment  MLD to full limb  -CW      MLD to Full Limb  BLE's  -CW         Compression/Skin Care    Compression/Skin Care  skin care;wrapping location;bandaging;compression garment;remove bandages  -CW      Skin Care  moisturizing lotion applied Applied lotion to both legs.   -CW      Wrapping Location  lower extremity  -CW      Wrapping Location LE  bilateral:;ankle;knee  -CW      Wrapping Comments  RLE-K1, 2- Rosidal soft, 1- 10cm. Artiflex, 1- 8cm. 3-10cm. rosidal bandages.  Added tubigrip to R/LLE ankle to thigh. Kerlex and ABD pad to LLE. LLE added 3- 10 cm. Rosidal.   -CW      Bandage Layers  cotton liner;padding/fluff layer;soft foam- 1/4 inch;short-stretch bandages (comment size/quantity)  -CW      Bandaging Technique  circumferential/spiral;moderate compression  -CW      Remove Bandages  Bandages were removed at home.  -CW        User Key  (r) = Recorded By, (t) = Taken By, (c) = Cosigned By    Initials Name Provider Type    CW Colleen Hidalgo OTR Occupational Therapist                   OT Assessment/Plan     Row Name 12/21/20 1430          OT Assessment    Assessment Comments  Pt. reports no pian when at rest. Some tenderness LLE wound area over the weekend. Pt.'s spouse was able to assist with wound dressings. Pt. attempted to apply the wraps, but had difficulty due to immobility, obesity. Applied the bandages with increased compression as tolerated and added Tubigrip to bilateral thighs. Wound margins appear to be healing. Min drainage noted. Discussed velcro compression vs. stockings. Pt will try to find her old in-elastic velcro at home. Revewied LE positioning, bandage precautions and wearing schedule.  -CW        OT Plan    OT Plan Comments  Plan to cont tx. Pt. to complete LE wound dresssings LLE with assistance from spouse as needed. Can leave BLE bandages on all night.  -CW       User Key  (r) = Recorded By, (t) = Taken By, (c) = Cosigned By    Initials Name Provider Type    Colleen Cleary OTR Occupational Therapist                 Manual Rx (last 36 hours)      Manual Treatments     Row Name 12/21/20 1436             Total Minutes    68326 - OT Manual Therapy Minutes  62  -CW        User Key  (r) = Recorded By, (t) = Taken By, (c) = Cosigned By    Initials Name Provider Type    Colleen Cleary OTR Occupational Therapist            Therapy Education  Given: Bandaging/dressing change, Edema management  Program: Reinforced  How Provided: Verbal, Demonstration  Provided to: Patient  Level of Understanding: Teach back education performed, Verbalized  73652 - OT Self Care/Mgmt Minutes: 10                Time Calculation:   OT Start Time: 0848  OT Stop Time: 1000  OT Time Calculation (min): 72 min  Total Timed Code Minutes- OT: 72 minute(s)     Therapy Charges for Today     Code Description Service Date Service Provider Modifiers Qty    31781549405 HC OT MANUAL THERAPY EA 15 MIN 12/21/2020 Colleen Hidalgo OTR GO 4    12082366648 HC OT SELF CARE/MGMT/TRAIN EA 15 MIN 12/21/2020  Colleen Hidalgo, OTR GO 1                      Colleen Hidalgo OTSIMON  12/21/2020

## 2020-12-22 ENCOUNTER — HOSPITAL ENCOUNTER (OUTPATIENT)
Dept: OCCUPATIONAL THERAPY | Facility: HOSPITAL | Age: 60
Setting detail: THERAPIES SERIES
Discharge: HOME | End: 2020-12-22

## 2020-12-22 DIAGNOSIS — I89.0 LYMPHEDEMA, NOT ELSEWHERE CLASSIFIED: ICD-10-CM

## 2020-12-22 DIAGNOSIS — I89.0 LYMPHEDEMA OF BOTH LOWER EXTREMITIES: Primary | ICD-10-CM

## 2020-12-22 PROCEDURE — 97140 MANUAL THERAPY 1/> REGIONS: CPT

## 2020-12-22 PROCEDURE — 97535 SELF CARE MNGMENT TRAINING: CPT

## 2020-12-22 NOTE — THERAPY TREATMENT NOTE
Outpatient Occupational Therapy Lymphedema Treatment Note  [facility]     Patient Name: Emely Solomon  : 1960  MRN: [id number]  Today's Date: 2020      Visit Date: 2020    Patient Active Problem List   Diagnosis   • Chronic diastolic CHF (congestive heart failure) (CMS/HCC)   • PFO (patent foramen ovale)   • Paradoxical embolism (CMS/HCC)   • Essential hypertension   • Pulmonary hypertension (CMS/HCC)   • Back pain   • ARIEL (obstructive sleep apnea)   • Class 3 severe obesity due to excess calories with serious comorbidity and body mass index (BMI) greater than or equal to 70 in adult (CMS/HCC)   • History of DVT of lower extremity   • Lymphedema   • Anemia, chronic disease   • CKD (chronic kidney disease) stage 3, GFR 30-59 ml/min   • Iron deficiency        Past Medical History:   Diagnosis Date   • Cellulitis     2017, with Group B Strep bacteremia and sepsis   • Chronic diastolic congestive heart failure (CMS/HCC)    • Deep venous thrombosis (CMS/HCC)    • Hypertension    • Lipoedema    • Lymphedema    • Morbid obesity (CMS/HCC)    • Paradoxical embolism (CMS/HCC)     to the LLE due to DVT/PE and PFO   • PFO (patent foramen ovale)    • Pulmonary embolism (CMS/HCC)    • Pulmonary hypertension (CMS/HCC)     multifactorial (dCHF, obesity/ARIEL, hx PE), mild by echo 2016   • Sepsis (CMS/HCC) 2020        Past Surgical History:   Procedure Laterality Date   • BRONCHOSCOPY N/A 2017    Procedure: BRONCHOSCOPY with wash;  Surgeon: Caleb Garcia MD;  Location: [facility] ENDOSCOPY;  Service:    • DILATATION AND CURETTAGE  2011   • VENA CAVA FILTER PLACEMENT      Inferior Vena Cava Filter Placement w/Fluorosc angiogr guidance         Visit Dx:      ICD-10-CM ICD-9-CM   1. Lymphedema of both lower extremities  I89.0 457.1   2. Lymphedema, not elsewhere classified  I89.0 457.1       Lymphedema     Row Name 20 0800             Subjective Pain    Able to rate subjective pain?  yes  -CW       Pre-Treatment Pain Level  0  -CW      Post-Treatment Pain Level  0  -CW         Subjective Comments    Subjective Comments  Pt. was able to wear the compression bandages last night.   -CW         Skin Changes/Observations    Location/Assessment  Lower Extremity  -CW      Lower Extremity Conditions  bilateral:;dry;shiny;scaly  -CW      Lower Extremity Color/Pigment  bilateral:;red;fibrosis  -CW      Lower Extremity Skin Details  Wound dressings completed by pt./spouse this morning. LLE wound with min drainage. Margins appear to be healing.   -CW         Manual Lymphatic Drainage    Manual Lymphatic Drainage  initial sequence;opened regional lymph nodes;opened anastamoses;extremity treatment  -CW      Initial Sequence  short neck  -CW      Opened Regional Lymph Nodes  axillary;inguinal  -CW      Axillary  right;left  -CW      Inguinal  right;left  -CW      Opened Anastamoses  inguino-axillary  -CW      Inguino-Axillary  right;left  -CW      Extremity Treatment  MLD to full limb  -CW      MLD to Full Limb  BLE's  -CW         Compression/Skin Care    Compression/Skin Care  skin care;wrapping location;bandaging;compression garment;remove bandages  -CW      Skin Care  moisturizing lotion applied Applied lotion to both legs.   -CW      Wrapping Location  lower extremity  -CW      Wrapping Location LE  bilateral:;ankle;knee  -CW      Wrapping Comments  RLE-K1, 2- Rosidal soft, 1- 10cm. Artiflex, 1- 8cm. 3-10cm. rosidal bandages.  Added tubigrip to R/LLE ankle to thigh. Kerlex and ABD pad to LLE. LLE added 3- 10 cm. Rosidal.   -CW      Bandage Layers  cotton liner;padding/fluff layer;soft foam- 1/4 inch;short-stretch bandages (comment size/quantity)  -CW      Bandaging Technique  circumferential/spiral;moderate compression  -CW      Remove Bandages  Bandages were removed at home.  -CW        User Key  (r) = Recorded By, (t) = Taken By, (c) = Cosigned By    Initials Name Provider Type    CW Colleen Hidalgo OTR  Occupational Therapist                  OT Assessment/Plan     Row Name 12/22/20 1004          OT Assessment    Assessment Comments  Pt. was able to wear the compression bandages all night. Her spouse assisted to complete the wound dressing changes. Beginning blister noted anterior shin area. Applied kerlix and extra padding. Min drainage noted L wound. Reviewed bandages precautions and wearing schedule. Applied bandages slightly tighter. Reviewed LE positioning. RLE skin softer.  -CW        OT Plan    OT Plan Comments  Plan to cont. tx. Pt. to complete LE wound dresssings LLE with assistance from spouse as needed. Can leave the bandages on all night as tolerated.  -CW       User Key  (r) = Recorded By, (t) = Taken By, (c) = Cosigned By    Initials Name Provider Type    Colleen Cleary OTR Occupational Therapist                 Manual Rx (last 36 hours)      Manual Treatments     Row Name 12/22/20 1122             Total Minutes    10787 - OT Manual Therapy Minutes  61  -CW        User Key  (r) = Recorded By, (t) = Taken By, (c) = Cosigned By    Initials Name Provider Type    Colleen Cleary OTR Occupational Therapist            Therapy Education  Education Details: Reviewed skin care and how to apply the bandages over the wound dressings.  Given: Bandaging/dressing change, Edema management  Program: Reinforced, Progressed  How Provided: Verbal, Demonstration  Provided to: Patient  Level of Understanding: Teach back education performed, Verbalized  50144 - OT Self Care/Mgmt Minutes: 10                Time Calculation:   OT Start Time: 0849  OT Stop Time: 1000  OT Time Calculation (min): 71 min  Total Timed Code Minutes- OT: 71 minute(s)     Therapy Charges for Today     Code Description Service Date Service Provider Modifiers Qty    24893767750 HC OT MANUAL THERAPY EA 15 MIN 12/22/2020 Colleen Hidalgo OTR GO 4    00141711388 HC OT SELF CARE/MGMT/TRAIN EA 15 MIN 12/22/2020 Colleen Hidalgo OTR GO 1                       Colleen Hidalgo, OTR  12/22/2020

## 2020-12-23 ENCOUNTER — HOSPITAL ENCOUNTER (OUTPATIENT)
Dept: OCCUPATIONAL THERAPY | Facility: HOSPITAL | Age: 60
Setting detail: THERAPIES SERIES
Discharge: HOME | End: 2020-12-23

## 2020-12-23 DIAGNOSIS — I89.0 LYMPHEDEMA: ICD-10-CM

## 2020-12-23 DIAGNOSIS — I89.0 LYMPHEDEMA OF BOTH LOWER EXTREMITIES: Primary | ICD-10-CM

## 2020-12-23 PROCEDURE — 97535 SELF CARE MNGMENT TRAINING: CPT

## 2020-12-23 PROCEDURE — 97140 MANUAL THERAPY 1/> REGIONS: CPT

## 2020-12-23 NOTE — THERAPY TREATMENT NOTE
Outpatient Occupational Therapy Lymphedema Treatment Note/Weekly progress note  [facility]     Patient Name: Emely Solomon  : 1960  MRN: [id number]  Today's Date: 2020      Visit Date: 2020    Patient Active Problem List   Diagnosis   • Chronic diastolic CHF (congestive heart failure) (CMS/HCC)   • PFO (patent foramen ovale)   • Paradoxical embolism (CMS/HCC)   • Essential hypertension   • Pulmonary hypertension (CMS/HCC)   • Back pain   • ARIEL (obstructive sleep apnea)   • Class 3 severe obesity due to excess calories with serious comorbidity and body mass index (BMI) greater than or equal to 70 in adult (CMS/HCC)   • History of DVT of lower extremity   • Lymphedema   • Anemia, chronic disease   • CKD (chronic kidney disease) stage 3, GFR 30-59 ml/min   • Iron deficiency        Past Medical History:   Diagnosis Date   • Cellulitis     2017, with Group B Strep bacteremia and sepsis   • Chronic diastolic congestive heart failure (CMS/HCC)    • Deep venous thrombosis (CMS/HCC)    • Hypertension    • Lipoedema    • Lymphedema    • Morbid obesity (CMS/HCC)    • Paradoxical embolism (CMS/HCC)     to the LLE due to DVT/PE and PFO   • PFO (patent foramen ovale)    • Pulmonary embolism (CMS/HCC)    • Pulmonary hypertension (CMS/HCC)     multifactorial (dCHF, obesity/ARIEL, hx PE), mild by echo 2016   • Sepsis (CMS/HCC) 2020        Past Surgical History:   Procedure Laterality Date   • BRONCHOSCOPY N/A 2017    Procedure: BRONCHOSCOPY with wash;  Surgeon: Caleb Garcia MD;  Location: [facility] ENDOSCOPY;  Service:    • DILATATION AND CURETTAGE  2011   • VENA CAVA FILTER PLACEMENT      Inferior Vena Cava Filter Placement w/Fluorosc angiogr guidance         Visit Dx:      ICD-10-CM ICD-9-CM   1. Lymphedema of both lower extremities  I89.0 457.1   2. Lymphedema  I89.0 457.1       Lymphedema     Row Name 20 0800             Subjective Pain    Able to rate subjective pain?  yes  -CW       Pre-Treatment Pain Level  0  -CW      Post-Treatment Pain Level  0  -CW         Subjective Comments    Subjective Comments  No increased pain LLE wound area. Some stinging at times.   -CW         Skin Changes/Observations    Location/Assessment  Lower Extremity  -CW      Lower Extremity Conditions  bilateral:;dry;shiny;scaly  -CW      Lower Extremity Color/Pigment  bilateral:;red;fibrosis  -CW      Lower Extremity Skin Details  Wound dressings completed by pt./spouse this morning. LLE wound with min drainage. Margins appear to be healing.   -CW         Lymphedema Measurements    Measurement Type(s)  Circumferential  -CW      Circumferential Areas  Lower extremities  -CW         BLE Circumferential (cm)    Measurement Location 1  20 cm. above knee  -CW      Left 1  82 cm  -CW      Right 1  85.5 cm  -CW      Measurement Location 2  10cm above knee  -CW      Left 2  84 cm  -CW      Right 2  85.5 cm  -CW      Measurement Location 3  knee  -CW      Left 3  65 cm  -CW      Right 3  70 cm  -CW      Measurement Location 4  10 cm. below knee  -CW      Left 4  76 cm  -CW      Right 4  65 cm  -CW      Measurement Location 5  20 cm. below knee  -CW      Left 5  59.7 cm  -CW      Right 5  54 cm  -CW      Measurement Location 6  30 cm below knee  -CW      Left 6  38 cm  -CW      Right 6  35.7 cm  -CW      Measurement Location 7  ankle  -CW      Left 7  32.9 cm  -CW      Right 7  31.9 cm  -CW      Measurement Location 8  mid foot   -CW      Left 8  23 cm  -CW      Right 8  23.8 cm  -CW      LLE Circumferential Total  460.6 cm  -CW      RLE Circumferential Total  451.4 cm  -CW         Manual Lymphatic Drainage    Manual Lymphatic Drainage  initial sequence;opened regional lymph nodes;opened anastamoses;extremity treatment  -CW      Initial Sequence  short neck  -CW      Opened Regional Lymph Nodes  axillary;inguinal  -CW      Axillary  right;left  -CW      Inguinal  right;left  -CW      Opened Anastamoses  inguino-axillary  -CW       Inguino-Axillary  right;left  -CW      Extremity Treatment  MLD to full limb  -CW      MLD to Full Limb  LLE  -CW         Compression/Skin Care    Compression/Skin Care  skin care;wrapping location;bandaging;compression garment;remove bandages  -CW      Skin Care  moisturizing lotion applied Applied lotion to both legs.   -CW      Wrapping Location  lower extremity  -CW      Wrapping Location LE  bilateral:;ankle;knee  -CW      Wrapping Comments  RLE-K1, 2- Rosidal soft, 1- 10cm. Artiflex, 1- 8cm. 3-10cm. rosidal bandages.  Added tubigrip to R/LLE ankle to thigh. Kerlex and ABD pad to LLE. LLE added 3- 10 cm. Rosidal.   -CW      Bandage Layers  cotton liner;padding/fluff layer;soft foam- 1/4 inch;short-stretch bandages (comment size/quantity)  -CW      Bandaging Technique  circumferential/spiral;moderate compression  -CW      Remove Bandages  Bandages were removed at home.  -CW        User Key  (r) = Recorded By, (t) = Taken By, (c) = Cosigned By    Initials Name Provider Type    CW Colleen Hidalgo OTR Occupational Therapist                  OT Assessment/Plan     Row Name 12/23/20 7400          OT Assessment    Assessment Comments  No pain complaints at present RLE tenderness LLE wound. Pt. was able to wear the bandages day and night. Skin dry and softer RLE with min drainage LLE wound area. Beginning blister LLE anterior shin with slight drainage.  Measurements taken. See flow sheet. Total circumference .4 cm. and .6 cm. (45.2 cm. RLE and LLE 29.2cm net decrease). Pt. is progressing slowly with decreased edema BLE’s. Goals updated and reviewed POC with pt. Discussed velcro compression and a compression pump. Pt will think about it and is concerned about cost. Reviewed bandage precautions and wearing schedule.  -CW        OT Plan    OT Plan Comments  Plan to cont tx. Pt to comlete LE wound dresssings LLE with assistance from spouse as needed. Can leave the bandages on all night as tolerated.  -CW        User Key  (r) = Recorded By, (t) = Taken By, (c) = Cosigned By    Initials Name Provider Type    CW Colleen Hidalgo OTR Occupational Therapist                 Manual Rx (last 36 hours)      Manual Treatments     Row Name 12/23/20 1443             Total Minutes    82150 - OT Manual Therapy Minutes  60  -CW        User Key  (r) = Recorded By, (t) = Taken By, (c) = Cosigned By    Initials Name Provider Type    CW Colleen Hidalgo OTR Occupational Therapist        OT Goals     Row Name 12/23/20 1400          OT Short Term Goals    STG Date to Achieve  01/06/21  -CW     STG 1  Patient to demonstrate proper awareness of “What is Lymphedema” and DO’s and Don’ts” for improved prevention, management, care of symptoms, and ease of transition to self-care of condition.  -CW     STG 1 Progress  Ongoing;Progressing  -CW     STG 2  Patient independent and compliant with self-wrapping techniques of compression bandages with family member or caregiver as indicated for improved self-management of condition.  -CW     STG 2 Progress  Ongoing;Progressing  -CW     STG 3  Patient demonstrate decreased net edema of  BLE's   >/5-10 cm. for decreased in edema, symptoms, decreased risk of infection, and improved skin-care/transition to self-care of condition.  -CW     STG 3 Progress  Met  -CW     STG 4  Patient independent and compliant with initial home exercise program focused on diaphragmatic breathing, range of motion, flexibility, to decrease edema, improve lymphatic flow for decreased edema and decreased risk of infection.  -CW     STG 4 Progress  Met  -CW        Long Term Goals    LTG Date to Achieve  01/20/21  -CW     LTG 1  Patient will demonstrate decreased net edema of  BLE's >/=10-20 cm. for decrease in symptoms, decreased risk of infection, and improved skin-care/transition to self-care of condition.   -CW     LTG 1 Progress  Ongoing;Progressing  -CW     LTG 2  Patient/family/caregivers independent with self-care techniques  for self-management of condition.  -CW     LTG 2 Progress  Ongoing;Progressing  -CW     LTG 3  Patient independent and compliant with use and care of compression (example garments, inelastic velcro compression) with assistance of a caregiver as needed to promote self-care independence.    -CW     LTG 3 Progress  Ongoing  -CW     LTG 4  Patient independent and compliant with use and care of compression garments with assistance of a caregiver as needed to promote self-care independence.   -CW     LTG 4 Progress  Ongoing  -CW       User Key  (r) = Recorded By, (t) = Taken By, (c) = Cosigned By    Initials Name Provider Type    Colleen Cleary OTR Occupational Therapist          Therapy Education  Given: Bandaging/dressing change, Edema management  Program: Reinforced  How Provided: Verbal, Demonstration  Provided to: Patient  Level of Understanding: Verbalized  39865 - OT Self Care/Mgmt Minutes: 10                Time Calculation:   OT Start Time: 0852  OT Stop Time: 1002  OT Time Calculation (min): 70 min  Total Timed Code Minutes- OT: 70 minute(s)     Therapy Charges for Today     Code Description Service Date Service Provider Modifiers Qty    15996649688 HC OT MANUAL THERAPY EA 15 MIN 12/23/2020 Colleen Hidalgo OTR GO 4    42967981668 HC OT SELF CARE/MGMT/TRAIN EA 15 MIN 12/23/2020 Colleen Hidalgo OTR GO 1                      RADHA Miller  12/23/2020

## 2020-12-28 ENCOUNTER — LAB REQUISITION (OUTPATIENT)
Dept: LAB | Facility: HOSPITAL | Age: 60
End: 2020-12-28

## 2020-12-28 ENCOUNTER — HOSPITAL ENCOUNTER (OUTPATIENT)
Dept: OCCUPATIONAL THERAPY | Facility: HOSPITAL | Age: 60
Setting detail: THERAPIES SERIES
Discharge: HOME | End: 2020-12-28

## 2020-12-28 ENCOUNTER — OFFICE VISIT (OUTPATIENT)
Dept: WOUND CARE | Facility: HOSPITAL | Age: 60
End: 2020-12-28

## 2020-12-28 DIAGNOSIS — I89.0 LYMPHEDEMA: ICD-10-CM

## 2020-12-28 DIAGNOSIS — I89.0 LYMPHEDEMA OF BOTH LOWER EXTREMITIES: Primary | ICD-10-CM

## 2020-12-28 DIAGNOSIS — L97.822 NON-PRESSURE CHRONIC ULCER OF OTHER PART OF LEFT LOWER LEG WITH FAT LAYER EXPOSED (HCC): ICD-10-CM

## 2020-12-28 PROCEDURE — 87186 SC STD MICRODIL/AGAR DIL: CPT | Performed by: SURGERY

## 2020-12-28 PROCEDURE — 87075 CULTR BACTERIA EXCEPT BLOOD: CPT | Performed by: SURGERY

## 2020-12-28 PROCEDURE — 97602 WOUND(S) CARE NON-SELECTIVE: CPT

## 2020-12-28 PROCEDURE — 97140 MANUAL THERAPY 1/> REGIONS: CPT

## 2020-12-28 PROCEDURE — 87147 CULTURE TYPE IMMUNOLOGIC: CPT | Performed by: SURGERY

## 2020-12-28 PROCEDURE — 87015 SPECIMEN INFECT AGNT CONCNTJ: CPT | Performed by: SURGERY

## 2020-12-28 PROCEDURE — 87070 CULTURE OTHR SPECIMN AEROBIC: CPT | Performed by: SURGERY

## 2020-12-28 PROCEDURE — 87205 SMEAR GRAM STAIN: CPT | Performed by: SURGERY

## 2020-12-28 NOTE — THERAPY TREATMENT NOTE
Outpatient Occupational Therapy Lymphedema Treatment Note  [facility]     Patient Name: Emely Solomon  : 1960  MRN: [id number]  Today's Date: 2020      Visit Date: 2020    Patient Active Problem List   Diagnosis   • Chronic diastolic CHF (congestive heart failure) (CMS/HCC)   • PFO (patent foramen ovale)   • Paradoxical embolism (CMS/HCC)   • Essential hypertension   • Pulmonary hypertension (CMS/HCC)   • Back pain   • ARIEL (obstructive sleep apnea)   • Class 3 severe obesity due to excess calories with serious comorbidity and body mass index (BMI) greater than or equal to 70 in adult (CMS/HCC)   • History of DVT of lower extremity   • Lymphedema   • Anemia, chronic disease   • CKD (chronic kidney disease) stage 3, GFR 30-59 ml/min   • Iron deficiency        Past Medical History:   Diagnosis Date   • Cellulitis     2017, with Group B Strep bacteremia and sepsis   • Chronic diastolic congestive heart failure (CMS/HCC)    • Deep venous thrombosis (CMS/HCC)    • Hypertension    • Lipoedema    • Lymphedema    • Morbid obesity (CMS/HCC)    • Paradoxical embolism (CMS/HCC)     to the LLE due to DVT/PE and PFO   • PFO (patent foramen ovale)    • Pulmonary embolism (CMS/HCC)    • Pulmonary hypertension (CMS/HCC)     multifactorial (dCHF, obesity/ARIEL, hx PE), mild by echo 2016   • Sepsis (CMS/HCC) 2020        Past Surgical History:   Procedure Laterality Date   • BRONCHOSCOPY N/A 2017    Procedure: BRONCHOSCOPY with wash;  Surgeon: Caleb Garcia MD;  Location: [facility] ENDOSCOPY;  Service:    • DILATATION AND CURETTAGE  2011   • VENA CAVA FILTER PLACEMENT      Inferior Vena Cava Filter Placement w/Fluorosc angiogr guidance         Visit Dx:      ICD-10-CM ICD-9-CM   1. Lymphedema of both lower extremities  I89.0 457.1   2. Lymphedema  I89.0 457.1       Lymphedema     Row Name 20 0900             Subjective Pain    Able to rate subjective pain?  yes  -CW      Pre-Treatment Pain  Level  0  -CW      Post-Treatment Pain Level  0  -CW         Subjective Comments    Subjective Comments  Tenderness LLE wound area  -CW         Skin Changes/Observations    Location/Assessment  Lower Extremity  -CW      Lower Extremity Conditions  bilateral:;dry;shiny;scaly  -CW      Lower Extremity Color/Pigment  bilateral:;red;fibrosis  -CW      Lower Extremity Skin Details  Wound dressings completed by pt./spouse this morning. LLE wound with min drainage. Margins appear to be healing. 2nd wound anterior shin noted.  Min drainage.   -CW      Skin Observations Comment  Called wound center regarding anterior shin wound. Wound area red/purple with 2 small indentions.   -CW         Manual Lymphatic Drainage    Manual Lymphatic Drainage  initial sequence;opened regional lymph nodes;opened anastamoses;extremity treatment  -CW      Initial Sequence  short neck  -CW      Opened Regional Lymph Nodes  axillary;inguinal  -CW      Axillary  right;left  -CW      Inguinal  right;left  -CW      Opened Anastamoses  inguino-axillary  -CW      Inguino-Axillary  right;left  -CW      Extremity Treatment  MLD to full limb  -CW      MLD to Full Limb  BLE's  -CW         Compression/Skin Care    Compression/Skin Care  skin care;wrapping location;bandaging;compression garment;remove bandages  -CW      Skin Care  moisturizing lotion applied Applied lotion to both legs.   -CW      Wrapping Location  lower extremity  -CW      Wrapping Location LE  right:;ankle;knee  -CW      Wrapping Comments  RLE-K1, 2- Rosidal soft, 1- 10cm. Artiflex, 1- 8cm. 3-10cm. rosidal bandages.  Added tubigrip to R/LLE ankle to thigh. Kerlex to LLE. LLE bandages not applied due to pt going to the wound clinic.    -CW      Bandage Layers  cotton liner;padding/fluff layer;soft foam- 1/4 inch;short-stretch bandages (comment size/quantity)  -CW      Bandaging Technique  circumferential/spiral;moderate compression  -CW      Remove Bandages  Bandages were removed at home.   -CW        User Key  (r) = Recorded By, (t) = Taken By, (c) = Cosigned By    Initials Name Provider Type    Colleen Cleary OTR Occupational Therapist                  OT Assessment/Plan     Row Name 12/28/20 1422          OT Assessment    Assessment Comments  Pt. reports no  pain at present, but has increased tenderness LLE wound area. 2nd wound noted L anterior shin with min drainage. Wound red/purple with 2 small dot open areas. LLE lateral wound not as deep with less drainage. Applied the bandages RLE today since pt. is going to the wound clinic after tx. Called wound clinic regarding  LLE new wound to see if she can make an appt. Skin soft RLE and is intact. Applied the bandages with moderate compression. Reviewed LE positioning/elevation and compression bandage precautions. Discussed compression pumps.  -CW        OT Plan    OT Plan Comments  Plan to cont. tx. Pt. to complete LLE wound dressisngs with assistance from spouse as needed.  -CW       User Key  (r) = Recorded By, (t) = Taken By, (c) = Cosigned By    Initials Name Provider Type    Colleen Cleary OTR Occupational Therapist                 Manual Rx (last 36 hours)      Manual Treatments     Row Name 12/28/20 1427             Total Minutes    24613 - OT Manual Therapy Minutes  65  -CW        User Key  (r) = Recorded By, (t) = Taken By, (c) = Cosigned By    Initials Name Provider Type    Colleen Cleary OTR Occupational Therapist            Therapy Education  Given: Bandaging/dressing change, Edema management  Program: Reinforced  How Provided: Verbal, Demonstration  Provided to: Patient  Level of Understanding: Verbalized, Teach back education performed                Time Calculation:   OT Start Time: 0854  OT Stop Time: 0959  OT Time Calculation (min): 65 min  Total Timed Code Minutes- OT: 65 minute(s)     Therapy Charges for Today     Code Description Service Date Service Provider Modifiers Qty    10691805904  OT MANUAL THERAPY EA 15 MIN  12/28/2020 Colleen Hidalgo, OTR GO 4                      Colleen Wo, OTR  12/28/2020

## 2020-12-29 ENCOUNTER — HOSPITAL ENCOUNTER (OUTPATIENT)
Dept: OCCUPATIONAL THERAPY | Facility: HOSPITAL | Age: 60
Setting detail: THERAPIES SERIES
Discharge: HOME | End: 2020-12-29

## 2020-12-29 DIAGNOSIS — I89.0 LYMPHEDEMA OF BOTH LOWER EXTREMITIES: Primary | ICD-10-CM

## 2020-12-29 DIAGNOSIS — I89.0 LYMPHEDEMA: ICD-10-CM

## 2020-12-29 PROCEDURE — 97140 MANUAL THERAPY 1/> REGIONS: CPT

## 2020-12-29 NOTE — THERAPY TREATMENT NOTE
Outpatient Occupational Therapy Lymphedema Treatment Note  [facility]     Patient Name: Emely Solomon  : 1960  MRN: [id number]  Today's Date: 2020      Visit Date: 2020    Patient Active Problem List   Diagnosis   • Chronic diastolic CHF (congestive heart failure) (CMS/HCC)   • PFO (patent foramen ovale)   • Paradoxical embolism (CMS/HCC)   • Essential hypertension   • Pulmonary hypertension (CMS/HCC)   • Back pain   • ARIEL (obstructive sleep apnea)   • Class 3 severe obesity due to excess calories with serious comorbidity and body mass index (BMI) greater than or equal to 70 in adult (CMS/HCC)   • History of DVT of lower extremity   • Lymphedema   • Anemia, chronic disease   • CKD (chronic kidney disease) stage 3, GFR 30-59 ml/min   • Iron deficiency        Past Medical History:   Diagnosis Date   • Cellulitis     2017, with Group B Strep bacteremia and sepsis   • Chronic diastolic congestive heart failure (CMS/HCC)    • Deep venous thrombosis (CMS/HCC)    • Hypertension    • Lipoedema    • Lymphedema    • Morbid obesity (CMS/HCC)    • Paradoxical embolism (CMS/HCC)     to the LLE due to DVT/PE and PFO   • PFO (patent foramen ovale)    • Pulmonary embolism (CMS/HCC)    • Pulmonary hypertension (CMS/HCC)     multifactorial (dCHF, obesity/ARIEL, hx PE), mild by echo 2016   • Sepsis (CMS/HCC) 2020        Past Surgical History:   Procedure Laterality Date   • BRONCHOSCOPY N/A 2017    Procedure: BRONCHOSCOPY with wash;  Surgeon: Caleb Garcia MD;  Location: [facility] ENDOSCOPY;  Service:    • DILATATION AND CURETTAGE  2011   • VENA CAVA FILTER PLACEMENT      Inferior Vena Cava Filter Placement w/Fluorosc angiogr guidance         Visit Dx:      ICD-10-CM ICD-9-CM   1. Lymphedema of both lower extremities  I89.0 457.1   2. Lymphedema  I89.0 457.1       Lymphedema     Row Name 20 0800             Subjective Pain    Able to rate subjective pain?  yes  -CW      Pre-Treatment Pain  Level  0  -CW      Post-Treatment Pain Level  0  -CW         Subjective Comments    Subjective Comments  No LE pain at rest.   -CW         Skin Changes/Observations    Location/Assessment  Lower Extremity  -CW      Lower Extremity Conditions  bilateral:;dry;shiny;scaly  -CW      Lower Extremity Color/Pigment  bilateral:;red;fibrosis  -CW      Lower Extremity Skin Details  Wound dressings completed by pt./spouse this morning. LLE wound with mod drainage. Margins appear to be healing. 2nd wound anterior shin noted. Mod drainage noted.   -CW         Manual Lymphatic Drainage    Manual Lymphatic Drainage  initial sequence;opened regional lymph nodes;opened anastamoses;extremity treatment  -CW      Initial Sequence  short neck  -CW      Opened Regional Lymph Nodes  axillary;inguinal  -CW      Axillary  right;left  -CW      Inguinal  right;left  -CW      Opened Anastamoses  inguino-axillary  -CW      Inguino-Axillary  right;left  -CW      Extremity Treatment  MLD to full limb  -CW      MLD to Full Limb  BLE's  -CW         Compression/Skin Care    Compression/Skin Care  skin care;wrapping location;bandaging;compression garment;remove bandages  -CW      Skin Care  moisturizing lotion applied Applied lotion to both legs.   -CW      Wrapping Location  lower extremity  -CW      Wrapping Location LE  bilateral:;ankle;knee  -CW      Wrapping Comments  R/LLE-K1, 2- Rosidal soft, 1- 10cm. Artiflex, 1- 8cm. 3-10cm. rosidal bandages.  Added tubigrip to R/LLE ankle to thigh. Kerlex to LLE. LLE bandages not applied due to pt going to the wound clinic.    -CW      Bandage Layers  cotton liner;padding/fluff layer;soft foam- 1/4 inch;short-stretch bandages (comment size/quantity)  -CW      Bandaging Technique  circumferential/spiral;moderate compression  -CW      Remove Bandages  Bandages were removed at home.  -CW        User Key  (r) = Recorded By, (t) = Taken By, (c) = Cosigned By    Initials Name Provider Type    Colleen Cleary  OTR Occupational Therapist                  OT Assessment/Plan     Row Name 12/29/20 1521          OT Assessment    Assessment Comments  Pt. was able to wear the compression bandages day/night. Her spouse assisted to apply the wound dresssings for both wounds. Recommended pt. to wash the bandages and stockinette. Instructed care of bandages. Skin intact RLE. Applied the bandages with moderate compression. Suggested pt. to elevate her LE's when possible and to complete HEP. Reviewed healthy habits. Encouraged pt. to re-apply the bandages after each dressing change.  -CW        OT Plan    OT Plan Comments  Plan to cont tx. Spouse to assist with wound dressings and bandage changes when possible.  -CW       User Key  (r) = Recorded By, (t) = Taken By, (c) = Cosigned By    Initials Name Provider Type    Colleen Cleary OTR Occupational Therapist                 Manual Rx (last 36 hours)      Manual Treatments     Row Name 12/29/20 1527             Total Minutes    22315 - OT Manual Therapy Minutes  61  -CW        User Key  (r) = Recorded By, (t) = Taken By, (c) = Cosigned By    Initials Name Provider Type    Colleen Cleary OTR Occupational Therapist            Therapy Education  Education Details: Skin care, HEP, elevation, how to apply bandages.  Given: Bandaging/dressing change, Edema management  Program: Reinforced  How Provided: Verbal, Demonstration  Provided to: Patient  Level of Understanding: Teach back education performed, Verbalized                Time Calculation:   OT Start Time: 0852  OT Stop Time: 0953  OT Time Calculation (min): 61 min  Total Timed Code Minutes- OT: 61 minute(s)     Therapy Charges for Today     Code Description Service Date Service Provider Modifiers Qty    91276383955  OT MANUAL THERAPY EA 15 MIN 12/29/2020 Colleen Hidalgo OTR GO 4                      RADHA Miller  12/29/2020

## 2020-12-30 ENCOUNTER — APPOINTMENT (OUTPATIENT)
Dept: OCCUPATIONAL THERAPY | Facility: HOSPITAL | Age: 60
End: 2020-12-30

## 2020-12-30 LAB
BACTERIA SPEC AEROBE CULT: ABNORMAL
GRAM STN SPEC: ABNORMAL
GRAM STN SPEC: ABNORMAL

## 2021-01-02 LAB — BACTERIA SPEC ANAEROBE CULT: NORMAL

## 2021-01-04 ENCOUNTER — HOSPITAL ENCOUNTER (OUTPATIENT)
Dept: OCCUPATIONAL THERAPY | Facility: HOSPITAL | Age: 61
Setting detail: THERAPIES SERIES
Discharge: HOME | End: 2021-01-04

## 2021-01-04 ENCOUNTER — OFFICE VISIT (OUTPATIENT)
Dept: WOUND CARE | Facility: HOSPITAL | Age: 61
End: 2021-01-04

## 2021-01-04 DIAGNOSIS — I89.0 LYMPHEDEMA: ICD-10-CM

## 2021-01-04 DIAGNOSIS — I89.0 LYMPHEDEMA OF BOTH LOWER EXTREMITIES: Primary | ICD-10-CM

## 2021-01-04 PROCEDURE — G0463 HOSPITAL OUTPT CLINIC VISIT: HCPCS

## 2021-01-04 PROCEDURE — 97535 SELF CARE MNGMENT TRAINING: CPT

## 2021-01-04 PROCEDURE — 97140 MANUAL THERAPY 1/> REGIONS: CPT

## 2021-01-04 NOTE — THERAPY TREATMENT NOTE
Outpatient Occupational Therapy Lymphedema Treatment Note  [facility]     Patient Name: Emely Solomon  : 1960  MRN: [id number]  Today's Date: 2021      Visit Date: 2021    Patient Active Problem List   Diagnosis   • Chronic diastolic CHF (congestive heart failure) (CMS/HCC)   • PFO (patent foramen ovale)   • Paradoxical embolism (CMS/HCC)   • Essential hypertension   • Pulmonary hypertension (CMS/HCC)   • Back pain   • ARIEL (obstructive sleep apnea)   • Class 3 severe obesity due to excess calories with serious comorbidity and body mass index (BMI) greater than or equal to 70 in adult (CMS/HCC)   • History of DVT of lower extremity   • Lymphedema   • Anemia, chronic disease   • CKD (chronic kidney disease) stage 3, GFR 30-59 ml/min   • Iron deficiency        Past Medical History:   Diagnosis Date   • Cellulitis     2017, with Group B Strep bacteremia and sepsis   • Chronic diastolic congestive heart failure (CMS/HCC)    • Deep venous thrombosis (CMS/HCC)    • Hypertension    • Lipoedema    • Lymphedema    • Morbid obesity (CMS/HCC)    • Paradoxical embolism (CMS/HCC)     to the LLE due to DVT/PE and PFO   • PFO (patent foramen ovale)    • Pulmonary embolism (CMS/HCC)    • Pulmonary hypertension (CMS/HCC)     multifactorial (dCHF, obesity/ARIEL, hx PE), mild by echo 2016   • Sepsis (CMS/HCC) 2020        Past Surgical History:   Procedure Laterality Date   • BRONCHOSCOPY N/A 2017    Procedure: BRONCHOSCOPY with wash;  Surgeon: Caleb Garcia MD;  Location: [facility] ENDOSCOPY;  Service:    • DILATATION AND CURETTAGE  2011   • VENA CAVA FILTER PLACEMENT      Inferior Vena Cava Filter Placement w/Fluorosc angiogr guidance         Visit Dx:      ICD-10-CM ICD-9-CM   1. Lymphedema of both lower extremities  I89.0 457.1   2. Lymphedema  I89.0 457.1       Lymphedema     Row Name 21 0800             Subjective Pain    Able to rate subjective pain?  yes No pain c/o.   -CW       Pre-Treatment Pain Level  0  -CW      Post-Treatment Pain Level  0  -CW         Subjective Comments    Subjective Comments  Pt. reports some tenderness LLE wound area at times over the weekend   -CW         Skin Changes/Observations    Location/Assessment  Lower Extremity  -CW      Lower Extremity Conditions  bilateral:;dry;shiny;scaly  -CW      Lower Extremity Color/Pigment  bilateral:;red;fibrosis  -CW      Lower Extremity Skin Details  Wound dressings completed by pt./spouse this morning. LLE wound with mod drainage. Margins appear to be healing. 2nd wound anterior shin noted deeper -Mod drainage noted.   -CW         Manual Lymphatic Drainage    Manual Lymphatic Drainage  initial sequence;opened regional lymph nodes;opened anastamoses;extremity treatment  -CW      Initial Sequence  short neck  -CW      Opened Regional Lymph Nodes  axillary;inguinal  -CW      Axillary  right;left  -CW      Inguinal  right;left  -CW      Opened Anastamoses  inguino-axillary  -CW      Inguino-Axillary  right;left  -CW      Extremity Treatment  MLD to full limb  -CW      MLD to Full Limb  BLE's  -CW         Compression/Skin Care    Compression/Skin Care  skin care;wrapping location;bandaging;compression garment;remove bandages  -CW      Skin Care  moisturizing lotion applied Applied lotion to both legs.   -CW      Wrapping Location  lower extremity  -CW      Wrapping Location LE  right:;ankle;knee  -CW      Wrapping Comments  R/LLE-K1, 2- Rosidal soft, 1- 10cm. Artiflex, 1- 8cm. 3-10cm. rosidal bandages.  Added tubigrip to R/LLE ankle to thigh. Kerlex to LLE. LLE bandages not applied due to pt going to the wound clinic.    -CW      Bandage Layers  cotton liner;padding/fluff layer;soft foam- 1/4 inch;short-stretch bandages (comment size/quantity)  -CW      Bandaging Technique  circumferential/spiral;moderate compression  -CW      Remove Bandages  Bandages were removed at home.  -CW        User Key  (r) = Recorded By, (t) = Taken By,  (c) = Cosigned By    Initials Name Provider Type    Colleen Cleary OTR Occupational Therapist                  OT Assessment/Plan     Row Name 01/04/21 1426 01/04/21 0959       OT Assessment    Assessment Comments  Pt. reports no pain at present LLE wound, but did have some discomfort over the weekend. Pt. attempted to apply the compression bandages, but had difficulty. Applied the bandages with moderate compression RLE. Pt to go to the wound clinic today. Mod drainage L wound. Reviewed LE positioning and how to apply the bandages. Obesity is a limited factor for her to reach her feet to bandage. Discussed velcro compression.  -CW  --       OT Plan    OT Plan Comments  --  Plan to cont tx. Pt. has an appt. to see the wound clinic today.  -CW      User Key  (r) = Recorded By, (t) = Taken By, (c) = Cosigned By    Initials Name Provider Type    Colleen Cleary OTR Occupational Therapist                 Manual Rx (last 36 hours)      Manual Treatments     Row Name 01/04/21 1448             Total Minutes    81272 - OT Manual Therapy Minutes  60  -CW        User Key  (r) = Recorded By, (t) = Taken By, (c) = Cosigned By    Initials Name Provider Type    Colleen Cleary OTR Occupational Therapist            Therapy Education  Given: Bandaging/dressing change, Edema management  Program: Reinforced  How Provided: Verbal, Demonstration  Provided to: Patient  Level of Understanding: Verbalized, Teach back education performed  10341 - OT Self Care/Mgmt Minutes: 12                Time Calculation:   OT Start Time: 0847  OT Stop Time: 0959  OT Time Calculation (min): 72 min  Total Timed Code Minutes- OT: 72 minute(s)     Therapy Charges for Today     Code Description Service Date Service Provider Modifiers Qty    90177443185 HC OT MANUAL THERAPY EA 15 MIN 1/4/2021 Colleen Hidalgo OTR GO 4    25812404922 HC OT SELF CARE/MGMT/TRAIN EA 15 MIN 1/4/2021 Colleen Hidalgo OTR GO 1                      RADHA Miller  1/4/2021

## 2021-01-05 ENCOUNTER — HOSPITAL ENCOUNTER (OUTPATIENT)
Dept: OCCUPATIONAL THERAPY | Facility: HOSPITAL | Age: 61
Setting detail: THERAPIES SERIES
Discharge: HOME | End: 2021-01-05

## 2021-01-05 ENCOUNTER — APPOINTMENT (OUTPATIENT)
Dept: WOUND CARE | Facility: HOSPITAL | Age: 61
End: 2021-01-05

## 2021-01-05 DIAGNOSIS — I89.0 LYMPHEDEMA: ICD-10-CM

## 2021-01-05 DIAGNOSIS — I89.0 LYMPHEDEMA OF BOTH LOWER EXTREMITIES: Primary | ICD-10-CM

## 2021-01-05 DIAGNOSIS — I89.0 LYMPHEDEMA, NOT ELSEWHERE CLASSIFIED: ICD-10-CM

## 2021-01-05 PROCEDURE — 97535 SELF CARE MNGMENT TRAINING: CPT

## 2021-01-05 PROCEDURE — 97140 MANUAL THERAPY 1/> REGIONS: CPT

## 2021-01-05 NOTE — THERAPY TREATMENT NOTE
Outpatient Occupational Therapy Lymphedema Treatment Note  [facility]     Patient Name: Emely Solomon  : 1960  MRN: [id number]  Today's Date: 2021      Visit Date: 2021    Patient Active Problem List   Diagnosis   • Chronic diastolic CHF (congestive heart failure) (CMS/HCC)   • PFO (patent foramen ovale)   • Paradoxical embolism (CMS/HCC)   • Essential hypertension   • Pulmonary hypertension (CMS/HCC)   • Back pain   • ARIEL (obstructive sleep apnea)   • Class 3 severe obesity due to excess calories with serious comorbidity and body mass index (BMI) greater than or equal to 70 in adult (CMS/HCC)   • History of DVT of lower extremity   • Lymphedema   • Anemia, chronic disease   • CKD (chronic kidney disease) stage 3, GFR 30-59 ml/min   • Iron deficiency        Past Medical History:   Diagnosis Date   • Cellulitis     2017, with Group B Strep bacteremia and sepsis   • Chronic diastolic congestive heart failure (CMS/HCC)    • Deep venous thrombosis (CMS/HCC)    • Hypertension    • Lipoedema    • Lymphedema    • Morbid obesity (CMS/HCC)    • Paradoxical embolism (CMS/HCC)     to the LLE due to DVT/PE and PFO   • PFO (patent foramen ovale)    • Pulmonary embolism (CMS/HCC)    • Pulmonary hypertension (CMS/HCC)     multifactorial (dCHF, obesity/ARIEL, hx PE), mild by echo 2016   • Sepsis (CMS/HCC) 2020        Past Surgical History:   Procedure Laterality Date   • BRONCHOSCOPY N/A 2017    Procedure: BRONCHOSCOPY with wash;  Surgeon: Caleb Garcia MD;  Location: [facility] ENDOSCOPY;  Service:    • DILATATION AND CURETTAGE  2011   • VENA CAVA FILTER PLACEMENT      Inferior Vena Cava Filter Placement w/Fluorosc angiogr guidance         Visit Dx:      ICD-10-CM ICD-9-CM   1. Lymphedema of both lower extremities  I89.0 457.1   2. Lymphedema  I89.0 457.1   3. Lymphedema, not elsewhere classified  I89.0 457.1       Lymphedema     Row Name 21 0800             Subjective Pain    Able to rate  subjective pain?  yes  -CW      Pre-Treatment Pain Level  0  -CW      Post-Treatment Pain Level  0  -CW         Subjective Comments    Subjective Comments  Occasional pain L LE wound area.   -CW         Skin Changes/Observations    Location/Assessment  Lower Extremity  -CW      Lower Extremity Conditions  bilateral:;dry;shiny;scaly  -CW      Lower Extremity Color/Pigment  bilateral:;red;fibrosis  -CW      Lower Extremity Skin Details  Pt. went to the wound clinic yesterday. Wound dressings completed by pt./spouse this morning. LLE wound with mod drainage. Margins appear to be healing. 2nd wound anterior shin noted slightly less deeper-Mod drainage noted.   -CW         Manual Lymphatic Drainage    Manual Lymphatic Drainage  initial sequence;opened regional lymph nodes;opened anastamoses;extremity treatment  -CW      Initial Sequence  short neck  -CW      Opened Regional Lymph Nodes  axillary;inguinal  -CW      Axillary  right;left  -CW      Inguinal  right;left  -CW      Opened Anastamoses  inguino-axillary  -CW      Inguino-Axillary  right;left  -CW      Extremity Treatment  MLD to full limb  -CW      MLD to Full Limb  BLE's  -CW         Compression/Skin Care    Compression/Skin Care  skin care;wrapping location;bandaging;compression garment;remove bandages  -CW      Skin Care  moisturizing lotion applied Applied lotion to both legs.   -CW      Wrapping Location  lower extremity  -CW      Wrapping Location LE  right:;ankle;knee  -CW      Wrapping Comments  R/LLE-K1, 2- Rosidal soft, 1- 10cm. Artiflex, 1- 8cm. 3-10cm. rosidal bandages.  Added tubigrip to R/LLE ankle to thigh. Kerlex to LLE. LLE bandages not applied due to pt going to the wound clinic.  ABD pad to L lateral LE wound.   -CW      Bandage Layers  cotton liner;padding/fluff layer;soft foam- 1/4 inch;short-stretch bandages (comment size/quantity)  -CW      Bandaging Technique  circumferential/spiral;moderate compression  -CW      Remove Bandages  Bandages  were removed at home.  -CW        User Key  (r) = Recorded By, (t) = Taken By, (c) = Cosigned By    Initials Name Provider Type    Colleen Cleary OTR Occupational Therapist                  OT Assessment/Plan     Row Name 01/05/21 1118          OT Assessment    Assessment Comments  Pt. was able to wear the compression bandages all night. Min tenderness LLE wound. Pt went to the wound clinic yesterday. Her spouse completed the wound dressing  change this morning. Mod drainage LLE lateral and anterior shin wound. Skin not as fibrotic RLE. Focused on proximal LLE for MLD. Reviewed how to apply the bandages with LE positioning while sitting in her recliner at home. Her decreased mobility and obesity is a limiting factor for her to apply the bandages independently. Reviewed precautions. Discussed a compression pump as an option.  -CW        OT Plan    OT Plan Comments  Plan to cont. tx. Pt. can remove LLE bandages for wound dressing change then re-apply.  -CW       User Key  (r) = Recorded By, (t) = Taken By, (c) = Cosigned By    Initials Name Provider Type    Colleen Cleary OTR Occupational Therapist                 Manual Rx (last 36 hours)      Manual Treatments     Row Name 01/05/21 1125             Total Minutes    04724 - OT Manual Therapy Minutes  60  -CW        User Key  (r) = Recorded By, (t) = Taken By, (c) = Cosigned By    Initials Name Provider Type    Colleen Cleary OTR Occupational Therapist            Therapy Education  Given: Bandaging/dressing change, Edema management  Program: Reinforced  How Provided: Verbal, Demonstration  Provided to: Patient  Level of Understanding: Verbalized  77684 - OT Self Care/Mgmt Minutes: 13                Time Calculation:   OT Start Time: 0847  OT Stop Time: 1000  OT Time Calculation (min): 73 min  Total Timed Code Minutes- OT: 73 minute(s)     Therapy Charges for Today     Code Description Service Date Service Provider Modifiers Qty    63467028065  OT MANUAL  THERAPY EA 15 MIN 1/5/2021 Colleen Hidalgo OTR GO 4    07967223166 HC OT SELF CARE/MGMT/TRAIN EA 15 MIN 1/5/2021 Colleen Hidalgo OTR GO 1                      RADHA Miller  1/5/2021

## 2021-01-06 ENCOUNTER — HOSPITAL ENCOUNTER (OUTPATIENT)
Dept: OCCUPATIONAL THERAPY | Facility: HOSPITAL | Age: 61
Setting detail: THERAPIES SERIES
Discharge: HOME | End: 2021-01-06

## 2021-01-06 DIAGNOSIS — I89.0 LYMPHEDEMA OF BOTH LOWER EXTREMITIES: Primary | ICD-10-CM

## 2021-01-06 DIAGNOSIS — I89.0 LYMPHEDEMA: ICD-10-CM

## 2021-01-06 PROCEDURE — 97535 SELF CARE MNGMENT TRAINING: CPT

## 2021-01-06 PROCEDURE — 97140 MANUAL THERAPY 1/> REGIONS: CPT

## 2021-01-06 NOTE — THERAPY TREATMENT NOTE
Outpatient Occupational Therapy Lymphedema Treatment Note/weekly progress note   [facility]     Patient Name: Emely Solomon  : 1960  MRN: [id number]  Today's Date: 2021      Visit Date: 2021    Patient Active Problem List   Diagnosis   • Chronic diastolic CHF (congestive heart failure) (CMS/HCC)   • PFO (patent foramen ovale)   • Paradoxical embolism (CMS/HCC)   • Essential hypertension   • Pulmonary hypertension (CMS/HCC)   • Back pain   • ARIEL (obstructive sleep apnea)   • Class 3 severe obesity due to excess calories with serious comorbidity and body mass index (BMI) greater than or equal to 70 in adult (CMS/HCC)   • History of DVT of lower extremity   • Lymphedema   • Anemia, chronic disease   • CKD (chronic kidney disease) stage 3, GFR 30-59 ml/min   • Iron deficiency        Past Medical History:   Diagnosis Date   • Cellulitis     2017, with Group B Strep bacteremia and sepsis   • Chronic diastolic congestive heart failure (CMS/HCC)    • Deep venous thrombosis (CMS/HCC)    • Hypertension    • Lipoedema    • Lymphedema    • Morbid obesity (CMS/HCC)    • Paradoxical embolism (CMS/HCC)     to the LLE due to DVT/PE and PFO   • PFO (patent foramen ovale)    • Pulmonary embolism (CMS/HCC)    • Pulmonary hypertension (CMS/HCC)     multifactorial (dCHF, obesity/ARIEL, hx PE), mild by echo 2016   • Sepsis (CMS/HCC) 2020        Past Surgical History:   Procedure Laterality Date   • BRONCHOSCOPY N/A 2017    Procedure: BRONCHOSCOPY with wash;  Surgeon: Caleb Garcia MD;  Location: [facility] ENDOSCOPY;  Service:    • DILATATION AND CURETTAGE  2011   • VENA CAVA FILTER PLACEMENT      Inferior Vena Cava Filter Placement w/Fluorosc angiogr guidance         Visit Dx:      ICD-10-CM ICD-9-CM   1. Lymphedema of both lower extremities  I89.0 457.1   2. Lymphedema  I89.0 457.1       Lymphedema     Row Name 21 0900             Subjective Pain    Able to rate subjective pain?  yes  -CW       Pre-Treatment Pain Level  0  -CW      Post-Treatment Pain Level  0  -CW      Subjective Pain Comment  Occasional pain LLE wound  area.  -CW         Subjective Comments    Subjective Comments  Pt. was able to wear the bandages all night.   -CW         Skin Changes/Observations    Location/Assessment  Lower Extremity  -CW      Lower Extremity Conditions  bilateral:;dry;shiny;scaly  -CW      Lower Extremity Color/Pigment  bilateral:;red;fibrosis  -CW      Lower Extremity Skin Details  Wound dressings completed by pt./spouse this morning. LLE lateral wound with mod drainage. Margins appear to be healing. 2nd wound anterior shin noted deeper -Mod drainage noted.   -CW         Lymphedema Measurements    Measurement Type(s)  Circumferential  -CW      Circumferential Areas  Lower extremities  -CW         BLE Circumferential (cm)    Measurement Location 1  20 cm. above knee  -CW      Left 1  85 cm  -CW      Right 1  85 cm  -CW      Measurement Location 2  10cm above knee  -CW      Left 2  83.5 cm  -CW      Right 2  83 cm  -CW      Measurement Location 3  knee  -CW      Left 3  61.5 cm  -CW      Right 3  69.5 cm  -CW      Measurement Location 4  10 cm. below knee  -CW      Left 4  75.4 cm  -CW      Right 4  67 cm  -CW      Measurement Location 5  20 cm. below knee  -CW      Left 5  58.3 cm  -CW      Right 5  53 cm  -CW      Measurement Location 6  30 cm below knee  -CW      Left 6  34.5 cm  -CW      Right 6  30.5 cm  -CW      Measurement Location 7  ankle  -CW      Left 7  32 cm  -CW      Right 7  31.9 cm  -CW      Measurement Location 8  mid foot   -CW      Left 8  23 cm  -CW      Right 8  24 cm  -CW      LLE Circumferential Total  453.2 cm  -CW      RLE Circumferential Total  443.9 cm  -CW         Manual Lymphatic Drainage    Manual Lymphatic Drainage  initial sequence;opened regional lymph nodes;opened anastamoses;extremity treatment  -CW      Initial Sequence  short neck  -CW      Opened Regional Lymph Nodes   "axillary;inguinal  -CW      Axillary  right;left  -CW      Inguinal  right;left  -CW      Opened Anastamoses  inguino-axillary  -CW      Inguino-Axillary  right;left  -CW      Extremity Treatment  MLD to full limb  -CW      MLD to Full Limb  BLE's  -CW         Compression/Skin Care    Compression/Skin Care  skin care;wrapping location;bandaging;compression garment;remove bandages  -CW      Skin Care  moisturizing lotion applied Applied lotion to both legs.   -CW      Wrapping Location  lower extremity  -CW      Wrapping Location LE  right:;ankle;knee  -CW      Wrapping Comments  R/LLE-K1, 2- Rosidal soft, 1- 10cm. Artiflex, 1- 8cm. 3-10cm. rosidal bandages.  Added tubigrip to R/LLE ankle to thigh. Kerlex to LLE. ABD pad to L lateral LE wound.   -CW      Bandage Layers  cotton liner;padding/fluff layer;soft foam- 1/4 inch;short-stretch bandages (comment size/quantity)  -CW      Bandaging Technique  circumferential/spiral;moderate compression  -CW      Remove Bandages  Bandages were removed at home.  -CW        User Key  (r) = Recorded By, (t) = Taken By, (c) = Cosigned By    Initials Name Provider Type    Colleen Cleary OTR Occupational Therapist                  OT Assessment/Plan     Row Name 01/06/21 0095          OT Assessment    Assessment Comments  No pain complaints at present from the bandages, but does have an occasional \"twinge\" LLE wound area. Pt. was able to wear the bandages day and night without discomfort. Skin dry and intact RLE. LLE wound dressings completed by spouse. Mod drainage noted. Pt. seemed tired today with some SOB.  Measurements taken. See flow sheet. Total circumference .9 cm. and .2 cm. (52.7 cm. R and 36.6 cm. L net decrease). Pt. is progressing slowly with decreased edema  BLE’s. Goals updated and reviewed POC with pt. Reviewed bandage precautions and wearing schedule along with wound dressing changes.  -CW        OT Plan    OT Plan Comments  Plan to cont. tx. Pt. can " remove LLE bandages for wound dressing changes 2x/day.  -CW       User Key  (r) = Recorded By, (t) = Taken By, (c) = Cosigned By    Initials Name Provider Type    Colleen Cleary OTR Occupational Therapist                 Manual Rx (last 36 hours)      Manual Treatments     Row Name 01/06/21 1130             Total Minutes    55636 - OT Manual Therapy Minutes  62  -CW        User Key  (r) = Recorded By, (t) = Taken By, (c) = Cosigned By    Initials Name Provider Type    Colleen Cleary OTR Occupational Therapist        OT Goals     Row Name 01/06/21 1100          OT Short Term Goals    STG Date to Achieve  01/06/21  -CW     STG 1  Patient to demonstrate proper awareness of “What is Lymphedema” and DO’s and Don’ts” for improved prevention, management, care of symptoms, and ease of transition to self-care of condition.  -CW     STG 1 Progress  Ongoing;Progressing  -CW     STG 2  Patient independent and compliant with self-wrapping techniques of compression bandages with family member or caregiver as indicated for improved self-management of condition.  -CW     STG 2 Progress  Ongoing;Progressing  -CW     STG 3  Patient demonstrate decreased net edema of  BLE's   >/5-10 cm. for decreased in edema, symptoms, decreased risk of infection, and improved skin-care/transition to self-care of condition.  -CW     STG 3 Progress  Met  -CW     STG 4  Patient independent and compliant with initial home exercise program focused on diaphragmatic breathing, range of motion, flexibility, to decrease edema, improve lymphatic flow for decreased edema and decreased risk of infection.  -CW     STG 4 Progress  Met  -CW        Long Term Goals    LTG Date to Achieve  01/20/21  -CW     LTG 1  Patient will demonstrate decreased net edema of  BLE's >/=10-20 cm. for decrease in symptoms, decreased risk of infection, and improved skin-care/transition to self-care of condition.   -CW     LTG 1 Progress  Ongoing;Progressing  -CW     LTG 2   Patient/family/caregivers independent with self-care techniques for self-management of condition.  -CW     LTG 2 Progress  Ongoing;Progressing  -CW     LTG 3  Patient independent and compliant with use and care of compression (example garments, inelastic velcro compression) with assistance of a caregiver as needed to promote self-care independence.    -CW     LTG 3 Progress  Ongoing  -CW       User Key  (r) = Recorded By, (t) = Taken By, (c) = Cosigned By    Initials Name Provider Type    CW Colleen Hidalgo OTR Occupational Therapist          Therapy Education  Given: Bandaging/dressing change, Edema management  Program: Reinforced  How Provided: Verbal, Demonstration  Level of Understanding: Verbalized, Teach back education performed  36330 - OT Self Care/Mgmt Minutes: 10                Time Calculation:   OT Start Time: 0848  OT Stop Time: 1000  OT Time Calculation (min): 72 min     Therapy Charges for Today     Code Description Service Date Service Provider Modifiers Qty    01748654992  OT MANUAL THERAPY EA 15 MIN 1/6/2021 Colleen Hidalgo OTR GO 4    [id number]  OT SELF CARE/MGMT/TRAIN EA 15 MIN 1/6/2021 Colleen Hidalgo OTR GO 1                      RADHA Miller  1/6/2021

## 2021-01-08 ENCOUNTER — HOSPITAL ENCOUNTER (OUTPATIENT)
Dept: OCCUPATIONAL THERAPY | Facility: HOSPITAL | Age: 61
Setting detail: THERAPIES SERIES
Discharge: HOME | End: 2021-01-08

## 2021-01-08 DIAGNOSIS — I89.0 LYMPHEDEMA: ICD-10-CM

## 2021-01-08 DIAGNOSIS — I89.0 LYMPHEDEMA OF BOTH LOWER EXTREMITIES: Primary | ICD-10-CM

## 2021-01-08 PROCEDURE — 97535 SELF CARE MNGMENT TRAINING: CPT

## 2021-01-08 PROCEDURE — 97140 MANUAL THERAPY 1/> REGIONS: CPT

## 2021-01-08 NOTE — THERAPY TREATMENT NOTE
Outpatient Occupational Therapy Lymphedema Treatment Note  [facility]     Patient Name: Emely Solomon  : 1960  MRN: [id number]  Today's Date: 2021      Visit Date: 2021    Patient Active Problem List   Diagnosis   • Chronic diastolic CHF (congestive heart failure) (CMS/HCC)   • PFO (patent foramen ovale)   • Paradoxical embolism (CMS/HCC)   • Essential hypertension   • Pulmonary hypertension (CMS/HCC)   • Back pain   • ARIEL (obstructive sleep apnea)   • Class 3 severe obesity due to excess calories with serious comorbidity and body mass index (BMI) greater than or equal to 70 in adult (CMS/HCC)   • History of DVT of lower extremity   • Lymphedema   • Anemia, chronic disease   • CKD (chronic kidney disease) stage 3, GFR 30-59 ml/min   • Iron deficiency        Past Medical History:   Diagnosis Date   • Cellulitis     2017, with Group B Strep bacteremia and sepsis   • Chronic diastolic congestive heart failure (CMS/HCC)    • Deep venous thrombosis (CMS/HCC)    • Hypertension    • Lipoedema    • Lymphedema    • Morbid obesity (CMS/HCC)    • Paradoxical embolism (CMS/HCC)     to the LLE due to DVT/PE and PFO   • PFO (patent foramen ovale)    • Pulmonary embolism (CMS/HCC)    • Pulmonary hypertension (CMS/HCC)     multifactorial (dCHF, obesity/ARIEL, hx PE), mild by echo 2016   • Sepsis (CMS/HCC) 2020        Past Surgical History:   Procedure Laterality Date   • BRONCHOSCOPY N/A 2017    Procedure: BRONCHOSCOPY with wash;  Surgeon: Caleb Garcia MD;  Location: [facility] ENDOSCOPY;  Service:    • DILATATION AND CURETTAGE  2011   • VENA CAVA FILTER PLACEMENT      Inferior Vena Cava Filter Placement w/Fluorosc angiogr guidance         Visit Dx:      ICD-10-CM ICD-9-CM   1. Lymphedema of both lower extremities  I89.0 457.1   2. Lymphedema  I89.0 457.1       Lymphedema     Row Name 21 0800             Subjective Pain    Able to rate subjective pain?  yes  -CW      Pre-Treatment Pain  Level  0  -CW      Post-Treatment Pain Level  0  -CW         Subjective Comments    Subjective Comments  Pt. reports mild pain LLE wound area yesterday.   -CW         Skin Changes/Observations    Location/Assessment  Lower Extremity  -CW      Lower Extremity Conditions  bilateral:;dry;shiny;scaly  -CW      Lower Extremity Color/Pigment  bilateral:;red;fibrosis  -CW      Lower Extremity Skin Details  Wound dressings completed by pt./spouse this morning. LLE lateral wound with mod drainage. Margins appear to be healing. 2nd wound anterior shin noted deeper -Mod drainage noted.   -CW         Manual Lymphatic Drainage    Manual Lymphatic Drainage  initial sequence;opened regional lymph nodes;opened anastamoses;extremity treatment  -CW      Initial Sequence  short neck  -CW      Opened Regional Lymph Nodes  axillary;inguinal  -CW      Axillary  right;left  -CW      Inguinal  right;left  -CW      Opened Anastamoses  inguino-axillary  -CW      Inguino-Axillary  right;left  -CW      Extremity Treatment  MLD to full limb  -CW      MLD to Full Limb  BLE's  -CW         Compression/Skin Care    Compression/Skin Care  skin care;wrapping location;bandaging;compression garment;remove bandages  -CW      Skin Care  moisturizing lotion applied Applied lotion to both legs.   -CW      Wrapping Location  lower extremity  -CW      Wrapping Location LE  right:;ankle;knee  -CW      Wrapping Comments  R/LLE-K1, 2- Rosidal soft, 1- 10cm. Artiflex, 1- 8cm. 3-10cm. rosidal bandages.  Added tubigrip to R/LLE ankle to thigh. Kerlex to LLE. ABD pad to L lateral LE wound.   -CW      Bandage Layers  cotton liner;padding/fluff layer;soft foam- 1/4 inch;short-stretch bandages (comment size/quantity)  -CW      Bandaging Technique  circumferential/spiral;moderate compression  -CW      Remove Bandages  Bandages were removed at home.  -CW        User Key  (r) = Recorded By, (t) = Taken By, (c) = Cosigned By    Initials Name Provider Type    CW Wo,  RADHA Macias Occupational Therapist                  OT Assessment/Plan     Row Name 01/08/21 1250 01/08/21 1234       OT Assessment    Assessment Comments  --  Pt. reports mild pain LLE wound area last night. Pt.'s spouse has been doing the wound dressing changes 2x/day. Moderate drainage LLE anterior shin wound area and min drainage LLE lateral wound. LLE lateral wound not as deep in appearance compared to previous. Skin softer B lower legs. Reviewed LE positioning and how to apply the bandages if her spouse is not available. Reviewed bandage precautions and wearing schedule. Called [employer] regarding insurance coverage and  velcro compression.  -CW       OT Plan    OT Plan Comments  Plan to cont tx. Pt. can remove LLE bandages for wound dressing changes 2x/day.  -CW  --      User Key  (r) = Recorded By, (t) = Taken By, (c) = Cosigned By    Initials Name Provider Type    Colleen Cleary OTR Occupational Therapist                 Manual Rx (last 36 hours)      Manual Treatments     Row Name 01/08/21 1359             Total Minutes    39235 - OT Manual Therapy Minutes  60  -CW        User Key  (r) = Recorded By, (t) = Taken By, (c) = Cosigned By    Initials Name Provider Type    Colleen Cleary OTR Occupational Therapist            Therapy Education  Education Details: Skin care, HEP,  Given: Bandaging/dressing change, Edema management  Program: Reinforced  How Provided: Verbal, Demonstration  Provided to: Patient  Level of Understanding: Teach back education performed, Verbalized  83421 - OT Self Care/Mgmt Minutes: 10                Time Calculation:   OT Start Time: 0850  OT Stop Time: 1000  OT Time Calculation (min): 70 min  Total Timed Code Minutes- OT: 70 minute(s)     Therapy Charges for Today     Code Description Service Date Service Provider Modifiers Qty    92074619820 HC OT SELF CARE/MGMT/TRAIN EA 15 MIN 1/8/2021 Colleen Hidalgo OTR GO 1    40608385980 HC OT MANUAL THERAPY EA 15 MIN 1/8/2021 Rocky  Colleen, OTR GO 4                      Colleen Wo, OTR  1/8/2021

## 2021-01-11 ENCOUNTER — HOSPITAL ENCOUNTER (OUTPATIENT)
Dept: OCCUPATIONAL THERAPY | Facility: HOSPITAL | Age: 61
Setting detail: THERAPIES SERIES
Discharge: HOME | End: 2021-01-11

## 2021-01-11 ENCOUNTER — OFFICE VISIT (OUTPATIENT)
Dept: WOUND CARE | Facility: HOSPITAL | Age: 61
End: 2021-01-11

## 2021-01-11 DIAGNOSIS — I89.0 LYMPHEDEMA OF BOTH LOWER EXTREMITIES: Primary | ICD-10-CM

## 2021-01-11 DIAGNOSIS — I89.0 LYMPHEDEMA: ICD-10-CM

## 2021-01-11 PROCEDURE — 97535 SELF CARE MNGMENT TRAINING: CPT

## 2021-01-11 PROCEDURE — 97140 MANUAL THERAPY 1/> REGIONS: CPT

## 2021-01-11 PROCEDURE — 97602 WOUND(S) CARE NON-SELECTIVE: CPT

## 2021-01-11 NOTE — THERAPY TREATMENT NOTE
Outpatient Occupational Therapy Lymphedema Treatment Note  [facility]     Patient Name: Emely Solomon  : 1960  MRN: [id number]  Today's Date: 2021      Visit Date: 2021    Patient Active Problem List   Diagnosis   • Chronic diastolic CHF (congestive heart failure) (CMS/HCC)   • PFO (patent foramen ovale)   • Paradoxical embolism (CMS/HCC)   • Essential hypertension   • Pulmonary hypertension (CMS/HCC)   • Back pain   • ARIEL (obstructive sleep apnea)   • Class 3 severe obesity due to excess calories with serious comorbidity and body mass index (BMI) greater than or equal to 70 in adult (CMS/HCC)   • History of DVT of lower extremity   • Lymphedema   • Anemia, chronic disease   • CKD (chronic kidney disease) stage 3, GFR 30-59 ml/min   • Iron deficiency        Past Medical History:   Diagnosis Date   • Cellulitis     2017, with Group B Strep bacteremia and sepsis   • Chronic diastolic congestive heart failure (CMS/HCC)    • Deep venous thrombosis (CMS/HCC)    • Hypertension    • Lipoedema    • Lymphedema    • Morbid obesity (CMS/HCC)    • Paradoxical embolism (CMS/HCC)     to the LLE due to DVT/PE and PFO   • PFO (patent foramen ovale)    • Pulmonary embolism (CMS/HCC)    • Pulmonary hypertension (CMS/HCC)     multifactorial (dCHF, obesity/ARIEL, hx PE), mild by echo 2016   • Sepsis (CMS/HCC) 2020        Past Surgical History:   Procedure Laterality Date   • BRONCHOSCOPY N/A 2017    Procedure: BRONCHOSCOPY with wash;  Surgeon: Caleb Garcia MD;  Location: [facility] ENDOSCOPY;  Service:    • DILATATION AND CURETTAGE  2011   • VENA CAVA FILTER PLACEMENT      Inferior Vena Cava Filter Placement w/Fluorosc angiogr guidance         Visit Dx:      ICD-10-CM ICD-9-CM   1. Lymphedema of both lower extremities  I89.0 457.1   2. Lymphedema  I89.0 457.1       Lymphedema     Row Name 21 0800             Subjective Pain    Able to rate subjective pain?  yes  -CW      Pre-Treatment Pain  Level  0  -CW      Post-Treatment Pain Level  0  -CW         Subjective Comments    Subjective Comments  Pt. reports no LE pain at rest.   -CW         Skin Changes/Observations    Location/Assessment  Lower Extremity  -CW      Lower Extremity Conditions  bilateral:;dry;shiny;scaly  -CW      Lower Extremity Color/Pigment  bilateral:;red;fibrosis  -CW      Lower Extremity Skin Details  Wound dressings completed by pt./spouse this morning. LLE lateral wound with mod drainage. Margins appear to be healing. 2nd wound anterior shin noted not as deep -Mod drainage noted.   -CW         Manual Lymphatic Drainage    Manual Lymphatic Drainage  initial sequence;opened regional lymph nodes;opened anastamoses;extremity treatment  -CW      Initial Sequence  short neck  -CW      Opened Regional Lymph Nodes  axillary;inguinal  -CW      Axillary  right;left  -CW      Inguinal  right;left  -CW      Opened Anastamoses  inguino-axillary  -CW      Inguino-Axillary  right;left  -CW      Extremity Treatment  MLD to full limb  -CW      MLD to Full Limb  BLE's  -CW         Compression/Skin Care    Compression/Skin Care  skin care;wrapping location;bandaging;compression garment;remove bandages  -CW      Skin Care  moisturizing lotion applied Applied lotion to both legs.   -CW      Wrapping Location  lower extremity  -CW      Wrapping Location LE  right:;ankle;knee  -CW      Wrapping Comments  R/LE-K1, 2- Rosidal soft, 1- 10cm. Artiflex, 1- 8cm. 3-10cm. rosidal bandages.  Added tubigrip to R/LLE ankle to thigh. Kerlex to LLE. ABD pad to L lateral LE wound.   -CW      Bandage Layers  cotton liner;padding/fluff layer;soft foam- 1/4 inch;short-stretch bandages (comment size/quantity)  -CW      Bandaging Comments  LLE added telfa pad and kerlix with 2- ABD pads.   -CW      Bandaging Technique  circumferential/spiral;moderate compression  -CW      Remove Bandages  Bandages were removed at home.  -CW        User Key  (r) = Recorded By, (t) = Taken  By, (c) = Cosigned By    Initials Name Provider Type    Colleen Cleary OTR Occupational Therapist                  OT Assessment/Plan     Row Name 01/11/21 1150          OT Assessment    Assessment Comments  Pt. reports min pain LLE wound area at times over the weekend. Pt.'s spouse continues to apply the wound dressing 2x/day. Moderated drainage both wound areas. LLE lateral wound is not as deep. Anterior wound the margins appear to be slightly smaller for one area. Pt. was able to find her velcro compression from home. Pt. going to the wound clinic today for follow up.  Reviewed LE positioning and how to apply the bandages. Applied RLE bandages foot to knee with moderate compression as tolerated.  -CW        OT Plan    OT Plan Comments  Plan to cont. tx.  -CW       User Key  (r) = Recorded By, (t) = Taken By, (c) = Cosigned By    Initials Name Provider Type    Colleen Cleary OTR Occupational Therapist                 Manual Rx (last 36 hours)      Manual Treatments     Row Name 01/11/21 1330             Total Minutes    46022 - OT Manual Therapy Minutes  60  -CW        User Key  (r) = Recorded By, (t) = Taken By, (c) = Cosigned By    Initials Name Provider Type    Colleen Cleary OTR Occupational Therapist            Therapy Education  Given: Bandaging/dressing change, Edema management  Program: Reinforced  How Provided: Verbal, Demonstration  Provided to: Patient  Level of Understanding: Verbalized  50542 - OT Self Care/Mgmt Minutes: 10                Time Calculation:   OT Start Time: 0850  OT Stop Time: 1000  OT Time Calculation (min): 70 min  Total Timed Code Minutes- OT: 70 minute(s)     Therapy Charges for Today     Code Description Service Date Service Provider Modifiers Qty    23439658202 HC OT MANUAL THERAPY EA 15 MIN 1/11/2021 Colleen Hidalgo OTR GO 4    52061948891 HC OT SELF CARE/MGMT/TRAIN EA 15 MIN 1/11/2021 Colleen Hidalgo OTR GO 1                      RADHA Miller  1/11/2021

## 2021-01-12 ENCOUNTER — HOSPITAL ENCOUNTER (OUTPATIENT)
Dept: OCCUPATIONAL THERAPY | Facility: HOSPITAL | Age: 61
Setting detail: THERAPIES SERIES
Discharge: HOME | End: 2021-01-12

## 2021-01-12 DIAGNOSIS — I89.0 LYMPHEDEMA: ICD-10-CM

## 2021-01-12 DIAGNOSIS — I89.0 LYMPHEDEMA OF BOTH LOWER EXTREMITIES: Primary | ICD-10-CM

## 2021-01-12 PROCEDURE — 97140 MANUAL THERAPY 1/> REGIONS: CPT

## 2021-01-12 PROCEDURE — 97535 SELF CARE MNGMENT TRAINING: CPT

## 2021-01-12 NOTE — THERAPY TREATMENT NOTE
Outpatient Occupational Therapy Lymphedema Treatment Note  [facility]     Patient Name: Emely Solomon  : 1960  MRN: [id number]  Today's Date: 2021      Visit Date: 2021    Patient Active Problem List   Diagnosis   • Chronic diastolic CHF (congestive heart failure) (CMS/HCC)   • PFO (patent foramen ovale)   • Paradoxical embolism (CMS/HCC)   • Essential hypertension   • Pulmonary hypertension (CMS/HCC)   • Back pain   • ARIEL (obstructive sleep apnea)   • Class 3 severe obesity due to excess calories with serious comorbidity and body mass index (BMI) greater than or equal to 70 in adult (CMS/HCC)   • History of DVT of lower extremity   • Lymphedema   • Anemia, chronic disease   • CKD (chronic kidney disease) stage 3, GFR 30-59 ml/min   • Iron deficiency        Past Medical History:   Diagnosis Date   • Cellulitis     2017, with Group B Strep bacteremia and sepsis   • Chronic diastolic congestive heart failure (CMS/HCC)    • Deep venous thrombosis (CMS/HCC)    • Hypertension    • Lipoedema    • Lymphedema    • Morbid obesity (CMS/HCC)    • Paradoxical embolism (CMS/HCC)     to the LLE due to DVT/PE and PFO   • PFO (patent foramen ovale)    • Pulmonary embolism (CMS/HCC)    • Pulmonary hypertension (CMS/HCC)     multifactorial (dCHF, obesity/ARIEL, hx PE), mild by echo 2016   • Sepsis (CMS/HCC) 2020        Past Surgical History:   Procedure Laterality Date   • BRONCHOSCOPY N/A 2017    Procedure: BRONCHOSCOPY with wash;  Surgeon: Caleb Garcia MD;  Location: [facility] ENDOSCOPY;  Service:    • DILATATION AND CURETTAGE  2011   • VENA CAVA FILTER PLACEMENT      Inferior Vena Cava Filter Placement w/Fluorosc angiogr guidance         Visit Dx:      ICD-10-CM ICD-9-CM   1. Lymphedema of both lower extremities  I89.0 457.1   2. Lymphedema  I89.0 457.1       Lymphedema     Row Name 21 0900             Subjective Pain    Able to rate subjective pain?  yes  -CW      Pre-Treatment Pain  Level  0  -CW      Post-Treatment Pain Level  0  -CW         Subjective Comments    Subjective Comments  No pain c/o BLE's. Pt. went to the wound care center yesterday.   -CW         Skin Changes/Observations    Location/Assessment  Lower Extremity  -CW      Lower Extremity Conditions  bilateral:;dry;shiny;scaly  -CW      Lower Extremity Color/Pigment  bilateral:;red;fibrosis  -CW      Lower Extremity Skin Details  Wound dressings completed by pt./spouse this morning. LLE lateral wound with mod drainage. Margins appear to be healing. 2nd wound anterior shin noted not as deep -Mod drainage noted.   -CW         Manual Lymphatic Drainage    MLD to Full Limb  Brief MLD B lower legs.   -CW         Compression/Skin Care    Compression/Skin Care  --  -CW      Skin Care  --  -CW      Wrapping Location  --  -CW      Wrapping Location LE  --  -CW      Wrapping Comments  --  -CW      Bandage Layers  --  -CW      Bandaging Technique  --  -CW      Compression Garment Comments  Applied adjusted fit of BLE velfcro compression-Reduction kit. Measured, trimmed the reduction kit to fit. Showed pt. how to apply the reduction kit.   -CW      Remove Bandages  Bandages were removed at home.  -CW        User Key  (r) = Recorded By, (t) = Taken By, (c) = Cosigned By    Initials Name Provider Type    Colleen Cleary OTR Occupational Therapist                  OT Assessment/Plan     Row Name 01/12/21 5128          OT Assessment    Assessment Comments  Pt. reports min pain LLE wound area. No LE pain from the bandages at present. Skin intact RLE. Mod drainage both wound areas. Applied eclypse padding to both wounds. Tried on and fit pt. for BLE Reduction kit knee high. Trimmed the ankle area for a better fit with medi rep present. Showed pt. how to apply the velcro compression reduction kit and had pt. practice step by step. Reviewed wearing schedule for velcro compression.  -CW        OT Plan    OT Plan Comments  Plan to cont. tx.  -CW        User Key  (r) = Recorded By, (t) = Taken By, (c) = Cosigned By    Initials Name Provider Type    Colleen Cleary OTR Occupational Therapist                 Manual Rx (last 36 hours)      Manual Treatments     Row Name 01/12/21 1459             Total Minutes    22188 - OT Manual Therapy Minutes  65  -CW        User Key  (r) = Recorded By, (t) = Taken By, (c) = Cosigned By    Initials Name Provider Type    CW Colleen Hidalgo OTR Occupational Therapist            Therapy Education  Given: Edema management  Program: Reinforced  How Provided: Verbal, Demonstration  Provided to: Patient  Level of Understanding: Verbalized  83875 - OT Self Care/Mgmt Minutes: 10                Time Calculation:   OT Start Time: 0850  OT Stop Time: 1005  OT Time Calculation (min): 75 min  Total Timed Code Minutes- OT: 75 minute(s)     Therapy Charges for Today     Code Description Service Date Service Provider Modifiers Qty    90294788251  OT MANUAL THERAPY EA 15 MIN 1/12/2021 Colleen Hidalgo OTR GO 4    68150668819 HC OT SELF CARE/MGMT/TRAIN EA 15 MIN 1/12/2021 Colleen Hidalgo OTR GO 1                      RADHA Miller  1/12/2021

## 2021-01-13 ENCOUNTER — HOSPITAL ENCOUNTER (OUTPATIENT)
Dept: OCCUPATIONAL THERAPY | Facility: HOSPITAL | Age: 61
Setting detail: THERAPIES SERIES
Discharge: HOME | End: 2021-01-13

## 2021-01-13 DIAGNOSIS — I89.0 LYMPHEDEMA OF BOTH LOWER EXTREMITIES: Primary | ICD-10-CM

## 2021-01-13 DIAGNOSIS — I89.0 LYMPHEDEMA: ICD-10-CM

## 2021-01-13 PROCEDURE — 97535 SELF CARE MNGMENT TRAINING: CPT

## 2021-01-13 PROCEDURE — 97140 MANUAL THERAPY 1/> REGIONS: CPT

## 2021-01-13 NOTE — THERAPY TREATMENT NOTE
Outpatient Occupational Therapy Lymphedema Treatment Note  [facility]     Patient Name: Emely Solomon  : 1960  MRN: [id number]  Today's Date: 2021      Visit Date: 2021    Patient Active Problem List   Diagnosis   • Chronic diastolic CHF (congestive heart failure) (CMS/HCC)   • PFO (patent foramen ovale)   • Paradoxical embolism (CMS/HCC)   • Essential hypertension   • Pulmonary hypertension (CMS/HCC)   • Back pain   • ARIEL (obstructive sleep apnea)   • Class 3 severe obesity due to excess calories with serious comorbidity and body mass index (BMI) greater than or equal to 70 in adult (CMS/HCC)   • History of DVT of lower extremity   • Lymphedema   • Anemia, chronic disease   • CKD (chronic kidney disease) stage 3, GFR 30-59 ml/min   • Iron deficiency        Past Medical History:   Diagnosis Date   • Cellulitis     2017, with Group B Strep bacteremia and sepsis   • Chronic diastolic congestive heart failure (CMS/HCC)    • Deep venous thrombosis (CMS/HCC)    • Hypertension    • Lipoedema    • Lymphedema    • Morbid obesity (CMS/HCC)    • Paradoxical embolism (CMS/HCC)     to the LLE due to DVT/PE and PFO   • PFO (patent foramen ovale)    • Pulmonary embolism (CMS/HCC)    • Pulmonary hypertension (CMS/HCC)     multifactorial (dCHF, obesity/ARIEL, hx PE), mild by echo 2016   • Sepsis (CMS/HCC) 2020        Past Surgical History:   Procedure Laterality Date   • BRONCHOSCOPY N/A 2017    Procedure: BRONCHOSCOPY with wash;  Surgeon: Caleb Garcia MD;  Location: [facility] ENDOSCOPY;  Service:    • DILATATION AND CURETTAGE  2011   • VENA CAVA FILTER PLACEMENT      Inferior Vena Cava Filter Placement w/Fluorosc angiogr guidance         Visit Dx:      ICD-10-CM ICD-9-CM   1. Lymphedema of both lower extremities  I89.0 457.1   2. Lymphedema  I89.0 457.1       Lymphedema     Row Name 21 0800             Subjective Pain    Able to rate subjective pain?  yes  -CW      Pre-Treatment Pain  Level  0  -CW      Post-Treatment Pain Level  0  -CW         Subjective Comments    Subjective Comments  Pt. was able to wear the velcro compression yesterday.   -CW         Skin Changes/Observations    Location/Assessment  Lower Extremity  -CW      Lower Extremity Conditions  bilateral:;dry;shiny;scaly  -CW      Lower Extremity Color/Pigment  bilateral:;red;fibrosis  -CW      Lower Extremity Skin Details  Wound dressings completed by pt./spouse this morning. LLE lateral wound with mod drainage. Margins appear to be healing. 2nd wound anterior shin noted not as deep -Mod drainage noted.   -CW         Manual Lymphatic Drainage    Manual Lymphatic Drainage  initial sequence;opened regional lymph nodes;opened anastamoses;extremity treatment  -CW      Initial Sequence  short neck  -CW      Opened Regional Lymph Nodes  axillary;inguinal  -CW      Axillary  right;left  -CW      Inguinal  right;left  -CW      Opened Anastamoses  inguino-axillary  -CW      Inguino-Axillary  right;left  -CW      Extremity Treatment  MLD to full limb  -CW      MLD to Full Limb  BLE's  -CW         Compression/Skin Care    Compression/Skin Care  skin care;wrapping location;bandaging;compression garment;remove bandages  -CW      Skin Care  moisturizing lotion applied Applied lotion to both legs.   -CW      Wrapping Location  lower extremity  -CW      Wrapping Location LE  right:;ankle;knee  -CW      Wrapping Comments  R/LE-K1, 2- Rosidal soft, 1- 10cm. Artiflex, 1- 8cm. 3-10cm. rosidal bandages.  Added tubigrip to R/LLE ankle to thigh. Kerlex to LLE. ABD pad to L lateral LE wound.   -CW      Bandage Layers  cotton liner;padding/fluff layer;soft foam- 1/4 inch;short-stretch bandages (comment size/quantity)  -CW      Bandaging Technique  circumferential/spiral;moderate compression  -CW      Remove Bandages  Remove BLE velcro compression reduction kit.   -CW        User Key  (r) = Recorded By, (t) = Taken By, (c) = Cosigned By    Initials Name  Provider Type    Colleen Cleary OTR Occupational Therapist                  OT Assessment/Plan     Row Name 01/13/21 1503          OT Assessment    Assessment Comments  Pt. was able to wear the BLE reduction kit yesterday and tried to re-apply it this morning with some difficulty. Reviewed how to apply the reduction kit B lower legs. Skin intact RLE. Mod drainage both wound areas and a beginning blister L LE above the wound. Applied the bandages with moderate compression and reviewed sequence/technique.  -CW        OT Plan    OT Plan Comments  Plan to cont tx.  -CW       User Key  (r) = Recorded By, (t) = Taken By, (c) = Cosigned By    Initials Name Provider Type    Colleen Cleary OTR Occupational Therapist                 Manual Rx (last 36 hours)      Manual Treatments     Row Name 01/13/21 1508             Total Minutes    89094 - OT Manual Therapy Minutes  62  -CW        User Key  (r) = Recorded By, (t) = Taken By, (c) = Cosigned By    Initials Name Provider Type    Colleen Cleary OTR Occupational Therapist            Therapy Education  Given: Bandaging/dressing change, Edema management  Program: Reinforced  How Provided: Verbal, Demonstration  Provided to: Patient  Level of Understanding: Verbalized  28588 - OT Self Care/Mgmt Minutes: 10                Time Calculation:   OT Start Time: 0848  OT Stop Time: 1000  OT Time Calculation (min): 72 min  Total Timed Code Minutes- OT: 72 minute(s)     Therapy Charges for Today     Code Description Service Date Service Provider Modifiers Qty    93779494013 HC OT MANUAL THERAPY EA 15 MIN 1/13/2021 Colleen Hidalgo OTR GO 4    23045491230 HC OT SELF CARE/MGMT/TRAIN EA 15 MIN 1/13/2021 Colleen Hidalgo OTR GO 1                      RADHA Miller  1/13/2021

## 2021-01-15 ENCOUNTER — HOSPITAL ENCOUNTER (OUTPATIENT)
Dept: OCCUPATIONAL THERAPY | Facility: HOSPITAL | Age: 61
Setting detail: THERAPIES SERIES
Discharge: HOME | End: 2021-01-15

## 2021-01-15 DIAGNOSIS — I89.0 LYMPHEDEMA OF BOTH LOWER EXTREMITIES: Primary | ICD-10-CM

## 2021-01-15 DIAGNOSIS — I89.0 LYMPHEDEMA: ICD-10-CM

## 2021-01-15 PROCEDURE — 97535 SELF CARE MNGMENT TRAINING: CPT

## 2021-01-15 PROCEDURE — 97140 MANUAL THERAPY 1/> REGIONS: CPT

## 2021-01-15 NOTE — THERAPY TREATMENT NOTE
Outpatient Occupational Therapy Lymphedema Treatment Note/weekly progress note  [facility]     Patient Name: Emely Solomon  : 1960  MRN: [id number]  Today's Date: 1/15/2021      Visit Date: 01/15/2021    Patient Active Problem List   Diagnosis   • Chronic diastolic CHF (congestive heart failure) (CMS/HCC)   • PFO (patent foramen ovale)   • Paradoxical embolism (CMS/HCC)   • Essential hypertension   • Pulmonary hypertension (CMS/HCC)   • Back pain   • ARIEL (obstructive sleep apnea)   • Class 3 severe obesity due to excess calories with serious comorbidity and body mass index (BMI) greater than or equal to 70 in adult (CMS/HCC)   • History of DVT of lower extremity   • Lymphedema   • Anemia, chronic disease   • CKD (chronic kidney disease) stage 3, GFR 30-59 ml/min   • Iron deficiency        Past Medical History:   Diagnosis Date   • Cellulitis     2017, with Group B Strep bacteremia and sepsis   • Chronic diastolic congestive heart failure (CMS/HCC)    • Deep venous thrombosis (CMS/HCC)    • Hypertension    • Lipoedema    • Lymphedema    • Morbid obesity (CMS/HCC)    • Paradoxical embolism (CMS/HCC)     to the LLE due to DVT/PE and PFO   • PFO (patent foramen ovale)    • Pulmonary embolism (CMS/HCC)    • Pulmonary hypertension (CMS/HCC)     multifactorial (dCHF, obesity/ARIEL, hx PE), mild by echo 2016   • Sepsis (CMS/HCC) 2020        Past Surgical History:   Procedure Laterality Date   • BRONCHOSCOPY N/A 2017    Procedure: BRONCHOSCOPY with wash;  Surgeon: Caleb Garcia MD;  Location: [facility] ENDOSCOPY;  Service:    • DILATATION AND CURETTAGE  2011   • VENA CAVA FILTER PLACEMENT      Inferior Vena Cava Filter Placement w/Fluorosc angiogr guidance         Visit Dx:      ICD-10-CM ICD-9-CM   1. Lymphedema of both lower extremities  I89.0 457.1   2. Lymphedema  I89.0 457.1       Lymphedema     Row Name 01/15/21 0800             Subjective Pain    Able to rate subjective pain?  yes  -CW       Pre-Treatment Pain Level  0  -CW      Post-Treatment Pain Level  0  -CW         Subjective Comments    Subjective Comments  Some discomfort LLE wound area   -CW         Skin Changes/Observations    Location/Assessment  Lower Extremity  -CW      Lower Extremity Conditions  bilateral:;dry;shiny;scaly  -CW      Lower Extremity Color/Pigment  bilateral:;red;fibrosis  -CW      Lower Extremity Skin Details  Wound dressings completed by pt./spouse this morning. LLE lateral wound with mod drainage. Margins appear to be healing. 2nd wound anterior shin noted not as deep -Mod drainage noted.   -CW      Skin Observations Comment  Skin red B knee skin folds.   -CW         Lymphedema Measurements    Measurement Type(s)  Circumferential  -CW      Circumferential Areas  Lower extremities  -CW         BLE Circumferential (cm)    Measurement Location 1  20 cm. above knee  -CW      Left 1  85 cm  -CW      Right 1  85.9 cm  -CW      Measurement Location 2  10cm above knee  -CW      Left 2  82.6 cm  -CW      Right 2  83 cm  -CW      Measurement Location 3  knee  -CW      Left 3  59.8 cm  -CW      Right 3  69.5 cm  -CW      Measurement Location 4  10 cm. below knee  -CW      Left 4  73 cm  -CW      Right 4  66.8 cm  -CW      Measurement Location 5  20 cm. below knee  -CW      Left 5  50 cm  -CW      Right 5  55 cm  -CW      Measurement Location 6  30 cm. below knee  -CW      Left 6  34.2 cm  -CW      Right 6  33.2 cm  -CW      Measurement Location 7  ankle  -CW      Left 7  31.9 cm  -CW      Right 7  31.9 cm  -CW      Measurement Location 8  mid foot   -CW      Left 8  23 cm  -CW      Right 8  23.5 cm  -CW      LLE Circumferential Total  439.5 cm  -CW      RLE Circumferential Total  448.8 cm  -CW         Manual Lymphatic Drainage    Manual Lymphatic Drainage  initial sequence;opened regional lymph nodes;opened anastamoses;extremity treatment  -CW      Initial Sequence  short neck  -CW      Opened Regional Lymph Nodes  axillary;inguinal   -CW      Axillary  right;left  -CW      Inguinal  right;left  -CW      Opened Anastamoses  inguino-axillary  -CW      Inguino-Axillary  right;left  -CW      Extremity Treatment  MLD to full limb  -CW      MLD to Full Limb  BLE's  -CW         Compression/Skin Care    Compression/Skin Care  skin care;wrapping location;bandaging;compression garment;remove bandages  -CW      Skin Care  moisturizing lotion applied Applied lotion to both legs and zinc oxide between skin fold  -CW      Wrapping Location  lower extremity  -CW      Wrapping Location LE  right:;ankle;knee  -CW      Wrapping Comments  R/LE-K1, 2- Rosidal soft, 1- 10cm. Artiflex, 1- 8cm. 3-10cm. rosidal bandages.  Added tubigrip to R/LLE ankle to thigh. Kerlex to LLE. ABD pad to L lateral LE wound.   -CW      Bandage Layers  cotton liner;padding/fluff layer;soft foam- 1/4 inch;short-stretch bandages (comment size/quantity)  -CW      Bandaging Technique  circumferential/spiral;moderate compression  -CW      Remove Bandages  Removed BLE velcro compression reduction kit.   -CW        User Key  (r) = Recorded By, (t) = Taken By, (c) = Cosigned By    Initials Name Provider Type    CW Colleen Hidalgo OTR Occupational Therapist                  OT Assessment/Plan     Row Name 01/15/21 0651          OT Assessment    Assessment Comments  Pt. was able to tolerate the compression bandages day and night. No pain complaints at present, but has some skin redness B knee skin folds. Skin dry. Applied the bandages with slightly more compression as tolerated with no discomfort. Reviewed bandage precautions, wearing schedule and skin care. Pt. can apply Aquaphor or Zinc oxide in between folds. Measurements taken. See flow sheet.  Total circumferenc .8cm. and .5 cm. (47.8 cm. R and 50.3 cm. L net decrease).Mod drainage LLE both wounds.  Reviewed sequence and technique on how to apply the compression bandages. Pt. can decrease compression or remove a bandage layer at  night if needed for comfort. Discussed long term edema management.  -CW        OT Plan    OT Plan Comments  Plan to cont. tx.  -CW       User Key  (r) = Recorded By, (t) = Taken By, (c) = Cosigned By    Initials Name Provider Type    Colleen Cleary OTR Occupational Therapist                 Manual Rx (last 36 hours)      Manual Treatments     Row Name 01/15/21 1521             Total Minutes    78217 - OT Manual Therapy Minutes  60  -CW        User Key  (r) = Recorded By, (t) = Taken By, (c) = Cosigned By    Initials Name Provider Type    Colleen Cleary OTR Occupational Therapist            Therapy Education  Given: Edema management, Bandaging/dressing change  Program: Reinforced  How Provided: Verbal, Demonstration  Provided to: Patient  Level of Understanding: Verbalized  33767 - OT Self Care/Mgmt Minutes: 11                Time Calculation:   OT Start Time: 0849  OT Stop Time: 1000  OT Time Calculation (min): 71 min  Total Timed Code Minutes- OT: 71 minute(s)     Therapy Charges for Today     Code Description Service Date Service Provider Modifiers Qty    56047703577  OT MANUAL THERAPY EA 15 MIN 1/15/2021 Colleen Hidalgo OTR GO 4    68539267786  OT SELF CARE/MGMT/TRAIN EA 15 MIN 1/15/2021 Colleen Hidalgo OTR GO 1                      RADHA Miller  1/15/2021

## 2021-01-18 ENCOUNTER — HOSPITAL ENCOUNTER (OUTPATIENT)
Dept: OCCUPATIONAL THERAPY | Facility: HOSPITAL | Age: 61
Setting detail: THERAPIES SERIES
Discharge: HOME | End: 2021-01-18

## 2021-01-18 ENCOUNTER — OFFICE VISIT (OUTPATIENT)
Dept: WOUND CARE | Facility: HOSPITAL | Age: 61
End: 2021-01-18

## 2021-01-18 DIAGNOSIS — I89.0 LYMPHEDEMA: ICD-10-CM

## 2021-01-18 DIAGNOSIS — I89.0 LYMPHEDEMA OF BOTH LOWER EXTREMITIES: Primary | ICD-10-CM

## 2021-01-18 PROCEDURE — 97535 SELF CARE MNGMENT TRAINING: CPT

## 2021-01-18 PROCEDURE — 97140 MANUAL THERAPY 1/> REGIONS: CPT

## 2021-01-18 PROCEDURE — G0463 HOSPITAL OUTPT CLINIC VISIT: HCPCS

## 2021-01-18 NOTE — THERAPY TREATMENT NOTE
Outpatient Occupational Therapy Lymphedema Treatment Note  [facility]     Patient Name: Emely Solomon  : 1960  MRN: [id number]  Today's Date: 2021      Visit Date: 2021    Patient Active Problem List   Diagnosis   • Chronic diastolic CHF (congestive heart failure) (CMS/HCC)   • PFO (patent foramen ovale)   • Paradoxical embolism (CMS/HCC)   • Essential hypertension   • Pulmonary hypertension (CMS/HCC)   • Back pain   • ARIEL (obstructive sleep apnea)   • Class 3 severe obesity due to excess calories with serious comorbidity and body mass index (BMI) greater than or equal to 70 in adult (CMS/HCC)   • History of DVT of lower extremity   • Lymphedema   • Anemia, chronic disease   • CKD (chronic kidney disease) stage 3, GFR 30-59 ml/min   • Iron deficiency        Past Medical History:   Diagnosis Date   • Cellulitis     2017, with Group B Strep bacteremia and sepsis   • Chronic diastolic congestive heart failure (CMS/HCC)    • Deep venous thrombosis (CMS/HCC)    • Hypertension    • Lipoedema    • Lymphedema    • Morbid obesity (CMS/HCC)    • Paradoxical embolism (CMS/HCC)     to the LLE due to DVT/PE and PFO   • PFO (patent foramen ovale)    • Pulmonary embolism (CMS/HCC)    • Pulmonary hypertension (CMS/HCC)     multifactorial (dCHF, obesity/ARIEL, hx PE), mild by echo 2016   • Sepsis (CMS/HCC) 2020        Past Surgical History:   Procedure Laterality Date   • BRONCHOSCOPY N/A 2017    Procedure: BRONCHOSCOPY with wash;  Surgeon: Caleb Garcia MD;  Location: [facility] ENDOSCOPY;  Service:    • DILATATION AND CURETTAGE  2011   • VENA CAVA FILTER PLACEMENT      Inferior Vena Cava Filter Placement w/Fluorosc angiogr guidance         Visit Dx:      ICD-10-CM ICD-9-CM   1. Lymphedema of both lower extremities  I89.0 457.1   2. Lymphedema  I89.0 457.1       Lymphedema     Row Name 21 0800             Subjective Pain    Able to rate subjective pain?  yes  -CW      Pre-Treatment Pain  Level  0  -CW      Post-Treatment Pain Level  0  -CW         Subjective Comments    Subjective Comments  No numbness or tingling. No LE pain c/o at present.   -CW         Skin Changes/Observations    Location/Assessment  Lower Extremity  -CW      Lower Extremity Conditions  bilateral:;dry;shiny;scaly  -CW      Lower Extremity Color/Pigment  bilateral:;red;fibrosis  -CW      Lower Extremity Skin Details  Wound dressings completed by pt./spouse this morning. LLE lateral wound with mod drainage. Margins appear to be healing. 2nd wound anterior shin noted not as deep -Mod drainage noted.   -CW      Skin Observations Comment  Skin not as red B knee skin folds.   -CW         Manual Lymphatic Drainage    Manual Lymphatic Drainage  initial sequence;opened regional lymph nodes;opened anastamoses;extremity treatment  -CW      Initial Sequence  short neck  -CW      Opened Regional Lymph Nodes  axillary;inguinal  -CW      Axillary  right;left  -CW      Inguinal  right;left  -CW      Opened Anastamoses  inguino-axillary  -CW      Inguino-Axillary  right;left  -CW      Extremity Treatment  MLD to full limb  -CW      MLD to Full Limb  BLE's  -CW         Compression/Skin Care    Compression/Skin Care  skin care;wrapping location;bandaging;compression garment;remove bandages  -CW      Skin Care  moisturizing lotion applied Applied lotion to both legs and zinc oxide between skin fold  -CW      Wrapping Location  lower extremity  -CW      Wrapping Location LE  right:;ankle;knee  -CW      Wrapping Comments  R/LE-K1, 2- Rosidal soft, 1- 10cm. Artiflex, 1- 8cm. 3-10cm. rosidal bandages.  Added tubigrip to R/LLE ankle to thigh. Kerlex to LLE. ABD pad to L lateral LE wound.   -CW      Bandage Layers  cotton liner;padding/fluff layer;soft foam- 1/4 inch;short-stretch bandages (comment size/quantity)  -CW      Bandaging Technique  circumferential/spiral;moderate compression  -CW      Remove Bandages  Removed BLE velcro compression reduction  kit.   -CW        User Key  (r) = Recorded By, (t) = Taken By, (c) = Cosigned By    Initials Name Provider Type    Colleen Cleary OTR Occupational Therapist                  OT Assessment/Plan     Row Name 01/18/21 1419          OT Assessment    Assessment Comments  Pt.. reports she was able to tolerate the compression bandages over the weekend. Skin not as red between skin fold B knees. Applied zinc oxide to skin folds. Mod drainage LLE both wound areas. Anterior wound not as deep per observation. Review how to apply the velcro compression. Pt. received a compression pump from [employer]. Pt. has one sleeve and can alternate legs until Jeff Weeks can bring her another leg sleeve piece.Pt to go to the wound clinic today. Reviewed bandage precautions and wearing schedule.  -CW        OT Plan    OT Plan Comments  Plan to cont. tx. Pt. can remove the bandages for wound dressing changes and bathing.  -CW       User Key  (r) = Recorded By, (t) = Taken By, (c) = Cosigned By    Initials Name Provider Type    Colleen Cleary OTR Occupational Therapist                 Manual Rx (last 36 hours)      Manual Treatments     Row Name 01/18/21 1433             Total Minutes    10657 - OT Manual Therapy Minutes  61  -CW        User Key  (r) = Recorded By, (t) = Taken By, (c) = Cosigned By    Initials Name Provider Type    Colleen Cleary OTR Occupational Therapist            Therapy Education  Given: Bandaging/dressing change, Edema management  Program: Reinforced  How Provided: Verbal, Demonstration  Provided to: Patient  Level of Understanding: Verbalized  19995 - OT Self Care/Mgmt Minutes: 8                Time Calculation:   OT Start Time: 0853  OT Stop Time: 1002  OT Time Calculation (min): 69 min  Total Timed Code Minutes- OT: 69 minute(s)     Therapy Charges for Today     Code Description Service Date Service Provider Modifiers Qty    30814540905 HC OT MANUAL THERAPY EA 15 MIN 1/18/2021 Colleen Hidalgo OTR GO 4     [id number] HC OT SELF CARE/MGMT/TRAIN EA 15 MIN 1/18/2021 Colleen Hidalgo OTR GO 1                      RADHA Miller  1/18/2021

## 2021-01-19 ENCOUNTER — APPOINTMENT (OUTPATIENT)
Dept: WOUND CARE | Facility: HOSPITAL | Age: 61
End: 2021-01-19

## 2021-01-19 ENCOUNTER — HOSPITAL ENCOUNTER (OUTPATIENT)
Dept: OCCUPATIONAL THERAPY | Facility: HOSPITAL | Age: 61
Setting detail: THERAPIES SERIES
Discharge: HOME | End: 2021-01-19

## 2021-01-19 DIAGNOSIS — I89.0 LYMPHEDEMA OF BOTH LOWER EXTREMITIES: Primary | ICD-10-CM

## 2021-01-19 DIAGNOSIS — I89.0 LYMPHEDEMA: ICD-10-CM

## 2021-01-19 PROCEDURE — 97535 SELF CARE MNGMENT TRAINING: CPT

## 2021-01-19 PROCEDURE — 97140 MANUAL THERAPY 1/> REGIONS: CPT

## 2021-01-19 NOTE — THERAPY TREATMENT NOTE
Outpatient Occupational Therapy Lymphedema Treatment Note  [facility]     Patient Name: Emely Solomon  : 1960  MRN: [id number]  Today's Date: 2021      Visit Date: 2021    Patient Active Problem List   Diagnosis   • Chronic diastolic CHF (congestive heart failure) (CMS/HCC)   • PFO (patent foramen ovale)   • Paradoxical embolism (CMS/HCC)   • Essential hypertension   • Pulmonary hypertension (CMS/HCC)   • Back pain   • ARIEL (obstructive sleep apnea)   • Class 3 severe obesity due to excess calories with serious comorbidity and body mass index (BMI) greater than or equal to 70 in adult (CMS/HCC)   • History of DVT of lower extremity   • Lymphedema   • Anemia, chronic disease   • CKD (chronic kidney disease) stage 3, GFR 30-59 ml/min   • Iron deficiency        Past Medical History:   Diagnosis Date   • Cellulitis     2017, with Group B Strep bacteremia and sepsis   • Chronic diastolic congestive heart failure (CMS/HCC)    • Deep venous thrombosis (CMS/HCC)    • Hypertension    • Lipoedema    • Lymphedema    • Morbid obesity (CMS/HCC)    • Paradoxical embolism (CMS/HCC)     to the LLE due to DVT/PE and PFO   • PFO (patent foramen ovale)    • Pulmonary embolism (CMS/HCC)    • Pulmonary hypertension (CMS/HCC)     multifactorial (dCHF, obesity/ARIEL, hx PE), mild by echo 2016   • Sepsis (CMS/HCC) 2020        Past Surgical History:   Procedure Laterality Date   • BRONCHOSCOPY N/A 2017    Procedure: BRONCHOSCOPY with wash;  Surgeon: Caleb Garcia MD;  Location: [facility] ENDOSCOPY;  Service:    • DILATATION AND CURETTAGE  2011   • VENA CAVA FILTER PLACEMENT      Inferior Vena Cava Filter Placement w/Fluorosc angiogr guidance         Visit Dx:      ICD-10-CM ICD-9-CM   1. Lymphedema of both lower extremities  I89.0 457.1   2. Lymphedema  I89.0 457.1       Lymphedema     Row Name 21 0800             Subjective Pain    Able to rate subjective pain?  yes  -CW      Pre-Treatment Pain  Level  0  -CW      Post-Treatment Pain Level  0  -CW         Subjective Comments    Subjective Comments  No LE pain at present except for tenderness LLE wound area.  -CW         Skin Changes/Observations    Location/Assessment  Lower Extremity  -CW      Lower Extremity Conditions  bilateral:;dry;shiny;scaly  -CW      Lower Extremity Color/Pigment  bilateral:;red;fibrosis  -CW      Lower Extremity Skin Details  Wound dressings completed by pt./spouse this morning. LLE lateral wound with mod drainage. Margins appear to be healing. 2nd wound anterior shin noted not as deep -Mod drainage noted.   -CW      Skin Observations Comment  Skin not as red B knee skin folds.   -CW         Manual Lymphatic Drainage    Manual Lymphatic Drainage  initial sequence;opened regional lymph nodes;opened anastamoses;extremity treatment  -CW      Initial Sequence  short neck  -CW      Opened Regional Lymph Nodes  axillary;inguinal  -CW      Axillary  right;left  -CW      Inguinal  right;left  -CW      Opened Anastamoses  inguino-axillary  -CW      Inguino-Axillary  right;left  -CW      Extremity Treatment  MLD to full limb  -CW      MLD to Full Limb  BLE's  -CW         Compression/Skin Care    Compression/Skin Care  skin care;wrapping location;bandaging;compression garment;remove bandages  -CW      Skin Care  moisturizing lotion applied Applied lotion to both legs and zinc oxide between skin fold  -CW      Wrapping Location  lower extremity  -CW      Wrapping Location LE  bilateral:;ankle;knee  -CW      Wrapping Comments  R/LE-K1, 2- Rosidal soft, 1- 10cm. Artiflex, 1- 8cm. 3-10cm. rosidal bandages.  Added tubigrip to R/LLE ankle to thigh. Kerlex to LLE. ABD pad to L lateral LE wound.   -CW      Bandage Layers  cotton liner;padding/fluff layer;soft foam- 1/4 inch;short-stretch bandages (comment size/quantity)  -CW      Bandaging Technique  circumferential/spiral;moderate compression  -CW      Remove Bandages  Removed BLE velcro compression  reduction kit.   -CW      Compression/Skin Care Comments  Kerlix and ABD pad to LLE wound area.   -CW        User Key  (r) = Recorded By, (t) = Taken By, (c) = Cosigned By    Initials Name Provider Type    Colleen Cleary OTR Occupational Therapist                  OT Assessment/Plan     Row Name 01/19/21 1312          OT Assessment    Assessment Comments  Pt. went to the wound clinic yesterday. No increased LE pain, but does have tenderness LLE wound area. Applied Zinc oxide to knee skin folds. Skin much improved skin folds B knee area and skin not as red. Skin softer B lower legs. Encouraged pt. to use her compression pump daily with her legs elevated. Reviewed how to apply BLE reduction kit. Applied the compression bandages with moderate compression BLE's.  -CW        OT Plan    OT Plan Comments  Plan to cont. tx. Pt. can remove the bandages for wound dressing changes and bathing.  -CW       User Key  (r) = Recorded By, (t) = Taken By, (c) = Cosigned By    Initials Name Provider Type    Colleen Cleary OTR Occupational Therapist                 Manual Rx (last 36 hours)      Manual Treatments     Row Name 01/19/21 1317             Total Minutes    96036 - OT Manual Therapy Minutes  63  -CW        User Key  (r) = Recorded By, (t) = Taken By, (c) = Cosigned By    Initials Name Provider Type    Colleen Cleary OTR Occupational Therapist            Therapy Education  Given: Bandaging/dressing change, Edema management  Program: Reinforced, Progressed  How Provided: Verbal, Demonstration  Provided to: Patient  Level of Understanding: Teach back education performed, Verbalized  19852 - OT Self Care/Mgmt Minutes: 10                Time Calculation:   OT Start Time: 0849  OT Stop Time: 1002  OT Time Calculation (min): 73 min  Total Timed Code Minutes- OT: 73 minute(s)     Therapy Charges for Today     Code Description Service Date Service Provider Modifiers Qty    [id number]  OT MANUAL THERAPY EA 15 MIN 1/19/2021  Colleen Hidalgo OTR GO 4    [id number] HC OT SELF CARE/MGMT/TRAIN EA 15 MIN 1/19/2021 Colleen Hidalgo OTR GO 1                      RADHA Miller  1/19/2021

## 2021-01-20 ENCOUNTER — HOSPITAL ENCOUNTER (OUTPATIENT)
Dept: OCCUPATIONAL THERAPY | Facility: HOSPITAL | Age: 61
Setting detail: THERAPIES SERIES
Discharge: HOME | End: 2021-01-20

## 2021-01-20 DIAGNOSIS — I89.0 LYMPHEDEMA OF BOTH LOWER EXTREMITIES: Primary | ICD-10-CM

## 2021-01-20 DIAGNOSIS — I89.0 LYMPHEDEMA: ICD-10-CM

## 2021-01-20 PROCEDURE — 97535 SELF CARE MNGMENT TRAINING: CPT

## 2021-01-20 PROCEDURE — 97140 MANUAL THERAPY 1/> REGIONS: CPT

## 2021-01-20 NOTE — THERAPY TREATMENT NOTE
Outpatient Occupational Therapy Lymphedema Progress Note  [facility]     Patient Name: Emely Solomon  : 1960  MRN: [id number]  Today's Date: 2021      Visit Date: 2021    Patient Active Problem List   Diagnosis   • Chronic diastolic CHF (congestive heart failure) (CMS/HCC)   • PFO (patent foramen ovale)   • Paradoxical embolism (CMS/HCC)   • Essential hypertension   • Pulmonary hypertension (CMS/HCC)   • Back pain   • ARIEL (obstructive sleep apnea)   • Class 3 severe obesity due to excess calories with serious comorbidity and body mass index (BMI) greater than or equal to 70 in adult (CMS/HCC)   • History of DVT of lower extremity   • Lymphedema   • Anemia, chronic disease   • CKD (chronic kidney disease) stage 3, GFR 30-59 ml/min   • Iron deficiency        Past Medical History:   Diagnosis Date   • Cellulitis     2017, with Group B Strep bacteremia and sepsis   • Chronic diastolic congestive heart failure (CMS/HCC)    • Deep venous thrombosis (CMS/HCC)    • Hypertension    • Lipoedema    • Lymphedema    • Morbid obesity (CMS/HCC)    • Paradoxical embolism (CMS/HCC)     to the LLE due to DVT/PE and PFO   • PFO (patent foramen ovale)    • Pulmonary embolism (CMS/HCC)    • Pulmonary hypertension (CMS/HCC)     multifactorial (dCHF, obesity/ARIEL, hx PE), mild by echo 2016   • Sepsis (CMS/HCC) 2020        Past Surgical History:   Procedure Laterality Date   • BRONCHOSCOPY N/A 2017    Procedure: BRONCHOSCOPY with wash;  Surgeon: Caleb Garcia MD;  Location: [facility] ENDOSCOPY;  Service:    • DILATATION AND CURETTAGE  2011   • VENA CAVA FILTER PLACEMENT      Inferior Vena Cava Filter Placement w/Fluorosc angiogr guidance         Visit Dx:      ICD-10-CM ICD-9-CM   1. Lymphedema of both lower extremities  I89.0 457.1   2. Lymphedema  I89.0 457.1       Lymphedema     Row Name 21 0800             Subjective Pain    Able to rate subjective pain?  yes  -CW      Pre-Treatment Pain  Level  0  -CW      Post-Treatment Pain Level  0  -CW         Subjective Comments    Subjective Comments  Pt. was able to wear the bandages all night.   -CW         Skin Changes/Observations    Location/Assessment  Lower Extremity  -CW      Lower Extremity Conditions  bilateral:;dry;shiny;scaly  -CW      Lower Extremity Color/Pigment  bilateral:;red;fibrosis  -CW      Lower Extremity Skin Details  Wound dressings completed by pt./spouse this morning. LLE lateral wound with mod drainage. Margins appear to be healing. 2nd wound anterior shin noted not as deep -Mod drainage noted.   -CW      Skin Observations Comment  Skin not as red B knee skin folds.   -CW         Lymphedema Measurements    Measurement Type(s)  Circumferential  -CW      Circumferential Areas  Lower extremities  -CW         BLE Circumferential (cm)    Measurement Location 1  20 cm. above knee  -CW      Left 1  84 cm  -CW      Right 1  85.2 cm  -CW      Measurement Location 2  10cm above knee  -CW      Left 2  80.2 cm  -CW      Right 2  83 cm  -CW      Measurement Location 3  knee  -CW      Left 3  59.8 cm  -CW      Right 3  65.5 cm  -CW      Measurement Location 4  10 cm. below knee  -CW      Left 4  70 cm  -CW      Right 4  64.5 cm  -CW      Measurement Location 5  20 cm. below knee  -CW      Left 5  50 cm  -CW      Right 5  49.5 cm  -CW      Measurement Location 6  30 cm. below knee  -CW      Left 6  32.5 cm  -CW      Right 6  33.3 cm  -CW      Measurement Location 7  ankle  -CW      Left 7  31.5 cm  -CW      Right 7  31.8 cm  -CW      Measurement Location 8  mid foot   -CW      Left 8  23 cm  -CW      Right 8  23.5 cm  -CW      LLE Circumferential Total  431 cm  -CW      RLE Circumferential Total  436.3 cm  -CW         Manual Lymphatic Drainage    Manual Lymphatic Drainage  initial sequence;opened regional lymph nodes;opened anastamoses;extremity treatment  -CW      Initial Sequence  short neck  -CW      Opened Regional Lymph Nodes   axillary;inguinal  -CW      Axillary  right;left  -CW      Inguinal  right;left  -CW      Opened Anastamoses  inguino-axillary  -CW      Inguino-Axillary  right;left  -CW      Extremity Treatment  MLD to full limb  -CW      MLD to Full Limb  BLE's  -CW         Compression/Skin Care    Compression/Skin Care  skin care;wrapping location;bandaging;compression garment;remove bandages  -CW      Skin Care  moisturizing lotion applied Applied lotion to both legs and zinc oxide between skin fold  -CW      Wrapping Location  lower extremity  -CW      Wrapping Location LE  bilateral:;ankle;knee  -CW      Wrapping Comments  R/LE-K1, 2- Rosidal soft, 1- 10cm. Artiflex, 1- 8cm. 3-10cm. rosidal bandages.  Added tubigrip to R/LLE ankle to thigh. Kerlex to LLE. ABD pad to L lateral LE wound.   -CW      Bandage Layers  cotton liner;padding/fluff layer;soft foam- 1/4 inch;short-stretch bandages (comment size/quantity)  -CW      Bandaging Technique  circumferential/spiral;moderate compression  -CW      Compression/Skin Care Comments  Kerlix and ABD pad to LLE wound area.   -CW        User Key  (r) = Recorded By, (t) = Taken By, (c) = Cosigned By    Initials Name Provider Type    Colleen Cleary OTR Occupational Therapist                  OT Assessment/Plan     Row Name 01/20/21 2747          OT Assessment    Assessment Comments  No pain complaints at present. Had some tenderness L LE wound area.  Pt. was able to wear the bandages day and night without discomfort. Skin dry with less drainage LLE lateral wound area. Mod drainage anterior wound. Skin not as red B knee skin folds.  Measurements taken. See flow sheet. Total circumference .3 cm. and .0 cm. (60.3 cm. R and 58.8L net decrease). Pt. is progressing slowly with decreased edema BLE’s. Goals updated and reviewed POC with pt. Encouraged pt. to use the compression pump and complete HEP. Reviewed bandage precautions and wearing schedule. Discussed skin care in general  and in between skin folds.  -CW        OT Plan    OT Plan Comments  Plan to cont. tx. Pt. can remove the bandages for wound dressing changes and bathing. Can apply Velcro compression as needed.  -CW       User Key  (r) = Recorded By, (t) = Taken By, (c) = Cosigned By    Initials Name Provider Type    Colleen Cleary OTR Occupational Therapist                 Manual Rx (last 36 hours)      Manual Treatments     Row Name 01/20/21 1200             Total Minutes    93879 - OT Manual Therapy Minutes  66  -CW        User Key  (r) = Recorded By, (t) = Taken By, (c) = Cosigned By    Initials Name Provider Type    Colleen Cleary OTR Occupational Therapist        OT Goals     Row Name 01/20/21 1100          OT Short Term Goals    STG Date to Achieve  01/06/21  -CW     STG 1  Patient to demonstrate proper awareness of “What is Lymphedema” and DO’s and Don’ts” for improved prevention, management, care of symptoms, and ease of transition to self-care of condition.  -CW     STG 1 Progress  Ongoing;Progressing  -CW     STG 2  Patient independent and compliant with self-wrapping techniques of compression bandages with family member or caregiver as indicated for improved self-management of condition.  -CW     STG 2 Progress  Ongoing;Progressing  -CW     STG 3  Patient demonstrate decreased net edema of  BLE's   >/5-10 cm. for decreased in edema, symptoms, decreased risk of infection, and improved skin-care/transition to self-care of condition.  -CW     STG 3 Progress  Met  -CW     STG 4  Patient independent and compliant with initial home exercise program focused on diaphragmatic breathing, range of motion, flexibility, to decrease edema, improve lymphatic flow for decreased edema and decreased risk of infection.  -CW     STG 4 Progress  Met  -CW        Long Term Goals    LTG Date to Achieve  01/20/21  -CW     LTG 1  Patient will demonstrate decreased net edema of  BLE's >/=50-70 cm. for decrease in symptoms, decreased risk of  infection, and improved skin-care/transition to self-care of condition.   -CW     LTG 1 Progress  Goal Revised  -CW     LTG 2  Patient/family/caregivers independent with self-care techniques for self-management of condition.  -CW     LTG 2 Progress  Ongoing;Progressing  -CW     LTG 3  Patient independent and compliant with use and care of compression (example garments, inelastic velcro compression) with assistance of a caregiver as needed to promote self-care independence.    -CW     LTG 3 Progress  Ongoing;Progressing  -CW     LTG 4  Patient independent and compliant with use and care of compression garments with assistance of a caregiver as needed to promote self-care independence.   -CW     LTG 4 Progress  Ongoing  -CW       User Key  (r) = Recorded By, (t) = Taken By, (c) = Cosigned By    Initials Name Provider Type    Colleen Cleary OTR Occupational Therapist          Therapy Education  Given: Bandaging/dressing change, Edema management  Program: Reinforced  How Provided: Verbal, Demonstration  Provided to: Patient  Level of Understanding: Verbalized, Teach back education performed  06515 - OT Self Care/Mgmt Minutes: 8                Time Calculation:   OT Start Time: 0848  OT Stop Time: 1002  OT Time Calculation (min): 74 min  Total Timed Code Minutes- OT: 74 minute(s)     Therapy Charges for Today     Code Description Service Date Service Provider Modifiers Qty    [id number]  OT MANUAL THERAPY EA 15 MIN 1/20/2021 Colleen Hidalgo OTR GO 4    [id number]  OT SELF CARE/MGMT/TRAIN EA 15 MIN 1/20/2021 Colleen Hidalgo OTR GO 1                      RADHA Miller  1/20/2021

## 2021-01-22 ENCOUNTER — HOSPITAL ENCOUNTER (OUTPATIENT)
Dept: OCCUPATIONAL THERAPY | Facility: HOSPITAL | Age: 61
Setting detail: THERAPIES SERIES
Discharge: HOME | End: 2021-01-22

## 2021-01-22 DIAGNOSIS — I89.0 LYMPHEDEMA: ICD-10-CM

## 2021-01-22 DIAGNOSIS — I89.0 LYMPHEDEMA OF BOTH LOWER EXTREMITIES: Primary | ICD-10-CM

## 2021-01-22 PROCEDURE — 97140 MANUAL THERAPY 1/> REGIONS: CPT

## 2021-01-22 PROCEDURE — 97535 SELF CARE MNGMENT TRAINING: CPT

## 2021-01-22 NOTE — THERAPY TREATMENT NOTE
Outpatient Occupational Therapy Lymphedema Treatment Note  [facility]     Patient Name: Emely Solomon  : 1960  MRN: [id number]  Today's Date: 2021      Visit Date: 2021    Patient Active Problem List   Diagnosis   • Chronic diastolic CHF (congestive heart failure) (CMS/HCC)   • PFO (patent foramen ovale)   • Paradoxical embolism (CMS/HCC)   • Essential hypertension   • Pulmonary hypertension (CMS/HCC)   • Back pain   • ARIEL (obstructive sleep apnea)   • Class 3 severe obesity due to excess calories with serious comorbidity and body mass index (BMI) greater than or equal to 70 in adult (CMS/HCC)   • History of DVT of lower extremity   • Lymphedema   • Anemia, chronic disease   • CKD (chronic kidney disease) stage 3, GFR 30-59 ml/min (CMS/HCC)   • Iron deficiency        Past Medical History:   Diagnosis Date   • Cellulitis     2017, with Group B Strep bacteremia and sepsis   • Chronic diastolic congestive heart failure (CMS/HCC)    • Deep venous thrombosis (CMS/HCC)    • Hypertension    • Lipoedema    • Lymphedema    • Morbid obesity (CMS/HCC)    • Paradoxical embolism (CMS/HCC)     to the LLE due to DVT/PE and PFO   • PFO (patent foramen ovale)    • Pulmonary embolism (CMS/HCC)    • Pulmonary hypertension (CMS/HCC)     multifactorial (dCHF, obesity/ARIEL, hx PE), mild by echo 2016   • Sepsis (CMS/HCC) 2020        Past Surgical History:   Procedure Laterality Date   • BRONCHOSCOPY N/A 2017    Procedure: BRONCHOSCOPY with wash;  Surgeon: Caleb Garcia MD;  Location: [facility] ENDOSCOPY;  Service:    • DILATATION AND CURETTAGE  2011   • VENA CAVA FILTER PLACEMENT      Inferior Vena Cava Filter Placement w/Fluorosc angiogr guidance         Visit Dx:      ICD-10-CM ICD-9-CM   1. Lymphedema of both lower extremities  I89.0 457.1   2. Lymphedema  I89.0 457.1       Lymphedema     Row Name 21 0800             Subjective Pain    Able to rate subjective pain?  yes  -CW       Pre-Treatment Pain Level  0  -CW      Post-Treatment Pain Level  0  -CW         Subjective Comments    Subjective Comments  Pt. was able to use the compression pump yesterday.   -CW         Skin Changes/Observations    Location/Assessment  Lower Extremity  -CW      Lower Extremity Conditions  bilateral:;dry;shiny;scaly  -CW      Lower Extremity Color/Pigment  bilateral:;red;fibrosis  -CW      Lower Extremity Skin Details  Wound dressings completed by pt./spouse this morning. LLE lateral wound with mod drainage. Margins appear to be healing. 2nd wound anterior shin noted not as deep -Mod drainage noted.   -CW      Skin Observations Comment  Skin not as red B knee skin folds.   -CW         Manual Lymphatic Drainage    Manual Lymphatic Drainage  initial sequence;opened regional lymph nodes;opened anastamoses;extremity treatment  -CW      Initial Sequence  short neck  -CW      Opened Regional Lymph Nodes  axillary;inguinal  -CW      Axillary  right;left  -CW      Inguinal  right;left  -CW      Opened Anastamoses  inguino-axillary  -CW      Inguino-Axillary  right;left  -CW      Extremity Treatment  MLD to full limb  -CW      MLD to Full Limb  BLE's  -CW         Compression/Skin Care    Compression/Skin Care  skin care;wrapping location;bandaging;compression garment;remove bandages  -CW      Skin Care  moisturizing lotion applied Applied lotion to both legs and zinc oxide between skin fold  -CW      Wrapping Location  lower extremity  -CW      Wrapping Location LE  bilateral:;ankle;knee  -CW      Wrapping Comments  R/LE-K1, 2- Rosidal soft, 1- 10cm. Artiflex, 1- 8cm. 3-10cm. rosidal bandages.  Added tubigrip to R/LLE ankle to thigh. Kerlex to LLE. ABD pad to L lateral LE wound.   -CW      Bandage Layers  cotton liner;padding/fluff layer;soft foam- 1/4 inch;short-stretch bandages (comment size/quantity)  -CW      Bandaging Technique  circumferential/spiral;moderate compression  -CW      Compression/Skin Care Comments   Kerlix and ABD pad to LLE wound area.   -CW        User Key  (r) = Recorded By, (t) = Taken By, (c) = Cosigned By    Initials Name Provider Type    Colleen Cleary OTR Occupational Therapist                  OT Assessment/Plan     Row Name 01/22/21 1434          OT Assessment    Assessment Comments  Pt. was able to tolerate the compression bandages day and night. No pain complaints at present when at rest.  Skin dry and intact RLE.  LLE anterior wound appears to be smaller with less drainage. Pt. was able to use the compression pump at home yesterday. Applied the bandages with slightly more compression as tolerated with no discomfort. Reviewed bandage precautions, wearing schedule and skin care. Reviewed sequence and technique on how to apply the compression bandages. Pt. can decrease compression or remove a bandage layer at night if needed for comfort. Discussed long term edema management. Discussed compression austin length pants and Lower leg velcro compression for long term management.  -CW        OT Plan    OT Plan Comments  Plan to cont. tx. Pt. can remove the bandages for wound dressing changes and bathing then re-apply.  -CW       User Key  (r) = Recorded By, (t) = Taken By, (c) = Cosigned By    Initials Name Provider Type    Colleen Cleary OTR Occupational Therapist                 Manual Rx (last 36 hours)      Manual Treatments     Row Name 01/22/21 1438             Total Minutes    52451 - OT Manual Therapy Minutes  60  -CW        User Key  (r) = Recorded By, (t) = Taken By, (c) = Cosigned By    Initials Name Provider Type    Colleen Cleary OTR Occupational Therapist            Therapy Education  Given: Bandaging/dressing change, Edema management  Program: Reinforced  How Provided: Demonstration, Verbal  Provided to: Patient  Level of Understanding: Verbalized, Teach back education performed  12593 - OT Self Care/Mgmt Minutes: 10                Time Calculation:   OT Start Time: 0850  OT Stop  Time: 1000  OT Time Calculation (min): 70 min  Total Timed Code Minutes- OT: 70 minute(s)     Therapy Charges for Today     Code Description Service Date Service Provider Modifiers Qty    [id number]  OT MANUAL THERAPY EA 15 MIN 1/22/2021 Colleen Hidalgo OTR GO 4    [id number]  OT SELF CARE/MGMT/TRAIN EA 15 MIN 1/22/2021 Colleen Hidalgo OTR GO 1                      RADHA Miller  1/22/2021

## 2021-01-25 ENCOUNTER — OFFICE VISIT (OUTPATIENT)
Dept: WOUND CARE | Facility: HOSPITAL | Age: 61
End: 2021-01-25

## 2021-01-25 ENCOUNTER — HOSPITAL ENCOUNTER (OUTPATIENT)
Dept: OCCUPATIONAL THERAPY | Facility: HOSPITAL | Age: 61
Setting detail: THERAPIES SERIES
Discharge: HOME | End: 2021-01-25

## 2021-01-25 DIAGNOSIS — I89.0 LYMPHEDEMA OF BOTH LOWER EXTREMITIES: Primary | ICD-10-CM

## 2021-01-25 DIAGNOSIS — I89.0 LYMPHEDEMA: ICD-10-CM

## 2021-01-25 PROCEDURE — G0463 HOSPITAL OUTPT CLINIC VISIT: HCPCS

## 2021-01-25 PROCEDURE — 97140 MANUAL THERAPY 1/> REGIONS: CPT

## 2021-01-25 PROCEDURE — 97535 SELF CARE MNGMENT TRAINING: CPT

## 2021-01-25 NOTE — THERAPY TREATMENT NOTE
Outpatient Occupational Therapy Lymphedema Treatment Note  [facility]     Patient Name: Emely Solomon  : 1960  MRN: [id number]  Today's Date: 2021      Visit Date: 2021    Patient Active Problem List   Diagnosis   • Chronic diastolic CHF (congestive heart failure) (CMS/HCC)   • PFO (patent foramen ovale)   • Paradoxical embolism (CMS/HCC)   • Essential hypertension   • Pulmonary hypertension (CMS/HCC)   • Back pain   • ARIEL (obstructive sleep apnea)   • Class 3 severe obesity due to excess calories with serious comorbidity and body mass index (BMI) greater than or equal to 70 in adult (CMS/HCC)   • History of DVT of lower extremity   • Lymphedema   • Anemia, chronic disease   • CKD (chronic kidney disease) stage 3, GFR 30-59 ml/min (CMS/HCC)   • Iron deficiency        Past Medical History:   Diagnosis Date   • Cellulitis     2017, with Group B Strep bacteremia and sepsis   • Chronic diastolic congestive heart failure (CMS/HCC)    • Deep venous thrombosis (CMS/HCC)    • Hypertension    • Lipoedema    • Lymphedema    • Morbid obesity (CMS/HCC)    • Paradoxical embolism (CMS/HCC)     to the LLE due to DVT/PE and PFO   • PFO (patent foramen ovale)    • Pulmonary embolism (CMS/HCC)    • Pulmonary hypertension (CMS/HCC)     multifactorial (dCHF, obesity/ARIEL, hx PE), mild by echo 2016   • Sepsis (CMS/HCC) 2020        Past Surgical History:   Procedure Laterality Date   • BRONCHOSCOPY N/A 2017    Procedure: BRONCHOSCOPY with wash;  Surgeon: Caleb Garcia MD;  Location: [facility] ENDOSCOPY;  Service:    • DILATATION AND CURETTAGE  2011   • VENA CAVA FILTER PLACEMENT      Inferior Vena Cava Filter Placement w/Fluorosc angiogr guidance         Visit Dx:      ICD-10-CM ICD-9-CM   1. Lymphedema of both lower extremities  I89.0 457.1   2. Lymphedema  I89.0 457.1       Lymphedema     Row Name 21 0800             Subjective Pain    Able to rate subjective pain?  yes  -CW       Pre-Treatment Pain Level  0  -CW      Post-Treatment Pain Level  0  -CW         Subjective Comments    Subjective Comments  Tenderness LLE wound area.   -CW         Skin Changes/Observations    Location/Assessment  Lower Extremity  -CW      Lower Extremity Conditions  bilateral:;dry;shiny;scaly  -CW      Lower Extremity Color/Pigment  bilateral:;red;fibrosis  -CW      Lower Extremity Skin Details  Wound dressings completed by pt./spouse this morning. LLE lateral wound with mod drainage. Margins appear to be healing. 2nd wound anterior shin noted not as deep -Mod drainage noted.   -CW      Skin Observations Comment  Skin not as red B knee skin folds.   -CW         Manual Lymphatic Drainage    Manual Lymphatic Drainage  initial sequence;opened regional lymph nodes;opened anastamoses;extremity treatment  -CW      Initial Sequence  short neck  -CW      Opened Regional Lymph Nodes  axillary;inguinal  -CW      Axillary  right;left  -CW      Inguinal  right;left  -CW      Opened Anastamoses  inguino-axillary  -CW      Inguino-Axillary  right;left  -CW      Extremity Treatment  MLD to full limb  -CW      MLD to Full Limb  BLE's  -CW         Compression/Skin Care    Compression/Skin Care  skin care;wrapping location;bandaging;compression garment;remove bandages  -CW      Skin Care  moisturizing lotion applied Applied lotion to both legs and zinc oxide between skin fold  -CW      Wrapping Location  lower extremity  -CW      Wrapping Location LE  right:;ankle;knee  -CW      Wrapping Comments  R/LE-K1, 2- Rosidal soft, 1- 10cm. Artiflex, 1- 8cm. 3-10cm. rosidal bandages.  Added tubigrip to R/LLE ankle to thigh. Kerlex to LLE. ABD pad to L lateral LE wound.   -CW      Bandage Layers  cotton liner;padding/fluff layer;soft foam- 1/4 inch;short-stretch bandages (comment size/quantity)  -CW      Bandaging Technique  circumferential/spiral;moderate compression  -CW      Compression/Skin Care Comments  Kerlix and ABD pad to LLE wound  area.   -CW        User Key  (r) = Recorded By, (t) = Taken By, (c) = Cosigned By    Initials Name Provider Type    Colleen Cleary OTR Occupational Therapist                  OT Assessment/Plan     Row Name 01/25/21 1237          OT Assessment    Assessment Comments  Pt. reports mild pain LLE wond area. Pt. tried to use the  pump over the weekend. Overall less drainage LLE anterior shin wound. Applied the bandages with moderate compression. Skin less red between skin folds. Pt. is getting more proficient to apply her velcro compression. Pt. is going to the  wound clinic today for follow up. Measured pt. for wearease compression shorts-as an option for thight compression. Reviewed bandage precautions and wearing schedule.  -CW        OT Plan    OT Plan Comments  Plant to cont. tx. Pt. can remove the bandages for wound dressing changes and bathing then re-apply.  -CW       User Key  (r) = Recorded By, (t) = Taken By, (c) = Cosigned By    Initials Name Provider Type    Colleen Cleary OTR Occupational Therapist                 Manual Rx (last 36 hours)      Manual Treatments     Row Name 01/25/21 1241             Total Minutes    13548 - OT Manual Therapy Minutes  60  -CW        User Key  (r) = Recorded By, (t) = Taken By, (c) = Cosigned By    Initials Name Provider Type    Colleen Cleary OTR Occupational Therapist            Therapy Education  Given: Bandaging/dressing change, Edema management  Program: Reinforced  How Provided: Verbal, Demonstration  Provided to: Patient  Level of Understanding: Verbalized  79744 - OT Self Care/Mgmt Minutes: 11                Time Calculation:   OT Start Time: 0850  OT Stop Time: 1001  OT Time Calculation (min): 71 min  Total Timed Code Minutes- OT: 71 minute(s)     Therapy Charges for Today     Code Description Service Date Service Provider Modifiers Qty    49656100760  OT MANUAL THERAPY EA 15 MIN 1/25/2021 Colleen Hidalgo OTR GO 4    [id number]  OT SELF CARE/MGMT/TRAIN  EA 15 MIN 1/25/2021 Colleen Hidalgo, OTR GO 1                      Colleen Hidalgo, OTR  1/25/2021

## 2021-01-26 ENCOUNTER — HOSPITAL ENCOUNTER (OUTPATIENT)
Dept: OCCUPATIONAL THERAPY | Facility: HOSPITAL | Age: 61
Setting detail: THERAPIES SERIES
Discharge: HOME | End: 2021-01-26

## 2021-01-26 DIAGNOSIS — I89.0 LYMPHEDEMA: ICD-10-CM

## 2021-01-26 DIAGNOSIS — I89.0 LYMPHEDEMA OF BOTH LOWER EXTREMITIES: Primary | ICD-10-CM

## 2021-01-26 PROCEDURE — 97140 MANUAL THERAPY 1/> REGIONS: CPT

## 2021-01-26 PROCEDURE — 97535 SELF CARE MNGMENT TRAINING: CPT

## 2021-01-26 NOTE — THERAPY TREATMENT NOTE
Outpatient Occupational Therapy Lymphedema Treatment Note  [facility]     Patient Name: Emely Solomon  : 1960  MRN: [id number]  Today's Date: 2021      Visit Date: 2021    Patient Active Problem List   Diagnosis   • Chronic diastolic CHF (congestive heart failure) (CMS/HCC)   • PFO (patent foramen ovale)   • Paradoxical embolism (CMS/HCC)   • Essential hypertension   • Pulmonary hypertension (CMS/HCC)   • Back pain   • ARIEL (obstructive sleep apnea)   • Class 3 severe obesity due to excess calories with serious comorbidity and body mass index (BMI) greater than or equal to 70 in adult (CMS/HCC)   • History of DVT of lower extremity   • Lymphedema   • Anemia, chronic disease   • CKD (chronic kidney disease) stage 3, GFR 30-59 ml/min (CMS/HCC)   • Iron deficiency        Past Medical History:   Diagnosis Date   • Cellulitis     2017, with Group B Strep bacteremia and sepsis   • Chronic diastolic congestive heart failure (CMS/HCC)    • Deep venous thrombosis (CMS/HCC)    • Hypertension    • Lipoedema    • Lymphedema    • Morbid obesity (CMS/HCC)    • Paradoxical embolism (CMS/HCC)     to the LLE due to DVT/PE and PFO   • PFO (patent foramen ovale)    • Pulmonary embolism (CMS/HCC)    • Pulmonary hypertension (CMS/HCC)     multifactorial (dCHF, obesity/ARIEL, hx PE), mild by echo 2016   • Sepsis (CMS/HCC) 2020        Past Surgical History:   Procedure Laterality Date   • BRONCHOSCOPY N/A 2017    Procedure: BRONCHOSCOPY with wash;  Surgeon: Caleb Garcia MD;  Location: [facility] ENDOSCOPY;  Service:    • DILATATION AND CURETTAGE  2011   • VENA CAVA FILTER PLACEMENT      Inferior Vena Cava Filter Placement w/Fluorosc angiogr guidance         Visit Dx:      ICD-10-CM ICD-9-CM   1. Lymphedema of both lower extremities  I89.0 457.1   2. Lymphedema  I89.0 457.1       Lymphedema     Row Name 21 1100             Subjective Pain    Able to rate subjective pain?  yes  -CW       Pre-Treatment Pain Level  0  -CW      Post-Treatment Pain Level  0  -CW         Subjective Comments    Subjective Comments  Soreness LLE wound area slightly diminished.   -CW         Skin Changes/Observations    Location/Assessment  Lower Extremity  -CW      Lower Extremity Conditions  bilateral:;dry;shiny;scaly  -CW      Lower Extremity Color/Pigment  bilateral:;red;fibrosis  -CW      Lower Extremity Skin Details  Wound dressings completed by pt./spouse this morning. LLE lateral wound with mod drainage. Wound not as deep.  2nd wound anterior shin noted -Mod drainage noted.   -CW      Skin Observations Comment  Skin not as red B knee skin folds.   -CW         Manual Lymphatic Drainage    Manual Lymphatic Drainage  initial sequence;opened regional lymph nodes;opened anastamoses;extremity treatment  -CW      Initial Sequence  short neck  -CW      Opened Regional Lymph Nodes  axillary;inguinal  -CW      Axillary  right;left  -CW      Inguinal  right;left  -CW      Opened Anastamoses  inguino-axillary  -CW      Inguino-Axillary  right;left  -CW      Extremity Treatment  MLD to full limb  -CW      MLD to Full Limb  BLE's  -CW         Compression/Skin Care    Compression/Skin Care  skin care;wrapping location;bandaging;compression garment;remove bandages  -CW      Skin Care  moisturizing lotion applied Applied lotion to both legs and zinc oxide between skin fold  -CW      Wrapping Location  lower extremity  -CW      Wrapping Location LE  right:;ankle;knee  -CW      Wrapping Comments  R/LE-K1, 2- Rosidal soft, 1- 10cm. Artiflex, 1- 8cm. 3-10cm. rosidal bandages.  Added tubigrip to R/LLE ankle to thigh. Kerlex to LLE. ABD pad to L lateral LE wound.   -CW      Bandage Layers  cotton liner;padding/fluff layer;soft foam- 1/4 inch;short-stretch bandages (comment size/quantity)  -CW      Bandaging Technique  circumferential/spiral;moderate compression  -CW      Compression/Skin Care Comments  Kerlix and ABD pad to LLE wound  area.   -CW        User Key  (r) = Recorded By, (t) = Taken By, (c) = Cosigned By    Initials Name Provider Type    Colleen Cleary OTR Occupational Therapist                  OT Assessment/Plan     Row Name 01/26/21 1129          OT Assessment    Assessment Comments  Pt. reports she is able to apply the BLE velcro compression wraps better. Can move her legs with less effort to reposition on the mat and get into/out of the car. Mod drainage both wound areaa. Skin improved and less red/irritated in between knee skin folds. Encouraged HEP and cont. LE elevation. Will re-measure tomorrow. Reviewed bandage precautions and wearing schedule.  -CW        OT Plan    OT Plan Comments  Plan to cont. tx. Pt. can remove the bandages for wound dressing changes and bathing. Will re-measure tomorrow.  -CW       User Key  (r) = Recorded By, (t) = Taken By, (c) = Cosigned By    Initials Name Provider Type    Colleen Cleary OTR Occupational Therapist                 Manual Rx (last 36 hours)      Manual Treatments     Row Name 01/26/21 1136             Total Minutes    56487 - OT Manual Therapy Minutes  60  -CW        User Key  (r) = Recorded By, (t) = Taken By, (c) = Cosigned By    Initials Name Provider Type    Colleen Cleary OTR Occupational Therapist            Therapy Education  Education Details: reviewed how to apply the velcro compression, skin care for skin folds  Given: Bandaging/dressing change, Edema management  Program: Reinforced, Progressed  How Provided: Verbal, Demonstration  Provided to: Patient  Level of Understanding: Verbalized, Teach back education performed  75273 - OT Self Care/Mgmt Minutes: 10                Time Calculation:   OT Start Time: 0850  OT Stop Time: 1000  OT Time Calculation (min): 70 min  Total Timed Code Minutes- OT: 70 minute(s)     Therapy Charges for Today     Code Description Service Date Service Provider Modifiers Qty    15414238821 HC OT MANUAL THERAPY EA 15 MIN 1/26/2021 [place]  RADHA Macias GO 4    [id number]  OT SELF CARE/MGMT/TRAIN EA 15 MIN 1/26/2021 Colleen Hidalgo OTR GO 1                      RADHA Miller  1/26/2021

## 2021-01-27 ENCOUNTER — HOSPITAL ENCOUNTER (OUTPATIENT)
Dept: OCCUPATIONAL THERAPY | Facility: HOSPITAL | Age: 61
Setting detail: THERAPIES SERIES
Discharge: HOME | End: 2021-01-27

## 2021-01-27 DIAGNOSIS — I89.0 LYMPHEDEMA OF BOTH LOWER EXTREMITIES: Primary | ICD-10-CM

## 2021-01-27 DIAGNOSIS — I89.0 LYMPHEDEMA: ICD-10-CM

## 2021-01-27 PROCEDURE — 97140 MANUAL THERAPY 1/> REGIONS: CPT

## 2021-01-27 PROCEDURE — 97535 SELF CARE MNGMENT TRAINING: CPT

## 2021-01-27 NOTE — THERAPY PROGRESS REPORT/RE-CERT
Outpatient Occupational Therapy Lymphedema Progress Note  [facility]     Patient Name: Emely Solomon  : 1960  MRN: [id number]  Today's Date: 2021      Visit Date: 2021    Patient Active Problem List   Diagnosis   • Chronic diastolic CHF (congestive heart failure) (CMS/HCC)   • PFO (patent foramen ovale)   • Paradoxical embolism (CMS/HCC)   • Essential hypertension   • Pulmonary hypertension (CMS/HCC)   • Back pain   • ARIEL (obstructive sleep apnea)   • Class 3 severe obesity due to excess calories with serious comorbidity and body mass index (BMI) greater than or equal to 70 in adult (CMS/HCC)   • History of DVT of lower extremity   • Lymphedema   • Anemia, chronic disease   • CKD (chronic kidney disease) stage 3, GFR 30-59 ml/min (CMS/HCC)   • Iron deficiency        Past Medical History:   Diagnosis Date   • Cellulitis     2017, with Group B Strep bacteremia and sepsis   • Chronic diastolic congestive heart failure (CMS/HCC)    • Deep venous thrombosis (CMS/HCC)    • Hypertension    • Lipoedema    • Lymphedema    • Morbid obesity (CMS/HCC)    • Paradoxical embolism (CMS/HCC)     to the LLE due to DVT/PE and PFO   • PFO (patent foramen ovale)    • Pulmonary embolism (CMS/HCC)    • Pulmonary hypertension (CMS/HCC)     multifactorial (dCHF, obesity/ARIEL, hx PE), mild by echo 2016   • Sepsis (CMS/HCC) 2020        Past Surgical History:   Procedure Laterality Date   • BRONCHOSCOPY N/A 2017    Procedure: BRONCHOSCOPY with wash;  Surgeon: Caleb Garcia MD;  Location: [facility] ENDOSCOPY;  Service:    • DILATATION AND CURETTAGE  2011   • VENA CAVA FILTER PLACEMENT      Inferior Vena Cava Filter Placement w/Fluorosc angiogr guidance         Visit Dx:      ICD-10-CM ICD-9-CM   1. Lymphedema of both lower extremities  I89.0 457.1   2. Lymphedema  I89.0 457.1       Lymphedema     Row Name 21 0800             Subjective Pain    Able to rate subjective pain?  yes  -CW       Pre-Treatment Pain Level  0  -CW      Post-Treatment Pain Level  0  -CW         Subjective Comments    Subjective Comments  Tenderness LLE wound area yesterday.   -CW         Skin Changes/Observations    Location/Assessment  Lower Extremity  -CW      Lower Extremity Conditions  bilateral:;dry;shiny;scaly  -CW      Lower Extremity Color/Pigment  bilateral:;red;fibrosis  -CW      Lower Extremity Skin Details  Wound dressings completed by pt./spouse this morning. LLE lateral wound with mod drainage. Wound not as deep.  2nd wound anterior shin noted -Mod drainage noted.   -CW      Skin Observations Comment  Skin not as red B knee skin folds.   -CW         Lymphedema Measurements    Measurement Type(s)  Circumferential  -CW      Circumferential Areas  Lower extremities  -CW         BLE Circumferential (cm)    Measurement Location 1  20 cm. above knee  -CW      Left 1  83 cm  -CW      Right 1  82.6 cm  -CW      Measurement Location 2  10cm above knee  -CW      Left 2  78.5 cm  -CW      Right 2  78.8 cm  -CW      Measurement Location 3  knee  -CW      Left 3  59 cm  -CW      Right 3  65.5 cm  -CW      Measurement Location 4  10 cm. below knee  -CW      Left 4  67.2 cm  -CW      Right 4  59 cm  -CW      Measurement Location 5  20 cm. below knee  -CW      Left 5  49.8 cm  -CW      Right 5  45 cm  -CW      Measurement Location 6  30 cm. below knee  -CW      Left 6  32.3 cm  -CW      Right 6  33.3 cm  -CW      Measurement Location 7  ankle  -CW      Left 7  31.5 cm  -CW      Right 7  31 cm  -CW      Measurement Location 8  mid foot   -CW      Left 8  23 cm  -CW      Right 8  23.5 cm  -CW      LLE Circumferential Total  424.3 cm  -CW      RLE Circumferential Total  418.7 cm  -CW         Manual Lymphatic Drainage    Manual Lymphatic Drainage  initial sequence;opened regional lymph nodes;opened anastamoses;extremity treatment  -CW      Initial Sequence  short neck  -CW      Opened Regional Lymph Nodes  axillary;inguinal  -CW       Axillary  right;left  -CW      Inguinal  right;left  -CW      Opened Anastamoses  inguino-axillary  -CW      Inguino-Axillary  right;left  -CW      Extremity Treatment  MLD to full limb  -CW      MLD to Full Limb  BLE's  -CW         Compression/Skin Care    Compression/Skin Care  skin care;wrapping location;bandaging;compression garment;remove bandages  -CW      Skin Care  moisturizing lotion applied Applied lotion to both legs and zinc oxide between skin fold  -CW      Wrapping Location  lower extremity  -CW      Wrapping Location LE  right:;ankle;knee  -CW      Wrapping Comments  R/LE-K1, 2- Rosidal soft, 1- 10cm. Artiflex, 1- 8cm. 3-10cm. rosidal bandages.  Added tubigrip to R/LLE ankle to thigh. Kerlex to LLE. ABD pad to L lateral LE wound.   -CW      Bandage Layers  cotton liner;padding/fluff layer;soft foam- 1/4 inch;short-stretch bandages (comment size/quantity)  -CW      Bandaging Technique  circumferential/spiral;moderate compression  -CW      Compression/Skin Care Comments  Kerlix and ABD pad to LLE wound area.   -CW        User Key  (r) = Recorded By, (t) = Taken By, (c) = Cosigned By    Initials Name Provider Type    Colleen Cleary OTR Occupational Therapist                  OT Assessment/Plan     Row Name 01/27/21 6806          OT Assessment    Assessment Comments  Mild tenderness noted LLE wound area. Pt. was able to wear the bandages day and night. Skin dry and intact RLE. LLE lateral wound appears smaller with less drainage. Measurements taken. See flow sheet. Total circumference .7 cm. and .3 cm. (77.9 cm. R and 65.5 cm. L net decrease). Pt. is progressing with decreased edema BLE’s. Skin softer B lower legs. Goals updated and reviewed POC with pt. Encouraged pt. to use the pump with legs elevated and complete HEP.  -CW        OT Plan    OT Plan Comments  Plan to con. tx. Pt. can remove the bandages for wound dressing changes and bathing, then re-apply.  -CW       User Key  (r) =  Recorded By, (t) = Taken By, (c) = Cosigned By    Initials Name Provider Type    CW Colleen Hidalgo, OTR Occupational Therapist                 Manual Rx (last 36 hours)      Manual Treatments     Row Name 01/27/21 1520             Total Minutes    63374 - OT Manual Therapy Minutes  60  -CW        User Key  (r) = Recorded By, (t) = Taken By, (c) = Cosigned By    Initials Name Provider Type    CW Colleen Hidalgo OTR Occupational Therapist        OT Goals     Row Name 01/27/21 1500          OT Short Term Goals    STG Date to Achieve  01/06/21  -CW     STG 1  Patient to demonstrate proper awareness of “What is Lymphedema” and DO’s and Don’ts” for improved prevention, management, care of symptoms, and ease of transition to self-care of condition.  -CW     STG 1 Progress  Ongoing;Progressing  -CW     STG 2  Patient independent and compliant with self-wrapping techniques of compression bandages with family member or caregiver as indicated for improved self-management of condition.  -CW     STG 2 Progress  Ongoing;Progressing  -CW     STG 3  Patient demonstrate decreased net edema of  BLE's   >/5-10 cm. for decreased in edema, symptoms, decreased risk of infection, and improved skin-care/transition to self-care of condition.  -CW     STG 3 Progress  Met  -CW     STG 4  Patient independent and compliant with initial home exercise program focused on diaphragmatic breathing, range of motion, flexibility, to decrease edema, improve lymphatic flow for decreased edema and decreased risk of infection.  -CW     STG 4 Progress  Met  -CW        Long Term Goals    LTG Date to Achieve  01/20/21  -CW     LTG 1  Patient will demonstrate decreased net edema of  BLE's >/=50-80 cm. for decrease in symptoms, decreased risk of infection, and improved skin-care/transition to self-care of condition.   -CW     LTG 1 Progress  Goal Revised  -CW     LTG 2  Patient/family/caregivers independent with self-care techniques for self-management of  condition.  -CW     LTG 2 Progress  Ongoing;Progressing  -CW     LTG 3  Patient independent and compliant with use and care of compression (example garments, inelastic velcro compression) with assistance of a caregiver as needed to promote self-care independence.    -CW     LTG 3 Progress  Ongoing;Progressing  -CW     LTG 4  Patient independent and compliant with use and care of compression garments with assistance of a caregiver as needed to promote self-care independence.   -CW     LTG 4 Progress  Ongoing  -CW       User Key  (r) = Recorded By, (t) = Taken By, (c) = Cosigned By    Initials Name Provider Type    Colleen Cleary OTR Occupational Therapist          Therapy Education  Education Details: self care for edema management instructed.  Given: Bandaging/dressing change, Edema management  Program: Reinforced  How Provided: Verbal, Demonstration  Provided to: Patient  Level of Understanding: Verbalized  62588 - OT Self Care/Mgmt Minutes: 11                Time Calculation:   OT Start Time: 0849  OT Stop Time: 1000  OT Time Calculation (min): 71 min  Total Timed Code Minutes- OT: 71 minute(s)     Therapy Charges for Today     Code Description Service Date Service Provider Modifiers Qty    52279697526  OT MANUAL THERAPY EA 15 MIN 1/27/2021 Colleen Hidalgo OTR GO 4    [id number]  OT SELF CARE/MGMT/TRAIN EA 15 MIN 1/27/2021 Colleen Hidalgo OTR GO 1                      RADHA Miller  1/27/2021

## 2021-01-29 ENCOUNTER — HOSPITAL ENCOUNTER (OUTPATIENT)
Dept: OCCUPATIONAL THERAPY | Facility: HOSPITAL | Age: 61
Setting detail: THERAPIES SERIES
Discharge: HOME | End: 2021-01-29

## 2021-01-29 DIAGNOSIS — I89.0 LYMPHEDEMA OF BOTH LOWER EXTREMITIES: Primary | ICD-10-CM

## 2021-01-29 DIAGNOSIS — I89.0 LYMPHEDEMA: ICD-10-CM

## 2021-01-29 PROCEDURE — 97140 MANUAL THERAPY 1/> REGIONS: CPT

## 2021-01-29 PROCEDURE — 97535 SELF CARE MNGMENT TRAINING: CPT

## 2021-01-29 NOTE — THERAPY TREATMENT NOTE
Outpatient Occupational Therapy Lymphedema Treatment Note  [facility]     Patient Name: Emely Solomon  : 1960  MRN: [id number]  Today's Date: 2021      Visit Date: 2021    Patient Active Problem List   Diagnosis   • Chronic diastolic CHF (congestive heart failure) (CMS/HCC)   • PFO (patent foramen ovale)   • Paradoxical embolism (CMS/HCC)   • Essential hypertension   • Pulmonary hypertension (CMS/HCC)   • Back pain   • ARIEL (obstructive sleep apnea)   • Class 3 severe obesity due to excess calories with serious comorbidity and body mass index (BMI) greater than or equal to 70 in adult (CMS/HCC)   • History of DVT of lower extremity   • Lymphedema   • Anemia, chronic disease   • CKD (chronic kidney disease) stage 3, GFR 30-59 ml/min (CMS/HCC)   • Iron deficiency        Past Medical History:   Diagnosis Date   • Cellulitis     2017, with Group B Strep bacteremia and sepsis   • Chronic diastolic congestive heart failure (CMS/HCC)    • Deep venous thrombosis (CMS/HCC)    • Hypertension    • Lipoedema    • Lymphedema    • Morbid obesity (CMS/HCC)    • Paradoxical embolism (CMS/HCC)     to the LLE due to DVT/PE and PFO   • PFO (patent foramen ovale)    • Pulmonary embolism (CMS/HCC)    • Pulmonary hypertension (CMS/HCC)     multifactorial (dCHF, obesity/ARIEL, hx PE), mild by echo 2016   • Sepsis (CMS/HCC) 2020        Past Surgical History:   Procedure Laterality Date   • BRONCHOSCOPY N/A 2017    Procedure: BRONCHOSCOPY with wash;  Surgeon: Caleb Garcia MD;  Location: [facility] ENDOSCOPY;  Service:    • DILATATION AND CURETTAGE  2011   • VENA CAVA FILTER PLACEMENT      Inferior Vena Cava Filter Placement w/Fluorosc angiogr guidance         Visit Dx:      ICD-10-CM ICD-9-CM   1. Lymphedema of both lower extremities  I89.0 457.1   2. Lymphedema  I89.0 457.1       Lymphedema     Row Name 21 0800             Subjective Pain    Able to rate subjective pain?  yes  -CW       Pre-Treatment Pain Level  0  -CW      Post-Treatment Pain Level  0  -CW         Subjective Comments    Subjective Comments  No LE pain at rest. No numbness or tingling.   -CW         Skin Changes/Observations    Location/Assessment  Lower Extremity  -CW      Lower Extremity Conditions  bilateral:;dry;shiny;scaly  -CW      Lower Extremity Color/Pigment  bilateral:;red;fibrosis  -CW      Lower Extremity Skin Details  Wound dressings completed by pt./spouse this morning. LLE lateral wound with mod drainage. Wound not as deep.  2nd wound anterior shin noted -Mod drainage noted.   -CW      Skin Observations Comment  Skin not as red B knee skin folds.   -CW         Manual Lymphatic Drainage    Manual Lymphatic Drainage  initial sequence;opened regional lymph nodes;opened anastamoses;extremity treatment  -CW      Initial Sequence  short neck  -CW      Opened Regional Lymph Nodes  axillary;inguinal  -CW      Axillary  right;left  -CW      Inguinal  right;left  -CW      Opened Anastamoses  inguino-axillary  -CW      Inguino-Axillary  right;left  -CW      Extremity Treatment  MLD to full limb  -CW      MLD to Full Limb  BLE's  -CW         Compression/Skin Care    Compression/Skin Care  skin care;wrapping location;bandaging;compression garment;remove bandages  -CW      Skin Care  moisturizing lotion applied Applied lotion to both legs and zinc oxide between skin fold  -CW      Wrapping Location  lower extremity  -CW      Wrapping Location LE  right:;ankle;knee  -CW      Wrapping Comments  R/LE-K1, 2- Rosidal soft, 1- 10cm. Artiflex, 1- 8cm. 3-10cm. rosidal bandages.  Added tubigrip to R/LLE ankle to thigh. Kerlex to LLE. ABD pad to L lateral LE wound.   -CW      Bandage Layers  cotton liner;padding/fluff layer;soft foam- 1/4 inch;short-stretch bandages (comment size/quantity)  -CW      Bandaging Technique  circumferential/spiral;moderate compression  -CW      Compression Garment Comments  Tried on her RLE custom compression  "stocking from home and checked fit. The stocking is likely to slide down with a tight fit around the lower calf.   -CW      Compression/Skin Care Comments  Kerlix and ABD pad to LLE wound area.   -CW        User Key  (r) = Recorded By, (t) = Taken By, (c) = Cosigned By    Initials Name Provider Type    Colleen Cleary OTR Occupational Therapist                  OT Assessment/Plan     Row Name 01/29/21 1142          OT Assessment    Assessment Comments  Pt. reports no pain at present. Was able to wear her circaid reduction kit this morning. Pt. stated the velcro compression slipped down a little. Added 1/4\" black foam to apply under the velcro compression R lower leg to see if that helps. LLE lateral wound with less drainage and appears smaller. Recommended pt. to continue  using the compression pump at home with LE elevation  and HEP. Reviewed bandage precautions and wearing schedule.  -CW        OT Plan    OT Plan Comments  Plan to cont. tx. Pt can remove the bandages for wound dressing changes and bathing, then re-apply.  -CW       User Key  (r) = Recorded By, (t) = Taken By, (c) = Cosigned By    Initials Name Provider Type    Colleen Cleary OTR Occupational Therapist                 Manual Rx (last 36 hours)      Manual Treatments     Row Name 01/29/21 1147             Total Minutes    27230 - OT Manual Therapy Minutes  60  -CW        User Key  (r) = Recorded By, (t) = Taken By, (c) = Cosigned By    Initials Name Provider Type    Colleen Cleary OTR Occupational Therapist            Therapy Education  Education Details: Reviewed self care edema mangement and skin care for over the weekend.  Given: Bandaging/dressing change, Edema management, Symptoms/condition management  Program: Reinforced  How Provided: Verbal, Demonstration  Provided to: Patient  Level of Understanding: Verbalized  16945 - OT Self Care/Mgmt Minutes: 13                Time Calculation:   OT Start Time: 0847  OT Stop Time: 1000  OT Time " Calculation (min): 73 min  Total Timed Code Minutes- OT: 73 minute(s)     Therapy Charges for Today     Code Description Service Date Service Provider Modifiers Qty    83139283204 HC OT MANUAL THERAPY EA 15 MIN 1/29/2021 Colleen Hidalgo OTR GO 4    [id number]  OT SELF CARE/MGMT/TRAIN EA 15 MIN 1/29/2021 Colleen Hidalgo OTR GO 1                      RADHA Miller  1/29/2021

## 2021-02-01 ENCOUNTER — HOSPITAL ENCOUNTER (OUTPATIENT)
Dept: OCCUPATIONAL THERAPY | Facility: HOSPITAL | Age: 61
Setting detail: THERAPIES SERIES
Discharge: HOME | End: 2021-02-01

## 2021-02-01 DIAGNOSIS — I89.0 LYMPHEDEMA OF BOTH LOWER EXTREMITIES: Primary | ICD-10-CM

## 2021-02-01 DIAGNOSIS — I89.0 LYMPHEDEMA: ICD-10-CM

## 2021-02-01 PROCEDURE — 97140 MANUAL THERAPY 1/> REGIONS: CPT

## 2021-02-01 PROCEDURE — 97535 SELF CARE MNGMENT TRAINING: CPT

## 2021-02-01 NOTE — THERAPY TREATMENT NOTE
Outpatient Occupational Therapy Lymphedema Treatment Note  [facility]     Patient Name: Emely Solomon  : 1960  MRN: [id number]  Today's Date: 2021      Visit Date: 2021    Patient Active Problem List   Diagnosis   • Chronic diastolic CHF (congestive heart failure) (CMS/HCC)   • PFO (patent foramen ovale)   • Paradoxical embolism (CMS/HCC)   • Essential hypertension   • Pulmonary hypertension (CMS/HCC)   • Back pain   • ARIEL (obstructive sleep apnea)   • Class 3 severe obesity due to excess calories with serious comorbidity and body mass index (BMI) greater than or equal to 70 in adult (CMS/HCC)   • History of DVT of lower extremity   • Lymphedema   • Anemia, chronic disease   • CKD (chronic kidney disease) stage 3, GFR 30-59 ml/min (CMS/HCC)   • Iron deficiency        Past Medical History:   Diagnosis Date   • Cellulitis     2017, with Group B Strep bacteremia and sepsis   • Chronic diastolic congestive heart failure (CMS/HCC)    • Deep venous thrombosis (CMS/HCC)    • Hypertension    • Lipoedema    • Lymphedema    • Morbid obesity (CMS/HCC)    • Paradoxical embolism (CMS/HCC)     to the LLE due to DVT/PE and PFO   • PFO (patent foramen ovale)    • Pulmonary embolism (CMS/HCC)    • Pulmonary hypertension (CMS/HCC)     multifactorial (dCHF, obesity/ARIEL, hx PE), mild by echo 2016   • Sepsis (CMS/HCC) 2020        Past Surgical History:   Procedure Laterality Date   • BRONCHOSCOPY N/A 2017    Procedure: BRONCHOSCOPY with wash;  Surgeon: Caleb Garcia MD;  Location: [facility] ENDOSCOPY;  Service:    • DILATATION AND CURETTAGE  2011   • VENA CAVA FILTER PLACEMENT      Inferior Vena Cava Filter Placement w/Fluorosc angiogr guidance         Visit Dx:      ICD-10-CM ICD-9-CM   1. Lymphedema of both lower extremities  I89.0 457.1   2. Lymphedema  I89.0 457.1       Lymphedema     Row Name 21 0800             Subjective Pain    Able to rate subjective pain?  yes  -CW       Pre-Treatment Pain Level  0  -CW      Post-Treatment Pain Level  0  -CW         Subjective Comments    Subjective Comments  Pt. reports no pain at present. Less tenderness noted.   -CW         Skin Changes/Observations    Location/Assessment  Lower Extremity  -CW      Lower Extremity Conditions  bilateral:;dry;shiny;scaly  -CW      Lower Extremity Color/Pigment  bilateral:;red;fibrosis  -CW      Lower Extremity Skin Details  Wound dressings completed by pt./spouse this morning. LLE lateral wound with mod drainage. Wound not as deep.  2nd wound anterior shin noted -Mod drainage noted.   -CW      Skin Observations Comment  Skin not as red B knee skin folds.   -CW         Manual Lymphatic Drainage    Manual Lymphatic Drainage  initial sequence;opened regional lymph nodes;opened anastamoses;extremity treatment  -CW      Initial Sequence  short neck  -CW      Opened Regional Lymph Nodes  axillary;inguinal  -CW      Axillary  right;left  -CW      Inguinal  right;left  -CW      Opened Anastamoses  inguino-axillary  -CW      Inguino-Axillary  right;left  -CW      Extremity Treatment  MLD to full limb  -CW      MLD to Full Limb  BLE's  -CW         Compression/Skin Care    Compression/Skin Care  skin care;wrapping location;bandaging;compression garment;remove bandages  -CW      Skin Care  moisturizing lotion applied Applied lotion to both legs and zinc oxide between skin fold  -CW      Wrapping Location  lower extremity  -CW      Wrapping Location LE  right:;ankle;knee  -CW      Wrapping Comments  R/LE-K1, 2- Rosidal soft, 1- 10cm. Artiflex, 1- 8cm. 3-10cm. rosidal bandages.  Added tubigrip to R/LLE ankle to thigh. Kerlex to LLE. ABD pad to L lateral LE wound.   -CW      Bandage Layers  cotton liner;padding/fluff layer;soft foam- 1/4 inch;short-stretch bandages (comment size/quantity)  -CW      Bandaging Technique  circumferential/spiral;moderate compression  -CW      Compression Garment Comments  Pt. received BLE liners in  the mail. Tried on the liners and checked the fit.   -CW      Compression/Skin Care Comments  Kerlix and ABD pad to LLE wound area.   -CW        User Key  (r) = Recorded By, (t) = Taken By, (c) = Cosigned By    Initials Name Provider Type    Colleen Cleray OTR Occupational Therapist                  OT Assessment/Plan     Row Name 02/01/21 1034          OT Assessment    Assessment Comments  Pt. was able to wear the velco compression and bandages over the weekend. Pt. received BLE liners for the reduction kit by mail. Applied the liners and checked the fit. Pt. will need to roll over the liners on top of the velcro reduction kit. LLE anterior wound appears smaller and L lateral wound is not as deep with less drainage. Applied the bandages with moderate compression. Encouraged pt. to use the compression pump with her legs elevated. Pt. reports she needs to get a better stool or recliner to elevate her legs better. Reviewed skin care, bandage precautions and wearing schedule.  -CW        OT Plan    OT Plan Comments  Plan to cont. tx. Pt. to removed bandages for wound dressing changes and bathing, then re-apply.  -CW       User Key  (r) = Recorded By, (t) = Taken By, (c) = Cosigned By    Initials Name Provider Type    Colleen Cleary OTR Occupational Therapist                 Manual Rx (last 36 hours)      Manual Treatments     Row Name 02/01/21 1042             Total Minutes    42081 - OT Manual Therapy Minutes  60  -CW        User Key  (r) = Recorded By, (t) = Taken By, (c) = Cosigned By    Initials Name Provider Type    Colleen Cleary OTR Occupational Therapist            Therapy Education  Education Details: Ed. on skin care and bathing BLE's between deep skin folds.  Given: Bandaging/dressing change, Edema management  Program: Reinforced  How Provided: Verbal, Demonstration  Provided to: Patient  Level of Understanding: Verbalized  77386 - OT Self Care/Mgmt Minutes: 12                Time Calculation:   OT  Start Time: 0848  OT Stop Time: 1000  OT Time Calculation (min): 72 min  Total Timed Code Minutes- OT: 72 minute(s)     Therapy Charges for Today     Code Description Service Date Service Provider Modifiers Qty    05424582036 HC OT MANUAL THERAPY EA 15 MIN 2/1/2021 Colleen Hidalgo OTR GO 4    [id number]  OT SELF CARE/MGMT/TRAIN EA 15 MIN 2/1/2021 Colleen Hidalgo OTR GO 1                      RADHA Miller  2/1/2021

## 2021-02-02 ENCOUNTER — HOSPITAL ENCOUNTER (OUTPATIENT)
Dept: OCCUPATIONAL THERAPY | Facility: HOSPITAL | Age: 61
Setting detail: THERAPIES SERIES
Discharge: HOME | End: 2021-02-02

## 2021-02-02 ENCOUNTER — OFFICE VISIT (OUTPATIENT)
Dept: WOUND CARE | Facility: HOSPITAL | Age: 61
End: 2021-02-02

## 2021-02-02 DIAGNOSIS — I89.0 LYMPHEDEMA OF BOTH LOWER EXTREMITIES: Primary | ICD-10-CM

## 2021-02-02 DIAGNOSIS — I89.0 LYMPHEDEMA: ICD-10-CM

## 2021-02-02 PROCEDURE — 97140 MANUAL THERAPY 1/> REGIONS: CPT

## 2021-02-02 PROCEDURE — G0463 HOSPITAL OUTPT CLINIC VISIT: HCPCS

## 2021-02-02 PROCEDURE — 97535 SELF CARE MNGMENT TRAINING: CPT

## 2021-02-02 NOTE — THERAPY TREATMENT NOTE
Outpatient Occupational Therapy Lymphedema Treatment Note  [facility]     Patient Name: Emely Solomon  : 1960  MRN: [id number]  Today's Date: 2021      Visit Date: 2021    Patient Active Problem List   Diagnosis   • Chronic diastolic CHF (congestive heart failure) (CMS/HCC)   • PFO (patent foramen ovale)   • Paradoxical embolism (CMS/HCC)   • Essential hypertension   • Pulmonary hypertension (CMS/HCC)   • Back pain   • ARIEL (obstructive sleep apnea)   • Class 3 severe obesity due to excess calories with serious comorbidity and body mass index (BMI) greater than or equal to 70 in adult (CMS/HCC)   • History of DVT of lower extremity   • Lymphedema   • Anemia, chronic disease   • CKD (chronic kidney disease) stage 3, GFR 30-59 ml/min (CMS/HCC)   • Iron deficiency        Past Medical History:   Diagnosis Date   • Cellulitis     2017, with Group B Strep bacteremia and sepsis   • Chronic diastolic congestive heart failure (CMS/HCC)    • Deep venous thrombosis (CMS/HCC)    • Hypertension    • Lipoedema    • Lymphedema    • Morbid obesity (CMS/HCC)    • Paradoxical embolism (CMS/HCC)     to the LLE due to DVT/PE and PFO   • PFO (patent foramen ovale)    • Pulmonary embolism (CMS/HCC)    • Pulmonary hypertension (CMS/HCC)     multifactorial (dCHF, obesity/ARIEL, hx PE), mild by echo 2016   • Sepsis (CMS/HCC) 2020        Past Surgical History:   Procedure Laterality Date   • BRONCHOSCOPY N/A 2017    Procedure: BRONCHOSCOPY with wash;  Surgeon: Caleb Garcia MD;  Location: [facility] ENDOSCOPY;  Service:    • DILATATION AND CURETTAGE  2011   • VENA CAVA FILTER PLACEMENT      Inferior Vena Cava Filter Placement w/Fluorosc angiogr guidance         Visit Dx:      ICD-10-CM ICD-9-CM   1. Lymphedema of both lower extremities  I89.0 457.1   2. Lymphedema  I89.0 457.1       Lymphedema     Row Name 21 0800             Subjective Pain    Able to rate subjective pain?  yes  -CW       Pre-Treatment Pain Level  0  -CW      Post-Treatment Pain Level  0  -CW         Subjective Comments    Subjective Comments  No LE pain at present. Some tingling LLE wound area.   -CW         Skin Changes/Observations    Location/Assessment  Lower Extremity  -CW      Lower Extremity Conditions  bilateral:;dry;shiny;scaly  -CW      Lower Extremity Color/Pigment  bilateral:;red;fibrosis  -CW      Lower Extremity Skin Details  Wound dressings completed by pt./spouse this morning. LLE lateral wound with min drainage. Wound not as deep.  2nd wound anterior shin noted -min drainage noted.   -CW      Skin Observations Comment  Skin not as red B knee skin folds.   -CW         Manual Lymphatic Drainage    Manual Lymphatic Drainage  initial sequence;opened regional lymph nodes;opened anastamoses;extremity treatment  -CW      Initial Sequence  short neck  -CW      Opened Regional Lymph Nodes  axillary;inguinal  -CW      Axillary  right;left  -CW      Inguinal  right;left  -CW      Opened Anastamoses  inguino-axillary  -CW      Inguino-Axillary  right;left  -CW      Extremity Treatment  MLD to full limb  -CW      MLD to Full Limb  BLE's  -CW         Compression/Skin Care    Compression/Skin Care  skin care;wrapping location;bandaging;compression garment;remove bandages  -CW      Skin Care  moisturizing lotion applied Applied lotion to both legs and zinc oxide between skin fold  -CW      Wrapping Location  lower extremity  -CW      Wrapping Location LE  right:;ankle;knee  -CW      Wrapping Comments  R/LE-K1, 2- Rosidal soft, 1- 10cm. Artiflex, 1- 8cm. 3-10cm. rosidal bandages.  Added tubigrip to R/LLE ankle to thigh. Kerlex to LLE. ABD pad to L lateral LE wound.   -CW      Bandage Layers  cotton liner;padding/fluff layer;soft foam- 1/4 inch;short-stretch bandages (comment size/quantity)  -CW      Bandaging Technique  circumferential/spiral;moderate compression  -CW      Compression/Skin Care Comments  Kerlix and ABD pad to LLE  wound area.   -CW        User Key  (r) = Recorded By, (t) = Taken By, (c) = Cosigned By    Initials Name Provider Type    Colleen Cleary OTR Occupational Therapist                  OT Assessment/Plan     Row Name 02/02/21 1024          OT Assessment    Assessment Comments  Pt. reports her LLE wound area is not as tender. Skin softer B lower legs after MLD. Pt. applied lotion B lower legs which helped with the dryness. LLE anterior wound is smaller with less drainage.LLE lateral  wound is not as deep. Pt. to go to the wound clinic today. Applied the bandages with moderate compression as tolerated. Recommend pt. to re-apply the bandages LLE after the wound clinic or wear the reduction kit. Reviewed bandage precautions and wearing schedule. Will re-measure tomorrow.  -CW        OT Plan    OT Plan Comments  Plan to cont. tx. Pt. to remove bandages for wound dressing changes and bathing, then re-apply. .Will re-measure tomorrow.  -CW       User Key  (r) = Recorded By, (t) = Taken By, (c) = Cosigned By    Initials Name Provider Type    Colleen Cleary OTR Occupational Therapist                 Manual Rx (last 36 hours)      Manual Treatments     Row Name 02/02/21 1029             Total Minutes    94085 - OT Manual Therapy Minutes  60  -CW        User Key  (r) = Recorded By, (t) = Taken By, (c) = Cosigned By    Initials Name Provider Type    Colleen Cleary OTR Occupational Therapist            Therapy Education  Given: Edema management, Bandaging/dressing change, Symptoms/condition management  Program: Reinforced, Progressed  How Provided: Verbal, Demonstration  Provided to: Patient  Level of Understanding: Verbalized, Teach back education performed  85621 - OT Self Care/Mgmt Minutes: 10                Time Calculation:   OT Start Time: 0850  OT Stop Time: 1000  OT Time Calculation (min): 70 min  Total Timed Code Minutes- OT: 70 minute(s)     Therapy Charges for Today     Code Description Service Date Service  Provider Modifiers Qty    73412494687 HC OT MANUAL THERAPY EA 15 MIN 2/2/2021 Colleen Hidalgo OTR GO 4    67125783629 HC OT SELF CARE/MGMT/TRAIN EA 15 MIN 2/2/2021 Colleen Hidalgo OTR GO 1                      Colleen Hidalgo OTSIMON  2/2/2021

## 2021-02-03 ENCOUNTER — HOSPITAL ENCOUNTER (OUTPATIENT)
Dept: OCCUPATIONAL THERAPY | Facility: HOSPITAL | Age: 61
Setting detail: THERAPIES SERIES
Discharge: HOME | End: 2021-02-03

## 2021-02-03 DIAGNOSIS — I89.0 LYMPHEDEMA: ICD-10-CM

## 2021-02-03 DIAGNOSIS — I89.0 LYMPHEDEMA OF BOTH LOWER EXTREMITIES: Primary | ICD-10-CM

## 2021-02-03 PROCEDURE — 97535 SELF CARE MNGMENT TRAINING: CPT

## 2021-02-03 PROCEDURE — 97140 MANUAL THERAPY 1/> REGIONS: CPT

## 2021-02-03 NOTE — THERAPY TREATMENT NOTE
Outpatient Occupational Therapy Lymphedema Treatment Note/Weekly progress note  [facility]     Patient Name: Emely Solomon  : 1960  MRN: [id number]  Today's Date: 2/3/2021      Visit Date: 2021    Patient Active Problem List   Diagnosis   • Chronic diastolic CHF (congestive heart failure) (CMS/HCC)   • PFO (patent foramen ovale)   • Paradoxical embolism (CMS/HCC)   • Essential hypertension   • Pulmonary hypertension (CMS/HCC)   • Back pain   • ARIEL (obstructive sleep apnea)   • Class 3 severe obesity due to excess calories with serious comorbidity and body mass index (BMI) greater than or equal to 70 in adult (CMS/HCC)   • History of DVT of lower extremity   • Lymphedema   • Anemia, chronic disease   • CKD (chronic kidney disease) stage 3, GFR 30-59 ml/min (CMS/HCC)   • Iron deficiency        Past Medical History:   Diagnosis Date   • Cellulitis     2017, with Group B Strep bacteremia and sepsis   • Chronic diastolic congestive heart failure (CMS/HCC)    • Deep venous thrombosis (CMS/HCC)    • Hypertension    • Lipoedema    • Lymphedema    • Morbid obesity (CMS/HCC)    • Paradoxical embolism (CMS/HCC)     to the LLE due to DVT/PE and PFO   • PFO (patent foramen ovale)    • Pulmonary embolism (CMS/HCC)    • Pulmonary hypertension (CMS/HCC)     multifactorial (dCHF, obesity/ARIEL, hx PE), mild by echo 2016   • Sepsis (CMS/HCC) 2020        Past Surgical History:   Procedure Laterality Date   • BRONCHOSCOPY N/A 2017    Procedure: BRONCHOSCOPY with wash;  Surgeon: Caleb Garcia MD;  Location: [facility] ENDOSCOPY;  Service:    • DILATATION AND CURETTAGE  2011   • VENA CAVA FILTER PLACEMENT      Inferior Vena Cava Filter Placement w/Fluorosc angiogr guidance         Visit Dx:      ICD-10-CM ICD-9-CM   1. Lymphedema of both lower extremities  I89.0 457.1   2. Lymphedema  I89.0 457.1       Lymphedema     Row Name 21 0900             Subjective Pain    Able to rate subjective pain?  yes   -CW      Pre-Treatment Pain Level  0  -CW      Post-Treatment Pain Level  0  -CW         Subjective Comments    Subjective Comments  Mild tenderness L lateral wound.   -CW         Skin Changes/Observations    Location/Assessment  Lower Extremity  -CW      Lower Extremity Conditions  bilateral:;dry;shiny;scaly  -CW      Lower Extremity Color/Pigment  bilateral:;red;fibrosis  -CW      Lower Extremity Skin Details  Wound dressings completed by pt./spouse this morning. LLE lateral wound with min drainage. Wound not as deep.  2nd wound anterior shin noted -min drainage noted.   -CW      Skin Observations Comment  Skin not as red B knee skin folds.   -CW         Lymphedema Measurements    Measurement Type(s)  Circumferential  -CW      Circumferential Areas  Lower extremities  -CW         BLE Circumferential (cm)    Measurement Location 1  20 cm. above knee  -CW      Left 1  84 cm  -CW      Right 1  82.6 cm  -CW      Measurement Location 2  10cm above knee  -CW      Left 2  79 cm  -CW      Right 2  78.8 cm  -CW      Measurement Location 3  knee  -CW      Left 3  57.5 cm  -CW      Right 3  65.5 cm  -CW      Measurement Location 4  10 cm. below knee  -CW      Left 4  67 cm  -CW      Right 4  58.3 cm  -CW      Measurement Location 5  20 cm. below knee  -CW      Left 5  49.8 cm  -CW      Right 5  43.4 cm  -CW      Measurement Location 6  30 cm. below knee  -CW      Left 6  32 cm  -CW      Right 6  33.2 cm  -CW      Measurement Location 7  ankle  -CW      Left 7  31.5 cm  -CW      Right 7  31 cm  -CW      Measurement Location 8  mid foot   -CW      Left 8  22.8 cm  -CW      Right 8  23 cm  -CW      LLE Circumferential Total  423.6 cm  -CW      RLE Circumferential Total  415.8 cm  -CW         Manual Lymphatic Drainage    Manual Lymphatic Drainage  initial sequence;opened regional lymph nodes;opened anastamoses;extremity treatment  -CW      Initial Sequence  short neck  -CW      Opened Regional Lymph Nodes  axillary;inguinal  -CW       Axillary  right;left  -CW      Inguinal  right;left  -CW      Opened Anastamoses  inguino-axillary  -CW      Inguino-Axillary  right;left  -CW      Extremity Treatment  MLD to full limb  -CW      MLD to Full Limb  BLE's  -CW         Compression/Skin Care    Compression/Skin Care  skin care;wrapping location;bandaging;compression garment;remove bandages  -CW      Skin Care  moisturizing lotion applied Applied lotion to both legs and zinc oxide between skin fold  -CW      Wrapping Location  lower extremity  -CW      Wrapping Location LE  right:;ankle;knee  -CW      Wrapping Comments  R/LE-K1, 2- Rosidal soft, 1- 10cm. Artiflex, 1- 8cm. 3-10cm. rosidal bandages.  Added tubigrip to R/LLE ankle to thigh. Kerlex to LLE. ABD pad to L lateral LE wound.   -CW      Bandage Layers  cotton liner;padding/fluff layer;soft foam- 1/4 inch;short-stretch bandages (comment size/quantity)  -CW      Bandaging Technique  circumferential/spiral;moderate compression  -CW      Compression/Skin Care Comments  Kerlix and ABD pad to LLE wound area.   -CW        User Key  (r) = Recorded By, (t) = Taken By, (c) = Cosigned By    Initials Name Provider Type    CW Colleen Hidalgo OTR Occupational Therapist                  OT Assessment/Plan     Row Name 02/03/21 1200          OT Assessment    Assessment Comments  No pain complaints at present. Pt. was able to wear the bandages day and night without discomfort. Skin dry and intact with no wounds or skin irritation. Measurements taken. See flow sheet. Total circumference .8 cm. and .6 cm. (80.8 cm. R and 66.2 cm L net decrease). Pt. is progressing with decreased edema  BLE’s. Goals updated and reviewed POC with pt. Recommend pt. to use the pump at home with BLE's elevated. LLE lateral wound appears smaller. Encouraged cont. HEP and walking. Discussed how to apply the velcro compression with added compression.  -CW        OT Plan    OT Plan Comments  Plan to cont. tx. Pt. to remove  bandages for wound dresssing changes and bathing, then re-apply.  -CW       User Key  (r) = Recorded By, (t) = Taken By, (c) = Cosigned By    Initials Name Provider Type    Colleen Cleary OTR Occupational Therapist                 Manual Rx (last 36 hours)      Manual Treatments     Row Name 02/03/21 1212             Total Minutes    91051 - OT Manual Therapy Minutes  60  -CW        User Key  (r) = Recorded By, (t) = Taken By, (c) = Cosigned By    Initials Name Provider Type    Colleen Cleary OTR Occupational Therapist        OT Goals     Row Name 02/03/21 1200          OT Short Term Goals    STG Date to Achieve  01/06/21  -CW     STG 1  Patient to demonstrate proper awareness of “What is Lymphedema” and DO’s and Don’ts” for improved prevention, management, care of symptoms, and ease of transition to self-care of condition.  -CW     STG 1 Progress  Ongoing;Progressing  -CW     STG 2  Patient independent and compliant with self-wrapping techniques of compression bandages with family member or caregiver as indicated for improved self-management of condition.  -CW     STG 2 Progress  Ongoing;Progressing  -CW     STG 3  Patient demonstrate decreased net edema of  BLE's   >/5-10 cm. for decreased in edema, symptoms, decreased risk of infection, and improved skin-care/transition to self-care of condition.  -CW     STG 3 Progress  Met  -CW     STG 4  Patient independent and compliant with initial home exercise program focused on diaphragmatic breathing, range of motion, flexibility, to decrease edema, improve lymphatic flow for decreased edema and decreased risk of infection.  -CW     STG 4 Progress  Met  -CW        Long Term Goals    LTG Date to Achieve  01/20/21  -CW     LTG 1  Patient will demonstrate decreased net edema of  BLE's >/=50-80 cm. for decrease in symptoms, decreased risk of infection, and improved skin-care/transition to self-care of condition.   -CW     LTG 1 Progress  Goal Revised  -CW     LTG 2   Patient/family/caregivers independent with self-care techniques for self-management of condition.  -CW     LTG 2 Progress  Ongoing;Progressing  -CW     LTG 3  Patient independent and compliant with use and care of compression (example garments, inelastic velcro compression) with assistance of a caregiver as needed to promote self-care independence.    -CW     LTG 3 Progress  Ongoing;Progressing  -CW     LTG 4  Patient independent and compliant with use and care of compression garments or velcro compression with assistance of a caregiver as needed to promote self-care independence.   -CW     LTG 4 Progress  Ongoing;Progressing;Goal Revised  -CW       User Key  (r) = Recorded By, (t) = Taken By, (c) = Cosigned By    Initials Name Provider Type    Colleen Cleary OTR Occupational Therapist          Therapy Education  Given: Bandaging/dressing change, Edema management, Symptoms/condition management  Program: Reinforced, Progressed  How Provided: Verbal, Demonstration  Provided to: Patient  Level of Understanding: Verbalized, Teach back education performed  01564 - OT Self Care/Mgmt Minutes: 13                Time Calculation:   OT Start Time: 0852  OT Stop Time: 1005  OT Time Calculation (min): 73 min  Total Timed Code Minutes- OT: 73 minute(s)     Therapy Charges for Today     Code Description Service Date Service Provider Modifiers Qty    08214175333 HC OT MANUAL THERAPY EA 15 MIN 2/3/2021 Colleen Hidalgo OTR GO 4    30002698557 HC OT SELF CARE/MGMT/TRAIN EA 15 MIN 2/3/2021 Colleen Hidalgo OTR GO 1                      RADHA Miller  2/3/2021

## 2021-02-05 ENCOUNTER — HOSPITAL ENCOUNTER (OUTPATIENT)
Dept: OCCUPATIONAL THERAPY | Facility: HOSPITAL | Age: 61
Discharge: HOME | End: 2021-02-05
Admitting: PHYSICIAN ASSISTANT

## 2021-02-05 DIAGNOSIS — I89.0 LYMPHEDEMA OF BOTH LOWER EXTREMITIES: Primary | ICD-10-CM

## 2021-02-05 DIAGNOSIS — I89.0 LYMPHEDEMA: ICD-10-CM

## 2021-02-05 PROCEDURE — 97535 SELF CARE MNGMENT TRAINING: CPT

## 2021-02-05 PROCEDURE — 97140 MANUAL THERAPY 1/> REGIONS: CPT

## 2021-02-05 NOTE — THERAPY TREATMENT NOTE
Outpatient Occupational Therapy Lymphedema Treatment Note  [facility]     Patient Name: Emely Solomon  : 1960  MRN: [id number]  Today's Date: 2021      Visit Date: 2021    Patient Active Problem List   Diagnosis   • Chronic diastolic CHF (congestive heart failure) (CMS/HCC)   • PFO (patent foramen ovale)   • Paradoxical embolism (CMS/HCC)   • Essential hypertension   • Pulmonary hypertension (CMS/HCC)   • Back pain   • ARIEL (obstructive sleep apnea)   • Class 3 severe obesity due to excess calories with serious comorbidity and body mass index (BMI) greater than or equal to 70 in adult (CMS/HCC)   • History of DVT of lower extremity   • Lymphedema   • Anemia, chronic disease   • CKD (chronic kidney disease) stage 3, GFR 30-59 ml/min (CMS/HCC)   • Iron deficiency        Past Medical History:   Diagnosis Date   • Cellulitis     2017, with Group B Strep bacteremia and sepsis   • Chronic diastolic congestive heart failure (CMS/HCC)    • Deep venous thrombosis (CMS/HCC)    • Hypertension    • Lipoedema    • Lymphedema    • Morbid obesity (CMS/HCC)    • Paradoxical embolism (CMS/HCC)     to the LLE due to DVT/PE and PFO   • PFO (patent foramen ovale)    • Pulmonary embolism (CMS/HCC)    • Pulmonary hypertension (CMS/HCC)     multifactorial (dCHF, obesity/ARIEL, hx PE), mild by echo 2016   • Sepsis (CMS/HCC) 2020        Past Surgical History:   Procedure Laterality Date   • BRONCHOSCOPY N/A 2017    Procedure: BRONCHOSCOPY with wash;  Surgeon: Caleb Garcia MD;  Location: [facility] ENDOSCOPY;  Service:    • DILATATION AND CURETTAGE  2011   • VENA CAVA FILTER PLACEMENT      Inferior Vena Cava Filter Placement w/Fluorosc angiogr guidance         Visit Dx:      ICD-10-CM ICD-9-CM   1. Lymphedema of both lower extremities  I89.0 457.1   2. Lymphedema  I89.0 457.1       Lymphedema     Row Name 21 0800             Subjective Pain    Able to rate subjective pain?  yes  -CW       Pre-Treatment Pain Level  0  -CW      Post-Treatment Pain Level  0  -CW         Subjective Comments    Subjective Comments  Pt. was able to use the pump. Mild tenderness LLE wound area.   -CW         Skin Changes/Observations    Location/Assessment  Lower Extremity  -CW      Lower Extremity Conditions  bilateral:;dry;shiny;scaly  -CW      Lower Extremity Color/Pigment  bilateral:;red;fibrosis  -CW      Lower Extremity Skin Details  Wound dressings completed by pt./spouse this morning. LLE lateral wound with min drainage. Wound not as deep.  2nd wound anterior shin noted -min drainage noted.   -CW      Skin Observations Comment  Skin not as red B knee skin folds.   -CW         Manual Lymphatic Drainage    Manual Lymphatic Drainage  initial sequence;opened regional lymph nodes;opened anastamoses;extremity treatment  -CW      Initial Sequence  short neck  -CW      Opened Regional Lymph Nodes  axillary;inguinal  -CW      Axillary  right;left  -CW      Inguinal  right;left  -CW      Opened Anastamoses  inguino-axillary  -CW      Inguino-Axillary  right;left  -CW      Extremity Treatment  MLD to full limb  -CW      MLD to Full Limb  BLE's  -CW         Compression/Skin Care    Compression/Skin Care  skin care;wrapping location;bandaging;compression garment;remove bandages  -CW      Skin Care  moisturizing lotion applied Applied lotion to both legs and zinc oxide between skin fold  -CW      Wrapping Location  lower extremity  -CW      Wrapping Location LE  right:;ankle;knee  -CW      Wrapping Comments  R/LE-K1, 2- Rosidal soft, 1- 10cm. Artiflex, 1- 8cm. 3-10cm. rosidal bandages.  Added tubigrip to R/LLE ankle to thigh. Kerlex to LLE. ABD pad to L lateral LE wound.   -CW      Bandage Layers  cotton liner;padding/fluff layer;soft foam- 1/4 inch;short-stretch bandages (comment size/quantity)  -CW      Bandaging Technique  circumferential/spiral;moderate compression  -CW      Compression Garment Comments  Reviewed how to apply  and adjust the velcro compression -reduction kit.   -CW      Compression/Skin Care Comments  Kerlix and ABD pad to LLE wound area.   -CW        User Key  (r) = Recorded By, (t) = Taken By, (c) = Cosigned By    Initials Name Provider Type    Colleen Cleary OTR Occupational Therapist                  OT Assessment/Plan     Row Name 02/05/21 1127          OT Assessment    Assessment Comments  No LE pain or discomfort at present. Pt. reports some tenderness LLE wound area during dressing changes. Skin dry and intact RLE. LLE anterior wound appears smaller with less drainage. Applied the bandages with moderate compression. Gave pt. info regarding compression [place] or bike shorts. Recommend pt. to use the pump daily and re-apply the bandages or reduction kit. Explained the importance of consistent compression for long term edema management. Can apply the reduction kit straps tighter as needed. Reviewed bandage precautions and wearing schedule.  -CW        OT Plan    OT Plan Comments  Plan to cont. tx. Recommend pt. to remove the bandages for dressing changes then re-apply or wear the Reduction kit.  -CW       User Key  (r) = Recorded By, (t) = Taken By, (c) = Cosigned By    Initials Name Provider Type    Colleen Cleary OTR Occupational Therapist                 Manual Rx (last 36 hours)      Manual Treatments     Row Name 02/05/21 1136             Total Minutes    22365 - OT Manual Therapy Minutes  60  -CW        User Key  (r) = Recorded By, (t) = Taken By, (c) = Cosigned By    Initials Name Provider Type    Colleen Cleary OTR Occupational Therapist            Therapy Education  Education Details: Reviewed how to apply the Reduction kit BLE's and adjust the compression. Reviewed skin care and bathing between deep skin folds.  Given: Bandaging/dressing change, Symptoms/condition management, Edema management  Program: Reinforced  How Provided: Verbal, Demonstration  Provided to: Patient  Level of Understanding:  Verbalized, Teach back education performed  50221 - OT Self Care/Mgmt Minutes: 10                Time Calculation:   OT Start Time: 0852  OT Stop Time: 1002  OT Time Calculation (min): 70 min  Total Timed Code Minutes- OT: 70 minute(s)     Therapy Charges for Today     Code Description Service Date Service Provider Modifiers Qty    09848543296 HC OT MANUAL THERAPY EA 15 MIN 2/5/2021 Colleen Hidalgo OTR GO 4    [id number]  OT SELF CARE/MGMT/TRAIN EA 15 MIN 2/5/2021 Colleen Hidalgo OTR GO 1                      RADHA Miller  2/5/2021

## 2021-02-08 ENCOUNTER — HOSPITAL ENCOUNTER (OUTPATIENT)
Dept: OCCUPATIONAL THERAPY | Facility: HOSPITAL | Age: 61
Discharge: HOME | End: 2021-02-08
Admitting: PHYSICIAN ASSISTANT

## 2021-02-08 DIAGNOSIS — I89.0 LYMPHEDEMA: ICD-10-CM

## 2021-02-08 DIAGNOSIS — I89.0 LYMPHEDEMA OF BOTH LOWER EXTREMITIES: Primary | ICD-10-CM

## 2021-02-08 PROCEDURE — 97535 SELF CARE MNGMENT TRAINING: CPT

## 2021-02-08 PROCEDURE — 97140 MANUAL THERAPY 1/> REGIONS: CPT

## 2021-02-08 NOTE — THERAPY TREATMENT NOTE
Outpatient Occupational Therapy Lymphedema Treatment Note  [facility]     Patient Name: Emely Solomon  : 1960  MRN: [id number]  Today's Date: 2021      Visit Date: 2021    Patient Active Problem List   Diagnosis   • Chronic diastolic CHF (congestive heart failure) (CMS/HCC)   • PFO (patent foramen ovale)   • Paradoxical embolism (CMS/HCC)   • Essential hypertension   • Pulmonary hypertension (CMS/HCC)   • Back pain   • ARIEL (obstructive sleep apnea)   • Class 3 severe obesity due to excess calories with serious comorbidity and body mass index (BMI) greater than or equal to 70 in adult (CMS/HCC)   • History of DVT of lower extremity   • Lymphedema   • Anemia, chronic disease   • CKD (chronic kidney disease) stage 3, GFR 30-59 ml/min (CMS/HCC)   • Iron deficiency        Past Medical History:   Diagnosis Date   • Cellulitis     2017, with Group B Strep bacteremia and sepsis   • Chronic diastolic congestive heart failure (CMS/HCC)    • Deep venous thrombosis (CMS/HCC)    • Hypertension    • Lipoedema    • Lymphedema    • Morbid obesity (CMS/HCC)    • Paradoxical embolism (CMS/HCC)     to the LLE due to DVT/PE and PFO   • PFO (patent foramen ovale)    • Pulmonary embolism (CMS/HCC)    • Pulmonary hypertension (CMS/HCC)     multifactorial (dCHF, obesity/ARIEL, hx PE), mild by echo 2016   • Sepsis (CMS/HCC) 2020        Past Surgical History:   Procedure Laterality Date   • BRONCHOSCOPY N/A 2017    Procedure: BRONCHOSCOPY with wash;  Surgeon: Caleb Garcia MD;  Location: [facility] ENDOSCOPY;  Service:    • DILATATION AND CURETTAGE  2011   • VENA CAVA FILTER PLACEMENT      Inferior Vena Cava Filter Placement w/Fluorosc angiogr guidance         Visit Dx:      ICD-10-CM ICD-9-CM   1. Lymphedema of both lower extremities  I89.0 457.1   2. Lymphedema  I89.0 457.1       Lymphedema     Row Name 21 0800             Subjective Pain    Able to rate subjective pain?  yes  -CW       Pre-Treatment Pain Level  0  -CW      Post-Treatment Pain Level  0  -CW         Subjective Comments    Subjective Comments  Mild tenderness and soreness wound area LLE during dressing changes.   -CW         Skin Changes/Observations    Location/Assessment  Lower Extremity  -CW      Lower Extremity Conditions  bilateral:;dry;shiny;scaly  -CW      Lower Extremity Color/Pigment  bilateral:;red;fibrosis  -CW      Lower Extremity Skin Details  Wound dressings completed by pt./spouse this morning. LLE lateral wound with min drainage. Wound not as deep.  2nd wound anterior shin noted -min drainage noted. Anterior wound apears to be healing.   -CW      Skin Observations Comment  Skin not as irritated between skin folds knee area.   -CW         Manual Lymphatic Drainage    Manual Lymphatic Drainage  initial sequence;opened regional lymph nodes;opened anastamoses;extremity treatment  -CW      Initial Sequence  short neck  -CW      Opened Regional Lymph Nodes  axillary;inguinal  -CW      Axillary  right;left  -CW      Inguinal  right;left  -CW      Opened Anastamoses  inguino-axillary  -CW      Inguino-Axillary  right;left  -CW      Extremity Treatment  MLD to full limb  -CW      MLD to Full Limb  BLE's  -CW         Compression/Skin Care    Compression/Skin Care  skin care;wrapping location;bandaging;compression garment;remove bandages  -CW      Skin Care  moisturizing lotion applied Applied lotion to both legs and zinc oxide between skin fold  -CW      Wrapping Location  lower extremity  -CW      Wrapping Location LE  right:;ankle;knee  -CW      Wrapping Comments  R/LE-K1, 2- Rosidal soft, 1- 10cm. Artiflex, 1- 8cm. 3-10cm. rosidal bandages.  Added tubigrip to R/LLE ankle to thigh. Kerlex to LLE. ABD pad to L lateral LE wound. Added black foam to RLE under the Rosidal soft.   -CW      Bandage Layers  cotton liner;padding/fluff layer;soft foam- 1/4 inch;short-stretch bandages (comment size/quantity)  -CW      Bandaging  Technique  circumferential/spiral;moderate compression  -CW      Compression/Skin Care Comments  Artiflex and ABD pad to LLE wound area.   -CW        User Key  (r) = Recorded By, (t) = Taken By, (c) = Cosigned By    Initials Name Provider Type    Colleen Cleary OTR Occupational Therapist                  OT Assessment/Plan     Row Name 02/08/21 1038          OT Assessment    Assessment Comments  Mild discomfort LLE lateral wound area during dressing changes. Skin dry, softer and intact RLE. LLE anterior wound appears to be smaller and draining less. L lateral wound min drainage noted. Applied the  bandages RLE with black foam under the Rosidal soft. Pt.'s sister to help pt. order compression bike shorts. Instructed skin care for lower leg skin folds. Can apply zinc oxide or diaper rash ointment as needed. Encouraged pt to use the pump daily at home with her legs elevated. Reviewed how to apply the Reduction kit with tighter compression. Ed. completed for bandage precautions and wearing schedule.  -CW        OT Plan    OT Plan Comments  Plan to cont. tx. Recommend pt. to remove the bandages for dresssingn changes then re-apply. Cont. the BLE pump daily.  -CW       User Key  (r) = Recorded By, (t) = Taken By, (c) = Cosigned By    Initials Name Provider Type    Colleen Cleary OTR Occupational Therapist                 Manual Rx (last 36 hours)      Manual Treatments     Row Name 02/08/21 1053             Total Minutes    57259 - OT Manual Therapy Minutes  60  -CW        User Key  (r) = Recorded By, (t) = Taken By, (c) = Cosigned By    Initials Name Provider Type    Colleen Cleary OTR Occupational Therapist            Therapy Education  Given: Bandaging/dressing change, Edema management  Program: Reinforced  How Provided: Verbal, Demonstration  Provided to: Patient  Level of Understanding: Verbalized  65855 - OT Self Care/Mgmt Minutes: 11                Time Calculation:   OT Start Time: 0849  OT Stop Time:  1000  OT Time Calculation (min): 71 min  Total Timed Code Minutes- OT: 71 minute(s)     Therapy Charges for Today     Code Description Service Date Service Provider Modifiers Qty    63397492224  OT MANUAL THERAPY EA 15 MIN 2/8/2021 Colleen Hidalgo OTR GO 4    [id number]  OT SELF CARE/MGMT/TRAIN EA 15 MIN 2/8/2021 Colleen Hidalgo OTR GO 1                      RADHA Miller  2/8/2021

## 2021-02-09 ENCOUNTER — HOSPITAL ENCOUNTER (OUTPATIENT)
Dept: OCCUPATIONAL THERAPY | Facility: HOSPITAL | Age: 61
Discharge: HOME | End: 2021-02-09
Admitting: PHYSICIAN ASSISTANT

## 2021-02-09 ENCOUNTER — OFFICE VISIT (OUTPATIENT)
Dept: WOUND CARE | Facility: HOSPITAL | Age: 61
End: 2021-02-09

## 2021-02-09 DIAGNOSIS — I89.0 LYMPHEDEMA OF BOTH LOWER EXTREMITIES: Primary | ICD-10-CM

## 2021-02-09 DIAGNOSIS — I89.0 LYMPHEDEMA: ICD-10-CM

## 2021-02-09 PROCEDURE — G0463 HOSPITAL OUTPT CLINIC VISIT: HCPCS

## 2021-02-09 PROCEDURE — 97535 SELF CARE MNGMENT TRAINING: CPT

## 2021-02-09 PROCEDURE — 97140 MANUAL THERAPY 1/> REGIONS: CPT

## 2021-02-09 NOTE — THERAPY TREATMENT NOTE
Outpatient Occupational Therapy Lymphedema Progress Note  [facility]     Patient Name: Emely Solomon  : 1960  MRN: [id number]  Today's Date: 2021      Visit Date: 2021    Patient Active Problem List   Diagnosis   • Chronic diastolic CHF (congestive heart failure) (CMS/HCC)   • PFO (patent foramen ovale)   • Paradoxical embolism (CMS/HCC)   • Essential hypertension   • Pulmonary hypertension (CMS/HCC)   • Back pain   • ARIEL (obstructive sleep apnea)   • Class 3 severe obesity due to excess calories with serious comorbidity and body mass index (BMI) greater than or equal to 70 in adult (CMS/HCC)   • History of DVT of lower extremity   • Lymphedema   • Anemia, chronic disease   • CKD (chronic kidney disease) stage 3, GFR 30-59 ml/min (CMS/HCC)   • Iron deficiency        Past Medical History:   Diagnosis Date   • Cellulitis     2017, with Group B Strep bacteremia and sepsis   • Chronic diastolic congestive heart failure (CMS/HCC)    • Deep venous thrombosis (CMS/HCC)    • Hypertension    • Lipoedema    • Lymphedema    • Morbid obesity (CMS/HCC)    • Paradoxical embolism (CMS/HCC)     to the LLE due to DVT/PE and PFO   • PFO (patent foramen ovale)    • Pulmonary embolism (CMS/HCC)    • Pulmonary hypertension (CMS/HCC)     multifactorial (dCHF, obesity/ARIEL, hx PE), mild by echo 2016   • Sepsis (CMS/HCC) 2020        Past Surgical History:   Procedure Laterality Date   • BRONCHOSCOPY N/A 2017    Procedure: BRONCHOSCOPY with wash;  Surgeon: Caleb Garcia MD;  Location: [facility] ENDOSCOPY;  Service:    • DILATATION AND CURETTAGE  2011   • VENA CAVA FILTER PLACEMENT      Inferior Vena Cava Filter Placement w/Fluorosc angiogr guidance         Visit Dx:      ICD-10-CM ICD-9-CM   1. Lymphedema of both lower extremities  I89.0 457.1   2. Lymphedema  I89.0 457.1       Lymphedema     Row Name 21 0800             Subjective Pain    Able to rate subjective pain?  yes  -CW      Pre-Treatment  Pain Level  0  -CW      Post-Treatment Pain Level  0  -CW      Subjective Pain Comment  No pain c/o at present.   -CW         Subjective Comments    Subjective Comments  Pt. went to the wound clinic this morning.   -CW         Skin Changes/Observations    Location/Assessment  Lower Extremity  -CW      Lower Extremity Conditions  bilateral:;dry;shiny;scaly  -CW      Lower Extremity Color/Pigment  bilateral:;red;fibrosis  -CW      Lower Extremity Skin Details  Wound dressings completed by pt./spouse this morning. LLE lateral wound with min drainage. Wound not as deep.  2nd wound anterior shin noted -min drainage noted. Anterior wound apears to be healing.   -CW      Skin Observations Comment  Skin not as irritated between skin folds knee area.   -CW         Lymphedema Measurements    Measurement Type(s)  Circumferential  -CW      Circumferential Areas  Lower extremities  -CW         BLE Circumferential (cm)    Measurement Location 1  20 cm. above knee  -CW      Left 1  83.8 cm  -CW      Right 1  83 cm  -CW      Measurement Location 2  10cm above knee  -CW      Left 2  78 cm  -CW      Right 2  78.8 cm  -CW      Measurement Location 3  knee  -CW      Left 3  58 cm  -CW      Right 3  67 cm  -CW      Measurement Location 4  10 cm. below knee  -CW      Left 4  64.5 cm  -CW      Right 4  58.3 cm  -CW      Measurement Location 5  20 cm. below knee  -CW      Left 5  47 cm  -CW      Right 5  42.6 cm  -CW      Measurement Location 6  30 cm. below knee  -CW      Left 6  31.8 cm  -CW      Right 6  29 cm  -CW      Measurement Location 7  ankle  -CW      Left 7  31.4 cm  -CW      Right 7  31.2 cm  -CW      Measurement Location 8  mid foot   -CW      Left 8  22.6 cm  -CW      Right 8  23 cm  -CW      LLE Circumferential Total  417.1 cm  -CW      RLE Circumferential Total  412.9 cm  -CW         Manual Lymphatic Drainage    Manual Lymphatic Drainage  initial sequence;opened regional lymph nodes;opened anastamoses;extremity treatment   -CW      Initial Sequence  short neck  -CW      Opened Regional Lymph Nodes  axillary;inguinal  -CW      Axillary  right;left  -CW      Inguinal  right;left  -CW      Opened Anastamoses  inguino-axillary  -CW      Inguino-Axillary  right;left  -CW      Extremity Treatment  MLD to full limb  -CW      MLD to Full Limb  BLE's  -CW         Compression/Skin Care    Compression/Skin Care  skin care;wrapping location;bandaging;compression garment;remove bandages  -CW      Skin Care  moisturizing lotion applied Applied lotion to both legs and zinc oxide between skin fold  -CW      Wrapping Location  lower extremity  -CW      Wrapping Location LE  right:;ankle;knee  -CW      Wrapping Comments  R/LE-K1, 2- Rosidal soft, 1- 10cm. Artiflex, 1- 8cm. 3-10cm. rosidal bandages.  Added tubigrip to R/LLE ankle to thigh. Kerlex to LLE. ABD pad to L lateral LE wound. Added black foam to RLE under the Rosidal soft.   -CW      Bandage Layers  cotton liner;padding/fluff layer;soft foam- 1/4 inch;short-stretch bandages (comment size/quantity)  -CW      Bandaging Technique  circumferential/spiral;moderate compression  -CW      Compression/Skin Care Comments  Artiflex and ABD pad to LLE wound area.   -CW        User Key  (r) = Recorded By, (t) = Taken By, (c) = Cosigned By    Initials Name Provider Type    Colleen Cleary OTR Occupational Therapist                  OT Assessment/Plan     Row Name 02/09/21 8110          OT Assessment    Assessment Comments  No pain complaints at present,  but has tenderness at times LLE wound areas.  Pt. was able to wear the bandages day and night without discomfort. Skin dry and intact RLE. LLE pt. went to the wound clinic this morning for dressing changes. Measurements taken. See flow sheet. Total circumference .9 cm. and .1 cm. (83.7 cm. R and 72.7cm. L net decrease). Pt. is progressing slowly with decreased edema BLE’s. Goals updated and reviewed POC with pt. Pt.'s sister to help pt. order  compression austin shorts.  -CW        OT Plan    OT Plan Comments  Plan to cont. tx. Recomment pt. to remove bandages for dressing changes then re-apply. Cont. BLE pump daily.  -CW       User Key  (r) = Recorded By, (t) = Taken By, (c) = Cosigned By    Initials Name Provider Type    Colleen Cleary OTR Occupational Therapist                 Manual Rx (last 36 hours)      Manual Treatments     Row Name 02/09/21 1327             Total Minutes    09402 - OT Manual Therapy Minutes  60  -CW        User Key  (r) = Recorded By, (t) = Taken By, (c) = Cosigned By    Initials Name Provider Type    Colleen Cleary OTR Occupational Therapist        OT Goals     Row Name 02/09/21 1300          OT Short Term Goals    STG Date to Achieve  01/06/21  -CW     STG 1  Patient to demonstrate proper awareness of “What is Lymphedema” and DO’s and Don’ts” for improved prevention, management, care of symptoms, and ease of transition to self-care of condition.  -CW     STG 1 Progress  Ongoing;Progressing  -CW     STG 2  Patient independent and compliant with self-wrapping techniques of compression bandages with family member or caregiver as indicated for improved self-management of condition.  -CW     STG 2 Progress  Ongoing;Progressing  -CW     STG 3  Patient demonstrate decreased net edema of  BLE's   >/5-10 cm. for decreased in edema, symptoms, decreased risk of infection, and improved skin-care/transition to self-care of condition.  -CW     STG 3 Progress  Met  -CW     STG 4  Patient independent and compliant with initial home exercise program focused on diaphragmatic breathing, range of motion, flexibility, to decrease edema, improve lymphatic flow for decreased edema and decreased risk of infection.  -CW     STG 4 Progress  Met  -CW        Long Term Goals    LTG Date to Achieve  01/20/21  -CW     LTG 1  Patient will demonstrate decreased net edema of  BLE's >/=50-80 cm. for decrease in symptoms, decreased risk of infection, and  improved skin-care/transition to self-care of condition.   -CW     LTG 1 Progress  Goal Revised  -CW     LTG 2  Patient/family/caregivers independent with self-care techniques for self-management of condition.  -CW     LTG 2 Progress  Ongoing;Progressing  -CW     LTG 3  Patient independent and compliant with use and care of compression (example garments, inelastic velcro compression) with assistance of a caregiver as needed to promote self-care independence.    -CW     LTG 3 Progress  Ongoing;Progressing  -CW     LTG 4  Patient independent and compliant with use and care of compression garments or velcro compression with assistance of a caregiver as needed to promote self-care independence.   -CW     LTG 4 Progress  Ongoing;Progressing;Goal Revised  -CW       User Key  (r) = Recorded By, (t) = Taken By, (c) = Cosigned By    Initials Name Provider Type    Colleen Cleary OTR Occupational Therapist          Therapy Education  Given: Bandaging/dressing change, Edema management, Symptoms/condition management  Program: Reinforced, Progressed  How Provided: Verbal, Demonstration  Provided to: Patient  Level of Understanding: Verbalized, Teach back education performed  13826 - OT Self Care/Mgmt Minutes: 10                Time Calculation:   OT Start Time: 0852  OT Stop Time: 1002  OT Time Calculation (min): 70 min  Total Timed Code Minutes- OT: 70 minute(s)     Therapy Charges for Today     Code Description Service Date Service Provider Modifiers Qty    52792619246  OT MANUAL THERAPY EA 15 MIN 2/9/2021 Colleen Hidalgo OTR GO 4    [id number]  OT SELF CARE/MGMT/TRAIN EA 15 MIN 2/9/2021 Colleen Hidalgo OTR GO 1                      RADHA Miller  2/9/2021

## 2021-02-10 ENCOUNTER — HOSPITAL ENCOUNTER (OUTPATIENT)
Dept: OCCUPATIONAL THERAPY | Facility: HOSPITAL | Age: 61
Discharge: HOME | End: 2021-02-10
Admitting: PHYSICIAN ASSISTANT

## 2021-02-10 DIAGNOSIS — I89.0 LYMPHEDEMA OF BOTH LOWER EXTREMITIES: Primary | ICD-10-CM

## 2021-02-10 DIAGNOSIS — I89.0 LYMPHEDEMA: ICD-10-CM

## 2021-02-10 PROCEDURE — 97535 SELF CARE MNGMENT TRAINING: CPT

## 2021-02-10 PROCEDURE — 97140 MANUAL THERAPY 1/> REGIONS: CPT

## 2021-02-10 NOTE — THERAPY TREATMENT NOTE
Outpatient Occupational Therapy Lymphedema Treatment Note  [facility]     Patient Name: Emely Solomon  : 1960  MRN: [id number]  Today's Date: 2/10/2021      Visit Date: 02/10/2021    Patient Active Problem List   Diagnosis   • Chronic diastolic CHF (congestive heart failure) (CMS/HCC)   • PFO (patent foramen ovale)   • Paradoxical embolism (CMS/HCC)   • Essential hypertension   • Pulmonary hypertension (CMS/HCC)   • Back pain   • ARIEL (obstructive sleep apnea)   • Class 3 severe obesity due to excess calories with serious comorbidity and body mass index (BMI) greater than or equal to 70 in adult (CMS/HCC)   • History of DVT of lower extremity   • Lymphedema   • Anemia, chronic disease   • CKD (chronic kidney disease) stage 3, GFR 30-59 ml/min (CMS/HCC)   • Iron deficiency        Past Medical History:   Diagnosis Date   • Cellulitis     2017, with Group B Strep bacteremia and sepsis   • Chronic diastolic congestive heart failure (CMS/HCC)    • Deep venous thrombosis (CMS/HCC)    • Hypertension    • Lipoedema    • Lymphedema    • Morbid obesity (CMS/HCC)    • Paradoxical embolism (CMS/HCC)     to the LLE due to DVT/PE and PFO   • PFO (patent foramen ovale)    • Pulmonary embolism (CMS/HCC)    • Pulmonary hypertension (CMS/HCC)     multifactorial (dCHF, obesity/ARIEL, hx PE), mild by echo 2016   • Sepsis (CMS/HCC) 2020        Past Surgical History:   Procedure Laterality Date   • BRONCHOSCOPY N/A 2017    Procedure: BRONCHOSCOPY with wash;  Surgeon: Caleb Garcia MD;  Location: [facility] ENDOSCOPY;  Service:    • DILATATION AND CURETTAGE  2011   • VENA CAVA FILTER PLACEMENT      Inferior Vena Cava Filter Placement w/Fluorosc angiogr guidance         Visit Dx:      ICD-10-CM ICD-9-CM   1. Lymphedema of both lower extremities  I89.0 457.1   2. Lymphedema  I89.0 457.1       Lymphedema     Row Name 02/10/21 0800             Subjective Pain    Able to rate subjective pain?  yes  -CW       Pre-Treatment Pain Level  0  -CW      Post-Treatment Pain Level  0  -CW         Subjective Comments    Subjective Comments  Pt. reports no LE pain at rest.   -CW         Skin Changes/Observations    Location/Assessment  Lower Extremity  -CW      Lower Extremity Conditions  bilateral:;dry;shiny;scaly  -CW      Lower Extremity Color/Pigment  bilateral:;red;fibrosis  -CW      Lower Extremity Skin Details  Wound dressings completed by pt./spouse this morning. LLE lateral wound with less drainage. Wound not as deep.  2nd wound anterior shin noted -min drainage noted. Anterior wound apears to be healing.   -CW      Skin Observations Comment  Skin not as irritated between skin folds knee area.   -CW         Manual Lymphatic Drainage    Manual Lymphatic Drainage  initial sequence;opened regional lymph nodes;opened anastamoses;extremity treatment  -CW      Initial Sequence  short neck  -CW      Opened Regional Lymph Nodes  axillary;inguinal  -CW      Axillary  right;left  -CW      Inguinal  right;left  -CW      Opened Anastamoses  inguino-axillary  -CW      Inguino-Axillary  right;left  -CW      Extremity Treatment  MLD to full limb  -CW      MLD to Full Limb  BLE's  -CW         Compression/Skin Care    Compression/Skin Care  skin care;wrapping location;bandaging;compression garment;remove bandages  -CW      Skin Care  moisturizing lotion applied Applied lotion to both legs and zinc oxide between skin fold  -CW      Wrapping Location  lower extremity  -CW      Wrapping Location LE  right:;ankle;knee  -CW      Wrapping Comments  R/LE-K1, 2- Rosidal soft, 1- 10cm. Artiflex, 1- 8cm. 3-10cm. rosidal bandages.  Added tubigrip to R/LLE ankle to thigh. Kerlex to LLE. ABD pad to L lateral LE wound. Added black foam to RLE under the Rosidal soft.   -CW      Bandage Layers  cotton liner;padding/fluff layer;soft foam- 1/4 inch;short-stretch bandages (comment size/quantity)  -CW      Bandaging Technique  circumferential/spiral;moderate  compression  -CW      Compression/Skin Care Comments  Artiflex and ABD pad to LLE wound area.   -CW        User Key  (r) = Recorded By, (t) = Taken By, (c) = Cosigned By    Initials Name Provider Type    Colleen Cleary OTR Occupational Therapist                  OT Assessment/Plan     Row Name 02/10/21 1313          OT Assessment    Assessment Comments  Pt. was able to tolerate the compression bandages day and night. No pain complaints at present when at rest.  Skin dry and intact RLE. LLE lateral wound appears smaller and not as deep. Applied the bandages with slightly more compression as tolerated with no discomfort. Reviewed bandage precautions, wearing schedule and skin care. Reviewed sequence and technique on how to apply the compression bandages and the velcro compression Pt. can decrease compression or remove a bandage layer at night if needed for comfort. Discussed long term edema management. Pt.'s sister plans to help  pt. obtain BLE compression austin pants. Advised pt. to use the compression pump and elevate her legs. Reviewed HEP.  -CW        OT Plan    OT Plan Comments  Plan to cont. tx. Pt. can remove bandages for dresssing changes then re-apply. Cont. BLE pump daily.  -CW       User Key  (r) = Recorded By, (t) = Taken By, (c) = Cosigned By    Initials Name Provider Type    Colleen Cleary OTR Occupational Therapist                 Manual Rx (last 36 hours)      Manual Treatments     Row Name 02/10/21 1316             Total Minutes    66319 - OT Manual Therapy Minutes  61  -CW        User Key  (r) = Recorded By, (t) = Taken By, (c) = Cosigned By    Initials Name Provider Type    Colleen Cleary OTR Occupational Therapist            Therapy Education  Given: Edema management, Bandaging/dressing change, Symptoms/condition management  Program: Reinforced, Progressed  How Provided: Verbal, Demonstration  Provided to: Patient  Level of Understanding: Verbalized, Teach back education performed  25258 -  OT Self Care/Mgmt Minutes: 8                Time Calculation:   OT Start Time: 0851  OT Stop Time: 1000  OT Time Calculation (min): 69 min  Total Timed Code Minutes- OT: 69 minute(s)     Therapy Charges for Today     Code Description Service Date Service Provider Modifiers Qty    [id number]  OT MANUAL THERAPY EA 15 MIN 2/10/2021 Colleen Hidalgo OTR GO 4    [id number]  OT SELF CARE/MGMT/TRAIN EA 15 MIN 2/10/2021 Colleen Hidalgo OTR GO 1                      RADHA Miller  2/10/2021

## 2021-02-16 ENCOUNTER — APPOINTMENT (OUTPATIENT)
Dept: WOUND CARE | Facility: HOSPITAL | Age: 61
End: 2021-02-16

## 2021-02-19 ENCOUNTER — HOSPITAL ENCOUNTER (OUTPATIENT)
Dept: OCCUPATIONAL THERAPY | Facility: HOSPITAL | Age: 61
Discharge: HOME | End: 2021-02-19
Admitting: FAMILY MEDICINE

## 2021-02-19 DIAGNOSIS — I89.0 LYMPHEDEMA: ICD-10-CM

## 2021-02-19 DIAGNOSIS — I89.0 LYMPHEDEMA OF BOTH LOWER EXTREMITIES: Primary | ICD-10-CM

## 2021-02-19 PROCEDURE — 97535 SELF CARE MNGMENT TRAINING: CPT

## 2021-02-19 PROCEDURE — 97140 MANUAL THERAPY 1/> REGIONS: CPT

## 2021-02-19 NOTE — THERAPY PROGRESS REPORT/RE-CERT
Outpatient Occupational Therapy Lymphedema Progress Note  [facility]     Patient Name: Emely Solomon  : 1960  MRN: [id number]  Today's Date: 2021      Visit Date: 2021    Patient Active Problem List   Diagnosis   • Chronic diastolic CHF (congestive heart failure) (CMS/HCC)   • PFO (patent foramen ovale)   • Paradoxical embolism (CMS/HCC)   • Essential hypertension   • Pulmonary hypertension (CMS/HCC)   • Back pain   • ARIEL (obstructive sleep apnea)   • Class 3 severe obesity due to excess calories with serious comorbidity and body mass index (BMI) greater than or equal to 70 in adult (CMS/HCC)   • History of DVT of lower extremity   • Lymphedema   • Anemia, chronic disease   • CKD (chronic kidney disease) stage 3, GFR 30-59 ml/min (CMS/HCC)   • Iron deficiency        Past Medical History:   Diagnosis Date   • Cellulitis     2017, with Group B Strep bacteremia and sepsis   • Chronic diastolic congestive heart failure (CMS/HCC)    • Deep venous thrombosis (CMS/HCC)    • Hypertension    • Lipoedema    • Lymphedema    • Morbid obesity (CMS/HCC)    • Paradoxical embolism (CMS/HCC)     to the LLE due to DVT/PE and PFO   • PFO (patent foramen ovale)    • Pulmonary embolism (CMS/HCC)    • Pulmonary hypertension (CMS/HCC)     multifactorial (dCHF, obesity/ARIEL, hx PE), mild by echo 2016   • Sepsis (CMS/HCC) 2020        Past Surgical History:   Procedure Laterality Date   • BRONCHOSCOPY N/A 2017    Procedure: BRONCHOSCOPY with wash;  Surgeon: Caleb Garcia MD;  Location: [facility] ENDOSCOPY;  Service:    • DILATATION AND CURETTAGE  2011   • VENA CAVA FILTER PLACEMENT      Inferior Vena Cava Filter Placement w/Fluorosc angiogr guidance         Visit Dx:      ICD-10-CM ICD-9-CM   1. Lymphedema of both lower extremities  I89.0 457.1   2. Lymphedema  I89.0 457.1       Lymphedema     Row Name 21 0800             Subjective Pain    Able to rate subjective pain?  yes  -CW       Pre-Treatment Pain Level  0  -CW      Post-Treatment Pain Level  0  -CW         Subjective Comments    Subjective Comments  No LE pain c/o. Some tenderness wound area at times during dressing changes.   -CW         Skin Changes/Observations    Location/Assessment  Lower Extremity  -CW      Lower Extremity Conditions  bilateral:;dry;shiny;scaly  -CW      Lower Extremity Color/Pigment  bilateral:;red;fibrosis  -CW      Lower Extremity Skin Details  Wound dressings completed by pt./spouse this morning. LLE lateral wound with less drainage. Wound not as deep.  2nd wound anterior shin noted -min drainage noted. Anterior wound apears to be healing.   -CW      Skin Observations Comment  Skin not as irritated between skin folds knee area.   -CW         Lymphedema Measurements    Measurement Type(s)  Circumferential  -CW      Circumferential Areas  Lower extremities  -CW         BLE Circumferential (cm)    Measurement Location 1  20 cm. above knee  -CW      Left 1  89 cm  -CW      Right 1  89 cm  -CW      Measurement Location 2  10cm above knee  -CW      Left 2  79.8 cm  -CW      Right 2  83.9 cm  -CW      Measurement Location 3  knee  -CW      Left 3  57.5 cm  -CW      Right 3  67.2 cm  -CW      Measurement Location 4  10 cm. below knee  -CW      Left 4  72.5 cm  -CW      Right 4  71 cm  -CW      Measurement Location 5  20 cm. below knee  -CW      Left 5  55.7 cm  -CW      Right 5  45.5 cm  -CW      Measurement Location 6  30 cm. below knee  -CW      Left 6  31.8 cm  -CW      Right 6  33.5 cm  -CW      Measurement Location 7  ankle  -CW      Left 7  31.4 cm  -CW      Right 7  31.5 cm  -CW      Measurement Location 8  mid foot   -CW      Left 8  22.8 cm  -CW      Right 8  23 cm  -CW      LLE Circumferential Total  440.5 cm  -CW      RLE Circumferential Total  444.6 cm  -CW         Manual Lymphatic Drainage    Manual Lymphatic Drainage  initial sequence;opened regional lymph nodes;opened anastamoses;extremity treatment  -CW       Initial Sequence  short neck  -CW      Opened Regional Lymph Nodes  axillary;inguinal  -CW      Axillary  right;left  -CW      Inguinal  right;left  -CW      Opened Anastamoses  inguino-axillary  -CW      Inguino-Axillary  right;left  -CW      Extremity Treatment  MLD to full limb  -CW      MLD to Full Limb  BLE's  -CW         Compression/Skin Care    Compression/Skin Care  skin care;wrapping location;bandaging;compression garment;remove bandages  -CW      Skin Care  moisturizing lotion applied Applied lotion to both legs and zinc oxide between skin fold  -CW      Wrapping Location  lower extremity  -CW      Wrapping Location LE  right:;ankle;knee  -CW      Wrapping Comments  Skin not as irritated between skin folds knee area.   -CW      Bandage Layers  cotton liner;padding/fluff layer;soft foam- 1/4 inch;short-stretch bandages (comment size/quantity)  -CW      Bandaging Technique  circumferential/spiral;moderate compression  -CW      Compression/Skin Care Comments  Artiflex and ABD pad to LLE wound area.   -CW        User Key  (r) = Recorded By, (t) = Taken By, (c) = Cosigned By    Initials Name Provider Type    Colleen Cleary OTR Occupational Therapist                  OT Assessment/Plan     Row Name 02/19/21 1492          OT Assessment    Assessment Comments  Pt. reports no pain BLE at rest. She has some tenderness LLE wound area during dressing changes. Skin dry and inact RLE. LLE anterior and lateral wounds appear to be smaller with less drainage. Pt. states she did not re-apply the velcro compression yesterday. Explained the importance of consistent compression for optimal edema management. Measurements taken. See flow sheet. Total circumference .6 cm and .5 cm. (52.0 cm. R and 49.3 cm. L net decrease). Will re-check measurements Monday after pt. has worn the bandages consistently. Pt.'s sister has ordered [place]in compression pants. Recommend cont. use  of the pump 2x/day for 30-45 min as  tolerated. Plan to cont. tx. to maximize indep. with self care edema management.  -CW        OT Plan    OT Plan Comments  Plan to  cont. tx. Pt. to wear the bandages consistently. and remove for wound dresssing changes. cont. BLE pump daily.  -CW       User Key  (r) = Recorded By, (t) = Taken By, (c) = Cosigned By    Initials Name Provider Type    Colleen Cleary OTR Occupational Therapist                 Manual Rx (last 36 hours)      Manual Treatments     Row Name 02/19/21 1142             Total Minutes    34274 - OT Manual Therapy Minutes  60  -CW        User Key  (r) = Recorded By, (t) = Taken By, (c) = Cosigned By    Initials Name Provider Type    CW Colleen Hidalgo OTR Occupational Therapist        OT Goals     Row Name 02/19/21 1100          OT Short Term Goals    STG Date to Achieve  03/05/21  -CW     STG 1  Patient to demonstrate proper awareness of “What is Lymphedema” and DO’s and Don’ts” for improved prevention, management, care of symptoms, and ease of transition to self-care of condition.  -CW     STG 1 Progress  Ongoing;Progressing  -CW     STG 2  Patient independent and compliant with self-wrapping techniques of compression bandages with family member or caregiver as indicated for improved self-management of condition.  -CW     STG 2 Progress  Progressing  -CW     STG 3  Patient demonstrate decreased net edema of  BLE's   >/5-10 cm. for decreased in edema, symptoms, decreased risk of infection, and improved skin-care/transition to self-care of condition.  -CW     STG 3 Progress  Met  -CW     STG 4  Patient independent and compliant with initial home exercise program focused on diaphragmatic breathing, range of motion, flexibility, to decrease edema, improve lymphatic flow for decreased edema and decreased risk of infection.  -CW     STG 4 Progress  Met  -CW        Long Term Goals    LTG Date to Achieve  03/19/21  -CW     LTG 1  Patient will demonstrate decreased net edema of  BLE's >/=50-80 cm. for  decrease in symptoms, decreased risk of infection, and improved skin-care/transition to self-care of condition.   -CW     LTG 1 Progress  Ongoing;Goal Revised  -CW     LTG 2  Patient/family/caregivers independent with self-care techniques for self-management of condition.  -CW     LTG 2 Progress  Ongoing;Progressing  -CW     LTG 3  Patient independent and compliant with use and care of compression (example garments, inelastic velcro compression) with assistance of a caregiver as needed to promote self-care independence.    -CW     LTG 3 Progress  Ongoing;Progressing  -CW       User Key  (r) = Recorded By, (t) = Taken By, (c) = Cosigned By    Initials Name Provider Type    Colleen Cleary OTR Occupational Therapist          Therapy Education  Given: Edema management, Symptoms/condition management, Bandaging/dressing change  Program: Reinforced  How Provided: Verbal, Demonstration  Provided to: Patient  Level of Understanding: Verbalized, Teach back education performed  36738 - OT Self Care/Mgmt Minutes: 10                Time Calculation:   OT Start Time: 0850  OT Stop Time: 1000  OT Time Calculation (min): 70 min  Total Timed Code Minutes- OT: 70 minute(s)     Therapy Charges for Today     Code Description Service Date Service Provider Modifiers Qty    70885362140 HC OT MANUAL THERAPY EA 15 MIN 2/19/2021 Colleen Hidalgo OTR GO 4    06822982365 HC OT SELF CARE/MGMT/TRAIN EA 15 MIN 2/19/2021 Colleen Hidalgo OTR GO 1                      RADHA Miller  2/19/2021

## 2021-02-22 ENCOUNTER — HOSPITAL ENCOUNTER (OUTPATIENT)
Dept: OCCUPATIONAL THERAPY | Facility: HOSPITAL | Age: 61
Setting detail: THERAPIES SERIES
Discharge: HOME | End: 2021-02-22

## 2021-02-22 DIAGNOSIS — I89.0 LYMPHEDEMA OF BOTH LOWER EXTREMITIES: Primary | ICD-10-CM

## 2021-02-22 DIAGNOSIS — I89.0 LYMPHEDEMA: ICD-10-CM

## 2021-02-22 PROCEDURE — 97140 MANUAL THERAPY 1/> REGIONS: CPT

## 2021-02-22 NOTE — THERAPY TREATMENT NOTE
Outpatient Occupational Therapy Lymphedema Treatment Note  [facility]     Patient Name: Emely Solomon  : 1960  MRN: [id number]  Today's Date: 2021      Visit Date: 2021    Patient Active Problem List   Diagnosis   • Chronic diastolic CHF (congestive heart failure) (CMS/HCC)   • PFO (patent foramen ovale)   • Paradoxical embolism (CMS/HCC)   • Essential hypertension   • Pulmonary hypertension (CMS/HCC)   • Back pain   • ARIEL (obstructive sleep apnea)   • Class 3 severe obesity due to excess calories with serious comorbidity and body mass index (BMI) greater than or equal to 70 in adult (CMS/HCC)   • History of DVT of lower extremity   • Lymphedema   • Anemia, chronic disease   • CKD (chronic kidney disease) stage 3, GFR 30-59 ml/min (CMS/HCC)   • Iron deficiency        Past Medical History:   Diagnosis Date   • Cellulitis     2017, with Group B Strep bacteremia and sepsis   • Chronic diastolic congestive heart failure (CMS/HCC)    • Deep venous thrombosis (CMS/HCC)    • Hypertension    • Lipoedema    • Lymphedema    • Morbid obesity (CMS/HCC)    • Paradoxical embolism (CMS/HCC)     to the LLE due to DVT/PE and PFO   • PFO (patent foramen ovale)    • Pulmonary embolism (CMS/HCC)    • Pulmonary hypertension (CMS/HCC)     multifactorial (dCHF, obesity/ARIEL, hx PE), mild by echo 2016   • Sepsis (CMS/HCC) 2020        Past Surgical History:   Procedure Laterality Date   • BRONCHOSCOPY N/A 2017    Procedure: BRONCHOSCOPY with wash;  Surgeon: Caleb Garcia MD;  Location: [facility] ENDOSCOPY;  Service:    • DILATATION AND CURETTAGE  2011   • VENA CAVA FILTER PLACEMENT      Inferior Vena Cava Filter Placement w/Fluorosc angiogr guidance         Visit Dx:      ICD-10-CM ICD-9-CM   1. Lymphedema of both lower extremities  I89.0 457.1   2. Lymphedema  I89.0 457.1       Lymphedema     Row Name 21 0800             Subjective Pain    Able to rate subjective pain?  yes  -CW       Pre-Treatment Pain Level  0  -CW      Post-Treatment Pain Level  0  -CW         Subjective Comments    Subjective Comments  Pt. fell on Friday in the hallway. No increase soremess BLE's.   -CW         Skin Changes/Observations    Location/Assessment  Lower Extremity  -CW      Lower Extremity Conditions  bilateral:;dry;shiny;scaly  -CW      Lower Extremity Color/Pigment  bilateral:;red;fibrosis  -CW      Lower Extremity Skin Details  Wound dressings completed by pt./spouse this morning. LLE lateral wound with less drainage. Wound not as deep.  2nd wound anterior shin noted -trace drainage noted. Anterior wound apears to be healing.   -CW      Skin Observations Comment  Skin clearer between skin folds knee area.   -CW         Manual Lymphatic Drainage    Manual Lymphatic Drainage  initial sequence;opened regional lymph nodes;opened anastamoses;extremity treatment  -CW      Initial Sequence  short neck  -CW      Opened Regional Lymph Nodes  axillary;inguinal  -CW      Axillary  right;left  -CW      Inguinal  right;left  -CW      Opened Anastamoses  inguino-axillary  -CW      Inguino-Axillary  right;left  -CW      Extremity Treatment  MLD to full limb  -CW      MLD to Full Limb  BLE's  -CW         Compression/Skin Care    Compression/Skin Care  skin care  -CW      Skin Care  moisturizing lotion applied  -CW      Wrapping Location  --  -CW      Wrapping Location LE  --  -CW      Wrapping Comments  --  -CW      Bandage Layers  --  -CW      Bandaging Technique  --  -CW      Compression Garment Comments  Applied BLE reduction kit with more compression and placed higher L knee. Measured pt. for thigh reduction kit. Tubigrip liner applied.   -CW      Compression/Skin Care Comments  Kerlix and telfa pad and SIflex contact layer to LLE wound.   -CW        User Key  (r) = Recorded By, (t) = Taken By, (c) = Cosigned By    Initials Name Provider Type    Colleen Cleary OTR Occupational Therapist                  OT  Assessment/Plan     Row Name 02/22/21 1424          OT Assessment    Assessment Comments  Pt. reports no pain at present. She did mention she fell on Friday in the hallway. Was assisted by security and did not go the ER. Pt. completed dressing changes over the weekend and applied the velcro Reduction kit. Measured pt. for a thigh reduction kit. Spoke with [employer] rep about getting a pump with a sleeve full leg to the upper thigh. Applied BLE Reduction kit higher on LLE. Reviewed how to apply with more compression.  -CW        OT Plan    OT Plan Comments  Plan to cont. tx. Thigh reduction kit has been ordered.  -CW       User Key  (r) = Recorded By, (t) = Taken By, (c) = Cosigned By    Initials Name Provider Type    Colleen Cleary OTR Occupational Therapist                 Manual Rx (last 36 hours)      Manual Treatments     Row Name 02/22/21 1429             Total Minutes    64020 - OT Manual Therapy Minutes  66  -CW        User Key  (r) = Recorded By, (t) = Taken By, (c) = Cosigned By    Initials Name Provider Type    Colleen Cleary OTR Occupational Therapist            Therapy Education  Given: Bandaging/dressing change, Edema management  Program: Reinforced, New  How Provided: Verbal, Demonstration  Provided to: Patient  Level of Understanding: Verbalized, Teach back education performed                Time Calculation:   OT Start Time: 0852  OT Stop Time: 0958  OT Time Calculation (min): 66 min  Total Timed Code Minutes- OT: 66 minute(s)     Therapy Charges for Today     Code Description Service Date Service Provider Modifiers Qty    85570201652  OT MANUAL THERAPY EA 15 MIN 2/22/2021 Colleen Hidalgo OTR GO 4                      RADHA Miller  2/22/2021

## 2021-02-23 ENCOUNTER — OFFICE VISIT (OUTPATIENT)
Dept: WOUND CARE | Facility: HOSPITAL | Age: 61
End: 2021-02-23

## 2021-02-23 ENCOUNTER — HOSPITAL ENCOUNTER (OUTPATIENT)
Dept: OCCUPATIONAL THERAPY | Facility: HOSPITAL | Age: 61
Setting detail: THERAPIES SERIES
Discharge: HOME | End: 2021-02-23

## 2021-02-23 DIAGNOSIS — I89.0 LYMPHEDEMA: ICD-10-CM

## 2021-02-23 DIAGNOSIS — I89.0 LYMPHEDEMA OF BOTH LOWER EXTREMITIES: Primary | ICD-10-CM

## 2021-02-23 PROCEDURE — G0463 HOSPITAL OUTPT CLINIC VISIT: HCPCS

## 2021-02-23 PROCEDURE — 97140 MANUAL THERAPY 1/> REGIONS: CPT

## 2021-02-23 PROCEDURE — 97535 SELF CARE MNGMENT TRAINING: CPT

## 2021-02-23 NOTE — THERAPY TREATMENT NOTE
Outpatient Occupational Therapy Lymphedema Treatment Note  [facility]     Patient Name: Emely Solomon  : 1960  MRN: [id number]  Today's Date: 2021      Visit Date: 2021    Patient Active Problem List   Diagnosis   • Chronic diastolic CHF (congestive heart failure) (CMS/HCC)   • PFO (patent foramen ovale)   • Paradoxical embolism (CMS/HCC)   • Essential hypertension   • Pulmonary hypertension (CMS/HCC)   • Back pain   • ARIEL (obstructive sleep apnea)   • Class 3 severe obesity due to excess calories with serious comorbidity and body mass index (BMI) greater than or equal to 70 in adult (CMS/HCC)   • History of DVT of lower extremity   • Lymphedema   • Anemia, chronic disease   • CKD (chronic kidney disease) stage 3, GFR 30-59 ml/min (CMS/HCC)   • Iron deficiency        Past Medical History:   Diagnosis Date   • Cellulitis     2017, with Group B Strep bacteremia and sepsis   • Chronic diastolic congestive heart failure (CMS/HCC)    • Deep venous thrombosis (CMS/HCC)    • Hypertension    • Lipoedema    • Lymphedema    • Morbid obesity (CMS/HCC)    • Paradoxical embolism (CMS/HCC)     to the LLE due to DVT/PE and PFO   • PFO (patent foramen ovale)    • Pulmonary embolism (CMS/HCC)    • Pulmonary hypertension (CMS/HCC)     multifactorial (dCHF, obesity/ARIEL, hx PE), mild by echo 2016   • Sepsis (CMS/HCC) 2020        Past Surgical History:   Procedure Laterality Date   • BRONCHOSCOPY N/A 2017    Procedure: BRONCHOSCOPY with wash;  Surgeon: Caleb Garcia MD;  Location: [facility] ENDOSCOPY;  Service:    • DILATATION AND CURETTAGE  2011   • VENA CAVA FILTER PLACEMENT      Inferior Vena Cava Filter Placement w/Fluorosc angiogr guidance         Visit Dx:      ICD-10-CM ICD-9-CM   1. Lymphedema of both lower extremities  I89.0 457.1   2. Lymphedema  I89.0 457.1       Lymphedema     Row Name 21 0800             Subjective Pain    Able to rate subjective pain?  yes  -CW       "Pre-Treatment Pain Level  0  -CW      Post-Treatment Pain Level  0  -CW         Subjective Comments    Subjective Comments  Pt. was able to wear the velcro compression at day/night. Reports she has gained weight.   -CW         Skin Changes/Observations    Location/Assessment  Lower Extremity  -CW      Lower Extremity Conditions  bilateral:;dry;shiny;scaly  -CW      Lower Extremity Color/Pigment  bilateral:;red;fibrosis  -CW      Lower Extremity Skin Details  Wound dressings completed by pt./spouse this morning. LLE lateral wound with less drainage. Wound not as deep.  2nd wound anterior shin noted -trace drainage noted. Anterior wound apears to be healing.   -CW      Skin Observations Comment  Skin clearer between skin folds knee area.   -CW         Manual Lymphatic Drainage    Manual Lymphatic Drainage  initial sequence;opened regional lymph nodes;opened anastamoses;extremity treatment  -CW      Initial Sequence  short neck  -CW      Opened Regional Lymph Nodes  axillary;inguinal  -CW      Axillary  right;left  -CW      Inguinal  right;left  -CW      Opened Anastamoses  inguino-axillary  -CW      Inguino-Axillary  right;left  -CW      Extremity Treatment  MLD to full limb  -CW      MLD to Full Limb  BLE's  -CW         Compression/Skin Care    Compression/Skin Care  skin care  -CW      Skin Care  moisturizing lotion applied  -CW      Wrapping Location  lower extremity  -CW      Wrapping Location LE  right:;ankle;knee  -CW      Wrapping Comments  R/LE-K1, 2- Rosidal soft, 1- 10cm. Artiflex, 1- 8cm. 3-10cm. rosidal bandages.  Added tubigrip to R/LLE ankle to thigh. Kerlex to LLE. ABD pad to L lateral LE wound. Added black foam to RLE under the Rosidal soft. Added black 1/4\"foam to B calf area.   -CW      Bandage Layers  cotton liner;padding/fluff layer;soft foam- 1/4 inch;short-stretch bandages (comment size/quantity)  -CW      Bandaging Technique  circumferential/spiral;moderate compression  -CW      Remove Bandages  " "Bandages were removed at home.   -CW      Compression/Skin Care Comments  Kerlix and telfa pad  layer to LLE wound.   -CW        User Key  (r) = Recorded By, (t) = Taken By, (c) = Cosigned By    Initials Name Provider Type    Colleen Cleary OTR Occupational Therapist                  OT Assessment/Plan     Row Name 02/23/21 1400          OT Assessment    Assessment Comments  Pt. was able to wear the velcro compression all day and remove for wound dressing change. LLE lateral and anterior shin wound appears smaller. Added black 1/4\" foam to BLE's under the bandages for padding. Applied the bandages with moderate compression with slightly added compression.  Pt. reports she has gained weight. Encouraged pt. and instructed healthy habits for lymph. management and HEP. Will re-measure tomorrow and review D/c info. handouts.  -CW        OT Plan    OT Plan Comments  Plan to cont. tx. Thigh reduction kit has been ordered.  -CW       User Key  (r) = Recorded By, (t) = Taken By, (c) = Cosigned By    Initials Name Provider Type    Colleen Cleary OTR Occupational Therapist                 Manual Rx (last 36 hours)      Manual Treatments     Row Name 02/23/21 1405             Total Minutes    73459 - OT Manual Therapy Minutes  60  -CW        User Key  (r) = Recorded By, (t) = Taken By, (c) = Cosigned By    Initials Name Provider Type    Cloleen Cleary OTR Occupational Therapist            Therapy Education  Education Details: Discussed poc and reviewed healthy habits for lymph management.  Given: Edema management, HEP, Bandaging/dressing change, Symptoms/condition management  Program: Reinforced  How Provided: Verbal, Demonstration  Provided to: Patient  Level of Understanding: Verbalized  37450 - OT Self Care/Mgmt Minutes: 10                Time Calculation:   OT Start Time: 0850  OT Stop Time: 1000  OT Time Calculation (min): 70 min  Total Timed Code Minutes- OT: 70 minute(s)     Therapy Charges for Today     Code " Description Service Date Service Provider Modifiers Qty    60187042084 HC OT MANUAL THERAPY EA 15 MIN 2/23/2021 Colleen Hidalgo OTR GO 4    63214935048 HC OT SELF CARE/MGMT/TRAIN EA 15 MIN 2/23/2021 Colleen Hidalgo OTR GO 1                      RADHA Miller  2/23/2021

## 2021-02-24 ENCOUNTER — HOSPITAL ENCOUNTER (OUTPATIENT)
Dept: OCCUPATIONAL THERAPY | Facility: HOSPITAL | Age: 61
Setting detail: THERAPIES SERIES
Discharge: HOME | End: 2021-02-24

## 2021-02-24 DIAGNOSIS — I89.0 LYMPHEDEMA OF BOTH LOWER EXTREMITIES: Primary | ICD-10-CM

## 2021-02-24 DIAGNOSIS — I89.0 LYMPHEDEMA: ICD-10-CM

## 2021-02-24 PROCEDURE — 97535 SELF CARE MNGMENT TRAINING: CPT

## 2021-02-24 PROCEDURE — 97140 MANUAL THERAPY 1/> REGIONS: CPT

## 2021-02-24 NOTE — THERAPY TREATMENT NOTE
Outpatient Occupational Therapy Lymphedema Treatment Note/weekly progress note  [facility]     Patient Name: Emely Solomon  : 1960  MRN: [id number]  Today's Date: 2021      Visit Date: 2021    Patient Active Problem List   Diagnosis   • Chronic diastolic CHF (congestive heart failure) (CMS/HCC)   • PFO (patent foramen ovale)   • Paradoxical embolism (CMS/HCC)   • Essential hypertension   • Pulmonary hypertension (CMS/HCC)   • Back pain   • ARIEL (obstructive sleep apnea)   • Class 3 severe obesity due to excess calories with serious comorbidity and body mass index (BMI) greater than or equal to 70 in adult (CMS/HCC)   • History of DVT of lower extremity   • Lymphedema   • Anemia, chronic disease   • CKD (chronic kidney disease) stage 3, GFR 30-59 ml/min (CMS/HCC)   • Iron deficiency        Past Medical History:   Diagnosis Date   • Cellulitis     2017, with Group B Strep bacteremia and sepsis   • Chronic diastolic congestive heart failure (CMS/HCC)    • Deep venous thrombosis (CMS/HCC)    • Hypertension    • Lipoedema    • Lymphedema    • Morbid obesity (CMS/HCC)    • Paradoxical embolism (CMS/HCC)     to the LLE due to DVT/PE and PFO   • PFO (patent foramen ovale)    • Pulmonary embolism (CMS/HCC)    • Pulmonary hypertension (CMS/HCC)     multifactorial (dCHF, obesity/ARIEL, hx PE), mild by echo 2016   • Sepsis (CMS/HCC) 2020        Past Surgical History:   Procedure Laterality Date   • BRONCHOSCOPY N/A 2017    Procedure: BRONCHOSCOPY with wash;  Surgeon: Caleb Garcia MD;  Location: [facility] ENDOSCOPY;  Service:    • DILATATION AND CURETTAGE  2011   • VENA CAVA FILTER PLACEMENT      Inferior Vena Cava Filter Placement w/Fluorosc angiogr guidance         Visit Dx:      ICD-10-CM ICD-9-CM   1. Lymphedema of both lower extremities  I89.0 457.1   2. Lymphedema  I89.0 457.1       Lymphedema     Row Name 21 0800             Subjective Pain    Able to rate subjective pain?  yes   -CW      Pre-Treatment Pain Level  0  -CW      Post-Treatment Pain Level  0  -CW         Subjective Comments    Subjective Comments  Pt. reports no LE pain. Some itching   -CW         Skin Changes/Observations    Location/Assessment  Lower Extremity  -CW      Lower Extremity Conditions  bilateral:;dry;shiny;scaly  -CW      Lower Extremity Color/Pigment  bilateral:;red;fibrosis  -CW      Lower Extremity Skin Details  Wound dressings completed by pt./spouse this morning. LLE lateral wound with less drainage. Wound not as deep.  2nd wound anterior shin noted -trace drainage noted. Anterior wound apears to be healing.   -CW      Skin Observations Comment  Skin clearer between skin folds knee area.   -CW         Lymphedema Measurements    Measurement Type(s)  --  -CW      Circumferential Areas  --  -CW         BLE Circumferential (cm)    Measurement Location 1  --  -CW      Measurement Location 2  --  -CW      Measurement Location 3  --  -CW      Measurement Location 4  --  -CW      Measurement Location 5  --  -CW      Measurement Location 6  --  -CW      Measurement Location 7  --  -CW      Measurement Location 8  --  -CW         Manual Lymphatic Drainage    Manual Lymphatic Drainage  initial sequence;opened regional lymph nodes;opened anastamoses;extremity treatment  -CW      Initial Sequence  short neck  -CW      Opened Regional Lymph Nodes  axillary;inguinal  -CW      Axillary  right;left  -CW      Inguinal  right;left  -CW      Opened Anastamoses  inguino-axillary  -CW      Inguino-Axillary  right;left  -CW      Extremity Treatment  MLD to full limb  -CW      MLD to Full Limb  BLE's  -CW         Compression/Skin Care    Compression/Skin Care  skin care  -CW      Skin Care  moisturizing lotion applied  -CW      Wrapping Location  lower extremity  -CW      Wrapping Location LE  right:;ankle;knee  -CW      Wrapping Comments  R/LE-K1, 2- Rosidal soft, 1- 10cm. Artiflex, 1- 8cm. 3-10cm. rosidal bandages.  Added tubigrip  "to R/LLE ankle to thigh. Kerlex to LLE. ABD pad to L lateral LE wound. Added black foam to RLE under the Rosidal soft. Added black 1/4\"foam to B calf area.   -CW      Bandage Layers  cotton liner;padding/fluff layer;soft foam- 1/4 inch;short-stretch bandages (comment size/quantity)  -CW      Bandaging Technique  circumferential/spiral;moderate compression  -CW      Remove Bandages  Bandages were removed at home.   -CW      Compression/Skin Care Comments  Kerlix and telfa pad  layer to LLE wound.   -CW        User Key  (r) = Recorded By, (t) = Taken By, (c) = Cosigned By    Initials Name Provider Type    Colleen Cleary OTR Occupational Therapist                  OT Assessment/Plan     Row Name 02/24/21 7533          OT Assessment    Assessment Comments  Pt. was able to tolerate the compression bandages during the day and remove for wound dressing changes.   No pain complaints at present BLE's.  Skin dry and intact RLE with no skin rashes or irritation. LLE skin cont. inflammed/red with 2 wounds dressings in place. Min drainage noted LLE anterior shin wound. Applied the bandages with slightly more compression as tolerated with no discomfort. Reviewed bandage precautions, wearing schedule and skin care. Reviewed sequence and technique on how to apply the compression bandages. Pt. can decrease compression or remove a bandage layer at night if needed for comfort. Discussed 18 steps for prevention, skin care, infection info. and health habits for lymph. management. Issued handouts. Reviewed overall lymph management to maximize reductions made during tx. Pt.'s sister has order austin compression pants and Medi red Melany to fit pt. for velcro thigh compression 3/1/21.  -CW        OT Plan    OT Plan Comments  Plan to cont. tx. thigh reduction kit, and austin compression pants have been ordered.  -CW       User Key  (r) = Recorded By, (t) = Taken By, (c) = Cosigned By    Initials Name Provider Type    Colleen Cleary OTR " Occupational Therapist                 Manual Rx (last 36 hours)      Manual Treatments     Row Name 02/24/21 1349             Total Minutes    74352 - OT Manual Therapy Minutes  63  -CW        User Key  (r) = Recorded By, (t) = Taken By, (c) = Cosigned By    Initials Name Provider Type    Colleen Cleary OTR Occupational Therapist        OT Goals     Row Name 02/24/21 1300          OT Short Term Goals    STG Date to Achieve  03/05/21  -CW     STG 1  Patient to demonstrate proper awareness of “What is Lymphedema” and DO’s and Don’ts” for improved prevention, management, care of symptoms, and ease of transition to self-care of condition.  -CW     STG 1 Progress  Ongoing;Progressing  -CW     STG 2  Patient independent and compliant with self-wrapping techniques of compression bandages with family member or caregiver as indicated for improved self-management of condition.  -CW     STG 2 Progress  Partially Met  -CW     STG 3  Patient demonstrate decreased net edema of  BLE's   >/5-10 cm. for decreased in edema, symptoms, decreased risk of infection, and improved skin-care/transition to self-care of condition.  -CW     STG 3 Progress  Met  -CW     STG 4  Patient independent and compliant with initial home exercise program focused on diaphragmatic breathing, range of motion, flexibility, to decrease edema, improve lymphatic flow for decreased edema and decreased risk of infection.  -CW     STG 4 Progress  Met  -CW        Long Term Goals    LTG Date to Achieve  03/19/21  -CW     LTG 1  Patient will demonstrate decreased net edema of  BLE's >/=50-80 cm. for decrease in symptoms, decreased risk of infection, and improved skin-care/transition to self-care of condition.   -CW     LTG 1 Progress  Ongoing  -CW     LTG 2  Patient/family/caregivers independent with self-care techniques for self-management of condition.  -CW     LTG 2 Progress  Ongoing;Progressing  -CW     LTG 3  Patient independent and compliant with use and  care of compression (example garments, inelastic velcro compression) with assistance of a caregiver as needed to promote self-care independence.    -CW     LTG 3 Progress  Ongoing;Progressing  -CW       User Key  (r) = Recorded By, (t) = Taken By, (c) = Cosigned By    Initials Name Provider Type    CW Colleen Hidalgo OTR Occupational Therapist          Therapy Education  Education Details: Reviewed 18 steps for edema prevention, skin care, infection info and overall edema management.  Given: Bandaging/dressing change, Edema management, Symptoms/condition management  Program: New, Reinforced  How Provided: Verbal, Demonstration, Written  Provided to: Patient  Level of Understanding: Verbalized  48903 - OT Self Care/Mgmt Minutes: 8                Time Calculation:   OT Start Time: 0848  OT Stop Time: 0959  OT Time Calculation (min): 71 min  Total Timed Code Minutes- OT: 71 minute(s)     Therapy Charges for Today     Code Description Service Date Service Provider Modifiers Qty    22208833372  OT MANUAL THERAPY EA 15 MIN 2/24/2021 Colleen Hidalgo OTR GO 4    [id number]  OT SELF CARE/MGMT/TRAIN EA 15 MIN 2/24/2021 Colleen Hidalgo OTR GO 1                      RADHA Miller  2/24/2021

## 2021-02-26 ENCOUNTER — APPOINTMENT (OUTPATIENT)
Dept: OCCUPATIONAL THERAPY | Facility: HOSPITAL | Age: 61
End: 2021-02-26

## 2021-03-09 ENCOUNTER — APPOINTMENT (OUTPATIENT)
Dept: WOUND CARE | Facility: HOSPITAL | Age: 61
End: 2021-03-09

## 2021-03-10 RX ORDER — LOSARTAN POTASSIUM 100 MG/1
100 TABLET ORAL DAILY
Qty: 90 TABLET | Refills: 0 | OUTPATIENT
Start: 2021-03-10

## 2021-03-11 ENCOUNTER — TELEPHONE (OUTPATIENT)
Dept: FAMILY MEDICINE CLINIC | Facility: CLINIC | Age: 61
End: 2021-03-11

## 2021-03-11 RX ORDER — LOSARTAN POTASSIUM 100 MG/1
100 TABLET ORAL DAILY
Qty: 90 TABLET | Refills: 0 | Status: SHIPPED | OUTPATIENT
Start: 2021-03-11 | End: 2021-06-14

## 2021-03-11 NOTE — TELEPHONE ENCOUNTER
PATIENT CALLED TO GET A REFILL OF LOSARTAN.   STATED THAT THE PHARMACY HAD TRIED TO REACH OUT TO THE OFFICE FOR A REFILL BUT IT WAS DENIED.    PLEASE ADVISE   [phone]     [id number] - [place] [address] AT [address] - [phone]  - [phone] FX  [phone]

## 2021-03-16 ENCOUNTER — OFFICE VISIT (OUTPATIENT)
Dept: WOUND CARE | Facility: HOSPITAL | Age: 61
End: 2021-03-16

## 2021-03-16 PROCEDURE — 97602 WOUND(S) CARE NON-SELECTIVE: CPT

## 2021-03-19 ENCOUNTER — BULK ORDERING (OUTPATIENT)
Dept: CASE MANAGEMENT | Facility: OTHER | Age: 61
End: 2021-03-19

## 2021-03-19 DIAGNOSIS — Z23 IMMUNIZATION DUE: ICD-10-CM

## 2021-03-22 ENCOUNTER — IMMUNIZATION (OUTPATIENT)
Dept: VACCINE CLINIC | Facility: HOSPITAL | Age: 61
End: 2021-03-22

## 2021-03-22 DIAGNOSIS — Z23 IMMUNIZATION DUE: ICD-10-CM

## 2021-03-22 PROCEDURE — 0001A: CPT | Performed by: INTERNAL MEDICINE

## 2021-03-22 PROCEDURE — 91300 HC SARSCOV02 VAC 30MCG/0.3ML IM: CPT | Performed by: INTERNAL MEDICINE

## 2021-04-06 ENCOUNTER — OFFICE VISIT (OUTPATIENT)
Dept: WOUND CARE | Facility: HOSPITAL | Age: 61
End: 2021-04-06

## 2021-04-12 ENCOUNTER — IMMUNIZATION (OUTPATIENT)
Dept: VACCINE CLINIC | Facility: HOSPITAL | Age: 61
End: 2021-04-12

## 2021-04-12 PROCEDURE — 91300 HC SARSCOV02 VAC 30MCG/0.3ML IM: CPT | Performed by: INTERNAL MEDICINE

## 2021-04-12 PROCEDURE — 0002A: CPT | Performed by: INTERNAL MEDICINE

## 2021-04-15 ENCOUNTER — OFFICE VISIT (OUTPATIENT)
Dept: FAMILY MEDICINE CLINIC | Facility: CLINIC | Age: 61
End: 2021-04-15

## 2021-04-15 VITALS
TEMPERATURE: 98 F | OXYGEN SATURATION: 96 % | SYSTOLIC BLOOD PRESSURE: 138 MMHG | WEIGHT: 293 LBS | HEART RATE: 77 BPM | DIASTOLIC BLOOD PRESSURE: 80 MMHG | BODY MASS INDEX: 61.29 KG/M2 | RESPIRATION RATE: 16 BRPM

## 2021-04-15 DIAGNOSIS — Z11.59 ENCOUNTER FOR HEPATITIS C SCREENING TEST FOR LOW RISK PATIENT: ICD-10-CM

## 2021-04-15 DIAGNOSIS — I10 ESSENTIAL HYPERTENSION: ICD-10-CM

## 2021-04-15 DIAGNOSIS — I89.0 LYMPHEDEMA: Primary | ICD-10-CM

## 2021-04-15 DIAGNOSIS — D63.8 ANEMIA, CHRONIC DISEASE: ICD-10-CM

## 2021-04-15 DIAGNOSIS — N18.30 STAGE 3 CHRONIC KIDNEY DISEASE, UNSPECIFIED WHETHER STAGE 3A OR 3B CKD (HCC): ICD-10-CM

## 2021-04-15 DIAGNOSIS — E66.01 CLASS 3 SEVERE OBESITY DUE TO EXCESS CALORIES WITH SERIOUS COMORBIDITY AND BODY MASS INDEX (BMI) GREATER THAN OR EQUAL TO 70 IN ADULT (HCC): ICD-10-CM

## 2021-04-15 LAB
ALBUMIN SERPL-MCNC: 4.6 G/DL (ref 3.5–5.2)
ALBUMIN/GLOB SERPL: 1.2 G/DL
ALP SERPL-CCNC: 115 U/L (ref 39–117)
ALT SERPL-CCNC: 12 U/L (ref 1–33)
AST SERPL-CCNC: 14 U/L (ref 1–32)
BILIRUB SERPL-MCNC: 0.8 MG/DL (ref 0–1.2)
BUN SERPL-MCNC: 25 MG/DL (ref 8–23)
BUN/CREAT SERPL: 22.1 (ref 7–25)
CALCIUM SERPL-MCNC: 10.2 MG/DL (ref 8.6–10.5)
CHLORIDE SERPL-SCNC: 99 MMOL/L (ref 98–107)
CHOLEST SERPL-MCNC: 205 MG/DL (ref 0–200)
CO2 SERPL-SCNC: 26.4 MMOL/L (ref 22–29)
CREAT SERPL-MCNC: 1.13 MG/DL (ref 0.57–1)
ERYTHROCYTE [DISTWIDTH] IN BLOOD BY AUTOMATED COUNT: 14.4 % (ref 12.3–15.4)
GLOBULIN SER CALC-MCNC: 3.9 GM/DL
GLUCOSE SERPL-MCNC: 82 MG/DL (ref 65–99)
HCT VFR BLD AUTO: 35.3 % (ref 34–46.6)
HDLC SERPL-MCNC: 62 MG/DL (ref 40–60)
HGB BLD-MCNC: 11.4 G/DL (ref 12–15.9)
LDLC SERPL CALC-MCNC: 119 MG/DL (ref 0–100)
MCH RBC QN AUTO: 25.9 PG (ref 26.6–33)
MCHC RBC AUTO-ENTMCNC: 32.3 G/DL (ref 31.5–35.7)
MCV RBC AUTO: 80 FL (ref 79–97)
PLATELET # BLD AUTO: 332 10*3/MM3 (ref 140–450)
POTASSIUM SERPL-SCNC: 4.9 MMOL/L (ref 3.5–5.2)
PROT SERPL-MCNC: 8.5 G/DL (ref 6–8.5)
RBC # BLD AUTO: 4.41 10*6/MM3 (ref 3.77–5.28)
SODIUM SERPL-SCNC: 136 MMOL/L (ref 136–145)
TRIGL SERPL-MCNC: 136 MG/DL (ref 0–150)
VLDLC SERPL CALC-MCNC: 24 MG/DL (ref 5–40)
WBC # BLD AUTO: 3.91 10*3/MM3 (ref 3.4–10.8)

## 2021-04-15 PROCEDURE — 99214 OFFICE O/P EST MOD 30 MIN: CPT | Performed by: FAMILY MEDICINE

## 2021-04-15 NOTE — PROGRESS NOTES
Chief Complaint  Obesity and Anemia    Subjective    History of Present Illness {CC  Problem List  Visit  Diagnosis   Encounters  Notes  Medications  Labs  Result Review Imaging  Media :23}     Emely Solomon presents to [facility] for Obesity and Anemia.  History of Present Illness     Here today for general follow-up.  Doing fairly well overall, continues to follow-up regularly with wound clinic and lymphedema clinic.  Continues to wrap her legs regularly no is currently washing the inner sleeve so not wearing them at present.  Continues to work on diet and exercise though getting around remains challenging.  Is using a walker at home with no recent falls.    Due for some basic blood work today.  Blood pressure is well controlled.  She reports good adherence and tolerance to her current antihypertensive regimen.  No need for any refills at this time.    Has been immunized against Covid, feels some level of reassurance in this.   has also had his vaccines.    History of anemia of chronic disease.  Needs a CBC checked along with the rest of her labs today.    Has not had a preventive health visit in some time, willing to schedule one in the near future.    Objective     Vital Signs:   /80   Pulse 77   Temp 98 °F (36.7 °C)   Resp 16   Wt (!) 157 kg (346 lb)   SpO2 96%   BMI 61.29 kg/m²   Physical Exam  Vitals and nursing note reviewed.   Constitutional:       General: She is not in acute distress.     Appearance: Normal appearance. She is not ill-appearing.   Cardiovascular:      Rate and Rhythm: Normal rate and regular rhythm.      Pulses: Normal pulses.      Heart sounds: Normal heart sounds. No murmur heard.     Pulmonary:      Effort: Pulmonary effort is normal. No respiratory distress.      Breath sounds: Normal breath sounds. No rales.   Musculoskeletal:      Right lower le+ Pitting Edema present.      Left lower le+ Pitting Edema present.       Comments: Slightly shuffling gait due to lymphedema   Neurological:      Mental Status: She is alert and oriented to person, place, and time. Mental status is at baseline.   Psychiatric:         Mood and Affect: Mood normal.         Behavior: Behavior normal.          Result Review  Data Reviewed:{ Labs  Result Review  Imaging  Med Tab  Media :23}                   Assessment and Plan {CC Problem List  Visit Diagnosis  ROS  Review (Popup)  Health Maintenance  Quality  BestPractice  Medications  SmartSets  SnapShot Encounters  Media :23}   Diagnoses and all orders for this visit:    1. Lymphedema (Primary)    2. Class 3 severe obesity due to excess calories with serious comorbidity and body mass index (BMI) greater than or equal to 70 in adult (CMS/[facility])  -     Comprehensive Metabolic Panel    3. Essential hypertension  -     Comprehensive Metabolic Panel  -     Lipid Panel    4. Anemia, chronic disease  -     CBC (No Diff)    5. Stage 3 chronic kidney disease, unspecified whether stage 3a or 3b CKD (CMS/[facility])  -     Comprehensive Metabolic Panel  -     Lipid Panel    6. Encounter for hepatitis C screening test for low risk patient  -     Hepatitis C Antibody    Orders as above.  I will contact her with results as available and will discuss any necessary changes to her regimen at that time.  Otherwise continue current regimen as prescribed.    I encouraged her to continue using her walker as much as possible and encouraged regular physical activity to help with mobilization of fluid.  Encouraged her to follow-up with both wound care and lymphedema clinic as scheduled.    Recommended follow-up as below.  Encouraged communication via [email or web address] in the meantime.      Follow Up {Instructions Charge Capture  Follow-up Communications :23}     Patient was given instructions and counseling regarding her condition or for health maintenance advice. Please see specific information pulled into the AVS (placed  there by myself) if appropriate.    Return in about 2 months (around 6/15/2021), or if symptoms worsen or fail to improve, for Preventive Health Maintenance.      CHIP Wilkerson MD

## 2021-04-16 LAB — HCV AB S/CO SERPL IA: <0.1 S/CO RATIO (ref 0–0.9)

## 2021-04-27 ENCOUNTER — OFFICE VISIT (OUTPATIENT)
Dept: WOUND CARE | Facility: HOSPITAL | Age: 61
End: 2021-04-27

## 2021-04-27 PROCEDURE — G0463 HOSPITAL OUTPT CLINIC VISIT: HCPCS

## 2021-04-30 NOTE — NURSING NOTE
CWOCN follow up for compression wrap.  Granulation to right leg blister with only 2 areas slightly bleeding with dressing removal.  Right upper thigh with small partial thickness area remaining also.  Legs cleaned with soap and water.  Optifoam applied to thigh. Silicone dressing and opticell ag, optifoam applied to right medial leg area.  Leg wrapped with profore 4 layer system.  Sarna lotion requested for rash to torso and arms which are now itching.  Antifungal powder requested for groin folds. Patient anticipating discharge.    Pt will meet at least 80% ENN w/ meals/supplements, no further wt loss, reduced s/s malnutrition

## 2021-05-18 ENCOUNTER — OFFICE VISIT (OUTPATIENT)
Dept: WOUND CARE | Facility: HOSPITAL | Age: 61
End: 2021-05-18

## 2021-05-18 PROCEDURE — G0463 HOSPITAL OUTPT CLINIC VISIT: HCPCS

## 2021-06-14 RX ORDER — LOSARTAN POTASSIUM 100 MG/1
100 TABLET ORAL DAILY
Qty: 90 TABLET | Refills: 1 | Status: SHIPPED | OUTPATIENT
Start: 2021-06-14 | End: 2021-12-12 | Stop reason: SDUPTHER

## 2021-06-15 ENCOUNTER — APPOINTMENT (OUTPATIENT)
Dept: WOUND CARE | Facility: HOSPITAL | Age: 61
End: 2021-06-15

## 2021-06-29 ENCOUNTER — OFFICE VISIT (OUTPATIENT)
Dept: FAMILY MEDICINE CLINIC | Facility: CLINIC | Age: 61
End: 2021-06-29

## 2021-06-29 VITALS
RESPIRATION RATE: 16 BRPM | DIASTOLIC BLOOD PRESSURE: 74 MMHG | SYSTOLIC BLOOD PRESSURE: 128 MMHG | TEMPERATURE: 97.6 F | HEART RATE: 74 BPM | OXYGEN SATURATION: 98 %

## 2021-06-29 DIAGNOSIS — I10 ESSENTIAL HYPERTENSION: ICD-10-CM

## 2021-06-29 DIAGNOSIS — I89.0 LYMPHEDEMA: Primary | ICD-10-CM

## 2021-06-29 DIAGNOSIS — E66.01 CLASS 3 SEVERE OBESITY DUE TO EXCESS CALORIES WITH SERIOUS COMORBIDITY AND BODY MASS INDEX (BMI) GREATER THAN OR EQUAL TO 70 IN ADULT (HCC): ICD-10-CM

## 2021-06-29 PROCEDURE — 99213 OFFICE O/P EST LOW 20 MIN: CPT | Performed by: FAMILY MEDICINE

## 2021-06-29 NOTE — PROGRESS NOTES
Chief Complaint  Edema (follow up)    Subjective    History of Present Illness {CC  Problem List  Visit  Diagnosis   Encounters  Notes  Medications  Labs  Result Review Imaging  Media :23}     Emely Solomon presents to [facility] for Edema (follow up).  History of Present Illness     Here for f/u as above. Has been discharged from lymphedema clinic and is no longer utilizing home health. Is wrapping her legs at home with proper supplies. Finds that the wraps and exercises still help overall though she retains a significant amount of edema at baseline. Taking furosemide as prescribed, tolerating well. Has f/u with cardiology in November.    Reports good adherence and tolerance to current medication regimen otherwise.  Blood pressure is well controlled today.    Objective     Vital Signs:   /74   Pulse 74   Temp 97.6 °F (36.4 °C)   Resp 16   SpO2 98%   Physical Exam  Vitals and nursing note reviewed.   Constitutional:       General: She is not in acute distress.     Appearance: Normal appearance. She is not ill-appearing.   Cardiovascular:      Rate and Rhythm: Normal rate and regular rhythm.      Heart sounds: Normal heart sounds. No murmur heard.        Comments: Legs wrapped bilateral.  Pulmonary:      Effort: Pulmonary effort is normal. No respiratory distress.      Breath sounds: Normal breath sounds. No rales.   Musculoskeletal:      Right lower le+ Pitting Edema present.      Left lower le+ Pitting Edema present.   Neurological:      Mental Status: She is alert and oriented to person, place, and time. Mental status is at baseline.   Psychiatric:         Mood and Affect: Mood normal.         Behavior: Behavior normal.          Result Review  Data Reviewed:{ Labs  Result Review  Imaging  Med Tab  Media :23}                   Assessment and Plan {CC Problem List  Visit Diagnosis  ROS  Review (Popup)  Health Maintenance  Quality  BestPractice   Medications  SmartSets  SnapShot Encounters  Media :23}   Diagnoses and all orders for this visit:    1. Lymphedema (Primary)    2. Essential hypertension    3. Class 3 severe obesity due to excess calories with serious comorbidity and body mass index (BMI) greater than or equal to 70 in adult (CMS/[facility])    Recommended she continue with current home regimen.  Follow-up with cardiology as scheduled.  I do think that efforts towards further exercise and to work on diet will be helpful overall.    Recommended follow-up as below.  Encouraged communication via [employer] in the meantime.      Follow Up {Instructions Charge Capture  Follow-up Communications :23}     Patient was given instructions and counseling regarding her condition or for health maintenance advice. Please see specific information pulled into the AVS (placed there by myself) if appropriate.    Return in about 3 months (around 9/29/2021) for Preventive Health Maintenance.      CHIP Wilkerson MD

## 2021-06-30 ENCOUNTER — DOCUMENTATION (OUTPATIENT)
Dept: OCCUPATIONAL THERAPY | Facility: HOSPITAL | Age: 61
End: 2021-06-30

## 2021-06-30 DIAGNOSIS — I89.0 LYMPHEDEMA: ICD-10-CM

## 2021-06-30 DIAGNOSIS — I89.0 LYMPHEDEMA OF BOTH LOWER EXTREMITIES: Primary | ICD-10-CM

## 2021-07-26 ENCOUNTER — TELEPHONE (OUTPATIENT)
Dept: CARDIOLOGY | Facility: CLINIC | Age: 61
End: 2021-07-26

## 2021-07-26 NOTE — TELEPHONE ENCOUNTER
Unfortunately, if she continues to take it at suppertime she is going to urinate during the nighttime.  I would recommend that she take it first thing in the morning.  Her body should get used to it and she will urinate frequently in the morning and then it should decrease.    She could always try taking it at 12 or 2 PM to see if that reduces the nighttime bathroom usage.

## 2021-07-26 NOTE — TELEPHONE ENCOUNTER
Patient called informing me that she is running to the bathroom a lot at night.  Patient currently takes Furosemide 40mg after supper.  (Patient said she doesn't take it sooner because she doesn't want to be out and not be able to find a bathroom.)  Patient said this has been going on for a while.  Patient wandered if there is anything else she could do to not go to the restroom at night as much.  Please advise.  Jane

## 2021-08-05 NOTE — THERAPY TREATMENT NOTE
Outpatient Occupational Therapy Lymphedema Treatment Note  [facility]     Patient Name: Emely Solomon  : 1960  MRN: [id number]  Today's Date: 2017      Visit Date: 2017    Patient Active Problem List   Diagnosis   • Chronic diastolic congestive heart failure   • PFO (patent foramen ovale)   • Paradoxical embolism   • Hypertension   • Pulmonary hypertension        Past Medical History:   Diagnosis Date   • Chronic diastolic congestive heart failure    • Deep venous thrombosis    • Hypertension    • Lipoedema    • Lymphedema    • Morbid obesity    • Paradoxical embolism     to the LLE due to DVT/PE and PFO   • PFO (patent foramen ovale)    • Pulmonary embolism    • Pulmonary hypertension     multifactorial (dCHF, obesity/ARIEL, hx PE), mild by echo 2016        Past Surgical History:   Procedure Laterality Date   • BRONCHOSCOPY N/A 2017    Procedure: BRONCHOSCOPY with wash;  Surgeon: Caleb Garcia MD;  Location: [facility] ENDOSCOPY;  Service:    • DILATATION AND CURETTAGE  2011   • VENA CAVA FILTER PLACEMENT      Inferior Vena Cava Filter Placement w/Fluorosc angiogr guidance         Visit Dx:      ICD-10-CM ICD-9-CM   1. Lymphedema of both lower extremities I89.0 457.1   2. Lipoedema R60.9 782.3   3. Decreased mobility and endurance Z74.09 780.99             Lymphedema       17 1300          Subjective Comments    Subjective Comments --   Pt. is concerned about taking time off work for lymph. tx.  -CW      Subjective Pain    Able to rate subjective pain? yes  -CW      Pre-Treatment Pain Level 0  -CW      Post-Treatment Pain Level 0  -CW      Subjective Pain Comment --   Some itching/sorness behind L knee.   -CW      Skin Changes/Observations    Location/Assessment Lower Extremity  -CW      Lower Extremity Conditions bilateral:;shiny;scab(s);inflamed;fragile  -CW      Lower Extremity Color/Pigment left:;erythema  -CW      Lower Extremity Skin Details --   L lower leg skin red and  "warm to touch.No drainage or wounds  -CW      Skin Observations Comment --   Rash behind Lknee-possibly a yeast infection.Skin red LLE.    -CW      Lymphedema Measurements    Measurement Type(s) Circumferential  -CW      Circumferential Areas Lower extremities  -CW      LLE Circumferential (cm)    Measurement Location 1 10 cm above knee  -CW      Left 1 92 cm  -CW      Measurement Location 2 Knee (popliteal crease)  -CW      Left 2 78.5 cm  -CW      Measurement Location 3 10 cm below knee  -CW      Left 3 82 cm  -CW      Measurement Location 4 20 cm below knee  -CW      Left 4 67.5 cm  -CW      Measurement Location 5 30 cm below kne  -CW      Left 5 47 cm  -CW      Measurement Location 6 Ankle  -CW      Left 6 33.2 cm  -CW      Measurement Location 7 Mid foot  -CW      Left 7 23 cm  -CW      Measurement Location 8 Total  -CW      Left 8 423.2 cm  -CW      RLE Circumferential (cm)    Measurement Location 1 10 cm above knee  -CW      Right 1 85.5 cm  -CW      Measurement Location 2 Knee (popliteal crease)  -CW      Right 2 81 cm  -CW      Measurement Location 3 10 cm below knee  -CW      Right 3 77 cm  -CW      Measurement Location 4 20 cm below knee  -CW      Right 4 59.5 cm  -CW      Measurement Location 5 30 cm below kne  -CW      Right 5 42 cm  -CW      Measurement Location 6 Ankle  -CW      Right 6 33.1 cm  -CW      Measurement Location 7 Mid foot  -CW      Right 7 23.5 cm  -CW      Measurement Location 8 Total  -CW      Right 8 401.6 cm  -CW      Manual Lymphatic Drainage    Manual Lymphatic Drainage --   Neck, axilla, abd., groin, RLE. Focused on prox. and RLE.   -CW      Compression/Skin Care    Compression/Skin Care compression garment  -CW      Compression Garment Comments --   Pt. may have thrown out her \"old\" garments.  -CW      Compression/Skin Care Comments --   Pt. to see if she still has bandages from home.   -CW        User Key  (r) = Recorded By, (t) = Taken By, (c) = Cosigned By    Initials Name " Provider Type    RADHA Acosta Occupational Therapist                        OT Assessment/Plan       08/29/17 1347       OT Assessment    Assessment Comments Measurements taken. See flow sheet. Total circumference .6 cm. and .2 cm. Pt. presents with severe BLE lymphedema 3+ with skin folds/creases. Skin behind L knee is red with a rash or possible yeast infection. LLE skin appears red and warm to touch. Recommend pt. to see her  primary MD to rule out a possible infection LLE and to check rash behind L knee. Pt. to see if she still has her bandages at home.   -CW     OT Plan    OT Plan Comments Plan to cont. tx. Pt. to call MD for appt.   -CW       User Key  (r) = Recorded By, (t) = Taken By, (c) = Cosigned By    Initials Name Provider Type    RADHA Acosta Occupational Therapist                                Therapy Education       08/29/17 1352          Therapy Education    Given Edema management   Discussed lymph. tx. program and plan. Recommend pt. to see MD regarding rash L knee and to check LLE.  Reviewed infection precautions.  -CW      Program New  -CW      How Provided Verbal  -CW      Provided to Patient  -CW      Level of Understanding Verbalized  -CW        User Key  (r) = Recorded By, (t) = Taken By, (c) = Cosigned By    Initials Name Provider Type    RADHA Acosta Occupational Therapist                  Time Calculation:   OT Start Time: 0904  OT Stop Time: 1003  OT Time Calculation (min): 59 min       Therapy Charges for Today     Code Description Service Date Service Provider Modifiers Qty    [id number] HC OT MANUAL THERAPY EA 15 MIN 8/29/2017 RADHA Miller GO 4                      RADHA Miller  8/29/2017   You can access the FollowMyHealth Patient Portal offered by [facility] by registering at the following website: [email or web address]. By joining [facility]’s FollowMyHealth portal, you will also be able to view your health information using other applications (apps) compatible with our system.

## 2021-08-30 ENCOUNTER — HOSPITAL ENCOUNTER (EMERGENCY)
Facility: HOSPITAL | Age: 61
Discharge: HOME | End: 2021-08-30
Attending: EMERGENCY MEDICINE | Admitting: EMERGENCY MEDICINE

## 2021-08-30 VITALS
RESPIRATION RATE: 20 BRPM | SYSTOLIC BLOOD PRESSURE: 138 MMHG | DIASTOLIC BLOOD PRESSURE: 80 MMHG | OXYGEN SATURATION: 95 % | TEMPERATURE: 100.4 F | HEART RATE: 89 BPM

## 2021-08-30 DIAGNOSIS — Z86.79 HISTORY OF CHF (CONGESTIVE HEART FAILURE): ICD-10-CM

## 2021-08-30 DIAGNOSIS — I89.0 LYMPHEDEMA OF BOTH LOWER EXTREMITIES: ICD-10-CM

## 2021-08-30 DIAGNOSIS — L03.116 CELLULITIS OF LEFT LOWER EXTREMITY: Primary | ICD-10-CM

## 2021-08-30 DIAGNOSIS — Z86.79 HISTORY OF HYPERTENSION: ICD-10-CM

## 2021-08-30 LAB
ALBUMIN SERPL-MCNC: 4.1 G/DL (ref 3.5–5.2)
ALBUMIN/GLOB SERPL: 0.9 G/DL
ALP SERPL-CCNC: 117 U/L (ref 39–117)
ALT SERPL W P-5'-P-CCNC: 12 U/L (ref 1–33)
ANION GAP SERPL CALCULATED.3IONS-SCNC: 10.2 MMOL/L (ref 5–15)
AST SERPL-CCNC: 7 U/L (ref 1–32)
BASOPHILS # BLD AUTO: 0.05 10*3/MM3 (ref 0–0.2)
BASOPHILS NFR BLD AUTO: 0.4 % (ref 0–1.5)
BILIRUB SERPL-MCNC: 0.8 MG/DL (ref 0–1.2)
BUN SERPL-MCNC: 20 MG/DL (ref 8–23)
BUN/CREAT SERPL: 15.5 (ref 7–25)
CALCIUM SPEC-SCNC: 9.2 MG/DL (ref 8.6–10.5)
CHLORIDE SERPL-SCNC: 99 MMOL/L (ref 98–107)
CO2 SERPL-SCNC: 25.8 MMOL/L (ref 22–29)
CREAT SERPL-MCNC: 1.29 MG/DL (ref 0.57–1)
DEPRECATED RDW RBC AUTO: 42.2 FL (ref 37–54)
EOSINOPHIL # BLD AUTO: 0.17 10*3/MM3 (ref 0–0.4)
EOSINOPHIL NFR BLD AUTO: 1.4 % (ref 0.3–6.2)
ERYTHROCYTE [DISTWIDTH] IN BLOOD BY AUTOMATED COUNT: 13.8 % (ref 12.3–15.4)
GFR SERPL CREATININE-BSD FRML MDRD: 42 ML/MIN/1.73
GLOBULIN UR ELPH-MCNC: 4.6 GM/DL
GLUCOSE SERPL-MCNC: 113 MG/DL (ref 65–99)
HCT VFR BLD AUTO: 32.3 % (ref 34–46.6)
HGB BLD-MCNC: 10.6 G/DL (ref 12–15.9)
IMM GRANULOCYTES # BLD AUTO: 0.06 10*3/MM3 (ref 0–0.05)
IMM GRANULOCYTES NFR BLD AUTO: 0.5 % (ref 0–0.5)
LYMPHOCYTES # BLD AUTO: 0.8 10*3/MM3 (ref 0.7–3.1)
LYMPHOCYTES NFR BLD AUTO: 6.7 % (ref 19.6–45.3)
MCH RBC QN AUTO: 27.5 PG (ref 26.6–33)
MCHC RBC AUTO-ENTMCNC: 32.8 G/DL (ref 31.5–35.7)
MCV RBC AUTO: 83.7 FL (ref 79–97)
MONOCYTES # BLD AUTO: 0.65 10*3/MM3 (ref 0.1–0.9)
MONOCYTES NFR BLD AUTO: 5.5 % (ref 5–12)
NEUTROPHILS NFR BLD AUTO: 10.19 10*3/MM3 (ref 1.7–7)
NEUTROPHILS NFR BLD AUTO: 85.5 % (ref 42.7–76)
NRBC BLD AUTO-RTO: 0 /100 WBC (ref 0–0.2)
PLATELET # BLD AUTO: 359 10*3/MM3 (ref 140–450)
PMV BLD AUTO: 8.2 FL (ref 6–12)
POTASSIUM SERPL-SCNC: 4.3 MMOL/L (ref 3.5–5.2)
PROT SERPL-MCNC: 8.7 G/DL (ref 6–8.5)
RBC # BLD AUTO: 3.86 10*6/MM3 (ref 3.77–5.28)
SODIUM SERPL-SCNC: 135 MMOL/L (ref 136–145)
WBC # BLD AUTO: 11.92 10*3/MM3 (ref 3.4–10.8)

## 2021-08-30 PROCEDURE — 80053 COMPREHEN METABOLIC PANEL: CPT | Performed by: EMERGENCY MEDICINE

## 2021-08-30 PROCEDURE — 87070 CULTURE OTHR SPECIMN AEROBIC: CPT | Performed by: EMERGENCY MEDICINE

## 2021-08-30 PROCEDURE — 87147 CULTURE TYPE IMMUNOLOGIC: CPT | Performed by: EMERGENCY MEDICINE

## 2021-08-30 PROCEDURE — 87205 SMEAR GRAM STAIN: CPT | Performed by: EMERGENCY MEDICINE

## 2021-08-30 PROCEDURE — 87186 SC STD MICRODIL/AGAR DIL: CPT | Performed by: EMERGENCY MEDICINE

## 2021-08-30 PROCEDURE — 85025 COMPLETE CBC W/AUTO DIFF WBC: CPT | Performed by: EMERGENCY MEDICINE

## 2021-08-30 PROCEDURE — 96365 THER/PROPH/DIAG IV INF INIT: CPT

## 2021-08-30 PROCEDURE — 87040 BLOOD CULTURE FOR BACTERIA: CPT | Performed by: EMERGENCY MEDICINE

## 2021-08-30 PROCEDURE — 99283 EMERGENCY DEPT VISIT LOW MDM: CPT

## 2021-08-30 RX ORDER — CLINDAMYCIN HYDROCHLORIDE 300 MG/1
300 CAPSULE ORAL 3 TIMES DAILY
Qty: 30 CAPSULE | Refills: 0 | Status: SHIPPED | OUTPATIENT
Start: 2021-08-30 | End: 2022-03-01

## 2021-08-30 RX ORDER — ACETAMINOPHEN 500 MG
1000 TABLET ORAL ONCE
Status: COMPLETED | OUTPATIENT
Start: 2021-08-30 | End: 2021-08-30

## 2021-08-30 RX ORDER — CLINDAMYCIN PHOSPHATE 900 MG/50ML
900 INJECTION INTRAVENOUS ONCE
Status: COMPLETED | OUTPATIENT
Start: 2021-08-30 | End: 2021-08-30

## 2021-08-30 RX ADMIN — CLINDAMYCIN PHOSPHATE 900 MG: 900 INJECTION, SOLUTION INTRAVENOUS at 22:14

## 2021-08-30 RX ADMIN — ACETAMINOPHEN 1000 MG: 500 TABLET, FILM COATED ORAL at 22:14

## 2021-08-31 ENCOUNTER — TELEPHONE (OUTPATIENT)
Dept: FAMILY MEDICINE CLINIC | Facility: CLINIC | Age: 61
End: 2021-08-31

## 2021-08-31 DIAGNOSIS — I87.319: ICD-10-CM

## 2021-08-31 DIAGNOSIS — I89.0 LYMPHEDEMA: Primary | ICD-10-CM

## 2021-08-31 DIAGNOSIS — L97.909: ICD-10-CM

## 2021-08-31 NOTE — TELEPHONE ENCOUNTER
Caller: Emely Solomon    Relationship: Self    Best call back number: [phone] (H)    What is the medical concern/diagnosis: RED BLOODY BALL ON LEFT LEG    What specialty or service is being requested: WOUND CARE    What is the provider, practice or medical service name:  WOUND CARE    What is the office location:  WOUND CARE    What is the office phone number: UNKNOWN    Any additional details: PATIENT STATES SHE WAS IN THE EMERGENCY ROOM LAST NIGHT AND WAS TOLD SHE NEEDS TO GO BACK TO WOUND CARE, PATIENT IS ASKING FOR DR GASPAR TO SEND HER A REFERRAL BACK TO  WOUND CARE SO SHE CAN START UP APPOINTMENTS WITH THEM AGAIN, PLEASE ADVISE WHEN READY ASAP

## 2021-09-02 LAB
BACTERIA SPEC AEROBE CULT: ABNORMAL
GRAM STN SPEC: ABNORMAL
GRAM STN SPEC: ABNORMAL

## 2021-09-04 LAB
BACTERIA SPEC AEROBE CULT: NORMAL
BACTERIA SPEC AEROBE CULT: NORMAL

## 2021-09-07 ENCOUNTER — OFFICE VISIT (OUTPATIENT)
Dept: WOUND CARE | Facility: HOSPITAL | Age: 61
End: 2021-09-07

## 2021-09-07 PROCEDURE — G0463 HOSPITAL OUTPT CLINIC VISIT: HCPCS

## 2021-09-14 ENCOUNTER — OFFICE VISIT (OUTPATIENT)
Dept: WOUND CARE | Facility: HOSPITAL | Age: 61
End: 2021-09-14

## 2021-09-14 PROCEDURE — G0463 HOSPITAL OUTPT CLINIC VISIT: HCPCS

## 2021-09-14 RX ORDER — SULFAMETHOXAZOLE AND TRIMETHOPRIM 800; 160 MG/1; MG/1
1 TABLET ORAL 2 TIMES DAILY
Qty: 10 TABLET | Refills: 0 | Status: SHIPPED | OUTPATIENT
Start: 2021-09-14 | End: 2021-09-20

## 2021-09-14 RX ORDER — SODIUM HYPOCHLORITE 2.5 MG/ML
SOLUTION TOPICAL ONCE
Qty: 473 ML | Refills: 4 | Status: SHIPPED | OUTPATIENT
Start: 2021-09-14 | End: 2021-09-16

## 2021-09-21 ENCOUNTER — OFFICE VISIT (OUTPATIENT)
Dept: WOUND CARE | Facility: HOSPITAL | Age: 61
End: 2021-09-21

## 2021-09-21 PROCEDURE — G0463 HOSPITAL OUTPT CLINIC VISIT: HCPCS

## 2021-10-05 ENCOUNTER — OFFICE VISIT (OUTPATIENT)
Dept: FAMILY MEDICINE CLINIC | Facility: CLINIC | Age: 61
End: 2021-10-05

## 2021-10-05 ENCOUNTER — OFFICE VISIT (OUTPATIENT)
Dept: WOUND CARE | Facility: HOSPITAL | Age: 61
End: 2021-10-05

## 2021-10-05 VITALS
HEART RATE: 73 BPM | BODY MASS INDEX: 66.25 KG/M2 | DIASTOLIC BLOOD PRESSURE: 98 MMHG | WEIGHT: 293 LBS | RESPIRATION RATE: 15 BRPM | SYSTOLIC BLOOD PRESSURE: 154 MMHG | OXYGEN SATURATION: 95 %

## 2021-10-05 DIAGNOSIS — I89.0 LYMPHEDEMA: ICD-10-CM

## 2021-10-05 DIAGNOSIS — E66.01 CLASS 3 SEVERE OBESITY DUE TO EXCESS CALORIES WITH SERIOUS COMORBIDITY AND BODY MASS INDEX (BMI) GREATER THAN OR EQUAL TO 70 IN ADULT (HCC): ICD-10-CM

## 2021-10-05 DIAGNOSIS — Z23 NEED FOR VACCINATION: ICD-10-CM

## 2021-10-05 DIAGNOSIS — I50.32 CHRONIC DIASTOLIC CHF (CONGESTIVE HEART FAILURE) (HCC): Primary | ICD-10-CM

## 2021-10-05 DIAGNOSIS — I10 ESSENTIAL HYPERTENSION: ICD-10-CM

## 2021-10-05 PROCEDURE — 99214 OFFICE O/P EST MOD 30 MIN: CPT | Performed by: FAMILY MEDICINE

## 2021-10-05 PROCEDURE — 90686 IIV4 VACC NO PRSV 0.5 ML IM: CPT | Performed by: FAMILY MEDICINE

## 2021-10-05 PROCEDURE — G0463 HOSPITAL OUTPT CLINIC VISIT: HCPCS

## 2021-10-05 PROCEDURE — 90471 IMMUNIZATION ADMIN: CPT | Performed by: FAMILY MEDICINE

## 2021-10-05 NOTE — PROGRESS NOTES
"Chief Complaint  Edema and Hypertension    Subjective    History of Present Illness {CC  Problem List  Visit  Diagnosis   Encounters  Notes  Medications  Labs  Result Review Imaging  Media :23}     Emely Solomon presents to [facility] for Edema and Hypertension.  History of Present Illness     Here for f/u as above. Doing okay today, no real changes that she's noticed. Was in the ED at the end of August with a \"place on my leg that busted open\". Is in wound care, appointment later this morning. Feels that it's improved significantly, anticipating d/c from wound care. Was given clindamycin but had a rash, stopped taking.     Edema is stable, no better no worse. Isn't getting much exercise, hoping to start walking a bit more.     BP is elevated today, though on par with previous readings. Doesn't have a home cuff. No signs or symptoms of hypertension at this time. No problems with med adherence or tolerance.      Frustrated by continued weight gain. Had made some progress previously with Weight Watchers and was down to 330 after hospital stay last fall. Has now gained back about 40 lbs. Knows this likely contributes to fatigue and edema.     Still using wraps for legs, finds them helpful. Has been to lymphedema clinic several times in the past with good results.     Objective     Vital Signs:   /98   Pulse 73   Resp 15   Wt (!) 170 kg (374 lb)   SpO2 95%   BMI 66.25 kg/m²   Physical Exam  Vitals and nursing note reviewed.   Constitutional:       General: She is not in acute distress.     Appearance: Normal appearance. She is not ill-appearing.   Cardiovascular:      Rate and Rhythm: Normal rate and regular rhythm.      Pulses: Normal pulses.      Heart sounds: Normal heart sounds. No murmur heard.        Comments: Chronic bilateral lymphedema of lower extremities. No open wounds at this time.  Pulmonary:      Effort: Pulmonary effort is normal. No respiratory " distress.      Breath sounds: Normal breath sounds. No rales.   Musculoskeletal:      Right lower leg: Edema present.      Left lower leg: Edema present.   Neurological:      Mental Status: She is alert and oriented to person, place, and time. Mental status is at baseline.   Psychiatric:         Mood and Affect: Mood normal.         Behavior: Behavior normal.          Result Review  Data Reviewed:{ Labs  Result Review  Imaging  Med Tab  Media :23}                   Assessment and Plan {CC Problem List  Visit Diagnosis  ROS  Review (Popup)  Health Maintenance  Quality  BestPractice  Medications  SmartSets  SnapShot Encounters  Media :23}   Diagnoses and all orders for this visit:    1. Chronic diastolic CHF (congestive heart failure) (HCC) (Primary)    2. Essential hypertension    3. Class 3 severe obesity due to excess calories with serious comorbidity and body mass index (BMI) greater than or equal to 70 in adult (HCC)    4. Lymphedema    5. Need for vaccination  -     FluLaval/Fluarix >6 Months (2519-2146)    Long discussion about current health. Encouraged her to continue taking medications as prescribed. Recommended she try to avoid high salt foods, drink lots of water, follow-up with cardiology as scheduled. Blood pressure remains slightly elevated today however we do not have much room for additional medications given her electrolyte imbalances in the past and side effects. Strongly encouraged her to work on diet and exercise. Discussed dietary interventions, she thinks that she might restart weight watchers in the near future.    Follow-up with wound care as scheduled. Appears to be resolving.    Recommended follow-up with me as below. Encouraged communication via [place] anytime.    I spent 30 minutes face-to-face with patient, reviewing chart, coordinating care, and documenting in the chart.      Follow Up {Instructions Charge Capture  Follow-up Communications :23}     Patient was given  instructions and counseling regarding her condition or for health maintenance advice. Please see specific information pulled into the AVS (placed there by myself) if appropriate.    Return in about 2 months (around 12/5/2021) for Preventive Health Maintenance.      CHIP Wilkerson MD

## 2021-10-10 NOTE — PROGRESS NOTES
Continued Stay Note  [facility]     Patient Name: Emely Solomon  MRN: [id number]  Today's Date: 6/12/2020    Admit Date: 6/4/2020    Discharge Plan     Row Name 06/12/20 0953       Plan    Plan  Pending SNF referrals     Plan Comments  CCP met with patient at bedside. Patient was agreeable for more referrals for SNF to be placed to facilities in [facility]. CCP placed referrals in Epic to [facility], [facility], and [facility]. CCP spoke to [facility] [phone] who stated he would review the referrals and verify benefits of the patient's insurance to determine if they would be able to accept. CCP will continue to follow for pending SNF referrals. Antonieta Valerio CSVANESSA        Discharge Codes    No documentation.             NEENA Jaramillo     LE arterial dopplers pending  No paresthesias, numbness, paralysis, or distal pain  DP pulses 1+  Favor obtaining this study. LE venous and arterial dopplers pending  No paresthesias, numbness, paralysis, or distal pain  DP pulses 1+  Favor obtaining the above studies.

## 2021-10-19 ENCOUNTER — OFFICE VISIT (OUTPATIENT)
Dept: WOUND CARE | Facility: HOSPITAL | Age: 61
End: 2021-10-19

## 2021-10-19 PROCEDURE — G0463 HOSPITAL OUTPT CLINIC VISIT: HCPCS

## 2021-11-01 ENCOUNTER — APPOINTMENT (OUTPATIENT)
Dept: WOMENS IMAGING | Facility: HOSPITAL | Age: 61
End: 2021-11-01

## 2021-11-01 PROCEDURE — 77067 SCR MAMMO BI INCL CAD: CPT | Performed by: RADIOLOGY

## 2021-11-01 PROCEDURE — 77063 BREAST TOMOSYNTHESIS BI: CPT | Performed by: RADIOLOGY

## 2021-11-09 ENCOUNTER — APPOINTMENT (OUTPATIENT)
Dept: WOUND CARE | Facility: HOSPITAL | Age: 61
End: 2021-11-09

## 2021-11-16 ENCOUNTER — OFFICE VISIT (OUTPATIENT)
Dept: WOUND CARE | Facility: HOSPITAL | Age: 61
End: 2021-11-16

## 2021-11-16 PROCEDURE — G0463 HOSPITAL OUTPT CLINIC VISIT: HCPCS

## 2021-12-07 ENCOUNTER — OFFICE VISIT (OUTPATIENT)
Dept: FAMILY MEDICINE CLINIC | Facility: CLINIC | Age: 61
End: 2021-12-07

## 2021-12-07 VITALS
SYSTOLIC BLOOD PRESSURE: 146 MMHG | RESPIRATION RATE: 15 BRPM | DIASTOLIC BLOOD PRESSURE: 90 MMHG | HEART RATE: 96 BPM | OXYGEN SATURATION: 95 %

## 2021-12-07 DIAGNOSIS — N18.31 STAGE 3A CHRONIC KIDNEY DISEASE (HCC): ICD-10-CM

## 2021-12-07 DIAGNOSIS — Z00.00 ROUTINE HEALTH MAINTENANCE: Primary | ICD-10-CM

## 2021-12-07 DIAGNOSIS — Z23 NEED FOR VACCINATION: ICD-10-CM

## 2021-12-07 DIAGNOSIS — Z12.11 SCREENING FOR COLON CANCER: ICD-10-CM

## 2021-12-07 PROCEDURE — 0003A COVID-19 (PFIZER): CPT | Performed by: FAMILY MEDICINE

## 2021-12-07 PROCEDURE — 91300 COVID-19 (PFIZER): CPT | Performed by: FAMILY MEDICINE

## 2021-12-07 PROCEDURE — 99396 PREV VISIT EST AGE 40-64: CPT | Performed by: FAMILY MEDICINE

## 2021-12-07 NOTE — PROGRESS NOTES
Chief Complaint  Annual Exam    Subjective    History of Present Illness {  Problem List  Visit  Diagnosis   Encounters  Notes  Medications  Labs  Result Review Imaging  Media :23}     Emely Solomon presents to [facility] for Annual Exam.  History of Present Illness     Here as above. Doing well overall, wants to discuss various screening recommendations. Is relatively well caught up. Had a mammogram about a month ago that was reportedly clear. Is due for a Pap next year. Believes her last colonoscopy was over 10 years ago. Would prefer to do a Cologuard if possible. No family history and no concerning signs or symptoms.     Taking meds as prescribed, has some questions about best timing of furosemide. Has been taking it around dinner time, finds that it keeps her up at night.     No need for any refills at this time. Does need a check of kidney function. Has been slowly decreasing over time. Doesn't currently see a nephrologist. Sees cardiology at least twice yearly.     Legs doing well. No current rash or open sores. Getting around decently well. Admits that diet could use some work.    Due for COVID booster today, would like to get Shingrix as well.     Due for dental visit.     Objective     Vital Signs:   /90   Pulse 96   Resp 15   SpO2 95%   Physical Exam  Vitals and nursing note reviewed.   Constitutional:       General: She is not in acute distress.     Appearance: Normal appearance. She is not ill-appearing.   Cardiovascular:      Rate and Rhythm: Normal rate and regular rhythm.      Pulses: Normal pulses.      Heart sounds: Normal heart sounds. No murmur heard.      Pulmonary:      Effort: Pulmonary effort is normal. No respiratory distress.      Breath sounds: Normal breath sounds. No rales.   Musculoskeletal:      Right lower le+ Edema present.      Left lower le+ Edema present.   Neurological:      Mental Status: She is alert and oriented to  person, place, and time. Mental status is at baseline.   Psychiatric:         Mood and Affect: Mood normal.         Behavior: Behavior normal.          Result Review  Data Reviewed:{ Labs  Result Review  Imaging  Med Tab  Media :23}                   Assessment and Plan {CC Problem List  Visit Diagnosis  ROS  Review (Popup)  Health Maintenance  Quality  BestPractice  Medications  SmartSets  SnapShot Encounters  Media :23}   Diagnoses and all orders for this visit:    1. Routine health maintenance (Primary)    2. Need for vaccination  -     COVID-19 Vaccine (Pfizer)  -     Cancel: Shingrix Vaccine    3. Screening for colon cancer  -     Cologuard - Stool, Per Rectum; Future    4. Stage 3a chronic kidney disease (HCC)  -     Comprehensive Metabolic Panel    Orders as above. I will contact her with results as available. Vaccines as requested.    Recommended follow-up as below. Encouraged communication via [employer] in the meantime.      Follow Up {Instructions Charge Capture  Follow-up Communications :23}     Patient was given instructions and counseling regarding her condition or for health maintenance advice. Please see specific information pulled into the AVS (placed there by myself) if appropriate.    Return in about 3 months (around 3/7/2022), or if symptoms worsen or fail to improve.      CHIP Wilkerson MD    Prevention counseling performed as below: Mindfulness for stress management.

## 2021-12-12 DIAGNOSIS — E61.1 IRON DEFICIENCY: ICD-10-CM

## 2021-12-12 DIAGNOSIS — D63.8 ANEMIA, CHRONIC DISEASE: ICD-10-CM

## 2021-12-13 RX ORDER — LOSARTAN POTASSIUM 100 MG/1
100 TABLET ORAL DAILY
Qty: 90 TABLET | Refills: 1 | Status: SHIPPED | OUTPATIENT
Start: 2021-12-13 | End: 2022-09-11 | Stop reason: SDUPTHER

## 2021-12-13 RX ORDER — LANOLIN ALCOHOL/MO/W.PET/CERES
325 CREAM (GRAM) TOPICAL
Qty: 90 TABLET | Refills: 3 | Status: SHIPPED | OUTPATIENT
Start: 2021-12-13 | End: 2022-12-26 | Stop reason: SDUPTHER

## 2022-01-03 DIAGNOSIS — R19.5 POSITIVE COLORECTAL CANCER SCREENING USING COLOGUARD TEST: Primary | ICD-10-CM

## 2022-01-20 RX ORDER — FUROSEMIDE 40 MG/1
40 TABLET ORAL DAILY
Qty: 30 TABLET | Refills: 1 | Status: SHIPPED | OUTPATIENT
Start: 2022-01-20 | End: 2022-06-13 | Stop reason: SDUPTHER

## 2022-01-25 ENCOUNTER — TELEPHONE (OUTPATIENT)
Dept: FAMILY MEDICINE CLINIC | Facility: CLINIC | Age: 62
End: 2022-01-25

## 2022-01-25 NOTE — TELEPHONE ENCOUNTER
PATIENT CALLED WANTING TO KNOW ABOUT THE REASONING FOR THE COLONOSCOPY THAT SHE NEEDS TO DO    SHE WANTED TO DISCUSS ANY RESULTS FROM TESTS THAT PROMPTED THIS PROCEDURE       PLEASE CALL    Emely Solomon (Self) [phone] (H)

## 2022-02-09 ENCOUNTER — OFFICE VISIT (OUTPATIENT)
Dept: CARDIOLOGY | Facility: CLINIC | Age: 62
End: 2022-02-09

## 2022-02-09 VITALS
HEART RATE: 68 BPM | SYSTOLIC BLOOD PRESSURE: 110 MMHG | HEIGHT: 63 IN | WEIGHT: 293 LBS | BODY MASS INDEX: 51.91 KG/M2 | DIASTOLIC BLOOD PRESSURE: 80 MMHG

## 2022-02-09 DIAGNOSIS — Z86.718 HISTORY OF DVT OF LOWER EXTREMITY: ICD-10-CM

## 2022-02-09 DIAGNOSIS — R31.0 GROSS HEMATURIA: ICD-10-CM

## 2022-02-09 DIAGNOSIS — I27.20 PULMONARY HYPERTENSION: ICD-10-CM

## 2022-02-09 DIAGNOSIS — Q21.12 PFO (PATENT FORAMEN OVALE): ICD-10-CM

## 2022-02-09 DIAGNOSIS — I74.9 PARADOXICAL EMBOLISM: ICD-10-CM

## 2022-02-09 DIAGNOSIS — I89.0 LYMPHEDEMA: ICD-10-CM

## 2022-02-09 DIAGNOSIS — I10 ESSENTIAL HYPERTENSION: ICD-10-CM

## 2022-02-09 DIAGNOSIS — N18.30 STAGE 3 CHRONIC KIDNEY DISEASE, UNSPECIFIED WHETHER STAGE 3A OR 3B CKD: ICD-10-CM

## 2022-02-09 DIAGNOSIS — I50.32 CHRONIC DIASTOLIC CHF (CONGESTIVE HEART FAILURE): Primary | ICD-10-CM

## 2022-02-09 PROCEDURE — 99213 OFFICE O/P EST LOW 20 MIN: CPT | Performed by: INTERNAL MEDICINE

## 2022-02-09 PROCEDURE — 93000 ELECTROCARDIOGRAM COMPLETE: CPT | Performed by: INTERNAL MEDICINE

## 2022-02-09 NOTE — PROGRESS NOTES
Date of Office Visit: 2022  Encounter Provider: Brennan Rogers MD  Place of Service: [facility] CARDIOLOGY  Patient Name: Emely Solomon  :1960    Chief Complaint   Patient presents with   • Congestive Heart Failure   :     HPI: Emely Solomon is a 61 y.o. female who presents today to follow up. I have reviewed prior notes and there are no changes except for any new updates described below. I have also reviewed any information entered into the medical record by the patient or by ancillary staff.     In the summer of , she developed an acute DVT as well as a pulmonary embolus. She had a previously undiagnosed patent foramen ovale and developed paradoxical embolus to the left lower extremity (arterial) and required prolonged hospitalization for thrombectomy and anticoagulation. She was sent home on warfarin but became subtherapeutic and had recurrent pulmonary embolus after that. She was changed to rivaroxaban and has done well since then. Her IVC filter has been removed. A repeat echo in 2016 showed significant improvement in her pulmonary hypertension, right-sided enlargement and function.  A repeat venous doppler in 2017 was negative for DVT.    She has mild, stable exertional dyspnea.  She has leg swelling (severe) due to lymphedema/lipoedema.  She denies orthopnea, PND, chest pain, palpitations, lightheadedness, or syncope. She had a brief episode of gross hematuria recently that resolved; she had no dysuria.     Past Medical History:   Diagnosis Date   • Cellulitis     2017, with Group B Strep bacteremia and sepsis   • Chronic diastolic congestive heart failure (HCC)    • Deep venous thrombosis (HCC)    • Hypertension    • Lipoedema    • Lymphedema    • Morbid obesity (HCC)    • Paradoxical embolism (HCC)     to the LLE due to DVT/PE and PFO   • PFO (patent foramen ovale)    • Pulmonary embolism (HCC)    • Pulmonary hypertension (HCC)      multifactorial (dCHF, obesity/ARIEL, hx PE), mild by echo 1/2016   • Sepsis (HCC) 9/18/2020       Past Surgical History:   Procedure Laterality Date   • BRONCHOSCOPY N/A 7/21/2017    Procedure: BRONCHOSCOPY with wash;  Surgeon: Caleb Garcia MD;  Location: [facility] ENDOSCOPY;  Service:    • DILATATION AND CURETTAGE  04/11/2011   • VENA CAVA FILTER PLACEMENT      Inferior Vena Cava Filter Placement w/Fluorosc angiogr guidance       Social History     Socioeconomic History   • Marital status:    Tobacco Use   • Smoking status: Never Smoker   • Smokeless tobacco: Never Used   Vaping Use   • Vaping Use: Never used   Substance and Sexual Activity   • Alcohol use: Yes     Comment: caffeine use:1 cup daily.    • Drug use: No   • Sexual activity: Defer       Family History   Problem Relation Age of Onset   • Hypertension Other        Review of Systems   Constitutional: Positive for weight gain. Negative for malaise/fatigue.   Cardiovascular: Positive for dyspnea on exertion and leg swelling. Negative for chest pain and palpitations.   Genitourinary: Positive for hematuria.   Neurological: Negative for dizziness and light-headedness.   All other systems reviewed and are negative.      Allergies   Allergen Reactions   • Cephalexin Hives and Rash     Diffuse rash on cephalexin 1 g PO q6h on 6/17/20  Tolerated zosyn and PCN G September 2020 without issue   • Clindamycin/Lincomycin Hives         Current Outpatient Medications:   •  acetaminophen (TYLENOL) 325 MG tablet, Take 2 tablets by mouth Every 4 (Four) Hours As Needed for Mild Pain ., Disp:  , Rfl:   •  Cholecalciferol (Vitamin D) 50 MCG (2000 UT) tablet, Take 2,000 Units by mouth Daily., Disp: , Rfl:   •  clindamycin (CLEOCIN) 300 MG capsule, Take 1 capsule by mouth 3 (Three) Times a Day., Disp: 30 capsule, Rfl: 0  •  ferrous sulfate 325 (65 FE) MG EC tablet, Take 1 tablet by mouth Daily With Breakfast, Disp: 90 tablet, Rfl: 3  •  furosemide (LASIX) 40 MG tablet, Take 1  "tablet by mouth Daily., Disp: 30 tablet, Rfl: 1  •  losartan (COZAAR) 100 MG tablet, TAKE 1 TABLET BY MOUTH DAILY, Disp: 90 tablet, Rfl: 1  •  metoprolol succinate XL (TOPROL-XL) 50 MG 24 hr tablet, Take 1 tablet by mouth Daily., Disp: 90 tablet, Rfl: 3  •  nystatin (MYCOSTATIN) 590322 UNIT/GM powder, apply to the affected area(s) topically 3 times per day, Disp: 15 g, Rfl: 4  •  rivaroxaban (Xarelto) 20 MG tablet, Take 1 tablet by mouth Daily., Disp: 90 tablet, Rfl: 3     Objective:     Vitals:    02/09/22 1020   BP: 110/80   BP Location: Right arm   Pulse: 68   Weight: (!) 174 kg (383 lb)   Height: 160 cm (63\")     Body mass index is 67.85 kg/m².    Physical Exam  Vitals reviewed.   Constitutional:       Comments: Obese   HENT:      Head: Normocephalic.      Mouth/Throat:      Comments: masked  Eyes:      Conjunctiva/sclera: Conjunctivae normal.   Neck:      Vascular: No JVD.   Cardiovascular:      Rate and Rhythm: Normal rate and regular rhythm.      Heart sounds: Heart sounds are distant. No murmur heard.      Pulmonary:      Effort: Pulmonary effort is normal.      Breath sounds: Normal breath sounds.   Abdominal:      Palpations: Abdomen is soft.      Tenderness: There is no abdominal tenderness.      Comments: Obesity limits abdominal exam   Musculoskeletal:         General: Swelling (marked lipedema/lymphedema) present. Normal range of motion.      Cervical back: Normal range of motion.   Skin:     General: Skin is warm and dry.   Neurological:      Mental Status: She is alert and oriented to person, place, and time.      Cranial Nerves: No cranial nerve deficit.   Psychiatric:         Mood and Affect: Mood normal.         Behavior: Behavior normal.         Thought Content: Thought content normal.           ECG 12 Lead    Date/Time: 2/9/2022 11:42 AM  Performed by: Brennan Rogers MD  Authorized by: Brennan Rogers MD   Comparison: compared with previous ECG   Similar to previous ECG  Rhythm: sinus " rhythm  Conduction: conduction normal  ST Segments: ST segments normal  T Waves: T waves normal  QRS axis: right  Other findings: poor R wave progression    Clinical impression: non-specific ECG              Assessment:       Diagnosis Plan   1. Chronic diastolic CHF (congestive heart failure) (HCC)     2. Essential hypertension     3. Pulmonary hypertension (CMS/HCC)     4. Stage 3 chronic kidney disease, unspecified whether stage 3a or 3b CKD (HCC)     5. Paradoxical embolism (HCC)     6. PFO (patent foramen ovale)     7. History of DVT of lower extremity     8. Lymphedema     9. Gross hematuria            Plan:       She has chronic diastolic CHF and secondary PHTN (due to diastolic dysfunction and her prior PE).   She has well controlled HTN and stable CKD. She has developed acute renal failure in the past when septic.  We'll continue with metoprolol, losartan, and furosemide.    She had VTE with paradoxical embolus to the leg and is s/p thrombectomy.  She had an IVC filter which has been removed.  Despite her weight, she is on rivaroxaban as it was too difficult to maintain a therapeutic INR on warfarin.      I asked her to let Dr Wilkerson know about her hematuria when she sees him next week.    Sincerely,       Brennan Rogers MD

## 2022-02-11 ENCOUNTER — PRE-PROCEDURE SCREENING (OUTPATIENT)
Dept: GASTROENTEROLOGY | Facility: CLINIC | Age: 62
End: 2022-02-11

## 2022-02-11 NOTE — TELEPHONE ENCOUNTER
Last scope 10yrs ( no records)--No personal history polyps-- No family history of colon cancer--Xarelto--Medications:      POSITIVE COLOGUARD         acetaminophen (TYLENOL) 325 MG tablet  Cholecalciferol (Vitamin D) 50 MCG (2000 UT) tablet  clindamycin (CLEOCIN) 300 MG capsule  ferrous sulfate 325 (65 FE) MG EC tablet  furosemide (LASIX) 40 MG tablet  losartan (COZAAR) 100 MG tablet  metoprolol succinate XL (TOPROL-XL) 50 MG 24 hr tablet  nystatin (MYCOSTATIN) 403448 UNIT/GM powder  rivaroxaban (Xarelto) 20 MG tablet          Pt verbalized and understood that it would be few weeks before been schedule

## 2022-02-15 ENCOUNTER — PREP FOR SURGERY (OUTPATIENT)
Dept: OTHER | Facility: HOSPITAL | Age: 62
End: 2022-02-15

## 2022-02-15 DIAGNOSIS — Z12.11 ENCOUNTER FOR SCREENING FOR MALIGNANT NEOPLASM OF COLON: Primary | ICD-10-CM

## 2022-02-15 RX ORDER — SODIUM CHLORIDE, SODIUM LACTATE, POTASSIUM CHLORIDE, CALCIUM CHLORIDE 600; 310; 30; 20 MG/100ML; MG/100ML; MG/100ML; MG/100ML
30 INJECTION, SOLUTION INTRAVENOUS CONTINUOUS
Status: CANCELLED | OUTPATIENT
Start: 2022-03-10

## 2022-02-15 NOTE — TELEPHONE ENCOUNTER
SPOKE WITH PT SCHEDULED MARCH 10 ARRIVE AT 0730 AM ABRIL BERRY---[employer] TO PT VERIFIED MAILING ADDRESS

## 2022-02-15 NOTE — TELEPHONE ENCOUNTER
Case request submitted.  Will need to get OK to hold anticoagulation for 48hrs prior to colonoscopy from prescribing MD. Rose, can we please scheduled in next 2-3 weeks, ok to put on at 7:30 time if necessary.

## 2022-03-01 ENCOUNTER — TELEPHONE (OUTPATIENT)
Dept: GYNECOLOGIC ONCOLOGY | Facility: CLINIC | Age: 62
End: 2022-03-01

## 2022-03-01 ENCOUNTER — PREP FOR SURGERY (OUTPATIENT)
Dept: OTHER | Facility: HOSPITAL | Age: 62
End: 2022-03-01

## 2022-03-01 ENCOUNTER — OFFICE VISIT (OUTPATIENT)
Dept: GYNECOLOGIC ONCOLOGY | Facility: CLINIC | Age: 62
End: 2022-03-01

## 2022-03-01 VITALS
DIASTOLIC BLOOD PRESSURE: 94 MMHG | HEIGHT: 63 IN | OXYGEN SATURATION: 93 % | WEIGHT: 293 LBS | SYSTOLIC BLOOD PRESSURE: 162 MMHG | BODY MASS INDEX: 51.91 KG/M2 | HEART RATE: 97 BPM | RESPIRATION RATE: 20 BRPM

## 2022-03-01 DIAGNOSIS — R93.89 THICKENED ENDOMETRIUM: ICD-10-CM

## 2022-03-01 DIAGNOSIS — N95.0 POST-MENOPAUSAL BLEEDING: Primary | ICD-10-CM

## 2022-03-01 DIAGNOSIS — N95.0 POST-MENOPAUSAL BLEEDING: ICD-10-CM

## 2022-03-01 DIAGNOSIS — E66.01 MORBID OBESITY: ICD-10-CM

## 2022-03-01 DIAGNOSIS — N83.201 OVARIAN CYST, RIGHT: Primary | ICD-10-CM

## 2022-03-01 PROCEDURE — 99204 OFFICE O/P NEW MOD 45 MIN: CPT | Performed by: PHYSICIAN ASSISTANT

## 2022-03-01 RX ORDER — ONDANSETRON 2 MG/ML
4 INJECTION INTRAMUSCULAR; INTRAVENOUS EVERY 6 HOURS PRN
Status: CANCELLED | OUTPATIENT
Start: 2022-03-01

## 2022-03-01 RX ORDER — SODIUM CHLORIDE, SODIUM LACTATE, POTASSIUM CHLORIDE, CALCIUM CHLORIDE 600; 310; 30; 20 MG/100ML; MG/100ML; MG/100ML; MG/100ML
100 INJECTION, SOLUTION INTRAVENOUS CONTINUOUS
Status: CANCELLED | OUTPATIENT
Start: 2022-03-08

## 2022-03-01 RX ORDER — SODIUM CHLORIDE 0.9 % (FLUSH) 0.9 %
10 SYRINGE (ML) INJECTION AS NEEDED
Status: CANCELLED | OUTPATIENT
Start: 2022-03-08

## 2022-03-01 RX ORDER — SODIUM CHLORIDE 0.9 % (FLUSH) 0.9 %
10 SYRINGE (ML) INJECTION EVERY 12 HOURS SCHEDULED
Status: CANCELLED | OUTPATIENT
Start: 2022-03-08

## 2022-03-01 NOTE — TELEPHONE ENCOUNTER
Caller: MAYELA    Relationship to patient: SELF    Best call back number: [phone]    Patient is needing: TO KNOW IF COLONOSCOPY AND SURGERY THAT IS GOING TO BE DONE WILL BE TOO MUCH FOR HER ALL IN THE SAME WEEK.

## 2022-03-02 ENCOUNTER — TELEPHONE (OUTPATIENT)
Dept: GASTROENTEROLOGY | Facility: CLINIC | Age: 62
End: 2022-03-02

## 2022-03-02 NOTE — TELEPHONE ENCOUNTER
Called pt, she was asking if she needed an antibiotic prior to her colonoscopy bc she has a heart murmer.  Advised that is no longer required.  Pt verbalized understanding.

## 2022-03-02 NOTE — TELEPHONE ENCOUNTER
----- Message from Sy Agosto sent at 3/2/2022  8:31 AM EST -----  Regarding: Antibiotics  PT SUPPOSE TO HAVE PROCEDURE ON 3/10 HAS   QUESTIONS ABOUT ANTIBIOTICS (copy from [facility])

## 2022-03-03 ENCOUNTER — TELEPHONE (OUTPATIENT)
Dept: GASTROENTEROLOGY | Facility: CLINIC | Age: 62
End: 2022-03-03

## 2022-03-03 NOTE — TELEPHONE ENCOUNTER
----- Message from Wilberto Tejada sent at 3/3/2022  2:40 PM EST -----  Contact: [phone]  Pt is having surgery on the 8th then having a colonoscopy on the  11th  is that ok ?   She is nervous

## 2022-03-04 NOTE — TELEPHONE ENCOUNTER
Colonosocpy is actually on the 11th.  I don't think she should have a gyne procedure then a colonoscopy two days later.  I would recommend she f/u/ with her GyNE as scheduled 1 week after surgery and then have her advise when she thinks it is ok to have colonoscopy.

## 2022-03-04 NOTE — TELEPHONE ENCOUNTER
Called and passed on Dr Dean's recommendations, and pt verbalized understanding.    She will call us back after her GYN FU, after her surgery and reschedule colonoscopy.

## 2022-03-07 ENCOUNTER — HOSPITAL ENCOUNTER (OUTPATIENT)
Dept: GENERAL RADIOLOGY | Facility: HOSPITAL | Age: 62
Discharge: HOME | End: 2022-03-07

## 2022-03-07 ENCOUNTER — TELEPHONE (OUTPATIENT)
Dept: GYNECOLOGIC ONCOLOGY | Facility: CLINIC | Age: 62
End: 2022-03-07

## 2022-03-07 ENCOUNTER — PRE-ADMISSION TESTING (OUTPATIENT)
Dept: PREADMISSION TESTING | Facility: HOSPITAL | Age: 62
End: 2022-03-07

## 2022-03-07 VITALS
RESPIRATION RATE: 20 BRPM | SYSTOLIC BLOOD PRESSURE: 140 MMHG | DIASTOLIC BLOOD PRESSURE: 85 MMHG | HEART RATE: 74 BPM | WEIGHT: 293 LBS | HEIGHT: 63 IN | OXYGEN SATURATION: 95 % | TEMPERATURE: 98.5 F | BODY MASS INDEX: 51.91 KG/M2

## 2022-03-07 DIAGNOSIS — N95.0 POST-MENOPAUSAL BLEEDING: ICD-10-CM

## 2022-03-07 DIAGNOSIS — R93.89 THICKENED ENDOMETRIUM: ICD-10-CM

## 2022-03-07 LAB
ABO GROUP BLD: NORMAL
ANION GAP SERPL CALCULATED.3IONS-SCNC: 12.5 MMOL/L (ref 5–15)
APTT PPP: 26.8 SECONDS (ref 22.7–35.4)
BACTERIA UR QL AUTO: ABNORMAL /HPF
BASOPHILS # BLD AUTO: 0.04 10*3/MM3 (ref 0–0.2)
BASOPHILS NFR BLD AUTO: 0.9 % (ref 0–1.5)
BILIRUB UR QL STRIP: NEGATIVE
BLD GP AB SCN SERPL QL: NEGATIVE
BUN SERPL-MCNC: 20 MG/DL (ref 8–23)
BUN/CREAT SERPL: 18 (ref 7–25)
CALCIUM SPEC-SCNC: 10.1 MG/DL (ref 8.6–10.5)
CHLORIDE SERPL-SCNC: 101 MMOL/L (ref 98–107)
CLARITY UR: CLEAR
CO2 SERPL-SCNC: 26.5 MMOL/L (ref 22–29)
COLOR UR: YELLOW
CREAT SERPL-MCNC: 1.11 MG/DL (ref 0.57–1)
DEPRECATED RDW RBC AUTO: 41.3 FL (ref 37–54)
EGFRCR SERPLBLD CKD-EPI 2021: 56.3 ML/MIN/1.73
EOSINOPHIL # BLD AUTO: 0.19 10*3/MM3 (ref 0–0.4)
EOSINOPHIL NFR BLD AUTO: 4.4 % (ref 0.3–6.2)
ERYTHROCYTE [DISTWIDTH] IN BLOOD BY AUTOMATED COUNT: 13.4 % (ref 12.3–15.4)
GLUCOSE SERPL-MCNC: 99 MG/DL (ref 65–99)
GLUCOSE UR STRIP-MCNC: NEGATIVE MG/DL
HCT VFR BLD AUTO: 38.1 % (ref 34–46.6)
HGB BLD-MCNC: 12.2 G/DL (ref 12–15.9)
HGB UR QL STRIP.AUTO: NEGATIVE
HYALINE CASTS UR QL AUTO: ABNORMAL /LPF
IMM GRANULOCYTES # BLD AUTO: 0.01 10*3/MM3 (ref 0–0.05)
IMM GRANULOCYTES NFR BLD AUTO: 0.2 % (ref 0–0.5)
INR PPP: 1.02 (ref 0.9–1.1)
KETONES UR QL STRIP: NEGATIVE
LEUKOCYTE ESTERASE UR QL STRIP.AUTO: ABNORMAL
LYMPHOCYTES # BLD AUTO: 1.12 10*3/MM3 (ref 0.7–3.1)
LYMPHOCYTES NFR BLD AUTO: 26.2 % (ref 19.6–45.3)
MCH RBC QN AUTO: 27 PG (ref 26.6–33)
MCHC RBC AUTO-ENTMCNC: 32 G/DL (ref 31.5–35.7)
MCV RBC AUTO: 84.3 FL (ref 79–97)
MONOCYTES # BLD AUTO: 0.42 10*3/MM3 (ref 0.1–0.9)
MONOCYTES NFR BLD AUTO: 9.8 % (ref 5–12)
NEUTROPHILS NFR BLD AUTO: 2.49 10*3/MM3 (ref 1.7–7)
NEUTROPHILS NFR BLD AUTO: 58.5 % (ref 42.7–76)
NITRITE UR QL STRIP: NEGATIVE
NRBC BLD AUTO-RTO: 0 /100 WBC (ref 0–0.2)
PH UR STRIP.AUTO: 6.5 [PH] (ref 5–8)
PLATELET # BLD AUTO: 300 10*3/MM3 (ref 140–450)
PMV BLD AUTO: 8.4 FL (ref 6–12)
POTASSIUM SERPL-SCNC: 5.2 MMOL/L (ref 3.5–5.2)
PROT UR QL STRIP: ABNORMAL
PROTHROMBIN TIME: 13.3 SECONDS (ref 11.7–14.2)
RBC # BLD AUTO: 4.52 10*6/MM3 (ref 3.77–5.28)
RBC # UR STRIP: ABNORMAL /HPF
REF LAB TEST METHOD: ABNORMAL
RH BLD: POSITIVE
SARS-COV-2 ORF1AB RESP QL NAA+PROBE: NOT DETECTED
SODIUM SERPL-SCNC: 140 MMOL/L (ref 136–145)
SP GR UR STRIP: 1.02 (ref 1–1.03)
SQUAMOUS #/AREA URNS HPF: ABNORMAL /HPF
T&S EXPIRATION DATE: NORMAL
UROBILINOGEN UR QL STRIP: ABNORMAL
WBC # UR STRIP: ABNORMAL /HPF
WBC NRBC COR # BLD: 4.27 10*3/MM3 (ref 3.4–10.8)

## 2022-03-07 PROCEDURE — 85610 PROTHROMBIN TIME: CPT

## 2022-03-07 PROCEDURE — 85025 COMPLETE CBC W/AUTO DIFF WBC: CPT

## 2022-03-07 PROCEDURE — 87077 CULTURE AEROBIC IDENTIFY: CPT

## 2022-03-07 PROCEDURE — U0004 COV-19 TEST NON-CDC HGH THRU: HCPCS

## 2022-03-07 PROCEDURE — C9803 HOPD COVID-19 SPEC COLLECT: HCPCS

## 2022-03-07 PROCEDURE — 71046 X-RAY EXAM CHEST 2 VIEWS: CPT

## 2022-03-07 PROCEDURE — 36415 COLL VENOUS BLD VENIPUNCTURE: CPT

## 2022-03-07 PROCEDURE — 81001 URINALYSIS AUTO W/SCOPE: CPT

## 2022-03-07 PROCEDURE — 87086 URINE CULTURE/COLONY COUNT: CPT

## 2022-03-07 PROCEDURE — 86900 BLOOD TYPING SEROLOGIC ABO: CPT

## 2022-03-07 PROCEDURE — 85730 THROMBOPLASTIN TIME PARTIAL: CPT

## 2022-03-07 PROCEDURE — 87186 SC STD MICRODIL/AGAR DIL: CPT

## 2022-03-07 PROCEDURE — 80048 BASIC METABOLIC PNL TOTAL CA: CPT

## 2022-03-07 PROCEDURE — 86850 RBC ANTIBODY SCREEN: CPT

## 2022-03-07 PROCEDURE — 86901 BLOOD TYPING SEROLOGIC RH(D): CPT

## 2022-03-07 NOTE — DISCHARGE INSTRUCTIONS
Take the following medications the morning of surgery:    metoprolol    If you are on prescription narcotic pain medication to control your pain you may also take that medication the morning of surgery.    General Instructions:  Do not eat solid food after midnight the night before surgery.  You may drink clear liquids day of surgery but must stop at least one hour before your hospital arrival time.  It is beneficial for you to have a clear drink that contains carbohydrates the day of surgery.  We suggest a 12 to 20 ounce bottle of Gatorade or Powerade for non-diabetic patients or a 12 to 20 ounce bottle of G2 or Powerade Zero for diabetic patients. (Pediatric patients, are not advised to drink a 12 to 20 ounce carbohydrate drink)    Clear liquids are liquids you can see through.  Nothing red in color.     Plain water                               Sports drinks  Sodas                                   Gelatin (Jell-O)  Fruit juices without pulp such as white grape juice and apple juice  Popsicles that contain no fruit or yogurt  Tea or coffee (no cream or milk added)  Gatorade / Powerade  G2 / Powerade Zero    Infants may have breast milk up to four hours before surgery.  Infants drinking formula may drink formula up to six hours before surgery.   Patients who avoid smoking, chewing tobacco and alcohol for 4 weeks prior to surgery have a reduced risk of post-operative complications.  Quit smoking as many days before surgery as you can.  Do not smoke, use chewing tobacco or drink alcohol the day of surgery.   If applicable bring your C-PAP/ BI-PAP machine.  Bring any papers given to you in the doctor’s office.  Wear clean comfortable clothes.  Do not wear contact lenses, false eyelashes or make-up.  Bring a case for your glasses.   Bring crutches or walker if applicable.  Remove all piercings.  Leave jewelry and any other valuables at home.  Hair extensions with metal clips must be removed prior to surgery.  The  Pre-Admission Testing nurse will instruct you to bring medications if unable to obtain an accurate list in Pre-Admission Testing.        If you were given a blood bank ID arm band remember to bring it with you the day of surgery.    Preventing a Surgical Site Infection:  For 2 to 3 days before surgery, avoid shaving with a razor because the razor can irritate skin and make it easier to develop an infection.    Any areas of open skin can increase the risk of a post-operative wound infection by allowing bacteria to enter and travel throughout the body.  Notify your surgeon if you have any skin wounds / rashes even if it is not near the expected surgical site.  The area will need assessed to determine if surgery should be delayed until it is healed.  The night prior to surgery shower using a fresh bar of anti-bacterial soap (such as Dial) and clean washcloth.  Sleep in a clean bed with clean clothing.  Do not allow pets to sleep with you.  Shower on the morning of surgery using a fresh bar of anti-bacterial soap (such as Dial) and clean washcloth.  Dry with a clean towel and dress in clean clothing.  Ask your surgeon if you will be receiving antibiotics prior to surgery.  Make sure you, your family, and all healthcare providers clean their hands with soap and water or an alcohol based hand  before caring for you or your wound.    Day of surgery:3/8/2022  0615  Your arrival time is approximately two hours before your scheduled surgery time.  Upon arrival, a Pre-op nurse and Anesthesiologist will review your health history, obtain vital signs, and answer questions you may have.  The only belongings needed at this time will be a list of your home medications and if applicable your C-PAP/BI-PAP machine.  A Pre-op nurse will start an IV and you may receive medication in preparation for surgery, including something to help you relax.     Please be aware that surgery does come with discomfort.  We want to make every  effort to control your discomfort so please discuss any uncontrolled symptoms with your nurse.   Your doctor will most likely have prescribed pain medications.      If you are going home after surgery you will receive individualized written care instructions before being discharged.  A responsible adult must drive you to and from the hospital on the day of your surgery and stay with you for 24 hours.  Discharge prescriptions can be filled by the hospital pharmacy during regular pharmacy hours.  If you are having surgery late in the day/evening your prescription may be e-prescribed to your pharmacy.  Please verify your pharmacy hours or chose a 24 hour pharmacy to avoid not having access to your prescription because your pharmacy has closed for the day.    If you are staying overnight following surgery, you will be transported to your hospital room following the recovery period.  [facility] has all private rooms.    If you have any questions please call Pre-Admission Testing at [phone].  Deductibles and co-payments are collected on the day of service. Please be prepared to pay the required co-pay, deductible or deposit on the day of service as defined by your plan.    Patient Education for Self-Quarantine Process    Following your COVID testing, we strongly recommend that you wear a mask when you are with other people and practice social distancing.   Limit your activities to only required outings.  Wash your hands with soap and water frequently for at least 20 seconds.   Avoid touching your eyes, nose and mouth with unwashed hands.  Do not share anything - utensils, drinking glasses, food from the same bowl.   Sanitize household surfaces daily. Include all high touch areas (door handles, light switches, phones, countertops, etc.)    Call your surgeon immediately if you experience any of the following symptoms:  Sore Throat  Shortness of Breath or difficulty breathing  Cough  Chills  Body  soreness or muscle pain  Headache  Fever  New loss of taste or smell  Do not arrive for your surgery ill.  Your procedure will need to be rescheduled to another time.  You will need to call your physician before the day of surgery to avoid any unnecessary exposure to hospital staff as well as other patients.

## 2022-03-07 NOTE — TELEPHONE ENCOUNTER
Caller: Emely Solomon    Relationship: Self    Best call back number: [phone]    What is the best time to reach you: ANYTIME    Who are you requesting to speak with (clinical staff, provider,  specific staff member): DR GARCIAS OR NURSE    What was the call regarding: PT STATED SHE USUALLY TAKES AN ANTIBIOTIC PRIOR TO ANY PROCEDURE FOR HER HEART ISSUE. PLEASE CALL PT TO ADVISE IF SHE SHOULD FOR TOMORROW AS WELL.     Do you require a callback: YES

## 2022-03-08 ENCOUNTER — HOSPITAL ENCOUNTER (OUTPATIENT)
Facility: HOSPITAL | Age: 62
Setting detail: HOSPITAL OUTPATIENT SURGERY
Discharge: HOME | End: 2022-03-08
Attending: OBSTETRICS & GYNECOLOGY | Admitting: OBSTETRICS & GYNECOLOGY

## 2022-03-08 ENCOUNTER — ANESTHESIA EVENT (OUTPATIENT)
Dept: PERIOP | Facility: HOSPITAL | Age: 62
End: 2022-03-08

## 2022-03-08 ENCOUNTER — ANESTHESIA (OUTPATIENT)
Dept: PERIOP | Facility: HOSPITAL | Age: 62
End: 2022-03-08

## 2022-03-08 VITALS
HEIGHT: 63 IN | DIASTOLIC BLOOD PRESSURE: 81 MMHG | BODY MASS INDEX: 51.91 KG/M2 | SYSTOLIC BLOOD PRESSURE: 125 MMHG | HEART RATE: 62 BPM | TEMPERATURE: 97.9 F | WEIGHT: 293 LBS | RESPIRATION RATE: 18 BRPM | OXYGEN SATURATION: 93 %

## 2022-03-08 DIAGNOSIS — R93.89 THICKENED ENDOMETRIUM: ICD-10-CM

## 2022-03-08 DIAGNOSIS — N95.0 POST-MENOPAUSAL BLEEDING: Primary | ICD-10-CM

## 2022-03-08 PROCEDURE — 94640 AIRWAY INHALATION TREATMENT: CPT

## 2022-03-08 PROCEDURE — 25010000002 VANCOMYCIN 10 G RECONSTITUTED SOLUTION: Performed by: PHYSICIAN ASSISTANT

## 2022-03-08 PROCEDURE — 25010000002 PROPOFOL 10 MG/ML EMULSION: Performed by: NURSE ANESTHETIST, CERTIFIED REGISTERED

## 2022-03-08 PROCEDURE — 88305 TISSUE EXAM BY PATHOLOGIST: CPT | Performed by: OBSTETRICS & GYNECOLOGY

## 2022-03-08 PROCEDURE — 58120 DILATION AND CURETTAGE: CPT | Performed by: OBSTETRICS & GYNECOLOGY

## 2022-03-08 PROCEDURE — 25010000002 FENTANYL CITRATE (PF) 50 MCG/ML SOLUTION: Performed by: NURSE ANESTHETIST, CERTIFIED REGISTERED

## 2022-03-08 PROCEDURE — 94799 UNLISTED PULMONARY SVC/PX: CPT

## 2022-03-08 PROCEDURE — 25010000002 MIDAZOLAM PER 1 MG: Performed by: ANESTHESIOLOGY

## 2022-03-08 PROCEDURE — 25010000002 ONDANSETRON PER 1 MG: Performed by: NURSE ANESTHETIST, CERTIFIED REGISTERED

## 2022-03-08 RX ORDER — IPRATROPIUM BROMIDE AND ALBUTEROL SULFATE 2.5; .5 MG/3ML; MG/3ML
3 SOLUTION RESPIRATORY (INHALATION) ONCE AS NEEDED
Status: COMPLETED | OUTPATIENT
Start: 2022-03-08 | End: 2022-03-08

## 2022-03-08 RX ORDER — SODIUM CHLORIDE 0.9 % (FLUSH) 0.9 %
3 SYRINGE (ML) INJECTION EVERY 12 HOURS SCHEDULED
Status: DISCONTINUED | OUTPATIENT
Start: 2022-03-08 | End: 2022-03-08 | Stop reason: HOSPADM

## 2022-03-08 RX ORDER — ONDANSETRON 2 MG/ML
4 INJECTION INTRAMUSCULAR; INTRAVENOUS ONCE AS NEEDED
Status: DISCONTINUED | OUTPATIENT
Start: 2022-03-08 | End: 2022-03-08 | Stop reason: HOSPADM

## 2022-03-08 RX ORDER — IBUPROFEN 600 MG/1
600 TABLET ORAL ONCE AS NEEDED
Status: DISCONTINUED | OUTPATIENT
Start: 2022-03-08 | End: 2022-03-08 | Stop reason: HOSPADM

## 2022-03-08 RX ORDER — HYDRALAZINE HYDROCHLORIDE 20 MG/ML
5 INJECTION INTRAMUSCULAR; INTRAVENOUS
Status: DISCONTINUED | OUTPATIENT
Start: 2022-03-08 | End: 2022-03-08 | Stop reason: HOSPADM

## 2022-03-08 RX ORDER — SODIUM CHLORIDE 0.9 % (FLUSH) 0.9 %
10 SYRINGE (ML) INJECTION EVERY 12 HOURS SCHEDULED
Status: DISCONTINUED | OUTPATIENT
Start: 2022-03-08 | End: 2022-03-08 | Stop reason: HOSPADM

## 2022-03-08 RX ORDER — FENTANYL CITRATE 50 UG/ML
50 INJECTION, SOLUTION INTRAMUSCULAR; INTRAVENOUS
Status: DISCONTINUED | OUTPATIENT
Start: 2022-03-08 | End: 2022-03-08 | Stop reason: HOSPADM

## 2022-03-08 RX ORDER — DIPHENHYDRAMINE HCL 25 MG
25 CAPSULE ORAL
Status: DISCONTINUED | OUTPATIENT
Start: 2022-03-08 | End: 2022-03-08 | Stop reason: HOSPADM

## 2022-03-08 RX ORDER — PROPOFOL 10 MG/ML
VIAL (ML) INTRAVENOUS AS NEEDED
Status: DISCONTINUED | OUTPATIENT
Start: 2022-03-08 | End: 2022-03-08 | Stop reason: SURG

## 2022-03-08 RX ORDER — PROMETHAZINE HYDROCHLORIDE 25 MG/1
25 TABLET ORAL ONCE AS NEEDED
Status: DISCONTINUED | OUTPATIENT
Start: 2022-03-08 | End: 2022-03-08 | Stop reason: HOSPADM

## 2022-03-08 RX ORDER — HYDROCODONE BITARTRATE AND ACETAMINOPHEN 5; 325 MG/1; MG/1
1 TABLET ORAL EVERY 6 HOURS PRN
Qty: 20 TABLET | Refills: 0 | Status: SHIPPED | OUTPATIENT
Start: 2022-03-08 | End: 2022-03-31

## 2022-03-08 RX ORDER — DIPHENHYDRAMINE HYDROCHLORIDE 50 MG/ML
12.5 INJECTION INTRAMUSCULAR; INTRAVENOUS
Status: DISCONTINUED | OUTPATIENT
Start: 2022-03-08 | End: 2022-03-08 | Stop reason: HOSPADM

## 2022-03-08 RX ORDER — FAMOTIDINE 10 MG/ML
20 INJECTION, SOLUTION INTRAVENOUS ONCE
Status: COMPLETED | OUTPATIENT
Start: 2022-03-08 | End: 2022-03-08

## 2022-03-08 RX ORDER — EPHEDRINE SULFATE 50 MG/ML
5 INJECTION, SOLUTION INTRAVENOUS ONCE AS NEEDED
Status: DISCONTINUED | OUTPATIENT
Start: 2022-03-08 | End: 2022-03-08 | Stop reason: HOSPADM

## 2022-03-08 RX ORDER — ONDANSETRON 2 MG/ML
4 INJECTION INTRAMUSCULAR; INTRAVENOUS EVERY 6 HOURS PRN
Status: DISCONTINUED | OUTPATIENT
Start: 2022-03-08 | End: 2022-03-08 | Stop reason: HOSPADM

## 2022-03-08 RX ORDER — LABETALOL HYDROCHLORIDE 5 MG/ML
5 INJECTION, SOLUTION INTRAVENOUS
Status: DISCONTINUED | OUTPATIENT
Start: 2022-03-08 | End: 2022-03-08 | Stop reason: HOSPADM

## 2022-03-08 RX ORDER — SODIUM CHLORIDE 0.9 % (FLUSH) 0.9 %
3-10 SYRINGE (ML) INJECTION AS NEEDED
Status: DISCONTINUED | OUTPATIENT
Start: 2022-03-08 | End: 2022-03-08 | Stop reason: HOSPADM

## 2022-03-08 RX ORDER — OXYCODONE AND ACETAMINOPHEN 7.5; 325 MG/1; MG/1
1 TABLET ORAL EVERY 4 HOURS PRN
Status: DISCONTINUED | OUTPATIENT
Start: 2022-03-08 | End: 2022-03-08 | Stop reason: HOSPADM

## 2022-03-08 RX ORDER — MAGNESIUM HYDROXIDE 1200 MG/15ML
LIQUID ORAL AS NEEDED
Status: DISCONTINUED | OUTPATIENT
Start: 2022-03-08 | End: 2022-03-08 | Stop reason: HOSPADM

## 2022-03-08 RX ORDER — SODIUM CHLORIDE, SODIUM LACTATE, POTASSIUM CHLORIDE, CALCIUM CHLORIDE 600; 310; 30; 20 MG/100ML; MG/100ML; MG/100ML; MG/100ML
9 INJECTION, SOLUTION INTRAVENOUS CONTINUOUS
Status: DISCONTINUED | OUTPATIENT
Start: 2022-03-08 | End: 2022-03-08 | Stop reason: HOSPADM

## 2022-03-08 RX ORDER — LIDOCAINE HYDROCHLORIDE 10 MG/ML
0.5 INJECTION, SOLUTION EPIDURAL; INFILTRATION; INTRACAUDAL; PERINEURAL ONCE AS NEEDED
Status: DISCONTINUED | OUTPATIENT
Start: 2022-03-08 | End: 2022-03-08 | Stop reason: HOSPADM

## 2022-03-08 RX ORDER — HYDROMORPHONE HYDROCHLORIDE 1 MG/ML
0.5 INJECTION, SOLUTION INTRAMUSCULAR; INTRAVENOUS; SUBCUTANEOUS
Status: DISCONTINUED | OUTPATIENT
Start: 2022-03-08 | End: 2022-03-08 | Stop reason: HOSPADM

## 2022-03-08 RX ORDER — LIDOCAINE HYDROCHLORIDE 20 MG/ML
INJECTION, SOLUTION INFILTRATION; PERINEURAL AS NEEDED
Status: DISCONTINUED | OUTPATIENT
Start: 2022-03-08 | End: 2022-03-08 | Stop reason: SURG

## 2022-03-08 RX ORDER — SODIUM CHLORIDE 0.9 % (FLUSH) 0.9 %
10 SYRINGE (ML) INJECTION AS NEEDED
Status: DISCONTINUED | OUTPATIENT
Start: 2022-03-08 | End: 2022-03-08 | Stop reason: HOSPADM

## 2022-03-08 RX ORDER — NALOXONE HCL 0.4 MG/ML
0.2 VIAL (ML) INJECTION AS NEEDED
Status: DISCONTINUED | OUTPATIENT
Start: 2022-03-08 | End: 2022-03-08 | Stop reason: HOSPADM

## 2022-03-08 RX ORDER — FENTANYL CITRATE 50 UG/ML
INJECTION, SOLUTION INTRAMUSCULAR; INTRAVENOUS AS NEEDED
Status: DISCONTINUED | OUTPATIENT
Start: 2022-03-08 | End: 2022-03-08 | Stop reason: SURG

## 2022-03-08 RX ORDER — MIDAZOLAM HYDROCHLORIDE 1 MG/ML
1 INJECTION INTRAMUSCULAR; INTRAVENOUS
Status: DISCONTINUED | OUTPATIENT
Start: 2022-03-08 | End: 2022-03-08 | Stop reason: HOSPADM

## 2022-03-08 RX ORDER — PROMETHAZINE HYDROCHLORIDE 25 MG/1
25 SUPPOSITORY RECTAL ONCE AS NEEDED
Status: DISCONTINUED | OUTPATIENT
Start: 2022-03-08 | End: 2022-03-08 | Stop reason: HOSPADM

## 2022-03-08 RX ORDER — SODIUM CHLORIDE, SODIUM LACTATE, POTASSIUM CHLORIDE, CALCIUM CHLORIDE 600; 310; 30; 20 MG/100ML; MG/100ML; MG/100ML; MG/100ML
100 INJECTION, SOLUTION INTRAVENOUS CONTINUOUS
Status: DISCONTINUED | OUTPATIENT
Start: 2022-03-08 | End: 2022-03-08 | Stop reason: HOSPADM

## 2022-03-08 RX ORDER — ONDANSETRON 2 MG/ML
INJECTION INTRAMUSCULAR; INTRAVENOUS AS NEEDED
Status: DISCONTINUED | OUTPATIENT
Start: 2022-03-08 | End: 2022-03-08 | Stop reason: SURG

## 2022-03-08 RX ORDER — SODIUM CHLORIDE 9 MG/ML
INJECTION, SOLUTION INTRAVENOUS AS NEEDED
Status: DISCONTINUED | OUTPATIENT
Start: 2022-03-08 | End: 2022-03-08 | Stop reason: HOSPADM

## 2022-03-08 RX ORDER — HYDROCODONE BITARTRATE AND ACETAMINOPHEN 7.5; 325 MG/1; MG/1
1 TABLET ORAL ONCE AS NEEDED
Status: DISCONTINUED | OUTPATIENT
Start: 2022-03-08 | End: 2022-03-08 | Stop reason: HOSPADM

## 2022-03-08 RX ORDER — FLUMAZENIL 0.1 MG/ML
0.2 INJECTION INTRAVENOUS AS NEEDED
Status: DISCONTINUED | OUTPATIENT
Start: 2022-03-08 | End: 2022-03-08 | Stop reason: HOSPADM

## 2022-03-08 RX ADMIN — FENTANYL CITRATE 50 MCG: 0.05 INJECTION, SOLUTION INTRAMUSCULAR; INTRAVENOUS at 09:06

## 2022-03-08 RX ADMIN — IPRATROPIUM BROMIDE AND ALBUTEROL SULFATE 3 ML: .5; 3 SOLUTION RESPIRATORY (INHALATION) at 10:43

## 2022-03-08 RX ADMIN — SODIUM CHLORIDE, POTASSIUM CHLORIDE, SODIUM LACTATE AND CALCIUM CHLORIDE 100 ML/HR: 600; 310; 30; 20 INJECTION, SOLUTION INTRAVENOUS at 07:49

## 2022-03-08 RX ADMIN — FAMOTIDINE 20 MG: 10 INJECTION INTRAVENOUS at 07:49

## 2022-03-08 RX ADMIN — LIDOCAINE HYDROCHLORIDE 100 MG: 20 INJECTION, SOLUTION INFILTRATION; PERINEURAL at 09:06

## 2022-03-08 RX ADMIN — PROPOFOL 50 MG: 10 INJECTION, EMULSION INTRAVENOUS at 09:19

## 2022-03-08 RX ADMIN — PROPOFOL 300 MG: 10 INJECTION, EMULSION INTRAVENOUS at 09:06

## 2022-03-08 RX ADMIN — MIDAZOLAM 1 MG: 1 INJECTION INTRAMUSCULAR; INTRAVENOUS at 07:49

## 2022-03-08 RX ADMIN — VANCOMYCIN HYDROCHLORIDE 2500 MG: 10 INJECTION, POWDER, LYOPHILIZED, FOR SOLUTION INTRAVENOUS at 07:48

## 2022-03-08 RX ADMIN — PROPOFOL 50 MG: 10 INJECTION, EMULSION INTRAVENOUS at 09:22

## 2022-03-08 RX ADMIN — ONDANSETRON 4 MG: 2 INJECTION INTRAMUSCULAR; INTRAVENOUS at 09:27

## 2022-03-08 NOTE — OP NOTE
Gynecologic Oncology - Operative Report         PATIENT NAME: Emely Solomon  YOB: 1960   AGE: 61 y.o.  MRN#: [id number]  [facility]#: [id number]      DATE OF OPERATION: 3/8/2022    PREOPERATIVE DIAGNOSIS:   1. Postmenopausal bleeding     POSTOPERATIVE DIAGNOSIS:  1. Same     OPERATION PERFORMED:    1. Dilation and curettage with hysteroscopy     SURGEON: Kaylah Land D.O     ANESTHESIA: gen/LMA    ESTIMATED BLOOD LOSS: min  IVF: 700mL LR     FINDINGS: appeared to be normal post menopausal endometrium , no findings concerning for tumor.     PROCEDURE: Informed consent was reviewed with the patient immediately prior to taking her to the OR suite. Once in the suite, induction of anesthesia was performed.  The patient was too large for traditional positioning, so ortho split legs had to be utilized for positioning. One orderly assisted in securing each leg throughout the case.   She was prepped and draped in the normal sterile fashion. The open sided bivalve speculum was placed in the patient's vagina and the cervix was grasped at the 12 O'clock position with a single tooth tenaculum.  Kevorkian curette was used to obtain endocervical curettings. The uterus was sounded to 11 cm and the cervix was dilated with the shelby dilators up to a number 15.  The hysteroscope was then placed into the uterine cavity and the findings of the hysteroscopic exam are noted above.  The hysteroscope was removed and the sharp curette was placed into the uterine cavity.  Curettage was performed in 360 degree fashion until uterine “cry” was noted. All specimens were passed off the field and sent to pathology. The tenaculum was then removed from the patient's cervix and good hemostasis was noted.  The speculum was removed.  Sponge, lap, needle, and instruments were correct x 2.  The patient was taken to the recovery room in awake and stable condition.               Kaylah Land D.O  3/8/2022    Gynecologic Oncology   [address] [facility] [address] [place] [id number] [phone] office

## 2022-03-08 NOTE — ANESTHESIA POSTPROCEDURE EVALUATION
Patient: Emely Solomon    Procedure Summary     Date: 03/08/22 Room / Location: [facility] OR [address] [place] [address] MAIN OR    Anesthesia Start: 0854 Anesthesia Stop: 0945    Procedure: DILATATION AND CURETTAGE with hysteroscopy (N/A Uterus) Diagnosis:       Post-menopausal bleeding      Thickened endometrium      (Post-menopausal bleeding [N95.0])      (Thickened endometrium [R93.89])    Surgeons: Kaylah Land DO Provider: Akil Colon MD    Anesthesia Type: general ASA Status: 4          Anesthesia Type: general    Vitals  Vitals Value Taken Time   /53 03/08/22 1046   Temp 36.6 °C (97.9 °F) 03/08/22 1045   Pulse 63 03/08/22 1050   Resp 16 03/08/22 1049   SpO2 94 % 03/08/22 1051   Vitals shown include unvalidated device data.        Post Anesthesia Care and Evaluation    Patient location during evaluation: PHASE II  Anesthetic complications: No anesthetic complications

## 2022-03-08 NOTE — EXTERNAL PATIENT INSTRUCTIONS
Patient Education   Table of Contents       Acetaminophen; Hydrocodone Capsules or Tablets       General Anesthesia, Adult       Hysteroscopy, Care After     To view videos and all your education online visit,   [email or web address]   or scan this QR code with your smartphone.                  Acetaminophen; Hydrocodone Capsules or Tablets     What is this medication?   ACETAMINOPHEN; HYDROCODONE (a set a MARCELLUS malgorzata fen; kavon droe KOE done) treats moderate pain. It is prescribed when other pain medications have not worked or cannot be tolerated. It works by blocking pain signals in the brain. This medication is a combination of acetaminophen and an opioid.   This medicine may be used for other purposes; ask your health care provider or pharmacist if you have questions.   COMMON BRAND NAME(S): Anexsia, Bancap HC, Ceta-Plus, Co-Gesic, Comfortpak, Dolagesic, Dolorex Forte, DuoCet, Hydrocet, Hydrogesic, Lorcet, Lorcet HD, Lorcet Plus, Lortab, Margesic H, Maxidone, Norco, Polygesic, Stagesic, Vanacet, Verdrocet, Vicodin, Vicodin ES, Vicodin HP, Xodol, Zydone   What should I tell my care team before I take this medication?   They need to know if you have any of these conditions:         Brain tumor       Drug abuse or addiction       Head injury       Heart disease       If you often drink alcohol       Kidney disease       Liver disease       Low adrenal gland function       Lung disease, asthma, or breathing problems       Seizures       Stomach or intestine problems       Taken an MAOI like Marplan, Nardil, or Parnate in the last 14 days       An unusual or allergic reaction to acetaminophen, hydrocodone, other medications, foods, dyes, or preservatives       Pregnant or trying to get pregnant       Breast-feeding     How should I use this medication?   Take this medication by mouth with a glass of water. Follow the directions on the prescription label. You can take it with or without food. If it upsets your  stomach, take it with food. Do not take your medication more often than directed.   A special MedGuide will be given to you by the pharmacist with each prescription and refill. Be sure to read this information carefully each time.   Talk to your care team regarding the use of this medication in children. Special care may be needed.   Overdosage: If you think you have taken too much of this medicine contact a poison control center or emergency room at once.   NOTE: This medicine is only for you. Do not share this medicine with others.   What if I miss a dose?   If you miss a dose, take it as soon as you can. If it is almost time for your next dose, take only that dose. Do not take double or extra doses.   What may interact with this medication?   This medication may interact with the following:         Alcohol       Antiviral medications for HIV or AIDS       Atropine       Antihistamines for allergy, cough and cold       Certain antibiotics like erythromycin, clarithromycin       Certain medications for anxiety or sleep       Certain medications for bladder problems like oxybutynin, tolterodine       Certain medications for depression like amitriptyline, fluoxetine, sertraline       Certain medications for fungal infections like ketoconazole and itraconazole       Certain medications for Parkinson's disease like benztropine, trihexyphenidyl       Certain medications for seizures like carbamazepine, phenobarbital, phenytoin, primidone       Certain medications for stomach problems like dicyclomine, hyoscyamine       Certain medications for travel sickness like scopolamine       General anesthetics like halothane, isoflurane, methoxyflurane, propofol       Ipratropium       Local anesthetics like lidocaine, pramoxine, tetracaine       MAOIs like Carbex, Eldepryl, Marplan, Nardil, and Parnate       Medications that relax muscles for surgery       Other medications with acetaminophen       Other narcotic medications for  pain or cough       Phenothiazines like chlorpromazine, mesoridazine, prochlorperazine, thioridazine       Rifampin     This list may not describe all possible interactions. Give your health care provider a list of all the medicines, herbs, non-prescription drugs, or dietary supplements you use. Also tell them if you smoke, drink alcohol, or use illegal drugs. Some items may interact with your medicine.   What should I watch for while using this medication?   Tell your care team if your pain does not go away, if it gets worse, or if you have new or a different type of pain. You may develop tolerance to this medication. Tolerance means that you will need a higher dose of the medication for pain relief. Tolerance is normal and is expected if you take this medication for a long time.   There are different types of narcotic medications (opioids) for pain. If you take more than one type at the same time, you may have more side effects. Give your care team a list of all medications you use. He or she will tell you how much medication to take. Do not take more medication than directed. Call emergency services if you have problems breathing.   Do not suddenly stop taking your medication because you may develop a severe reaction. Your body becomes used to the medication. This does NOT mean you are addicted. Addiction is a behavior related to getting and using a medication for a nonmedical reason. If you have pain, you have a medical reason to take pain medication. Your care team will tell you how much medication to take. If your care team wants you to stop the medication, the dose will be slowly lowered over time to avoid any side effects.   Talk to your care team about naloxone and how to get it. Naloxone is an emergency medication used for an opioid overdose. An overdose can happen if you take too much opioid. It can also happen if an opioid is taken with some other medications or substances, like alcohol. Know the  symptoms of an overdose, like trouble breathing, unusually tired or sleepy, or not being able to respond or wake up. Make sure to tell caregivers and close contacts where it is stored. Make sure they know how to use it. After naloxone is given, you must call emergency services. Naloxone is a temporary treatment. Repeat doses may be needed.   Do not take other medications that contain acetaminophen with this medication. Many non-prescription medications contain acetaminophen. Always read labels carefully. If you have questions, ask your care team.   If you take too much acetaminophen, get medical help right away. Too much acetaminophen can be very dangerous and cause liver damage. Even if you do not have symptoms, it is important to get help right away.   You may get drowsy or dizzy. Do not drive, use machinery, or do anything that needs mental alertness until you know how this medication affects you. Do not stand up or sit up quickly, especially if you are an older patient. This reduces the risk of dizzy or fainting spells. Alcohol may interfere with the effect of this medication. Avoid alcoholic drinks.   This medication will cause constipation. If you do not have a bowel movement for 3 days, call your care team.   Your mouth may get dry. Chewing sugarless gum or sucking hard candy and drinking plenty of water may help. Contact your care team if the problem does not go away or is severe.   What side effects may I notice from receiving this medication?   Side effects that you should report to your care team as soon as possible:         Allergic reactions-skin rash, itching, hives, swelling of the face, lips, tongue, or throat       CNS depression-slow or shallow breathing, shortness of breath, feeling faint, dizziness, confusion, trouble staying awake       Liver injury-right upper belly pain, loss of appetite, nausea, light-colored stool, dark yellow or brown urine, yellowing skin or eyes, unusual weakness or  "fatigue       Low adrenal gland function-nausea, vomiting, loss of appetite, unusual weakness or fatigue, dizziness       Low blood pressure-dizziness, feeling faint or lightheaded, blurry vision       Redness, blistering, peeling, or loosening of the skin, including inside the mouth     Side effects that usually do not require medical attention (report to your care team if they continue or are bothersome):         Constipation       Dizziness       Drowsiness       Dry mouth       Headache       Nausea       Trouble sleeping       Upset stomach       Vomiting     This list may not describe all possible side effects. Call your doctor for medical advice about side effects. You may report side effects to FDA at 0-457-GAW-9944.   Where should I keep my medication?   Keep out of the reach of children and pets. This medication can be abused. Keep your medication in a safe place to protect it from theft. Do not share this medication with anyone. Selling or giving away this medication is dangerous and against the law.   Store at room temperature between 15 and 30 degrees C (59 and 86 degrees F).   This medication may cause harm and death if it is taken by other adults, children, or pets. It is important to get rid of the medication as soon as you no longer need it or it is . You can do this in two ways:         Take the medication to a medication take-back program. Check with your pharmacy or law enforcement to find a location.       If you cannot return the medication, flush it down the toilet.     NOTE: This sheet is a summary. It may not cover all possible information. If you have questions about this medicine, talk to your doctor, pharmacist, or health care provider.     ?  Elsevier/Gold Standard (2021 12:46:43)         General Anesthesia, Adult     General anesthesia is the use of medicines to make a person \"go to sleep\" (unconscious) for a medical procedure. General anesthesia must be used for " certain procedures, and is often recommended for procedures that:       Last a long time.       Require you to be still or in an unusual position.       Are major and can cause blood loss.      The medicines used for general anesthesia are called general anesthetics. As well as making you unconscious for a certain amount of time, these medicines:       Prevent pain.       Control your blood pressure.       Relax your muscles.     Tell a health care provider about:         Any allergies you have.       All medicines you are taking, including vitamins, herbs, eye drops, creams, and over-the-counter medicines.       Any problems you or family members have had with anesthetic medicines.       Types of anesthetics you have had in the past.       Any blood disorders you have.       Any surgeries you have had.       Any medical conditions you have.       Any recent upper respiratory, chest, or ear infections.      Any history of:       Heart or lung conditions, such as heart failure, sleep apnea, asthma, or chronic obstructive pulmonary disease (COPD).        service.       Depression or anxiety.       Any tobacco or drug use, including marijuana or alcohol use.       Whether you are pregnant or may be pregnant.     What are the risks?    Generally, this is a safe procedure. However, problems may occur, including:       Allergic reaction.       Lung and heart problems.       Inhaling food or liquid from the stomach into the lungs (aspiration).       Nerve injury.       Dental injury.       Air in the bloodstream, which can lead to stroke.       Extreme agitation or confusion (delirium) when you wake up from the anesthetic.       Waking up during your procedure and being unable to move. This is rare.     These problems are more likely to develop if you are having a major surgery or if you have an advanced or serious medical condition. You can prevent some of these complications by answering all of your health care  provider's questions thoroughly and by following all instructions before your procedure.    General anesthesia can cause side effects, including:       Nausea or vomiting.       A sore throat from the breathing tube.       Hoarseness.       Wheezing or coughing.       Shaking chills.       Tiredness.       Body aches.       Anxiety.       Sleepiness or drowsiness.       Confusion or agitation.     What happens before the procedure?   Staying hydrated       Follow instructions from your health care provider about hydration, which may include:       Up to 2 hours before the procedure - you may continue to drink clear liquids, such as water, clear fruit juice, black coffee, and plain tea.     Eating and drinking restrictions    Follow instructions from your health care provider about eating and drinking, which may include:       8 hours before the procedure - stop eating heavy meals or foods such as meat, fried foods, or fatty foods.       6 hours before the procedure - stop eating light meals or foods, such as toast or cereal.       6 hours before the procedure - stop drinking milk or drinks that contain milk.       2 hours before the procedure - stop drinking clear liquids.     Medicines    Ask your health care provider about:       Changing or stopping your regular medicines. This is especially important if you are taking diabetes medicines or blood thinners.       Taking medicines such as aspirin and ibuprofen. These medicines can thin your blood. Do not  take these medicines unless your health care provider tells you to take them.       Taking over-the-counter medicines, vitamins, herbs, and supplements. Do not  take these during the week before your procedure unless your health care provider approves them.     General instructions         Starting 3?6 weeks before the procedure, do not  use any products that contain nicotine or tobacco, such as cigarettes and e-cigarettes. If you need help quitting, ask your  health care provider.       If you brush your teeth on the morning of the procedure, make sure to spit out all of the toothpaste.       Tell your health care provider if you become ill or develop a cold, cough, or fever.       If instructed by your health care provider, bring your sleep apnea device with you on the day of your surgery (if applicable).      Ask your health care provider if you will be going home the same day, the following day, or after a longer hospital stay.       Plan to have a responsible adult take you home from the hospital or clinic.       Plan to have a responsible adult care for you for the time you are told after you leave the hospital or clinic. This is important.       What happens during the procedure?           You will be given anesthetics through both of the following:       A mask placed over your nose and mouth.       An IV in one of your veins.       You may receive a medicine to help you relax (sedative).       After you are unconscious, a breathing tube may be inserted down your throat to help you breathe. This will be removed before you wake up.      An anesthesia specialist will stay with you throughout your procedure. He or she will:       Keep you comfortable and safe by continuing to give you medicines and adjusting the amount of medicine that you get.       Monitor your blood pressure, pulse, and oxygen levels to make sure that the anesthetics do not cause any problems.     The procedure may vary among health care providers and hospitals.     What happens after the procedure?         Your blood pressure, temperature, heart rate, breathing rate, and blood oxygen level will be monitored until the medicines you were given have worn off.       You will wake up in a recovery area. You may wake up slowly.       If you feel anxious or agitated, you may be given medicine to help you calm down.       If you will be going home the same day, your health care provider may check to make  sure you can walk, drink, and urinate.       Your health care provider will treat any pain or side effects you have before you go home.      Do not  drive or operate machinery until your health care provider says that it is safe.     Summary         General anesthesia is used to keep you still and prevent pain during a procedure.       It is important to tell your health care provider about your medical history and any surgeries you have had, and previous experience with anesthesia.       Follow your health care provider's instructions about when to stop eating, drinking, or taking certain medicines before your procedure.       Plan to have a responsible adult take you home from the hospital or clinic.     This information is not intended to replace advice given to you by your health care provider. Make sure you discuss any questions you have with your health care provider.     Document Released: 03/26/2009Document Revised: 08/30/2021Document Reviewed: 03/31/2021     [employer] Patient Education ? 2022 [employer]         Hysteroscopy, Care After     This sheet gives you information about how to care for yourself after your procedure. Your health care provider may also give you more specific instructions. If you have problems or questions, contact your health care provider.   What can I expect after the procedure?    After the procedure, it is common to have:       Cramping.       Bleeding. This can vary from light spotting to menstrual-like bleeding.     Follow these instructions at home:   Activity         Rest for 1?2 days after the procedure.      Do not  douche, use tampons, or have sex for 2 weeks after the procedure, or until your health care provider approves.      Do not  drive for 24 hours after the procedure, or for as long as told by your health care provider.      Do not  drive, use heavy machinery, or drink alcohol while taking prescription pain medicines.     Medicines            Take over-the-counter  and prescription medicines only as told by your health care provider.      Do not  take aspirin during recovery. It can increase the risk of bleeding.     General instructions        Do not  take baths, swim, or use a hot tub until your health care provider approves. Take showers instead of baths for 2 weeks, or for as long as told by your health care provider.      To prevent or treat constipation while you are taking prescription pain medicine, your health care provider may recommend that you:       Drink enough fluid to keep your urine clear or pale yellow.       Take over-the-counter or prescription medicines.       Eat foods that are high in fiber, such as fresh fruits and vegetables, whole grains, and beans.       Limit foods that are high in fat and processed sugars, such as fried and sweet foods.       Keep all follow-up visits as told by your health care provider. This is important.       Contact a health care provider if:         You feel dizzy or lightheaded.       You feel nauseous.       You have abnormal vaginal discharge.       You have a rash.       You have pain that does not get better with medicine.       You have chills.     Get help right away if:         You have bleeding that is heavier than a normal menstrual period.       You have a fever.       You have pain or cramps that get worse.       You develop new abdominal pain.       You faint.       You have pain in your shoulders.       You have shortness of breath.     Summary         After the procedure, you may have cramping and some vaginal bleeding.      Do not  douche, use tampons, or have sex for 2 weeks after the procedure, or until your health care provider approves.      Do not  take baths, swim, or use a hot tub until your health care provider approves. Take showers instead of baths for 2 weeks, or for as long as told by your health care provider.       Report any unusual symptoms to your health care provider.       Keep all follow-up  visits as told by your health care provider. This is important.     This information is not intended to replace advice given to you by your health care provider. Make sure you discuss any questions you have with your health care provider.     Document Released: 10/08/2014Document Revised: 11/30/2018Document Reviewed: 01/16/2018     [employer]vier Patient Education ? 2021 [employer]

## 2022-03-08 NOTE — ANESTHESIA PREPROCEDURE EVALUATION
Anesthesia Evaluation     Patient summary reviewed and Nursing notes reviewed                Airway   Mallampati: III  TM distance: >3 FB  Neck ROM: limited  Dental      Pulmonary    (+) pulmonary embolism, sleep apnea,   Cardiovascular     ECG reviewed  PT is on anticoagulation therapy  Rhythm: regular  Rate: normal    (+) hypertension, valvular problems/murmurs murmur, CHF , DVT resolved,       Neuro/Psych- negative ROS  GI/Hepatic/Renal/Endo    (+) morbid obesity,  renal disease CRI,     Musculoskeletal     (+) back pain,   Abdominal    Substance History - negative use     OB/GYN negative ob/gyn ROS         Other   arthritis,                      Anesthesia Plan    ASA 4     general   (PFO  DVT history  BMI    I have reviewed the patient's history with the patient and the chart, including all pertinent laboratory results and imaging. I have explained the risks of anesthesia including but not limited to dental damage, corneal abrasion, nerve injury, MI, stroke, and death. Questions asked and answered. Anesthetic plan discussed with patient and team as indicated. Patient expressed understanding of the above.  )  intravenous induction     Anesthetic plan, all risks, benefits, and alternatives have been provided, discussed and informed consent has been obtained with: patient.        CODE STATUS:

## 2022-03-08 NOTE — H&P
Pt seen and examined in pre op holding area. There are no changes to her original H & P.   We reviewed the procedure as planned, as well as the benefits, risks, alternatives and possible complications (bleeding, infection, hemorrhage, damage to surrournding structures including bladder or bowel, failure to cure, need for additional treatment, as well as perioperative cardiopulmonary, thromboembolic or other medical events.)   We reviewed post operative care and home instructions.  All questions were answered and she wishes to proceed with surgery.           Kaylah Land D.O  3/8/2022  07:10 [facility]    Gynecologic Oncology   [facility]  [place] [address]    [phone] office            Age: 61 y.o.  Sex: female  :  1960  MRN: [id number]       REFERRING PHYSICIAN: Kaylah Land DO  DATE OF VISIT: 3/8/2022      Emely Solomon presents to [facility] secondary to evaluation of right ovarian cyst and thickened endometrium.  Pt initially worked up by gynecologist secondary to PMB.  U/S performed on 22.  Right ovary measures 4.8 x 5.22 x 3.08 cm, with a cyst noted to be 4.4 x 2.9 x 4.0 cm.  Endometrial thickness of 17.79 mm.  U/S limited due to habitus.   on  was 64.    Two aunts with history of breast cancer, no family history of gyn or colon cancer.        Oncology/Hematology History Overview Note   Emely Solomon is a 61 y.o. female referred by Dr. Florida Segovia ([facility]) for right ovarian cyst.    • 22: TVUS (outside facility)-uterus-13.22 x 7.83 x 8.01 cm, ET-17.79 mm, right ovary-4.81 x 5.22 x 3.08 cm, right ovarian cyst-4.41 x 2.87 x 4.04 cm, right ovarian cyst-0.63 x 0.45 x 0.62.  • 22: Ca 125-65.0          Past Medical History:  Past Medical History:   Diagnosis Date   • Arthritis    • Cellulitis     2017, with Group B Strep bacteremia and sepsis   • Chronic diastolic congestive heart failure (HCC)    • COVID-19 virus infection 2020    • Deep venous thrombosis (HCC)    • Heart murmur    • Hypertension    • Lipoedema    • Lymphedema    • Morbid obesity (HCC)    • Paradoxical embolism (HCC)     to the LLE due to DVT/PE and PFO   • PFO (patent foramen ovale)    • Pulmonary embolism (HCC)    • Pulmonary hypertension (HCC)     multifactorial (dCHF, obesity/ARIEL, hx PE), mild by echo 1/2016   • Sepsis (HCC) 09/18/2020   • Sleep apnea        Past Surgical History:  Past Surgical History:   Procedure Laterality Date   • BRONCHOSCOPY N/A 07/21/2017    Procedure: BRONCHOSCOPY with wash;  Surgeon: Caleb Garcia MD;  Location: [facility] ENDOSCOPY;  Service:    • COLONOSCOPY     • DILATATION AND CURETTAGE  04/11/2011        MEDICATIONS:    Current Facility-Administered Medications:   •  lactated ringers infusion, 100 mL/hr, Intravenous, Continuous, Seldon, Becca, PA-C  •  sodium chloride 0.9 % flush 10 mL, 10 mL, Intravenous, Q12H, Seldon, Becca, PA-C  •  sodium chloride 0.9 % flush 10 mL, 10 mL, Intravenous, PRN, Seldon, Becca, PA-C  •  vancomycin 2500 mg/500 mL 0.9% NS IVPB ([facility]), 2,500 mg, Intravenous, Once, Seldon, Becca, PA-C    ALLERGIES:  Allergies   Allergen Reactions   • Cephalexin Hives and Rash     Diffuse rash on cephalexin 1 g PO q6h on 6/17/20  Tolerated zosyn and PCN G September 2020 without issue   • Clindamycin/Lincomycin Hives         ROS:  CONSTITUTIONAL:  Denies fever or chills.   NEUROLOGIC:  Denies headache, focal weakness or sensory changes.   EYES:  Denies change in visual acuity.  HEENT:  Denies nasal congestion or sore throat.   RESPIRATORY:  Denies cough or shortness of breath.   CARDIOVASCULAR:  Denies chest pain or edema.   GI:  Denies abdominal pain, nausea, vomiting, bloody stools or diarrhea.   :  Denies dysuria, leaking or incontinence.  MUSCULOSKELETAL:  Denies back pain or joint pain.   INTEGUMENT:  Denies rash.   ENDOCRINE:  Denies polyuria or polydipsia.   LYMPHATIC:  Denies swollen glands or lymphedema.   PSYCHIATRIC:  Denies  "depression or anxiety.      PHYSICAL EXAM:  Vitals:    03/08/22 0700   BP: 128/90   BP Location: Right arm   Patient Position: Lying   Pulse: 80   Resp: 24   Temp: 98.4 °F (36.9 °C)   TempSrc: Oral   SpO2: 95%   Weight: (!) 175 kg (385 lb 14.4 oz)   Height: 160 cm (63\")       Body mass index is 68.36 kg/m².    No flowsheet data found.  PHQ-9 Total Score:           GEN: alert, normal affect, in no acute distress, super morbidly obese  CARDIO: regular rate and rhythm  PULM: Lungs CTA bilaterally, no RRW   ABD: Soft, morbidly obese, nontender, nondistended  GYN: defer  EXT: No petechiae, bruising, rash, candida. No CCE.         Result Review :  The pertinent labs, images, and/or pathology as noted in the oncology history were reviewed independently and discussed with the patient.   No name on file.   03/01/2022      [facility] LABS:   WBC   Date Value Ref Range Status   03/07/2022 4.27 3.40 - 10.80 10*3/mm3 Final   04/15/2021 3.91 3.40 - 10.80 10*3/mm3 Final     RBC   Date Value Ref Range Status   03/07/2022 4.52 3.77 - 5.28 10*6/mm3 Final   04/15/2021 4.41 3.77 - 5.28 10*6/mm3 Final     Hemoglobin   Date Value Ref Range Status   03/07/2022 12.2 12.0 - 15.9 g/dL Final     Hematocrit   Date Value Ref Range Status   03/07/2022 38.1 34.0 - 46.6 % Final     Platelets   Date Value Ref Range Status   03/07/2022 300 140 - 450 10*3/mm3 Final     No results found for:     [facility] IMAGING:  XR Chest PA & Lateral    Result Date: 3/7/2022  Negative.  This report was finalized on 3/7/2022 12:21 PM by Dr. Jack Barros M.D.          ASSESSMENT :  • Right ovarian cyst  • Thickened endometrium  • PMB  • BMI 67.8   • Extensive PMH:  o Pulmonary hypertension  o Essential hypertension  o CHF  o ARIEL  o History of DVT, on Xarelto  o CKD  o lymphedema      PLAN     The case was reviewed with the patient in detail by Dr. Land, taking into consideration symptoms, laboratory results, imaging, pathology and physical exam findings.  At this " point in time, Dr. Land is recommending:    - Dilation and curettage with hysteroscopy secondary to PMB/thickened endometrium  - Surveillance of ovarian mass with U/S    Risks, benefits, alternatives and indications to the procedure were reviewed in detail.    Risks of surgery include bleeding/hemorrhage which may require transfusion and associated transfusion risk (transfusion reaction, HCV, TRALI ); Infection, which may require antibiotic therapy or abscess drainage; Damage to surrounding internal organs (bowel, bladder, or urinary tract) which may result in obstruction or fistula; Nerve, or vascular injury which may result in numbness, pain, swelling or loss of strength. Risk of possible incomplete resolution of symptoms or failure to cure.  She understands that it is possible that intraoperative findings may warrant further treatment.  There is a low, but real, risk of unanticipated return to the OR for a problem associated with the surgery and a remote possibility of death.      All questions were addressed and answered to the patient's satisfaction. She indicates understanding of these risks and desires to proceed. She wishes Dr. Land to use her judgement to do the procedure that she feels is in her best interest. Surgical consents were signed.  Advised to d/c any blood thinner use, including Ibuprofen and Aspirin, one week prior to operation.             Becca Harmon PA-C  3/8/2022    Gynecologic Oncology   [address] [place] [id number] [phone] office          Kaylah Land D.O  3/8/2022    Gynecologic Oncology   [address] [place] [address] [id number] [phone] office

## 2022-03-09 LAB
BACTERIA SPEC AEROBE CULT: ABNORMAL
LAB AP CASE REPORT: NORMAL
PATH REPORT.FINAL DX SPEC: NORMAL
PATH REPORT.GROSS SPEC: NORMAL

## 2022-03-11 NOTE — PROGRESS NOTES
Age: 61 y.o.  Sex: female  :  1960  MRN: [id number]       REFERRING PHYSICIAN: No ref. provider found  DATE OF VISIT: 3/11/2022          Emely Solomon presents to [facility] for scheduled post-operative appointment .  Pt is s/p D+C with hysteroscopy on 3/8/22.   No complaints today, pain well controlled, tolerating diet without issue, normal BMs, no urinary complaints.   Pathology: endometrium- fragments of nondysplastic squamous mucosa, no endometrial fragments available; endocervix- nondysplastic ecto-endocervical mucosa w/ mixed predominantly acute inflammation.      Oncology/Hematology History Overview Note   Emely Solomon is a 61 y.o. female referred by Dr. Florida Segovia ([facility]) for right ovarian cyst.    • 22: TVUS (outside facility)-uterus-13.22 x 7.83 x 8.01 cm, ET-17.79 mm, right ovary-4.81 x 5.22 x 3.08 cm, right ovarian cyst-4.41 x 2.87 x 4.04 cm, right ovarian cyst-0.63 x 0.45 x 0.62.  • 22: Ca 125-65.0   • 3/08/22: D&C w/ hysteroscopy  • 3/08/22: Pathology-endometrium-fragments of nondysplastic squamous mucosa, no endometrial fragments available, endocervix-nondysplastic ecto-endocervical mucosa w/ mixed predominantly acute inflammation.    3/11/22: Post op         Past Medical History:  Past Medical History:   Diagnosis Date   • Arthritis    • Cellulitis     2017, with Group B Strep bacteremia and sepsis   • Chronic diastolic congestive heart failure (HCC)    • COVID-19 virus infection 2020   • Deep venous thrombosis (HCC)    • Heart murmur    • Hypertension    • Lipoedema    • Lymphedema    • Morbid obesity (HCC)    • Paradoxical embolism (HCC)     to the LLE due to DVT/PE and PFO   • PFO (patent foramen ovale)    • Pulmonary embolism (HCC)    • Pulmonary hypertension (HCC)     multifactorial (dCHF, obesity/ARIEL, hx PE), mild by echo 2016   • Sepsis (HCC) 2020   • Sleep apnea        Past Surgical History:  Past Surgical History:   Procedure  Laterality Date   • BRONCHOSCOPY N/A 07/21/2017    Procedure: BRONCHOSCOPY with wash;  Surgeon: Caleb Garcia MD;  Location: [facility] ENDOSCOPY;  Service:    • COLONOSCOPY     • D & C HYSTEROSCOPY N/A 3/8/2022    Procedure: DILATATION AND CURETTAGE with hysteroscopy;  Surgeon: Kaylah Land DO;  Location: [facility] MAIN OR;  Service: Gynecology Oncology;  Laterality: N/A;   • DILATATION AND CURETTAGE  04/11/2011        MEDICATIONS:    Current Outpatient Medications:   •  acetaminophen (TYLENOL) 325 MG tablet, Take 2 tablets by mouth Every 4 (Four) Hours As Needed for Mild Pain ., Disp:  , Rfl:   •  ferrous sulfate 325 (65 FE) MG EC tablet, Take 1 tablet by mouth Daily With Breakfast, Disp: 90 tablet, Rfl: 3  •  furosemide (LASIX) 40 MG tablet, Take 1 tablet by mouth Daily. (Patient taking differently: Take 40 mg by mouth Every Evening.), Disp: 30 tablet, Rfl: 1  •  HYDROcodone-acetaminophen (Norco) 5-325 MG per tablet, Take 1 tablet by mouth Every 6 (Six) Hours As Needed for Moderate Pain ., Disp: 20 tablet, Rfl: 0  •  losartan (COZAAR) 100 MG tablet, TAKE 1 TABLET BY MOUTH DAILY, Disp: 90 tablet, Rfl: 1  •  metoprolol succinate XL (TOPROL-XL) 50 MG 24 hr tablet, Take 1 tablet by mouth Daily. (Patient taking differently: Take 50 mg by mouth Every Morning.), Disp: 90 tablet, Rfl: 3  •  nystatin (MYCOSTATIN) 384310 UNIT/GM powder, apply to the affected area(s) topically 3 times per day (Patient taking differently: Apply 1 application topically to the appropriate area as directed 3 (Three) Times a Day As Needed.), Disp: 15 g, Rfl: 4  •  rivaroxaban (Xarelto) 20 MG tablet, Take 1 tablet by mouth Daily., Disp: 90 tablet, Rfl: 3    ALLERGIES:  Allergies   Allergen Reactions   • Cephalexin Hives and Rash     Diffuse rash on cephalexin 1 g PO q6h on 6/17/20  Tolerated zosyn and PCN G September 2020 without issue   • Clindamycin/Lincomycin Hives         ROS:  CONSTITUTIONAL:  Denies fever or chills.   NEUROLOGIC:  Denies  headache, focal weakness or sensory changes.   EYES:  Denies change in visual acuity.  HEENT:  Denies nasal congestion or sore throat.   RESPIRATORY:  Denies cough or shortness of breath.   CARDIOVASCULAR:  Denies chest pain or edema.   GI:  Denies abdominal pain, nausea, vomiting, bloody stools or diarrhea.   :  Denies dysuria, leaking or incontinence.  MUSCULOSKELETAL:  Denies back pain or joint pain.   INTEGUMENT:  Denies rash.   ENDOCRINE:  Denies polyuria or polydipsia.   LYMPHATIC:  Denies swollen glands or lymphedema.   PSYCHIATRIC:  Denies depression or anxiety.      PHYSICAL EXAM:  There were no vitals filed for this visit.    There is no height or weight on file to calculate BMI.    No flowsheet data found.  PHQ-9 Total Score:           GEN: alert and oriented x 3, normal affect, well nourished and hydrated; morbidly obese  ABD: Soft, obese, nontender  GYN: deferred  EXT: No petechiae, bruising, rash, candida. No CCE.       Result Review :  The pertinent labs, images, and/or pathology as noted in the oncology history were reviewed independently and discussed with the patient.   Kaylah Land DO   03/14/2022    [facility] LABS:   WBC   Date Value Ref Range Status   03/07/2022 4.27 3.40 - 10.80 10*3/mm3 Final   04/15/2021 3.91 3.40 - 10.80 10*3/mm3 Final     RBC   Date Value Ref Range Status   03/07/2022 4.52 3.77 - 5.28 10*6/mm3 Final   04/15/2021 4.41 3.77 - 5.28 10*6/mm3 Final     Hemoglobin   Date Value Ref Range Status   03/07/2022 12.2 12.0 - 15.9 g/dL Final     Hematocrit   Date Value Ref Range Status   03/07/2022 38.1 34.0 - 46.6 % Final     Platelets   Date Value Ref Range Status   03/07/2022 300 140 - 450 10*3/mm3 Final     No results found for:     [facility] IMAGING:  XR Chest PA & Lateral    Result Date: 3/7/2022  Negative.  This report was finalized on 3/7/2022 12:21 PM by Dr. Jack Barros M.D.              ASSESSMENT :  • Post-operative assessment  - s/p D+C on 3/8/22 secondary to PMB,  thickened endometrium: pathology benign  · Right ovarian cyst  · BMI 67.8   · Extensive PMH:  ? Pulmonary hypertension  ? Essential hypertension  ? CHF  ? ARIEL  ? History of DVT, on Xarelto  ? CKD  ? lymphedema         PLAN :  • Discussed with patient that path did not return with endometrial tissue. We reviewed the scans and I showed her how angle is difficult. We had extreme difficulty with getting her positioned for the d and c. No stirrups would hold her legs so we tried using ortho split legs and I think that without legs in lithotomy, the angle of the uteurs with respect to the pubic bone precluded evaluation. We had several extra people in the room helping hold each leg but to no avail.   • I offered her three possible options: 1) repeat attempt at d and c. 2) Obtain PET scan to see if the uterus is hypermetabolic, or 3) having a procedure with IR done for biopsy and this carries more risk, she is on a blood thinner. Pt prefers least invasive method of PET scan. Ordered with caveat that insurance will hopefully read about the difficulties we had and approve this without tissue diagnosis. If not, will need to ask IR about a biopsy.           Kaylah Land D.O  3/14/2022    Gynecologic Oncology   [address] [place] [address] [address] [place] [address]  [phone] office

## 2022-03-14 ENCOUNTER — OFFICE VISIT (OUTPATIENT)
Dept: GYNECOLOGIC ONCOLOGY | Facility: CLINIC | Age: 62
End: 2022-03-14

## 2022-03-14 ENCOUNTER — TELEPHONE (OUTPATIENT)
Dept: GYNECOLOGIC ONCOLOGY | Facility: CLINIC | Age: 62
End: 2022-03-14

## 2022-03-14 VITALS
OXYGEN SATURATION: 91 % | WEIGHT: 293 LBS | DIASTOLIC BLOOD PRESSURE: 95 MMHG | HEART RATE: 76 BPM | TEMPERATURE: 98.1 F | HEIGHT: 63 IN | SYSTOLIC BLOOD PRESSURE: 162 MMHG | BODY MASS INDEX: 51.91 KG/M2

## 2022-03-14 DIAGNOSIS — N95.0 POST-MENOPAUSAL BLEEDING: Primary | ICD-10-CM

## 2022-03-14 DIAGNOSIS — R93.89 THICKENED ENDOMETRIUM: ICD-10-CM

## 2022-03-14 PROCEDURE — 99024 POSTOP FOLLOW-UP VISIT: CPT | Performed by: PHYSICIAN ASSISTANT

## 2022-03-14 NOTE — TELEPHONE ENCOUNTER
Provider: DR GARCIAS  Caller: MAYELA  Relationship to Patient: SELF    Reason for Call: MAYELA WAS IN THIS MORNING AND SHE LOST SOME PAPERS THAT DR GARCIAS GAVE TO HER REGARDING DIFFERENT PROCEDURES.  MAYELA WAS WANTING TO SEE IF SHE CAN BE MAILED NEW ONES.    PLEASE ADVISE

## 2022-03-17 ENCOUNTER — TELEPHONE (OUTPATIENT)
Dept: GYNECOLOGIC ONCOLOGY | Age: 62
End: 2022-03-17

## 2022-03-17 NOTE — TELEPHONE ENCOUNTER
Called pt and and explained to her that our office does not do the auths for scans. Told her that I reached out to someone in financial to get an update on it but have not heard back. Told pt that if I hear something I will update her.

## 2022-03-17 NOTE — TELEPHONE ENCOUNTER
Caller: Mayela Solomon    Relationship: Self    Best call back number:  [phone]    What is the best time to reach you: ANYTIME    Who are you requesting to speak with (clinical staff, provider,  specific staff member): FINANCIAL     Do you know the name of the person who called: MAYELA    What was the call regarding:     SCHEDULE FOR PET SCAN 03/22 WANTED TO KNOW IF INSURANCE HAS COVERED THIS AND TO VERIFY IF HAS A CO PAY OR NOT.     Do you require a callback: YES

## 2022-03-18 ENCOUNTER — DOCUMENTATION (OUTPATIENT)
Dept: GYNECOLOGIC ONCOLOGY | Facility: CLINIC | Age: 62
End: 2022-03-18

## 2022-03-18 DIAGNOSIS — N95.0 POST-MENOPAUSAL BLEEDING: Primary | ICD-10-CM

## 2022-03-18 DIAGNOSIS — R93.89 THICKENED ENDOMETRIUM: ICD-10-CM

## 2022-03-18 NOTE — PROGRESS NOTES
Spoke to provider regarding prior auth for PET scan.  Unwilling to sign off on PET scan since no CT has been done.  Provider states a non-diagnostic CT is first needed for approval of PET scan, despite pt history, physical exam.  Will place order for CT scan to be done next week        Becca Harmon PA-C  3/18/2022    Gynecologic Oncology   [facility] [address] [address] [address] [phone] office

## 2022-03-21 ENCOUNTER — APPOINTMENT (OUTPATIENT)
Dept: PET IMAGING | Facility: HOSPITAL | Age: 62
End: 2022-03-21

## 2022-03-21 ENCOUNTER — HOSPITAL ENCOUNTER (OUTPATIENT)
Dept: PET IMAGING | Facility: HOSPITAL | Age: 62
End: 2022-03-21

## 2022-03-22 ENCOUNTER — APPOINTMENT (OUTPATIENT)
Dept: PET IMAGING | Facility: HOSPITAL | Age: 62
End: 2022-03-22

## 2022-03-31 ENCOUNTER — OFFICE VISIT (OUTPATIENT)
Dept: FAMILY MEDICINE CLINIC | Facility: CLINIC | Age: 62
End: 2022-03-31

## 2022-03-31 VITALS
OXYGEN SATURATION: 92 % | WEIGHT: 293 LBS | DIASTOLIC BLOOD PRESSURE: 96 MMHG | RESPIRATION RATE: 22 BRPM | SYSTOLIC BLOOD PRESSURE: 142 MMHG | BODY MASS INDEX: 51.91 KG/M2 | HEIGHT: 63 IN | HEART RATE: 60 BPM

## 2022-03-31 DIAGNOSIS — E66.01 CLASS 3 SEVERE OBESITY DUE TO EXCESS CALORIES WITH SERIOUS COMORBIDITY AND BODY MASS INDEX (BMI) OF 60.0 TO 69.9 IN ADULT: ICD-10-CM

## 2022-03-31 DIAGNOSIS — I89.0 LYMPHEDEMA: Primary | ICD-10-CM

## 2022-03-31 PROCEDURE — 99213 OFFICE O/P EST LOW 20 MIN: CPT | Performed by: FAMILY MEDICINE

## 2022-03-31 NOTE — PROGRESS NOTES
"Chief Complaint  Follow-up (3 month)    Subjective    History of Present Illness {CC  Problem List  Visit  Diagnosis   Encounters  Notes  Medications  Labs  Result Review Imaging  Media :23}     Emely Solomon presents to [facility] for Follow-up (3 month).  History of Present Illness     Here today for general follow-up. No real questions or concerns at this time. Continues to suffer from significant lymphedema that prevents her from getting around. She expresses some frustration with herself that she has not made any progress in weight loss. Has done so in the past but unfortunately added back on the lost weight \"and then some.\" Interested in possibly meeting with a nutritionist to discuss with some weight loss plans. Has done well with weight watchers in the past but finds their offices fewer and further between.    No need for labs or refills at this time.    Objective     Vital Signs:   /96   Pulse 60   Resp 22   Ht 160 cm (63\")   Wt (!) 177 kg (390 lb)   SpO2 92%   BMI 69.09 kg/m²   Physical Exam  Vitals and nursing note reviewed.   Constitutional:       General: She is not in acute distress.     Appearance: Normal appearance. She is not ill-appearing.   Cardiovascular:      Rate and Rhythm: Normal rate and regular rhythm.      Pulses: Normal pulses.      Heart sounds: Normal heart sounds. No murmur heard.     Comments: Lymphedema bilateral lower extremities  Pulmonary:      Effort: Pulmonary effort is normal. No respiratory distress.      Breath sounds: Normal breath sounds. No rales.   Musculoskeletal:      Right lower le+ Edema present.      Left lower le+ Edema present.   Neurological:      Mental Status: She is alert and oriented to person, place, and time. Mental status is at baseline.   Psychiatric:         Mood and Affect: Mood normal.         Behavior: Behavior normal.          Result Review  Data Reviewed:{ Labs  Result Review  Imaging  " Med Tab  Media :23}                   Assessment and Plan {CC Problem List  Visit Diagnosis  ROS  Review (Popup)  Health Maintenance  Quality  BestPractice  Medications  SmartSets  SnapShot Encounters  Media :23}   Diagnoses and all orders for this visit:    1. Lymphedema (Primary)    2. Class 3 severe obesity due to excess calories with serious comorbidity and body mass index (BMI) of 60.0 to 69.9 in adult (HCC)  -     Ambulatory Referral to Nutrition Services    Long discussion about the ways in which her weight continues to affect her health. Offered various resources. She would like a referral to nutrition services as above. Encouraged her to continue with increasing her movement every day and working to avoid the foods she knows she should.    Recommended follow-up as below. Encouraged communication via [employer] the meantime.      Follow Up {Instructions Charge Capture  Follow-up Communications :23}     Patient was given instructions and counseling regarding her condition or for health maintenance advice. Please see specific information pulled into the AVS (placed there by myself) if appropriate.    Return in about 3 months (around 6/30/2022), or if symptoms worsen or fail to improve.      CHIP Wilkerson MD

## 2022-04-01 ENCOUNTER — HOSPITAL ENCOUNTER (OUTPATIENT)
Dept: CT IMAGING | Facility: HOSPITAL | Age: 62
Discharge: HOME | End: 2022-04-01
Admitting: PHYSICIAN ASSISTANT

## 2022-04-01 DIAGNOSIS — N95.0 POST-MENOPAUSAL BLEEDING: ICD-10-CM

## 2022-04-01 DIAGNOSIS — R93.89 THICKENED ENDOMETRIUM: ICD-10-CM

## 2022-04-01 PROCEDURE — 0 DIATRIZOATE MEGLUMINE & SODIUM PER 1 ML: Performed by: PHYSICIAN ASSISTANT

## 2022-04-01 PROCEDURE — 0 IOPAMIDOL PER 1 ML: Performed by: PHYSICIAN ASSISTANT

## 2022-04-01 PROCEDURE — 74177 CT ABD & PELVIS W/CONTRAST: CPT

## 2022-04-01 RX ADMIN — DIATRIZOATE MEGLUMINE AND DIATRIZOATE SODIUM 30 ML: 660; 100 LIQUID ORAL; RECTAL at 17:01

## 2022-04-01 RX ADMIN — IOPAMIDOL 95 ML: 755 INJECTION, SOLUTION INTRAVENOUS at 17:01

## 2022-04-08 ENCOUNTER — OFFICE VISIT (OUTPATIENT)
Dept: GYNECOLOGIC ONCOLOGY | Facility: CLINIC | Age: 62
End: 2022-04-08

## 2022-04-08 DIAGNOSIS — N95.0 POST-MENOPAUSAL BLEEDING: Primary | ICD-10-CM

## 2022-04-08 DIAGNOSIS — E66.01 MORBID OBESITY: ICD-10-CM

## 2022-04-08 PROCEDURE — 99441 PR PHYS/QHP TELEPHONE EVALUATION 5-10 MIN: CPT | Performed by: OBSTETRICS & GYNECOLOGY

## 2022-04-08 NOTE — PROGRESS NOTES
*TELEHEALTH VISIT *     Patient consented to telephone visit for medical care today.   Total time spent reviewing images, labs, discussion and plan was 10 minutes.       Kaylah Land D.O  2022      Age: 61 y.o.  Sex: female  :  1960  MRN: [id number]     REFERRING PHYSICIAN: No ref. provider found  DATE OF VISIT: 2022      Emely Solomon televisit to follow up on CT imaging. There are inguinal nodes from old hernia, enlarged irregural uterus and hemangiona liver.       Pt is s/p attempted D+C with hysteroscopy on 3/8/22.   There was significant difficulty with that surgery due to her weight.  Pathology unfortubatnely did not obtain endometrial tissue. (: endometrium- fragments of nondysplastic squamous mucosa, no endometrial fragments available; endocervix- nondysplastic ecto-endocervical mucosa w/ mixed predominantly acute inflammation.)      Oncology/Hematology History Overview Note   Emely Solomon is a 61 y.o. female referred by Dr. Florida Segovia ([facility]) for right ovarian cyst.    • 22: TVUS (outside facility)-uterus-13.22 x 7.83 x 8.01 cm, ET-17.79 mm, right ovary-4.81 x 5.22 x 3.08 cm, right ovarian cyst-4.41 x 2.87 x 4.04 cm, right ovarian cyst-0.63 x 0.45 x 0.62.  • 22: Ca 125-65.0   • 3/08/22: D&C w/ hysteroscopy  • 3/08/22: Pathology-endometrium-fragments of nondysplastic squamous mucosa, no endometrial fragments available, endocervix-nondysplastic ecto-endocervical mucosa w/ mixed predominantly acute inflammation.  • 22: CT AP-Resolution of the right pelvic phlegmon, right inguinal lymph nodes diminished in size. Left inguinal lymph nodes appear slightly larger compared to the previous CT examination. Hemostatic clips demonstrated within the left inguinal canal. Lobulated right ovarian/adnexal fluid collection/cyst. This measures 6.2 x 2.8 x 4.5 cm. Stable enlargement of the uterus. Endometrium appears linear and 7 mm in width. Mass within the right lobe of the  liver. There appears to be a small nodular focus of enhancement along its perimeter suggesting that this may represent a hepatic hemangioma. This was not seen on any of the prior unenhanced examinations, this could be new or perhaps isodense to the surrounding parenchyma on prior CT. Dedicated liver CT is recommended to exclude hemangioma prior to consideration of biopsy. Left renal atrophy. L5 spondylolysis and spondylolisthesis.    4/8/22: Telephone-CT review         Past Medical History:  Past Medical History:   Diagnosis Date   • Arthritis    • Cellulitis     11/2017, with Group B Strep bacteremia and sepsis   • Chronic diastolic congestive heart failure (HCC)    • COVID-19 virus infection 11/2020   • Deep venous thrombosis (HCC)    • Heart murmur    • Hypertension    • Lipoedema    • Lymphedema    • Morbid obesity (HCC)    • Paradoxical embolism (HCC)     to the LLE due to DVT/PE and PFO   • PFO (patent foramen ovale)    • Pulmonary embolism (HCC)    • Pulmonary hypertension (HCC)     multifactorial (dCHF, obesity/ARIEL, hx PE), mild by echo 1/2016   • Sepsis (HCC) 09/18/2020   • Sleep apnea        Past Surgical History:  Past Surgical History:   Procedure Laterality Date   • BRONCHOSCOPY N/A 07/21/2017    Procedure: BRONCHOSCOPY with wash;  Surgeon: Caleb Garcia MD;  [facility] ENDOSCOPY;  Service:    • COLONOSCOPY     • D & C HYSTEROSCOPY N/A 3/8/2022    Procedure: DILATATION AND CURETTAGE with hysteroscopy;  Surgeon: Kaylah Land DO;  Location: [facility] OR;  Service: Gynecology Oncology;  Laterality: N/A;   • DILATATION AND CURETTAGE  04/11/2011        MEDICATIONS:    Current Outpatient Medications:   •  acetaminophen (TYLENOL) 325 MG tablet, Take 2 tablets by mouth Every 4 (Four) Hours As Needed for Mild Pain ., Disp:  , Rfl:   •  ferrous sulfate 325 (65 FE) MG EC tablet, Take 1 tablet by mouth Daily With Breakfast, Disp: 90 tablet, Rfl: 3  •  furosemide (LASIX) 40 MG tablet, Take 1 tablet by  mouth Daily. (Patient taking differently: Take 40 mg by mouth Every Evening.), Disp: 30 tablet, Rfl: 1  •  losartan (COZAAR) 100 MG tablet, TAKE 1 TABLET BY MOUTH DAILY, Disp: 90 tablet, Rfl: 1  •  metoprolol succinate XL (TOPROL-XL) 50 MG 24 hr tablet, Take 1 tablet by mouth Daily. (Patient taking differently: Take 50 mg by mouth Every Morning.), Disp: 90 tablet, Rfl: 3  •  nystatin (MYCOSTATIN) 013642 UNIT/GM powder, apply to the affected area(s) topically 3 times per day (Patient taking differently: Apply 1 application topically to the appropriate area as directed 3 (Three) Times a Day As Needed.), Disp: 15 g, Rfl: 4  •  rivaroxaban (Xarelto) 20 MG tablet, Take 1 tablet by mouth Daily., Disp: 90 tablet, Rfl: 3    ALLERGIES:  Allergies   Allergen Reactions   • Cephalexin Hives and Rash     Diffuse rash on cephalexin 1 g PO q6h on 6/17/20  Tolerated zosyn and PCN G September 2020 without issue   • Clindamycin/Lincomycin Hives         ROS:  CONSTITUTIONAL:  Denies fever or chills.   NEUROLOGIC:  Denies headache, focal weakness or sensory changes.   EYES:  Denies change in visual acuity.  HEENT:  Denies nasal congestion or sore throat.   RESPIRATORY:  Denies cough or shortness of breath.   CARDIOVASCULAR:  Denies chest pain or edema.   GI:  Denies abdominal pain, nausea, vomiting, bloody stools or diarrhea.   :  Denies dysuria, leaking or incontinence.  MUSCULOSKELETAL:  Denies back pain or joint pain.   INTEGUMENT:  Denies rash.   ENDOCRINE:  Denies polyuria or polydipsia.   LYMPHATIC:  Denies swollen glands or lymphedema.   PSYCHIATRIC:  Denies depression or anxiety.      PHYSICAL EXAM:  There were no vitals filed for this visit.    There is no height or weight on file to calculate BMI.    No flowsheet data found.  PHQ-9 Total Score:           GEN: alert and oriented x 3, normal affect, well nourished and hydrated; morbidly obese  ABD: Soft, obese, nontender  GYN: deferred  EXT: No petechiae, bruising, rash,  candida. No CCE.       Result Review :  The pertinent labs, images, and/or pathology as noted in the oncology history were reviewed independently and discussed with the patient.   Kaylah KUO Shanda, DO   03/14/2022    [facility] [place] [facility]:   WBC   Date Value Ref Range Status   03/07/2022 4.27 3.40 - 10.80 10*3/mm3 Final   04/15/2021 3.91 3.40 - 10.80 10*3/mm3 Final     RBC   Date Value Ref Range Status   03/07/2022 4.52 3.77 - 5.28 10*6/mm3 Final   04/15/2021 4.41 3.77 - 5.28 10*6/mm3 Final     Hemoglobin   Date Value Ref Range Status   03/07/2022 12.2 12.0 - 15.9 g/dL Final     Hematocrit   Date Value Ref Range Status   03/07/2022 38.1 34.0 - 46.6 % Final     Platelets   Date Value Ref Range Status   03/07/2022 300 140 - 450 10*3/mm3 Final     No results found for:     [facility] [place]:  XR Chest PA & Lateral    Result Date: 3/7/2022  Negative.  This report was finalized on 3/7/2022 12:21 PM by Dr. Jack Barros M.D.              ASSESSMENT :  • Post-operative assessment  - s/p D+C on 3/8/22 secondary to PMB, thickened endometrium: pathology benign  · Right ovarian cyst  · BMI 67.8   · Extensive PMH:  ? Pulmonary hypertension  ? Essential hypertension  ? CHF  ? ARIEL  ? History of DVT, on Xarelto  ? CKD  ? lymphedema         PLAN :  • Had a long nabil discussion regarding how her weight is impacting her care. We were unable to obtain tissue for diagnosis.   • Discussed with patient that path did not return with endometrial tissue. We reviewed the scans and I showed her how angle is difficult. We had extreme difficulty with getting her positioned for the d and c. No stirrups would hold her legs so we tried using ortho split legs and I think that without legs in lithotomy, the angle of the uteurs with respect to the pubic bone precluded evaluation. We had several extra people in the room helping hold each leg but to no avail.   • I urged her to agressively try and lose weight for her long term health.   • She has agreed to  nutritional therapy and eval for gastric banding. I offered and she accepts lap band consultation with Dr Pires.   • Will repeat CT in 3 mos to monitor and assess uterus/hemangioma.           Kaylah Land D.O  4/8/2022    Gynecologic Oncology   [address] [place] [address] [address] [place], [place] [id number] [phone] office

## 2022-04-11 RX ORDER — METOPROLOL SUCCINATE 50 MG/1
50 TABLET, EXTENDED RELEASE ORAL DAILY
Qty: 90 TABLET | Refills: 3 | Status: CANCELLED | OUTPATIENT
Start: 2022-04-11

## 2022-04-12 RX ORDER — METOPROLOL SUCCINATE 50 MG/1
50 TABLET, EXTENDED RELEASE ORAL DAILY
Qty: 90 TABLET | Refills: 3 | Status: SHIPPED | OUTPATIENT
Start: 2022-04-12 | End: 2022-04-12 | Stop reason: SDUPTHER

## 2022-04-12 RX ORDER — METOPROLOL SUCCINATE 50 MG/1
50 TABLET, EXTENDED RELEASE ORAL DAILY
Qty: 90 TABLET | Refills: 3 | Status: SHIPPED | OUTPATIENT
Start: 2022-04-12

## 2022-05-06 ENCOUNTER — HOSPITAL ENCOUNTER (OUTPATIENT)
Dept: DIABETES SERVICES | Facility: HOSPITAL | Age: 62
Discharge: HOME | End: 2022-05-06
Admitting: FAMILY MEDICINE

## 2022-05-06 PROCEDURE — 97802 MEDICAL NUTRITION INDIV IN: CPT

## 2022-05-06 NOTE — CONSULTS
Mrs. Solomon met with Registered Dietitian for MNT/diet education for obesity/weight loss. A comprehensive assessment/training record has been sent to medical records to scan and associate with this encounter.     Mrs. Solomon has been encouraged to call our office with questions or additional education needs. Please place referral for additional services or follow-up should need arise.     Thank you for the referral.

## 2022-06-02 ENCOUNTER — OFFICE VISIT (OUTPATIENT)
Dept: FAMILY MEDICINE CLINIC | Facility: CLINIC | Age: 62
End: 2022-06-02

## 2022-06-02 ENCOUNTER — TELEPHONE (OUTPATIENT)
Dept: FAMILY MEDICINE CLINIC | Facility: CLINIC | Age: 62
End: 2022-06-02

## 2022-06-02 VITALS
OXYGEN SATURATION: 92 % | HEART RATE: 78 BPM | RESPIRATION RATE: 22 BRPM | WEIGHT: 293 LBS | BODY MASS INDEX: 67.85 KG/M2

## 2022-06-02 DIAGNOSIS — I89.0 LYMPHEDEMA: Primary | ICD-10-CM

## 2022-06-02 DIAGNOSIS — Z23 NEED FOR VACCINATION: ICD-10-CM

## 2022-06-02 PROCEDURE — 99213 OFFICE O/P EST LOW 20 MIN: CPT | Performed by: FAMILY MEDICINE

## 2022-06-02 NOTE — TELEPHONE ENCOUNTER
At checkout patient stated that they had appt for 6/30/22 scheduled and did not know if that should be kept since she was seen today. No notes at checkout to schedule additional f/u outside of referral placed. Please advise and we can follow up with patient to clarify.

## 2022-06-02 NOTE — PROGRESS NOTES
Chief Complaint  spot on leg  (X1 week, states it's been oozing )    Subjective    History of Present Illness {CC  Problem List  Visit  Diagnosis   Encounters  Notes  Medications  Labs  Result Review Imaging  Media :23}     Emely Solomon presents to [facility] for spot on leg  (X1 week, states it's been oozing ).  History of Present Illness     Here with concern for a spot on her medial L ankle that's opened up and been weeping of late. Has chronic BLE lymphedema for which she's seen lymphedema clinic and wound care intermittently for years. Hasn't seen then in the last several months. Not currently wrapping legs, no recent changes in diuresis. Hasn't noticed any erythema or warmth in the area. Wondering how best to avoid infection.     Due for second COVID booster. Had a severe case of COVID last year, trying hard to avoid a second round. Tolerated previous vaccines without difficulty.     Objective     Vital Signs:   Pulse 78   Resp 22   Wt (!) 174 kg (383 lb)   SpO2 92%   BMI 67.85 kg/m²   Physical Exam  Vitals and nursing note reviewed.   Constitutional:       General: She is not in acute distress.     Appearance: Normal appearance. She is not ill-appearing.   Cardiovascular:      Rate and Rhythm: Normal rate and regular rhythm.      Pulses: Normal pulses.      Heart sounds: Normal heart sounds. No murmur heard.  Pulmonary:      Effort: Pulmonary effort is normal. No respiratory distress.      Breath sounds: Normal breath sounds. No rales.   Musculoskeletal:      Right lower le+ Edema present.      Left lower le+ Pitting Edema present.   Skin:     Comments: Small (~1 cm) round ulcerated lesion on medial L calf, no active drainage, no warmth or erythema, though there is some general hyperpigmentation of the distal LLE.   Neurological:      Mental Status: She is alert and oriented to person, place, and time. Mental status is at baseline.   Psychiatric:          Mood and Affect: Mood normal.         Behavior: Behavior normal.          Result Review  Data Reviewed:{ Labs  Result Review  Imaging  Med Tab  Media :23}                   Assessment and Plan {CC Problem List  Visit Diagnosis  ROS  Review (Popup)  Health Maintenance  Quality  BestPractice  Medications  SmartSets  SnapShot Encounters  Media :23}   Diagnoses and all orders for this visit:    1. Lymphedema (Primary)  -     Ambulatory Referral to Lymphedema Clinic    2. Need for vaccination  -     COVID-19 Vaccine (Pfizer) Lopez Cap    Discussed management options at length. No apparent infection at present, would prefer to avoid antibiotics without pressing need. Recommended referral back to Lymphedema clinic and wound care as needed. COVID booster as requested.     Follow up as below. Encouraged to communicate via [employer] in the meantime.     Patient was given instructions and counseling regarding her condition or for health maintenance advice. Please see specific information pulled into the AVS (placed there by myself) if appropriate.    Return in about 1 month (around 7/2/2022), or if symptoms worsen or fail to improve.      CHIP Wilkerson MD

## 2022-06-06 PROCEDURE — 0051A COVID-19 (PFIZER) 12+ YRS: CPT | Performed by: FAMILY MEDICINE

## 2022-06-06 PROCEDURE — 91305 COVID-19 (PFIZER) 12+ YRS: CPT | Performed by: FAMILY MEDICINE

## 2022-06-09 ENCOUNTER — HOSPITAL ENCOUNTER (OUTPATIENT)
Dept: PHYSICAL THERAPY | Facility: HOSPITAL | Age: 62
Setting detail: THERAPIES SERIES
Discharge: HOME | End: 2022-06-09

## 2022-06-09 DIAGNOSIS — I89.0 LYMPHEDEMA: Primary | ICD-10-CM

## 2022-06-09 PROCEDURE — 97162 PT EVAL MOD COMPLEX 30 MIN: CPT

## 2022-06-09 NOTE — THERAPY EVALUATION
Physical Therapy Lymphedema Initial Evaluation  [facility]     Patient Name: Emely Solomon  : 1960  MRN: [id number]  Today's Date: 2022      Visit Date: 2022    Visit Dx:    ICD-10-CM ICD-9-CM   1. Lymphedema  I89.0 457.1       Patient Active Problem List   Diagnosis   • Chronic diastolic CHF (congestive heart failure) (HCC)   • PFO (patent foramen ovale)   • Paradoxical embolism (HCC)   • Essential hypertension   • Pulmonary hypertension (CMS/HCC)   • Back pain   • ARIEL (obstructive sleep apnea)   • Class 3 severe obesity due to excess calories with serious comorbidity and body mass index (BMI) of 60.0 to 69.9 in adult ([facility])   • History of DVT of lower extremity   • Lymphedema   • Anemia, chronic disease   • CKD (chronic kidney disease) stage 3, GFR 30-59 ml/min (HCC)   • Iron deficiency   • Encounter for screening for malignant neoplasm of colon   • Post-menopausal bleeding   • Thickened endometrium        Past Medical History:   Diagnosis Date   • Arthritis    • Cellulitis     2017, with Group B Strep bacteremia and sepsis   • Chronic diastolic congestive heart failure (HCC)    • COVID-19 virus infection 2020   • Deep venous thrombosis (HCC)    • Heart murmur    • Hypertension    • Lipoedema    • Lymphedema    • Morbid obesity (HCC)    • Paradoxical embolism (HCC)     to the LLE due to DVT/PE and PFO   • PFO (patent foramen ovale)    • Pulmonary embolism (HCC)    • Pulmonary hypertension (HCC)     multifactorial (dCHF, obesity/ARIEL, hx PE), mild by echo 2016   • Sepsis (HCC) 2020   • Sleep apnea         Past Surgical History:   Procedure Laterality Date   • BRONCHOSCOPY N/A 2017    Procedure: BRONCHOSCOPY with wash;  Surgeon: Caleb Garcia MD;  Location: [facility];  Service:    • COLONOSCOPY     • D & C HYSTEROSCOPY N/A 3/8/2022    Procedure: DILATATION AND CURETTAGE with hysteroscopy;  Surgeon: Kaylah Land DO;  Location: [facility] OR;  Service:  Gynecology Oncology;  Laterality: N/A;   • DILATATION AND CURETTAGE  04/11/2011       Visit Dx:    ICD-10-CM ICD-9-CM   1. Lymphedema  I89.0 457.1        Patient History     Row Name 06/09/22 1300             History    Chief Complaint Swelling;Ulcer, wound or other skin conditions  skin redness, min weeping  -KD      Brief Description of Current Complaint States her left leg has recently gotten more red and a couple of spots have popped up with a little drainage. Says she wears compression capris and reduction kits once or twice a week. Thinks swelling may be worse.  -KD      Patient/Caregiver Goals Decrease swelling  heal skin  -KD      How has patient tried to help current problem? wears compression garments at times  -KD      Are you or can you be pregnant No  -KD              Fall Risk Assessment    Any falls in the past year: No  -KD      Does patient have a fear of falling No  -KD              Services    Are you currently receiving Home Health services No  -KD      Do you plan to receive Home Health services in the near future No  -KD              Daily Activities    Primary Language English  -KD      Are you able to read Yes  -KD      Are you able to write Yes  -KD      How does patient learn best? Listening;Reading  -KD      Teaching needs identified Management of Condition  -KD      Patient is concerned about/has problems with Performing sports, recreation, and play activities;Other (comment)  proper fitting of clothes  -KD      Does patient have problems with the following? None  -KD      Barriers to learning None  -KD      Explanation of Functional Status Problem Ambulates with rolling walker independently, has  assist as needed with tasks  -KD      Pt Participated in POC and Goals Yes  -KD              Safety    Are you being hurt, hit, or frightened by anyone at home or in your life? No  -KD      Are you being neglected by a caregiver No  -KD            User Key  (r) = Recorded By, (t) = Taken  "By, (c) = Cosigned By    Initials Name Provider Type    KD Nila Herrera, PT Physical Therapist                 Lymphedema     Row Name 06/09/22 1300             Subjective Pain    Able to rate subjective pain? yes  -KD      Pre-Treatment Pain Level 0  -KD      Post-Treatment Pain Level 0  -KD              Subjective Comments    Subjective Comments States her left leg has become more red and has noticed a couple of breaks in skin with a little dampness, states swelling may be worse.  -KD              Lymphedema Assessment    Infections or Cellulitis? yes  -KD      Infection/Cellulitis Treatment hospitalized, antibiotics a few years ago  -KD      Lymphedema Precautions CHF;DVT;anemia;kidney disease  -KD              LLIS - Physical Concerns    The amount of pain associated with my lymphedema is: 1  -KD      The amount of limb heaviness associated with my lymphedema is: 0  -KD      The amount of skin tightness associated with my lymphedema is: 0  -KD      The size of my swollen limb(s) seems: 1  -KD      Lymphedema affects the movement of my swollen limb(s): 1  -KD      The strength in my swollen limb(s) is: 1  -KD              LLIS - Psychosocial Concerns    Lymphedema affects my body image (i.e., \"how I think I look\"). 1  -KD      Lymphedema affects my socializing with others. 0  -KD      Lymphedema affects my intimate relations with spouse or partner (rate 0 if not applicable 0  -KD      Lymphedema \"gets me down\" (i.e., depression, frustration, or anger) 1  -KD      I must rely on others for help due to my lymphedema. 0  -KD      I know what to do to manage my lymphedema 0  -KD              LLIS - Functional Concerns    Lymphedema affects my ability to perform self-care activities (i.e. eating, dressing, hygiene) 0  -KD      Lymphedema affects my ability to perform routine home or work-related activities. 0  -KD      Lymphedema affects my performance of preferred leisure activities. 1  -KD      Lymphedema affects " proper fit of clothing/shoes 1  -KD      Lymphedema affects my sleep 0  -KD              Posture/Observations    Posture/Observations Comments Pt ambulates with Rwx, short on air toward end of walk.  -KD              General ROM    RT Lower Ext --  AROM limited by edema and body mass  -KD      LT Lower Ext --  AROM linited by edema and body mass  -KD              MMT (Manual Muscle Testing)    General MMT Comments 4/5 BLE's general  -KD              Lymphedema Edema Assessment    Edema Assessment Comment Severe firm/fibrotic edema BLE's  -KD              Skin Changes/Observations    Skin Observations Comment BLE skin is dry, scaly in areas, scattered blisters/papillomas. Multiple skin folds.R lower leg is quite red, a bit warmer to the touch than the left. Noted a 3cm open area with min weeping ant leg & dampness just under skin fold in same general area.  -KD              Lymphedema Sensation    Lymphedema Sensation Reports LLE:;numbness  3rd-5th toes  -KD      Lymphedema Sensation Tests light touch  -KD      Lymphedema Light Touch mild impairment  mild impairment left, normal right  -KD              Lymphedema Measurements    Measurement Type(s) Circumferential  -KD      Circumferential Areas Lower extremities  -KD              BLE Circumferential (cm)    Measurement Location 1 20 cm. above knee  -KD      Left 1 85 cm  -KD      Right 1 89 cm  -KD      Measurement Location 2 10cm above knee  -KD      Left 2 89 cm  -KD      Right 2 88 cm  -KD      Measurement Location 3 knee  -KD      Left 3 70 cm  -KD      Right 3 76 cm  -KD      Measurement Location 4 10 cm. below knee  -KD      Left 4 80 cm  -KD      Right 4 76.5 cm  -KD      Measurement Location 5 20 cm. below knee  -KD      Left 5 65 cm  -KD      Right 5 67 cm  -KD      Measurement Location 6 30 cm. below knee  -KD      Left 6 47 cm  -KD      Right 6 42 cm  -KD      Measurement Location 7 ankle  -KD      Left 7 36 cm  -KD      Right 7 34.5 cm  -KD       Measurement Location 8 mid foot   -KD      Left 8 24 cm  -KD      Right 8 24 cm  -KD      LLE Circumferential Total 496 cm  -KD      RLE Circumferential Total 497 cm  -KD              Lymphedema Life Impact Scale Totals    A.  Total Q1 - Q17 (Do not include Q18) 8  -KD      B.  Total number of questions answered (Q1-Q17) 17  -KD      C. Divide A by B 0.47  -KD      D. Multiple C by 25 11.75  -KD            User Key  (r) = Recorded By, (t) = Taken By, (c) = Cosigned By    Initials Name Provider Type    Nila Rankin, CATRINA Physical Therapist                                Therapy Education  Education Details: Emphasized importance of consistent compression applied daily. Reviewed signs/discussed possibility of infection. Pt to see MD tomorrow for assessment.  Given: Symptoms/condition management, Edema management  Program: Reinforced, New  How Provided: Verbal  Provided to: Patient  Level of Understanding: Verbalized       OP Exercises     Row Name 06/09/22 1300             Subjective Comments    Subjective Comments States her left leg has become more red and has noticed a couple of breaks in skin with a little dampness, states swelling may be worse.  -KD              Subjective Pain    Able to rate subjective pain? yes  -KD      Pre-Treatment Pain Level 0  -KD      Post-Treatment Pain Level 0  -KD            User Key  (r) = Recorded By, (t) = Taken By, (c) = Cosigned By    Initials Name Provider Type    Nila Rankin, PT Physical Therapist                             PT OP Goals     Row Name 06/09/22 1300          PT Short Term Goals    STG Date to Achieve 06/23/22  -KD     STG 1 Patient will demonstrate awareness of condition and precautions for improved prevention, management, care of symptoms, and ease of transition to self-care of condition.  -KD     STG 1 Progress New  -KD     STG 2 Patient will be independent with self-wrapping techniques of compression bandages as indicated for improved self-management  of condition.    -KD     STG 2 Progress New  -KD     STG 3 Patient will demonstrate decreased net edema of >/=10-15cm for decreased edema symptoms, decreased risk of infection, and improved skin care.    -KD     STG 3 Progress New  -KD            Long Term Goals    LTG Date to Achieve 07/09/22  -KD     LTG 1 Patient/family independent with self-care techniques for self-management of condition.   -KD     LTG 1 Progress New  -KD     LTG 2 Patient will demonstrate decreased net edema of >/=16-30cm for decreased edema symptoms, decreased risk of infection, and improved skin care.    -KD     LTG 2 Progress New  -KD     LTG 3 Patient will be independent with compression garments as indicated for self-management of condition.    -KD     LTG 3 Progress New  -KD            Time Calculation    PT Goal Re-Cert Due Date 09/07/22  -KD           User Key  (r) = Recorded By, (t) = Taken By, (c) = Cosigned By    Initials Name Provider Type    Nila Rankin, PT Physical Therapist                 PT Assessment/Plan     Row Name 06/09/22 6713          PT Assessment    Functional Limitations Performance in leisure activities;Performance in self-care ADL;Other (comment)  proper fit of clothes  -KD     Impairments Edema;Impaired lymphatic circulation;Integumentary integrity  -KD     Assessment Comments Pt presents in the Lymphedema Clinic today with severe, firm edema of BLE's. Total circumferential measurements: R UI=882mw; L XG=145ya. Skin on lower legs is  dry, flaky, min weeping in 2 spots on right lower leg, firm with a few scattered blisters/papillomas.  Sensation is min impaired  left 3rd-5th toes.  General mobility is limited, but she uses walker independently. Lymphedema Lifestyle Impact Scale score is 11.75.   Functional concerns are related to lymphedema affecting leisure activites and proper fit of clothing. Pt 's  is able to assist with tasks as needed. Pt appears to have some symptoms consistent with cellulitis  (leg is redder, slightly warmer, starting to weep) of left lower leg, so is seeing MD tomorrow for assessment. Patient may benefit from Complete Decongestive Therapy to reduce swelling, protect/promote skin integrity, decrease risk of infection, and mainly for instruction to patient/family of self-management skills. Pt seems to be having some issues with consistent self-management as she has been seen previously for treatment and does not seem to be applying daily compression. Compression pump is broken--gave her the phone number of company to contact to get that resolved.  -KD     Rehab Potential Fair  -KD     Patient/caregiver participated in establishment of treatment plan and goals Yes  -KD     Patient would benefit from skilled therapy intervention Yes  -KD            PT Plan    PT Frequency 3x/week;5x/week  -KD     Predicted Duration of Therapy Intervention (PT) 4 weeks  -KD     Planned CPT's? PT EVAL MOD COMPLELITY: 09554;PT RE-EVAL: 80783;PT THER PROC EA 15 MIN: 06873;PT THER ACT EA 15 MIN: 23699;PT MANUAL THERAPY EA 15 MIN: 44335;PT SELF CARE/HOME MGMT/TRAIN EA 15: 74432  -KD     Physical Therapy Interventions (Optional Details) bandaging;home exercise program;manual lymphatic drainage;patient/family education;other (see comments)  skin care  -KD     PT Plan Comments See pt per PT Plan.  -KD           User Key  (r) = Recorded By, (t) = Taken By, (c) = Cosigned By    Initials Name Provider Type     Nila Herrera, PT Physical Therapist                             Time Calculation:   Start Time: 1025  Stop Time: 1110  Time Calculation (min): 45 min   Therapy Charges for Today     Code Description Service Date Service Provider Modifiers Qty    21487157720 HC PT EVAL MOD COMPLEXITY 3 6/9/2022 Nila Herrera, PT GP 1                    Nila Herrera, PT  6/9/2022

## 2022-06-10 ENCOUNTER — OFFICE VISIT (OUTPATIENT)
Dept: FAMILY MEDICINE CLINIC | Facility: CLINIC | Age: 62
End: 2022-06-10

## 2022-06-10 VITALS
HEIGHT: 63 IN | DIASTOLIC BLOOD PRESSURE: 82 MMHG | RESPIRATION RATE: 20 BRPM | OXYGEN SATURATION: 96 % | HEART RATE: 77 BPM | SYSTOLIC BLOOD PRESSURE: 130 MMHG | WEIGHT: 293 LBS | BODY MASS INDEX: 51.91 KG/M2

## 2022-06-10 DIAGNOSIS — I89.0 LYMPHEDEMA: ICD-10-CM

## 2022-06-10 DIAGNOSIS — L03.116 CELLULITIS OF LEFT LOWER EXTREMITY: Primary | ICD-10-CM

## 2022-06-10 PROCEDURE — 99213 OFFICE O/P EST LOW 20 MIN: CPT | Performed by: NURSE PRACTITIONER

## 2022-06-10 RX ORDER — DOXYCYCLINE HYCLATE 100 MG
100 TABLET ORAL 2 TIMES DAILY
Qty: 14 TABLET | Refills: 0 | Status: SHIPPED | OUTPATIENT
Start: 2022-06-10 | End: 2022-06-17

## 2022-06-10 NOTE — PROGRESS NOTES
Subjective   Emely Solomon is a 61 y.o. female.     History of Present Illness   Patient presents with c/o lymphadema, ongoing, chronic. She reports that she went for her initial consultation yesterday and was advised due to new lesions on her lower left leg to come back to the PCP for treatment. Patient was seen in our office on 6/2/2022 and referred to lymphadenia clinic by Dr. Wilkerson. She has been there and to wound care on and off for years. Patient is not currently on an antibiotic as it was not warranted at last visit. Site has worsened and she is back in today for this. Patient denies any fever or chills at visit.      The following portions of the patient's history were reviewed and updated as appropriate: allergies, current medications, past family history, past medical history, past social history, past surgical history and problem list.    Review of Systems   Constitutional: Negative for chills, fatigue and fever.   Respiratory: Negative for cough, chest tightness, shortness of breath and wheezing.    Cardiovascular: Negative for chest pain, palpitations and leg swelling.   Musculoskeletal: Negative for arthralgias and joint swelling.   Skin: Negative for color change, dry skin, pallor, rash, skin lesions and wound.        Left lower leg infection   Allergic/Immunologic: Negative.    Neurological: Negative for dizziness, weakness and headache.       Objective   Physical Exam  Vitals and nursing note reviewed.   Constitutional:       Appearance: She is well-developed.   HENT:      Head: Normocephalic and atraumatic.   Eyes:      Conjunctiva/sclera: Conjunctivae normal.      Pupils: Pupils are equal, round, and reactive to light.   Cardiovascular:      Rate and Rhythm: Normal rate and regular rhythm.      Heart sounds: Normal heart sounds. No murmur heard.  Pulmonary:      Effort: Pulmonary effort is normal.      Breath sounds: Normal breath sounds.   Musculoskeletal:         General: No deformity.       Cervical back: Normal range of motion and neck supple.   Lymphadenopathy:      Cervical: No cervical adenopathy.   Skin:     General: Skin is warm and dry.      Findings: Erythema present. No lesion or rash.      Comments: Left lower leg with blistering and drainage. Erythematous, warm to touch.    Neurological:      Mental Status: She is alert and oriented to person, place, and time.   Psychiatric:         Behavior: Behavior normal.         Thought Content: Thought content normal.         Judgment: Judgment normal.             Vitals:    06/10/22 0918   BP: 130/82   Pulse: 77   Resp: 20   SpO2: 96%     Body mass index is 66.43 kg/m².    Procedures    Assessment & Plan   Problems Addressed this Visit        Symptoms and Signs    Lymphedema    Relevant Orders    Ambulatory Referral to Wound Clinic      Other Visit Diagnoses     Cellulitis of left lower extremity    -  Primary    Relevant Medications    doxycycline (VIBRAMYICN) 100 MG tablet    Other Relevant Orders    Ambulatory Referral to Wound Clinic      Diagnoses       Codes Comments    Cellulitis of left lower extremity    -  Primary ICD-10-CM: L03.116  ICD-9-CM: 682.6     Lymphedema     ICD-10-CM: I89.0  ICD-9-CM: 457.1         I am prescribing you an antibiotic, it is important that you take all of this medication even if you are feeling better.  Hold ferrous sulfate while taking doxycycline  Referral to wound care clinic  Follow-up with lymphedema clinic as scheduled.         Return if symptoms worsen or fail to improve.

## 2022-06-10 NOTE — PATIENT INSTRUCTIONS
Return if symptoms worsen or fail to improve.  Call with any questions or concerns.  I am prescribing you an antibiotic, it is important that you take all of this medication even if you are feeling better.  Hold ferrous sulfate while taking doxycycline

## 2022-06-13 RX ORDER — FUROSEMIDE 40 MG/1
40 TABLET ORAL DAILY
Qty: 30 TABLET | Refills: 1 | Status: SHIPPED | OUTPATIENT
Start: 2022-06-13 | End: 2022-08-14 | Stop reason: SDUPTHER

## 2022-06-14 ENCOUNTER — TELEPHONE (OUTPATIENT)
Dept: FAMILY MEDICINE CLINIC | Facility: CLINIC | Age: 62
End: 2022-06-14

## 2022-06-14 NOTE — TELEPHONE ENCOUNTER
Caller: Emely Solomon    Relationship: Self    Best call back number: [phone]    Who are you requesting to speak with (clinical staff, provider,  specific staff member): CLINICAL STAFF     What was the call regarding: HAS A RASH THAT'S COME UP ON HER ARM WANTING TO KNOW COULD IT BE FROM THE doxycycline (VIBRAMYICN) 100 MG tablet  PLEASE CALL AND ADVISE

## 2022-07-01 ENCOUNTER — HOSPITAL ENCOUNTER (OUTPATIENT)
Dept: PET IMAGING | Facility: HOSPITAL | Age: 62
Discharge: HOME | End: 2022-07-01
Admitting: OBSTETRICS & GYNECOLOGY

## 2022-07-01 DIAGNOSIS — N95.0 POST-MENOPAUSAL BLEEDING: ICD-10-CM

## 2022-07-01 PROCEDURE — 74177 CT ABD & PELVIS W/CONTRAST: CPT

## 2022-07-01 PROCEDURE — 0 IOPAMIDOL PER 1 ML: Performed by: OBSTETRICS & GYNECOLOGY

## 2022-07-01 RX ADMIN — IOPAMIDOL 150 ML: 755 INJECTION, SOLUTION INTRAVENOUS at 15:05

## 2022-07-11 ENCOUNTER — OFFICE VISIT (OUTPATIENT)
Dept: GYNECOLOGIC ONCOLOGY | Facility: CLINIC | Age: 62
End: 2022-07-11

## 2022-07-11 DIAGNOSIS — E66.01 MORBID OBESITY: Primary | ICD-10-CM

## 2022-07-11 PROCEDURE — 99441 PR PHYS/QHP TELEPHONE EVALUATION 5-10 MIN: CPT | Performed by: OBSTETRICS & GYNECOLOGY

## 2022-07-11 NOTE — PROGRESS NOTES
*TELEHEALTH VISIT *     Patient consented to telephone visit for medical care today.   Total time spent reviewing images, labs, discussion and plan was 10 minutes.       Kaylah Land D.O  2022      Age: 61 y.o.  Sex: female  :  1960  MRN: [id number]     REFERRING PHYSICIAN: No ref. provider found  DATE OF VISIT: 2022      Emely Solomon televisit to follow up on CT imaging. There are inguinal nodes from old hernia, enlarged irregural uterus and hemangiona liver.       Pt is s/p attempted D+C with hysteroscopy on 3/8/22.   There was significant difficulty with that surgery due to her weight.  Pathology unfortubatnely did not obtain endometrial tissue. (: endometrium- fragments of nondysplastic squamous mucosa, no endometrial fragments available; endocervix- nondysplastic ecto-endocervical mucosa w/ mixed predominantly acute inflammation.)      Oncology/Hematology History Overview Note   Emely Solomon is a 61 y.o. female referred by Dr. Florida Segovia ([facility]) for right ovarian cyst.    • 22: TVUS (outside facility)-uterus-13.22 x 7.83 x 8.01 cm, ET-17.79 mm, right ovary-4.81 x 5.22 x 3.08 cm, right ovarian cyst-4.41 x 2.87 x 4.04 cm, right ovarian cyst-0.63 x 0.45 x 0.62.  • 22: Ca 125-65.0   • 3/08/22: D&C w/ hysteroscopy  • 3/08/22: Pathology-endometrium-fragments of nondysplastic squamous mucosa, no endometrial fragments available, endocervix-nondysplastic ecto-endocervical mucosa w/ mixed predominantly acute inflammation.  • 22: CT AP-Resolution of the right pelvic phlegmon, right inguinal lymph nodes diminished in size. Left inguinal lymph nodes appear slightly larger compared to the previous CT examination. Hemostatic clips demonstrated within the left inguinal canal. Lobulated right ovarian/adnexal fluid collection/cyst. This measures 6.2 x 2.8 x 4.5 cm. Stable enlargement of the uterus. Endometrium appears linear and 7 mm in width. Mass within the right lobe of the  liver. There appears to be a small nodular focus of enhancement along its perimeter suggesting that this may represent a hepatic hemangioma. This was not seen on any of the prior unenhanced examinations, this could be new or perhaps isodense to the surrounding parenchyma on prior CT. Dedicated liver CT is recommended to exclude hemangioma prior to consideration of biopsy. Left renal atrophy. L5 spondylolysis and spondylolisthesis.  • 7/1/22: CT AP - Enlarged and heterogeneous uterus has a nonspecific appearance which can be seen with multiple small fibroids, but a malignancy cannot be excluded. There are no new or pathologically enlarged nodes within the pelvis or abdomen. The bilateral groin lymphadenopathy is stable. Limited characterization of the approximately 4 x 3 cm right hepatic lobe liver lesion. The lesion is likely stable given slight differences in timing of enhancement. Unfortunately, the older CTs of the abdomen were all performed without IV contrast. A benign hemangioma is suspected. Given the breathing artifact present on this examination, it is doubtful the patient will be able to suspend her breath long enough  for MRI image acquisition. Conservative surveillance is recommended with  a follow-up multiphase CT of the abdomen in 6 months.      7/11/22: Review CT Scan          Past Medical History:  Past Medical History:   Diagnosis Date   • Arthritis    • Cellulitis     11/2017, with Group B Strep bacteremia and sepsis   • Chronic diastolic congestive heart failure (HCC)    • COVID-19 virus infection 11/2020   • Deep venous thrombosis (HCC)    • Heart murmur    • Hypertension    • Lipoedema    • Lymphedema    • Morbid obesity (HCC)    • Paradoxical embolism (HCC)     to the LLE due to DVT/PE and PFO   • PFO (patent foramen ovale)    • Pulmonary embolism (HCC)    • Pulmonary hypertension (HCC)     multifactorial (dCHF, obesity/ARIEL, hx PE), mild by echo 1/2016   • Sepsis (HCC) 09/18/2020   • Sleep  apnea        Past Surgical History:  Past Surgical History:   Procedure Laterality Date   • BRONCHOSCOPY N/A 07/21/2017    Procedure: BRONCHOSCOPY with wash;  Surgeon: Caleb Garcia MD;  Location: [facility] ENDOSCOPY;  Service:    • COLONOSCOPY     • D & C HYSTEROSCOPY N/A 3/8/2022    Procedure: DILATATION AND CURETTAGE with hysteroscopy;  Surgeon: Kaylah Land DO;  Location: [facility] MAIN OR;  Service: Gynecology Oncology;  Laterality: N/A;   • DILATATION AND CURETTAGE  04/11/2011        MEDICATIONS:    Current Outpatient Medications:   •  acetaminophen (TYLENOL) 325 MG tablet, Take 2 tablets by mouth Every 4 (Four) Hours As Needed for Mild Pain ., Disp:  , Rfl:   •  ferrous sulfate 325 (65 FE) MG EC tablet, Take 1 tablet by mouth Daily With Breakfast, Disp: 90 tablet, Rfl: 3  •  furosemide (LASIX) 40 MG tablet, Take 1 tablet by mouth Daily., Disp: 30 tablet, Rfl: 1  •  losartan (COZAAR) 100 MG tablet, TAKE 1 TABLET BY MOUTH DAILY, Disp: 90 tablet, Rfl: 1  •  metoprolol succinate XL (TOPROL-XL) 50 MG 24 hr tablet, Take 1 tablet by mouth Daily., Disp: 90 tablet, Rfl: 3  •  nystatin (MYCOSTATIN) 962998 UNIT/GM powder, apply to the affected area(s) topically 3 times per day (Patient taking differently: Apply 1 application topically to the appropriate area as directed 3 (Three) Times a Day As Needed.), Disp: 15 g, Rfl: 4  •  rivaroxaban (Xarelto) 20 MG tablet, Take 1 tablet by mouth Daily., Disp: 90 tablet, Rfl: 3    ALLERGIES:  Allergies   Allergen Reactions   • Cephalexin Hives and Rash     Diffuse rash on cephalexin 1 g PO q6h on 6/17/20  Tolerated zosyn and PCN G September 2020 without issue   • Clindamycin/Lincomycin Hives         ROS:  CONSTITUTIONAL:  Denies fever or chills.   NEUROLOGIC:  Denies headache, focal weakness or sensory changes.   EYES:  Denies change in visual acuity.  HEENT:  Denies nasal congestion or sore throat.   RESPIRATORY:  Denies cough or shortness of breath.   CARDIOVASCULAR:  Denies chest  pain or edema.   GI:  Denies abdominal pain, nausea, vomiting, bloody stools or diarrhea.   :  Denies dysuria, leaking or incontinence.  MUSCULOSKELETAL:  Denies back pain or joint pain.   INTEGUMENT:  Denies rash.   ENDOCRINE:  Denies polyuria or polydipsia.   LYMPHATIC:  Denies swollen glands or lymphedema.   PSYCHIATRIC:  Denies depression or anxiety.      PHYSICAL EXAM:  There were no vitals filed for this visit.    There is no height or weight on file to calculate BMI.    No flowsheet data found.  PHQ-9 Total Score:           GEN: alert and oriented x 3, normal affect, well nourished and hydrated; morbidly obese  ABD: Soft, obese, nontender  GYN: deferred  EXT: No petechiae, bruising, rash, candida. No CCE.       Result Review :  The pertinent labs, images, and/or pathology as noted in the oncology history were reviewed independently and discussed with the patient.   Kaylah Land, DO   03/14/2022    [facility]   WBC   Date Value Ref Range Status   03/07/2022 4.27 3.40 - 10.80 10*3/mm3 Final   04/15/2021 3.91 3.40 - 10.80 10*3/mm3 Final     RBC   Date Value Ref Range Status   03/07/2022 4.52 3.77 - 5.28 10*6/mm3 Final   04/15/2021 4.41 3.77 - 5.28 10*6/mm3 Final     Hemoglobin   Date Value Ref Range Status   03/07/2022 12.2 12.0 - 15.9 g/dL Final     Hematocrit   Date Value Ref Range Status   03/07/2022 38.1 34.0 - 46.6 % Final     Platelets   Date Value Ref Range Status   03/07/2022 300 140 - 450 10*3/mm3 Final     No results found for:     [facility]:  CT Abdomen Pelvis With Contrast    Result Date: 7/6/2022  1. Enlarged and heterogeneous uterus has a nonspecific appearance which can be seen with multiple small fibroids, but a malignancy cannot be excluded. There are no new or pathologically enlarged nodes within the pelvis or abdomen. The bilateral groin lymphadenopathy is stable. 2. Limited characterization of the approximately 4 x 3 cm right hepatic lobe liver lesion. The lesion is likely  stable given slight differences in timing of enhancement. Unfortunately, the older CTs of the abdomen were all performed without IV contrast. A benign hemangioma is suspected. Given the breathing artifact present on this examination, it is doubtful the patient will be able to suspend her breath long enough for MRI image acquisition. Conservative surveillance is recommended with a follow-up multiphase CT of the abdomen in 6 months.  This report was finalized on 7/6/2022 7:03 AM by Dr. Tania Barlow M.D.              ASSESSMENT :  • Post-operative assessment  - s/p D+C on 3/8/22 secondary to PMB, thickened endometrium: pathology benign  · Right ovarian cyst  · BMI 67.8   · Extensive PMH:  ? Pulmonary hypertension  ? Essential hypertension  ? CHF  ? ARIEL  ? History of DVT, on Xarelto  ? CKD  ? lymphedema         PLAN :  • Had a long nabil discussion, again, regarding how her weight is impacting her care. We were unable to obtain tissue for diagnosis. She states she does not remember that conversation the first time around.   • Discussed with patient that path did not return with endometrial tissue. We reviewed the scans and I showed her how angle is difficult. We had extreme difficulty with getting her positioned for the d and c. No stirrups would hold her legs so we tried using ortho split legs and I think that without legs in lithotomy, the angle of the uteurs with respect to the pubic bone precluded evaluation. We had several extra people in the room helping hold each leg but to no avail.   • I urged her to agressively try and lose weight for her long term health.   • She has agreed to nutritional therapy and eval for gastric banding. I offered and she accepts lap band consultation with Dr Pires.   • Will repeat CT in 3 mos to monitor and assess uterus/hemangioma.           Kaylah Land D.O  7/11/2022    Gynecologic Oncology   [address] [place] [address] [address] [place] [address]  [phone]  office

## 2022-07-14 ENCOUNTER — APPOINTMENT (OUTPATIENT)
Dept: WOUND CARE | Facility: HOSPITAL | Age: 62
End: 2022-07-14

## 2022-08-15 RX ORDER — FUROSEMIDE 40 MG/1
40 TABLET ORAL DAILY
Qty: 30 TABLET | Refills: 4 | Status: SHIPPED | OUTPATIENT
Start: 2022-08-15 | End: 2023-01-18 | Stop reason: SDUPTHER

## 2022-09-11 RX ORDER — LOSARTAN POTASSIUM 100 MG/1
100 TABLET ORAL DAILY
Qty: 90 TABLET | Refills: 1 | Status: CANCELLED | OUTPATIENT
Start: 2022-09-11

## 2022-09-12 RX ORDER — LOSARTAN POTASSIUM 100 MG/1
100 TABLET ORAL DAILY
Qty: 90 TABLET | Refills: 1 | Status: SHIPPED | OUTPATIENT
Start: 2022-09-12 | End: 2023-03-13 | Stop reason: SDUPTHER

## 2022-09-30 DIAGNOSIS — E66.01 MORBID OBESITY: ICD-10-CM

## 2022-09-30 DIAGNOSIS — D18.03 LIVER HEMANGIOMA: ICD-10-CM

## 2022-09-30 DIAGNOSIS — R93.89 THICKENED ENDOMETRIUM: Primary | ICD-10-CM

## 2022-09-30 DIAGNOSIS — N95.0 POST-MENOPAUSAL BLEEDING: ICD-10-CM

## 2022-10-03 ENCOUNTER — HOSPITAL ENCOUNTER (OUTPATIENT)
Dept: PET IMAGING | Facility: HOSPITAL | Age: 62
End: 2022-10-03

## 2022-10-27 ENCOUNTER — HOSPITAL ENCOUNTER (OUTPATIENT)
Dept: CT IMAGING | Facility: HOSPITAL | Age: 62
Discharge: HOME | End: 2022-10-27
Admitting: PHYSICIAN ASSISTANT

## 2022-10-27 DIAGNOSIS — R93.89 THICKENED ENDOMETRIUM: ICD-10-CM

## 2022-10-27 DIAGNOSIS — E66.01 MORBID OBESITY: ICD-10-CM

## 2022-10-27 DIAGNOSIS — D18.03 LIVER HEMANGIOMA: ICD-10-CM

## 2022-10-27 DIAGNOSIS — N95.0 POST-MENOPAUSAL BLEEDING: ICD-10-CM

## 2022-10-27 LAB — CREAT BLDA-MCNC: 1.1 MG/DL (ref 0.6–1.3)

## 2022-10-27 PROCEDURE — 74178 CT ABD&PLV WO CNTR FLWD CNTR: CPT

## 2022-10-27 PROCEDURE — 0 IOPAMIDOL PER 1 ML: Performed by: PHYSICIAN ASSISTANT

## 2022-10-27 PROCEDURE — 82565 ASSAY OF CREATININE: CPT

## 2022-10-27 RX ADMIN — IOPAMIDOL 85 ML: 755 INJECTION, SOLUTION INTRAVENOUS at 10:23

## 2022-11-03 ENCOUNTER — TELEPHONE (OUTPATIENT)
Dept: GYNECOLOGIC ONCOLOGY | Facility: CLINIC | Age: 62
End: 2022-11-03

## 2022-11-04 ENCOUNTER — TELEPHONE (OUTPATIENT)
Dept: GYNECOLOGIC ONCOLOGY | Facility: CLINIC | Age: 62
End: 2022-11-04

## 2022-11-04 NOTE — TELEPHONE ENCOUNTER
Caller: MAYELA    Relationship to patient: SELF    Best call back number: [phone]    Patient is needing: TO CALL AND R/S 11- F/U APPT TO A DAY THAT IS NOT A Friday; MAYBE A Tuesday OR Thursday, IF POSSIBLE.

## 2022-11-16 ENCOUNTER — OFFICE VISIT (OUTPATIENT)
Dept: GYNECOLOGIC ONCOLOGY | Facility: CLINIC | Age: 62
End: 2022-11-16

## 2022-11-16 VITALS
DIASTOLIC BLOOD PRESSURE: 99 MMHG | HEIGHT: 63 IN | RESPIRATION RATE: 16 BRPM | WEIGHT: 293 LBS | TEMPERATURE: 98.5 F | HEART RATE: 83 BPM | OXYGEN SATURATION: 94 % | BODY MASS INDEX: 51.91 KG/M2 | SYSTOLIC BLOOD PRESSURE: 144 MMHG

## 2022-11-16 DIAGNOSIS — E66.01 MORBID OBESITY: ICD-10-CM

## 2022-11-16 DIAGNOSIS — N95.0 POST-MENOPAUSAL BLEEDING: ICD-10-CM

## 2022-11-16 DIAGNOSIS — R93.89 THICKENED ENDOMETRIUM: Primary | ICD-10-CM

## 2022-11-16 DIAGNOSIS — N83.201 OVARIAN CYST, RIGHT: ICD-10-CM

## 2022-11-16 PROCEDURE — 99213 OFFICE O/P EST LOW 20 MIN: CPT | Performed by: OBSTETRICS & GYNECOLOGY

## 2022-12-08 ENCOUNTER — OFFICE VISIT (OUTPATIENT)
Dept: FAMILY MEDICINE CLINIC | Facility: CLINIC | Age: 62
End: 2022-12-08

## 2022-12-08 VITALS
DIASTOLIC BLOOD PRESSURE: 92 MMHG | WEIGHT: 293 LBS | BODY MASS INDEX: 64.13 KG/M2 | RESPIRATION RATE: 21 BRPM | SYSTOLIC BLOOD PRESSURE: 142 MMHG | OXYGEN SATURATION: 100 % | HEART RATE: 75 BPM

## 2022-12-08 DIAGNOSIS — E61.1 IRON DEFICIENCY: ICD-10-CM

## 2022-12-08 DIAGNOSIS — R19.5 POSITIVE COLORECTAL CANCER SCREENING USING COLOGUARD TEST: ICD-10-CM

## 2022-12-08 DIAGNOSIS — Z13.6 ENCOUNTER FOR LIPID SCREENING FOR CARDIOVASCULAR DISEASE: ICD-10-CM

## 2022-12-08 DIAGNOSIS — Z23 NEED FOR VACCINATION: ICD-10-CM

## 2022-12-08 DIAGNOSIS — Z00.00 ROUTINE HEALTH MAINTENANCE: Primary | ICD-10-CM

## 2022-12-08 DIAGNOSIS — Z12.11 SCREENING FOR COLON CANCER: ICD-10-CM

## 2022-12-08 DIAGNOSIS — Z13.220 ENCOUNTER FOR LIPID SCREENING FOR CARDIOVASCULAR DISEASE: ICD-10-CM

## 2022-12-08 DIAGNOSIS — I10 ESSENTIAL HYPERTENSION: ICD-10-CM

## 2022-12-08 DIAGNOSIS — D63.8 ANEMIA, CHRONIC DISEASE: ICD-10-CM

## 2022-12-08 PROBLEM — M62.81 GENERALIZED MUSCLE WEAKNESS: Status: ACTIVE | Noted: 2020-09-30

## 2022-12-08 PROBLEM — E87.1 HYPO-OSMOLALITY AND HYPONATREMIA: Status: RESOLVED | Noted: 2020-09-29 | Resolved: 2022-12-08

## 2022-12-08 PROBLEM — E87.1 HYPO-OSMOLALITY AND HYPONATREMIA: Status: ACTIVE | Noted: 2020-09-29

## 2022-12-08 PROCEDURE — 91312 COVID-19 (PFIZER) BIVALENT BOOSTER 12+YRS: CPT | Performed by: FAMILY MEDICINE

## 2022-12-08 PROCEDURE — 99396 PREV VISIT EST AGE 40-64: CPT | Performed by: FAMILY MEDICINE

## 2022-12-08 PROCEDURE — 90686 IIV4 VACC NO PRSV 0.5 ML IM: CPT | Performed by: FAMILY MEDICINE

## 2022-12-08 PROCEDURE — 90471 IMMUNIZATION ADMIN: CPT | Performed by: FAMILY MEDICINE

## 2022-12-08 NOTE — PROGRESS NOTES
Chief Complaint  Annual Exam and Edema    Subjective    History of Present Illness {CC  Problem List  Visit  Diagnosis   Encounters  Notes  Medications  Labs  Result Review Imaging  Media :23}     Emely Solomon presents to [facility] for Annual Exam and Edema.  History of Present Illness     Here today for annual exam and to discuss preventive health maintenance.    Doing well overall. Health is overall stable from her perspective. She continues with lymphedema clinic here and there for management of chronic bilateral lower extremity lymphedema. No recent changes to her med regimen. No need for any refills at this time.    Due for some routine blood work. Has not had a CBC checked in a while. Has chronic anemia and iron deficiency.    Due for colonoscopy. Had a positive Cologuard about a year ago, has been unable to schedule a colonoscopy since that time. Needs a new order today.    Due for a flu shot and COVID booster today.    Will also be due for a pneumonia vaccine and shingles vaccine in the near future.    Has been participating in weight watchers, has made some progress in her efforts toward weight loss. Remains fairly sedentary. Has good family and social support. Due for dental exam.    Objective     Vital Signs:   /92   Pulse 75   Resp 21   Wt (!) 164 kg (362 lb)   SpO2 100%   BMI 64.13 kg/m²   Physical Exam  Vitals and nursing note reviewed.   Constitutional:       General: She is not in acute distress.     Appearance: Normal appearance. She is not ill-appearing.   Cardiovascular:      Rate and Rhythm: Normal rate and regular rhythm.      Pulses: Normal pulses.      Heart sounds: Normal heart sounds. No murmur heard.  Pulmonary:      Effort: Pulmonary effort is normal. No respiratory distress.      Breath sounds: Normal breath sounds. No rales.   Musculoskeletal:      Right lower le+ Pitting Edema present.      Left lower le+ Pitting Edema  present.   Neurological:      Mental Status: She is alert and oriented to person, place, and time. Mental status is at baseline.   Psychiatric:         Mood and Affect: Mood normal.         Behavior: Behavior normal.          Result Review  Data Reviewed:{ Labs  Result Review  Imaging  Med Tab  Media :23}                   Assessment and Plan {CC Problem List  Visit Diagnosis  ROS  Review (Popup)  Health Maintenance  Quality  BestPractice  Medications  SmartSets  SnapShot Encounters  Media :23}   Diagnoses and all orders for this visit:    1. Routine health maintenance (Primary)    2. Essential hypertension  -     Comprehensive Metabolic Panel    3. Anemia, chronic disease  -     CBC (No Diff)    4. Iron deficiency  -     CBC (No Diff)    5. Positive colorectal cancer screening using Cologuard test  -     Ambulatory Referral For Screening Colonoscopy    6. Screening for colon cancer  -     Ambulatory Referral For Screening Colonoscopy    7. Need for vaccination  -     FluLaval/Fluzone >6 mos ([id number])  -     COVID-19 Bivalent Booster (Pfizer) 12+yrs    8. Encounter for lipid screening for cardiovascular disease  -     Lipid Panel    Orders as above. I will contact her with results as available. Continue regimen as prescribed. Vaccines as discussed. Referral to colonoscopy as discussed.    Encouraged continued work towards healthy lifestyle habits. Recommended follow-up as below. Encouraged communication via [employer] in the meantime.    Patient was given instructions and counseling regarding her condition or for health maintenance advice. Please see specific information pulled into the AVS (placed there by myself) if appropriate.    Return in about 1 month (around 1/8/2023) for PCV and Shingrix vaccines.      CHIP Wilkerson MD    Prevention counseling performed as below: Mindfulness for stress management.

## 2022-12-09 LAB
ALBUMIN SERPL-MCNC: 4.8 G/DL (ref 3.5–5.2)
ALBUMIN/GLOB SERPL: 1.5 G/DL
ALP SERPL-CCNC: 116 U/L (ref 39–117)
ALT SERPL-CCNC: 14 U/L (ref 1–33)
AST SERPL-CCNC: 14 U/L (ref 1–32)
BILIRUB SERPL-MCNC: 0.6 MG/DL (ref 0–1.2)
BUN SERPL-MCNC: 29 MG/DL (ref 8–23)
BUN/CREAT SERPL: 30.9 (ref 7–25)
CALCIUM SERPL-MCNC: 10 MG/DL (ref 8.6–10.5)
CHLORIDE SERPL-SCNC: 99 MMOL/L (ref 98–107)
CHOLEST SERPL-MCNC: 234 MG/DL (ref 0–200)
CO2 SERPL-SCNC: 26.6 MMOL/L (ref 22–29)
CREAT SERPL-MCNC: 0.94 MG/DL (ref 0.57–1)
EGFRCR SERPLBLD CKD-EPI 2021: 68.3 ML/MIN/1.73
ERYTHROCYTE [DISTWIDTH] IN BLOOD BY AUTOMATED COUNT: 13.3 % (ref 12.3–15.4)
GLOBULIN SER CALC-MCNC: 3.3 GM/DL
GLUCOSE SERPL-MCNC: 86 MG/DL (ref 65–99)
HCT VFR BLD AUTO: 37.3 % (ref 34–46.6)
HDLC SERPL-MCNC: 58 MG/DL (ref 40–60)
HGB BLD-MCNC: 12.4 G/DL (ref 12–15.9)
LDLC SERPL CALC-MCNC: 156 MG/DL (ref 0–100)
MCH RBC QN AUTO: 27.3 PG (ref 26.6–33)
MCHC RBC AUTO-ENTMCNC: 33.2 G/DL (ref 31.5–35.7)
MCV RBC AUTO: 82.2 FL (ref 79–97)
PLATELET # BLD AUTO: 289 10*3/MM3 (ref 140–450)
POTASSIUM SERPL-SCNC: 4.6 MMOL/L (ref 3.5–5.2)
PROT SERPL-MCNC: 8.1 G/DL (ref 6–8.5)
RBC # BLD AUTO: 4.54 10*6/MM3 (ref 3.77–5.28)
SODIUM SERPL-SCNC: 139 MMOL/L (ref 136–145)
TRIGL SERPL-MCNC: 115 MG/DL (ref 0–150)
VLDLC SERPL CALC-MCNC: 20 MG/DL (ref 5–40)
WBC # BLD AUTO: 4.45 10*3/MM3 (ref 3.4–10.8)

## 2022-12-26 DIAGNOSIS — D63.8 ANEMIA, CHRONIC DISEASE: ICD-10-CM

## 2022-12-26 DIAGNOSIS — E61.1 IRON DEFICIENCY: ICD-10-CM

## 2022-12-27 RX ORDER — LANOLIN ALCOHOL/MO/W.PET/CERES
325 CREAM (GRAM) TOPICAL
Qty: 90 TABLET | Refills: 3 | Status: SHIPPED | OUTPATIENT
Start: 2022-12-27 | End: 2023-03-06

## 2022-12-28 ENCOUNTER — TELEPHONE (OUTPATIENT)
Dept: FAMILY MEDICINE CLINIC | Facility: CLINIC | Age: 62
End: 2022-12-28

## 2022-12-28 NOTE — TELEPHONE ENCOUNTER
Caller: Emely Solomon     Relationship: [unfilled]     Best call back number: [phone]    What is your medical concern? IRON PILLS    How long has this issue been going on? WENT TO THE PHARMACY AND PRICE WAS 50 DOLLARS AND PATIENT NEEDS TO KNOW IF SHE CAN TAKE OVER THE COUNTER IRON OR DO SHE EVEN NEEDS THIS MEDICATION NOW STILL    Is your provider already aware of this issue? PATIENT IS ASKING FOR A CALL BACK AS SOON AS POSSIBLE

## 2023-01-04 ENCOUNTER — TELEPHONE (OUTPATIENT)
Dept: CARDIOLOGY | Facility: CLINIC | Age: 63
End: 2023-01-04
Payer: COMMERCIAL

## 2023-01-04 NOTE — TELEPHONE ENCOUNTER
Pt left a VM stating she is going to have a colonoscopy, and would like to know how long she should be off her blood thinner for the procedure.

## 2023-01-05 ENCOUNTER — TELEPHONE (OUTPATIENT)
Dept: FAMILY MEDICINE CLINIC | Facility: CLINIC | Age: 63
End: 2023-01-05

## 2023-01-05 NOTE — TELEPHONE ENCOUNTER
Caller: Emely Solomon    Relationship: Self    Best call back number: [phone]    What was the call regarding: PATIENT IS COMING IN TOMORROW TO SEE ALEXIA STEVENS BUT SHE WOULD LIKE TO KNOW IF THERE IS ANYTHING OVER THE COUNTER SHE CAN TAKE IN THE MEANTIME. SHE DOES TAKE BLOOD PRESSURE MEDICATION SO SHE WANT'S TO MAKE SURE ANYTHING SHE TAKES WILL BE OKAY WITH HER OTHER MEDICATION.     Do you require a callback: YES

## 2023-01-06 ENCOUNTER — OFFICE VISIT (OUTPATIENT)
Dept: FAMILY MEDICINE CLINIC | Facility: CLINIC | Age: 63
End: 2023-01-06
Payer: COMMERCIAL

## 2023-01-06 DIAGNOSIS — R05.1 ACUTE COUGH: Primary | ICD-10-CM

## 2023-01-06 LAB
EXPIRATION DATE: ABNORMAL
FLUAV AG UPPER RESP QL IA.RAPID: NOT DETECTED
FLUBV AG UPPER RESP QL IA.RAPID: NOT DETECTED
INTERNAL CONTROL: ABNORMAL
Lab: ABNORMAL
SARS-COV-2 AG UPPER RESP QL IA.RAPID: DETECTED

## 2023-01-06 PROCEDURE — 99213 OFFICE O/P EST LOW 20 MIN: CPT | Performed by: NURSE PRACTITIONER

## 2023-01-06 PROCEDURE — 87428 SARSCOV & INF VIR A&B AG IA: CPT | Performed by: NURSE PRACTITIONER

## 2023-01-06 RX ORDER — BENZONATATE 100 MG/1
100 CAPSULE ORAL 3 TIMES DAILY PRN
Qty: 30 CAPSULE | Refills: 0 | Status: SHIPPED | OUTPATIENT
Start: 2023-01-06 | End: 2023-01-16

## 2023-01-06 NOTE — PROGRESS NOTES
Subjective   Emely Solomon is a 62 y.o. female.     History of Present Illness   Patient presents for c/o cough that started two days ago. She reports that she really has had no other respiratory symptoms such as runny nose or congestion. She states that she thinks that she has a cold. She has not taken anything for her symptoms.     The following portions of the patient's history were reviewed and updated as appropriate: allergies, current medications, past family history, past medical history, past social history, past surgical history and problem list.    Review of Systems   Constitutional: Negative for appetite change, chills, fatigue and fever.   HENT: Positive for postnasal drip. Negative for congestion, ear pain, rhinorrhea, sinus pressure, sneezing and sore throat.    Eyes: Negative for redness and itching.   Respiratory: Positive for cough. Negative for chest tightness, shortness of breath and wheezing.    Cardiovascular: Negative for chest pain, palpitations and leg swelling.   Musculoskeletal: Negative for myalgias.   Allergic/Immunologic: Negative.    Neurological: Negative for dizziness and headache.       Objective   Physical Exam  Vitals and nursing note reviewed.   Constitutional:       Appearance: Normal appearance. She is well-developed. She is obese.   HENT:      Head: Normocephalic and atraumatic.   Eyes:      Conjunctiva/sclera: Conjunctivae normal.      Pupils: Pupils are equal, round, and reactive to light.   Cardiovascular:      Rate and Rhythm: Normal rate and regular rhythm.      Heart sounds: Normal heart sounds. No murmur heard.  Pulmonary:      Effort: Pulmonary effort is normal.      Breath sounds: Normal breath sounds.   Neurological:      Mental Status: She is alert and oriented to person, place, and time.   Psychiatric:         Behavior: Behavior normal.         Thought Content: Thought content normal.         Judgment: Judgment normal.         There were no vitals filed for this  visit.  There is no height or weight on file to calculate BMI.      Procedures    Assessment & Plan   Problems Addressed this Visit    None  Visit Diagnoses     Acute cough    -  Primary    Relevant Medications    benzonatate (Tessalon Perles) 100 MG capsule    Other Relevant Orders    POCT SARS-CoV-2 Antigen ERICH + Flu (Completed)      Diagnoses       Codes Comments    Acute cough    -  Primary ICD-10-CM: R05.1  ICD-9-CM: 786.2         POCT covid + flu - positive for covid  Patient advised to take dayquil over the counter   Benzonatate 100mg 1p.o. TID PRN for cough  Advised patient to go to ER with worsening or severe symptoms.          Return if symptoms worsen or fail to improve.

## 2023-01-06 NOTE — PATIENT INSTRUCTIONS
Patient advised to take dayquil over the counter   Benzonatate 100mg 1p.o. TID PRN for cough  Advised patient to go to ER with worsening or severe symptoms.   Return if symptoms worsen or fail to improve.

## 2023-01-09 ENCOUNTER — TELEPHONE (OUTPATIENT)
Dept: FAMILY MEDICINE CLINIC | Facility: CLINIC | Age: 63
End: 2023-01-09
Payer: COMMERCIAL

## 2023-01-09 NOTE — TELEPHONE ENCOUNTER
Patient called tested positive for Covid on 1/6/23, wants to come back to the office and get re-tested before returning to work.  Is this ok to schedule.

## 2023-01-17 NOTE — TELEPHONE ENCOUNTER
Caller: Emely Solomon    Relationship: Self    Best call back number: [phone]    Requested Prescriptions:   Requested Prescriptions     Pending Prescriptions Disp Refills   • rivaroxaban (Xarelto) 20 MG tablet 90 tablet 3     Sig: Take 1 tablet by mouth Daily.        Pharmacy where request should be sent: [facility]     Additional details provided by patient: PT CALLING TO FOLLOW UP ON MED REFILL REQUEST - PT REPORTS PHARMACY SAYS THEY HAVENT RECEIVED ANYTHING AND PT TAKING LAST PILL TODAY 01.17.22    Does the patient have less than a 3 day supply:  [x] Yes  [] No    Would you like a call back once the refill request has been completed: [x] Yes [] No    If the office needs to give you a call back, can they leave a voicemail: [x] Yes [] No    Wilberto Aguilar Rep   01/17/23 08:18 EST

## 2023-01-18 RX ORDER — FUROSEMIDE 40 MG/1
40 TABLET ORAL DAILY
Qty: 30 TABLET | Refills: 4 | Status: CANCELLED | OUTPATIENT
Start: 2023-01-18

## 2023-01-19 ENCOUNTER — DOCUMENTATION (OUTPATIENT)
Dept: PHYSICAL THERAPY | Facility: HOSPITAL | Age: 63
End: 2023-01-19
Payer: COMMERCIAL

## 2023-01-19 RX ORDER — FUROSEMIDE 40 MG/1
40 TABLET ORAL DAILY
Qty: 30 TABLET | Refills: 4 | Status: CANCELLED | OUTPATIENT
Start: 2023-01-18

## 2023-01-19 NOTE — THERAPY DISCHARGE NOTE
Outpatient Physical Therapy Discharge Summary         Patient Name: Emely Solomon  : 1960  MRN: [id number]    Today's Date: 2023    Visit Dx:  No diagnosis found.     PT OP Goals     Row Name 23 1100          PT Short Term Goals    STG Date to Achieve 22  -KD     STG 1 Patient will demonstrate awareness of condition and precautions for improved prevention, management, care of symptoms, and ease of transition to self-care of condition.  -KD     STG 1 Progress Not Met  -KD     STG 1 Progress Comments Pt did not return for treatment  -KD     STG 2 Patient will be independent with self-wrapping techniques of compression bandages as indicated for improved self-management of condition.    -KD     STG 2 Progress Not Met  -KD     STG 2 Progress Comments Pt did not return for treatment  -KD     STG 3 Patient will demonstrate decreased net edema of >/=10-15cm for decreased edema symptoms, decreased risk of infection, and improved skin care.    -KD     STG 3 Progress Not Met  -KD     STG 3 Progress Comments Pt did not return for treatment  -KD        Long Term Goals    LTG Date to Achieve 22  -KD     LTG 1 Patient/family independent with self-care techniques for self-management of condition.   -KD     LTG 1 Progress Not Met  -KD     LTG 1 Progress Comments Pt did not return for treatment  -KD     LTG 2 Patient will demonstrate decreased net edema of >/=16-30cm for decreased edema symptoms, decreased risk of infection, and improved skin care.    -KD     LTG 2 Progress Not Met  -KD     LTG 2 Progress Comments Pt did not return for treatment  -KD     LTG 3 Patient will be independent with compression garments as indicated for self-management of condition.    -KD     LTG 3 Progress Not Met  -KD     LTG 3 Progress Comments Pt did not return for treatment  -KD           User Key  (r) = Recorded By, (t) = Taken By, (c) = Cosigned By    Initials Name Provider Type    Nila Rankin, PT  Physical Therapist                OP PT Discharge Summary  Date of Discharge: 01/19/23  Reason for Discharge: other (comment) (Pt did not return for treatment)  Outcomes Achieved: Other (Pt did not return for treatment)  Discharge Destination: Unknown      Time Calculation:                    Nila Herrera, PT  1/19/2023

## 2023-01-20 RX ORDER — FUROSEMIDE 40 MG/1
40 TABLET ORAL DAILY
Qty: 30 TABLET | Refills: 4 | Status: SHIPPED | OUTPATIENT
Start: 2023-01-20

## 2023-01-20 NOTE — TELEPHONE ENCOUNTER
Caller: Emely Solomon    Relationship: Self    Best call back number: [phone]    What is the best time to reach you: ANY    Who are you requesting to speak with (clinical staff, provider,  specific staff member): ANY    What was the call regarding: MEDICATION REFILL    Do you require a callback: YES     PT CALLED IN FOR AN UPDATE ON HER FUROSEMIDE 40 MG REFILL THAT IS STILL PENDING. SHE WOULD LIKE TO KNOW IF  IS GOING TO AUTHORIZE THE REFILL.

## 2023-01-26 ENCOUNTER — HOSPITAL ENCOUNTER (OUTPATIENT)
Facility: HOSPITAL | Age: 63
Setting detail: HOSPITAL OUTPATIENT SURGERY
Discharge: HOME | End: 2023-01-26
Attending: INTERNAL MEDICINE | Admitting: INTERNAL MEDICINE
Payer: COMMERCIAL

## 2023-01-26 ENCOUNTER — ANESTHESIA (OUTPATIENT)
Dept: GASTROENTEROLOGY | Facility: HOSPITAL | Age: 63
End: 2023-01-26
Payer: COMMERCIAL

## 2023-01-26 ENCOUNTER — ANESTHESIA EVENT (OUTPATIENT)
Dept: GASTROENTEROLOGY | Facility: HOSPITAL | Age: 63
End: 2023-01-26
Payer: COMMERCIAL

## 2023-01-26 VITALS
RESPIRATION RATE: 16 BRPM | HEART RATE: 77 BPM | BODY MASS INDEX: 51.91 KG/M2 | DIASTOLIC BLOOD PRESSURE: 82 MMHG | HEIGHT: 63 IN | OXYGEN SATURATION: 100 % | WEIGHT: 293 LBS | SYSTOLIC BLOOD PRESSURE: 96 MMHG

## 2023-01-26 DIAGNOSIS — Z12.11 ENCOUNTER FOR SCREENING FOR MALIGNANT NEOPLASM OF COLON: ICD-10-CM

## 2023-01-26 PROCEDURE — 25010000002 PROPOFOL 10 MG/ML EMULSION: Performed by: ANESTHESIOLOGY

## 2023-01-26 PROCEDURE — 45378 DIAGNOSTIC COLONOSCOPY: CPT | Performed by: INTERNAL MEDICINE

## 2023-01-26 RX ORDER — PROPOFOL 10 MG/ML
VIAL (ML) INTRAVENOUS CONTINUOUS PRN
Status: DISCONTINUED | OUTPATIENT
Start: 2023-01-26 | End: 2023-01-26 | Stop reason: SURG

## 2023-01-26 RX ORDER — EPHEDRINE SULFATE 50 MG/ML
INJECTION, SOLUTION INTRAVENOUS AS NEEDED
Status: DISCONTINUED | OUTPATIENT
Start: 2023-01-26 | End: 2023-01-26 | Stop reason: SURG

## 2023-01-26 RX ORDER — SODIUM CHLORIDE 0.9 % (FLUSH) 0.9 %
10 SYRINGE (ML) INJECTION EVERY 12 HOURS SCHEDULED
Status: DISCONTINUED | OUTPATIENT
Start: 2023-01-26 | End: 2023-01-26 | Stop reason: HOSPADM

## 2023-01-26 RX ORDER — SODIUM CHLORIDE 0.9 % (FLUSH) 0.9 %
10 SYRINGE (ML) INJECTION AS NEEDED
Status: DISCONTINUED | OUTPATIENT
Start: 2023-01-26 | End: 2023-01-26 | Stop reason: HOSPADM

## 2023-01-26 RX ORDER — LIDOCAINE HYDROCHLORIDE 20 MG/ML
INJECTION, SOLUTION INFILTRATION; PERINEURAL AS NEEDED
Status: DISCONTINUED | OUTPATIENT
Start: 2023-01-26 | End: 2023-01-26 | Stop reason: SURG

## 2023-01-26 RX ORDER — SODIUM CHLORIDE, SODIUM LACTATE, POTASSIUM CHLORIDE, CALCIUM CHLORIDE 600; 310; 30; 20 MG/100ML; MG/100ML; MG/100ML; MG/100ML
30 INJECTION, SOLUTION INTRAVENOUS CONTINUOUS
Status: DISCONTINUED | OUTPATIENT
Start: 2023-01-26 | End: 2023-01-26 | Stop reason: HOSPADM

## 2023-01-26 RX ORDER — PROPOFOL 10 MG/ML
VIAL (ML) INTRAVENOUS AS NEEDED
Status: DISCONTINUED | OUTPATIENT
Start: 2023-01-26 | End: 2023-01-26 | Stop reason: SURG

## 2023-01-26 RX ADMIN — EPHEDRINE SULFATE 15 MG: 50 INJECTION INTRAVENOUS at 12:53

## 2023-01-26 RX ADMIN — Medication 100 MCG/KG/MIN: at 12:45

## 2023-01-26 RX ADMIN — SODIUM CHLORIDE, POTASSIUM CHLORIDE, SODIUM LACTATE AND CALCIUM CHLORIDE 30 ML/HR: 600; 310; 30; 20 INJECTION, SOLUTION INTRAVENOUS at 10:34

## 2023-01-26 RX ADMIN — LIDOCAINE HYDROCHLORIDE 100 MG: 20 INJECTION, SOLUTION INFILTRATION; PERINEURAL at 12:42

## 2023-01-26 RX ADMIN — PROPOFOL 150 MG: 10 INJECTION, EMULSION INTRAVENOUS at 12:44

## 2023-01-26 NOTE — ANESTHESIA POSTPROCEDURE EVALUATION
"Patient: Emely Solomon    Procedure Summary     Date: 01/26/23 Room / Location:  [facility] /  [facility]    Anesthesia Start: 1238 Anesthesia Stop: 1303    Procedure: COLONOSCOPY to CECUM AND TERM ILEUM Diagnosis:       Encounter for screening for malignant neoplasm of colon      (Encounter for screening for malignant neoplasm of colon [Z12.11])    Surgeons: Jj Dean MD Provider: Nish Smith MD    Anesthesia Type: MAC ASA Status: 4          Anesthesia Type: MAC    Vitals  Vitals Value Taken Time   BP 96/82 01/26/23 1320   Temp     Pulse 77 01/26/23 1320   Resp 16 01/26/23 1320   SpO2 100 % 01/26/23 1320           Post Anesthesia Care and Evaluation    Patient location during evaluation: bedside  Patient participation: complete - patient participated  Level of consciousness: awake and alert  Pain management: adequate    Airway patency: patent  Anesthetic complications: No anesthetic complications    Cardiovascular status: acceptable  Respiratory status: acceptable  Hydration status: acceptable    Comments: BP 96/82 (BP Location: Right arm, Patient Position: Lying)   Pulse 77   Resp 16   Ht 160 cm (63\")   Wt (!) 154 kg (340 lb)   SpO2 100%   BMI 60.23 kg/m²       "

## 2023-01-26 NOTE — ANESTHESIA PREPROCEDURE EVALUATION
Anesthesia Evaluation     Patient summary reviewed and Nursing notes reviewed   NPO Solid Status: > 8 hours  NPO Liquid Status: > 4 hours           Airway   Mallampati: II  Neck ROM: full  Possible difficult intubation  Dental - normal exam     Pulmonary     breath sounds clear to auscultation  (+) pulmonary embolism, sleep apnea,   Cardiovascular     Rhythm: regular    (+) hypertension, valvular problems/murmurs, CHF , DVT,       Neuro/Psych  GI/Hepatic/Renal/Endo    (+) obesity, morbid obesity,  renal disease,     ROS Comment: 380 lbs BMI 68.4    Musculoskeletal     (+) back pain,   Abdominal   (+) obese,    Substance History      OB/GYN          Other   arthritis,                      Anesthesia Plan    ASA 4     MAC     intravenous induction     Anesthetic plan, risks, benefits, and alternatives have been provided, discussed and informed consent has been obtained with: patient.        CODE STATUS:

## 2023-02-08 ENCOUNTER — CLINICAL SUPPORT (OUTPATIENT)
Dept: FAMILY MEDICINE CLINIC | Facility: CLINIC | Age: 63
End: 2023-02-08
Payer: COMMERCIAL

## 2023-02-08 DIAGNOSIS — Z23 NEED FOR VACCINATION: Primary | ICD-10-CM

## 2023-02-08 PROCEDURE — 90750 HZV VACC RECOMBINANT IM: CPT | Performed by: FAMILY MEDICINE

## 2023-02-08 PROCEDURE — 90677 PCV20 VACCINE IM: CPT | Performed by: FAMILY MEDICINE

## 2023-02-08 PROCEDURE — 90471 IMMUNIZATION ADMIN: CPT | Performed by: FAMILY MEDICINE

## 2023-03-06 ENCOUNTER — OFFICE VISIT (OUTPATIENT)
Dept: CARDIOLOGY | Facility: CLINIC | Age: 63
End: 2023-03-06
Payer: COMMERCIAL

## 2023-03-06 VITALS
DIASTOLIC BLOOD PRESSURE: 74 MMHG | SYSTOLIC BLOOD PRESSURE: 116 MMHG | HEIGHT: 63 IN | WEIGHT: 293 LBS | HEART RATE: 60 BPM | BODY MASS INDEX: 51.91 KG/M2

## 2023-03-06 DIAGNOSIS — I27.20 PULMONARY HYPERTENSION: ICD-10-CM

## 2023-03-06 DIAGNOSIS — I50.32 CHRONIC DIASTOLIC CHF (CONGESTIVE HEART FAILURE): Primary | ICD-10-CM

## 2023-03-06 DIAGNOSIS — Z86.718 HISTORY OF DVT OF LOWER EXTREMITY: ICD-10-CM

## 2023-03-06 DIAGNOSIS — Q21.12 PFO (PATENT FORAMEN OVALE): ICD-10-CM

## 2023-03-06 DIAGNOSIS — N18.30 STAGE 3 CHRONIC KIDNEY DISEASE, UNSPECIFIED WHETHER STAGE 3A OR 3B CKD: ICD-10-CM

## 2023-03-06 DIAGNOSIS — I10 ESSENTIAL HYPERTENSION: ICD-10-CM

## 2023-03-06 DIAGNOSIS — I74.9 PARADOXICAL EMBOLISM: ICD-10-CM

## 2023-03-06 PROCEDURE — 93000 ELECTROCARDIOGRAM COMPLETE: CPT | Performed by: INTERNAL MEDICINE

## 2023-03-06 PROCEDURE — 99214 OFFICE O/P EST MOD 30 MIN: CPT | Performed by: INTERNAL MEDICINE

## 2023-03-06 NOTE — PROGRESS NOTES
Date of Office Visit: 2023  Encounter Provider: Brennan Rogers MD  Place of Service: [facility] CARDIOLOGY  Patient Name: Emely Solomon  :1960    Chief Complaint   Patient presents with   • Congestive Heart Failure   :     HPI: Emely Solomon is a 62 y.o. female who presents today to follow up. I have reviewed prior notes and there are no changes except for any new updates described below. I have also reviewed any information entered into the medical record by the patient or by ancillary staff.     In the summer of , she developed an acute DVT as well as a pulmonary embolus. She had a previously undiagnosed patent foramen ovale and developed paradoxical embolus to the left lower extremity (arterial) and required prolonged hospitalization for thrombectomy and anticoagulation. She was sent home on warfarin but became subtherapeutic and had recurrent pulmonary embolus after that. She was changed to rivaroxaban and has done well since then. Her IVC filter has been removed. A repeat echo in 2016 showed significant improvement in her pulmonary hypertension, right-sided enlargement and function.  A repeat venous doppler in 2017 was negative for DVT.    She was admitted in  with cellulitis, bacteremia, and COVID.  An echocardiogram revealed normal left ventricular systolic function as well as mild right ventricular dilation.  She was again noted to have severe pulmonary hypertension.    She has mild, stable exertional dyspnea.  She has leg swelling (severe) due to lymphedema/lipoedema.  She denies orthopnea, PND, chest pain, palpitations, lightheadedness, or syncope.  She has enrolled in weight watchers and has been actively losing weight.    Past Medical History:   Diagnosis Date   • Arthritis    • Cellulitis     2017, with Group B Strep bacteremia and sepsis   • Chronic diastolic congestive heart failure (HCC)    • COVID-19 virus infection 2020   •  Deep venous thrombosis (HCC)    • Heart murmur    • Hypertension    • Hypo-osmolality and hyponatremia 9/29/2020   • Lipoedema    • Lymphedema    • Morbid obesity (HCC)    • Paradoxical embolism (HCC)     to the LLE due to DVT/PE and PFO   • PFO (patent foramen ovale)    • Pulmonary embolism (HCC)    • Pulmonary hypertension (HCC)     multifactorial (dCHF, obesity/ARIEL, hx PE), mild by echo 1/2016   • Sepsis (HCC) 09/18/2020   • Sleep apnea        Past Surgical History:   Procedure Laterality Date   • BRONCHOSCOPY N/A 07/21/2017    Procedure: BRONCHOSCOPY with wash;  Surgeon: Caleb Garcia MD;  Location: [facility] ENDOSCOPY;  Service:    • COLONOSCOPY     • COLONOSCOPY N/A 1/26/2023    Procedure: COLONOSCOPY to CECUM AND TERM ILEUM;  Surgeon: Jj Dean MD;  Location: [facility] ENDOSCOPY;  Service: Gastroenterology;  Laterality: N/A;  PRE OP -screening  POST OP -  NORMAL   • D & C HYSTEROSCOPY N/A 3/8/2022    Procedure: DILATATION AND CURETTAGE with hysteroscopy;  Surgeon: Kaylah Land DO;  Location: [facility] MAIN OR;  Service: Gynecology Oncology;  Laterality: N/A;   • DILATATION AND CURETTAGE  04/11/2011       Social History     Socioeconomic History   • Marital status:    Tobacco Use   • Smoking status: Never   • Smokeless tobacco: Never   Vaping Use   • Vaping Use: Never used   Substance and Sexual Activity   • Alcohol use: Not Currently     Comment: occassionally   • Drug use: Never   • Sexual activity: Defer       Family History   Problem Relation Age of Onset   • Breast cancer Mother    • Hypertension Other    • Ovarian cancer Neg Hx    • Uterine cancer Neg Hx    • Colon cancer Neg Hx    • Malig Hyperthermia Neg Hx        Review of Systems   Constitutional: Positive for weight loss. Negative for malaise/fatigue.   Cardiovascular: Positive for dyspnea on exertion and leg swelling. Negative for chest pain and palpitations.   Genitourinary: Positive for hematuria.   Neurological: Negative for  "dizziness and light-headedness.   All other systems reviewed and are negative.      Allergies   Allergen Reactions   • Cephalexin Hives and Rash     Diffuse rash on cephalexin 1 g PO q6h on 6/17/20  Tolerated zosyn and PCN G September 2020 without issue   • Clindamycin/Lincomycin Hives         Current Outpatient Medications:   •  acetaminophen (TYLENOL) 325 MG tablet, Take 2 tablets by mouth Every 4 (Four) Hours As Needed for Mild Pain ., Disp:  , Rfl:   •  furosemide (LASIX) 40 MG tablet, Take 1 tablet by mouth Daily., Disp: 30 tablet, Rfl: 4  •  losartan (COZAAR) 100 MG tablet, Take 1 tablet by mouth Daily., Disp: 90 tablet, Rfl: 1  •  metoprolol succinate XL (TOPROL-XL) 50 MG 24 hr tablet, Take 1 tablet by mouth Daily., Disp: 90 tablet, Rfl: 3  •  nystatin 437340 UNIT/GM powder, apply to the affected area(s) topically 3 times per day, Disp: 15 g, Rfl: 1  •  rivaroxaban (Xarelto) 20 MG tablet, Take 1 tablet by mouth Daily., Disp: 90 tablet, Rfl: 3     Objective:     Vitals:    03/06/23 1050   BP: 116/74   Pulse: 60   Weight: (!) 161 kg (354 lb)   Height: 160 cm (63\")     Body mass index is 62.71 kg/m².    Physical Exam  Vitals reviewed.   Constitutional:       Comments: Obese   HENT:      Head: Normocephalic.      Mouth/Throat:      Comments: masked  Eyes:      Conjunctiva/sclera: Conjunctivae normal.   Neck:      Vascular: No JVD.   Cardiovascular:      Rate and Rhythm: Normal rate and regular rhythm.      Heart sounds: Heart sounds are distant. No murmur heard.  Pulmonary:      Effort: Pulmonary effort is normal.      Breath sounds: Normal breath sounds.   Abdominal:      Palpations: Abdomen is soft.      Tenderness: There is no abdominal tenderness.      Comments: Obesity limits abdominal exam   Musculoskeletal:         General: Swelling (marked lipedema/lymphedema) present. Normal range of motion.      Cervical back: Normal range of motion.   Skin:     General: Skin is warm and dry.   Neurological:      Mental " Status: She is alert and oriented to person, place, and time.      Cranial Nerves: No cranial nerve deficit.   Psychiatric:         Mood and Affect: Mood normal.         Behavior: Behavior normal.         Thought Content: Thought content normal.           ECG 12 Lead    Date/Time: 3/6/2023 11:15 AM  Performed by: Brennan Rogers MD  Authorized by: Brennan Rogers MD   Comparison: compared with previous ECG   Similar to previous ECG  Rhythm: sinus rhythm  Conduction: non-specific intraventricular conduction delay  ST Segments: ST segments normal  T Waves: T waves normal  QRS axis: normal  Other findings: poor R wave progression    Clinical impression: abnormal EKG              Assessment:       Diagnosis Plan   1. Chronic diastolic CHF (congestive heart failure) (HCC)        2. Pulmonary hypertension (CMS/HCC)        3. Essential hypertension        4. Stage 3 chronic kidney disease, unspecified whether stage 3a or 3b CKD (HCC)        5. History of DVT of lower extremity        6. PFO (patent foramen ovale)        7. Paradoxical embolism (HCC)               Plan:       She has chronic diastolic CHF and secondary PHTN (due to diastolic dysfunction and her prior PE).   She has well controlled HTN and stable CKD. She has developed acute renal failure in the past when septic.  We'll continue with metoprolol, losartan, and furosemide.  Her last echo was in 2020 and everything was stable.  I do not feel that we need to repeat the study at this time.    She had VTE with paradoxical embolus to the leg and is s/p thrombectomy.  She had an IVC filter which has been removed.  Despite her weight, she is on rivaroxaban as it was too difficult to maintain a therapeutic INR on warfarin.      Sincerely,       Brennan Rogers MD

## 2023-03-13 RX ORDER — LOSARTAN POTASSIUM 100 MG/1
100 TABLET ORAL DAILY
Qty: 90 TABLET | Refills: 1 | Status: SHIPPED | OUTPATIENT
Start: 2023-03-13

## 2023-03-17 ENCOUNTER — TELEPHONE (OUTPATIENT)
Dept: GYNECOLOGIC ONCOLOGY | Facility: CLINIC | Age: 63
End: 2023-03-17
Payer: COMMERCIAL

## 2023-03-17 NOTE — TELEPHONE ENCOUNTER
Left message for patient that we rescheduled her appointment with Dr. Land on 5/17 @ 8:45AM To Becca Harmon on 5/17 @10:00. I provided her the office number if this doesn't work for her.

## 2023-03-30 ENCOUNTER — OFFICE VISIT (OUTPATIENT)
Dept: FAMILY MEDICINE CLINIC | Facility: CLINIC | Age: 63
End: 2023-03-30
Payer: COMMERCIAL

## 2023-03-30 VITALS
RESPIRATION RATE: 21 BRPM | SYSTOLIC BLOOD PRESSURE: 128 MMHG | DIASTOLIC BLOOD PRESSURE: 88 MMHG | HEART RATE: 88 BPM | OXYGEN SATURATION: 92 %

## 2023-03-30 DIAGNOSIS — R50.81 FEVER IN OTHER DISEASES: Primary | ICD-10-CM

## 2023-03-30 LAB
EXPIRATION DATE: NORMAL
FLUAV AG UPPER RESP QL IA.RAPID: NOT DETECTED
FLUBV AG UPPER RESP QL IA.RAPID: NOT DETECTED
INTERNAL CONTROL: NORMAL
Lab: NORMAL
SARS-COV-2 AG UPPER RESP QL IA.RAPID: NOT DETECTED

## 2023-03-30 PROCEDURE — 99213 OFFICE O/P EST LOW 20 MIN: CPT | Performed by: FAMILY MEDICINE

## 2023-03-30 PROCEDURE — 87428 SARSCOV & INF VIR A&B AG IA: CPT | Performed by: FAMILY MEDICINE

## 2023-03-30 NOTE — PROGRESS NOTES
Chief Complaint  Fever    Subjective    History of Present Illness {CC  Problem List  Visit  Diagnosis   Encounters  Notes  Medications  Labs  Result Review Imaging  Media :23}     Emely Solomon presents to [facility] for Fever.  History of Present Illness     Here today with a fever over the past 3 to 4 days. Has been relatively low-grade overall. No other symptoms. Feels mildly achy at times. No URI symptoms. Is up-to-date with all immunizations. Has not tried any home COVID tests. Has not noticed any redness in her extremities. Has had lower extremity cellulitis several times in the past.    Objective     Vital Signs:   /88   Pulse 88   Resp 21   SpO2 92%   Physical Exam  Vitals and nursing note reviewed.   Constitutional:       General: She is not in acute distress.     Appearance: Normal appearance. She is not ill-appearing.   Cardiovascular:      Rate and Rhythm: Normal rate and regular rhythm.      Pulses: Normal pulses.      Heart sounds: Normal heart sounds. No murmur heard.  Pulmonary:      Effort: Pulmonary effort is normal. No respiratory distress.      Breath sounds: Normal breath sounds. No rales.   Neurological:      Mental Status: She is alert and oriented to person, place, and time. Mental status is at baseline.   Psychiatric:         Mood and Affect: Mood normal.         Behavior: Behavior normal.          Result Review  Data Reviewed:{ Labs  Result Review  Imaging  Med Tab  Media :23}                   Assessment and Plan {CC Problem List  Visit Diagnosis  ROS  Review (Popup)  [facility] Maintenance  Quality  BestPractice  Medications  SmartSets  SnapShot Encounters  Media :23}   Diagnoses and all orders for this visit:    1. Fever in other diseases (Primary)  -     POCT SARS-CoV-2 Antigen ERICH    Point-of-care testing is negative for flu and COVID. No other obvious signs of infection at this time. May very well be a viral syndrome.  She will keep me updated with her symptoms and let me know if anything changes.    Patient was given instructions and counseling regarding her condition or for health maintenance advice. Please see specific information pulled into the AVS (placed there by myself) if appropriate.    Return if symptoms worsen or fail to improve.      CHIP Wilkerson MD

## 2023-04-11 RX ORDER — METOPROLOL SUCCINATE 50 MG/1
50 TABLET, EXTENDED RELEASE ORAL DAILY
Qty: 90 TABLET | Refills: 3 | Status: SHIPPED | OUTPATIENT
Start: 2023-04-11

## 2023-04-25 ENCOUNTER — HOSPITAL ENCOUNTER (OUTPATIENT)
Dept: CT IMAGING | Facility: HOSPITAL | Age: 63
Discharge: HOME | End: 2023-04-25
Admitting: OBSTETRICS & GYNECOLOGY
Payer: COMMERCIAL

## 2023-04-25 DIAGNOSIS — N95.0 POST-MENOPAUSAL BLEEDING: ICD-10-CM

## 2023-04-25 DIAGNOSIS — N83.201 OVARIAN CYST, RIGHT: ICD-10-CM

## 2023-04-25 DIAGNOSIS — R93.89 THICKENED ENDOMETRIUM: ICD-10-CM

## 2023-04-25 DIAGNOSIS — E66.01 MORBID OBESITY: ICD-10-CM

## 2023-04-25 LAB — CREAT BLDA-MCNC: 1.1 MG/DL (ref 0.6–1.3)

## 2023-04-25 PROCEDURE — 25510000001 IOPAMIDOL 61 % SOLUTION: Performed by: OBSTETRICS & GYNECOLOGY

## 2023-04-25 PROCEDURE — 74177 CT ABD & PELVIS W/CONTRAST: CPT

## 2023-04-25 PROCEDURE — 82565 ASSAY OF CREATININE: CPT

## 2023-04-25 RX ADMIN — IOPAMIDOL 85 ML: 612 INJECTION, SOLUTION INTRAVENOUS at 10:50

## 2023-06-08 ENCOUNTER — CLINICAL SUPPORT (OUTPATIENT)
Dept: FAMILY MEDICINE CLINIC | Facility: CLINIC | Age: 63
End: 2023-06-08
Payer: COMMERCIAL

## 2023-06-08 DIAGNOSIS — Z23 NEED FOR VACCINATION: Primary | ICD-10-CM

## 2023-06-08 PROCEDURE — 90750 HZV VACC RECOMBINANT IM: CPT | Performed by: FAMILY MEDICINE

## 2023-09-11 RX ORDER — LOSARTAN POTASSIUM 100 MG/1
100 TABLET ORAL DAILY
Qty: 90 TABLET | Refills: 1 | Status: SHIPPED | OUTPATIENT
Start: 2023-09-11

## 2023-09-15 ENCOUNTER — TELEPHONE (OUTPATIENT)
Dept: GYNECOLOGIC ONCOLOGY | Facility: CLINIC | Age: 63
End: 2023-09-15
Payer: COMMERCIAL

## 2023-09-15 DIAGNOSIS — R93.89 THICKENED ENDOMETRIUM: Primary | ICD-10-CM

## 2023-09-15 NOTE — TELEPHONE ENCOUNTER
Spoke with patient to discuss options to transfer care to an external Gyn Onc provider due to Dr. Land no longer being with [facility] as of 9/1/23. Offered the following options- [facility] or [facility]. Explained that it is their choice and we will help to facilitate this transition to ensure minimal disruption to their care. Patient prefers to go to [facility]. I explained next steps for referring to new provider and that we would coordinate with that practice to schedule. Patient confirmed understanding.       I explained that I would cancel upcoming appointment as well as CT. I explained that [facility] will have to schedule CT once established.

## 2023-11-30 ENCOUNTER — OFFICE VISIT (OUTPATIENT)
Dept: FAMILY MEDICINE CLINIC | Facility: CLINIC | Age: 63
End: 2023-11-30
Payer: COMMERCIAL

## 2023-11-30 ENCOUNTER — HOSPITAL ENCOUNTER (OUTPATIENT)
Dept: GENERAL RADIOLOGY | Facility: HOSPITAL | Age: 63
Discharge: HOME | End: 2023-11-30
Admitting: NURSE PRACTITIONER
Payer: COMMERCIAL

## 2023-11-30 VITALS
BODY MASS INDEX: 62.71 KG/M2 | HEART RATE: 94 BPM | HEIGHT: 63 IN | OXYGEN SATURATION: 94 % | RESPIRATION RATE: 22 BRPM | DIASTOLIC BLOOD PRESSURE: 78 MMHG | SYSTOLIC BLOOD PRESSURE: 128 MMHG

## 2023-11-30 DIAGNOSIS — R06.2 WHEEZING: ICD-10-CM

## 2023-11-30 DIAGNOSIS — R05.1 ACUTE COUGH: ICD-10-CM

## 2023-11-30 DIAGNOSIS — R06.2 WHEEZING: Primary | ICD-10-CM

## 2023-11-30 DIAGNOSIS — J06.9 UPPER RESPIRATORY TRACT INFECTION, UNSPECIFIED TYPE: ICD-10-CM

## 2023-11-30 PROCEDURE — 87428 SARSCOV & INF VIR A&B AG IA: CPT | Performed by: NURSE PRACTITIONER

## 2023-11-30 PROCEDURE — 71046 X-RAY EXAM CHEST 2 VIEWS: CPT

## 2023-11-30 PROCEDURE — 99214 OFFICE O/P EST MOD 30 MIN: CPT | Performed by: NURSE PRACTITIONER

## 2023-11-30 RX ORDER — ALBUTEROL SULFATE 90 UG/1
2 AEROSOL, METERED RESPIRATORY (INHALATION) EVERY 4 HOURS PRN
Qty: 18 G | Refills: 1 | Status: SHIPPED | OUTPATIENT
Start: 2023-11-30

## 2023-11-30 RX ORDER — BENZONATATE 100 MG/1
100 CAPSULE ORAL 3 TIMES DAILY PRN
Qty: 30 CAPSULE | Refills: 0 | Status: SHIPPED | OUTPATIENT
Start: 2023-11-30 | End: 2023-12-10

## 2023-11-30 RX ORDER — DOXYCYCLINE HYCLATE 100 MG
100 TABLET ORAL 2 TIMES DAILY
Qty: 14 TABLET | Refills: 0 | Status: SHIPPED | OUTPATIENT
Start: 2023-11-30 | End: 2023-12-07

## 2023-11-30 NOTE — PROGRESS NOTES
Subjective   Emely Solomon is a 63 y.o. female.     Cough  Associated symptoms include postnasal drip and rhinorrhea. Pertinent negatives include no chest pain, chills, ear pain, eye redness, fever, myalgias, sore throat, shortness of breath or wheezing.    Patient presents with c/o cough X 2 days. She reports that she has a little nasal congestion, but denies any fever or shortness of breath. Patient reports that she has not taken anything for her cough. She reports that her  has been sick.     The following portions of the patient's history were reviewed and updated as appropriate: allergies, current medications, past family history, past medical history, past social history, past surgical history and problem list.    Review of Systems   Constitutional:  Negative for appetite change, chills, fatigue and fever.   HENT:  Positive for congestion, postnasal drip and rhinorrhea. Negative for ear pain, sinus pressure, sneezing and sore throat.    Eyes:  Negative for redness and itching.   Respiratory:  Positive for cough. Negative for chest tightness, shortness of breath and wheezing.    Cardiovascular:  Negative for chest pain, palpitations and leg swelling.   Musculoskeletal:  Negative for myalgias.   Allergic/Immunologic: Negative.    Neurological:  Negative for dizziness and headache.       Objective   Physical Exam  Vitals and nursing note reviewed.   Constitutional:       Appearance: She is well-developed.   HENT:      Head: Normocephalic and atraumatic.      Right Ear: Tympanic membrane, ear canal and external ear normal. There is no impacted cerumen. No foreign body. Tympanic membrane is not scarred or perforated.      Left Ear: Tympanic membrane, ear canal and external ear normal. Tympanic membrane is not scarred or perforated.      Nose: Nose normal.      Right Sinus: No maxillary sinus tenderness or frontal sinus tenderness.      Left Sinus: No maxillary sinus tenderness or frontal sinus tenderness.       Mouth/Throat:      Lips: Pink.      Mouth: Mucous membranes are moist.      Pharynx: No oropharyngeal exudate.   Eyes:      General:         Right eye: No discharge.         Left eye: No discharge.      Conjunctiva/sclera: Conjunctivae normal.      Pupils: Pupils are equal, round, and reactive to light.   Neck:      Thyroid: No thyromegaly.   Cardiovascular:      Rate and Rhythm: Normal rate and regular rhythm.      Heart sounds: Normal heart sounds. No murmur heard.  Pulmonary:      Effort: Pulmonary effort is normal. No tachypnea or respiratory distress.      Breath sounds: Examination of the right-upper field reveals wheezing. Examination of the right-lower field reveals wheezing. Examination of the left-lower field reveals wheezing. Wheezing present. No decreased breath sounds or rales.   Musculoskeletal:      Cervical back: Normal range of motion and neck supple.   Lymphadenopathy:      Cervical: No cervical adenopathy.   Skin:     General: Skin is warm and dry.   Neurological:      Mental Status: She is alert and oriented to person, place, and time.   Psychiatric:         Behavior: Behavior normal.         Thought Content: Thought content normal.         Judgment: Judgment normal.         Vitals:    11/30/23 1345   BP: 128/78   Pulse: 94   Resp: 22   SpO2: 94%     Body mass index is 62.71 kg/m².      Procedures    Assessment & Plan   Problems Addressed this Visit    None  Visit Diagnoses       Wheezing    -  Primary    Relevant Medications    albuterol sulfate  (90 Base) MCG/ACT inhaler    Other Relevant Orders    XR Chest PA & Lateral    Upper respiratory tract infection, unspecified type        Relevant Medications    doxycycline (VIBRAMYICN) 100 MG tablet    Acute cough        Relevant Medications    benzonatate (Tessalon Perles) 100 MG capsule          Diagnoses         Codes Comments    Wheezing    -  Primary ICD-10-CM: R06.2  ICD-9-CM: 786.07     Upper respiratory tract infection, unspecified  type     ICD-10-CM: J06.9  ICD-9-CM: 465.9     Acute cough     ICD-10-CM: R05.1  ICD-9-CM: 786.2           POCT sars + flu -   Doxycycline 100mg 1p.o. BID X 7 days  Tessalon perles 100mg 1p.o. BID X 7 days  CXR  Albuterol inhaler 2 puffs QHPRN wheezing        No follow-ups on file.

## 2023-12-11 NOTE — OUTREACH NOTE
Care Coordination Assessment    Documented/Reviewed By:  Florida Escalante RN Date/time:  7/9/2020 12:42 PM   Assessment completed with:  patient  Enrolled in care management program:  Yes  Living arrangement:  spouse  Support system:  spouse  Type of residence:  private residence  Home care services:  Yes (Comment: in process of setting up  )  Equipment used at home:  walker, bedside commode, tub seat  Communication device:  No  Other issues:  impaired decision making (Comment: seems sometimes confused )  Bed or wheelchair confined:  No  Inadequate nutrition:  No  Medication adherence problem:  No  Experiencing side effects from current medications:  No  History of fall(s) in last 6 months:  No  Difficulty keeping appointments:  No  Family aware of the patient's advance care planning wishes:   (Comment: tarun discuss on future cll )  Cheondoism or spiritual beliefs that impact treatment:  No  Chronic pain:  No       Care Plan Note      Responses   Lifestyle Goals  Medication management, Maintain blood pressure < 130/80, Eat a healthy diet, Self monitor blood pressure   Barriers  Disease education, Other (See Comment) [she is not sure her BP cuff work- will contine to educate]   Self Management  Home BP Monitoring, Medication Adherence   Care Gap Comments  She believes she is suppose to [facility] services and lymphdemia therapy   Specific Disease Process Teaching  Heart Disease   Other Patient Education/Resources   24/7 [facility] Nurse Call Line, Home Healthcare   24/7 Nurse Call Line Education Method  Verbal   Home Healthcare Education Method  Verbal   Does patient have depression diagnosis?  No   Advanced Directives:  -- [will addres on future call ]   Hospitalizations past 12 months  1   Discharged From:  [facility]    Discharged to:  [facility]    Admit Date:  06/03/20   Discharge Date:  06/18/20   Discharge destination:  Rehab   Medication Adherence  Medications understood   Goal Progress  Making  Progress Toward Goal(s)   Readiness Scale  9   Confidence Scale  9   How often do you have someone help you read hospital materials?  Occasionally   How often do you have problems learning about your medical condition because of difficulty understanding written information?  Sometimes   How often do you have a problem understanding what is told to you about your medical condition?  Sometimes   How confident are you filling out medical forms by yourself?  Quite a bit   Health Literacy  Moderate        The main concerns and/or symptoms the patient would like to address are: Mrs Solomon is a retired employee of [employer].  She was admitted to [facility] on Jessica 3 with Cellulitis of the right leg.  She was discharged on June 18 to [facility].  She was sent home before the 4th.    She was told she would be scheduled for lymphedema therapy and home health.  Neither had happened.  I contacted Ellen at [facility] ([phone]) and she got orders from the MD and they will see her on Monday July 13.      She is unaware of who ordered or was scheduling lymphedema therapy.  At rehab she was told they were unable to find her an available appointment.  She is in the process of transferring her PCP from  Dr Smith (who she sees Carmen SWEET) to [employer] [facility] MD Dr Wilkerson.  She has a new patient appointment with Dr Alejandra on  July 23.  Encouraged and stressed the importance of speaking to Dr Smith regarding the lymphedema surgery.      Education/instruction provided by Care Coordinator:  Educated on importance of working with home health and participating in Lymphoedema therapy.  Educated on the importance of monitoring her BP.  She originally said she did not have a cuff.  Gave her ideas on how and where to purchase.  Her  found one in the home and they are checking to see if it works. She is not diabetica and her past a1c have come back in the 5s.    On the next call discuss her activity level and the importance of a  healthy diet and the importance of increasing protein intake so she is able to heal more effectively.  Did someone schedule her for lymphedema therapy?     No issue with social determinants.  No inabilities to obtain food or medications or transportation to MD appointments. Educated on participating in habits that prevent the spread of COVID virus with home & work hygiene. Patient verbalizes understanding.    Educated patient on benefits of Employee CM program and invited to call with any new needs.  Encouraged use of [facility] nurseline if needed.      Follow Up Outreach Due:  The day after her appointment with Dr wiseman July 15  Florida Escalante, RN  Ambulatory     7/9/2020, 12:55     Florida ALEXANDER, RN, [facility]   RN Case Manager  [place] [phone] cell   [phone] office  [phone] fax  [email or web address]  [email or web address]  Trying to close out case- shayy reno and completed on 7/9     15-Dec-2023

## 2024-01-08 NOTE — TELEPHONE ENCOUNTER
Caller: Emely Solomon    Relationship: Self    Best call back number: [phone]     Requested Prescriptions:   Requested Prescriptions     Pending Prescriptions Disp Refills    rivaroxaban (Xarelto) 20 MG tablet 90 tablet 3     Sig: Take 1 tablet by mouth Daily.        Pharmacy where request should be sent: [facility] [place]     Last office visit with prescribing clinician: 3/6/2023   Last telemedicine visit with prescribing clinician: Visit date not found   Next office visit with prescribing clinician: 3/7/2024     Additional details provided by patient: PT CALLING IN AS SHE IS NEEDING A REFILL SENT TO PHARMACY     Does the patient have less than a 3 day supply:  [] Yes  [x] No    Wilberto Aguilar Rep   01/08/24 11:12 EST

## 2024-03-11 RX ORDER — LOSARTAN POTASSIUM 100 MG/1
100 TABLET ORAL DAILY
Qty: 30 TABLET | Refills: 1 | Status: SHIPPED | OUTPATIENT
Start: 2024-03-11

## 2024-03-19 NOTE — NURSING NOTE
Called into room by nursing assistant. Pt had been using bedpan up in chair. Upon entering room, pt on knees on floor, being held up by staff, unresponsive. After a few seconds of calling patient name, she began to respond and then lowered to seated position on floor. Pt denies hurting herself. Pt assisted to bed with lift and 5-6 staff members. VSS. Dr. Giron called and notified of event.   [Patient Intake Form Reviewed] : Patient intake form was reviewed [Negative] : Heme/Lymph

## 2024-03-30 RX ORDER — METOPROLOL SUCCINATE 50 MG/1
50 TABLET, EXTENDED RELEASE ORAL DAILY
Qty: 90 TABLET | Refills: 3 | Status: CANCELLED | OUTPATIENT
Start: 2024-03-30

## 2024-04-04 ENCOUNTER — TELEPHONE (OUTPATIENT)
Dept: CARDIOLOGY | Facility: CLINIC | Age: 64
End: 2024-04-04
Payer: COMMERCIAL

## 2024-04-04 RX ORDER — FUROSEMIDE 40 MG/1
40 TABLET ORAL DAILY
Qty: 90 TABLET | Refills: 1 | Status: SHIPPED | OUTPATIENT
Start: 2024-04-04 | End: 2024-04-04 | Stop reason: SDUPTHER

## 2024-04-04 RX ORDER — METOPROLOL SUCCINATE 50 MG/1
50 TABLET, EXTENDED RELEASE ORAL DAILY
Qty: 90 TABLET | Refills: 3 | Status: CANCELLED | OUTPATIENT
Start: 2024-04-04

## 2024-04-04 RX ORDER — FUROSEMIDE 40 MG/1
40 TABLET ORAL DAILY
Qty: 90 TABLET | Refills: 1 | Status: SHIPPED | OUTPATIENT
Start: 2024-04-04

## 2024-04-04 RX ORDER — METOPROLOL SUCCINATE 50 MG/1
50 TABLET, EXTENDED RELEASE ORAL DAILY
Qty: 90 TABLET | Refills: 1 | Status: SHIPPED | OUTPATIENT
Start: 2024-04-04

## 2024-04-04 NOTE — TELEPHONE ENCOUNTER
Caller: Emely Solomon    Relationship: Self    Best call back number: [phone]    What are your concerns:  PHARMACY IS WAITING ON APPROVAL FOR METOPROLOL AND LASIX. PLEASE APPROVE AND REACH OUT TO PATIENT

## 2024-04-04 NOTE — PROGRESS NOTES
RM:________     PCP: Adilson Wilkerson MD    : 1960  AGE: 63 y.o.  EST PATIENT     REASON FOR VISIT/  CC:        BP Readings from Last 3 Encounters:   23 128/78   23 128/88   23 116/74      Wt Readings from Last 3 Encounters:   23 (!) 161 kg (354 lb)   23 (!) 154 kg (340 lb)   22 (!) 164 kg (362 lb)        WT: ____________ BP: __________L __________R HR______    CHEST PAIN: _____________    SOA: _____________PALPS: _______________     LIGHTHEADED: ___________FATIGUE: ________________ EDEMA __________    ALLERGIES:Cephalexin and Clindamycin/lincomycin SMOKING HISTORY:  Social History     Tobacco Use    Smoking status: Never    Smokeless tobacco: Never   Vaping Use    Vaping status: Never Used   Substance Use Topics    Alcohol use: Not Currently     Comment: occassionally    Drug use: Never     CAFFEINE USE_________________  ALCOHOL ______________________

## 2024-04-11 ENCOUNTER — HOSPITAL ENCOUNTER (OUTPATIENT)
Dept: CARDIOLOGY | Facility: HOSPITAL | Age: 64
Discharge: HOME | End: 2024-04-11
Admitting: INTERNAL MEDICINE
Payer: COMMERCIAL

## 2024-04-11 ENCOUNTER — OFFICE VISIT (OUTPATIENT)
Dept: CARDIOLOGY | Facility: CLINIC | Age: 64
End: 2024-04-11
Payer: COMMERCIAL

## 2024-04-11 VITALS
HEART RATE: 77 BPM | HEIGHT: 63 IN | DIASTOLIC BLOOD PRESSURE: 81 MMHG | BODY MASS INDEX: 51.91 KG/M2 | WEIGHT: 293 LBS | SYSTOLIC BLOOD PRESSURE: 126 MMHG | OXYGEN SATURATION: 97 %

## 2024-04-11 DIAGNOSIS — I50.32 CHRONIC HEART FAILURE WITH PRESERVED EJECTION FRACTION (HFPEF): Primary | ICD-10-CM

## 2024-04-11 DIAGNOSIS — I50.32 CHRONIC HEART FAILURE WITH PRESERVED EJECTION FRACTION (HFPEF): ICD-10-CM

## 2024-04-11 DIAGNOSIS — Z86.718 HISTORY OF DVT OF LOWER EXTREMITY: ICD-10-CM

## 2024-04-11 DIAGNOSIS — Q21.12 PFO (PATENT FORAMEN OVALE): ICD-10-CM

## 2024-04-11 DIAGNOSIS — S81.801A WOUND OF RIGHT LOWER EXTREMITY, INITIAL ENCOUNTER: ICD-10-CM

## 2024-04-11 DIAGNOSIS — I89.0 LYMPHEDEMA: ICD-10-CM

## 2024-04-11 DIAGNOSIS — N18.30 STAGE 3 CHRONIC KIDNEY DISEASE, UNSPECIFIED WHETHER STAGE 3A OR 3B CKD: ICD-10-CM

## 2024-04-11 DIAGNOSIS — I74.9 PARADOXICAL EMBOLISM: ICD-10-CM

## 2024-04-11 DIAGNOSIS — I27.20 PULMONARY HYPERTENSION: ICD-10-CM

## 2024-04-11 DIAGNOSIS — I10 ESSENTIAL HYPERTENSION: ICD-10-CM

## 2024-04-11 LAB
ALBUMIN SERPL-MCNC: 4.3 G/DL (ref 3.5–5.2)
ALBUMIN/GLOB SERPL: 0.9 G/DL
ALP SERPL-CCNC: 127 U/L (ref 39–117)
ALT SERPL W P-5'-P-CCNC: 15 U/L (ref 1–33)
ANION GAP SERPL CALCULATED.3IONS-SCNC: 11.2 MMOL/L (ref 5–15)
AST SERPL-CCNC: 16 U/L (ref 1–32)
BILIRUB SERPL-MCNC: 0.5 MG/DL (ref 0–1.2)
BUN SERPL-MCNC: 27 MG/DL (ref 8–23)
BUN/CREAT SERPL: 23.5 (ref 7–25)
CALCIUM SPEC-SCNC: 10.1 MG/DL (ref 8.6–10.5)
CHLORIDE SERPL-SCNC: 101 MMOL/L (ref 98–107)
CO2 SERPL-SCNC: 26.8 MMOL/L (ref 22–29)
CREAT SERPL-MCNC: 1.15 MG/DL (ref 0.57–1)
EGFRCR SERPLBLD CKD-EPI 2021: 53.3 ML/MIN/1.73
GLOBULIN UR ELPH-MCNC: 4.8 GM/DL
GLUCOSE SERPL-MCNC: 82 MG/DL (ref 65–99)
POTASSIUM SERPL-SCNC: 4.9 MMOL/L (ref 3.5–5.2)
PROT SERPL-MCNC: 9.1 G/DL (ref 6–8.5)
SODIUM SERPL-SCNC: 139 MMOL/L (ref 136–145)

## 2024-04-11 PROCEDURE — 36415 COLL VENOUS BLD VENIPUNCTURE: CPT

## 2024-04-11 PROCEDURE — 80053 COMPREHEN METABOLIC PANEL: CPT | Performed by: INTERNAL MEDICINE

## 2024-04-11 RX ORDER — FUROSEMIDE 40 MG/1
40 TABLET ORAL 3 TIMES WEEKLY
Start: 2024-04-12

## 2024-04-11 RX ORDER — CHLORTHALIDONE 25 MG/1
25 TABLET ORAL DAILY
Qty: 30 TABLET | Refills: 5 | Status: SHIPPED | OUTPATIENT
Start: 2024-04-11

## 2024-04-11 NOTE — PROGRESS NOTES
Date of Office Visit: 2024  Encounter Provider: Brennan Rogers MD  Place of Service: [facility] CARDIOLOGY  Patient Name: Emely Solomon  :1960    Chief Complaint   Patient presents with    Congestive Heart Failure   :     HPI: Emely Solomon is a 63 y.o. female who presents today to follow up. I have reviewed prior notes and there are no changes except for any new updates described below. I have also reviewed any information entered into the medical record by the patient or by ancillary staff.     In the summer of , she developed an acute DVT as well as a pulmonary embolus. She had a previously undiagnosed patent foramen ovale and developed paradoxical embolus to the left lower extremity (arterial) and required prolonged hospitalization for thrombectomy and anticoagulation. She was sent home on warfarin but became subtherapeutic and had recurrent pulmonary embolus after that. She was changed to rivaroxaban and has done well since then. Her IVC filter has been removed. A repeat echo in 2016 showed significant improvement in her pulmonary hypertension, right-sided enlargement and function.  A repeat venous doppler in 2017 was negative for DVT.    She was admitted in  with cellulitis, bacteremia, and COVID.  An echocardiogram revealed normal left ventricular systolic function as well as mild right ventricular dilation.  She was again noted to have severe pulmonary hypertension.    She has mild, stable exertional dyspnea.  She has leg swelling (severe) due to lymphedema/lipoedema.  She denies orthopnea, PND, chest pain, palpitations, lightheadedness, or syncope.  Her mobility is quite limited and she does not drive. She has a painful lesion on her right leg. She urinates excessively from her diuretic and is often incontinent.     Past Medical History:   Diagnosis Date    Arthritis     Cellulitis     2017, with Group B Strep bacteremia and sepsis     Chronic diastolic congestive heart failure     COVID-19 virus infection 11/2020    Deep venous thrombosis     Heart murmur     Hypertension     Hypo-osmolality and hyponatremia 9/29/2020    Lipoedema     Lymphedema     Morbid obesity     Paradoxical embolism     to the LLE due to DVT/PE and PFO    PFO (patent foramen ovale)     Pulmonary embolism     Pulmonary hypertension     multifactorial (dCHF, obesity/ARIEL, hx PE), mild by echo 1/2016    Sepsis 09/18/2020    Sleep apnea        Past Surgical History:   Procedure Laterality Date    BRONCHOSCOPY N/A 07/21/2017    Procedure: BRONCHOSCOPY with wash;  Surgeon: Caleb Garcia MD;  Location: [facility] ENDOSCOPY;  Service:     COLONOSCOPY      COLONOSCOPY N/A 1/26/2023    Procedure: COLONOSCOPY to CECUM AND TERM ILEUM;  Surgeon: Jj Dean MD;  Location: [facility] ENDOSCOPY;  Service: Gastroenterology;  Laterality: N/A;  PRE OP -screening  POST OP -  NORMAL    D & C HYSTEROSCOPY N/A 3/8/2022    Procedure: DILATATION AND CURETTAGE with hysteroscopy;  Surgeon: Kaylah Land DO;  Location: [facility] MAIN OR;  Service: Gynecology Oncology;  Laterality: N/A;    DILATATION AND CURETTAGE  04/11/2011       Social History     Socioeconomic History    Marital status:    Tobacco Use    Smoking status: Never    Smokeless tobacco: Never   Vaping Use    Vaping status: Never Used   Substance and Sexual Activity    Alcohol use: Not Currently     Comment: occassionally    Drug use: Never    Sexual activity: Defer       Family History   Problem Relation Age of Onset    Breast cancer Mother     Hypertension Other     Ovarian cancer Neg Hx     Uterine cancer Neg Hx     Colon cancer Neg Hx     Malig Hyperthermia Neg Hx        Review of Systems   Constitutional: Negative for malaise/fatigue.   Cardiovascular:  Positive for dyspnea on exertion and leg swelling. Negative for chest pain and palpitations.   Genitourinary:  Positive for hematuria.   Neurological:  Negative for  "dizziness and light-headedness.   All other systems reviewed and are negative.      Allergies   Allergen Reactions    Cephalexin Hives and Rash     Diffuse rash on cephalexin 1 g PO q6h on 6/17/20  Tolerated zosyn and PCN G September 2020 without issue    Clindamycin/Lincomycin Hives         Current Outpatient Medications:     acetaminophen (TYLENOL) 325 MG tablet, Take 2 tablets by mouth Every 4 (Four) Hours As Needed for Mild Pain ., Disp:  , Rfl:     albuterol sulfate  (90 Base) MCG/ACT inhaler, Inhale 2 puffs Every 4 (Four) Hours As Needed for Wheezing or Shortness of Air., Disp: 18 g, Rfl: 1    furosemide (LASIX) 40 MG tablet, Take 1 tablet by mouth Daily., Disp: 90 tablet, Rfl: 1    losartan (COZAAR) 100 MG tablet, Take 1 tablet by mouth Daily. *Appointment required for further refill**, Disp: 30 tablet, Rfl: 1    metoprolol succinate XL (TOPROL-XL) 50 MG 24 hr tablet, Take 1 tablet by mouth Daily., Disp: 90 tablet, Rfl: 1    nystatin 990547 UNIT/GM powder, apply to the affected area(s) topically 3 times per day, Disp: 15 g, Rfl: 1    rivaroxaban (Xarelto) 20 MG tablet, Take 1 tablet by mouth Daily., Disp: 90 tablet, Rfl: 3    doxycycline (VIBRAMYCIN) 100 MG capsule, Take 1 capsule by mouth 2 (Two) Times a Day. (Patient not taking: Reported on 4/11/2024), Disp: 14 capsule, Rfl: 0     Objective:     Vitals:    04/11/24 0943   BP: 126/81   Pulse: 77   SpO2: 97%   Weight: (!) 171 kg (377 lb)   Height: 160 cm (63\")     Body mass index is 66.78 kg/m².    Physical Exam  Vitals reviewed.   Constitutional:       Comments: Obese   HENT:      Head: Normocephalic.      Nose: Nose normal.      Mouth/Throat:      Pharynx: Oropharynx is clear.      Comments: masked  Eyes:      Conjunctiva/sclera: Conjunctivae normal.   Neck:      Vascular: No JVD.   Cardiovascular:      Rate and Rhythm: Normal rate and regular rhythm.      Heart sounds: Heart sounds are distant. No murmur heard.     Comments: marked " lipedema/lymphedema bilaterally with stasis changes and peau d'orange  Pulmonary:      Effort: Pulmonary effort is normal.      Breath sounds: Normal breath sounds.   Abdominal:      Palpations: Abdomen is soft.      Tenderness: There is no abdominal tenderness.      Comments: Obesity limits abdominal exam   Musculoskeletal:         General: Swelling present. Normal range of motion.      Cervical back: Normal range of motion.   Skin:     General: Skin is warm.      Comments: Cratered ulcer RLE in a skin fold, ~ 2x2cm and 1-2cm deep, with granulation tissue and no drainage or pus   Neurological:      General: No focal deficit present.      Mental Status: She is alert.   Psychiatric:         Mood and Affect: Mood normal.         ECG 12 Lead    Date/Time: 4/11/2024 10:01 AM  Performed by: Brennan Rogers MD    Authorized by: Brennan Rogers MD  Comparison: compared with previous ECG   Similar to previous ECG  Rhythm: sinus rhythm  Conduction: conduction normal  ST Segments: ST segments normal  T Waves: T waves normal  QRS axis: normal  Other findings: low voltage and poor R wave progression    Clinical impression: non-specific ECG            Assessment:       Diagnosis Plan   1. Chronic heart failure with preserved ejection fraction (HFpEF)  ECG 12 Lead    Comprehensive Metabolic Panel      2. Essential hypertension        3. Pulmonary hypertension        4. Stage 3 chronic kidney disease, unspecified whether stage 3a or 3b CKD  Comprehensive Metabolic Panel      5. Lymphedema        6. History of DVT of lower extremity        7. PFO (patent foramen ovale)        8. Paradoxical embolism        9. Wound of right lower extremity, initial encounter  Ambulatory Referral to Home Health (Outpatient)    Ambulatory Referral to Wound Clinic          Plan:       She has chronic diastolic CHF and multifactorial PHTN (suspected WHO groups 2 and 3).  She has well controlled HTN and stable CKD. I checked labs today; her Na was 139, K  was 4.9, and Cr was 1.15 (baseline 0.9-1.2). She has real problems with polyuria and incontinence and is struggling with her diuretic. This is just always a really hard thing to determine because she is at extremely high risk of decompensation but I want to be sympathetic to her plight as well.  I am going to start chlorthalidone 25 mg daily and decrease furosemide to 3 days a week but Bertha bring her back in 2 weeks to make sure that we are closely following her clinical status and her weight.  I think I should avoid spironolactone as her potassium is a bit elevated at baseline.    She has developed acute renal failure in the past when septic.  We'll continue with metoprolol, losartan, and furosemide.  Her last echo was in 2020 and everything was stable.  I do not feel that we need to repeat the study at this time.    She had VTE with paradoxical embolus to the leg and is s/p thrombectomy.  She had an IVC filter which has been removed.  Despite her weight, she is on rivaroxaban as it was too difficult to maintain a therapeutic INR on warfarin.      She has this ulcer right in the skin fold on her lower extremity.  She has terrible lymphedema/lipedema and has developed critical illness with sepsis from skin venus in the past.  It does not appear overtly infected to my eye.  I gave her some bacitracin ointment and Kerlix wrap in the office today and urgently consulted wound care and home health.  I am also going to refer her back to vascular surgery.    Sincerely,       Brennan Rogers MD

## 2024-04-16 ENCOUNTER — OFFICE VISIT (OUTPATIENT)
Dept: WOUND CARE | Facility: HOSPITAL | Age: 64
End: 2024-04-16
Payer: COMMERCIAL

## 2024-04-16 RX ORDER — SODIUM HYPOCHLORITE 2.5 MG/ML
SOLUTION TOPICAL 2 TIMES DAILY
Qty: 473 ML | Refills: 4 | Status: SHIPPED | OUTPATIENT
Start: 2024-04-16

## 2024-04-25 ENCOUNTER — HOME HEALTH ADMISSION (OUTPATIENT)
Dept: HOME HEALTH SERVICES | Facility: HOME HEALTHCARE | Age: 64
End: 2024-04-25
Payer: COMMERCIAL

## 2024-04-25 ENCOUNTER — HOSPITAL ENCOUNTER (OUTPATIENT)
Dept: CARDIOLOGY | Facility: HOSPITAL | Age: 64
Discharge: HOME | End: 2024-04-25
Admitting: NURSE PRACTITIONER
Payer: COMMERCIAL

## 2024-04-25 ENCOUNTER — OFFICE VISIT (OUTPATIENT)
Dept: CARDIOLOGY | Facility: CLINIC | Age: 64
End: 2024-04-25
Payer: COMMERCIAL

## 2024-04-25 VITALS
SYSTOLIC BLOOD PRESSURE: 124 MMHG | WEIGHT: 293 LBS | OXYGEN SATURATION: 99 % | BODY MASS INDEX: 51.91 KG/M2 | HEIGHT: 63 IN | HEART RATE: 69 BPM | DIASTOLIC BLOOD PRESSURE: 82 MMHG

## 2024-04-25 DIAGNOSIS — I50.32 CHRONIC HEART FAILURE WITH PRESERVED EJECTION FRACTION (HFPEF): Primary | ICD-10-CM

## 2024-04-25 DIAGNOSIS — Z86.718 HISTORY OF DVT OF LOWER EXTREMITY: ICD-10-CM

## 2024-04-25 DIAGNOSIS — I45.2 RIGHT BUNDLE BRANCH BLOCK (RBBB) WITH LEFT ANTERIOR FASCICULAR BLOCK (LAFB): ICD-10-CM

## 2024-04-25 DIAGNOSIS — G47.33 OSA (OBSTRUCTIVE SLEEP APNEA): ICD-10-CM

## 2024-04-25 DIAGNOSIS — E66.01 CLASS 3 SEVERE OBESITY DUE TO EXCESS CALORIES WITH SERIOUS COMORBIDITY AND BODY MASS INDEX (BMI) OF 60.0 TO 69.9 IN ADULT: ICD-10-CM

## 2024-04-25 DIAGNOSIS — I89.0 LYMPHEDEMA: ICD-10-CM

## 2024-04-25 DIAGNOSIS — Q21.12 PFO (PATENT FORAMEN OVALE): ICD-10-CM

## 2024-04-25 DIAGNOSIS — I50.32 CHRONIC HEART FAILURE WITH PRESERVED EJECTION FRACTION (HFPEF): ICD-10-CM

## 2024-04-25 DIAGNOSIS — I10 ESSENTIAL HYPERTENSION: ICD-10-CM

## 2024-04-25 DIAGNOSIS — I27.20 PULMONARY HYPERTENSION: ICD-10-CM

## 2024-04-25 LAB
ANION GAP SERPL CALCULATED.3IONS-SCNC: 12 MMOL/L (ref 5–15)
BUN SERPL-MCNC: 40 MG/DL (ref 8–23)
BUN/CREAT SERPL: 37.4 (ref 7–25)
CALCIUM SPEC-SCNC: 9.7 MG/DL (ref 8.6–10.5)
CHLORIDE SERPL-SCNC: 99 MMOL/L (ref 98–107)
CO2 SERPL-SCNC: 26 MMOL/L (ref 22–29)
CREAT SERPL-MCNC: 1.07 MG/DL (ref 0.57–1)
EGFRCR SERPLBLD CKD-EPI 2021: 58.1 ML/MIN/1.73
GLUCOSE SERPL-MCNC: 83 MG/DL (ref 65–99)
POTASSIUM SERPL-SCNC: 4.3 MMOL/L (ref 3.5–5.2)
SODIUM SERPL-SCNC: 137 MMOL/L (ref 136–145)

## 2024-04-25 PROCEDURE — 80048 BASIC METABOLIC PNL TOTAL CA: CPT | Performed by: NURSE PRACTITIONER

## 2024-04-25 PROCEDURE — 36415 COLL VENOUS BLD VENIPUNCTURE: CPT

## 2024-04-25 RX ORDER — FUROSEMIDE 40 MG/1
40 TABLET ORAL EVERY OTHER DAY
Qty: 60 TABLET | Refills: 2 | Status: SHIPPED | OUTPATIENT
Start: 2024-04-25

## 2024-04-25 NOTE — PROGRESS NOTES
Date of Office Visit: 2024  Encounter Provider: LUCÍA Hernández  Place of Service: [facility] CARDIOLOGY  Patient Name: Emely Solomon  :1960  Primary Cardiologist: Dr. Brennan Rogers     Chief Complaint   Patient presents with    Diastolic Heart Failure    Follow-up   :     HPI: Emely Solomon is a 63 y.o. female who presents today for cardiac follow-up visit.  I reviewed her medical records.       She has known hypertension, obstructive sleep apnea on CPAP, lymphedema, and obesity.      In , she was diagnosed with an acute DVT and pulmonary embolism.  She had an undiagnosed PFO and developed paradoxical embolus to the left lower extremity (arterial).  She underwent thrombectomy and anticoagulation.  She was sent home on warfarin, but was subtherapeutic and developed a pulmonary embolism.  She was changed to rivaroxaban.  In 2016, repeat echocardiogram showed significant improvement of the right side of her heart and pulmonary hypertension.  In 2017, she was negative for DVT.    Last echocardiogram in 2020 showed the following: EF 64%, mild LVH, diastolic function normal, RV moderately dilated, right atrium mild to moderately dilated, aortic valve calcification, trace MR, mild TR, and severe pulmonary hypertension.    Two weeks ago she saw Dr. Rogers.  She had stable exertional dyspnea, chronic lymphedema, and a painful lesion on her right leg.  She was urinating excessively from her diuretic and was incontinent.  Dr. Rogers recommended starting chlorthalidone 25 mg daily and decreasing the furosemide to 3 times per week.  She recommended avoiding spironolactone.  She did not feel that she needed a repeat echocardiogram.  She referred her to the wound center and to vascular surgery.    She presents today for follow-up visit.  She is taking chlorthalidone 25 mg daily and did not realize that that was a water pill.  She is taking the furosemide every  other day.  She has less incontinence.  Her weight is down 5 pounds since last visit.  Blood pressure and heart rate are normal.  She denies chest pain, shortness of air, PND, orthopnea, cough, palpitations, dizziness, or syncope.  She was referred to the wound center in her right leg ulcer is healing.  She will be seeing Dr. Deven Jane in the near future.      Past Medical History:   Diagnosis Date    Arthritis     Cellulitis     11/2017, with Group B Strep bacteremia and sepsis    Chronic diastolic congestive heart failure     COVID-19 virus infection 11/2020    Deep venous thrombosis     Heart murmur     Hypertension     Hypo-osmolality and hyponatremia 09/29/2020    Lipoedema     Lymphedema     Morbid obesity     Paradoxical embolism     to the LLE due to DVT/PE and PFO    PFO (patent foramen ovale)     Pulmonary embolism     Pulmonary hypertension     multifactorial (dCHF, obesity/ARIEL, hx PE), mild by echo 1/2016    Right bundle branch block (RBBB) with left anterior fascicular block (LAFB)     Sepsis 09/18/2020    Sleep apnea        Past Surgical History:   Procedure Laterality Date    BRONCHOSCOPY N/A 07/21/2017    Procedure: BRONCHOSCOPY with wash;  Surgeon: Caleb Garcia MD;  Location: [facility] ENDOSCOPY;  Service:     COLONOSCOPY      COLONOSCOPY N/A 1/26/2023    Procedure: COLONOSCOPY to CECUM AND TERM ILEUM;  Surgeon: Jj Dean MD;  Location: [facility] ENDOSCOPY;  Service: Gastroenterology;  Laterality: N/A;  PRE OP -screening  POST OP -  NORMAL    D & C HYSTEROSCOPY N/A 3/8/2022    Procedure: DILATATION AND CURETTAGE with hysteroscopy;  Surgeon: Kyalah Land DO;  Location: [facility] MAIN OR;  Service: Gynecology Oncology;  Laterality: N/A;    DILATATION AND CURETTAGE  04/11/2011       Social History     Socioeconomic History    Marital status:    Tobacco Use    Smoking status: Never    Smokeless tobacco: Never   Vaping Use    Vaping status: Never Used   Substance and Sexual Activity     Alcohol use: Not Currently     Comment: occassionally    Drug use: Never    Sexual activity: Defer       Family History   Problem Relation Age of Onset    Breast cancer Mother     Hypertension Other     Ovarian cancer Neg Hx     Uterine cancer Neg Hx     Colon cancer Neg Hx     Malig Hyperthermia Neg Hx        The following portion of the patient's history were reviewed and updated as appropriate: past medical history, past surgical history, past social history, past family history, allergies, current medications, and problem list.    Review of Systems   Constitutional: Negative.   Cardiovascular:  Positive for leg swelling.   Respiratory: Negative.     Hematologic/Lymphatic: Negative.    Neurological: Negative.        Allergies   Allergen Reactions    Cephalexin Hives and Rash     Diffuse rash on cephalexin 1 g PO q6h on 6/17/20  Tolerated zosyn and PCN G September 2020 without issue    Clindamycin/Lincomycin Hives         Current Outpatient Medications:     acetaminophen (TYLENOL) 325 MG tablet, Take 2 tablets by mouth Every 4 (Four) Hours As Needed for Mild Pain ., Disp:  , Rfl:     albuterol sulfate  (90 Base) MCG/ACT inhaler, Inhale 2 puffs Every 4 (Four) Hours As Needed for Wheezing or Shortness of Air., Disp: 18 g, Rfl: 1    chlorthalidone (HYGROTON) 25 MG tablet, Take 1 tablet by mouth Daily., Disp: 30 tablet, Rfl: 5    furosemide (LASIX) 40 MG tablet, Take 1 tablet by mouth Every Other Day., Disp: 60 tablet, Rfl: 2    losartan (COZAAR) 100 MG tablet, Take 1 tablet by mouth Daily. *Appointment required for further refill**, Disp: 30 tablet, Rfl: 1    metoprolol succinate XL (TOPROL-XL) 50 MG 24 hr tablet, Take 1 tablet by mouth Daily., Disp: 90 tablet, Rfl: 1    nystatin [id number] UNIT/GM powder, apply to the affected area(s) topically 3 times per day, Disp: 15 g, Rfl: 1    rivaroxaban (Xarelto) 20 MG tablet, Take 1 tablet by mouth Daily., Disp: 90 tablet, Rfl: 3    sodium hypochlorite (DAKIN'S) 0.25 %  "topical solution, Apply  topically to the appropriate area as directed 2 (Two) Times a Day. Pack the right leg ulcer with moist Dakins gauze twice daily., Disp: 473 mL, Rfl: 4         Objective:     Vitals:    04/25/24 0934   BP: 124/82   BP Location: Left arm   Patient Position: Sitting   Cuff Size: Adult   Pulse: 69   SpO2: 99%   Weight: (!) 169 kg (372 lb 9.6 oz)   Height: 160 cm (62.99\")     Body mass index is 66.02 kg/m².    PHYSICAL EXAM:    Vitals Reviewed.   General Appearance: No acute distress, well developed and well nourished. Obese.   HENT: No hearing loss noted.    Respiratory: No signs of respiratory distress. Respiration rhythm and depth normal.  Clear to auscultation.   Cardiovascular:  Jugular Venous Pressure: Normal  Heart Rate and Rhythm: Normal, Heart Sounds: Normal S1 and S2. No S3 or S4 noted.  Murmurs: No murmurs noted. No rubs, thrills, or gallops.   Lower Extremities: Bilateral lower extremity edema.  Patient  Musculoskeletal: Normal movement of extremities.  Skin: General appearance normal.    Psychiatric: Patient alert and oriented to person, place, and time. Speech and behavior appropriate. Normal mood and affect.       ECG 12 Lead    Date/Time: 4/25/2024 9:35 AM  Performed by: Tessy Fink APRN    Authorized by: Tessy Fink APRN  Comparison: compared with previous ECG from 4/11/2024  Similar to previous ECG  Rhythm: sinus rhythm  Rate: normal  BPM: 69  Conduction: right bundle branch block and left anterior fascicular block  ST Segments: ST segments normal  T Waves: T waves normal  QRS axis: normal    Clinical impression: abnormal EKG            Assessment:       Diagnosis Plan   1. Chronic heart failure with preserved ejection fraction (HFpEF)  Basic Metabolic Panel      2. Lymphedema        3. Pulmonary hypertension        4. PFO (patent foramen ovale)        5. Right bundle branch block (RBBB) with left anterior fascicular block (LAFB)        6. Essential hypertension      "   7. ARIEL (obstructive sleep apnea)        8. Class 3 severe obesity due to excess calories with serious comorbidity and body mass index (BMI) of 60.0 to 69.9 in adult        9. History of DVT of lower extremity               Plan:       1.  Diastolic heart failure.  Her weight is down 5 pounds and she feels her lower extremity edema has improved.  Continue chlorthalidone daily and furosemide every other day.  Recheck BMP today.    2.  Lymphedema.  She is enrolled in the lymphedema edema clinic/wound clinic.  She feels that her wound on her right shin is healing.  She will also be seeing Dr. Deven Jane in the near future.    3.  Severe pulmonary hypertension.      4.  PFO: Remains on rivaroxaban.    5.  Chronic Right bundle branch block with left anterior fascicular block.    6.  Hypertension: Blood pressure normal.    7.  Obstructive sleep apnea on CPAP.    8.  Obesity: Body mass index is 66.02 kg/m².     9.  History of DVT and pulmonary embolism.  Remains on rivaroxaban.    10.  Continue current medications.  Follow-up with Dr. Rogers in 6 months.    As always, it has been a pleasure to participate in your patient's care. Thank you.         Sincerely,         LUCÍA Brito  [facility] Cardiology      Dictated utilizing Dragon Dictation  I spent 30 minutes reviewing her medical records/testing/previous office notes/labs, face-to-face interaction with patient, physical examination, formulating the plan of care, and discussion of plan of care with patient.

## 2024-04-25 NOTE — PROGRESS NOTES
Please call patient.  Creatinine stable.  BUN mildly elevated and I would encourage her to drink a little bit more water.  Potassium looks good.  Continue current medications.  Thank you

## 2024-04-26 PROBLEM — I73.9 PVD (PERIPHERAL VASCULAR DISEASE) WITH CLAUDICATION: Status: ACTIVE | Noted: 2024-04-26

## 2024-05-07 ENCOUNTER — OFFICE VISIT (OUTPATIENT)
Dept: WOUND CARE | Facility: HOSPITAL | Age: 64
End: 2024-05-07
Payer: COMMERCIAL

## 2024-05-13 RX ORDER — LOSARTAN POTASSIUM 100 MG/1
100 TABLET ORAL DAILY
Qty: 30 TABLET | Refills: 1 | Status: SHIPPED | OUTPATIENT
Start: 2024-05-13

## 2024-05-13 RX ORDER — LOSARTAN POTASSIUM 100 MG/1
100 TABLET ORAL DAILY
Qty: 30 TABLET | Refills: 1 | Status: CANCELLED | OUTPATIENT
Start: 2024-05-13

## 2024-05-13 NOTE — TELEPHONE ENCOUNTER
I spoke with pt who says that she called in the losartan 100 mg QD.  She is not taking lisinopril or warfarin.  She's wanting the script send to the [facility] pharmacy.  I updated her medication list.    She's currently taking:  - Losartan 100 mg QD  - Metoprolol succinate 50 mg QD  - Furosemide 40 mg QOD  - Rivaroxaban 20 mg QD    Thank you,    Vale ROGERS RN  Triage [facility]  05/13/24 11:19 EDT

## 2024-05-15 ENCOUNTER — OFFICE VISIT (OUTPATIENT)
Age: 64
End: 2024-05-15
Payer: COMMERCIAL

## 2024-05-15 VITALS
WEIGHT: 293 LBS | HEIGHT: 63 IN | DIASTOLIC BLOOD PRESSURE: 76 MMHG | BODY MASS INDEX: 51.91 KG/M2 | SYSTOLIC BLOOD PRESSURE: 132 MMHG

## 2024-05-15 DIAGNOSIS — I87.2 EDEMA OF BOTH LOWER EXTREMITIES DUE TO PERIPHERAL VENOUS INSUFFICIENCY: ICD-10-CM

## 2024-05-15 DIAGNOSIS — I73.9 PVD (PERIPHERAL VASCULAR DISEASE) WITH CLAUDICATION: ICD-10-CM

## 2024-05-15 DIAGNOSIS — I87.1 ILIAC VEIN STENOSIS, RIGHT: ICD-10-CM

## 2024-05-15 DIAGNOSIS — I89.0 LYMPHEDEMA: ICD-10-CM

## 2024-05-15 DIAGNOSIS — Z86.718 HISTORY OF DVT OF LOWER EXTREMITY: Primary | ICD-10-CM

## 2024-05-15 PROBLEM — I87.331 CHRONIC VENOUS HYPERTENSION WITH ULCER AND INFLAMMATION INVOLVING RIGHT SIDE: Status: ACTIVE | Noted: 2024-05-15

## 2024-05-15 NOTE — PROGRESS NOTES
Patient Name: Emely Solomon    MRN: [id number] Encounter Date: 05/15/2024      Consulting Service: Vascular Surgery    Referring Provider: Brennan Rogers MD       CHIEF COMPLAINT:  Chief Complaint   Patient presents with    New Patient     Lymphedema with wound on RLE.        Subjective    HPI: Emely Solomon is a 63 y.o. female is being seen for evaluation/management of what appears to be severe lymphedema with a right medial leg ulcer complicating her injury to the skin from her poorly controlled lymphedema.    PAST MEDICAL HISTORY:   Past Medical History:   Diagnosis Date    Arthritis     Atheroembolism of other site 04/14/2016    Cellulitis     11/2017, with Group B Strep bacteremia and sepsis    Chronic deep vein thrombosis (DVT) of left popliteal vein 12/17/2015 02/04/2016, 04/14/2016    Chronic diastolic congestive heart failure     COVID-19 virus infection 11/2020    Deep venous thrombosis     Exercise involving walking     Heart murmur     Hypertension     Hypo-osmolality and hyponatremia 09/29/2020    Lipoedema     Lymphedema     other    Morbid obesity     Paradoxical embolism     to the LLE due to DVT/PE and PFO    PFO (patent foramen ovale)     Postthrombotic syndrome of left lower extremity without complications 12/17/2015    postphletibis    Pulmonary embolism 04/14/2016    Pulmonary hypertension     multifactorial (dCHF, obesity/ARIEL, hx PE), mild by echo 1/2016    Right bundle branch block (RBBB) with left anterior fascicular block (LAFB)     Sepsis 09/18/2020    Sleep apnea       PAST SURGICAL HISTORY:   Past Surgical History:   Procedure Laterality Date    ARTERIOGRAM  07/2015    BRONCHOSCOPY N/A 07/21/2017    Procedure: BRONCHOSCOPY with wash;  Surgeon: Caleb Garcia MD;  Location: [facility] ENDOSCOPY;  Service:     COLONOSCOPY      COLONOSCOPY N/A 01/26/2023    Procedure: COLONOSCOPY to CECUM AND TERM ILEUM;  Surgeon: Jj Dean MD;  Location: [facility] ENDOSCOPY;  Service:  Gastroenterology;  Laterality: N/A;  PRE OP -screening  POST OP -  NORMAL    D & C HYSTEROSCOPY N/A 03/08/2022    Procedure: DILATATION AND CURETTAGE with hysteroscopy;  Surgeon: Kaylah Land DO;  Location: [facility] OR;  Service: Gynecology Oncology;  Laterality: N/A;    DILATATION AND CURETTAGE  04/11/2011    OTHER SURGICAL HISTORY  09/2015    IVC filter    OTHER SURGICAL HISTORY      left LE revascularization    OTHER SURGICAL HISTORY      ALESSIA and LEV scan    THROMBECTOMY  07/2015      FAMILY HISTORY:   Family History   Problem Relation Age of Onset    Breast cancer Mother     Hypertension Other     Ovarian cancer Neg Hx     Uterine cancer Neg Hx     Colon cancer Neg Hx     Malig Hyperthermia Neg Hx       SOCIAL HISTORY:   Social History     Tobacco Use    Smoking status: Never    Smokeless tobacco: Never   Vaping Use    Vaping status: Never Used   Substance Use Topics    Alcohol use: Not Currently     Comment: occassionally    Drug use: Never      MEDICATIONS:   Current Outpatient Medications on File Prior to Visit   Medication Sig Dispense Refill    acetaminophen (TYLENOL) 325 MG tablet Take 2 tablets by mouth Every 4 (Four) Hours As Needed for Mild Pain .      albuterol sulfate  (90 Base) MCG/ACT inhaler Inhale 2 puffs Every 4 (Four) Hours As Needed for Wheezing or Shortness of Air. 18 g 1    benzonatate (TESSALON) 100 MG capsule       chlorthalidone (HYGROTON) 25 MG tablet Take 1 tablet by mouth Daily. 30 tablet 5    furosemide (LASIX) 40 MG tablet Take 1 tablet by mouth Every Other Day. 60 tablet 2    losartan (COZAAR) 100 MG tablet Take 1 tablet by mouth Daily. *Appointment required for further refill** 30 tablet 1    metoprolol succinate XL (TOPROL-XL) 50 MG 24 hr tablet Take 1 tablet by mouth Daily. 90 tablet 1    nystatin [id number] UNIT/GM powder apply to the affected area(s) topically 3 times per day 15 g 1    rivaroxaban (Xarelto) 20 MG tablet Take 1 tablet by mouth Daily. 90 tablet 3     "sodium hypochlorite (DAKIN'S) 0.25 % topical solution Apply  topically to the appropriate area as directed 2 (Two) Times a Day. Pack the right leg ulcer with moist Dakins gauze twice daily. 473 mL 4    [DISCONTINUED] warfarin (COUMADIN) 4 MG tablet        No current facility-administered medications on file prior to visit.       ALLERGIES: Cephalexin and Clindamycin/lincomycin       Objective   Vitals:    05/15/24 0956   BP: 132/76   Weight: (!) 169 kg (372 lb)   Height: 160 cm (62.99\")     Body mass index is 65.91 kg/m².  Class 3 Severe Obesity (BMI >=40). Obesity-related health conditions include the following: obstructive sleep apnea and lower extremity venous stasis disease. Obesity is unchanged. BMI is is above average; BMI management plan is completed. We discussed low calorie, low carb based diet program, portion control, and increasing exercise.      PHYSICAL EXAM:   Physical Exam  Constitutional:       Appearance: Normal appearance.   HENT:      Head: Normocephalic and atraumatic.      Nose: Nose normal.   Eyes:      Extraocular Movements: Extraocular movements intact.      Pupils: Pupils are equal, round, and reactive to light.   Cardiovascular:      Rate and Rhythm: Normal rate.      Pulses: Normal pulses.      Heart sounds: Normal heart sounds.      Comments: The ulcer to the right leg noted without evidence of true infection.  Severe lymphedema with elephantiasis  Pulmonary:      Effort: Pulmonary effort is normal.      Breath sounds: Normal breath sounds.   Abdominal:      General: Abdomen is flat. Bowel sounds are normal.      Palpations: Abdomen is soft.   Musculoskeletal:         General: Normal range of motion.      Cervical back: Normal range of motion and neck supple.      Right lower le+ Edema present.      Left lower le+ Edema present.   Skin:     General: Skin is warm and dry.   Neurological:      General: No focal deficit present.      Mental Status: She is alert and oriented to " person, place, and time. Mental status is at baseline.   Psychiatric:         Mood and Affect: Mood normal.         Thought Content: Thought content normal.          Result Review   LABS:      Results Review:       I reviewed the patient's new clinical results.    The following radiologic or non-invasive studies have been reviewed by me:    Testing including CT scans reviewed from thousand 22 and 23  No radiology results for the last 30 days.                ASSESSMENT/PLAN:   Diagnoses and all orders for this visit:    1. History of DVT of lower extremity (Primary)  -     Ambulatory Referral to Lymphedema Clinic  -     Venous w Reflux Lower Extremity - Unilateral CAR; Future  -     Duplex Aorta IVC Iliac Graft Limited CAR; Future    2. PVD (peripheral vascular disease) with claudication    3. Lymphedema    4. Iliac vein stenosis, right  -     Ambulatory Referral to Lymphedema Clinic  -     Venous w Reflux Lower Extremity - Unilateral CAR; Future  -     Duplex Aorta IVC Iliac Graft Limited CAR; Future    5. Edema of both lower extremities due to peripheral venous insufficiency  -     Ambulatory Referral to Lymphedema Clinic  -     Venous w Reflux Lower Extremity - Unilateral CAR; Future  -     Duplex Aorta IVC Iliac Graft Limited CAR; Future       63 y.o. female with severe lymphedema with some history of venous reflux as well and venous outflow issues due to the history of DVT back in 2017.  At this point in time patient's right leg swelling has worsened and she is in need of getting back into the lymphedema therapist which we are ordering at the hospital.  I believe that a scan of her right iliac vein would be warranted as a believe on her CT scan from 2023 there may be some scar in the external iliac vein.  If this is positive it may be stentable to improve her leg but it is difficult to say on this CAT scan.  We will proceed with a duplex and make sure that things are stable and there is no chronic clot in this  area and if there is may be treatable.  Her prior clot in 2017 was in the left leg.  Right leg is significantly worse than the left right now we we will try a venous mapping on the right leg class II to see if there is anything that can be done in her right leg as well as the right iliac.  This may take some time to set up and we will get her into the physical therapy occupational therapy treatment in the interim.    I discussed the plan with the patient who is agreeable to the plan of care at this point. Thank you for this consult.   Follow Up  Return in about 1 month (around 6/15/2024).    Juan Jane MD   05/15/24

## 2024-06-04 ENCOUNTER — OFFICE VISIT (OUTPATIENT)
Dept: WOUND CARE | Facility: HOSPITAL | Age: 64
End: 2024-06-04
Payer: COMMERCIAL

## 2024-06-05 ENCOUNTER — TRANSCRIBE ORDERS (OUTPATIENT)
Dept: HOME HEALTH SERVICES | Facility: HOME HEALTHCARE | Age: 64
End: 2024-06-05
Payer: COMMERCIAL

## 2024-06-05 ENCOUNTER — HOME HEALTH ADMISSION (OUTPATIENT)
Dept: HOME HEALTH SERVICES | Facility: HOME HEALTHCARE | Age: 64
End: 2024-06-05
Payer: COMMERCIAL

## 2024-06-05 DIAGNOSIS — L97.212 NON-PRESSURE CHRONIC ULCER OF RIGHT CALF WITH FAT LAYER EXPOSED: Primary | ICD-10-CM

## 2024-06-17 ENCOUNTER — HOSPITAL ENCOUNTER (OUTPATIENT)
Dept: OCCUPATIONAL THERAPY | Facility: HOSPITAL | Age: 64
Setting detail: THERAPIES SERIES
Discharge: HOME | End: 2024-06-17
Payer: COMMERCIAL

## 2024-06-17 DIAGNOSIS — I89.0 LYMPHEDEMA OF BOTH LOWER EXTREMITIES: Primary | ICD-10-CM

## 2024-06-17 PROCEDURE — 97535 SELF CARE MNGMENT TRAINING: CPT

## 2024-06-17 PROCEDURE — 97166 OT EVAL MOD COMPLEX 45 MIN: CPT

## 2024-06-17 NOTE — THERAPY EVALUATION
Outpatient Occupational Therapy Lymphedema Initial Evaluation  [facility]     Patient Name: Emely Solomon  : 1960  MRN: [id number]  Today's Date: 2024      Visit Date: 2024    Patient Active Problem List   Diagnosis    Chronic heart failure with preserved ejection fraction (HFpEF)    PFO (patent foramen ovale)    Paradoxical embolism    Essential hypertension    Pulmonary hypertension    Back pain    ARIEL (obstructive sleep apnea)    Class 3 severe obesity due to excess calories with serious comorbidity and body mass index (BMI) of 60.0 to 69.9 in adult    History of DVT of lower extremity    Lymphedema    Anemia, chronic disease    CKD (chronic kidney disease) stage 3, GFR 30-59 ml/min    Iron deficiency    Encounter for screening for malignant neoplasm of colon    Post-menopausal bleeding    Thickened endometrium    Generalized muscle weakness    Right bundle branch block (RBBB) with left anterior fascicular block (LAFB)    PVD (peripheral vascular disease) with claudication    Iliac vein stenosis, right    Edema of both lower extremities due to peripheral venous insufficiency    Chronic venous hypertension with ulcer and inflammation involving right side        Past Medical History:   Diagnosis Date    Arthritis     Atheroembolism of other site 2016    Cellulitis     2017, with Group B Strep bacteremia and sepsis    Chronic deep vein thrombosis (DVT) of left popliteal vein 2015, 2016    Chronic diastolic congestive heart failure     COVID-19 virus infection 2020    Deep venous thrombosis     Exercise involving walking     Heart murmur     Hypertension     Hypo-osmolality and hyponatremia 2020    Lipoedema     Lymphedema     other    Morbid obesity     Paradoxical embolism     to the LLE due to DVT/PE and PFO    PFO (patent foramen ovale)     Postthrombotic syndrome of left lower extremity without complications 2015    postphletibis     Pulmonary embolism 04/14/2016    Pulmonary hypertension     multifactorial (dCHF, obesity/ARIEL, hx PE), mild by echo 1/2016    Right bundle branch block (RBBB) with left anterior fascicular block (LAFB)     Sepsis 09/18/2020    Sleep apnea         Past Surgical History:   Procedure Laterality Date    ARTERIOGRAM  07/2015    BRONCHOSCOPY N/A 07/21/2017    Procedure: BRONCHOSCOPY with wash;  Surgeon: Caleb Garcia MD;  Location: [facility] ENDOSCOPY;  Service:     COLONOSCOPY      COLONOSCOPY N/A 01/26/2023    Procedure: COLONOSCOPY to CECUM AND TERM ILEUM;  Surgeon: Jj Dean MD;  Location: [facility] ENDOSCOPY;  Service: Gastroenterology;  Laterality: N/A;  PRE OP -screening  POST OP -  NORMAL    D & C HYSTEROSCOPY N/A 03/08/2022    Procedure: DILATATION AND CURETTAGE with hysteroscopy;  Surgeon: Kaylah Land DO;  Location: [facility] MAIN OR;  Service: Gynecology Oncology;  Laterality: N/A;    DILATATION AND CURETTAGE  04/11/2011    OTHER SURGICAL HISTORY  09/2015    IVC filter    OTHER SURGICAL HISTORY      left LE revascularization    OTHER SURGICAL HISTORY      ALESSIA and LEV scan    THROMBECTOMY  07/2015         Visit Dx:     ICD-10-CM ICD-9-CM   1. Lymphedema of both lower extremities  I89.0 457.1        Patient History       Row Name 06/17/24 1700             History    Chief Complaint Swelling;Ulcer, wound or other skin conditions  -RE      Date Current Problem(s) Began 05/15/24  Referral date  -RE      Brief Description of Current Complaint Patient returns for evaluation of bilateral lower extremity lymphedema.  She was last treated in this clinic in 2021.  Patient reports that she has been a patient at the wound care center and still has 2 open wounds on her right lower leg.  She is not currently wearing any form of compression.  -RE      Patient/Caregiver Goals Decrease swelling;Know what to do to help the symptoms  -RE      How has patient tried to help current problem? Velcro compression products  -RE       What clinical tests have you had for this problem? Other 1 (comment)  Venous Doppler  -RE      Results of Clinical Tests Negative for clot  -RE      Are you or can you be pregnant No  -RE         Pain     Pain at Present 0  -RE         Fall Risk Assessment    Any falls in the past year: No  -RE         Services    Are you currently receiving Home Health services No  -RE         Daily Activities    Primary Language English  -RE      Are you able to read Yes  -RE      Are you able to write Yes  -RE      How does patient learn best? Listening;Reading  -RE      Teaching needs identified Management of Condition  -RE      Patient is concerned about/has problems with Other (comment)  Healing wounds  -RE      Does patient have problems with the following? None  -RE      Barriers to learning None  -RE      Explanation of Functional Status Problem Ambulates with rolling walker independently, has  assist as needed with tasks  -RE      Pt Participated in POC and Goals Yes  -RE         Safety    Are you being hurt, hit, or frightened by anyone at home or in your life? No  -RE      Are you being neglected by a caregiver No  -RE                User Key  (r) = Recorded By, (t) = Taken By, (c) = Cosigned By      Initials Name Provider Type    RE Martha Valencia OTR Occupational Therapist                     Lymphedema       Row Name 06/17/24 1300             Subjective Pain    Able to rate subjective pain? yes  -RE      Pre-Treatment Pain Level 0  -RE      Post-Treatment Pain Level 0  -RE      Subjective Pain Comment wounds are painful if they are bumped  -RE         Lymphedema Assessment    Lymphedema Classification RLE:;LLE:;stage 3 (Lymphostatic Elephantiasis)  -RE      Infections or Cellulitis? yes  -RE      Infection/Cellulitis Treatment IV antibiotics  -RE      Lymphedema Precautions CHF;DVT;anemia;kidney disease  -RE         LLIS - Physical Concerns    The amount of pain associated with my lymphedema is: 1  -RE       "The amount of limb heaviness associated with my lymphedema is: 0  -RE      The amount of skin tightness associated with my lymphedema is: 0  -RE      The size of my swollen limb(s) seems: 1  -RE      Lymphedema affects the movement of my swollen limb(s): 1  -RE         LLIS - Psychosocial Concerns    Lymphedema affects my body image (i.e., \"how I think I look\"). 0  -RE      Lymphedema affects my socializing with others. 0  -RE      Lymphedema affects my intimate relations with spouse or partner (rate 0 if not applicable 0  -RE      Lymphedema \"gets me down\" (i.e., depression, frustration, or anger) 0  -RE      I must rely on others for help due to my lymphedema. 0  -RE         LLIS - Functional Concerns    Lymphedema affects my ability to perform self-care activities (i.e. eating, dressing, hygiene) 0  -RE      Lymphedema affects my ability to perform routine home or work-related activities. 0  -RE      Lymphedema affects my performance of preferred leisure activities. 0  -RE      Lymphedema affects proper fit of clothing/shoes 1  -RE      Lymphedema affects my sleep 0  -RE         Posture/Observations    Posture/Observations Comments Ambulates with a rolling walker  -RE         General ROM    GENERAL ROM COMMENTS Bilateral lower extremity active range of motion is limited by tissue bulk related to edema and body habitus  -RE         Lymphedema Edema Assessment    Ptting Edema Category By grade out of 4  -RE      Pitting Edema Severe  -RE      Stemmer Sign bilateral:;positive  -RE      Breese Hump bilateral:;negative  -RE         Skin Changes/Observations    Location/Assessment Lower Extremity  -RE      Lower Extremity Conditions bilateral:;dry;shiny;scaly  -RE      Lower Extremity Color/Pigment bilateral:;red;fibrosis  -RE      Lower Extremity Skin Details 2 wounds on the right lower leg which are being treated by wound care  -RE         Lymphedema Sensation    Lymphedema Sensation Reports LLE:;numbness  -RE      " Lymphedema Sensation Tests light touch  -RE      Lymphedema Light Touch mild impairment  -RE         Lymphedema Measurements    Measurement Type(s) Circumferential  -RE      Circumferential Areas Lower extremities  -RE         BLE Circumferential (cm)    Measurement Location 1 20 cm. above knee  -RE      Left 1 86 cm  -RE      Right 1 91 cm  -RE      Measurement Location 2 10cm above knee  -RE      Left 2 95 cm  -RE      Right 2 95 cm  -RE      Measurement Location 3 knee  -RE      Left 3 71 cm  -RE      Right 3 93 cm  -RE      Measurement Location 4 10 cm. below knee  -RE      Left 4 80 cm  -RE      Right 4 82 cm  -RE      Measurement Location 5 20 cm. below knee  -RE      Left 5 76.5 cm  -RE      Right 5 83.5 cm  -RE      Measurement Location 6 30 cm. below knee  -RE      Left 6 --  Unable to measure due to abnormal contour, the tape measure kept slipping  -RE      Right 6 --  Unable to measure due to wound  -RE      Measurement Location 7 ankle  -RE      Left 7 34.5 cm  -RE      Right 7 34 cm  -RE      Measurement Location 8 mid foot   -RE      Left 8 24 cm  -RE      Right 8 23.8 cm  -RE      LLE Circumferential Total 467 cm  -RE      RLE Circumferential Total 502.3 cm  -RE         Lymphedema Life Impact Scale Totals    A.  Total Q1 - Q17 (Do not include Q18) 4  -RE      B.  Total number of questions answered (Q1-Q17) 15  -RE      C. Divide A by B 0.27  -RE      D. Multiple C by 25 6.75  -RE                User Key  (r) = Recorded By, (t) = Taken By, (c) = Cosigned By      Initials Name Provider Type    Martha Mcdonald OTR Occupational Therapist                            Therapy Education  Education Details: Discussed lymphedema treatment including estimated duration.  I explained that we cannot start treatment until her wounds are healed.  I recommended that she follow-up with the wound care center or with home health.  Given: Edema management, Symptoms/condition management, Bandaging/dressing  "change  Program: New  How Provided: Verbal, Written  Provided to: Patient  Level of Understanding: Teach back education performed, Verbalized  40304 - OT Self Care/Mgmt Minutes: 15         OT Goals       Row Name 06/17/24 1800          OT Short Term Goals    STG Date to Achieve 07/17/24  -RE     STG 1 Patient will demonstrate proper awareness of “What is Lymphedema?” and \"Healthy Habits\" for improved prevention, management, care of symptoms and ease of transition to self-care of condition.  -RE     STG 1 Progress New  -RE     STG 2 Patient independent and compliant with self-wrapping techniques of compression bandages and/or velcro products with assistance of caregiver as needed for improved self-management of condition.  -RE     STG 2 Progress New  -RE     STG 3 Patient will demonstrate decreased net edema of bilateral lower extremities >/= 10-20cm  for decrease in edema, symptoms, decreased risk of infection and improved skin care/transition to self-care of condition.  -RE     STG 3 Progress New  -RE        Long Term Goals    LTG Date to Achieve 09/17/24  -RE     LTG 1 Patient will demonstrate decreased net edema of bilateral lower extremities >/= 21-40 cm  for decrease in edema, symptoms, decreased risk of infection and improved skin care/transition to self-care of condition.  -RE     LTG 1 Progress New  -RE     LTG 2 Patient independent and compliant with initial home exercise program focused on diaphragmatic breathing, range of motion, flexibility to decrease edema and improve lymphatic flow for decreased edema and decreased risk of infection.  -RE     LTG 2 Progress New  -RE     LTG 4 Patient independent and compliant with use and care of compression garments and/or Velcro products with assistance of a caregiver as needed to promote self-care independence.  -RE     LTG 4 Progress New  -RE        Time Calculation    OT Goal Re-Cert Due Date 09/17/24  -RE               User Key  (r) = Recorded By, (t) = Taken " By, (c) = Cosigned By      Initials Name Provider Type    RE RuthMartha trotter, OTR Occupational Therapist                     OT Assessment/Plan       Row Name 06/17/24 1802          OT Assessment    Functional Limitations Limitation in home management  -RE     Impairments Edema;Impaired lymphatic circulation;Impaired venous circulation  -RE     Assessment Comments Patient is a 63-year-old female who presents with signs and symptoms consistent with severe bilateral lower extremity lymphedema which extends from toes to mid thigh.  Edema is firm with 3-4+ pitting.  She is being treated by the wound care center for 2 wounds on her right lower leg.  The total circumference of the right lower extremity is 502.3 cm and the left lower extremity is 467.0 cm.  Comorbidities which may affect progress include limited mobility and obesity.  She could benefit from Complete Decongestive Therapy to decrease edema, protect and improve skin integrity, decrease the risk of infection, and to learn self-care strategies.  However, we will not be able to begin treatment until after her wounds are healed.  I asked the patient to contact us when she has been discharged from wound care center.  -RE     OT Diagnosis Bilateral lower extremity lymphedema  -RE     OT Rehab Potential Good  -RE     Patient/caregiver participated in establishment of treatment plan and goals Yes  -RE     Patient would benefit from skilled therapy intervention Yes  -RE        OT Plan    OT Frequency 4x/week  -RE     Predicted Duration of Therapy Intervention (OT) 6 to 8 weeks  -RE     Planned CPT's? OT EVAL MOD COMPLEXITY: 03837;OT SELF CARE/MGMT/TRAIN 15 MIN: 81015;OT MANUAL THERAPY EA 15 MIN: 88084  -RE     Planned Therapy Interventions (Optional Details) home exercise program;patient/family education;manual therapy techniques;other (see comments)  -RE               User Key  (r) = Recorded By, (t) = Taken By, (c) = Cosigned By      Initials Name Provider Type    RE  Martha Valencia OTR Occupational Therapist                              Time Calculation:   OT Start Time: 1330  OT Stop Time: 1425  OT Time Calculation (min): 55 min  Total Timed Code Minutes- OT: 15 minute(s)  Timed Charges  92366 - OT Self Care/Mgmt Minutes: 15  Untimed Charges  OT Eval/Re-eval Minutes: 40  Total Minutes  Timed Charges Total Minutes: 15  Untimed Charges Total Minutes: 40   Total Minutes: 55     Therapy Charges for Today       Code Description Service Date Service Provider Modifiers Qty    97101193128 HC OT SELF CARE/MGMT/TRAIN EA 15 MIN 6/17/2024 Martha Valencia OTR GO 1    92781411761 HC OT EVAL MOD COMPLEXITY 3 6/17/2024 Martha Valencia OTR GO 1          Dictated utilizing Dragon dictation:  Much of this encounter note is an electronic transcription/translation of spoken language to printed text. The electronic translation of spoken language may permit erroneous, or at times, nonsensical words or phrases to be inadvertently transcribed; Although I have reviewed the note for such errors, some may still exist.            RADHA Esteban  6/17/2024

## 2024-07-01 RX ORDER — METOPROLOL SUCCINATE 50 MG/1
50 TABLET, EXTENDED RELEASE ORAL DAILY
Qty: 90 TABLET | Refills: 1 | Status: SHIPPED | OUTPATIENT
Start: 2024-07-01

## 2024-07-02 ENCOUNTER — OFFICE VISIT (OUTPATIENT)
Dept: WOUND CARE | Facility: HOSPITAL | Age: 64
End: 2024-07-02
Payer: COMMERCIAL

## 2024-07-15 RX ORDER — LOSARTAN POTASSIUM 100 MG/1
100 TABLET ORAL DAILY
Qty: 30 TABLET | Refills: 1 | Status: SHIPPED | OUTPATIENT
Start: 2024-07-15

## 2024-07-15 RX ORDER — LOSARTAN POTASSIUM 100 MG/1
100 TABLET ORAL DAILY
Qty: 90 TABLET | OUTPATIENT
Start: 2024-07-15

## 2024-07-15 NOTE — TELEPHONE ENCOUNTER
Caller: [id number] - [place], [place] - [address] AT [facility] & [place] [phone] [place] [phone]     Relationship: Pharmacy    Best call back number:     Requested Prescriptions:   Requested Prescriptions     Pending Prescriptions Disp Refills    losartan (COZAAR) 100 MG tablet 30 tablet 1     Sig: Take 1 tablet by mouth Daily. *Appointment required for further refill**        Pharmacy where request should be sent: [id number] - [place], [place] - [address] AT [facility] & [place] [phone] [place] [phone] FX     Last office visit with prescribing clinician: 4/11/2024   Last telemedicine visit with prescribing clinician: Visit date not found   Next office visit with prescribing clinician: 11/7/2024     Additional details provided by patient:     Does the patient have less than a 3 day supply:  [] Yes  [] No    Would you like a call back once the refill request has been completed: [] Yes [] No    If the office needs to give you a call back, can they leave a voicemail: [] Yes [] No    Wilberto Da Silva Rep   07/15/24 13:13 EDT

## 2024-07-24 ENCOUNTER — HOSPITAL ENCOUNTER (OUTPATIENT)
Facility: HOSPITAL | Age: 64
Discharge: HOME | End: 2024-07-24
Payer: MEDICARE

## 2024-07-24 ENCOUNTER — OFFICE VISIT (OUTPATIENT)
Age: 64
End: 2024-07-24
Payer: MEDICARE

## 2024-07-24 VITALS
DIASTOLIC BLOOD PRESSURE: 78 MMHG | SYSTOLIC BLOOD PRESSURE: 132 MMHG | BODY MASS INDEX: 51.91 KG/M2 | WEIGHT: 293 LBS | HEIGHT: 63 IN

## 2024-07-24 DIAGNOSIS — I87.2 EDEMA OF BOTH LOWER EXTREMITIES DUE TO PERIPHERAL VENOUS INSUFFICIENCY: ICD-10-CM

## 2024-07-24 DIAGNOSIS — I87.1 ILIAC VEIN STENOSIS, RIGHT: ICD-10-CM

## 2024-07-24 DIAGNOSIS — Z86.718 HISTORY OF DVT OF LOWER EXTREMITY: ICD-10-CM

## 2024-07-24 DIAGNOSIS — L97.312 VENOUS STASIS ULCER OF RIGHT ANKLE WITH FAT LAYER EXPOSED WITHOUT VARICOSE VEINS: ICD-10-CM

## 2024-07-24 DIAGNOSIS — I89.0 LYMPHEDEMA: Primary | ICD-10-CM

## 2024-07-24 DIAGNOSIS — I87.2 VENOUS STASIS ULCER OF RIGHT ANKLE WITH FAT LAYER EXPOSED WITHOUT VARICOSE VEINS: ICD-10-CM

## 2024-07-24 PROBLEM — I83.003: Status: ACTIVE | Noted: 2024-07-24

## 2024-07-24 PROBLEM — L97.302: Status: ACTIVE | Noted: 2024-07-24

## 2024-07-24 LAB
BH CV LOWER VAS RIGHT GSV DIST THIGH COMPRESSIBILTY: NORMAL
BH CV LOWER VAS RIGHT GSV MID THIGH COMPRESSIBILTY: NORMAL
BH CV LOWER VASCULAR LEFT COMMON FEMORAL AUGMENT: NORMAL
BH CV LOWER VASCULAR LEFT COMMON FEMORAL COMPETENT: NORMAL
BH CV LOWER VASCULAR LEFT COMMON FEMORAL COMPRESS: NORMAL
BH CV LOWER VASCULAR LEFT COMMON FEMORAL PHASIC: NORMAL
BH CV LOWER VASCULAR LEFT COMMON FEMORAL SPONT: NORMAL
BH CV LOWER VASCULAR RIGHT AA GSV COMPETENT: NORMAL
BH CV LOWER VASCULAR RIGHT AA GSV COMPRESS: NORMAL
BH CV LOWER VASCULAR RIGHT COMMON FEMORAL AUGMENT: NORMAL
BH CV LOWER VASCULAR RIGHT COMMON FEMORAL COMPETENT: NORMAL
BH CV LOWER VASCULAR RIGHT COMMON FEMORAL COMPRESS: NORMAL
BH CV LOWER VASCULAR RIGHT COMMON FEMORAL PHASIC: NORMAL
BH CV LOWER VASCULAR RIGHT COMMON FEMORAL SPONT: NORMAL
BH CV LOWER VASCULAR RIGHT EXTERNAL ILIAC AUGMENT: NORMAL
BH CV LOWER VASCULAR RIGHT EXTERNAL ILIAC COMPETENT: NORMAL
BH CV LOWER VASCULAR RIGHT EXTERNAL ILIAC COMPRESS: NORMAL
BH CV LOWER VASCULAR RIGHT EXTERNAL ILIAC PHASIC: NORMAL
BH CV LOWER VASCULAR RIGHT EXTERNAL ILIAC SPONT: NORMAL
BH CV LOWER VASCULAR RIGHT GREATER SAPH AK COMPETENT: NORMAL
BH CV LOWER VASCULAR RIGHT GSV DIST THIGH COMPETENT: NORMAL
BH CV LOWER VASCULAR RIGHT MID FEMORAL AUGMENT: NORMAL
BH CV LOWER VASCULAR RIGHT MID FEMORAL COMPETENT: NORMAL
BH CV LOWER VASCULAR RIGHT MID FEMORAL COMPRESS: NORMAL
BH CV LOWER VASCULAR RIGHT MID FEMORAL PHASIC: NORMAL
BH CV LOWER VASCULAR RIGHT MID FEMORAL SPONT: NORMAL
BH CV LOWER VASCULAR RIGHT PROFUNDA FEMORAL COMPRESS: NORMAL
BH CV LOWER VASCULAR RIGHT PROXIMAL FEMORAL COMPRESS: NORMAL
BH CV LOWER VASCULAR RIGHT SAPHENOFEMORAL JUNCTION AUGMENT: NORMAL
BH CV LOWER VASCULAR RIGHT SAPHENOFEMORAL JUNCTION COMPETENT: NORMAL
BH CV LOWER VASCULAR RIGHT SAPHENOFEMORAL JUNCTION COMPRESS: NORMAL
BH CV LOWER VASCULAR RIGHT SAPHENOFEMORAL JUNCTION PHASIC: NORMAL
BH CV LOWER VASCULAR RIGHT SAPHENOFEMORAL JUNCTION SPONT: NORMAL
BH CV RIGHT LOWER VAS AA GSV REFLUX TIME: 1.3 SEC
BH CV RIGHT LOWER VAS AA GSV TRANS DIAMETER: 0.73 CM
BH CV RIGHT LOWER VAS COMMON FEMORAL REFLUX COLOR FLOW TIME: 1.38 SEC
BH CV RIGHT LOWER VAS EXT ILIAC REFLUX COLOR FLOW TIME: 1.67 SEC
BH CV RIGHT LOWER VAS GSV KNEE TRANS DIAMETER: 0.51 CM
BH CV RIGHT LOWER VAS GSV MID THIGH TRANS DIAMETER: 0.89 CM
BH CV RIGHT LOWER VAS GSV PROX THIGH REFLUX TIME: 0.51 SEC
BH CV RIGHT LOWER VAS GSV PROX THIGH TRANS DIAMETER: 0.96 CM
BH CV RIGHT LOWER VAS SAPHENOFEM JUNCTION REFLUX TIME: 1.03 SEC
BH CV RIGHT LOWER VAS SAPHENOFEM JUNCTION TRANSVERSE DIAMETER: 0.79 CM
BH CV RIGHT LOWER VAS VARICOSITY AK TRANS DIAMETER: 0.3 CM
BH CV RIGHT LOWER VAS VARICOSITY BK TRANS DIAMETER: 0.81 CM
BH CV VAS ABD AO IVC SPONTANEOUS SCRIPTING: NORMAL
BH CV VAS ABD AO RT COM ILIAC PHASIC SCRIPTING: NORMAL
BH CV VAS ABD AO RT COM ILIAC SPONTANEOUS SCRIPTING: NORMAL
BH CV VAS ABD AO RT EXT ILIAC PHASIC SCRIPTING: NORMAL
BH CV VAS ABD AO RT EXT ILIAC SPONTANEOUS SCRIPTING: NORMAL
BH CV VAS RIGHT GSV PROXIMAL HIDDEN LRR COMPRESSIBILTY: NORMAL

## 2024-07-24 PROCEDURE — 93971 EXTREMITY STUDY: CPT

## 2024-07-24 PROCEDURE — 93979 VASCULAR STUDY: CPT

## 2024-07-24 NOTE — PROGRESS NOTES
Patient Name: Emely Solomon    MRN: [id number] Encounter Date: 07/24/2024      Consulting Service: Vascular Surgery    Referring Provider: No ref. provider found       CHIEF COMPLAINT:  Chief Complaint   Patient presents with    Follow-up     RLE vein mapping/ AAA scan follow up.        Subjective    HPI: Emely Solomon is a 63 y.o. female is being seen for evaluation/management of  persistent right lower extremity ulceration on the medial aspect of the ankle related to her severe lymphedema which is not controlled.  Unfortunately we really do not have any options for intervention as her venous reflux study does not show significant veins feeding into this area and really she has no visible greater saphenous or lesser saphenous vein to ablate her anterior branch saphenous and 1 varicosity could be treated with Varithena but I do not believe that will actually improve anything.  Her lower extremities have no DVT and when I look at her studies at this point her iliac and IVC veins studies are negative for stenosis or outflow disease.  Basically this is not a venous issue and I believe that the lymphedema has to be controlled locally with wound care getting her to heal the ulcer and then lymphedema therapy controlling her from thereon out.    PAST MEDICAL HISTORY:   Past Medical History:   Diagnosis Date    Arthritis     Atheroembolism of other site 04/14/2016    Cellulitis     11/2017, with Group B Strep bacteremia and sepsis    Chronic deep vein thrombosis (DVT) of left popliteal vein 12/17/2015 02/04/2016, 04/14/2016    Chronic diastolic congestive heart failure     COVID-19 virus infection 11/2020    Deep venous thrombosis     Exercise involving walking     Heart murmur     Hypertension     Hypo-osmolality and hyponatremia 09/29/2020    Lipoedema     Lymphedema     other    Morbid obesity     Paradoxical embolism     to the LLE due to DVT/PE and PFO    PFO (patent foramen ovale)     Postthrombotic syndrome of  left lower extremity without complications 12/17/2015    postphletibis    Pulmonary embolism 04/14/2016    Pulmonary hypertension     multifactorial (dCHF, obesity/ARIEL, hx PE), mild by echo 1/2016    Right bundle branch block (RBBB) with left anterior fascicular block (LAFB)     Sepsis 09/18/2020    Sleep apnea       PAST SURGICAL HISTORY:   Past Surgical History:   Procedure Laterality Date    ARTERIOGRAM  07/2015    BRONCHOSCOPY N/A 07/21/2017    Procedure: BRONCHOSCOPY with wash;  Surgeon: Caleb Garcia MD;  Location: [facility] END[facility];  Service:     COLONOSCOPY      COLONOSCOPY N/A 01/26/2023    Procedure: COLONOSCOPY to CECUM AND TERM ILEUM;  Surgeon: Jj Dean MD;  Location: [facility] ENDOSCOPY;  Service: Gastroenterology;  Laterality: N/A;  PRE OP -screening  POST OP -  NORMAL    D & C HYSTEROSCOPY N/A 03/08/2022    Procedure: DILATATION AND CURETTAGE with hysteroscopy;  Surgeon: Kaylah Land DO;  Location: [facility] MAIN OR;  Service: Gynecology Oncology;  Laterality: N/A;    DILATATION AND CURETTAGE  04/11/2011    OTHER SURGICAL HISTORY  09/2015    IVC filter    OTHER SURGICAL HISTORY      left LE revascularization    OTHER SURGICAL HISTORY      ALESSIA and LEV scan    THROMBECTOMY  07/2015      FAMILY HISTORY:   Family History   Problem Relation Age of Onset    Breast cancer Mother     Hypertension Other     Ovarian cancer Neg Hx     Uterine cancer Neg Hx     Colon cancer Neg Hx     Malig Hyperthermia Neg Hx       SOCIAL HISTORY:   Social History     Tobacco Use    Smoking status: Never    Smokeless tobacco: Never   Vaping Use    Vaping status: Never Used   Substance Use Topics    Alcohol use: Not Currently     Comment: occassionally    Drug use: Never      MEDICATIONS:   Current Outpatient Medications on File Prior to Visit   Medication Sig Dispense Refill    acetaminophen (TYLENOL) 325 MG tablet Take 2 tablets by mouth Every 4 (Four) Hours As Needed for Mild Pain .      albuterol sulfate   "(90 Base) MCG/ACT inhaler Inhale 2 puffs Every 4 (Four) Hours As Needed for Wheezing or Shortness of Air. 18 g 1    benzonatate (TESSALON) 100 MG capsule       chlorthalidone (HYGROTON) 25 MG tablet Take 1 tablet by mouth Daily. 30 tablet 5    furosemide (LASIX) 40 MG tablet Take 1 tablet by mouth Every Other Day. 60 tablet 2    losartan (COZAAR) 100 MG tablet Take 1 tablet by mouth Daily. *Appointment required for further refill** 30 tablet 1    metoprolol succinate XL (TOPROL-XL) 50 MG 24 hr tablet Take 1 tablet by mouth Daily. 90 tablet 1    nystatin 599864 UNIT/GM powder apply to the affected area(s) topically 3 times per day 15 g 1    rivaroxaban (Xarelto) 20 MG tablet Take 1 tablet by mouth Daily. 90 tablet 3    sodium hypochlorite (DAKIN'S) 0.25 % topical solution Apply  topically to the appropriate area as directed 2 (Two) Times a Day. Pack the right leg ulcer with moist Dakins gauze twice daily. 473 mL 4     No current facility-administered medications on file prior to visit.       ALLERGIES: Cephalexin and Clindamycin/lincomycin       Objective   Vitals:    07/24/24 1203   BP: 132/78   Weight: (!) 169 kg (372 lb)   Height: 160 cm (62.99\")     Body mass index is 65.91 kg/m².          PHYSICAL EXAM:   Physical Exam     Result Review   LABS:      [facility]          4/11/2024    10:21 4/25/2024    10:26   BMP   BUN 27  40    Creatinine 1.15  1.07    Sodium 139  137    Potassium 4.9  4.3    Chloride 101  99    CO2 26.8  26.0    Calcium 10.1  9.7                 Results Review:       I reviewed the patient's new clinical results.  Lower extremity venous scan and IVC iliac vein scan reviewed with images reviewed    The following radiologic or non-invasive studies have been reviewed by me:   No results found for this or any previous visit from the past 365 days.     No radiology results for the last 30 days.                ASSESSMENT/PLAN:   Diagnoses and all orders for this visit:    1. Lymphedema (Primary)    2. Edema " of both lower extremities due to peripheral venous insufficiency    3. Venous stasis ulcer of right ankle with fat layer exposed without varicose veins       63 y.o. female with uncontrolled lymphedema and ulceration of the right ankle this key creating problems getting her cared for at the lymphedema clinic.  I am not sure why they at least cannot wrap her left leg and get it under control at the wound care center works on her right leg.  There is no venous intervention here that would improve her and as such I do not think there is much for us to offer.  Were going to refer her back to the wound care center for control of the ulcer with Unna boots and then send a note copy of this note once over 2 lymphedema therapy as well so that she can get adequate treatment as soon as that ulcer is healed.  Is very unclear to me why the lymphedema therapy at [facility] will not treat in the face of an ulceration but at the very least they could treat her left leg ongoing.    I discussed the plan with the patient and family who are agreeable to the plan of care at this point. Thank you for this consult.   Follow Up  Return if symptoms worsen or fail to improve.    Juan Jane MD   07/24/24

## 2024-07-30 ENCOUNTER — OFFICE VISIT (OUTPATIENT)
Dept: WOUND CARE | Facility: HOSPITAL | Age: 64
End: 2024-07-30
Payer: MEDICARE

## 2024-07-30 PROCEDURE — 97602 WOUND(S) CARE NON-SELECTIVE: CPT

## 2024-07-30 PROCEDURE — G0463 HOSPITAL OUTPT CLINIC VISIT: HCPCS

## 2024-08-21 ENCOUNTER — OFFICE VISIT (OUTPATIENT)
Dept: FAMILY MEDICINE CLINIC | Facility: CLINIC | Age: 64
End: 2024-08-21
Payer: MEDICARE

## 2024-08-21 VITALS
RESPIRATION RATE: 22 BRPM | BODY MASS INDEX: 51.91 KG/M2 | HEART RATE: 69 BPM | HEIGHT: 63 IN | WEIGHT: 293 LBS | DIASTOLIC BLOOD PRESSURE: 82 MMHG | OXYGEN SATURATION: 98 % | SYSTOLIC BLOOD PRESSURE: 136 MMHG

## 2024-08-21 DIAGNOSIS — R05.1 ACUTE COUGH: Primary | ICD-10-CM

## 2024-08-21 PROCEDURE — 1159F MED LIST DOCD IN RCRD: CPT | Performed by: FAMILY MEDICINE

## 2024-08-21 PROCEDURE — 3075F SYST BP GE 130 - 139MM HG: CPT | Performed by: FAMILY MEDICINE

## 2024-08-21 PROCEDURE — 87428 SARSCOV & INF VIR A&B AG IA: CPT | Performed by: FAMILY MEDICINE

## 2024-08-21 PROCEDURE — 1160F RVW MEDS BY RX/DR IN RCRD: CPT | Performed by: FAMILY MEDICINE

## 2024-08-21 PROCEDURE — 99213 OFFICE O/P EST LOW 20 MIN: CPT | Performed by: FAMILY MEDICINE

## 2024-08-21 PROCEDURE — 1126F AMNT PAIN NOTED NONE PRSNT: CPT | Performed by: FAMILY MEDICINE

## 2024-08-21 PROCEDURE — 3079F DIAST BP 80-89 MM HG: CPT | Performed by: FAMILY MEDICINE

## 2024-08-21 RX ORDER — BENZONATATE 100 MG/1
100 CAPSULE ORAL 3 TIMES DAILY PRN
Qty: 30 CAPSULE | Refills: 1 | Status: SHIPPED | OUTPATIENT
Start: 2024-08-21

## 2024-08-21 NOTE — PROGRESS NOTES
"Chief Complaint  Cough (X 1 week) and Nasal Congestion    Subjective    History of Present Illness    Emely Solomon presents to [facility] for Cough (X 1 week) and Nasal Congestion.  History of Present Illness     Here with about a week of a dry hacking cough and sinus congestion. No shortness of breath, no fevers or chills, no known sick contacts. Worried as she had COVID in the past and was quite ill. Had recent follow-up with cardiology, reports good adherence and tolerance to her current regimen.    Objective     Vital Signs:   /82   Pulse 69   Resp 22   Ht 160 cm (62.99\")   Wt (!) 169 kg (372 lb)   SpO2 98%   BMI 65.92 kg/m²   Physical Exam  Vitals and nursing note reviewed.   Constitutional:       General: She is not in acute distress.     Appearance: Normal appearance. She is not ill-appearing.   Cardiovascular:      Rate and Rhythm: Normal rate and regular rhythm.      Pulses: Normal pulses.      Heart sounds: Normal heart sounds.   Pulmonary:      Effort: Pulmonary effort is normal. No respiratory distress.      Breath sounds: Normal breath sounds. No wheezing or rhonchi.   Neurological:      Mental Status: She is alert.                 Assessment and Plan   Diagnoses and all orders for this visit:    1. Acute cough (Primary)  -     POCT SARS-CoV-2 + Flu Antigen ERICH  -     benzonatate (TESSALON) 100 MG capsule; Take 1 capsule by mouth 3 (Three) Times a Day As Needed for Cough.  Dispense: 30 capsule; Refill: 1  -     dextromethorphan 15 MG/5ML syrup; Take 10 mL by mouth 4 (Four) Times a Day As Needed for Cough.  Dispense: 118 mL; Refill: 1    Point-of-care testing negative for flu and COVID. Suspect an upper respiratory virus. Recommended symptomatic management, rest, and hydration. Prescriptions as above to help with cough. She will keep me updated with any worsening symptoms.    Recommended follow up as below. Encouraged communication via [employer] in the " meantime.     Patient was given instructions and counseling regarding her condition or for health maintenance advice. Please see specific information pulled into the AVS (placed there by myself) if appropriate.    Return in about 1 month (around 9/21/2024), or if symptoms worsen or fail to improve, for Medicare Wellness.    CHIP Wilkerson MD

## 2024-08-23 RX ORDER — LOSARTAN POTASSIUM 100 MG/1
100 TABLET ORAL DAILY
Qty: 90 TABLET | Refills: 1 | Status: SHIPPED | OUTPATIENT
Start: 2024-08-23

## 2024-08-27 ENCOUNTER — OFFICE VISIT (OUTPATIENT)
Dept: WOUND CARE | Facility: HOSPITAL | Age: 64
End: 2024-08-27
Payer: MEDICARE

## 2024-08-27 ENCOUNTER — HOME HEALTH ADMISSION (OUTPATIENT)
Dept: HOME HEALTH SERVICES | Facility: HOME HEALTHCARE | Age: 64
End: 2024-08-27
Payer: MEDICARE

## 2024-08-27 ENCOUNTER — TRANSCRIBE ORDERS (OUTPATIENT)
Dept: HOME HEALTH SERVICES | Facility: HOME HEALTHCARE | Age: 64
End: 2024-08-27
Payer: MEDICARE

## 2024-08-27 DIAGNOSIS — I89.0 LYMPHEDEMA: Primary | ICD-10-CM

## 2024-08-27 PROCEDURE — G0463 HOSPITAL OUTPT CLINIC VISIT: HCPCS

## 2024-08-27 RX ORDER — DOXYCYCLINE 100 MG/1
100 CAPSULE ORAL 2 TIMES DAILY
Qty: 14 CAPSULE | Refills: 0 | Status: SHIPPED | OUTPATIENT
Start: 2024-08-27 | End: 2024-09-03

## 2024-09-17 ENCOUNTER — TRANSCRIBE ORDERS (OUTPATIENT)
Dept: HOME HEALTH SERVICES | Facility: HOME HEALTHCARE | Age: 64
End: 2024-09-17
Payer: MEDICARE

## 2024-09-17 ENCOUNTER — HOME HEALTH ADMISSION (OUTPATIENT)
Dept: HOME HEALTH SERVICES | Facility: HOME HEALTHCARE | Age: 64
End: 2024-09-17
Payer: MEDICARE

## 2024-09-17 ENCOUNTER — OFFICE VISIT (OUTPATIENT)
Dept: WOUND CARE | Facility: HOSPITAL | Age: 64
End: 2024-09-17
Payer: MEDICARE

## 2024-09-17 DIAGNOSIS — I89.0 LYMPHEDEMA OF BOTH LOWER EXTREMITIES: Primary | ICD-10-CM

## 2024-09-18 ENCOUNTER — HOME CARE VISIT (OUTPATIENT)
Dept: HOME HEALTH SERVICES | Facility: HOME HEALTHCARE | Age: 64
End: 2024-09-18
Payer: MEDICARE

## 2024-09-18 VITALS
RESPIRATION RATE: 18 BRPM | TEMPERATURE: 97.1 F | HEART RATE: 67 BPM | OXYGEN SATURATION: 93 % | DIASTOLIC BLOOD PRESSURE: 70 MMHG | SYSTOLIC BLOOD PRESSURE: 142 MMHG

## 2024-09-18 PROCEDURE — G0299 HHS/HOSPICE OF RN EA 15 MIN: HCPCS

## 2024-09-19 ENCOUNTER — HOME CARE VISIT (OUTPATIENT)
Dept: HOME HEALTH SERVICES | Facility: HOME HEALTHCARE | Age: 64
End: 2024-09-19
Payer: MEDICARE

## 2024-09-20 ENCOUNTER — HOME CARE VISIT (OUTPATIENT)
Dept: HOME HEALTH SERVICES | Facility: HOME HEALTHCARE | Age: 64
End: 2024-09-20
Payer: MEDICARE

## 2024-09-20 PROCEDURE — G0299 HHS/HOSPICE OF RN EA 15 MIN: HCPCS

## 2024-09-21 VITALS
DIASTOLIC BLOOD PRESSURE: 82 MMHG | SYSTOLIC BLOOD PRESSURE: 132 MMHG | HEART RATE: 74 BPM | RESPIRATION RATE: 20 BRPM | OXYGEN SATURATION: 97 %

## 2024-09-24 ENCOUNTER — HOME CARE VISIT (OUTPATIENT)
Dept: HOME HEALTH SERVICES | Facility: HOME HEALTHCARE | Age: 64
End: 2024-09-24
Payer: MEDICARE

## 2024-09-24 ENCOUNTER — OFFICE VISIT (OUTPATIENT)
Dept: FAMILY MEDICINE CLINIC | Facility: CLINIC | Age: 64
End: 2024-09-24
Payer: MEDICARE

## 2024-09-24 VITALS
SYSTOLIC BLOOD PRESSURE: 134 MMHG | RESPIRATION RATE: 20 BRPM | TEMPERATURE: 98.6 F | OXYGEN SATURATION: 98 % | HEART RATE: 87 BPM | DIASTOLIC BLOOD PRESSURE: 82 MMHG

## 2024-09-24 VITALS
WEIGHT: 293 LBS | OXYGEN SATURATION: 95 % | SYSTOLIC BLOOD PRESSURE: 136 MMHG | BODY MASS INDEX: 51.91 KG/M2 | RESPIRATION RATE: 20 BRPM | HEIGHT: 63 IN | HEART RATE: 78 BPM | DIASTOLIC BLOOD PRESSURE: 80 MMHG

## 2024-09-24 DIAGNOSIS — R93.89 THICKENED ENDOMETRIUM: ICD-10-CM

## 2024-09-24 DIAGNOSIS — Z91.81 AT LOW RISK FOR FALL: ICD-10-CM

## 2024-09-24 DIAGNOSIS — E66.01 CLASS 3 SEVERE OBESITY DUE TO EXCESS CALORIES WITH SERIOUS COMORBIDITY AND BODY MASS INDEX (BMI) OF 60.0 TO 69.9 IN ADULT: ICD-10-CM

## 2024-09-24 DIAGNOSIS — Z00.00 ROUTINE HEALTH MAINTENANCE: Primary | ICD-10-CM

## 2024-09-24 DIAGNOSIS — Z13.6 ENCOUNTER FOR LIPID SCREENING FOR CARDIOVASCULAR DISEASE: ICD-10-CM

## 2024-09-24 DIAGNOSIS — Z13.1 SCREENING FOR DIABETES MELLITUS: ICD-10-CM

## 2024-09-24 DIAGNOSIS — Z13.220 ENCOUNTER FOR LIPID SCREENING FOR CARDIOVASCULAR DISEASE: ICD-10-CM

## 2024-09-24 DIAGNOSIS — Z23 NEED FOR VACCINATION: ICD-10-CM

## 2024-09-24 DIAGNOSIS — I10 ESSENTIAL HYPERTENSION: ICD-10-CM

## 2024-09-24 PROCEDURE — 91320 SARSCV2 VAC 30MCG TRS-SUC IM: CPT | Performed by: FAMILY MEDICINE

## 2024-09-24 PROCEDURE — 1170F FXNL STATUS ASSESSED: CPT | Performed by: FAMILY MEDICINE

## 2024-09-24 PROCEDURE — 1126F AMNT PAIN NOTED NONE PRSNT: CPT | Performed by: FAMILY MEDICINE

## 2024-09-24 PROCEDURE — 90662 IIV NO PRSV INCREASED AG IM: CPT | Performed by: FAMILY MEDICINE

## 2024-09-24 PROCEDURE — G0299 HHS/HOSPICE OF RN EA 15 MIN: HCPCS

## 2024-09-24 PROCEDURE — G0008 ADMIN INFLUENZA VIRUS VAC: HCPCS | Performed by: FAMILY MEDICINE

## 2024-09-24 PROCEDURE — 90480 ADMN SARSCOV2 VAC 1/ONLY CMP: CPT | Performed by: FAMILY MEDICINE

## 2024-09-24 PROCEDURE — 3079F DIAST BP 80-89 MM HG: CPT | Performed by: FAMILY MEDICINE

## 2024-09-24 PROCEDURE — G0402 INITIAL PREVENTIVE EXAM: HCPCS | Performed by: FAMILY MEDICINE

## 2024-09-24 PROCEDURE — 3075F SYST BP GE 130 - 139MM HG: CPT | Performed by: FAMILY MEDICINE

## 2024-09-25 LAB
ALBUMIN SERPL-MCNC: 4.1 G/DL (ref 3.5–5.2)
ALBUMIN/GLOB SERPL: 1.1 G/DL
ALP SERPL-CCNC: 115 U/L (ref 39–117)
ALT SERPL-CCNC: 12 U/L (ref 1–33)
AST SERPL-CCNC: 12 U/L (ref 1–32)
BILIRUB SERPL-MCNC: 0.5 MG/DL (ref 0–1.2)
BUN SERPL-MCNC: 28 MG/DL (ref 8–23)
BUN/CREAT SERPL: 25.9 (ref 7–25)
CALCIUM SERPL-MCNC: 9.3 MG/DL (ref 8.6–10.5)
CHLORIDE SERPL-SCNC: 97 MMOL/L (ref 98–107)
CHOLEST SERPL-MCNC: 186 MG/DL (ref 0–200)
CO2 SERPL-SCNC: 26.5 MMOL/L (ref 22–29)
CREAT SERPL-MCNC: 1.08 MG/DL (ref 0.57–1)
EGFRCR SERPLBLD CKD-EPI 2021: 57.1 ML/MIN/1.73
GLOBULIN SER CALC-MCNC: 3.7 GM/DL
GLUCOSE SERPL-MCNC: 82 MG/DL (ref 65–99)
HDLC SERPL-MCNC: 58 MG/DL (ref 40–60)
LDLC SERPL CALC-MCNC: 113 MG/DL (ref 0–100)
POTASSIUM SERPL-SCNC: 4.3 MMOL/L (ref 3.5–5.2)
PROT SERPL-MCNC: 7.8 G/DL (ref 6–8.5)
SODIUM SERPL-SCNC: 138 MMOL/L (ref 136–145)
TRIGL SERPL-MCNC: 80 MG/DL (ref 0–150)
VLDLC SERPL CALC-MCNC: 15 MG/DL (ref 5–40)

## 2024-09-26 ENCOUNTER — HOME CARE VISIT (OUTPATIENT)
Dept: HOME HEALTH SERVICES | Facility: HOME HEALTHCARE | Age: 64
End: 2024-09-26
Payer: MEDICARE

## 2024-09-26 VITALS
SYSTOLIC BLOOD PRESSURE: 118 MMHG | RESPIRATION RATE: 18 BRPM | HEART RATE: 101 BPM | DIASTOLIC BLOOD PRESSURE: 80 MMHG | TEMPERATURE: 97.9 F | OXYGEN SATURATION: 95 %

## 2024-09-26 PROCEDURE — G0299 HHS/HOSPICE OF RN EA 15 MIN: HCPCS

## 2024-09-27 ENCOUNTER — HOME CARE VISIT (OUTPATIENT)
Dept: HOME HEALTH SERVICES | Facility: HOME HEALTHCARE | Age: 64
End: 2024-09-27
Payer: MEDICARE

## 2024-09-30 ENCOUNTER — HOME CARE VISIT (OUTPATIENT)
Dept: HOME HEALTH SERVICES | Facility: HOME HEALTHCARE | Age: 64
End: 2024-09-30
Payer: MEDICARE

## 2024-09-30 VITALS
HEART RATE: 97 BPM | TEMPERATURE: 98.9 F | DIASTOLIC BLOOD PRESSURE: 72 MMHG | RESPIRATION RATE: 18 BRPM | SYSTOLIC BLOOD PRESSURE: 132 MMHG | OXYGEN SATURATION: 98 %

## 2024-09-30 PROCEDURE — G0300 HHS/HOSPICE OF LPN EA 15 MIN: HCPCS

## 2024-10-02 ENCOUNTER — HOME CARE VISIT (OUTPATIENT)
Dept: HOME HEALTH SERVICES | Facility: HOME HEALTHCARE | Age: 64
End: 2024-10-02
Payer: MEDICARE

## 2024-10-02 PROCEDURE — G0300 HHS/HOSPICE OF LPN EA 15 MIN: HCPCS

## 2024-10-03 VITALS
HEART RATE: 81 BPM | TEMPERATURE: 98.1 F | DIASTOLIC BLOOD PRESSURE: 66 MMHG | RESPIRATION RATE: 18 BRPM | SYSTOLIC BLOOD PRESSURE: 130 MMHG | OXYGEN SATURATION: 98 %

## 2024-10-04 ENCOUNTER — HOME CARE VISIT (OUTPATIENT)
Dept: HOME HEALTH SERVICES | Facility: HOME HEALTHCARE | Age: 64
End: 2024-10-04
Payer: MEDICARE

## 2024-10-04 PROCEDURE — G0300 HHS/HOSPICE OF LPN EA 15 MIN: HCPCS

## 2024-10-06 VITALS
DIASTOLIC BLOOD PRESSURE: 84 MMHG | RESPIRATION RATE: 18 BRPM | HEART RATE: 83 BPM | SYSTOLIC BLOOD PRESSURE: 142 MMHG | TEMPERATURE: 97.9 F | OXYGEN SATURATION: 94 %

## 2024-10-07 ENCOUNTER — HOME CARE VISIT (OUTPATIENT)
Dept: HOME HEALTH SERVICES | Facility: HOME HEALTHCARE | Age: 64
End: 2024-10-07
Payer: MEDICARE

## 2024-10-07 ENCOUNTER — TELEPHONE (OUTPATIENT)
Dept: CARDIOLOGY | Facility: CLINIC | Age: 64
End: 2024-10-07
Payer: MEDICARE

## 2024-10-07 VITALS — OXYGEN SATURATION: 95 % | HEART RATE: 60 BPM

## 2024-10-07 PROCEDURE — G0299 HHS/HOSPICE OF RN EA 15 MIN: HCPCS

## 2024-10-07 NOTE — TELEPHONE ENCOUNTER
Caller: Emely Solomon    Relationship to patient: Self    Best call back number:  [phone]    Patient is needing: PATIENT HAS ENOUGH XARLETO TO DO UNTIL SUNDAY. MEDICATION  DOLLARS. PATIENT CHECKING FOR SAMPLES AND IF THERES A PROGRAM SHE CAN APPLY FOR MEDICATION.

## 2024-10-08 RX ORDER — CHLORTHALIDONE 25 MG/1
25 TABLET ORAL DAILY
Qty: 90 TABLET | Refills: 1 | Status: SHIPPED | OUTPATIENT
Start: 2024-10-08

## 2024-10-08 NOTE — TELEPHONE ENCOUNTER
PT CALLED BACK AND HAS 6 DAYS OF XARELTO LEFT.  WHAT DOES SHE NEED TO DO? WARFARIN DID NOT WORK FOR HER.  YOU DO NOT HAVE SAMPLES AND SHE CAN NOT AFFORD $800.  PLEASE CALL TO ADVISE

## 2024-10-09 ENCOUNTER — HOME CARE VISIT (OUTPATIENT)
Dept: HOME HEALTH SERVICES | Facility: HOME HEALTHCARE | Age: 64
End: 2024-10-09
Payer: MEDICARE

## 2024-10-09 PROCEDURE — G0300 HHS/HOSPICE OF LPN EA 15 MIN: HCPCS

## 2024-10-10 VITALS
DIASTOLIC BLOOD PRESSURE: 74 MMHG | OXYGEN SATURATION: 95 % | RESPIRATION RATE: 18 BRPM | HEART RATE: 74 BPM | SYSTOLIC BLOOD PRESSURE: 138 MMHG | TEMPERATURE: 97.9 F

## 2024-10-11 ENCOUNTER — HOME CARE VISIT (OUTPATIENT)
Dept: HOME HEALTH SERVICES | Facility: HOME HEALTHCARE | Age: 64
End: 2024-10-11
Payer: MEDICARE

## 2024-10-11 PROCEDURE — G0300 HHS/HOSPICE OF LPN EA 15 MIN: HCPCS

## 2024-10-13 VITALS
SYSTOLIC BLOOD PRESSURE: 132 MMHG | HEART RATE: 84 BPM | TEMPERATURE: 98 F | RESPIRATION RATE: 16 BRPM | OXYGEN SATURATION: 94 % | DIASTOLIC BLOOD PRESSURE: 72 MMHG

## 2024-10-14 ENCOUNTER — HOME CARE VISIT (OUTPATIENT)
Dept: HOME HEALTH SERVICES | Facility: HOME HEALTHCARE | Age: 64
End: 2024-10-14
Payer: MEDICARE

## 2024-10-14 PROCEDURE — G0300 HHS/HOSPICE OF LPN EA 15 MIN: HCPCS

## 2024-10-15 ENCOUNTER — OFFICE VISIT (OUTPATIENT)
Dept: WOUND CARE | Facility: HOSPITAL | Age: 64
End: 2024-10-15
Payer: MEDICARE

## 2024-10-15 VITALS
RESPIRATION RATE: 18 BRPM | SYSTOLIC BLOOD PRESSURE: 136 MMHG | DIASTOLIC BLOOD PRESSURE: 72 MMHG | OXYGEN SATURATION: 94 % | TEMPERATURE: 98.1 F | HEART RATE: 88 BPM

## 2024-10-16 ENCOUNTER — HOME CARE VISIT (OUTPATIENT)
Dept: HOME HEALTH SERVICES | Facility: HOME HEALTHCARE | Age: 64
End: 2024-10-16
Payer: MEDICARE

## 2024-10-16 PROCEDURE — G0300 HHS/HOSPICE OF LPN EA 15 MIN: HCPCS

## 2024-10-17 VITALS
OXYGEN SATURATION: 96 % | HEART RATE: 88 BPM | SYSTOLIC BLOOD PRESSURE: 138 MMHG | RESPIRATION RATE: 18 BRPM | DIASTOLIC BLOOD PRESSURE: 76 MMHG | TEMPERATURE: 97.7 F

## 2024-10-18 ENCOUNTER — HOME CARE VISIT (OUTPATIENT)
Dept: HOME HEALTH SERVICES | Facility: HOME HEALTHCARE | Age: 64
End: 2024-10-18
Payer: MEDICARE

## 2024-10-18 VITALS
TEMPERATURE: 98.2 F | RESPIRATION RATE: 18 BRPM | HEART RATE: 86 BPM | DIASTOLIC BLOOD PRESSURE: 74 MMHG | OXYGEN SATURATION: 97 % | SYSTOLIC BLOOD PRESSURE: 132 MMHG

## 2024-10-18 PROCEDURE — G0300 HHS/HOSPICE OF LPN EA 15 MIN: HCPCS

## 2024-10-21 ENCOUNTER — HOME CARE VISIT (OUTPATIENT)
Dept: HOME HEALTH SERVICES | Facility: HOME HEALTHCARE | Age: 64
End: 2024-10-21
Payer: MEDICARE

## 2024-10-21 PROCEDURE — G0300 HHS/HOSPICE OF LPN EA 15 MIN: HCPCS

## 2024-10-23 ENCOUNTER — HOME CARE VISIT (OUTPATIENT)
Dept: HOME HEALTH SERVICES | Facility: HOME HEALTHCARE | Age: 64
End: 2024-10-23
Payer: MEDICARE

## 2024-10-23 VITALS
SYSTOLIC BLOOD PRESSURE: 110 MMHG | RESPIRATION RATE: 17 BRPM | DIASTOLIC BLOOD PRESSURE: 62 MMHG | BODY MASS INDEX: 65.21 KG/M2 | WEIGHT: 293 LBS

## 2024-10-23 VITALS
SYSTOLIC BLOOD PRESSURE: 140 MMHG | RESPIRATION RATE: 16 BRPM | DIASTOLIC BLOOD PRESSURE: 70 MMHG | TEMPERATURE: 97.8 F | OXYGEN SATURATION: 96 % | HEART RATE: 71 BPM

## 2024-10-23 PROCEDURE — G0299 HHS/HOSPICE OF RN EA 15 MIN: HCPCS

## 2024-10-23 NOTE — CASE COMMUNICATION
I have arrived to patients home several times and patient is not wearing dressings, I have made several notes in the chart and educated patient on keeping dressings on, clean and dry.

## 2024-10-25 ENCOUNTER — HOME CARE VISIT (OUTPATIENT)
Dept: HOME HEALTH SERVICES | Facility: HOME HEALTHCARE | Age: 64
End: 2024-10-25
Payer: MEDICARE

## 2024-10-25 PROCEDURE — G0299 HHS/HOSPICE OF RN EA 15 MIN: HCPCS

## 2024-10-25 NOTE — PROGRESS NOTES
RM:________     PCP: Adilson Wilkerson MD    : 1960  AGE: 64 y.o.  EST PATIENT     REASON FOR VISIT/  CC:        BP Readings from Last 3 Encounters:   10/23/24 110/62   10/21/24 140/70   10/18/24 132/74      Wt Readings from Last 3 Encounters:   10/23/24 (!) 167 kg (368 lb)   24 (!) 169 kg (372 lb)   24 (!) 169 kg (372 lb)        WT: ____________ BP: __________L __________R HR______    CHEST PAIN: _____________    SOA: _____________PALPS: _______________     LIGHTHEADED: ___________FATIGUE: ________________ EDEMA __________    ALLERGIES:Cephalexin and Clindamycin/lincomycin SMOKING HISTORY:  Social History     Tobacco Use    Smoking status: Never    Smokeless tobacco: Never   Vaping Use    Vaping status: Never Used   Substance Use Topics    Alcohol use: Not Currently     Comment: occassionally    Drug use: Never     CAFFEINE USE_________________  ALCOHOL ______________________

## 2024-10-28 ENCOUNTER — HOME CARE VISIT (OUTPATIENT)
Dept: HOME HEALTH SERVICES | Facility: HOME HEALTHCARE | Age: 64
End: 2024-10-28
Payer: MEDICARE

## 2024-10-28 VITALS
OXYGEN SATURATION: 98 % | TEMPERATURE: 98.4 F | HEART RATE: 78 BPM | DIASTOLIC BLOOD PRESSURE: 88 MMHG | RESPIRATION RATE: 18 BRPM | SYSTOLIC BLOOD PRESSURE: 120 MMHG

## 2024-10-28 PROCEDURE — G0300 HHS/HOSPICE OF LPN EA 15 MIN: HCPCS

## 2024-10-28 NOTE — CASE COMMUNICATION
SUPPLY ORDER PLACED WITH MEDLINE PO#[id number]  IODOSORB GEL DRESSING  4IN KERLIX ROLL  4IN ACE BANDAGE  ABD PAD  CALCIUM ALGINATE

## 2024-10-29 VITALS
OXYGEN SATURATION: 96 % | DIASTOLIC BLOOD PRESSURE: 78 MMHG | TEMPERATURE: 98 F | RESPIRATION RATE: 18 BRPM | SYSTOLIC BLOOD PRESSURE: 130 MMHG | HEART RATE: 81 BPM

## 2024-10-29 NOTE — CASE COMMUNICATION
Hello,   Patient is having trouble completing the wound care her self, I have seen her the past several weeks, and days that she leaves the dressing on for 2 days her wounds are making progress with less irritation. I have seen patient in the home several times and she does not have dressing on when I arrive, and her legs are red and inflamed.   Would you be okay with nursing to educated patient on leaving the dressings in place until [facility] completes the wound care?  Thank you   Isha FAUSTIN   [phone]

## 2024-10-30 ENCOUNTER — HOME CARE VISIT (OUTPATIENT)
Dept: HOME HEALTH SERVICES | Facility: HOME HEALTHCARE | Age: 64
End: 2024-10-30
Payer: MEDICARE

## 2024-10-30 PROCEDURE — G0299 HHS/HOSPICE OF RN EA 15 MIN: HCPCS

## 2024-10-31 VITALS
TEMPERATURE: 98.7 F | RESPIRATION RATE: 18 BRPM | OXYGEN SATURATION: 98 % | HEART RATE: 67 BPM | DIASTOLIC BLOOD PRESSURE: 88 MMHG | SYSTOLIC BLOOD PRESSURE: 142 MMHG

## 2024-11-01 ENCOUNTER — HOME CARE VISIT (OUTPATIENT)
Dept: HOME HEALTH SERVICES | Facility: HOME HEALTHCARE | Age: 64
End: 2024-11-01
Payer: MEDICARE

## 2024-11-01 PROCEDURE — G0299 HHS/HOSPICE OF RN EA 15 MIN: HCPCS

## 2024-11-03 VITALS
TEMPERATURE: 97 F | RESPIRATION RATE: 20 BRPM | DIASTOLIC BLOOD PRESSURE: 82 MMHG | HEART RATE: 67 BPM | OXYGEN SATURATION: 98 % | SYSTOLIC BLOOD PRESSURE: 122 MMHG

## 2024-11-04 ENCOUNTER — HOME CARE VISIT (OUTPATIENT)
Dept: HOME HEALTH SERVICES | Facility: HOME HEALTHCARE | Age: 64
End: 2024-11-04
Payer: MEDICARE

## 2024-11-04 PROCEDURE — G0300 HHS/HOSPICE OF LPN EA 15 MIN: HCPCS

## 2024-11-04 NOTE — CASE COMMUNICATION
Patient seen in the home, she is not reporting that she is homebound at this time, and is not keeping her wound care dressings on legs for SN to complete 3 times a week, Patient is taking wound care dressings completed by SN off the day that nursing completes them due to them being uncomfortable, multiple nurses have educated patient to keep the dressings on.   Thank you   Isha FAUSTIN

## 2024-11-04 NOTE — PLAN OF CARE
Problem: Patient Care Overview (Adult)  Goal: Adult Individualization and Mutuality  Outcome: Ongoing (interventions implemented as appropriate)  Goal: Discharge Needs Assessment  Outcome: Ongoing (interventions implemented as appropriate)    Problem: Bronchoscopy (Adult)  Goal: Signs and Symptoms of Listed Potential Problems Will be Absent or Manageable (Bronchoscopy)  Outcome: Ongoing (interventions implemented as appropriate)    07/21/17 0605   Bronchoscopy   Problems Assessed (Bronchoscopy) all   Problems Present (Bronchoscopy) none            [Annual Wellness Visit] : an annual wellness visit

## 2024-11-05 VITALS
SYSTOLIC BLOOD PRESSURE: 132 MMHG | RESPIRATION RATE: 18 BRPM | OXYGEN SATURATION: 95 % | HEART RATE: 88 BPM | TEMPERATURE: 98.2 F | DIASTOLIC BLOOD PRESSURE: 72 MMHG

## 2024-11-06 ENCOUNTER — HOME CARE VISIT (OUTPATIENT)
Dept: HOME HEALTH SERVICES | Facility: HOME HEALTHCARE | Age: 64
End: 2024-11-06
Payer: MEDICARE

## 2024-11-06 VITALS
RESPIRATION RATE: 18 BRPM | HEART RATE: 84 BPM | SYSTOLIC BLOOD PRESSURE: 132 MMHG | DIASTOLIC BLOOD PRESSURE: 70 MMHG | OXYGEN SATURATION: 96 % | TEMPERATURE: 98.1 F

## 2024-11-06 PROCEDURE — G0300 HHS/HOSPICE OF LPN EA 15 MIN: HCPCS

## 2024-11-07 ENCOUNTER — OFFICE VISIT (OUTPATIENT)
Dept: CARDIOLOGY | Facility: CLINIC | Age: 64
End: 2024-11-07
Payer: MEDICARE

## 2024-11-07 VITALS
BODY MASS INDEX: 51.91 KG/M2 | DIASTOLIC BLOOD PRESSURE: 82 MMHG | HEART RATE: 64 BPM | HEIGHT: 63 IN | WEIGHT: 293 LBS | SYSTOLIC BLOOD PRESSURE: 120 MMHG | OXYGEN SATURATION: 96 % | RESPIRATION RATE: 16 BRPM

## 2024-11-07 DIAGNOSIS — I45.2 RIGHT BUNDLE BRANCH BLOCK (RBBB) WITH LEFT ANTERIOR FASCICULAR BLOCK (LAFB): ICD-10-CM

## 2024-11-07 DIAGNOSIS — Q21.12 PFO (PATENT FORAMEN OVALE): ICD-10-CM

## 2024-11-07 DIAGNOSIS — I74.9 PARADOXICAL EMBOLISM: ICD-10-CM

## 2024-11-07 DIAGNOSIS — Z86.718 HISTORY OF DVT OF LOWER EXTREMITY: ICD-10-CM

## 2024-11-07 DIAGNOSIS — I50.32 CHRONIC HEART FAILURE WITH PRESERVED EJECTION FRACTION (HFPEF): Primary | ICD-10-CM

## 2024-11-07 DIAGNOSIS — I10 ESSENTIAL HYPERTENSION: ICD-10-CM

## 2024-11-07 DIAGNOSIS — N18.30 STAGE 3 CHRONIC KIDNEY DISEASE, UNSPECIFIED WHETHER STAGE 3A OR 3B CKD: ICD-10-CM

## 2024-11-07 NOTE — PROGRESS NOTES
Date of Office Visit: 2024  Encounter Provider: Brennan Rogers MD  Place of Service: [facility] CARDIOLOGY  Patient Name: Emely Solomon  :1960    Chief Complaint   Patient presents with    Congestive Heart Failure   :     HPI: Emely Solomon is a 64 y.o. female who presents today in follow up. I have reviewed prior notes and there are no changes except for any new updates described below. I have also reviewed any information entered into the medical record by the patient or by ancillary staff.     In the summer of , she developed an acute DVT as well as a pulmonary embolus. She had a previously undiagnosed patent foramen ovale and developed paradoxical embolus to the left lower extremity (arterial) and required prolonged hospitalization for thrombectomy and anticoagulation. She was sent home on warfarin but became subtherapeutic and had recurrent pulmonary embolus after that. She was changed to rivaroxaban and has done well since then. Her IVC filter has been removed. A repeat echo in 2016 showed significant improvement in her pulmonary hypertension, right-sided enlargement and function.  A repeat venous doppler in 2017 was negative for DVT.    She was admitted in  with cellulitis, bacteremia, and COVID.  An echocardiogram revealed normal left ventricular systolic function as well as mild right ventricular dilation.  She was again noted to have severe pulmonary hypertension.    She has mild, stable exertional dyspnea.  She has leg swelling (severe) due to lymphedema/lipoedema.  She denies orthopnea, PND, chest pain, palpitations, lightheadedness, or syncope.  Her mobility is quite limited and she does not drive.     Past Medical History:   Diagnosis Date    Arthritis     Atheroembolism of other site 2016    Cellulitis     2017, with Group B Strep bacteremia and sepsis    Chronic deep vein thrombosis (DVT) of left popliteal vein 2015     02/04/2016, 04/14/2016    Chronic diastolic congestive heart failure     COVID-19 virus infection 11/2020    Deep venous thrombosis     Exercise involving walking     Heart murmur     Hypertension     Hypo-osmolality and hyponatremia 09/29/2020    Lipoedema     Lymphedema     other    Morbid obesity     Paradoxical embolism     to the LLE due to DVT/PE and PFO    PFO (patent foramen ovale)     Postthrombotic syndrome of left lower extremity without complications 12/17/2015    postphletibis    Pulmonary embolism 04/14/2016    Pulmonary hypertension     multifactorial (dCHF, obesity/ARIEL, hx PE), mild by echo 1/2016    Right bundle branch block (RBBB) with left anterior fascicular block (LAFB)     Sepsis 09/18/2020    Sleep apnea        Past Surgical History:   Procedure Laterality Date    ARTERIOGRAM  07/2015    BRONCHOSCOPY N/A 07/21/2017    Procedure: BRONCHOSCOPY with wash;  Surgeon: Caleb Garcia MD;  Location: [facility] ENDOSCOPY;  Service:     COLONOSCOPY      COLONOSCOPY N/A 01/26/2023    Procedure: COLONOSCOPY to CECUM AND TERM ILEUM;  Surgeon: Jj Dean MD;  Location: [facility] ENDOSCOPY;  Service: Gastroenterology;  Laterality: N/A;  PRE OP -screening  POST OP -  NORMAL    D & C HYSTEROSCOPY N/A 03/08/2022    Procedure: DILATATION AND CURETTAGE with hysteroscopy;  Surgeon: Kaylah Land DO;  Location: [facility] MAIN OR;  Service: Gynecology Oncology;  Laterality: N/A;    DILATATION AND CURETTAGE  04/11/2011    OTHER SURGICAL HISTORY  09/2015    IVC filter    OTHER SURGICAL HISTORY      left LE revascularization    OTHER SURGICAL HISTORY      ALESSIA and LEV scan    THROMBECTOMY  07/2015       Social History     Socioeconomic History    Marital status:    Tobacco Use    Smoking status: Never    Smokeless tobacco: Never   Vaping Use    Vaping status: Never Used   Substance and Sexual Activity    Alcohol use: Not Currently     Comment: occassionally    Drug use: Never    Sexual activity: Defer        Family History   Problem Relation Age of Onset    Breast cancer Mother     Hypertension Other     Ovarian cancer Neg Hx     Uterine cancer Neg Hx     Colon cancer Neg Hx     Malig Hyperthermia Neg Hx        Review of Systems   Constitutional: Negative for malaise/fatigue.   Cardiovascular:  Positive for dyspnea on exertion and leg swelling. Negative for chest pain and palpitations.   Genitourinary:  Positive for hematuria.   Neurological:  Negative for dizziness and light-headedness.   All other systems reviewed and are negative.      Allergies   Allergen Reactions    Cephalexin Hives and Rash     Diffuse rash on cephalexin 1 g PO q6h on 6/17/20  Tolerated zosyn and PCN G September 2020 without issue    Clindamycin/Lincomycin Hives         Current Outpatient Medications:     acetaminophen (TYLENOL) 325 MG tablet, Take 2 tablets by mouth Every 4 (Four) Hours As Needed for Mild Pain ., Disp:  , Rfl:     chlorthalidone (HYGROTON) 25 MG tablet, TAKE 1 TABLET BY MOUTH EVERY DAY, Disp: 90 tablet, Rfl: 1    furosemide (LASIX) 40 MG tablet, Take 1 tablet by mouth Every Other Day., Disp: 60 tablet, Rfl: 2    losartan (COZAAR) 100 MG tablet, Take 1 tablet by mouth Daily. *Appointment required for further refill**, Disp: 90 tablet, Rfl: 1    metoprolol succinate XL (TOPROL-XL) 50 MG 24 hr tablet, Take 1 tablet by mouth Daily., Disp: 90 tablet, Rfl: 1    rivaroxaban (Xarelto) 20 MG tablet, Take 1 tablet by mouth Daily., Disp: 90 tablet, Rfl: 3    albuterol sulfate  (90 Base) MCG/ACT inhaler, Inhale 2 puffs Every 4 (Four) Hours As Needed for Wheezing or Shortness of Air. (Patient not taking: Reported on 11/7/2024), Disp: 18 g, Rfl: 1    nystatin 225480 UNIT/GM powder, apply to the affected area(s) topically 3 times per day (Patient not taking: Reported on 11/7/2024), Disp: 15 g, Rfl: 1     Objective:     Vitals:    11/07/24 1110   Pulse: 64   Resp: 16   SpO2: 96%   Weight: (!) 166 kg (367 lb)   Height: 160 cm  "(62.99\")     Body mass index is 65.03 kg/m².    Physical Exam  Vitals reviewed.   Constitutional:       Comments: Obese   HENT:      Head: Normocephalic.      Nose: Nose normal.      Mouth/Throat:      Pharynx: Oropharynx is clear.      Comments: masked  Eyes:      Conjunctiva/sclera: Conjunctivae normal.   Neck:      Vascular: No JVD.   Cardiovascular:      Rate and Rhythm: Normal rate and regular rhythm.      Heart sounds: Heart sounds are distant. No murmur heard.     Comments: marked lipedema/lymphedema bilaterally with stasis changes and peau d'orange  Pulmonary:      Effort: Pulmonary effort is normal.      Breath sounds: Normal breath sounds.   Abdominal:      Palpations: Abdomen is soft.      Tenderness: There is no abdominal tenderness.      Comments: Obesity limits abdominal exam   Musculoskeletal:         General: Swelling present. Normal range of motion.      Cervical back: Normal range of motion.   Skin:     General: Skin is warm.      Comments: Cratered ulcer RLE in a skin fold, ~ 2x2cm and 1-2cm deep, with granulation tissue and no drainage or pus   Neurological:      General: No focal deficit present.      Mental Status: She is alert.   Psychiatric:         Mood and Affect: Mood normal.       Procedures      Assessment:       Diagnosis Plan   1. Chronic heart failure with preserved ejection fraction (HFpEF)        2. Essential hypertension        3. Stage 3 chronic kidney disease, unspecified whether stage 3a or 3b CKD        4. Paradoxical embolism        5. PFO (patent foramen ovale)        6. History of DVT of lower extremity        7. Right bundle branch block (RBBB) with left anterior fascicular block (LAFB)            Plan:       She has chronic diastolic CHF and multifactorial PHTN (suspected WHO groups 2 and 3).  She has well controlled HTN and stable CKD. I checked labs today; her Na was 139, K was 4.9, and Cr was 1.15 (baseline 0.9-1.2). She has real problems with polyuria and incontinence " and is struggling with her diuretic, but sadly, she depends on the diuretic to stay out of HF.     She has developed acute renal failure in the past when septic.  We'll continue with metoprolol, losartan, chlorthalidone, and furosemide. I think she's at excessive risk of bacterial and yeast infections with an SGLT2i.  Her last echo was in 2020 and everything was stable.  I do not feel that we need to repeat the study at this time.    She had VTE with paradoxical embolus to the leg and is s/p thrombectomy.  She had an IVC filter which has been removed.  Despite her weight, she is on rivaroxaban as it was too difficult to maintain a therapeutic INR on warfarin.      Sincerely,       Brennan Rogers MD

## 2024-11-08 ENCOUNTER — HOME CARE VISIT (OUTPATIENT)
Dept: HOME HEALTH SERVICES | Facility: HOME HEALTHCARE | Age: 64
End: 2024-11-08
Payer: MEDICARE

## 2024-11-08 PROCEDURE — G0300 HHS/HOSPICE OF LPN EA 15 MIN: HCPCS

## 2024-11-10 VITALS
OXYGEN SATURATION: 97 % | SYSTOLIC BLOOD PRESSURE: 132 MMHG | TEMPERATURE: 98.2 F | HEART RATE: 88 BPM | DIASTOLIC BLOOD PRESSURE: 68 MMHG | RESPIRATION RATE: 18 BRPM

## 2024-11-11 ENCOUNTER — HOME CARE VISIT (OUTPATIENT)
Dept: HOME HEALTH SERVICES | Facility: HOME HEALTHCARE | Age: 64
End: 2024-11-11
Payer: MEDICARE

## 2024-11-11 PROCEDURE — G0300 HHS/HOSPICE OF LPN EA 15 MIN: HCPCS

## 2024-11-12 ENCOUNTER — OFFICE VISIT (OUTPATIENT)
Dept: WOUND CARE | Facility: HOSPITAL | Age: 64
End: 2024-11-12
Payer: MEDICARE

## 2024-11-12 VITALS
RESPIRATION RATE: 18 BRPM | SYSTOLIC BLOOD PRESSURE: 142 MMHG | TEMPERATURE: 97.3 F | HEART RATE: 82 BPM | OXYGEN SATURATION: 97 % | DIASTOLIC BLOOD PRESSURE: 80 MMHG

## 2024-11-12 PROCEDURE — G0463 HOSPITAL OUTPT CLINIC VISIT: HCPCS

## 2024-11-13 ENCOUNTER — HOME CARE VISIT (OUTPATIENT)
Dept: HOME HEALTH SERVICES | Facility: HOME HEALTHCARE | Age: 64
End: 2024-11-13
Payer: MEDICARE

## 2024-11-13 PROCEDURE — G0299 HHS/HOSPICE OF RN EA 15 MIN: HCPCS

## 2024-11-14 NOTE — CASE COMMUNICATION
Hello, I am verifying that you will be signing off on Home Health skilled nursing orders for patient recert.   Thank you   Isha FAUSTIN   [phone]

## 2024-11-15 ENCOUNTER — HOME CARE VISIT (OUTPATIENT)
Dept: HOME HEALTH SERVICES | Facility: HOME HEALTHCARE | Age: 64
End: 2024-11-15
Payer: MEDICARE

## 2024-11-15 VITALS
DIASTOLIC BLOOD PRESSURE: 72 MMHG | HEART RATE: 78 BPM | TEMPERATURE: 98.3 F | RESPIRATION RATE: 20 BRPM | OXYGEN SATURATION: 95 % | SYSTOLIC BLOOD PRESSURE: 120 MMHG

## 2024-11-15 PROCEDURE — G0300 HHS/HOSPICE OF LPN EA 15 MIN: HCPCS

## 2024-11-15 NOTE — HOME HEALTH
60 Day Summary:   Home Health need continues for: Lymphedema  FOC: Lymphedema  Primary diagnoses/co-morbidities/recent procedures in past 60 days that impact current episode: Lymphedema  Current level of functional ability: assistance with ADL's. walker at all times   Homebound status and living arrangements: lives at home with , reports she leaves the house to visit her parents in nursing home, clinical manger aware. patient reports this is once a week.  Goals accomplished and/or measurable progress toward unmet goals in past 60 days: goals not met  Focus of care for next 60 days for each discipline ordered: wound care   Skin integrity/wound status: multiple open wounds, Lymphedema.   Estimated date when home care services will end: 60 days

## 2024-11-18 ENCOUNTER — HOME CARE VISIT (OUTPATIENT)
Dept: HOME HEALTH SERVICES | Facility: HOME HEALTHCARE | Age: 64
End: 2024-11-18
Payer: MEDICARE

## 2024-11-18 PROCEDURE — G0299 HHS/HOSPICE OF RN EA 15 MIN: HCPCS

## 2024-11-19 VITALS
OXYGEN SATURATION: 98 % | HEART RATE: 78 BPM | SYSTOLIC BLOOD PRESSURE: 130 MMHG | RESPIRATION RATE: 18 BRPM | TEMPERATURE: 98.1 F | DIASTOLIC BLOOD PRESSURE: 82 MMHG

## 2024-11-20 ENCOUNTER — HOME CARE VISIT (OUTPATIENT)
Dept: HOME HEALTH SERVICES | Facility: HOME HEALTHCARE | Age: 64
End: 2024-11-20
Payer: MEDICARE

## 2024-11-20 PROCEDURE — G0300 HHS/HOSPICE OF LPN EA 15 MIN: HCPCS

## 2024-11-21 VITALS
SYSTOLIC BLOOD PRESSURE: 138 MMHG | TEMPERATURE: 97.8 F | DIASTOLIC BLOOD PRESSURE: 74 MMHG | RESPIRATION RATE: 18 BRPM | HEART RATE: 68 BPM | OXYGEN SATURATION: 97 %

## 2024-11-22 ENCOUNTER — HOME CARE VISIT (OUTPATIENT)
Dept: HOME HEALTH SERVICES | Facility: HOME HEALTHCARE | Age: 64
End: 2024-11-22
Payer: MEDICARE

## 2024-11-22 PROCEDURE — G0300 HHS/HOSPICE OF LPN EA 15 MIN: HCPCS

## 2024-11-24 VITALS
HEART RATE: 64 BPM | OXYGEN SATURATION: 97 % | RESPIRATION RATE: 18 BRPM | DIASTOLIC BLOOD PRESSURE: 74 MMHG | TEMPERATURE: 96.7 F | SYSTOLIC BLOOD PRESSURE: 132 MMHG

## 2024-11-25 ENCOUNTER — HOME CARE VISIT (OUTPATIENT)
Dept: HOME HEALTH SERVICES | Facility: HOME HEALTHCARE | Age: 64
End: 2024-11-25
Payer: MEDICARE

## 2024-11-25 VITALS
OXYGEN SATURATION: 93 % | SYSTOLIC BLOOD PRESSURE: 140 MMHG | RESPIRATION RATE: 18 BRPM | DIASTOLIC BLOOD PRESSURE: 80 MMHG | HEART RATE: 63 BPM | TEMPERATURE: 97.3 F

## 2024-11-25 PROCEDURE — G0300 HHS/HOSPICE OF LPN EA 15 MIN: HCPCS

## 2024-11-27 ENCOUNTER — HOME CARE VISIT (OUTPATIENT)
Dept: HOME HEALTH SERVICES | Facility: HOME HEALTHCARE | Age: 64
End: 2024-11-27
Payer: MEDICARE

## 2024-11-27 PROCEDURE — G0300 HHS/HOSPICE OF LPN EA 15 MIN: HCPCS

## 2024-11-28 VITALS
HEART RATE: 86 BPM | DIASTOLIC BLOOD PRESSURE: 78 MMHG | OXYGEN SATURATION: 96 % | TEMPERATURE: 97.4 F | RESPIRATION RATE: 18 BRPM | SYSTOLIC BLOOD PRESSURE: 132 MMHG

## 2024-11-29 ENCOUNTER — HOME CARE VISIT (OUTPATIENT)
Dept: HOME HEALTH SERVICES | Facility: HOME HEALTHCARE | Age: 64
End: 2024-11-29
Payer: MEDICARE

## 2024-11-29 PROCEDURE — G0300 HHS/HOSPICE OF LPN EA 15 MIN: HCPCS

## 2024-11-30 VITALS
TEMPERATURE: 97.5 F | HEART RATE: 63 BPM | RESPIRATION RATE: 18 BRPM | OXYGEN SATURATION: 96 % | DIASTOLIC BLOOD PRESSURE: 72 MMHG | SYSTOLIC BLOOD PRESSURE: 136 MMHG

## 2024-12-02 ENCOUNTER — HOME CARE VISIT (OUTPATIENT)
Dept: HOME HEALTH SERVICES | Facility: HOME HEALTHCARE | Age: 64
End: 2024-12-02
Payer: MEDICARE

## 2024-12-02 PROCEDURE — G0300 HHS/HOSPICE OF LPN EA 15 MIN: HCPCS

## 2024-12-03 VITALS
RESPIRATION RATE: 18 BRPM | OXYGEN SATURATION: 98 % | HEART RATE: 61 BPM | TEMPERATURE: 97.5 F | DIASTOLIC BLOOD PRESSURE: 78 MMHG | SYSTOLIC BLOOD PRESSURE: 136 MMHG

## 2024-12-04 ENCOUNTER — HOME CARE VISIT (OUTPATIENT)
Dept: HOME HEALTH SERVICES | Facility: HOME HEALTHCARE | Age: 64
End: 2024-12-04
Payer: MEDICARE

## 2024-12-04 PROCEDURE — G0300 HHS/HOSPICE OF LPN EA 15 MIN: HCPCS

## 2024-12-05 VITALS
HEART RATE: 64 BPM | TEMPERATURE: 98 F | DIASTOLIC BLOOD PRESSURE: 74 MMHG | OXYGEN SATURATION: 99 % | SYSTOLIC BLOOD PRESSURE: 140 MMHG

## 2024-12-06 ENCOUNTER — HOME CARE VISIT (OUTPATIENT)
Dept: HOME HEALTH SERVICES | Facility: HOME HEALTHCARE | Age: 64
End: 2024-12-06
Payer: MEDICARE

## 2024-12-06 PROCEDURE — G0300 HHS/HOSPICE OF LPN EA 15 MIN: HCPCS

## 2024-12-08 VITALS
OXYGEN SATURATION: 96 % | TEMPERATURE: 98 F | DIASTOLIC BLOOD PRESSURE: 72 MMHG | RESPIRATION RATE: 18 BRPM | SYSTOLIC BLOOD PRESSURE: 116 MMHG | HEART RATE: 62 BPM

## 2024-12-09 ENCOUNTER — HOME CARE VISIT (OUTPATIENT)
Dept: HOME HEALTH SERVICES | Facility: HOME HEALTHCARE | Age: 64
End: 2024-12-09
Payer: MEDICARE

## 2024-12-09 PROCEDURE — G0300 HHS/HOSPICE OF LPN EA 15 MIN: HCPCS

## 2024-12-10 ENCOUNTER — OFFICE VISIT (OUTPATIENT)
Dept: WOUND CARE | Facility: HOSPITAL | Age: 64
End: 2024-12-10
Payer: MEDICARE

## 2024-12-10 VITALS
RESPIRATION RATE: 18 BRPM | OXYGEN SATURATION: 95 % | DIASTOLIC BLOOD PRESSURE: 60 MMHG | HEART RATE: 61 BPM | SYSTOLIC BLOOD PRESSURE: 120 MMHG | TEMPERATURE: 97.8 F

## 2024-12-11 ENCOUNTER — HOME CARE VISIT (OUTPATIENT)
Dept: HOME HEALTH SERVICES | Facility: HOME HEALTHCARE | Age: 64
End: 2024-12-11
Payer: MEDICARE

## 2024-12-11 PROCEDURE — G0299 HHS/HOSPICE OF RN EA 15 MIN: HCPCS

## 2024-12-13 ENCOUNTER — HOME CARE VISIT (OUTPATIENT)
Dept: HOME HEALTH SERVICES | Facility: HOME HEALTHCARE | Age: 64
End: 2024-12-13
Payer: MEDICARE

## 2024-12-13 VITALS
SYSTOLIC BLOOD PRESSURE: 122 MMHG | HEART RATE: 68 BPM | DIASTOLIC BLOOD PRESSURE: 68 MMHG | RESPIRATION RATE: 18 BRPM | OXYGEN SATURATION: 96 %

## 2024-12-13 PROCEDURE — G0300 HHS/HOSPICE OF LPN EA 15 MIN: HCPCS

## 2024-12-13 RX ORDER — LOSARTAN POTASSIUM 100 MG/1
100 TABLET ORAL DAILY
Qty: 30 TABLET | Refills: 4 | Status: SHIPPED | OUTPATIENT
Start: 2024-12-13

## 2024-12-15 VITALS
RESPIRATION RATE: 18 BRPM | SYSTOLIC BLOOD PRESSURE: 120 MMHG | HEART RATE: 75 BPM | OXYGEN SATURATION: 99 % | DIASTOLIC BLOOD PRESSURE: 70 MMHG | TEMPERATURE: 97.5 F

## 2024-12-16 ENCOUNTER — HOME CARE VISIT (OUTPATIENT)
Dept: HOME HEALTH SERVICES | Facility: HOME HEALTHCARE | Age: 64
End: 2024-12-16
Payer: MEDICARE

## 2024-12-16 VITALS
RESPIRATION RATE: 18 BRPM | SYSTOLIC BLOOD PRESSURE: 132 MMHG | OXYGEN SATURATION: 95 % | HEART RATE: 79 BPM | TEMPERATURE: 97.4 F | DIASTOLIC BLOOD PRESSURE: 72 MMHG

## 2024-12-16 PROCEDURE — G0300 HHS/HOSPICE OF LPN EA 15 MIN: HCPCS

## 2024-12-18 ENCOUNTER — HOME CARE VISIT (OUTPATIENT)
Dept: HOME HEALTH SERVICES | Facility: HOME HEALTHCARE | Age: 64
End: 2024-12-18
Payer: MEDICARE

## 2024-12-18 VITALS
RESPIRATION RATE: 18 BRPM | SYSTOLIC BLOOD PRESSURE: 136 MMHG | TEMPERATURE: 97.4 F | OXYGEN SATURATION: 95 % | DIASTOLIC BLOOD PRESSURE: 72 MMHG | HEART RATE: 89 BPM

## 2024-12-18 PROCEDURE — G0300 HHS/HOSPICE OF LPN EA 15 MIN: HCPCS

## 2024-12-19 ENCOUNTER — OFFICE VISIT (OUTPATIENT)
Dept: OBSTETRICS AND GYNECOLOGY | Age: 64
End: 2024-12-19
Payer: MEDICARE

## 2024-12-19 VITALS — BODY MASS INDEX: 51.91 KG/M2 | HEIGHT: 63 IN | WEIGHT: 293 LBS

## 2024-12-19 DIAGNOSIS — Z12.4 SCREENING FOR CERVICAL CANCER: ICD-10-CM

## 2024-12-19 DIAGNOSIS — Z01.419 ENCOUNTER FOR ANNUAL ROUTINE GYNECOLOGICAL EXAMINATION: Primary | ICD-10-CM

## 2024-12-19 DIAGNOSIS — N39.44 NOCTURNAL ENURESIS: ICD-10-CM

## 2024-12-19 DIAGNOSIS — R93.89 THICKENED ENDOMETRIUM: ICD-10-CM

## 2024-12-19 DIAGNOSIS — Z12.31 SCREENING MAMMOGRAM FOR BREAST CANCER: ICD-10-CM

## 2024-12-19 NOTE — THERAPY TREATMENT NOTE
[facility]  [facility]  [address] [facility] [address]  Dept: [phone]  Loc: [phone]    Visit Date: 12/19/2024    Audrey Miguel a 33 y.o. female who presents today for:   Chief Complaint   Patient presents with    1 Month Follow-Up     Follow up on Lexapro. Pt feels medication is working well.     HPI:     Lexapro 10 mg is working well - if she takes it during the day she gets sleepy - taking it at night. Takes Atarax 10 mg in morning and at bed - working well for her     HPI  Health Maintenance   Topic Date Due    Hepatitis B vaccine (1 of 3 - 19+ 3-dose series) Never done    Flu vaccine (1) 08/01/2024    COVID-19 Vaccine (3 - 2023-24 season) 09/01/2024    Cervical cancer screen  07/29/2025    Depression Monitoring  11/14/2025    DTaP/Tdap/Td vaccine (3 - Td or Tdap) 08/13/2029    Hepatitis C screen  Completed    HIV screen  Completed    Hepatitis A vaccine  Aged Out    Hib vaccine  Aged Out    HPV vaccine  Aged Out    Polio vaccine  Aged Out    Meningococcal (ACWY) vaccine  Aged Out    Pneumococcal 0-64 years Vaccine  Aged Out    Varicella vaccine  Discontinued       Current Outpatient Medications   Medication Sig Dispense Refill    escitalopram (LEXAPRO) 10 MG tablet Take 1 tablet by mouth daily 30 tablet 3    hydrOXYzine HCl (ATARAX) 10 MG tablet Take 1 tablet by mouth 3 times daily as needed for Anxiety 90 tablet 1    cetirizine (ZYRTEC) 10 MG tablet Take 1 tablet by mouth daily      Ubrogepant (UBRELVY) 50 MG TABS TAKE 1 TABLET BY MOUTH AS NEEDED (HEADACHE) 10 tablet 4    Multiple Vitamins-Minerals (CENTRUM) TABS Take by mouth daily      magnesium (MAGNESIUM-OXIDE) 250 MG TABS tablet Take 1 tablet by mouth daily      acetaminophen (TYLENOL) 325 MG tablet Take 500 mg by mouth every 6 hours as needed for Pain       No current facility-administered medications for this visit.     Allergies   Allergen Reactions    Dynapen [Dicloxacillin] Other (See  Outpatient Occupational Therapy Lymphedema Treatment Note  [facility]     Patient Name: Emely Solomon  : 1960  MRN: [id number]  Today's Date: 12/15/2020      Visit Date: 12/15/2020    Patient Active Problem List   Diagnosis   • Chronic diastolic CHF (congestive heart failure) (CMS/HCC)   • PFO (patent foramen ovale)   • Paradoxical embolism (CMS/HCC)   • Essential hypertension   • Pulmonary hypertension (CMS/HCC)   • Back pain   • ARIEL (obstructive sleep apnea)   • Class 3 severe obesity due to excess calories with serious comorbidity and body mass index (BMI) greater than or equal to 70 in adult (CMS/HCC)   • History of DVT of lower extremity   • Lymphedema   • Anemia, chronic disease   • CKD (chronic kidney disease) stage 3, GFR 30-59 ml/min   • Iron deficiency        Past Medical History:   Diagnosis Date   • Cellulitis     2017, with Group B Strep bacteremia and sepsis   • Chronic diastolic congestive heart failure (CMS/HCC)    • Deep venous thrombosis (CMS/HCC)    • Hypertension    • Lipoedema    • Lymphedema    • Morbid obesity (CMS/HCC)    • Paradoxical embolism (CMS/HCC)     to the LLE due to DVT/PE and PFO   • PFO (patent foramen ovale)    • Pulmonary embolism (CMS/HCC)    • Pulmonary hypertension (CMS/HCC)     multifactorial (dCHF, obesity/ARIEL, hx PE), mild by echo 2016   • Sepsis (CMS/HCC) 2020        Past Surgical History:   Procedure Laterality Date   • BRONCHOSCOPY N/A 2017    Procedure: BRONCHOSCOPY with wash;  Surgeon: Caleb Garcia MD;  Location: [facility] ENDOSCOPY;  Service:    • DILATATION AND CURETTAGE  2011   • VENA CAVA FILTER PLACEMENT      Inferior Vena Cava Filter Placement w/Fluorosc angiogr guidance         Visit Dx:      ICD-10-CM ICD-9-CM   1. Lymphedema of both lower extremities  I89.0 457.1   2. Lymphedema, not elsewhere classified  I89.0 457.1       Lymphedema     Row Name 12/15/20 0800             Subjective Pain    Able to rate subjective pain?  yes  -CW       Pre-Treatment Pain Level  1  -CW      Post-Treatment Pain Level  1  -CW         Subjective Comments    Subjective Comments  Pt. went to the wound clinic this morning. Mild tenderness L wound area.   -CW         Skin Changes/Observations    Location/Assessment  Lower Extremity  -CW      Lower Extremity Conditions  bilateral:;dry;shiny;scaly  -CW      Lower Extremity Color/Pigment  bilateral:;red;fibrosis  -CW      Lower Extremity Skin Details  Wound dressings completed by  wound clinic L lower leg  -CW         Manual Lymphatic Drainage    Manual Lymphatic Drainage  initial sequence;opened regional lymph nodes;opened anastamoses;extremity treatment  -CW      Initial Sequence  short neck  -CW      Opened Regional Lymph Nodes  axillary;inguinal  -CW      Axillary  right;left  -CW      Inguinal  right;left  -CW      Opened Anastamoses  inguino-axillary  -CW      Inguino-Axillary  right;left  -CW      Extremity Treatment  MLD to full limb  -CW      MLD to Full Limb  BLE's  -CW         Compression/Skin Care    Compression/Skin Care  skin care;wrapping location;bandaging;compression garment;remove bandages  -CW      Skin Care  moisturizing lotion applied Applied lotion to both legs.   -CW      Wrapping Location  lower extremity  -CW      Wrapping Location LE  bilateral:;ankle;knee  -CW      Wrapping Comments  RLE-K1, 2- Rosidal soft, 1- 10cm. Artiflex, 1- 8cm. 3-10cm. rosidal bandages.  Added tubigrip to R/LLE ankle to thigh. Kerlex and ABD pad to LLE. LLE added 3- 10 cm. Rosidal.   -CW      Bandage Layers  cotton liner;padding/fluff layer;soft foam- 1/4 inch;short-stretch bandages (comment size/quantity)  -CW      Bandaging Technique  circumferential/spiral;moderate compression  -CW      Remove Bandages  Bandages were removed at home.  -CW      Compression/Skin Care Comments  wound dressings completed by wound clinic Maxorb silver and Betadine.   -CW        User Key  (r) = Recorded By, (t) = Taken By, (c) = Cosigned  By    Initials Name Provider Type    Colleen Cleary OTR Occupational Therapist                  OT Assessment/Plan     Row Name 12/15/20 0912          OT Assessment    Assessment Comments  Pt. just went to the wound clinic. Dressing changes completed by wound clinic -maxoub silver and Betadine. Wound appears to be not as red or draining. Wound not as deep per observations. Pt. reports her pain is decreased and is able to move a little better with less discomfort. Applied the bandages with slightly increased compression B feet to knees. Added tubigrip to thighs. Reviewed bandage precautions and wearing schedule. Encouraged HEP and LE elevation.  -CW        OT Plan    OT Plan Comments  Plan to cont. tx. Pt. to wear bandages day/night and remove for wound dressing change/bathing.  -CW       User Key  (r) = Recorded By, (t) = Taken By, (c) = Cosigned By    Initials Name Provider Type    Colleen Cleary OTR Occupational Therapist                 Manual Rx (last 36 hours)      Manual Treatments     Row Name 12/15/20 0958             Total Minutes    58398 - OT Manual Therapy Minutes  59  -CW        User Key  (r) = Recorded By, (t) = Taken By, (c) = Cosigned By    Initials Name Provider Type    Colleen Cleary OTR Occupational Therapist            Therapy Education  Given: Bandaging/dressing change, Edema management  Program: Reinforced, Progressed  How Provided: Verbal, Demonstration  Provided to: Patient  Level of Understanding: Verbalized  16769 - OT Self Care/Mgmt Minutes: 10                Time Calculation:   OT Start Time: 0850  OT Stop Time: 0959  OT Time Calculation (min): 69 min  Total Timed Code Minutes- OT: 69 minute(s)     Therapy Charges for Today     Code Description Service Date Service Provider Modifiers Qty    56398416957 HC OT MANUAL THERAPY EA 15 MIN 12/15/2020 Colleen Hidalgo OTR GO 4    20955883273 HC OT SELF CARE/MGMT/TRAIN EA 15 MIN 12/15/2020 Colleen Hidalgo OTR GO 1                       Colleen Hidalgo, OTR  12/15/2020

## 2024-12-19 NOTE — PROGRESS NOTES
[facility]   Obstetrics and Gynecology     2024    Patient: Emely Solomon          MR#:[id number]    History of Present Illness    Chief Complaint   Patient presents with    Gynecologic Exam     CC: new gyn. Last pap unknown. No recent MG. Last colonscopy 23. Pt states she has bleeding  that subsided.        64 y.o. female  who presents for annual exam. She Was previously being evaluated by Dr. Land for thickened endometrium and ovarian cyst per patient. Dr. Land attempted a hysteroscopy, but sample obtained with insufficient and examination was severely limited by patient habitus. She has not had any follow-up regarding this since early . She denies bleeding and pain today and states she has been going to weight watchers to attempt to lose weight. She has severe lymphedema of her legs which also adds to difficulty with pelvic examinations.     Relevant data reviewed:    No LMP recorded. Patient is postmenopausal.  Obstetric History:  OB History          0    Para   0    Term   0       0    AB   0    Living   0         SAB   0    IAB   0    Ectopic   0    Molar   0    Multiple   0    Live Births   0               Menstrual History:     No LMP recorded. Patient is postmenopausal.       Social History     Substance and Sexual Activity   Sexual Activity Not Currently    Partners: Male    Birth control/protection: Post-menopausal     ______________________________________  Patient Active Problem List   Diagnosis    Chronic heart failure with preserved ejection fraction (HFpEF)    PFO (patent foramen ovale)    Paradoxical embolism    Essential hypertension    Pulmonary hypertension    Back pain    ARIEL (obstructive sleep apnea)    Class 3 severe obesity due to excess calories with serious comorbidity and body mass index (BMI) of 60.0 to 69.9 in adult    History of DVT of lower extremity    Lymphedema    Anemia, chronic disease    CKD (chronic kidney  disease) stage 3, GFR 30-59 ml/min    Iron deficiency    Encounter for screening for malignant neoplasm of colon    Post-menopausal bleeding    Thickened endometrium    Generalized muscle weakness    Right bundle branch block (RBBB) with left anterior fascicular block (LAFB)    PVD (peripheral vascular disease) with claudication    Iliac vein stenosis, right    Edema of both lower extremities due to peripheral venous insufficiency    Chronic venous hypertension with ulcer and inflammation involving right side    Venous stasis ulcer of ankle with fat layer exposed     Past Medical History:   Diagnosis Date    Arthritis     Atheroembolism of other site 04/14/2016    Cellulitis     11/2017, with Group B Strep bacteremia and sepsis    Chronic deep vein thrombosis (DVT) of left popliteal vein 12/17/2015 02/04/2016, 04/14/2016    Chronic diastolic congestive heart failure     COVID-19 virus infection 11/2020    Deep venous thrombosis     Exercise involving walking     Heart murmur     Hypertension     Hypo-osmolality and hyponatremia 09/29/2020    Lipoedema     Lymphedema     other    Morbid obesity     Paradoxical embolism     to the LLE due to DVT/PE and PFO    PFO (patent foramen ovale)     Postthrombotic syndrome of left lower extremity without complications 12/17/2015    postphletibis    Pulmonary embolism 04/14/2016    Pulmonary hypertension     multifactorial (dCHF, obesity/ARIEL, hx PE), mild by echo 1/2016    Right bundle branch block (RBBB) with left anterior fascicular block (LAFB)     Sepsis 09/18/2020    Sleep apnea      Past Surgical History:   Procedure Laterality Date    ARTERIOGRAM  07/2015    BRONCHOSCOPY N/A 07/21/2017    Procedure: BRONCHOSCOPY with wash;  Surgeon: Caleb Garcia MD;  Location: [facility] ENDOSCOPY;  Service:     COLONOSCOPY      COLONOSCOPY N/A 01/26/2023    Procedure: COLONOSCOPY to CECUM AND TERM ILEUM;  Surgeon: Jj Dean MD;  Location: [facility] ENDOSCOPY;  Service:  Gastroenterology;  Laterality: N/A;  PRE OP -screening  POST OP -  NORMAL    D & C HYSTEROSCOPY N/A 03/08/2022    Procedure: DILATATION AND CURETTAGE with hysteroscopy;  Surgeon: Kaylah Land DO;  Location: [facility] MAIN OR;  Service: Gynecology Oncology;  Laterality: N/A;    DILATATION AND CURETTAGE  04/11/2011    OTHER SURGICAL HISTORY  09/2015    IVC filter    OTHER SURGICAL HISTORY      left LE revascularization    OTHER SURGICAL HISTORY      ALESSIA and LEV scan    THROMBECTOMY  07/2015     Social History     Tobacco Use   Smoking Status Never   Smokeless Tobacco Never     Family History   Problem Relation Age of Onset    Breast cancer Mother     Hypertension Other     Ovarian cancer Neg Hx     Uterine cancer Neg Hx     Colon cancer Neg Hx     Malig Hyperthermia Neg Hx      Prior to Admission medications    Medication Sig Start Date End Date Taking? Authorizing Provider   acetaminophen (TYLENOL) 325 MG tablet Take 2 tablets by mouth Every 4 (Four) Hours As Needed for Mild Pain . 9/29/20  Yes Fernanda Conrad APRN   chlorthalidone (HYGROTON) 25 MG tablet TAKE 1 TABLET BY MOUTH EVERY DAY 10/8/24  Yes Brennan Rogers MD   furosemide (LASIX) 40 MG tablet Take 1 tablet by mouth Every Other Day. 4/25/24  Yes Tessy Fink APRN   losartan (COZAAR) 100 MG tablet TAKE 1 TABLET BY MOUTH DAILY 12/13/24  Yes Brennan Rogers MD   metoprolol succinate XL (TOPROL-XL) 50 MG 24 hr tablet Take 1 tablet by mouth Daily. 7/1/24  Yes Brennan Rogers MD   rivaroxaban (Xarelto) 20 MG tablet Take 1 tablet by mouth Daily. 1/10/24  Yes Brennan Rogers MD   albuterol sulfate  (90 Base) MCG/ACT inhaler Inhale 2 puffs Every 4 (Four) Hours As Needed for Wheezing or Shortness of Air.  Patient not taking: Reported on 12/19/2024 11/30/23   Raquel Balderrama APRN   nystatin 588475 UNIT/GM powder apply to the affected area(s) topically 3 times per day  Patient not taking: Reported on 12/19/2024 1/18/23     "    _______________________________________    Current contraception: post menopausal status  History of abnormal Pap smear: no  Family history of uterine or ovarian cancer: no  Family History of colon cancer/colon polyps: no  History of abnormal mammogram: no      The following portions of the patient's history were reviewed and updated as appropriate: allergies, current medications, past family history, past medical history, past social history, past surgical history, and problem list.        Pertinent items are noted in HPI.       Objective   Physical Exam  Vitals and nursing note reviewed. Exam conducted with a chaperone present.   Constitutional:       Appearance: Normal appearance. She is obese.   HENT:      Head: Normocephalic and atraumatic.   Pulmonary:      Effort: Pulmonary effort is normal.   Chest:   Breasts:     Right: Normal.      Left: Normal.   Abdominal:      General: Abdomen is flat.      Palpations: Abdomen is soft.   Genitourinary:     Exam position: Lithotomy position.      Labia:         Right: No rash or lesion.         Left: No rash or lesion.       Vagina: Normal. No vaginal discharge or lesions.      Comments: Exam severely limited by habitus, cervix could not be visualized.   Musculoskeletal:      Right lower leg: Edema present.      Left lower leg: Edema present.   Lymphadenopathy:      Upper Body:      Right upper body: No supraclavicular, axillary or pectoral adenopathy.      Left upper body: No supraclavicular, axillary or pectoral adenopathy.   Skin:     General: Skin is warm and dry.   Neurological:      Mental Status: She is alert and oriented to person, place, and time.   Psychiatric:         Mood and Affect: Mood normal.         Ht 160 cm (62.99\")   Wt (!) 168 kg (370 lb 9.6 oz)   BMI 65.67 kg/m²    BP Readings from Last 3 Encounters:   12/18/24 136/72   12/16/24 132/72   12/13/24 120/70      Wt Readings from Last 3 Encounters:   12/19/24 (!) 168 kg (370 lb 9.6 oz)   11/07/24 " "(!) 166 kg (367 lb)   10/23/24 (!) 167 kg (368 lb)        BMI: Estimated body mass index is 65.67 kg/m² as calculated from the following:    Height as of this encounter: 160 cm (62.99\").    Weight as of this encounter: 168 kg (370 lb 9.6 oz).    As part of wellness and prevention, the following topics were discussed with the patient:  Encouraged self breast exam  Physical activity and regular exercised encouraged.   Healthy weight discussed.  Sexual transmitted disease prevention   Dental health discussed  Vaccinations/immunizations addressed.         Reminder:   Complete UI metric     Problem List   Meds  History  Prep for Surg   Imagin}    Assessment:  64 y.o. female  who presents for annual exam.  Problem List Items Addressed This Visit       Thickened endometrium    Overview     - Return for attempt at TVUS.   - Disc with pt that her weight is very much limiting our ability to evaluate her problems and encouraged continued attempt at weight loss.   - Discussed that we will attempt TVUS, but if we are not able to visualize reproductive organs, may recommend CT or MRI.   - Regardless, if intervention is required she is not currently a surgical candidate due to obesity and co morbidities.           Other Visit Diagnoses       Encounter for annual routine gynecological examination    -  Primary    Nocturnal enuresis        Screening mammogram for breast cancer        Relevant Orders    Mammo screening digital tomosynthesis bilateral w CAD    Screening for cervical cancer        Relevant Orders    IGP, Apt HPV,rfx 16 / 18,45             - Pap > unable to visualize cervix today > blind pap obtained.   - Mammo/Colonoscopy > mammo ordered, colonoscopy up to date   - Vaccine recs reviewed  - STI screening declined       Plan:  No follow-ups on file.    Colleen Corbin MD  2024 15:51 EST  "

## 2024-12-20 ENCOUNTER — HOME CARE VISIT (OUTPATIENT)
Dept: HOME HEALTH SERVICES | Facility: HOME HEALTHCARE | Age: 64
End: 2024-12-20
Payer: MEDICARE

## 2024-12-20 PROCEDURE — G0300 HHS/HOSPICE OF LPN EA 15 MIN: HCPCS

## 2024-12-22 VITALS
SYSTOLIC BLOOD PRESSURE: 132 MMHG | RESPIRATION RATE: 18 BRPM | DIASTOLIC BLOOD PRESSURE: 64 MMHG | TEMPERATURE: 97.2 F | OXYGEN SATURATION: 96 % | HEART RATE: 89 BPM

## 2024-12-23 ENCOUNTER — HOME CARE VISIT (OUTPATIENT)
Dept: HOME HEALTH SERVICES | Facility: HOME HEALTHCARE | Age: 64
End: 2024-12-23
Payer: MEDICARE

## 2024-12-23 PROCEDURE — G0300 HHS/HOSPICE OF LPN EA 15 MIN: HCPCS

## 2024-12-27 ENCOUNTER — HOME CARE VISIT (OUTPATIENT)
Dept: HOME HEALTH SERVICES | Facility: HOME HEALTHCARE | Age: 64
End: 2024-12-27
Payer: MEDICARE

## 2024-12-27 PROCEDURE — G0299 HHS/HOSPICE OF RN EA 15 MIN: HCPCS

## 2024-12-28 VITALS
SYSTOLIC BLOOD PRESSURE: 130 MMHG | RESPIRATION RATE: 16 BRPM | OXYGEN SATURATION: 100 % | HEART RATE: 82 BPM | DIASTOLIC BLOOD PRESSURE: 62 MMHG

## 2024-12-30 ENCOUNTER — HOME CARE VISIT (OUTPATIENT)
Dept: HOME HEALTH SERVICES | Facility: HOME HEALTHCARE | Age: 64
End: 2024-12-30
Payer: MEDICARE

## 2024-12-30 LAB
CYTOLOGIST CVX/VAG CYTO: ABNORMAL
CYTOLOGY CVX/VAG DOC CYTO: ABNORMAL
CYTOLOGY CVX/VAG DOC THIN PREP: ABNORMAL
DX ICD CODE: ABNORMAL
HPV I/H RISK 4 DNA CVX QL PROBE+SIG AMP: POSITIVE
HPV16 DNA CVX QL PROBE+SIG AMP: NEGATIVE
HPV18+45 E6+E7 MRNA CVX QL NAA+PROBE: NEGATIVE
Lab: ABNORMAL
OTHER STN SPEC: ABNORMAL
STAT OF ADQ CVX/VAG CYTO-IMP: ABNORMAL

## 2024-12-30 PROCEDURE — G0300 HHS/HOSPICE OF LPN EA 15 MIN: HCPCS

## 2025-01-01 ENCOUNTER — HOME CARE VISIT (OUTPATIENT)
Dept: HOME HEALTH SERVICES | Facility: HOME HEALTHCARE | Age: 65
End: 2025-01-01
Payer: MEDICARE

## 2025-01-01 PROCEDURE — G0300 HHS/HOSPICE OF LPN EA 15 MIN: HCPCS

## 2025-01-02 VITALS
DIASTOLIC BLOOD PRESSURE: 72 MMHG | SYSTOLIC BLOOD PRESSURE: 138 MMHG | HEART RATE: 78 BPM | OXYGEN SATURATION: 96 % | RESPIRATION RATE: 18 BRPM | TEMPERATURE: 98.3 F

## 2025-01-03 ENCOUNTER — HOME CARE VISIT (OUTPATIENT)
Dept: HOME HEALTH SERVICES | Facility: HOME HEALTHCARE | Age: 65
End: 2025-01-03
Payer: MEDICARE

## 2025-01-03 PROCEDURE — G0300 HHS/HOSPICE OF LPN EA 15 MIN: HCPCS

## 2025-01-06 ENCOUNTER — HOME CARE VISIT (OUTPATIENT)
Dept: HOME HEALTH SERVICES | Facility: HOME HEALTHCARE | Age: 65
End: 2025-01-06
Payer: MEDICARE

## 2025-01-06 VITALS
RESPIRATION RATE: 18 BRPM | TEMPERATURE: 97.9 F | HEART RATE: 82 BPM | SYSTOLIC BLOOD PRESSURE: 134 MMHG | DIASTOLIC BLOOD PRESSURE: 74 MMHG | OXYGEN SATURATION: 96 %

## 2025-01-07 RX ORDER — RIVAROXABAN 20 MG/1
20 TABLET, FILM COATED ORAL DAILY
Qty: 90 TABLET | Refills: 1 | Status: SHIPPED | OUTPATIENT
Start: 2025-01-07

## 2025-01-08 ENCOUNTER — HOME CARE VISIT (OUTPATIENT)
Dept: HOME HEALTH SERVICES | Facility: HOME HEALTHCARE | Age: 65
End: 2025-01-08
Payer: MEDICARE

## 2025-01-08 VITALS
RESPIRATION RATE: 18 BRPM | DIASTOLIC BLOOD PRESSURE: 68 MMHG | OXYGEN SATURATION: 96 % | SYSTOLIC BLOOD PRESSURE: 134 MMHG | HEART RATE: 78 BPM | TEMPERATURE: 98.2 F

## 2025-01-08 PROCEDURE — G0300 HHS/HOSPICE OF LPN EA 15 MIN: HCPCS

## 2025-01-10 ENCOUNTER — HOME CARE VISIT (OUTPATIENT)
Dept: HOME HEALTH SERVICES | Facility: HOME HEALTHCARE | Age: 65
End: 2025-01-10
Payer: MEDICARE

## 2025-01-10 VITALS
DIASTOLIC BLOOD PRESSURE: 80 MMHG | TEMPERATURE: 98.3 F | HEART RATE: 72 BPM | SYSTOLIC BLOOD PRESSURE: 120 MMHG | OXYGEN SATURATION: 98 % | RESPIRATION RATE: 17 BRPM

## 2025-01-10 PROCEDURE — G0299 HHS/HOSPICE OF RN EA 15 MIN: HCPCS

## 2025-01-13 ENCOUNTER — HOME CARE VISIT (OUTPATIENT)
Dept: HOME HEALTH SERVICES | Facility: HOME HEALTHCARE | Age: 65
End: 2025-01-13
Payer: MEDICARE

## 2025-01-13 VITALS
RESPIRATION RATE: 18 BRPM | HEART RATE: 83 BPM | OXYGEN SATURATION: 97 % | SYSTOLIC BLOOD PRESSURE: 138 MMHG | TEMPERATURE: 97.8 F | DIASTOLIC BLOOD PRESSURE: 82 MMHG

## 2025-01-13 PROCEDURE — G0300 HHS/HOSPICE OF LPN EA 15 MIN: HCPCS

## 2025-01-14 ENCOUNTER — OFFICE VISIT (OUTPATIENT)
Dept: WOUND CARE | Facility: HOSPITAL | Age: 65
End: 2025-01-14
Payer: MEDICARE

## 2025-01-14 ENCOUNTER — HOME CARE VISIT (OUTPATIENT)
Dept: HOME HEALTH SERVICES | Facility: HOME HEALTHCARE | Age: 65
End: 2025-01-14
Payer: MEDICARE

## 2025-01-14 PROCEDURE — G0463 HOSPITAL OUTPT CLINIC VISIT: HCPCS

## 2025-01-15 ENCOUNTER — HOME CARE VISIT (OUTPATIENT)
Dept: HOME HEALTH SERVICES | Facility: HOME HEALTHCARE | Age: 65
End: 2025-01-15
Payer: MEDICARE

## 2025-01-15 PROCEDURE — G0299 HHS/HOSPICE OF RN EA 15 MIN: HCPCS

## 2025-01-15 NOTE — Clinical Note
Hello, I am verifying that you will be signing off on Home Health skilled nursing orders.   Thank you   Isha FAUSTIN   [phone]

## 2025-01-17 ENCOUNTER — HOME CARE VISIT (OUTPATIENT)
Dept: HOME HEALTH SERVICES | Facility: HOME HEALTHCARE | Age: 65
End: 2025-01-17
Payer: MEDICARE

## 2025-01-17 PROCEDURE — G0300 HHS/HOSPICE OF LPN EA 15 MIN: HCPCS

## 2025-01-19 VITALS
TEMPERATURE: 97.6 F | SYSTOLIC BLOOD PRESSURE: 130 MMHG | HEART RATE: 82 BPM | DIASTOLIC BLOOD PRESSURE: 78 MMHG | RESPIRATION RATE: 18 BRPM | OXYGEN SATURATION: 97 %

## 2025-01-20 ENCOUNTER — HOME CARE VISIT (OUTPATIENT)
Dept: HOME HEALTH SERVICES | Facility: HOME HEALTHCARE | Age: 65
End: 2025-01-20
Payer: MEDICARE

## 2025-01-20 ENCOUNTER — TELEPHONE (OUTPATIENT)
Dept: OBSTETRICS AND GYNECOLOGY | Age: 65
End: 2025-01-20
Payer: MEDICARE

## 2025-01-20 VITALS
SYSTOLIC BLOOD PRESSURE: 140 MMHG | DIASTOLIC BLOOD PRESSURE: 80 MMHG | TEMPERATURE: 97.8 F | RESPIRATION RATE: 18 BRPM | OXYGEN SATURATION: 94 % | HEART RATE: 76 BPM

## 2025-01-20 PROCEDURE — G0300 HHS/HOSPICE OF LPN EA 15 MIN: HCPCS

## 2025-01-20 NOTE — TELEPHONE ENCOUNTER
PT states she went to the bathroom and saw a tablespoon of blood. PT is scheduled for a US and appointment with Dr damon on 1/22

## 2025-01-22 ENCOUNTER — HOME CARE VISIT (OUTPATIENT)
Dept: HOME HEALTH SERVICES | Facility: HOME HEALTHCARE | Age: 65
End: 2025-01-22
Payer: MEDICARE

## 2025-01-22 ENCOUNTER — OFFICE VISIT (OUTPATIENT)
Dept: OBSTETRICS AND GYNECOLOGY | Age: 65
End: 2025-01-22
Payer: MEDICARE

## 2025-01-22 VITALS
SYSTOLIC BLOOD PRESSURE: 118 MMHG | HEIGHT: 63 IN | DIASTOLIC BLOOD PRESSURE: 74 MMHG | BODY MASS INDEX: 51.91 KG/M2 | WEIGHT: 293 LBS

## 2025-01-22 DIAGNOSIS — N95.0 POST-MENOPAUSAL BLEEDING: ICD-10-CM

## 2025-01-22 DIAGNOSIS — D39.11 NEOPLASM OF UNCERTAIN BEHAVIOR OF RIGHT OVARY: ICD-10-CM

## 2025-01-22 DIAGNOSIS — N83.201 CYST OF RIGHT OVARY: Primary | ICD-10-CM

## 2025-01-22 NOTE — PROGRESS NOTES
[facility]   Obstetrics and Gynecology     2025      Patient:  Emely Solomon   MR#:[id number]    Office note    Chief Complaint   Patient presents with    Follow-up     CC: gyn f/u u/s @1.       Subjective     History of Present Illness  64 y.o. female  presenting for follow-up for postmenopausal bleeding.  She says that she has had a couple of episodes of spotting since she was seen here last.   Of note she was seeing GYN oncology previously for a right ovarian cyst which still appears to be present on ultrasound today as well as postmenopausal bleeding.  Given her habitus diagnostic sampling has not been obtained.  Exam attempted last visit, but was not even able to visualize the cervix.      Relevant data reviewed: US Non-ob Transvaginal (2025 13:10)       Patient Active Problem List   Diagnosis    Chronic heart failure with preserved ejection fraction (HFpEF)    PFO (patent foramen ovale)    Paradoxical embolism    Essential hypertension    Pulmonary hypertension    Back pain    ARIEL (obstructive sleep apnea)    Class 3 severe obesity due to excess calories with serious comorbidity and body mass index (BMI) of 60.0 to 69.9 in adult    History of DVT of lower extremity    Lymphedema    Anemia, chronic disease    CKD (chronic kidney disease) stage 3, GFR 30-59 ml/min    Iron deficiency    Encounter for screening for malignant neoplasm of colon    Post-menopausal bleeding    Thickened endometrium    Generalized muscle weakness    Right bundle branch block (RBBB) with left anterior fascicular block (LAFB)    PVD (peripheral vascular disease) with claudication    Iliac vein stenosis, right    Edema of both lower extremities due to peripheral venous insufficiency    Chronic venous hypertension with ulcer and inflammation involving right side    Venous stasis ulcer of ankle with fat layer exposed       Past Medical History:   Diagnosis Date    Arthritis     Atheroembolism of other site  2016    Cellulitis     2017, with Group B Strep bacteremia and sepsis    Chronic deep vein thrombosis (DVT) of left popliteal vein 2015, 2016    Chronic diastolic congestive heart failure     COVID-19 virus infection 2020    Deep venous thrombosis     Exercise involving walking     Heart murmur     Hypertension     Hypo-osmolality and hyponatremia 2020    Lipoedema     Lymphedema     other    Morbid obesity     Paradoxical embolism     to the LLE due to DVT/PE and PFO    PFO (patent foramen ovale)     Postthrombotic syndrome of left lower extremity without complications 2015    postphletibis    Pulmonary embolism 2016    Pulmonary hypertension     multifactorial (dCHF, obesity/ARIEL, hx PE), mild by echo 2016    Right bundle branch block (RBBB) with left anterior fascicular block (LAFB)     Sepsis 2020    Sleep apnea      Past Surgical History:   Procedure Laterality Date    ARTERIOGRAM  2015    BRONCHOSCOPY N/A 2017    Procedure: BRONCHOSCOPY with wash;  Surgeon: Caleb Garcia MD;  Location: [facility] ENDOSCOPY;  Service:     COLONOSCOPY      COLONOSCOPY N/A 2023    Procedure: COLONOSCOPY to CECUM AND TERM ILEUM;  Surgeon: Jj Dean MD;  Location: [facility] ENDOSCOPY;  Service: Gastroenterology;  Laterality: N/A;  PRE OP -screening  POST OP -  NORMAL    D & C HYSTEROSCOPY N/A 2022    Procedure: DILATATION AND CURETTAGE with hysteroscopy;  Surgeon: Kaylah Land DO;  Location: [facility] MAIN OR;  Service: Gynecology Oncology;  Laterality: N/A;    DILATATION AND CURETTAGE  2011    OTHER SURGICAL HISTORY  2015    IVC filter    OTHER SURGICAL HISTORY      left LE revascularization    OTHER SURGICAL HISTORY      ALESSIA and LEV scan    THROMBECTOMY  2015     Obstetric History:  OB History          0    Para   0    Term   0       0    AB   0    Living   0         SAB   0    IAB   0    Ectopic   0    Molar   0     Multiple   0    Live Births   0               Menstrual History:     No LMP recorded. Patient is postmenopausal.       The patient has never been pregnant.  Family History   Problem Relation Age of Onset    Breast cancer Mother     Hypertension Other     Ovarian cancer Neg Hx     Uterine cancer Neg Hx     Colon cancer Neg Hx     Malig Hyperthermia Neg Hx      Social History     Tobacco Use    Smoking status: Never    Smokeless tobacco: Never   Vaping Use    Vaping status: Never Used   Substance Use Topics    Alcohol use: Not Currently     Comment: occassionally    Drug use: Never     Cephalexin and Clindamycin/lincomycin    Current Outpatient Medications:     acetaminophen (TYLENOL) 325 MG tablet, Take 2 tablets by mouth Every 4 (Four) Hours As Needed for Mild Pain ., Disp:  , Rfl:     furosemide (LASIX) 40 MG tablet, Take 1 tablet by mouth Every Other Day., Disp: 60 tablet, Rfl: 2    losartan (COZAAR) 100 MG tablet, TAKE 1 TABLET BY MOUTH DAILY, Disp: 30 tablet, Rfl: 4    metoprolol succinate XL (TOPROL-XL) 50 MG 24 hr tablet, Take 1 tablet by mouth Daily., Disp: 90 tablet, Rfl: 1    rivaroxaban (Xarelto) 20 MG tablet, TAKE 1 TABLET BY MOUTH EVERY DAY, Disp: 90 tablet, Rfl: 1    albuterol sulfate  (90 Base) MCG/ACT inhaler, Inhale 2 puffs Every 4 (Four) Hours As Needed for Wheezing or Shortness of Air. (Patient not taking: Reported on 11/7/2024), Disp: 18 g, Rfl: 1    chlorthalidone (HYGROTON) 25 MG tablet, TAKE 1 TABLET BY MOUTH EVERY DAY (Patient not taking: Reported on 1/12/2025), Disp: 90 tablet, Rfl: 1    nystatin [id number] UNIT/GM powder, apply to the affected area(s) topically 3 times per day (Patient not taking: Reported on 11/7/2024), Disp: 15 g, Rfl: 1      Review of Systems   All other systems reviewed and are negative.      BP Readings from Last 3 Encounters:   01/22/25 118/74   01/20/25 140/80   01/17/25 130/78      Wt Readings from Last 3 Encounters:   01/22/25 (!) 169 kg (373 lb)   12/19/24  "(!) 168 kg (370 lb 9.6 oz)   11/07/24 (!) 166 kg (367 lb)      BMI: Estimated body mass index is 66.09 kg/m² as calculated from the following:    Height as of this encounter: 160 cm (62.99\").    Weight as of this encounter: 169 kg (373 lb). BSA: Estimated body surface area is 2.52 meters squared as calculated from the following:    Height as of this encounter: 160 cm (62.99\").    Weight as of this encounter: 169 kg (373 lb).    Objective   Physical Exam  Vitals and nursing note reviewed.   Constitutional:       Appearance: Normal appearance.   HENT:      Head: Normocephalic and atraumatic.   Pulmonary:      Effort: Pulmonary effort is normal.   Neurological:      Mental Status: She is alert and oriented to person, place, and time.   Psychiatric:         Mood and Affect: Mood normal.         Assessment & Plan     Diagnoses and all orders for this visit:    1. Cyst of right ovary (Primary)  Comments:  - CA-125 collected in clinic today.  -Pt states that she had cyst before, however I cannot confirm that with recent imaging.  -Referral made to GYN oncology.  Orders:  -         2. 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 Post-menopausal bleeding  Overview:  - Patient is continued to have periodic spotting.  -ES on exam today is over 16 mm and heterogenous.  -Previous attempt by Dr. Land to obtain sampling was not successful.  -I discussed with patient that she does have risk factors for endometrial cancer including obesity.  -Referral made to GYN oncology at [facility].            No follow-ups on file.    Colleen Corbin MD   1/22/2025 14:08 EST    "

## 2025-01-23 LAB — CANCER AG125 SERPL-ACNC: 57.2 U/ML (ref 0–38.1)

## 2025-01-24 ENCOUNTER — HOME CARE VISIT (OUTPATIENT)
Dept: HOME HEALTH SERVICES | Facility: HOME HEALTHCARE | Age: 65
End: 2025-01-24
Payer: MEDICARE

## 2025-01-24 VITALS
DIASTOLIC BLOOD PRESSURE: 78 MMHG | TEMPERATURE: 98.2 F | HEART RATE: 89 BPM | SYSTOLIC BLOOD PRESSURE: 122 MMHG | BODY MASS INDEX: 65.56 KG/M2 | RESPIRATION RATE: 20 BRPM | WEIGHT: 293 LBS | OXYGEN SATURATION: 92 %

## 2025-01-24 PROCEDURE — G0299 HHS/HOSPICE OF RN EA 15 MIN: HCPCS

## 2025-01-27 ENCOUNTER — HOME CARE VISIT (OUTPATIENT)
Dept: HOME HEALTH SERVICES | Facility: HOME HEALTHCARE | Age: 65
End: 2025-01-27
Payer: MEDICARE

## 2025-01-27 PROCEDURE — G0300 HHS/HOSPICE OF LPN EA 15 MIN: HCPCS

## 2025-01-28 NOTE — PROGRESS NOTES
Age: 62 y.o.  Sex: female  :  1960  MRN: [id number]     REFERRING PHYSICIAN: No ref. provider found  DATE OF VISIT: 2022      Emely Solomon televisit to follow up on CT imaging. She previously was seen for PMB with enlarged uterus. Pt is s/p attempted D+C with hysteroscopy on 3/8/22.   There was significant difficulty with that surgery due to her weight.  Pathology unfortubatnely did not obtain endometrial tissue. (: endometrium- fragments of nondysplastic squamous mucosa, no endometrial fragments available; endocervix- nondysplastic ecto-endocervical mucosa w/ mixed predominantly acute inflammation.)  Her most recent CT today shows stability of the size of the uterus, previously enlarged node is smaller. She also has not had any further PMB. States she has been working on weight management with weight watchers. BMI today is down to 65 from 67.8.      Oncology/Hematology History Overview Note   Emely Solomon is a 61 y.o. female referred by Dr. Florida Segovia ([facility]) for right ovarian cyst.    • 22: TVUS (outside facility)-uterus-13.22 x 7.83 x 8.01 cm, ET-17.79 mm, right ovary-4.81 x 5.22 x 3.08 cm, right ovarian cyst-4.41 x 2.87 x 4.04 cm, right ovarian cyst-0.63 x 0.45 x 0.62.  • 22: Ca 125-65.0   • 3/08/22: D&C w/ hysteroscopy  • 3/08/22: Pathology-endometrium-fragments of nondysplastic squamous mucosa, no endometrial fragments available, endocervix-nondysplastic ecto-endocervical mucosa w/ mixed predominantly acute inflammation.  • 22: CT AP-Resolution of the right pelvic phlegmon, right inguinal lymph nodes diminished in size. Left inguinal lymph nodes appear slightly larger compared to the previous CT examination. Hemostatic clips demonstrated within the left inguinal canal. Lobulated right ovarian/adnexal fluid collection/cyst. This measures 6.2 x 2.8 x 4.5 cm. Stable enlargement of the uterus. Endometrium appears linear and 7 mm in width. Mass within the right lobe of  Yes, should start this 1 day before the procedure, and continue after as well until prescription completed.   the liver. There appears to be a small nodular focus of enhancement along its perimeter suggesting that this may represent a hepatic hemangioma. This was not seen on any of the prior unenhanced examinations, this could be new or perhaps isodense to the surrounding parenchyma on prior CT. Dedicated liver CT is recommended to exclude hemangioma prior to consideration of biopsy. Left renal atrophy. L5 spondylolysis and spondylolisthesis.  • 7/1/22: CT AP - Enlarged and heterogeneous uterus has a nonspecific appearance which can be seen with multiple small fibroids, but a malignancy cannot be excluded. There are no new or pathologically enlarged nodes within the pelvis or abdomen. The bilateral groin lymphadenopathy is stable. Limited characterization of the approximately 4 x 3 cm right hepatic lobe liver lesion. The lesion is likely stable given slight differences in timing of enhancement. Unfortunately, the older CTs of the abdomen were all performed without IV contrast. A benign hemangioma is suspected. Given the breathing artifact present on this examination, it is doubtful the patient will be able to suspend her breath long enough  for MRI image acquisition. Conservative surveillance is recommended with  a follow-up multiphase CT of the abdomen in 6 months.  • 10/27/2022: CT AP: The uterus measures 9 cm in length, 7.1 cm AP and 10.2 cm transverse. It is enlarged, the size is similar to the previous examination. The ovaries appear satisfactory in size and symmetric. No free fluid is demonstrated within the pelvis, no adnexal mass seen.    11/16/22: Review CT Scan          Past Medical History:  Past Medical History:   Diagnosis Date   • Arthritis    • Cellulitis     11/2017, with Group B Strep bacteremia and sepsis   • Chronic diastolic congestive heart failure (HCC)    • COVID-19 virus infection 11/2020   • Deep venous thrombosis (HCC)    • Heart murmur    • Hypertension    • Lipoedema    • Lymphedema    • Morbid  obesity (HCC)    • Paradoxical embolism (HCC)     to the LLE due to DVT/PE and PFO   • PFO (patent foramen ovale)    • Pulmonary embolism (HCC)    • Pulmonary hypertension (HCC)     multifactorial (dCHF, obesity/ARIEL, hx PE), mild by echo 1/2016   • Sepsis (HCC) 09/18/2020   • Sleep apnea        Past Surgical History:  Past Surgical History:   Procedure Laterality Date   • BRONCHOSCOPY N/A 07/21/2017    Procedure: BRONCHOSCOPY with wash;  Surgeon: Caleb Garcia MD;  Location: [facility] ENDOSCOPY;  Service:    • COLONOSCOPY     • D & C HYSTEROSCOPY N/A 3/8/2022    Procedure: DILATATION AND CURETTAGE with hysteroscopy;  Surgeon: Kaylah Land DO;  Location: [facility] OR;  Service: Gynecology Oncology;  Laterality: N/A;   • DILATATION AND CURETTAGE  04/11/2011        MEDICATIONS:    Current Outpatient Medications:   •  acetaminophen (TYLENOL) 325 MG tablet, Take 2 tablets by mouth Every 4 (Four) Hours As Needed for Mild Pain ., Disp:  , Rfl:   •  ferrous sulfate 325 (65 FE) MG EC tablet, Take 1 tablet by mouth Daily With Breakfast, Disp: 90 tablet, Rfl: 3  •  furosemide (LASIX) 40 MG tablet, Take 1 tablet by mouth Daily., Disp: 30 tablet, Rfl: 4  •  losartan (COZAAR) 100 MG tablet, Take 1 tablet by mouth Daily., Disp: 90 tablet, Rfl: 1  •  metoprolol succinate XL (TOPROL-XL) 50 MG 24 hr tablet, Take 1 tablet by mouth Daily., Disp: 90 tablet, Rfl: 3  •  nystatin (MYCOSTATIN) 444402 UNIT/GM powder, apply to the affected area(s) topically 3 times per day (Patient taking differently: Apply 1 application topically to the appropriate area as directed 3 (Three) Times a Day As Needed.), Disp: 15 g, Rfl: 4  •  rivaroxaban (Xarelto) 20 MG tablet, Take 1 tablet by mouth Daily., Disp: 90 tablet, Rfl: 3    ALLERGIES:  Allergies   Allergen Reactions   • Cephalexin Hives and Rash     Diffuse rash on cephalexin 1 g PO q6h on 6/17/20  Tolerated zosyn and PCN G September 2020 without issue   • Clindamycin/Lincomycin Hives  "        ROS:  CONSTITUTIONAL:  Denies fever or chills.   NEUROLOGIC:  Denies headache, focal weakness or sensory changes.   EYES:  Denies change in visual acuity.  HEENT:  Denies nasal congestion or sore throat.   RESPIRATORY:  Denies cough or shortness of breath.   CARDIOVASCULAR:  Denies chest pain or edema.   GI:  Denies abdominal pain, nausea, vomiting, bloody stools or diarrhea.   :  Denies dysuria, leaking or incontinence.  MUSCULOSKELETAL:  Denies back pain or joint pain.   INTEGUMENT:  Denies rash.   ENDOCRINE:  Denies polyuria or polydipsia.   LYMPHATIC:  Denies swollen glands or lymphedema.   PSYCHIATRIC:  Denies depression or anxiety.      PHYSICAL EXAM:  Vitals:    11/16/22 1321   BP: 144/99   Pulse: 83   Resp: 16   Temp: 98.5 °F (36.9 °C)   TempSrc: Temporal   SpO2: 94%   Weight: (!) 167 kg (367 lb 3.2 oz)   Height: 160 cm (63\")   PainSc: 0-No pain       Body mass index is 65.05 kg/m².    Current Status 11/16/2022   ECOG score 0     PHQ-9 Total Score:           Confirmed today  GEN: alert and oriented x 3, normal affect, well nourished and hydrated; morbidly obese  ABD: Soft, obese, nontender  GYN: deferred  EXT: No petechiae, bruising, rash, candida. No CCE.       Result Review :  The pertinent labs, images, and/or pathology as noted in the oncology history were reviewed independently and discussed with the patient.   Kaylah Land,    03/14/2022    [facility] [place]   WBC   Date Value Ref Range Status   03/07/2022 4.27 3.40 - 10.80 10*3/mm3 Final   04/15/2021 3.91 3.40 - 10.80 10*3/mm3 Final     RBC   Date Value Ref Range Status   03/07/2022 4.52 3.77 - 5.28 10*6/mm3 Final   04/15/2021 4.41 3.77 - 5.28 10*6/mm3 Final     Hemoglobin   Date Value Ref Range Status   03/07/2022 12.2 12.0 - 15.9 g/dL Final     Hematocrit   Date Value Ref Range Status   03/07/2022 38.1 34.0 - 46.6 % Final     Platelets   Date Value Ref Range Status   03/07/2022 300 140 - 450 10*3/mm3 Final     No results found for: "     [facility]:  No radiology results for the last 90 days.      ASSESSMENT :  • Post-operative assessment  - s/p D+C on 3/8/22 secondary to PMB, thickened endometrium: pathology benign  - CT with stable uterine dimensions and decresed LN size  · Right ovarian cyst  · BMI 67.8   · Extensive PMH:  ? Pulmonary hypertension  ? Essential hypertension  ? CHF  ? ARIEL  ? History of DVT, on Xarelto  ? CKD  ? lymphedema         PLAN :  • Overall clinical picture and images suggest stability.   • She was encouraged to continue with weight watchers.   • RTO 6 mos with imaging for assessment.           Kaylah Land D.O  11/16/2022    Gynecologic Oncology   [facility] [id number] [phone] office

## 2025-01-29 ENCOUNTER — HOME CARE VISIT (OUTPATIENT)
Dept: HOME HEALTH SERVICES | Facility: HOME HEALTHCARE | Age: 65
End: 2025-01-29
Payer: MEDICARE

## 2025-01-29 PROCEDURE — G0300 HHS/HOSPICE OF LPN EA 15 MIN: HCPCS

## 2025-01-30 VITALS
SYSTOLIC BLOOD PRESSURE: 128 MMHG | DIASTOLIC BLOOD PRESSURE: 78 MMHG | RESPIRATION RATE: 18 BRPM | HEART RATE: 82 BPM | OXYGEN SATURATION: 97 % | TEMPERATURE: 97.6 F

## 2025-01-31 ENCOUNTER — HOME CARE VISIT (OUTPATIENT)
Dept: HOME HEALTH SERVICES | Facility: HOME HEALTHCARE | Age: 65
End: 2025-01-31
Payer: MEDICARE

## 2025-01-31 PROCEDURE — G0299 HHS/HOSPICE OF RN EA 15 MIN: HCPCS

## 2025-02-02 VITALS
SYSTOLIC BLOOD PRESSURE: 120 MMHG | DIASTOLIC BLOOD PRESSURE: 78 MMHG | OXYGEN SATURATION: 94 % | TEMPERATURE: 98.9 F | HEART RATE: 79 BPM | RESPIRATION RATE: 17 BRPM

## 2025-02-03 ENCOUNTER — HOME CARE VISIT (OUTPATIENT)
Dept: HOME HEALTH SERVICES | Facility: HOME HEALTHCARE | Age: 65
End: 2025-02-03
Payer: MEDICARE

## 2025-02-03 PROCEDURE — G0300 HHS/HOSPICE OF LPN EA 15 MIN: HCPCS

## 2025-02-04 VITALS
OXYGEN SATURATION: 96 % | HEART RATE: 82 BPM | RESPIRATION RATE: 18 BRPM | DIASTOLIC BLOOD PRESSURE: 78 MMHG | TEMPERATURE: 97.1 F | SYSTOLIC BLOOD PRESSURE: 136 MMHG

## 2025-02-05 ENCOUNTER — HOME CARE VISIT (OUTPATIENT)
Dept: HOME HEALTH SERVICES | Facility: HOME HEALTHCARE | Age: 65
End: 2025-02-05
Payer: MEDICARE

## 2025-02-05 VITALS
RESPIRATION RATE: 18 BRPM | HEART RATE: 68 BPM | SYSTOLIC BLOOD PRESSURE: 138 MMHG | TEMPERATURE: 97.9 F | OXYGEN SATURATION: 98 % | DIASTOLIC BLOOD PRESSURE: 72 MMHG

## 2025-02-05 PROCEDURE — G0300 HHS/HOSPICE OF LPN EA 15 MIN: HCPCS

## 2025-02-07 ENCOUNTER — HOME CARE VISIT (OUTPATIENT)
Dept: HOME HEALTH SERVICES | Facility: HOME HEALTHCARE | Age: 65
End: 2025-02-07
Payer: MEDICARE

## 2025-02-07 VITALS
SYSTOLIC BLOOD PRESSURE: 132 MMHG | TEMPERATURE: 98.1 F | HEART RATE: 78 BPM | OXYGEN SATURATION: 96 % | RESPIRATION RATE: 18 BRPM | DIASTOLIC BLOOD PRESSURE: 76 MMHG

## 2025-02-07 PROCEDURE — G0300 HHS/HOSPICE OF LPN EA 15 MIN: HCPCS

## 2025-02-10 ENCOUNTER — HOME CARE VISIT (OUTPATIENT)
Dept: HOME HEALTH SERVICES | Facility: HOME HEALTHCARE | Age: 65
End: 2025-02-10
Payer: MEDICARE

## 2025-02-10 VITALS — TEMPERATURE: 96.9 F | OXYGEN SATURATION: 98 % | HEART RATE: 55 BPM

## 2025-02-10 PROCEDURE — G0299 HHS/HOSPICE OF RN EA 15 MIN: HCPCS

## 2025-02-11 ENCOUNTER — OFFICE VISIT (OUTPATIENT)
Dept: WOUND CARE | Facility: HOSPITAL | Age: 65
End: 2025-02-11
Payer: MEDICARE

## 2025-02-11 PROCEDURE — G0463 HOSPITAL OUTPT CLINIC VISIT: HCPCS

## 2025-02-12 ENCOUNTER — HOME CARE VISIT (OUTPATIENT)
Dept: HOME HEALTH SERVICES | Facility: HOME HEALTHCARE | Age: 65
End: 2025-02-12
Payer: MEDICARE

## 2025-02-12 VITALS
SYSTOLIC BLOOD PRESSURE: 134 MMHG | OXYGEN SATURATION: 96 % | DIASTOLIC BLOOD PRESSURE: 76 MMHG | RESPIRATION RATE: 18 BRPM | HEART RATE: 82 BPM | TEMPERATURE: 97.9 F

## 2025-02-12 PROCEDURE — G0300 HHS/HOSPICE OF LPN EA 15 MIN: HCPCS

## 2025-02-14 ENCOUNTER — HOME CARE VISIT (OUTPATIENT)
Dept: HOME HEALTH SERVICES | Facility: HOME HEALTHCARE | Age: 65
End: 2025-02-14
Payer: MEDICARE

## 2025-02-14 PROCEDURE — G0300 HHS/HOSPICE OF LPN EA 15 MIN: HCPCS

## 2025-02-14 PROCEDURE — G0299 HHS/HOSPICE OF RN EA 15 MIN: HCPCS

## 2025-02-15 VITALS
HEART RATE: 60 BPM | TEMPERATURE: 98.5 F | RESPIRATION RATE: 18 BRPM | DIASTOLIC BLOOD PRESSURE: 70 MMHG | OXYGEN SATURATION: 95 % | SYSTOLIC BLOOD PRESSURE: 110 MMHG

## 2025-02-17 ENCOUNTER — HOME CARE VISIT (OUTPATIENT)
Dept: HOME HEALTH SERVICES | Facility: HOME HEALTHCARE | Age: 65
End: 2025-02-17
Payer: MEDICARE

## 2025-02-17 PROCEDURE — G0300 HHS/HOSPICE OF LPN EA 15 MIN: HCPCS

## 2025-02-18 VITALS
DIASTOLIC BLOOD PRESSURE: 76 MMHG | RESPIRATION RATE: 18 BRPM | SYSTOLIC BLOOD PRESSURE: 132 MMHG | OXYGEN SATURATION: 97 % | TEMPERATURE: 98.1 F | HEART RATE: 76 BPM

## 2025-02-19 ENCOUNTER — HOME CARE VISIT (OUTPATIENT)
Dept: HOME HEALTH SERVICES | Facility: HOME HEALTHCARE | Age: 65
End: 2025-02-19
Payer: MEDICARE

## 2025-02-19 PROCEDURE — G0300 HHS/HOSPICE OF LPN EA 15 MIN: HCPCS

## 2025-02-20 VITALS
OXYGEN SATURATION: 95 % | RESPIRATION RATE: 18 BRPM | TEMPERATURE: 97.9 F | SYSTOLIC BLOOD PRESSURE: 138 MMHG | HEART RATE: 88 BPM | DIASTOLIC BLOOD PRESSURE: 72 MMHG

## 2025-02-21 ENCOUNTER — HOME CARE VISIT (OUTPATIENT)
Dept: HOME HEALTH SERVICES | Facility: HOME HEALTHCARE | Age: 65
End: 2025-02-21
Payer: MEDICARE

## 2025-02-21 VITALS
DIASTOLIC BLOOD PRESSURE: 72 MMHG | HEART RATE: 68 BPM | RESPIRATION RATE: 18 BRPM | SYSTOLIC BLOOD PRESSURE: 134 MMHG | OXYGEN SATURATION: 99 % | TEMPERATURE: 97.5 F

## 2025-02-21 PROCEDURE — G0300 HHS/HOSPICE OF LPN EA 15 MIN: HCPCS

## 2025-02-24 ENCOUNTER — HOME CARE VISIT (OUTPATIENT)
Dept: HOME HEALTH SERVICES | Facility: HOME HEALTHCARE | Age: 65
End: 2025-02-24
Payer: MEDICARE

## 2025-02-24 PROCEDURE — G0300 HHS/HOSPICE OF LPN EA 15 MIN: HCPCS

## 2025-02-25 VITALS
DIASTOLIC BLOOD PRESSURE: 74 MMHG | OXYGEN SATURATION: 95 % | SYSTOLIC BLOOD PRESSURE: 138 MMHG | TEMPERATURE: 98.1 F | RESPIRATION RATE: 18 BRPM | HEART RATE: 78 BPM

## 2025-02-26 ENCOUNTER — HOME CARE VISIT (OUTPATIENT)
Dept: HOME HEALTH SERVICES | Facility: HOME HEALTHCARE | Age: 65
End: 2025-02-26
Payer: MEDICARE

## 2025-02-26 VITALS
TEMPERATURE: 98.2 F | HEART RATE: 82 BPM | DIASTOLIC BLOOD PRESSURE: 74 MMHG | RESPIRATION RATE: 18 BRPM | OXYGEN SATURATION: 95 % | SYSTOLIC BLOOD PRESSURE: 138 MMHG

## 2025-02-26 PROCEDURE — G0300 HHS/HOSPICE OF LPN EA 15 MIN: HCPCS

## 2025-02-28 ENCOUNTER — HOME CARE VISIT (OUTPATIENT)
Dept: HOME HEALTH SERVICES | Facility: HOME HEALTHCARE | Age: 65
End: 2025-02-28
Payer: MEDICARE

## 2025-02-28 VITALS
TEMPERATURE: 98.4 F | SYSTOLIC BLOOD PRESSURE: 120 MMHG | RESPIRATION RATE: 18 BRPM | HEART RATE: 87 BPM | OXYGEN SATURATION: 98 % | DIASTOLIC BLOOD PRESSURE: 58 MMHG

## 2025-02-28 PROCEDURE — G0299 HHS/HOSPICE OF RN EA 15 MIN: HCPCS

## 2025-03-03 ENCOUNTER — HOME CARE VISIT (OUTPATIENT)
Dept: HOME HEALTH SERVICES | Facility: HOME HEALTHCARE | Age: 65
End: 2025-03-03
Payer: MEDICARE

## 2025-03-03 PROCEDURE — G0300 HHS/HOSPICE OF LPN EA 15 MIN: HCPCS

## 2025-03-04 VITALS
SYSTOLIC BLOOD PRESSURE: 138 MMHG | DIASTOLIC BLOOD PRESSURE: 82 MMHG | OXYGEN SATURATION: 96 % | TEMPERATURE: 98.2 F | HEART RATE: 84 BPM | RESPIRATION RATE: 18 BRPM

## 2025-03-05 ENCOUNTER — HOME CARE VISIT (OUTPATIENT)
Dept: HOME HEALTH SERVICES | Facility: HOME HEALTHCARE | Age: 65
End: 2025-03-05
Payer: MEDICARE

## 2025-03-05 VITALS
RESPIRATION RATE: 18 BRPM | OXYGEN SATURATION: 96 % | DIASTOLIC BLOOD PRESSURE: 76 MMHG | HEART RATE: 82 BPM | SYSTOLIC BLOOD PRESSURE: 134 MMHG | TEMPERATURE: 97.6 F

## 2025-03-05 PROCEDURE — G0300 HHS/HOSPICE OF LPN EA 15 MIN: HCPCS

## 2025-03-07 ENCOUNTER — HOME CARE VISIT (OUTPATIENT)
Dept: HOME HEALTH SERVICES | Facility: HOME HEALTHCARE | Age: 65
End: 2025-03-07
Payer: MEDICARE

## 2025-03-07 PROCEDURE — G0300 HHS/HOSPICE OF LPN EA 15 MIN: HCPCS

## 2025-03-08 VITALS
DIASTOLIC BLOOD PRESSURE: 80 MMHG | RESPIRATION RATE: 18 BRPM | TEMPERATURE: 97.2 F | SYSTOLIC BLOOD PRESSURE: 134 MMHG | HEART RATE: 83 BPM | OXYGEN SATURATION: 97 %

## 2025-03-10 ENCOUNTER — HOME CARE VISIT (OUTPATIENT)
Dept: HOME HEALTH SERVICES | Facility: HOME HEALTHCARE | Age: 65
End: 2025-03-10
Payer: MEDICARE

## 2025-03-10 VITALS
DIASTOLIC BLOOD PRESSURE: 78 MMHG | SYSTOLIC BLOOD PRESSURE: 132 MMHG | RESPIRATION RATE: 18 BRPM | HEART RATE: 84 BPM | TEMPERATURE: 98.1 F | OXYGEN SATURATION: 97 %

## 2025-03-10 PROCEDURE — G0300 HHS/HOSPICE OF LPN EA 15 MIN: HCPCS

## 2025-03-11 ENCOUNTER — OFFICE VISIT (OUTPATIENT)
Dept: WOUND CARE | Facility: HOSPITAL | Age: 65
End: 2025-03-11
Payer: MEDICARE

## 2025-03-11 PROCEDURE — G0463 HOSPITAL OUTPT CLINIC VISIT: HCPCS

## 2025-03-12 ENCOUNTER — HOME CARE VISIT (OUTPATIENT)
Dept: HOME HEALTH SERVICES | Facility: HOME HEALTHCARE | Age: 65
End: 2025-03-12
Payer: MEDICARE

## 2025-03-12 VITALS
DIASTOLIC BLOOD PRESSURE: 78 MMHG | OXYGEN SATURATION: 96 % | RESPIRATION RATE: 18 BRPM | SYSTOLIC BLOOD PRESSURE: 130 MMHG | HEART RATE: 82 BPM | TEMPERATURE: 97.6 F

## 2025-03-12 PROCEDURE — G0300 HHS/HOSPICE OF LPN EA 15 MIN: HCPCS

## 2025-03-14 ENCOUNTER — HOME CARE VISIT (OUTPATIENT)
Dept: HOME HEALTH SERVICES | Facility: HOME HEALTHCARE | Age: 65
End: 2025-03-14
Payer: MEDICARE

## 2025-03-14 VITALS
HEART RATE: 88 BPM | SYSTOLIC BLOOD PRESSURE: 126 MMHG | OXYGEN SATURATION: 98 % | DIASTOLIC BLOOD PRESSURE: 86 MMHG | RESPIRATION RATE: 18 BRPM

## 2025-03-14 PROCEDURE — G0299 HHS/HOSPICE OF RN EA 15 MIN: HCPCS

## 2025-03-17 RX ORDER — LOSARTAN POTASSIUM 100 MG/1
100 TABLET ORAL DAILY
Qty: 90 TABLET | Refills: 0 | Status: SHIPPED | OUTPATIENT
Start: 2025-03-17

## 2025-03-24 ENCOUNTER — OFFICE VISIT (OUTPATIENT)
Dept: FAMILY MEDICINE CLINIC | Facility: CLINIC | Age: 65
End: 2025-03-24
Payer: MEDICARE

## 2025-03-24 VITALS
HEIGHT: 63 IN | BODY MASS INDEX: 51.91 KG/M2 | SYSTOLIC BLOOD PRESSURE: 110 MMHG | WEIGHT: 293 LBS | RESPIRATION RATE: 20 BRPM | OXYGEN SATURATION: 99 % | DIASTOLIC BLOOD PRESSURE: 74 MMHG | HEART RATE: 58 BPM

## 2025-03-24 DIAGNOSIS — I50.32 CHRONIC HEART FAILURE WITH PRESERVED EJECTION FRACTION (HFPEF): ICD-10-CM

## 2025-03-24 DIAGNOSIS — I10 ESSENTIAL HYPERTENSION: Primary | ICD-10-CM

## 2025-03-24 PROCEDURE — 1159F MED LIST DOCD IN RCRD: CPT | Performed by: FAMILY MEDICINE

## 2025-03-24 PROCEDURE — 99213 OFFICE O/P EST LOW 20 MIN: CPT | Performed by: FAMILY MEDICINE

## 2025-03-24 PROCEDURE — 3078F DIAST BP <80 MM HG: CPT | Performed by: FAMILY MEDICINE

## 2025-03-24 PROCEDURE — 1126F AMNT PAIN NOTED NONE PRSNT: CPT | Performed by: FAMILY MEDICINE

## 2025-03-24 PROCEDURE — 1160F RVW MEDS BY RX/DR IN RCRD: CPT | Performed by: FAMILY MEDICINE

## 2025-03-24 PROCEDURE — 3074F SYST BP LT 130 MM HG: CPT | Performed by: FAMILY MEDICINE

## 2025-03-24 RX ORDER — HYDROCHLOROTHIAZIDE 25 MG/1
1 TABLET ORAL DAILY
COMMUNITY
End: 2025-03-24

## 2025-03-24 RX ORDER — PANTOPRAZOLE SODIUM 40 MG/1
1 TABLET, DELAYED RELEASE ORAL DAILY
COMMUNITY
End: 2025-03-24

## 2025-03-24 RX ORDER — OXYBUTYNIN CHLORIDE 5 MG/1
1 TABLET, EXTENDED RELEASE ORAL DAILY
COMMUNITY
End: 2025-03-24

## 2025-03-24 NOTE — PROGRESS NOTES
"Chief Complaint  Hypertension    Subjective    History of Present Illness  History of Present Illness  The patient presents for evaluation of hypertension, cardiac issues, and skin issue.    She reports satisfactory management of her blood pressure with the current medication regimen and does not require any refills at this time.    Her last consultation with the cardiology department was recent, and she maintains a regular follow-up schedule with them every 6 months. She has a scheduled appointment in May 2025.      Objective     Vital Signs:   /74 Comment: unable to hear, provider informed  Pulse 58   Resp 20   Ht 160 cm (62.99\")   Wt (!) 163 kg (358 lb 6.4 oz)   SpO2 99%   BMI 63.51 kg/m²   Physical Exam  Vitals and nursing note reviewed.   Constitutional:       General: She is not in acute distress.     Appearance: Normal appearance. She is not ill-appearing.   Cardiovascular:      Rate and Rhythm: Normal rate and regular rhythm.      Pulses: Normal pulses.      Heart sounds: Normal heart sounds. No murmur heard.  Pulmonary:      Effort: Pulmonary effort is normal. No respiratory distress.      Breath sounds: Normal breath sounds. No rales.   Neurological:      Mental Status: She is alert and oriented to person, place, and time. Mental status is at baseline.   Psychiatric:         Mood and Affect: Mood normal.         Behavior: Behavior normal.          Assessment and Plan   Diagnoses and all orders for this visit:    1. Essential hypertension (Primary)    2. Chronic heart failure with preserved ejection fraction (HFpEF)      Assessment & Plan  1. Hypertension.  Her blood pressure readings are within the normal range today. She will continue her current antihypertensive medication regimen.    2. Cardiac issues.  She is under the care of a cardiologist, with regular follow-ups every 6 months. Her next appointment is scheduled for May 2025.    Recommended follow up as below. Encouraged communication " via [employer] in the meantime.     Patient was given instructions and counseling regarding her condition or for health maintenance advice. Please see specific information pulled into the AVS (placed there by myself) if appropriate.    Return in about 6 months (around 9/24/2025), or if symptoms worsen or fail to improve, for Medicare Wellness.    Patient or patient representative verbalized consent for the use of Ambient Listening during the visit with  Adilson Wilkerson MD for chart documentation. 3/24/2025  09:08 EDT    CHIP Wilkerson MD

## 2025-04-08 ENCOUNTER — OFFICE VISIT (OUTPATIENT)
Dept: WOUND CARE | Facility: HOSPITAL | Age: 65
End: 2025-04-08
Payer: MEDICARE

## 2025-04-10 RX ORDER — CHLORTHALIDONE 25 MG/1
25 TABLET ORAL DAILY
Qty: 90 TABLET | Refills: 1 | OUTPATIENT
Start: 2025-04-10

## 2025-04-10 RX ORDER — METOPROLOL SUCCINATE 50 MG/1
50 TABLET, EXTENDED RELEASE ORAL DAILY
Qty: 90 TABLET | Refills: 1 | Status: SHIPPED | OUTPATIENT
Start: 2025-04-10

## 2025-05-01 ENCOUNTER — OFFICE VISIT (OUTPATIENT)
Dept: FAMILY MEDICINE CLINIC | Facility: CLINIC | Age: 65
End: 2025-05-01
Payer: MEDICARE

## 2025-05-01 VITALS
RESPIRATION RATE: 18 BRPM | HEIGHT: 63 IN | DIASTOLIC BLOOD PRESSURE: 72 MMHG | BODY MASS INDEX: 63.51 KG/M2 | HEART RATE: 100 BPM | OXYGEN SATURATION: 94 % | SYSTOLIC BLOOD PRESSURE: 122 MMHG

## 2025-05-01 DIAGNOSIS — R53.81 MALAISE: Primary | ICD-10-CM

## 2025-05-01 PROCEDURE — 87428 SARSCOV & INF VIR A&B AG IA: CPT | Performed by: NURSE PRACTITIONER

## 2025-05-01 PROCEDURE — 99213 OFFICE O/P EST LOW 20 MIN: CPT | Performed by: NURSE PRACTITIONER

## 2025-05-01 PROCEDURE — 3078F DIAST BP <80 MM HG: CPT | Performed by: NURSE PRACTITIONER

## 2025-05-01 PROCEDURE — 3074F SYST BP LT 130 MM HG: CPT | Performed by: NURSE PRACTITIONER

## 2025-05-01 PROCEDURE — 1126F AMNT PAIN NOTED NONE PRSNT: CPT | Performed by: NURSE PRACTITIONER

## 2025-05-01 NOTE — PROGRESS NOTES
Subjective   Emely Solomon is a 64 y.o. female.     Chief Complaint   Patient presents with    Fatigue     For a few days        History of Present Illness     History of Present Illness  The patient presents for evaluation of respiratory symptoms.    A general feeling of malaise is reported, accompanied by a slight cough that started a few days ago. No gastrointestinal symptoms or shortness of breath at rest are noted, although dyspnea on exertion is acknowledged. Influenza and COVID-19 tests have been conducted. Increased frequency of urination is reported, attributed to diuretic medication.    SOCIAL HISTORY  She does not smoke.       The following portions of the patient's history were reviewed and updated as appropriate: allergies, current medications, past family history, past medical history, past social history, past surgical history and problem list.    Review of Systems   Constitutional:  Positive for fatigue. Negative for chills, fever and unexpected weight loss.   HENT:  Negative for congestion, dental problem, ear discharge, hearing loss, postnasal drip, sinus pressure, sore throat, swollen glands and tinnitus.    Respiratory:  Positive for cough and shortness of breath (exertion). Negative for chest tightness and wheezing.    Cardiovascular:  Negative for chest pain, palpitations and leg swelling.   Neurological:  Negative for dizziness and headache.   Hematological: Negative.    Psychiatric/Behavioral: Negative.  Negative for sleep disturbance.        Objective   Physical Exam  Vitals and nursing note reviewed.   Constitutional:       Appearance: Normal appearance. She is well-developed. She is obese.   HENT:      Head: Normocephalic and atraumatic.      Right Ear: Tympanic membrane, ear canal and external ear normal.      Left Ear: Tympanic membrane, ear canal and external ear normal.      Nose: Nose normal. No congestion or rhinorrhea.      Right Sinus: No maxillary sinus tenderness or frontal  sinus tenderness.      Left Sinus: No maxillary sinus tenderness or frontal sinus tenderness.      Mouth/Throat:      Lips: Pink.      Mouth: Mucous membranes are moist.      Pharynx: Oropharynx is clear. No oropharyngeal exudate.   Eyes:      General:         Right eye: No discharge.         Left eye: No discharge.      Conjunctiva/sclera: Conjunctivae normal.      Pupils: Pupils are equal, round, and reactive to light.   Neck:      Thyroid: No thyromegaly.   Cardiovascular:      Rate and Rhythm: Normal rate and regular rhythm.      Heart sounds: Normal heart sounds. No murmur heard.  Pulmonary:      Effort: Pulmonary effort is normal. No tachypnea or respiratory distress.      Breath sounds: Normal breath sounds. No decreased breath sounds, wheezing or rales.   Musculoskeletal:      Cervical back: Normal range of motion and neck supple.   Lymphadenopathy:      Cervical: No cervical adenopathy.   Skin:     General: Skin is warm and dry.   Neurological:      Mental Status: She is alert and oriented to person, place, and time.   Psychiatric:         Behavior: Behavior normal.         Thought Content: Thought content normal.         Judgment: Judgment normal.         Vitals:    05/01/25 0941   BP:    Pulse:    Resp:    SpO2: 94%     Body mass index is 63.51 kg/m².      Procedures    Assessment & Plan   Problems Addressed this Visit    None  Visit Diagnoses         Malaise    -  Primary    Relevant Orders    POCT SARS-CoV-2 + Flu Antigen [facility] (Completed)    CBC & Differential    Comprehensive Metabolic Panel          Diagnoses         Codes Comments      Malaise    -  Primary ICD-10-CM: R53.81  ICD-9-CM: 780.79             Assessment & Plan  1. Respiratory symptoms.  - Covid and flu test returned negative results.  - Oxygen saturation improved from 88% to 94%, likely due to walking prior to the initial measurement.  - Advised to maintain adequate hydration and ensure sufficient rest.  - Laboratory tests will be  conducted to further investigate the condition.              Return if symptoms worsen or fail to improve.    Patient or patient representative verbalized consent for the use of Ambient Listening during the visit with  LUCÍA Jin for chart documentation. 5/1/2025  09:41 EDT

## 2025-05-02 ENCOUNTER — HOSPITAL ENCOUNTER (OUTPATIENT)
Facility: HOSPITAL | Age: 65
Discharge: HOME | End: 2025-05-02
Payer: MEDICARE

## 2025-05-02 DIAGNOSIS — R79.89 ELEVATED SERUM CREATININE: Primary | ICD-10-CM

## 2025-05-02 LAB
ALBUMIN SERPL-MCNC: 3.7 G/DL (ref 3.5–5.2)
ALBUMIN/GLOB SERPL: 0.9 G/DL
ALP SERPL-CCNC: 119 U/L (ref 39–117)
ALT SERPL-CCNC: 11 U/L (ref 1–33)
AST SERPL-CCNC: 19 U/L (ref 1–32)
BILIRUB SERPL-MCNC: 1.1 MG/DL (ref 0–1.2)
BUN SERPL-MCNC: 35 MG/DL (ref 8–23)
BUN/CREAT SERPL: 17.2 (ref 7–25)
CALCIUM SERPL-MCNC: 8.7 MG/DL (ref 8.6–10.5)
CHLORIDE SERPL-SCNC: 96 MMOL/L (ref 98–107)
CO2 SERPL-SCNC: 22.3 MMOL/L (ref 22–29)
CREAT SERPL-MCNC: 2.03 MG/DL (ref 0.57–1)
DIFFERENTIAL COMMENT: ABNORMAL
EGFRCR SERPLBLD CKD-EPI 2021: 26.8 ML/MIN/1.73
ERYTHROCYTE [DISTWIDTH] IN BLOOD BY AUTOMATED COUNT: 15.9 % (ref 12.3–15.4)
GLOBULIN SER CALC-MCNC: 4.3 GM/DL
GLUCOSE SERPL-MCNC: 83 MG/DL (ref 65–99)
HCT VFR BLD AUTO: 27.5 % (ref 34–46.6)
HGB BLD-MCNC: 8.7 G/DL (ref 12–15.9)
LYMPHOCYTES # BLD MANUAL: 0.41 10*3/MM3 (ref 0.7–3.1)
LYMPHOCYTES NFR BLD MANUAL: 3 % (ref 19.6–45.3)
MCH RBC QN AUTO: 25.7 PG (ref 26.6–33)
MCHC RBC AUTO-ENTMCNC: 31.6 G/DL (ref 31.5–35.7)
MCV RBC AUTO: 81.4 FL (ref 79–97)
MONOCYTES # BLD MANUAL: 1.22 10*3/MM3 (ref 0.1–0.9)
MONOCYTES NFR BLD MANUAL: 9 % (ref 5–12)
NEUTROPHILS # BLD MANUAL: 11.51 10*3/MM3 (ref 1.7–7)
NEUTROPHILS NFR BLD MANUAL: 85 % (ref 42.7–76)
NRBC BLD AUTO-RTO: 0 /100 WBC (ref 0–0.2)
PLATELET # BLD AUTO: 263 10*3/MM3 (ref 140–450)
PLATELET BLD QL SMEAR: ABNORMAL
POTASSIUM SERPL-SCNC: 4.9 MMOL/L (ref 3.5–5.2)
PROT SERPL-MCNC: 8 G/DL (ref 6–8.5)
RBC # BLD AUTO: 3.38 10*6/MM3 (ref 3.77–5.28)
RBC MORPH BLD: ABNORMAL
SODIUM SERPL-SCNC: 131 MMOL/L (ref 136–145)
WBC # BLD AUTO: 13.54 10*3/MM3 (ref 3.4–10.8)

## 2025-05-04 ENCOUNTER — APPOINTMENT (OUTPATIENT)
Dept: ULTRASOUND IMAGING | Facility: HOSPITAL | Age: 65
DRG: 280 | End: 2025-05-04
Payer: MEDICARE

## 2025-05-04 ENCOUNTER — APPOINTMENT (OUTPATIENT)
Dept: CT IMAGING | Facility: HOSPITAL | Age: 65
DRG: 280 | End: 2025-05-04
Payer: MEDICARE

## 2025-05-04 ENCOUNTER — HOSPITAL ENCOUNTER (INPATIENT)
Facility: HOSPITAL | Age: 65
LOS: 5 days | Discharge: NURSING, REHAB OR LONG-TERM CARE | DRG: 280 | End: 2025-05-10
Attending: EMERGENCY MEDICINE | Admitting: INTERNAL MEDICINE
Payer: MEDICARE

## 2025-05-04 ENCOUNTER — APPOINTMENT (OUTPATIENT)
Dept: GENERAL RADIOLOGY | Facility: HOSPITAL | Age: 65
DRG: 280 | End: 2025-05-04
Payer: MEDICARE

## 2025-05-04 DIAGNOSIS — S20.229A CONTUSION OF UPPER BACK, UNSPECIFIED LATERALITY, INITIAL ENCOUNTER: ICD-10-CM

## 2025-05-04 DIAGNOSIS — Z79.01 CHRONIC ANTICOAGULATION: ICD-10-CM

## 2025-05-04 DIAGNOSIS — N28.9 RENAL INSUFFICIENCY: ICD-10-CM

## 2025-05-04 DIAGNOSIS — R53.1 GENERALIZED WEAKNESS: ICD-10-CM

## 2025-05-04 DIAGNOSIS — D64.9 ANEMIA, UNSPECIFIED TYPE: ICD-10-CM

## 2025-05-04 DIAGNOSIS — R09.02 HYPOXIA: Primary | ICD-10-CM
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
## 2025-05-04 PROBLEM — Z86.711 HISTORY OF PULMONARY EMBOLISM: Status: ACTIVE | Noted: 2025-05-04

## 2025-05-04 PROBLEM — R79.89 ELEVATED TROPONIN: Status: ACTIVE | Noted: 2025-05-04

## 2025-05-04 PROBLEM — L97.211 VENOUS STASIS ULCER OF RIGHT CALF LIMITED TO BREAKDOWN OF SKIN WITHOUT VARICOSE VEINS: Status: ACTIVE | Noted: 2024-05-15

## 2025-05-04 PROBLEM — R73.9 HYPERGLYCEMIA: Status: ACTIVE | Noted: 2025-05-04

## 2025-05-04 PROBLEM — E66.01 MORBID OBESITY: Status: ACTIVE | Noted: 2025-05-04

## 2025-05-04 PROBLEM — J20.9 ACUTE WHEEZY BRONCHITIS: Status: ACTIVE | Noted: 2025-05-04

## 2025-05-04 LAB
ALBUMIN SERPL-MCNC: 3.2 G/DL (ref 3.5–5.2)
ALBUMIN/GLOB SERPL: 0.6 G/DL
ALP SERPL-CCNC: 203 U/L (ref 39–117)
ALT SERPL W P-5'-P-CCNC: 15 U/L (ref 1–33)
ANION GAP SERPL CALCULATED.3IONS-SCNC: 13.8 MMOL/L (ref 5–15)
AST SERPL-CCNC: 38 U/L (ref 1–32)
B PARAPERT DNA SPEC QL NAA+PROBE: NOT DETECTED
B PERT DNA SPEC QL NAA+PROBE: NOT DETECTED
BILIRUB SERPL-MCNC: 1.1 MG/DL (ref 0–1.2)
BUN SERPL-MCNC: 40 MG/DL (ref 8–23)
BUN/CREAT SERPL: 21.5 (ref 7–25)
C PNEUM DNA NPH QL NAA+NON-PROBE: NOT DETECTED
CALCIUM SPEC-SCNC: 8.9 MG/DL (ref 8.6–10.5)
CHLORIDE SERPL-SCNC: 95 MMOL/L (ref 98–107)
CO2 SERPL-SCNC: 21.2 MMOL/L (ref 22–29)
CREAT SERPL-MCNC: 1.86 MG/DL (ref 0.57–1)
D DIMER PPP FEU-MCNC: 2.02 MCGFEU/ML (ref 0–0.65)
D-LACTATE SERPL-SCNC: 1.5 MMOL/L (ref 0.5–2)
DEPRECATED RDW RBC AUTO: 46.8 FL (ref 37–54)
EGFRCR SERPLBLD CKD-EPI 2021: 29.8 ML/MIN/1.73
ERYTHROCYTE [DISTWIDTH] IN BLOOD BY AUTOMATED COUNT: 16.1 % (ref 12.3–15.4)
FERRITIN SERPL-MCNC: 632 NG/ML (ref 13–150)
FLUAV SUBTYP SPEC NAA+PROBE: NOT DETECTED
FLUBV RNA ISLT QL NAA+PROBE: NOT DETECTED
FOLATE SERPL-MCNC: 9.33 NG/ML (ref 4.78–24.2)
GEN 5 1HR TROPONIN T REFLEX: 81 NG/L
GLOBULIN UR ELPH-MCNC: 5 GM/DL
GLUCOSE SERPL-MCNC: 104 MG/DL (ref 65–99)
HADV DNA SPEC NAA+PROBE: NOT DETECTED
HAPTOGLOB SERPL-MCNC: 333 MG/DL (ref 30–200)
HAV IGM SERPL QL IA: ABNORMAL
HBA1C MFR BLD: 5.4 % (ref 4.8–5.6)
HBV CORE IGM SERPL QL IA: ABNORMAL
HBV SURFACE AG SERPL QL IA: ABNORMAL
HCOV 229E RNA SPEC QL NAA+PROBE: NOT DETECTED
HCOV HKU1 RNA SPEC QL NAA+PROBE: NOT DETECTED
HCOV NL63 RNA SPEC QL NAA+PROBE: NOT DETECTED
HCOV OC43 RNA SPEC QL NAA+PROBE: NOT DETECTED
HCT VFR BLD AUTO: 26.9 % (ref 34–46.6)
HCV AB SER QL: REACTIVE
HGB BLD-MCNC: 8.7 G/DL (ref 12–15.9)
HMPV RNA NPH QL NAA+NON-PROBE: NOT DETECTED
HPIV1 RNA ISLT QL NAA+PROBE: NOT DETECTED
HPIV2 RNA SPEC QL NAA+PROBE: NOT DETECTED
HPIV3 RNA NPH QL NAA+PROBE: NOT DETECTED
HPIV4 P GENE NPH QL NAA+PROBE: NOT DETECTED
IRON 24H UR-MRATE: 13 MCG/DL (ref 37–145)
IRON SATN MFR SERPL: 5 % (ref 20–50)
LYMPHOCYTES # BLD MANUAL: 0.18 10*3/MM3 (ref 0.7–3.1)
LYMPHOCYTES NFR BLD MANUAL: 6 % (ref 5–12)
M PNEUMO IGG SER IA-ACNC: NOT DETECTED
MCH RBC QN AUTO: 25.9 PG (ref 26.6–33)
MCHC RBC AUTO-ENTMCNC: 32.3 G/DL (ref 31.5–35.7)
MCV RBC AUTO: 80.1 FL (ref 79–97)
METAMYELOCYTES NFR BLD MANUAL: 1 % (ref 0–0)
MONOCYTES # BLD: 0.53 10*3/MM3 (ref 0.1–0.9)
NEUTROPHILS # BLD AUTO: 8.03 10*3/MM3 (ref 1.7–7)
NEUTROPHILS NFR BLD MANUAL: 91 % (ref 42.7–76)
NRBC BLD AUTO-RTO: 0 /100 WBC (ref 0–0.2)
NT-PROBNP SERPL-MCNC: 2994 PG/ML (ref 0–900)
PLAT MORPH BLD: NORMAL
PLATELET # BLD AUTO: 220 10*3/MM3 (ref 140–450)
PMV BLD AUTO: 8.7 FL (ref 6–12)
POTASSIUM SERPL-SCNC: 3.3 MMOL/L (ref 3.5–5.2)
POTASSIUM SERPL-SCNC: 3.8 MMOL/L (ref 3.5–5.2)
PROCALCITONIN SERPL-MCNC: 40.8 NG/ML (ref 0–0.25)
PROT SERPL-MCNC: 8.2 G/DL (ref 6–8.5)
QT INTERVAL: 365 MS
QTC INTERVAL: 465 MS
RBC # BLD AUTO: 3.36 10*6/MM3 (ref 3.77–5.28)
RBC MORPH BLD: NORMAL
RETICS # AUTO: 0.04 10*6/MM3 (ref 0.02–0.13)
RETICS/RBC NFR AUTO: 1.24 % (ref 0.7–1.9)
RHINOVIRUS RNA SPEC NAA+PROBE: NOT DETECTED
RSV RNA NPH QL NAA+NON-PROBE: NOT DETECTED
SARS-COV-2 RNA NPH QL NAA+NON-PROBE: NOT DETECTED
SODIUM SERPL-SCNC: 130 MMOL/L (ref 136–145)
TIBC SERPL-MCNC: 262 MCG/DL (ref 298–536)
TRANSFERRIN SERPL-MCNC: 176 MG/DL (ref 200–360)
TROPONIN T % DELTA: 13
TROPONIN T NUMERIC DELTA: 9 NG/L
TROPONIN T SERPL HS-MCNC: 72 NG/L
TSH SERPL DL<=0.05 MIU/L-ACNC: 0.91 UIU/ML (ref 0.27–4.2)
URATE SERPL-MCNC: 7.9 MG/DL (ref 2.4–5.7)
VARIANT LYMPHS NFR BLD MANUAL: 2 % (ref 19.6–45.3)
VIT B12 BLD-MCNC: 1818 PG/ML (ref 211–946)
WBC MORPH BLD: NORMAL
WBC NRBC COR # BLD AUTO: 8.82 10*3/MM3 (ref 3.4–10.8)

## 2025-05-04 PROCEDURE — 84550 ASSAY OF BLOOD/URIC ACID: CPT | Performed by: INTERNAL MEDICINE

## 2025-05-04 PROCEDURE — 87186 SC STD MICRODIL/AGAR DIL: CPT | Performed by: INTERNAL MEDICINE

## 2025-05-04 PROCEDURE — 0202U NFCT DS 22 TRGT SARS-COV-2: CPT | Performed by: EMERGENCY MEDICINE

## 2025-05-04 PROCEDURE — 80053 COMPREHEN METABOLIC PANEL: CPT | Performed by: EMERGENCY MEDICINE

## 2025-05-04 PROCEDURE — 84466 ASSAY OF TRANSFERRIN: CPT | Performed by: INTERNAL MEDICINE

## 2025-05-04 PROCEDURE — 87154 CUL TYP ID BLD PTHGN 6+ TRGT: CPT | Performed by: INTERNAL MEDICINE

## 2025-05-04 PROCEDURE — 83880 ASSAY OF NATRIURETIC PEPTIDE: CPT | Performed by: EMERGENCY MEDICINE

## 2025-05-04 PROCEDURE — 87040 BLOOD CULTURE FOR BACTERIA: CPT | Performed by: INTERNAL MEDICINE

## 2025-05-04 PROCEDURE — 83010 ASSAY OF HAPTOGLOBIN QUANT: CPT | Performed by: INTERNAL MEDICINE

## 2025-05-04 PROCEDURE — 82607 VITAMIN B-12: CPT | Performed by: INTERNAL MEDICINE

## 2025-05-04 PROCEDURE — 94799 UNLISTED PULMONARY SVC/PX: CPT

## 2025-05-04 PROCEDURE — G0378 HOSPITAL OBSERVATION PER HR: HCPCS

## 2025-05-04 PROCEDURE — 85025 COMPLETE CBC W/AUTO DIFF WBC: CPT | Performed by: EMERGENCY MEDICINE

## 2025-05-04 PROCEDURE — 85007 BL SMEAR W/DIFF WBC COUNT: CPT | Performed by: EMERGENCY MEDICINE

## 2025-05-04 PROCEDURE — 85045 AUTOMATED RETICULOCYTE COUNT: CPT | Performed by: INTERNAL MEDICINE

## 2025-05-04 PROCEDURE — 82746 ASSAY OF FOLIC ACID SERUM: CPT | Performed by: INTERNAL MEDICINE

## 2025-05-04 PROCEDURE — 80074 ACUTE HEPATITIS PANEL: CPT | Performed by: INTERNAL MEDICINE

## 2025-05-04 PROCEDURE — 83540 ASSAY OF IRON: CPT | Performed by: INTERNAL MEDICINE

## 2025-05-04 PROCEDURE — 72128 CT CHEST SPINE W/O DYE: CPT

## 2025-05-04 PROCEDURE — 84145 PROCALCITONIN (PCT): CPT | Performed by: INTERNAL MEDICINE

## 2025-05-04 PROCEDURE — 93010 ELECTROCARDIOGRAM REPORT: CPT | Performed by: INTERNAL MEDICINE

## 2025-05-04 PROCEDURE — 71045 X-RAY EXAM CHEST 1 VIEW: CPT

## 2025-05-04 PROCEDURE — 83036 HEMOGLOBIN GLYCOSYLATED A1C: CPT | Performed by: INTERNAL MEDICINE

## 2025-05-04 PROCEDURE — 76705 ECHO EXAM OF ABDOMEN: CPT

## 2025-05-04 PROCEDURE — 84484 ASSAY OF TROPONIN QUANT: CPT | Performed by: EMERGENCY MEDICINE

## 2025-05-04 PROCEDURE — 84132 ASSAY OF SERUM POTASSIUM: CPT | Performed by: INTERNAL MEDICINE

## 2025-05-04 PROCEDURE — 84443 ASSAY THYROID STIM HORMONE: CPT | Performed by: INTERNAL MEDICINE

## 2025-05-04 PROCEDURE — 87077 CULTURE AEROBIC IDENTIFY: CPT | Performed by: INTERNAL MEDICINE

## 2025-05-04 PROCEDURE — 83930 ASSAY OF BLOOD OSMOLALITY: CPT | Performed by: NURSE PRACTITIONER

## 2025-05-04 PROCEDURE — 99285 EMERGENCY DEPT VISIT HI MDM: CPT

## 2025-05-04 PROCEDURE — 25810000003 SODIUM CHLORIDE 0.9 % SOLUTION 250 ML FLEX CONT: Performed by: INTERNAL MEDICINE

## 2025-05-04 PROCEDURE — 93005 ELECTROCARDIOGRAM TRACING: CPT | Performed by: EMERGENCY MEDICINE

## 2025-05-04 PROCEDURE — 83605 ASSAY OF LACTIC ACID: CPT | Performed by: EMERGENCY MEDICINE

## 2025-05-04 PROCEDURE — 85379 FIBRIN DEGRADATION QUANT: CPT | Performed by: INTERNAL MEDICINE

## 2025-05-04 PROCEDURE — 82728 ASSAY OF FERRITIN: CPT | Performed by: INTERNAL MEDICINE

## 2025-05-04 PROCEDURE — 25010000002 AZITHROMYCIN PER 500 MG: Performed by: INTERNAL MEDICINE

## 2025-05-04 PROCEDURE — 36415 COLL VENOUS BLD VENIPUNCTURE: CPT

## 2025-05-04 RX ORDER — FAMOTIDINE 20 MG/1
40 TABLET, FILM COATED ORAL DAILY
Status: DISCONTINUED | OUTPATIENT
Start: 2025-05-04 | End: 2025-05-10 | Stop reason: HOSPADM

## 2025-05-04 RX ORDER — OXYCODONE AND ACETAMINOPHEN 5; 325 MG/1; MG/1
1 TABLET ORAL EVERY 6 HOURS PRN
Refills: 0 | Status: DISPENSED | OUTPATIENT
Start: 2025-05-04 | End: 2025-05-09

## 2025-05-04 RX ORDER — ACETAMINOPHEN 325 MG/1
650 TABLET ORAL EVERY 4 HOURS PRN
Status: DISCONTINUED | OUTPATIENT
Start: 2025-05-04 | End: 2025-05-10 | Stop reason: HOSPADM

## 2025-05-04 RX ORDER — LIDOCAINE 4 G/G
1 PATCH TOPICAL
Status: DISCONTINUED | OUTPATIENT
Start: 2025-05-04 | End: 2025-05-10 | Stop reason: HOSPADM

## 2025-05-04 RX ORDER — AMOXICILLIN 250 MG
2 CAPSULE ORAL 2 TIMES DAILY
Status: DISCONTINUED | OUTPATIENT
Start: 2025-05-04 | End: 2025-05-08

## 2025-05-04 RX ORDER — ONDANSETRON 4 MG/1
4 TABLET, ORALLY DISINTEGRATING ORAL EVERY 6 HOURS PRN
Status: DISCONTINUED | OUTPATIENT
Start: 2025-05-04 | End: 2025-05-10 | Stop reason: HOSPADM

## 2025-05-04 RX ORDER — IPRATROPIUM BROMIDE AND ALBUTEROL SULFATE 2.5; .5 MG/3ML; MG/3ML
3 SOLUTION RESPIRATORY (INHALATION)
Status: DISCONTINUED | OUTPATIENT
Start: 2025-05-04 | End: 2025-05-10 | Stop reason: HOSPADM

## 2025-05-04 RX ORDER — GUAIFENESIN 600 MG/1
1200 TABLET, EXTENDED RELEASE ORAL EVERY 12 HOURS SCHEDULED
Status: DISCONTINUED | OUTPATIENT
Start: 2025-05-04 | End: 2025-05-10 | Stop reason: HOSPADM

## 2025-05-04 RX ORDER — SODIUM CHLORIDE 0.9 % (FLUSH) 0.9 %
10 SYRINGE (ML) INJECTION EVERY 12 HOURS SCHEDULED
Status: DISCONTINUED | OUTPATIENT
Start: 2025-05-04 | End: 2025-05-10 | Stop reason: HOSPADM

## 2025-05-04 RX ORDER — ONDANSETRON 2 MG/ML
4 INJECTION INTRAMUSCULAR; INTRAVENOUS EVERY 6 HOURS PRN
Status: DISCONTINUED | OUTPATIENT
Start: 2025-05-04 | End: 2025-05-10 | Stop reason: HOSPADM

## 2025-05-04 RX ORDER — POLYETHYLENE GLYCOL 3350 17 G/17G
17 POWDER, FOR SOLUTION ORAL DAILY PRN
Status: DISCONTINUED | OUTPATIENT
Start: 2025-05-04 | End: 2025-05-08

## 2025-05-04 RX ORDER — LOSARTAN POTASSIUM 100 MG/1
100 TABLET ORAL DAILY
Status: DISCONTINUED | OUTPATIENT
Start: 2025-05-04 | End: 2025-05-04

## 2025-05-04 RX ORDER — NITROGLYCERIN 0.4 MG/1
0.4 TABLET SUBLINGUAL
Status: DISCONTINUED | OUTPATIENT
Start: 2025-05-04 | End: 2025-05-10 | Stop reason: HOSPADM

## 2025-05-04 RX ORDER — BISACODYL 5 MG/1
5 TABLET, DELAYED RELEASE ORAL DAILY PRN
Status: DISCONTINUED | OUTPATIENT
Start: 2025-05-04 | End: 2025-05-08

## 2025-05-04 RX ORDER — LORAZEPAM 0.5 MG/1
0.5 TABLET ORAL EVERY 8 HOURS PRN
Status: ACTIVE | OUTPATIENT
Start: 2025-05-04 | End: 2025-05-09

## 2025-05-04 RX ORDER — SODIUM CHLORIDE 0.9 % (FLUSH) 0.9 %
10 SYRINGE (ML) INJECTION AS NEEDED
Status: DISCONTINUED | OUTPATIENT
Start: 2025-05-04 | End: 2025-05-10 | Stop reason: HOSPADM

## 2025-05-04 RX ORDER — METOPROLOL SUCCINATE 50 MG/1
50 TABLET, EXTENDED RELEASE ORAL DAILY
Status: DISCONTINUED | OUTPATIENT
Start: 2025-05-04 | End: 2025-05-05

## 2025-05-04 RX ORDER — ACETAMINOPHEN 650 MG/1
650 SUPPOSITORY RECTAL EVERY 4 HOURS PRN
Status: DISCONTINUED | OUTPATIENT
Start: 2025-05-04 | End: 2025-05-10 | Stop reason: HOSPADM

## 2025-05-04 RX ORDER — POTASSIUM CHLORIDE 1500 MG/1
40 TABLET, EXTENDED RELEASE ORAL EVERY 4 HOURS
Status: COMPLETED | OUTPATIENT
Start: 2025-05-04 | End: 2025-05-04

## 2025-05-04 RX ORDER — ACETAMINOPHEN 160 MG/5ML
650 SOLUTION ORAL EVERY 4 HOURS PRN
Status: DISCONTINUED | OUTPATIENT
Start: 2025-05-04 | End: 2025-05-10 | Stop reason: HOSPADM

## 2025-05-04 RX ORDER — BISACODYL 10 MG
10 SUPPOSITORY, RECTAL RECTAL DAILY PRN
Status: DISCONTINUED | OUTPATIENT
Start: 2025-05-04 | End: 2025-05-08

## 2025-05-04 RX ORDER — SODIUM CHLORIDE 9 MG/ML
40 INJECTION, SOLUTION INTRAVENOUS AS NEEDED
Status: DISCONTINUED | OUTPATIENT
Start: 2025-05-04 | End: 2025-05-10 | Stop reason: HOSPADM

## 2025-05-04 RX ADMIN — FAMOTIDINE 40 MG: 20 TABLET, FILM COATED ORAL at 17:43

## 2025-05-04 RX ADMIN — RIVAROXABAN 20 MG: 20 TABLET, FILM COATED ORAL at 17:43

## 2025-05-04 RX ADMIN — IPRATROPIUM BROMIDE AND ALBUTEROL SULFATE 3 ML: .5; 3 SOLUTION RESPIRATORY (INHALATION) at 21:18

## 2025-05-04 RX ADMIN — AZITHROMYCIN MONOHYDRATE 500 MG: 500 INJECTION, POWDER, LYOPHILIZED, FOR SOLUTION INTRAVENOUS at 17:12

## 2025-05-04 RX ADMIN — METOPROLOL SUCCINATE 50 MG: 50 TABLET, EXTENDED RELEASE ORAL at 17:43

## 2025-05-04 RX ADMIN — POTASSIUM CHLORIDE 40 MEQ: 1500 TABLET, EXTENDED RELEASE ORAL at 17:43

## 2025-05-04 RX ADMIN — GUAIFENESIN 1200 MG: 600 TABLET, MULTILAYER, EXTENDED RELEASE ORAL at 20:27

## 2025-05-04 RX ADMIN — Medication 10 ML: at 20:28

## 2025-05-04 RX ADMIN — POTASSIUM CHLORIDE 40 MEQ: 1500 TABLET, EXTENDED RELEASE ORAL at 20:27

## 2025-05-04 NOTE — ED NOTES
Nursing report ED to floor  Emely Solomon  64 y.o.  female    HPI :  HPI  Stated Reason for Visit: patient fell in bathtub  History Obtained From: patient, EMS    Chief Complaint  Chief Complaint   Patient presents with    Fall       Admitting doctor:   Jimmy Morales MD    Admitting diagnosis:   The primary encounter diagnosis was Hypoxia. Diagnoses of Contusion of upper back, unspecified laterality, initial encounter, BMI 60.0-69.9, adult, Generalized weakness, Renal insufficiency, and Anemia, unspecified type were also pertinent to this visit.    Code status:   Current Code Status       Date Active Code Status Order ID Comments User Context       Prior            Allergies:   Cephalexin and Clindamycin/lincomycin    Isolation:   No active isolations    Intake and Output  No intake or output data in the 24 hours ending 05/04/25 1221    Weight:       05/04/25  0713   Weight: (!) 170 kg (375 lb)       Most recent vitals:   Vitals:    05/04/25 0828 05/04/25 0829 05/04/25 0911 05/04/25 0912   BP: 100/60      BP Location: Left arm      Patient Position: Lying      Pulse:  99 99 101   Resp:       Temp: 100.1 °F (37.8 °C)      TempSrc: Tympanic      SpO2:  95% 97% 96%   Weight:       Height:           Active LDAs/IV Access:   Lines, Drains & Airways       Active LDAs       Name Placement date Placement time Site Days    Peripheral IV 05/04/25 0849 20 G Right Antecubital 05/04/25  0849  Antecubital  less than 1                    Labs (abnormal labs have a star):   Labs Reviewed   COMPREHENSIVE METABOLIC PANEL - Abnormal; Notable for the following components:       Result Value    Glucose 104 (*)     BUN 40 (*)     Creatinine 1.86 (*)     Sodium 130 (*)     Potassium 3.3 (*)     Chloride 95 (*)     CO2 21.2 (*)     Albumin 3.2 (*)     AST (SGOT) 38 (*)     Alkaline Phosphatase 203 (*)     eGFR 29.8 (*)     All other components within normal limits    Narrative:     GFR Categories in Chronic Kidney Disease (CKD)               GFR Category          GFR (mL/min/1.73)    Interpretation  G1                    90 or greater        Normal or high (1)  G2                    60-89                Mild decrease (1)  G3a                   45-59                Mild to moderate decrease  G3b                   30-44                Moderate to severe decrease  G4                    15-29                Severe decrease  G5                    14 or less           Kidney failure    (1)In the absence of evidence of kidney disease, neither GFR category G1 or G2 fulfill the criteria for CKD.    eGFR calculation 2021 CKD-EPI creatinine equation, which does not include race as a factor   CBC WITH AUTO DIFFERENTIAL - Abnormal; Notable for the following components:    RBC 3.36 (*)     Hemoglobin 8.7 (*)     Hematocrit 26.9 (*)     MCH 25.9 (*)     RDW 16.1 (*)     All other components within normal limits   BNP (IN-HOUSE) - Abnormal; Notable for the following components:    proBNP 2,994.0 (*)     All other components within normal limits    Narrative:     This assay is used as an aid in the diagnosis of individuals suspected of having heart failure. It can be used as an aid in the diagnosis of acute decompensated heart failure (ADHF) in patients presenting with signs and symptoms of ADHF to the emergency department (ED). In addition, NT-proBNP of <300 pg/mL indicates ADHF is not likely.    Age Range Result Interpretation  NT-proBNP Concentration (pg/mL:      <50             Positive            >450                   Gray                 300-450                    Negative             <300    50-75           Positive            >900                  Gray                300-900                  Negative            <300      >75             Positive            >1800                  Gray                300-1800                  Negative            <300   TROPONIN - Abnormal; Notable for the following components:    HS Troponin T 72 (*)     All other  components within normal limits    Narrative:     High Sensitive Troponin T Reference Range:  <14.0 ng/L- Negative Female for AMI  <22.0 ng/L- Negative Male for AMI  >=14 - Abnormal Female indicating possible myocardial injury.  >=22 - Abnormal Male indicating possible myocardial injury.   Clinicians would have to utilize clinical acumen, EKG, Troponin, and serial changes to determine if it is an Acute Myocardial Infarction or myocardial injury due to an underlying chronic condition.        HIGH SENSITIVITIY TROPONIN T 1HR - Abnormal; Notable for the following components:    HS Troponin T 81 (*)     All other components within normal limits    Narrative:     High Sensitive Troponin T Reference Range:  <14.0 ng/L- Negative Female for AMI  <22.0 ng/L- Negative Male for AMI  >=14 - Abnormal Female indicating possible myocardial injury.  >=22 - Abnormal Male indicating possible myocardial injury.   Clinicians would have to utilize clinical acumen, EKG, Troponin, and serial changes to determine if it is an Acute Myocardial Infarction or myocardial injury due to an underlying chronic condition.        MANUAL DIFFERENTIAL - Abnormal; Notable for the following components:    Neutrophil % 91.0 (*)     Lymphocyte % 2.0 (*)     Metamyelocyte % 1.0 (*)     Neutrophils Absolute 8.03 (*)     Lymphocytes Absolute 0.18 (*)     All other components within normal limits   RESPIRATORY PANEL PCR W/ COVID-19 (SARS-COV-2), NP SWAB IN UTM/VTP, 2 HR TAT - Normal    Narrative:     In the setting of a positive respiratory panel with a viral infection PLUS a negative procalcitonin without other underlying concern for bacterial infection, consider observing off antibiotics or discontinuation of antibiotics and continue supportive care. If the respiratory panel is positive for atypical bacterial infection (Bordetella pertussis, Chlamydophila pneumoniae, or Mycoplasma pneumoniae), consider antibiotic de-escalation to target atypical bacterial  infection.   LACTIC ACID, PLASMA - Normal   CBC AND DIFFERENTIAL    Narrative:     The following orders were created for panel order CBC & Differential.  Procedure                               Abnormality         Status                     ---------                               -----------         ------                     CBC Auto Differential[[id number]]        Abnormal            Final result                 Please view results for these tests on the individual orders.       EKG:   ECG 12 Lead Dyspnea   Preliminary Result   HEART RATE=97  bpm   RR Rdlzffng=420  ms   IA Qeaowasx=090  ms   P Horizontal Axis=21  deg   P Front Axis=31  deg   QRSD Klsgbthf=363  ms   QT Urbeunzd=205  ms   UYnW=940  ms   QRS Axis=153  deg   T Wave Axis=20  deg   - ABNORMAL ECG -   Sinus rhythm   Nonspecific intraventricular conduction delay   Date and Time of Study:2025-05-04 08:32:47          Meds given in ED:   Medications - No data to display    Imaging results:  CT Thoracic Spine Without Contrast  Result Date: 5/4/2025   As described.    This report was finalized on 5/4/2025 9:53 AM by Dr. Damon Chavez M.D on Workstation: [employer]      XR Chest AP  Result Date: 5/4/2025  No focal pulmonary consolidation. Borderline heart size. Follow-up as clinical indications persist.  This report was finalized on 5/4/2025 8:34 AM by Dr. Damon Chavez M.D on Workstation: [employer]        Ambulatory status:   - assist Xs 2; uses walker at baseline     Social issues:   Social History     Socioeconomic History    Marital status:    Tobacco Use    Smoking status: Never    Smokeless tobacco: Never   Vaping Use    Vaping status: Never Used   Substance and Sexual Activity    Alcohol use: Not Currently     Comment: occassionally    Drug use: Never    Sexual activity: Not Currently     Partners: Male     Birth control/protection: Post-menopausal       Peripheral Neurovascular  Peripheral Neurovascular (Adult)  Peripheral Neurovascular  WDL: .WDL except, pulse assessment, neurovascular assessment lower  Pulse Assessment: dorsalis pedis  Additional Documentation: Edema (Group)  Edema  Edema: dependent  Dependent Edema: 3+ (Moderate)  LLE Neurovascular Assessment  Temperature LLE: warm  Color LLE: red  Sensation LLE: no tenderness  RLE Neurovascular Assessment  Temperature RLE: warm  Color RLE: red  Sensation RLE: no tenderness    Neuro Cognitive  Neuro Cognitive (Adult)  Cognitive/Neuro/Behavioral WDL: WDL, level of consciousness, orientation  Level of Consciousness: Alert  Orientation: oriented x 4  Pupils  Pupil PERRLA: yes    Learning  Learning Assessment  Learning Readiness and Ability: no barriers identified  Education Provided  Person Taught: patient, family member/friend  Teaching Method: verbal instruction  Teaching Focus: symptom/problem overview, diagnostic test, medical device/equipment use, medication administration  Education Outcome Evaluation: acceptance expressed    Respiratory  Respiratory WDL  Respiratory WDL: .WDL except, cough, all  Rhythm/Pattern, Respiratory: shortness of breath, labored  Cough Frequency: infrequent  Cough Type: nonproductive  Breath Sounds  All Lung Fields Breath Sounds: All Fields  All Lung Fields Breath Sounds: Anterior:, wheezes, expiratory, equal bilaterally    Abdominal Pain  Safety Interventions  Safety Precautions/Falls Reduction: assistive device/personal items within reach, fall reduction program maintained, family at bedside  All Alarms: alarm(s) activated and audible    Pain Assessments  Pain (Adult)  (0-10) Pain Rating: Rest: 6  (0-10) Pain Rating: Activity: 6  Pain Location: other (see comments) (pt stated that whole body is achy)    NIH Stroke Scale       Nemo Casey RN  05/04/25 12:21 EDT

## 2025-05-04 NOTE — ED PROVIDER NOTES
EMERGENCY DEPARTMENT ENCOUNTER    Room Number:  09/09  PCP: Adilson Wilkerson MD  Historian: Patient, spouse, EMS    I initially evaluated the patient at 8:14 AM    HPI:  Chief Complaint: Fall  A complete HPI/ROS/PMH/PSH/SH/FH are unobtainable due to: Nothing  Context: Emely Solomon is a 64 y.o. female with a medical history of CHF, DVT, morbid obesity, hypertension, lymphedema, chronic anticoagulation, sleep apnea who presents to the ED from home by EMS c/o acute fall that occurred around 6 AM this morning.  Patient was try to get out of the bathtub when she slipped and fell.  She landed on her back and complains of pain in her upper back between her shoulder blades.  She did not hit her head or lose consciousness.  She was unable to get up so her  called 911.  She has had a mild nonproductive cough and runny nose for several days.  Denies fever, chest pain, abdominal pain, vomiting, or diarrhea.  She is on Xarelto.  She normally ambulates with a walker.  She is not on home oxygen            PAST MEDICAL HISTORY  Active Ambulatory Problems     Diagnosis Date Noted    Chronic heart failure with preserved ejection fraction (HFpEF)     PFO (patent foramen ovale)     Paradoxical embolism     Essential hypertension     Pulmonary hypertension     Back pain 11/12/2017    ARIEL (obstructive sleep apnea) 11/14/2017    Class 3 severe obesity due to excess calories with serious comorbidity and body mass index (BMI) of 60.0 to 69.9 in adult 06/03/2019    History of DVT of lower extremity 06/04/2020    Lymphedema 06/04/2020    Anemia, chronic disease 06/04/2020    CKD (chronic kidney disease) stage 3, GFR 30-59 ml/min 09/19/2020    Iron deficiency 11/16/2020    Encounter for screening for malignant neoplasm of colon 02/15/2022    Post-menopausal bleeding 03/01/2022    Thickened endometrium 03/01/2022    Generalized muscle weakness 09/30/2020    Right bundle branch block (RBBB) with left anterior fascicular block  (LAFB)     PVD (peripheral vascular disease) with claudication 04/26/2024    Iliac vein stenosis, right 05/15/2024    Edema of both lower extremities due to peripheral venous insufficiency 05/15/2024    Chronic venous hypertension with ulcer and inflammation involving right side 05/15/2024    Venous stasis ulcer of ankle with fat layer exposed 07/24/2024     Resolved Ambulatory Problems     Diagnosis Date Noted    Chronic cough 07/21/2017    Sepsis 11/12/2017    Fever and chills 11/12/2017    Acute kidney injury 11/12/2017    Bacteremia due to Streptococcus 11/13/2017    Hypoxia 11/14/2017    Cellulitis 11/14/2017    Cellulitis of right anterior lower leg 06/04/2020    Cellulitis of right lower extremity 06/04/2020    Hyponatremia 06/04/2020    Syncope 06/12/2020    Sepsis 09/18/2020    COVID-19 virus detected 09/19/2020    Acute conjunctivitis of right eye 09/23/2020    Hypo-osmolality and hyponatremia 09/29/2020     Past Medical History:   Diagnosis Date    Arthritis     Atheroembolism of other site 04/14/2016    Chronic deep vein thrombosis (DVT) of left popliteal vein 12/17/2015    Chronic diastolic congestive heart failure     COVID-19 virus infection 11/2020    Deep venous thrombosis     Exercise involving walking     Heart murmur     Hypertension     Lipoedema     Morbid obesity     Postthrombotic syndrome of left lower extremity without complications 12/17/2015    Pulmonary embolism 04/14/2016    Sleep apnea          PAST SURGICAL HISTORY  Past Surgical History:   Procedure Laterality Date    ARTERIOGRAM  07/2015    BRONCHOSCOPY N/A 07/21/2017    Procedure: BRONCHOSCOPY with wash;  Surgeon: Caleb Garcia MD;  Location: [facility] ENDOSCOPY;  Service:     COLONOSCOPY      COLONOSCOPY N/A 01/26/2023    Procedure: COLONOSCOPY to CECUM AND TERM ILEUM;  Surgeon: Jj Dean MD;  Location: [facility] ENDOSCOPY;  Service: Gastroenterology;  Laterality: N/A;  PRE OP -screening  POST OP -  NORMAL    D & C  HYSTEROSCOPY N/A 03/08/2022    Procedure: DILATATION AND CURETTAGE with hysteroscopy;  Surgeon: Kaylah Land DO;  Location: [facility] MAIN OR;  Service: Gynecology Oncology;  Laterality: N/A;    DILATATION AND CURETTAGE  04/11/2011    OTHER SURGICAL HISTORY  09/2015    IVC filter    OTHER SURGICAL HISTORY      left LE revascularization    OTHER SURGICAL HISTORY      ALESSIA and LEV scan    THROMBECTOMY  07/2015         FAMILY HISTORY  Family History   Problem Relation Age of Onset    Breast cancer Mother     Hypertension Other     Ovarian cancer Neg Hx     Uterine cancer Neg Hx     Colon cancer Neg Hx     Malig Hyperthermia Neg Hx          SOCIAL HISTORY  Social History     Socioeconomic History    Marital status:    Tobacco Use    Smoking status: Never    Smokeless tobacco: Never   Vaping Use    Vaping status: Never Used   Substance and Sexual Activity    Alcohol use: Not Currently     Comment: occassionally    Drug use: Never    Sexual activity: Not Currently     Partners: Male     Birth control/protection: Post-menopausal         ALLERGIES  Cephalexin and Clindamycin/lincomycin    REVIEW OF SYSTEMS  Review of Systems  Included in HPI  All systems reviewed and negative except for those discussed in HPI.      PHYSICAL EXAM  ED Triage Vitals [05/04/25 0712]   Temp Heart Rate Resp BP SpO2   (!) 100.6 °F (38.1 °C) 108 16 -- 98 %      Temp src Heart Rate Source Patient Position BP Location FiO2 (%)   Tympanic -- Sitting Left arm --       Physical Exam      GENERAL: Awake, alert, oriented x 3.  Nontoxic-appearing elderly female.  BMI 66.4  HENT: NCAT, nares patent  EYES: no scleral icterus  CV: regular rhythm, mildly tachycardic  RESPIRATORY: normal effort, clear to auscultation bilaterally  ABDOMEN: soft, nontender  MUSCULOSKELETAL: Extremities are nontender.  There is lymphedema in both legs.  There is tenderness over the mid thoracic spine.  C and L-spine and chest are nontender  NEURO: Speech is normal.  No  facial droop.  GCS 15  PSYCH:  calm, cooperative  SKIN: warm, dry    Vital signs and nursing notes reviewed.          LAB RESULTS  Recent Results (from the past 24 hours)   ECG 12 Lead Dyspnea    Collection Time: 05/04/25  8:32 AM   Result Value Ref Range    QT Interval 365 ms    QTC Interval 465 ms   Comprehensive Metabolic Panel    Collection Time: 05/04/25  8:49 AM    Specimen: Arm, Right; Blood   Result Value Ref Range    Glucose 104 (H) 65 - 99 mg/dL    BUN 40 (H) 8 - 23 mg/dL    Creatinine 1.86 (H) 0.57 - 1.00 mg/dL    Sodium 130 (L) 136 - 145 mmol/L    Potassium 3.3 (L) 3.5 - 5.2 mmol/L    Chloride 95 (L) 98 - 107 mmol/L    CO2 21.2 (L) 22.0 - 29.0 mmol/L    Calcium 8.9 8.6 - 10.5 mg/dL    Total Protein 8.2 6.0 - 8.5 g/dL    Albumin 3.2 (L) 3.5 - 5.2 g/dL    ALT (SGPT) 15 1 - 33 U/L    AST (SGOT) 38 (H) 1 - 32 U/L    Alkaline Phosphatase 203 (H) 39 - 117 U/L    Total Bilirubin 1.1 0.0 - 1.2 mg/dL    Globulin 5.0 gm/dL    A/G Ratio 0.6 g/dL    BUN/Creatinine Ratio 21.5 7.0 - 25.0    Anion Gap 13.8 5.0 - 15.0 mmol/L    eGFR 29.8 (L) >60.0 mL/min/1.73   Lactic Acid, Plasma    Collection Time: 05/04/25  8:49 AM    Specimen: Arm, Right; Blood   Result Value Ref Range    Lactate 1.5 0.5 - 2.0 mmol/L   CBC Auto Differential    Collection Time: 05/04/25  8:49 AM    Specimen: Arm, Right; Blood   Result Value Ref Range    WBC 8.82 3.40 - 10.80 10*3/mm3    RBC 3.36 (L) 3.77 - 5.28 10*6/mm3    Hemoglobin 8.7 (L) 12.0 - 15.9 g/dL    Hematocrit 26.9 (L) 34.0 - 46.6 %    MCV 80.1 79.0 - 97.0 fL    MCH 25.9 (L) 26.6 - 33.0 pg    MCHC 32.3 31.5 - 35.7 g/dL    RDW 16.1 (H) 12.3 - 15.4 %    RDW-SD 46.8 37.0 - 54.0 fl    MPV 8.7 6.0 - 12.0 fL    Platelets 220 140 - 450 10*3/mm3    nRBC 0.0 0.0 - 0.2 /100 WBC   BNP    Collection Time: 05/04/25  8:49 AM    Specimen: Arm, Right; Blood   Result Value Ref Range    proBNP 2,994.0 (H) 0.0 - 900.0 pg/mL   High Sensitivity Troponin T    Collection Time: 05/04/25  8:49 AM    Specimen:  Arm, Right; Blood   Result Value Ref Range    HS Troponin T 72 (C) <14 ng/L   Manual Differential    Collection Time: 05/04/25  8:49 AM    Specimen: Arm, Right; Blood   Result Value Ref Range    Neutrophil % 91.0 (H) 42.7 - 76.0 %    Lymphocyte % 2.0 (L) 19.6 - 45.3 %    Monocyte % 6.0 5.0 - 12.0 %    Metamyelocyte % 1.0 (H) 0.0 - 0.0 %    Neutrophils Absolute 8.03 (H) 1.70 - 7.00 10*3/mm3    Lymphocytes Absolute 0.18 (L) 0.70 - 3.10 10*3/mm3    Monocytes Absolute 0.53 0.10 - 0.90 10*3/mm3    RBC Morphology Normal Normal    WBC Morphology Normal Normal    Platelet Morphology Normal Normal   Respiratory Panel PCR w/COVID-19(SARS-CoV-2) [facility]A/THO/DANITZA/PAD/[facility]A In-House, NP Swab in UTM/VTM, 2 HR TAT - Swab, Nasopharynx    Collection Time: 05/04/25  8:50 AM    Specimen: Nasopharynx; Swab   Result Value Ref Range    ADENOVIRUS, PCR Not Detected Not Detected    Coronavirus 229E Not Detected Not Detected    Coronavirus HKU1 Not Detected Not Detected    Coronavirus NL63 Not Detected Not Detected    Coronavirus OC43 Not Detected Not Detected    COVID19 Not Detected Not Detected - Ref. Range    Human Metapneumovirus Not Detected Not Detected    Human Rhinovirus/Enterovirus Not Detected Not Detected    Influenza A PCR Not Detected Not Detected    Influenza B PCR Not Detected Not Detected    Parainfluenza Virus 1 Not Detected Not Detected    Parainfluenza Virus 2 Not Detected Not Detected    Parainfluenza Virus 3 Not Detected Not Detected    Parainfluenza Virus 4 Not Detected Not Detected    RSV, PCR Not Detected Not Detected    Bordetella pertussis pcr Not Detected Not Detected    Bordetella parapertussis PCR Not Detected Not Detected    Chlamydophila pneumoniae PCR Not Detected Not Detected    Mycoplasma pneumo by PCR Not Detected Not Detected   High Sensitivity Troponin T 1Hr    Collection Time: 05/04/25 10:36 AM    Specimen: Arm, Right; Blood   Result Value Ref Range    HS Troponin T 81 (C) <14 ng/L    Troponin T Numeric  Delta 9 ng/L    Troponin T % Delta 13 Abnormal if >/= 20%       Ordered the above labs and reviewed the results.        RADIOLOGY  CT Thoracic Spine Without Contrast  Result Date: 5/4/2025  CT THORACIC SPINE WO CONTRAST-  INDICATIONS: Trauma. Radiation dose reduction techniques were utilized, including automated exposure control and exposure modulation based on body size.  TECHNIQUE: NONCONTRAST THORACIC SPINE CT  COMPARISON: 9/24/2015  FINDINGS:  Vertebral body heights appear stable. No acute fracture is identified. Multilevel endplate spurring, disc space narrowing.  No high-grade neuroforaminal narrowing or central stenosis is noted, without the benefit of intrathecal contrast material.  If there is further clinical concern, MRI or CT myelogram can be obtained.  Relative prominence of the caliber of the central pulmonary arteries may reflect pulmonary arterial hypertension.       As described.    This report was finalized on 5/4/2025 9:53 AM by Dr. Damon Chavez M.D on Workstation: [employer]      XR Chest AP  Result Date: 5/4/2025  XR CHEST AP-  HISTORY: Female who is 64 years-old, cough and fever, COVID evaluation  TECHNIQUE: Frontal view of the chest  COMPARISON: 11/30/2023  FINDINGS: The heart size is borderline. Pulmonary vasculature is unremarkable. No focal pulmonary consolidation, pleural effusion, or pneumothorax. No acute osseous process.      No focal pulmonary consolidation. Borderline heart size. Follow-up as clinical indications persist.  This report was finalized on 5/4/2025 8:34 AM by Dr. Damon Chavez M.D on Workstation: [employer]        Ordered the above noted radiological studies. Reviewed by me in PACS.            PROCEDURES  Procedures        OUTPATIENT MEDICATION MANAGEMENT:  No current Epic-ordered facility-administered medications on file.     Current Outpatient Medications Ordered in Epic   Medication Sig Dispense Refill    acetaminophen (TYLENOL) 325 MG tablet Take 2 tablets by  mouth Every 4 (Four) Hours As Needed for Mild Pain .      furosemide (LASIX) 40 MG tablet Take 1 tablet by mouth Every Other Day. 60 tablet 2    losartan (COZAAR) 100 MG tablet TAKE 1 TABLET BY MOUTH DAILY 90 tablet 0    metoprolol succinate XL (TOPROL-XL) 50 MG 24 hr tablet TAKE 1 TABLET BY MOUTH EVERY DAY 90 tablet 1    rivaroxaban (Xarelto) 20 MG tablet TAKE 1 TABLET BY MOUTH EVERY DAY 90 tablet 1           MEDICATIONS GIVEN IN ER  Medications - No data to display                MEDICAL DECISION MAKING, PROGRESS, and CONSULTS    All labs have been independently reviewed by me.  All radiology studies have been reviewed by me and I have also reviewed the radiology report.   EKG's independently viewed and interpreted by me.  Discussion below represents my analysis of pertinent findings related to patient's condition, differential diagnosis, treatment plan and final disposition.      Additional sources:    - Discussed/ obtained information from independent historians: EMS, spouse at bedside    - External (non-ED) record review: Patient was admitted here in September 2020 for sepsis, bacteremia, left leg cellulitis, and COVID-19.  She was seen by infectious disease.  She saw her PCP on 5/1/2025 for fatigue, malaise, and cough.  COVID test were negative.    -Prescription drug monitoring program review:     N/A    - Chronic or social conditions impacting patient care (Social Determinants of Health): None          Orders placed during this visit:  Orders Placed This Encounter   Procedures    Respiratory Panel PCR w/COVID-19(SARS-CoV-2) [facility]A/THO/DANITZA/PAD/[facility]A In-House, NP Swab in UTM/VTM, 2 HR TAT - Swab, Nasopharynx    XR Chest AP    CT Thoracic Spine Without Contrast    Comprehensive Metabolic Panel    Lactic Acid, Plasma    CBC Auto Differential    BNP    High Sensitivity Troponin T    High Sensitivity Troponin T 1Hr    Manual Differential    Check Temperature    LHA (on-call MD unless specified) Details    ECG 12  Lead Dyspnea    Initiate Observation Status    CBC & Differential         Additional orders considered but not ordered:          Differential diagnosis includes, but is not limited to:    Back contusion, spine fracture, muscle strain      Independent interpretation of labs, radiology studies, and discussions with consultants:  ED Course as of 05/04/25 1313   Sun May 04, 2025   0813 Temp(!): 100.6 °F (38.1 °C) [WH]   0813 Heart Rate: 108 [WH]   0813 Resp: 16 [WH]   0813 SpO2: 98 % [WH]   0826 O2 sat is currently 88%.  She will be placed on a nasal cannula. [WH]   0838 EKG personally interpreted by me at 8:36 AM.  My personal interpretation is:          EKG time: 8:32 AM  Rhythm/Rate: Sinus rhythm, rate 97  P waves and SD: Normal  QRS, axis: Rightward axis, IVCD  ST and T waves: Nonspecific ST/T wave changes in the inferior    Interpreted Contemporaneously by me, independently viewed  EKG is not significantly changed compared to prior EKG done on 6/6/2020   [WH]   0916 Chest x-ray personally interpreted by me.  My personal interpretation is: Heart size is borderline.  Lungs are clear.  No pleural effusion. [WH]   0935 BUN(!): 40 [WH]   0935 Creatinine(!): 1.86  BUN 35 creatinine 2.03 three days ago [WH]   0935 Sodium(!): 130  131 three days ago [WH]   0935 proBNP(!): 2,994.0 [WH]   0935 HS Troponin T(!!): 72 [WH]   0935 Lactate: 1.5 [WH]   0935 WBC: 8.82 [WH]   0936 Hemoglobin(!): 8.7  Unchanged from 3 days ago [WH]   1007 Respiratory panel is negative [WH]   1007 Per the radiologist, CT of the T-spine shows multilevel endplate spurring and disc space narrowing but no acute fracture. [WH]   1110 HS Troponin T(!!): 81  Delta +13% [WH]   1203 Case discussed with Dr. Morales, hospitalist, and he agrees to admit the patient to a monitored bed.  Pertinent history, exam findings, test results, ED course, and diagnoses were discussed with him. [WH]   1215 MDM: Patient presented to the ED after falling in her bathtub this  morning.  She complained of pain in her upper back.  She did not hit her head.  CT of the T-spine showed degenerative changes but no acute fracture.  While in the ED, patient was noted to be hypoxic.  She also had a low-grade fever.  She is not on home oxygen.  Chest x-ray was negative acute.  Respiratory panel was negative.  She was found to be anemic and also found to have renal insufficiency.  Initial troponin was elevated.  Repeat had a delta of +13%.  EKG did not have any acute ischemic changes.  Patient has difficulty getting around home at baseline due to morbid obesity and bilateral lymphedema.  Patient is on Xarelto so I have low suspicion of PE.  She will be admitted to the hospitalist. [WH]      ED Course User Index  [WH] Louie Arredondo MD         COMPLEXITY OF CARE  The patient requires admission.      DIAGNOSIS  Final diagnoses:   Hypoxia   Contusion of upper back, unspecified laterality, initial encounter   BMI 60.0-69.9, adult   Generalized weakness   Renal insufficiency   Anemia, unspecified type   Fall, initial encounter   Chronic anticoagulation         DISPOSITION  ADMISSION    Discussed treatment plan and reason for admission with pt/family and admitting physician.  Pt/family voiced understanding of the plan for admission for further testing/treatment as needed.               Latest Documented Vital Signs:  AS OF 13:13 EDT VITALS:    BP - 100/60  HR - 101  TEMP - 100.1 °F (37.8 °C) (Tympanic)  O2 SATS - 96%            --    Please note that portions of this were completed with a voice recognition program.       Note Disclaimer: At [facility], we believe that sharing information builds trust and better relationships. You are receiving this note because you are receiving care at [facility] or recently visited. It is possible you will see health information before a provider has talked with you about it. This kind of information can be easy to misunderstand. To help you fully understand  what it means for your health, we urge you to discuss this note with your provider.             Louie Arredondo MD  05/04/25 1671

## 2025-05-04 NOTE — CONSULTS
Patient Identification:  Emely Solomon  64 y.o.  female  1960  [id number]          LOS 0    Requesting physician:   Jimmy Morales MD    Reason for Consultation:    Pulmonary hypertension, hypoxic respiratory failure    History of Present Illness:   64-year-old female with a history of super morbid obesity, chronic lower extremity lymphedema, peripheral vascular disease, obstructive sleep apnea on CPAP, patent foramen ovale with paradoxical embolism, pulmonary hypertension, heart failure with preserved ejection fraction who presented to the hospital with back pain after fall.    Patient presented for fall and back pain.  On presentation she had significant electrolyte disturbances including hyponatremia to 130, hypokalemia to 3.3, creatinine to 1.86.  No leukocytosis, procalcitonin markedly elevated at 40, no lactic acidosis.  On 2 L nasal cannula.  Does not use supplemental oxygen at home.  Follows with Dr. Garcia for obstructive sleep apnea.    Past Medical History:  Past Medical History:   Diagnosis Date    Arthritis     Atheroembolism of other site 04/14/2016    Cellulitis     11/2017, with Group B Strep bacteremia and sepsis    Chronic deep vein thrombosis (DVT) of left popliteal vein 12/17/2015 02/04/2016, 04/14/2016    Chronic diastolic congestive heart failure     COVID-19 virus infection 11/2020    Deep venous thrombosis     Exercise involving walking     Heart murmur     Hypertension     Hypo-osmolality and hyponatremia 09/29/2020    Lipoedema     Lymphedema     other    Morbid obesity     Paradoxical embolism     to the LLE due to DVT/PE and PFO    PFO (patent foramen ovale)     Postthrombotic syndrome of left lower extremity without complications 12/17/2015    postphletibis    Pulmonary embolism 04/14/2016    Pulmonary hypertension     multifactorial (dCHF, obesity/ARIEL, hx PE), mild by echo 1/2016    Right bundle branch block (RBBB) with left anterior fascicular block (LAFB)     Sepsis  09/18/2020    Sleep apnea        Past Surgical History:  Past Surgical History:   Procedure Laterality Date    ARTERIOGRAM  07/2015    BRONCHOSCOPY N/A 07/21/2017    Procedure: BRONCHOSCOPY with wash;  Surgeon: Caleb Garcia MD;  Location: [facility] ENDOSCOPY;  Service:     COLONOSCOPY      COLONOSCOPY N/A 01/26/2023    Procedure: COLONOSCOPY to CECUM AND TERM ILEUM;  Surgeon: Jj Dean MD;  Location: [facility] ENDOSCOPY;  Service: Gastroenterology;  Laterality: N/A;  PRE OP -screening  POST OP -  NORMAL    D & C HYSTEROSCOPY N/A 03/08/2022    Procedure: DILATATION AND CURETTAGE with hysteroscopy;  Surgeon: Kaylah Land DO;  Location: [facility] MAIN OR;  Service: Gynecology Oncology;  Laterality: N/A;    DILATATION AND CURETTAGE  04/11/2011    OTHER SURGICAL HISTORY  09/2015    IVC filter    OTHER SURGICAL HISTORY      left LE revascularization    OTHER SURGICAL HISTORY      ALESSIA and LEV scan    THROMBECTOMY  07/2015        Home Meds:  Medications Prior to Admission   Medication Sig Dispense Refill Last Dose/Taking    furosemide (LASIX) 40 MG tablet Take 1 tablet by mouth Every Other Day. 60 tablet 2 5/3/2025    losartan (COZAAR) 100 MG tablet TAKE 1 TABLET BY MOUTH DAILY 90 tablet 0 5/3/2025    metoprolol succinate XL (TOPROL-XL) 50 MG 24 hr tablet TAKE 1 TABLET BY MOUTH EVERY DAY 90 tablet 1 5/3/2025    rivaroxaban (Xarelto) 20 MG tablet TAKE 1 TABLET BY MOUTH EVERY DAY 90 tablet 1 5/3/2025    acetaminophen (TYLENOL) 325 MG tablet Take 2 tablets by mouth Every 4 (Four) Hours As Needed for Mild Pain .            Allergies:  Allergies   Allergen Reactions    Cephalexin Hives and Rash     Diffuse rash on cephalexin 1 g PO q6h on 6/17/20  Tolerated zosyn and PCN G September 2020 without issue    Clindamycin/Lincomycin Hives       Social History:   Social History     Socioeconomic History    Marital status:    Tobacco Use    Smoking status: Never    Smokeless tobacco: Never   Vaping Use    Vaping status:  "Never Used   Substance and Sexual Activity    Alcohol use: Not Currently     Comment: occassionally    Drug use: Never    Sexual activity: Not Currently     Partners: Male     Birth control/protection: Post-menopausal       Family History:  Family History   Problem Relation Age of Onset    Breast cancer Mother     Hypertension Other     Ovarian cancer Neg Hx     Uterine cancer Neg Hx     Colon cancer Neg Hx     Malig Hyperthermia Neg Hx        Review of Systems:  12 point review of systems performed and all else negative except as per HPI above.    Objective:    PHYSICAL EXAM:    /63 (BP Location: Left arm, Patient Position: Lying)   Pulse 86   Temp 98.4 °F (36.9 °C) (Oral)   Resp 22   Ht 160 cm (63\")   Wt (!) 169 kg (372 lb 2.2 oz)   SpO2 100%   BMI 65.92 kg/m²  Body mass index is 65.92 kg/m². 100% (!) 169 kg (372 lb 2.2 oz)    General: Alert, nontoxic, NAD  HEENT: NC/AT, EOMI, MMM  Neck: Supple, trachea midline  Cardiac: RRR, no murmur, gallops, rubs  Pulmonary: Diminished bilaterally  GI: Soft, non-tender, non-distended, normal bowel sounds  Extremities: Warm, well perfused, LE lymphadema  Skin: no visible rash  Neuro: CN II - XII grossly intact  Psychiatry: Normal mood and affect    Lab Review:   Results from last 7 days   Lab Units 05/04/25  0849 05/01/25  0946   WBC 10*3/mm3 8.82 13.54*   HEMOGLOBIN g/dL 8.7* 8.7*   PLATELETS 10*3/mm3 220 263     Results from last 7 days   Lab Units 05/04/25  1744 05/04/25  0849 05/01/25  0946   SODIUM mmol/L  --  130* 131*   POTASSIUM mmol/L 3.8 3.3* 4.9   CHLORIDE mmol/L  --  95* 96*   CO2 mmol/L  --  21.2* 22.3   BUN mg/dL  --  40* 35*   CREATININE mg/dL  --  1.86* 2.03*   GLUCOSE mg/dL  --  104* 83   CALCIUM mg/dL  --  8.9 8.7   Estimated Creatinine Clearance: 47.1 mL/min (A) (by C-G formula based on SCr of 1.86 mg/dL (H)).    Results from last 7 days   Lab Units 05/04/25  1744 05/04/25  0849 05/01/25  0946   AST (SGOT) U/L  --  38* 19   ALT (SGPT) U/L  --  15 " 11   PROCALCITONIN ng/mL 40.80*  --   --    LACTATE mmol/L  --  1.5  --    FERRITIN ng/mL 632.00*  --   --    PLATELETS 10*3/mm3  --  220 263              Result Review:  I have personally reviewed the results from the time of this admission to 5/4/2025 19:49 EDT and agree with these findings:  [x]  Laboratory list / accordion  [x]  Microbiology  [x]  Radiology  []  EKG/Telemetry   [x]  Cardiology/Vascular   []  Pathology  [x]  Old records  []  Other:      Assessment / Recommendations:    Acute hypoxic respiratory failure  Pulmonary hypertension  Heart failure with preserved ejection fraction  Fall with back pain  Acute kidney injury  Obstructive sleep apnea on noninvasive ventilation  Elevated troponin and BNP  Hyperglycemia  Lower extremity lymphedema  History of DVT/PE on Xarelto  Super morbid obesity    - Patient presented to the hospital with fall and subsequently with back pain.  Also found to be acute hypoxic respiratory failure with previous history of pulmonary hypertension.  - On 2 L nasal cannula with saturations greater than 95%, wean as tolerated for SpO2 goal greater than 90%  - History of pulmonary hypertension, last echocardiogram that is available to me is from January 2016 which demonstrates mild RV dilatation and RVSP of 44.  Agree with repeat echocardiogram to further evaluate pulmonary hypertension.  Of note she is super morbidly obese and I suspect that her weight does will probably be quite poor.  She may require a right heart cath at some point to better characterize her pulmonary hypertension.  I suspect that this is mostly in the setting of heart failure with preserved ejection fraction however she does have obstructive sleep apnea and likely restrictive lung disease which are also contributors.  At this time I do not see any need for vasodilator therapy.  - Agree with CT chest to better evaluate lung parenchyma  -Patient does have a mild cough without significant sputum production.   Overall her infectious workup has been unremarkable.  She has no leukocytosis, afebrile, normal hemodynamics.  Only concerning factor is a markedly elevated procalcitonin.  Can continue azithromycin at this time however unclear if there is truly an underlying pneumonia.  - Rivaroxaban for atrial fibrillation    Thank you for allowing me to participate in the care of this patient.  I will continue to follow along with you.    Shady Blanco MD  [facility], [facility]  Pulmonary and Critical Care Medicine, Interventional Pulmonology    5/4/2025  19:35 EDT    Parts of this note may be an electronic transcription/translation of spoken language to printed text using the Dragon dictation system.            Detail Level: Zone

## 2025-05-04 NOTE — H&P
Patient Name:  Emely Solomon  YOB: 1960  MRN:  [id number]  Admit Date:  5/4/2025  Patient Care Team:  Adilson Wilkerson MD as PCP - General (Family Medicine)  [place] Segovia MD as Referring Physician (Obstetrics and Gynecology)  Brennan Rogers MD as Cardiologist (Cardiology)  Caleb Garcia MD as Consulting Physician (Pulmonary Disease)        Chief Complaint   Patient presents with    Fall   With upper back pain.  Early this morning    History Present Illness     A pleasant 64 years old female with a past history of morbid obesity/lower extremity venous insufficiency/lower extremity lymphedema/lower extremity venous ulcers/DVT/PVD/lower extremity peripheral vascular disease/patent foramen ovale with paradoxical embolism/obstructive sleep apnea on CPAP/pulmonary hypertension/iron deficiency and chronic disease anemia/hypertension/chronic diastolic congestive heart failure who presents to the emergency department with upper back pain after a fall.  Patient states he she was getting out of the shower and slipped fell backward and could not get up and  called EMS.  She complains of upper back pain but no other joint pain.  No neck pain.  She denies any head trauma.  No loss of consciousness.  No dizziness.  No headache.  No focal neurological deficits symptoms.  In the emergency department she was noted to be hypoxemic and patient herself denies any chest pain or palpitation or shortness of breath.  No PND or orthopnea.  She however reports old lower extremity edema and new onset dry cough.  No hemoptysis or wheeze.  In the emergency she was noted to be hypoxemic and having a low-grade fever.  Department further workup included a chest x-ray revealing borderline cardiomegaly otherwise negative.  CT scan of the thoracic spine revealed no acute fractures or subluxation or canal stenosis.  Troponin was 72.  proBNP elevated at 2994.  CBC normal except a hemoglobin of 8.7.  Lactic acid  was normal.  CMP normal except a glucose of 104, BUN 40, creatinine 1.86, sodium 130, potassium 3.3, chloride 95, CO2 of 21.2, GFR of 29.8.  Respiratory PCR panel is negative.  P troponin escalated to 81 with +9 delta patient was subsequently admitted        Review of Systems   Cardiovascular/respiratory.  As above.  GI.  No abdominal pain.  No nausea or vomiting.  Normal bowel habits without constipation/diarrhea/bleeding per rectum/melena  .  No dysuria or hematuria  CNS.  As above.  Musculoskeletal as above.    Personal History     Past Medical History:   Diagnosis Date    Arthritis     Atheroembolism of other site 04/14/2016    Cellulitis     11/2017, with Group B Strep bacteremia and sepsis    Chronic deep vein thrombosis (DVT) of left popliteal vein 12/17/2015 02/04/2016, 04/14/2016    Chronic diastolic congestive heart failure     COVID-19 virus infection 11/2020    Deep venous thrombosis     Exercise involving walking     Heart murmur     Hypertension     Hypo-osmolality and hyponatremia 09/29/2020    Lipoedema     Lymphedema     other    Morbid obesity     Paradoxical embolism     to the LLE due to DVT/PE and PFO    PFO (patent foramen ovale)     Postthrombotic syndrome of left lower extremity without complications 12/17/2015    postphletibis    Pulmonary embolism 04/14/2016    Pulmonary hypertension     multifactorial (dCHF, obesity/ARIEL, hx PE), mild by echo 1/2016    Right bundle branch block (RBBB) with left anterior fascicular block (LAFB)     Sepsis 09/18/2020    Sleep apnea      Past Surgical History:   Procedure Laterality Date    ARTERIOGRAM  07/2015    BRONCHOSCOPY N/A 07/21/2017    Procedure: BRONCHOSCOPY with wash;  Surgeon: Caleb Garcia MD;  Location: [facility] ENDOSCOPY;  Service:     COLONOSCOPY      COLONOSCOPY N/A 01/26/2023    Procedure: COLONOSCOPY to CECUM AND TERM ILEUM;  Surgeon: Jj Dean MD;  Location: [facility] ENDOSCOPY;  Service: Gastroenterology;  Laterality: N/A;  PRE  OP -screening  POST OP -  NORMAL    D & C HYSTEROSCOPY N/A 03/08/2022    Procedure: DILATATION AND CURETTAGE with hysteroscopy;  Surgeon: Kaylah Land DO;  Location: [facility] MAIN OR;  Service: Gynecology Oncology;  Laterality: N/A;    DILATATION AND CURETTAGE  04/11/2011    OTHER SURGICAL HISTORY  09/2015    IVC filter    OTHER SURGICAL HISTORY      left LE revascularization    OTHER SURGICAL HISTORY      ALESSIA and LEV scan    THROMBECTOMY  07/2015     Family History   Problem Relation Age of Onset    Breast cancer Mother     Hypertension Other     Ovarian cancer Neg Hx     Uterine cancer Neg Hx     Colon cancer Neg Hx     Malig Hyperthermia Neg Hx      Social History     Tobacco Use    Smoking status: Never    Smokeless tobacco: Never   Vaping Use    Vaping status: Never Used   Substance Use Topics    Alcohol use: Not Currently     Comment: occassionally    Drug use: Never     No current facility-administered medications on file prior to encounter.     Current Outpatient Medications on File Prior to Encounter   Medication Sig Dispense Refill    furosemide (LASIX) 40 MG tablet Take 1 tablet by mouth Every Other Day. 60 tablet 2    losartan (COZAAR) 100 MG tablet TAKE 1 TABLET BY MOUTH DAILY 90 tablet 0    metoprolol succinate XL (TOPROL-XL) 50 MG 24 hr tablet TAKE 1 TABLET BY MOUTH EVERY DAY 90 tablet 1    rivaroxaban (Xarelto) 20 MG tablet TAKE 1 TABLET BY MOUTH EVERY DAY 90 tablet 1    acetaminophen (TYLENOL) 325 MG tablet Take 2 tablets by mouth Every 4 (Four) Hours As Needed for Mild Pain .       Allergies   Allergen Reactions    Cephalexin Hives and Rash     Diffuse rash on cephalexin 1 g PO q6h on 6/17/20  Tolerated zosyn and PCN G September 2020 without issue    Clindamycin/Lincomycin Hives       Objective    Objective     Vital Signs  Temp:  [98.4 °F (36.9 °C)-100.6 °F (38.1 °C)] 98.4 °F (36.9 °C)  Heart Rate:  [] 86  Resp:  [16-22] 22  BP: (100-105)/(60-63) 105/63  SpO2:  [87 %-100 %] 100 %  on   Flow (L/min) (Oxygen Therapy):  [2] 2;   Device (Oxygen Therapy): nasal cannula  Body mass index is 66.43 kg/m².    Physical Exam  General.  Middle-aged female.  Morbidly obese.  She is alert and oriented x 4 having frequent cough during the interview.  No acute pain or respiratory distress.  HEENT.  No external head injuries.  Pupils equal round and reactive.  Intact extraocular musculature.  No pallor or jaundice.  Moist mucous membrane.  Neck.  No C-spine tenderness.  Supple.  No JVD.  No lymphadenopathy or thyromegaly.    Cardiovascular.  Regular rate and rhythm with grade 2 systolic murmur  Chest.  Poor bilateral air entry with scattered bilateral rhonchi and expiratory wheeze.  Abdomen.  Soft lax.  No tenderness.  No organomegaly.  No guarding or rebound.  Extremities.  Positive nonpitting edema of both lower extremity (lymphedema).  Scabbed wounds scattered in the right leg.  Evidence of chronic lower extremity venous stasis dermatitis of both lower extremities.  CNS.  No acute focal neurological deficits    Results Review:  I reviewed the patient's new clinical results.  I reviewed the patient's new imaging results and agree with the interpretation.  I reviewed the patient's other test results and agree with the interpretation  I personally viewed and interpreted the patient's EKG/Telemetry data  Discussed with ED provider.    Lab Results (last 24 hours)       Procedure Component Value Units Date/Time    CBC & Differential [[id number]]  (Abnormal) Collected: 05/04/25 0849    Specimen: Blood from Arm, Right Updated: 05/04/25 0913    Narrative:      The following orders were created for panel order CBC & Differential.  Procedure                               Abnormality         Status                     ---------                               -----------         ------                     CBC Auto Differential[[id number]]        Abnormal            Final result                 Please view results for these  tests on the individual orders.    Comprehensive Metabolic Panel [[id number]]  (Abnormal) Collected: 05/04/25 0849    Specimen: Blood from Arm, Right Updated: 05/04/25 0925     Glucose 104 mg/dL      BUN 40 mg/dL      Creatinine 1.86 mg/dL      Sodium 130 mmol/L      Potassium 3.3 mmol/L      Chloride 95 mmol/L      CO2 21.2 mmol/L      Calcium 8.9 mg/dL      Total Protein 8.2 g/dL      Albumin 3.2 g/dL      ALT (SGPT) 15 U/L      AST (SGOT) 38 U/L      Alkaline Phosphatase 203 U/L      Total Bilirubin 1.1 mg/dL      Globulin 5.0 gm/dL      A/G Ratio 0.6 g/dL      BUN/Creatinine Ratio 21.5     Anion Gap 13.8 mmol/L      eGFR 29.8 mL/min/1.73     Narrative:      GFR Categories in Chronic Kidney Disease (CKD)              GFR Category          GFR (mL/min/1.73)    Interpretation  G1                    90 or greater        Normal or high (1)  G2                    60-89                Mild decrease (1)  G3a                   45-59                Mild to moderate decrease  G3b                   30-44                Moderate to severe decrease  G4                    15-29                Severe decrease  G5                    14 or less           Kidney failure    (1)In the absence of evidence of kidney disease, neither GFR category G1 or G2 fulfill the criteria for CKD.    eGFR calculation 2021 CKD-EPI creatinine equation, which does not include race as a factor    Lactic Acid, Plasma [[id number]]  (Normal) Collected: 05/04/25 0849    Specimen: Blood from Arm, Right Updated: 05/04/25 0921     Lactate 1.5 mmol/L     CBC Auto Differential [[id number]]  (Abnormal) Collected: 05/04/25 0849    Specimen: Blood from Arm, Right Updated: 05/04/25 0934     WBC 8.82 10*3/mm3      RBC 3.36 10*6/mm3      Hemoglobin 8.7 g/dL      Hematocrit 26.9 %      MCV 80.1 fL      MCH 25.9 pg      MCHC 32.3 g/dL      RDW 16.1 %      RDW-SD 46.8 fl      MPV 8.7 fL      Platelets 220 10*3/mm3      nRBC 0.0 /100 WBC     BNP [[id number]]  (Abnormal)  Collected: 05/04/25 0849    Specimen: Blood from Arm, Right Updated: 05/04/25 0925     proBNP 2,994.0 pg/mL     Narrative:      This assay is used as an aid in the diagnosis of individuals suspected of having heart failure. It can be used as an aid in the diagnosis of acute decompensated heart failure (ADHF) in patients presenting with signs and symptoms of ADHF to the emergency department (ED). In addition, NT-proBNP of <300 pg/mL indicates ADHF is not likely.    Age Range Result Interpretation  NT-proBNP Concentration (pg/mL:      <50             Positive            >450                   Gray                 300-450                    Negative             <300    50-75           Positive            >900                  Gray                300-900                  Negative            <300      >75             Positive            >1800                  Gray                300-1800                  Negative            <300    High Sensitivity Troponin T [[id number]]  (Abnormal) Collected: 05/04/25 0849    Specimen: Blood from Arm, Right Updated: 05/04/25 0929     HS Troponin T 72 ng/L     Narrative:      High Sensitive Troponin T Reference Range:  <14.0 ng/L- Negative Female for AMI  <22.0 ng/L- Negative Male for AMI  >=14 - Abnormal Female indicating possible myocardial injury.  >=22 - Abnormal Male indicating possible myocardial injury.   Clinicians would have to utilize clinical acumen, EKG, Troponin, and serial changes to determine if it is an Acute Myocardial Infarction or myocardial injury due to an underlying chronic condition.         Manual Differential [[id number]]  (Abnormal) Collected: 05/04/25 0849    Specimen: Blood from Arm, Right Updated: 05/04/25 1006     Neutrophil % 91.0 %      Lymphocyte % 2.0 %      Monocyte % 6.0 %      Metamyelocyte % 1.0 %      Neutrophils Absolute 8.03 10*3/mm3      Lymphocytes Absolute 0.18 10*3/mm3      Monocytes Absolute 0.53 10*3/mm3      RBC Morphology Normal     WBC  Morphology Normal     Platelet Morphology Normal    Respiratory Panel PCR w/COVID-19(SARS-CoV-2) CAMILA/THO/DANITZA/PAD/COR/MARTA In-House, NP Swab in UTM/VTM, 2 HR TAT - Swab, Nasopharynx [[id number]]  (Normal) Collected: 05/04/25 0850    Specimen: Swab from Nasopharynx Updated: 05/04/25 1003     ADENOVIRUS, PCR Not Detected     Coronavirus 229E Not Detected     Coronavirus HKU1 Not Detected     Coronavirus NL63 Not Detected     Coronavirus OC43 Not Detected     COVID19 Not Detected     Human Metapneumovirus Not Detected     Human Rhinovirus/Enterovirus Not Detected     Influenza A PCR Not Detected     Influenza B PCR Not Detected     Parainfluenza Virus 1 Not Detected     Parainfluenza Virus 2 Not Detected     Parainfluenza Virus 3 Not Detected     Parainfluenza Virus 4 Not Detected     RSV, PCR Not Detected     Bordetella pertussis pcr Not Detected     Bordetella parapertussis PCR Not Detected     Chlamydophila pneumoniae PCR Not Detected     Mycoplasma pneumo by PCR Not Detected    Narrative:      In the setting of a positive respiratory panel with a viral infection PLUS a negative procalcitonin without other underlying concern for bacterial infection, consider observing off antibiotics or discontinuation of antibiotics and continue supportive care. If the respiratory panel is positive for atypical bacterial infection (Bordetella pertussis, Chlamydophila pneumoniae, or Mycoplasma pneumoniae), consider antibiotic de-escalation to target atypical bacterial infection.    High Sensitivity Troponin T 1Hr [[id number]]  (Abnormal) Collected: 05/04/25 1036    Specimen: Blood from Arm, Right Updated: 05/04/25 1109     HS Troponin T 81 ng/L      Troponin T Numeric Delta 9 ng/L      Troponin T % Delta 13    Narrative:      High Sensitive Troponin T Reference Range:  <14.0 ng/L- Negative Female for AMI  <22.0 ng/L- Negative Male for AMI  >=14 - Abnormal Female indicating possible myocardial injury.  >=22 - Abnormal Male indicating  possible myocardial injury.   Clinicians would have to utilize clinical acumen, EKG, Troponin, and serial changes to determine if it is an Acute Myocardial Infarction or myocardial injury due to an underlying chronic condition.                 Imaging Results (Last 24 Hours)       Procedure Component Value Units Date/Time    CT Thoracic Spine Without Contrast [[id number]] Collected: 05/04/25 0950     Updated: 05/04/25 0956    Narrative:      CT THORACIC SPINE WO CONTRAST-     INDICATIONS: Trauma. Radiation dose reduction techniques were utilized,  including automated exposure control and exposure modulation based on  body size.     TECHNIQUE: NONCONTRAST THORACIC SPINE CT     COMPARISON: 9/24/2015     FINDINGS:     Vertebral body heights appear stable. No acute fracture is identified.  Multilevel endplate spurring, disc space narrowing.     No high-grade neuroforaminal narrowing or central stenosis is noted,  without the benefit of intrathecal contrast material.     If there is further clinical concern, MRI or CT myelogram can be  obtained.     Relative prominence of the caliber of the central pulmonary arteries may  reflect pulmonary arterial hypertension.       Impression:         As described.           This report was finalized on 5/4/2025 9:53 AM by Dr. Damon Chavez M.D on Workstation: [id number] [email or web address]       XR Chest AP [[id number]] Collected: 05/04/25 0833     Updated: 05/04/25 0837    Narrative:      XR CHEST AP-     HISTORY: Female who is 64 years-old, cough and fever, COVID evaluation     TECHNIQUE: Frontal view of the chest     COMPARISON: 11/30/2023     FINDINGS: The heart size is borderline. Pulmonary vasculature is  unremarkable. No focal pulmonary consolidation, pleural effusion, or  pneumothorax. No acute osseous process.       Impression:      No focal pulmonary consolidation. Borderline heart size.  Follow-up as clinical indications persist.     This report was finalized on 5/4/2025 8:34 AM by   Damon Chavez M.D on Workstation: [email or web address]               Results for orders placed during the hospital encounter of 06/04/20    STAT Adult Transthoracic Echo Complete W/ Cont if Necessary Per Protocol    Interpretation Summary  · Left ventricular wall thickness is consistent with mild concentric hypertrophy.  · Left ventricular systolic function is normal.  · Calculated EF = 64.5%.  · Right ventricular cavity is moderately dilated.  · Right atrial cavity size is mild-to-moderately dilated.  · Mild tricuspid valve regurgitation is present.  · There is calcification of the aortic valve.  · Severe pulmonary hypertension is present.      ECG 12 Lead Dyspnea   Final Result   HEART RATE=97  bpm   RR Hoqzispw=719  ms   HI Vpvltkqp=121  ms   P Horizontal Axis=21  deg   P Front Axis=31  deg   QRSD Gdwlyewa=780  ms   QT Dhmljpnn=464  ms   KSjO=139  ms   QRS Axis=153  deg   T Wave Axis=20  deg   - ABNORMAL ECG -   Sinus rhythm   Nonspecific  intraventricular conduction delay   When compared with ECG of 06-Jun-2020 15:39:25,   No significant change   Electronically Signed By: Jj Miguel ([facility]) 2025-05-04 14:46:43   Date and Time of Study:2025-05-04 08:32:47      Telemetry Scan   Final Result               Assessment/Plan     Active Hospital Problems    Diagnosis  POA    **Hypoxia [R09.02]  Yes    Fall [W19.XXXA]  Yes    Acute wheezy bronchitis [J20.9]  Yes    Elevated troponin [R79.89]  Yes    Hyperglycemia [R73.9]  Yes    History of pulmonary embolism [Z86.711]  Yes    Morbid obesity [E66.01]  Yes    Venous stasis ulcer of right calf limited to breakdown of skin without varicose veins [I87.2, L97.211]  Yes    Chronic renal failure (CRF), stage 3 (moderate) [N18.30]  Yes    Lymphedema of lower extremity, unspecified laterality [I89.0]  Yes    History of DVT (deep vein thrombosis) [Z86.718]  Not Applicable    Obstructive sleep apnea [G47.33]  Yes    Upper back pain [M54.9]  Yes    Acute kidney failure [N17.9]  Yes     Pulmonary hypertension [I27.20]  Yes    Essential hypertension [I10]  Yes    Chronic diastolic CHF (congestive heart failure) [I50.32]  Yes      Resolved Hospital Problems   No resolved problems to display.           Mechanical fall leading to a contusion of the upper back.  CT scan of the thoracic spine revealed no fracture or acute subluxation or significant abnormality.  Plan pain control with lidocaine patch/Tylenol/Percocet.  Consult physical therapy.  Fever/acute wheezy bronchitis/hypoxemia in a patient with a history of obstructive sleep apnea, pulmonary hypertension.  White count is negative.  Chest x-ray is negative.  Respiratory PCR panel is negative.  Chest x-ray is negative.  Will check UA, procalcitonin, blood cultures.  Will initiate DuoNebs/mist and IV Zithromax.  Check CT scan of the chest.  Consult pulmonary (sees Dr. Garcia)..  Elevated troponin and proBNP in a patient with a history of hypertension and chronic diastolic congestive heart failure.  Patient last echo on 6/2020 revealing a normal ejection fraction with left ventricular hypertrophy and normal diastolic function with no significant valvular disease and positive severe pulmonary hypertension.  Good blood pressure control.  Continue Toprol/losartan.  Volume status appears to be normal clinically but it is really difficult to assess.  Will hold Lasix/losartan secondary to acute kidney failure.  Will check 2D echo.  Consult cardiology (sees Dr. Rogers).  Anemia in a patient with a history of iron deficiency and anemia of chronic disease.  Hemoglobin is worse than baseline.  Will fully workup anemia.  There is no acute active bleed.  Hyperglycemia.  Check A1c.  Acute on chronic stage IIIa renal failure and hypokalemia.  Baseline creatinine between 1.07 and 1.15.  Volume status appears to be euvolemic though it is difficult to assess secondary to the patient body habitus diet.  Will check urine electrolytes and uric acid.  Will hold Lasix and  losartan at this time.  Will check urine electrolytes and uric acid.  Will monitor renal function.  Elevated liver function test.  Benign GI examination.  Will check right upper quadrant ultrasound and hepatitis panel.  Lower extremity venous insufficiency/lower extremity venous ulcers/lower extremity lymphedema.  Appears to be stable.  Will ask wound nurse to evaluate.  History of DVT and PE.  Continue Xarelto anticoagulation.  Morbid obesity.  Check TSH.  VTE prophylaxis.  Patient anticoagulated      Discussed my findings and plan of treatment with the patient/sister/ER provider.    Code Status -full code.       Jimmy Morales MD    05/04/25  16:03 EDT

## 2025-05-05 ENCOUNTER — APPOINTMENT (OUTPATIENT)
Dept: CARDIOLOGY | Facility: HOSPITAL | Age: 65
DRG: 280 | End: 2025-05-05
Payer: MEDICARE

## 2025-05-05 PROBLEM — I33.0 BACTERIAL ENDOCARDITIS: Status: ACTIVE | Noted: 2025-05-05

## 2025-05-05 PROBLEM — E87.1 HYPONATREMIA: Status: ACTIVE | Noted: 2025-05-05

## 2025-05-05 LAB
ALBUMIN SERPL-MCNC: 2.9 G/DL (ref 3.5–5.2)
ALBUMIN/GLOB SERPL: 0.6 G/DL
ALP SERPL-CCNC: 261 U/L (ref 39–117)
ALT SERPL W P-5'-P-CCNC: 43 U/L (ref 1–33)
ANION GAP SERPL CALCULATED.3IONS-SCNC: 12.5 MMOL/L (ref 5–15)
AORTIC DIMENSIONLESS INDEX: 0.55 (DI)
APTT PPP: 40.5 SECONDS (ref 22.7–35.4)
ASCENDING AORTA: 3.2 CM
AST SERPL-CCNC: 160 U/L (ref 1–32)
AV MEAN PRESS GRAD SYS DOP V1V2: 16 MMHG
AV VMAX SYS DOP: 262 CM/SEC
BACTERIA BLD CULT: ABNORMAL
BASOPHILS # BLD MANUAL: 0 10*3/MM3 (ref 0–0.2)
BASOPHILS NFR BLD MANUAL: 0 % (ref 0–1.5)
BH CV ECHO MEAS - AO MAX PG: 27.5 MMHG
BH CV ECHO MEAS - AO ROOT DIAM: 3 CM
BH CV ECHO MEAS - AO V2 VTI: 53.1 CM
BH CV ECHO MEAS - AVA(I,D): 2.09 CM2
BH CV ECHO MEAS - EDV(CUBED): 180.1 ML
BH CV ECHO MEAS - EDV(MOD-SP2): 133 ML
BH CV ECHO MEAS - EDV(MOD-SP4): 125 ML
BH CV ECHO MEAS - EF(MOD-SP2): 70.7 %
BH CV ECHO MEAS - EF(MOD-SP4): 68 %
BH CV ECHO MEAS - ESV(CUBED): 37.7 ML
BH CV ECHO MEAS - ESV(MOD-SP2): 39 ML
BH CV ECHO MEAS - ESV(MOD-SP4): 40 ML
BH CV ECHO MEAS - FS: 40.6 %
BH CV ECHO MEAS - IVS/LVPW: 0.97 CM
BH CV ECHO MEAS - IVSD: 0.89 CM
BH CV ECHO MEAS - LAT PEAK E' VEL: 8.1 CM/SEC
BH CV ECHO MEAS - LV DIASTOLIC VOL/BSA (35-75): 49.6 CM2
BH CV ECHO MEAS - LV MASS(C)D: 194.8 GRAMS
BH CV ECHO MEAS - LV MAX PG: 8.3 MMHG
BH CV ECHO MEAS - LV MEAN PG: 5 MMHG
BH CV ECHO MEAS - LV SYSTOLIC VOL/BSA (12-30): 15.9 CM2
BH CV ECHO MEAS - LV V1 MAX: 144 CM/SEC
BH CV ECHO MEAS - LV V1 VTI: 29.3 CM
BH CV ECHO MEAS - LVIDD: 5.6 CM
BH CV ECHO MEAS - LVIDS: 3.4 CM
BH CV ECHO MEAS - LVOT AREA: 3.8 CM2
BH CV ECHO MEAS - LVOT DIAM: 2.19 CM
BH CV ECHO MEAS - LVPWD: 0.92 CM
BH CV ECHO MEAS - MED PEAK E' VEL: 5.9 CM/SEC
BH CV ECHO MEAS - MV A DUR: 0.14 SEC
BH CV ECHO MEAS - MV A MAX VEL: 127 CM/SEC
BH CV ECHO MEAS - MV DEC SLOPE: 662.8 CM/SEC2
BH CV ECHO MEAS - MV DEC TIME: 0.23 SEC
BH CV ECHO MEAS - MV E MAX VEL: 110 CM/SEC
BH CV ECHO MEAS - MV E/A: 0.87
BH CV ECHO MEAS - MV MAX PG: 8.6 MMHG
BH CV ECHO MEAS - MV MEAN PG: 4.6 MMHG
BH CV ECHO MEAS - MV P1/2T: 60 MSEC
BH CV ECHO MEAS - MV V2 VTI: 41.4 CM
BH CV ECHO MEAS - MVA(P1/2T): 3.7 CM2
BH CV ECHO MEAS - MVA(VTI): 2.7 CM2
BH CV ECHO MEAS - PA ACC TIME: 0.12 SEC
BH CV ECHO MEAS - PA V2 MAX: 106.7 CM/SEC
BH CV ECHO MEAS - RAP SYSTOLE: 15 MMHG
BH CV ECHO MEAS - RV MAX PG: 1.2 MMHG
BH CV ECHO MEAS - RV V1 MAX: 54.8 CM/SEC
BH CV ECHO MEAS - RV V1 VTI: 10.6 CM
BH CV ECHO MEAS - RVSP: 62 MMHG
BH CV ECHO MEAS - SV(LVOT): 110.8 ML
BH CV ECHO MEAS - SV(MOD-SP2): 94 ML
BH CV ECHO MEAS - SV(MOD-SP4): 85 ML
BH CV ECHO MEAS - SVI(LVOT): 43.9 ML/M2
BH CV ECHO MEAS - SVI(MOD-SP2): 37.3 ML/M2
BH CV ECHO MEAS - SVI(MOD-SP4): 33.7 ML/M2
BH CV ECHO MEAS - TAPSE (>1.6): 2.39 CM
BH CV ECHO MEAS - TR MAX PG: 47.3 MMHG
BH CV ECHO MEAS - TR MAX VEL: 343.9 CM/SEC
BH CV ECHO MEASUREMENTS AVERAGE E/E' RATIO: 15.71
BH CV XLRA - RV BASE: 4.8 CM
BH CV XLRA - RV LENGTH: 9.7 CM
BH CV XLRA - RV MID: 4.1 CM
BH CV XLRA - TDI S': 13.2 CM/SEC
BILIRUB SERPL-MCNC: 1.1 MG/DL (ref 0–1.2)
BOTTLE TYPE: ABNORMAL
BUN SERPL-MCNC: 31 MG/DL (ref 8–23)
BUN/CREAT SERPL: 20.8 (ref 7–25)
CALCIUM SPEC-SCNC: 8.8 MG/DL (ref 8.6–10.5)
CHLORIDE SERPL-SCNC: 92 MMOL/L (ref 98–107)
CO2 SERPL-SCNC: 20.5 MMOL/L (ref 22–29)
CREAT SERPL-MCNC: 1.49 MG/DL (ref 0.57–1)
CREAT UR-MCNC: 147.4 MG/DL
DEPRECATED RDW RBC AUTO: 43.8 FL (ref 37–54)
EGFRCR SERPLBLD CKD-EPI 2021: 38.8 ML/MIN/1.73
EOSINOPHIL # BLD MANUAL: 0 10*3/MM3 (ref 0–0.4)
EOSINOPHIL NFR BLD MANUAL: 0 % (ref 0.3–6.2)
ERYTHROCYTE [DISTWIDTH] IN BLOOD BY AUTOMATED COUNT: 16 % (ref 12.3–15.4)
GLOBULIN UR ELPH-MCNC: 4.6 GM/DL
GLUCOSE SERPL-MCNC: 102 MG/DL (ref 65–99)
HCT VFR BLD AUTO: 24.1 % (ref 34–46.6)
HEMOCCULT STL QL: NEGATIVE
HGB BLD-MCNC: 7.9 G/DL (ref 12–15.9)
INR PPP: 4.49 (ref 0.9–1.1)
LDH SERPL-CCNC: 278 U/L (ref 135–214)
LEFT ATRIUM VOLUME INDEX: 26.5 ML/M2
LV EF BIPLANE MOD: 68.6 %
LYMPHOCYTES # BLD MANUAL: 0 10*3/MM3 (ref 0.7–3.1)
LYMPHOCYTES NFR BLD MANUAL: 7 % (ref 5–12)
MCH RBC QN AUTO: 25.2 PG (ref 26.6–33)
MCHC RBC AUTO-ENTMCNC: 32.8 G/DL (ref 31.5–35.7)
MCV RBC AUTO: 77 FL (ref 79–97)
MONOCYTES # BLD: 0.98 10*3/MM3 (ref 0.1–0.9)
NEUTROPHILS # BLD AUTO: 12.97 10*3/MM3 (ref 1.7–7)
NEUTROPHILS NFR BLD MANUAL: 93 % (ref 42.7–76)
OSMOLALITY SERPL: 281 MOSM/KG (ref 280–301)
OSMOLALITY UR: 1030 MOSM/KG
PLAT MORPH BLD: NORMAL
PLATELET # BLD AUTO: 175 10*3/MM3 (ref 140–450)
PMV BLD AUTO: 8.5 FL (ref 6–12)
POTASSIUM SERPL-SCNC: 3.9 MMOL/L (ref 3.5–5.2)
PROT SERPL-MCNC: 7.5 G/DL (ref 6–8.5)
PROTHROMBIN TIME: 43.6 SECONDS (ref 11.7–14.2)
RBC # BLD AUTO: 3.13 10*6/MM3 (ref 3.77–5.28)
RBC MORPH BLD: NORMAL
SINUS: 2.9 CM
SODIUM SERPL-SCNC: 125 MMOL/L (ref 136–145)
SODIUM UR-SCNC: 114 MMOL/L
STJ: 2.43 CM
VARIANT LYMPHS NFR BLD MANUAL: 0 % (ref 19.6–45.3)
WBC MORPH BLD: NORMAL
WBC NRBC COR # BLD AUTO: 13.95 10*3/MM3 (ref 3.4–10.8)

## 2025-05-05 PROCEDURE — 25010000002 CEFAZOLIN PER 500 MG: Performed by: INTERNAL MEDICINE

## 2025-05-05 PROCEDURE — 94761 N-INVAS EAR/PLS OXIMETRY MLT: CPT

## 2025-05-05 PROCEDURE — 82272 OCCULT BLD FECES 1-3 TESTS: CPT | Performed by: INTERNAL MEDICINE

## 2025-05-05 PROCEDURE — 84300 ASSAY OF URINE SODIUM: CPT | Performed by: INTERNAL MEDICINE

## 2025-05-05 PROCEDURE — 85025 COMPLETE CBC W/AUTO DIFF WBC: CPT | Performed by: INTERNAL MEDICINE

## 2025-05-05 PROCEDURE — 25010000002 HEPARIN (PORCINE) 25000-0.45 UT/250ML-% SOLUTION: Performed by: NURSE PRACTITIONER

## 2025-05-05 PROCEDURE — 87484 EHRLICHA CHAFFEENSIS AMP PRB: CPT | Performed by: NURSE PRACTITIONER

## 2025-05-05 PROCEDURE — 85007 BL SMEAR W/DIFF WBC COUNT: CPT | Performed by: INTERNAL MEDICINE

## 2025-05-05 PROCEDURE — 94664 DEMO&/EVAL PT USE INHALER: CPT

## 2025-05-05 PROCEDURE — 80053 COMPREHEN METABOLIC PANEL: CPT | Performed by: INTERNAL MEDICINE

## 2025-05-05 PROCEDURE — G0545 PR INHERENT VISIT TO INPT: HCPCS | Performed by: INTERNAL MEDICINE

## 2025-05-05 PROCEDURE — 93306 TTE W/DOPPLER COMPLETE: CPT

## 2025-05-05 PROCEDURE — 94799 UNLISTED PULMONARY SVC/PX: CPT

## 2025-05-05 PROCEDURE — 87468 ANAPLSMA PHGCYTOPHLM AMP PRB: CPT | Performed by: NURSE PRACTITIONER

## 2025-05-05 PROCEDURE — 25010000002 CEFTRIAXONE PER 250 MG: Performed by: INTERNAL MEDICINE

## 2025-05-05 PROCEDURE — 82570 ASSAY OF URINE CREATININE: CPT | Performed by: INTERNAL MEDICINE

## 2025-05-05 PROCEDURE — 83935 ASSAY OF URINE OSMOLALITY: CPT | Performed by: INTERNAL MEDICINE

## 2025-05-05 PROCEDURE — 85730 THROMBOPLASTIN TIME PARTIAL: CPT | Performed by: NURSE PRACTITIONER

## 2025-05-05 PROCEDURE — 99223 1ST HOSP IP/OBS HIGH 75: CPT | Performed by: INTERNAL MEDICINE

## 2025-05-05 PROCEDURE — 87040 BLOOD CULTURE FOR BACTERIA: CPT | Performed by: INTERNAL MEDICINE

## 2025-05-05 PROCEDURE — 87522 HEPATITIS C REVRS TRNSCRPJ: CPT | Performed by: INTERNAL MEDICINE

## 2025-05-05 PROCEDURE — 83615 LACTATE (LD) (LDH) ENZYME: CPT | Performed by: INTERNAL MEDICINE

## 2025-05-05 PROCEDURE — 93306 TTE W/DOPPLER COMPLETE: CPT | Performed by: INTERNAL MEDICINE

## 2025-05-05 PROCEDURE — 87798 DETECT AGENT NOS DNA AMP: CPT | Performed by: NURSE PRACTITIONER

## 2025-05-05 PROCEDURE — 85610 PROTHROMBIN TIME: CPT | Performed by: NURSE PRACTITIONER

## 2025-05-05 RX ORDER — HEPARIN SODIUM 10000 [USP'U]/100ML
5.92 INJECTION, SOLUTION INTRAVENOUS
Status: DISCONTINUED | OUTPATIENT
Start: 2025-05-05 | End: 2025-05-05

## 2025-05-05 RX ORDER — METOPROLOL SUCCINATE 25 MG/1
25 TABLET, EXTENDED RELEASE ORAL DAILY
Status: DISCONTINUED | OUTPATIENT
Start: 2025-05-06 | End: 2025-05-08

## 2025-05-05 RX ORDER — HEPARIN SODIUM 10000 [USP'U]/100ML
5.92 INJECTION, SOLUTION INTRAVENOUS
Status: DISCONTINUED | OUTPATIENT
Start: 2025-05-05 | End: 2025-05-07

## 2025-05-05 RX ORDER — HEPARIN SODIUM 5000 [USP'U]/ML
23.7 INJECTION, SOLUTION INTRAVENOUS; SUBCUTANEOUS EVERY 6 HOURS PRN
Status: DISCONTINUED | OUTPATIENT
Start: 2025-05-05 | End: 2025-05-05

## 2025-05-05 RX ADMIN — SENNOSIDES AND DOCUSATE SODIUM 2 TABLET: 50; 8.6 TABLET ORAL at 20:23

## 2025-05-05 RX ADMIN — IPRATROPIUM BROMIDE AND ALBUTEROL SULFATE 3 ML: .5; 3 SOLUTION RESPIRATORY (INHALATION) at 07:19

## 2025-05-05 RX ADMIN — FAMOTIDINE 40 MG: 20 TABLET, FILM COATED ORAL at 09:59

## 2025-05-05 RX ADMIN — GUAIFENESIN 1200 MG: 600 TABLET, MULTILAYER, EXTENDED RELEASE ORAL at 20:23

## 2025-05-05 RX ADMIN — RIVAROXABAN 20 MG: 20 TABLET, FILM COATED ORAL at 09:59

## 2025-05-05 RX ADMIN — GUAIFENESIN 1200 MG: 600 TABLET, MULTILAYER, EXTENDED RELEASE ORAL at 09:59

## 2025-05-05 RX ADMIN — CEFAZOLIN 2000 MG: 2 INJECTION, POWDER, FOR SOLUTION INTRAMUSCULAR; INTRAVENOUS at 09:58

## 2025-05-05 RX ADMIN — Medication 10 ML: at 10:01

## 2025-05-05 RX ADMIN — SENNOSIDES AND DOCUSATE SODIUM 2 TABLET: 50; 8.6 TABLET ORAL at 09:59

## 2025-05-05 RX ADMIN — ZINC OXIDE 1 APPLICATION: 200 OINTMENT TOPICAL at 20:24

## 2025-05-05 RX ADMIN — ZINC OXIDE 1 APPLICATION: 200 OINTMENT TOPICAL at 17:53

## 2025-05-05 RX ADMIN — Medication 10 ML: at 20:24

## 2025-05-05 RX ADMIN — IPRATROPIUM BROMIDE AND ALBUTEROL SULFATE 3 ML: .5; 3 SOLUTION RESPIRATORY (INHALATION) at 11:15

## 2025-05-05 RX ADMIN — ACETAMINOPHEN 650 MG: 325 TABLET, FILM COATED ORAL at 02:29

## 2025-05-05 RX ADMIN — OXYCODONE AND ACETAMINOPHEN 1 TABLET: 5; 325 TABLET ORAL at 17:53

## 2025-05-05 RX ADMIN — CEFTRIAXONE 2000 MG: 2 INJECTION, POWDER, FOR SOLUTION INTRAMUSCULAR; INTRAVENOUS at 17:53

## 2025-05-05 RX ADMIN — OXYCODONE AND ACETAMINOPHEN 1 TABLET: 5; 325 TABLET ORAL at 04:31

## 2025-05-05 RX ADMIN — IPRATROPIUM BROMIDE AND ALBUTEROL SULFATE 3 ML: .5; 3 SOLUTION RESPIRATORY (INHALATION) at 20:56

## 2025-05-05 RX ADMIN — HEPARIN SODIUM 5.92 UNITS/KG/HR: 10000 INJECTION, SOLUTION INTRAVENOUS at 22:21

## 2025-05-05 NOTE — PROGRESS NOTES
[facility] Clinical Pharmacy Services: Heparin Dosing Consult  Emely Solomon is a 64 y.o. female weighing (!) 169 kg (373 lb) receiving a heparin infusion.    Indication: Endocarditis  Initial Bolus: No  Correction Boluses: Yes    Signs or Symptoms of Bleeding: None    Notes: Will initiate low dose heparin with Initial rate: 12 units/kg/hr (Max 1,000 units/hr) and prn bolus dose (max 4000 units). RN to follow heparin protocol and adjust as appropriate.     Pharmacy has verified the appropriateness of the dose ordered and will continue to monitor the patient for safety while drug is on board or until patient discharge.     Jovany Mares [facility]  Clinical Pharmacist

## 2025-05-05 NOTE — PROGRESS NOTES
Name: Emely Solomon ADMIT: 2025   : 1960  PCP: Adilson Wilkerson MD    MRN: [id number] LOS: 0 days   AGE/SEX: 64 y.o. female  ROOM: [facility]     Subjective   Subjective   Patient appears morbidly obese, generally weak, relatively comfortable, no apparent distress.  Sister and has been at bedside--describe right lower extremity posterior wound present for several months.       Objective   Objective   Vital Signs  Temp:  [98.8 °F (37.1 °C)-99 °F (37.2 °C)] 99 °F (37.2 °C)  Heart Rate:  [76-95] 79  Resp:  [20-22] 20  BP: ()/(61-89) 102/72  SpO2:  [90 %-99 %] 92 %  on  Flow (L/min) (Oxygen Therapy):  [2-4] 4;   Device (Oxygen Therapy): nasal cannula  Body mass index is 66.07 kg/m².    Physical Exam  Constitutional:       General: She is not in acute distress.     Appearance: She is obese. She is not toxic-appearing.   Cardiovascular:      Rate and Rhythm: Normal rate.      Heart sounds: Normal heart sounds.   Pulmonary:      Effort: Pulmonary effort is normal.      Comments: Diminished on expiration anteriorly  Abdominal:      General: Bowel sounds are normal.      Palpations: Abdomen is soft.   Musculoskeletal:      Right lower leg: Edema present.      Left lower leg: Edema present.   Skin:     General: Skin is warm and dry.   Neurological:      Mental Status: She is alert and oriented to person, place, and time.      Cranial Nerves: No cranial nerve deficit.       Results Review     I reviewed the patient's new clinical results.  Results from last 7 days   Lab Units 25  0728 25  0849 25  0946   WBC 10*3/mm3 13.95* 8.82 13.54*   HEMOGLOBIN g/dL 7.9* 8.7* 8.7*   PLATELETS 10*3/mm3 175 220 263     Results from last 7 days   Lab Units 25  0728 25  1744 25  0849 25  0946   SODIUM mmol/L 125*  --  130* 131*   POTASSIUM mmol/L 3.9 3.8 3.3* 4.9   CHLORIDE mmol/L 92*  --  95* 96*   CO2 mmol/L 20.5*  --  21.2* 22.3   BUN mg/dL 31*  --  40* 35*   CREATININE  mg/dL 1.49*  --  1.86* 2.03*   GLUCOSE mg/dL 102*  --  104* 83   EGFR mL/min/1.73 38.8*  --  29.8*  --    EGFR RESULT mL/min/1.73  --   --   --  26.8*     Results from last 7 days   Lab Units 05/05/25  0728 05/04/25  0849 05/01/25  0946   ALBUMIN g/dL 2.9* 3.2* 3.7   BILIRUBIN mg/dL 1.1 1.1 1.1   ALK PHOS U/L 261* 203* 119*   AST (SGOT) U/L 160* 38* 19   ALT (SGPT) U/L 43* 15 11     Results from last 7 days   Lab Units 05/05/25  0728 05/04/25  0849 05/01/25  0946   CALCIUM mg/dL 8.8 8.9 8.7   ALBUMIN g/dL 2.9* 3.2* 3.7     Results from last 7 days   Lab Units 05/04/25  1744 05/04/25  0849   PROCALCITONIN ng/mL 40.80*  --    LACTATE mmol/L  --  1.5     Hemoglobin A1C   Date/Time Value Ref Range Status   05/04/2025 1744 5.40 4.80 - 5.60 % Final       US Abdomen Limited  Result Date: 5/4/2025   Negative right upper quadrant ultrasound. No acute findings.   This report was finalized on 5/4/2025 5:46 PM by Dr. Akli Perales M.D on Workstation: [id number]      CT Thoracic Spine Without Contrast  Result Date: 5/4/2025   As described.    This report was finalized on 5/4/2025 9:53 AM by Dr. Damon Chavez M.D on Workstation: [id number]      XR Chest AP  Result Date: 5/4/2025  No focal pulmonary consolidation. Borderline heart size. Follow-up as clinical indications persist.  This report was finalized on 5/4/2025 8:34 AM by Dr. Damon Chavez M.D on Workstation: [id number]        I have personally reviewed all medications:  Scheduled Medications  azithromycin, 500 mg, Intravenous, Q24H  ceFAZolin, 2,000 mg, Intravenous, Q8H  famotidine, 40 mg, Oral, Daily  guaiFENesin, 1,200 mg, Oral, Q12H  hydrocortisone-bacitracin-zinc oxide-nystatin, 1 Application, Topical, Q12H  ipratropium-albuterol, 3 mL, Nebulization, 4x Daily - RT  Lidocaine, 1 patch, Transdermal, Q24H  metoprolol succinate XL, 50 mg, Oral, Daily  rivaroxaban, 20 mg, Oral, Daily  senna-docusate sodium, 2 tablet, Oral, BID  sodium chloride, 10 mL,  Intravenous, Q12H    Infusions   Diet  Diet: Cardiac, Fluid Restriction (240 mL/tray); Healthy Heart (2-3 Na+); 1500 mL/day; Fluid Consistency: Thin (IDDSI 0)    I have personally reviewed:  [x]  Laboratory   [x]  Microbiology   [x]  Radiology   [x]  EKG/Telemetry  [x]  Cardiology/Vascular   []  Pathology    []  Records       Assessment/Plan     Active Hospital Problems    Diagnosis  POA    **Hypoxia [R09.02]  Yes    Hyponatremia [E87.1]  Unknown    Bacterial endocarditis [I33.0]  Unknown    Fall [W19.XXXA]  Yes    Acute wheezy bronchitis [J20.9]  Yes    Elevated troponin [R79.89]  Yes    Hyperglycemia [R73.9]  Yes    History of pulmonary embolism [Z86.711]  Yes    Morbid obesity [E66.01]  Yes    Venous stasis ulcer of right calf limited to breakdown of skin without varicose veins [I87.2, L97.211]  Yes    Chronic renal failure (CRF), stage 3 (moderate) [N18.30]  Yes    Lymphedema of lower extremity, unspecified laterality [I89.0]  Yes    History of DVT (deep vein thrombosis) [Z86.718]  Not Applicable    Obstructive sleep apnea [G47.33]  Yes    Bacteremia due to Gram-positive bacteria [R78.81]  Yes    Upper back pain [M54.9]  Yes    Acute kidney failure [N17.9]  Yes    Pulmonary hypertension [I27.20]  Yes    Essential hypertension [I10]  Yes    Chronic diastolic CHF (congestive heart failure) [I50.32]  Yes      Resolved Hospital Problems   No resolved problems to display.       64 y.o. female with recent history of mechanical fall leading to contusion of upper back with CT thoracic spine revealing no fracture or acute finding & fever, acute wheezy bronchitis, hypoxemia with history of ARIEL and pulmonary hypertension admitted with Hypoxia.    Pulmonology following acute hypoxic respiratory failure given O2 saturation 87% on room air improved with supplemental oxygen--wean oxygen as tolerated to maintain saturation above 90%.  History of pulmonary hypertension noted on previous echocardiogram.  Repeat echocardiogram  today given concerns for bacteremia.  CT chest ordered.  - Respiratory PCR panel negative  - Nebulizers scheduled    Elevated troponin and proBNP with history of hypertension and chronic diastolic congestive heart failure--continue Toprol.  Diuretics on hold secondary to STEFANIA (serum creatinine improved with trend).  Cardiology following.    Bacteremia due to gram-positive bacteria confirmed here on blood cultures x 2.  ? Due to complications of chronic RLE wound POA--wound RN following.  Infectious disease consulted.  Continue broad-spectrum antibiotic and defer to ID for further management.  Pro-Aleksandr 40.  TTE pending results.    Acute on chronic anemia.  Confirmed iron deficiency here.  Would avoid iron supplement at this time.  No obvious signs of bleeding.  Fecal occult blood negative.  Elevated haptoglobin yet specimen hemolyzed & most likely due to current infection.  Ordering tick borne panel for now.      History of DVT / PE.  Plan to hold Xarelto for now & switch to heparin gtt given endocarditis confirmed here--cardiology consulted CTS.      A1c 5.4.  Glucose trends acceptable.  NCS / healthy heart diet.    Hyponatremia given serum sodium 125.  Urine studies /serum osmolality reviewed and appears pseudohyponatremia given serum osmolality 281 yet borderline so will ask nephrology to consult.  CKD stage 3. IVFs infusing given bacteremia.    Generalized weakness complicated by BMI >66 (unremarkable TSH 0.9).  Therapies following.    Lymphedema, BLE POA in the setting of BMI >66 (complicating & most likely contributing to all problems).  Wound RN & OT following.    Transaminitis.  Right upper quadrant ultrasound negative.  Hepatitis C antibody reactive.      Xarelto (home med) switched to heparin gtt for DVT prophylaxis.  Full code.  Discussed with patient and nursing staff.  Anticipate discharge home with HH vs SNU facility timing yet to be determined. / CCP following / patient & family favoring HH  Expected  Discharge Date: 5/6/2025; Expected Discharge Time:       LUCÍA Chapman  [facility]  05/05/25  14:32 EDT

## 2025-05-05 NOTE — CONSULTS
[facility] Consult Note      Patient Name: Emely Solomon  : 1960  MRN: [id number]  Primary Care Physician:  Adilson Wilkerson MD  Referring Physician: Jimmy Morales MD  Date of admission: 2025    Subjective     Reason for Consult: Acute on chronic hyponatremia; STEFANIA on CKD 3a    HPI:   Emely Solomon is a 64 y.o. female with CKD stage IIIa (baseline SCr 1.2), chronic hyponatremia, BLE lymphedema, ARIEL, prior PE/DVT, and pulm HTN, who presented to the hospital yesterday after a mechanical fall (slipped backward when getting out of shower).  Upon arrival, temp 100.6, /60, CXR, abdomen US, and CT thoracic spine all negative for acute findings.  Labs showed SCR 1.86, sodium 130.  Blood cultures grew gram-positive cocci.  Today, sodium further down to 125, SCr down to 1.49.  Hemoglobin almost down by 1 g with low iron stores.  Patient denies chest pain, SOA, dizziness/lightheadedness, N/V/D, urinary symptoms, or regular NSAID use.  States appetite has been fine at home and here in the hospital.  Currently sipping on water constantly due to frequent cough.    Review of Systems:   14 point review of systems is otherwise negative except for mentioned above on HPI    Personal History     Past Medical History:   Diagnosis Date    Arthritis     Atheroembolism of other site 2016    Cellulitis     2017, with Group B Strep bacteremia and sepsis    Chronic deep vein thrombosis (DVT) of left popliteal vein 2015, 2016    Chronic diastolic congestive heart failure     COVID-19 virus infection 2020    Deep venous thrombosis     Exercise involving walking     Heart murmur     Hypertension     Hypo-osmolality and hyponatremia 2020    Lipoedema     Lymphedema     other    Morbid obesity     Paradoxical embolism     to the LLE due to DVT/PE and PFO    PFO (patent foramen ovale)     Postthrombotic syndrome of left lower extremity without  complications 12/17/2015    postphletibis    Pulmonary embolism 04/14/2016    Pulmonary hypertension     multifactorial (dCHF, obesity/ARIEL, hx PE), mild by echo 1/2016    Right bundle branch block (RBBB) with left anterior fascicular block (LAFB)     Sepsis 09/18/2020    Sleep apnea        Past Surgical History:   Procedure Laterality Date    ARTERIOGRAM  07/2015    BRONCHOSCOPY N/A 07/21/2017    Procedure: BRONCHOSCOPY with wash;  Surgeon: Caleb Garcia MD;  Location: [facility] ENDOSCOPY;  Service:     COLONOSCOPY      COLONOSCOPY N/A 01/26/2023    Procedure: COLONOSCOPY to CECUM AND TERM ILEUM;  Surgeon: Jj Dean MD;  Location: [facility] ENDOSCOPY;  Service: Gastroenterology;  Laterality: N/A;  PRE OP -screening  POST OP -  NORMAL    D & C HYSTEROSCOPY N/A 03/08/2022    Procedure: DILATATION AND CURETTAGE with hysteroscopy;  Surgeon: Kaylah Land DO;  Location: [facility] MAIN OR;  Service: Gynecology Oncology;  Laterality: N/A;    DILATATION AND CURETTAGE  04/11/2011    OTHER SURGICAL HISTORY  09/2015    IVC filter    OTHER SURGICAL HISTORY      left LE revascularization    OTHER SURGICAL HISTORY      ALESSIA and LEV scan    THROMBECTOMY  07/2015       Family History: family history includes Breast cancer in her mother; Hypertension in an other family member.    Social History:  reports that she has never smoked. She has never used smokeless tobacco. She reports that she does not currently use alcohol. She reports that she does not use drugs.    Home Medications:  Prior to Admission medications    Medication Sig Start Date End Date Taking? Authorizing Provider   furosemide (LASIX) 40 MG tablet Take 1 tablet by mouth Every Other Day. 4/25/24  Yes Tessy Fink APRN   losartan (COZAAR) 100 MG tablet TAKE 1 TABLET BY MOUTH DAILY 3/17/25  Yes Tessy Fink APRN   metoprolol succinate XL (TOPROL-XL) 50 MG 24 hr tablet TAKE 1 TABLET BY MOUTH EVERY DAY 4/10/25  Yes Brennan Rogers MD   rivaroxaban (Xarelto)  20 MG tablet TAKE 1 TABLET BY MOUTH EVERY DAY 1/7/25  Yes Brennan Rogers MD   acetaminophen (TYLENOL) 325 MG tablet Take 2 tablets by mouth Every 4 (Four) Hours As Needed for Mild Pain . 9/29/20   Fernanda Conrad APRN       Allergies:  Allergies   Allergen Reactions    Cephalexin Hives and Rash     Diffuse rash on cephalexin 1 g PO q6h on 6/17/20  Tolerated zosyn and PCN G September 2020 without issue    Clindamycin/Lincomycin Hives       Objective     Vitals:   Temp:  [98.8 °F (37.1 °C)-99 °F (37.2 °C)] 99 °F (37.2 °C)  Heart Rate:  [76-95] 79  Resp:  [20-22] 20  BP: ()/(61-89) 102/72  Flow (L/min) (Oxygen Therapy):  [2-4] 4    Intake/Output Summary (Last 24 hours) at 5/5/2025 1704  Last data filed at 5/4/2025 2209  Gross per 24 hour   Intake --   Output 1750 ml   Net -1750 ml       Physical Exam:   Constitutional: Awake, chronically ill, mL, no acute distress.  HEENT: Sclera anicteric, no conjunctival injection  Neck: Supple, no JVD  Respiratory: Diminished, nonlabored respiration on 4 L/min  Cardiovascular: RRR, no murmurs, no rub, no carotid bruit  Gastrointestinal: BS +, abdomen is soft, nontender and nondistended  : No palpable bladder  Musculoskeletal: Chronic venous stasis to BLE, no clubbing or cyanosis  Psychiatric: Flat affect, cooperative  Neurologic: Oriented x3, moving all extremities, normal speech and mental status  Skin: Warm and dry       Scheduled Meds:     cefTRIAXone, 2,000 mg, Intravenous, Q12H  famotidine, 40 mg, Oral, Daily  guaiFENesin, 1,200 mg, Oral, Q12H  hydrocortisone-bacitracin-zinc oxide-nystatin, 1 Application, Topical, Q12H  ipratropium-albuterol, 3 mL, Nebulization, 4x Daily - RT  Lidocaine, 1 patch, Transdermal, Q24H  metoprolol succinate XL, 50 mg, Oral, Daily  [Held by provider] rivaroxaban, 20 mg, Oral, Daily  senna-docusate sodium, 2 tablet, Oral, BID  sodium chloride, 10 mL, Intravenous, Q12H      IV Meds:   heparin, 5.92 Units/kg/hr  Pharmacy Consult - Pharmacy  to dose,         Results Reviewed:   I have personally reviewed the results from the time of this admission to 5/5/2025 17:04 EDT     Lab Results   Component Value Date    GLUCOSE 102 (H) 05/05/2025    CALCIUM 8.8 05/05/2025     (L) 05/05/2025    K 3.9 05/05/2025    CO2 20.5 (L) 05/05/2025    CL 92 (L) 05/05/2025    BUN 31 (H) 05/05/2025    CREATININE 1.49 (H) 05/05/2025    EGFRIFAFRI 59 (L) 04/15/2021    EGFRIFNONA 42 (L) 08/30/2021    BCR 20.8 05/05/2025    ANIONGAP 12.5 05/05/2025      Lab Results   Component Value Date    MG 1.7 09/24/2020    PHOS 2.8 09/23/2020    ALBUMIN 2.9 (L) 05/05/2025     US Abdomen Limited  Result Date: 5/4/2025  RUQ ULTRASOUND  INDICATION: Elevated liver enzymes  TECHNIQUE: Routine transabdominal right upper quadrant ultrasound.  COMPARISON: CT abdomen pelvis 4/25/2023  FINDINGS:  PANCREAS: Visualized portions of the pancreas are within normal limits.  LIVER: The echogenicity and echotexture of the hepatic parenchyma is within normal limits. There is a 2.8 cm hyperechoic mass in the right hepatic lobe, previously characterized as benign hemangioma. There is no evidence of intrahepatic biliary ductal dilatation. The common duct is normal in caliber at the lolly hepatis.  GALLBLADDER: The gallbladder is normal. There is no evidence of cholelithiasis, gallbladder wall thickening, or pericholecystic fluid. Sonographic Last sign is absent.  VASCULAR: Visualized portion of the inferior vena cava is unremarkable.  OTHER: No ascites.      Impression:  Negative right upper quadrant ultrasound. No acute findings.   This report was finalized on 5/4/2025 5:46 PM by Dr. Akil Perales M.D on Workstation: [id number]         Assessment / Plan       Hypoxia    Chronic diastolic CHF (congestive heart failure)    Essential hypertension    Pulmonary hypertension    Upper back pain    Acute kidney failure    Bacteremia due to Gram-positive bacteria    Obstructive sleep apnea    History of DVT  (deep vein thrombosis)    Lymphedema of lower extremity, unspecified laterality    Chronic renal failure (CRF), stage 3 (moderate)    Venous stasis ulcer of right calf limited to breakdown of skin without varicose veins    Fall    Acute wheezy bronchitis    Elevated troponin    Hyperglycemia    History of pulmonary embolism    Morbid obesity    Hyponatremia    Bacterial endocarditis      ASSESSMENT:  Acute on chronic hyponatremia, suspect secondary to hypotension stimulating ADH release, liberal fluid intake, and underlying SIADH (urine osmolality 1,030; urine sodium 114).  Volume status hard to assess due to body habitus.  Nonoliguric STEFANIA, secondary to hypotension with impaired renal autoregulation with use of ARB.  BP low/soft with ARB on hold  CKD stage IIIa, baseline creatinine 1.1-1.2, secondary to hypertensive nephrosclerosis and chronic cardiorenal syndrome  Mechanical fall, managed by primary team  Bacteremia with gram-positive cocci, on IV antibiotics, managed by primary team  Pneumonia, on IV antibiotics, CT chest pending  Pulmonary hypertension, may need right heart cath, awaiting cardiology evaluation  Chronic diastolic CHF, echo on 6//2020 showed EF 64.5% with mild tricuspid regurg and severe pulmonary hypertension.  On Lasix 40 mg every other day at home.  Repeat echo pending.  Awaiting cardiology evaluation  History of DVT/PE, on Xarelto  BLE lymphedema  Transaminases, managed by primary team  Anemia with CKD/IONA, hemoglobin dropping.  Although would not give IV iron given active infection.  Transfuse per primary team    PLAN:  Continue to hold losartan  Decrease metoprolol dose to 25 mg daily  May consider oral  urea if sodium continues to be low  Check urinalysis, urine protein to creatinine  Start fluid restriction at 1.5 L/day  Surveillance labs    Thank you for involving us in the care of Emely Solomon.  Please feel free to call with any questions.    Jo Bonds MD  05/05/25  17:04  ALICIA    [facility]  [phone]      Please note that portions of this note were completed with a voice recognition program.

## 2025-05-05 NOTE — DISCHARGE PLACEMENT REQUEST
"Emely Solomon (64 y.o. Female)       Date of Birth   1960    Social Security Number       Address   [address] [place] [address]    Home Phone   [phone]    MRN   [id number]       Restoration   Non-Taoism    Marital Status                               Admission Date   5/4/2025    Admission Type   Emergency    Admitting Provider   Jimmy Morales MD    Attending Provider   Jaime Kaur MD    Department, Room/Bed   [address], [address]       Discharge Date       Discharge Disposition       Discharge Destination                                 Attending Provider: Jaime Kaur MD    Allergies: Cephalexin, Clindamycin/lincomycin    Isolation: None   Infection: MRSA/History Only (03/08/22)   Code Status: CPR    Ht: 160 cm (63\")   Wt: 170 kg (373 lb 10.9 oz)    Admission Cmt: None   Principal Problem: Hypoxia [R09.02]                   Active Insurance as of 5/4/2025       Primary Coverage       Payor Plan Insurance Group Employer/Plan Group    MEDICARE MEDICARE A & B        Payor Plan Address Payor Plan Phone Number Payor Plan Fax Number Effective Dates    [address] [phone]  7/1/2024 - None Entered    [facility] [id number]         Subscriber Name Subscriber Birth Date Member ID       EMELY SOLOMON 1960 [id number]               Secondary Coverage       Payor Plan Insurance Group Employer/Plan Group    [facility] SUPP [id number]       Payor Plan Address Payor Plan Phone Number Payor Plan Fax Number Effective Dates    [address]   7/1/2024 - None Entered    [facility] [id number]         Subscriber Name Subscriber Birth Date Member ID       EMELY SOLOMON 1960 [id number]                     Emergency Contacts        (Rel.) Home Phone Work Phone Mobile Phone    Ghanshyam Solomon (Spouse) [phone] -- [phone]    Adilson Cintron (Brother) [phone] -- [phone]      "

## 2025-05-05 NOTE — CONSULTS
Patient Care Team:  Adilson Wilkerson MD as PCP - General (Family Medicine)  [place] Segovia MD as Referring Physician (Obstetrics and Gynecology)  Brennan Rogers MD as Cardiologist (Cardiology)  Caleb Garcia MD as Consulting Physician (Pulmonary Disease)  Jess Sanz, RN as Ambulatory  ([facility])    Chief complaint: Back pain s/p fall    Subjective     History of Present Illness:   Mrs. Solomon is  64 year-old female with PMH of HTN, CHF, DVT/PE, ARIEL, lymphedema, pulmonary hypertension, and morbid obesity who presented to the ED on 5/4/25 with a chief complaint of back pain following a fall. Patient states she slipped in the bathroom and fell. She was unable to get up and her  couldn't help her, so EMS was called. In the ED she was noted to have lower extremity edema and a dry cough. Temperature was 100.6, BNP was elevated to 2994, and blood cultures were positive for Group B strep. Patient was started on IV antibiotics, admitted to the hospital for further evaluation and treatment, and Infectious Disease was consulted. Transthoracic echocardiogram today demonstrated a large mobile echodensity attached to the atrial surface of the posterior mitral valve leaflet concerning for vegetation, mild mitral regurgitation, and mild to moderate tricuspid regurgitation. Cardiac Surgery has been consulted for surgical evaluation.      Review of Systems   Respiratory:  Positive for cough and shortness of breath.    Cardiovascular:  Positive for leg swelling.   Musculoskeletal:  Positive for back pain.   All other systems reviewed and are negative.       Past Medical History:   Diagnosis Date    Arthritis     Atheroembolism of other site 04/14/2016    Cellulitis     11/2017, with Group B Strep bacteremia and sepsis    Chronic deep vein thrombosis (DVT) of left popliteal vein 12/17/2015 02/04/2016, 04/14/2016    Chronic diastolic congestive heart failure     COVID-19 virus infection  11/2020    Deep venous thrombosis     Exercise involving walking     Heart murmur     Hypertension     Hypo-osmolality and hyponatremia 09/29/2020    Lipoedema     Lymphedema     other    Morbid obesity     Paradoxical embolism     to the LLE due to DVT/PE and PFO    PFO (patent foramen ovale)     Postthrombotic syndrome of left lower extremity without complications 12/17/2015    postphletibis    Pulmonary embolism 04/14/2016    Pulmonary hypertension     multifactorial (dCHF, obesity/ARIEL, hx PE), mild by echo 1/2016    Right bundle branch block (RBBB) with left anterior fascicular block (LAFB)     Sepsis 09/18/2020    Sleep apnea      Past Surgical History:   Procedure Laterality Date    ARTERIOGRAM  07/2015    BRONCHOSCOPY N/A 07/21/2017    Procedure: BRONCHOSCOPY with wash;  Surgeon: Caleb Garcia MD;  Location: [facility] ENDOSCOPY;  Service:     COLONOSCOPY      COLONOSCOPY N/A 01/26/2023    Procedure: COLONOSCOPY to CECUM AND TERM ILEUM;  Surgeon: Jj Dean MD;  Location: [facility] ENDOSCOPY;  Service: Gastroenterology;  Laterality: N/A;  PRE OP -screening  POST OP -  NORMAL    D & C HYSTEROSCOPY N/A 03/08/2022    Procedure: DILATATION AND CURETTAGE with hysteroscopy;  Surgeon: Kaylah Land DO;  Location: [facility] MAIN OR;  Service: Gynecology Oncology;  Laterality: N/A;    DILATATION AND CURETTAGE  04/11/2011    OTHER SURGICAL HISTORY  09/2015    IVC filter    OTHER SURGICAL HISTORY      left LE revascularization    OTHER SURGICAL HISTORY      ALESSIA and LEV scan    THROMBECTOMY  07/2015     Family History   Problem Relation Age of Onset    Breast cancer Mother     Hypertension Other     Ovarian cancer Neg Hx     Uterine cancer Neg Hx     Colon cancer Neg Hx     Malig Hyperthermia Neg Hx      Social History     Tobacco Use    Smoking status: Never    Smokeless tobacco: Never   Vaping Use    Vaping status: Never Used   Substance Use Topics    Alcohol use: Not Currently     Comment: occassionally    Drug  "use: Never     Medications Prior to Admission   Medication Sig Dispense Refill Last Dose/Taking    furosemide (LASIX) 40 MG tablet Take 1 tablet by mouth Every Other Day. 60 tablet 2 5/3/2025    losartan (COZAAR) 100 MG tablet TAKE 1 TABLET BY MOUTH DAILY 90 tablet 0 5/3/2025    metoprolol succinate XL (TOPROL-XL) 50 MG 24 hr tablet TAKE 1 TABLET BY MOUTH EVERY DAY 90 tablet 1 5/3/2025    rivaroxaban (Xarelto) 20 MG tablet TAKE 1 TABLET BY MOUTH EVERY DAY 90 tablet 1 5/3/2025    acetaminophen (TYLENOL) 325 MG tablet Take 2 tablets by mouth Every 4 (Four) Hours As Needed for Mild Pain .        cefTRIAXone, 2,000 mg, Intravenous, Q12H  famotidine, 40 mg, Oral, Daily  guaiFENesin, 1,200 mg, Oral, Q12H  hydrocortisone-bacitracin-zinc oxide-nystatin, 1 Application, Topical, Q12H  ipratropium-albuterol, 3 mL, Nebulization, 4x Daily - RT  Lidocaine, 1 patch, Transdermal, Q24H  metoprolol succinate XL, 50 mg, Oral, Daily  [Held by provider] rivaroxaban, 20 mg, Oral, Daily  senna-docusate sodium, 2 tablet, Oral, BID  sodium chloride, 10 mL, Intravenous, Q12H      Allergies:  Cephalexin and Clindamycin/lincomycin    Objective      Vital Signs  Temp:  [98.8 °F (37.1 °C)-99 °F (37.2 °C)] 99 °F (37.2 °C)  Heart Rate:  [76-95] 79  Resp:  [20-22] 20  BP: ()/(61-89) 102/72    Flowsheet Rows      Flowsheet Row First Filed Value   Admission Height 160 cm (63\") Documented at 05/04/2025 0713   Admission Weight 170 kg (375 lb) Documented at 05/04/2025 0713          160 cm (63\")    Physical Exam  Vitals and nursing note reviewed.   Constitutional:       General: She is not in acute distress.     Appearance: Normal appearance. She is obese. She is not ill-appearing, toxic-appearing or diaphoretic.   HENT:      Head: Normocephalic and atraumatic.      Nose: Nose normal.      Mouth/Throat:      Mouth: Mucous membranes are moist.      Pharynx: Oropharynx is clear.   Eyes:      Extraocular Movements: Extraocular movements intact.      " Conjunctiva/sclera: Conjunctivae normal.      Pupils: Pupils are equal, round, and reactive to light.   Cardiovascular:      Rate and Rhythm: Normal rate and regular rhythm.      Heart sounds: No murmur heard.  Pulmonary:      Effort: Pulmonary effort is normal. No respiratory distress.      Breath sounds: Normal breath sounds. No wheezing or rales.   Abdominal:      General: Bowel sounds are normal.      Palpations: Abdomen is soft.   Musculoskeletal:         General: Normal range of motion.      Cervical back: Normal range of motion and neck supple.      Right lower leg: Edema present.      Left lower leg: Edema present.   Skin:     General: Skin is warm and dry.   Neurological:      General: No focal deficit present.      Mental Status: She is alert and oriented to person, place, and time.   Psychiatric:         Mood and Affect: Mood normal.         Behavior: Behavior normal.         Results Review:   Lab Results (last 24 hours)       Procedure Component Value Units Date/Time    Ehrlichia Profile DNA PCR [[id number]] Collected: 05/05/25 1508    Specimen: Blood Updated: 05/05/25 1516    Osmolality, Serum [[id number]]  (Normal) Collected: 05/04/25 1744    Specimen: Blood Updated: 05/05/25 1037     Osmolality 281 mOsm/kg     CBC & Differential [[id number]]  (Abnormal) Collected: 05/05/25 0728    Specimen: Blood Updated: 05/05/25 0928    Narrative:      The following orders were created for panel order CBC & Differential.  Procedure                               Abnormality         Status                     ---------                               -----------         ------                     CBC Auto Differential[[id number]]        Abnormal            Final result                 Please view results for these tests on the individual orders.    Manual Differential [[id number]]  (Abnormal) Collected: 05/05/25 0728    Specimen: Blood Updated: 05/05/25 0928     Neutrophil % 93.0 %      Lymphocyte % 0.0 %      Monocyte % 7.0  %      Eosinophil % 0.0 %      Basophil % 0.0 %      Neutrophils Absolute 12.97 10*3/mm3      Lymphocytes Absolute 0.00 10*3/mm3      Monocytes Absolute 0.98 10*3/mm3      Eosinophils Absolute 0.00 10*3/mm3      Basophils Absolute 0.00 10*3/mm3      RBC Morphology Normal     WBC Morphology Normal     Platelet Morphology Normal    CBC Auto Differential [[id number]]  (Abnormal) Collected: 05/05/25 0728    Specimen: Blood Updated: 05/05/25 0928     WBC 13.95 10*3/mm3      RBC 3.13 10*6/mm3      Hemoglobin 7.9 g/dL      Hematocrit 24.1 %      MCV 77.0 fL      MCH 25.2 pg      MCHC 32.8 g/dL      RDW 16.0 %      RDW-SD 43.8 fl      MPV 8.5 fL      Platelets 175 10*3/mm3     Lactate Dehydrogenase [[id number]]  (Abnormal) Collected: 05/05/25 0728    Specimen: Blood Updated: 05/05/25 0820      U/L     Comprehensive Metabolic Panel [[id number]]  (Abnormal) Collected: 05/05/25 0728    Specimen: Blood Updated: 05/05/25 0820     Glucose 102 mg/dL      BUN 31 mg/dL      Creatinine 1.49 mg/dL      Sodium 125 mmol/L      Potassium 3.9 mmol/L      Chloride 92 mmol/L      CO2 20.5 mmol/L      Calcium 8.8 mg/dL      Total Protein 7.5 g/dL      Albumin 2.9 g/dL      ALT (SGPT) 43 U/L      AST (SGOT) 160 U/L      Alkaline Phosphatase 261 U/L      Total Bilirubin 1.1 mg/dL      Globulin 4.6 gm/dL      A/G Ratio 0.6 g/dL      BUN/Creatinine Ratio 20.8     Anion Gap 12.5 mmol/L      eGFR 38.8 mL/min/1.73     Narrative:      GFR Categories in Chronic Kidney Disease (CKD)              GFR Category          GFR (mL/min/1.73)    Interpretation  G1                    90 or greater        Normal or high (1)  G2                    60-89                Mild decrease (1)  G3a                   45-59                Mild to moderate decrease  G3b                   30-44                Moderate to severe decrease  G4                    15-29                Severe decrease  G5                    14 or less           Kidney failure    (1)In the  absence of evidence of kidney disease, neither GFR category G1 or G2 fulfill the criteria for CKD.    eGFR calculation 2021 CKD-EPI creatinine equation, which does not include race as a factor    Blood Culture ID, PCR - Blood, Arm, Right [[id number]]  (Abnormal) Collected: 05/04/25 1744    Specimen: Blood from Arm, Right Updated: 05/05/25 0813     BCID, PCR Streptococcus agalactiae (Group B). Identification by BCID2 PCR.     BOTTLE TYPE Anaerobic Bottle    Blood Culture - Blood, Arm, Right [[id number]]  (Abnormal) Collected: 05/04/25 1744    Specimen: Blood from Arm, Right Updated: 05/05/25 0723     Blood Culture Abnormal Stain     Gram Stain Aerobic Bottle Gram positive cocci in chains      Anaerobic Bottle Gram positive cocci in chains    Narrative:      Less than seven (7) mL's of blood was collected.  Insufficient quantity may yield false negative results.    Blood Culture - Blood, Arm, Left [[id number]]  (Abnormal) Collected: 05/04/25 2125    Specimen: Blood from Arm, Left Updated: 05/05/25 0723     Blood Culture Abnormal Stain     Gram Stain Anaerobic Bottle Gram positive cocci in chains      Aerobic Bottle Gram positive cocci in chains    Occult Blood X 1, Stool - Stool, Per Rectum [[id number]]  (Normal) Collected: 05/05/25 0226    Specimen: Stool from Per Rectum Updated: 05/05/25 0623     Fecal Occult Blood Negative    Osmolality, Urine - Urine, Clean Catch [[id number]] Collected: 05/05/25 0228    Specimen: Urine, Clean Catch Updated: 05/05/25 0311     Osmolality, Urine 1,030 mOsm/kg     Narrative:      Osmo Normal Reference Ranges:    Random:  mOsm/kg H2O, depending on fluid intake.  Random: >850 mOsm/kg H20, after 12 hour fluid restriction.    24 Hour: 300-900 mOsm/kg H2O.    Creatinine Urine Random (kidney function) GFR component - Urine, Clean Catch [[id number]] Collected: 05/05/25 0228    Specimen: Urine, Clean Catch Updated: 05/05/25 0306     Creatinine, Urine 147.4 mg/dL     Narrative:      Reference  "intervals for random urine have not been established.  Clinical usage is dependent upon physician's interpretation in combination with other laboratory tests.       Sodium, Urine, Random - Urine, Clean Catch [[id number]] Collected: 05/05/25 0228    Specimen: Urine, Clean Catch Updated: 05/05/25 0306     Sodium, Urine 114 mmol/L     Narrative:      Reference intervals for random urine have not been established.  Clinical usage is dependent upon physician's interpretation in combination with other laboratory tests.       Hepatitis Panel, Acute [[id number]]  (Abnormal) Collected: 05/04/25 2125    Specimen: Blood from Arm, Left Updated: 05/04/25 2215     Hepatitis B Surface Ag Non-Reactive     Hep A IgM Non-Reactive     Hep B C IgM Non-Reactive     Hepatitis C Ab Reactive    Narrative:      Results may be falsely decreased if patient taking Biotin.     D-dimer, Quantitative [[id number]]  (Abnormal) Collected: 05/04/25 2125    Specimen: Blood from Arm, Left Updated: 05/04/25 2150     D-Dimer, Quantitative 2.02 MCGFEU/mL     Narrative:      According to the assay 's published package insert, a normal (<0.50 MCGFEU/mL) D-dimer result in conjunction with a non-high clinical probability assessment, excludes deep vein thrombosis (DVT) and pulmonary embolism (PE) with high sensitivity.    D-dimer values increase with age and this can make VTE exclusion of an older population difficult. To address this, the [employer], based on best available evidence and recent guidelines, recommends that clinicians use age-adjusted D-dimer thresholds in patients greater than 50 years of age with: a) a low probability of PE who do not meet all Pulmonary Embolism Rule Out Criteria, or b) in those with intermediate probability of PE.   The formula for an age-adjusted D-dimer cut-off is \"age/100\".  For example, a 60 year old patient would have an age-adjusted cut-off of 0.60 MCGFEU/mL and an 80 year old 0.80 " "MCGFEU/mL.    Vitamin B12 [[id number]]  (Abnormal) Collected: 05/04/25 1744    Specimen: Blood Updated: 05/04/25 1838     Vitamin B-12 1,818 pg/mL     Narrative:      Results may be falsely increased if patient taking Biotin.      Folate [[id number]]  (Normal) Collected: 05/04/25 1744    Specimen: Blood Updated: 05/04/25 1838     Folate 9.33 ng/mL     Narrative:      Results may be falsely increased if patient taking Biotin.      Procalcitonin [[id number]]  (Abnormal) Collected: 05/04/25 1744    Specimen: Blood Updated: 05/04/25 1830     Procalcitonin 40.80 ng/mL     Narrative:      As a Marker for Sepsis (Non-Neonates):    1. <0.5 ng/mL represents a low risk of severe sepsis and/or septic shock.  2. >2 ng/mL represents a high risk of severe sepsis and/or septic shock.    As a Marker for Lower Respiratory Tract Infections that require antibiotic therapy:    PCT on Admission    Antibiotic Therapy       6-12 Hrs later    >0.5                Strongly Recommended  >0.25 - <0.5        Recommended   0.1 - 0.25          Discouraged              Remeasure/reassess PCT  <0.1                Strongly Discouraged     Remeasure/reassess PCT    As 28 day mortality risk marker: \"Change in Procalcitonin Result\" (>80% or <=80%) if Day 0 (or Day 1) and Day 4 values are available. Refer to http://www.[facility]-pct-calculator.com    Change in PCT <=80%  A decrease of PCT levels below or equal to 80% defines a positive change in PCT test result representing a higher risk for 28-day all-cause mortality of patients diagnosed with severe sepsis for septic shock.    Change in PCT >80%  A decrease of PCT levels of more than 80% defines a negative change in PCT result representing a lower risk for 28-day all-cause mortality of patients diagnosed with severe sepsis or septic shock.       Ferritin [[id number]]  (Abnormal) Collected: 05/04/25 1744    Specimen: Blood Updated: 05/04/25 1830     Ferritin 632.00 ng/mL     Narrative:      Results may be " falsely decreased if patient taking Biotin.      TSH [[id number]]  (Normal) Collected: 05/04/25 1744    Specimen: Blood Updated: 05/04/25 1830     TSH 0.907 uIU/mL     Haptoglobin [[id number]]  (Abnormal) Collected: 05/04/25 1744    Specimen: Blood Updated: 05/04/25 1829     Haptoglobin 333 mg/dL      Comment: Specimen hemolyzed. Results may be affected.       Potassium [[id number]]  (Normal) Collected: 05/04/25 1744    Specimen: Blood Updated: 05/04/25 1829     Potassium 3.8 mmol/L      Comment: Slight hemolysis detected by analyzer. Result may be falsely elevated.       Iron Profile [[id number]]  (Abnormal) Collected: 05/04/25 1744    Specimen: Blood Updated: 05/04/25 1826     Iron 13 mcg/dL      Iron Saturation (TSAT) 5 %      Transferrin 176 mg/dL      TIBC 262 mcg/dL     Uric Acid [[id number]]  (Abnormal) Collected: 05/04/25 1744    Specimen: Blood Updated: 05/04/25 1826     Uric Acid 7.9 mg/dL     Hemoglobin A1c [[id number]]  (Normal) Collected: 05/04/25 1744    Specimen: Blood Updated: 05/04/25 1812     Hemoglobin A1C 5.40 %     Narrative:      Hemoglobin A1C Ranges:    Increased Risk for Diabetes  5.7% to 6.4%  Diabetes                     >= 6.5%  Diabetic Goal                < 7.0%    Reticulocytes [[id number]]  (Normal) Collected: 05/04/25 1744    Specimen: Blood Updated: 05/04/25 1801     Reticulocyte % 1.24 %      Reticulocyte Absolute 0.0418 10*6/mm3           TRANSTHORACIC ECHOCARDIOGRAM 5/5/25:    The mitral valve leaflets are thickened and myxomatous. There is a large mobile echodensity attached to the atrial surface of the posterior mitral valve leaflet. Differential includes vegetation, degeneration of the valve leaflet, and fibroblastoma    The left ventricular cavity is mildly dilated.    Left ventricular systolic function is normal. Left ventricular ejection fraction appears to be 66 - 70%.    Left ventricular diastolic function is consistent with (grade Ia w/high LAP) impaired relaxation.    The  right ventricular cavity is moderately dilated. Normal right ventricular systolic function noted.    The left atrial cavity is moderately dilated.    Mild to moderate aortic valve stenosis is present. Aortic valve maximum pressure gradient is 27.5 mmHg. Aortic valve mean pressure gradient is 16.0 mmHg.    Mild mitral valve regurgitation is present. Mild mitral valve stenosis is present.    Mild to moderate tricuspid valve regurgitation is present.    Calculated right ventricular systolic pressure from tricuspid regurgitation is 62 mmHg.    The inferior vena cava is dilated. IVC inspiratory collapse is absent.    There is no evidence of pericardial effusion      Assessment & Plan       Hypoxia    Chronic diastolic CHF (congestive heart failure)    Essential hypertension    Pulmonary hypertension    Upper back pain    Acute kidney failure    Bacteremia due to Gram-positive bacteria    Obstructive sleep apnea    History of DVT (deep vein thrombosis)    Lymphedema of lower extremity, unspecified laterality    Chronic renal failure (CRF), stage 3 (moderate)    Venous stasis ulcer of right calf limited to breakdown of skin without varicose veins    Fall    Acute wheezy bronchitis    Elevated troponin    Hyperglycemia    History of pulmonary embolism    Morbid obesity    Hyponatremia    Bacterial endocarditis      Assessment & Plan    Mitral valve endocarditis with 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 Martinez who recommends medical management given no significant mitral regurgitation, multiple co-morbidities, and morbid obesity.   Juan discussed with Dr. Lawton.       ARTIE Patricio  05/05/25  15:40 EDT

## 2025-05-05 NOTE — CASE MANAGEMENT/SOCIAL WORK
Discharge Planning Assessment  [facility]     Patient Name: Emely Solomon  MRN: [id number]  Today's Date: 5/5/2025    Admit Date: 5/4/2025    Plan: Home with  and current [facility]   Discharge Needs Assessment       Row Name 05/05/25 1212       Living Environment    People in Home other (see comments);spouse    Current Living Arrangements home    Potentially Unsafe Housing Conditions none    Primary Care Provided by self;spouse/significant other    Family Caregiver if Needed spouse    Quality of Family Relationships involved;helpful    Able to Return to Prior Arrangements yes       Resource/Environmental Concerns    Resource/Environmental Concerns none    Transportation Concerns none       Transition Planning    Patient/Family Anticipates Transition to home with family;home with help/services    Patient/Family Anticipated Services at Transition home health care    Transportation Anticipated family or friend will provide       Discharge Needs Assessment    Readmission Within the Last 30 Days no previous admission in last 30 days    Equipment Currently Used at Home cpap;walker, standard;shower chair;grab bar    Concerns to be Addressed adjustment to diagnosis/illness    Anticipated Changes Related to Illness inability to care for self    Equipment Needed After Discharge none                   Discharge Plan       Row Name 05/05/25 1213       Plan    Plan Home with  and current [facility]    Patient/Family in Agreement with Plan yes    Plan Comments Spoke with pt at bedside, introduced self and explained CCP role and verified the face sheet and pharmacy information.  Pt lives with  Ghanshyam in a 1 level home with 3 NICOLAS, she states she is IADL's, uses rwx, s/c, gb and cpap. She is current with [facility] and has no STR rehab, notified [facility] RN preference for that, [facility] following for DME with HH need.  She plans to return home with  to transport, CCP will follow - Davida SAMS                  Continued Care and  Services - Admitted Since 5/4/2025       Home Medical Care       Service Provider Request Status Services Address Phone Fax Patient Preferred    [facility] [place]  Selected Home Rehabilitation [address], [address], [place] [address] [phone] --                  Selected Continued Care - Episodes Includes continued care and service providers with selected services from the active episodes listed below          Expected Discharge Date and Time       Expected Discharge Date Expected Discharge Time    May 6, 2025            Demographic Summary       Row Name 05/05/25 1210       General Information    Admission Type observation                   Functional Status       Row Name 05/05/25 1210       Functional Status    Usual Activity Tolerance good    Current Activity Tolerance moderate       Assessment of Health Literacy    Health Literacy Good       Functional Status, IADL    Medications independent    Meal Preparation independent    Housekeeping assistive person    Laundry assistive person    Shopping assistive person       Mental Status    General Appearance WDL WDL       Mental Status Summary    Recent Changes in Mental Status/Cognitive Functioning no changes                   Psychosocial    No documentation.                  Abuse/Neglect    No documentation.                  Legal       Row Name 05/05/25 1212       Financial/Legal    Who Manages Finances if Patient Unable                    Substance Abuse    No documentation.                  Patient Forms    No documentation.                     Davida Curtis RN

## 2025-05-05 NOTE — CONSULTS
Date of Consultation: 25    Referral Provider: Jimym Morales MD     Reason for Consultation: Elevated troponin.    Encounter Provider: Gamaliel Lawton MD    Group of Service: [facility]     Patient Name: Emely oSlomon    :1960    Chief complaint: Mechanical fall with back pain.    History of Present Illness:      This is a 64-year-old female who is normally followed by Dr. Rogers in our practice.  She has a history of super morbid obesity, remote DVT and pulmonary embolism, chronic diastolic and right-sided CHF, pulmonary hypertension, and chronic lymphedema.  She also has chronic kidney disease and severe obstructive sleep apnea treated with CPAP.    She presented to the emergency department after a fall while she was getting out of the shower, and slipped.  She complained of upper back discomfort.  She has also been febrile since admission, and her blood cultures are already growing group B strep.  After I saw her, her echocardiogram was concerning for a large vegetation and endocarditis involving the mitral valve.  Her troponin was elevated at 72 and then 81, although she has worsening renal function.  She also has had no chest pain, and she has no ischemic changes on her EKG.      Past Medical History:   Diagnosis Date    Arthritis     Atheroembolism of other site 2016    Cellulitis     2017, with Group B Strep bacteremia and sepsis    Chronic deep vein thrombosis (DVT) of left popliteal vein 2015, 2016    Chronic diastolic congestive heart failure     COVID-19 virus infection 2020    Deep venous thrombosis     Exercise involving walking     Heart murmur     Hypertension     Hypo-osmolality and hyponatremia 2020    Lipoedema     Lymphedema     other    Morbid obesity     Paradoxical embolism     to the LLE due to DVT/PE and PFO    PFO (patent foramen ovale)     Postthrombotic syndrome of left lower extremity without complications  12/17/2015    postphletibis    Pulmonary embolism 04/14/2016    Pulmonary hypertension     multifactorial (dCHF, obesity/ARIEL, hx PE), mild by echo 1/2016    Right bundle branch block (RBBB) with left anterior fascicular block (LAFB)     Sepsis 09/18/2020    Sleep apnea          Past Surgical History:   Procedure Laterality Date    ARTERIOGRAM  07/2015    BRONCHOSCOPY N/A 07/21/2017    Procedure: BRONCHOSCOPY with wash;  Surgeon: Caleb Garcia MD;  Location: [facility] ENDOSCOPY;  Service:     COLONOSCOPY      COLONOSCOPY N/A 01/26/2023    Procedure: COLONOSCOPY to CECUM AND TERM ILEUM;  Surgeon: Jj Dean MD;  Location: [facility] ENDOSCOPY;  Service: Gastroenterology;  Laterality: N/A;  PRE OP -screening  POST OP -  NORMAL    D & C HYSTEROSCOPY N/A 03/08/2022    Procedure: DILATATION AND CURETTAGE with hysteroscopy;  Surgeon: Kaylah Land DO;  Location: [facility] MAIN OR;  Service: Gynecology Oncology;  Laterality: N/A;    DILATATION AND CURETTAGE  04/11/2011    OTHER SURGICAL HISTORY  09/2015    IVC filter    OTHER SURGICAL HISTORY      left LE revascularization    OTHER SURGICAL HISTORY      ALESSIA and LEV scan    THROMBECTOMY  07/2015         Allergies   Allergen Reactions    Cephalexin Hives and Rash     Diffuse rash on cephalexin 1 g PO q6h on 6/17/20  Tolerated zosyn and PCN G September 2020 without issue    Clindamycin/Lincomycin Hives         No current facility-administered medications on file prior to encounter.     Current Outpatient Medications on File Prior to Encounter   Medication Sig Dispense Refill    furosemide (LASIX) 40 MG tablet Take 1 tablet by mouth Every Other Day. 60 tablet 2    losartan (COZAAR) 100 MG tablet TAKE 1 TABLET BY MOUTH DAILY 90 tablet 0    metoprolol succinate XL (TOPROL-XL) 50 MG 24 hr tablet TAKE 1 TABLET BY MOUTH EVERY DAY 90 tablet 1    rivaroxaban (Xarelto) 20 MG tablet TAKE 1 TABLET BY MOUTH EVERY DAY 90 tablet 1    acetaminophen (TYLENOL) 325 MG tablet Take 2 tablets  "by mouth Every 4 (Four) Hours As Needed for Mild Pain .           Social History     Socioeconomic History    Marital status:    Tobacco Use    Smoking status: Never    Smokeless tobacco: Never   Vaping Use    Vaping status: Never Used   Substance and Sexual Activity    Alcohol use: Not Currently     Comment: occassionally    Drug use: Never    Sexual activity: Not Currently     Partners: Male     Birth control/protection: Post-menopausal         Family History   Problem Relation Age of Onset    Breast cancer Mother     Hypertension Other     Ovarian cancer Neg Hx     Uterine cancer Neg Hx     Colon cancer Neg Hx     Malig Hyperthermia Neg Hx        REVIEW OF SYSTEMS:   Pertinent positives are noted in HPI above.  Otherwise, all other systems were reviewed, and are negative.     Objective:     Vitals:    05/05/25 0735 05/05/25 1115 05/05/25 1332 05/05/25 1515   BP: 92/63  102/72 126/69   BP Location: Left arm   Left arm   Patient Position: Lying   Lying   Pulse: 78 79  91   Resp: 20 20  20   Temp: 99 °F (37.2 °C)   98.2 °F (36.8 °C)   TempSrc: Oral   Oral   SpO2: 91% 92%  99%   Weight:   (!) 169 kg (373 lb)    Height:   160 cm (63\")      Body mass index is 66.07 kg/m².  Flowsheet Rows      Flowsheet Row First Filed Value   Admission Height 160 cm (63\") Documented at 05/04/2025 0713   Admission Weight 170 kg (375 lb) Documented at 05/04/2025 0713             General:    No acute distress, alert, appears ill.  Obese.                   Head:    Normocephalic, atraumatic.   Eyes:          Conjunctivae and sclerae normal, no icterus.   Throat:   No oral lesions, no thrush, oral mucosa moist.    Neck:   Supple, trachea midline.   Lungs:     Decreased breath sounds bilaterally.    Heart:    Regular rhythm and normal rate. II/VI SM LLSB.   Abdomen:     Soft, non-tender, obese, non-distended, positive bowel sounds.    Extremities:   Bilateral lymphedema.  Chronic venous stasis changes.   Pulses:   Pulses palpable and " equal bilaterally.    Skin:   No bleeding or rash.   Neuro:   Non-focal.  Moves all extremities well.    Psychiatric:   Normal mood and affect.                 Lab Review:                Results from last 7 days   Lab Units 05/05/25  0728   SODIUM mmol/L 125*   POTASSIUM mmol/L 3.9   CHLORIDE mmol/L 92*   CO2 mmol/L 20.5*   BUN mg/dL 31*   CREATININE mg/dL 1.49*   GLUCOSE mg/dL 102*   CALCIUM mg/dL 8.8     Results from last 7 days   Lab Units 05/04/25  1036 05/04/25  0849   HSTROP T ng/L 81* 72*     Results from last 7 days   Lab Units 05/05/25  0728   WBC 10*3/mm3 13.95*   HEMOGLOBIN g/dL 7.9*   HEMATOCRIT % 24.1*   PLATELETS 10*3/mm3 175                       EKG (reviewed by me personally):            Assessment:   1.  Group B strep bacteremia  2.  Mitral valve endocarditis (large vegetation on posterior leaflet of the mitral valve by echo on 5/5/2025)  3.  Acute hypoxic respiratory failure  4.  Super morbid obesity, complicating all aspects of care  5.  Pulmonary hypertension  6.  Chronic diastolic and right-sided CHF  7.  Chronic lower extremity lymphedema with venous stasis changes  8.  Mild to moderate aortic stenosis by echo on 5/5/2025  9.  Acute kidney injury with stage IIIa chronic kidney disease  10.  Acute on chronic hyponatremia  11.  Severe obstructive sleep apnea, on CPAP  12.  Elevated troponin, type II NSTEMI secondary to #1, #2, #3, and #9  13.  History of DVT and pulmonary embolism in 2015  14.  Anemia, likely multifactorial  15.  Elevated liver function test  16.  Hypoalbuminemia  17.  Deconditioning    Plan:       This is a very poor situation.  She has a large vegetation noted on her posterior leaflet of the mitral valve on her echocardiogram.  This was clearly visualized on the transthoracic images.  This in conjunction with very quickly positive blood cultures for group B strep is highly concerning for mitral valve endocarditis.    I spoke with Dr. Nunez from infectious disease earlier today.   She is going to treat presumptively for endocarditis.  There really is no good role for LYNETTE in this situation as it will not change her clinical management.  I also really appreciate Dr. Martinez and the cardiothoracic surgery team seen the patient.  She is not a candidate for any type of valve surgery given her super morbid obesity and other comorbidities.  She would be unlikely to survive an open heart surgery.    Agree with changing the Xarelto to heparin for now in case any procedures are needed.  Continue Toprol-XL at 25 mg/day.  Hold losartan given renal insufficiency.  Diuretics also per nephrology for now, especially given the hyponatremia.  She has significant chronic lower extremity lymphedema and venous stasis issues which appear to be largely stable.    Cardiology will continue to follow.  Thank you very much for this consult.    Víctor Lawton MD

## 2025-05-05 NOTE — NURSING NOTE
Reason for Visit: CWCN: We are seeing a patient at the request of the floor nurse regarding a skin issue on the lower leg. The patient is alert, with hx of lymphedema. Upon assessment, we were able to see a swollen appearance, redness, and open wounds on both lower legs. Follow-up was provided in the outpatient clinic using Iodosorb gel and Opticel dressing.   In addition, there is a rash, red in color with irregular borders, satellite lesions, and a characteristic odor of fungal infection to all skin fold and Sacrococcygeal area and  Groin.  Treatment Plan/Recommendations: Magic barrier ointment was ordered to Sacrococcygeal/Buttocks/Groin, and all skin fold.  Iodosorb gel, Opticel dressing and Kerlix was ordered to rt lower leg open wound following outpatient clinic order  Bariatric bed will be request by the unit secretary.  Allevyn life dressing to bilateral Heels, the heels should remain elevated off the bed.  Wound care order and preventive ulcers standing measures have been implemented into Epic.  Wound Team Follow up Plan: WCN team will follow up according his needs.

## 2025-05-05 NOTE — SIGNIFICANT NOTE
05/05/25 1337   OTHER   Discipline physical therapist   Rehab Time/Intention   Session Not Performed other (see comments)  (Patient MARIELLA at echo. PT will f/u.)   Recommendation   PT - Next Appointment 05/06/25

## 2025-05-05 NOTE — PROGRESS NOTES
"      [facility]         Dr Esteban Sayied   LOS: 0 days   Patient Care Team:  Adilson Wilkerson MD as PCP - General (Family Medicine)  [place] Segovia MD as Referring Physician (Obstetrics and Gynecology)  Brennan Rogers MD as Cardiologist (Cardiology)  Caleb Garcia MD as Consulting Physician (Pulmonary Disease)  Jess Sanz, RN as Ambulatory  ([facility])    Chief Complaint: Acute hypoxemic respite failure with severe pulmonary hypertension ARIEL multiple medical issues as listed below    Interval History: Events noted chart reviewed.  Patient currently requiring 4 L oxygen nasal cannula.    REVIEW OF SYSTEMS:   CARDIOVASCULAR: No chest pain, chest pressure or chest discomfort. No palpitations or edema.   RESPIRATORY: Short of breath with activity  GASTROINTESTINAL: No anorexia, nausea, vomiting or diarrhea. No abdominal pain or blood.   HEMATOLOGIC: No bleeding or bruising.     Ventilator/Non-Invasive Ventilation Settings (From admission, onward)      None              Vital Signs  Temp:  [98.4 °F (36.9 °C)-99 °F (37.2 °C)] 99 °F (37.2 °C)  Heart Rate:  [76-95] 78  Resp:  [20-22] 20  BP: ()/(61-89) 92/63    Intake/Output Summary (Last 24 hours) at 5/5/2025 1109  Last data filed at 5/4/2025 2209  Gross per 24 hour   Intake --   Output 1750 ml   Net -1750 ml     Flowsheet Rows      Flowsheet Row First Filed Value   Admission Height 160 cm (63\") Documented at 05/04/2025 0713   Admission Weight 170 kg (375 lb) Documented at 05/04/2025 0713                  Physical Exam:  Patient is examined using the personal protective equipment as per guidelines from infection control for this particular patient as enacted.  Hand hygiene was performed before and after patient interaction.   General Appearance:    Alert, cooperative, in no acute distress.  Following simple commands  ENT Mallampati between 3 and 4 no nasal congestion  Neck midline trachea, no thyromegaly   Lungs:   " Diminished breath sounds with rhonchi in the bases    Heart:    Regular rhythm and normal rate, normal S1 and S2, no            murmur, no gallop, no rub, no click   Chest Wall:    No abnormalities observed   Abdomen:     Normal bowel sounds, no masses, no organomegaly, soft        nontender, nondistended, no guarding, no rebound                tenderness   Extremities:   Moves all extremities well, 1+ edema, no cyanosis, no             redness  CNS no focal neurological deficits normal sensory exam  Skin no rashes no nodules  Musculoskeletal no cyanosis no clubbing normal range of motion     Results Review:        Results from last 7 days   Lab Units 05/05/25  0728 05/04/25  1744 05/04/25  0849 05/04/25  0849 05/01/25  0946   SODIUM mmol/L 125*  --   --  130* 131*   POTASSIUM mmol/L 3.9 3.8  --  3.3* 4.9   CHLORIDE mmol/L 92*  --   --  95* 96*   CO2 mmol/L 20.5*  --   --  21.2* 22.3   BUN mg/dL 31*  --   --  40* 35*   CREATININE mg/dL 1.49*  --   --  1.86* 2.03*   GLUCOSE mg/dL 102*  --    < > 104* 83   CALCIUM mg/dL 8.8  --   --  8.9 8.7    < > = values in this interval not displayed.     Results from last 7 days   Lab Units 05/04/25  1036 05/04/25  0849   HSTROP T ng/L 81* 72*     Results from last 7 days   Lab Units 05/05/25  0728 05/04/25  0849 05/01/25  0946   WBC 10*3/mm3 13.95* 8.82 13.54*   HEMOGLOBIN g/dL 7.9* 8.7* 8.7*   HEMATOCRIT % 24.1* 26.9* 27.5*   PLATELETS 10*3/mm3 175 220 263                           I reviewed the patient's new clinical results.  I personally viewed and interpreted the patient's chest x-ray.        Medication Review:   azithromycin, 500 mg, Intravenous, Q24H  ceFAZolin, 2,000 mg, Intravenous, Q8H  famotidine, 40 mg, Oral, Daily  guaiFENesin, 1,200 mg, Oral, Q12H  ipratropium-albuterol, 3 mL, Nebulization, 4x Daily - RT  Lidocaine, 1 patch, Transdermal, Q24H  metoprolol succinate XL, 50 mg, Oral, Daily  rivaroxaban, 20 mg, Oral, Daily  senna-docusate sodium, 2 tablet, Oral,  BID  sodium chloride, 10 mL, Intravenous, Q12H             ASSESSMENT:   Acute hypoxic respiratory failure  Pulmonary hypertension  Heart failure with preserved ejection fraction  Fall with back pain  Acute kidney injury  Obstructive sleep apnea on noninvasive ventilation  Elevated troponin and BNP  Hyperglycemia  Lower extremity lymphedema  History of DVT/PE on Xarelto  Super morbid obesity    PLAN:  Events noted chart reviewed  Wean down oxygen maintain sats above 90%.  Pulmonary hypertension severe noted on repeat echo.  Could be group 2 with underlying cardiac history but will require right heart cath to evaluate PA pressures and wedge accurately.  Clinically appears to be somewhat volume overloaded.  CT chest ordered awaiting to be completed  Home CPAP to be used at bedside  Mobilize ambulate        Eulalia Delaney MD  05/05/25  11:09 EDT

## 2025-05-05 NOTE — PLAN OF CARE
Problem: Adult Inpatient Plan of Care  Goal: Plan of Care Review  Outcome: Progressing  Flowsheets (Taken 5/5/2025 0359)  Progress: no change  Outcome Evaluation:   No s/sx of  distress noted at this time. Rested some throughout night. Vital signs WDL. 02@2L NC   tolerated. Home Cpap while sleeping   tolerated. Fall precautions and assist with turns maintained. Will cont with current plan of care.  Plan of Care Reviewed With: patient  Goal: Patient-Specific Goal (Individualized)  Outcome: Progressing  Goal: Absence of Hospital-Acquired Illness or Injury  Outcome: Progressing  Intervention: Identify and Manage Fall Risk  Recent Flowsheet Documentation  Taken 5/5/2025 0229 by Kaylah Diaz, RN  Safety Promotion/Fall Prevention:   activity supervised   clutter free environment maintained   assistive device/personal items within reach   fall prevention program maintained   lighting adjusted   nonskid shoes/slippers when out of bed   room organization consistent   safety round/check completed  Taken 5/5/2025 0047 by Kaylah Diaz, RN  Safety Promotion/Fall Prevention:   activity supervised   assistive device/personal items within reach   clutter free environment maintained   fall prevention program maintained   lighting adjusted   nonskid shoes/slippers when out of bed   room organization consistent   safety round/check completed  Taken 5/4/2025 2219 by Kaylah Diaz, RN  Safety Promotion/Fall Prevention:   activity supervised   assistive device/personal items within reach   clutter free environment maintained   fall prevention program maintained   lighting adjusted   nonskid shoes/slippers when out of bed   room organization consistent   safety round/check completed  Taken 5/4/2025 2026 by Kaylah Diaz, RN  Safety Promotion/Fall Prevention:   activity supervised   assistive device/personal items within reach   clutter free environment maintained   fall prevention program maintained   lighting adjusted   nonskid  shoes/slippers when out of bed   safety round/check completed   room organization consistent  Intervention: Prevent Skin Injury  Recent Flowsheet Documentation  Taken 5/5/2025 0229 by Kaylah Diaz RN  Body Position:   turned   left   tilted  Taken 5/4/2025 2026 by Kaylah Diaz RN  Body Position: supine  Skin Protection:   incontinence pads utilized   skin sealant/moisture barrier applied   transparent dressing maintained  Intervention: Prevent and Manage VTE (Venous Thromboembolism) Risk  Recent Flowsheet Documentation  Taken 5/4/2025 2026 by Kaylah Diaz RN  VTE Prevention/Management: (Xarelto) other (see comments)  Intervention: Prevent Infection  Recent Flowsheet Documentation  Taken 5/5/2025 0229 by Kaylah Diaz RN  Infection Prevention:   hand hygiene promoted   rest/sleep promoted   single patient room provided  Taken 5/5/2025 0047 by Kaylah Diaz RN  Infection Prevention:   hand hygiene promoted   rest/sleep promoted   single patient room provided  Taken 5/4/2025 2219 by Kaylah Diaz RN  Infection Prevention:   hand hygiene promoted   rest/sleep promoted   personal protective equipment utilized  Taken 5/4/2025 2026 by Kaylah Diaz RN  Infection Prevention:   hand hygiene promoted   rest/sleep promoted   single patient room provided  Goal: Optimal Comfort and Wellbeing  Outcome: Progressing  Intervention: Monitor Pain and Promote Comfort  Recent Flowsheet Documentation  Taken 5/5/2025 0229 by Kaylah Diaz RN  Pain Management Interventions: (tylenol  given)   pain medication given   pillow support provided   position adjusted  Intervention: Provide Person-Centered Care  Recent Flowsheet Documentation  Taken 5/4/2025 2026 by Kaylah Diaz RN  Trust Relationship/Rapport:   care explained   reassurance provided   thoughts/feelings acknowledged  Goal: Readiness for Transition of Care  Outcome: Progressing   Goal Outcome Evaluation:  Plan of Care Reviewed With: patient        Progress: no change  Outcome  Evaluation: No s/sx of  distress noted at this time. Rested some throughout night. Vital signs WDL. 02@2L NC; tolerated. Home Cpap while sleeping; tolerated. Fall precautions and assist with turns maintained. Will cont with current plan of care.

## 2025-05-05 NOTE — SIGNIFICANT NOTE
05/05/25 1311   OTHER   Discipline occupational therapist   Rehab Time/Intention   Session Not Performed patient unavailable for evaluation  (patient off unit in echo. OT will follow up next service date.)   Recommendation   OT - Next Appointment 05/06/25

## 2025-05-06 ENCOUNTER — APPOINTMENT (OUTPATIENT)
Dept: CT IMAGING | Facility: HOSPITAL | Age: 65
DRG: 280 | End: 2025-05-06
Payer: MEDICARE

## 2025-05-06 PROBLEM — B95.1 BACTEREMIA DUE TO GROUP B STREPTOCOCCUS: Status: ACTIVE | Noted: 2017-11-13

## 2025-05-06 LAB
ALBUMIN SERPL-MCNC: 2.7 G/DL (ref 3.5–5.2)
ALBUMIN/GLOB SERPL: 0.6 G/DL
ALP SERPL-CCNC: 301 U/L (ref 39–117)
ALT SERPL W P-5'-P-CCNC: 38 U/L (ref 1–33)
ANION GAP SERPL CALCULATED.3IONS-SCNC: 10 MMOL/L (ref 5–15)
ANION GAP SERPL CALCULATED.3IONS-SCNC: 9 MMOL/L (ref 5–15)
APTT PPP: 40.7 SECONDS (ref 22.7–35.4)
AST SERPL-CCNC: 110 U/L (ref 1–32)
BASOPHILS # BLD AUTO: 0.09 10*3/MM3 (ref 0–0.2)
BASOPHILS NFR BLD AUTO: 0.7 % (ref 0–1.5)
BILIRUB SERPL-MCNC: 0.5 MG/DL (ref 0–1.2)
BUN SERPL-MCNC: 33 MG/DL (ref 8–23)
BUN SERPL-MCNC: 43 MG/DL (ref 8–23)
BUN/CREAT SERPL: 22 (ref 7–25)
BUN/CREAT SERPL: 29.7 (ref 7–25)
CALCIUM SPEC-SCNC: 8.5 MG/DL (ref 8.6–10.5)
CALCIUM SPEC-SCNC: 8.5 MG/DL (ref 8.6–10.5)
CHLORIDE SERPL-SCNC: 92 MMOL/L (ref 98–107)
CHLORIDE SERPL-SCNC: 92 MMOL/L (ref 98–107)
CO2 SERPL-SCNC: 21 MMOL/L (ref 22–29)
CO2 SERPL-SCNC: 21 MMOL/L (ref 22–29)
CORTIS SERPL-MCNC: 18.5 MCG/DL
CREAT SERPL-MCNC: 1.45 MG/DL (ref 0.57–1)
CREAT SERPL-MCNC: 1.5 MG/DL (ref 0.57–1)
DEPRECATED RDW RBC AUTO: 45.9 FL (ref 37–54)
EGFRCR SERPLBLD CKD-EPI 2021: 38.5 ML/MIN/1.73
EGFRCR SERPLBLD CKD-EPI 2021: 40.1 ML/MIN/1.73
EOSINOPHIL # BLD AUTO: 0.11 10*3/MM3 (ref 0–0.4)
EOSINOPHIL NFR BLD AUTO: 0.8 % (ref 0.3–6.2)
ERYTHROCYTE [DISTWIDTH] IN BLOOD BY AUTOMATED COUNT: 16.2 % (ref 12.3–15.4)
GLOBULIN UR ELPH-MCNC: 4.6 GM/DL
GLUCOSE SERPL-MCNC: 127 MG/DL (ref 65–99)
GLUCOSE SERPL-MCNC: 96 MG/DL (ref 65–99)
HCT VFR BLD AUTO: 23.8 % (ref 34–46.6)
HCT VFR BLD AUTO: 26.1 % (ref 34–46.6)
HCT VFR BLD AUTO: 26.9 % (ref 34–46.6)
HGB BLD-MCNC: 7.6 G/DL (ref 12–15.9)
HGB BLD-MCNC: 8.3 G/DL (ref 12–15.9)
HGB BLD-MCNC: 8.6 G/DL (ref 12–15.9)
IMM GRANULOCYTES # BLD AUTO: 0.27 10*3/MM3 (ref 0–0.05)
IMM GRANULOCYTES NFR BLD AUTO: 2 % (ref 0–0.5)
LYMPHOCYTES # BLD AUTO: 0.71 10*3/MM3 (ref 0.7–3.1)
LYMPHOCYTES NFR BLD AUTO: 5.3 % (ref 19.6–45.3)
MAGNESIUM SERPL-MCNC: 1.8 MG/DL (ref 1.6–2.4)
MCH RBC QN AUTO: 25 PG (ref 26.6–33)
MCHC RBC AUTO-ENTMCNC: 31.9 G/DL (ref 31.5–35.7)
MCV RBC AUTO: 78.3 FL (ref 79–97)
MONOCYTES # BLD AUTO: 0.53 10*3/MM3 (ref 0.1–0.9)
MONOCYTES NFR BLD AUTO: 4 % (ref 5–12)
NEUTROPHILS NFR BLD AUTO: 11.69 10*3/MM3 (ref 1.7–7)
NEUTROPHILS NFR BLD AUTO: 87.2 % (ref 42.7–76)
PHOSPHATE SERPL-MCNC: 2.9 MG/DL (ref 2.5–4.5)
PLATELET # BLD AUTO: 150 10*3/MM3 (ref 140–450)
PMV BLD AUTO: 9.1 FL (ref 6–12)
POTASSIUM SERPL-SCNC: 3.8 MMOL/L (ref 3.5–5.2)
POTASSIUM SERPL-SCNC: 4.1 MMOL/L (ref 3.5–5.2)
PROT SERPL-MCNC: 7.3 G/DL (ref 6–8.5)
RBC # BLD AUTO: 3.04 10*6/MM3 (ref 3.77–5.28)
SODIUM SERPL-SCNC: 122 MMOL/L (ref 136–145)
SODIUM SERPL-SCNC: 123 MMOL/L (ref 136–145)
URATE SERPL-MCNC: 7.8 MG/DL (ref 2.4–5.7)
WBC NRBC COR # BLD AUTO: 13.4 10*3/MM3 (ref 3.4–10.8)

## 2025-05-06 PROCEDURE — 97166 OT EVAL MOD COMPLEX 45 MIN: CPT

## 2025-05-06 PROCEDURE — 97530 THERAPEUTIC ACTIVITIES: CPT

## 2025-05-06 PROCEDURE — 99232 SBSQ HOSP IP/OBS MODERATE 35: CPT | Performed by: INTERNAL MEDICINE

## 2025-05-06 PROCEDURE — 85025 COMPLETE CBC W/AUTO DIFF WBC: CPT | Performed by: INTERNAL MEDICINE

## 2025-05-06 PROCEDURE — 71250 CT THORAX DX C-: CPT

## 2025-05-06 PROCEDURE — 94761 N-INVAS EAR/PLS OXIMETRY MLT: CPT

## 2025-05-06 PROCEDURE — 87040 BLOOD CULTURE FOR BACTERIA: CPT | Performed by: INTERNAL MEDICINE

## 2025-05-06 PROCEDURE — 25010000002 HEPARIN (PORCINE) PER 1000 UNITS: Performed by: INTERNAL MEDICINE

## 2025-05-06 PROCEDURE — 99222 1ST HOSP IP/OBS MODERATE 55: CPT | Performed by: INTERNAL MEDICINE

## 2025-05-06 PROCEDURE — 94664 DEMO&/EVAL PT USE INHALER: CPT

## 2025-05-06 PROCEDURE — 94799 UNLISTED PULMONARY SVC/PX: CPT

## 2025-05-06 PROCEDURE — 94760 N-INVAS EAR/PLS OXIMETRY 1: CPT

## 2025-05-06 PROCEDURE — 80053 COMPREHEN METABOLIC PANEL: CPT | Performed by: INTERNAL MEDICINE

## 2025-05-06 PROCEDURE — 85730 THROMBOPLASTIN TIME PARTIAL: CPT | Performed by: NURSE PRACTITIONER

## 2025-05-06 PROCEDURE — 85014 HEMATOCRIT: CPT | Performed by: NURSE PRACTITIONER

## 2025-05-06 PROCEDURE — 85018 HEMOGLOBIN: CPT | Performed by: NURSE PRACTITIONER

## 2025-05-06 PROCEDURE — 25010000002 CEFTRIAXONE PER 250 MG: Performed by: INTERNAL MEDICINE

## 2025-05-06 PROCEDURE — 84100 ASSAY OF PHOSPHORUS: CPT

## 2025-05-06 PROCEDURE — 82533 TOTAL CORTISOL: CPT | Performed by: INTERNAL MEDICINE

## 2025-05-06 PROCEDURE — 97162 PT EVAL MOD COMPLEX 30 MIN: CPT

## 2025-05-06 PROCEDURE — 83735 ASSAY OF MAGNESIUM: CPT

## 2025-05-06 PROCEDURE — 84550 ASSAY OF BLOOD/URIC ACID: CPT

## 2025-05-06 RX ORDER — DEXTROMETHORPHAN POLISTIREX 30 MG/5ML
60 SUSPENSION ORAL EVERY 12 HOURS SCHEDULED
Status: DISCONTINUED | OUTPATIENT
Start: 2025-05-06 | End: 2025-05-10 | Stop reason: HOSPADM

## 2025-05-06 RX ORDER — SODIUM CHLORIDE 1 G/1
3 TABLET ORAL ONCE
Status: COMPLETED | OUTPATIENT
Start: 2025-05-06 | End: 2025-05-06

## 2025-05-06 RX ORDER — FUROSEMIDE 40 MG/1
40 TABLET ORAL DAILY
Status: DISCONTINUED | OUTPATIENT
Start: 2025-05-06 | End: 2025-05-10 | Stop reason: HOSPADM

## 2025-05-06 RX ORDER — HEPARIN SODIUM 5000 [USP'U]/ML
23.7 INJECTION, SOLUTION INTRAVENOUS; SUBCUTANEOUS EVERY 6 HOURS PRN
Status: DISCONTINUED | OUTPATIENT
Start: 2025-05-06 | End: 2025-05-07

## 2025-05-06 RX ORDER — FERROUS SULFATE 325(65) MG
325 TABLET ORAL
Status: DISCONTINUED | OUTPATIENT
Start: 2025-05-07 | End: 2025-05-10 | Stop reason: HOSPADM

## 2025-05-06 RX ADMIN — SENNOSIDES AND DOCUSATE SODIUM 2 TABLET: 50; 8.6 TABLET ORAL at 11:38

## 2025-05-06 RX ADMIN — DEXTROMETHORPHAN 60 MG: 30 SUSPENSION, EXTENDED RELEASE ORAL at 20:06

## 2025-05-06 RX ADMIN — GUAIFENESIN 1200 MG: 600 TABLET, MULTILAYER, EXTENDED RELEASE ORAL at 20:06

## 2025-05-06 RX ADMIN — CEFTRIAXONE 2000 MG: 2 INJECTION, POWDER, FOR SOLUTION INTRAMUSCULAR; INTRAVENOUS at 05:30

## 2025-05-06 RX ADMIN — CEFTRIAXONE 2000 MG: 2 INJECTION, POWDER, FOR SOLUTION INTRAMUSCULAR; INTRAVENOUS at 18:43

## 2025-05-06 RX ADMIN — Medication 15 G: at 11:37

## 2025-05-06 RX ADMIN — Medication 3 G: at 20:06

## 2025-05-06 RX ADMIN — Medication 1 APPLICATION: at 05:15

## 2025-05-06 RX ADMIN — IPRATROPIUM BROMIDE AND ALBUTEROL SULFATE 3 ML: .5; 3 SOLUTION RESPIRATORY (INHALATION) at 10:59

## 2025-05-06 RX ADMIN — IPRATROPIUM BROMIDE AND ALBUTEROL SULFATE 3 ML: .5; 3 SOLUTION RESPIRATORY (INHALATION) at 14:58

## 2025-05-06 RX ADMIN — Medication 15 G: at 20:06

## 2025-05-06 RX ADMIN — HEPARIN SODIUM 4000 UNITS: 5000 INJECTION, SOLUTION INTRAVENOUS; SUBCUTANEOUS at 08:20

## 2025-05-06 RX ADMIN — ZINC OXIDE 1 APPLICATION: 200 OINTMENT TOPICAL at 20:05

## 2025-05-06 RX ADMIN — Medication 10 ML: at 20:05

## 2025-05-06 RX ADMIN — ZINC OXIDE 1 APPLICATION: 200 OINTMENT TOPICAL at 11:43

## 2025-05-06 RX ADMIN — DEXTROMETHORPHAN 60 MG: 30 SUSPENSION, EXTENDED RELEASE ORAL at 14:35

## 2025-05-06 RX ADMIN — FUROSEMIDE 40 MG: 40 TABLET ORAL at 14:35

## 2025-05-06 RX ADMIN — IPRATROPIUM BROMIDE AND ALBUTEROL SULFATE 3 ML: .5; 3 SOLUTION RESPIRATORY (INHALATION) at 07:25

## 2025-05-06 RX ADMIN — GUAIFENESIN 1200 MG: 600 TABLET, MULTILAYER, EXTENDED RELEASE ORAL at 11:38

## 2025-05-06 RX ADMIN — Medication 10 ML: at 11:39

## 2025-05-06 RX ADMIN — IPRATROPIUM BROMIDE AND ALBUTEROL SULFATE 3 ML: .5; 3 SOLUTION RESPIRATORY (INHALATION) at 19:58

## 2025-05-06 RX ADMIN — METOPROLOL SUCCINATE 25 MG: 25 TABLET, EXTENDED RELEASE ORAL at 11:38

## 2025-05-06 RX ADMIN — SENNOSIDES AND DOCUSATE SODIUM 2 TABLET: 50; 8.6 TABLET ORAL at 20:06

## 2025-05-06 RX ADMIN — FAMOTIDINE 40 MG: 20 TABLET, FILM COATED ORAL at 11:38

## 2025-05-06 NOTE — CONSULTS
.     REASON FOR CONSULTATION:     Provide an opinion on any further workup or treatment  Anticoagulation recommendations  Anemia                             REQUESTING PHYSICIAN: Jimmy Morales MD  RECORDS OBTAINED:  Records of the patient's history including those obtained from the referring provider were reviewed and summarized in detail.    HISTORY OF PRESENT ILLNESS:  The patient is a 64 y.o. year old female  who is here for follow-up with the above-mentioned history.    Ms. Solomon is a 64-year-old lady with remote history of DVT who has been on long-term anticoagulation.  Patient is unable to clearly report when she had her original diagnosis of DVT however there is a note from Dr. Stratton from 2020 with states that she had her DVT likely in 2015 and has remained on anticoagulation.  She was initially on Coumadin however concern for Coumadin failure/inadequate anticoagulation due to not reaching target INR and subsequently treatment was changed to Xarelto.  She was recommended to continue long-term anticoagulation due to morbid obesity being a risk factor for blood clots.  Other comorbidities include hypertension, CHF, obstructive sleep apnea, lymphedema, pulmonary hypertension.  She presented to the hospital after she had a fall and was noted to have a fever and blood cultures were positive for group B strep.  She was noted to have a large mobile echodensity on the atrial surface of the posterior mitral valve leaflet concerning for infective endocarditis.  Evaluated by cardiac surgery and thought to not be a candidate for surgery.  She is currently on treatment with ceftriaxone 2 g IV twice daily.  6 weeks of antibiotics plan.    Patient has been noted to be anemic and we have been consulted for recommendations on anticoagulation in the setting of anemia.    Reviewing the CBCs all the way back to 2020 patient has been anemic in the past, normocytic.  Hemoglobin ranged from 8.2-12.4.    12/8/2022  hemoglobin 12.4.    5/1/2025 hemoglobin 8.7.  Drop in hemoglobin to 7.9 with an MCV of 77 on 5/5/2025 5/4/2025-haptoglobin elevated at 333, hemolyzed sample  Reticulocyte count low at 1.24%, folic acid normal at 9.33, vitamin B12 1818, iron saturation 5%, TIBC 262, iron 13, ferritin 632      Past Medical History:   Diagnosis Date    Arthritis     Atheroembolism of other site 04/14/2016    Cellulitis     11/2017, with Group B Strep bacteremia and sepsis    Chronic deep vein thrombosis (DVT) of left popliteal vein 12/17/2015 02/04/2016, 04/14/2016    Chronic diastolic congestive heart failure     COVID-19 virus infection 11/2020    Deep venous thrombosis     Exercise involving walking     Heart murmur     Hypertension     Hypo-osmolality and hyponatremia 09/29/2020    Lipoedema     Lymphedema     other    Morbid obesity     Paradoxical embolism     to the LLE due to DVT/PE and PFO    PFO (patent foramen ovale)     Postthrombotic syndrome of left lower extremity without complications 12/17/2015    postphletibis    Pulmonary embolism 04/14/2016    Pulmonary hypertension     multifactorial (dCHF, obesity/ARIEL, hx PE), mild by echo 1/2016    Right bundle branch block (RBBB) with left anterior fascicular block (LAFB)     Sepsis 09/18/2020    Sleep apnea      Past Surgical History:   Procedure Laterality Date    ARTERIOGRAM  07/2015    BRONCHOSCOPY N/A 07/21/2017    Procedure: BRONCHOSCOPY with wash;  Surgeon: Caleb Garcia MD;  Location: [facility] ENDOSCOPY;  Service:     COLONOSCOPY      COLONOSCOPY N/A 01/26/2023    Procedure: COLONOSCOPY to CECUM AND TERM ILEUM;  Surgeon: Jj Dean MD;  Location: [facility] ENDOSCOPY;  Service: Gastroenterology;  Laterality: N/A;  PRE OP -screening  POST OP -  NORMAL    D & C HYSTEROSCOPY N/A 03/08/2022    Procedure: DILATATION AND CURETTAGE with hysteroscopy;  Surgeon: Kaylah Land DO;  Location: [facility] MAIN OR;  Service: Gynecology Oncology;  Laterality: N/A;     DILATATION AND CURETTAGE  04/11/2011    OTHER SURGICAL HISTORY  09/2015    IVC filter    OTHER SURGICAL HISTORY      left LE revascularization    OTHER SURGICAL HISTORY      ALESSIA and LEV scan    THROMBECTOMY  07/2015       MEDICATIONS    Current Facility-Administered Medications:     acetaminophen (TYLENOL) tablet 650 mg, 650 mg, Oral, Q4H PRN, 650 mg at 05/05/25 0229 **OR** acetaminophen (TYLENOL) 160 MG/5ML oral solution 650 mg, 650 mg, Oral, Q4H PRN **OR** acetaminophen (TYLENOL) suppository 650 mg, 650 mg, Rectal, Q4H PRN, Jimmy Morales MD    sennosides-docusate (PERICOLACE) 8.6-50 MG per tablet 2 tablet, 2 tablet, Oral, BID, 2 tablet at 05/06/25 1138 **AND** polyethylene glycol (MIRALAX) packet 17 g, 17 g, Oral, Daily PRN **AND** bisacodyl (DULCOLAX) EC tablet 5 mg, 5 mg, Oral, Daily PRN **AND** bisacodyl (DULCOLAX) suppository 10 mg, 10 mg, Rectal, Daily PRN, Jimmy Morales MD    cadexomer iodine (IODOSORB) 0.9 % gel 1 Application, 1 Application, Topical, Daily PRN, Jaime Kaur MD, 1 Application at 05/06/25 0515    cefTRIAXone (ROCEPHIN) 2,000 mg in sodium chloride 0.9 % 100 mL MBP, 2,000 mg, Intravenous, Q12H, Tania Nunez MD, Last Rate: 200 mL/hr at 05/06/25 0530, 2,000 mg at 05/06/25 0530    dextromethorphan polistirex ER (DELSYM) 30 MG/5ML oral suspension 60 mg, 60 mg, Oral, Q12H, Jaime Kaur MD, 60 mg at 05/06/25 1435    famotidine (PEPCID) tablet 40 mg, 40 mg, Oral, Daily, Jimmy Morales MD, 40 mg at 05/06/25 1138    furosemide (LASIX) tablet 40 mg, 40 mg, Oral, Daily, Latasha Song MD, 40 mg at 05/06/25 1435    guaiFENesin (MUCINEX) 12 hr tablet 1,200 mg, 1,200 mg, Oral, Q12H, Jimmy Morales MD, 1,200 mg at 05/06/25 1138    heparin (porcine) 5000 UNIT/ML injection 4,000 Units, 23.7 Units/kg, Intravenous, Q6H PRN, Jaime Kaur MD, 4,000 Units at 05/06/25 0820    [Held by provider] heparin 64691 units/250 mL (100 units/mL) in 0.45 % NaCl  infusion, 5.92 Units/kg/hr, Intravenous, Titrated, Brennan Moreira APRN, Stopped at 05/06/25 1216    hydrocortisone-bacitracin-zinc oxide-nystatin (MAGIC BARRIER) ointment 1 Application, 1 Application, Topical, Q12H, Jaime Kaur MD, 1 Application at 05/06/25 1143    ipratropium-albuterol (DUO-NEB) nebulizer solution 3 mL, 3 mL, Nebulization, 4x Daily - RT, Jimmy Morales MD, 3 mL at 05/06/25 1059    Lidocaine 4 % 1 patch, 1 patch, Transdermal, Q24H, Jimmy Morales MD    LORazepam (ATIVAN) tablet 0.5 mg, 0.5 mg, Oral, Q8H PRN, Jimmy Morales MD    melatonin tablet 5 mg, 5 mg, Oral, Nightly PRN, Jimmy Morales MD    metoprolol succinate XL (TOPROL-XL) 24 hr tablet 25 mg, 25 mg, Oral, Daily, Jo Bonds MD, 25 mg at 05/06/25 1138    nitroglycerin (NITROSTAT) SL tablet 0.4 mg, 0.4 mg, Sublingual, Q5 Min PRN, Jimmy Morales MD    ondansetron ODT (ZOFRAN-ODT) disintegrating tablet 4 mg, 4 mg, Oral, Q6H PRN **OR** ondansetron (ZOFRAN) injection 4 mg, 4 mg, Intravenous, Q6H PRN, Jimmy Morales MD    oxyCODONE-acetaminophen (PERCOCET) 5-325 MG per tablet 1 tablet, 1 tablet, Oral, Q6H PRN, Jimmy Morales MD, 1 tablet at 05/05/25 1753    Pharmacy Consult - Pharmacy to dose, , Not Applicable, Continuous PRN, Brennan Moreira APRN    [Held by provider] rivaroxaban (XARELTO) tablet 20 mg, 20 mg, Oral, Daily, Jimmy Morales MD, 20 mg at 05/05/25 0959    sodium chloride 0.9 % flush 10 mL, 10 mL, Intravenous, Q12H, Jimmy Morales MD, 10 mL at 05/06/25 1139    sodium chloride 0.9 % flush 10 mL, 10 mL, Intravenous, PRN, Jimmy Morales MD    sodium chloride 0.9 % infusion 40 mL, 40 mL, Intravenous, PRN, Jimmy Morales MD    Urea (URE-NA) packet 15 g, 15 g, Oral, BID, Latasha Song MD, 15 g at 05/06/25 1137    ALLERGIES:     Allergies   Allergen Reactions    Cephalexin Hives and Rash     Diffuse rash on cephalexin 1 g PO q6h on 6/17/20  Tolerated zosyn and PCN G September  2020 without issue    Clindamycin/Lincomycin Hives       SOCIAL HISTORY:       Social History     Socioeconomic History    Marital status:    Tobacco Use    Smoking status: Never    Smokeless tobacco: Never   Vaping Use    Vaping status: Never Used   Substance and Sexual Activity    Alcohol use: Not Currently     Comment: occassionally    Drug use: Never    Sexual activity: Not Currently     Partners: Male     Birth control/protection: Post-menopausal         FAMILY HISTORY:  Family History   Problem Relation Age of Onset    Breast cancer Mother     Hypertension Other     Ovarian cancer Neg Hx     Uterine cancer Neg Hx     Colon cancer Neg Hx     Malig Hyperthermia Neg Hx        REVIEW OF SYSTEMS:  Review of Systems   Constitutional:  Positive for activity change, fatigue and fever.   HENT: Negative.     Eyes: Negative.    Respiratory: Negative.     Cardiovascular:  Positive for leg swelling.   Genitourinary: Negative.    Musculoskeletal:  Positive for back pain.              Vitals:    05/06/25 1059 05/06/25 1100 05/06/25 1102 05/06/25 1326   BP:    100/60   BP Location:    Left arm   Patient Position:    Lying   Pulse: 79 78 77 80   Resp:  18 18 18   Temp:    97.7 °F (36.5 °C)   TempSrc:    Oral   SpO2: 100% 100% 100% 99%   Weight:       Height:             11/16/2022     1:19 PM   Current Status   ECOG score 0      PHYSICAL EXAM:      CONSTITUTIONAL:  Vital signs reviewed.    EYES:  Conjunctivae and lids unremarkable.  PERRLA  EARS,NOSE,MOUTH,THROAT:  Ears and nose appear unremarkable.  Lips, teeth, gums appear unremarkable.  RESPIRATORY:  Normal respiratory effort.  Lungs clear to auscultation bilaterally.  CARDIOVASCULAR:  Normal S1, S2.  No murmurs rubs or gallops.  Bilateral lower extremity edema  GASTROINTESTINAL: Abdomen appears unremarkable.  Nontender.  No hepatomegaly.  No splenomegaly.  LYMPHATIC:  No cervical, supraclavicular, axillary lymphadenopathy.        RECENT LABS:        WBC   Date Value  Ref Range Status   05/06/2025 13.40 (H) 3.40 - 10.80 10*3/mm3 Final   05/05/2025 13.95 (H) 3.40 - 10.80 10*3/mm3 Final   05/04/2025 8.82 3.40 - 10.80 10*3/mm3 Final     Hemoglobin   Date Value Ref Range Status   05/06/2025 8.3 (L) 12.0 - 15.9 g/dL Final   05/06/2025 7.6 (L) 12.0 - 15.9 g/dL Final   05/05/2025 7.9 (L) 12.0 - 15.9 g/dL Final   05/04/2025 8.7 (L) 12.0 - 15.9 g/dL Final     Platelets   Date Value Ref Range Status   05/06/2025 150 140 - 450 10*3/mm3 Final   05/05/2025 175 140 - 450 10*3/mm3 Final   05/04/2025 220 140 - 450 10*3/mm3 Final       Assessment & Plan   [unfilled]      Emely Solomon     *History of pulmonary embolism  PE noted on CT chest from 7/15/2025  Patient has been on anticoagulation with Coumadin initially and subsequently on Xarelto.  Given morbid obesity and relative immobility patient remains at risk for pulmonary embolism.  Recommend ongoing anticoagulation.  Currently on heparin drip  Can be transition back to Xarelto at the time of discharge    *Anemia  Microcytic  Iron saturation and iron low however ferritin elevated and TIBC is low.  There is likely a component of iron deficiency but there is also component of ongoing infection and inflammation and renal dysfunction contributing to the anemia.  Transfuse for hemoglobin less than 7  Hemoccult negative  Patient will benefit from iron.  Hold off on IV iron for now given active ongoing infection  Will start oral iron.  She had a recent colonoscopy in 2023.  Labs not suggestive of hemolysis.  Could repeat haptoglobin    *Infective endocarditis  Recommend 6 weeks of antibiotic therapy by ID.  Continues on ceftriaxone  Bacteremia from group B streptococcus.  Medical management recommended  Not a surgical candidate.  Evaluated by cardiothoracic surgery as well as cardiology.    *Hyponatremia-management per nephrology      *STEFANIA on CKD-management per nephrology    Recommendations  Start oral iron  Continue to monitor H&H and transfuse  for hemoglobin less than 7  Continue heparin drip for now but patient should be able to go back on Xarelto prophylactic dose given persistent risk factor for recurrent DVT PE which is morbid obesity and relative immobility.

## 2025-05-06 NOTE — PROGRESS NOTES
LOS: 1 day   Patient Care Team:  Adilson Wilkerson MD as PCP - General (Family Medicine)  Florida Segovia MD as Referring Physician (Obstetrics and Gynecology)  Brennan Rogers MD as Cardiologist (Cardiology)  Caleb Garcia MD as Consulting Physician (Pulmonary Disease)  Jess Sanz, RN as Ambulatory  ([facility])    Chief Complaint: Follow-up mitral valve endocarditis, acute on chronic diastolic CHF.    Interval History: She did have more shortness of breath this morning.  She feels very fatigued.  No chest pain.    Vital Signs:  Temp:  [97.7 °F (36.5 °C)-98.6 °F (37 °C)] 97.7 °F (36.5 °C)  Heart Rate:  [70-93] 80  Resp:  [18-20] 18  BP: ()/(50-73) 100/60    Intake/Output Summary (Last 24 hours) at 5/6/2025 1428  Last data filed at 5/6/2025 1419  Gross per 24 hour   Intake --   Output 1200 ml   Net -1200 ml       Physical Exam:   General Appearance:    No acute distress, alert and oriented x4   Lungs:     Decreased breath sounds bilaterally     Heart:    Regular rhythm and normal rate. II/VI SM LLSB.    Abdomen:     Soft, nontender, nondistended.    Extremities:   Bilateral lymphedema. Chronic venous stasis changes.      Results Review:    Results from last 7 days   Lab Units 05/06/25 0618   SODIUM mmol/L 123*   POTASSIUM mmol/L 4.1   CHLORIDE mmol/L 92*   CO2 mmol/L 21.0*   BUN mg/dL 33*   CREATININE mg/dL 1.50*   GLUCOSE mg/dL 96   CALCIUM mg/dL 8.5*     Results from last 7 days   Lab Units 05/04/25  1036 05/04/25  0849   HSTROP T ng/L 81* 72*     Results from last 7 days   Lab Units 05/06/25  1153 05/06/25  0618   WBC 10*3/mm3  --  13.40*   HEMOGLOBIN g/dL 8.3* 7.6*   HEMATOCRIT % 26.1* 23.8*   PLATELETS 10*3/mm3  --  150     Results from last 7 days   Lab Units 05/06/25  0618 05/05/25 2008   INR   --  4.49*   APTT seconds 40.7* 40.5*         Results from last 7 days   Lab Units 05/06/25  0618   MAGNESIUM mg/dL 1.8           I reviewed the patient's new clinical results.         Assessment:  1.  Group B strep bacteremia  2.  Mitral valve endocarditis (large vegetation on posterior leaflet of the mitral valve by echo on 5/5/2025)  3.  Acute hypoxic respiratory failure  4.  Super morbid obesity, complicating all aspects of care  5.  Pulmonary hypertension  6.  Acute on chronic diastolic and right-sided CHF, secondary to #2, #3, and #4  7.  Chronic lower extremity lymphedema with venous stasis changes  8.  Mild to moderate aortic stenosis by echo on 5/5/2025  9.  Acute kidney injury with stage IIIa chronic kidney disease  10.  Acute on chronic hyponatremia  11.  Severe obstructive sleep apnea, on CPAP  12.  Elevated troponin, type II NSTEMI secondary to #1, #2, #3, and #9  13.  History of DVT and pulmonary embolism in 2015  14.  Anemia, likely multifactorial  15.  Elevated liver function tests  16.  Hypoalbuminemia  17.  Hepatitis C antibody positive  18.  Deconditioning    Plan:  -Nephrology is following.  Continue Lasix 40 mg p.o. daily for now.  Fluid restriction changed to 1200 cc.  Sodium to be rechecked later today.    -6-week course of antibiotic therapy per Dr. Nunez.  Appreciate Dr. Martinez seeing the patient.  Medical management only recommended.  I agree that she would be very unlikely to survive an operation.  A LYNETTE is not going to change her management here.    -Xarelto switched to heparin drip for now in case any procedures are needed.  Watch hemoglobin closely.    Gamaliel Lawton MD  05/06/25  14:28 EDT

## 2025-05-06 NOTE — PROGRESS NOTES
"[facility] PULMONARY CARE         Dr Esteban Sayied   LOS: 1 day   Patient Care Team:  Adilson Wilkerson MD as PCP - General (Family Medicine)  Florida Segovia MD as Referring Physician (Obstetrics and Gynecology)  Brennan Rogers MD as Cardiologist (Cardiology)  Caleb Garcia MD as Consulting Physician (Pulmonary Disease)  Jess Sanz, RN as Ambulatory  ([facility])    Chief Complaint: Acute respiratory failure with mitral valve endocarditis with vegetation group B strep bacteremia pulmonary hypertension other issues as listed below    Interval History: Events noted chart reviewed.  Patient currently on heparin drip ongoing.  On 3 L oxygen nasal cannula..  Appears to be weak and tired looking.  REVIEW OF SYSTEMS:   CARDIOVASCULAR: No chest pain, chest pressure or chest discomfort. No palpitations or edema.   RESPIRATORY: Short of breath with activity  GASTROINTESTINAL: No anorexia, nausea, vomiting or diarrhea. No abdominal pain or blood.   HEMATOLOGIC: No bleeding or bruising.     Ventilator/Non-Invasive Ventilation Settings (From admission, onward)      None              Vital Signs  Temp:  [98 °F (36.7 °C)-98.6 °F (37 °C)] 98 °F (36.7 °C)  Heart Rate:  [70-93] 79  Resp:  [20] 20  BP: ()/(50-73) 112/73    Intake/Output Summary (Last 24 hours) at 5/6/2025 0953  Last data filed at 5/6/2025 0521  Gross per 24 hour   Intake --   Output 300 ml   Net -300 ml     Flowsheet Rows      Flowsheet Row First Filed Value   Admission Height 160 cm (63\") Documented at 05/04/2025 0713   Admission Weight 170 kg (375 lb) Documented at 05/04/2025 0713                  Physical Exam:  Patient is examined using the personal protective equipment as per guidelines from infection control for this particular patient as enacted.  Hand hygiene was performed before and after patient interaction.   General Appearance:    Alert, cooperative, in no acute distress.  Following simple commands  ENT " Mallampati between 3 and 4 no nasal congestion  Neck midline trachea, no thyromegaly   Lungs:   Diminished breath sounds with rhonchi in the bases    Heart:    Regular rhythm and normal rate, normal S1 and S2, no            murmur, no gallop, no rub, no click   Chest Wall:    No abnormalities observed   Abdomen:     Normal bowel sounds, no masses, no organomegaly, soft        nontender, nondistended, no guarding, no rebound                tenderness   Extremities:   Moves all extremities well, 1+ edema, no cyanosis, no             redness  CNS no focal neurological deficits normal sensory exam  Skin no rashes no nodules  Musculoskeletal no cyanosis no clubbing normal range of motion     Results Review:        Results from last 7 days   Lab Units 05/06/25  0618 05/05/25  0728 05/04/25  1744 05/04/25  0849   SODIUM mmol/L 123* 125*  --  130*   POTASSIUM mmol/L 4.1 3.9 3.8 3.3*   CHLORIDE mmol/L 92* 92*  --  95*   CO2 mmol/L 21.0* 20.5*  --  21.2*   BUN mg/dL 33* 31*  --  40*   CREATININE mg/dL 1.50* 1.49*  --  1.86*   GLUCOSE mg/dL 96 102*  --  104*   CALCIUM mg/dL 8.5* 8.8  --  8.9     Results from last 7 days   Lab Units 05/04/25  1036 05/04/25  0849   HSTROP T ng/L 81* 72*     Results from last 7 days   Lab Units 05/06/25  0618 05/05/25  0728 05/04/25  0849   WBC 10*3/mm3 13.40* 13.95* 8.82   HEMOGLOBIN g/dL 7.6* 7.9* 8.7*   HEMATOCRIT % 23.8* 24.1* 26.9*   PLATELETS 10*3/mm3 150 175 220     Results from last 7 days   Lab Units 05/06/25 0618 05/05/25 2008   INR   --  4.49*   APTT seconds 40.7* 40.5*         Results from last 7 days   Lab Units 05/06/25 0618   MAGNESIUM mg/dL 1.8               I reviewed the patient's new clinical results.  I personally viewed and interpreted the patient's chest x-ray.        Medication Review:   cefTRIAXone, 2,000 mg, Intravenous, Q12H  famotidine, 40 mg, Oral, Daily  guaiFENesin, 1,200 mg, Oral, Q12H  hydrocortisone-bacitracin-zinc oxide-nystatin, 1 Application, Topical,  Q12H  ipratropium-albuterol, 3 mL, Nebulization, 4x Daily - RT  Lidocaine, 1 patch, Transdermal, Q24H  metoprolol succinate XL, 25 mg, Oral, Daily  [Held by provider] rivaroxaban, 20 mg, Oral, Daily  senna-docusate sodium, 2 tablet, Oral, BID  sodium chloride, 10 mL, Intravenous, Q12H  Urea, 15 g, Oral, BID        heparin, 5.92 Units/kg/hr, Last Rate: 8.92 Units/kg/hr (05/06/25 0820)  Pharmacy Consult - Pharmacy to dose,         ASSESSMENT:   Acute hypoxic respiratory failure  Mitral valve endocarditis  Group B strep bacteremia  Pulmonary hypertension  Heart failure with preserved ejection fraction  Fall with back pain  Acute kidney injury  Hyponatremia  Obstructive sleep apnea on noninvasive ventilation  Elevated troponin and BNP  Hyperglycemia  Lower extremity lymphedema  History of DVT/PE on Xarelto  Super morbid obesity    PLAN:  Events noted chart reviewed  Now with group B bacteremia with concerns for mitral valve endocarditis  Wean down oxygen maintain sats above 90%.  Pulmonary hypertension severe noted on repeat echo.   Now in the setting of mitral valve endocarditis and bacteremia doubt right heart cath can be done at this time.  Clinically appears to be somewhat volume overloaded.  CT chest noted with left lower lobe atelectasis versus infiltrate.  Patient on adequate antibiotics to cover for pneumonia.  Home CPAP to be used at bedside  Mobilize ambulate        Eulalia Delaney MD  05/06/25  09:53 EDT

## 2025-05-06 NOTE — THERAPY EVALUATION
Patient Name: Emely Solomon  : 1960    MRN: [id number]                              Today's Date: 2025       Admit Date: 2025    Visit Dx:     ICD-10-CM ICD-9-CM   1. Hypoxia  R09.02 799.02   2. Contusion of upper back, unspecified laterality, initial encounter  S20.229A 922.31   3. BMI 60.0-69.9, adult  Z68.44 V85.44   4. Generalized weakness  R53.1 780.79   5. Renal insufficiency  N28.9 593.9   6. Anemia, unspecified type  D64.9 285.9   7. Fall, initial encounter  W19.XXXA E888.9   8. Chronic anticoagulation  Z79.01 V58.61     Patient Active Problem List   Diagnosis    Chronic diastolic CHF (congestive heart failure)    PFO (patent foramen ovale)    Paradoxical embolism    Essential hypertension    Pulmonary hypertension    Upper back pain    Acute kidney failure    Bacteremia due to Gram-positive bacteria    Obstructive sleep apnea    Class 3 severe obesity due to excess calories with serious comorbidity and body mass index (BMI) of 60.0 to 69.9 in adult    History of DVT (deep vein thrombosis)    Lymphedema of lower extremity, unspecified laterality    Anemia, chronic disease    Chronic renal failure (CRF), stage 3 (moderate)    Iron deficiency    Encounter for screening for malignant neoplasm of colon    Post-menopausal bleeding    Thickened endometrium    Generalized muscle weakness    Right bundle branch block (RBBB) with left anterior fascicular block (LAFB)    PVD (peripheral vascular disease) with claudication    Iliac vein stenosis, right    Venous stasis ulcer of right calf limited to breakdown of skin without varicose veins    Chronic venous hypertension with ulcer and inflammation involving right side    Venous stasis ulcer of ankle with fat layer exposed    Hypoxia    Fall    Acute wheezy bronchitis    Elevated troponin    Hyperglycemia    History of pulmonary embolism    Morbid obesity    Hyponatremia    Bacterial endocarditis     Past Medical History:   Diagnosis Date    Arthritis      Atheroembolism of other site 04/14/2016    Cellulitis     11/2017, with Group B Strep bacteremia and sepsis    Chronic deep vein thrombosis (DVT) of left popliteal vein 12/17/2015 02/04/2016, 04/14/2016    Chronic diastolic congestive heart failure     COVID-19 virus infection 11/2020    Deep venous thrombosis     Exercise involving walking     Heart murmur     Hypertension     Hypo-osmolality and hyponatremia 09/29/2020    Lipoedema     Lymphedema     other    Morbid obesity     Paradoxical embolism     to the LLE due to DVT/PE and PFO    PFO (patent foramen ovale)     Postthrombotic syndrome of left lower extremity without complications 12/17/2015    postphletibis    Pulmonary embolism 04/14/2016    Pulmonary hypertension     multifactorial (dCHF, obesity/ARIEL, hx PE), mild by echo 1/2016    Right bundle branch block (RBBB) with left anterior fascicular block (LAFB)     Sepsis 09/18/2020    Sleep apnea      Past Surgical History:   Procedure Laterality Date    ARTERIOGRAM  07/2015    BRONCHOSCOPY N/A 07/21/2017    Procedure: BRONCHOSCOPY with wash;  Surgeon: Caleb Garcia MD;  Location: [facility] ENDOSCOPY;  Service:     COLONOSCOPY      COLONOSCOPY N/A 01/26/2023    Procedure: COLONOSCOPY to CECUM AND TERM ILEUM;  Surgeon: Jj Dean MD;  Location: [facility] ENDOSCOPY;  Service: Gastroenterology;  Laterality: N/A;  PRE OP -screening  POST OP -  NORMAL    D & C HYSTEROSCOPY N/A 03/08/2022    Procedure: DILATATION AND CURETTAGE with hysteroscopy;  Surgeon: Kaylah Land DO;  Location: [facility] MAIN OR;  Service: Gynecology Oncology;  Laterality: N/A;    DILATATION AND CURETTAGE  04/11/2011    OTHER SURGICAL HISTORY  09/2015    IVC filter    OTHER SURGICAL HISTORY      left LE revascularization    OTHER SURGICAL HISTORY      ALESSIA and LEV scan    THROMBECTOMY  07/2015      General Information       Row Name 05/06/25 0990          Physical Therapy Time and Intention    Document Type evaluation  Pt. admitted  after a fall at home, hitting her upper back;  Also diagnosed with Hypoxia  -MS     Mode of Treatment physical therapy;individual therapy  -MS       Row Name 05/06/25 0917          General Information    Patient Profile Reviewed yes  -MS     Prior Level of Function independent:  Per pt. report she was independent with functional mobility prior to admission and used a Rwx for ambulation.  -MS     Existing Precautions/Restrictions fall;oxygen therapy device and L/min   -MS     Barriers to Rehab physical barrier  -MS       Row Name 05/06/25 0994          Cognition    Orientation Status (Cognition) oriented x 3  -MS               User Key  (r) = Recorded By, (t) = Taken By, (c) = Cosigned By      Initials Name Provider Type    Juan Nolan AYAAN, PT Physical Therapist                   Mobility       Row Name 05/06/25 0986          Bed Mobility    Bed Mobility supine-sit;sit-supine  -MS     Supine-Sit [place] (Bed Mobility) maximum assist (25% patient effort);dependent (less than 25% patient effort);2 person assist  -MS     Sit-Supine [place] (Bed Mobility) maximum assist (25% patient effort);dependent (less than 25% patient effort);2 person assist  -MS     Assistive Device (Bed Mobility) bed rails  -MS     Comment, (Bed Mobility) Once sitting EOB, pt. had difficulty at times maintaining an upright sitting position due to back pain.  Slight lean to her Left side with verbal/tactile cueing to try and correct.  Pt.'s mattress also an issue as pt. started to slide forward while sitting EOB.  Unable to attempt sit <-> stand transfers at this time due to the previous reasons as well as overall weakness.   -MS               User Key  (r) = Recorded By, (t) = Taken By, (c) = Cosigned By      Initials Name Provider Type    MS Weeks Juan KUO, PT Physical Therapist                   Obj/Interventions       Row Name 05/06/25 0961          Range of Motion Comprehensive    Comment, General Range of Motion B Shld (Imp.  75%);  Otherwise BUE's (WFL's);  Unable to assess BLE's due to positioning on EOB and body habitus  -MS       Row Name 05/06/25 0911          Strength Comprehensive (MMT)    Comment, General Manual Muscle Testing (MMT) Assessment B Shld (2+/5);  Otherwise BUE's (3/5); Unable to assess BLE's  -MS               User Key  (r) = Recorded By, (t) = Taken By, (c) = Cosigned By      Initials Name Provider Type    Juan Nolan AYAAN, PT Physical Therapist                   Goals/Plan       Row Name 05/06/25 0999          Bed Mobility Goal 1 (PT)    Activity/Assistive Device (Bed Mobility Goal 1, PT) bed mobility activities, all  -MS     [place] Level/Cues Needed (Bed Mobility Goal 1, PT) moderate assist (50-74% patient effort)  -MS     Time Frame (Bed Mobility Goal 1, PT) long term goal (LTG);1 week  -MS       Row Name 05/06/25 0968          Transfer Goal 1 (PT)    Activity/Assistive Device (Transfer Goal 1, PT) transfers, all;sit-to-stand/stand-to-sit;bed-to-chair/chair-to-bed;walker, rolling  -MS     [place] Level/Cues Needed (Transfer Goal 1, PT) moderate assist (50-74% patient effort)  -MS     Time Frame (Transfer Goal 1, PT) long term goal (LTG);1 week  -MS       Row Name 05/06/25 0996          Gait Training Goal 1 (PT)    Activity/Assistive Device (Gait Training Goal 1, PT) gait (walking locomotion);walker, rolling  -MS     [place] Level (Gait Training Goal 1, PT) moderate assist (50-74% patient effort)  -MS     Distance (Gait Training Goal 1, PT) 20 feet  -MS     Time Frame (Gait Training Goal 1, PT) long term goal (LTG);1 week  -MS       Row Name 05/06/25 0900          Therapy Assessment/Plan (PT)    Planned Therapy Interventions (PT) balance training;bed mobility training;gait training;home exercise program;patient/family education;postural re-education;transfer training;strengthening;ROM (range of motion)  -MS               User Key  (r) = Recorded By, (t) = Taken By, (c) = Cosigned By       Initials Name Provider Type    Juan Nolan AYAAN, PT Physical Therapist                   Clinical Impression       Row Name 05/06/25 0942          Pain    Pretreatment Pain Rating 4/10  -MS     Posttreatment Pain Rating 4/10  -MS     Pain Location back  -MS     Pain Side/Orientation upper  -MS     Pain Management Interventions nursing notified;positioning techniques utilized  -MS     Pre/Posttreatment Pain Comment Pain between her upper shoulder blades (mid/lower traps)  -MS       Row Name 05/06/25 0942          Plan of Care Review    Plan of Care Reviewed With patient  -MS       Row Name 05/06/25 0942          Therapy Assessment/Plan (PT)    Rehab Potential (PT) good  -MS     Criteria for Skilled Interventions Met (PT) skilled treatment is necessary  -MS     Therapy Frequency (PT) 6 times/wk  -MS       Row Name 05/06/25 0942          Positioning and Restraints    Pre-Treatment Position in bed  -MS     Post Treatment Position other  -MS     Other Position with other staff  Once returning pt. back to bed supine, assisted nursing staff in sheet transfer of pt. over to a transport bed to go down for testing.  -MS               User Key  (r) = Recorded By, (t) = Taken By, (c) = Cosigned By      Initials Name Provider Type    Juan Nolan AYAAN, PT Physical Therapist                   Outcome Measures       Row Name 05/06/25 0944          How much help from another person do you currently need...    Turning from your back to your side while in flat bed without using bedrails? 2  -MS     Moving from lying on back to sitting on the side of a flat bed without bedrails? 2  -MS     Moving to and from a bed to a chair (including a wheelchair)? 1  -MS     Standing up from a chair using your arms (e.g., wheelchair, bedside chair)? 1  -MS     Climbing 3-5 steps with a railing? 1  -MS     To walk in hospital room? 1  -MS     AM-PAC 6 Clicks Score (PT) 8  -MS     Highest Level of Mobility Goal 3 --> Sit at edge of bed  -MS        Row Name 05/06/25 0944          Functional Assessment    Outcome Measure Options AM-PAC 6 Clicks Basic Mobility (PT)  -MS               User Key  (r) = Recorded By, (t) = Taken By, (c) = Cosigned By      Initials Name Provider Type    MS Juan Weeks, PT Physical Therapist                                 Physical Therapy Education       Title: PT OT SLP Therapies (In Progress)       Topic: Physical Therapy (In Progress)       Point: Mobility training (Done)       Learning Progress Summary            Patient Acceptance, E,D, VU,NR by MS at 5/6/2025 0944                      Point: Home exercise program (Not Started)       Learner Progress:  Not documented in this visit.              Point: Body mechanics (Done)       Learning Progress Summary            Patient Acceptance, E,D, VU,NR by MS at 5/6/2025 0944                      Point: Precautions (Done)       Learning Progress Summary            Patient Acceptance, E,D, VU,NR by MS at 5/6/2025 0944                                      User Key       Initials Effective Dates Name Provider Type Discipline    MS 06/16/21 -  Juan Weeks PT Physical Therapist PT                  PT Recommendation and Plan  Planned Therapy Interventions (PT): balance training, bed mobility training, gait training, home exercise program, patient/family education, postural re-education, transfer training, strengthening, ROM (range of motion)  Outcome Evaluation: Pt. is a 64 year old Female admitted to the hospital after a fall at home striking her upper back (also diagnosed with Hypoxia once admitted).  Pt. reports that prior to her fall she was independent with functional mobility and used a Rwx for ambulation.  Pt. currently presents with decreased strength, decreased balance, decreased ROM, and decreased tolerance to functional activity.  This AM, pt. requires Max-Dep. assist x 2 for bed mobility.  Once sitting EOB, pt. has a difficult time in maintaining an upright  sitting position (slight Left lean) due to overall weakness, fatigue, and back pain.  Pt. also has issues sitting on mattress (slides forward easily while sitting).  Unable to attempt sit <-> stand transfers this date given her overall weakness and difficulty maintaining an upright sitting position.  Pt. presents with significant decreased in B shld ROM due to back pain from fall (pt. reports normal shld ROM prior to fall).  Pt. will benefit from skilled inpt. P.T. to address her functional deficits and to assist pt. in regaining her maximum level of independence with functional mobility.  Given her current functional status, P.T. rec.'s SNF placement upon discharge from hospital.     Time Calculation:         PT Charges       Row Name 05/06/25 0951             Time Calculation    Start Time 0915  -MS      Stop Time 0928  -MS      Time Calculation (min) 13 min  -MS      PT Received On 05/06/25  -MS      PT - Next Appointment 05/07/25  -MS      PT Goal Re-Cert Due Date 05/13/25  -MS         Time Calculation- PT    Total Timed Code Minutes- PT 13 minute(s)  -MS                User Key  (r) = Recorded By, (t) = Taken By, (c) = Cosigned By      Initials Name Provider Type    Juan Nolan, PT Physical Therapist                  Therapy Charges for Today       Code Description Service Date Service Provider Modifiers Qty    13049442037 HC PT EVAL MOD COMPLEXITY 2 5/6/2025 Juan Weeks, PT GP 1    42541069397 HC PT THERAPEUTIC ACT EA 15 MIN 5/6/2025 Juan Weeks, PT GP 1    28305399513 HC PT THER SUPP EA 15 MIN 5/6/2025 Juan Weeks, PT GP 1            PT G-Codes  Outcome Measure Options: AM-PAC 6 Clicks Basic Mobility (PT)  AM-PAC 6 Clicks Score (PT): 8  PT Discharge Summary  Anticipated Discharge Disposition (PT): skilled nursing facility    Juan Weeks PT  5/6/2025

## 2025-05-06 NOTE — PROGRESS NOTES
Name: Emely Solomon ADMIT: 2025   : 1960  PCP: Adilson Wilkerson MD    MRN: [id number] LOS: 1 days   AGE/SEX: 64 y.o. female  ROOM: [facility]     Subjective   Subjective   Patient appears morbidly obese, generally weak, relatively comfortable, no apparent distress--somewhat improved today.  Sister at bedside.    Denies signs of bleeding, chest pain, or trouble breathing.       Objective   Objective   Vital Signs  Temp:  [97.7 °F (36.5 °C)-98.6 °F (37 °C)] 97.7 °F (36.5 °C)  Heart Rate:  [70-93] 80  Resp:  [18-20] 18  BP: ()/(50-73) 100/60  SpO2:  [87 %-100 %] 99 %  on  Flow (L/min) (Oxygen Therapy):  [3-4] 3;   Device (Oxygen Therapy): nasal cannula  Body mass index is 78.28 kg/m².    Physical Exam  Constitutional:       General: She is not in acute distress.     Appearance: She is obese. She is not toxic-appearing.   Cardiovascular:      Rate and Rhythm: Normal rate.      Heart sounds: Normal heart sounds.   Pulmonary:      Effort: Pulmonary effort is normal.      Comments: Diminished on expiration anteriorly  Abdominal:      General: Bowel sounds are normal.      Palpations: Abdomen is soft.   Musculoskeletal:      Right lower leg: Edema present.      Left lower leg: Edema present.   Skin:     General: Skin is warm and dry.   Neurological:      Mental Status: She is alert and oriented to person, place, and time.      Cranial Nerves: No cranial nerve deficit.     Physical exam as per above unchanged from prior date    Results Review     I reviewed the patient's new clinical results.  Results from last 7 days   Lab Units 25  1153 25  0618 25  0728 25  0849 25  0946   WBC 10*3/mm3  --  13.40* 13.95* 8.82 13.54*   HEMOGLOBIN g/dL 8.3* 7.6* 7.9* 8.7* 8.7*   PLATELETS 10*3/mm3  --  150 175 220 263     Results from last 7 days   Lab Units 25  0618 25  0728 25  1744 25  0849 25  0946   SODIUM mmol/L 123* 125*  --  130* 131*   POTASSIUM  mmol/L 4.1 3.9 3.8 3.3* 4.9   CHLORIDE mmol/L 92* 92*  --  95* 96*   CO2 mmol/L 21.0* 20.5*  --  21.2* 22.3   BUN mg/dL 33* 31*  --  40* 35*   CREATININE mg/dL 1.50* 1.49*  --  1.86* 2.03*   GLUCOSE mg/dL 96 102*  --  104* 83   EGFR mL/min/1.73 38.5* 38.8*  --  29.8*  --    EGFR RESULT mL/min/1.73  --   --   --   --  26.8*     Results from last 7 days   Lab Units 05/06/25  0618 05/05/25  0728 05/04/25  0849 05/01/25  0946   ALBUMIN g/dL 2.7* 2.9* 3.2* 3.7   BILIRUBIN mg/dL 0.5 1.1 1.1 1.1   ALK PHOS U/L 301* 261* 203* 119*   AST (SGOT) U/L 110* 160* 38* 19   ALT (SGPT) U/L 38* 43* 15 11     Results from last 7 days   Lab Units 05/06/25  0618 05/05/25  0728 05/04/25  0849 05/01/25  0946   CALCIUM mg/dL 8.5* 8.8 8.9 8.7   ALBUMIN g/dL 2.7* 2.9* 3.2* 3.7   MAGNESIUM mg/dL 1.8  --   --   --    PHOSPHORUS mg/dL 2.9  --   --   --      Results from last 7 days   Lab Units 05/04/25  1744 05/04/25  0849   PROCALCITONIN ng/mL 40.80*  --    LACTATE mmol/L  --  1.5     Hemoglobin A1C   Date/Time Value Ref Range Status   05/04/2025 1744 5.40 4.80 - 5.60 % Final       US Abdomen Limited  Result Date: 5/4/2025   Negative right upper quadrant ultrasound. No acute findings.   This report was finalized on 5/4/2025 5:46 PM by Dr. Akil Perales M.D on Workstation: [id number]        I have personally reviewed all medications:  Scheduled Medications  cefTRIAXone, 2,000 mg, Intravenous, Q12H  dextromethorphan polistirex ER, 60 mg, Oral, Q12H  famotidine, 40 mg, Oral, Daily  furosemide, 40 mg, Oral, Daily  guaiFENesin, 1,200 mg, Oral, Q12H  hydrocortisone-bacitracin-zinc oxide-nystatin, 1 Application, Topical, Q12H  ipratropium-albuterol, 3 mL, Nebulization, 4x Daily - RT  Lidocaine, 1 patch, Transdermal, Q24H  metoprolol succinate XL, 25 mg, Oral, Daily  [Held by provider] rivaroxaban, 20 mg, Oral, Daily  senna-docusate sodium, 2 tablet, Oral, BID  sodium chloride, 10 mL, Intravenous, Q12H  Urea, 15 g, Oral, BID    Infusions  [Held  by provider] heparin, 5.92 Units/kg/hr, Last Rate: Stopped (05/06/25 1216)  Pharmacy Consult - Pharmacy to dose,     Diet  Diet: Cardiac, Fluid Restriction (240 mL/tray); Healthy Heart (2-3 Na+); Other (Specify mL/day) (1200); Fluid Consistency: Thin (IDDSI 0)    I have personally reviewed:  [x]  Laboratory   [x]  Microbiology   [x]  Radiology   [x]  EKG/Telemetry  [x]  Cardiology/Vascular   []  Pathology    []  Records       Assessment/Plan     Active Hospital Problems    Diagnosis  POA    **Hypoxia [R09.02]  Yes    Hyponatremia [E87.1]  Yes    Bacterial endocarditis [I33.0]  Yes    Fall [W19.XXXA]  Yes    Acute wheezy bronchitis [J20.9]  Yes    Elevated troponin [R79.89]  Yes    Hyperglycemia [R73.9]  Yes    History of pulmonary embolism [Z86.711]  Yes    Morbid obesity [E66.01]  Yes    Venous stasis ulcer of right calf limited to breakdown of skin without varicose veins [I87.2, L97.211]  Yes    Chronic renal failure (CRF), stage 3 (moderate) [N18.30]  Yes    Lymphedema of lower extremity, unspecified laterality [I89.0]  Yes    History of DVT (deep vein thrombosis) [Z86.718]  Not Applicable    Obstructive sleep apnea [G47.33]  Yes    Bacteremia due to group B Streptococcus [R78.81, B95.1]  Yes    Upper back pain [M54.9]  Yes    Acute kidney failure [N17.9]  Yes    Pulmonary hypertension [I27.20]  Yes    Essential hypertension [I10]  Yes    Chronic diastolic CHF (congestive heart failure) [I50.32]  Yes      Resolved Hospital Problems   No resolved problems to display.       64 y.o. female with recent history of mechanical fall leading to contusion of upper back with CT thoracic spine revealing no fracture or acute finding & fever, acute wheezy bronchitis, hypoxemia with history of ARIEL and pulmonary hypertension admitted with Hypoxia.    *Appreciate the assistance of all consultants.     Pulmonology following acute hypoxic respiratory failure given O2 saturation 87% on room air improved with supplemental  oxygen--wean oxygen as tolerated to maintain saturation above 90%.  Repeat echocardiogram showing severe pulmonary hypertension & bacterial endocarditis & CTS consulted & recommend medical management given no significant mitral regurgitation.  CT chest left lower lobe atelectasis versus infiltrate (on adequate antibiotic coverage).  - Respiratory PCR panel negative  - Nebulizers scheduled  - Home CPAP at bedside    Elevated troponin and proBNP with history of hypertension and chronic diastolic congestive heart failure--continue Toprol.  Diuretics per nephrology.  Cardiology following.    Bacteremia due to group B strep confirmed here on blood cultures x 2; repeat blood cultures x2.  Etiology unknown.  Infectious disease following recommend likely 6-weeks course of IV antibiotic therapy--current ceftriaxone 2 g IV every 12 hours. Pro-Aleksandr 40.  TTE mitral valve endocarditis.    Acute on chronic anemia.  Serial H&H stable & suspect in part hemodilution given suspected fluid volume overload.  No obvious signs of bleeding.  Fecal occult blood negative.  Confirmed iron deficiency here.  Would avoid iron supplement at this time.  Elevated haptoglobin yet specimen hemolyzed & most likely due to current infection.  Tick borne panel low suspicion.  Hematology consulted given chronic AC for history of DVT / PE / ? Hypercoagulopathy.      History of DVT / PE.  AC on hold for acute anemia.  Will restart heparin in the event that procedures are needed tomorrow pending hemoglobin vs hematology recommendations.    A1c 5.4.  Glucose trends acceptable.  NCS / healthy heart /fluid restricted diet.    Hyponatremia     STEFANIA  -Nephrology following suspect SIADH CKD stage 3. IVFs initially provided for bacteremia later discontinued given worsening sodium 123 & concerns for fluid volume overload.  Fluid restriction 1200 mL, start urea 15 g BID, Lasix 40 mg daily, strict I&Os, sodium later today.    Generalized weakness complicated by BMI >66  now documented BMI >78 today (unremarkable TSH 0.9).  Therapies following.    Lymphedema, BLE POA in the setting of initial documented BMI >66 now BMI >78 today (complicating & most likely contributing to all problems).  Wound RN & OT following.    Transaminitis.  ? Suspect hepatocongestion from chronic diastolic and right-sided congestive heart failure (renal managing diuretics).  Right upper quadrant ultrasound negative.  Hepatitis C antibody reactive & ID following viral load.    Xarelto (home med) switched to heparin gtt for DVT prophylaxis.  Full code. Confirmed with patient / sister  Discussed with patient and nursing staff & sister  Anticipate discharge home with HH vs SNU facility timing yet to be determined. / CCP following / patient & family previously favoring HH & now anticipate SNF which I believe is more appropriate given clinical picture  Expected Discharge Date: 5/6/2025; Expected Discharge Time:       LUCÍA Chapman  [facility]  05/06/25  14:25 EDT

## 2025-05-06 NOTE — PROGRESS NOTES
[facility] Progress Note      Patient Name: Emely Solomon  : 1960  MRN: [id number]  Primary Care Physician:  Adilson Wilkerson MD  Date of admission: 2025    Subjective     Interval History:   Follow-up acute on intermittent chronic hyponatremia, acute kidney injury on CKD 3A.  Intake and output not recorded.  Weight inconsistent.  Cardiothoracic surgery advises medical management for mitral valve endocarditis.  On ceftriaxone.  Feels more short winded today.  Urine output 900 cc so far.  I tightened her fluid restriction this morning.  Review of Systems:   As noted above    Objective     Vitals:   Temp:  [98 °F (36.7 °C)-98.6 °F (37 °C)] 98 °F (36.7 °C)  Heart Rate:  [70-93] 77  Resp:  [18-20] 18  BP: ()/(50-73) 112/73  Flow (L/min) (Oxygen Therapy):  [3-4] 3    Intake/Output Summary (Last 24 hours) at 2025 1253  Last data filed at 2025 0521  Gross per 24 hour   Intake --   Output 300 ml   Net -300 ml       Physical Exam:    General Appearance: alert, on nasal cannula oxygen 4 L.  Morbidly obese.  Mild dyspnea with conversation.  Skin: warm and dry  HEENT: oral mucosa normal, nonicteric sclera  Neck: supple, no JVD  Lungs: Bilateral rales with end expiratory wheezing.  Heart: RRR, normal S1 and S2  Abdomen: soft, nontender, nondistended. +bs.  Obese  : Pure wick  Extremities: Chronic lower extremity lymphedema.  2+.  Upper extremity edema 1+.  Neuro: normal speech and mental status     Scheduled Meds:     cefTRIAXone, 2,000 mg, Intravenous, Q12H  dextromethorphan polistirex ER, 60 mg, Oral, Q12H  famotidine, 40 mg, Oral, Daily  guaiFENesin, 1,200 mg, Oral, Q12H  hydrocortisone-bacitracin-zinc oxide-nystatin, 1 Application, Topical, Q12H  ipratropium-albuterol, 3 mL, Nebulization, 4x Daily - RT  Lidocaine, 1 patch, Transdermal, Q24H  metoprolol succinate XL, 25 mg, Oral, Daily  [Held by provider] rivaroxaban, 20 mg, Oral, Daily  senna-docusate sodium,  2 tablet, Oral, BID  sodium chloride, 10 mL, Intravenous, Q12H  Urea, 15 g, Oral, BID      IV Meds:   [Held by provider] heparin, 5.92 Units/kg/hr, Last Rate: Stopped (05/06/25 1216)  Pharmacy Consult - Pharmacy to dose,         Results Reviewed:   I have personally reviewed the results from the time of this admission to 5/6/2025 12:53 EDT     Results from last 7 days   Lab Units 05/06/25  0618 05/05/25  0728 05/04/25  1744 05/04/25  0849   SODIUM mmol/L 123* 125*  --  130*   POTASSIUM mmol/L 4.1 3.9 3.8 3.3*   CHLORIDE mmol/L 92* 92*  --  95*   CO2 mmol/L 21.0* 20.5*  --  21.2*   BUN mg/dL 33* 31*  --  40*   CREATININE mg/dL 1.50* 1.49*  --  1.86*   CALCIUM mg/dL 8.5* 8.8  --  8.9   BILIRUBIN mg/dL 0.5 1.1  --  1.1   ALK PHOS U/L 301* 261*  --  203*   ALT (SGPT) U/L 38* 43*  --  15   AST (SGOT) U/L 110* 160*  --  38*   GLUCOSE mg/dL 96 102*  --  104*       Estimated Creatinine Clearance: 65.5 mL/min (A) (by C-G formula based on SCr of 1.5 mg/dL (H)).    Results from last 7 days   Lab Units 05/06/25  0618   MAGNESIUM mg/dL 1.8   PHOSPHORUS mg/dL 2.9       Results from last 7 days   Lab Units 05/06/25  0618 05/04/25  1744   URIC ACID mg/dL 7.8* 7.9*       Results from last 7 days   Lab Units 05/06/25  1153 05/06/25  0618 05/05/25  0728 05/04/25  0849 05/01/25  0946   WBC 10*3/mm3  --  13.40* 13.95* 8.82 13.54*   HEMOGLOBIN g/dL 8.3* 7.6* 7.9* 8.7* 8.7*   PLATELETS 10*3/mm3  --  150 175 220 263       Results from last 7 days   Lab Units 05/05/25 2008   INR  4.49*       Assessment / Plan     ASSESSMENT:  Hyponatremia, acute on intermittent chronic.  TSH and cortisol normal.  Urine studies consistent with SIADH.  On fluid restriction.  Sodium dropping.  Add urea and Lasix.  Acute kidney injury on CKD 3A baseline creatinine 1.2.  Creatinine stable at 1.5.  Underlying hypertensive nephrosclerosis and cardiorenal syndrome.  Acute component due to hypotension with impaired renal autoregulation due to ARB.  3.  Strep  bacteremia with mitral valve endocarditis.  On ceftriaxone.  Cardiothoracic surgery recommends medical management.  4.  Acute on chronic heart failure preserved ejection fraction.  Looks wet by exam.  Lasix today.  Metoprolol reduced for hypotension.  5.  Left lower lobe pneumonia.  Acute hypoxic respiratory failure  6.  History of DVT and pulmonary embolism on Xarelto.  7.  Elevated transaminases.  Slowly improving.  8.  Iron deficiency anemia.  No IV iron while active infection.  9.  Hepatitis C antibody positive.  10.  Morbid obesity.  PLAN:  Urea 15 g twice daily  Lasix 40 mg daily.  3.  Very strict intake and output  4.  Tighten fluid restriction to 1200 cc  5.  Recheck sodium later today.  Thank you for involving us in the care of Emely Solomon.  Please feel free to call with any questions.    Latasha Song MD  05/06/25  12:53 EDT    [facility]  [phone]    Please note that portions of this note were completed with a voice recognition program.

## 2025-05-06 NOTE — THERAPY EVALUATION
Patient Name: Emely Solomon  : 1960    MRN: [id number]                              Today's Date: 2025       Admit Date: 2025    Visit Dx:     ICD-10-CM ICD-9-CM   1. Hypoxia  R09.02 799.02   2. Contusion of upper back, unspecified laterality, initial encounter  S20.229A 922.31   3. BMI 60.0-69.9, adult  Z68.44 V85.44   4. Generalized weakness  R53.1 780.79   5. Renal insufficiency  N28.9 593.9   6. Anemia, unspecified type  D64.9 285.9   7. Fall, initial encounter  W19.XXXA E888.9   8. Chronic anticoagulation  Z79.01 V58.61     Patient Active Problem List   Diagnosis    Chronic diastolic CHF (congestive heart failure)    PFO (patent foramen ovale)    Paradoxical embolism    Essential hypertension    Pulmonary hypertension    Upper back pain    Acute kidney failure    Bacteremia due to group B Streptococcus    Obstructive sleep apnea    Class 3 severe obesity due to excess calories with serious comorbidity and body mass index (BMI) of 60.0 to 69.9 in adult    History of DVT (deep vein thrombosis)    Lymphedema of lower extremity, unspecified laterality    Anemia, chronic disease    Chronic renal failure (CRF), stage 3 (moderate)    Iron deficiency    Encounter for screening for malignant neoplasm of colon    Post-menopausal bleeding    Thickened endometrium    Generalized muscle weakness    Right bundle branch block (RBBB) with left anterior fascicular block (LAFB)    PVD (peripheral vascular disease) with claudication    Iliac vein stenosis, right    Venous stasis ulcer of right calf limited to breakdown of skin without varicose veins    Chronic venous hypertension with ulcer and inflammation involving right side    Venous stasis ulcer of ankle with fat layer exposed    Hypoxia    Fall    Acute wheezy bronchitis    Elevated troponin    Hyperglycemia    History of pulmonary embolism    Morbid obesity    Hyponatremia    Bacterial endocarditis     Past Medical History:   Diagnosis Date    Arthritis      Atheroembolism of other site 04/14/2016    Cellulitis     11/2017, with Group B Strep bacteremia and sepsis    Chronic deep vein thrombosis (DVT) of left popliteal vein 12/17/2015 02/04/2016, 04/14/2016    Chronic diastolic congestive heart failure     COVID-19 virus infection 11/2020    Deep venous thrombosis     Exercise involving walking     Heart murmur     Hypertension     Hypo-osmolality and hyponatremia 09/29/2020    Lipoedema     Lymphedema     other    Morbid obesity     Paradoxical embolism     to the LLE due to DVT/PE and PFO    PFO (patent foramen ovale)     Postthrombotic syndrome of left lower extremity without complications 12/17/2015    postphletibis    Pulmonary embolism 04/14/2016    Pulmonary hypertension     multifactorial (dCHF, obesity/ARIEL, hx PE), mild by echo 1/2016    Right bundle branch block (RBBB) with left anterior fascicular block (LAFB)     Sepsis 09/18/2020    Sleep apnea      Past Surgical History:   Procedure Laterality Date    ARTERIOGRAM  07/2015    BRONCHOSCOPY N/A 07/21/2017    Procedure: BRONCHOSCOPY with wash;  Surgeon: Caleb Garcia MD;  Location: [facility] ENDOSCOPY;  Service:     COLONOSCOPY      COLONOSCOPY N/A 01/26/2023    Procedure: COLONOSCOPY to CECUM AND TERM ILEUM;  Surgeon: Jj Dean MD;  Location: [facility] ENDOSCOPY;  Service: Gastroenterology;  Laterality: N/A;  PRE OP -screening  POST OP -  NORMAL    D & C HYSTEROSCOPY N/A 03/08/2022    Procedure: DILATATION AND CURETTAGE with hysteroscopy;  Surgeon: Kaylah Land DO;  Location: [facility] MAIN OR;  Service: Gynecology Oncology;  Laterality: N/A;    DILATATION AND CURETTAGE  04/11/2011    OTHER SURGICAL HISTORY  09/2015    IVC filter    OTHER SURGICAL HISTORY      left LE revascularization    OTHER SURGICAL HISTORY      ALESSIA and LEV scan    THROMBECTOMY  07/2015      General Information       Row Name 05/06/25 4933          OT Time and Intention    Document Type evaluation  -PP     Mode of Treatment  individual therapy;occupational therapy  -PP     Comment tech supprt needed; could benefit from co-tx for increased safety and to maximize therapeutic benefit.  -PP       Row Name 05/06/25 1517          General Information    Patient Profile Reviewed yes  -PP     Prior Level of Function independent:  -PP     Existing Precautions/Restrictions fall;oxygen therapy device and L/min  2L currently  -PP     Barriers to Rehab physical barrier  -PP       Row Name 05/06/25 1517          Living Environment    Current Living Arrangements home  -PP     People in Home spouse  -PP       Row Name 05/06/25 1517          Cognition    Orientation Status (Cognition) oriented x 3  -PP       Row Name 05/06/25 1517          Safety Issues/Impairments Affecting Functional Mobility    Impairments Affecting Function (Mobility) balance;strength;endurance/activity tolerance;pain  -PP     Comment, Safety Issues/Impairments (Mobility) gait belt and non skid socks worn for safety  -PP               User Key  (r) = Recorded By, (t) = Taken By, (c) = Cosigned By      Initials Name Provider Type    PP Katina Wilkerson OT Occupational Therapist                     Mobility/ADL's       Row Name 05/06/25 1521          Bed Mobility    Bed Mobility rolling left;rolling right  -PP     Assistive Device (Bed Mobility) bed rails  -PP     Comment, (Bed Mobility) Max A for long sitting 3x in bed d/t need for additional person(s) for assist to increase safety. Max A for rolling during brief change with Nsg aid  -PP       Row Name 05/06/25 1521          Transfers    Comment, (Transfers) deferred at this time, not appropriate.  -PP       Row Name 05/06/25 1521          Activities of Daily Living    BADL Assessment/Intervention toileting;lower body dressing;grooming  -PP       Row Name 05/06/25 1521          Toileting Assessment/Training    [place] Level (Toileting) toileting skills;dependent (less than 25% patient effort);maximum assist (25% patient  effort)  -PP     Position (Toileting) supine  -PP     Comment, (Toileting) [place] and brief present  -PP       Row Name 05/06/25 1521          Lower Body Dressing Assessment/Training    [place] Level (Lower Body Dressing) lower body dressing skills;dependent (less than 25% patient effort)  -PP       [facility] Name 05/06/25 1521          Grooming Assessment/Training    Comment, (Grooming) UE AROM limited d/t c/o pain and weakness  -PP               User Key  (r) = Recorded By, (t) = Taken By, (c) = Cosigned By      Initials Name Provider Type    PP [place], Porshia, OT Occupational Therapist                   Obj/Interventions       Row Name 05/06/25 1525          Sensory Assessment (Somatosensory)    Sensory Assessment (Somatosensory) UE sensation intact  -PP     Sensory Assessment per pt report  -PP       [facility] Name 05/06/25 1525          Vision Assessment/Intervention    Visual Impairment/Limitations WFL  -PP       Row Name 05/06/25 1525          Range of Motion Comprehensive    General Range of Motion bilateral upper extremity ROM WFL  -PP     Comment, General Range of Motion Noé shoulder AAROM WFL but AROM impaired, otherwise BUE WFL  -PP       [facility] Name 05/06/25 1525          Strength Comprehensive (MMT)    General Manual Muscle Testing (MMT) Assessment upper extremity strength deficits identified  -PP     Comment, General Manual Muscle Testing (MMT) Assessment Gen BUE weakness noted, grossly 2+/3 out 5.  -PP       Row Name 05/06/25 1525          Motor Skills    Motor Skills functional endurance  -PP     Functional Endurance fair  -PP       Row Name 05/06/25 1525          Balance    Balance Assessment sitting static balance  -PP     Static Sitting Balance maximum assist  -PP     Position, Sitting Balance long sitting  -PP     Balance Interventions sitting  -PP               User Key  (r) = Recorded By, (t) = Taken By, (c) = Cosigned By      Initials Name Provider Type    PP [place], Porshia, OT Occupational  Therapist                   Goals/Plan       Row Name 05/06/25 1516          Bed Mobility Goal 1 (OT)    Activity/Assistive Device (Bed Mobility Goal 1, OT) bed mobility activities, all  -PP     [place] Level/Cues Needed (Bed Mobility Goal 1, OT) contact guard required  -PP     Time Frame (Bed Mobility Goal 1, OT) 2 weeks  -PP     Progress/Outcomes (Bed Mobility Goal 1, OT) goal ongoing  -PP       Row Name 05/06/25 1516          Transfer Goal 1 (OT)    Activity/Assistive Device (Transfer Goal 1, OT) transfers, all;sit-to-stand/stand-to-sit;toilet  -PP     [place] Level/Cues Needed (Transfer Goal 1, OT) contact guard required  -PP     Time Frame (Transfer Goal 1, OT) short term goal (STG);2 weeks  -PP     Progress/Outcome (Transfer Goal 1, OT) goal ongoing  -PP       Row Name 05/06/25 1516          Dressing Goal 1 (OT)    Activity/Device (Dressing Goal 1, OT) dressing skills, all  -PP     [place]/Cues Needed (Dressing Goal 1, OT) contact guard required  -PP     Time Frame (Dressing Goal 1, OT) short term goal (STG);2 weeks  -PP     Progress/Outcome (Dressing Goal 1, OT) goal ongoing  -PP       Row Name 05/06/25 1516          Toileting Goal 1 (OT)    Activity/Device (Toileting Goal 1, OT) commode;perform perineal hygiene;adjust/manage clothing;grab bar/safety frame  -PP     [place] Level/Cues Needed (Toileting Goal 1, OT) contact guard required  -PP     Time Frame (Toileting Goal 1, OT) short term goal (STG);2 weeks  -PP     Progress/Outcome (Toileting Goal 1, OT) goal ongoing  -PP       Row Name 05/06/25 1516          Problem Specific Goal 1 (OT)    Problem Specific Goal 1 (OT) Pt will improve BUE strength and ROM to increase (I) w/ UE ADLs and xfers.  -PP     Time Frame (Problem Specific Goal 1, OT) 2 weeks  -PP     Progress/Outcome (Problem Specific Goal 1, OT) goal ongoing  -PP       Row Name 05/06/25 1516          Therapy Assessment/Plan (OT)    Planned Therapy Interventions (OT) activity  tolerance training;BADL retraining;functional balance retraining;occupation/activity based interventions;patient/caregiver education/training;strengthening exercise;transfer/mobility retraining;ROM/therapeutic exercise  -PP               User Key  (r) = Recorded By, (t) = Taken By, (c) = Cosigned By      Initials Name Provider Type    PP Katina Wilkerson OT Occupational Therapist                   Clinical Impression       Row Name 05/06/25 1529          Pain Assessment    Pain Location back  -PP     Pain Side/Orientation upper  -PP     Pain Management Interventions nursing notified;positioning techniques utilized  -PP     Response to Pain Interventions activity participation with increased pain  -PP     Pre/Posttreatment Pain Comment Pt reports having severe pain in her upper back  -PP       Row Name 05/06/25 1529          Plan of Care Review    Plan of Care Reviewed With patient  -PP     Progress no change  -PP     Outcome Evaluation Patient is a 64 y.o. female admitted from home by EMS d/t c/o acute fall, dx with hypoxia. Pmhx includes CHF, DVT, morbid obesity, hypertension, lymphedema, chronic anticoagulation, and sleep apnea. At baseline, pt reports living with  3STE and being (I) for ADLs and mob using rwx. Today, pt presents to OT Eval with deficits in balance, endurance/ act tolerance, back pain and strength. She only tolerated long sitting briefly 3x with Max A. Max A for rolling during brief change with Total assist from PCA for clean up. BUE WFL but limited shoulder AROM noted. Rec SNF at d/c. IPOT will continue to monitor to maximize patient safety and (I) w/ ADLs/ xfers and address fxnl deficits.  -PP       Row Name 05/06/25 1528          Therapy Assessment/Plan (OT)    Rehab Potential (OT) good  -PP     Criteria for Skilled Therapeutic Interventions Met (OT) yes;skilled treatment is necessary  -PP     Therapy Frequency (OT) 3 times/wk  -PP       Row Name 05/06/25 1525          Therapy Plan  Review/Discharge Plan (OT)    Anticipated Discharge Disposition (OT) skilled nursing facility  -PP       Row Name 05/06/25 1529          Vital Signs    O2 Delivery Pre Treatment room air  -PP     Pre Patient Position Supine  -PP     Intra Patient Position Sitting  -PP     Post Patient Position Supine  -PP       Row Name 05/06/25 1529          Positioning and Restraints    Pre-Treatment Position in bed  -PP     Post Treatment Position bed  -PP     In Bed notified nsg;sitting;call light within reach;encouraged to call for assist;exit alarm on  -PP               User Key  (r) = Recorded By, (t) = Taken By, (c) = Cosigned By      Initials Name Provider Type    PP Katina Wilkerson, FABY Occupational Therapist                   Outcome Measures       Row Name 05/06/25 1515          How much help from another is currently needed...    Putting on and taking off regular lower body clothing? 1  -PP     Bathing (including washing, rinsing, and drying) 1  -PP     Toileting (which includes using toilet bed pan or urinal) 1  -PP     Putting on and taking off regular upper body clothing 2  -PP     Taking care of personal grooming (such as brushing teeth) 3  -PP     Eating meals 3  -PP     AM-PAC 6 Clicks Score (OT) 11  -PP       Row Name 05/06/25 0944          How much help from another person do you currently need...    Turning from your back to your side while in flat bed without using bedrails? 2  -MS     Moving from lying on back to sitting on the side of a flat bed without bedrails? 2  -MS     Moving to and from a bed to a chair (including a wheelchair)? 1  -MS     Standing up from a chair using your arms (e.g., wheelchair, bedside chair)? 1  -MS     Climbing 3-5 steps with a railing? 1  -MS     To walk in hospital room? 1  -MS     AM-PAC 6 Clicks Score (PT) 8  -MS     Highest Level of Mobility Goal 3 --> Sit at edge of bed  -MS       Row Name 05/06/25 1515 05/06/25 0964       Functional Assessment    Outcome Measure Options  AM-PAC 6 Clicks Daily Activity (OT)  -PP AM-PAC 6 Clicks Basic Mobility (PT)  -MS              User Key  (r) = Recorded By, (t) = Taken By, (c) = Cosigned By      Initials Name Provider Type    Juan Nolan, PT Physical Therapist    Katina Mi OT Occupational Therapist                    Occupational Therapy Education       Title: PT OT SLP Therapies (In Progress)       Topic: Occupational Therapy (In Progress)       Point: ADL training (Done)       Learning Progress Summary            Patient Acceptance, E, VU by PP at 5/6/2025 1516    Comment: Pt Ed on OT role and dc planning.                                      User Key       Initials Effective Dates Name Provider Type Discipline    PP 06/09/23 -  Katina Wilkerson OT Occupational Therapist OT                  OT Recommendation and Plan  Planned Therapy Interventions (OT): activity tolerance training, BADL retraining, functional balance retraining, occupation/activity based interventions, patient/caregiver education/training, strengthening exercise, transfer/mobility retraining, ROM/therapeutic exercise  Therapy Frequency (OT): 3 times/wk  Plan of Care Review  Plan of Care Reviewed With: patient  Progress: no change  Outcome Evaluation: Patient is a 64 y.o. female admitted from home by EMS d/t c/o acute fall, dx with hypoxia. Pmhx includes CHF, DVT, morbid obesity, hypertension, lymphedema, chronic anticoagulation, and sleep apnea. At baseline, pt reports living with  3STE and being (I) for ADLs and mob using rwx. Today, pt presents to OT Eval with deficits in balance, endurance/ act tolerance, back pain and strength. She only tolerated long sitting briefly 3x with Max A. Max A for rolling during brief change with Total assist from PCA for clean up. BUE WFL but limited shoulder AROM noted. Rec SNF at d/c. IPOT will continue to monitor to maximize patient safety and (I) w/ ADLs/ xfers and address fxnl deficits.     Time Calculation:    Evaluation Complexity (OT)  Review Occupational Profile/Medical/Therapy History Complexity: expanded/moderate complexity  Assessment, Occupational Performance/Identification of Deficit Complexity: 3-5 performance deficits  Clinical Decision Making Complexity (OT): detailed assessment/moderate complexity  Overall Complexity of Evaluation (OT): moderate complexity     Time Calculation- OT       Row Name 05/06/25 1509             Time Calculation- OT    OT Start Time 1408  -PP      OT Stop Time 1425  -PP      OT Time Calculation (min) 17 min  -PP      Total Timed Code Minutes- OT 8 minute(s)  -PP      OT Received On 05/06/25  -PP      OT - Next Appointment 05/08/25  -PP      OT Goal Re-Cert Due Date 05/20/25  -PP         Timed Charges    71315 - OT Therapeutic Activity Minutes 8  -PP         Untimed Charges    OT Eval/Re-eval Minutes 9  -PP         Total Minutes    Timed Charges Total Minutes 8  -PP      Untimed Charges Total Minutes 9  -PP       Total Minutes 17  -PP                User Key  (r) = Recorded By, (t) = Taken By, (c) = Cosigned By      Initials Name Provider Type    PP Rhea, Porshia, OT Occupational Therapist                  Therapy Charges for Today       Code Description Service Date Service Provider Modifiers Qty    32243481567  OT THERAPEUTIC ACT EA 15 MIN 5/6/2025 [place], Porshia, OT GO 1    22099211910 HC OT EVAL MOD COMPLEXITY 3 5/6/2025 [place], Porshia, OT GO 1                 Porshia [place], OT  5/6/2025

## 2025-05-06 NOTE — CASE MANAGEMENT/SOCIAL WORK
Continued Stay Note  [facility]     Patient Name: Emely Solomon  MRN: [id number]  Today's Date: 5/6/2025    Admit Date: 5/4/2025    Plan: STR pending acceptance   Discharge Plan       Row Name 05/06/25 1558       Plan    Plan STR pending acceptance    Patient/Family in Agreement with Plan yes    Plan Comments Pt family provided Advanced directive, uploaded to HIM, original returned to family  member. Spoke with pt and family about concerns for returning home with dependent assistance per PT notes. Provided STR pt choice list, they will review and give choices tomorrow. Pt will not need precert and will meet [facility] guidelines on 5/8. Pt will likely need transport as well. CCP will follow - Davida SAMS                   Discharge Codes    No documentation.                 Expected Discharge Date and Time       Expected Discharge Date Expected Discharge Time    May 8, 2025               Davida Curtis RN

## 2025-05-06 NOTE — PLAN OF CARE
Goal Outcome Evaluation:  Plan of Care Reviewed With: patient           Outcome Evaluation: Pt. is a 64 year old Female admitted to the hospital after a fall at home striking her upper back (also diagnosed with Hypoxia once admitted).  Pt. reports that prior to her fall she was independent with functional mobility and used a Rwx for ambulation.  Pt. currently presents with decreased strength, decreased balance, decreased ROM, and decreased tolerance to functional activity.  This AM, pt. requires Max-Dep. assist x 2 for bed mobility.  Once sitting EOB, pt. has a difficult time in maintaining an upright sitting position (slight Left lean) due to overall weakness, fatigue, and back pain.  Pt. also has issues sitting on mattress (slides forward easily while sitting).  Unable to attempt sit <-> stand transfers this date given her overall weakness and difficulty maintaining an upright sitting position.  Pt. presents with significant decreased in B shld ROM due to back pain from fall (pt. reports normal shld ROM prior to fall).  Pt. will benefit from skilled inpt. P.T. to address her functional deficits and to assist pt. in regaining her maximum level of independence with functional mobility.  Given her current functional status, P.T. rec.'s SNF placement upon discharge from hospital.    Anticipated Discharge Disposition (PT): skilled nursing facility

## 2025-05-06 NOTE — PLAN OF CARE
Goal Outcome Evaluation:  Plan of Care Reviewed With: patient        Progress: no change  Outcome Evaluation: Patient is a 64 y.o. female admitted from home by EMS d/t c/o acute fall, dx with hypoxia. Pmhx includes CHF, DVT, morbid obesity, hypertension, lymphedema, chronic anticoagulation, and sleep apnea. At baseline, pt reports living with  [facility] and being (I) for ADLs and mob using rwx. Today, pt presents to OT Eval with deficits in balance, endurance/ act tolerance, back pain and strength. She only tolerated long sitting briefly 3x with Max A. Max A for rolling during brief change with Total assist from PCA for clean up. BUE WFL but limited shoulder AROM noted. Rec SNF at d/c. IPOT will continue to monitor to maximize patient safety and (I) w/ ADLs/ xfers and address fxnl deficits.    Anticipated Discharge Disposition (OT): skilled nursing facility

## 2025-05-06 NOTE — PROGRESS NOTES
LOS: 1 day     Chief Complaint: Bacteremia and endocarditis    Interval History: Afebrile, states she feels tired and a little achy.  Tolerating antibiotics without vomiting diarrhea or rash    Vital Signs  Temp:  [98 °F (36.7 °C)-98.6 °F (37 °C)] 98 °F (36.7 °C)  Heart Rate:  [70-93] 79  Resp:  [20] 20  BP: ()/(50-73) 112/73    Physical Exam:  General: In no acute distress  Cardiovascular: RRR, positive lower extremity edema  Respiratory: Basilar crackles  GI: Obese nontender  Skin: No rashes   Extremities: Bilateral lymphedema    Antibiotics:  Ceftriaxone 2 g IV every 12 hours     Results Review:      Lab Results   Component Value Date    WBC 13.40 (H) 05/06/2025    HGB 7.6 (L) 05/06/2025    HCT 23.8 (L) 05/06/2025    MCV 78.3 (L) 05/06/2025     05/06/2025     Lab Results   Component Value Date    GLUCOSE 96 05/06/2025    BUN 33 (H) 05/06/2025    CREATININE 1.50 (H) 05/06/2025    EGFRIFNONA 42 (L) 08/30/2021    EGFRIFAFRI 59 (L) 04/15/2021    BCR 22.0 05/06/2025    CO2 21.0 (L) 05/06/2025    CALCIUM 8.5 (L) 05/06/2025    ALBUMIN 2.7 (L) 05/06/2025     (H) 05/06/2025    ALT 38 (H) 05/06/2025       Microbiology:  5/6 BCx P x 2  5/4 BCx group B strep x 2  5/4 RVP neg     Assessment & Plan   Group B strep bacteremia with concerns for mitral valve endocarditis  Fever  Acute hypoxic respiratory failure  Acute diastolic heart failure  Lower extremity lymphedema.  Hepatitis C antibody positive  Morbid obesity with a BMI of 66 complicating above    Fevers have resolved.  Repeat blood cultures have been obtained.  Continue ceftriaxone 2 g IV every 12 hours for group B strep bacteremia and mitral valve endocarditis.  CT surgery note reviewed.  Appreciate recommendations.  They are recommending medical management only given the patient's comorbidities.  Anticipate a 6-week course of IV antibiotic therapy.    Hepatitis C viral load pending

## 2025-05-07 ENCOUNTER — APPOINTMENT (OUTPATIENT)
Dept: GENERAL RADIOLOGY | Facility: HOSPITAL | Age: 65
DRG: 280 | End: 2025-05-07
Payer: MEDICARE

## 2025-05-07 LAB
ALBUMIN SERPL-MCNC: 2.5 G/DL (ref 3.5–5.2)
ALBUMIN/GLOB SERPL: 0.6 G/DL
ALP SERPL-CCNC: 266 U/L (ref 39–117)
ALT SERPL W P-5'-P-CCNC: 28 U/L (ref 1–33)
ANION GAP SERPL CALCULATED.3IONS-SCNC: 10.1 MMOL/L (ref 5–15)
ANION GAP SERPL CALCULATED.3IONS-SCNC: 10.5 MMOL/L (ref 5–15)
APTT PPP: 29.2 SECONDS (ref 22.7–35.4)
APTT PPP: 31.3 SECONDS (ref 22.7–35.4)
AST SERPL-CCNC: 78 U/L (ref 1–32)
BACTERIA SPEC AEROBE CULT: ABNORMAL
BACTERIA SPEC AEROBE CULT: ABNORMAL
BASOPHILS # BLD AUTO: 0.04 10*3/MM3 (ref 0–0.2)
BASOPHILS NFR BLD AUTO: 0.3 % (ref 0–1.5)
BILIRUB SERPL-MCNC: 0.4 MG/DL (ref 0–1.2)
BUN SERPL-MCNC: 49 MG/DL (ref 8–23)
BUN SERPL-MCNC: 53 MG/DL (ref 8–23)
BUN/CREAT SERPL: 38.9 (ref 7–25)
BUN/CREAT SERPL: 39.8 (ref 7–25)
CALCIUM SPEC-SCNC: 8.5 MG/DL (ref 8.6–10.5)
CALCIUM SPEC-SCNC: 9 MG/DL (ref 8.6–10.5)
CHLORIDE SERPL-SCNC: 94 MMOL/L (ref 98–107)
CHLORIDE SERPL-SCNC: 96 MMOL/L (ref 98–107)
CO2 SERPL-SCNC: 21.9 MMOL/L (ref 22–29)
CO2 SERPL-SCNC: 23.5 MMOL/L (ref 22–29)
CREAT SERPL-MCNC: 1.26 MG/DL (ref 0.57–1)
CREAT SERPL-MCNC: 1.33 MG/DL (ref 0.57–1)
DEPRECATED RDW RBC AUTO: 45.6 FL (ref 37–54)
EGFRCR SERPLBLD CKD-EPI 2021: 44.5 ML/MIN/1.73
EGFRCR SERPLBLD CKD-EPI 2021: 47.5 ML/MIN/1.73
EOSINOPHIL # BLD AUTO: 0.34 10*3/MM3 (ref 0–0.4)
EOSINOPHIL NFR BLD AUTO: 2.9 % (ref 0.3–6.2)
ERYTHROCYTE [DISTWIDTH] IN BLOOD BY AUTOMATED COUNT: 16.5 % (ref 12.3–15.4)
GLOBULIN UR ELPH-MCNC: 4.1 GM/DL
GLUCOSE SERPL-MCNC: 94 MG/DL (ref 65–99)
GLUCOSE SERPL-MCNC: 94 MG/DL (ref 65–99)
GRAM STN SPEC: ABNORMAL
HCT VFR BLD AUTO: 23.9 % (ref 34–46.6)
HCT VFR BLD AUTO: 27.1 % (ref 34–46.6)
HCV RNA SERPL NAA+PROBE-ACNC: NORMAL IU/ML
HGB BLD-MCNC: 7.9 G/DL (ref 12–15.9)
HGB BLD-MCNC: 8.8 G/DL (ref 12–15.9)
IMM GRANULOCYTES # BLD AUTO: 0.3 10*3/MM3 (ref 0–0.05)
IMM GRANULOCYTES NFR BLD AUTO: 2.5 % (ref 0–0.5)
ISOLATED FROM: ABNORMAL
ISOLATED FROM: ABNORMAL
LYMPHOCYTES # BLD AUTO: 0.79 10*3/MM3 (ref 0.7–3.1)
LYMPHOCYTES NFR BLD AUTO: 6.7 % (ref 19.6–45.3)
MAGNESIUM SERPL-MCNC: 1.9 MG/DL (ref 1.6–2.4)
MCH RBC QN AUTO: 25.5 PG (ref 26.6–33)
MCHC RBC AUTO-ENTMCNC: 33.1 G/DL (ref 31.5–35.7)
MCV RBC AUTO: 77.1 FL (ref 79–97)
MONOCYTES # BLD AUTO: 0.7 10*3/MM3 (ref 0.1–0.9)
MONOCYTES NFR BLD AUTO: 5.9 % (ref 5–12)
NEUTROPHILS NFR BLD AUTO: 81.7 % (ref 42.7–76)
NEUTROPHILS NFR BLD AUTO: 9.68 10*3/MM3 (ref 1.7–7)
NRBC BLD AUTO-RTO: 0 /100 WBC (ref 0–0.2)
PLATELET # BLD AUTO: 154 10*3/MM3 (ref 140–450)
PMV BLD AUTO: 9.1 FL (ref 6–12)
POTASSIUM SERPL-SCNC: 4 MMOL/L (ref 3.5–5.2)
POTASSIUM SERPL-SCNC: 4 MMOL/L (ref 3.5–5.2)
PROT SERPL-MCNC: 6.6 G/DL (ref 6–8.5)
RBC # BLD AUTO: 3.1 10*6/MM3 (ref 3.77–5.28)
SODIUM SERPL-SCNC: 128 MMOL/L (ref 136–145)
SODIUM SERPL-SCNC: 128 MMOL/L (ref 136–145)
TEST INFORMATION: NORMAL
WBC NRBC COR # BLD AUTO: 11.85 10*3/MM3 (ref 3.4–10.8)

## 2025-05-07 PROCEDURE — 85014 HEMATOCRIT: CPT | Performed by: NURSE PRACTITIONER

## 2025-05-07 PROCEDURE — 99232 SBSQ HOSP IP/OBS MODERATE 35: CPT | Performed by: INTERNAL MEDICINE

## 2025-05-07 PROCEDURE — 94799 UNLISTED PULMONARY SVC/PX: CPT

## 2025-05-07 PROCEDURE — 83735 ASSAY OF MAGNESIUM: CPT | Performed by: INTERNAL MEDICINE

## 2025-05-07 PROCEDURE — 73130 X-RAY EXAM OF HAND: CPT

## 2025-05-07 PROCEDURE — 85730 THROMBOPLASTIN TIME PARTIAL: CPT | Performed by: INTERNAL MEDICINE

## 2025-05-07 PROCEDURE — 94664 DEMO&/EVAL PT USE INHALER: CPT

## 2025-05-07 PROCEDURE — 85025 COMPLETE CBC W/AUTO DIFF WBC: CPT | Performed by: INTERNAL MEDICINE

## 2025-05-07 PROCEDURE — 94761 N-INVAS EAR/PLS OXIMETRY MLT: CPT

## 2025-05-07 PROCEDURE — 85018 HEMOGLOBIN: CPT | Performed by: NURSE PRACTITIONER

## 2025-05-07 PROCEDURE — 94760 N-INVAS EAR/PLS OXIMETRY 1: CPT

## 2025-05-07 PROCEDURE — 25010000002 CEFTRIAXONE PER 250 MG: Performed by: INTERNAL MEDICINE

## 2025-05-07 PROCEDURE — 80053 COMPREHEN METABOLIC PANEL: CPT | Performed by: INTERNAL MEDICINE

## 2025-05-07 PROCEDURE — 85730 THROMBOPLASTIN TIME PARTIAL: CPT | Performed by: NURSE PRACTITIONER

## 2025-05-07 RX ORDER — HEPARIN SODIUM 10000 [USP'U]/100ML
4.9 INJECTION, SOLUTION INTRAVENOUS
Status: DISCONTINUED | OUTPATIENT
Start: 2025-05-07 | End: 2025-05-07

## 2025-05-07 RX ADMIN — FERROUS SULFATE TAB 325 MG (65 MG ELEMENTAL FE) 325 MG: 325 (65 FE) TAB at 08:45

## 2025-05-07 RX ADMIN — SENNOSIDES AND DOCUSATE SODIUM 2 TABLET: 50; 8.6 TABLET ORAL at 08:45

## 2025-05-07 RX ADMIN — DEXTROMETHORPHAN 60 MG: 30 SUSPENSION, EXTENDED RELEASE ORAL at 20:45

## 2025-05-07 RX ADMIN — IPRATROPIUM BROMIDE AND ALBUTEROL SULFATE 3 ML: .5; 3 SOLUTION RESPIRATORY (INHALATION) at 15:06

## 2025-05-07 RX ADMIN — IPRATROPIUM BROMIDE AND ALBUTEROL SULFATE 3 ML: .5; 3 SOLUTION RESPIRATORY (INHALATION) at 11:03

## 2025-05-07 RX ADMIN — Medication 10 ML: at 08:51

## 2025-05-07 RX ADMIN — IPRATROPIUM BROMIDE AND ALBUTEROL SULFATE 3 ML: .5; 3 SOLUTION RESPIRATORY (INHALATION) at 20:08

## 2025-05-07 RX ADMIN — DEXTROMETHORPHAN 60 MG: 30 SUSPENSION, EXTENDED RELEASE ORAL at 08:45

## 2025-05-07 RX ADMIN — Medication 10 ML: at 20:46

## 2025-05-07 RX ADMIN — FAMOTIDINE 40 MG: 20 TABLET, FILM COATED ORAL at 08:45

## 2025-05-07 RX ADMIN — SENNOSIDES AND DOCUSATE SODIUM 2 TABLET: 50; 8.6 TABLET ORAL at 20:45

## 2025-05-07 RX ADMIN — Medication 15 G: at 20:45

## 2025-05-07 RX ADMIN — GUAIFENESIN 1200 MG: 600 TABLET, MULTILAYER, EXTENDED RELEASE ORAL at 20:45

## 2025-05-07 RX ADMIN — IPRATROPIUM BROMIDE AND ALBUTEROL SULFATE 3 ML: .5; 3 SOLUTION RESPIRATORY (INHALATION) at 07:22

## 2025-05-07 RX ADMIN — Medication 15 G: at 08:42

## 2025-05-07 RX ADMIN — GUAIFENESIN 1200 MG: 600 TABLET, MULTILAYER, EXTENDED RELEASE ORAL at 08:46

## 2025-05-07 RX ADMIN — CEFTRIAXONE 2000 MG: 2 INJECTION, POWDER, FOR SOLUTION INTRAMUSCULAR; INTRAVENOUS at 05:22

## 2025-05-07 RX ADMIN — CEFTRIAXONE 2000 MG: 2 INJECTION, POWDER, FOR SOLUTION INTRAMUSCULAR; INTRAVENOUS at 20:45

## 2025-05-07 RX ADMIN — ZINC OXIDE 1 APPLICATION: 200 OINTMENT TOPICAL at 20:47

## 2025-05-07 RX ADMIN — ZINC OXIDE 1 APPLICATION: 200 OINTMENT TOPICAL at 08:51

## 2025-05-07 NOTE — PROGRESS NOTES
LOS: 2 days     Chief Complaint: Bacteremia and endocarditis    Interval History: Afebrile, no new complaints or events.  Tolerating antibiotics without diarrhea or rash    Vital Signs  Temp:  [97.5 °F (36.4 °C)-97.9 °F (36.6 °C)] 97.5 °F (36.4 °C)  Heart Rate:  [71-80] 72  Resp:  [18-20] 18  BP: ()/(56-69) 97/66    Physical Exam:  General: In no acute distress  Cardiovascular: RRR, positive lower extremity edema  Respiratory: Basilar crackles  GI: Obese nontender  Skin: No rashes   Extremities: Bilateral lymphedema    Antibiotics:  Ceftriaxone 2 g IV every 12 hours     Results Review:      Lab Results   Component Value Date    WBC 11.85 (H) 05/07/2025    HGB 7.9 (L) 05/07/2025    HCT 23.9 (L) 05/07/2025    MCV 77.1 (L) 05/07/2025     05/07/2025     Lab Results   Component Value Date    GLUCOSE 94 05/07/2025    BUN 49 (H) 05/07/2025    CREATININE 1.26 (H) 05/07/2025    EGFRIFNONA 42 (L) 08/30/2021    EGFRIFAFRI 59 (L) 04/15/2021    BCR 38.9 (H) 05/07/2025    CO2 21.9 (L) 05/07/2025    CALCIUM 8.5 (L) 05/07/2025    ALBUMIN 2.5 (L) 05/07/2025    AST 78 (H) 05/07/2025    ALT 28 05/07/2025       Microbiology:  5/6 BCx NGTD x 2  5/4 BCx group B strep x 2  5/4 RVP neg     Assessment & Plan   Group B strep bacteremia with concerns for mitral valve endocarditis  Fever  Acute hypoxic respiratory failure  Acute diastolic heart failure  Lower extremity lymphedema.  Hepatitis C antibody positive  Morbid obesity with a BMI of 66 complicating above    Fevers have resolved.  Repeat blood cultures are negative to date.  Continue ceftriaxone 2 g IV every 12 hours for group B strep bacteremia and mitral valve endocarditis.  Medical management only given the patient's comorbidities.  Anticipate a 6-week course of IV antibiotic therapy.    Hepatitis C viral load pending

## 2025-05-07 NOTE — PROGRESS NOTES
Name: Emely Solomon ADMIT: 2025   : 1960  PCP: Adilson Wilkerson MD    MRN: [id number] LOS: 2 days   AGE/SEX: 64 y.o. female  ROOM: [facility]     Subjective   Subjective   Patient appears morbidly obese, generally weak, relatively comfortable, & in no apparent distress--seems to improve daily.  Sister &  at bedside.    Continues to deny signs of bleeding, chest pain, or trouble breathing.    Complains of right hand pain & swelling after fall at home.       Objective   Objective   Vital Signs  Temp:  [97.5 °F (36.4 °C)-97.9 °F (36.6 °C)] 97.5 °F (36.4 °C)  Heart Rate:  [70-80] 74  Resp:  [18-20] 18  BP: ()/(56-69) 97/66  SpO2:  [94 %-98 %] 95 %  on  Flow (L/min) (Oxygen Therapy):  [2-3] 2;   Device (Oxygen Therapy): nasal cannula  Body mass index is 79.63 kg/m².    Physical Exam  Constitutional:       General: She is not in acute distress.     Appearance: She is obese. She is not toxic-appearing.   Cardiovascular:      Rate and Rhythm: Normal rate.      Heart sounds: Normal heart sounds.   Pulmonary:      Effort: Pulmonary effort is normal.      Comments: Diminished on expiration anteriorly  Abdominal:      General: Bowel sounds are normal.      Palpations: Abdomen is soft.   Musculoskeletal:         General: Swelling (right hand) and tenderness (right hand) present.      Right lower leg: Edema present.      Left lower leg: Edema present.   Skin:     General: Skin is warm and dry.   Neurological:      Mental Status: She is alert and oriented to person, place, and time.      Cranial Nerves: No cranial nerve deficit.     Physical exam on 2025 as per above     Results Review     I reviewed the patient's new clinical results.  Results from last 7 days   Lab Units 25  0603 25  1549 25  1153 25  0618 25  0728 25  0849   WBC 10*3/mm3 11.85*  --   --  13.40* 13.95* 8.82   HEMOGLOBIN g/dL 7.9* 8.6* 8.3* 7.6* 7.9* 8.7*   PLATELETS 10*3/mm3 154  --   --   150 175 220     Results from last 7 days   Lab Units 05/07/25 0602 05/06/25 1817 05/06/25 0618 05/05/25  0728   SODIUM mmol/L 128* 122* 123* 125*   POTASSIUM mmol/L 4.0 3.8 4.1 3.9   CHLORIDE mmol/L 96* 92* 92* 92*   CO2 mmol/L 21.9* 21.0* 21.0* 20.5*   BUN mg/dL 49* 43* 33* 31*   CREATININE mg/dL 1.26* 1.45* 1.50* 1.49*   GLUCOSE mg/dL 94 127* 96 102*   EGFR mL/min/1.73 47.5* 40.1* 38.5* 38.8*     Results from last 7 days   Lab Units 05/07/25 0602 05/06/25 0618 05/05/25 0728 05/04/25  0849   ALBUMIN g/dL 2.5* 2.7* 2.9* 3.2*   BILIRUBIN mg/dL 0.4 0.5 1.1 1.1   ALK PHOS U/L 266* 301* 261* 203*   AST (SGOT) U/L 78* 110* 160* 38*   ALT (SGPT) U/L 28 38* 43* 15     Results from last 7 days   Lab Units 05/07/25 0602 05/06/25 1817 05/06/25 0618 05/05/25 0728 05/04/25  0849   CALCIUM mg/dL 8.5* 8.5* 8.5* 8.8 8.9   ALBUMIN g/dL 2.5*  --  2.7* 2.9* 3.2*   MAGNESIUM mg/dL 1.9  --  1.8  --   --    PHOSPHORUS mg/dL  --   --  2.9  --   --      Results from last 7 days   Lab Units 05/04/25 1744 05/04/25  0849   PROCALCITONIN ng/mL 40.80*  --    LACTATE mmol/L  --  1.5     Hemoglobin A1C   Date/Time Value Ref Range Status   05/04/2025 1744 5.40 4.80 - 5.60 % Final       XR Hand 3+ View Right  Result Date: 5/7/2025   As described.    This report was finalized on 5/7/2025 2:08 PM by Dr. Damon Chavez M.D on Workstation: [id number]          I have personally reviewed all medications:  Scheduled Medications  cefTRIAXone, 2,000 mg, Intravenous, Q12H  dextromethorphan polistirex ER, 60 mg, Oral, Q12H  famotidine, 40 mg, Oral, Daily  ferrous sulfate, 325 mg, Oral, Daily With Breakfast  furosemide, 40 mg, Oral, Daily  guaiFENesin, 1,200 mg, Oral, Q12H  hydrocortisone-bacitracin-zinc oxide-nystatin, 1 Application, Topical, Q12H  ipratropium-albuterol, 3 mL, Nebulization, 4x Daily - RT  Lidocaine, 1 patch, Transdermal, Q24H  metoprolol succinate XL, 25 mg, Oral, Daily  [Held by provider] rivaroxaban, 20 mg, Oral,  Daily  senna-docusate sodium, 2 tablet, Oral, BID  sodium chloride, 10 mL, Intravenous, Q12H  Urea, 15 g, Oral, BID    Infusions  heparin, 4.9 Units/kg/hr, Last Rate: 4.9 Units/kg/hr (05/07/25 1024)  Pharmacy Consult - Pharmacy to dose,     Diet  Diet: Cardiac, Fluid Restriction (240 mL/tray); Healthy Heart (2-3 Na+); Other (Specify mL/day) (1200); Fluid Consistency: Thin (IDDSI 0)    I have personally reviewed:  [x]  Laboratory   [x]  Microbiology   [x]  Radiology   [x]  EKG/Telemetry  []  Cardiology/Vascular   []  Pathology    []  Records       Assessment/Plan     Active Hospital Problems    Diagnosis  POA    **Hypoxia [R09.02]  Yes    Hyponatremia [E87.1]  Yes    Bacterial endocarditis [I33.0]  Yes    Fall [W19.XXXA]  Yes    Acute wheezy bronchitis [J20.9]  Yes    Elevated troponin [R79.89]  Yes    Hyperglycemia [R73.9]  Yes    History of pulmonary embolism [Z86.711]  Yes    Morbid obesity [E66.01]  Yes    Venous stasis ulcer of right calf limited to breakdown of skin without varicose veins [I87.2, L97.211]  Yes    Chronic renal failure (CRF), stage 3 (moderate) [N18.30]  Yes    Lymphedema of lower extremity, unspecified laterality [I89.0]  Yes    History of DVT (deep vein thrombosis) [Z86.718]  Not Applicable    Obstructive sleep apnea [G47.33]  Yes    Bacteremia due to group B Streptococcus [R78.81, B95.1]  Yes    Upper back pain [M54.9]  Yes    Acute kidney failure [N17.9]  Yes    Pulmonary hypertension [I27.20]  Yes    Essential hypertension [I10]  Yes    Chronic diastolic CHF (congestive heart failure) [I50.32]  Yes      Resolved Hospital Problems   No resolved problems to display.       64 y.o. female with recent history of mechanical fall leading to contusion of upper back with CT thoracic spine revealing no fracture or acute finding & fever, acute wheezy bronchitis, hypoxemia with history of ARIEL and pulmonary hypertension admitted with Hypoxia.    *Appreciate the assistance of all consultants.      5/7/2025:  Complains of right hand pain / swelling after fall at home.  XR right hand ordered.    Pulmonology following acute hypoxic respiratory failure given O2 saturation 87% on room air improved with supplemental oxygen--wean oxygen as tolerated to maintain saturation above 90%.  Repeat echocardiogram showing severe pulmonary hypertension & bacterial endocarditis & CTS consulted & recommend medical management given no significant mitral regurgitation & extreme risk with invasive management due to multiple comorbidities & high risk for morbdity & mortality with open heart surgery.  CT chest left lower lobe atelectasis versus infiltrate (on adequate antibiotic coverage).  - Respiratory PCR panel negative  - Nebulizers scheduled  - Home CPAP at bedside    Elevated troponin and proBNP with history of hypertension and chronic diastolic congestive heart failure--continue Toprol.  Diuretics per nephrology.  Cardiology following.    Bacteremia due to group B strep confirmed here on blood cultures x 2; repeat blood cultures x2 NGTD.  Fevers resolved.  Infectious disease following anticipate 6-weeks course of IV antibiotic therapy--current ceftriaxone 2 g IV every 12 hours.  TTE mitral valve endocarditis.    Acute on chronic anemia.  Serial H&H mostly stable / waxing / waning--continue to monitor closely.  No obvious signs of bleeding.  Fecal occult blood negative.  Confirmed iron deficiency here.  Hematology started oral iron supplement.  Tick borne panel low suspicion.  Hematology following recommend continue AC for history of DVT / PE with BMI 79 today & immobility.  Labs not suggestive of hemolysis.  Transfuse for Hgb <7    History of DVT / PE.  Held AC recently for acute anemia here & restarted heparin today (see above).    A1c 5.4.  Glucose trends acceptable.  NCS / healthy heart /fluid restricted diet.    Hyponatremia     STEFANIA  -Nephrology following suspect SIADH CKD stage 3.   -Sodium improving, 128  today  -Fluid restriction 1200 mL, urea 15 g BID, Lasix 40 mg daily, strict I&Os, sodium later today    Generalized weakness complicated by BMI >66 initially & now documented BMI >79 (unremarkable TSH 0.9).  Therapies following.    Lymphedema, BLE POA in the setting of initial documented BMI >66 now BMI >79 (complicating & most likely contributing to all problems).  Wound RN & OT following.    Transaminitis.  Suspect hepatocongestion from chronic diastolic and right-sided congestive heart failure (renal managing diuretics).  Right upper quadrant ultrasound negative.  Hepatitis C antibody reactive & ID following viral load.    Xarelto (home med) switched to heparin gtt for DVT prophylaxis  Full code. Confirmed with patient / sister  Discussed with patient and nursing staff & sister  Anticipate discharge home with HH vs SNU facility timing yet to be determined. / CCP following / patient & family previously favoring HH & now anticipate SNF which I believe is more appropriate given clinical picture  Expected Discharge Date: 5/8/2025; Expected Discharge Time:       LUCÍA Chapman  [facility]  05/07/25  15:09 EDT

## 2025-05-07 NOTE — PROGRESS NOTES
"      [facility]         Dr Esteban Sayied   LOS: 2 days   Patient Care Team:  Adilson Wilkerson MD as PCP - General (Family Medicine)  [place] Segovia MD as Referring Physician (Obstetrics and Gynecology)  Brennan Rogers MD as Cardiologist (Cardiology)  Caleb Garcia MD as Consulting Physician (Pulmonary Disease)  Jess Sanz, RN as Ambulatory  ([facility])    Chief Complaint: Acute respiratory failure with mitral valve endocarditis with vegetation group B strep bacteremia pulmonary hypertension other issues as listed below    Interval History: Resting comfortably on nasal cannula oxygen.  No overnight issues reported.    REVIEW OF SYSTEMS:   CARDIOVASCULAR: No chest pain, chest pressure or chest discomfort. No palpitations or edema.   RESPIRATORY: Short of breath with activity  GASTROINTESTINAL: No anorexia, nausea, vomiting or diarrhea. No abdominal pain or blood.   HEMATOLOGIC: No bleeding or bruising.     Ventilator/Non-Invasive Ventilation Settings (From admission, onward)      None              Vital Signs  Temp:  [97.5 °F (36.4 °C)-97.9 °F (36.6 °C)] 97.5 °F (36.4 °C)  Heart Rate:  [70-80] 77  Resp:  [18-20] 18  BP: ()/(56-69) 97/66    Intake/Output Summary (Last 24 hours) at 5/7/2025 1324  Last data filed at 5/7/2025 0850  Gross per 24 hour   Intake 1138 ml   Output 3500 ml   Net -2362 ml     Flowsheet Rows      Flowsheet Row First Filed Value   Admission Height 160 cm (63\") Documented at 05/04/2025 0713   Admission Weight 170 kg (375 lb) Documented at 05/04/2025 0713                  Physical Exam:  Patient is examined using the personal protective equipment as per guidelines from infection control for this particular patient as enacted.  Hand hygiene was performed before and after patient interaction.   General Appearance:    Alert, cooperative, in no acute distress.  Following simple commands  ENT Mallampati between 3 and 4 no nasal congestion  Neck " midline trachea, no thyromegaly   Lungs:   Diminished breath sounds with rhonchi in the bases    Heart:    Regular rhythm and normal rate, normal S1 and S2, no            murmur, no gallop, no rub, no click   Chest Wall:    No abnormalities observed   Abdomen:     Normal bowel sounds, no masses, no organomegaly, soft        nontender, nondistended, no guarding, no rebound                tenderness   Extremities:   Moves all extremities well, 1+ edema, no cyanosis, no             redness  CNS no focal neurological deficits normal sensory exam  Skin no rashes no nodules  Musculoskeletal no cyanosis no clubbing normal range of motion     Results Review:        Results from last 7 days   Lab Units 05/07/25  0602 05/06/25  1817 05/06/25 0618   SODIUM mmol/L 128* 122* 123*   POTASSIUM mmol/L 4.0 3.8 4.1   CHLORIDE mmol/L 96* 92* 92*   CO2 mmol/L 21.9* 21.0* 21.0*   BUN mg/dL 49* 43* 33*   CREATININE mg/dL 1.26* 1.45* 1.50*   GLUCOSE mg/dL 94 127* 96   CALCIUM mg/dL 8.5* 8.5* 8.5*     Results from last 7 days   Lab Units 05/04/25  1036 05/04/25  0849   HSTROP T ng/L 81* 72*     Results from last 7 days   Lab Units 05/07/25  0603 05/06/25  1549 05/06/25  1153 05/06/25 0618 05/05/25  0728   WBC 10*3/mm3 11.85*  --   --  13.40* 13.95*   HEMOGLOBIN g/dL 7.9* 8.6* 8.3* 7.6* 7.9*   HEMATOCRIT % 23.9* 26.9* 26.1* 23.8* 24.1*   PLATELETS 10*3/mm3 154  --   --  150 175     Results from last 7 days   Lab Units 05/07/25  0602 05/06/25  0618 05/05/25 2008   INR   --   --  4.49*   APTT seconds 29.2 40.7* 40.5*         Results from last 7 days   Lab Units 05/07/25 0602   MAGNESIUM mg/dL 1.9               I reviewed the patient's new clinical results.  I personally viewed and interpreted the patient's chest x-ray.        Medication Review:   cefTRIAXone, 2,000 mg, Intravenous, Q12H  dextromethorphan polistirex ER, 60 mg, Oral, Q12H  famotidine, 40 mg, Oral, Daily  ferrous sulfate, 325 mg, Oral, Daily With Breakfast  furosemide, 40  mg, Oral, Daily  guaiFENesin, 1,200 mg, Oral, Q12H  hydrocortisone-bacitracin-zinc oxide-nystatin, 1 Application, Topical, Q12H  ipratropium-albuterol, 3 mL, Nebulization, 4x Daily - RT  Lidocaine, 1 patch, Transdermal, Q24H  metoprolol succinate XL, 25 mg, Oral, Daily  [Held by provider] rivaroxaban, 20 mg, Oral, Daily  senna-docusate sodium, 2 tablet, Oral, BID  sodium chloride, 10 mL, Intravenous, Q12H  Urea, 15 g, Oral, BID        heparin, 4.9 Units/kg/hr, Last Rate: 4.9 Units/kg/hr (05/07/25 1024)  Pharmacy Consult - Pharmacy to dose,         ASSESSMENT:   Acute hypoxic respiratory failure  Mitral valve endocarditis  Group B strep bacteremia  Pulmonary hypertension  Heart failure with preserved ejection fraction  Fall with back pain  Acute kidney injury  Hyponatremia  Obstructive sleep apnea on noninvasive ventilation  Elevated troponin and BNP  Hyperglycemia  Lower extremity lymphedema  History of DVT/PE on Xarelto  Super morbid obesity    PLAN:  Respiratory status stable and improving.  No further fevers noted.  Now with group B bacteremia with concerns for mitral valve endocarditis  Wean down oxygen maintain sats above 90%.  Pulmonary hypertension severe noted on repeat echo.   Now in the setting of mitral valve endocarditis and bacteremia doubt right heart cath can be done at this time.  May repeat echo down the road  CT chest noted with left lower lobe atelectasis versus infiltrate.  Patient on adequate antibiotics to cover for pneumonia.  Home CPAP to be used at bedside  Mobilize ambulate        Eulalia Delaney MD  05/07/25  13:24 EDT

## 2025-05-07 NOTE — PROGRESS NOTES
[facility] Progress Note      Patient Name: Emely Solomon  : 1960  MRN: [id number]  Primary Care Physician:  Adilson Wilkerson MD  Date of admission: 2025    Subjective     Interval History:   Follow-up acute on intermittent chronic hyponatremia, acute kidney injury on CKD 3A.  Intake not recorded.  Urine output 2.7 L.  Weight inconsistent.  Cardiothoracic surgery advises medical management for mitral valve endocarditis.  On ceftriaxone.  Feels better today.  Received 3 g salt tablet last night for sodium 122.  128 this morning.  Review of Systems:   As noted above    Objective     Vitals:   Temp:  [97.5 °F (36.4 °C)-97.9 °F (36.6 °C)] 97.5 °F (36.4 °C)  Heart Rate:  [71-80] 72  Resp:  [18-20] 18  BP: ()/(56-69) 97/66  Flow (L/min) (Oxygen Therapy):  [2-3] 2    Intake/Output Summary (Last 24 hours) at 2025 1028  Last data filed at 2025 0850  Gross per 24 hour   Intake 1138 ml   Output 3500 ml   Net -2362 ml       Physical Exam:    General Appearance: alert, on nasal cannula oxygen 4 L.  Morbidly obese.  less dyspnea with conversation.  Skin: warm and dry  HEENT: oral mucosa normal, nonicteric sclera  Neck: supple, no JVD  Lungs: Bilateral rales with end expiratory wheezing.  Heart: RRR, normal S1 and S2  Abdomen: soft, nontender, nondistended. +bs.  Obese  : Pure wick  Extremities: Chronic lower extremity lymphedema.  2+.  Upper extremity edema 1+.  Neuro: normal speech and mental status     Scheduled Meds:     cefTRIAXone, 2,000 mg, Intravenous, Q12H  dextromethorphan polistirex ER, 60 mg, Oral, Q12H  famotidine, 40 mg, Oral, Daily  ferrous sulfate, 325 mg, Oral, Daily With Breakfast  furosemide, 40 mg, Oral, Daily  guaiFENesin, 1,200 mg, Oral, Q12H  hydrocortisone-bacitracin-zinc oxide-nystatin, 1 Application, Topical, Q12H  ipratropium-albuterol, 3 mL, Nebulization, 4x Daily - RT  Lidocaine, 1 patch, Transdermal, Q24H  metoprolol succinate XL, 25  mg, Oral, Daily  [Held by provider] rivaroxaban, 20 mg, Oral, Daily  senna-docusate sodium, 2 tablet, Oral, BID  sodium chloride, 10 mL, Intravenous, Q12H  Urea, 15 g, Oral, BID      IV Meds:   heparin, 4.9 Units/kg/hr, Last Rate: 4.9 Units/kg/hr (05/07/25 1024)  Pharmacy Consult - Pharmacy to dose,         Results Reviewed:   I have personally reviewed the results from the time of this admission to 5/7/2025 10:28 EDT     Results from last 7 days   Lab Units 05/07/25  0602 05/06/25  1817 05/06/25  0618 05/05/25  0728   SODIUM mmol/L 128* 122* 123* 125*   POTASSIUM mmol/L 4.0 3.8 4.1 3.9   CHLORIDE mmol/L 96* 92* 92* 92*   CO2 mmol/L 21.9* 21.0* 21.0* 20.5*   BUN mg/dL 49* 43* 33* 31*   CREATININE mg/dL 1.26* 1.45* 1.50* 1.49*   CALCIUM mg/dL 8.5* 8.5* 8.5* 8.8   BILIRUBIN mg/dL 0.4  --  0.5 1.1   ALK PHOS U/L 266*  --  301* 261*   ALT (SGPT) U/L 28  --  38* 43*   AST (SGOT) U/L 78*  --  110* 160*   GLUCOSE mg/dL 94 127* 96 102*       Estimated Creatinine Clearance: 79.4 mL/min (A) (by C-G formula based on SCr of 1.26 mg/dL (H)).    Results from last 7 days   Lab Units 05/07/25  0602 05/06/25  0618   MAGNESIUM mg/dL 1.9 1.8   PHOSPHORUS mg/dL  --  2.9       Results from last 7 days   Lab Units 05/06/25  0618 05/04/25  1744   URIC ACID mg/dL 7.8* 7.9*       Results from last 7 days   Lab Units 05/07/25  0603 05/06/25  1549 05/06/25  1153 05/06/25  0618 05/05/25  0728 05/04/25  0849 05/04/25  0849 05/01/25  0946   WBC 10*3/mm3 11.85*  --   --  13.40* 13.95*  --  8.82 13.54*   HEMOGLOBIN g/dL 7.9* 8.6* 8.3* 7.6* 7.9*   < > 8.7* 8.7*   PLATELETS 10*3/mm3 154  --   --  150 175  --  220 263    < > = values in this interval not displayed.       Results from last 7 days   Lab Units 05/05/25 2008   INR  4.49*       Assessment / Plan     ASSESSMENT:  Hyponatremia, acute on intermittent chronic.  TSH and cortisol normal.  Urine studies consistent with SIADH.  On fluid restriction.    Added urea and Lasix 5/6/2025.  Received   dose 3 g sodium tablet last night.  Sodium better today.  Recheck this afternoon.  Acute kidney injury on CKD 3A baseline creatinine 1.2.  Creatinine stable at 1.5.  Underlying hypertensive nephrosclerosis and cardiorenal syndrome.  Acute component due to hypotension with impaired renal autoregulation due to ARB.  3.  Strep bacteremia with mitral valve endocarditis.  On ceftriaxone.  Cardiothoracic surgery recommends medical management.  4.  Acute on chronic heart failure preserved ejection fraction.  Continue Lasix today.  Metoprolol reduced yesterday for hypotension.  5.  Left lower lobe pneumonia.  Acute hypoxic respiratory failure  6.  History of DVT and pulmonary embolism on Xarelto.  7.  Elevated transaminases.  Slowly improving.  8.  Iron deficiency anemia.  No IV iron while active infection.  9.  Hepatitis C antibody positive.  10.  Morbid obesity.  PLAN:  Continue urea 15 g twice daily  Lasix 40 mg daily.  3.   Recheck sodium later today.  Thank you for involving us in the care of Emely Solomon.  Please feel free to call with any questions.    Latasha Song MD  05/07/25  10:28 EDT    [facility]  [phone]    Please note that portions of this note were completed with a voice recognition program.

## 2025-05-07 NOTE — PROGRESS NOTES
LOS: 2 days   Patient Care Team:  Adilson Wilkerson MD as PCP - General (Family Medicine)  [place] Segovia MD as Referring Physician (Obstetrics and Gynecology)  Brennan Rogers MD as Cardiologist (Cardiology)  Caleb Garcia MD as Consulting Physician (Pulmonary Disease)  Jess Sanz, RN as Ambulatory  ([facility])    Chief Complaint: Follow-up mitral valve endocarditis, acute on chronic diastolic CHF.    Interval History: She feels better in general.  Still having malaise.  Denies any new shortness of breath or chest pain.    Vital Signs:  Temp:  [97.5 °F (36.4 °C)-97.9 °F (36.6 °C)] 97.5 °F (36.4 °C)  Heart Rate:  [70-80] 77  Resp:  [18-20] 18  BP: ()/(56-69) 97/66    Intake/Output Summary (Last 24 hours) at 5/7/2025 1327  Last data filed at 5/7/2025 0850  Gross per 24 hour   Intake 1138 ml   Output 3500 ml   Net -2362 ml       Physical Exam:   General Appearance:    No acute distress, alert and oriented x4   Lungs:     Decreased breath sounds bilaterally     Heart:    Regular rhythm and normal rate. II/VI SM LLSB.    Abdomen:     Soft, nontender, nondistended.    Extremities:   Bilateral lymphedema. Chronic venous stasis changes.  1+ edema of the upper extremities.     Results Review:    Results from last 7 days   Lab Units 05/07/25  0602   SODIUM mmol/L 128*   POTASSIUM mmol/L 4.0   CHLORIDE mmol/L 96*   CO2 mmol/L 21.9*   BUN mg/dL 49*   CREATININE mg/dL 1.26*   GLUCOSE mg/dL 94   CALCIUM mg/dL 8.5*     Results from last 7 days   Lab Units 05/04/25  1036 05/04/25  0849   HSTROP T ng/L 81* 72*     Results from last 7 days   Lab Units 05/07/25  0603   WBC 10*3/mm3 11.85*   HEMOGLOBIN g/dL 7.9*   HEMATOCRIT % 23.9*   PLATELETS 10*3/mm3 154     Results from last 7 days   Lab Units 05/07/25  0602 05/06/25  0618 05/05/25 2008   INR   --   --  4.49*   APTT seconds 29.2 40.7* 40.5*         Results from last 7 days   Lab Units 05/07/25  0602   MAGNESIUM mg/dL 1.9           I reviewed  the patient's new clinical results.        Assessment:  1.  Group B strep bacteremia  2.  Mitral valve endocarditis (large vegetation on posterior leaflet of the mitral valve by echo on 5/5/2025)  3.  Acute hypoxic respiratory failure  4.  Super morbid obesity, complicating all aspects of care  5.  Pulmonary hypertension  6.  Acute on chronic diastolic and right-sided CHF, secondary to #2, #3, and #4  7.  Chronic lower extremity lymphedema with venous stasis changes  8.  Mild to moderate aortic stenosis by echo on 5/5/2025  9.  Acute kidney injury with stage IIIa chronic kidney disease  10.  Acute on chronic hyponatremia  11.  Severe obstructive sleep apnea, on CPAP  12.  Elevated troponin, type II NSTEMI secondary to #1, #2, #3, and #9  13.  History of DVT and pulmonary embolism in 2015  14.  Anemia, likely multifactorial  15.  Elevated liver function tests  16.  Hypoalbuminemia  17.  Hepatitis C antibody positive  18.  Deconditioning    Plan:  -Nephrology is following.  Continue Lasix 40 mg p.o. daily for now.  Fluid restriction.  On urea 15 g twice a day.  Sodium better at 128.    -6-week course of antibiotic therapy per Dr. Nunez.  Dr. Martinez recommends medical management.  This is secondary to multiple comorbidities and high morbidity and mortality with open heart surgery.    -Xarelto switched to heparin drip for now in case any procedures are needed.  Watch hemoglobin closely.  Oral iron started by hematology.    Gamaliel Lawton MD  05/07/25  13:27 EDT

## 2025-05-07 NOTE — DISCHARGE PLACEMENT REQUEST
"Emely Solomon (64 y.o. Female)       Date of Birth   1960    Social Security Number       Address   [address] [place] [address]    Home Phone   [phone]    MRN   [id number]       Catholic   Non-Uatsdin    Marital Status                               Admission Date   5/4/2025    Admission Type   Emergency    Admitting Provider   Jimmy Morales MD    Attending Provider   Jaime Kaur MD    Department, Room/Bed   [address], [address]/1       Discharge Date       Discharge Disposition       Discharge Destination                                 Attending Provider: Jaime Kaur MD    Allergies: Cephalexin, Clindamycin/lincomycin    Isolation: None   Infection: MRSA/History Only (03/08/22)   Code Status: CPR    Ht: 160 cm (63\")   Wt: 204 kg (449 lb 8 oz)    Admission Cmt: None   Principal Problem: Hypoxia [R09.02]                   Active Insurance as of 5/4/2025       Primary Coverage       Payor Plan Insurance Group Employer/Plan Group    MEDICARE MEDICARE A & B        Payor Plan Address Payor Plan Phone Number Payor Plan Fax Number Effective Dates    [address] [phone]  7/1/2024 - None Entered    [facility] [id number]         Subscriber Name Subscriber Birth Date Member ID       EMELY SOLOMON 1960 [id number]               Secondary Coverage       Payor Plan Insurance Group Employer/Plan Group    [facility] SUPP [id number]       Payor Plan Address Payor Plan Phone Number Payor Plan Fax Number Effective Dates    [address]   7/1/2024 - None Entered    [facility] [id number]         Subscriber Name Subscriber Birth Date Member ID       EMELY SOLOMON 1960 [id number]                     Emergency Contacts        (Rel.) Home Phone Work Phone Mobile Phone    Ghanshyam Solomon (Spouse) [phone] -- [phone]    Adilson Cintron (Brother) [phone] -- [phone]                "

## 2025-05-07 NOTE — PROGRESS NOTES
REASON FOR FOLLOWUP/CHIEF COMPLAINT:    Infective endocarditis  History of PE and DVT  Recommendations on anticoagulation    HISTORY OF PRESENT ILLNESS:   Patient reports no new complaints today.  Her sister is at bedside.      Past Medical History, Past Surgical History, Social History, Family History have been reviewed and are without significant changes except as mentioned.    Review of Systems   Review of Systems  Review of systems as mentioned HPI otherwise negative    Medications:  The current medication list was reviewed in the EMR    ALLERGIES:    Allergies   Allergen Reactions    Cephalexin Hives and Rash     Diffuse rash on cephalexin 1 g PO q6h on 6/17/20  Tolerated zosyn and PCN G September 2020 without issue    Clindamycin/Lincomycin Hives              Vitals:    05/07/25 0727 05/07/25 0741 05/07/25 1103 05/07/25 1107   BP:  97/66     BP Location:  Left arm     Patient Position:  Lying     Pulse: 74 72 70 77   Resp: 18 18 18 18   Temp:  97.5 °F (36.4 °C)     TempSrc:  Oral     SpO2:  96% 98%    Weight:       Height:         Physical Exam    CONSTITUTIONAL:  Vital signs reviewed.  Morbidly obese  EYES:  Conjunctivae and lids unremarkable.  PERRLA  EARS,NOSE,MOUTH,THROAT:  Ears and nose appear unremarkable.  Lips, teeth, gums appear unremarkable.  RESPIRATORY:  Normal respiratory effort.  Lungs clear to auscultation bilaterally.  CARDIOVASCULAR:  Normal S1, S2.  No murmurs rubs or gallops.  No significant lower extremity edema.  GASTROINTESTINAL: Abdomen appears unremarkable.  Nontender.  No hepatomegaly.  No splenomegaly.  NEURO: cranial nerves 2-12 grossly intact.  No focal deficits.  Appears to have equal strength all 4 extremities.  MUSCULOSKELETAL:  Unremarkable digits/nails.  No cyanosis or clubbing.  SKIN:  Warm.  No rashes.  PSYCHIATRIC:  Normal judgment and insight.  Normal mood and affect.       RECENT LABS:  WBC   Date Value Ref Range Status   05/07/2025 11.85 (H) 3.40 - 10.80 10*3/mm3  Final   05/06/2025 13.40 (H) 3.40 - 10.80 10*3/mm3 Final   05/05/2025 13.95 (H) 3.40 - 10.80 10*3/mm3 Final     Hemoglobin   Date Value Ref Range Status   05/07/2025 7.9 (L) 12.0 - 15.9 g/dL Final   05/06/2025 8.6 (L) 12.0 - 15.9 g/dL Final   05/06/2025 8.3 (L) 12.0 - 15.9 g/dL Final   05/06/2025 7.6 (L) 12.0 - 15.9 g/dL Final   05/05/2025 7.9 (L) 12.0 - 15.9 g/dL Final     Platelets   Date Value Ref Range Status   05/07/2025 154 140 - 450 10*3/mm3 Final   05/06/2025 150 140 - 450 10*3/mm3 Final   05/05/2025 175 140 - 450 10*3/mm3 Final       ASSESSMENT/PLAN:  Emely Solomon [id number]     *History of pulmonary embolism  PE noted on CT chest from 7/15/2025  Patient has been on anticoagulation with Coumadin initially and subsequently on Xarelto.  Given morbid obesity and relative immobility patient remains at risk for pulmonary embolism.  Recommend ongoing anticoagulation.  Currently on heparin drip  Can be transitioned back to Xarelto at the time of discharge     *Anemia  Microcytic  Iron saturation and iron low however ferritin elevated and TIBC is low.  There is likely a component of iron deficiency but there is also component of ongoing infection and inflammation and renal dysfunction contributing to the anemia.  Transfuse for hemoglobin less than 7  Hemoccult negative  Patient will benefit from iron.  Hold off on IV iron for now given active ongoing infection  She had a recent colonoscopy in 2023.  Labs not suggestive of hemolysis.  Could repeat haptoglobin  Hemoglobin stable at 7.9 on 5/7/2025     *Infective endocarditis  Recommend 6 weeks of antibiotic therapy by ID.  Continues on ceftriaxone  Bacteremia from group B streptococcus.  Medical management recommended  Not a surgical candidate.  Evaluated by cardiothoracic surgery as well as cardiology.     *Hyponatremia-management per nephrology  Sodium 128        *STEFANIA on CKD-management per nephrology     Recommendations  Continue oral  Continue to monitor H&H and  transfuse for hemoglobin less than 7  Continue heparin drip for now but patient should be able to go back on Xarelto prophylactic dose given persistent risk factor for recurrent DVT PE which is morbid obesity and relative immobility.  We will sign off at this time.  Please call with additional questions or concerns

## 2025-05-08 LAB
ALBUMIN SERPL-MCNC: 2.6 G/DL (ref 3.5–5.2)
ALBUMIN/GLOB SERPL: 0.6 G/DL
ALP SERPL-CCNC: 304 U/L (ref 39–117)
ALT SERPL W P-5'-P-CCNC: 24 U/L (ref 1–33)
ANION GAP SERPL CALCULATED.3IONS-SCNC: 11.3 MMOL/L (ref 5–15)
AST SERPL-CCNC: 56 U/L (ref 1–32)
BASOPHILS # BLD AUTO: 0.03 10*3/MM3 (ref 0–0.2)
BASOPHILS NFR BLD AUTO: 0.3 % (ref 0–1.5)
BILIRUB SERPL-MCNC: 0.5 MG/DL (ref 0–1.2)
BUN SERPL-MCNC: 52 MG/DL (ref 8–23)
BUN/CREAT SERPL: 43.3 (ref 7–25)
CALCIUM SPEC-SCNC: 8.9 MG/DL (ref 8.6–10.5)
CHLORIDE SERPL-SCNC: 98 MMOL/L (ref 98–107)
CO2 SERPL-SCNC: 22.7 MMOL/L (ref 22–29)
CREAT SERPL-MCNC: 1.2 MG/DL (ref 0.57–1)
DEPRECATED RDW RBC AUTO: 45.7 FL (ref 37–54)
EGFRCR SERPLBLD CKD-EPI 2021: 50.3 ML/MIN/1.73
EOSINOPHIL # BLD AUTO: 0.51 10*3/MM3 (ref 0–0.4)
EOSINOPHIL NFR BLD AUTO: 4.9 % (ref 0.3–6.2)
ERYTHROCYTE [DISTWIDTH] IN BLOOD BY AUTOMATED COUNT: 16.5 % (ref 12.3–15.4)
GLOBULIN UR ELPH-MCNC: 4.6 GM/DL
GLUCOSE SERPL-MCNC: 99 MG/DL (ref 65–99)
HCT VFR BLD AUTO: 26.2 % (ref 34–46.6)
HCT VFR BLD AUTO: 26.5 % (ref 34–46.6)
HGB BLD-MCNC: 8.5 G/DL (ref 12–15.9)
HGB BLD-MCNC: 8.6 G/DL (ref 12–15.9)
IMM GRANULOCYTES # BLD AUTO: 0.35 10*3/MM3 (ref 0–0.05)
IMM GRANULOCYTES NFR BLD AUTO: 3.4 % (ref 0–0.5)
LYMPHOCYTES # BLD AUTO: 0.61 10*3/MM3 (ref 0.7–3.1)
LYMPHOCYTES NFR BLD AUTO: 5.9 % (ref 19.6–45.3)
MCH RBC QN AUTO: 25.1 PG (ref 26.6–33)
MCHC RBC AUTO-ENTMCNC: 32.8 G/DL (ref 31.5–35.7)
MCV RBC AUTO: 76.4 FL (ref 79–97)
MONOCYTES # BLD AUTO: 0.61 10*3/MM3 (ref 0.1–0.9)
MONOCYTES NFR BLD AUTO: 5.9 % (ref 5–12)
NEUTROPHILS NFR BLD AUTO: 79.6 % (ref 42.7–76)
NEUTROPHILS NFR BLD AUTO: 8.31 10*3/MM3 (ref 1.7–7)
NRBC BLD AUTO-RTO: 0 /100 WBC (ref 0–0.2)
PLATELET # BLD AUTO: 160 10*3/MM3 (ref 140–450)
PMV BLD AUTO: 8.8 FL (ref 6–12)
POTASSIUM SERPL-SCNC: 4 MMOL/L (ref 3.5–5.2)
PROT SERPL-MCNC: 7.2 G/DL (ref 6–8.5)
RBC # BLD AUTO: 3.43 10*6/MM3 (ref 3.77–5.28)
SODIUM SERPL-SCNC: 132 MMOL/L (ref 136–145)
WBC NRBC COR # BLD AUTO: 10.42 10*3/MM3 (ref 3.4–10.8)

## 2025-05-08 PROCEDURE — 25010000002 CEFTRIAXONE PER 250 MG: Performed by: INTERNAL MEDICINE

## 2025-05-08 PROCEDURE — 99232 SBSQ HOSP IP/OBS MODERATE 35: CPT | Performed by: INTERNAL MEDICINE

## 2025-05-08 PROCEDURE — G0545 PR INHERENT VISIT TO INPT: HCPCS | Performed by: INTERNAL MEDICINE

## 2025-05-08 PROCEDURE — 94664 DEMO&/EVAL PT USE INHALER: CPT

## 2025-05-08 PROCEDURE — 99232 SBSQ HOSP IP/OBS MODERATE 35: CPT

## 2025-05-08 PROCEDURE — 94799 UNLISTED PULMONARY SVC/PX: CPT

## 2025-05-08 PROCEDURE — 85025 COMPLETE CBC W/AUTO DIFF WBC: CPT | Performed by: INTERNAL MEDICINE

## 2025-05-08 PROCEDURE — 97530 THERAPEUTIC ACTIVITIES: CPT

## 2025-05-08 PROCEDURE — 94760 N-INVAS EAR/PLS OXIMETRY 1: CPT

## 2025-05-08 PROCEDURE — 85014 HEMATOCRIT: CPT | Performed by: NURSE PRACTITIONER

## 2025-05-08 PROCEDURE — 97535 SELF CARE MNGMENT TRAINING: CPT

## 2025-05-08 PROCEDURE — 80053 COMPREHEN METABOLIC PANEL: CPT | Performed by: INTERNAL MEDICINE

## 2025-05-08 PROCEDURE — 85018 HEMOGLOBIN: CPT | Performed by: NURSE PRACTITIONER

## 2025-05-08 PROCEDURE — 94761 N-INVAS EAR/PLS OXIMETRY MLT: CPT

## 2025-05-08 RX ORDER — AMOXICILLIN 250 MG
2 CAPSULE ORAL NIGHTLY PRN
Status: DISCONTINUED | OUTPATIENT
Start: 2025-05-08 | End: 2025-05-10 | Stop reason: HOSPADM

## 2025-05-08 RX ORDER — SODIUM CHLORIDE 0.9 % (FLUSH) 0.9 %
10 SYRINGE (ML) INJECTION EVERY 12 HOURS SCHEDULED
Status: CANCELLED | OUTPATIENT
Start: 2025-05-08

## 2025-05-08 RX ORDER — BISACODYL 10 MG
10 SUPPOSITORY, RECTAL RECTAL DAILY PRN
Status: DISCONTINUED | OUTPATIENT
Start: 2025-05-08 | End: 2025-05-10 | Stop reason: HOSPADM

## 2025-05-08 RX ORDER — POLYETHYLENE GLYCOL 3350 17 G/17G
17 POWDER, FOR SOLUTION ORAL DAILY PRN
Status: DISCONTINUED | OUTPATIENT
Start: 2025-05-08 | End: 2025-05-10 | Stop reason: HOSPADM

## 2025-05-08 RX ORDER — SODIUM CHLORIDE 0.9 % (FLUSH) 0.9 %
20 SYRINGE (ML) INJECTION AS NEEDED
Status: CANCELLED | OUTPATIENT
Start: 2025-05-08

## 2025-05-08 RX ORDER — METOPROLOL SUCCINATE 25 MG/1
12.5 TABLET, EXTENDED RELEASE ORAL DAILY
Status: DISCONTINUED | OUTPATIENT
Start: 2025-05-09 | End: 2025-05-10 | Stop reason: HOSPADM

## 2025-05-08 RX ORDER — SODIUM CHLORIDE 0.9 % (FLUSH) 0.9 %
10 SYRINGE (ML) INJECTION AS NEEDED
Status: CANCELLED | OUTPATIENT
Start: 2025-05-08

## 2025-05-08 RX ORDER — BISACODYL 5 MG/1
5 TABLET, DELAYED RELEASE ORAL DAILY PRN
Status: DISCONTINUED | OUTPATIENT
Start: 2025-05-08 | End: 2025-05-10 | Stop reason: HOSPADM

## 2025-05-08 RX ADMIN — IPRATROPIUM BROMIDE AND ALBUTEROL SULFATE 3 ML: .5; 3 SOLUTION RESPIRATORY (INHALATION) at 07:24

## 2025-05-08 RX ADMIN — RIVAROXABAN 20 MG: 20 TABLET, FILM COATED ORAL at 10:12

## 2025-05-08 RX ADMIN — FUROSEMIDE 40 MG: 40 TABLET ORAL at 10:12

## 2025-05-08 RX ADMIN — Medication 15 G: at 10:11

## 2025-05-08 RX ADMIN — DEXTROMETHORPHAN 60 MG: 30 SUSPENSION, EXTENDED RELEASE ORAL at 10:11

## 2025-05-08 RX ADMIN — METOPROLOL SUCCINATE 25 MG: 25 TABLET, EXTENDED RELEASE ORAL at 10:12

## 2025-05-08 RX ADMIN — ZINC OXIDE 1 APPLICATION: 200 OINTMENT TOPICAL at 10:12

## 2025-05-08 RX ADMIN — IPRATROPIUM BROMIDE AND ALBUTEROL SULFATE 3 ML: .5; 3 SOLUTION RESPIRATORY (INHALATION) at 19:35

## 2025-05-08 RX ADMIN — GUAIFENESIN 1200 MG: 600 TABLET, MULTILAYER, EXTENDED RELEASE ORAL at 10:12

## 2025-05-08 RX ADMIN — IPRATROPIUM BROMIDE AND ALBUTEROL SULFATE 3 ML: .5; 3 SOLUTION RESPIRATORY (INHALATION) at 15:20

## 2025-05-08 RX ADMIN — GUAIFENESIN 1200 MG: 600 TABLET, MULTILAYER, EXTENDED RELEASE ORAL at 20:47

## 2025-05-08 RX ADMIN — Medication 10 ML: at 20:48

## 2025-05-08 RX ADMIN — ZINC OXIDE 1 APPLICATION: 200 OINTMENT TOPICAL at 20:48

## 2025-05-08 RX ADMIN — CEFTRIAXONE 2000 MG: 2 INJECTION, POWDER, FOR SOLUTION INTRAMUSCULAR; INTRAVENOUS at 06:49

## 2025-05-08 RX ADMIN — Medication 15 G: at 20:47

## 2025-05-08 RX ADMIN — Medication 10 ML: at 10:12

## 2025-05-08 RX ADMIN — Medication 1 APPLICATION: at 00:39

## 2025-05-08 RX ADMIN — FERROUS SULFATE TAB 325 MG (65 MG ELEMENTAL FE) 325 MG: 325 (65 FE) TAB at 10:12

## 2025-05-08 RX ADMIN — CEFTRIAXONE 2000 MG: 2 INJECTION, POWDER, FOR SOLUTION INTRAMUSCULAR; INTRAVENOUS at 17:16

## 2025-05-08 RX ADMIN — IPRATROPIUM BROMIDE AND ALBUTEROL SULFATE 3 ML: .5; 3 SOLUTION RESPIRATORY (INHALATION) at 11:05

## 2025-05-08 RX ADMIN — FAMOTIDINE 40 MG: 20 TABLET, FILM COATED ORAL at 10:12

## 2025-05-08 RX ADMIN — DEXTROMETHORPHAN 60 MG: 30 SUSPENSION, EXTENDED RELEASE ORAL at 20:47

## 2025-05-08 NOTE — PLAN OF CARE
Goal Outcome Evaluation:  Plan of Care Reviewed With: patient, family           Outcome Evaluation: Multiple loose bowels today.  Stool softerners have been changed to PRN.  PT/OT worked with pt and notes are in EPIC.  PICC placement has been ordered.  Nephro approved PICC.  Consent signed and in chart. IV ABX will be given for 6 weeks for Endocarditis at DC when pt goes to Rehab.  Strong Assist x 2 for mobiity per PT/OT.  Restarted Xarelto today.  VSS, 2lpnvia n/c baseline, NSR.  1200ml fluid restriction.

## 2025-05-08 NOTE — PLAN OF CARE
Goal Outcome Evaluation:  Plan of Care Reviewed With: patient           Outcome Evaluation: Upon entering room, pt. supine in bed, awake/alert, and agreeable to work with P.T./O.T. this date despite c/o fatigue.  This PM, pt. requires Max. assist x 3 for bed mobility and Max. assist x 2 (with HHA x 2) for sit <-> stand transfers.  Pt. performed sit <-> stand transfers (elevated bed surface) x 3 reps for functional strength training. With each stance, pt. able to clear her bottom off of the bed but was unable to stand fully upright.  Mod. verbal/tactile cueing for posture correction throughout upright mobility.  Overall improved tolerance to functional activity this date compared to last P.T. session with initiation of standing transfers.  Will continue to progress functional mobility as tolerated.    Anticipated Discharge Disposition (PT): skilled nursing facility

## 2025-05-08 NOTE — PROGRESS NOTES
LOS: 3 days   Patient Care Team:  Adilson Wilkerson MD as PCP - General (Family Medicine)  [place] Segovia MD as Referring Physician (Obstetrics and Gynecology)  Brennan Rogers MD as Cardiologist (Cardiology)  Caleb Garcia MD as Consulting Physician (Pulmonary Disease)  Jess Sanz, RN as Ambulatory  ([facility])    Chief Complaint: Follow-up mitral valve endocarditis, acute on chronic diastolic CHF    Interval History: She was sitting up in bed eating breakfast.  No acute events overnight.    Vital Signs:  Temp:  [97.5 °F (36.4 °C)-97.8 °F (36.6 °C)] 97.8 °F (36.6 °C)  Heart Rate:  [70-90] 90  Resp:  [18-22] 22  BP: (108-112)/(65-74) 108/74    Intake/Output Summary (Last 24 hours) at 5/8/2025 0842  Last data filed at 5/8/2025 0835  Gross per 24 hour   Intake 565 ml   Output 2600 ml   Net -2035 ml        Physical Exam  Vitals reviewed.   Constitutional:       General: She is not in acute distress.  HENT:      Head: Normocephalic.      Nose: Nose normal.   Eyes:      Extraocular Movements: Extraocular movements intact.      Pupils: Pupils are equal, round, and reactive to light.   Cardiovascular:      Rate and Rhythm: Normal rate and regular rhythm.      Pulses: Normal pulses.      Heart sounds: Heart sounds not distant. Murmur heard.      No friction rub. No gallop. No S3 or S4 sounds.   Pulmonary:      Effort: Pulmonary effort is normal.      Breath sounds: Decreased breath sounds present.   Abdominal:      General: Abdomen is flat. Bowel sounds are normal.      Palpations: Abdomen is soft.      Tenderness: There is no abdominal tenderness.   Skin:     General: Skin is warm and dry.   Neurological:      General: No focal deficit present.      Mental Status: She is alert and oriented to person, place, and time. Mental status is at baseline.   Psychiatric:         Mood and Affect: Mood normal.         Behavior: Behavior normal.         Results Review:    Results from last 7 days   Lab  Units 05/08/25  0614   SODIUM mmol/L 132*   POTASSIUM mmol/L 4.0   CHLORIDE mmol/L 98   CO2 mmol/L 22.7   BUN mg/dL 52*   CREATININE mg/dL 1.20*   GLUCOSE mg/dL 99   CALCIUM mg/dL 8.9     Results from last 7 days   Lab Units 05/04/25  1036 05/04/25  0849   HSTROP T ng/L 81* 72*     Results from last 7 days   Lab Units 05/08/25  0614   WBC 10*3/mm3 10.42   HEMOGLOBIN g/dL 8.6*   HEMATOCRIT % 26.2*   PLATELETS 10*3/mm3 160     Results from last 7 days   Lab Units 05/07/25  1642 05/07/25  0602 05/06/25  0618 05/05/25 2008   INR   --   --   --  4.49*   APTT seconds 31.3 29.2 40.7* 40.5*         Results from last 7 days   Lab Units 05/07/25  0602   MAGNESIUM mg/dL 1.9           I reviewed the patient's new clinical results.        Assessment & Plan:  Group B strep bacteremia  Mitral valve endocarditis  Large vegetation on posterior leaflet of the mitral valve by echocardiogram on 5/5/2025  6-week course of antibiotic therapy per infectious disease.  Dr. Martinez has evaluated and recommends medical management due to multiple comorbidities and high morbidity and mortality with open heart surgery.  Acute hypoxic respiratory failure  Requiring 2 L nasal cannula on my exam today.  Super morbid obesity  Complicating all aspects of care  Pulmonary hypertension  Acute on chronic diastolic and right-sided CHF  Secondary to #2, #3, and #4  Nephrology is following, continue oral diuretics and fluid restriction.  Chronic lower extremity lymphedema with venous stasis changes  Mild to moderate aortic stenosis by echocardiogram on 5/5/2025  Acute kidney injury with stage IIIa chronic kidney disease  Acute on chronic hyponatremia  Sodium improved to 132 today.  Severe obstructive sleep apnea  On CPAP  Elevated troponin  Type II NSTEMI  Secondary to #1, #2, #3, #9  History of DVT and pulmonary embolism in 2015  Xarelto resumed today.   Anemia  Elevated liver function tests  Hypoalbuminemia  Hepatitis C antibody  positive  Deconditioning    No changes from a cardiac standpoint today.     Kiara Romero, APRN  05/08/25  08:42 EDT

## 2025-05-08 NOTE — PROGRESS NOTES
[facility] Progress Note      Patient Name: Emely Solomon  : 1960  MRN: [id number]  Primary Care Physician:  Adilson Wilkerson MD  Date of admission: 2025    Subjective     Interval History:   Follow-up acute on intermittent chronic hyponatremia, acute kidney injury on CKD 3A.  Intake not recorded.  Urine output 3.4 L.  Weight inconsistent.  On ceftriaxone.  Feels better today.  Able to stand for 5 seconds with PT this morning.  Review of Systems:   As noted above    Objective     Vitals:   Temp:  [97.5 °F (36.4 °C)-97.8 °F (36.6 °C)] 97.8 °F (36.6 °C)  Heart Rate:  [70-90] 90  Resp:  [18-22] 22  BP: (108-112)/(65-74) 108/74  Flow (L/min) (Oxygen Therapy):  [2] 2    Intake/Output Summary (Last 24 hours) at 2025 1101  Last data filed at 2025 0835  Gross per 24 hour   Intake 477 ml   Output 2600 ml   Net -2123 ml       Physical Exam:    General Appearance: alert, on nasal cannula oxygen 4 L.  Morbidly obese.  less dyspnea with conversation.  Sitting on side of the bed.  Skin: warm and dry  HEENT: oral mucosa normal, nonicteric sclera  Neck: supple, no JVD  Lungs: Bilateral rales with end expiratory wheezing.  Heart: RRR, normal S1 and S2  Abdomen: soft, nontender, nondistended. +bs.  Obese  : Pure wick  Extremities: Chronic lower extremity lymphedema.  3+.  Upper extremity edema 1+.  Neuro: normal speech and mental status     Scheduled Meds:     cefTRIAXone, 2,000 mg, Intravenous, Q12H  dextromethorphan polistirex ER, 60 mg, Oral, Q12H  famotidine, 40 mg, Oral, Daily  ferrous sulfate, 325 mg, Oral, Daily With Breakfast  furosemide, 40 mg, Oral, Daily  guaiFENesin, 1,200 mg, Oral, Q12H  hydrocortisone-bacitracin-zinc oxide-nystatin, 1 Application, Topical, Q12H  ipratropium-albuterol, 3 mL, Nebulization, 4x Daily - RT  Lidocaine, 1 patch, Transdermal, Q24H  metoprolol succinate XL, 25 mg, Oral, Daily  [START ON 2025] rivaroxaban, 20 mg, Oral, Daily  sodium  chloride, 10 mL, Intravenous, Q12H  Urea, 15 g, Oral, BID      IV Meds:          Results Reviewed:   I have personally reviewed the results from the time of this admission to 5/8/2025 11:01 EDT     Results from last 7 days   Lab Units 05/08/25  0614 05/07/25  1556 05/07/25  0602 05/06/25  1817 05/06/25  0618   SODIUM mmol/L 132* 128* 128*   < > 123*   POTASSIUM mmol/L 4.0 4.0 4.0   < > 4.1   CHLORIDE mmol/L 98 94* 96*   < > 92*   CO2 mmol/L 22.7 23.5 21.9*   < > 21.0*   BUN mg/dL 52* 53* 49*   < > 33*   CREATININE mg/dL 1.20* 1.33* 1.26*   < > 1.50*   CALCIUM mg/dL 8.9 9.0 8.5*   < > 8.5*   BILIRUBIN mg/dL 0.5  --  0.4  --  0.5   ALK PHOS U/L 304*  --  266*  --  301*   ALT (SGPT) U/L 24  --  28  --  38*   AST (SGOT) U/L 56*  --  78*  --  110*   GLUCOSE mg/dL 99 94 94   < > 96    < > = values in this interval not displayed.       Estimated Creatinine Clearance: 78.9 mL/min (A) (by C-G formula based on SCr of 1.2 mg/dL (H)).    Results from last 7 days   Lab Units 05/07/25  0602 05/06/25  0618   MAGNESIUM mg/dL 1.9 1.8   PHOSPHORUS mg/dL  --  2.9       Results from last 7 days   Lab Units 05/06/25  0618 05/04/25  1744   URIC ACID mg/dL 7.8* 7.9*       Results from last 7 days   Lab Units 05/08/25  0614 05/07/25  1759 05/07/25  0603 05/06/25  1549 05/06/25  1153 05/06/25  0618 05/05/25  0728 05/04/25  0849   WBC 10*3/mm3 10.42  --  11.85*  --   --  13.40* 13.95* 8.82   HEMOGLOBIN g/dL 8.6* 8.8* 7.9* 8.6* 8.3* 7.6* 7.9* 8.7*   PLATELETS 10*3/mm3 160  --  154  --   --  150 175 220       Results from last 7 days   Lab Units 05/05/25 2008   INR  4.49*       Assessment / Plan     ASSESSMENT:  Hyponatremia, acute on intermittent chronic.  TSH and cortisol normal.  Urine studies consistent with SIADH.  On fluid restriction.    Added urea and Lasix 5/6/2025.   Sodium better today.    Acute kidney injury on CKD 3A baseline creatinine 1.2.  Now at baseline.  Underlying hypertensive nephrosclerosis and cardiorenal syndrome.   Acute component due to hypotension with impaired renal autoregulation due to ARB.  3.  Strep bacteremia with mitral valve endocarditis.  On ceftriaxone.  Cardiothoracic surgery recommends medical management.  4.  Acute on chronic heart failure preserved ejection fraction.  Continue Lasix today.  Blood pressure still too low.  Reduce metoprolol further  5.  Left lower lobe pneumonia.  Acute hypoxic respiratory failure  6.  History of DVT and pulmonary embolism on Xarelto.  7.  Elevated transaminases.  Slowly improving.  8.  Iron deficiency anemia.  No IV iron while active infection.  9.  Hepatitis C antibody positive.  10.  Morbid obesity.  PLAN:  Continue urea 15 g twice daily  Lasix 40 mg daily.  3.  Reduce metoprolol to 12.5 mg daily.  Thank you for involving us in the care of Emely AYAAN Solomon.  Please feel free to call with any questions.    Latasha Song MD  05/08/25  11:01 EDT    [facility]  [phone]    Please note that portions of this note were completed with a voice recognition program.

## 2025-05-08 NOTE — PLAN OF CARE
Goal Outcome Evaluation:  Plan of Care Reviewed With: patient        Progress: improving  Outcome Evaluation: Pt seen for OT/PT co-tx session this am, agreeable and endorses feeling better. Pt demo'ing slight improvement with fxnl ability and act tolerance today. Pt able to sit EOB with Max x3 for assist with trunk and BLEs. Tech support utilized during session for additional support for safety. Setup A for light grooming sitting up in bed. Pt able to partially stand 2x from EOB with Max x2 and devora HHA using rocking tech for momentum. Pt still reqs a substantial amount of assist and could benefot from Snf at [facility] before returning home safely. IPOT will continue to monitor.    Anticipated Discharge Disposition (OT): skilled nursing facility

## 2025-05-08 NOTE — THERAPY TREATMENT NOTE
Patient Name: Emely Solomon  : 1960    MRN: [id number]                              Today's Date: 2025       Admit Date: 2025    Visit Dx:     ICD-10-CM ICD-9-CM   1. Hypoxia  R09.02 799.02   2. Contusion of upper back, unspecified laterality, initial encounter  S20.229A 922.31   3. BMI 60.0-69.9, adult  Z68.44 V85.44   4. Generalized weakness  R53.1 780.79   5. Renal insufficiency  N28.9 593.9   6. Anemia, unspecified type  D64.9 285.9   7. Fall, initial encounter  W19.XXXA E888.9   8. Chronic anticoagulation  Z79.01 V58.61     Patient Active Problem List   Diagnosis    Chronic diastolic CHF (congestive heart failure)    PFO (patent foramen ovale)    Paradoxical embolism    Essential hypertension    Pulmonary hypertension    Upper back pain    Acute kidney failure    Bacteremia due to group B Streptococcus    Obstructive sleep apnea    Class 3 severe obesity due to excess calories with serious comorbidity and body mass index (BMI) of 60.0 to 69.9 in adult    History of DVT (deep vein thrombosis)    Lymphedema of lower extremity, unspecified laterality    Anemia, chronic disease    Chronic renal failure (CRF), stage 3 (moderate)    Iron deficiency    Encounter for screening for malignant neoplasm of colon    Post-menopausal bleeding    Thickened endometrium    Generalized muscle weakness    Right bundle branch block (RBBB) with left anterior fascicular block (LAFB)    PVD (peripheral vascular disease) with claudication    Iliac vein stenosis, right    Venous stasis ulcer of right calf limited to breakdown of skin without varicose veins    Chronic venous hypertension with ulcer and inflammation involving right side    Venous stasis ulcer of ankle with fat layer exposed    Hypoxia    Fall    Acute wheezy bronchitis    Elevated troponin    Hyperglycemia    History of pulmonary embolism    Morbid obesity    Hyponatremia    Bacterial endocarditis     Past Medical History:   Diagnosis Date    Arthritis      Atheroembolism of other site 04/14/2016    Cellulitis     11/2017, with Group B Strep bacteremia and sepsis    Chronic deep vein thrombosis (DVT) of left popliteal vein 12/17/2015 02/04/2016, 04/14/2016    Chronic diastolic congestive heart failure     COVID-19 virus infection 11/2020    Deep venous thrombosis     Exercise involving walking     Heart murmur     Hypertension     Hypo-osmolality and hyponatremia 09/29/2020    Lipoedema     Lymphedema     other    Morbid obesity     Paradoxical embolism     to the LLE due to DVT/PE and PFO    PFO (patent foramen ovale)     Postthrombotic syndrome of left lower extremity without complications 12/17/2015    postphletibis    Pulmonary embolism 04/14/2016    Pulmonary hypertension     multifactorial (dCHF, obesity/ARIEL, hx PE), mild by echo 1/2016    Right bundle branch block (RBBB) with left anterior fascicular block (LAFB)     Sepsis 09/18/2020    Sleep apnea      Past Surgical History:   Procedure Laterality Date    ARTERIOGRAM  07/2015    BRONCHOSCOPY N/A 07/21/2017    Procedure: BRONCHOSCOPY with wash;  Surgeon: Caleb Garcia MD;  Location: [facility] ENDOSCOPY;  Service:     COLONOSCOPY      COLONOSCOPY N/A 01/26/2023    Procedure: COLONOSCOPY to CECUM AND TERM ILEUM;  Surgeon: Jj Dean MD;  Location: [facility] ENDOSCOPY;  Service: Gastroenterology;  Laterality: N/A;  PRE OP -screening  POST OP -  NORMAL    D & C HYSTEROSCOPY N/A 03/08/2022    Procedure: DILATATION AND CURETTAGE with hysteroscopy;  Surgeon: Kaylah Land DO;  Location: [facility] MAIN OR;  Service: Gynecology Oncology;  Laterality: N/A;    DILATATION AND CURETTAGE  04/11/2011    OTHER SURGICAL HISTORY  09/2015    IVC filter    OTHER SURGICAL HISTORY      left LE revascularization    OTHER SURGICAL HISTORY      ALESSIA and LEV scan    THROMBECTOMY  07/2015      General Information       Row Name 05/08/25 1112          Physical Therapy Time and Intention    Document Type therapy note (daily  note)  -MS     Mode of Treatment physical therapy;occupational therapy;co-treatment  Activity limitation due to fatigue/weakness; Working on functional balance and strengthening while performing ADL's  -MS       Row Name 05/08/25 1115          General Information    Patient Profile Reviewed yes  -MS     Existing Precautions/Restrictions fall   -MS     Barriers to Rehab physical barrier  -MS       Row Name 05/08/25 1115          Cognition    Orientation Status (Cognition) oriented x 3  -MS       Row Name 05/08/25 1115          Safety Issues/Impairments Affecting Functional Mobility    Comment, Safety Issues/Impairments (Mobility) Gait belt used for safety.  -MS               User Key  (r) = Recorded By, (t) = Taken By, (c) = Cosigned By      Initials Name Provider Type    Juan Nolan, PT Physical Therapist                   Mobility       Row Name 05/08/25 1116          Bed Mobility    Supine-Sit Alpha (Bed Mobility) maximum assist (25% patient effort)   Max. assist x 3  -MS     Sit-Supine Alpha (Bed Mobility) maximum assist (25% patient effort)   Max. assist x 3  -MS     Comment, (Bed Mobility) Assist for Upper body and x 1 assist for each L.E.  -MS       Row Name 05/08/25 1116          Sit-Stand Transfer    Sit-Stand Alpha (Transfers) maximum assist (25% patient effort);2 person assist  -MS     Assistive Device (Sit-Stand Transfers) --  HHA x 2 and use of gait belt  -MS     Comment, (Sit-Stand Transfer) Pt. performed sit <-> stand transfers x 3 reps for functional strength training.  Bilateral feet blocked for safety.  Pt. able to clear her bottom off of the bed but is unable to stand fully upright with each stance.  Mod. verbal/tactile cueing to try and correct posture.  Elevated bed surface required.  -MS               User Key  (r) = Recorded By, (t) = Taken By, (c) = Cosigned By      Initials Name Provider Type    Juan Nolan, PT Physical Therapist                    Obj/Interventions    No documentation.                  Goals/Plan    No documentation.                  Clinical Impression       Row Name 05/08/25 1118          Pain    Pretreatment Pain Rating 0/10 - no pain  -MS     Posttreatment Pain Rating 0/10 - no pain  -MS       Row Name 05/08/25 1118          Positioning and Restraints    Pre-Treatment Position in bed  -MS     Post Treatment Position bed  -MS     In Bed notified nsg;supine;call light within reach;encouraged to call for assist  All lines intact.  -MS               User Key  (r) = Recorded By, (t) = Taken By, (c) = Cosigned By      Initials Name Provider Type    MS WeeksJuan, PT Physical Therapist                   Outcome Measures       Row Name 05/08/25 1119          How much help from another person do you currently need...    Turning from your back to your side while in flat bed without using bedrails? 2  -MS     Moving from lying on back to sitting on the side of a flat bed without bedrails? 2  -MS     Moving to and from a bed to a chair (including a wheelchair)? 1  -MS     Standing up from a chair using your arms (e.g., wheelchair, bedside chair)? 2  -MS     Climbing 3-5 steps with a railing? 1  -MS     To walk in hospital room? 1  -MS     AM-PAC 6 Clicks Score (PT) 9  -MS     Highest Level of Mobility Goal 3 --> Sit at edge of bed  -MS       Row Name 05/08/25 1119 05/08/25 1014       Functional Assessment    Outcome Measure Options AM-PAC 6 Clicks Basic Mobility (PT)  -MS AM-PAC 6 Clicks Daily Activity (OT)  -PP              User Key  (r) = Recorded By, (t) = Taken By, (c) = Cosigned By      Initials Name Provider Type    MS DuttaerJuan, PT Physical Therapist    Katina Mi OT Occupational Therapist                                 Physical Therapy Education       Title: PT OT SLP Therapies (In Progress)       Topic: Physical Therapy (In Progress)       Point: Mobility training (Done)       Learning Progress Summary             Patient Acceptance, E,D, VU,NR by MS at 5/8/2025 1119    Acceptance, E,D, VU,NR by MS at 5/6/2025 0944                      Point: Home exercise program (Not Started)       Learner Progress:  Not documented in this visit.              Point: Body mechanics (Done)       Learning Progress Summary            Patient Acceptance, E,D, VU,NR by MS at 5/8/2025 1119    Acceptance, E,D, VU,NR by MS at 5/6/2025 0944                      Point: Precautions (Done)       Learning Progress Summary            Patient Acceptance, E,D, VU,NR by MS at 5/8/2025 1119    Acceptance, E,D, VU,NR by MS at 5/6/2025 0944                                      User Key       Initials Effective Dates Name Provider Type Discipline    MS 06/16/21 -  Juan Weeks, PT Physical Therapist PT                  PT Recommendation and Plan  Planned Therapy Interventions (PT): balance training, bed mobility training, gait training, home exercise program, patient/family education, postural re-education, transfer training, strengthening, ROM (range of motion)  Outcome Evaluation: Upon entering room, pt. supine in bed, awake/alert, and agreeable to work with P.T./O.T. this date despite c/o fatigue.  This PM, pt. requires Max. assist x 3 for bed mobility and Max. assist x 2 (with HHA x 2) for sit <-> stand transfers.  Pt. performed sit <-> stand transfers (elevated bed surface) x 3 reps for functional strength training. With each stance, pt. able to clear her bottom off of the bed but was unable to stand fully upright.  Mod. verbal/tactile cueing for posture correction throughout upright mobility.  Overall improved tolerance to functional activity this date compared to last P.T. session with initiation of standing transfers.  Will continue to progress functional mobility as tolerated.     Time Calculation:         PT Charges       Row Name 05/08/25 1123             Time Calculation    Start Time 0900  -MS      Stop Time 0925  -MS      Time Calculation  (min) 25 min  -MS      PT Received On 05/08/25  -MS      PT - Next Appointment 05/09/25  -MS         Time Calculation- PT    Total Timed Code Minutes- PT 24 minute(s)  -MS                User Key  (r) = Recorded By, (t) = Taken By, (c) = Cosigned By      Initials Name Provider Type    Juan Nolan, PT Physical Therapist                  Therapy Charges for Today       Code Description Service Date Service Provider Modifiers Qty    75572671270 HC PT THERAPEUTIC ACT EA 15 MIN 5/8/2025 Juan Weeks, PT GP 2    98854776809 HC PT THER SUPP EA 15 MIN 5/8/2025 Juan Weeks, PT GP 2            PT G-Codes  Outcome Measure Options: AM-PAC 6 Clicks Basic Mobility (PT)  AM-PAC 6 Clicks Score (PT): 9  AM-PAC 6 Clicks Score (OT): 11  PT Discharge Summary  Anticipated Discharge Disposition (PT): skilled nursing facility    uJan Weeks, PT  5/8/2025

## 2025-05-08 NOTE — PROGRESS NOTES
LOS: 3 days     Chief Complaint: Bacteremia and endocarditis    Interval History: Afebrile, no new complaints or events.  Tolerating antibiotics without diarrhea or rash    Vital Signs  Temp:  [97.5 °F (36.4 °C)-97.8 °F (36.6 °C)] 97.8 °F (36.6 °C)  Heart Rate:  [70-90] 90  Resp:  [18-22] 22  BP: (108-112)/(65-74) 108/74    Physical Exam:  General: In no acute distress  Cardiovascular: RRR, positive lower extremity edema  Respiratory: Basilar crackles  GI: Obese nontender  Skin: No rashes   Extremities: Bilateral lymphedema    Antibiotics:  Ceftriaxone 2 g IV every 12 hours     Results Review:      Lab Results   Component Value Date    WBC 10.42 05/08/2025    HGB 8.6 (L) 05/08/2025    HCT 26.2 (L) 05/08/2025    MCV 76.4 (L) 05/08/2025     05/08/2025     Lab Results   Component Value Date    GLUCOSE 99 05/08/2025    BUN 52 (H) 05/08/2025    CREATININE 1.20 (H) 05/08/2025    EGFRIFNONA 42 (L) 08/30/2021    EGFRIFAFRI 59 (L) 04/15/2021    BCR 43.3 (H) 05/08/2025    CO2 22.7 05/08/2025    CALCIUM 8.9 05/08/2025    ALBUMIN 2.6 (L) 05/08/2025    AST 56 (H) 05/08/2025    ALT 24 05/08/2025       Microbiology:  5/6 BCx NGTD x 2  5/4 BCx group B strep x 2  5/4 RVP neg     Assessment & Plan   Group B strep bacteremia with concerns for mitral valve endocarditis  Fever  Acute hypoxic respiratory failure  Acute diastolic heart failure  Lower extremity lymphedema.  Hepatitis C antibody positive  Morbid obesity with a BMI of 66 complicating above    Fevers and leukocytosis have resolved. Repeat blood cultures are negative to date.  Continue ceftriaxone 2 g IV every 12 hours for group B strep bacteremia and mitral valve endocarditis.  Medical management only given the patient's comorbidities.  Anticipate a 6-week course of IV antibiotic therapy.    Hepatitis C viral not detected therefore no need for any further workup or treatment. Labs represent chronic hep C     Final antibiotic recommendations  Ceftriaxone 2g IV q12hrs x  6 weeks. Abx stop dateJune 17th  Please monitor weekly CBC/diff and Cr and fax the results to the ID clinic at [phone]  Will schedule in the ID clinic on JUne 16th    Ok to place a PICC line at anytime

## 2025-05-08 NOTE — PROGRESS NOTES
"      [facility]         Dr Esteban Sayied   LOS: 3 days   Patient Care Team:  Adilson Wilkerson MD as PCP - General (Family Medicine)  [place] Segovia MD as Referring Physician (Obstetrics and Gynecology)  Brennan Rogers MD as Cardiologist (Cardiology)  Caleb Garcia MD as Consulting Physician (Pulmonary Disease)  Jess Sanz, RN as Ambulatory  ([facility])    Chief Complaint: Acute respiratory failure with mitral valve endocarditis with vegetation group B strep bacteremia pulmonary hypertension other issues as listed below    Interval History: Resting comfortably on nasal cannula oxygen.  Sleepy but wakes up follows simple commands.    REVIEW OF SYSTEMS:   CARDIOVASCULAR: No chest pain, chest pressure or chest discomfort. No palpitations or edema.   RESPIRATORY: Short of breath with activity  GASTROINTESTINAL: No anorexia, nausea, vomiting or diarrhea. No abdominal pain or blood.   HEMATOLOGIC: No bleeding or bruising.     Ventilator/Non-Invasive Ventilation Settings (From admission, onward)      None              Vital Signs  Temp:  [97.5 °F (36.4 °C)-97.8 °F (36.6 °C)] 97.8 °F (36.6 °C)  Heart Rate:  [70-90] 90  Resp:  [18-22] 22  BP: (108-112)/(65-74) 108/74    Intake/Output Summary (Last 24 hours) at 5/8/2025 1051  Last data filed at 5/8/2025 0835  Gross per 24 hour   Intake 477 ml   Output 2600 ml   Net -2123 ml     Flowsheet Rows      Flowsheet Row First Filed Value   Admission Height 160 cm (63\") Documented at 05/04/2025 0713   Admission Weight 170 kg (375 lb) Documented at 05/04/2025 0713                  Physical Exam:  Patient is examined using the personal protective equipment as per guidelines from infection control for this particular patient as enacted.  Hand hygiene was performed before and after patient interaction.   General Appearance:    Alert, cooperative, in no acute distress.  Following simple commands  ENT Mallampati between 3 and 4 no nasal " congestion  Neck midline trachea, no thyromegaly   Lungs:   Diminished breath sounds with rhonchi in the bases    Heart:    Regular rhythm and normal rate, normal S1 and S2, no            murmur, no gallop, no rub, no click   Chest Wall:    No abnormalities observed   Abdomen:     Normal bowel sounds, no masses, no organomegaly, soft        nontender, nondistended, no guarding, no rebound                tenderness   Extremities:   Moves all extremities well, 1+ edema, no cyanosis, no             redness  CNS no focal neurological deficits normal sensory exam  Skin no rashes no nodules  Musculoskeletal no cyanosis no clubbing normal range of motion     Results Review:        Results from last 7 days   Lab Units 05/08/25  0614 05/07/25  1556 05/07/25  0602   SODIUM mmol/L 132* 128* 128*   POTASSIUM mmol/L 4.0 4.0 4.0   CHLORIDE mmol/L 98 94* 96*   CO2 mmol/L 22.7 23.5 21.9*   BUN mg/dL 52* 53* 49*   CREATININE mg/dL 1.20* 1.33* 1.26*   GLUCOSE mg/dL 99 94 94   CALCIUM mg/dL 8.9 9.0 8.5*     Results from last 7 days   Lab Units 05/04/25  1036 05/04/25  0849   HSTROP T ng/L 81* 72*     Results from last 7 days   Lab Units 05/08/25  0614 05/07/25  1759 05/07/25  0603 05/06/25  1153 05/06/25  0618   WBC 10*3/mm3 10.42  --  11.85*  --  13.40*   HEMOGLOBIN g/dL 8.6* 8.8* 7.9*   < > 7.6*   HEMATOCRIT % 26.2* 27.1* 23.9*   < > 23.8*   PLATELETS 10*3/mm3 160  --  154  --  150    < > = values in this interval not displayed.     Results from last 7 days   Lab Units 05/07/25  1642 05/07/25  0602 05/06/25  0618 05/05/25 2008   INR   --   --   --  4.49*   APTT seconds 31.3 29.2 40.7* 40.5*         Results from last 7 days   Lab Units 05/07/25  0602   MAGNESIUM mg/dL 1.9               I reviewed the patient's new clinical results.  I personally viewed and interpreted the patient's chest x-ray.        Medication Review:   cefTRIAXone, 2,000 mg, Intravenous, Q12H  dextromethorphan polistirex ER, 60 mg, Oral, Q12H  famotidine, 40 mg,  Oral, Daily  ferrous sulfate, 325 mg, Oral, Daily With Breakfast  furosemide, 40 mg, Oral, Daily  guaiFENesin, 1,200 mg, Oral, Q12H  hydrocortisone-bacitracin-zinc oxide-nystatin, 1 Application, Topical, Q12H  ipratropium-albuterol, 3 mL, Nebulization, 4x Daily - RT  Lidocaine, 1 patch, Transdermal, Q24H  metoprolol succinate XL, 25 mg, Oral, Daily  [START ON 5/9/2025] rivaroxaban, 20 mg, Oral, Daily  sodium chloride, 10 mL, Intravenous, Q12H  Urea, 15 g, Oral, BID               ASSESSMENT:   Acute hypoxic respiratory failure  Mitral valve endocarditis  Group B strep bacteremia  Pulmonary hypertension  Heart failure with preserved ejection fraction  Fall with back pain  Acute kidney injury  Hyponatremia  Obstructive sleep apnea on noninvasive ventilation  Elevated troponin and BNP  Hyperglycemia  Lower extremity lymphedema  History of DVT/PE on Xarelto  Super morbid obesity    PLAN:  Respiratory status stable and improving.  Wean off nasal cannula oxygen as tolerated.  Group B bacteremia with endocarditis.  IV antibiotics with Rocephin for infectious diseases.  Fevers have resolved.  Pulmonary hypertension severe noted on repeat echo.   Now in the setting of mitral valve endocarditis and bacteremia doubt right heart cath can be done at this time.  May repeat echo down the road  CT chest noted with left lower lobe atelectasis versus infiltrate.  Patient on adequate antibiotics to cover for pneumonia.  Home CPAP to be used at bedside  Mobilize ambulate        Eulalia Delaney MD  05/08/25  10:51 EDT

## 2025-05-08 NOTE — THERAPY TREATMENT NOTE
Patient Name: Emely Solomon  : 1960    MRN: [id number]                              Today's Date: 2025       Admit Date: 2025    Visit Dx:     ICD-10-CM ICD-9-CM   1. Hypoxia  R09.02 799.02   2. Contusion of upper back, unspecified laterality, initial encounter  S20.229A 922.31   3. BMI 60.0-69.9, adult  Z68.44 V85.44   4. Generalized weakness  R53.1 780.79   5. Renal insufficiency  N28.9 593.9   6. Anemia, unspecified type  D64.9 285.9   7. Fall, initial encounter  W19.XXXA E888.9   8. Chronic anticoagulation  Z79.01 V58.61     Patient Active Problem List   Diagnosis    Chronic diastolic CHF (congestive heart failure)    PFO (patent foramen ovale)    Paradoxical embolism    Essential hypertension    Pulmonary hypertension    Upper back pain    Acute kidney failure    Bacteremia due to group B Streptococcus    Obstructive sleep apnea    Class 3 severe obesity due to excess calories with serious comorbidity and body mass index (BMI) of 60.0 to 69.9 in adult    History of DVT (deep vein thrombosis)    Lymphedema of lower extremity, unspecified laterality    Anemia, chronic disease    Chronic renal failure (CRF), stage 3 (moderate)    Iron deficiency    Encounter for screening for malignant neoplasm of colon    Post-menopausal bleeding    Thickened endometrium    Generalized muscle weakness    Right bundle branch block (RBBB) with left anterior fascicular block (LAFB)    PVD (peripheral vascular disease) with claudication    Iliac vein stenosis, right    Venous stasis ulcer of right calf limited to breakdown of skin without varicose veins    Chronic venous hypertension with ulcer and inflammation involving right side    Venous stasis ulcer of ankle with fat layer exposed    Hypoxia    Fall    Acute wheezy bronchitis    Elevated troponin    Hyperglycemia    History of pulmonary embolism    Morbid obesity    Hyponatremia    Bacterial endocarditis     Past Medical History:   Diagnosis Date    Arthritis      Atheroembolism of other site 04/14/2016    Cellulitis     11/2017, with Group B Strep bacteremia and sepsis    Chronic deep vein thrombosis (DVT) of left popliteal vein 12/17/2015 02/04/2016, 04/14/2016    Chronic diastolic congestive heart failure     COVID-19 virus infection 11/2020    Deep venous thrombosis     Exercise involving walking     Heart murmur     Hypertension     Hypo-osmolality and hyponatremia 09/29/2020    Lipoedema     Lymphedema     other    Morbid obesity     Paradoxical embolism     to the LLE due to DVT/PE and PFO    PFO (patent foramen ovale)     Postthrombotic syndrome of left lower extremity without complications 12/17/2015    postphletibis    Pulmonary embolism 04/14/2016    Pulmonary hypertension     multifactorial (dCHF, obesity/ARIEL, hx PE), mild by echo 1/2016    Right bundle branch block (RBBB) with left anterior fascicular block (LAFB)     Sepsis 09/18/2020    Sleep apnea      Past Surgical History:   Procedure Laterality Date    ARTERIOGRAM  07/2015    BRONCHOSCOPY N/A 07/21/2017    Procedure: BRONCHOSCOPY with wash;  Surgeon: Caleb Garcia MD;  Location: [facility] ENDOSCOPY;  Service:     COLONOSCOPY      COLONOSCOPY N/A 01/26/2023    Procedure: COLONOSCOPY to CECUM AND TERM ILEUM;  Surgeon: Jj Dean MD;  Location: [facility] ENDOSCOPY;  Service: Gastroenterology;  Laterality: N/A;  PRE OP -screening  POST OP -  NORMAL    D & C HYSTEROSCOPY N/A 03/08/2022    Procedure: DILATATION AND CURETTAGE with hysteroscopy;  Surgeon: Kaylah Land DO;  Location: [facility] MAIN OR;  Service: Gynecology Oncology;  Laterality: N/A;    DILATATION AND CURETTAGE  04/11/2011    OTHER SURGICAL HISTORY  09/2015    IVC filter    OTHER SURGICAL HISTORY      left LE revascularization    OTHER SURGICAL HISTORY      ALESSIA and LEV scan    THROMBECTOMY  07/2015      General Information       Row Name 05/08/25 0957          OT Time and Intention    Subjective Information no complaints  -PP      Document Type therapy note (daily note)  -PP     Mode of Treatment occupational therapy;co-treatment;physical therapy  -PP     Patient Effort good  -PP     Symptoms Noted During/After Treatment none  -PP     Comment co-tx appropriate and tech for extra support d/t pt's activity limitation due to fatigue/weakness, size and acuity level; pt working on functional balance and strengthening while performing ADL's.  -PP       Row Name 05/08/25 0957          General Information    Patient Profile Reviewed yes  -PP     Existing Precautions/Restrictions fall;oxygen therapy device and L/min  -PP     Barriers to Rehab physical barrier  -PP       Row Name 05/08/25 0957          Cognition    Orientation Status (Cognition) oriented x 3  -PP       Row Name 05/08/25 0957          Safety Issues/Impairments Affecting Functional Mobility    Impairments Affecting Function (Mobility) balance;strength;endurance/activity tolerance;pain  -PP     Comment, Safety Issues/Impairments (Mobility) gait belt and non skid socks worn for safety  -PP               User Key  (r) = Recorded By, (t) = Taken By, (c) = Cosigned By      Initials Name Provider Type    PP Katina Wilkerson, OT Occupational Therapist                     Mobility/ADL's       Row Name 05/08/25 0959          Bed Mobility    Supine-Sit [place] (Bed Mobility) maximum assist (25% patient effort);2 person assist;1 person assist;verbal cues;nonverbal cues (demo/gesture)  -PP     Sit-Supine [place] (Bed Mobility) 1 person assist;2 person assist;maximum assist (25% patient effort);verbal cues;nonverbal cues (demo/gesture)  -PP     Assistive Device (Bed Mobility) bed rails  -PP     Comment, (Bed Mobility) Max A x3, PT assisting with trunk, OT & tech assisting with BLE, Cueing for seq tech but better effort today.  -PP       Row Name 05/08/25 0959          Transfers    Transfers sit-stand transfer;stand-sit transfer  -PP       Row Name 05/08/25 0959          Sit-Stand Transfer     Sit-Stand [place] (Transfers) maximum assist (25% patient effort);2 person assist  -PP     Assistive Device (Sit-Stand Transfers) other (see comments)  Noé HHA  -PP     Comment, (Sit-Stand Transfer) 2x partial STS from EOB, and 1x failed attempt to rise, Pt utilized rocking for momentum  -PP       Row Name 05/08/25 0959          Lower Body Dressing Assessment/Training    [place] Level (Lower Body Dressing) lower body dressing skills;shoes/slippers;maximum assist (25% patient effort)  -PP     Position (Lower Body Dressing) edge of bed sitting  -PP     Comment, (Lower Body Dressing) Pt able to slightly lift legs but unable to assist with fxnl reach limited by body habitus  -PP       Row Name 05/08/25 0959          Grooming Assessment/Training    [place] Level (Grooming) grooming skills;set up  -PP     Position (Grooming) sitting up in bed  -PP               User Key  (r) = Recorded By, (t) = Taken By, (c) = Cosigned By      Initials Name Provider Type    PP Katina Wilkerson, FABY Occupational Therapist                   Obj/Interventions       Row Name 05/08/25 1004          Balance    Balance Assessment sitting static balance;standing static balance  -PP     Static Sitting Balance standby assist;contact guard  -PP     Position, Sitting Balance unsupported;sitting edge of bed  -PP     Static Standing Balance maximum assist;2-person assist  -PP     Position/Device Used, Standing Balance supported;other (see comments)  Noé HHA  -PP     Balance Interventions sitting;static  -PP     Comment, Balance Pt initially Mod A to sit EOB and prog to Cga for extended time.  -PP               User Key  (r) = Recorded By, (t) = Taken By, (c) = Cosigned By      Initials Name Provider Type    PP Katina Wilkerson, OT Occupational Therapist                   Goals/Plan    No documentation.                  Clinical Impression       Row Name 05/08/25 1005          Pain Assessment    Pre/Posttreatment Pain Comment Pt  had no complaints at this time  -PP       Row Name 05/08/25 1005          Plan of Care Review    Plan of Care Reviewed With patient  -PP     Progress improving  -PP     Outcome Evaluation Pt seen for OT/PT co-tx session this am, agreeable and endorses feeling better. Pt demo'ing slight improvement with fxnl ability and act tolerance today. Pt able to sit EOB with Max x3 for assist with trunk and BLEs. Tech support utilized during session for additional support for safety. Setup A for light grooming sitting up in bed. Pt able to partially stand 2x from EOB with Max x2 and devora HHA using rocking tech for momentum. Pt still reqs a substantial amount of assist and could benefot from Snf at [place] before returning home safely. IPOT will continue to monitor.  -PP       Row Name 05/08/25 1005          Therapy Plan Review/Discharge Plan (OT)    Anticipated Discharge Disposition (OT) skilled nursing facility  -PP       Row Name 05/08/25 1005          Vital Signs    O2 Delivery Pre Treatment nasal cannula  -PP     O2 Delivery Intra Treatment nasal cannula  -PP     O2 Delivery Post Treatment nasal cannula  -PP       Row Name 05/08/25 1005          Positioning and Restraints    Pre-Treatment Position in bed  -PP     Post Treatment Position bed  -PP     In Bed notified nsg;call light within reach;fowlers;encouraged to call for assist;exit alarm on  -PP               User Key  (r) = Recorded By, (t) = Taken By, (c) = Cosigned By      Initials Name Provider Type    PP Katina Wilkerson OT Occupational Therapist                   Outcome Measures       Row Name 05/08/25 1014          How much help from another is currently needed...    Putting on and taking off regular lower body clothing? 1  -PP     Bathing (including washing, rinsing, and drying) 1  -PP     Toileting (which includes using toilet bed pan or urinal) 1  -PP     Putting on and taking off regular upper body clothing 2  -PP     Taking care of personal grooming (such as  brushing teeth) 3  -PP     Eating meals 3  -PP     AM-PAC 6 Clicks Score (OT) 11  -PP       Row Name 05/08/25 1014          Functional Assessment    Outcome Measure Options AM-PAC 6 Clicks Daily Activity (OT)  -PP               User Key  (r) = Recorded By, (t) = Taken By, (c) = Cosigned By      Initials Name Provider Type    PP Katina Wilkerson OT Occupational Therapist                    Occupational Therapy Education       Title: PT OT SLP Therapies (In Progress)       Topic: Occupational Therapy (In Progress)       Point: ADL training (Done)       Learning Progress Summary            Patient Acceptance, E, VU by PP at 5/6/2025 1516    Comment: Pt Ed on OT role and dc planning.                                      User Key       Initials Effective Dates Name Provider Type Discipline    PP 06/09/23 -  Katina Wilkerson OT Occupational Therapist OT                  OT Recommendation and Plan  Planned Therapy Interventions (OT): activity tolerance training, BADL retraining, functional balance retraining, occupation/activity based interventions, patient/caregiver education/training, strengthening exercise, transfer/mobility retraining, ROM/therapeutic exercise  Therapy Frequency (OT): 3 times/wk  Plan of Care Review  Plan of Care Reviewed With: patient  Progress: improving  Outcome Evaluation: Pt seen for OT/PT co-tx session this am, agreeable and endorses feeling better. Pt demo'ing slight improvement with fxnl ability and act tolerance today. Pt able to sit EOB with Max x3 for assist with trunk and BLEs. Tech support utilized during session for additional support for safety. Setup A for light grooming sitting up in bed. Pt able to partially stand 2x from EOB with Max x2 and devora HHA using rocking tech for momentum. Pt still reqs a substantial amount of assist and could benefot from Snf at [place] before returning home safely. IPOT will continue to monitor.     Time Calculation:   Evaluation Complexity (OT)  Review  Occupational Profile/Medical/Therapy History Complexity: expanded/moderate complexity  Assessment, Occupational Performance/Identification of Deficit Complexity: 3-5 performance deficits  Clinical Decision Making Complexity (OT): detailed assessment/moderate complexity  Overall Complexity of Evaluation (OT): moderate complexity     Time Calculation- OT       Row Name 05/08/25 0956             Time Calculation- OT    OT Start Time 0859  -PP      OT Stop Time 0926  -PP      OT Time Calculation (min) 27 min  -PP      Total Timed Code Minutes- OT 27 minute(s)  -PP      OT Received On 05/08/25  -PP      OT - Next Appointment 05/12/25  -PP         Timed Charges    51745 - OT Therapeutic Activity Minutes 19  -PP      00387 - OT Self Care/Mgmt Minutes 8  -PP         Total Minutes    Timed Charges Total Minutes 27  -PP       Total Minutes 27  -PP                User Key  (r) = Recorded By, (t) = Taken By, (c) = Cosigned By      Initials Name Provider Type    PP Rhea, Porshia, OT Occupational Therapist                  Therapy Charges for Today       Code Description Service Date Service Provider Modifiers Qty    83506561355  OT THERAPEUTIC ACT EA 15 MIN 5/8/2025 Rowe, Porshia, OT GO 1    [id number] HC OT SELF CARE/MGMT/TRAIN EA 15 MIN 5/8/2025 Rhea, Porshia, OT GO 1                 Porshia Rowe, OT  5/8/2025

## 2025-05-08 NOTE — THERAPY TREATMENT NOTE
Patient Name: Emely Solomon  : 1960    MRN: [id number]                              Today's Date: 2025       Admit Date: 2025    Visit Dx:     ICD-10-CM ICD-9-CM   1. Hypoxia  R09.02 799.02   2. Contusion of upper back, unspecified laterality, initial encounter  S20.229A 922.31   3. BMI 60.0-69.9, adult  Z68.44 V85.44   4. Generalized weakness  R53.1 780.79   5. Renal insufficiency  N28.9 593.9   6. Anemia, unspecified type  D64.9 285.9   7. Fall, initial encounter  W19.XXXA E888.9   8. Chronic anticoagulation  Z79.01 V58.61     Patient Active Problem List   Diagnosis    Chronic diastolic CHF (congestive heart failure)    PFO (patent foramen ovale)    Paradoxical embolism    Essential hypertension    Pulmonary hypertension    Upper back pain    Acute kidney failure    Bacteremia due to group B Streptococcus    Obstructive sleep apnea    Class 3 severe obesity due to excess calories with serious comorbidity and body mass index (BMI) of 60.0 to 69.9 in adult    History of DVT (deep vein thrombosis)    Lymphedema of lower extremity, unspecified laterality    Anemia, chronic disease    Chronic renal failure (CRF), stage 3 (moderate)    Iron deficiency    Encounter for screening for malignant neoplasm of colon    Post-menopausal bleeding    Thickened endometrium    Generalized muscle weakness    Right bundle branch block (RBBB) with left anterior fascicular block (LAFB)    PVD (peripheral vascular disease) with claudication    Iliac vein stenosis, right    Venous stasis ulcer of right calf limited to breakdown of skin without varicose veins    Chronic venous hypertension with ulcer and inflammation involving right side    Venous stasis ulcer of ankle with fat layer exposed    Hypoxia    Fall    Acute wheezy bronchitis    Elevated troponin    Hyperglycemia    History of pulmonary embolism    Morbid obesity    Hyponatremia    Bacterial endocarditis     Past Medical History:   Diagnosis Date    Arthritis      Atheroembolism of other site 04/14/2016    Cellulitis     11/2017, with Group B Strep bacteremia and sepsis    Chronic deep vein thrombosis (DVT) of left popliteal vein 12/17/2015 02/04/2016, 04/14/2016    Chronic diastolic congestive heart failure     COVID-19 virus infection 11/2020    Deep venous thrombosis     Exercise involving walking     Heart murmur     Hypertension     Hypo-osmolality and hyponatremia 09/29/2020    Lipoedema     Lymphedema     other    Morbid obesity     Paradoxical embolism     to the LLE due to DVT/PE and PFO    PFO (patent foramen ovale)     Postthrombotic syndrome of left lower extremity without complications 12/17/2015    postphletibis    Pulmonary embolism 04/14/2016    Pulmonary hypertension     multifactorial (dCHF, obesity/ARIEL, hx PE), mild by echo 1/2016    Right bundle branch block (RBBB) with left anterior fascicular block (LAFB)     Sepsis 09/18/2020    Sleep apnea      Past Surgical History:   Procedure Laterality Date    ARTERIOGRAM  07/2015    BRONCHOSCOPY N/A 07/21/2017    Procedure: BRONCHOSCOPY with wash;  Surgeon: Caleb Garcia MD;  Location: [facility] ENDOSCOPY;  Service:     COLONOSCOPY      COLONOSCOPY N/A 01/26/2023    Procedure: COLONOSCOPY to CECUM AND TERM ILEUM;  Surgeon: Jj Dean MD;  Location: [facility] ENDOSCOPY;  Service: Gastroenterology;  Laterality: N/A;  PRE OP -screening  POST OP -  NORMAL    D & C HYSTEROSCOPY N/A 03/08/2022    Procedure: DILATATION AND CURETTAGE with hysteroscopy;  Surgeon: Kaylah Land DO;  Location: [facility] MAIN OR;  Service: Gynecology Oncology;  Laterality: N/A;    DILATATION AND CURETTAGE  04/11/2011    OTHER SURGICAL HISTORY  09/2015    IVC filter    OTHER SURGICAL HISTORY      left LE revascularization    OTHER SURGICAL HISTORY      ALESSIA and LEV scan    THROMBECTOMY  07/2015      General Information       Row Name 05/08/25 0957          OT Time and Intention    Subjective Information no complaints  -PP      Document Type therapy note (daily note)  -PP     Mode of Treatment occupational therapy;co-treatment;physical therapy  -PP     Patient Effort good  -PP     Symptoms Noted During/After Treatment none  -PP     Comment co-tx appropriate and tech for extra support d/t pt's activity limitation due to fatigue/weakness, size and acuity level; pt working on functional balance and strengthening while performing ADL's.  -PP       Row Name 05/08/25 0957          General Information    Patient Profile Reviewed yes  -PP     Existing Precautions/Restrictions fall;oxygen therapy device and L/min  -PP     Barriers to Rehab physical barrier  -PP       Row Name 05/08/25 0957          Cognition    Orientation Status (Cognition) oriented x 3  -PP       Row Name 05/08/25 0957          Safety Issues/Impairments Affecting Functional Mobility    Impairments Affecting Function (Mobility) balance;strength;endurance/activity tolerance;pain  -PP     Comment, Safety Issues/Impairments (Mobility) gait belt and non skid socks worn for safety  -PP               User Key  (r) = Recorded By, (t) = Taken By, (c) = Cosigned By      Initials Name Provider Type    PP Katina Wilkerson, OT Occupational Therapist                     Mobility/ADL's       Row Name 05/08/25 0959          Bed Mobility    Supine-Sit [place] (Bed Mobility) maximum assist (25% patient effort);2 person assist;1 person assist;verbal cues;nonverbal cues (demo/gesture)  -PP     Sit-Supine [place] (Bed Mobility) 1 person assist;2 person assist;maximum assist (25% patient effort);verbal cues;nonverbal cues (demo/gesture)  -PP     Assistive Device (Bed Mobility) bed rails  -PP     Comment, (Bed Mobility) Max A x3, PT assisting with trunk, OT & tech assisting with BLE, Cueing for seq tech but better effort today.  -PP       Row Name 05/08/25 0959          Transfers    Transfers sit-stand transfer;stand-sit transfer  -PP       Row Name 05/08/25 0959          Sit-Stand Transfer     Sit-Stand [place] (Transfers) maximum assist (25% patient effort);2 person assist  -PP     Assistive Device (Sit-Stand Transfers) other (see comments)  Noé HHA  -PP     Comment, (Sit-Stand Transfer) 2x partial STS from EOB, and 1x failed attempt to rise, Pt utilized rocking for momentum  -PP       Row Name 05/08/25 0959          Lower Body Dressing Assessment/Training    [place] Level (Lower Body Dressing) lower body dressing skills;shoes/slippers;maximum assist (25% patient effort)  -PP     Position (Lower Body Dressing) edge of bed sitting  -PP     Comment, (Lower Body Dressing) Pt able to slightly lift legs but unable to assist with fxnl reach limited by body habitus  -PP       Row Name 05/08/25 0959          Grooming Assessment/Training    [place] Level (Grooming) grooming skills;set up  -PP     Position (Grooming) sitting up in bed  -PP               User Key  (r) = Recorded By, (t) = Taken By, (c) = Cosigned By      Initials Name Provider Type    PP Katina Wilkerson, FABY Occupational Therapist                   Obj/Interventions       Row Name 05/08/25 1004          Balance    Balance Assessment sitting static balance;standing static balance  -PP     Static Sitting Balance contact guard;moderate assist  -PP     Position, Sitting Balance unsupported;sitting edge of bed  -PP     Static Standing Balance maximum assist;2-person assist  -PP     Position/Device Used, Standing Balance supported;other (see comments)  Noé HHA  -PP     Balance Interventions sitting;static  -PP     Comment, Balance Pt initially Mod A to sit EOB and prog to Cga for extended time.  -PP               User Key  (r) = Recorded By, (t) = Taken By, (c) = Cosigned By      Initials Name Provider Type    PP Katina Wilkerson, OT Occupational Therapist                   Goals/Plan    No documentation.                  Clinical Impression       Row Name 05/08/25 1005          Pain Assessment    Pre/Posttreatment Pain Comment Pt  had no complaints at this time  -PP       Row Name 05/08/25 1005          Plan of Care Review    Plan of Care Reviewed With patient  -PP     Progress improving  -PP     Outcome Evaluation Pt seen for OT/PT co-tx session this am, agreeable and endorses feeling better. Pt demo'ing slight improvement with fxnl ability and act tolerance today. Pt able to sit EOB with Max x3 for assist with trunk and BLEs. Tech support utilized during session for additional support for safety. Setup A for light grooming sitting up in bed. Pt able to partially stand 2x from EOB with Max x2 and devora HHA using rocking tech for momentum. Pt still reqs a substantial amount of assist and could benefot from Snf at [place] before returning home safely. IPOT will continue to monitor.  -PP       Row Name 05/08/25 1005          Therapy Plan Review/Discharge Plan (OT)    Anticipated Discharge Disposition (OT) skilled nursing facility  -PP       Row Name 05/08/25 1005          Vital Signs    O2 Delivery Pre Treatment nasal cannula  -PP     O2 Delivery Intra Treatment nasal cannula  -PP     O2 Delivery Post Treatment nasal cannula  -PP       Row Name 05/08/25 1005          Positioning and Restraints    Pre-Treatment Position in bed  -PP     Post Treatment Position bed  -PP     In Bed notified nsg;call light within reach;fowlers;encouraged to call for assist;exit alarm on  -PP               User Key  (r) = Recorded By, (t) = Taken By, (c) = Cosigned By      Initials Name Provider Type    PP Katina Wilkerson OT Occupational Therapist                   Outcome Measures       Row Name 05/08/25 1014          How much help from another is currently needed...    Putting on and taking off regular lower body clothing? 1  -PP     Bathing (including washing, rinsing, and drying) 1  -PP     Toileting (which includes using toilet bed pan or urinal) 1  -PP     Putting on and taking off regular upper body clothing 2  -PP     Taking care of personal grooming (such as  brushing teeth) 3  -PP     Eating meals 3  -PP     AM-PAC 6 Clicks Score (OT) 11  -PP       Row Name 05/08/25 1014          Functional Assessment    Outcome Measure Options AM-PAC 6 Clicks Daily Activity (OT)  -PP               User Key  (r) = Recorded By, (t) = Taken By, (c) = Cosigned By      Initials Name Provider Type    PP Katina Wilkerson OT Occupational Therapist                    Occupational Therapy Education       Title: PT OT SLP Therapies (In Progress)       Topic: Occupational Therapy (In Progress)       Point: ADL training (Done)       Learning Progress Summary            Patient Acceptance, E, VU by PP at 5/6/2025 1516    Comment: Pt Ed on OT role and dc planning.                                      User Key       Initials Effective Dates Name Provider Type Discipline    PP 06/09/23 -  Katina Wilkerson OT Occupational Therapist OT                  OT Recommendation and Plan  Planned Therapy Interventions (OT): activity tolerance training, BADL retraining, functional balance retraining, occupation/activity based interventions, patient/caregiver education/training, strengthening exercise, transfer/mobility retraining, ROM/therapeutic exercise  Therapy Frequency (OT): 3 times/wk  Plan of Care Review  Plan of Care Reviewed With: patient  Progress: improving  Outcome Evaluation: Pt seen for OT/PT co-tx session this am, agreeable and endorses feeling better. Pt demo'ing slight improvement with fxnl ability and act tolerance today. Pt able to sit EOB with Max x3 for assist with trunk and BLEs. Tech support utilized during session for additional support for safety. Setup A for light grooming sitting up in bed. Pt able to partially stand 2x from EOB with Max x2 and devora HHA using rocking tech for momentum. Pt still reqs a substantial amount of assist and could benefot from Snf at [place] before returning home safely. IPOT will continue to monitor.     Time Calculation:   Evaluation Complexity (OT)  Review  Occupational Profile/Medical/Therapy History Complexity: expanded/moderate complexity  Assessment, Occupational Performance/Identification of Deficit Complexity: 3-5 performance deficits  Clinical Decision Making Complexity (OT): detailed assessment/moderate complexity  Overall Complexity of Evaluation (OT): moderate complexity     Time Calculation- OT       Row Name 05/08/25 0956             Time Calculation- OT    OT Start Time 0859  -PP      OT Stop Time 0926  -PP      OT Time Calculation (min) 27 min  -PP      Total Timed Code Minutes- OT 27 minute(s)  -PP      OT Received On 05/08/25  -PP      OT - Next Appointment 05/12/25  -PP         Timed Charges    74733 - OT Therapeutic Activity Minutes 19  -PP      05313 - OT Self Care/Mgmt Minutes 8  -PP         Total Minutes    Timed Charges Total Minutes 27  -PP       Total Minutes 27  -PP                User Key  (r) = Recorded By, (t) = Taken By, (c) = Cosigned By      Initials Name Provider Type    PP Rhea, Porshia, OT Occupational Therapist                  Therapy Charges for Today       Code Description Service Date Service Provider Modifiers Qty    75074002970  OT THERAPEUTIC ACT EA 15 MIN 5/8/2025 Amelia, Porshia, OT GO 1    [id number] HC OT SELF CARE/MGMT/TRAIN EA 15 MIN 5/8/2025 Rhea, Porshia, OT GO 1                 Porshia Amelia, OT  5/8/2025

## 2025-05-08 NOTE — PROGRESS NOTES
Name: Emely Solomon ADMIT: 2025   : 1960  PCP: Adilson Wilkerson MD    MRN: [id number] LOS: 3 days   AGE/SEX: 64 y.o. female  ROOM: [facility]     Subjective   Subjective   Patient appears morbidly obese, generally weak, relatively comfortable, & in no apparent distress--seems to improve daily.  No family at bedside.    Continues to deny signs of bleeding, chest pain, or trouble breathing.    States right hand pain & swelling from fall at home better today.    Complains of dizziness now (RN states dizziness resolved by 1340) & some trouble sleeping overnight.    CPAP at bedside.       Objective   Objective   Vital Signs  Temp:  [97.5 °F (36.4 °C)-97.8 °F (36.6 °C)] 97.8 °F (36.6 °C)  Heart Rate:  [74-95] 95  Resp:  [18-22] 22  BP: (108-112)/(65-74) 108/74  SpO2:  [93 %-100 %] 96 %  on  Flow (L/min) (Oxygen Therapy):  [2] 2;   Device (Oxygen Therapy): nasal cannula  Body mass index is 73.23 kg/m².    Physical Exam  Constitutional:       General: She is not in acute distress.     Appearance: She is obese. She is not toxic-appearing.      Comments: Lethargic & generally weak   Cardiovascular:      Rate and Rhythm: Normal rate.      Heart sounds: Normal heart sounds.   Pulmonary:      Effort: Pulmonary effort is normal.      Comments: Diminished on expiration anteriorly  Abdominal:      General: Bowel sounds are normal.      Palpations: Abdomen is soft.   Musculoskeletal:         General: Swelling (right hand) and tenderness (right hand) present.      Right lower leg: Edema present.      Left lower leg: Edema present.   Skin:     General: Skin is warm and dry.   Neurological:      Mental Status: She is oriented to person, place, and time.      Cranial Nerves: No cranial nerve deficit.     Physical exam performed on 2025 as per above     Results Review     I reviewed the patient's new clinical results.  Results from last 7 days   Lab Units 25  0614 25  1759 25  0603  "05/06/25  1549 05/06/25  1153 05/06/25 0618 05/05/25 0728   WBC 10*3/mm3 10.42  --  11.85*  --   --  13.40* 13.95*   HEMOGLOBIN g/dL 8.6* 8.8* 7.9* 8.6*   < > 7.6* 7.9*   PLATELETS 10*3/mm3 160  --  154  --   --  150 175    < > = values in this interval not displayed.     Results from last 7 days   Lab Units 05/08/25 0614 05/07/25 1556 05/07/25 0602 05/06/25 1817   SODIUM mmol/L 132* 128* 128* 122*   POTASSIUM mmol/L 4.0 4.0 4.0 3.8   CHLORIDE mmol/L 98 94* 96* 92*   CO2 mmol/L 22.7 23.5 21.9* 21.0*   BUN mg/dL 52* 53* 49* 43*   CREATININE mg/dL 1.20* 1.33* 1.26* 1.45*   GLUCOSE mg/dL 99 94 94 127*   EGFR mL/min/1.73 50.3* 44.5* 47.5* 40.1*     Results from last 7 days   Lab Units 05/08/25 0614 05/07/25  0602 05/06/25 0618 05/05/25 0728   ALBUMIN g/dL 2.6* 2.5* 2.7* 2.9*   BILIRUBIN mg/dL 0.5 0.4 0.5 1.1   ALK PHOS U/L 304* 266* 301* 261*   AST (SGOT) U/L 56* 78* 110* 160*   ALT (SGPT) U/L 24 28 38* 43*     Results from last 7 days   Lab Units 05/08/25 0614 05/07/25  1556 05/07/25 0602 05/06/25  1817 05/06/25 0618 05/05/25 0728   CALCIUM mg/dL 8.9 9.0 8.5* 8.5* 8.5* 8.8   ALBUMIN g/dL 2.6*  --  2.5*  --  2.7* 2.9*   MAGNESIUM mg/dL  --   --  1.9  --  1.8  --    PHOSPHORUS mg/dL  --   --   --   --  2.9  --      Results from last 7 days   Lab Units 05/04/25  1744 05/04/25  0849   PROCALCITONIN ng/mL 40.80*  --    LACTATE mmol/L  --  1.5     No results found for: \"HGBA1C\", \"POCGLU\"      XR Hand 3+ View Right  Result Date: 5/7/2025   As described.    This report was finalized on 5/7/2025 2:08 PM by Dr. Damon Chavez M.D on Workstation: [id number]          I have personally reviewed all medications:  Scheduled Medications  cefTRIAXone, 2,000 mg, Intravenous, Q12H  dextromethorphan polistirex ER, 60 mg, Oral, Q12H  famotidine, 40 mg, Oral, Daily  ferrous sulfate, 325 mg, Oral, Daily With Breakfast  furosemide, 40 mg, Oral, Daily  guaiFENesin, 1,200 mg, Oral, Q12H  hydrocortisone-bacitracin-zinc " oxide-nystatin, 1 Application, Topical, Q12H  ipratropium-albuterol, 3 mL, Nebulization, 4x Daily - RT  Lidocaine, 1 patch, Transdermal, Q24H  [START ON 5/9/2025] metoprolol succinate XL, 12.5 mg, Oral, Daily  [START ON 5/9/2025] rivaroxaban, 20 mg, Oral, Daily  sodium chloride, 10 mL, Intravenous, Q12H  Urea, 15 g, Oral, BID    Infusions     Diet  Diet: Cardiac, Fluid Restriction (240 mL/tray); Healthy Heart (2-3 Na+); Other (Specify mL/day) (1200); Fluid Consistency: Thin (IDDSI 0)    I have personally reviewed:  [x]  Laboratory   [x]  Microbiology   [x]  Radiology   [x]  EKG/Telemetry  []  Cardiology/Vascular   []  Pathology    []  Records       Assessment/Plan     Active Hospital Problems    Diagnosis  POA    **Hypoxia [R09.02]  Yes    Hyponatremia [E87.1]  Yes    Bacterial endocarditis [I33.0]  Yes    Fall [W19.XXXA]  Yes    Acute wheezy bronchitis [J20.9]  Yes    Elevated troponin [R79.89]  Yes    Hyperglycemia [R73.9]  Yes    History of pulmonary embolism [Z86.711]  Yes    Morbid obesity [E66.01]  Yes    Venous stasis ulcer of right calf limited to breakdown of skin without varicose veins [I87.2, L97.211]  Yes    Chronic renal failure (CRF), stage 3 (moderate) [N18.30]  Yes    Lymphedema of lower extremity, unspecified laterality [I89.0]  Yes    History of DVT (deep vein thrombosis) [Z86.718]  Not Applicable    Obstructive sleep apnea [G47.33]  Yes    Bacteremia due to group B Streptococcus [R78.81, B95.1]  Yes    Upper back pain [M54.9]  Yes    Acute kidney failure [N17.9]  Yes    Pulmonary hypertension [I27.20]  Yes    Essential hypertension [I10]  Yes    Chronic diastolic CHF (congestive heart failure) [I50.32]  Yes      Resolved Hospital Problems   No resolved problems to display.       64 y.o. female with recent history of mechanical fall leading to contusion of upper back with CT thoracic spine revealing no fracture or acute finding & fever, acute wheezy bronchitis, hypoxemia with history of ARIEL and  pulmonary hypertension admitted with Hypoxia.    *Appreciate the assistance of all consultants.     5/8/2025:  XR right hand reviewed & discussed with patient--no evidence of fracture.    Pulmonology following acute hypoxic respiratory failure given O2 saturation 87% on room air improved with supplemental oxygen--wean oxygen as tolerated to maintain saturation above 90%.  Repeat echocardiogram showing severe pulmonary hypertension & bacterial endocarditis & CTS consulted & recommend medical management given no significant mitral regurgitation & extreme risk with invasive management due to multiple comorbidities & high risk for morbdity & mortality with open heart surgery.  CT chest left lower lobe atelectasis versus infiltrate (on adequate antibiotic coverage).  - Respiratory PCR panel negative  - Nebulizers scheduled  - Home CPAP at bedside    Elevated troponin and proBNP with history of hypertension and chronic diastolic congestive heart failure--reduced metoprolol to 12.5 daily.  Diuretics per nephrology.  Cardiology following.    Bacteremia due to group B strep & TTE mitral valve endocarditis; repeat blood cultures x2 NGTD. Infectious disease scheduled follow-up 6/16 & plan 6-weeks course of ceftriaxone 2 g IV every 12 hours stop on 6/17/2025 with weekly CBC/diff & Cr faxed to [phone].  ID says ok for PICC--ordered today.    Acute on chronic anemia.  Serial H&H mostly stable / waxing / waning--continue to monitor closely.  No obvious signs of bleeding.  Fecal occult blood negative.  Confirmed iron deficiency here.  Hematology started oral iron supplement.  Tick borne panel low suspicion.  Hematology following recommend continue AC for history of DVT / PE with BMI 79 today & immobility.  Labs not suggestive of hemolysis.  Transfuse for Hgb <7    History of DVT / PE.  Held AC recently for acute anemia here & restarted heparin today (see above).    A1c 5.4.  Glucose trends acceptable.  NCS / healthy heart  /fluid restricted diet.    Hyponatremia     STEFANIA  -Nephrology following suspect SIADH CKD stage 3.   -Sodium improved--132 today  -Fluid restriction 1200 mL, urea 15 g BID, Lasix 40 mg daily, strict I&Os,    Generalized weakness complicated by BMI >66 initially & now documented BMI >79 (unremarkable TSH 0.9).  Therapies following.    Lymphedema, BLE POA in the setting of initial documented BMI >66 now BMI >79 (complicating & most likely contributing to all problems).  Wound RN & OT following.    Transaminitis.  Suspect hepatocongestion from chronic diastolic and right-sided congestive heart failure (renal managing diuretics).  Right upper quadrant ultrasound negative.  Hepatitis C viral not detected therefore no need for any further workup or treatment. Labs represent chronic hep C.    Xarelto (home med) restarted today  Full code. Confirmed with patient / sister  Discussed with patient and nursing staff & sister  Anticipate discharge home with HH vs SNU facility timing yet to be determined. / CCP following / anticipate SNF   Expected Discharge Date: 5/10/2025; Expected Discharge Time:       LUCÍA Chapman  [facility]  05/08/25  13:28 EDT

## 2025-05-09 LAB
A PHAGOCYTOPH DNA BLD QL NAA+PROBE: NEGATIVE
ALBUMIN SERPL-MCNC: 2.8 G/DL (ref 3.5–5.2)
ANION GAP SERPL CALCULATED.3IONS-SCNC: 11 MMOL/L (ref 5–15)
BASOPHILS # BLD AUTO: 0.03 10*3/MM3 (ref 0–0.2)
BASOPHILS NFR BLD AUTO: 0.3 % (ref 0–1.5)
BUN SERPL-MCNC: 44 MG/DL (ref 8–23)
BUN/CREAT SERPL: 39.3 (ref 7–25)
CALCIUM SPEC-SCNC: 9.1 MG/DL (ref 8.6–10.5)
CHLORIDE SERPL-SCNC: 98 MMOL/L (ref 98–107)
CO2 SERPL-SCNC: 25 MMOL/L (ref 22–29)
CREAT SERPL-MCNC: 1.12 MG/DL (ref 0.57–1)
DEPRECATED RDW RBC AUTO: 46.6 FL (ref 37–54)
EGFRCR SERPLBLD CKD-EPI 2021: 54.7 ML/MIN/1.73
EHRLICHIA DNA SPEC QL NAA+PROBE: NEGATIVE
EOSINOPHIL # BLD AUTO: 0.62 10*3/MM3 (ref 0–0.4)
EOSINOPHIL NFR BLD AUTO: 5.9 % (ref 0.3–6.2)
ERYTHROCYTE [DISTWIDTH] IN BLOOD BY AUTOMATED COUNT: 16.5 % (ref 12.3–15.4)
GLUCOSE SERPL-MCNC: 87 MG/DL (ref 65–99)
HCT VFR BLD AUTO: 26.1 % (ref 34–46.6)
HCT VFR BLD AUTO: 27.1 % (ref 34–46.6)
HGB BLD-MCNC: 8.3 G/DL (ref 12–15.9)
HGB BLD-MCNC: 8.6 G/DL (ref 12–15.9)
IMM GRANULOCYTES # BLD AUTO: 0.34 10*3/MM3 (ref 0–0.05)
IMM GRANULOCYTES NFR BLD AUTO: 3.2 % (ref 0–0.5)
INR PPP: 1.5 (ref 0.9–1.1)
LYMPHOCYTES # BLD AUTO: 1.04 10*3/MM3 (ref 0.7–3.1)
LYMPHOCYTES NFR BLD AUTO: 9.9 % (ref 19.6–45.3)
MAGNESIUM SERPL-MCNC: 1.9 MG/DL (ref 1.6–2.4)
MCH RBC QN AUTO: 25.1 PG (ref 26.6–33)
MCHC RBC AUTO-ENTMCNC: 31.7 G/DL (ref 31.5–35.7)
MCV RBC AUTO: 79 FL (ref 79–97)
MONOCYTES # BLD AUTO: 0.75 10*3/MM3 (ref 0.1–0.9)
MONOCYTES NFR BLD AUTO: 7.2 % (ref 5–12)
NEUTROPHILS NFR BLD AUTO: 7.7 10*3/MM3 (ref 1.7–7)
NEUTROPHILS NFR BLD AUTO: 73.5 % (ref 42.7–76)
NRBC BLD AUTO-RTO: 0 /100 WBC (ref 0–0.2)
PHOSPHATE SERPL-MCNC: 3.5 MG/DL (ref 2.5–4.5)
PLATELET # BLD AUTO: 168 10*3/MM3 (ref 140–450)
PMV BLD AUTO: 9.2 FL (ref 6–12)
POTASSIUM SERPL-SCNC: 3.9 MMOL/L (ref 3.5–5.2)
PROTHROMBIN TIME: 18.1 SECONDS (ref 11.7–14.2)
RBC # BLD AUTO: 3.43 10*6/MM3 (ref 3.77–5.28)
SODIUM SERPL-SCNC: 134 MMOL/L (ref 136–145)
WBC NRBC COR # BLD AUTO: 10.48 10*3/MM3 (ref 3.4–10.8)

## 2025-05-09 PROCEDURE — 99232 SBSQ HOSP IP/OBS MODERATE 35: CPT | Performed by: NURSE PRACTITIONER

## 2025-05-09 PROCEDURE — 94760 N-INVAS EAR/PLS OXIMETRY 1: CPT

## 2025-05-09 PROCEDURE — 83735 ASSAY OF MAGNESIUM: CPT | Performed by: INTERNAL MEDICINE

## 2025-05-09 PROCEDURE — 85025 COMPLETE CBC W/AUTO DIFF WBC: CPT | Performed by: INTERNAL MEDICINE

## 2025-05-09 PROCEDURE — 85018 HEMOGLOBIN: CPT | Performed by: NURSE PRACTITIONER

## 2025-05-09 PROCEDURE — 94799 UNLISTED PULMONARY SVC/PX: CPT

## 2025-05-09 PROCEDURE — C1751 CATH, INF, PER/CENT/MIDLINE: HCPCS

## 2025-05-09 PROCEDURE — 25010000002 CEFTRIAXONE PER 250 MG: Performed by: INTERNAL MEDICINE

## 2025-05-09 PROCEDURE — 85014 HEMATOCRIT: CPT | Performed by: NURSE PRACTITIONER

## 2025-05-09 PROCEDURE — 80069 RENAL FUNCTION PANEL: CPT | Performed by: INTERNAL MEDICINE

## 2025-05-09 PROCEDURE — 94761 N-INVAS EAR/PLS OXIMETRY MLT: CPT

## 2025-05-09 PROCEDURE — 05HY33Z INSERTION OF INFUSION DEVICE INTO UPPER VEIN, PERCUTANEOUS APPROACH: ICD-10-PCS | Performed by: INTERNAL MEDICINE

## 2025-05-09 PROCEDURE — 94664 DEMO&/EVAL PT USE INHALER: CPT

## 2025-05-09 PROCEDURE — 85610 PROTHROMBIN TIME: CPT | Performed by: NURSE PRACTITIONER

## 2025-05-09 RX ORDER — SODIUM CHLORIDE 0.9 % (FLUSH) 0.9 %
10 SYRINGE (ML) INJECTION AS NEEDED
Status: DISCONTINUED | OUTPATIENT
Start: 2025-05-09 | End: 2025-05-10 | Stop reason: HOSPADM

## 2025-05-09 RX ORDER — SODIUM CHLORIDE 0.9 % (FLUSH) 0.9 %
10 SYRINGE (ML) INJECTION EVERY 12 HOURS SCHEDULED
Status: DISCONTINUED | OUTPATIENT
Start: 2025-05-09 | End: 2025-05-10 | Stop reason: HOSPADM

## 2025-05-09 RX ORDER — SODIUM CHLORIDE 0.9 % (FLUSH) 0.9 %
20 SYRINGE (ML) INJECTION AS NEEDED
Status: DISCONTINUED | OUTPATIENT
Start: 2025-05-09 | End: 2025-05-10 | Stop reason: HOSPADM

## 2025-05-09 RX ORDER — SODIUM CHLORIDE 9 MG/ML
40 INJECTION, SOLUTION INTRAVENOUS AS NEEDED
Status: DISCONTINUED | OUTPATIENT
Start: 2025-05-09 | End: 2025-05-10 | Stop reason: HOSPADM

## 2025-05-09 RX ADMIN — ZINC OXIDE 1 APPLICATION: 200 OINTMENT TOPICAL at 11:12

## 2025-05-09 RX ADMIN — ZINC OXIDE 1 APPLICATION: 200 OINTMENT TOPICAL at 21:20

## 2025-05-09 RX ADMIN — Medication 10 ML: at 21:19

## 2025-05-09 RX ADMIN — METOPROLOL SUCCINATE 12.5 MG: 25 TABLET, EXTENDED RELEASE ORAL at 11:10

## 2025-05-09 RX ADMIN — DEXTROMETHORPHAN 60 MG: 30 SUSPENSION, EXTENDED RELEASE ORAL at 21:19

## 2025-05-09 RX ADMIN — DEXTROMETHORPHAN 60 MG: 30 SUSPENSION, EXTENDED RELEASE ORAL at 11:10

## 2025-05-09 RX ADMIN — RIVAROXABAN 20 MG: 20 TABLET, FILM COATED ORAL at 11:10

## 2025-05-09 RX ADMIN — Medication 15 G: at 11:11

## 2025-05-09 RX ADMIN — FAMOTIDINE 40 MG: 20 TABLET, FILM COATED ORAL at 11:10

## 2025-05-09 RX ADMIN — GUAIFENESIN 1200 MG: 600 TABLET, MULTILAYER, EXTENDED RELEASE ORAL at 21:19

## 2025-05-09 RX ADMIN — FERROUS SULFATE TAB 325 MG (65 MG ELEMENTAL FE) 325 MG: 325 (65 FE) TAB at 11:10

## 2025-05-09 RX ADMIN — Medication 10 ML: at 11:12

## 2025-05-09 RX ADMIN — FUROSEMIDE 40 MG: 40 TABLET ORAL at 11:10

## 2025-05-09 RX ADMIN — IPRATROPIUM BROMIDE AND ALBUTEROL SULFATE 3 ML: .5; 3 SOLUTION RESPIRATORY (INHALATION) at 07:49

## 2025-05-09 RX ADMIN — GUAIFENESIN 1200 MG: 600 TABLET, MULTILAYER, EXTENDED RELEASE ORAL at 11:10

## 2025-05-09 RX ADMIN — IPRATROPIUM BROMIDE AND ALBUTEROL SULFATE 3 ML: .5; 3 SOLUTION RESPIRATORY (INHALATION) at 21:39

## 2025-05-09 RX ADMIN — CEFTRIAXONE 2000 MG: 2 INJECTION, POWDER, FOR SOLUTION INTRAMUSCULAR; INTRAVENOUS at 06:30

## 2025-05-09 RX ADMIN — IPRATROPIUM BROMIDE AND ALBUTEROL SULFATE 3 ML: .5; 3 SOLUTION RESPIRATORY (INHALATION) at 10:46

## 2025-05-09 RX ADMIN — CEFTRIAXONE 2000 MG: 2 INJECTION, POWDER, FOR SOLUTION INTRAMUSCULAR; INTRAVENOUS at 18:23

## 2025-05-09 NOTE — PROGRESS NOTES
"[facility]    Patient Name: Emely Solomon  :1960  64 y.o.  LOS: 4  Encounter Provider: LUCÍA Thompson      Patient Care Team:  Adilson Wilkerson MD as PCP - General (Family Medicine)  Florida Segovia MD as Referring Physician (Obstetrics and Gynecology)  Brennan Rogers MD as Cardiologist (Cardiology)  Caleb Garcia MD as Consulting Physician (Pulmonary Disease)  Jess Sanz, RN as Ambulatory  ([facility])    Chief Complaint: Follow-up mitral valve endocarditis, acute on chronic diastolic CHF    Interval History: Patient currently denies complaints.       Objective   Vital Signs  Temp:  [97.7 °F (36.5 °C)-98.1 °F (36.7 °C)] 98.1 °F (36.7 °C)  Heart Rate:  [50-95] 87  Resp:  [14-22] 18  BP: (107-148)/(62-96) 148/96    Intake/Output Summary (Last 24 hours) at 2025 0826  Last data filed at 2025 0219  Gross per 24 hour   Intake 720 ml   Output 4200 ml   Net -3480 ml     Flowsheet Rows      Flowsheet Row First Filed Value   Admission Height 160 cm (63\") Documented at 2025 0713   Admission Weight 170 kg (375 lb) Documented at 2025 0713              Vitals and nursing note reviewed.   Constitutional:       Appearance: Normal appearance. Well-developed.   Eyes:      Conjunctiva/sclera: Conjunctivae normal.   Neck:      Vascular: No carotid bruit.   Pulmonary:      Breath sounds: Normal breath sounds.   Cardiovascular:      Normal rate. Regular rhythm. Normal S1 with normal intensity. Normal S2 with normal intensity.       Murmurs: There is no murmur.      No gallop.  No click. No rub.   Edema:     Peripheral edema absent.   Musculoskeletal: Normal range of motion. Skin:     General: Skin is warm and dry.   Neurological:      Mental Status: Alert and oriented to person, place, and time.      GCS: GCS eye subscore is 4. GCS verbal subscore is 5. GCS motor subscore is 6.   Psychiatric:         Speech: Speech normal.         Behavior: " "Behavior normal.         Thought Content: Thought content normal.         Judgment: Judgment normal.           Pertinent Test Results:  Results from last 7 days   Lab Units 05/08/25  0614 05/07/25  1556 05/07/25  0602 05/06/25  1817 05/06/25  0618 05/05/25  0728 05/04/25  1744 05/04/25  0849   SODIUM mmol/L 132* 128* 128* 122* 123* 125*  --  130*   POTASSIUM mmol/L 4.0 4.0 4.0 3.8 4.1 3.9 3.8 3.3*   CHLORIDE mmol/L 98 94* 96* 92* 92* 92*  --  95*   CO2 mmol/L 22.7 23.5 21.9* 21.0* 21.0* 20.5*  --  21.2*   BUN mg/dL 52* 53* 49* 43* 33* 31*  --  40*   CREATININE mg/dL 1.20* 1.33* 1.26* 1.45* 1.50* 1.49*  --  1.86*   GLUCOSE mg/dL 99 94 94 127* 96 102*  --  104*   CALCIUM mg/dL 8.9 9.0 8.5* 8.5* 8.5* 8.8  --  8.9   AST (SGOT) U/L 56*  --  78*  --  110* 160*  --  38*   ALT (SGPT) U/L 24  --  28  --  38* 43*  --  15     Results from last 7 days   Lab Units 05/04/25  1036 05/04/25  0849   HSTROP T ng/L 81* 72*     Results from last 7 days   Lab Units 05/08/25  1816 05/08/25  0614 05/07/25  1759 05/07/25  0603 05/06/25  1549 05/06/25  1153 05/06/25  0618 05/05/25  0728 05/04/25  0849   WBC 10*3/mm3  --  10.42  --  11.85*  --   --  13.40* 13.95* 8.82   HEMOGLOBIN g/dL 8.5* 8.6* 8.8* 7.9* 8.6* 8.3* 7.6* 7.9* 8.7*   HEMATOCRIT % 26.5* 26.2* 27.1* 23.9* 26.9* 26.1* 23.8* 24.1* 26.9*   PLATELETS 10*3/mm3  --  160  --  154  --   --  150 175 220     Results from last 7 days   Lab Units 05/07/25  1642 05/07/25  0602 05/06/25  0618 05/05/25 2008   INR   --   --   --  4.49*   APTT seconds 31.3 29.2 40.7* 40.5*     Results from last 7 days   Lab Units 05/07/25  0602 05/06/25 0618   MAGNESIUM mg/dL 1.9 1.8           Invalid input(s): \"LDLCALC\"  Results from last 7 days   Lab Units 05/04/25  0849   PROBNP pg/mL 2,994.0*     Results from last 7 days   Lab Units 05/04/25  1744   TSH uIU/mL 0.907           Medication Review:   cefTRIAXone, 2,000 mg, Intravenous, Q12H  dextromethorphan polistirex ER, 60 mg, Oral, Q12H  famotidine, 40 mg, " Oral, Daily  ferrous sulfate, 325 mg, Oral, Daily With Breakfast  furosemide, 40 mg, Oral, Daily  guaiFENesin, 1,200 mg, Oral, Q12H  hydrocortisone-bacitracin-zinc oxide-nystatin, 1 Application, Topical, Q12H  ipratropium-albuterol, 3 mL, Nebulization, 4x Daily - RT  Lidocaine, 1 patch, Transdermal, Q24H  metoprolol succinate XL, 12.5 mg, Oral, Daily  rivaroxaban, 20 mg, Oral, Daily  sodium chloride, 10 mL, Intravenous, Q12H  Urea, 15 g, Oral, BID              Assessment & Plan     Active Hospital Problems    Diagnosis  POA    **Hypoxia [R09.02]  Yes    Hyponatremia [E87.1]  Yes    Bacterial endocarditis [I33.0]  Yes    Fall [W19.XXXA]  Yes    Acute wheezy bronchitis [J20.9]  Yes    Elevated troponin [R79.89]  Yes    Hyperglycemia [R73.9]  Yes    History of pulmonary embolism [Z86.711]  Yes    Morbid obesity [E66.01]  Yes    Venous stasis ulcer of right calf limited to breakdown of skin without varicose veins [I87.2, L97.211]  Yes    Chronic renal failure (CRF), stage 3 (moderate) [N18.30]  Yes    Lymphedema of lower extremity, unspecified laterality [I89.0]  Yes    History of DVT (deep vein thrombosis) [Z86.718]  Not Applicable    Obstructive sleep apnea [G47.33]  Yes    Bacteremia due to group B Streptococcus [R78.81, B95.1]  Yes    Upper back pain [M54.9]  Yes    Acute kidney failure [N17.9]  Yes    Pulmonary hypertension [I27.20]  Yes    Essential hypertension [I10]  Yes    Chronic diastolic CHF (congestive heart failure) [I50.32]  Yes      Resolved Hospital Problems   No resolved problems to display.        Group B strep bacteremia  Mitral valve endocarditis  Large vegetation on posterior leaflet of the mitral valve by echocardiogram on 5/5/2025  6-week course of antibiotic therapy per infectious disease.  Dr. Martinez has evaluated and recommends medical management due to multiple comorbidities and high morbidity and mortality with open heart surgery.  ID following  Acute hypoxic respiratory failure  Requiring  2 L nasal cannula on my exam today.  Super morbid obesity  Complicating all aspects of care  Pulmonary hypertension  Acute on chronic diastolic and right-sided CHF  Secondary to #2, #3, and #4  Nephrology is following, continue oral diuretics and fluid restriction.  Chronic lower extremity lymphedema with venous stasis changes  Mild to moderate aortic stenosis by echocardiogram on 5/5/2025  Acute kidney injury with stage IIIa chronic kidney disease  Creatinine 1.2 yesterday, a.m. labs pending  Acute on chronic hyponatremia  Sodium improved to 132 yesterday, a.m. labs pending  Severe obstructive sleep apnea  On CPAP  Elevated troponin  Type II NSTEMI  Secondary to #1, #2, #3, #9  History of DVT and pulmonary embolism in 2015  Xarelto resumed today.   Anemia  Hemoglobin stable at 8.5  Elevated liver function tests  Hypoalbuminemia  Hepatitis C antibody positive  Deconditioning    Plan: Patient stable from cardiac standpoint.  We will see as needed only over the weekend.  If new needs arise please do not hesitate to reach out to our service and we will evaluate patient.      CDI inquiry addendum: Acute on chronic diastolic CHF secondary to valvular endocarditis           LUCÍA Thompson  [facility]   [facility]  [address] [place] [address] [address]  [place] [address]  Office: [phone]    05/09/25  08:26 EDT

## 2025-05-09 NOTE — CASE MANAGEMENT/SOCIAL WORK
Continued Stay Note  [facility]     Patient Name: Emely Solomon  MRN: [id number]  Today's Date: 5/9/2025    Admit Date: 5/4/2025    Plan: [facility]   Discharge Plan       Row Name 05/09/25 1633       Plan    Plan [facility]    Patient/Family in Agreement with Plan yes    Plan Comments Spoke with Vani/Matilda, she can offer bed Saturday.   Spoke with Concepcion/ Terry, unable to accept pt.   Spoke with Bette/Soham, pt will need to be improved with therapy for them as well and may have bed available tomorrow.   Spoke with Adele/Kathia, pt accepted at [facility], and can offer bed tomorrow, they will need to order bariatric bed. Spoke with patient and she has chosen [facility]. Spoke with pt  Ghanshyam, he will be at hospital in am, he is agreeable to dc plan. CCP to alert Marcia early with dc orders so she can order bariatric bed for pt arrival. Nursing to call [phone] when have dc from MD. CCP will follow - Davida SAMS                   Discharge Codes    No documentation.                 Expected Discharge Date and Time       Expected Discharge Date Expected Discharge Time    May 10, 2025               Davida Curtis RN

## 2025-05-09 NOTE — PROGRESS NOTES
[facility] Progress Note      Patient Name: Emely Solomon  : 1960  MRN: [id number]  Primary Care Physician:  Adilson Wilkerson MD  Date of admission: 2025    Subjective     Interval History:   Follow-up acute on intermittent chronic hyponatremia,    Patient lying in bed  Feeling tired fatigue  Tolerating oral intake    Continues to have a negative balance of 4.2 L    Review of Systems:   As noted above    Objective     Vitals:   Temp:  [97.7 °F (36.5 °C)-98.1 °F (36.7 °C)] 98.1 °F (36.7 °C)  Heart Rate:  [50-95] 82  Resp:  [14-22] 18  BP: (107-148)/(62-96) 148/96  Flow (L/min) (Oxygen Therapy):  [2] 2    Intake/Output Summary (Last 24 hours) at 2025 1127  Last data filed at 2025 0900  Gross per 24 hour   Intake 600 ml   Output 4200 ml   Net -3600 ml       Physical Exam:    General Appearance: alert, on nasal cannula oxygen 4 L.  Morbidly obese.  BMI of 71  HEENT: oral mucosa normal, nonicteric sclera  Neck: supple, no JVD  Lungs: Bilateral rales with end expiratory wheezing.  Heart: RRR, normal S1 and S2  Abdomen: soft, nontender, nondistended. +bs.  Obese  : Pure wick  Extremities: Chronic lower extremity lymphedema.  3+.  Upper extremity edema 1+.  Neuro: normal speech and mental status     Scheduled Meds:     cefTRIAXone, 2,000 mg, Intravenous, Q12H  dextromethorphan polistirex ER, 60 mg, Oral, Q12H  famotidine, 40 mg, Oral, Daily  ferrous sulfate, 325 mg, Oral, Daily With Breakfast  furosemide, 40 mg, Oral, Daily  guaiFENesin, 1,200 mg, Oral, Q12H  hydrocortisone-bacitracin-zinc oxide-nystatin, 1 Application, Topical, Q12H  ipratropium-albuterol, 3 mL, Nebulization, 4x Daily - RT  Lidocaine, 1 patch, Transdermal, Q24H  metoprolol succinate XL, 12.5 mg, Oral, Daily  rivaroxaban, 20 mg, Oral, Daily  sodium chloride, 10 mL, Intravenous, Q12H  Urea, 15 g, Oral, BID      IV Meds:          Results Reviewed:   I have personally reviewed the results from the  time of this admission to 5/9/2025 11:27 EDT     Results from last 7 days   Lab Units 05/09/25  0739 05/08/25  0614 05/07/25  1556 05/07/25  0602 05/06/25  1817 05/06/25  0618   SODIUM mmol/L 134* 132* 128* 128*   < > 123*   POTASSIUM mmol/L 3.9 4.0 4.0 4.0   < > 4.1   CHLORIDE mmol/L 98 98 94* 96*   < > 92*   CO2 mmol/L 25.0 22.7 23.5 21.9*   < > 21.0*   BUN mg/dL 44* 52* 53* 49*   < > 33*   CREATININE mg/dL 1.12* 1.20* 1.33* 1.26*   < > 1.50*   CALCIUM mg/dL 9.1 8.9 9.0 8.5*   < > 8.5*   BILIRUBIN mg/dL  --  0.5  --  0.4  --  0.5   ALK PHOS U/L  --  304*  --  266*  --  301*   ALT (SGPT) U/L  --  24  --  28  --  38*   AST (SGOT) U/L  --  56*  --  78*  --  110*   GLUCOSE mg/dL 87 99 94 94   < > 96    < > = values in this interval not displayed.       Estimated Creatinine Clearance: 82.2 mL/min (A) (by C-G formula based on SCr of 1.12 mg/dL (H)).    Results from last 7 days   Lab Units 05/09/25  0739 05/07/25  0602 05/06/25  0618   MAGNESIUM mg/dL 1.9 1.9 1.8   PHOSPHORUS mg/dL 3.5  --  2.9       Results from last 7 days   Lab Units 05/06/25  0618 05/04/25  1744   URIC ACID mg/dL 7.8* 7.9*       Results from last 7 days   Lab Units 05/09/25  0739 05/08/25  1816 05/08/25  0614 05/07/25  1759 05/07/25  0603 05/06/25  1153 05/06/25  0618 05/05/25  0728   WBC 10*3/mm3 10.48  --  10.42  --  11.85*  --  13.40* 13.95*   HEMOGLOBIN g/dL 8.6* 8.5* 8.6* 8.8* 7.9*   < > 7.6* 7.9*   PLATELETS 10*3/mm3 168  --  160  --  154  --  150 175    < > = values in this interval not displayed.       Results from last 7 days   Lab Units 05/09/25  1028 05/05/25 2008   INR  1.50* 4.49*       Assessment / Plan     ASSESSMENT:  Hyponatremia, acute on intermittent chronic.  TSH and cortisol normal.  Urine studies consistent with SIADH.  On fluid restriction.    Added urea and Lasix 5/6/2025.   Sodium improving  Acute kidney injury on CKD 3A baseline creatinine 1.2.  Now at baseline.  Underlying hypertensive nephrosclerosis and cardiorenal  syndrome.  Acute component due to hypotension with impaired renal autoregulation due to ARB.  3.  Strep bacteremia with mitral valve endocarditis.  On ceftriaxone.  Cardiothoracic surgery recommends medical management.  4.  Acute on chronic heart failure preserved ejection fraction.  Continue Lasix today.  Blood pressure still too low.  Reduce metoprolol further  5.  Left lower lobe pneumonia.  Acute hypoxic respiratory failure  6.  History of DVT and pulmonary embolism on Xarelto.  7.  Elevated transaminases.  Slowly improving.  8.  Iron deficiency anemia.  No IV iron while active infection.  9.  Hepatitis C antibody positive.  10.  Morbid obesity.  BMI of 71    PLAN:  Continue current dose of diuretics with fluid  restriction, sodium gradually improving  Will continue to monitor diuretic toxicity    Thank you for involving us in the care of Emely Solomon.  Please feel free to call with any questions.    Carlos Francois MD  05/09/25  11:27 EDT    [facility]  [phone]    Please note that portions of this note were completed with a voice recognition program.

## 2025-05-09 NOTE — PROGRESS NOTES
"      [facility]         Dr Esteban Sayied   LOS: 4 days   Patient Care Team:  Adilson Wilkerson MD as PCP - General (Family Medicine)  Florida Segovia MD as Referring Physician (Obstetrics and Gynecology)  Brennan Rogers MD as Cardiologist (Cardiology)  Caleb Garcia MD as Consulting Physician (Pulmonary Disease)  Jess Sanz, RN as Ambulatory  ([facility])    Chief Complaint: Acute respiratory failure with mitral valve endocarditis with vegetation group B strep bacteremia pulmonary hypertension other issues as listed below    Interval History: Remains on 2 L oxygen nasal cannula.  Resting comfortably.  No overnight issues reported.    REVIEW OF SYSTEMS:   CARDIOVASCULAR: No chest pain, chest pressure or chest discomfort. No palpitations or edema.   RESPIRATORY: Short of breath with activity  GASTROINTESTINAL: No anorexia, nausea, vomiting or diarrhea. No abdominal pain or blood.   HEMATOLOGIC: No bleeding or bruising.     Ventilator/Non-Invasive Ventilation Settings (From admission, onward)      None              Vital Signs  Temp:  [97.7 °F (36.5 °C)-98.1 °F (36.7 °C)] 98.1 °F (36.7 °C)  Heart Rate:  [50-95] 82  Resp:  [14-22] 18  BP: (107-148)/(62-96) 148/96    Intake/Output Summary (Last 24 hours) at 5/9/2025 1122  Last data filed at 5/9/2025 0900  Gross per 24 hour   Intake 600 ml   Output 4200 ml   Net -3600 ml     Flowsheet Rows      Flowsheet Row First Filed Value   Admission Height 160 cm (63\") Documented at 05/04/2025 0713   Admission Weight 170 kg (375 lb) Documented at 05/04/2025 0713                  Physical Exam:  Patient is examined using the personal protective equipment as per guidelines from infection control for this particular patient as enacted.  Hand hygiene was performed before and after patient interaction.   General Appearance:    Alert, cooperative, in no acute distress.  Following simple commands  ENT Mallampati between 3 and 4 no nasal " congestion  Neck midline trachea, no thyromegaly   Lungs:   Diminished breath sounds with rhonchi in the bases    Heart:    Regular rhythm and normal rate, normal S1 and S2, no            murmur, no gallop, no rub, no click   Chest Wall:    No abnormalities observed   Abdomen:     Normal bowel sounds, no masses, no organomegaly, soft        nontender, nondistended, no guarding, no rebound                tenderness   Extremities:   Moves all extremities well, 1+ edema, no cyanosis, no             redness  CNS no focal neurological deficits normal sensory exam  Skin no rashes no nodules  Musculoskeletal no cyanosis no clubbing normal range of motion     Results Review:        Results from last 7 days   Lab Units 05/09/25  0739 05/08/25  0614 05/07/25  1556   SODIUM mmol/L 134* 132* 128*   POTASSIUM mmol/L 3.9 4.0 4.0   CHLORIDE mmol/L 98 98 94*   CO2 mmol/L 25.0 22.7 23.5   BUN mg/dL 44* 52* 53*   CREATININE mg/dL 1.12* 1.20* 1.33*   GLUCOSE mg/dL 87 99 94   CALCIUM mg/dL 9.1 8.9 9.0     Results from last 7 days   Lab Units 05/04/25  1036 05/04/25  0849   HSTROP T ng/L 81* 72*     Results from last 7 days   Lab Units 05/09/25  0739 05/08/25  1816 05/08/25  0614 05/07/25  1759 05/07/25  0603   WBC 10*3/mm3 10.48  --  10.42  --  11.85*   HEMOGLOBIN g/dL 8.6* 8.5* 8.6*   < > 7.9*   HEMATOCRIT % 27.1* 26.5* 26.2*   < > 23.9*   PLATELETS 10*3/mm3 168  --  160  --  154    < > = values in this interval not displayed.     Results from last 7 days   Lab Units 05/09/25  1028 05/07/25  1642 05/07/25  0602 05/06/25  0618 05/05/25 2008   INR  1.50*  --   --   --  4.49*   APTT seconds  --  31.3 29.2 40.7* 40.5*         Results from last 7 days   Lab Units 05/09/25  0739   MAGNESIUM mg/dL 1.9               I reviewed the patient's new clinical results.  I personally viewed and interpreted the patient's chest x-ray.        Medication Review:   cefTRIAXone, 2,000 mg, Intravenous, Q12H  dextromethorphan polistirex ER, 60 mg, Oral,  Q12H  famotidine, 40 mg, Oral, Daily  ferrous sulfate, 325 mg, Oral, Daily With Breakfast  furosemide, 40 mg, Oral, Daily  guaiFENesin, 1,200 mg, Oral, Q12H  hydrocortisone-bacitracin-zinc oxide-nystatin, 1 Application, Topical, Q12H  ipratropium-albuterol, 3 mL, Nebulization, 4x Daily - RT  Lidocaine, 1 patch, Transdermal, Q24H  metoprolol succinate XL, 12.5 mg, Oral, Daily  rivaroxaban, 20 mg, Oral, Daily  sodium chloride, 10 mL, Intravenous, Q12H  Urea, 15 g, Oral, BID               ASSESSMENT:   Acute hypoxic respiratory failure  Mitral valve endocarditis  Group B strep bacteremia  Pulmonary hypertension  Heart failure with preserved ejection fraction  Fall with back pain  Acute kidney injury  Hyponatremia  Obstructive sleep apnea on noninvasive ventilation  Elevated troponin and BNP  Hyperglycemia  Lower extremity lymphedema  History of DVT/PE on Xarelto  Super morbid obesity    PLAN:  Respiratory status stable and improving.  Wean off nasal cannula oxygen as tolerated.  Group B bacteremia with endocarditis.  IV antibiotics with Rocephin for infectious diseases.  Fevers have resolved.  Plans for 6 weeks total antibiotics per infectious diseases  Pulmonary hypertension severe noted on repeat echo.   Now in the setting of mitral valve endocarditis and bacteremia doubt right heart cath can be done at this time.  May repeat echo down the road  CT chest noted with left lower lobe atelectasis versus infiltrate.  Patient on adequate antibiotics to cover for pneumonia.  Home CPAP to be used at bedside  Mobilize ambulate  Nothing further to add we will sign off        Eulalia Delaney MD  05/09/25  11:22 EDT

## 2025-05-09 NOTE — PROGRESS NOTES
Name: Emely Solomon ADMIT: 2025   : 1960  PCP: Adilson Wilkerson MD    MRN: [id number] LOS: 4 days   AGE/SEX: 64 y.o. female  ROOM: [facility]     Subjective   Subjective   Patient appears comfortable and in no apparent distress.  Tolerating intake.      Home CPAP at bedside.    Denies chest pain or worsening shortness of breath.       Objective   Objective   Vital Signs  Temp:  [97.7 °F (36.5 °C)-98.1 °F (36.7 °C)] 98.1 °F (36.7 °C)  Heart Rate:  [50-95] 82  Resp:  [14-22] 18  BP: (107-148)/(62-96) 148/96  SpO2:  [85 %-100 %] 94 %  on  Flow (L/min) (Oxygen Therapy):  [2] 2;   Device (Oxygen Therapy): nasal cannula  Body mass index is 71.15 kg/m².    Physical Exam  Constitutional:       General: She is not in acute distress.     Appearance: She is obese. She is not toxic-appearing.      Comments: Generally weak   Cardiovascular:      Rate and Rhythm: Normal rate.      Heart sounds: Normal heart sounds.   Pulmonary:      Effort: Pulmonary effort is normal.      Comments: Diminished on expiration anteriorly  Abdominal:      General: Bowel sounds are normal.      Palpations: Abdomen is soft.   Musculoskeletal:         General: Swelling (right hand) and tenderness (right hand) present.      Right lower leg: Edema present.      Left lower leg: Edema present.   Skin:     General: Skin is warm and dry.      Comments: RLE posterior wound POA   Neurological:      General: No focal deficit present.      Mental Status: She is alert and oriented to person, place, and time.       Results Review     I reviewed the patient's new clinical results.  Results from last 7 days   Lab Units 25  0739 25  1816 25  0614 25  1759 25  0603 25  1153 25  0618   WBC 10*3/mm3 10.48  --  10.42  --  11.85*  --  13.40*   HEMOGLOBIN g/dL 8.6* 8.5* 8.6* 8.8* 7.9*   < > 7.6*   PLATELETS 10*3/mm3 168  --  160  --  154  --  150    < > = values in this interval not displayed.     Results from  "last 7 days   Lab Units 05/09/25  0739 05/08/25  0614 05/07/25  1556 05/07/25  0602   SODIUM mmol/L 134* 132* 128* 128*   POTASSIUM mmol/L 3.9 4.0 4.0 4.0   CHLORIDE mmol/L 98 98 94* 96*   CO2 mmol/L 25.0 22.7 23.5 21.9*   BUN mg/dL 44* 52* 53* 49*   CREATININE mg/dL 1.12* 1.20* 1.33* 1.26*   GLUCOSE mg/dL 87 99 94 94   EGFR mL/min/1.73 54.7* 50.3* 44.5* 47.5*     Results from last 7 days   Lab Units 05/09/25  0739 05/08/25  0614 05/07/25  0602 05/06/25  0618 05/05/25  0728   ALBUMIN g/dL 2.8* 2.6* 2.5* 2.7* 2.9*   BILIRUBIN mg/dL  --  0.5 0.4 0.5 1.1   ALK PHOS U/L  --  304* 266* 301* 261*   AST (SGOT) U/L  --  56* 78* 110* 160*   ALT (SGPT) U/L  --  24 28 38* 43*     Results from last 7 days   Lab Units 05/09/25 0739 05/08/25 0614 05/07/25  1556 05/07/25  0602 05/06/25  1817 05/06/25  0618   CALCIUM mg/dL 9.1 8.9 9.0 8.5*   < > 8.5*   ALBUMIN g/dL 2.8* 2.6*  --  2.5*  --  2.7*   MAGNESIUM mg/dL 1.9  --   --  1.9  --  1.8   PHOSPHORUS mg/dL 3.5  --   --   --   --  2.9    < > = values in this interval not displayed.     Results from last 7 days   Lab Units 05/04/25  1744 05/04/25  0849   PROCALCITONIN ng/mL 40.80*  --    LACTATE mmol/L  --  1.5     No results found for: \"HGBA1C\", \"POCGLU\"    XR Hand 3+ View Right  Result Date: 5/7/2025   As described.    This report was finalized on 5/7/2025 2:08 PM by Dr. Damon Chavez M.D on Workstation: [employer]        I have personally reviewed all medications:  Scheduled Medications  cefTRIAXone, 2,000 mg, Intravenous, Q12H  dextromethorphan polistirex ER, 60 mg, Oral, Q12H  famotidine, 40 mg, Oral, Daily  ferrous sulfate, 325 mg, Oral, Daily With Breakfast  furosemide, 40 mg, Oral, Daily  guaiFENesin, 1,200 mg, Oral, Q12H  hydrocortisone-bacitracin-zinc oxide-nystatin, 1 Application, Topical, Q12H  ipratropium-albuterol, 3 mL, Nebulization, 4x Daily - RT  Lidocaine, 1 patch, Transdermal, Q24H  metoprolol succinate XL, 12.5 mg, Oral, Daily  rivaroxaban, 20 mg, Oral, " Daily  sodium chloride, 10 mL, Intravenous, Q12H    Infusions   Diet  Diet: Cardiac, Fluid Restriction (240 mL/tray); Healthy Heart (2-3 Na+); Other (Specify mL/day) (1200); Fluid Consistency: Thin (IDDSI 0)    I have personally reviewed:  [x]  Laboratory   []  Microbiology   []  Radiology   [x]  EKG/Telemetry  []  Cardiology/Vascular   []  Pathology    []  Records       Assessment/Plan     Active Hospital Problems    Diagnosis  POA    **Hypoxia [R09.02]  Yes    Hyponatremia [E87.1]  Yes    Bacterial endocarditis [I33.0]  Yes    Fall [W19.XXXA]  Yes    Acute wheezy bronchitis [J20.9]  Yes    Elevated troponin [R79.89]  Yes    Hyperglycemia [R73.9]  Yes    History of pulmonary embolism [Z86.711]  Yes    Morbid obesity [E66.01]  Yes    Venous stasis ulcer of right calf limited to breakdown of skin without varicose veins [I87.2, L97.211]  Yes    Chronic renal failure (CRF), stage 3 (moderate) [N18.30]  Yes    Lymphedema of lower extremity, unspecified laterality [I89.0]  Yes    History of DVT (deep vein thrombosis) [Z86.718]  Not Applicable    Obstructive sleep apnea [G47.33]  Yes    Bacteremia due to group B Streptococcus [R78.81, B95.1]  Yes    Upper back pain [M54.9]  Yes    Acute kidney failure [N17.9]  Yes    Pulmonary hypertension [I27.20]  Yes    Essential hypertension [I10]  Yes    Chronic diastolic CHF (congestive heart failure) [I50.32]  Yes      Resolved Hospital Problems   No resolved problems to display.       Mrs Emely Solomon is a 64 y.o. female with history of chronic diastolic congestive heart failure, hypertension, pulmonary hypertension, group B streptococcus cellulitis of right lower extremity with subsequent bacteremia & sepsis in 2017, history of DVT on chronic anticoagulation, ARIEL on CPAP at home, morbid obesity, CKD stage III who experienced mechanical fall at home leading to contusion of upper back with CT thoracic spine revealing no fracture or acute finding yet fever, acute wheezy bronchitis,  hypoxemia admitted with Hypoxia and found to have bacteremia due to group B streptococcus with mitral valve endocarditis confirmed on TTE.    Pulmonology following acute hypoxic respiratory failure given O2 saturation 87% on room air which improved with supplemental oxygen. Continue weaning oxygen as tolerated to maintain O2 saturation greater than 90%.    -Respiratory PCR panel negative.    -Repeat TTE showed severe pulmonary hypertension as well as bacterial endocarditis and CTS consulted and recommended medical management given no significant mitral regurgitation and extreme risk with invasive management due to multiple comorbidities and high risk of morbidity and mortality with open heart surgery.    -CT chest left lower lobe atelectasis versus infiltrate with adequate antibiotic coverage for bacteremia.  Incentive spirometer encouraged.  Home CPAP at bedside.  Nebulizer scheduled.  - Pulmonology signed off on 5/9/2025    Infectious disease followed bacteremia/endocarditis and recommend 6 weeks ceftriaxone 2 g IV every 12 hours with stop date on 6/17/2025, weekly CBC with differential and creatinine faxed to [phone].  PICC line orders active.  INR 1.5 today.    Cardiology following as needed now for recent elevated troponin and proBNP with history of hypertension and chronic diastolic congestive heart failure.  Metoprolol reduced to 12.5 mg daily on 5/8/2025 given recent soft BP.  Monitor BP trends.  Diuretics per nephrologist.    Nephrology following hyponatremia and STEFANIA suspect SIADH.   sodium levels improved with fluid restriction 1.2 L daily, urea 15 g twice daily, Lasix 40 mg daily.  Nephrology approved PICC line.      Chronic anemia most likely due to chronic disease--CKD, chronic hepatitis C, chronic RLE leg wound.  Xarelto initially held with heparin drip started for possible procedure during hospital course; however, medical management pursued given increased mortality/morbidity with invasive  management of endocarditis given BMI 71.  Hematology followed IONA and recommend start oral iron supplement & transfuse for serum hemoglobin less than 7.  Heparin drip switched back to Xarelto given stable hemoglobin.  Fecal Hemoccult negative as well    RLE wound (posterior) POA evaluated & wound RN noted plan Opticel dressing and Kerlix to open right lower leg wound.    A1c 5.4 showing adequate control in outpatient setting.    Liver enzymes normalizing during hospital course and most likely due to hepatic congestion from chronic diastolic and right-sided congestive heart failure.  Right upper quadrant ultrasound negative.    Generalized weakness complicated by morbid obesity and deconditioning.  Unremarkable TSH 0.9.  Therapies followed & recommend SNF given max assist x2 & bilateral hand held assistance using rocking technique for momentum (BMI 71).    Recent complaints of right hand pain after fall at home (later improved) and x-ray obtained showing no evidence of fracture.      Xarelto (home med) for DVT prophylaxis.  Full code.  Discussed with patient and nursing staff, pulmonologist, & CCP  Anticipate discharge to SNU facility once arrangements have been made.  Expected Discharge Date: 5/10/2025; Expected Discharge Time:       LUCÍA Chapman  [facility]  05/09/25  11:31 EDT

## 2025-05-09 NOTE — SIGNIFICANT NOTE
"   05/09/25 1612   PICC Single Lumen 05/09/25 Right Cephalic   Placement date: If unknown, DO NOT use \"Add Comment\" note/Placement time: If unknown, DO NOT use \"Add Comment\" note: 05/09/25 1611   Hand Hygiene Completed: Yes  Size (Fr): 4  Description (optional): Lot# [id number]; Exp: 2026-01-31  Length (cm): 36 cm ...   Site Assessment Clean;Dry;Intact   #1 Lumen Status Blood return noted;Capped;Flushed;Normal saline locked   Length richelle (cm) 36 cm   Extremity Circumference (cm) 51 cm   Dressing Type Border Dressing;Transparent;Securing device;Antimicrobial dressing/disc   Dressing Status Clean;Dry;Intact   Dressing Change Due 05/16/25   Indication/Daily Review of Necessity long-term IV access >7 days     3 needles, 2 guidewires, 1 scalpel properly disposed of post procedure.      "

## 2025-05-09 NOTE — CASE MANAGEMENT/SOCIAL WORK
Continued Stay Note  [facility]     Patient Name: Emely Solomon  MRN: [id number]  Today's Date: 5/9/2025    Admit Date: 5/4/2025    Plan: STR pending acceptance   Discharge Plan       Row Name 05/09/25 1318       Plan    Plan STR pending acceptance    Patient/Family in Agreement with Plan yes    Plan Comments Spoke with Tika/Sher, she cannot accept at any of her facilities due to pt weight, they do not have capacity of equipment etc to care for patient.   Spoke with Ramya/Laura, she was evaluating for acceptance pending bed availability, they will not have bed over the weekend and patient would need to be able to full stand upright for them to accept, they will follow in case pt still needs bed on Monday and has improved.   Spoke with pt and she is agreeable to referrals to other facilities.   Pulmonary and cardiology services have cleared for dc. ID has final abx plan in place and ordered PICC line, clear for dc. Renal service following, labs slowly  normalizing, unsure if has any barriers to dc from their service. Pt likely ready for dc tomorrow, CCP will follow - Davida SAMS                   Discharge Codes    No documentation.                 Expected Discharge Date and Time       Expected Discharge Date Expected Discharge Time    May 10, 2025               Davida Curtis RN

## 2025-05-10 VITALS
HEIGHT: 63 IN | TEMPERATURE: 97.7 F | HEART RATE: 77 BPM | DIASTOLIC BLOOD PRESSURE: 69 MMHG | OXYGEN SATURATION: 95 % | SYSTOLIC BLOOD PRESSURE: 109 MMHG | WEIGHT: 293 LBS | RESPIRATION RATE: 20 BRPM | BODY MASS INDEX: 51.91 KG/M2

## 2025-05-10 PROBLEM — A41.01: Status: ACTIVE | Noted: 2025-05-10

## 2025-05-10 PROBLEM — R65.20: Status: ACTIVE | Noted: 2025-05-10

## 2025-05-10 PROBLEM — J96.01: Status: ACTIVE | Noted: 2025-05-10
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
## 2025-05-10 PROBLEM — J20.9 ACUTE WHEEZY BRONCHITIS: Status: RESOLVED | Noted: 2025-05-04 | Resolved: 2025-05-10

## 2025-05-10 PROBLEM — N17.9 ACUTE KIDNEY FAILURE: Status: RESOLVED | Noted: 2017-11-12 | Resolved: 2025-05-10

## 2025-05-10 LAB
ALBUMIN SERPL-MCNC: 2.5 G/DL (ref 3.5–5.2)
ANION GAP SERPL CALCULATED.3IONS-SCNC: 8 MMOL/L (ref 5–15)
BACTERIA SPEC AEROBE CULT: NORMAL
BASOPHILS # BLD AUTO: 0.01 10*3/MM3 (ref 0–0.2)
BASOPHILS NFR BLD AUTO: 0.1 % (ref 0–1.5)
BUN SERPL-MCNC: 37 MG/DL (ref 8–23)
BUN/CREAT SERPL: 43 (ref 7–25)
CALCIUM SPEC-SCNC: 8.5 MG/DL (ref 8.6–10.5)
CHLORIDE SERPL-SCNC: 99 MMOL/L (ref 98–107)
CO2 SERPL-SCNC: 28 MMOL/L (ref 22–29)
CREAT SERPL-MCNC: 0.86 MG/DL (ref 0.57–1)
DEPRECATED RDW RBC AUTO: 48.2 FL (ref 37–54)
EGFRCR SERPLBLD CKD-EPI 2021: 75.1 ML/MIN/1.73
EOSINOPHIL # BLD AUTO: 0.67 10*3/MM3 (ref 0–0.4)
EOSINOPHIL NFR BLD AUTO: 8.1 % (ref 0.3–6.2)
ERYTHROCYTE [DISTWIDTH] IN BLOOD BY AUTOMATED COUNT: 16.5 % (ref 12.3–15.4)
GLUCOSE SERPL-MCNC: 93 MG/DL (ref 65–99)
HCT VFR BLD AUTO: 24 % (ref 34–46.6)
HCT VFR BLD AUTO: 26.9 % (ref 34–46.6)
HGB BLD-MCNC: 7.3 G/DL (ref 12–15.9)
HGB BLD-MCNC: 8.4 G/DL (ref 12–15.9)
IMM GRANULOCYTES # BLD AUTO: 0.32 10*3/MM3 (ref 0–0.05)
IMM GRANULOCYTES NFR BLD AUTO: 3.9 % (ref 0–0.5)
LYMPHOCYTES # BLD AUTO: 0.79 10*3/MM3 (ref 0.7–3.1)
LYMPHOCYTES NFR BLD AUTO: 9.6 % (ref 19.6–45.3)
MCH RBC QN AUTO: 24.5 PG (ref 26.6–33)
MCHC RBC AUTO-ENTMCNC: 30.4 G/DL (ref 31.5–35.7)
MCV RBC AUTO: 80.5 FL (ref 79–97)
MONOCYTES # BLD AUTO: 0.6 10*3/MM3 (ref 0.1–0.9)
MONOCYTES NFR BLD AUTO: 7.3 % (ref 5–12)
NEUTROPHILS NFR BLD AUTO: 5.85 10*3/MM3 (ref 1.7–7)
NEUTROPHILS NFR BLD AUTO: 71 % (ref 42.7–76)
NRBC BLD AUTO-RTO: 0 /100 WBC (ref 0–0.2)
PHOSPHATE SERPL-MCNC: 3.1 MG/DL (ref 2.5–4.5)
PLATELET # BLD AUTO: 147 10*3/MM3 (ref 140–450)
PMV BLD AUTO: 8.9 FL (ref 6–12)
POTASSIUM SERPL-SCNC: 3.6 MMOL/L (ref 3.5–5.2)
RBC # BLD AUTO: 2.98 10*6/MM3 (ref 3.77–5.28)
RICKETTSIA RICKETTSII DNA, RT: NOT DETECTED
SODIUM SERPL-SCNC: 135 MMOL/L (ref 136–145)
WBC NRBC COR # BLD AUTO: 8.24 10*3/MM3 (ref 3.4–10.8)

## 2025-05-10 PROCEDURE — 94799 UNLISTED PULMONARY SVC/PX: CPT

## 2025-05-10 PROCEDURE — 80069 RENAL FUNCTION PANEL: CPT | Performed by: INTERNAL MEDICINE

## 2025-05-10 PROCEDURE — 94760 N-INVAS EAR/PLS OXIMETRY 1: CPT

## 2025-05-10 PROCEDURE — 85025 COMPLETE CBC W/AUTO DIFF WBC: CPT | Performed by: INTERNAL MEDICINE

## 2025-05-10 PROCEDURE — 94761 N-INVAS EAR/PLS OXIMETRY MLT: CPT

## 2025-05-10 PROCEDURE — 94664 DEMO&/EVAL PT USE INHALER: CPT

## 2025-05-10 PROCEDURE — 25010000002 CEFTRIAXONE PER 250 MG: Performed by: INTERNAL MEDICINE

## 2025-05-10 PROCEDURE — 85014 HEMATOCRIT: CPT | Performed by: INTERNAL MEDICINE

## 2025-05-10 PROCEDURE — 85018 HEMOGLOBIN: CPT | Performed by: INTERNAL MEDICINE

## 2025-05-10 RX ORDER — METOPROLOL SUCCINATE 25 MG/1
12.5 TABLET, EXTENDED RELEASE ORAL DAILY
Start: 2025-05-10

## 2025-05-10 RX ORDER — FUROSEMIDE 40 MG/1
40 TABLET ORAL DAILY
Start: 2025-05-10

## 2025-05-10 RX ORDER — AMOXICILLIN 250 MG
2 CAPSULE ORAL NIGHTLY PRN
Start: 2025-05-10

## 2025-05-10 RX ORDER — IPRATROPIUM BROMIDE AND ALBUTEROL SULFATE 2.5; .5 MG/3ML; MG/3ML
3 SOLUTION RESPIRATORY (INHALATION)
Start: 2025-05-10

## 2025-05-10 RX ORDER — DEXTROMETHORPHAN POLISTIREX 30 MG/5ML
60 SUSPENSION ORAL 2 TIMES DAILY PRN
Start: 2025-05-10

## 2025-05-10 RX ORDER — LIDOCAINE 4 G/G
1 PATCH TOPICAL
Start: 2025-05-10

## 2025-05-10 RX ORDER — GUAIFENESIN 600 MG/1
1200 TABLET, EXTENDED RELEASE ORAL 2 TIMES DAILY PRN
Start: 2025-05-10

## 2025-05-10 RX ORDER — ACETAMINOPHEN 325 MG/1
650 TABLET ORAL EVERY 6 HOURS PRN
Start: 2025-05-10

## 2025-05-10 RX ORDER — FERROUS SULFATE 325(65) MG
325 TABLET ORAL
Start: 2025-05-10

## 2025-05-10 RX ADMIN — Medication 10 ML: at 10:55

## 2025-05-10 RX ADMIN — IPRATROPIUM BROMIDE AND ALBUTEROL SULFATE 3 ML: .5; 3 SOLUTION RESPIRATORY (INHALATION) at 09:10

## 2025-05-10 RX ADMIN — METOPROLOL SUCCINATE 12.5 MG: 25 TABLET, EXTENDED RELEASE ORAL at 10:54

## 2025-05-10 RX ADMIN — FAMOTIDINE 40 MG: 20 TABLET, FILM COATED ORAL at 10:54

## 2025-05-10 RX ADMIN — ACETAMINOPHEN 650 MG: 325 TABLET, FILM COATED ORAL at 10:59

## 2025-05-10 RX ADMIN — CEFTRIAXONE 2000 MG: 2 INJECTION, POWDER, FOR SOLUTION INTRAMUSCULAR; INTRAVENOUS at 06:14

## 2025-05-10 RX ADMIN — ZINC OXIDE 1 APPLICATION: 200 OINTMENT TOPICAL at 10:55

## 2025-05-10 RX ADMIN — GUAIFENESIN 1200 MG: 600 TABLET, MULTILAYER, EXTENDED RELEASE ORAL at 10:54

## 2025-05-10 RX ADMIN — FUROSEMIDE 40 MG: 40 TABLET ORAL at 10:54

## 2025-05-10 RX ADMIN — RIVAROXABAN 20 MG: 20 TABLET, FILM COATED ORAL at 10:54

## 2025-05-10 RX ADMIN — Medication 10 ML: at 10:56

## 2025-05-10 RX ADMIN — DEXTROMETHORPHAN 60 MG: 30 SUSPENSION, EXTENDED RELEASE ORAL at 10:54

## 2025-05-10 RX ADMIN — FERROUS SULFATE TAB 325 MG (65 MG ELEMENTAL FE) 325 MG: 325 (65 FE) TAB at 10:54

## 2025-05-10 NOTE — DISCHARGE SUMMARY
[facility] Medicine Services  DISCHARGE SUMMARY    Patient Name: Emely Solomon  : 1960  MRN: [id number]    Date of Admission: 2025  7:16 AM  Date of Discharge:   5/10/2025  Primary Care Physician: Adilson Wilkerson MD    Consults       Date and Time Order Name Status Description    2025  8:59 AM Hematology & Oncology Inpatient Consult Completed     2025  2:40 PM Inpatient Cardiothoracic Surgery Consult Completed     2025 10:29 AM Inpatient Nephrology Consult Completed     2025 10:28 AM Inpatient Infectious Diseases Consult Completed     2025  3:43 PM Inpatient Cardiology Consult Completed     2025  3:43 PM Inpatient Pulmonology Consult Completed     2025 11:42 AM [facility] (on-call MD unless specified) Details              Hospital Course       Active Hospital Problems    Diagnosis  POA    **Hypoxia [R09.02]  Yes    Sepsis due to methicillin susceptible Staphylococcus aureus (MSSA) with acute hypoxic respiratory failure without septic shock [A41.01, R65.20, J96.01]  Yes    Hyponatremia [E87.1]  Yes    Bacterial endocarditis [I33.0]  Yes    Elevated troponin [R79.89]  Yes    Hyperglycemia [R73.9]  Yes    History of pulmonary embolism [Z86.711]  Yes    Morbid obesity [E66.01]  Yes    Venous stasis ulcer of right calf limited to breakdown of skin without varicose veins [I87.2, L97.211]  Yes    Chronic renal failure (CRF), stage 3 (moderate) [N18.30]  Yes    Lymphedema of lower extremity, unspecified laterality [I89.0]  Yes    History of DVT (deep vein thrombosis) [Z86.718]  Not Applicable    Obstructive sleep apnea [G47.33]  Yes    Bacteremia due to group B Streptococcus [R78.81, B95.1]  Yes    Upper back pain [M54.9]  Yes    Pulmonary hypertension [I27.20]  Yes    Essential hypertension [I10]  Yes    Chronic diastolic CHF (congestive heart failure) [I50.32]  Yes      Resolved Hospital Problems    Diagnosis Date Resolved POA    Fall [W19.XXXA] 05/10/2025 Yes    Acute  wheezy bronchitis [J20.9] 05/10/2025 Yes    Acute kidney failure [N17.9] 05/10/2025 Yes          Hospital Course:  Emely Solomon is a 64 y.o. female with history of chronic diastolic congestive heart failure, hypertension, pulmonary hypertension, group B streptococcus cellulitis of right lower extremity with subsequent bacteremia & sepsis in 2017, history of DVT on chronic anticoagulation, ARIEL on CPAP at home, morbid obesity, CKD stage III who experienced mechanical fall at home leading to contusion of upper back with CT thoracic spine revealing no fracture or acute finding yet fever, acute wheezy bronchitis, hypoxemia admitted with Hypoxia and found to have bacteremia due to group B streptococcus with mitral valve endocarditis confirmed on TTE.     Pulmonology following acute hypoxic respiratory failure given O2 saturation 87% on room air which improved with supplemental oxygen. Continue weaning oxygen as tolerated to maintain O2 saturation greater than 90%.    -Respiratory PCR panel negative.    -Repeat TTE showed severe pulmonary hypertension as well as bacterial endocarditis and CTS consulted and recommended medical management given no significant mitral regurgitation and extreme risk with invasive management due to multiple comorbidities and high risk of morbidity and mortality with open heart surgery.    -CT chest left lower lobe atelectasis versus infiltrate with adequate antibiotic coverage for bacteremia.  Incentive spirometer encouraged.  Home CPAP at bedside.  Nebulizer scheduled.  - Pulmonology signed off on 5/9/2025.  Respiratory status continues to stabilize.     Infectious disease followed bacteremia/endocarditis and recommend 6 weeks ceftriaxone 2 g IV every 12 hours with stop date on 6/17/2025, weekly CBC with differential and creatinine faxed to [phone].  PICC line orders active.  INR 1.5 today.  They have scheduled a follow-up as listed below with Dr. Nunez.  Please call Dr. Nunez with  questions regarding antibiotics at the SNF.     Cardiology following as needed now for recent elevated troponin and proBNP with history of hypertension and chronic diastolic congestive heart failure.  Metoprolol reduced to 12.5 mg daily on 5/8/2025 given recent soft BP.  Monitor BP trends.  Nephrology recommends Lasix outlined below.     Nephrology following hyponatremia and STEFANIA suspect SIADH.   sodium levels improved with fluid restriction 1.2 L daily total daily,  Lasix.  Nephrology approved PICC line.  Sodium levels improved.     Chronic anemia most likely due to chronic disease--CKD, chronic hepatitis C, chronic RLE leg wound.  Xarelto initially held with heparin drip started for possible procedure during hospital course; however, medical management pursued given increased mortality/morbidity with invasive management of endocarditis given BMI 71.  Hematology followed IONA and recommend start oral iron supplement & transfuse for serum hemoglobin less than 7.  Heparin drip switched back to Xarelto given stable hemoglobin.  Fecal Hemoccult negative as well.  Hemoglobin initially was a little lower this morning however this was immediately repeated and returned to 8.4 which is reasonably stable from what she had been running.  The 7.3 hemoglobin from this morning is thought to be lab error.  Recommend to monitor hemoglobin and BMP intermittently at the facility.     RLE wound (posterior) POA evaluated & wound RN noted plan Opticel dressing and Kerlix to open right lower leg wound.  Continue wound care at facility.     A1c 5.4 showing adequate control in outpatient setting.     Liver enzymes normalizing during hospital course and most likely due to hepatic congestion from chronic diastolic and right-sided congestive heart failure.  Right upper quadrant ultrasound negative.  Recommend repeat liver enzymes at primary care follow-up.     Generalized weakness complicated by morbid obesity and deconditioning.  Unremarkable  TSH 0.9.  Therapies followed & recommend SNF given max assist x2 & bilateral hand held assistance using rocking technique for momentum (BMI 71).  Strongly recommend lifestyle changes and gradual healthy weight loss as possible.     Recent complaints of right hand pain after fall at home (later improved) and x-ray obtained showing no evidence of fracture.     At the time of discharge patient was told to take all medications as prescribed, keep all follow-up appointments, and call their doctor or return to the hospital with any worsening or concerning symptoms.    Please note that this note was made using Dragon voice recognition software        Day of Discharge     Subjective: Patient says she is feeling pretty well today.  She is working on her fluid restrictions.  Her and her  are eager to go to SNF today.  Her  is at the bedside.  No new complaints.  No infectious symptoms.  Patient agrees to follow-up with providers as instructed.    No current fevers or chills  No current nausea, vomiting, or diarrhea  No current chest pain or palpitations      Vital Signs:   Temp:  [97.3 °F (36.3 °C)-98.4 °F (36.9 °C)] 97.7 °F (36.5 °C)  Heart Rate:  [74-84] 77  Resp:  [18-20] 20  BP: (105-113)/(61-82) 109/69     Physical Exam:    Constitutional: Awake, alert, chronically ill-appearing, no acute distress  Respiratory: No cough or wheezes, distant breath sounds  Cardiovascular: Pulse rate is normal, palpable radial pulses  Gastrointestinal:  Soft, nontender  Musculoskeletal: Severely debilitated, severe morbid obesity with BMI of 71 weight is greater than 400 pounds  Neurologic: No slurred speech or facial droop, follows commands  Skin: No rashes or jaundice, warm    Pertinent  and/or Most Recent Results     Results from last 7 days   Lab Units 05/10/25  0918 05/10/25  0521 05/09/25  1818 05/09/25  0739 05/08/25  1816 05/08/25  0614 05/07/25  1759 05/07/25  1556 05/07/25  0603 05/07/25  0602 05/06/25  1817  "05/06/25  1153 05/06/25  0618 05/05/25  0728 05/04/25  1744 05/04/25  0849   WBC 10*3/mm3  --  8.24  --  10.48  --  10.42  --   --  11.85*  --   --   --  13.40* 13.95*  --  8.82   HEMOGLOBIN g/dL 8.4* 7.3* 8.3* 8.6* 8.5* 8.6* 8.8*  --  7.9*  --   --    < > 7.6* 7.9*  --  8.7*   HEMATOCRIT % 26.9* 24.0* 26.1* 27.1* 26.5* 26.2* 27.1*  --  23.9*  --   --    < > 23.8* 24.1*  --  26.9*   PLATELETS 10*3/mm3  --  147  --  168  --  160  --   --  154  --   --   --  150 175  --  220   SODIUM mmol/L  --  135*  --  134*  --  132*  --  128*  --  128* 122*  --  123* 125*  --  130*   POTASSIUM mmol/L  --  3.6  --  3.9  --  4.0  --  4.0  --  4.0 3.8  --  4.1 3.9   < > 3.3*   CHLORIDE mmol/L  --  99  --  98  --  98  --  94*  --  96* 92*  --  92* 92*  --  95*   CO2 mmol/L  --  28.0  --  25.0  --  22.7  --  23.5  --  21.9* 21.0*  --  21.0* 20.5*  --  21.2*   BUN mg/dL  --  37*  --  44*  --  52*  --  53*  --  49* 43*  --  33* 31*  --  40*   CREATININE mg/dL  --  0.86  --  1.12*  --  1.20*  --  1.33*  --  1.26* 1.45*  --  1.50* 1.49*  --  1.86*   GLUCOSE mg/dL  --  93  --  87  --  99  --  94  --  94 127*  --  96 102*  --  104*   CALCIUM mg/dL  --  8.5*  --  9.1  --  8.9  --  9.0  --  8.5* 8.5*  --  8.5* 8.8  --  8.9    < > = values in this interval not displayed.     Results from last 7 days   Lab Units 05/09/25  1028 05/08/25  0614 05/07/25  1642 05/07/25  0602 05/06/25  0618 05/05/25  2008 05/05/25  0728 05/04/25  0849   BILIRUBIN mg/dL  --  0.5  --  0.4 0.5  --  1.1 1.1   ALK PHOS U/L  --  304*  --  266* 301*  --  261* 203*   ALT (SGPT) U/L  --  24  --  28 38*  --  43* 15   AST (SGOT) U/L  --  56*  --  78* 110*  --  160* 38*   PROTIME Seconds 18.1*  --   --   --   --  43.6*  --   --    INR  1.50*  --   --   --   --  4.49*  --   --    APTT seconds  --   --  31.3 29.2 40.7* 40.5*  --   --            Invalid input(s): \"TG\", \"LDLCALC\", \"LDLREALC\"  Results from last 7 days   Lab Units 05/06/25  0618 05/04/25  1744 05/04/25  1036 " 05/04/25  0849   TSH uIU/mL  --  0.907  --   --    CORTISOL mcg/dL 18.50  --   --   --    HEMOGLOBIN A1C %  --  5.40  --   --    PROBNP pg/mL  --   --   --  2,994.0*   HSTROP T ng/L  --   --  81* 72*   PROCALCITONIN ng/mL  --  40.80*  --   --    LACTATE mmol/L  --   --   --  1.5            Microbiology Results Abnormal       Procedure Component Value - Date/Time    Blood Culture - Blood, Arm, Left [[id number]]  (Abnormal) Collected: 05/04/25 2125    Lab Status: Edited Result - FINAL Specimen: Blood from Arm, Left Updated: 05/07/25 0635     Blood Culture Streptococcus agalactiae (Group B)     Comment:   Corrected result. Previously Reported Organism Changed. Previous result was Gram Positive Cocci on 5/7/2025 at 0635 EDT.        Isolated from Aerobic and Anaerobic Bottles     Gram Stain Anaerobic Bottle Gram positive cocci in chains      Aerobic Bottle Gram positive cocci in chains    Narrative:      Refer to previous blood culture collected on 05/04/2025 1744 for MICs      Blood Culture - Blood, Arm, Right [[id number]]  (Abnormal)  (Susceptibility) Collected: 05/04/25 1744    Lab Status: Final result Specimen: Blood from Arm, Right Updated: 05/07/25 0635     Blood Culture Streptococcus agalactiae (Group B)     Isolated from Aerobic and Anaerobic Bottles     Gram Stain Aerobic Bottle Gram positive cocci in chains      Anaerobic Bottle Gram positive cocci in chains    Narrative:      Less than seven (7) mL's of blood was collected.  Insufficient quantity may yield false negative results.    Susceptibility        Streptococcus agalactiae (Group B)      ASTNAM      Ceftriaxone Susceptible      Penicillin G Susceptible      Vancomycin Susceptible                           Blood Culture ID, PCR - Blood, Arm, Right [[id number]]  (Abnormal) Collected: 05/04/25 1744    Lab Status: Final result Specimen: Blood from Arm, Right Updated: 05/05/25 0813     BCID, PCR Streptococcus agalactiae (Group B). Identification by BCID2 PCR.      BOTTLE TYPE Anaerobic Bottle            Imaging Results (All)       Procedure Component Value Units Date/Time    XR Hand 3+ View Right [[id number]] Collected: 05/07/25 1405     Updated: 05/07/25 1414    Narrative:      XR HAND 3+ VW RIGHT-     INDICATIONS: Pain.     TECHNIQUE: 3 views of the right hand     COMPARISON: None available     FINDINGS:     Soft tissue swelling of the hand is apparent. Mild to moderate  osteoarthritic degenerative changes are present. Mild widening of the  scapholunate interval is probably degenerative in nature, but correlate  clinically to exclude possibility of scapholunate ligament injury. No  acute fracture, erosion, or dislocation is identified. Follow-up/further  evaluation can be obtained as indications persist.       Impression:         As described.           This report was finalized on 5/7/2025 2:08 PM by Dr. Damon Chavez M.D on Workstation: [id number]       CT Chest Without Contrast Diagnostic [[id number]] Collected: 05/06/25 1230     Updated: 05/06/25 1343    Narrative:      CT CHEST WITHOUT CONTRAST DIAGNOSTIC     Radiation dose reduction techniques were utilized, including automated  exposure control and exposure modulation based on body size.     CLINICAL INFORMATION: Hypoxemia.     COMPARISON:  09/24/2015     FINDINGS:  1. There appears to be subpleural atelectasis demonstrated at the base  of both lower lobes, along the costophrenic sulci, this appears  symmetric. There is however a peripheral wedge-shaped area of either  atelectasis or pneumonia along the posterior left lower lobe seen best  on image #52. Curvilinear area of likely atelectasis adjacent to the  right major fissure, seen best on image #38. No pleural effusion or  pulmonary edema. No suspicious pulmonary parenchymal lesion seen.     2. Small to moderate size hiatal hernia. The esophagus appears mildly  distended with an air-fluid level, likely related to distal dysmotility.     3. There is cardiac  enlargement with coronary artery calcification.  No  pericardial effusion. Mild dilatation of the ascending thoracic aorta,  measuring 4.2 cm in the AP direction, similar to 2015. No mediastinal or  hilar mass/lymphadenopathy has developed. Partially within the  field-of-view within the upper abdomen, atrophic left kidney. The  remainder is unremarkable.                    This report was finalized on 5/6/2025 1:40 PM by Dr. Dhruv Sanchez M.D  on Workstation: [id number]       US Abdomen Limited [[id number]] Collected: 05/04/25 1740     Updated: 05/04/25 1749    Narrative:      RUQ ULTRASOUND     INDICATION: Elevated liver enzymes     TECHNIQUE: Routine transabdominal right upper quadrant ultrasound.     COMPARISON: CT abdomen pelvis 4/25/2023     FINDINGS:     PANCREAS: Visualized portions of the pancreas are within normal limits.      LIVER: The echogenicity and echotexture of the hepatic parenchyma is  within normal limits. There is a 2.8 cm hyperechoic mass in the right  hepatic lobe, previously characterized as benign hemangioma. There is no  evidence of intrahepatic biliary ductal dilatation. The common duct is  normal in caliber at the lolly hepatis.     GALLBLADDER: The gallbladder is normal. There is no evidence of  cholelithiasis, gallbladder wall thickening, or pericholecystic fluid.   Sonographic Last sign is absent.     VASCULAR: Visualized portion of the inferior vena cava is unremarkable.     OTHER: No ascites.       Impression:         Negative right upper quadrant ultrasound. No acute findings.        This report was finalized on 5/4/2025 5:46 PM by Dr. Akil Perales M.D  on Workstation: [id number]       CT Thoracic Spine Without Contrast [[id number]] Collected: 05/04/25 0950     Updated: 05/04/25 0956    Narrative:      CT THORACIC SPINE WO CONTRAST-     INDICATIONS: Trauma. Radiation dose reduction techniques were utilized,  including automated exposure control and exposure modulation based  on  body size.     TECHNIQUE: NONCONTRAST THORACIC SPINE CT     COMPARISON: 9/24/2015     FINDINGS:     Vertebral body heights appear stable. No acute fracture is identified.  Multilevel endplate spurring, disc space narrowing.     No high-grade neuroforaminal narrowing or central stenosis is noted,  without the benefit of intrathecal contrast material.     If there is further clinical concern, MRI or CT myelogram can be  obtained.     Relative prominence of the caliber of the central pulmonary arteries may  reflect pulmonary arterial hypertension.       Impression:         As described.           This report was finalized on 5/4/2025 9:53 AM by Dr. Damon Chavez M.D on Workstation: [id number]       XR Chest AP [[id number]] Collected: 05/04/25 0833     Updated: 05/04/25 0837    Narrative:      XR CHEST AP-     HISTORY: Female who is 64 years-old, cough and fever, COVID evaluation     TECHNIQUE: Frontal view of the chest     COMPARISON: 11/30/2023     FINDINGS: The heart size is borderline. Pulmonary vasculature is  unremarkable. No focal pulmonary consolidation, pleural effusion, or  pneumothorax. No acute osseous process.       Impression:      No focal pulmonary consolidation. Borderline heart size.  Follow-up as clinical indications persist.     This report was finalized on 5/4/2025 8:34 AM by Dr. Damon Chavez M.D on Workstation: [id number]               Results for orders placed during the hospital encounter of 07/24/24    Venous w Reflux Lower Extremity - Unilateral CAR    Interpretation Summary    Right saphenofemoral junction reflux with mild proximal saphenous vein reflux with poorly visualized greater saphenous medial branch.  Short segment of varicosity noted in the medial calf.  Lesser saphenous vein nonvisualized.  Poorly visualized deep vein structures without DVT noted.      Results for orders placed during the hospital encounter of 07/24/24    Venous w Reflux Lower Extremity - Unilateral  CAR    Interpretation Summary    Right saphenofemoral junction reflux with mild proximal saphenous vein reflux with poorly visualized greater saphenous medial branch.  Short segment of varicosity noted in the medial calf.  Lesser saphenous vein nonvisualized.  Poorly visualized deep vein structures without DVT noted.      Results for orders placed during the hospital encounter of 05/04/25    Adult Transthoracic Echo Complete W/ Cont if Necessary Per Protocol    Interpretation Summary    The mitral valve leaflets are thickened and myxomatous. There is a large mobile echodensity attached to the atrial surface of the posterior mitral valve leaflet. Differential includes vegetation, degeneration of the valve leaflet, and fibroblastoma    The left ventricular cavity is mildly dilated.    Left ventricular systolic function is normal. Left ventricular ejection fraction appears to be 66 - 70%.    Left ventricular diastolic function is consistent with (grade Ia w/high LAP) impaired relaxation.    The right ventricular cavity is moderately dilated. Normal right ventricular systolic function noted.    The left atrial cavity is moderately dilated.    Mild to moderate aortic valve stenosis is present. Aortic valve maximum pressure gradient is 27.5 mmHg. Aortic valve mean pressure gradient is 16.0 mmHg.    Mild mitral valve regurgitation is present. Mild mitral valve stenosis is present.    Mild to moderate tricuspid valve regurgitation is present.    Calculated right ventricular systolic pressure from tricuspid regurgitation is 62 mmHg.    The inferior vena cava is dilated. IVC inspiratory collapse is absent.    There is no evidence of pericardial effusion      Plan for Follow-up of Pending Labs/Results: Infectious disease clinic follow-up listed below  Pending Labs       Order Current Status    Rickettsia Species DNA, Real-Time PCR In process    Blood Culture - Blood, Hand, Right Preliminary result    Blood Culture - Blood,  Hand, Right Preliminary result          Discharge Details        Discharge Medications        New Medications        Instructions Start Date   cadexomer iodine 0.9 % gel  Commonly known as: IODOSORB   1 Application, Topical, Daily PRN, Apply to open wound to rt lower leg      cefTRIAXone 2,000 mg in sodium chloride 0.9 % 100 mL IVPB   2,000 mg, Intravenous, Every 12 Hours      dextromethorphan polistirex ER 30 MG/5ML Suspension Extended Release oral suspension  Commonly known as: DELSYM   60 mg, Oral, 2 Times Daily PRN      ferrous sulfate 325 (65 FE) MG tablet   325 mg, Oral, Daily With Breakfast      guaiFENesin 600 MG 12 hr tablet  Commonly known as: MUCINEX   1,200 mg, Oral, 2 Times Daily PRN      hydrocortisone-bacitracin-zinc oxide-nystatin  Commonly known as: MAGIC BARRIER   1 Application, Topical, Every 12 Hours Scheduled      ipratropium-albuterol 0.5-2.5 mg/3 ml nebulizer  Commonly known as: DUO-NEB   3 mL, Nebulization, 4 Times Daily - RT      Lidocaine 4 %   1 patch, Transdermal, Every 24 Hours Scheduled, Apply to skin on upper back remove patch in 12 hours. Remove & Discard patch within 12 hours or as directed by MD      melatonin 5 MG tablet tablet   5 mg, Oral, Nightly PRN      sennosides-docusate 8.6-50 MG per tablet  Commonly known as: PERICOLACE   2 tablets, Oral, Nightly PRN             Changes to Medications        Instructions Start Date   acetaminophen 325 MG tablet  Commonly known as: TYLENOL  What changed: when to take this   650 mg, Oral, Every 6 Hours PRN      furosemide 40 MG tablet  Commonly known as: LASIX  What changed: when to take this   40 mg, Oral, Daily      metoprolol succinate XL 25 MG 24 hr tablet  Commonly known as: TOPROL-XL  What changed:   medication strength  how much to take   12.5 mg, Oral, Daily      rivaroxaban 20 MG tablet  Commonly known as: Xarelto  What changed: when to take this   20 mg, Oral, Daily With Dinner             Stop These Medications      losartan 100  MG tablet  Commonly known as: COZAAR              Allergies   Allergen Reactions    Cephalexin Hives and Rash     Diffuse rash on cephalexin 1 g PO q6h on 6/17/20  Tolerated zosyn and PCN G September 2020 without issue    Clindamycin/Lincomycin Hives         Discharge Disposition:  Skilled Nursing Facility (DC - External)    Diet:  Hospital:  Diet Order   Procedures    Diet: Cardiac, Fluid Restriction (240 mL/tray); Healthy Heart (2-3 Na+); Other (Specify mL/day) (1200); Fluid Consistency: Thin (IDDSI 0)       Activity:  Activity Instructions       Activity as Tolerated      Up WIth Assist                   CODE STATUS:    Code Status and Medical Interventions: CPR (Attempt to Resuscitate); Full Support   Ordered at: 05/04/25 1543     Code Status (Patient has no pulse and is not breathing):    CPR (Attempt to Resuscitate)     Medical Interventions (Patient has pulse or is breathing):    Full Support       Future Appointments   Date Time Provider Department Center   5/20/2025  9:00 AM Tessy Fink APRN [id number]   6/16/2025 10:20 AM Tania Nunez MD [facility]   9/30/2025  9:30 AM Adilson Wilkerson MD [facility]  [facility]           Additional Instructions for the Follow-ups that You Need to Schedule       Discharge Follow-up with PCP   As directed       Currently Documented PCP:    Adilson Wilkerson MD    PCP Phone Number:    [phone]     Follow Up Details: Recommend primary care provider follow-up within 1 week after discharge from rehab.  Please call to schedule        Discharge Follow-up with Specified Provider: Follow-up with cardiology for heart failure within 4 to 6 weeks.  Please call to schedule   As directed      To: Follow-up with cardiology for heart failure within 4 to 6 weeks.  Please call to schedule               Contact information for follow-up providers       Adilson Wilkerson MD .    Specialty: Family Medicine  Why: Recommend primary care provider follow-up within  1 week after discharge from rehab.  Please call to schedule  Contact information:  [address] [facility] [address]  [phone]                       Contact information for after-discharge care       Destination       [facility] .    Service: Skilled Nursing  Contact information:  [address]  [facility] [id number] [phone]                     Home Medical Care       [facility] .    Service: Home Rehabilitation  Contact information:  [address] [facility], [address]  [place] [id number] [phone]                                       Ghanshyam Amos MD  05/10/25      Time Spent on Discharge:  I spent greater than 30 minutes on this discharge activity which included: face-to-face encounter with the patient, reviewing the data in the system, coordination of the care with the nursing staff as well as consultants, documentation, and entering orders.

## 2025-05-10 NOTE — PLAN OF CARE
Problem: Adult Inpatient Plan of Care  Goal: Plan of Care Review  Outcome: Progressing  Flowsheets (Taken 5/10/2025 0511)  Progress: improving  Outcome Evaluation:   VSS, BP still slightly low at times   SR on monitor   2L NC O2 when awake, home cpap at night while sleeping with 3-4L O2 bled in   PICC line to R UA, drsg CDI   no acute distress noted   will cont to monitor  Plan of Care Reviewed With: patient   Goal Outcome Evaluation:  Plan of Care Reviewed With: patient        Progress: improving  Outcome Evaluation: VSS, BP still slightly low at times; SR on monitor; 2L NC O2 when awake, home cpap at night while sleeping with 3-4L O2 bled in; PICC line to R UA, drsg CDI; no acute distress noted; will cont to monitor

## 2025-05-10 NOTE — PROGRESS NOTES
[facility] Progress Note      Patient Name: Emely Solomon  : 1960  MRN: [id number]  Primary Care Physician:  Adilson Wilkerson MD  Date of admission: 2025    Subjective     Interval History:   Follow-up acute on intermittent chronic hyponatremia,    Patient lying in bed  Feeling tired fatigue  Tolerating oral intake    Review of Systems:   As noted above    Objective     Vitals:   Temp:  [97.3 °F (36.3 °C)-98.4 °F (36.9 °C)] 97.7 °F (36.5 °C)  Heart Rate:  [74-84] 80  Resp:  [18-20] 20  BP: (105-113)/(61-82) 109/69  Flow (L/min) (Oxygen Therapy):  [2-4] 4    Intake/Output Summary (Last 24 hours) at 5/10/2025 0912  Last data filed at 5/10/2025 0120  Gross per 24 hour   Intake 800 ml   Output 1700 ml   Net -900 ml       Physical Exam:    General Appearance: alert, on nasal cannula oxygen 4 L.  Morbidly obese.  BMI of 71  HEENT: oral mucosa normal, nonicteric sclera  Neck: supple, no JVD  Lungs: Bilateral rales with end expiratory wheezing.  Heart: RRR, normal S1 and S2  Abdomen: soft, nontender, nondistended. +bs.  Obese  : Pure wick  Extremities: Chronic lower extremity lymphedema.  3+.  Upper extremity edema 1+.  Neuro: normal speech and mental status     Scheduled Meds:     cefTRIAXone, 2,000 mg, Intravenous, Q12H  dextromethorphan polistirex ER, 60 mg, Oral, Q12H  famotidine, 40 mg, Oral, Daily  ferrous sulfate, 325 mg, Oral, Daily With Breakfast  furosemide, 40 mg, Oral, Daily  guaiFENesin, 1,200 mg, Oral, Q12H  hydrocortisone-bacitracin-zinc oxide-nystatin, 1 Application, Topical, Q12H  ipratropium-albuterol, 3 mL, Nebulization, 4x Daily - RT  Lidocaine, 1 patch, Transdermal, Q24H  metoprolol succinate XL, 12.5 mg, Oral, Daily  rivaroxaban, 20 mg, Oral, Daily  sodium chloride, 10 mL, Intravenous, Q12H  sodium chloride, 10 mL, Intravenous, Q12H      IV Meds:          Results Reviewed:   I have personally reviewed the results from the time of this admission to  5/10/2025 09:12 EDT     Results from last 7 days   Lab Units 05/10/25  0521 05/09/25  0739 05/08/25  0614 05/07/25  1556 05/07/25  0602 05/06/25 1817 05/06/25  0618   SODIUM mmol/L 135* 134* 132*   < > 128*   < > 123*   POTASSIUM mmol/L 3.6 3.9 4.0   < > 4.0   < > 4.1   CHLORIDE mmol/L 99 98 98   < > 96*   < > 92*   CO2 mmol/L 28.0 25.0 22.7   < > 21.9*   < > 21.0*   BUN mg/dL 37* 44* 52*   < > 49*   < > 33*   CREATININE mg/dL 0.86 1.12* 1.20*   < > 1.26*   < > 1.50*   CALCIUM mg/dL 8.5* 9.1 8.9   < > 8.5*   < > 8.5*   BILIRUBIN mg/dL  --   --  0.5  --  0.4  --  0.5   ALK PHOS U/L  --   --  304*  --  266*  --  301*   ALT (SGPT) U/L  --   --  24  --  28  --  38*   AST (SGOT) U/L  --   --  56*  --  78*  --  110*   GLUCOSE mg/dL 93 87 99   < > 94   < > 96    < > = values in this interval not displayed.       Estimated Creatinine Clearance: 110.2 mL/min (by C-G formula based on SCr of 0.86 mg/dL).    Results from last 7 days   Lab Units 05/10/25  0521 05/09/25  0739 05/07/25  0602 05/06/25  0618   MAGNESIUM mg/dL  --  1.9 1.9 1.8   PHOSPHORUS mg/dL 3.1 3.5  --  2.9       Results from last 7 days   Lab Units 05/06/25 0618 05/04/25  1744   URIC ACID mg/dL 7.8* 7.9*       Results from last 7 days   Lab Units 05/10/25  0521 05/09/25  1818 05/09/25  0739 05/08/25  1816 05/08/25  0614 05/07/25  1759 05/07/25  0603 05/06/25  1153 05/06/25  0618   WBC 10*3/mm3 8.24  --  10.48  --  10.42  --  11.85*  --  13.40*   HEMOGLOBIN g/dL 7.3* 8.3* 8.6* 8.5* 8.6*   < > 7.9*   < > 7.6*   PLATELETS 10*3/mm3 147  --  168  --  160  --  154  --  150    < > = values in this interval not displayed.       Results from last 7 days   Lab Units 05/09/25  1028 05/05/25 2008   INR  1.50* 4.49*       Assessment / Plan     ASSESSMENT:  Hyponatremia, acute on intermittent chronic.  TSH and cortisol normal.  Urine studies consistent with SIADH.  On fluid restriction.    Added urea and Lasix 5/6/2025.   Sodium improving  Acute kidney injury on CKD 3A  baseline creatinine 1.2.  Now at baseline.  Underlying hypertensive nephrosclerosis and cardiorenal syndrome.  Acute component due to hypotension with impaired renal autoregulation due to ARB.  3.  Strep bacteremia with mitral valve endocarditis.  On ceftriaxone.  Cardiothoracic surgery recommends medical management.  4.  Acute on chronic heart failure preserved ejection fraction.  Continue Lasix today.  Blood pressure still too low.  Reduce metoprolol further  5.  Left lower lobe pneumonia.  Acute hypoxic respiratory failure  6.  History of DVT and pulmonary embolism on Xarelto.  7.  Elevated transaminases.  Slowly improving.  8.  Iron deficiency anemia.  No IV iron while active infection.  9.  Hepatitis C antibody positive.  10.  Morbid obesity.  BMI of 71    PLAN:  Continue current dose of diuretics with fluid  restriction, sodium gradually improving    Thank you for involving us in the care of Emely Solomon.  Please feel free to call with any questions.    Ridge Jones MD  05/10/25  09:12 EDT    [facility]  [phone]    Please note that portions of this note were completed with a voice recognition program.

## 2025-05-10 NOTE — CASE MANAGEMENT/SOCIAL WORK
Continued Stay Note  [facility]     Patient Name: Emely Solomon  MRN: [id number]  Today's Date: 5/10/2025    Admit Date: 5/4/2025    Plan: [facility]   Discharge Plan       Row Name 05/10/25 1145       Plan    Plan Comments [facility] notified that pt is discharging today and needs a bariatric bed at discharge.                   Discharge Codes    No documentation.                 Expected Discharge Date and Time       Expected Discharge Date Expected Discharge Time    May 10, 2025               Janett Bettencourt RN

## 2025-05-11 LAB — BACTERIA SPEC AEROBE CULT: NORMAL

## 2025-05-12 NOTE — CASE MANAGEMENT/SOCIAL WORK
Case Management Discharge Note      Final Note: [facility]         Selected Continued Care - Discharged on 5/10/2025 Admission date: 5/4/2025 - Discharge disposition: Skilled Nursing Facility (DC - External)      Destination Coordination complete.      Service Provider Services Address Phone Fax Patient Preferred    [facility] [address] [id number] [phone] --              Durable Medical Equipment    No services have been selected for the patient.                Dialysis/Infusion    No services have been selected for the patient.                Home Medical Care       Service Provider Services Address Phone Fax Patient Preferred    [facility] [address], [address] [address] [phone] --              Therapy    No services have been selected for the patient.                Community Resources    No services have been selected for the patient.                Community & DME    No services have been selected for the patient.                    Selected Continued Care - Episodes Includes continued care and service providers with selected services from the active episodes listed below          Transportation Services  Ambulance: [facility]  [facility] Ambulance Status: Accepted    Final Discharge Disposition Code: 03 - skilled nursing facility (SNF)

## 2025-05-20 ENCOUNTER — OFFICE VISIT (OUTPATIENT)
Dept: CARDIOLOGY | Age: 65
End: 2025-05-20
Payer: MEDICARE

## 2025-05-20 ENCOUNTER — HOSPITAL ENCOUNTER (INPATIENT)
Facility: HOSPITAL | Age: 65
LOS: 3 days | Discharge: NURSING, REHAB OR LONG-TERM CARE | End: 2025-05-24
Attending: EMERGENCY MEDICINE | Admitting: STUDENT IN AN ORGANIZED HEALTH CARE EDUCATION/TRAINING PROGRAM
Payer: MEDICARE

## 2025-05-20 ENCOUNTER — DOCUMENTATION (OUTPATIENT)
Dept: INTERNAL MEDICINE | Facility: HOSPITAL | Age: 65
End: 2025-05-20
Payer: MEDICARE

## 2025-05-20 ENCOUNTER — APPOINTMENT (OUTPATIENT)
Dept: CT IMAGING | Facility: HOSPITAL | Age: 65
End: 2025-05-20
Payer: MEDICARE

## 2025-05-20 ENCOUNTER — TELEPHONE (OUTPATIENT)
Dept: CARDIOLOGY | Age: 65
End: 2025-05-20

## 2025-05-20 VITALS
HEIGHT: 63 IN | DIASTOLIC BLOOD PRESSURE: 82 MMHG | SYSTOLIC BLOOD PRESSURE: 120 MMHG | WEIGHT: 293 LBS | OXYGEN SATURATION: 90 % | HEART RATE: 84 BPM | BODY MASS INDEX: 51.91 KG/M2

## 2025-05-20 DIAGNOSIS — E66.01 MORBID OBESITY: ICD-10-CM

## 2025-05-20 DIAGNOSIS — D63.8 ANEMIA, CHRONIC DISEASE: ICD-10-CM

## 2025-05-20 DIAGNOSIS — H53.133 ACUTE LOSS OF VISION, BILATERAL: Primary | ICD-10-CM

## 2025-05-20 DIAGNOSIS — I33.0 ACUTE BACTERIAL ENDOCARDITIS: Primary | ICD-10-CM

## 2025-05-20 DIAGNOSIS — J96.01 SEPSIS DUE TO METHICILLIN SUSCEPTIBLE STAPHYLOCOCCUS AUREUS (MSSA) WITH ACUTE HYPOXIC RESPIRATORY FAILURE WITHOUT SEPTIC SHOCK: ICD-10-CM

## 2025-05-20 DIAGNOSIS — I21.A1 TYPE 2 MYOCARDIAL INFARCTION: ICD-10-CM

## 2025-05-20 DIAGNOSIS — H53.9 VISION CHANGES: ICD-10-CM

## 2025-05-20 DIAGNOSIS — J96.01 ACUTE RESPIRATORY FAILURE WITH HYPOXIA: ICD-10-CM

## 2025-05-20 DIAGNOSIS — I50.33 ACUTE ON CHRONIC HEART FAILURE WITH PRESERVED EJECTION FRACTION (HFPEF): ICD-10-CM

## 2025-05-20 DIAGNOSIS — G47.33 OSA ON CPAP: ICD-10-CM

## 2025-05-20 DIAGNOSIS — I74.9 PARADOXICAL EMBOLISM: ICD-10-CM

## 2025-05-20 DIAGNOSIS — I89.0 LYMPHEDEMA: ICD-10-CM

## 2025-05-20 DIAGNOSIS — I33.0 ACUTE BACTERIAL ENDOCARDITIS: ICD-10-CM

## 2025-05-20 DIAGNOSIS — R65.20 SEPSIS DUE TO METHICILLIN SUSCEPTIBLE STAPHYLOCOCCUS AUREUS (MSSA) WITH ACUTE HYPOXIC RESPIRATORY FAILURE WITHOUT SEPTIC SHOCK: ICD-10-CM

## 2025-05-20 DIAGNOSIS — A41.01 SEPSIS DUE TO METHICILLIN SUSCEPTIBLE STAPHYLOCOCCUS AUREUS (MSSA) WITH ACUTE HYPOXIC RESPIRATORY FAILURE WITHOUT SEPTIC SHOCK: ICD-10-CM

## 2025-05-20 DIAGNOSIS — Z99.81 ON HOME OXYGEN THERAPY: ICD-10-CM

## 2025-05-20 DIAGNOSIS — I27.20 PULMONARY HYPERTENSION: ICD-10-CM

## 2025-05-20 PROBLEM — I87.331 CHRONIC VENOUS HYPERTENSION WITH ULCER AND INFLAMMATION INVOLVING RIGHT SIDE: Status: RESOLVED | Noted: 2024-05-15 | Resolved: 2025-05-20

## 2025-05-20 PROBLEM — J96.11 CHRONIC RESPIRATORY FAILURE WITH HYPOXIA: Status: ACTIVE | Noted: 2025-05-20

## 2025-05-20 PROBLEM — I83.003: Status: RESOLVED | Noted: 2024-07-24 | Resolved: 2025-05-20

## 2025-05-20 PROBLEM — I87.2 VENOUS STASIS ULCER OF RIGHT CALF LIMITED TO BREAKDOWN OF SKIN WITHOUT VARICOSE VEINS: Status: RESOLVED | Noted: 2024-05-15 | Resolved: 2025-05-20

## 2025-05-20 PROBLEM — L97.211 VENOUS STASIS ULCER OF RIGHT CALF LIMITED TO BREAKDOWN OF SKIN WITHOUT VARICOSE VEINS: Status: RESOLVED | Noted: 2024-05-15 | Resolved: 2025-05-20

## 2025-05-20 PROBLEM — Z12.11 ENCOUNTER FOR SCREENING FOR MALIGNANT NEOPLASM OF COLON: Status: RESOLVED | Noted: 2022-02-15 | Resolved: 2025-05-20

## 2025-05-20 PROBLEM — L97.302: Status: RESOLVED | Noted: 2024-07-24 | Resolved: 2025-05-20

## 2025-05-20 PROBLEM — N18.30: Status: RESOLVED | Noted: 2020-09-19 | Resolved: 2025-05-20

## 2025-05-20 PROBLEM — R79.89 ELEVATED TROPONIN: Status: RESOLVED | Noted: 2025-05-04 | Resolved: 2025-05-20

## 2025-05-20 LAB
ALBUMIN SERPL-MCNC: 2.9 G/DL (ref 3.5–5.2)
ALBUMIN/GLOB SERPL: 0.6 G/DL
ALP SERPL-CCNC: 127 U/L (ref 39–117)
ALT SERPL W P-5'-P-CCNC: 9 U/L (ref 1–33)
ANION GAP SERPL CALCULATED.3IONS-SCNC: 9.1 MMOL/L (ref 5–15)
AST SERPL-CCNC: 11 U/L (ref 1–32)
BASOPHILS # BLD AUTO: 0.03 10*3/MM3 (ref 0–0.2)
BASOPHILS NFR BLD AUTO: 0.5 % (ref 0–1.5)
BILIRUB SERPL-MCNC: 0.4 MG/DL (ref 0–1.2)
BUN SERPL-MCNC: 11 MG/DL (ref 8–23)
BUN/CREAT SERPL: 12.1 (ref 7–25)
CALCIUM SPEC-SCNC: 8.6 MG/DL (ref 8.6–10.5)
CHLORIDE SERPL-SCNC: 99 MMOL/L (ref 98–107)
CO2 SERPL-SCNC: 25.9 MMOL/L (ref 22–29)
CREAT SERPL-MCNC: 0.91 MG/DL (ref 0.57–1)
CRP SERPL-MCNC: 11.45 MG/DL (ref 0–0.5)
DEPRECATED RDW RBC AUTO: 52.4 FL (ref 37–54)
EGFRCR SERPLBLD CKD-EPI 2021: 70.2 ML/MIN/1.73
EOSINOPHIL # BLD AUTO: 1.12 10*3/MM3 (ref 0–0.4)
EOSINOPHIL NFR BLD AUTO: 17.2 % (ref 0.3–6.2)
ERYTHROCYTE [DISTWIDTH] IN BLOOD BY AUTOMATED COUNT: 16.8 % (ref 12.3–15.4)
ERYTHROCYTE [SEDIMENTATION RATE] IN BLOOD: 96 MM/HR (ref 0–30)
GLOBULIN UR ELPH-MCNC: 4.6 GM/DL
GLUCOSE SERPL-MCNC: 90 MG/DL (ref 65–99)
HCT VFR BLD AUTO: 26.5 % (ref 34–46.6)
HGB BLD-MCNC: 7.6 G/DL (ref 12–15.9)
IMM GRANULOCYTES # BLD AUTO: 0.05 10*3/MM3 (ref 0–0.05)
IMM GRANULOCYTES NFR BLD AUTO: 0.8 % (ref 0–0.5)
LYMPHOCYTES # BLD AUTO: 0.9 10*3/MM3 (ref 0.7–3.1)
LYMPHOCYTES NFR BLD AUTO: 13.8 % (ref 19.6–45.3)
MCH RBC QN AUTO: 24.9 PG (ref 26.6–33)
MCHC RBC AUTO-ENTMCNC: 28.7 G/DL (ref 31.5–35.7)
MCV RBC AUTO: 86.9 FL (ref 79–97)
MONOCYTES # BLD AUTO: 0.39 10*3/MM3 (ref 0.1–0.9)
MONOCYTES NFR BLD AUTO: 6 % (ref 5–12)
NEUTROPHILS NFR BLD AUTO: 4.04 10*3/MM3 (ref 1.7–7)
NEUTROPHILS NFR BLD AUTO: 61.7 % (ref 42.7–76)
NRBC BLD AUTO-RTO: 0 /100 WBC (ref 0–0.2)
PLATELET # BLD AUTO: 354 10*3/MM3 (ref 140–450)
PMV BLD AUTO: 8.1 FL (ref 6–12)
POTASSIUM SERPL-SCNC: 3.9 MMOL/L (ref 3.5–5.2)
PROT SERPL-MCNC: 7.5 G/DL (ref 6–8.5)
RBC # BLD AUTO: 3.05 10*6/MM3 (ref 3.77–5.28)
SODIUM SERPL-SCNC: 134 MMOL/L (ref 136–145)
WBC NRBC COR # BLD AUTO: 6.53 10*3/MM3 (ref 3.4–10.8)

## 2025-05-20 PROCEDURE — G0378 HOSPITAL OBSERVATION PER HR: HCPCS

## 2025-05-20 PROCEDURE — 70450 CT HEAD/BRAIN W/O DYE: CPT

## 2025-05-20 PROCEDURE — 94640 AIRWAY INHALATION TREATMENT: CPT

## 2025-05-20 PROCEDURE — 85652 RBC SED RATE AUTOMATED: CPT | Performed by: EMERGENCY MEDICINE

## 2025-05-20 PROCEDURE — 94799 UNLISTED PULMONARY SVC/PX: CPT

## 2025-05-20 PROCEDURE — 86140 C-REACTIVE PROTEIN: CPT | Performed by: EMERGENCY MEDICINE

## 2025-05-20 PROCEDURE — 99285 EMERGENCY DEPT VISIT HI MDM: CPT

## 2025-05-20 PROCEDURE — 85025 COMPLETE CBC W/AUTO DIFF WBC: CPT | Performed by: EMERGENCY MEDICINE

## 2025-05-20 PROCEDURE — 25010000002 CEFTRIAXONE PER 250 MG: Performed by: STUDENT IN AN ORGANIZED HEALTH CARE EDUCATION/TRAINING PROGRAM

## 2025-05-20 PROCEDURE — 80053 COMPREHEN METABOLIC PANEL: CPT | Performed by: EMERGENCY MEDICINE

## 2025-05-20 RX ORDER — ONDANSETRON 4 MG/1
4 TABLET, ORALLY DISINTEGRATING ORAL EVERY 6 HOURS PRN
Status: DISCONTINUED | OUTPATIENT
Start: 2025-05-20 | End: 2025-05-24 | Stop reason: HOSPADM

## 2025-05-20 RX ORDER — IPRATROPIUM BROMIDE AND ALBUTEROL SULFATE 2.5; .5 MG/3ML; MG/3ML
3 SOLUTION RESPIRATORY (INHALATION)
Status: DISCONTINUED | OUTPATIENT
Start: 2025-05-20 | End: 2025-05-24 | Stop reason: HOSPADM

## 2025-05-20 RX ORDER — BISACODYL 10 MG
10 SUPPOSITORY, RECTAL RECTAL DAILY PRN
Status: DISCONTINUED | OUTPATIENT
Start: 2025-05-20 | End: 2025-05-24 | Stop reason: HOSPADM

## 2025-05-20 RX ORDER — AMOXICILLIN 250 MG
2 CAPSULE ORAL 2 TIMES DAILY PRN
Status: DISCONTINUED | OUTPATIENT
Start: 2025-05-20 | End: 2025-05-24 | Stop reason: HOSPADM

## 2025-05-20 RX ORDER — NITROGLYCERIN 0.4 MG/1
0.4 TABLET SUBLINGUAL
Status: DISCONTINUED | OUTPATIENT
Start: 2025-05-20 | End: 2025-05-24 | Stop reason: HOSPADM

## 2025-05-20 RX ORDER — ONDANSETRON 2 MG/ML
4 INJECTION INTRAMUSCULAR; INTRAVENOUS EVERY 6 HOURS PRN
Status: DISCONTINUED | OUTPATIENT
Start: 2025-05-20 | End: 2025-05-24 | Stop reason: HOSPADM

## 2025-05-20 RX ORDER — METOPROLOL SUCCINATE 25 MG/1
12.5 TABLET, EXTENDED RELEASE ORAL DAILY
Status: DISCONTINUED | OUTPATIENT
Start: 2025-05-20 | End: 2025-05-24 | Stop reason: HOSPADM

## 2025-05-20 RX ORDER — BISACODYL 5 MG/1
5 TABLET, DELAYED RELEASE ORAL DAILY PRN
Status: DISCONTINUED | OUTPATIENT
Start: 2025-05-20 | End: 2025-05-24 | Stop reason: HOSPADM

## 2025-05-20 RX ORDER — ACETAMINOPHEN 325 MG/1
650 TABLET ORAL EVERY 4 HOURS PRN
Status: DISCONTINUED | OUTPATIENT
Start: 2025-05-20 | End: 2025-05-24 | Stop reason: HOSPADM

## 2025-05-20 RX ORDER — POLYETHYLENE GLYCOL 3350 17 G/17G
17 POWDER, FOR SOLUTION ORAL DAILY PRN
Status: DISCONTINUED | OUTPATIENT
Start: 2025-05-20 | End: 2025-05-24 | Stop reason: HOSPADM

## 2025-05-20 RX ORDER — SODIUM CHLORIDE 0.9 % (FLUSH) 0.9 %
10 SYRINGE (ML) INJECTION AS NEEDED
Status: DISCONTINUED | OUTPATIENT
Start: 2025-05-20 | End: 2025-05-24 | Stop reason: HOSPADM

## 2025-05-20 RX ORDER — FERROUS SULFATE 325(65) MG
325 TABLET ORAL
Status: DISCONTINUED | OUTPATIENT
Start: 2025-05-21 | End: 2025-05-24 | Stop reason: HOSPADM

## 2025-05-20 RX ORDER — FUROSEMIDE 40 MG/1
40 TABLET ORAL DAILY
Status: DISCONTINUED | OUTPATIENT
Start: 2025-05-20 | End: 2025-05-24 | Stop reason: HOSPADM

## 2025-05-20 RX ORDER — DEXTROMETHORPHAN POLISTIREX 30 MG/5ML
60 SUSPENSION ORAL 2 TIMES DAILY PRN
Status: DISCONTINUED | OUTPATIENT
Start: 2025-05-20 | End: 2025-05-24 | Stop reason: HOSPADM

## 2025-05-20 RX ORDER — GUAIFENESIN 600 MG/1
1200 TABLET, EXTENDED RELEASE ORAL 2 TIMES DAILY PRN
Status: DISCONTINUED | OUTPATIENT
Start: 2025-05-20 | End: 2025-05-24 | Stop reason: HOSPADM

## 2025-05-20 RX ORDER — PETROLATUM,WHITE 41 %
1 OINTMENT (GRAM) TOPICAL 2 TIMES DAILY
Status: ON HOLD | COMMUNITY

## 2025-05-20 RX ORDER — GUAIFENESIN 1200 MG/1
1 TABLET, EXTENDED RELEASE ORAL 2 TIMES DAILY PRN
Status: ON HOLD | COMMUNITY

## 2025-05-20 RX ADMIN — CEFTRIAXONE 2000 MG: 2 INJECTION, POWDER, FOR SOLUTION INTRAMUSCULAR; INTRAVENOUS at 17:41

## 2025-05-20 RX ADMIN — RIVAROXABAN 20 MG: 20 TABLET, FILM COATED ORAL at 17:44

## 2025-05-20 RX ADMIN — IPRATROPIUM BROMIDE AND ALBUTEROL SULFATE 3 ML: .5; 3 SOLUTION RESPIRATORY (INHALATION) at 20:53

## 2025-05-20 RX ADMIN — FUROSEMIDE 40 MG: 20 TABLET ORAL at 17:44

## 2025-05-20 RX ADMIN — METOPROLOL SUCCINATE 12.5 MG: 25 TABLET, EXTENDED RELEASE ORAL at 17:43

## 2025-05-20 RX ADMIN — IPRATROPIUM BROMIDE AND ALBUTEROL SULFATE 3 ML: .5; 3 SOLUTION RESPIRATORY (INHALATION) at 18:10

## 2025-05-20 NOTE — ED NOTES
Nursing report ED to floor  Emely Solomon  64 y.o.  female    HPI :  HPI  Stated Reason for Visit: visual issues (floaters x 2 day)  History Obtained From: patient, family    Chief Complaint  Chief Complaint   Patient presents with    Eye Problem     X 2 day floaters        Admitting doctor:   Anderson Mclean DO    Admitting diagnosis:   The primary encounter diagnosis was Acute loss of vision, bilateral. Diagnoses of Anemia, chronic disease, Morbid obesity, and Acute bacterial endocarditis were also pertinent to this visit.    Code status:   Current Code Status       Date Active Code Status Order ID Comments User Context       5/20/2025 1204 CPR (Attempt to Resuscitate) [id number]  Anderson Mclean DO ED        Question Answer    Code Status (Patient has no pulse and is not breathing) CPR (Attempt to Resuscitate)    Medical Interventions (Patient has pulse or is breathing) Full    Level Of Support Discussed With Patient                    Allergies:   Cephalexin and Clindamycin/lincomycin    Isolation:   No active isolations    Intake and Output  No intake or output data in the 24 hours ending 05/20/25 1403    Weight:       05/20/25  1022   Weight: (!) 183 kg (403 lb)       Most recent vitals:   Vitals:    05/20/25 1108 05/20/25 1131 05/20/25 1231 05/20/25 1301   BP:  103/65 114/82 117/55   Pulse:  80 81 69   Resp:  18 18 18   Temp: 97.5 °F (36.4 °C)      SpO2:  93% 93% 96%   Weight:       Height:           Active LDAs/IV Access:   Lines, Drains & Airways       Active LDAs       Name Placement date Placement time Site Days    PICC Single Lumen 05/09/25 Right Cephalic 05/09/25  1611  Cephalic  10    External Urinary Catheter 05/04/25  1253  --  16                    Labs (abnormal labs have a star):   Labs Reviewed   COMPREHENSIVE METABOLIC PANEL - Abnormal; Notable for the following components:       Result Value    Sodium 134 (*)     Albumin 2.9 (*)     Alkaline Phosphatase 127 (*)     All other components within  normal limits    Narrative:     GFR Categories in Chronic Kidney Disease (CKD)              GFR Category          GFR (mL/min/1.73)    Interpretation  G1                    90 or greater        Normal or high (1)  G2                    60-89                Mild decrease (1)  G3a                   45-59                Mild to moderate decrease  G3b                   30-44                Moderate to severe decrease  G4                    15-29                Severe decrease  G5                    14 or less           Kidney failure    (1)In the absence of evidence of kidney disease, neither GFR category G1 or G2 fulfill the criteria for CKD.    eGFR calculation 2021 CKD-EPI creatinine equation, which does not include race as a factor   CBC WITH AUTO DIFFERENTIAL - Abnormal; Notable for the following components:    RBC 3.05 (*)     Hemoglobin 7.6 (*)     Hematocrit 26.5 (*)     MCH 24.9 (*)     MCHC 28.7 (*)     RDW 16.8 (*)     Lymphocyte % 13.8 (*)     Eosinophil % 17.2 (*)     Immature Grans % 0.8 (*)     Eosinophils, Absolute 1.12 (*)     All other components within normal limits   SEDIMENTATION RATE - Abnormal; Notable for the following components:    Sed Rate 96 (*)     All other components within normal limits   C-REACTIVE PROTEIN - Abnormal; Notable for the following components:    C-Reactive Protein 11.45 (*)     All other components within normal limits   CBC AND DIFFERENTIAL    Narrative:     The following orders were created for panel order CBC & Differential.  Procedure                               Abnormality         Status                     ---------                               -----------         ------                     CBC Auto Differential[[id number]]        Abnormal            Final result                 Please view results for these tests on the individual orders.       EKG:   No orders to display       Meds given in ED:   Medications   sodium chloride 0.9 % flush 10 mL (has no  administration in time range)   nitroglycerin (NITROSTAT) SL tablet 0.4 mg (has no administration in time range)   acetaminophen (TYLENOL) tablet 650 mg (has no administration in time range)   sennosides-docusate (PERICOLACE) 8.6-50 MG per tablet 2 tablet (has no administration in time range)     And   polyethylene glycol (MIRALAX) packet 17 g (has no administration in time range)     And   bisacodyl (DULCOLAX) EC tablet 5 mg (has no administration in time range)     And   bisacodyl (DULCOLAX) suppository 10 mg (has no administration in time range)   ondansetron ODT (ZOFRAN-ODT) disintegrating tablet 4 mg (has no administration in time range)     Or   ondansetron (ZOFRAN) injection 4 mg (has no administration in time range)   Potassium Replacement - Follow Nurse / BPA Driven Protocol (has no administration in time range)   Magnesium Standard Dose Replacement - Follow Nurse / BPA Driven Protocol (has no administration in time range)   Phosphorus Replacement - Follow Nurse / BPA Driven Protocol (has no administration in time range)   Calcium Replacement - Follow Nurse / BPA Driven Protocol (has no administration in time range)       Imaging results:  No radiology results for the last day    Ambulatory status:   - slide    Social issues:   Social History     Socioeconomic History    Marital status:    Tobacco Use    Smoking status: Never    Smokeless tobacco: Never   Vaping Use    Vaping status: Never Used   Substance and Sexual Activity    Alcohol use: Not Currently     Comment: occassionally    Drug use: Never    Sexual activity: Not Currently     Partners: Male     Birth control/protection: Post-menopausal       Peripheral Neurovascular  Peripheral Neurovascular (Adult)  Peripheral Neurovascular WDL: WDL    Neuro Cognitive  Neuro Cognitive (Adult)  Cognitive/Neuro/Behavioral WDL: WDL    Learning  Learning Assessment  Learning Readiness and Ability: no barriers identified    Respiratory  Respiratory  Airway  WDL: WDL  Respiratory WDL  Respiratory WDL: WDL    Abdominal Pain       Pain Assessments  Pain (Adult)  (0-10) Pain Rating: Rest: 0  (0-10) Pain Rating: Activity: 0    NIH Stroke Scale       Melany Abernathy RN  05/20/25 14:03 EDT

## 2025-05-20 NOTE — H&P
"    Patient Name:  Emeyl Solomon  YOB: 1960  MRN:  [id number]  Admit Date:  5/20/2025  Patient Care Team:  Adilson Wilkerson MD as PCP - General (Family Medicine)  [place] Segovia MD as Referring Physician (Obstetrics and Gynecology)  Brennan Rogers MD as Cardiologist (Cardiology)  Caleb Garcia MD as Consulting Physician (Pulmonary Disease)  eJss Sanz, RN as Ambulatory  ([facility])      Subjective   History Present Illness     Chief Complaint   Patient presents with    Eye Problem     X 2 day floaters        Ms. Solomon is a 64 y.o. female with a history of HFpEF, HTN, pulmonary hypertension, group B streptococcus cellulitis of right lower extremity with subsequent bacteremia & sepsis in 2017, DVT/PE on chronic anticoagulation, ARIEL on CPAP, PFO, obesity, iron deficiency anemia, lymphedema who was recently admitted here from 05/04 - 05/10 due to mitral valve endocarditis who presents to [facility] with complaints of bilateral vision changes over the past 3-4 days prior to arrival. She states that this past Friday or Saturday she started to have multiple \"spots\" in her vision and was having blurred vision at times, and these symptoms have been ongoing since that time without resolution. She denies any loss of vision, eye pain, headache, or additional acute complaints. She denies recollection of possible inciting factors. She endorses compliance with medications since discharge. She is being admitted for further evaluation and management.     Personal History     Past Medical History:   Diagnosis Date    Arthritis     Cellulitis     11/2017, with Group B Strep bacteremia and sepsis    Chronic deep vein thrombosis (DVT) of left popliteal vein 12/17/2015 02/04/2016, 04/14/2016    Chronic diastolic congestive heart failure     COVID-19 virus infection 11/2020    Heart murmur     Hypertension     Iliac vein stenosis, right 05/15/2024    Lipoedema     " Lymphedema     other    Morbid obesity     ARIEL on CPAP     PFO (patent foramen ovale)     Postthrombotic syndrome of left lower extremity without complications 12/17/2015    postphletibis    Pulmonary embolism 04/14/2016    Pulmonary hypertension     multifactorial (dCHF, obesity/ARIEL, hx PE), mild by echo 1/2016    Right bundle branch block (RBBB) with left anterior fascicular block (LAFB)     Sepsis 09/18/2020    Type 2 myocardial infarction 05/20/2025     Past Surgical History:   Procedure Laterality Date    ARTERIOGRAM  07/2015    BRONCHOSCOPY N/A 07/21/2017    Procedure: BRONCHOSCOPY with wash;  Surgeon: Caleb Garcia MD;  Location: [facility] ENDOSCOPY;  Service:     COLONOSCOPY      COLONOSCOPY N/A 01/26/2023    Procedure: COLONOSCOPY to CECUM AND TERM ILEUM;  Surgeon: Jj Dean MD;  Location: [facility] ENDOSCOPY;  Service: Gastroenterology;  Laterality: N/A;  PRE OP -screening  POST OP -  NORMAL    D & C HYSTEROSCOPY N/A 03/08/2022    Procedure: DILATATION AND CURETTAGE with hysteroscopy;  Surgeon: Kaylah Land DO;  Location: [facility] MAIN OR;  Service: Gynecology Oncology;  Laterality: N/A;    DILATATION AND CURETTAGE  04/11/2011    OTHER SURGICAL HISTORY  09/2015    IVC filter    OTHER SURGICAL HISTORY      left LE revascularization    OTHER SURGICAL HISTORY      ALESSIA and LEV scan    THROMBECTOMY  07/2015     Family History   Problem Relation Age of Onset    Breast cancer Mother     Hypertension Other     Ovarian cancer Neg Hx     Uterine cancer Neg Hx     Colon cancer Neg Hx     Malig Hyperthermia Neg Hx      Social History     Tobacco Use    Smoking status: Never    Smokeless tobacco: Never   Vaping Use    Vaping status: Never Used   Substance Use Topics    Alcohol use: Not Currently     Comment: occassionally    Drug use: Never     No current facility-administered medications on file prior to encounter.     Current Outpatient Medications on File Prior to Encounter   Medication Sig Dispense Refill     acetaminophen (TYLENOL) 325 MG tablet Take 2 tablets by mouth Every 6 (Six) Hours As Needed for Mild Pain.      cadexomer iodine (IODOSORB) 0.9 % gel Apply 1 Application topically to the appropriate area as directed Daily As Needed for Wound Care. Apply to open wound to rt lower leg      cefTRIAXone 2,000 mg in sodium chloride 0.9 % 100 mL IVPB Infuse 2,000 mg into a venous catheter Every 12 (Twelve) Hours for 74 doses. Indications: Endocarditis      dextromethorphan polistirex ER (DELSYM) 30 MG/5ML Suspension Extended Release oral suspension Take 10 mL by mouth 2 (Two) Times a Day As Needed (As needed for cough).      ferrous sulfate 325 (65 FE) MG tablet Take 1 tablet by mouth Daily With Breakfast.      furosemide (LASIX) 40 MG tablet Take 1 tablet by mouth Daily.      guaiFENesin (MUCINEX) 600 MG 12 hr tablet Take 2 tablets by mouth 2 (Two) Times a Day As Needed for Congestion.      hydrocortisone-bacitracin-zinc oxide-nystatin (MAGIC BARRIER) Apply 1 Application topically to the appropriate area as directed Every 12 (Twelve) Hours.      ipratropium-albuterol (DUO-NEB) 0.5-2.5 mg/3 ml nebulizer Take 3 mL by nebulization 4 (Four) Times a Day.      Lidocaine 4 % Place 1 patch on the skin as directed by provider Daily. Apply to skin on upper back remove patch in 12 hours. Remove & Discard patch within 12 hours or as directed by MD      melatonin 5 MG tablet tablet Take 1 tablet by mouth At Night As Needed (sleep).      metoprolol succinate XL (TOPROL-XL) 25 MG 24 hr tablet Take 0.5 tablets by mouth Daily.      rivaroxaban (Xarelto) 20 MG tablet Take 1 tablet by mouth Daily With Dinner. Indications: Atrial Fibrillation      sennosides-docusate (PERICOLACE) 8.6-50 MG per tablet Take 2 tablets by mouth At Night As Needed for Constipation.       Allergies   Allergen Reactions    Cephalexin Hives and Rash     Diffuse rash on cephalexin 1 g PO q6h on 6/17/20  Tolerated zosyn and PCN G September 2020 without issue     Clindamycin/Lincomycin Hives       Objective    Objective     Vital Signs  Temp:  [97.5 °F (36.4 °C)] 97.5 °F (36.4 °C)  Heart Rate:  [80-87] 80  Resp:  [18-20] 18  BP: ()/(65-82) 103/65  SpO2:  [90 %-96 %] 93 %  on   ;      Body mass index is 71.41 kg/m².    Physical Exam  Vitals and nursing note reviewed.   Constitutional:       General: She is not in acute distress.     Appearance: She is overweight.      Comments: Chronically ill appearing   Eyes:      General: No scleral icterus.     Extraocular Movements: Extraocular movements intact.      Conjunctiva/sclera: Conjunctivae normal.   Cardiovascular:      Rate and Rhythm: Normal rate.      Pulses: Normal pulses.      Heart sounds: Normal heart sounds.   Pulmonary:      Breath sounds: No wheezing.      Comments: Normal effort, but slightly diminished breath sounds, no wheezes  Abdominal:      Palpations: Abdomen is soft.      Tenderness: There is no abdominal tenderness.   Musculoskeletal:      Comments: Chronic LE swelling/lymphedema noted   Skin:     General: Skin is warm and dry.   Neurological:      Mental Status: She is alert and oriented to person, place, and time.         Results Review:  I reviewed the patient's new clinical results.  I reviewed the patient's new imaging results and agree with the interpretation.  I reviewed the patient's other test results and agree with the interpretation  I personally viewed and interpreted the patient's EKG/Telemetry data  Discussed with ED provider.    Lab Results (last 24 hours)       Procedure Component Value Units Date/Time    CBC & Differential [[id number]]  (Abnormal) Collected: 05/20/25 1111    Specimen: Blood Updated: 05/20/25 1121    Narrative:      The following orders were created for panel order CBC & Differential.  Procedure                               Abnormality         Status                     ---------                               -----------         ------                     CBC Auto  Differential[[id number]]        Abnormal            Final result                 Please view results for these tests on the individual orders.    Comprehensive Metabolic Panel [[id number]]  (Abnormal) Collected: 05/20/25 1111    Specimen: Blood Updated: 05/20/25 1143     Glucose 90 mg/dL      BUN 11 mg/dL      Creatinine 0.91 mg/dL      Sodium 134 mmol/L      Potassium 3.9 mmol/L      Chloride 99 mmol/L      CO2 25.9 mmol/L      Calcium 8.6 mg/dL      Total Protein 7.5 g/dL      Albumin 2.9 g/dL      ALT (SGPT) 9 U/L      AST (SGOT) 11 U/L      Alkaline Phosphatase 127 U/L      Total Bilirubin 0.4 mg/dL      Globulin 4.6 gm/dL      A/G Ratio 0.6 g/dL      BUN/Creatinine Ratio 12.1     Anion Gap 9.1 mmol/L      eGFR 70.2 mL/min/1.73     Narrative:      GFR Categories in Chronic Kidney Disease (CKD)              GFR Category          GFR (mL/min/1.73)    Interpretation  G1                    90 or greater        Normal or high (1)  G2                    60-89                Mild decrease (1)  G3a                   45-59                Mild to moderate decrease  G3b                   30-44                Moderate to severe decrease  G4                    15-29                Severe decrease  G5                    14 or less           Kidney failure    (1)In the absence of evidence of kidney disease, neither GFR category G1 or G2 fulfill the criteria for CKD.    eGFR calculation 2021 CKD-EPI creatinine equation, which does not include race as a factor    CBC Auto Differential [[id number]]  (Abnormal) Collected: 05/20/25 1111    Specimen: Blood Updated: 05/20/25 1121     WBC 6.53 10*3/mm3      RBC 3.05 10*6/mm3      Hemoglobin 7.6 g/dL      Hematocrit 26.5 %      MCV 86.9 fL      MCH 24.9 pg      MCHC 28.7 g/dL      RDW 16.8 %      RDW-SD 52.4 fl      MPV 8.1 fL      Platelets 354 10*3/mm3      Neutrophil % 61.7 %      Lymphocyte % 13.8 %      Monocyte % 6.0 %      Eosinophil % 17.2 %      Basophil % 0.5 %      Immature  Grans % 0.8 %      Neutrophils, Absolute 4.04 10*3/mm3      Lymphocytes, Absolute 0.90 10*3/mm3      Monocytes, Absolute 0.39 10*3/mm3      Eosinophils, Absolute 1.12 10*3/mm3      Basophils, Absolute 0.03 10*3/mm3      Immature Grans, Absolute 0.05 10*3/mm3      nRBC 0.0 /100 WBC     Sedimentation Rate [[id number]]  (Abnormal) Collected: 05/20/25 1111    Specimen: Blood Updated: 05/20/25 1129     Sed Rate 96 mm/hr     C-reactive Protein [[id number]]  (Abnormal) Collected: 05/20/25 1111    Specimen: Blood Updated: 05/20/25 1143     C-Reactive Protein 11.45 mg/dL             Imaging Results (Last 24 Hours)       ** No results found for the last 24 hours. **            Results for orders placed during the hospital encounter of 05/04/25    Adult Transthoracic Echo Complete W/ Cont if Necessary Per Protocol    Interpretation Summary    The mitral valve leaflets are thickened and myxomatous. There is a large mobile echodensity attached to the atrial surface of the posterior mitral valve leaflet. Differential includes vegetation, degeneration of the valve leaflet, and fibroblastoma    The left ventricular cavity is mildly dilated.    Left ventricular systolic function is normal. Left ventricular ejection fraction appears to be 66 - 70%.    Left ventricular diastolic function is consistent with (grade Ia w/high LAP) impaired relaxation.    The right ventricular cavity is moderately dilated. Normal right ventricular systolic function noted.    The left atrial cavity is moderately dilated.    Mild to moderate aortic valve stenosis is present. Aortic valve maximum pressure gradient is 27.5 mmHg. Aortic valve mean pressure gradient is 16.0 mmHg.    Mild mitral valve regurgitation is present. Mild mitral valve stenosis is present.    Mild to moderate tricuspid valve regurgitation is present.    Calculated right ventricular systolic pressure from tricuspid regurgitation is 62 mmHg.    The inferior vena cava is dilated. IVC  inspiratory collapse is absent.    There is no evidence of pericardial effusion      No orders to display        Assessment/Plan     Active Hospital Problems    Diagnosis  POA    **Acute loss of vision, bilateral [H53.133]  Yes    Chronic respiratory failure with hypoxia [J96.11]  Yes    Bacterial endocarditis [I33.0]  Yes    Morbid obesity [E66.01]  Yes    History of pulmonary embolism [Z86.711]  Yes    Iron deficiency [E61.1]  Yes    History of DVT (deep vein thrombosis) [Z86.718]  Not Applicable    Anemia, chronic disease [D63.8]  Yes    Lymphedema [I89.0]  Yes    ARIEL on CPAP [G47.33]  Yes    Bacteremia due to group B Streptococcus [R78.81, B95.1]  Yes    Chronic diastolic CHF (congestive heart failure) [I50.32]  Yes    Pulmonary hypertension [I27.20]  Yes    Essential hypertension [I10]  Yes      Resolved Hospital Problems   No resolved problems to display.     Ms. Solomon is a 64 y.o. female with a history of HFpEF, HTN, pulmonary hypertension, group B streptococcus cellulitis of right lower extremity with subsequent bacteremia & sepsis in 2017, DVT/PE on chronic anticoagulation, ARIEL on CPAP, PFO, obesity, iron deficiency anemia, lymphedema who was recently admitted here from 05/04 - 05/10 due to mitral valve endocarditis who presents to [facility] with complaints of bilateral vision changes over the past 3-4 days prior to arrival.    - Complaints of new onset bilateral vision changes as above. Unclear etiology. CT head negative for acute findings. Decreased visual acuity per testing of ED provider. Extra-ocular muscles appear to be intact. Ophthalmology consulted-they will see patient today. Will await their impression, but will likely plan for MRI brain W/WO contrast after their assessment, if indicated and consider Neurology consultation.  - She was discharged on Xarelto per DC med rec. Cardiology note from today says Xarelto. In packet from facility it shows that she has been getting Eliquis,  with last dose of 05/19 in AM. I spoke with Tessy Fink, reaching out to facility now to try to confirm which anticoagulant she has been receiving. Because she is supposed to be on Xarelto, going to reorder this now. Continue home Metoprolol.   - Recently diagnosed Endocarditis and was evaluated by infectious disease during recent admission. She is on prolonged course of antibiotics with Ceftriaxone with stop date 06/17/25. Continue this as prescribed. Closely monitor renal function, CBC while here.  - There is no evidence of CHF exacerbation, continue her home Furosemide. Strict I/O, daily weights, telemetry, pulse oximetry, low sodium diet.  - Blood pressure is soft, need to closely monitor this. Trend BP to guide further management.  - Hemoglobin low, no evidence of bleeding, monitor for now, continue PO iron, repeat CBC in AM, transfuse for hemoglobin <7.  - No evidence of acutely worsening respiratory status, continue treatments and O2 as prescribed, closely monitor with pulse oximetry.  - Okay to use home PAP machine if available.      I discussed the patient's findings and my recommendations with patient, family, nursing staff, consulting provider, and ED provider. Further management based on clinical course. Home medication list reviewed and verified, medications reordered as appropriate.    VTE Prophylaxis - Xarelto (home med).  Code Status - Full code.       Anedrson Mclean DO  [facility]  05/20/25

## 2025-05-20 NOTE — PROGRESS NOTES
Date of Office Visit: 2025  Encounter Provider: LUCÍA Hernández  Place of Service: [facility] CARDIOLOGY  Patient Name: Emely Solomon  :1960  Primary Cardiologist: Dr. Brennan Rogers     Chief Complaint   Patient presents with    Hospital Follow Up Visit   :     HPI: Emely Solomon is a 64 y.o. female who presents today for hospital follow-up visit.  I reviewed her medical records.       She has known hypertension, obstructive sleep apnea on CPAP, lymphedema, chronic shortness of breath, and obesity.      In , she was diagnosed with an acute DVT and pulmonary embolism.  She had an undiagnosed PFO and developed paradoxical embolus to the left lower extremity (arterial).  She underwent thrombectomy and anticoagulation.  She was sent home on warfarin, but was subtherapeutic and developed a pulmonary embolism.  She was changed to rivaroxaban.  She also had an IVC filter placed that has been removed.    In 2016, repeat echocardiogram showed significant improvement of the right side of her heart and pulmonary hypertension.  In 2017, she was negative for DVT.    In 2020, echocardiogram reported the following: LV 64%, mild LVH, diastolic function normal, RV moderately dilated, right atrium mild to moderately dilated, aortic valve calcification, trace MR, mild TR, and severe pulmonary hypertension.    In 2024, she saw Dr. Rogers and was stable at that time.    In May 2025, she was getting out of the shower, and slipped and fell.  She complains of upper back discomfort and presented to [facility] ED.  She was noted to be febrile since admission and blood cultures grew group B strep.  Echocardiogram was concerning for large vegetation and endocarditis.  Troponin level was elevated in the setting of worsening renal function.  She was acutely hypoxic and responded well to oxygen.  She was noted to have iron deficiency anemia.  Chest x-ray was concerning  for possible infiltrate.    Ultimately, she was diagnosed with mitral valve endocarditis with large vegetation on posterior leaflet of the mitral valve.  She was seen by ID and recommended a 6-week IV antibiotic course.  CV surgery recommended medical management due to multiple comorbidities and high morbility/mortality with open heart surgery.  She was noted to have acute on chronic diastolic/right-sided heart failure and a type II non-STEMI.  She was hyponatremic suggestive of SIADH and nephrology was following.  She was anemic with a hemoglobin in the 8 range and liver function testing was elevated due to possible hepatic congestion.  She was discharged to outpatient rehab facility.    She presents today with her  and her sister accompanying her.  Her biggest concern is acute vision changes where she sees black spots and is having hard time seeing me today.  She denies any other neurological symptoms.  She has not been short of breath because she really has not been exerting herself.  The most that she is doing at the rehab facility per the family is getting into a chair.  She has a chronic cough, chronic lower extremity edema, and still feels fatigued.  She denies fevers, diaphoresis, chest pain, PND, orthopnea, palpitations, dizziness, syncope, or bleeding.  She is wearing oxygen and using her CPAP at nighttime.  Blood pressure and heart rate are normal today.  She has a port where she is receiving the IV antibiotics.      Past Medical History:   Diagnosis Date    Arthritis     Cellulitis     11/2017, with Group B Strep bacteremia and sepsis    Chronic deep vein thrombosis (DVT) of left popliteal vein 12/17/2015 02/04/2016, 04/14/2016    Chronic diastolic congestive heart failure     COVID-19 virus infection 11/2020    Heart murmur     Hypertension     Iliac vein stenosis, right 05/15/2024    Lipoedema     Lymphedema     other    Morbid obesity     ARIEL on CPAP     PFO (patent foramen ovale)      Postthrombotic syndrome of left lower extremity without complications 12/17/2015    postphletibis    Pulmonary embolism 04/14/2016    Pulmonary hypertension     multifactorial (dCHF, obesity/ARIEL, hx PE), mild by echo 1/2016    Right bundle branch block (RBBB) with left anterior fascicular block (LAFB)     Sepsis 09/18/2020    Type 2 myocardial infarction 05/20/2025       Past Surgical History:   Procedure Laterality Date    ARTERIOGRAM  07/2015    BRONCHOSCOPY N/A 07/21/2017    Procedure: BRONCHOSCOPY with wash;  Surgeon: Caleb Garcia MD;  Location: [facility] ENDOSCOPY;  Service:     COLONOSCOPY      COLONOSCOPY N/A 01/26/2023    Procedure: COLONOSCOPY to CECUM AND TERM ILEUM;  Surgeon: Jj Dean MD;  Location: [facility] ENDOSCOPY;  Service: Gastroenterology;  Laterality: N/A;  PRE OP -screening  POST OP -  NORMAL    D & C HYSTEROSCOPY N/A 03/08/2022    Procedure: DILATATION AND CURETTAGE with hysteroscopy;  Surgeon: Kaylah Land DO;  Location: [facility] OR;  Service: Gynecology Oncology;  Laterality: N/A;    DILATATION AND CURETTAGE  04/11/2011    OTHER SURGICAL HISTORY  09/2015    IVC filter    OTHER SURGICAL HISTORY      left LE revascularization    OTHER SURGICAL HISTORY      ALESSIA and LEV scan    THROMBECTOMY  07/2015       Social History     Socioeconomic History    Marital status:    Tobacco Use    Smoking status: Never    Smokeless tobacco: Never   Vaping Use    Vaping status: Never Used   Substance and Sexual Activity    Alcohol use: Not Currently     Comment: occassionally    Drug use: Never    Sexual activity: Not Currently     Partners: Male     Birth control/protection: Post-menopausal       Family History   Problem Relation Age of Onset    Breast cancer Mother     Hypertension Other     Ovarian cancer Neg Hx     Uterine cancer Neg Hx     Colon cancer Neg Hx     Malig Hyperthermia Neg Hx        The following portion of the patient's history were reviewed and updated as appropriate:  past medical history, past surgical history, past social history, past family history, allergies, current medications, and problem list.    Review of Systems   Constitutional: Positive for malaise/fatigue.   Eyes:  Positive for blurred vision and visual disturbance.   Cardiovascular:  Positive for leg swelling.   Respiratory:  Positive for cough.    Hematologic/Lymphatic: Negative.    Neurological: Negative.        Allergies   Allergen Reactions    Cephalexin Hives and Rash     Diffuse rash on cephalexin 1 g PO q6h on 6/17/20  Tolerated zosyn and PCN G September 2020 without issue    Clindamycin/Lincomycin Hives         Current Outpatient Medications:     acetaminophen (TYLENOL) 325 MG tablet, Take 2 tablets by mouth Every 6 (Six) Hours As Needed for Mild Pain., Disp: , Rfl:     cadexomer iodine (IODOSORB) 0.9 % gel, Apply 1 Application topically to the appropriate area as directed Daily As Needed for Wound Care. Apply to open wound to rt lower leg, Disp: , Rfl:     cefTRIAXone 2,000 mg in sodium chloride 0.9 % 100 mL IVPB, Infuse 2,000 mg into a venous catheter Every 12 (Twelve) Hours for 74 doses. Indications: Endocarditis, Disp: , Rfl:     dextromethorphan polistirex ER (DELSYM) 30 MG/5ML Suspension Extended Release oral suspension, Take 10 mL by mouth 2 (Two) Times a Day As Needed (As needed for cough)., Disp: , Rfl:     ferrous sulfate 325 (65 FE) MG tablet, Take 1 tablet by mouth Daily With Breakfast., Disp: , Rfl:     furosemide (LASIX) 40 MG tablet, Take 1 tablet by mouth Daily., Disp: , Rfl:     guaiFENesin (MUCINEX) 600 MG 12 hr tablet, Take 2 tablets by mouth 2 (Two) Times a Day As Needed for Congestion., Disp: , Rfl:     hydrocortisone-bacitracin-zinc oxide-nystatin (MAGIC BARRIER), Apply 1 Application topically to the appropriate area as directed Every 12 (Twelve) Hours., Disp: , Rfl:     ipratropium-albuterol (DUO-NEB) 0.5-2.5 mg/3 ml nebulizer, Take 3 mL by nebulization 4 (Four) Times a Day., Disp: ,  "Rfl:     Lidocaine 4 %, Place 1 patch on the skin as directed by provider Daily. Apply to skin on upper back remove patch in 12 hours. Remove & Discard patch within 12 hours or as directed by MD, Disp: , Rfl:     melatonin 5 MG tablet tablet, Take 1 tablet by mouth At Night As Needed (sleep)., Disp: , Rfl:     metoprolol succinate XL (TOPROL-XL) 25 MG 24 hr tablet, Take 0.5 tablets by mouth Daily., Disp: , Rfl:     rivaroxaban (Xarelto) 20 MG tablet, Take 1 tablet by mouth Daily With Dinner. Indications: Atrial Fibrillation, Disp: , Rfl:     sennosides-docusate (PERICOLACE) 8.6-50 MG per tablet, Take 2 tablets by mouth At Night As Needed for Constipation., Disp: , Rfl:           Objective:     Vitals:    05/20/25 0851   BP: 120/82   BP Location: Right arm   Pulse: 84   SpO2: 90%   Weight: (!) 183 kg (403 lb)   Height: 160 cm (62.99\")     Body mass index is 71.41 kg/m².    PHYSICAL EXAM:    Vitals Reviewed.   General Appearance: No acute distress, well developed and well nourished.  Obesity limiting exam.   HENT: No hearing loss noted.  Mucous membranes dry.  Glasses.  Respiratory: No signs of respiratory distress. Respiration rhythm and depth normal.  Diminished lung sounds.  Cough present.  Cardiovascular:  Jugular Venous Pressure: Normal  Heart Rate and Rhythm: Normal, Heart Sounds: Normal S1 and S2. No S3 or S4 noted.  Murmurs: No murmurs noted. No rubs, thrills, or gallops.   Lower Extremities: Bilateral lymphedema noted.    Skin: Dry skin.  Psychiatric: Patient alert and oriented to person, place, and time. Speech and behavior appropriate. Normal mood and affect.       ECG 12 Lead    Date/Time: 5/20/2025 9:00 AM  Performed by: Tessy Fink APRN    Authorized by: Tessy Fink APRN  Comparison: compared with previous ECG from 5/4/2025  Similar to previous ECG  Rhythm: sinus rhythm  Rate: normal  BPM: 84  Conduction: left anterior fascicular block and non-specific intraventricular conduction delay  ST " Segments: ST segments normal  T Waves: T waves normal  QRS axis: normal    Clinical impression: abnormal EKG            Assessment:       Diagnosis Plan   1. Acute bacterial endocarditis  Adult Transthoracic Echo Complete w/ Color, Spectral and Contrast if Necessary Per Protocol      2. Sepsis due to methicillin susceptible Staphylococcus aureus (MSSA) with acute hypoxic respiratory failure without septic shock        3. Acute on chronic heart failure with preserved ejection fraction (HFpEF)  Adult Transthoracic Echo Complete w/ Color, Spectral and Contrast if Necessary Per Protocol      4. Pulmonary hypertension  Adult Transthoracic Echo Complete w/ Color, Spectral and Contrast if Necessary Per Protocol      5. Lymphedema        6. Type 2 myocardial infarction        7. Acute respiratory failure with hypoxia        8. On home oxygen therapy        9. ARIEL on CPAP        10. Morbid obesity        11. Paradoxical embolism        12. Vision changes                 Plan:       1/2.  She was recently hospitalized for acute bacterial endocarditis and acute hypoxic respiratory failure due to sepsis.  She is receiving IV antibiotics x 6 weeks.  She will follow-up with ID in June.  She denies fevers or diaphoresis.    3/4/5.  Acute on Chronic HFpEF, Right-sided Heart Failure, and Severe Pulmonary Hypertension Who Group 2/3.      6.  Chronic Lymphedema.     6. Type 2 Myocardial Infarction due to endocarditis and acute respiratory failure.    7/8.  Acute respiratory failure with hypoxia.  On home oxygen.    9.  Obstructive sleep apnea: On CPAP.    10.  Obesity: Body mass index is 71.41 kg/m².     11.  History of paradoxical embolism, DVT, and pulmonary embolism.  Remains on rivaroxaban    12. Acute vision changes: Denies any other neurological symptoms.  She is really having a hard time seeing today and recommended that she go to the ED.     13. Dr. Rogers recommended repeat echocardiogram after 6 weeks of antibiotics and see me  the same day.  Follow-up with her in 3 months.    As always, it has been a pleasure to participate in your patient's care. Thank you.         Sincerely,         LUCÍA Brito  [facility] Cardiology      Dictated utilizing Dragon Dictation  I spent 67 minutes reviewing her medical records/testing/previous office notes/labs, face-to-face interaction with patient, physical examination, formulating the plan of care, and discussion of plan of care with patient.

## 2025-05-20 NOTE — PROGRESS NOTES
Subjective   Emely Solomon is a 64 y.o. female.     Chief Complaint: had concerns including Eye Problem (X 2 day floaters ).      A complete HPI/ROS/PMH/PSH/SH/FH are unobtainable due to: Nothing        Context: Emley Solomon is a 64 y.o. female with a medical history of DVT, PFO, paradoxical embolus, endocarditis, diastolic CHF, who presents to the ED c/o acute blurry vision for the last 2 days.  She was at her cardiologist appointment today when she had mentioned the symptoms.  She wears corrective eyeglasses but despite that she is having difficulty seeing things that are more than a couple feet away from her.  She states that she has seen an eye doctor previously, Dr. Ramírez, to monitor her pressures.  She denies prior diagnosis of glaucoma or current use of eyedrops.  She has been having some floating dots in her vision bilaterally.  She denies headache.  She denies any focal weakness.  She is currently in rehab after recent hospitalization for endocarditis, sepsis.  Only recently she has been requiring supplemental oxygen.        Review of prior external notes (non-ED) -and- Review of prior external test results outside of this encounter: See ED course below           PAST MEDICAL HISTORY       Active Ambulatory Problems     Diagnosis Date Noted    Chronic diastolic CHF (congestive heart failure)      PFO (patent foramen ovale)      Paradoxical embolism      Essential hypertension      Pulmonary hypertension      Upper back pain 11/12/2017    Bacteremia due to group B Streptococcus 11/13/2017    ARIEL on CPAP 11/14/2017    Class 3 severe obesity due to excess calories with serious comorbidity and body mass index (BMI) of 60.0 to 69.9 in adult 06/03/2019    History of DVT (deep vein thrombosis) 06/04/2020    Lymphedema 06/04/2020    Anemia, chronic disease 06/04/2020    Iron deficiency 11/16/2020    Post-menopausal bleeding 03/01/2022    Thickened endometrium 03/01/2022    Generalized muscle weakness  09/30/2020    Right bundle branch block (RBBB) with left anterior fascicular block (LAFB)      PVD (peripheral vascular disease) with claudication 04/26/2024    Iliac vein stenosis, right 05/15/2024    Hypoxia 05/04/2025    Hyperglycemia 05/04/2025    History of pulmonary embolism 05/04/2025    Morbid obesity 05/04/2025    Hyponatremia 05/05/2025    Bacterial endocarditis 05/05/2025    Sepsis due to methicillin susceptible Staphylococcus aureus (MSSA) with acute hypoxic respiratory failure without septic shock 05/10/2025    Acute on chronic heart failure with preserved ejection fraction (HFpEF)      On home oxygen therapy      Type 2 myocardial infarction 05/20/2025           Resolved Ambulatory Problems     Diagnosis Date Noted    Chronic cough 07/21/2017    Sepsis 11/12/2017    Fever and chills 11/12/2017    Acute kidney failure 11/12/2017    Hypoxia 11/14/2017    Cellulitis 11/14/2017    Cellulitis of right anterior lower leg 06/04/2020    Cellulitis of right lower extremity 06/04/2020    Hyponatremia 06/04/2020    Syncope 06/12/2020    Sepsis 09/18/2020    Chronic renal failure (CRF), stage 3 (moderate) 09/19/2020    COVID-19 virus detected 09/19/2020    Acute conjunctivitis of right eye 09/23/2020    Encounter for screening for malignant neoplasm of colon 02/15/2022    Hypo-osmolality and hyponatremia 09/29/2020    Venous stasis ulcer of right calf limited to breakdown of skin without varicose veins 05/15/2024    Chronic venous hypertension with ulcer and inflammation involving right side 05/15/2024    Venous stasis ulcer of ankle with fat layer exposed 07/24/2024    Fall 05/04/2025    Acute wheezy bronchitis 05/04/2025    Elevated troponin 05/04/2025           Past Medical History:   Diagnosis Date    Arthritis      Chronic deep vein thrombosis (DVT) of left popliteal vein 12/17/2015    Chronic diastolic congestive heart failure      COVID-19 virus infection 11/2020    Heart murmur      Hypertension       Lipoedema      Postthrombotic syndrome of left lower extremity without complications 12/17/2015    Pulmonary embolism 04/14/2016            PAST SURGICAL HISTORY  Surgical History         Past Surgical History:   Procedure Laterality Date    ARTERIOGRAM   07/2015    BRONCHOSCOPY N/A 07/21/2017     Procedure: BRONCHOSCOPY with wash;  Surgeon: Caleb Garcia MD;  Location: [facility] ENDOSCOPY;  Service:     COLONOSCOPY        COLONOSCOPY N/A 01/26/2023     Procedure: COLONOSCOPY to CECUM AND TERM ILEUM;  Surgeon: Jj Dean MD;  Location: [facility] ENDOSCOPY;  Service: Gastroenterology;  Laterality: N/A;  PRE OP -screening  POST OP -  NORMAL    D & C HYSTEROSCOPY N/A 03/08/2022     Procedure: DILATATION AND CURETTAGE with hysteroscopy;  Surgeon: Kaylah Land DO;  Location: [facility] OR;  Service: Gynecology Oncology;  Laterality: N/A;    DILATATION AND CURETTAGE   04/11/2011    OTHER SURGICAL HISTORY   09/2015     IVC filter    OTHER SURGICAL HISTORY         left LE revascularization    OTHER SURGICAL HISTORY         ALESSIA and LEV scan    THROMBECTOMY   07/2015               FAMILY HISTORY        Family History   Problem Relation Age of Onset    Breast cancer Mother      Hypertension Other      Ovarian cancer Neg Hx      Uterine cancer Neg Hx      Colon cancer Neg Hx      Malig Hyperthermia Neg Hx              SOCIAL HISTORY  Social History   Social History            Socioeconomic History    Marital status:    Tobacco Use    Smoking status: Never    Smokeless tobacco: Never   Vaping Use    Vaping status: Never Used   Substance and Sexual Activity    Alcohol use: Not Currently       Comment: occassionally    Drug use: Never    Sexual activity: Not Currently       Partners: Male       Birth control/protection: Post-menopausal               ALLERGIES  Cephalexin and Clindamycin/lincomycin        REVIEW OF SYSTEMS  Review of all 14 systems is negative other than stated in the HPI above.     Objective   Va  20/100 OD  20/70 OS  IOP Tn  Corneal clear  Anterior chamber deep  Senile cataract   Vitreous degeneration  No retinal detachment or retinal hemorrhage      Assessment & Plan      Diagnose  Vitreous degeneration right eye  Senile cataract ou    Reassure patient   Follow up in eye clinic after discharge patient  Schedule clinic  [facility] eye clinic [phone]

## 2025-05-20 NOTE — PROGRESS NOTES
Clinical Pharmacy Services: Medication History    Emely Solomon is a 64 y.o. female presenting to [facility] for   Chief Complaint   Patient presents with    Eye Problem     X 2 day floaters        She  has a past medical history of Arthritis, Cellulitis, Chronic deep vein thrombosis (DVT) of left popliteal vein (12/17/2015), Chronic diastolic congestive heart failure, COVID-19 virus infection (11/2020), Heart murmur, Hypertension, Iliac vein stenosis, right (05/15/2024), Lipoedema, Lymphedema, Morbid obesity, ARIEL on CPAP, PFO (patent foramen ovale), Postthrombotic syndrome of left lower extremity without complications (12/17/2015), Pulmonary embolism (04/14/2016), Pulmonary hypertension, Right bundle branch block (RBBB) with left anterior fascicular block (LAFB), Sepsis (09/18/2020), and Type 2 myocardial infarction (05/20/2025).    Allergies as of 05/20/2025 - Reviewed 05/20/2025   Allergen Reaction Noted    Cephalexin Hives and Rash 06/17/2020    Clindamycin/lincomycin Hives 09/14/2021       Medication information was obtained from: [facility] Paperwork ([place])  Pharmacy and Phone Number:     Prior to Admission Medications       Prescriptions Last Dose Informant Patient Reported? Taking?    acetaminophen (TYLENOL) 325 MG tablet  Nursing Home No Yes    Take 2 tablets by mouth Every 6 (Six) Hours As Needed for Mild Pain.    apixaban (ELIQUIS) 5 MG tablet tablet 5/19/2025 Nursing Home Yes Yes    Take 1 tablet by mouth 2 (Two) Times a Day.    cadexomer iodine (IODOSORB) 0.9 % gel 5/19/2025 Nursing Home No Yes    Apply 1 Application topically to the appropriate area as directed Daily As Needed for Wound Care. Apply to open wound to rt lower leg    Patient taking differently:  Apply 1 Application topically to the appropriate area as directed Daily As Needed for Wound Care.    cefTRIAXone 2,000 mg in sodium chloride 0.9 % 100 mL IVPB 5/19/2025 Nursing Home No Yes    Infuse 2,000 mg into a  venous catheter Every 12 (Twelve) Hours for 74 doses. Indications: Endocarditis    dextromethorphan polistirex ER (DELSYM) 30 MG/5ML Suspension Extended Release oral suspension 5/13/2025 [facility] No Yes    Take 10 mL by mouth 2 (Two) Times a Day As Needed (As needed for cough).    Dimethicone-Zinc Oxide-Vit A-D ([employer] ZINC OXIDE DIAPER EX) 5/19/2025 [facility] Yes Yes    Apply 1 Application topically to the appropriate area as directed 2 (Two) Times a Day.    Emollient (Aquaphor Advanced Therapy) 41 % ointment 5/19/2025 [facility] Yes Yes    Apply 1 Application topically to the appropriate area as directed 2 (Two) Times a Day.    ferrous sulfate 325 (65 FE) MG tablet 5/19/2025 [facility] No Yes    Take 1 tablet by mouth Daily With Breakfast.    furosemide (LASIX) 40 MG tablet 5/19/2025 [facility] No Yes    Take 1 tablet by mouth Daily.    guaiFENesin ER 1200 MG tablet sustained-release 12 hour 5/19/2025 [facility] Yes Yes    Take 1 tablet by mouth 2 (Two) Times a Day As Needed.    ipratropium-albuterol (DUO-NEB) 0.5-2.5 mg/3 ml nebulizer 5/19/2025 [facility] No Yes    Take 3 mL by nebulization 4 (Four) Times a Day.    Lidocaine 4 % 5/18/2025 [facility] No Yes    Place 1 patch on the skin as directed by provider Daily. Apply to skin on upper back remove patch in 12 hours. Remove & Discard patch within 12 hours or as directed by MD    melatonin 5 MG tablet tablet 5/16/2025 [facility] No Yes    Take 1 tablet by mouth At Night As Needed (sleep).    metoprolol succinate XL (TOPROL-XL) 25 MG 24 hr tablet 5/19/2025 [facility] No Yes    Take 0.5 tablets by mouth Daily.    sennosides-docusate (PERICOLACE) 8.6-50 MG per tablet  Nursing Home No Yes    Take 2 tablets by mouth At Night As Needed for Constipation.    rivaroxaban (Xarelto) 20 MG tablet Not Taking  No No    Take 1 tablet by mouth Daily With Dinner. Indications: Atrial Fibrillation    Patient not taking:  Reported on 5/20/2025               Medication notes:     This medication list is complete to the best of my knowledge as of 5/20/2025    Please call if questions.    Jamaal Ulloa  Medication History Technician   [phone]    5/20/2025 13:07 EDT

## 2025-05-20 NOTE — ED PROVIDER NOTES
EMERGENCY DEPARTMENT ENCOUNTER  Room Number:  14/14  PCP: Adilson Wilkerson MD  Independent Historians: Patient      HPI:  Chief Complaint: had concerns including Eye Problem (X 2 day floaters ).     A complete HPI/ROS/PMH/PSH/SH/FH are unobtainable due to: Nothing      Context: Emely Solomon is a 64 y.o. female with a medical history of DVT, PFO, paradoxical embolus, endocarditis, diastolic CHF, who presents to the ED c/o acute blurry vision for the last 2 days.  She was at her cardiologist appointment today when she had mentioned the symptoms.  She wears corrective eyeglasses but despite that she is having difficulty seeing things that are more than a couple feet away from her.  She states that she has seen an eye doctor previously, Dr. Ramírez, to monitor her pressures.  She denies prior diagnosis of glaucoma or current use of eyedrops.  She has been having some floating dots in her vision bilaterally.  She denies headache.  She denies any focal weakness.  She is currently in rehab after recent hospitalization for endocarditis, sepsis.  Only recently she has been requiring supplemental oxygen.      Review of prior external notes (non-ED) -and- Review of prior external test results outside of this encounter: See ED course below        PAST MEDICAL HISTORY  Active Ambulatory Problems     Diagnosis Date Noted    Chronic diastolic CHF (congestive heart failure)     PFO (patent foramen ovale)     Paradoxical embolism     Essential hypertension     Pulmonary hypertension     Upper back pain 11/12/2017    Bacteremia due to group B Streptococcus 11/13/2017    ARIEL on CPAP 11/14/2017    Class 3 severe obesity due to excess calories with serious comorbidity and body mass index (BMI) of 60.0 to 69.9 in adult 06/03/2019    History of DVT (deep vein thrombosis) 06/04/2020    Lymphedema 06/04/2020    Anemia, chronic disease 06/04/2020    Iron deficiency 11/16/2020    Post-menopausal bleeding 03/01/2022    Thickened  endometrium 03/01/2022    Generalized muscle weakness 09/30/2020    Right bundle branch block (RBBB) with left anterior fascicular block (LAFB)     PVD (peripheral vascular disease) with claudication 04/26/2024    Iliac vein stenosis, right 05/15/2024    Hypoxia 05/04/2025    Hyperglycemia 05/04/2025    History of pulmonary embolism 05/04/2025    Morbid obesity 05/04/2025    Hyponatremia 05/05/2025    Bacterial endocarditis 05/05/2025    Sepsis due to methicillin susceptible Staphylococcus aureus (MSSA) with acute hypoxic respiratory failure without septic shock 05/10/2025    Acute on chronic heart failure with preserved ejection fraction (HFpEF)     On home oxygen therapy     Type 2 myocardial infarction 05/20/2025     Resolved Ambulatory Problems     Diagnosis Date Noted    Chronic cough 07/21/2017    Sepsis 11/12/2017    Fever and chills 11/12/2017    Acute kidney failure 11/12/2017    Hypoxia 11/14/2017    Cellulitis 11/14/2017    Cellulitis of right anterior lower leg 06/04/2020    Cellulitis of right lower extremity 06/04/2020    Hyponatremia 06/04/2020    Syncope 06/12/2020    Sepsis 09/18/2020    Chronic renal failure (CRF), stage 3 (moderate) 09/19/2020    COVID-19 virus detected 09/19/2020    Acute conjunctivitis of right eye 09/23/2020    Encounter for screening for malignant neoplasm of colon 02/15/2022    Hypo-osmolality and hyponatremia 09/29/2020    Venous stasis ulcer of right calf limited to breakdown of skin without varicose veins 05/15/2024    Chronic venous hypertension with ulcer and inflammation involving right side 05/15/2024    Venous stasis ulcer of ankle with fat layer exposed 07/24/2024    Fall 05/04/2025    Acute wheezy bronchitis 05/04/2025    Elevated troponin 05/04/2025     Past Medical History:   Diagnosis Date    Arthritis     Chronic deep vein thrombosis (DVT) of left popliteal vein 12/17/2015    Chronic diastolic congestive heart failure     COVID-19 virus infection 11/2020     Heart murmur     Hypertension     Lipoedema     Postthrombotic syndrome of left lower extremity without complications 12/17/2015    Pulmonary embolism 04/14/2016         PAST SURGICAL HISTORY  Past Surgical History:   Procedure Laterality Date    ARTERIOGRAM  07/2015    BRONCHOSCOPY N/A 07/21/2017    Procedure: BRONCHOSCOPY with wash;  Surgeon: Caleb Garcia MD;  Location: [facility] ENDOSCOPY;  Service:     COLONOSCOPY      COLONOSCOPY N/A 01/26/2023    Procedure: COLONOSCOPY to CECUM AND TERM ILEUM;  Surgeon: Jj Dean MD;  Location: [facility] ENDOSCOPY;  Service: Gastroenterology;  Laterality: N/A;  PRE OP -screening  POST OP -  NORMAL    D & C HYSTEROSCOPY N/A 03/08/2022    Procedure: DILATATION AND CURETTAGE with hysteroscopy;  Surgeon: Kaylah Land DO;  Location: [facility] MAIN OR;  Service: Gynecology Oncology;  Laterality: N/A;    DILATATION AND CURETTAGE  04/11/2011    OTHER SURGICAL HISTORY  09/2015    IVC filter    OTHER SURGICAL HISTORY      left LE revascularization    OTHER SURGICAL HISTORY      ALESSIA and LEV scan    THROMBECTOMY  07/2015         FAMILY HISTORY  Family History   Problem Relation Age of Onset    Breast cancer Mother     Hypertension Other     Ovarian cancer Neg Hx     Uterine cancer Neg Hx     Colon cancer Neg Hx     Malig Hyperthermia Neg Hx          SOCIAL HISTORY  Social History     Socioeconomic History    Marital status:    Tobacco Use    Smoking status: Never    Smokeless tobacco: Never   Vaping Use    Vaping status: Never Used   Substance and Sexual Activity    Alcohol use: Not Currently     Comment: occassionally    Drug use: Never    Sexual activity: Not Currently     Partners: Male     Birth control/protection: Post-menopausal         ALLERGIES  Cephalexin and Clindamycin/lincomycin      REVIEW OF SYSTEMS  Review of all 14 systems is negative other than stated in the HPI above.      PHYSICAL EXAM    I have reviewed the triage vital signs and nursing notes.    ED  Triage Vitals   Temp Heart Rate Resp BP SpO2   05/20/25 1108 05/20/25 1017 05/20/25 1017 05/20/25 1019 05/20/25 1017   97.5 °F (36.4 °C) 87 18 104/66 92 %      Temp src Heart Rate Source Patient Position BP Location FiO2 (%)   -- -- -- -- --                GENERAL: awake and alert, chronically ill-appearing, morbidly obese, no acute distress  HENT: Normocephalic, atraumatic  EYES: no scleral icterus, pupils 3 mm reactive bilaterally, extract movements intact.  Conjunctiva clear bilaterally, no chemosis.  Intraocular pressure 11 on the right, 12 on the left.  Visual acuity 20/100 right, 20/70 left.  CV: regular rhythm, regular rate  RESPIRATORY: normal effort, nasal cannula oxygen in place at 2 L  ABDOMEN: soft, nontender throughout  MUSCULOSKELETAL: no deformity  NEURO: alert, moves all extremities, follows commands, speech fluent and clear  PSYCH: calm, cooperative  SKIN: Warm, dry          LAB RESULTS  Recent Results (from the past 24 hours)   Comprehensive Metabolic Panel    Collection Time: 05/20/25 11:11 AM    Specimen: Blood   Result Value Ref Range    Glucose 90 65 - 99 mg/dL    BUN 11 8 - 23 mg/dL    Creatinine 0.91 0.57 - 1.00 mg/dL    Sodium 134 (L) 136 - 145 mmol/L    Potassium 3.9 3.5 - 5.2 mmol/L    Chloride 99 98 - 107 mmol/L    CO2 25.9 22.0 - 29.0 mmol/L    Calcium 8.6 8.6 - 10.5 mg/dL    Total Protein 7.5 6.0 - 8.5 g/dL    Albumin 2.9 (L) 3.5 - 5.2 g/dL    ALT (SGPT) 9 1 - 33 U/L    AST (SGOT) 11 1 - 32 U/L    Alkaline Phosphatase 127 (H) 39 - 117 U/L    Total Bilirubin 0.4 0.0 - 1.2 mg/dL    Globulin 4.6 gm/dL    A/G Ratio 0.6 g/dL    BUN/Creatinine Ratio 12.1 7.0 - 25.0    Anion Gap 9.1 5.0 - 15.0 mmol/L    eGFR 70.2 >60.0 mL/min/1.73   CBC Auto Differential    Collection Time: 05/20/25 11:11 AM    Specimen: Blood   Result Value Ref Range    WBC 6.53 3.40 - 10.80 10*3/mm3    RBC 3.05 (L) 3.77 - 5.28 10*6/mm3    Hemoglobin 7.6 (L) 12.0 - 15.9 g/dL    Hematocrit 26.5 (L) 34.0 - 46.6 %    MCV 86.9  79.0 - 97.0 fL    MCH 24.9 (L) 26.6 - 33.0 pg    MCHC 28.7 (L) 31.5 - 35.7 g/dL    RDW 16.8 (H) 12.3 - 15.4 %    RDW-SD 52.4 37.0 - 54.0 fl    MPV 8.1 6.0 - 12.0 fL    Platelets 354 140 - 450 10*3/mm3    Neutrophil % 61.7 42.7 - 76.0 %    Lymphocyte % 13.8 (L) 19.6 - 45.3 %    Monocyte % 6.0 5.0 - 12.0 %    Eosinophil % 17.2 (H) 0.3 - 6.2 %    Basophil % 0.5 0.0 - 1.5 %    Immature Grans % 0.8 (H) 0.0 - 0.5 %    Neutrophils, Absolute 4.04 1.70 - 7.00 10*3/mm3    Lymphocytes, Absolute 0.90 0.70 - 3.10 10*3/mm3    Monocytes, Absolute 0.39 0.10 - 0.90 10*3/mm3    Eosinophils, Absolute 1.12 (H) 0.00 - 0.40 10*3/mm3    Basophils, Absolute 0.03 0.00 - 0.20 10*3/mm3    Immature Grans, Absolute 0.05 0.00 - 0.05 10*3/mm3    nRBC 0.0 0.0 - 0.2 /100 WBC   Sedimentation Rate    Collection Time: 05/20/25 11:11 AM    Specimen: Blood   Result Value Ref Range    Sed Rate 96 (H) 0 - 30 mm/hr   C-reactive Protein    Collection Time: 05/20/25 11:11 AM    Specimen: Blood   Result Value Ref Range    C-Reactive Protein 11.45 (H) 0.00 - 0.50 mg/dL       The above labs were ordered by me and independently reviewed by me.     RADIOLOGY  No Radiology Exams Resulted Within Past 24 Hours    The above radiology studies were ordered by me.  See ED course for independent interpretations.     MEDICATIONS GIVEN IN ER  Medications   sodium chloride 0.9 % flush 10 mL (has no administration in time range)   nitroglycerin (NITROSTAT) SL tablet 0.4 mg (has no administration in time range)   acetaminophen (TYLENOL) tablet 650 mg (has no administration in time range)   sennosides-docusate (PERICOLACE) 8.6-50 MG per tablet 2 tablet (has no administration in time range)     And   polyethylene glycol (MIRALAX) packet 17 g (has no administration in time range)     And   bisacodyl (DULCOLAX) EC tablet 5 mg (has no administration in time range)     And   bisacodyl (DULCOLAX) suppository 10 mg (has no administration in time range)   ondansetron ODT (ZOFRAN-ODT)  disintegrating tablet 4 mg (has no administration in time range)     Or   ondansetron (ZOFRAN) injection 4 mg (has no administration in time range)   Potassium Replacement - Follow Nurse / BPA Driven Protocol (has no administration in time range)   Magnesium Standard Dose Replacement - Follow Nurse / BPA Driven Protocol (has no administration in time range)   Phosphorus Replacement - Follow Nurse / BPA Driven Protocol (has no administration in time range)   Calcium Replacement - Follow Nurse / BPA Driven Protocol (has no administration in time range)         ORDERS PLACED DURING THIS VISIT:  Orders Placed This Encounter   Procedures    CT Head Without Contrast    Comprehensive Metabolic Panel    CBC Auto Differential    Sedimentation Rate    C-reactive Protein    Diet: Cardiac; Healthy Heart (2-3 Na+); Fluid Consistency: Thin (IDDSI 0)    Visual acuity screening    Supplies To Bedside - Notify MD When Ready- Primo Pen    Vital Signs    Maintain IV Access    Telemetry - Place Orders & Notify Provider of Results When Patient Experiences Acute Chest Pain, Dysrhythmia or Respiratory Distress    May Be Off Telemetry for Tests    Daily Weights    Oral Care    Place Sequential Compression Device    Maintain Sequential Compression Device    Continuous Pulse Oximetry    Up with assistance    Strict Intake & Output    Code Status and Medical Interventions: CPR (Attempt to Resuscitate); Full    Ophthalmology (on-call MD unless specified)    LHA (on-call MD unless specified) Details    Oxygen Therapy- Nasal Cannula; Titrate 1-6 LPM Per SpO2; 90 - 95%    Insert Peripheral IV    Initiate Observation Status    CBC & Differential         OUTPATIENT MEDICATION MANAGEMENT:  Current Facility-Administered Medications Ordered in Epic   Medication Dose Route Frequency Provider Last Rate Last Admin    acetaminophen (TYLENOL) tablet 650 mg  650 mg Oral Q4H PRN Anderson Mclean DO        sennosides-docusate (PERICOLACE) 8.6-50 MG per tablet 2  tablet  2 tablet Oral BID PRN Anderson Mclean DO        And    polyethylene glycol (MIRALAX) packet 17 g  17 g Oral Daily PRN Anderson Mclean DO        And    bisacodyl (DULCOLAX) EC tablet 5 mg  5 mg Oral Daily PRN Anderson Mclean DO        And    bisacodyl (DULCOLAX) suppository 10 mg  10 mg Rectal Daily PRN Anderson Mclean,         Calcium Replacement - Follow Nurse / BPA Driven Protocol   Not Applicable PRN Anderson Mclean DO        Magnesium Standard Dose Replacement - Follow Nurse / BPA Driven Protocol   Not Applicable PRN Anderson Mclean,         nitroglycerin (NITROSTAT) SL tablet 0.4 mg  0.4 mg Sublingual Q5 Min PRN Adnerson Mclean DO        ondansetron ODT (ZOFRAN-ODT) disintegrating tablet 4 mg  4 mg Oral Q6H PRN Anderson Mclean DO        Or    ondansetron (ZOFRAN) injection 4 mg  4 mg Intravenous Q6H PRN Anderson Mclean DO        Phosphorus Replacement - Follow Nurse / BPA Driven Protocol   Not Applicable PRN Anderson Mclean DO        Potassium Replacement - Follow Nurse / BPA Driven Protocol   Not Applicable PRN Anderson Mclean DO        sodium chloride 0.9 % flush 10 mL  10 mL Intravenous PRN Andrew Harper MD         Current Outpatient Medications Ordered in Epic   Medication Sig Dispense Refill    acetaminophen (TYLENOL) 325 MG tablet Take 2 tablets by mouth Every 6 (Six) Hours As Needed for Mild Pain.      cadexomer iodine (IODOSORB) 0.9 % gel Apply 1 Application topically to the appropriate area as directed Daily As Needed for Wound Care. Apply to open wound to rt lower leg      cefTRIAXone 2,000 mg in sodium chloride 0.9 % 100 mL IVPB Infuse 2,000 mg into a venous catheter Every 12 (Twelve) Hours for 74 doses. Indications: Endocarditis      dextromethorphan polistirex ER (DELSYM) 30 MG/5ML Suspension Extended Release oral suspension Take 10 mL by mouth 2 (Two) Times a Day As Needed (As needed for cough).      ferrous sulfate 325 (65 FE) MG tablet Take 1 tablet by mouth Daily With Breakfast.      furosemide  (LASIX) 40 MG tablet Take 1 tablet by mouth Daily.      guaiFENesin (MUCINEX) 600 MG 12 hr tablet Take 2 tablets by mouth 2 (Two) Times a Day As Needed for Congestion.      hydrocortisone-bacitracin-zinc oxide-nystatin (MAGIC BARRIER) Apply 1 Application topically to the appropriate area as directed Every 12 (Twelve) Hours.      ipratropium-albuterol (DUO-NEB) 0.5-2.5 mg/3 ml nebulizer Take 3 mL by nebulization 4 (Four) Times a Day.      Lidocaine 4 % Place 1 patch on the skin as directed by provider Daily. Apply to skin on upper back remove patch in 12 hours. Remove & Discard patch within 12 hours or as directed by MD      melatonin 5 MG tablet tablet Take 1 tablet by mouth At Night As Needed (sleep).      metoprolol succinate XL (TOPROL-XL) 25 MG 24 hr tablet Take 0.5 tablets by mouth Daily.      rivaroxaban (Xarelto) 20 MG tablet Take 1 tablet by mouth Daily With Dinner. Indications: Atrial Fibrillation      sennosides-docusate (PERICOLACE) 8.6-50 MG per tablet Take 2 tablets by mouth At Night As Needed for Constipation.           PROCEDURES  Procedures            PROGRESS, DATA ANALYSIS, CONSULTS, AND MEDICAL DECISION MAKING  All labs have been independently interpreted by me.  All radiology studies have been reviewed by me. All EKG's have been independently viewed and interpreted by me.  Discussion below represents my analysis of pertinent findings related to patient's condition, differential diagnosis, treatment plan and final disposition.    Differential diagnosis includes but is not limited to:  Posterior vitreous detachment  Retinal detachment  CRAO  Occipital stroke      Clinical Scores:                  ED Course as of 05/20/25 1236   Tue May 20, 2025   1053 I reviewed discharge summary from 5/10/2025.  Patient has a history of chronic diastolic heart failure, hypertension, pulmonary hypertension, morbid obesity, and was found to have bacteremia due to group B strep with mitral valve endocarditis  confirmed on TTE. [JR]   1135 Discussed with , ophthalmology, who will evaluate patient later this afternoon. [JR]   1135 Hemoglobin(!): 7.6  Stable from recent hospitalization [JR]   1200 Discussed with Dr. Mclean, [facility] hospitalist, who agrees to admit. [JR]   1200 I have ordered a CT brain without contrast for initial evaluation, however I think the need for further neuroimaging will hinge upon ophthalmology's findings with funduscopic examination. [JR]   1236 CT brain without contrast independently interpreted in PACS demonstrates no acute intracranial hemorrhage. [JR]      ED Course User Index  [JR] Andrew Harper MD             AS OF 12:36 EDT VITALS:    BP - 103/65  HR - 80  TEMP - 97.5 °F (36.4 °C)  O2 SATS - 93%    COMPLEXITY OF CARE  The patient requires admission.      Chronic or social conditions impacting patient care (Social Determinants of Health):     DIAGNOSIS  Final diagnoses:   Acute loss of vision, bilateral   Anemia, chronic disease   Morbid obesity   Acute bacterial endocarditis           DISPOSITION  Admit      Prescription drug monitoring program review:           Please note that portions of this document were completed with a voice recognition program.    Note Disclaimer: At [facility], we believe that sharing information builds trust and better relationships. You are receiving this note because you recently visited [facility]. It is possible you will see health information before a provider has talked with you about it. This kind of information can be easy to misunderstand. To help you fully understand what it means for your health, we urge you to discuss this note with your provider.         Andrew Harper MD  05/20/25 5706

## 2025-05-20 NOTE — TELEPHONE ENCOUNTER
Dr. Mclean is admitting her to the hospital and asked that I talk with him.  He was questioning why she was changed from rivaroxaban to apixaban.    I called the [facility] rehab facility where she is staying and spoke to Therese FAUSTIN.  She was discharged on rivaroxaban 20 mg daily.  She received that dosage at the rehab facility on 5/10 and 5/11.  On 5/13, she was changed from rivaroxaban to apixaban 5 mg twice per day.  She has received dosages since the change.  The nurse said patient was changed because of insurance reasons.    I spoke with Dr. Mclean about the change of medication.  He is going to place her back on rivaroxaban 20 mg daily since she has done well on that.

## 2025-05-20 NOTE — ED TRIAGE NOTES
Pt to ED from Cardiologist PINA Fink  due to complaint of visual problems. PT stated x 2 days ago she noticed floaters in her eyes bilaterally, no headache. Pt is on a blood thinner.     Pt alert and oriented x 4.

## 2025-05-21 PROBLEM — H53.139 ACUTE LOSS OF VISION: Status: ACTIVE | Noted: 2025-05-21

## 2025-05-21 LAB
ANION GAP SERPL CALCULATED.3IONS-SCNC: 9.8 MMOL/L (ref 5–15)
BUN SERPL-MCNC: 10 MG/DL (ref 8–23)
BUN/CREAT SERPL: 10.2 (ref 7–25)
CALCIUM SPEC-SCNC: 8.2 MG/DL (ref 8.6–10.5)
CHLORIDE SERPL-SCNC: 100 MMOL/L (ref 98–107)
CO2 SERPL-SCNC: 26.2 MMOL/L (ref 22–29)
CREAT SERPL-MCNC: 0.98 MG/DL (ref 0.57–1)
DEPRECATED RDW RBC AUTO: 46.4 FL (ref 37–54)
EGFRCR SERPLBLD CKD-EPI 2021: 64.2 ML/MIN/1.73
ERYTHROCYTE [DISTWIDTH] IN BLOOD BY AUTOMATED COUNT: 16.3 % (ref 12.3–15.4)
GLUCOSE SERPL-MCNC: 86 MG/DL (ref 65–99)
HCT VFR BLD AUTO: 21.9 % (ref 34–46.6)
HGB BLD-MCNC: 7.1 G/DL (ref 12–15.9)
MCH RBC QN AUTO: 25.9 PG (ref 26.6–33)
MCHC RBC AUTO-ENTMCNC: 32.4 G/DL (ref 31.5–35.7)
MCV RBC AUTO: 79.9 FL (ref 79–97)
PLATELET # BLD AUTO: 313 10*3/MM3 (ref 140–450)
PMV BLD AUTO: 7.6 FL (ref 6–12)
POTASSIUM SERPL-SCNC: 3.9 MMOL/L (ref 3.5–5.2)
RBC # BLD AUTO: 2.74 10*6/MM3 (ref 3.77–5.28)
SODIUM SERPL-SCNC: 136 MMOL/L (ref 136–145)
WBC NRBC COR # BLD AUTO: 5.91 10*3/MM3 (ref 3.4–10.8)

## 2025-05-21 PROCEDURE — 80048 BASIC METABOLIC PNL TOTAL CA: CPT | Performed by: STUDENT IN AN ORGANIZED HEALTH CARE EDUCATION/TRAINING PROGRAM

## 2025-05-21 PROCEDURE — 87481 CANDIDA DNA AMP PROBE: CPT | Performed by: STUDENT IN AN ORGANIZED HEALTH CARE EDUCATION/TRAINING PROGRAM

## 2025-05-21 PROCEDURE — 94799 UNLISTED PULMONARY SVC/PX: CPT

## 2025-05-21 PROCEDURE — 85027 COMPLETE CBC AUTOMATED: CPT | Performed by: STUDENT IN AN ORGANIZED HEALTH CARE EDUCATION/TRAINING PROGRAM

## 2025-05-21 PROCEDURE — 25010000002 CEFTRIAXONE PER 250 MG: Performed by: STUDENT IN AN ORGANIZED HEALTH CARE EDUCATION/TRAINING PROGRAM

## 2025-05-21 RX ORDER — SODIUM CHLORIDE 0.9 % (FLUSH) 0.9 %
10 SYRINGE (ML) INJECTION EVERY 12 HOURS SCHEDULED
Status: DISCONTINUED | OUTPATIENT
Start: 2025-05-21 | End: 2025-05-24 | Stop reason: HOSPADM

## 2025-05-21 RX ORDER — SODIUM CHLORIDE 9 MG/ML
40 INJECTION, SOLUTION INTRAVENOUS AS NEEDED
Status: DISCONTINUED | OUTPATIENT
Start: 2025-05-21 | End: 2025-05-24 | Stop reason: HOSPADM

## 2025-05-21 RX ORDER — SODIUM CHLORIDE 0.9 % (FLUSH) 0.9 %
10 SYRINGE (ML) INJECTION AS NEEDED
Status: DISCONTINUED | OUTPATIENT
Start: 2025-05-21 | End: 2025-05-24 | Stop reason: HOSPADM

## 2025-05-21 RX ORDER — SODIUM CHLORIDE 0.9 % (FLUSH) 0.9 %
20 SYRINGE (ML) INJECTION AS NEEDED
Status: DISCONTINUED | OUTPATIENT
Start: 2025-05-21 | End: 2025-05-24 | Stop reason: HOSPADM

## 2025-05-21 RX ADMIN — RIVAROXABAN 20 MG: 20 TABLET, FILM COATED ORAL at 20:22

## 2025-05-21 RX ADMIN — METOPROLOL SUCCINATE 12.5 MG: 25 TABLET, EXTENDED RELEASE ORAL at 08:37

## 2025-05-21 RX ADMIN — IPRATROPIUM BROMIDE AND ALBUTEROL SULFATE 3 ML: .5; 3 SOLUTION RESPIRATORY (INHALATION) at 10:10

## 2025-05-21 RX ADMIN — IPRATROPIUM BROMIDE AND ALBUTEROL SULFATE 3 ML: .5; 3 SOLUTION RESPIRATORY (INHALATION) at 07:29

## 2025-05-21 RX ADMIN — IPRATROPIUM BROMIDE AND ALBUTEROL SULFATE 3 ML: .5; 3 SOLUTION RESPIRATORY (INHALATION) at 14:35

## 2025-05-21 RX ADMIN — CEFTRIAXONE 2000 MG: 2 INJECTION, POWDER, FOR SOLUTION INTRAMUSCULAR; INTRAVENOUS at 06:06

## 2025-05-21 RX ADMIN — FERROUS SULFATE TAB 325 MG (65 MG ELEMENTAL FE) 325 MG: 325 (65 FE) TAB at 08:37

## 2025-05-21 RX ADMIN — Medication 10 ML: at 10:21

## 2025-05-21 RX ADMIN — CEFTRIAXONE 2000 MG: 2 INJECTION, POWDER, FOR SOLUTION INTRAMUSCULAR; INTRAVENOUS at 17:37

## 2025-05-21 RX ADMIN — Medication 10 ML: at 20:22

## 2025-05-21 NOTE — DISCHARGE PLACEMENT REQUEST
"Emely Solomon (64 y.o. Female)       Date of Birth   1960    Social Security Number       Address   [address] [facility] [address]    Home Phone   [phone]    MRN   [id number]       Yazdanism   Non-Religion    Marital Status                               Admission Date   5/20/2025    Admission Type   Emergency    Admitting Provider   Anderson Mclean DO    Attending Provider   Anderson Mclean DO    Department, Room/Bed   [facility] ANTEPARTUM SURGE UNIT, 3W01/1       Discharge Date       Discharge Disposition       Discharge Destination                                 Attending Provider: Anderson Mclean DO    Allergies: Cephalexin, Clindamycin/lincomycin    Isolation: Contact   Infection: Candida Auris (rule out) (05/21/25), MRSA (05/21/25)   Code Status: CPR    Ht: 160 cm (62.99\")   Wt: 160 kg (352 lb 1.2 oz)    Admission Cmt: None   Principal Problem: Acute loss of vision, bilateral [H53.133]                   Active Insurance as of 5/20/2025       Primary Coverage       Payor Plan Insurance Group Employer/Plan Group    MEDICARE MEDICARE A & B        Payor Plan Address Payor Plan Phone Number Payor Plan Fax Number Effective Dates    [address] [phone]  7/1/2024 - None Entered    [facility] [id number]         Subscriber Name Subscriber Birth Date Member ID       EMELY SOLOMON 1960 [id number]               Secondary Coverage       Payor Plan Insurance Group Employer/Plan Group    [facility][1] SUPP [id number]       Payor Plan Address Payor Plan Phone Number Payor Plan Fax Number Effective Dates    [address]   7/1/2024 - None Entered    [facility] [id number]         Subscriber Name Subscriber Birth Date Member ID       EMELY SOLOMON 1960 [id number]                     Emergency Contacts        (Rel.) Home Phone Work Phone Mobile Phone    Ghanshyam Solomon (Spouse) [phone] -- [phone]    " Adilson Cintron ([facility]) [phone] -- [phone]

## 2025-05-21 NOTE — PROGRESS NOTES
Discharge Planning Assessment  [facility]     Patient Name: Emely Solomon  MRN: [id number]  Today's Date: 5/21/2025    Admit Date: 5/20/2025    Plan: Return to [facility]   Discharge Needs Assessment       Row Name 05/21/25 1155       Living Environment    People in Home facility resident    Current Living Arrangements extended care facility    Potentially Unsafe Housing Conditions none    Primary Care Provided by other (see comments)    Provides Primary Care For no one, unable/limited ability to care for self    Family Caregiver if Needed spouse    Family Caregiver Names  Ghanshyam [phone] or [phone]    Quality of Family Relationships helpful    Able to Return to Prior Arrangements yes       Resource/Environmental Concerns    Resource/Environmental Concerns none    Transportation Concerns none       Transition Planning    Patient/Family Anticipates Transition to inpatient rehabilitation facility    Patient/Family Anticipated Services at Transition rehabilitation services;skilled nursing    Transportation Anticipated health plan transportation       Discharge Needs Assessment    Readmission Within the Last 30 Days other (see comments)  Ongoing problem    Equipment Currently Used at Home wheelchair;cpap    Concerns to be Addressed discharge planning    Anticipated Changes Related to Illness none    Equipment Needed After Discharge none    Provided Post Acute Provider List? N/A    N/A Provider List Comment Has been at [facility] and plans to return                   Discharge Plan       Row Name 05/21/25 3727       Plan    Plan Return to [facility]    Patient/Family in Agreement with Plan yes    Plan Comments Spoke with patient at bedside.  She has been at [facility] and plans to return .  Call to [facility] and waiting confirmation of bed status.  Patient states she was getting up to a W/C.  Pharmacy is [facility].  Will need stretcher/EMS transport.   Emergency contact is her  Ghanshyam [phone].  CCP will follow.  Galina FAUSTIN                  Continued Care and Services - Admitted Since 5/20/2025       Destination Coordination complete.      Service Provider Request Status Services Address Phone Fax Patient Preferred    [facility]  Selected Skilled Nursing [address] [address] [phone] --                  Selected Continued Care - Episodes Includes continued care and service providers with selected services from the active episodes listed below          Selected Continued Care - Prior Encounters Includes continued care and service providers with selected services from prior encounters from 2/19/2025 to 5/21/2025      Discharged on 5/10/2025 Admission date: 5/4/2025 - Discharge disposition: Skilled Nursing Facility (DC - External)      Destination       Service Provider Services Address Phone Fax Patient Preferred    [facility] [address] [address] [phone] --              Home Medical Care       Service Provider Services Address Phone Fax Patient Preferred    [facility] [address], [address] [id number] [phone] --                          Expected Discharge Date and Time       Expected Discharge Date Expected Discharge Time    May 21, 2025 11:55 AM           Demographic Summary       Row Name 05/21/25 1154       General Information    Admission Type observation    Arrived From subacute/long-term acute care    Referral Source admission list    Reason for Consult discharge planning    Preferred Language English                   Functional Status       Row Name 05/21/25 1154       Functional Status    Usual Activity Tolerance moderate    Current Activity Tolerance poor       Functional Status, IADL    Medications completely dependent     Meal Preparation completely dependent    Housekeeping completely dependent    Laundry completely dependent    Shopping completely dependent       Mental Status    General Appearance WDL WDL       Mental Status Summary    Recent Changes in Mental Status/Cognitive Functioning unable to assess  Answered appropriately                               Becky S. [place], RN

## 2025-05-21 NOTE — NURSING NOTE
05/21/25 1347   Wound 05/20/25 1500 Right posterior heel   Placement Date/Time: 05/20/25 1500   Present on Original Admission: Yes  Side: Right  Orientation: posterior  Location: heel   Pressure Injury Stage DTPI   Dressing Appearance intact  (foam heel dressings)   Periwound maroon/purple   Wound Length (cm) 3 cm   Wound Width (cm) 3 cm   Wound Depth (cm) 0 cm   Wound Surface Area (cm^2) 7.07 cm^2   Wound Volume (cm^3) 0 cm^3   Drainage Amount none     Wound/ostomy - consult received regarding skin concerns POA. Patient known to the WOC team from prior admissions. Patient BMI 62, morbid obesity with lymphedema component. Patient has chronic issues with MASD and intertrigo in skin folds, wounds to BLE that have been treated at the [facility], last appt at [facility] was April 2025, historically noncompliant with leaving dressings on legs when has had home health and gone to the [facility]. Patient usually lives at home with spouse but recently has been staying at SNF since earlier this month, skin conditions look better today compared to prior admissions.     Legs with lymphedema, cobblestone skin changes, scattered scabs but no open wounds or weeping, no need for dressings to the legs. Deep skin folds moist, some with yeast, recommend managing with high quality hygiene, keep skin clean and dry, miconazole powder to folds, tuck pillow cases or wash cloths in moisture prone folds to wick and manage the moisture.    Skin to gluteal cleft has some mild MASD partial thickness skin loss, skin to sacral area with some blanchable  purplish discoloration but more consistent with venous congestion and chronic tissue injury. Recommend to use zinc barrier cream to gluteal cleft and folds.     R heel has a foam heel dressing in place, this was peeled back to assess, note a DTI to the heel. Recommend to keep heels offloaded and use of the foam heel protectors.     Recommend to transition patient to a bariatric low air loss mattress and use of a  glide sheet if one is available in distribution, discussed with US, aide and RN.

## 2025-05-21 NOTE — PLAN OF CARE
Goal Outcome Evaluation:  Plan of Care Reviewed With: patient        Progress: no change  Outcome Evaluation: All needs met. Rested well. Turns. Cardiac diet. Purewick in place and care provided. BM tonight. VSS. Oriented x 4. SR. Remains on 3L NC. IV antibiotics. Plans for wound to see today.

## 2025-05-21 NOTE — PROGRESS NOTES
Name: Emely Solomon ADMIT: 2025   : 1960  PCP: Adilson Wilkerson MD    MRN: [id number] LOS: 0 days   AGE/SEX: 64 y.o. female  ROOM: [facility]     Subjective   Subjective   Patient awake and resting in bed, feeling better today, vision has improved from prior.       Objective   Objective   Vital Signs  Temp:  [97.5 °F (36.4 °C)-98.4 °F (36.9 °C)] 98.2 °F (36.8 °C)  Heart Rate:  [69-89] 86  Resp:  [18-24] 20  BP: ()/(52-82) 93/52  SpO2:  [90 %-96 %] 93 %  on  Flow (L/min) (Oxygen Therapy):  [2-3] 3;   Device (Oxygen Therapy): nasal cannula  Body mass index is 62.39 kg/m².  Physical Exam  Vitals and nursing note reviewed.   Constitutional:       General: She is not in acute distress.  Eyes:      General: No scleral icterus.     Extraocular Movements: Extraocular movements intact.      Conjunctiva/sclera: Conjunctivae normal.   Cardiovascular:      Rate and Rhythm: Normal rate.      Pulses: Normal pulses.      Heart sounds: Normal heart sounds.   Pulmonary:      Effort: Pulmonary effort is normal. No respiratory distress.      Breath sounds: No wheezing.      Comments: On oxygen  Abdominal:      Palpations: Abdomen is soft.      Tenderness: There is no abdominal tenderness.   Musculoskeletal:      Comments: Chronic lymphedema   Neurological:      General: No focal deficit present.      Mental Status: She is alert and oriented to person, place, and time.       Results Review     I reviewed the patient's new clinical results.  Results from last 7 days   Lab Units 25  0654 25  1111   WBC 10*3/mm3 5.91 6.53   HEMOGLOBIN g/dL 7.1* 7.6*   PLATELETS 10*3/mm3 313 354     Results from last 7 days   Lab Units 25  0654 25  1111   SODIUM mmol/L 136 134*   POTASSIUM mmol/L 3.9 3.9   CHLORIDE mmol/L 100 99   CO2 mmol/L 26.2 25.9   BUN mg/dL 10 11   CREATININE mg/dL 0.98 0.91   GLUCOSE mg/dL 86 90   EGFR mL/min/1.73 64.2 70.2     Results from last 7 days   Lab Units 25  1111  "  ALBUMIN g/dL 2.9*   BILIRUBIN mg/dL 0.4   ALK PHOS U/L 127*   AST (SGOT) U/L 11   ALT (SGPT) U/L 9     Results from last 7 days   Lab Units 05/20/25  1111   CALCIUM mg/dL 8.6   ALBUMIN g/dL 2.9*       No results found for: \"HGBA1C\", \"POCGLU\"    No radiology results for the last day    I have personally reviewed all medications:  Scheduled Medications  cefTRIAXone (ROCEPHIN) 2,000 mg in sodium chloride 0.9 % 100 mL MBP, 2,000 mg, Intravenous, Q12H  ferrous sulfate, 325 mg, Oral, Daily With Breakfast  furosemide, 40 mg, Oral, Daily  ipratropium-albuterol, 3 mL, Nebulization, 4x Daily - RT  metoprolol succinate XL, 12.5 mg, Oral, Daily  rivaroxaban, 20 mg, Oral, Daily With Dinner    Infusions   Diet  Diet: Cardiac; Healthy Heart (2-3 Na+); Fluid Consistency: Thin (IDDSI 0)    I have personally reviewed:  [x]  Laboratory   []  Microbiology   []  Radiology   [x]  EKG/Telemetry  []  Cardiology/Vascular   []  Pathology    []  Records       Assessment/Plan     Active Hospital Problems    Diagnosis  POA    **Acute loss of vision, bilateral [H53.133]  Yes    Chronic respiratory failure with hypoxia [J96.11]  Yes    Bacterial endocarditis [I33.0]  Yes    Morbid obesity [E66.01]  Yes    History of pulmonary embolism [Z86.711]  Yes    Iron deficiency [E61.1]  Yes    History of DVT (deep vein thrombosis) [Z86.718]  Not Applicable    Anemia, chronic disease [D63.8]  Yes    Lymphedema [I89.0]  Yes    ARIEL on CPAP [G47.33]  Yes    Bacteremia due to group B Streptococcus [R78.81, B95.1]  Yes    Chronic diastolic CHF (congestive heart failure) [I50.32]  Yes    Pulmonary hypertension [I27.20]  Yes    Essential hypertension [I10]  Yes      Resolved Hospital Problems   No resolved problems to display.     Ms. Solomon is a 64 y.o. female with a history of HFpEF, HTN, pulmonary hypertension, group B streptococcus cellulitis of right lower extremity with subsequent bacteremia & sepsis in 2017, DVT/PE on chronic anticoagulation, ARIEL on CPAP, " PFO, obesity, iron deficiency anemia, lymphedema who was recently admitted here from 05/04 - 05/10 due to mitral valve endocarditis who presents to [facility] with complaints of bilateral vision changes over the past 3-4 days prior to arrival.     - Complaints of new onset bilateral vision changes as above. Unclear etiology. CT head negative for acute findings. Decreased visual acuity per testing of ED provider. Extra-ocular muscles appear to be intact. Ophthalmology consulted and evaluated---examination performed, she has vitreous degeneration, senile cataract, no retinal detachment or hemorrhage. No acute intervention or further workup warranted from their standpoint, noting plans for outpatient follow up. Her vision is improved today per patient. Given Ophthalmology examination, with improvement, no indication for MRI or neurology consultation at present. Continue to monitor.  - She was discharged on Xarelto per [place] med rec. Cardiology note from 05/20 says Xarelto. In packet from facility it shows that she has been getting Eliquis, with last dose of 05/19 in AM. I spoke with Tessy Fink, she reached out to facility, medication was changed at facility due to possible issue with insurance? Cardiology recommending continuation of her Xarelto as previously prescribed, so this is what is ordered and will continue. Continue Metoprolol as prescribed.  - Recently diagnosed Endocarditis and was evaluated by infectious disease during recent admission. She is on prolonged course of antibiotics with Ceftriaxone with stop date 06/17/25. Continue this as prescribed. Closely monitor renal function, CBC while here.  - There is no evidence of CHF exacerbation. Her BP is soft at present, so hold Furosemide for now and closely monitor.   - Hemoglobin low, slightly worse today. Value 7.3 at time of discharge previously, was 7.6 on arrival, now down to 7.1. Given that she is on AC, would like to monitor this and  ensure stability prior to discharge. Will review labs to see when had recent workup. Continue to monitor for now, repeat CBC in AM, transfuse if hemoglobin <7. Further management based on clinical course.  - No evidence of acutely worsening respiratory status, continue treatments and O2 as prescribed, closely monitor with pulse oximetry.  - Okay to use home PAP machine if available.      Xarelto (home med) for DVT prophylaxis.  Full code.  Discussed with patient and nursing staff.  Anticipate discharge  facility   tomorrow if hemoglobin is stable and other labs/vitals stable  Expected Discharge Date: 5/22/2025; Expected Discharge Time:       Anderson Mclean DO  [facility]  05/21/25  06:45 EDT

## 2025-05-21 NOTE — PLAN OF CARE
Pt. VS WNL. No c/o pain. Pt. A&O x4, denies blurred vision, however, states that she still sees floaters. Pt. Receiving IV abx. Pt. With PICC line, complete chlorhexidine bath given per protocol. Pt. With multiple wounds and yeast, wound care performed with barrier cream and mepilex, wound care RN consulted. Pt. Q2H turns. Pt. With some wheezing and SOA with activity, breathing tx given per RT as per orders. Pt. Receiving PO Xarelto for DVT proph. Pt. Resting comfortably at present, will continue to monitor closely for the remainder of this RN's shift.      Problem: Adult Inpatient Plan of Care  Goal: Plan of Care Review  Outcome: Progressing  Flowsheets (Taken 5/20/2025 2006)  Progress: no change  Plan of Care Reviewed With: patient

## 2025-05-22 LAB
ANION GAP SERPL CALCULATED.3IONS-SCNC: 10 MMOL/L (ref 5–15)
BASOPHILS # BLD MANUAL: 0.06 10*3/MM3 (ref 0–0.2)
BASOPHILS NFR BLD MANUAL: 1 % (ref 0–1.5)
BUN SERPL-MCNC: 9 MG/DL (ref 8–23)
BUN/CREAT SERPL: 11.3 (ref 7–25)
C AURIS DNA SPEC QL NAA+NON-PROBE: NOT DETECTED
CALCIUM SPEC-SCNC: 8.4 MG/DL (ref 8.6–10.5)
CHLORIDE SERPL-SCNC: 101 MMOL/L (ref 98–107)
CO2 SERPL-SCNC: 25 MMOL/L (ref 22–29)
CREAT SERPL-MCNC: 0.8 MG/DL (ref 0.57–1)
DEPRECATED RDW RBC AUTO: 47.9 FL (ref 37–54)
EGFRCR SERPLBLD CKD-EPI 2021: 81.9 ML/MIN/1.73
EOSINOPHIL # BLD MANUAL: 1.71 10*3/MM3 (ref 0–0.4)
EOSINOPHIL NFR BLD MANUAL: 30 % (ref 0.3–6.2)
ERYTHROCYTE [DISTWIDTH] IN BLOOD BY AUTOMATED COUNT: 16.4 % (ref 12.3–15.4)
GLUCOSE SERPL-MCNC: 85 MG/DL (ref 65–99)
HCT VFR BLD AUTO: 24.9 % (ref 34–46.6)
HGB BLD-MCNC: 7.9 G/DL (ref 12–15.9)
HYPOCHROMIA BLD QL: ABNORMAL
LYMPHOCYTES # BLD MANUAL: 0.69 10*3/MM3 (ref 0.7–3.1)
LYMPHOCYTES NFR BLD MANUAL: 4 % (ref 5–12)
MCH RBC QN AUTO: 25.6 PG (ref 26.6–33)
MCHC RBC AUTO-ENTMCNC: 31.7 G/DL (ref 31.5–35.7)
MCV RBC AUTO: 80.8 FL (ref 79–97)
MONOCYTES # BLD: 0.23 10*3/MM3 (ref 0.1–0.9)
NEUTROPHILS # BLD AUTO: 3.03 10*3/MM3 (ref 1.7–7)
NEUTROPHILS NFR BLD MANUAL: 53 % (ref 42.7–76)
PLAT MORPH BLD: NORMAL
PLATELET # BLD AUTO: 358 10*3/MM3 (ref 140–450)
PMV BLD AUTO: 7.5 FL (ref 6–12)
POTASSIUM SERPL-SCNC: 3.9 MMOL/L (ref 3.5–5.2)
RBC # BLD AUTO: 3.08 10*6/MM3 (ref 3.77–5.28)
SODIUM SERPL-SCNC: 136 MMOL/L (ref 136–145)
VARIANT LYMPHS NFR BLD MANUAL: 12 % (ref 19.6–45.3)
WBC MORPH BLD: NORMAL
WBC NRBC COR # BLD AUTO: 5.71 10*3/MM3 (ref 3.4–10.8)

## 2025-05-22 PROCEDURE — 25010000002 CEFTRIAXONE PER 250 MG: Performed by: STUDENT IN AN ORGANIZED HEALTH CARE EDUCATION/TRAINING PROGRAM

## 2025-05-22 PROCEDURE — 94664 DEMO&/EVAL PT USE INHALER: CPT

## 2025-05-22 PROCEDURE — 85025 COMPLETE CBC W/AUTO DIFF WBC: CPT | Performed by: STUDENT IN AN ORGANIZED HEALTH CARE EDUCATION/TRAINING PROGRAM

## 2025-05-22 PROCEDURE — 97530 THERAPEUTIC ACTIVITIES: CPT

## 2025-05-22 PROCEDURE — 94799 UNLISTED PULMONARY SVC/PX: CPT

## 2025-05-22 PROCEDURE — 85007 BL SMEAR W/DIFF WBC COUNT: CPT | Performed by: STUDENT IN AN ORGANIZED HEALTH CARE EDUCATION/TRAINING PROGRAM

## 2025-05-22 PROCEDURE — 97162 PT EVAL MOD COMPLEX 30 MIN: CPT

## 2025-05-22 PROCEDURE — 80048 BASIC METABOLIC PNL TOTAL CA: CPT | Performed by: STUDENT IN AN ORGANIZED HEALTH CARE EDUCATION/TRAINING PROGRAM

## 2025-05-22 PROCEDURE — 99222 1ST HOSP IP/OBS MODERATE 55: CPT

## 2025-05-22 PROCEDURE — 99232 SBSQ HOSP IP/OBS MODERATE 35: CPT | Performed by: OBSTETRICS & GYNECOLOGY

## 2025-05-22 PROCEDURE — 94761 N-INVAS EAR/PLS OXIMETRY MLT: CPT

## 2025-05-22 RX ORDER — MEGESTROL ACETATE 40 MG/1
40 TABLET ORAL DAILY
Status: DISCONTINUED | OUTPATIENT
Start: 2025-05-22 | End: 2025-05-24 | Stop reason: HOSPADM

## 2025-05-22 RX ADMIN — ANTI-FUNGAL POWDER MICONAZOLE NITRATE TALC FREE 1 APPLICATION: 1.42 POWDER TOPICAL at 21:28

## 2025-05-22 RX ADMIN — METOPROLOL SUCCINATE 12.5 MG: 25 TABLET, EXTENDED RELEASE ORAL at 08:19

## 2025-05-22 RX ADMIN — ANTI-FUNGAL POWDER MICONAZOLE NITRATE TALC FREE 1 APPLICATION: 1.42 POWDER TOPICAL at 10:56

## 2025-05-22 RX ADMIN — Medication 10 ML: at 21:28

## 2025-05-22 RX ADMIN — CEFTRIAXONE 2000 MG: 2 INJECTION, POWDER, FOR SOLUTION INTRAMUSCULAR; INTRAVENOUS at 05:58

## 2025-05-22 RX ADMIN — IPRATROPIUM BROMIDE AND ALBUTEROL SULFATE 3 ML: .5; 3 SOLUTION RESPIRATORY (INHALATION) at 11:41

## 2025-05-22 RX ADMIN — FERROUS SULFATE TAB 325 MG (65 MG ELEMENTAL FE) 325 MG: 325 (65 FE) TAB at 08:19

## 2025-05-22 RX ADMIN — MEGESTROL ACETATE 40 MG: 40 TABLET ORAL at 21:26

## 2025-05-22 RX ADMIN — CEFTRIAXONE 2000 MG: 2 INJECTION, POWDER, FOR SOLUTION INTRAMUSCULAR; INTRAVENOUS at 16:56

## 2025-05-22 RX ADMIN — IPRATROPIUM BROMIDE AND ALBUTEROL SULFATE 3 ML: .5; 3 SOLUTION RESPIRATORY (INHALATION) at 21:11

## 2025-05-22 RX ADMIN — Medication 10 ML: at 08:20

## 2025-05-22 RX ADMIN — IPRATROPIUM BROMIDE AND ALBUTEROL SULFATE 3 ML: .5; 3 SOLUTION RESPIRATORY (INHALATION) at 08:13

## 2025-05-22 NOTE — THERAPY EVALUATION
Patient Name: Emely Solomon  : 1960    MRN: [id number]                              Today's Date: 2025       Admit Date: 2025    Visit Dx:     ICD-10-CM ICD-9-CM   1. Acute loss of vision, bilateral  H53.133 368.11   2. Anemia, chronic disease  D63.8 285.29   3. Morbid obesity  E66.01 278.01   4. Acute bacterial endocarditis  I33.0 421.0     Patient Active Problem List   Diagnosis    Chronic diastolic CHF (congestive heart failure)    PFO (patent foramen ovale)    Paradoxical embolism    Essential hypertension    Pulmonary hypertension    Upper back pain    Bacteremia due to group B Streptococcus    ARIEL on CPAP    Class 3 severe obesity due to excess calories with serious comorbidity and body mass index (BMI) of 60.0 to 69.9 in adult    History of DVT (deep vein thrombosis)    Lymphedema    Anemia, chronic disease    Iron deficiency    Post-menopausal bleeding    Thickened endometrium    Generalized muscle weakness    Right bundle branch block (RBBB) with left anterior fascicular block (LAFB)    PVD (peripheral vascular disease) with claudication    Iliac vein stenosis, right    Hypoxia    Hyperglycemia    History of pulmonary embolism    Morbid obesity    Hyponatremia    Bacterial endocarditis    Sepsis due to methicillin susceptible Staphylococcus aureus (MSSA) with acute hypoxic respiratory failure without septic shock    Acute on chronic heart failure with preserved ejection fraction (HFpEF)    On home oxygen therapy    Type 2 myocardial infarction    Acute loss of vision, bilateral    Chronic respiratory failure with hypoxia    Acute loss of vision     Past Medical History:   Diagnosis Date    Arthritis     Cellulitis     2017, with Group B Strep bacteremia and sepsis    Chronic deep vein thrombosis (DVT) of left popliteal vein 2015, 2016    Chronic diastolic congestive heart failure     COVID-19 virus infection 2020    Heart murmur     Hypertension     Iliac  vein stenosis, right 05/15/2024    Lipoedema     Lymphedema     other    Morbid obesity     ARIEL on CPAP     PFO (patent foramen ovale)     Postthrombotic syndrome of left lower extremity without complications 12/17/2015    postphletibis    Pulmonary embolism 04/14/2016    Pulmonary hypertension     multifactorial (dCHF, obesity/ARIEL, hx PE), mild by echo 1/2016    Right bundle branch block (RBBB) with left anterior fascicular block (LAFB)     Sepsis 09/18/2020    Type 2 myocardial infarction 05/20/2025     Past Surgical History:   Procedure Laterality Date    ARTERIOGRAM  07/2015    BRONCHOSCOPY N/A 07/21/2017    Procedure: BRONCHOSCOPY with wash;  Surgeon: Caleb Garcia MD;  Location: [facility] ENDOSCOPY;  Service:     COLONOSCOPY      COLONOSCOPY N/A 01/26/2023    Procedure: COLONOSCOPY to CECUM AND TERM ILEUM;  Surgeon: Jj Dean MD;  [facility];  Service: Gastroenterology;  Laterality: N/A;  PRE OP -screening  POST OP -  NORMAL    D & C HYSTEROSCOPY N/A 03/08/2022    Procedure: DILATATION AND CURETTAGE with hysteroscopy;  Surgeon: Kaylah Land DO;  Location: [facility] MAIN OR;  Service: Gynecology Oncology;  Laterality: N/A;    DILATATION AND CURETTAGE  04/11/2011    OTHER SURGICAL HISTORY  09/2015    IVC filter    OTHER SURGICAL HISTORY      left LE revascularization    OTHER SURGICAL HISTORY      ALESSIA and LEV scan    THROMBECTOMY  07/2015      General Information       Row Name 05/22/25 1344          Physical Therapy Time and Intention    Document Type evaluation  -     Mode of Treatment individual therapy;physical therapy  -       Row Name 05/22/25 1347          General Information    Patient Profile Reviewed yes  -     Prior Level of Function gait;transfer;bed mobility;w/c or scooter;independent:  Pt reports stand-pivot to WC at rehab  -     Existing Precautions/Restrictions fall  -     Barriers to Rehab previous functional deficit;medically complex  -       Row Name  05/22/25 1344          Living Environment    Current Living Arrangements extended care facility;other (see comments)  [facility]     People in Home facility resident  Lives with spouse at [facility]  -       Row Name 05/22/25 1344          Cognition    Orientation Status (Cognition) oriented x 4  -       Row Name 05/22/25 1344          Safety Issues/Impairments Affecting Functional Mobility    Impairments Affecting Function (Mobility) balance;endurance/activity tolerance;strength  -               User Key  (r) = Recorded By, (t) = Taken By, (c) = Cosigned By      Initials Name Provider Type     Sada Nunez, PT Physical Therapist                   Mobility       Row Name 05/22/25 1346          Bed Mobility    Bed Mobility supine-sit;sit-supine;rolling left;rolling right  -     Rolling Left [place] (Bed Mobility) standby assist;verbal cues  -     Rolling Right [place] (Bed Mobility) standby assist;verbal cues  -     Supine-Sit [place] (Bed Mobility) standby assist;verbal cues  -     Sit-Supine [place] (Bed Mobility) verbal cues;minimum assist (75% patient effort);1 person assist  -     Assistive Device (Bed Mobility) head of bed elevated;bed rails  -       Row Name 05/22/25 1346          Sit-Stand Transfer    Sit-Stand [place] (Transfers) minimum assist (75% patient effort);2 person assist;verbal cues  -     Assistive Device (Sit-Stand Transfers) walker, front-wheeled  -       Row Name 05/22/25 1346          Gait/Stairs (Locomotion)    [place] Level (Gait) verbal cues;minimum assist (75% patient effort);1 person assist  -     Assistive Device (Gait) walker, front-wheeled  -     Distance in Feet (Gait) 3  Side steps  -     Deviations/Abnormal Patterns (Gait) antalgic;urvashi decreased;gait speed decreased;stride length decreased;weight shifting decreased  -     Bilateral Gait Deviations forward flexed posture;heel strike decreased  -                User Key  (r) = Recorded By, (t) = Taken By, (c) = Cosigned By      Initials Name Provider Type     Sada Nunez PT Physical Therapist                   Obj/Interventions       Row Name 05/22/25 1349          Range of Motion Comprehensive    General Range of Motion bilateral lower extremity ROM WFL  -BH       Row Name 05/22/25 1349          Strength Comprehensive (MMT)    General Manual Muscle Testing (MMT) Assessment lower extremity strength deficits identified  -     Comment, General Manual Muscle Testing (MMT) Assessment Generalized weakness, BLE grossly 4/5  -[facility] Name 05/22/25 1349          Balance    Balance Assessment sitting static balance;sitting dynamic balance;standing static balance;standing dynamic balance  -     Static Sitting Balance supervision  -     Dynamic Sitting Balance supervision  -     Position, Sitting Balance unsupported;sitting edge of bed  -     Static Standing Balance contact guard;verbal cues  -     Dynamic Standing Balance minimal assist;verbal cues  -     Position/Device Used, Standing Balance supported;walker, front-wheeled  -     Balance Interventions sitting;standing;sit to stand;static;dynamic;supported  -BH       Row Name 05/22/25 1341          Sensory Assessment (Somatosensory)    Sensory Assessment (Somatosensory) LE sensation intact  -               User Key  (r) = Recorded By, (t) = Taken By, (c) = Cosigned By      Initials Name Provider Type     Sada Nunez PT Physical Therapist                   Goals/Plan       Row Name 05/22/25 1029          Bed Mobility Goal 1 (PT)    Activity/Assistive Device (Bed Mobility Goal 1, PT) bed mobility activities, all  -     [place] Level/Cues Needed (Bed Mobility Goal 1, PT) independent  -     Time Frame (Bed Mobility Goal 1, PT) 1 week  -BH       Row Name 05/22/25 1351          Transfer Goal 1 (PT)    Activity/Assistive Device (Transfer Goal 1, PT) transfers, all  -     [place]  Level/Cues Needed (Transfer Goal 1, PT) standby assist  -     Time Frame (Transfer Goal 1, PT) 1 week  -       Row Name 05/22/25 135          Gait Training Goal 1 (PT)    Activity/Assistive Device (Gait Training Goal 1, PT) gait (walking locomotion)  -     [place] Level (Gait Training Goal 1, PT) standby assist  -     Distance (Gait Training Goal 1, PT) 30ft  -     Time Frame (Gait Training Goal 1, PT) 1 week  -       Row Name 05/22/25 4363          Therapy Assessment/Plan (PT)    Planned Therapy Interventions (PT) balance training;bed mobility training;gait training;home exercise program;patient/family education;ROM (range of motion);strengthening;stretching;transfer training  -               User Key  (r) = Recorded By, (t) = Taken By, (c) = Cosigned By      Initials Name Provider Type     Sada Nunez, PT Physical Therapist                   Clinical Impression       Row Name 05/22/25 1212          Pain    Pretreatment Pain Rating 0/10 - no pain  -     Posttreatment Pain Rating 0/10 - no pain  -       Row Name 05/22/25 5606          Plan of Care Review    Plan of Care Reviewed With patient  -     Outcome Evaluation Pt is a 65 yo F admitted from her cardiologist office with c/o acute blurry vision. Pt recently admitted to [facility] and diagnosed with mitral valve endocarditis - DC to SNF following and statess she's been working on WC transfers. Pt was independent at  living with her . Pt presents to PT with impaired strength, endurance, and balance limiting overall mobility. Pt completed multiple rolls in bed with SBA and heavy use of bedrails to change brief. Pt transferred to sitting EOB with SBA and stood with min A x2 and rwx. Pt took several side steps along EOB with min A x1 and rwx, forward flexed posture and fatigues quickly. Pt returned to supine with min A x1 - assist getting BLE back into bed. PT will continue to follow, anticipate return to SNF at [place].  -       Row  Name 05/22/25 1350          Therapy Assessment/Plan (PT)    Patient/Family Therapy Goals Statement (PT) Return to [facility]  -     Rehab Potential (PT) good  -     Criteria for Skilled Interventions Met (PT) yes  -     Therapy Frequency (PT) 5 times/wk  -       Row Name 05/22/25 1350          Vital Signs    O2 Delivery Pre Treatment supplemental O2  -     O2 Delivery Intra Treatment supplemental O2  -     O2 Delivery Post Treatment supplemental O2  -       Row Name 05/22/25 1350          Positioning and Restraints    Pre-Treatment Position in bed  -     Post Treatment Position bed  -     In Bed notified nsg;fowlers;call light within reach;encouraged to call for assist;exit alarm on  -               User Key  (r) = Recorded By, (t) = Taken By, (c) = Cosigned By      Initials Name Provider Type    Sada Barnes, PT Physical Therapist                   Outcome Measures       Row Name 05/22/25 1356 05/22/25 0815       How much help from another person do you currently need...    Turning from your back to your side while in flat bed without using bedrails? 3  - 3  -ES    Moving from lying on back to sitting on the side of a flat bed without bedrails? 3  - 3  -ES    Moving to and from a bed to a chair (including a wheelchair)? 2  - 2  -ES    Standing up from a chair using your arms (e.g., wheelchair, bedside chair)? 3  - 2  -ES    Climbing 3-5 steps with a railing? 1  - 2  -ES    To walk in hospital room? 2  - 2  -ES    AM-PAC 6 Clicks Score (PT) 14  - 14  -ES    Highest Level of Mobility Goal Move to Chair/Commode-4  - Move to Chair/Commode-4  -ES      Row Name 05/22/25 1356          Functional Assessment    Outcome Measure Options AM-PAC 6 Clicks Basic Mobility (PT)  -               User Key  (r) = Recorded By, (t) = Taken By, (c) = Cosigned By      Initials Name Provider Type    Sada Barnes, PT Physical Therapist    Janett Pa, RN Registered Nurse                                  Physical Therapy Education       Title: PT OT SLP Therapies (In Progress)       Topic: Physical Therapy (In Progress)       Point: Mobility training (Done)       Learning Progress Summary            Patient Acceptance, E,TB,D, VU,NR by  at 5/22/2025 1357                      Point: Home exercise program (Not Started)       Learner Progress:  Not documented in this visit.              Point: Body mechanics (Done)       Learning Progress Summary            Patient Acceptance, E,TB,D, VU,NR by  at 5/22/2025 1357                      Point: Precautions (Done)       Learning Progress Summary            Patient Acceptance, E,TB,D, VU,NR by  at 5/22/2025 1357                                      User Key       Initials Effective Dates Name Provider Type Discipline     04/08/22 -  Sada Nunez, PT Physical Therapist PT                  PT Recommendation and Plan  Planned Therapy Interventions (PT): balance training, bed mobility training, gait training, home exercise program, patient/family education, ROM (range of motion), strengthening, stretching, transfer training  Outcome Evaluation: Pt is a 65 yo F admitted from her cardiologist office with c/o acute blurry vision. Pt recently admitted to [facility] and diagnosed with mitral valve endocarditis - DC to SNF following and statess she's been working on WC transfers. Pt was independent at  living with her . Pt presents to PT with impaired strength, endurance, and balance limiting overall mobility. Pt completed multiple rolls in bed with SBA and heavy use of bedrails to change brief. Pt transferred to sitting EOB with SBA and stood with min A x2 and rwx. Pt took several side steps along EOB with min A x1 and rwx, forward flexed posture and fatigues quickly. Pt returned to supine with min A x1 - assist getting BLE back into bed. PT will continue to follow, anticipate return to SNF at [place].     Time Calculation:   PT Evaluation Complexity  History,  PT Evaluation Complexity: 1-2 personal factors and/or comorbidities  Examination of Body Systems (PT Eval Complexity): total of 3 or more elements  Clinical Presentation (PT Evaluation Complexity): evolving  Clinical Decision Making (PT Evaluation Complexity): moderate complexity  Overall Complexity (PT Evaluation Complexity): moderate complexity     PT Charges       Row Name 05/22/25 1357             Time Calculation    Start Time 1258  -      Stop Time 1323  -      Time Calculation (min) 25 min  -      PT Received On 05/22/25  -      PT - Next Appointment 05/23/25  -      PT Goal Re-Cert Due Date 05/29/25  -         Time Calculation- PT    Total Timed Code Minutes- PT 20 minute(s)  -         Timed Charges    01140 - PT Therapeutic Activity Minutes 20  -BH         Total Minutes    Timed Charges Total Minutes 20  -BH       Total Minutes 20  -BH                User Key  (r) = Recorded By, (t) = Taken By, (c) = Cosigned By      Initials Name Provider Type     Sada Nunez, PT Physical Therapist                  Therapy Charges for Today       Code Description Service Date Service Provider Modifiers Qty    17441852895  PT THERAPEUTIC ACT EA 15 MIN 5/22/2025 Sada Nunez, PT GP 1    [id number]  PT EVAL MOD COMPLEXITY 3 5/22/2025 Sada Nunez, PT GP 1            PT G-Codes  Outcome Measure Options: AM-PAC 6 Clicks Basic Mobility (PT)  AM-PAC 6 Clicks Score (PT): 14  PT Discharge Summary  Anticipated Discharge Disposition (PT): skilled nursing facility    Sada Nunez PT  5/22/2025

## 2025-05-22 NOTE — PLAN OF CARE
Goal Outcome Evaluation:  Plan of Care Reviewed With: patient           Outcome Evaluation: Pt is a 63 yo F admitted from her cardiologist office with c/o acute blurry vision. Pt recently admitted to [facility] and diagnosed with mitral valve endocarditis - DC to SNF following and statess she's been working on WC transfers. Pt was independent at  living with her . Pt presents to PT with impaired strength, endurance, and balance limiting overall mobility. Pt completed multiple rolls in bed with SBA and heavy use of bedrails to change brief. Pt transferred to sitting EOB with SBA and stood with min A x2 and rwx. Pt took several side steps along EOB with min A x1 and rwx, forward flexed posture and fatigues quickly. Pt returned to supine with min A x1 - assist getting BLE back into bed. PT will continue to follow, anticipate return to SNF at [place].    Anticipated Discharge Disposition (PT): skilled nursing facility

## 2025-05-22 NOTE — CONSULTS
[facility]   Consult Note    Patient Name: Emely Solomon  : 1960  MRN: [id number]  Primary Care Physician:  Adilson Wilkerson MD  Referring Physician: Anderson Mclean DO  Date of admission: 2025    Subjective   Subjective     Reason for Consult/ Chief Complaint: Vaginal bleeding    HPI:  Emely Solomon is a 64 y.o. female admitted for loss of vision.  This symptom has improved.  She has multiple medical problems.  She has a history of DVT and pulmonary embolism and is anticoagulated chronically.  She has congestive heart failure.  She is morbidly obese.  She is currently being treated for endocarditis with IV antibiotics.  I was called to evaluate due to her vaginal bleeding and anemia.  On interview with the patient she states she cannot remember when she last had vaginal bleeding.  Nursing reports that she had vaginal bleeding last night.  There was some small clots and bright red bleeding that was mixed with her urine.  The bleeding has stopped today.  The patient is a poor historian regarding her vaginal bleeding.  She has poor memory of her appointment with Dr. De Jesus GYN oncology.  From the chart notes the patient has had as much of a workup for her vaginal bleeding as can be done at this time.  She had a ultrasound in January and an MRI in March of this year.  Findings on ultrasound and MRI indicate that she likely has a hydrosalpinx of the right adnexa and thickened endometrium.  She is at high risk for abnormalities of the endometrium because of her obesity.  Unfortunately this problem has been chronic for the past 2 years.  She underwent a D&C hysteroscopy with Dr. Land in  but no tissue was obtained due to poor visualization.  She has seen Dr. De Jesus in consult and it was recommended that she proceed with another trial of a D&C hysteroscopy and placement of Mirena IUD.  The patient declined at the time and preferred observation.  She continues to have vaginal bleeding.  Her  hemoglobin has remained in the 7 to 8 g range.  Her vaginal bleeding most certainly is contributing to her anemia and may be the sole cause.  She reports no pelvic pain or abnormal discharge.  She has never had a transfusion.  She is anticoagulated currently.    Review of Systems   No pelvic pain, vaginal discharge, no fever or chills or dysuria    Personal History     Past Medical History:   Diagnosis Date    Arthritis     Cellulitis     11/2017, with Group B Strep bacteremia and sepsis    Chronic deep vein thrombosis (DVT) of left popliteal vein 12/17/2015 02/04/2016, 04/14/2016    Chronic diastolic congestive heart failure     COVID-19 virus infection 11/2020    Heart murmur     Hypertension     Iliac vein stenosis, right 05/15/2024    Lipoedema     Lymphedema     other    Morbid obesity     ARIEL on CPAP     PFO (patent foramen ovale)     Postthrombotic syndrome of left lower extremity without complications 12/17/2015    postphletibis    Pulmonary embolism 04/14/2016    Pulmonary hypertension     multifactorial (dCHF, obesity/ARIEL, hx PE), mild by echo 1/2016    Right bundle branch block (RBBB) with left anterior fascicular block (LAFB)     Sepsis 09/18/2020    Type 2 myocardial infarction 05/20/2025       Past Surgical History:   Procedure Laterality Date    ARTERIOGRAM  07/2015    BRONCHOSCOPY N/A 07/21/2017    Procedure: BRONCHOSCOPY with wash;  Surgeon: Caleb Garcia MD;  Location: [facility] ENDOSCOPY;  Service:     COLONOSCOPY      COLONOSCOPY N/A 01/26/2023    Procedure: COLONOSCOPY to CECUM AND TERM ILEUM;  Surgeon: Jj Dean MD;  Location: [facility] ENDOSCOPY;  Service: Gastroenterology;  Laterality: N/A;  PRE OP -screening  POST OP -  NORMAL    D & C HYSTEROSCOPY N/A 03/08/2022    Procedure: DILATATION AND CURETTAGE with hysteroscopy;  Surgeon: Kaylah Land DO;  Location: [facility] MAIN OR;  Service: Gynecology Oncology;  Laterality: N/A;    DILATATION AND CURETTAGE  04/11/2011    OTHER SURGICAL  HISTORY  09/2015    IVC filter    OTHER SURGICAL HISTORY      left LE revascularization    OTHER SURGICAL HISTORY      ALESSIA and LEV scan    THROMBECTOMY  07/2015       Family History: family history includes Breast cancer in her mother; Hypertension in an other family member. Otherwise pertinent FHx was reviewed and not pertinent to current issue.    Social History:  reports that she has never smoked. She has never used smokeless tobacco. She reports that she does not currently use alcohol. She reports that she does not use drugs.    Home Medications:  Aquaphor Advanced Therapy, Dimethicone-Zinc Oxide-Vit A-D, Lidocaine, acetaminophen, apixaban, cadexomer iodine, (cefTRIAXone 2,000 mg in sodium chloride 0.9 % 100 mL IVPB), dextromethorphan polistirex ER, ferrous sulfate, furosemide, guaiFENesin ER, ipratropium-albuterol, melatonin, metoprolol succinate XL, rivaroxaban, and sennosides-docusate    Allergies:  Allergies   Allergen Reactions    Cephalexin Hives and Rash     Diffuse rash on cephalexin 1 g PO q6h on 6/17/20  Tolerated zosyn and PCN G September 2020 without issue    Clindamycin/Lincomycin Hives       Objective    Objective     Vitals:   Temp:  [97.7 °F (36.5 °C)-98.9 °F (37.2 °C)] 97.7 °F (36.5 °C)  Heart Rate:  [76-83] 76  Resp:  [18-20] 18  BP: ()/(55-93) 108/93  Flow (L/min) (Oxygen Therapy):  [2-3] 3    Physical Exam:   Constitutional: Awake, alert   Eyes: PERRLA, sclerae anicteric, no conjunctival injection   HENT: NCAT, mucous membranes moist   Neck: Supple, no thyromegaly, no lymphadenopathy, trachea midline   Respiratory: , nonlabored respirations    Cardiovascular: RRR   Gastrointestinal:  soft, nontender, nondistended   Extremities with pitting edema chronic   Psychiatric: Appropriate affect, cooperative, poor historian, confused at times   Neurologic: Oriented x 3    Pelvic.  No bleeding externally.  I did not attempt pelvic exam due to patient habitus and recent extensive workup  gynecologically.       Result Review    Result Review:  I have personally reviewed the results from the time of this admission to 5/22/2025 18:05 EDT and agree with these findings:  []  Laboratory list / accordion  []  Microbiology  [x]  Radiology  []  EKG/Telemetry   []  Cardiology/Vascular   []  Pathology  []  Old records  []  Other:  Most notable findings include: MRI and US done this year.  Thickened endometrial lining, right adnexal complex most consistent with hydrosalpinx      Assessment & Plan   Assessment / Plan     Brief Patient Summary:  Emely Solomon is a 64 y.o. female who has chronic vaginal bleeding due to to probable endometrial pathology.  We have been unable to proceed with the neck step which would be tissue sampling of the endometrium.  This is due to her comorbid conditions as well as reluctance to proceed with surgery.  The short-term goal would be to help stop her vaginal bleeding and increase her hemoglobin to a healthy level.  She has a follow-up appointment with Dr. De Jesus June 9.    Active Hospital Problems:  Active Hospital Problems    Diagnosis     **Acute loss of vision, bilateral     Acute loss of vision     Chronic respiratory failure with hypoxia     Bacterial endocarditis     Morbid obesity     History of pulmonary embolism     Iron deficiency     History of DVT (deep vein thrombosis)     Anemia, chronic disease     Lymphedema     ARIEL on CPAP     Bacteremia due to group B Streptococcus     Chronic diastolic CHF (congestive heart failure)     Pulmonary hypertension     Essential hypertension      Plan:     Consider stopping anticoagulation for a few days or longer, consider transfusion if hemoglobin drops  Will start high dose progesterone tablet daily for bleeding  Reviewed plan with her sister Lindsey on the phone with pt present, recommend discussing plans with her family to increase compliance and follow up as pt is confused at times. Reviewed possible endometrial pathology  including endometrial hyperplasia, endometrial cancer, or benign pathology.  Keep appt with Dr De Jesus June 9th , consider surgery recommended by Dr De Jesus- also discussed this recommendation with pts sister Lindsey who was unaware of the plan Dr De Jesus had outlined.   Discussed my findings and recommendations with Dr Mclean by phone conversation.        Melvina Barnard MD

## 2025-05-22 NOTE — PROGRESS NOTES
Name: Emely Solomon ADMIT: 2025   : 1960  PCP: Adilson Wilkerson MD    MRN: [id number] LOS: 1 days   AGE/SEX: 64 y.o. female  ROOM: [facility]/     Subjective   Subjective   Patient awake and resting in bed, visual symptoms have improved, but she is having evidence of vaginal bleeding.       Objective   Objective   Vital Signs  Temp:  [97.8 °F (36.6 °C)-98.9 °F (37.2 °C)] 97.8 °F (36.6 °C)  Heart Rate:  [70-83] 77  Resp:  [18-20] 18  BP: ()/(55-74) 112/58  SpO2:  [92 %-99 %] 94 %  on  Flow (L/min) (Oxygen Therapy):  [2-3] 3;   Device (Oxygen Therapy): room air  Body mass index is 63.91 kg/m².  Physical Exam  Vitals and nursing note reviewed.   Constitutional:       General: She is not in acute distress.  Eyes:      General: No scleral icterus.     Extraocular Movements: Extraocular movements intact.      Conjunctiva/sclera: Conjunctivae normal.   Cardiovascular:      Rate and Rhythm: Normal rate.      Pulses: Normal pulses.      Heart sounds: Normal heart sounds.   Pulmonary:      Effort: Pulmonary effort is normal. No respiratory distress.      Breath sounds: No wheezing or rhonchi.      Comments: On oxygen  Abdominal:      Palpations: Abdomen is soft.      Tenderness: There is no abdominal tenderness.   Musculoskeletal:      Comments: Chronic lymphedema   Neurological:      General: No focal deficit present.      Mental Status: She is alert and oriented to person, place, and time.       Results Review     I reviewed the patient's new clinical results.  Results from last 7 days   Lab Units 25  0607 25  0654 25  1111   WBC 10*3/mm3 5.71 5.91 6.53   HEMOGLOBIN g/dL 7.9* 7.1* 7.6*   PLATELETS 10*3/mm3 358 313 354     Results from last 7 days   Lab Units 25  0607 25  0654 25  1111   SODIUM mmol/L 136 136 134*   POTASSIUM mmol/L 3.9 3.9 3.9   CHLORIDE mmol/L 101 100 99   CO2 mmol/L 25.0 26.2 25.9   BUN mg/dL 9 10 11   CREATININE mg/dL 0.80 0.98 0.91   GLUCOSE  "mg/dL 85 86 90   EGFR mL/min/1.73 81.9 64.2 70.2     Results from last 7 days   Lab Units 05/20/25  1111   ALBUMIN g/dL 2.9*   BILIRUBIN mg/dL 0.4   ALK PHOS U/L 127*   AST (SGOT) U/L 11   ALT (SGPT) U/L 9     Results from last 7 days   Lab Units 05/22/25  0607 05/21/25  0654 05/20/25  1111   CALCIUM mg/dL 8.4* 8.2* 8.6   ALBUMIN g/dL  --   --  2.9*       No results found for: \"HGBA1C\", \"POCGLU\"    No radiology results for the last day    I have personally reviewed all medications:  Scheduled Medications  cefTRIAXone (ROCEPHIN) 2,000 mg in sodium chloride 0.9 % 100 mL MBP, 2,000 mg, Intravenous, Q12H  ferrous sulfate, 325 mg, Oral, Daily With Breakfast  [Held by provider] furosemide, 40 mg, Oral, Daily  ipratropium-albuterol, 3 mL, Nebulization, 4x Daily - RT  metoprolol succinate XL, 12.5 mg, Oral, Daily  miconazole, 1 Application, Topical, Q12H  rivaroxaban, 20 mg, Oral, Daily With Dinner  sodium chloride, 10 mL, Intravenous, Q12H    Infusions   Diet  Diet: Cardiac; Healthy Heart (2-3 Na+); Fluid Consistency: Thin (IDDSI 0)    I have personally reviewed:  [x]  Laboratory   []  Microbiology   []  Radiology   [x]  EKG/Telemetry  []  Cardiology/Vascular   []  Pathology    []  Records       Assessment/Plan     Active Hospital Problems    Diagnosis  POA    **Acute loss of vision, bilateral [H53.133]  Yes    Acute loss of vision [H53.139]  Yes    Chronic respiratory failure with hypoxia [J96.11]  Yes    Bacterial endocarditis [I33.0]  Yes    Morbid obesity [E66.01]  Yes    History of pulmonary embolism [Z86.711]  Yes    Iron deficiency [E61.1]  Yes    History of DVT (deep vein thrombosis) [Z86.718]  Not Applicable    Anemia, chronic disease [D63.8]  Yes    Lymphedema [I89.0]  Yes    ARIEL on CPAP [G47.33]  Yes    Bacteremia due to group B Streptococcus [R78.81, B95.1]  Yes    Chronic diastolic CHF (congestive heart failure) [I50.32]  Yes    Pulmonary hypertension [I27.20]  Yes    Essential hypertension [I10]  Yes    "   Resolved Hospital Problems   No resolved problems to display.     Ms. Solomon is a 64 y.o. female with a history of HFpEF, HTN, pulmonary hypertension, group B streptococcus cellulitis of right lower extremity with subsequent bacteremia & sepsis in 2017, DVT/PE on chronic anticoagulation, ARIEL on CPAP, PFO, obesity, iron deficiency anemia, lymphedema who was recently admitted here from 05/04 - 05/10 due to mitral valve endocarditis who presents to [facility] with complaints of bilateral vision changes over the past 3-4 days prior to arrival.     - Complaints of new onset bilateral vision changes as above. Unclear etiology. CT head negative for acute findings. Decreased visual acuity per testing of ED provider. Extra-ocular muscles appear to be intact. Ophthalmology consulted and evaluated---examination performed, she has vitreous degeneration, senile cataract, no retinal detachment or hemorrhage. No acute intervention or further workup warranted from their standpoint, noting plans for outpatient follow up. Her vision is improved today per patient. Given Ophthalmology examination, with improvement, no indication for MRI or neurology consultation at present. Continue to monitor.  - She was discharged on Xarelto per DC med rec. Cardiology note from 05/20 says Xarelto. In packet from facility it shows that she has been getting Eliquis, with last dose of 05/19 in AM. I spoke with Tessy Fink, she reached out to facility, medication was changed at facility due to possible issue with insurance? Cardiology recommending continuation of her Xarelto as previously prescribed, so this is what is ordered and will continue. Continue Metoprolol as prescribed.  - Recently diagnosed Endocarditis and was evaluated by infectious disease during recent admission. She is on prolonged course of antibiotics with Ceftriaxone with stop date 06/17/25. Continue this as prescribed. Closely monitor renal function, CBC while  here.  - There is no evidence of CHF exacerbation. Her BP has been soft, so diuretic being held, can resume when appropriate.  - Hemoglobin low, slightly improved today. slightly worse today. Value 7.3 at time of discharge previously, was 7.6 on arrival, down to 7.1 on 05/21, but now 7.9. However, she is now having evidence of vaginal bleeding (which she states is known and has happened before). She is on AC, going to ask Gyn to see, and will ask Cardiology to assess ongoing AC use. Repeat CBC in AM, transfuse if hemoglobin <7. Further management based on clinical course.  - No evidence of acutely worsening respiratory status, continue treatments and O2 as prescribed, closely monitor with pulse oximetry.  - Okay to use home PAP machine if available.      Xarelto (home med) for DVT prophylaxis.  Full code.  Discussed with patient and nursing staff.  Anticipate discharge  facility   tomorrow if hemoglobin is stable and other labs/vitals stable and she is cleared by consultants  Expected Discharge Date: 5/22/2025; Expected Discharge Time: 11:55 AM      Adnerson Mclean DO  [facility]  05/22/25

## 2025-05-22 NOTE — CONSULTS
Date of Consultation: 25    Referral Provider: Anderson Mclean DO     Reason for Consultation: Anemia on AC     Encounter Provider: LUCÍA Shaw    Group of Service: [facility]     Patient Name: Emely Solomon    :1960    Chief complaint:  Vision changes     History of Present Illness:  Emely Solomon is a 64 year old who is followed by Dr. Rogers. She has a past medical history that is significant for hypertension, ARIEL on CPAP, lymphedema, shortness of breath, obesity, DVT/PE.     She was hospitalized earlier this month with mitral valve endocarditis with large vegetation on posterior leaflet of the mitral valve. She was recommended to undergo a 6 week antibiotic course. She was not felt to be a surgical candidate. She was discharge to outpatient rehab.     She was seen in our office and had complaints of acute visual changes. She was seeing black spots in her vision field. She has been evaluated by ophthalmology who found no need for acute intervention and have recommended she be seen in follow up.     Her anemia has worsened and cardiology has been consulted to evaluate the use of anticoagulation in this setting. She has reportedly been having some vaginal bleeding.     On exam today she is resting in bed. She denies much improvement or change in her vision. She denies chest pain or discomfort, palpitations, or shortness of breath.     Echocardiogram 25     The mitral valve leaflets are thickened and myxomatous. There is a large mobile echodensity attached to the atrial surface of the posterior mitral valve leaflet. Differential includes vegetation, degeneration of the valve leaflet, and fibroblastoma    The left ventricular cavity is mildly dilated.    Left ventricular systolic function is normal. Left ventricular ejection fraction appears to be 66 - 70%.    Left ventricular diastolic function is consistent with (grade Ia w/high LAP) impaired relaxation.    The right  ventricular cavity is moderately dilated. Normal right ventricular systolic function noted.    The left atrial cavity is moderately dilated.    Mild to moderate aortic valve stenosis is present. Aortic valve maximum pressure gradient is 27.5 mmHg. Aortic valve mean pressure gradient is 16.0 mmHg.    Mild mitral valve regurgitation is present. Mild mitral valve stenosis is present.    Mild to moderate tricuspid valve regurgitation is present.    Calculated right ventricular systolic pressure from tricuspid regurgitation is 62 mmHg.    The inferior vena cava is dilated. IVC inspiratory collapse is absent.    There is no evidence of pericardial effusion    Past Medical History:   Diagnosis Date    Arthritis     Cellulitis     11/2017, with Group B Strep bacteremia and sepsis    Chronic deep vein thrombosis (DVT) of left popliteal vein 12/17/2015 02/04/2016, 04/14/2016    Chronic diastolic congestive heart failure     COVID-19 virus infection 11/2020    Heart murmur     Hypertension     Iliac vein stenosis, right 05/15/2024    Lipoedema     Lymphedema     other    Morbid obesity     ARIEL on CPAP     PFO (patent foramen ovale)     Postthrombotic syndrome of left lower extremity without complications 12/17/2015    postphletibis    Pulmonary embolism 04/14/2016    Pulmonary hypertension     multifactorial (dCHF, obesity/ARIEL, hx PE), mild by echo 1/2016    Right bundle branch block (RBBB) with left anterior fascicular block (LAFB)     Sepsis 09/18/2020    Type 2 myocardial infarction 05/20/2025         Past Surgical History:   Procedure Laterality Date    ARTERIOGRAM  07/2015    BRONCHOSCOPY N/A 07/21/2017    Procedure: BRONCHOSCOPY with wash;  Surgeon: Caleb Garcia MD;  Location: [facility] ENDOSCOPY;  Service:     COLONOSCOPY      COLONOSCOPY N/A 01/26/2023    Procedure: COLONOSCOPY to CECUM AND TERM ILEUM;  Surgeon: Jj Dean MD;  Location: [facility] ENDOSCOPY;  Service: Gastroenterology;  Laterality: N/A;  PRE OP  -screening  POST OP -  NORMAL    D & C HYSTEROSCOPY N/A 03/08/2022    Procedure: DILATATION AND CURETTAGE with hysteroscopy;  Surgeon: Kaylah Land DO;  Location: [facility] MAIN OR;  Service: Gynecology Oncology;  Laterality: N/A;    DILATATION AND CURETTAGE  04/11/2011    OTHER SURGICAL HISTORY  09/2015    IVC filter    OTHER SURGICAL HISTORY      left LE revascularization    OTHER SURGICAL HISTORY      ALESSIA and LEV scan    THROMBECTOMY  07/2015         Allergies   Allergen Reactions    Cephalexin Hives and Rash     Diffuse rash on cephalexin 1 g PO q6h on 6/17/20  Tolerated zosyn and PCN G September 2020 without issue    Clindamycin/Lincomycin Hives         No current facility-administered medications on file prior to encounter.     Current Outpatient Medications on File Prior to Encounter   Medication Sig Dispense Refill    acetaminophen (TYLENOL) 325 MG tablet Take 2 tablets by mouth Every 6 (Six) Hours As Needed for Mild Pain.      apixaban (ELIQUIS) 5 MG tablet tablet Take 1 tablet by mouth 2 (Two) Times a Day.      cadexomer iodine (IODOSORB) 0.9 % gel Apply 1 Application topically to the appropriate area as directed Daily As Needed for Wound Care. Apply to open wound to rt lower leg (Patient taking differently: Apply 1 Application topically to the appropriate area as directed Daily As Needed for Wound Care.)      cefTRIAXone 2,000 mg in sodium chloride 0.9 % 100 mL IVPB Infuse 2,000 mg into a venous catheter Every 12 (Twelve) Hours for 74 doses. Indications: Endocarditis      dextromethorphan polistirex ER (DELSYM) 30 MG/5ML Suspension Extended Release oral suspension Take 10 mL by mouth 2 (Two) Times a Day As Needed (As needed for cough).      Dimethicone-Zinc Oxide-Vit A-D ([employer] ZINC OXIDE DIAPER EX) Apply 1 Application topically to the appropriate area as directed 2 (Two) Times a Day.      Emollient (Aquaphor Advanced Therapy) 41 % ointment Apply 1 Application topically to the appropriate area as  directed 2 (Two) Times a Day.      ferrous sulfate 325 (65 FE) MG tablet Take 1 tablet by mouth Daily With Breakfast.      furosemide (LASIX) 40 MG tablet Take 1 tablet by mouth Daily.      guaiFENesin ER 1200 MG tablet sustained-release 12 hour Take 1 tablet by mouth 2 (Two) Times a Day As Needed.      ipratropium-albuterol (DUO-NEB) 0.5-2.5 mg/3 ml nebulizer Take 3 mL by nebulization 4 (Four) Times a Day.      Lidocaine 4 % Place 1 patch on the skin as directed by provider Daily. Apply to skin on upper back remove patch in 12 hours. Remove & Discard patch within 12 hours or as directed by MD      melatonin 5 MG tablet tablet Take 1 tablet by mouth At Night As Needed (sleep).      metoprolol succinate XL (TOPROL-XL) 25 MG 24 hr tablet Take 0.5 tablets by mouth Daily.      rivaroxaban (Xarelto) 20 MG tablet Take 1 tablet by mouth Daily With Dinner. Indications: Atrial Fibrillation      sennosides-docusate (PERICOLACE) 8.6-50 MG per tablet Take 2 tablets by mouth At Night As Needed for Constipation.           Social History     Socioeconomic History    Marital status:    Tobacco Use    Smoking status: Never    Smokeless tobacco: Never   Vaping Use    Vaping status: Never Used   Substance and Sexual Activity    Alcohol use: Not Currently     Comment: occassionally    Drug use: Never    Sexual activity: Not Currently     Partners: Male     Birth control/protection: Post-menopausal         Family History   Problem Relation Age of Onset    Breast cancer Mother     Hypertension Other     Ovarian cancer Neg Hx     Uterine cancer Neg Hx     Colon cancer Neg Hx     Malig Hyperthermia Neg Hx        REVIEW OF SYSTEMS:   12 point ROS was performed and is negative except as outlined in HPI       Objective:     Vitals:    05/22/25 0817 05/22/25 1141 05/22/25 1149 05/22/25 1327   BP:    108/93   BP Location:    Left arm   Patient Position:    Lying   Pulse: 77 82 76    Resp: 18 18 20 18   Temp:    97.7 °F (36.5 °C)  "  TempSrc:    Oral   SpO2: 94% 100% 98%    Weight:       Height:         Body mass index is 63.91 kg/m².  Flowsheet Rows      Flowsheet Row First Filed Value   Admission Height 160 cm (62.99\") Documented at 05/20/2025 1022   Admission Weight 183 kg (403 lb) Documented at 05/20/2025 1022              Physical Exam  Vitals reviewed.   Constitutional:       General: She is not in acute distress.  HENT:      Head: Normocephalic.      Nose: Nose normal.   Eyes:      Extraocular Movements: Extraocular movements intact.      Pupils: Pupils are equal, round, and reactive to light.   Cardiovascular:      Rate and Rhythm: Normal rate and regular rhythm.      Pulses: Normal pulses.      Heart sounds: Heart sounds not distant. Murmur heard.      No friction rub. No gallop. No S3 or S4 sounds.   Pulmonary:      Effort: Pulmonary effort is normal.      Breath sounds: Normal breath sounds.   Abdominal:      General: Abdomen is flat. Bowel sounds are normal.      Palpations: Abdomen is soft.      Tenderness: There is no abdominal tenderness.   Musculoskeletal:      Right lower leg: Edema present.      Left lower leg: Edema present.   Skin:     General: Skin is warm and dry.   Neurological:      General: No focal deficit present.      Mental Status: She is alert and oriented to person, place, and time. Mental status is at baseline.   Psychiatric:         Mood and Affect: Mood normal.         Behavior: Behavior normal.         Lab Review:                Results from last 7 days   Lab Units 05/22/25  0607   SODIUM mmol/L 136   POTASSIUM mmol/L 3.9   CHLORIDE mmol/L 101   CO2 mmol/L 25.0   BUN mg/dL 9   CREATININE mg/dL 0.80   GLUCOSE mg/dL 85   CALCIUM mg/dL 8.4*         Results from last 7 days   Lab Units 05/22/25  0607   WBC 10*3/mm3 5.71   HEMOGLOBIN g/dL 7.9*   HEMATOCRIT % 24.9*   PLATELETS 10*3/mm3 358                       EKG:      Assessment/Plan:   Bilateral vision changes  She has been evaluated by ophthalmology who do not " feel acute intervention is needed currently, recommend outpatient workup.   Anemia   Hemoglobin decreased to 7.1 yesterday, improved to 7.9 today. Reports of vaginal bleeding, gynecology to see. No objection to holding Xarelto if hemoglobin continues to worsen or procedures are indicated.   Hemoglobin has been in the 7-8 range over the last month.   Mitral valve endocarditis  Continue IV antibiotics.   Chronic diastolic and right-sided heart failure  She appears euvolemic on exam   History of DVT and pulmonary embolism in 2015   On Xarelto   Chronic lower extremity lymphedema with venous stasis changes    Cardiology will follow, thank you for this consult.     Kiara Romero, APRN   05/22/25

## 2025-05-22 NOTE — PROGRESS NOTES
"Nutrition Services    Patient Name: Emely Solomon  YOB: 1960  MRN: [id number]  Admission date: 5/20/2025    Assessment Date:  05/22/25    NUTRITION EVALUATION      Reason for Encounter Pressure Injury and/or Non-Healing Wound   Diagnosis/Problem Admission Diagnosis:  Morbid obesity [E66.01]  Acute bacterial endocarditis [I33.0]  Anemia, chronic disease [D63.8]  Acute loss of vision, bilateral [H53.133]  Acute loss of vision [H53.139]    Problem List:    Acute loss of vision, bilateral    Chronic diastolic CHF (congestive heart failure)    Essential hypertension    Pulmonary hypertension    Bacteremia due to group B Streptococcus    ARIEL on CPAP    History of DVT (deep vein thrombosis)    Lymphedema    Anemia, chronic disease    Iron deficiency    History of pulmonary embolism    Morbid obesity    Bacterial endocarditis    Chronic respiratory failure with hypoxia    Acute loss of vision     Narrative 64 yoF admitted with bilateral vision changes. Pt with R heel DTI.        PO Diet Diet: Cardiac; Healthy Heart (2-3 Na+); Fluid Consistency: Thin (IDDSI 0)   Allergies NKFA   Supplements n/a   PO Intake % %       Chewing/Swallowing Difficulty no issues identified at this time       Medications reviewed   Labs  reviewed       Physical Findings alert, obese, oriented, room air     Edema no edema    GI Function last bowel movement: 5/21   Skin Status pressure injury: L calf, coccyxsacral spine, R calf, R heel DTI     Lines/Drains none   I/O reviewed        Height  Weight  BMI  Weight Trend     Height: 160 cm (62.99\")  Weight: (!) 164 kg (360 lb 10.8 oz) (05/22/25 0500)  Body mass index is 63.91 kg/m².  Stable, Loss    Weight change: No significant changes   Estimated needs   Estimated Requirements         Weight used  52.3kg (IBW)    Calories  0083-2166 (30-35 kcal/kg)    Protein  105-131 (2.0 gm/kg, 2.5 gm/kg)    Fluid   (1 mL/kcal)      NFPE Not indicated at this time       Nutrition Problem (PES) " Problem: Increased Nutrient Needs  Etiology: Medical Diagnosis - DTPI    Signs/Symptoms: Other (comment) skin breakdown       Intervention/Plan Greg BID  CTM oral intakes and encourage PO >75%    RD to follow up per protocol.     Results from last 7 days   Lab Units 05/22/25  0607 05/21/25  0654 05/20/25  1111   SODIUM mmol/L 136 136 134*   POTASSIUM mmol/L 3.9 3.9 3.9   CHLORIDE mmol/L 101 100 99   CO2 mmol/L 25.0 26.2 25.9   BUN mg/dL 9 10 11   CREATININE mg/dL 0.80 0.98 0.91   CALCIUM mg/dL 8.4* 8.2* 8.6   BILIRUBIN mg/dL  --   --  0.4   ALK PHOS U/L  --   --  127*   ALT (SGPT) U/L  --   --  9   AST (SGOT) U/L  --   --  11   GLUCOSE mg/dL 85 86 90     Results from last 7 days   Lab Units 05/22/25  0607   HEMOGLOBIN g/dL 7.9*   HEMATOCRIT % 24.9*     Lab Results   Component Value Date    HGBA1C 5.40 05/04/2025     Wt Readings from Last 10 Encounters:   05/22/25 (!) 164 kg (360 lb 10.8 oz)   05/20/25 (!) 183 kg (403 lb)   05/10/25 (!) 190 kg (418 lb 9.6 oz)   03/24/25 (!) 163 kg (358 lb 6.4 oz)   01/24/25 (!) 168 kg (370 lb)   01/22/25 (!) 169 kg (373 lb)   12/19/24 (!) 168 kg (370 lb 9.6 oz)   11/07/24 (!) 166 kg (367 lb)   10/23/24 (!) 167 kg (368 lb)   09/24/24 (!) 169 kg (372 lb)       Electronically signed by:  Maryellen Guerrero RD  05/22/25 13:13 EDT

## 2025-05-23 LAB
ANION GAP SERPL CALCULATED.3IONS-SCNC: 10 MMOL/L (ref 5–15)
BUN SERPL-MCNC: 13 MG/DL (ref 8–23)
BUN/CREAT SERPL: 15.5 (ref 7–25)
CALCIUM SPEC-SCNC: 8.3 MG/DL (ref 8.6–10.5)
CHLORIDE SERPL-SCNC: 101 MMOL/L (ref 98–107)
CO2 SERPL-SCNC: 25 MMOL/L (ref 22–29)
CREAT SERPL-MCNC: 0.84 MG/DL (ref 0.57–1)
DEPRECATED RDW RBC AUTO: 51.8 FL (ref 37–54)
EGFRCR SERPLBLD CKD-EPI 2021: 77.2 ML/MIN/1.73
ERYTHROCYTE [DISTWIDTH] IN BLOOD BY AUTOMATED COUNT: 16.6 % (ref 12.3–15.4)
GLUCOSE SERPL-MCNC: 93 MG/DL (ref 65–99)
HCT VFR BLD AUTO: 24.6 % (ref 34–46.6)
HGB BLD-MCNC: 7.3 G/DL (ref 12–15.9)
MCH RBC QN AUTO: 25.4 PG (ref 26.6–33)
MCHC RBC AUTO-ENTMCNC: 29.7 G/DL (ref 31.5–35.7)
MCV RBC AUTO: 85.7 FL (ref 79–97)
PLATELET # BLD AUTO: 336 10*3/MM3 (ref 140–450)
PMV BLD AUTO: 8.1 FL (ref 6–12)
POTASSIUM SERPL-SCNC: 4 MMOL/L (ref 3.5–5.2)
RBC # BLD AUTO: 2.87 10*6/MM3 (ref 3.77–5.28)
SODIUM SERPL-SCNC: 136 MMOL/L (ref 136–145)
WBC NRBC COR # BLD AUTO: 6.09 10*3/MM3 (ref 3.4–10.8)

## 2025-05-23 PROCEDURE — 94761 N-INVAS EAR/PLS OXIMETRY MLT: CPT

## 2025-05-23 PROCEDURE — 94760 N-INVAS EAR/PLS OXIMETRY 1: CPT

## 2025-05-23 PROCEDURE — 85027 COMPLETE CBC AUTOMATED: CPT | Performed by: STUDENT IN AN ORGANIZED HEALTH CARE EDUCATION/TRAINING PROGRAM

## 2025-05-23 PROCEDURE — 99232 SBSQ HOSP IP/OBS MODERATE 35: CPT | Performed by: STUDENT IN AN ORGANIZED HEALTH CARE EDUCATION/TRAINING PROGRAM

## 2025-05-23 PROCEDURE — 80048 BASIC METABOLIC PNL TOTAL CA: CPT | Performed by: STUDENT IN AN ORGANIZED HEALTH CARE EDUCATION/TRAINING PROGRAM

## 2025-05-23 PROCEDURE — 99232 SBSQ HOSP IP/OBS MODERATE 35: CPT | Performed by: INTERNAL MEDICINE

## 2025-05-23 PROCEDURE — 94799 UNLISTED PULMONARY SVC/PX: CPT

## 2025-05-23 PROCEDURE — 25010000002 CEFTRIAXONE PER 250 MG: Performed by: STUDENT IN AN ORGANIZED HEALTH CARE EDUCATION/TRAINING PROGRAM

## 2025-05-23 RX ORDER — MEGESTROL ACETATE 40 MG/1
40 TABLET ORAL DAILY
Qty: 30 TABLET | Refills: 0 | Status: ON HOLD | OUTPATIENT
Start: 2025-05-23

## 2025-05-23 RX ADMIN — ANTI-FUNGAL POWDER MICONAZOLE NITRATE TALC FREE 1 APPLICATION: 1.42 POWDER TOPICAL at 20:47

## 2025-05-23 RX ADMIN — CEFTRIAXONE 2000 MG: 2 INJECTION, POWDER, FOR SOLUTION INTRAMUSCULAR; INTRAVENOUS at 16:19

## 2025-05-23 RX ADMIN — METOPROLOL SUCCINATE 12.5 MG: 25 TABLET, EXTENDED RELEASE ORAL at 08:54

## 2025-05-23 RX ADMIN — Medication 10 ML: at 08:56

## 2025-05-23 RX ADMIN — IPRATROPIUM BROMIDE AND ALBUTEROL SULFATE 3 ML: .5; 3 SOLUTION RESPIRATORY (INHALATION) at 16:04

## 2025-05-23 RX ADMIN — ANTI-FUNGAL POWDER MICONAZOLE NITRATE TALC FREE 1 APPLICATION: 1.42 POWDER TOPICAL at 11:26

## 2025-05-23 RX ADMIN — Medication 10 ML: at 20:47

## 2025-05-23 RX ADMIN — FERROUS SULFATE TAB 325 MG (65 MG ELEMENTAL FE) 325 MG: 325 (65 FE) TAB at 08:54

## 2025-05-23 RX ADMIN — IPRATROPIUM BROMIDE AND ALBUTEROL SULFATE 3 ML: .5; 3 SOLUTION RESPIRATORY (INHALATION) at 20:53

## 2025-05-23 RX ADMIN — CEFTRIAXONE 2000 MG: 2 INJECTION, POWDER, FOR SOLUTION INTRAMUSCULAR; INTRAVENOUS at 04:18

## 2025-05-23 RX ADMIN — MEGESTROL ACETATE 40 MG: 40 TABLET ORAL at 08:54

## 2025-05-23 RX ADMIN — IPRATROPIUM BROMIDE AND ALBUTEROL SULFATE 3 ML: .5; 3 SOLUTION RESPIRATORY (INHALATION) at 10:23

## 2025-05-23 NOTE — PROGRESS NOTES
[facility]   Obstetrics and Gynecology     2025      Patient:  Emely Solomon   MR#:[id number]    Office note    Chief Complaint   Patient presents with    Eye Problem     X 2 day floaters        Subjective     Eye Problem       64 y.o. female  admitted for loss of vision which has improved.  She does have a history of DVT and pulmonary embolism and was on Xarelto until it was discontinued due to vaginal bleeding.  She has been previously worked up for the vaginal bleeding to the best of our ability, however endometrial sampling has not been possible largely due to body habitus and difficult exam.  She was seeing Dr. Davida De Jesus for this problem and she recommended a hysteroscopy which the patient declined.  Upon this admission it was suggested to her that she should perhaps reconsider the possibility of undergoing the procedure and having a Mirena placed for endometrial protection given her very high risk for endometrial carcinoma.    We recommended Provera to control the bleeding and discontinuation of Xarelto for the time being.    Today patient states she has not had any vaginal bleeding, but was also unaware that she had any vaginal bleeding yesterday.  Speaking with the medical assistant no vaginal bleeding has been noted when changing her today.      Patient Active Problem List   Diagnosis    Chronic diastolic CHF (congestive heart failure)    PFO (patent foramen ovale)    Paradoxical embolism    Essential hypertension    Pulmonary hypertension    Upper back pain    Bacteremia due to group B Streptococcus    ARIEL on CPAP    Class 3 severe obesity due to excess calories with serious comorbidity and body mass index (BMI) of 60.0 to 69.9 in adult    History of DVT (deep vein thrombosis)    Lymphedema    Anemia, chronic disease    Iron deficiency    Post-menopausal bleeding    Thickened endometrium    Generalized muscle weakness    Right bundle branch block (RBBB) with left anterior  fascicular block (LAFB)    PVD (peripheral vascular disease) with claudication    Iliac vein stenosis, right    Hypoxia    Hyperglycemia    History of pulmonary embolism    Morbid obesity    Hyponatremia    Bacterial endocarditis    Sepsis due to methicillin susceptible Staphylococcus aureus (MSSA) with acute hypoxic respiratory failure without septic shock    Acute on chronic heart failure with preserved ejection fraction (HFpEF)    On home oxygen therapy    Type 2 myocardial infarction    Acute loss of vision, bilateral    Chronic respiratory failure with hypoxia    Acute loss of vision       Past Medical History:   Diagnosis Date    Arthritis     Cellulitis     11/2017, with Group B Strep bacteremia and sepsis    Chronic deep vein thrombosis (DVT) of left popliteal vein 12/17/2015 02/04/2016, 04/14/2016    Chronic diastolic congestive heart failure     COVID-19 virus infection 11/2020    Heart murmur     Hypertension     Iliac vein stenosis, right 05/15/2024    Lipoedema     Lymphedema     other    Morbid obesity     ARIEL on CPAP     PFO (patent foramen ovale)     Postthrombotic syndrome of left lower extremity without complications 12/17/2015    postphletibis    Pulmonary embolism 04/14/2016    Pulmonary hypertension     multifactorial (dCHF, obesity/ARIEL, hx PE), mild by echo 1/2016    Right bundle branch block (RBBB) with left anterior fascicular block (LAFB)     Sepsis 09/18/2020    Type 2 myocardial infarction 05/20/2025     Past Surgical History:   Procedure Laterality Date    ARTERIOGRAM  07/2015    BRONCHOSCOPY N/A 07/21/2017    Procedure: BRONCHOSCOPY with wash;  Surgeon: Caleb Garcia MD;  Location: [facility] ENDOSCOPY;  Service:     COLONOSCOPY      COLONOSCOPY N/A 01/26/2023    Procedure: COLONOSCOPY to CECUM AND TERM ILEUM;  Surgeon: Jj Dean MD;  Location: [facility] ENDOSCOPY;  Service: Gastroenterology;  Laterality: N/A;  PRE OP -screening  POST OP -  NORMAL    D & C HYSTEROSCOPY N/A  2022    Procedure: DILATATION AND CURETTAGE with hysteroscopy;  Surgeon: Kaylah Land DO;  Location: [facility] MAIN OR;  Service: Gynecology Oncology;  Laterality: N/A;    DILATATION AND CURETTAGE  2011    OTHER SURGICAL HISTORY  2015    IVC filter    OTHER SURGICAL HISTORY      left LE revascularization    OTHER SURGICAL HISTORY      ALESSIA and LEV scan    THROMBECTOMY  2015     Obstetric History:  OB History          0    Para   0    Term   0       0    AB   0    Living   0         SAB   0    IAB   0    Ectopic   0    Molar   0    Multiple   0    Live Births   0               Menstrual History:     No LMP recorded. Patient is postmenopausal.       The patient has never been pregnant.  Family History   Problem Relation Age of Onset    Breast cancer Mother     Hypertension Other     Ovarian cancer Neg Hx     Uterine cancer Neg Hx     Colon cancer Neg Hx     Malig Hyperthermia Neg Hx      Social History     Tobacco Use    Smoking status: Never    Smokeless tobacco: Never   Vaping Use    Vaping status: Never Used   Substance Use Topics    Alcohol use: Not Currently     Comment: occassionally    Drug use: Never     Cephalexin and Clindamycin/lincomycin    Current Facility-Administered Medications:     acetaminophen (TYLENOL) tablet 650 mg, 650 mg, Oral, Q4H PRN, Anderson Mclean DO    sennosides-docusate (PERICOLACE) 8.6-50 MG per tablet 2 tablet, 2 tablet, Oral, BID PRN **AND** polyethylene glycol (MIRALAX) packet 17 g, 17 g, Oral, Daily PRN **AND** bisacodyl (DULCOLAX) EC tablet 5 mg, 5 mg, Oral, Daily PRN **AND** bisacodyl (DULCOLAX) suppository 10 mg, 10 mg, Rectal, Daily PRN, Anderson Mclean DO    Calcium Replacement - Follow Nurse / BPA Driven Protocol, , Not Applicable, PRN, Anderson Mclean DO    cefTRIAXone (ROCEPHIN) 2,000 mg in sodium chloride 0.9 % 100 mL MBP, 2,000 mg, Intravenous, Q12H, Anderson Mclean DO, Last Rate: 200 mL/hr at 258, 2,000 mg at 258     dextromethorphan polistirex ER (DELSYM) 30 MG/5ML oral suspension 60 mg, 60 mg, Oral, BID PRN, Anderson Mclean DO    ferrous sulfate tablet 325 mg, 325 mg, Oral, Daily With Breakfast, Anderson Mclean DO, 325 mg at 05/23/25 0854    [Held by provider] furosemide (LASIX) tablet 40 mg, 40 mg, Oral, Daily, Anderson Mclean DO, 40 mg at 05/20/25 1744    guaiFENesin (MUCINEX) 12 hr tablet 1,200 mg, 1,200 mg, Oral, BID PRN, Anderson Mclean,     ipratropium-albuterol (DUO-NEB) nebulizer solution 3 mL, 3 mL, Nebulization, 4x Daily - RT, Anderson Mclean DO, 3 mL at 05/23/25 1023    Magnesium Standard Dose Replacement - Follow Nurse / BPA Driven Protocol, , Not Applicable, PRN, Anderson Mclean DO    megestrol (MEGACE) tablet 40 mg, 40 mg, Oral, Daily, Melvina Barnard MD, 40 mg at 05/23/25 0854    melatonin tablet 5 mg, 5 mg, Oral, Nightly PRN, Anderson Mclean DO    metoprolol succinate XL (TOPROL-XL) 24 hr tablet 12.5 mg, 12.5 mg, Oral, Daily, Anderson Mclean, , 12.5 mg at 05/23/25 0854    miconazole (MICOTIN) 2 % powder 1 Application, 1 Application, Topical, Q12H, Anderson Mclean DO, 1 Application at 05/23/25 1126    nitroglycerin (NITROSTAT) SL tablet 0.4 mg, 0.4 mg, Sublingual, Q5 Min PRN, Anderson Mclean DO    ondansetron ODT (ZOFRAN-ODT) disintegrating tablet 4 mg, 4 mg, Oral, Q6H PRN **OR** ondansetron (ZOFRAN) injection 4 mg, 4 mg, Intravenous, Q6H PRN, Anderson Mclean,     Phosphorus Replacement - Follow Nurse / BPA Driven Protocol, , Not Applicable, PRN, Anderson Mclean DO    Potassium Replacement - Follow Nurse / BPA Driven Protocol, , Not Applicable, PRN, Anderson Mclean DO    [Held by provider] rivaroxaban (XARELTO) tablet 20 mg, 20 mg, Oral, Daily With Dinner, Anderson Mclean DO, 20 mg at 05/21/25 2022    [COMPLETED] Insert Peripheral IV, , , Once **AND** sodium chloride 0.9 % flush 10 mL, 10 mL, Intravenous, PRN, Andrew Harper MD    sodium chloride 0.9 % flush 10 mL, 10 mL, Intravenous, Q12H, Anderson Mclean DO, 10 mL at 05/23/25  "0856    sodium chloride 0.9 % flush 10 mL, 10 mL, Intravenous, PRN, [place], Anderson, DO    sodium chloride 0.9 % flush 20 mL, 20 mL, Intravenous, PRN, [place], Anderson, DO    sodium chloride 0.9 % infusion 40 mL, 40 mL, Intravenous, PRN, Vin[place], Anderson, DO      Review of Systems   All other systems reviewed and are negative.      BP Readings from Last 3 Encounters:   05/23/25 106/73   05/20/25 120/82   05/10/25 109/69      Wt Readings from Last 3 Encounters:   05/23/25 (!) 163 kg (358 lb 11 oz)   05/20/25 (!) 183 kg (403 lb)   05/10/25 (!) 190 kg (418 lb 9.6 oz)      BMI: Estimated body mass index is 63.56 kg/m² as calculated from the following:    Height as of this encounter: 160 cm (62.99\").    Weight as of this encounter: 163 kg (358 lb 11 oz). BSA: Estimated body surface area is 2.48 meters squared as calculated from the following:    Height as of this encounter: 160 cm (62.99\").    Weight as of this encounter: 163 kg (358 lb 11 oz).    Objective   Physical Exam  Vitals and nursing note reviewed.   Constitutional:       Appearance: She is obese. She is ill-appearing.   HENT:      Head: Normocephalic and atraumatic.   Pulmonary:      Effort: Pulmonary effort is normal.   Neurological:      Mental Status: She is alert and oriented to person, place, and time.   Psychiatric:         Mood and Affect: Mood normal.         Assessment & Plan   64-year-old female with chronic vaginal bleeding and thickened endometrium on recent imaging.  Endometrial sampling has not been possible thus far.  We do recommend Provera at this time in order to help her stop her vaginal bleeding given her very low hemoglobin.    Plan:  Postmenopausal bleeding:  - May consider restarting anticoagulation after patient has had no vaginal bleeding for 24 to 48 hours.  May consider for starting with Lovenox prior to transitioning ultimately back to Xarelto in case bleeding resumes.  - Continue Provera until patient's appointment with Dr. De Jesus on June 9.  " Again would highly recommend considering surgery as Dr. De Jesus had recommended.  - GYN will sign off at this time, thank you for the consult.    No follow-ups on file.    Colleen Corbin MD   5/23/2025 15:24 EDT

## 2025-05-23 NOTE — PROGRESS NOTES
"Patient Name: Emely Solomon  :1960  64 y.o.      Patient Care Team:  Adilson Wilkerson MD as PCP - General (Family Medicine)  Florida Segovia MD as Referring Physician (Obstetrics and Gynecology)  Brennan Rogers MD as Cardiologist (Cardiology)  Caleb Garcia MD as Consulting Physician (Pulmonary Disease)  Jess Sanz, RN as Ambulatory  (Population Health)    Interval History:   Hemoglobin low but stable.  Off Xarelto.    Subjective:  Following for mitral valve endocarditis    Objective   Vital Signs  Temp:  [97.7 °F (36.5 °C)-98.2 °F (36.8 °C)] 97.9 °F (36.6 °C)  Heart Rate:  [76-87] 77  Resp:  [18-20] 18  BP: (102-108)/(57-93) 107/60    Intake/Output Summary (Last 24 hours) at 2025 0831  Last data filed at 2025 0418  Gross per 24 hour   Intake 532 ml   Output 1100 ml   Net -568 ml     Flowsheet Rows      Flowsheet Row First Filed Value   Admission Height 160 cm (62.99\") Documented at 2025 1022   Admission Weight 183 kg (403 lb) Documented at 2025 1022              Results Review:    Results from last 7 days   Lab Units 25  0411   SODIUM mmol/L 136   POTASSIUM mmol/L 4.0   CHLORIDE mmol/L 101   CO2 mmol/L 25.0   BUN mg/dL 13   CREATININE mg/dL 0.84   GLUCOSE mg/dL 93   CALCIUM mg/dL 8.3*         Results from last 7 days   Lab Units 25  0411   WBC 10*3/mm3 6.09   HEMOGLOBIN g/dL 7.3*   HEMATOCRIT % 24.6*   PLATELETS 10*3/mm3 336                         Medication Review:   cefTRIAXone (ROCEPHIN) 2,000 mg in sodium chloride 0.9 % 100 mL MBP, 2,000 mg, Intravenous, Q12H  ferrous sulfate, 325 mg, Oral, Daily With Breakfast  [Held by provider] furosemide, 40 mg, Oral, Daily  ipratropium-albuterol, 3 mL, Nebulization, 4x Daily - RT  megestrol, 40 mg, Oral, Daily  metoprolol succinate XL, 12.5 mg, Oral, Daily  miconazole, 1 Application, Topical, Q12H  [Held by provider] rivaroxaban, 20 mg, Oral, Daily With Dinner  sodium chloride, 10 mL, Intravenous, " Q12H              Assessment & Plan     Mitral valve endocarditis with a large vegetation on the posterior mitral valve leaflet.  She was felt not to be a good surgical candidate.  She is on IV antibiotics.  Mild mitral regurgitation.  Mild to moderate tricuspid regurgitation with severe pulmonary hypertension by echo 5/5/2025  Mild to moderate aortic stenosis.  Chronic diastolic/right-sided heart failure.  History of DVT and PE in 2015.  Xarelto on hold for anemia.  Chronic lymphedema and venous stasis.  Bilateral vision changes.  Seen by ophthalmology.  No acute intervention.  Vaginal bleeding.  Xarelto on hold.  She is on Xarelto due to the history of DVT and PE remotely.  Defer timing of restarting anticoagulation to the primary service and gynecology service.  Not on Xarelto for a cardiac indication.    Nothing else to add from a cardiac standpoint.  Keep appointment on June 16 with LUCÍA Brito MD, [facility]  05/23/25  08:31 EDT

## 2025-05-23 NOTE — PLAN OF CARE
Goal Outcome Evaluation:              Outcome Evaluation: Pt AOx4, can be forgetful at times. 2-3L during day, CPAP @ night. Q2 turns. Minimal vaginal bleeding this shift. Hgb stable. Wound care completed. CHG bath completed. Plan to d/c back to [facility] rehab tomorrow. VSS. Xarelto remains on hold

## 2025-05-23 NOTE — CASE MANAGEMENT/SOCIAL WORK
Continued Stay Note  [facility]     Patient Name: Emely Solomon  MRN: [id number]  Today's Date: 5/23/2025    Admit Date: 5/20/2025    Plan: [facility]   Discharge Plan       Row Name 05/23/25 1311       Plan    Plan [facility]    Patient/Family in Agreement with Plan yes    Plan Comments Spoke with [facility] bed is available tomorrow. Packet on chart with EMS form.      Row Name 05/23/25 1138       Plan    Plan [facility]                   Discharge Codes    No documentation.                 Expected Discharge Date and Time       Expected Discharge Date Expected Discharge Time    May 24, 2025  8:51 AM              NEENA Seals

## 2025-05-23 NOTE — PROGRESS NOTES
Name: Emely Solomon ADMIT: 2025   : 1960  PCP: Adilson Wilkerson MD    MRN: [id number] LOS: 2 days   AGE/SEX: 64 y.o. female  ROOM: [facility]/     Subjective   Subjective   Patient awake and resting in bed, doing okay this morning, no new acute complaints. Vision overall improved, still with some vaginal bleeding.       Objective   Objective   Vital Signs  Temp:  [97.7 °F (36.5 °C)-98.2 °F (36.8 °C)] 97.9 °F (36.6 °C)  Heart Rate:  [77-87] 80  Resp:  [18-20] 18  BP: (102-108)/(57-93) 107/60  SpO2:  [97 %-100 %] 99 %  on  Flow (L/min) (Oxygen Therapy):  [2-3] 2;   Device (Oxygen Therapy): nasal cannula  Body mass index is 63.56 kg/m².  Physical Exam  Vitals and nursing note reviewed.   Constitutional:       General: She is not in acute distress.  Eyes:      General: No scleral icterus.  Cardiovascular:      Rate and Rhythm: Normal rate.      Pulses: Normal pulses.      Heart sounds: Normal heart sounds.   Pulmonary:      Effort: Pulmonary effort is normal. No respiratory distress.      Breath sounds: No wheezing or rhonchi.      Comments: On oxygen  Abdominal:      General: There is no distension.      Palpations: Abdomen is soft.      Tenderness: There is no abdominal tenderness.   Musculoskeletal:      Comments: Chronic lymphedema   Neurological:      General: No focal deficit present.      Mental Status: She is alert and oriented to person, place, and time.       Results Review     I reviewed the patient's new clinical results.  Results from last 7 days   Lab Units 25  04125  0607 25  0654 25  1111   WBC 10*3/mm3 6.09 5.71 5.91 6.53   HEMOGLOBIN g/dL 7.3* 7.9* 7.1* 7.6*   PLATELETS 10*3/mm3 336 358 313 354     Results from last 7 days   Lab Units 25  0411 25  0607 25  0654 25  1111   SODIUM mmol/L 136 136 136 134*   POTASSIUM mmol/L 4.0 3.9 3.9 3.9   CHLORIDE mmol/L 101 101 100 99   CO2 mmol/L 25.0 25.0 26.2 25.9   BUN mg/dL 13 9 10 11  "  CREATININE mg/dL 0.84 0.80 0.98 0.91   GLUCOSE mg/dL 93 85 86 90   EGFR mL/min/1.73 77.2 81.9 64.2 70.2     Results from last 7 days   Lab Units 05/20/25  1111   ALBUMIN g/dL 2.9*   BILIRUBIN mg/dL 0.4   ALK PHOS U/L 127*   AST (SGOT) U/L 11   ALT (SGPT) U/L 9     Results from last 7 days   Lab Units 05/23/25  0411 05/22/25  0607 05/21/25  0654 05/20/25  1111   CALCIUM mg/dL 8.3* 8.4* 8.2* 8.6   ALBUMIN g/dL  --   --   --  2.9*       No results found for: \"HGBA1C\", \"POCGLU\"    No radiology results for the last day    I have personally reviewed all medications:  Scheduled Medications  cefTRIAXone (ROCEPHIN) 2,000 mg in sodium chloride 0.9 % 100 mL MBP, 2,000 mg, Intravenous, Q12H  ferrous sulfate, 325 mg, Oral, Daily With Breakfast  [Held by provider] furosemide, 40 mg, Oral, Daily  ipratropium-albuterol, 3 mL, Nebulization, 4x Daily - RT  megestrol, 40 mg, Oral, Daily  metoprolol succinate XL, 12.5 mg, Oral, Daily  miconazole, 1 Application, Topical, Q12H  [Held by provider] rivaroxaban, 20 mg, Oral, Daily With Dinner  sodium chloride, 10 mL, Intravenous, Q12H    Infusions   Diet  Diet: Cardiac; Healthy Heart (2-3 Na+); Fluid Consistency: Thin (IDDSI 0)    I have personally reviewed:  [x]  Laboratory   []  Microbiology   []  Radiology   [x]  EKG/Telemetry  []  Cardiology/Vascular   []  Pathology    []  Records       Assessment/Plan     Active Hospital Problems    Diagnosis  POA    **Acute loss of vision, bilateral [H53.133]  Yes    Acute loss of vision [H53.139]  Yes    Chronic respiratory failure with hypoxia [J96.11]  Yes    Bacterial endocarditis [I33.0]  Yes    Morbid obesity [E66.01]  Yes    History of pulmonary embolism [Z86.711]  Yes    Iron deficiency [E61.1]  Yes    History of DVT (deep vein thrombosis) [Z86.718]  Not Applicable    Anemia, chronic disease [D63.8]  Yes    Lymphedema [I89.0]  Yes    ARIEL on CPAP [G47.33]  Yes    Bacteremia due to group B Streptococcus [R78.81, B95.1]  Yes    Chronic " diastolic CHF (congestive heart failure) [I50.32]  Yes    Pulmonary hypertension [I27.20]  Yes    Essential hypertension [I10]  Yes      Resolved Hospital Problems   No resolved problems to display.     Ms. Solomon is a 64 y.o. female with a history of HFpEF, HTN, pulmonary hypertension, group B streptococcus cellulitis of right lower extremity with subsequent bacteremia & sepsis in 2017, DVT/PE on chronic anticoagulation, ARIEL on CPAP, PFO, obesity, iron deficiency anemia, lymphedema who was recently admitted here from 05/04 - 05/10 due to mitral valve endocarditis who presents to [facility] with complaints of bilateral vision changes over the past 3-4 days prior to arrival.     - Complaints of new onset bilateral vision changes as above. Unclear etiology. CT head negative for acute findings. Decreased visual acuity per testing of ED provider. Extra-ocular muscles appear to be intact. Ophthalmology consulted and evaluated---examination performed, she has vitreous degeneration, senile cataract, no retinal detachment or hemorrhage. No acute intervention or further workup warranted from their standpoint, noting plans for outpatient follow up. Her vision has overall improved, no evidence of acute worsening. Given Ophthalmology examination, with improvement, no indication for MRI or neurology consultation at present. Continue to monitor.  - She was discharged on Xarelto per DC med rec. Cardiology note from 05/20 says Xarelto. In packet from facility it shows that she has been getting Eliquis, with last dose of 05/19 in AM. I spoke with Tessy Fink, she reached out to facility, medication was changed at facility due to possible issue with insurance? Cardiology recommending continuation of her Xarelto as previously prescribed, so this was ordered, however, has since been stopped due to vaginal bleeding and cardiology has consulted and given okay for holding this at present. Greatly appreciate their help.  Continue Metoprolol as prescribed.  - Recently diagnosed Endocarditis and was evaluated by infectious disease during recent admission. She is on prolonged course of antibiotics with Ceftriaxone with stop date 06/17/25. Continue this as prescribed. Closely monitor renal function, CBC while here.  - There is no evidence of CHF exacerbation. Her BP has been soft, so diuretic being held, can resume when appropriate.  - Hemoglobin low, values fluctuating but relatively stable. slightly worse today. Value 7.3 at time of discharge previously, was 7.6 on arrival, now at 7.3. She is having vaginal bleeding, gynecology has seen her, recommending holding Xarelto and she has been started on Progesterone. Greatly appreciate their help. Repeat CBC in AM, transfuse if hemoglobin <7. Further management based on clinical course.  - No evidence of acutely worsening respiratory status, continue treatments and O2 as prescribed, closely monitor with pulse oximetry.  - Okay to use home PAP machine if available.      Xarelto (home med) for DVT prophylaxis.  Full code.  Discussed with patient, nursing staff, and CCP.  Anticipate discharge  facility   tomorrow if hemoglobin is stable and other labs/vitals stable  Expected Discharge Date: 5/23/2025; Expected Discharge Time:  8:51 AM      Anderson Mclean DO  [facility]  05/23/25

## 2025-05-23 NOTE — PLAN OF CARE
Problem: Skin Injury Risk Increased  Goal: Skin Health and Integrity  Outcome: Progressing     Problem: Fall Injury Risk  Goal: Absence of Fall and Fall-Related Injury  Outcome: Progressing     Problem: Anemia  Goal: Anemia Symptom Improvement  Outcome: Progressing   Goal Outcome Evaluation:  Plan of Care Reviewed With: patient        Progress: no change  Outcome Evaluation: Pt appeared to sleep well, alert and oriented x 4, coop with care, compliant with q 2 hour turns, continues to have vaginal bleeding, hg 7.3 this am, pt denies abd pain, up with walker and assist of 1 to chair, tolerating iv antibiotics, skin care/bath performed, wore cpap at hs or 3L o2 when awake, some soa with exertion.

## 2025-05-24 VITALS
OXYGEN SATURATION: 98 % | SYSTOLIC BLOOD PRESSURE: 118 MMHG | TEMPERATURE: 98.2 F | DIASTOLIC BLOOD PRESSURE: 62 MMHG | RESPIRATION RATE: 18 BRPM | HEIGHT: 63 IN | HEART RATE: 80 BPM | WEIGHT: 293 LBS | BODY MASS INDEX: 51.91 KG/M2

## 2025-05-24 LAB
ANION GAP SERPL CALCULATED.3IONS-SCNC: 9.8 MMOL/L (ref 5–15)
BUN SERPL-MCNC: 12 MG/DL (ref 8–23)
BUN/CREAT SERPL: 14 (ref 7–25)
CALCIUM SPEC-SCNC: 8.3 MG/DL (ref 8.6–10.5)
CHLORIDE SERPL-SCNC: 102 MMOL/L (ref 98–107)
CO2 SERPL-SCNC: 24.2 MMOL/L (ref 22–29)
CREAT SERPL-MCNC: 0.86 MG/DL (ref 0.57–1)
DEPRECATED RDW RBC AUTO: 48.9 FL (ref 37–54)
EGFRCR SERPLBLD CKD-EPI 2021: 75.1 ML/MIN/1.73
ERYTHROCYTE [DISTWIDTH] IN BLOOD BY AUTOMATED COUNT: 16.2 % (ref 12.3–15.4)
GLUCOSE SERPL-MCNC: 84 MG/DL (ref 65–99)
HCT VFR BLD AUTO: 23 % (ref 34–46.6)
HGB BLD-MCNC: 7.2 G/DL (ref 12–15.9)
MCH RBC QN AUTO: 25.6 PG (ref 26.6–33)
MCHC RBC AUTO-ENTMCNC: 31.3 G/DL (ref 31.5–35.7)
MCV RBC AUTO: 81.9 FL (ref 79–97)
PLATELET # BLD AUTO: 338 10*3/MM3 (ref 140–450)
PMV BLD AUTO: 7.7 FL (ref 6–12)
POTASSIUM SERPL-SCNC: 4.3 MMOL/L (ref 3.5–5.2)
RBC # BLD AUTO: 2.81 10*6/MM3 (ref 3.77–5.28)
SODIUM SERPL-SCNC: 136 MMOL/L (ref 136–145)
WBC NRBC COR # BLD AUTO: 6.79 10*3/MM3 (ref 3.4–10.8)

## 2025-05-24 PROCEDURE — 94664 DEMO&/EVAL PT USE INHALER: CPT

## 2025-05-24 PROCEDURE — 94761 N-INVAS EAR/PLS OXIMETRY MLT: CPT

## 2025-05-24 PROCEDURE — 94799 UNLISTED PULMONARY SVC/PX: CPT

## 2025-05-24 PROCEDURE — 25010000002 CEFTRIAXONE PER 250 MG: Performed by: STUDENT IN AN ORGANIZED HEALTH CARE EDUCATION/TRAINING PROGRAM

## 2025-05-24 PROCEDURE — 80048 BASIC METABOLIC PNL TOTAL CA: CPT | Performed by: STUDENT IN AN ORGANIZED HEALTH CARE EDUCATION/TRAINING PROGRAM

## 2025-05-24 PROCEDURE — 85027 COMPLETE CBC AUTOMATED: CPT | Performed by: STUDENT IN AN ORGANIZED HEALTH CARE EDUCATION/TRAINING PROGRAM

## 2025-05-24 RX ADMIN — DEXTROMETHORPHAN 60 MG: 30 SUSPENSION, EXTENDED RELEASE ORAL at 00:37

## 2025-05-24 RX ADMIN — CEFTRIAXONE 2000 MG: 2 INJECTION, POWDER, FOR SOLUTION INTRAMUSCULAR; INTRAVENOUS at 05:15

## 2025-05-24 RX ADMIN — FERROUS SULFATE TAB 325 MG (65 MG ELEMENTAL FE) 325 MG: 325 (65 FE) TAB at 10:18

## 2025-05-24 RX ADMIN — METOPROLOL SUCCINATE 12.5 MG: 25 TABLET, EXTENDED RELEASE ORAL at 10:19

## 2025-05-24 RX ADMIN — ANTI-FUNGAL POWDER MICONAZOLE NITRATE TALC FREE 1 APPLICATION: 1.42 POWDER TOPICAL at 10:18

## 2025-05-24 RX ADMIN — MEGESTROL ACETATE 40 MG: 40 TABLET ORAL at 10:17

## 2025-05-24 RX ADMIN — Medication 10 ML: at 10:18

## 2025-05-24 RX ADMIN — IPRATROPIUM BROMIDE AND ALBUTEROL SULFATE 3 ML: .5; 3 SOLUTION RESPIRATORY (INHALATION) at 06:54

## 2025-05-24 NOTE — DISCHARGE SUMMARY
Patient Name: Emely Solomon  : 1960  MRN: [id number]    Date of Admission: 2025  Date of Discharge:  2025  Primary Care Physician: Adilson Wilkerson MD      Chief Complaint:   Eye Problem (X 2 day floaters )      Discharge Diagnoses     Active Hospital Problems    Diagnosis  POA    **Acute loss of vision, bilateral [H53.133]  Yes    Acute loss of vision [H53.139]  Yes    Chronic respiratory failure with hypoxia [J96.11]  Yes    Bacterial endocarditis [I33.0]  Yes    Morbid obesity [E66.01]  Yes    History of pulmonary embolism [Z86.711]  Yes    Iron deficiency [E61.1]  Yes    History of DVT (deep vein thrombosis) [Z86.718]  Not Applicable    Anemia, chronic disease [D63.8]  Yes    Lymphedema [I89.0]  Yes    ARIEL on CPAP [G47.33]  Yes    Bacteremia due to group B Streptococcus [R78.81, B95.1]  Yes    Chronic diastolic CHF (congestive heart failure) [I50.32]  Yes    Pulmonary hypertension [I27.20]  Yes    Essential hypertension [I10]  Yes      Resolved Hospital Problems   No resolved problems to display.        Hospital Course     Ms. Solomon is a 64 y.o. female with a history of HFpEF, HTN, pulmonary hypertension, group B streptococcus cellulitis of right lower extremity with subsequent bacteremia & sepsis in 2017, DVT/PE on chronic anticoagulation, ARIEL on CPAP, PFO, obesity, iron deficiency anemia, lymphedema who was recently admitted here from  - 05/10 due to mitral valve endocarditis who presented to  with complaints of bilateral vision changes over the past 3-4 days prior to arrival. Please see the admitting history and physical for further details.  She was admitted to the hospital for further evaluation and treatment.       Complaints of new onset bilateral vision changes as above. CT head negative for acute findings. Decreased visual acuity per testing of ED provider. Ophthalmology consulted and evaluated---examination performed, she has vitreous  "degeneration, senile cataract, no retinal detachment or hemorrhage. No acute intervention or further workup warranted from their standpoint, noting plans for outpatient follow up. Referral placed to Ophthalmology to allow for outpatient evaluation. Based on Ophthalmology examination and recommendations, along with no evidence of acute worsening changes during hospital stay, no indication to pursue further workup with MRI or Neurology evaluation at this time. Follow up with Ophthalmology after discharge.     She was discharged on Xarelto per [facility] med rec. Cardiology note from 05/20 says Xarelto. In packet from facility it showed that she has been getting Eliquis, with last dose of 05/19 in AM. I spoke with Tessy Fink, she reached out to facility, medication was changed at facility due to possible issue with insurance, however, discussed with pharmacy here and stated that she should be able to continue Xarelto after discharge. Cardiology recommended continuation of her Xarelto as previously prescribed, so this was ordered, however, as discussed elsewhere, she had vaginal bleeding, and medication had to be discontinued. Gynecology noted that she could resume Xarelto after discharge once she had no vaginal bleeding for 24-48 hours, so would recommend monitoring of this at facility, with decision to resume anticoagulation at discretion of Gynecology and physician at facility. Cardiology deferred decision resuming anticoagulation to Gynecology as she is on this due to history of DVT/PE. Otherwise, she will continue with her beta blocker as previously prescribed. She will follow up with Cardiology on 06/16 as scheduled for ongoing management.     Vaginal bleeding noted during hospital stay. Gynecology was consulted and followed, they noted that she has \"chronic vaginal bleeding and thickened endometrium on recent imaging.  Endometrial sampling has not been possible thus far.\" They recommended initiation of treatment with " "Progesterone daily for bleeding, which was ordered and she will continue at discharge. Please see further recommendations per Dr. Barnard as follows: \"Reviewed plan with her sister Lindsey on the phone with pt present, recommend discussing plans with her family to increase compliance and follow up as pt is confused at times. Reviewed possible endometrial pathology including endometrial hyperplasia, endometrial cancer, or benign pathology. Keep appt with Dr De Jesus June 9th , consider surgery recommended by Dr De Jesus- also discussed this recommendation with pts sister Lindsey who was unaware of the plan Dr De Jesus had outlined.\"    Recently diagnosed Endocarditis and was evaluated by infectious disease during recent admission. She is on prolonged course of antibiotics with Ceftriaxone with stop date 06/17/25. Lab monitoring per prior infectious disease note as follows: \"weekly CBC with differential and creatinine faxed to [phone].\" Continue antibiotics as previously prescribed, follow up with infectious disease in outpatient setting as scheduled.     There is no evidence of CHF exacerbation. Her BP has been soft at times, so this needs to be closely monitored at facility. Can continue medications as previously prescribed, but may warrant adjustment to treatment regimen depending on renal function and blood pressure readings. Recommend close monitoring of patient's weight and blood pressure at facility. Follow up with Cardiology in outpatient setting as scheduled. She is unable to tolerate ACE/ARB or SGLT2 inhibitor at this time, given blood pressure readings, as initiation of these medications would likely lead to development of hypotension.     Hemoglobin low, values fluctuating but relatively stable. Value 7.3 at time of discharge previously, was 7.6 on arrival, now at 7.2. She has had vaginal bleeding as discussed elsewhere. Given stability, no indication for transfusion at present. Hold off on IV iron given current " infection with IV antibiotic therapy. However, her hemoglobin needs to be closely monitored moving forward. Recommend repeat CBC within 1-2 days at facility and again with PCP within 1 week for reassessment.    No evidence of acutely worsening respiratory status, continue treatments and O2 as previously prescribed.    Aside from follow ups as above, also recommend close follow up with PCP within 1 week after discharge for reassessment to guide ongoing management decisions.    Day of Discharge     Subjective:  Patient seen and examined this morning.  She is awake and resting in bed, she is doing okay at present, still having some spots in her vision at times, however, overall improved.  She denies any notice of vaginal bleeding this morning.  She has been cleared for discharge by consultants, plans for discharge to facility today.    Physical Exam:  Temp:  [97.8 °F (36.6 °C)-98.8 °F (37.1 °C)] 98.2 °F (36.8 °C)  Heart Rate:  [80-91] 80  Resp:  [16-24] 18  BP: ()/(51-73) 98/54  Body mass index is 61.84 kg/m².  Physical Exam  Vitals and nursing note reviewed.   Constitutional:       General: She is not in acute distress.     Appearance: She is overweight.   Cardiovascular:      Rate and Rhythm: Normal rate.   Pulmonary:      Effort: Pulmonary effort is normal. No respiratory distress.      Breath sounds: No stridor. No wheezing.      Comments: On oxygen  Abdominal:      General: There is no distension.      Palpations: Abdomen is soft.      Tenderness: There is no abdominal tenderness.   Musculoskeletal:      Comments: Chronic lymphedema   Neurological:      Mental Status: She is alert.         Consultants     Consult Orders (all) (From admission, onward)       Start     Ordered    05/22/25 1052  Inpatient Obstetrics / Gynecology Consult  Once        Specialty:  Obstetrics and Gynecology  Provider:  Colleen Corbin MD    05/22/25 1051    05/22/25 0976  Inpatient Cardiology Consult  Once        Specialty:   Cardiology  Provider:  Brennan Rogers MD    05/22/25 0937    05/20/25 1722  Inpatient Case Management  Consult  Once        Provider:  (Not yet assigned)    05/20/25 1721    05/20/25 1135  [facility] (on-call MD unless specified) Details  Once        Specialty:  Hospitalist  Provider:  (Not yet assigned)    05/20/25 1134    05/20/25 1126  Ophthalmology (on-call MD unless specified)  Once        Specialty:  Ophthalmology  Provider:  (Not yet assigned)    05/20/25 1125                  Procedures     * Surgery not found *    Imaging Results (All)       Procedure Component Value Units Date/Time    CT Head Without Contrast [[id number]] Collected: 05/20/25 1306     Updated: 05/20/25 1311    Narrative:      CT HEAD WO CONTRAST-     HISTORY:  bilateral vision loss, endocarditis; H53.133-Sudden visual  loss, bilateral; D63.8-Anemia in other chronic diseases classified  elsewhere; E66.01-Morbid (severe) obesity due to excess calories;  I33.0-Acute and subacute infective endocarditis     COMPARISON: CT 3/20/2019     FINDINGS: The study is hampered somewhat by patient motion. The brain  ventricles are symmetrical. A septum cavum callosum is incidentally  noted. There is no evidence of hemorrhage, hydrocephalus or of acute  infarction. Further evaluation could be performed with MRI examination  brain with and without contrast.                 Radiation dose reduction techniques were utilized, including automated  exposure modulation based on body size.     This report was finalized on 5/20/2025 1:08 PM by Dr. Ghanshyam Tamayo M.D  on Workstation: [id number]             Results for orders placed during the hospital encounter of 07/24/24    Venous w Reflux Lower Extremity - Unilateral CAR    Interpretation Summary    Right saphenofemoral junction reflux with mild proximal saphenous vein reflux with poorly visualized greater saphenous medial branch.  Short segment of varicosity noted in the medial calf.  Lesser saphenous  vein nonvisualized.  Poorly visualized deep vein structures without DVT noted.    Results for orders placed during the hospital encounter of 05/04/25    Adult Transthoracic Echo Complete W/ Cont if Necessary Per Protocol    Interpretation Summary    The mitral valve leaflets are thickened and myxomatous. There is a large mobile echodensity attached to the atrial surface of the posterior mitral valve leaflet. Differential includes vegetation, degeneration of the valve leaflet, and fibroblastoma    The left ventricular cavity is mildly dilated.    Left ventricular systolic function is normal. Left ventricular ejection fraction appears to be 66 - 70%.    Left ventricular diastolic function is consistent with (grade Ia w/high LAP) impaired relaxation.    The right ventricular cavity is moderately dilated. Normal right ventricular systolic function noted.    The left atrial cavity is moderately dilated.    Mild to moderate aortic valve stenosis is present. Aortic valve maximum pressure gradient is 27.5 mmHg. Aortic valve mean pressure gradient is 16.0 mmHg.    Mild mitral valve regurgitation is present. Mild mitral valve stenosis is present.    Mild to moderate tricuspid valve regurgitation is present.    Calculated right ventricular systolic pressure from tricuspid regurgitation is 62 mmHg.    The inferior vena cava is dilated. IVC inspiratory collapse is absent.    There is no evidence of pericardial effusion    Pertinent Labs     Results from last 7 days   Lab Units 05/24/25  0526 05/23/25  0411 05/22/25  0607 05/21/25  0654   WBC 10*3/mm3 6.79 6.09 5.71 5.91   HEMOGLOBIN g/dL 7.2* 7.3* 7.9* 7.1*   PLATELETS 10*3/mm3 338 336 358 313     Results from last 7 days   Lab Units 05/24/25  0526 05/23/25  0411 05/22/25  0607 05/21/25  0654   SODIUM mmol/L 136 136 136 136   POTASSIUM mmol/L 4.3 4.0 3.9 3.9   CHLORIDE mmol/L 102 101 101 100   CO2 mmol/L 24.2 25.0 25.0 26.2   BUN mg/dL 12 13 9 10   CREATININE mg/dL 0.86 0.84  "0.80 0.98   GLUCOSE mg/dL 84 93 85 86   EGFR mL/min/1.73 75.1 77.2 81.9 64.2     Results from last 7 days   Lab Units 05/20/25  1111   ALBUMIN g/dL 2.9*   BILIRUBIN mg/dL 0.4   ALK PHOS U/L 127*   AST (SGOT) U/L 11   ALT (SGPT) U/L 9     Results from last 7 days   Lab Units 05/24/25  0526 05/23/25  0411 05/22/25  0607 05/21/25  0654 05/20/25  1111   CALCIUM mg/dL 8.3* 8.3* 8.4* 8.2* 8.6   ALBUMIN g/dL  --   --   --   --  2.9*               Invalid input(s): \"LDLCALC\"          Test Results Pending at Discharge     Pending Results       None              Discharge Details        Discharge Medications        PAUSE taking these medications        Instructions Start Date   rivaroxaban 20 MG tablet  Wait to take this until your doctor or other care provider tells you to start again.  Until no vaginal bleeding for 24-48 hours and given clearance to resume this per Gynecology, if you do not have bleeding in that time frame, please reach out to them to ask if it is okay to resume this medication  Commonly known as: Xarelto   20 mg, Oral, Daily With Dinner             New Medications        Instructions Start Date   megestrol 40 MG tablet  Commonly known as: MEGACE   40 mg, Oral, Daily      miconazole 2 % powder  Commonly known as: MICOTIN   1 Application, Topical, Every 12 Hours Scheduled             Continue These Medications        Instructions Start Date   acetaminophen 325 MG tablet  Commonly known as: TYLENOL   650 mg, Oral, Every 6 Hours PRN      Aquaphor Advanced Therapy 41 % ointment   1 Application, 2 Times Daily      cadexomer iodine 0.9 % gel  Commonly known as: IODOSORB   1 Application, Topical, Daily PRN, Apply to open wound to rt lower leg      cefTRIAXone 2,000 mg in sodium chloride 0.9 % 100 mL IVPB   2,000 mg, Intravenous, Every 12 Hours      [employer] ZINC OXIDE DIAPER EX   1 Application, 2 Times Daily      dextromethorphan polistirex ER 30 MG/5ML Suspension Extended Release oral suspension  Commonly known as: " DELSYM   60 mg, Oral, 2 Times Daily PRN      ferrous sulfate 325 (65 FE) MG tablet   325 mg, Oral, Daily With Breakfast      furosemide 40 MG tablet  Commonly known as: LASIX   40 mg, Oral, Daily      guaiFENesin ER 1200 MG tablet sustained-release 12 hour   1 tablet, 2 Times Daily PRN      ipratropium-albuterol 0.5-2.5 mg/3 ml nebulizer  Commonly known as: DUO-NEB   3 mL, Nebulization, 4 Times Daily - RT      Lidocaine 4 %   1 patch, Transdermal, Every 24 Hours Scheduled, Apply to skin on upper back remove patch in 12 hours. Remove & Discard patch within 12 hours or as directed by MD      melatonin 5 MG tablet tablet   5 mg, Oral, Nightly PRN      metoprolol succinate XL 25 MG 24 hr tablet  Commonly known as: TOPROL-XL   12.5 mg, Oral, Daily      sennosides-docusate 8.6-50 MG per tablet  Commonly known as: PERICOLACE   2 tablets, Oral, Nightly PRN             Stop These Medications      apixaban 5 MG tablet tablet  Commonly known as: ELIQUIS              Allergies   Allergen Reactions    Cephalexin Hives and Rash     Diffuse rash on cephalexin 1 g PO q6h on 6/17/20  Tolerated zosyn and PCN G September 2020 without issue    Clindamycin/Lincomycin Hives       Discharge Disposition:  Skilled Nursing Facility (DC - External)      Discharge Diet:  Diet Order   Procedures    Diet: Cardiac; Healthy Heart (2-3 Na+); Fluid Consistency: Thin (IDDSI 0)       Discharge Activity:   Activity Instructions       Up WIth Assist              CODE STATUS:    Code Status and Medical Interventions: CPR (Attempt to Resuscitate); Full   Ordered at: 05/20/25 1204     Code Status (Patient has no pulse and is not breathing):    CPR (Attempt to Resuscitate)     Medical Interventions (Patient has pulse or is breathing):    Full     Level Of Support Discussed With:    Patient       Future Appointments   Date Time Provider Department Center   6/16/2025 10:20 AM Tania Nunez MD [facility]   6/16/2025  2:00 PM [facility] ECHO/VAS BACK [facility]  ECHO [facility]   6/16/2025  3:00 PM Tessy Fink APRN [facility] CD [id number] [facility]   8/20/2025  9:30 AM Brennan Rogers MD [facility] CD [id number]   9/30/2025  9:30 AM Adilson Wilkerson MD [facility] PC DR PRANAV JENSEN     Additional Instructions for the Follow-ups that You Need to Schedule       Discharge Follow-up with PCP   As directed       Currently Documented PCP:    Adislon Wilkerson MD    PCP Phone Number:    [phone]     Follow Up Details: within 1 week for reassessment, blood pressure and weight check, BMP and CBC        Discharge Follow-up with Specialty: Cardiology, Infectious disease, Ophthalmology, Gynecology   As directed      Specialty: Cardiology, Infectious disease, Ophthalmology, Gynecology   Follow Up Details: as scheduled per their services               Contact information for follow-up providers       Adilson Wilkerson MD .    Specialty: Family Medicine  Why: within 1 week for reassessment, blood pressure and weight check, BMP and CBC  Contact information:  [address] [address]  [phone]               Turner Lipscomb MD .    Specialty: Ophthalmology  Contact information:  [address] [facility] [address]  [phone]               Tessy Fink APRN Follow up on 6/16/2025.    Specialty: Cardiology  Contact information:  [address] [address]  [phone]               Dr. De Jesus. Go to.    Why: June 9th                     Contact information for after-discharge care       Destination       [facility] .    Service: Skilled Nursing  Contact information:  [address] [facility] [address]  [phone]                                   Additional Instructions for the Follow-ups that You Need to Schedule       Discharge Follow-up with PCP   As directed       Currently Documented PCP:    Adilson Wilkerson MD    PCP Phone Number:    [phone]     Follow Up Details: within 1 week for reassessment, blood  pressure and weight check, BMP and CBC        Discharge Follow-up with Specialty: Cardiology, Infectious disease, Ophthalmology, Gynecology   As directed      Specialty: Cardiology, Infectious disease, Ophthalmology, Gynecology   Follow Up Details: as scheduled per their services            Time Spent on Discharge:  Greater than 30 minutes      DO [facility]  05/24/25  08:44 EDT

## 2025-05-25 ENCOUNTER — HOSPITAL ENCOUNTER (INPATIENT)
Facility: HOSPITAL | Age: 65
LOS: 9 days | Discharge: NURSING, REHAB OR LONG-TERM CARE | End: 2025-06-03
Attending: STUDENT IN AN ORGANIZED HEALTH CARE EDUCATION/TRAINING PROGRAM | Admitting: INTERNAL MEDICINE
Payer: MEDICARE

## 2025-05-25 ENCOUNTER — APPOINTMENT (OUTPATIENT)
Dept: GENERAL RADIOLOGY | Facility: HOSPITAL | Age: 65
End: 2025-05-25
Payer: MEDICARE

## 2025-05-25 DIAGNOSIS — I50.9 ACUTE ON CHRONIC CONGESTIVE HEART FAILURE, UNSPECIFIED HEART FAILURE TYPE: Primary | ICD-10-CM

## 2025-05-25 LAB
ALBUMIN SERPL-MCNC: 2.9 G/DL (ref 3.5–5.2)
ALBUMIN/GLOB SERPL: 0.6 G/DL
ALP SERPL-CCNC: 115 U/L (ref 39–117)
ALT SERPL W P-5'-P-CCNC: 8 U/L (ref 1–33)
ANION GAP SERPL CALCULATED.3IONS-SCNC: 9 MMOL/L (ref 5–15)
AST SERPL-CCNC: 13 U/L (ref 1–32)
BASOPHILS # BLD AUTO: 0.04 10*3/MM3 (ref 0–0.2)
BASOPHILS NFR BLD AUTO: 0.6 % (ref 0–1.5)
BILIRUB SERPL-MCNC: 0.4 MG/DL (ref 0–1.2)
BUN SERPL-MCNC: 10 MG/DL (ref 8–23)
BUN/CREAT SERPL: 11.8 (ref 7–25)
CALCIUM SPEC-SCNC: 8.4 MG/DL (ref 8.6–10.5)
CHLORIDE SERPL-SCNC: 104 MMOL/L (ref 98–107)
CO2 SERPL-SCNC: 23 MMOL/L (ref 22–29)
CREAT SERPL-MCNC: 0.85 MG/DL (ref 0.57–1)
DEPRECATED RDW RBC AUTO: 52 FL (ref 37–54)
EGFRCR SERPLBLD CKD-EPI 2021: 76.1 ML/MIN/1.73
EOSINOPHIL # BLD AUTO: 1.43 10*3/MM3 (ref 0–0.4)
EOSINOPHIL NFR BLD AUTO: 20.6 % (ref 0.3–6.2)
ERYTHROCYTE [DISTWIDTH] IN BLOOD BY AUTOMATED COUNT: 16.7 % (ref 12.3–15.4)
GEN 5 1HR TROPONIN T REFLEX: 26 NG/L
GLOBULIN UR ELPH-MCNC: 4.7 GM/DL
GLUCOSE SERPL-MCNC: 89 MG/DL (ref 65–99)
HCT VFR BLD AUTO: 26.1 % (ref 34–46.6)
HGB BLD-MCNC: 7.9 G/DL (ref 12–15.9)
IMM GRANULOCYTES # BLD AUTO: 0.07 10*3/MM3 (ref 0–0.05)
IMM GRANULOCYTES NFR BLD AUTO: 1 % (ref 0–0.5)
LYMPHOCYTES # BLD AUTO: 0.95 10*3/MM3 (ref 0.7–3.1)
LYMPHOCYTES NFR BLD AUTO: 13.7 % (ref 19.6–45.3)
MCH RBC QN AUTO: 25.8 PG (ref 26.6–33)
MCHC RBC AUTO-ENTMCNC: 30.3 G/DL (ref 31.5–35.7)
MCV RBC AUTO: 85.3 FL (ref 79–97)
MONOCYTES # BLD AUTO: 0.37 10*3/MM3 (ref 0.1–0.9)
MONOCYTES NFR BLD AUTO: 5.3 % (ref 5–12)
NEUTROPHILS NFR BLD AUTO: 4.07 10*3/MM3 (ref 1.7–7)
NEUTROPHILS NFR BLD AUTO: 58.8 % (ref 42.7–76)
NRBC BLD AUTO-RTO: 0 /100 WBC (ref 0–0.2)
NT-PROBNP SERPL-MCNC: 1946 PG/ML (ref 0–900)
PLATELET # BLD AUTO: 341 10*3/MM3 (ref 140–450)
PMV BLD AUTO: 8 FL (ref 6–12)
POTASSIUM SERPL-SCNC: 4.4 MMOL/L (ref 3.5–5.2)
PROT SERPL-MCNC: 7.6 G/DL (ref 6–8.5)
QT INTERVAL: 412 MS
QTC INTERVAL: 471 MS
RBC # BLD AUTO: 3.06 10*6/MM3 (ref 3.77–5.28)
SODIUM SERPL-SCNC: 136 MMOL/L (ref 136–145)
TROPONIN T % DELTA: -4
TROPONIN T NUMERIC DELTA: -1 NG/L
TROPONIN T SERPL HS-MCNC: 27 NG/L
WBC NRBC COR # BLD AUTO: 6.93 10*3/MM3 (ref 3.4–10.8)

## 2025-05-25 PROCEDURE — 85025 COMPLETE CBC W/AUTO DIFF WBC: CPT | Performed by: STUDENT IN AN ORGANIZED HEALTH CARE EDUCATION/TRAINING PROGRAM

## 2025-05-25 PROCEDURE — 36415 COLL VENOUS BLD VENIPUNCTURE: CPT | Performed by: STUDENT IN AN ORGANIZED HEALTH CARE EDUCATION/TRAINING PROGRAM

## 2025-05-25 PROCEDURE — 99285 EMERGENCY DEPT VISIT HI MDM: CPT

## 2025-05-25 PROCEDURE — 94799 UNLISTED PULMONARY SVC/PX: CPT

## 2025-05-25 PROCEDURE — 80053 COMPREHEN METABOLIC PANEL: CPT | Performed by: STUDENT IN AN ORGANIZED HEALTH CARE EDUCATION/TRAINING PROGRAM

## 2025-05-25 PROCEDURE — 93005 ELECTROCARDIOGRAM TRACING: CPT | Performed by: STUDENT IN AN ORGANIZED HEALTH CARE EDUCATION/TRAINING PROGRAM

## 2025-05-25 PROCEDURE — 93010 ELECTROCARDIOGRAM REPORT: CPT | Performed by: INTERNAL MEDICINE

## 2025-05-25 PROCEDURE — 94760 N-INVAS EAR/PLS OXIMETRY 1: CPT

## 2025-05-25 PROCEDURE — 25010000002 FUROSEMIDE PER 20 MG: Performed by: INTERNAL MEDICINE

## 2025-05-25 PROCEDURE — 25010000002 CEFTRIAXONE PER 250 MG: Performed by: INTERNAL MEDICINE

## 2025-05-25 PROCEDURE — 83880 ASSAY OF NATRIURETIC PEPTIDE: CPT | Performed by: STUDENT IN AN ORGANIZED HEALTH CARE EDUCATION/TRAINING PROGRAM

## 2025-05-25 PROCEDURE — 84484 ASSAY OF TROPONIN QUANT: CPT | Performed by: STUDENT IN AN ORGANIZED HEALTH CARE EDUCATION/TRAINING PROGRAM

## 2025-05-25 PROCEDURE — 94640 AIRWAY INHALATION TREATMENT: CPT

## 2025-05-25 PROCEDURE — 94761 N-INVAS EAR/PLS OXIMETRY MLT: CPT

## 2025-05-25 PROCEDURE — 71045 X-RAY EXAM CHEST 1 VIEW: CPT

## 2025-05-25 RX ORDER — ACETAMINOPHEN 325 MG/1
650 TABLET ORAL EVERY 4 HOURS PRN
Status: DISCONTINUED | OUTPATIENT
Start: 2025-05-25 | End: 2025-06-03 | Stop reason: HOSPADM

## 2025-05-25 RX ORDER — AMOXICILLIN 250 MG
2 CAPSULE ORAL 2 TIMES DAILY PRN
Status: DISCONTINUED | OUTPATIENT
Start: 2025-05-25 | End: 2025-06-03 | Stop reason: HOSPADM

## 2025-05-25 RX ORDER — FERROUS SULFATE 325(65) MG
325 TABLET ORAL
Status: DISCONTINUED | OUTPATIENT
Start: 2025-05-26 | End: 2025-06-03 | Stop reason: HOSPADM

## 2025-05-25 RX ORDER — LIDOCAINE 4 G/G
1 PATCH TOPICAL
Status: DISCONTINUED | OUTPATIENT
Start: 2025-05-26 | End: 2025-06-03 | Stop reason: HOSPADM

## 2025-05-25 RX ORDER — POLYETHYLENE GLYCOL 3350 17 G/17G
17 POWDER, FOR SOLUTION ORAL DAILY PRN
Status: DISCONTINUED | OUTPATIENT
Start: 2025-05-25 | End: 2025-06-03 | Stop reason: HOSPADM

## 2025-05-25 RX ORDER — METOPROLOL SUCCINATE 25 MG/1
12.5 TABLET, EXTENDED RELEASE ORAL DAILY
Status: DISCONTINUED | OUTPATIENT
Start: 2025-05-26 | End: 2025-05-27

## 2025-05-25 RX ORDER — MEGESTROL ACETATE 20 MG/1
40 TABLET ORAL DAILY
Status: DISCONTINUED | OUTPATIENT
Start: 2025-05-26 | End: 2025-05-30

## 2025-05-25 RX ORDER — AMOXICILLIN 250 MG
2 CAPSULE ORAL NIGHTLY PRN
Status: DISCONTINUED | OUTPATIENT
Start: 2025-05-25 | End: 2025-06-03 | Stop reason: HOSPADM

## 2025-05-25 RX ORDER — ONDANSETRON 2 MG/ML
4 INJECTION INTRAMUSCULAR; INTRAVENOUS EVERY 6 HOURS PRN
Status: DISCONTINUED | OUTPATIENT
Start: 2025-05-25 | End: 2025-06-03 | Stop reason: HOSPADM

## 2025-05-25 RX ORDER — DEXTROMETHORPHAN POLISTIREX 30 MG/5ML
60 SUSPENSION ORAL 2 TIMES DAILY PRN
Status: DISCONTINUED | OUTPATIENT
Start: 2025-05-25 | End: 2025-06-03 | Stop reason: HOSPADM

## 2025-05-25 RX ORDER — ACETAMINOPHEN 650 MG/1
650 SUPPOSITORY RECTAL EVERY 4 HOURS PRN
Status: DISCONTINUED | OUTPATIENT
Start: 2025-05-25 | End: 2025-06-03 | Stop reason: HOSPADM

## 2025-05-25 RX ORDER — FUROSEMIDE 10 MG/ML
40 INJECTION INTRAMUSCULAR; INTRAVENOUS EVERY 12 HOURS
Status: DISCONTINUED | OUTPATIENT
Start: 2025-05-25 | End: 2025-05-27

## 2025-05-25 RX ORDER — BISACODYL 10 MG
10 SUPPOSITORY, RECTAL RECTAL DAILY PRN
Status: DISCONTINUED | OUTPATIENT
Start: 2025-05-25 | End: 2025-06-03 | Stop reason: HOSPADM

## 2025-05-25 RX ORDER — GUAIFENESIN 600 MG/1
1200 TABLET, EXTENDED RELEASE ORAL 2 TIMES DAILY PRN
Status: DISCONTINUED | OUTPATIENT
Start: 2025-05-25 | End: 2025-06-03 | Stop reason: HOSPADM

## 2025-05-25 RX ORDER — ACETAMINOPHEN 160 MG/5ML
650 SOLUTION ORAL EVERY 4 HOURS PRN
Status: DISCONTINUED | OUTPATIENT
Start: 2025-05-25 | End: 2025-06-03 | Stop reason: HOSPADM

## 2025-05-25 RX ORDER — ONDANSETRON 4 MG/1
4 TABLET, ORALLY DISINTEGRATING ORAL EVERY 6 HOURS PRN
Status: DISCONTINUED | OUTPATIENT
Start: 2025-05-25 | End: 2025-06-03 | Stop reason: HOSPADM

## 2025-05-25 RX ORDER — IPRATROPIUM BROMIDE AND ALBUTEROL SULFATE 2.5; .5 MG/3ML; MG/3ML
3 SOLUTION RESPIRATORY (INHALATION)
Status: DISCONTINUED | OUTPATIENT
Start: 2025-05-25 | End: 2025-06-03 | Stop reason: HOSPADM

## 2025-05-25 RX ORDER — BISACODYL 5 MG/1
5 TABLET, DELAYED RELEASE ORAL DAILY PRN
Status: DISCONTINUED | OUTPATIENT
Start: 2025-05-25 | End: 2025-06-03 | Stop reason: HOSPADM

## 2025-05-25 RX ADMIN — CEFTRIAXONE 2000 MG: 2 INJECTION, POWDER, FOR SOLUTION INTRAMUSCULAR; INTRAVENOUS at 22:32

## 2025-05-25 RX ADMIN — FUROSEMIDE 40 MG: 10 INJECTION, SOLUTION INTRAMUSCULAR; INTRAVENOUS at 22:32

## 2025-05-25 RX ADMIN — GUAIFENESIN 1200 MG: 600 TABLET, EXTENDED RELEASE ORAL at 23:05

## 2025-05-25 RX ADMIN — DEXTROMETHORPHAN 60 MG: 30 SUSPENSION, EXTENDED RELEASE ORAL at 23:05

## 2025-05-25 RX ADMIN — IPRATROPIUM BROMIDE AND ALBUTEROL SULFATE 3 ML: .5; 3 SOLUTION RESPIRATORY (INHALATION) at 20:33

## 2025-05-25 RX ADMIN — RIVAROXABAN 20 MG: 20 TABLET, FILM COATED ORAL at 22:32

## 2025-05-25 NOTE — ED TRIAGE NOTES
Pt to Er from [facility] ([phone]) via [facility].    Per report from facility pt was hypotensive with bp 88/53 and requiring additional supplemental o2. Facility reported spo2 of 88% on 2L NC with improvement to >90% on 3-4L NC. Facility staff expresses concerns for CHF exacerbation.    Pt has no complaints currently.    Facility also reports they are unable to fill medication prescribed to pt for vaginal bleeding  until Tuesday.

## 2025-05-25 NOTE — ED PROVIDER NOTES
EMERGENCY DEPARTMENT ENCOUNTER  Room Number:  2203/1  PCP: Adilson Wilkerson MD  Independent Historians: Patient and Family      HPI:  Chief Complaint: had concerns including Cough, Hypotension, and Shortness of Breath.     Context: Emely Solomon is a 64 y.o. female with a medical history of CHF, HTN, pulmonary hypertension, obesity who presents to the ED c/o acute cough, hypoxia, hypotension.  Patient discharged from the hospital yesterday to [facility] rehab.  Facility called EMS stating patient's blood pressure was low and she was needing increased O2 support.  Patient states she is really quite unsure about this and her  thinks that maybe their equipment was not working.  She feels like she did yesterday and has had no acute change.  Patient has no complaints at this time.  Facility was concern for CHF exacerbation.      Review of prior external notes (non-ED) -and- Review of prior external test results outside of this encounter: Discharge summary from yesterday reviewed and notable for admission secondary to onset bilateral vision changes.  Patient was seen by ophthalmology eventually able to be discharged.    Prescription drug monitoring program review:         PAST MEDICAL HISTORY  Active Ambulatory Problems     Diagnosis Date Noted    Chronic diastolic CHF (congestive heart failure)     PFO (patent foramen ovale)     Paradoxical embolism     Essential hypertension     Pulmonary hypertension     Upper back pain 11/12/2017    Bacteremia due to group B Streptococcus 11/13/2017    ARIEL on CPAP 11/14/2017    Class 3 severe obesity due to excess calories with serious comorbidity and body mass index (BMI) of 60.0 to 69.9 in adult 06/03/2019    History of DVT (deep vein thrombosis) 06/04/2020    Lymphedema 06/04/2020    Anemia, chronic disease 06/04/2020    Iron deficiency 11/16/2020    Post-menopausal bleeding 03/01/2022    Thickened endometrium 03/01/2022    Generalized muscle weakness 09/30/2020     Right bundle branch block (RBBB) with left anterior fascicular block (LAFB)     PVD (peripheral vascular disease) with claudication 04/26/2024    Iliac vein stenosis, right 05/15/2024    Hypoxia 05/04/2025    Hyperglycemia 05/04/2025    History of pulmonary embolism 05/04/2025    Morbid obesity 05/04/2025    Hyponatremia 05/05/2025    Bacterial endocarditis 05/05/2025    Sepsis due to methicillin susceptible Staphylococcus aureus (MSSA) with acute hypoxic respiratory failure without septic shock 05/10/2025    Acute on chronic heart failure with preserved ejection fraction (HFpEF)     On home oxygen therapy     Type 2 myocardial infarction 05/20/2025    Acute loss of vision, bilateral 05/20/2025    Chronic respiratory failure with hypoxia 05/20/2025    Acute loss of vision 05/21/2025     Resolved Ambulatory Problems     Diagnosis Date Noted    Chronic cough 07/21/2017    Sepsis 11/12/2017    Fever and chills 11/12/2017    Acute kidney failure 11/12/2017    Hypoxia 11/14/2017    Cellulitis 11/14/2017    Cellulitis of right anterior lower leg 06/04/2020    Cellulitis of right lower extremity 06/04/2020    Hyponatremia 06/04/2020    Syncope 06/12/2020    Sepsis 09/18/2020    Chronic renal failure (CRF), stage 3 (moderate) 09/19/2020    COVID-19 virus detected 09/19/2020    Acute conjunctivitis of right eye 09/23/2020    Encounter for screening for malignant neoplasm of colon 02/15/2022    Hypo-osmolality and hyponatremia 09/29/2020    Venous stasis ulcer of right calf limited to breakdown of skin without varicose veins 05/15/2024    Chronic venous hypertension with ulcer and inflammation involving right side 05/15/2024    Venous stasis ulcer of ankle with fat layer exposed 07/24/2024    Fall 05/04/2025    Acute wheezy bronchitis 05/04/2025    Elevated troponin 05/04/2025     Past Medical History:   Diagnosis Date    Arthritis     Chronic deep vein thrombosis (DVT) of left popliteal vein 12/17/2015    Chronic diastolic  congestive heart failure     COVID-19 virus infection 11/2020    Heart murmur     Hypertension     Lipoedema     Postthrombotic syndrome of left lower extremity without complications 12/17/2015    Pulmonary embolism 04/14/2016         PAST SURGICAL HISTORY  Past Surgical History:   Procedure Laterality Date    ARTERIOGRAM  07/2015    BRONCHOSCOPY N/A 07/21/2017    Procedure: BRONCHOSCOPY with wash;  Surgeon: Caleb Garcia MD;  Location: [facility] ENDOSCOPY;  Service:     COLONOSCOPY      COLONOSCOPY N/A 01/26/2023    Procedure: COLONOSCOPY to CECUM AND TERM ILEUM;  Surgeon: Jj Dean MD;  Location: [facility] ENDOSCOPY;  Service: Gastroenterology;  Laterality: N/A;  PRE OP -screening  POST OP -  NORMAL    D & C HYSTEROSCOPY N/A 03/08/2022    Procedure: DILATATION AND CURETTAGE with hysteroscopy;  Surgeon: Kaylah Land DO;  Location: [facility] OR;  Service: Gynecology Oncology;  Laterality: N/A;    DILATATION AND CURETTAGE  04/11/2011    OTHER SURGICAL HISTORY  09/2015    IVC filter    OTHER SURGICAL HISTORY      left LE revascularization    OTHER SURGICAL HISTORY      ALESSIA and LEV scan    THROMBECTOMY  07/2015         FAMILY HISTORY  Family History   Problem Relation Age of Onset    Breast cancer Mother     Hypertension Other     Ovarian cancer Neg Hx     Uterine cancer Neg Hx     Colon cancer Neg Hx     Malig Hyperthermia Neg Hx          SOCIAL HISTORY  Social History     Socioeconomic History    Marital status:    Tobacco Use    Smoking status: Never    Smokeless tobacco: Never   Vaping Use    Vaping status: Never Used   Substance and Sexual Activity    Alcohol use: Not Currently     Comment: occassionally    Drug use: Never    Sexual activity: Not Currently     Partners: Male     Birth control/protection: Post-menopausal         ALLERGIES  Cephalexin and Clindamycin/lincomycin      REVIEW OF SYSTEMS  Review of Systems  Included in HPI  All systems reviewed and negative except for those discussed  in HPI.      PHYSICAL EXAM    I have reviewed the triage vital signs and nursing notes.    ED Triage Vitals [05/25/25 1256]   Temp Heart Rate Resp BP SpO2   99.4 °F (37.4 °C) 77 20 119/68 97 %      Temp src Heart Rate Source Patient Position BP Location FiO2 (%)   Oral -- -- -- --       Physical Exam  GENERAL: alert, no acute distress  SKIN: Warm, dry  HENT: Normocephalic, atraumatic  EYES: no scleral icterus  CV: regular rhythm, regular rate  RESPIRATORY: normal effort, lungs clear  ABDOMEN: soft, nontender, nondistended  MUSCULOSKELETAL: no deformity  NEURO: alert, moves all extremities, follows commands            LAB RESULTS  Recent Results (from the past 24 hours)   Comprehensive Metabolic Panel    Collection Time: 05/25/25  1:44 PM    Specimen: Blood   Result Value Ref Range    Glucose 89 65 - 99 mg/dL    BUN 10 8 - 23 mg/dL    Creatinine 0.85 0.57 - 1.00 mg/dL    Sodium 136 136 - 145 mmol/L    Potassium 4.4 3.5 - 5.2 mmol/L    Chloride 104 98 - 107 mmol/L    CO2 23.0 22.0 - 29.0 mmol/L    Calcium 8.4 (L) 8.6 - 10.5 mg/dL    Total Protein 7.6 6.0 - 8.5 g/dL    Albumin 2.9 (L) 3.5 - 5.2 g/dL    ALT (SGPT) 8 1 - 33 U/L    AST (SGOT) 13 1 - 32 U/L    Alkaline Phosphatase 115 39 - 117 U/L    Total Bilirubin 0.4 0.0 - 1.2 mg/dL    Globulin 4.7 gm/dL    A/G Ratio 0.6 g/dL    BUN/Creatinine Ratio 11.8 7.0 - 25.0    Anion Gap 9.0 5.0 - 15.0 mmol/L    eGFR 76.1 >60.0 mL/min/1.73   High Sensitivity Troponin T    Collection Time: 05/25/25  1:44 PM    Specimen: Blood   Result Value Ref Range    HS Troponin T 27 (H) <14 ng/L   BNP    Collection Time: 05/25/25  1:44 PM    Specimen: Blood   Result Value Ref Range    proBNP 1,946.0 (H) 0.0 - 900.0 pg/mL   CBC Auto Differential    Collection Time: 05/25/25  1:44 PM    Specimen: Blood   Result Value Ref Range    WBC 6.93 3.40 - 10.80 10*3/mm3    RBC 3.06 (L) 3.77 - 5.28 10*6/mm3    Hemoglobin 7.9 (L) 12.0 - 15.9 g/dL    Hematocrit 26.1 (L) 34.0 - 46.6 %    MCV 85.3 79.0 - 97.0  fL    MCH 25.8 (L) 26.6 - 33.0 pg    MCHC 30.3 (L) 31.5 - 35.7 g/dL    RDW 16.7 (H) 12.3 - 15.4 %    RDW-SD 52.0 37.0 - 54.0 fl    MPV 8.0 6.0 - 12.0 fL    Platelets 341 140 - 450 10*3/mm3    Neutrophil % 58.8 42.7 - 76.0 %    Lymphocyte % 13.7 (L) 19.6 - 45.3 %    Monocyte % 5.3 5.0 - 12.0 %    Eosinophil % 20.6 (H) 0.3 - 6.2 %    Basophil % 0.6 0.0 - 1.5 %    Immature Grans % 1.0 (H) 0.0 - 0.5 %    Neutrophils, Absolute 4.07 1.70 - 7.00 10*3/mm3    Lymphocytes, Absolute 0.95 0.70 - 3.10 10*3/mm3    Monocytes, Absolute 0.37 0.10 - 0.90 10*3/mm3    Eosinophils, Absolute 1.43 (H) 0.00 - 0.40 10*3/mm3    Basophils, Absolute 0.04 0.00 - 0.20 10*3/mm3    Immature Grans, Absolute 0.07 (H) 0.00 - 0.05 10*3/mm3    nRBC 0.0 0.0 - 0.2 /100 WBC   ECG 12 Lead Dyspnea    Collection Time: 05/25/25  1:47 PM   Result Value Ref Range    QT Interval 412 ms    QTC Interval 471 ms   High Sensitivity Troponin T 1Hr    Collection Time: 05/25/25  3:33 PM    Specimen: Arm, Left; Blood   Result Value Ref Range    HS Troponin T 26 (H) <14 ng/L    Troponin T Numeric Delta -1 ng/L    Troponin T % Delta -4 Abnormal if >/= 20%   Basic Metabolic Panel    Collection Time: 05/26/25  4:01 AM    Specimen: Blood   Result Value Ref Range    Glucose 89 65 - 99 mg/dL    BUN 10 8 - 23 mg/dL    Creatinine 0.94 0.57 - 1.00 mg/dL    Sodium 135 (L) 136 - 145 mmol/L    Potassium 4.4 3.5 - 5.2 mmol/L    Chloride 101 98 - 107 mmol/L    CO2 24.4 22.0 - 29.0 mmol/L    Calcium 8.6 8.6 - 10.5 mg/dL    BUN/Creatinine Ratio 10.6 7.0 - 25.0    Anion Gap 9.6 5.0 - 15.0 mmol/L    eGFR 67.5 >60.0 mL/min/1.73   CBC (No Diff)    Collection Time: 05/26/25  4:01 AM    Specimen: Blood   Result Value Ref Range    WBC 6.92 3.40 - 10.80 10*3/mm3    RBC 3.22 (L) 3.77 - 5.28 10*6/mm3    Hemoglobin 8.2 (L) 12.0 - 15.9 g/dL    Hematocrit 26.1 (L) 34.0 - 46.6 %    MCV 81.1 79.0 - 97.0 fL    MCH 25.5 (L) 26.6 - 33.0 pg    MCHC 31.4 (L) 31.5 - 35.7 g/dL    RDW 17.0 (H) 12.3 - 15.4 %     RDW-SD 49.9 37.0 - 54.0 fl    MPV 7.9 6.0 - 12.0 fL    Platelets 323 140 - 450 10*3/mm3         RADIOLOGY  XR Chest 1 View  Result Date: 5/25/2025  XR CHEST 1 VW-  Clinical: Cough and shortness of breath  COMPARISON examination dated 5/4/2025  FINDINGS: There is cardiac enlargement. The mediastinum is stable. There is a right upper extremity PICC line in position and the tip superimposes the superior vena cava, this position is satisfactory. There is prominence of the central vasculature. Suspect either pulmonary arterial hypertension or early/mild congestion. No gross pulmonary edema. No gross pleural effusion seen. The remainder is unremarkable.  This report was finalized on 5/25/2025 2:07 PM by Dr. Dhruv Sanchez M.D on Workstation: [id number]          MEDICATIONS GIVEN IN ER  Medications   acetaminophen (TYLENOL) tablet 650 mg (has no administration in time range)     Or   acetaminophen (TYLENOL) 160 MG/5ML oral solution 650 mg (has no administration in time range)     Or   acetaminophen (TYLENOL) suppository 650 mg (has no administration in time range)   ondansetron ODT (ZOFRAN-ODT) disintegrating tablet 4 mg (has no administration in time range)     Or   ondansetron (ZOFRAN) injection 4 mg (has no administration in time range)   melatonin tablet 2.5 mg (has no administration in time range)   sennosides-docusate (PERICOLACE) 8.6-50 MG per tablet 2 tablet (has no administration in time range)     And   polyethylene glycol (MIRALAX) packet 17 g (has no administration in time range)     And   bisacodyl (DULCOLAX) EC tablet 5 mg (has no administration in time range)     And   bisacodyl (DULCOLAX) suppository 10 mg (has no administration in time range)   cefTRIAXone (ROCEPHIN) 2,000 mg in sodium chloride 0.9 % 100 mL MBP (2,000 mg Intravenous New Bag 5/26/25 7931)   dextromethorphan polistirex ER (DELSYM) 30 MG/5ML oral suspension 60 mg (60 mg Oral Given 5/25/25 6808)   ferrous sulfate tablet 325 mg (325 mg Oral  Given 5/26/25 0938)   guaiFENesin (MUCINEX) 12 hr tablet 1,200 mg (1,200 mg Oral Given 5/25/25 2305)   ipratropium-albuterol (DUO-NEB) nebulizer solution 3 mL (3 mL Nebulization Given 5/26/25 1102)   Lidocaine 4 % 1 patch (1 patch Transdermal Medication Applied 5/26/25 0939)   megestrol (MEGACE) tablet 40 mg (40 mg Oral Given 5/26/25 0939)   metoprolol succinate XL (TOPROL-XL) 24 hr tablet 12.5 mg (12.5 mg Oral Given 5/26/25 0939)   sennosides-docusate (PERICOLACE) 8.6-50 MG per tablet 2 tablet (has no administration in time range)   rivaroxaban (XARELTO) tablet 20 mg (20 mg Oral Given 5/25/25 2232)   furosemide (LASIX) injection 40 mg (40 mg Intravenous Given 5/26/25 0938)         ORDERS PLACED DURING THIS VISIT:  Orders Placed This Encounter   Procedures    [facility] PCR - Swab, Axilla Right, Axilla Left and Groin    XR Chest 1 View    CT Angiogram Chest    Comprehensive Metabolic Panel    High Sensitivity Troponin T    BNP    CBC Auto Differential    High Sensitivity Troponin T 1Hr    Basic Metabolic Panel    CBC (No Diff)    Basic Metabolic Panel    Hemoglobin & Hematocrit, Blood    Diet: Cardiac; Healthy Heart (2-3 Na+); Fluid Consistency: Thin (IDDSI 0)    Vital Signs    Up with assistance    Daily Weights    Strict Intake & Output    Oral Care    Place Sequential Compression Device    Maintain Sequential Compression Device    Code Status and Medical Interventions: CPR (Attempt to Resuscitate); Full    [facility] (on-call MD unless specified) Details    Inpatient Case Management  Consult    Inpatient Cardiology Consult    OT Consult: Eval & Treat ADL Performance Below Baseline    PT Consult: Eval & Treat Functional Mobility Below Baseline    ECG 12 Lead Dyspnea    Adult Transthoracic Echo Complete W/ Cont if Necessary Per Protocol    Inpatient Admission    CBC & Differential         OUTPATIENT MEDICATION MANAGEMENT:  Current Facility-Administered Medications Ordered in Epic   Medication Dose Route  Frequency Provider Last Rate Last Admin    acetaminophen (TYLENOL) tablet 650 mg  650 mg Oral Q4H PRN Lexii Street MD        Or    acetaminophen (TYLENOL) 160 MG/5ML oral solution 650 mg  650 mg Oral Q4H PRN Lexii Street MD        Or    acetaminophen (TYLENOL) suppository 650 mg  650 mg Rectal Q4H PRN Lexii Street MD        sennosides-docusate (PERICOLACE) 8.6-50 MG per tablet 2 tablet  2 tablet Oral BID PRN Lexii Street MD        And    polyethylene glycol (MIRALAX) packet 17 g  17 g Oral Daily PRN Lexii Street MD        And    bisacodyl (DULCOLAX) EC tablet 5 mg  5 mg Oral Daily PRN Lexii Street MD        And    bisacodyl (DULCOLAX) suppository 10 mg  10 mg Rectal Daily PRN Lexii Street MD        cefTRIAXone (ROCEPHIN) 2,000 mg in sodium chloride 0.9 % 100 mL MBP  2,000 mg Intravenous Q12H Lexii Street  mL/hr at 05/26/25 0938 2,000 mg at 05/26/25 0938    dextromethorphan polistirex ER (DELSYM) 30 MG/5ML oral suspension 60 mg  60 mg Oral BID PRN Lexii Street MD   60 mg at 05/25/25 2305    ferrous sulfate tablet 325 mg  325 mg Oral Daily With Breakfast Lexii Street MD   325 mg at 05/26/25 0938    furosemide (LASIX) injection 40 mg  40 mg Intravenous Q12H Lexii Street MD   40 mg at 05/26/25 0938    guaiFENesin (MUCINEX) 12 hr tablet 1,200 mg  1,200 mg Oral BID PRN Lexii Street MD   1,200 mg at 05/25/25 2305    ipratropium-albuterol (DUO-NEB) nebulizer solution 3 mL  3 mL Nebulization 4x Daily - RT Lexii Street MD   3 mL at 05/26/25 1102    Lidocaine 4 % 1 patch  1 patch Transdermal Q24H Lexii Street MD   1 patch at 05/26/25 0939    megestrol (MEGACE) tablet 40 mg  40 mg Oral Daily Lexii Street MD   40 mg at 05/26/25 0939    melatonin tablet 2.5 mg  2.5 mg Oral Nightly PRN Lexii Street MD        metoprolol succinate XL (TOPROL-XL) 24 hr tablet 12.5 mg   12.5 mg Oral Daily Lexii Street MD   12.5 mg at 05/26/25 0939    ondansetron ODT (ZOFRAN-ODT) disintegrating tablet 4 mg  4 mg Oral Q6H PRN Lexii Street MD        Or    ondansetron (ZOFRAN) injection 4 mg  4 mg Intravenous Q6H PRN Lexii Street MD        rivaroxaban (XARELTO) tablet 20 mg  20 mg Oral Daily With Dinner Lexii Street MD   20 mg at 05/25/25 2232    sennosides-docusate (PERICOLACE) 8.6-50 MG per tablet 2 tablet  2 tablet Oral Nightly PRN Lexii Street MD         No current [facility]-ordered outpatient medications on file.         PROCEDURES  Procedures            PROGRESS, DATA ANALYSIS, CONSULTS, AND MEDICAL DECISION MAKING  All labs have been independently interpreted by me.  All radiology studies have been reviewed by me. All EKG's have been independently viewed and interpreted by me.  Discussion below represents my analysis of pertinent findings related to patient's condition, differential diagnosis, treatment plan and final disposition.    Differential diagnosis includes but is not limited to spurious readings, viral illness, CHF exacerbation, pneumonia.    Clinical Scores:                                       ED Course as of 05/26/25 1205   Sun May 25, 2025   1350 EKG interpreted by me demonstrates sinus rhythm, rate of 79, no [place]/QT prolongation, no ST elevation, PVC noted [MW]   1436 Chest x-ray interpreted by me and demonstrates cardiomegaly, mild vascular congestion [MW]   1457 Workup in the emergency department is notable for elevation of BNP, acute on chronic hypoxic respiratory failure, chest x-ray with pulmonary edema.  Will initiate diuresis with Lasix and admit patient back to the hospital. [MW]      ED Course User Index  [MW] Juan Kam MD             AS OF 12:05 EDT VITALS:    BP - 128/76  HR - 74  TEMP - 99.8 °F (37.7 °C) (Oral)  O2 SATS - 99%    COMPLEXITY OF CARE  The patient requires admission.      DIAGNOSIS  Final diagnoses:    Acute on chronic congestive heart failure, unspecified heart failure type         DISPOSITION  ED Disposition       ED Disposition   Decision to Admit    Condition   --    Comment   Level of Care: Telemetry [5]   Diagnosis: Acute exacerbation of CHF (congestive heart failure) [325624]   Admitting Physician: NEWTON ROWAN [[id number]]   Attending Physician: NEWTON ROWAN [[id number]]   Certification: I Certify That Inpatient Hospital Services Are Medically Necessary For Greater Than 2 Midnights                  Please note that portions of this document were completed with a voice recognition program.    Note Disclaimer: At [facility], we believe that sharing information builds trust and better relationships. You are receiving this note because you recently visited [facility]. It is possible you will see health information before a provider has talked with you about it. This kind of information can be easy to misunderstand. To help you fully understand what it means for your health, we urge you to discuss this note with your provider.         Juan Kam MD  05/26/25 7434

## 2025-05-26 ENCOUNTER — APPOINTMENT (OUTPATIENT)
Dept: CARDIOLOGY | Facility: HOSPITAL | Age: 65
End: 2025-05-26
Payer: MEDICARE

## 2025-05-26 LAB
ANION GAP SERPL CALCULATED.3IONS-SCNC: 9.6 MMOL/L (ref 5–15)
AORTIC ARCH: 3.2 CM
AORTIC DIMENSIONLESS INDEX: 0.43 (DI)
ASCENDING AORTA: 4 CM
AV MEAN PRESS GRAD SYS DOP V1V2: 20 MMHG
AV VMAX SYS DOP: 282 CM/SEC
BH CV ECHO MEAS - AO MAX PG: 31.8 MMHG
BH CV ECHO MEAS - AO V2 VTI: 58.5 CM
BH CV ECHO MEAS - AVA(I,D): 1.59 CM2
BH CV ECHO MEAS - EDV(CUBED): 217.4 ML
BH CV ECHO MEAS - EDV(MOD-SP2): 125 ML
BH CV ECHO MEAS - EDV(MOD-SP4): 70 ML
BH CV ECHO MEAS - EF(MOD-SP2): 72 %
BH CV ECHO MEAS - EF(MOD-SP4): 61.4 %
BH CV ECHO MEAS - ESV(CUBED): 48.6 ML
BH CV ECHO MEAS - ESV(MOD-SP2): 35 ML
BH CV ECHO MEAS - ESV(MOD-SP4): 27 ML
BH CV ECHO MEAS - FS: 39.3 %
BH CV ECHO MEAS - IVS/LVPW: 0.92 CM
BH CV ECHO MEAS - IVSD: 0.92 CM
BH CV ECHO MEAS - LAT PEAK E' VEL: 9.6 CM/SEC
BH CV ECHO MEAS - LV DIASTOLIC VOL/BSA (35-75): 28.8 CM2
BH CV ECHO MEAS - LV MASS(C)D: 235.7 GRAMS
BH CV ECHO MEAS - LV MAX PG: 7.5 MMHG
BH CV ECHO MEAS - LV MEAN PG: 4 MMHG
BH CV ECHO MEAS - LV SYSTOLIC VOL/BSA (12-30): 11.1 CM2
BH CV ECHO MEAS - LV V1 MAX: 137 CM/SEC
BH CV ECHO MEAS - LV V1 VTI: 25 CM
BH CV ECHO MEAS - LVIDD: 6 CM
BH CV ECHO MEAS - LVIDS: 3.7 CM
BH CV ECHO MEAS - LVOT AREA: 3.7 CM2
BH CV ECHO MEAS - LVOT DIAM: 2.18 CM
BH CV ECHO MEAS - LVPWD: 1 CM
BH CV ECHO MEAS - MED PEAK E' VEL: 6.9 CM/SEC
BH CV ECHO MEAS - MR MAX PG: 90.3 MMHG
BH CV ECHO MEAS - MR MAX VEL: 475.1 CM/SEC
BH CV ECHO MEAS - MR MEAN PG: 66.9 MMHG
BH CV ECHO MEAS - MR MEAN VEL: 398.6 CM/SEC
BH CV ECHO MEAS - MR VTI: 144.2 CM
BH CV ECHO MEAS - MV A DUR: 0.13 SEC
BH CV ECHO MEAS - MV A MAX VEL: 161 CM/SEC
BH CV ECHO MEAS - MV DEC SLOPE: 956.9 CM/SEC2
BH CV ECHO MEAS - MV DEC TIME: 0.29 SEC
BH CV ECHO MEAS - MV E MAX VEL: 162 CM/SEC
BH CV ECHO MEAS - MV E/A: 1.01
BH CV ECHO MEAS - MV MAX PG: 14.5 MMHG
BH CV ECHO MEAS - MV MEAN PG: 8.7 MMHG
BH CV ECHO MEAS - MV P1/2T: 61.9 MSEC
BH CV ECHO MEAS - MV V2 VTI: 49.5 CM
BH CV ECHO MEAS - MVA(P1/2T): 3.6 CM2
BH CV ECHO MEAS - MVA(VTI): 1.88 CM2
BH CV ECHO MEAS - PA V2 MAX: 123.5 CM/SEC
BH CV ECHO MEAS - PULM A REVS DUR: 0.12 SEC
BH CV ECHO MEAS - PULM A REVS VEL: 40.2 CM/SEC
BH CV ECHO MEAS - PULM DIAS VEL: 56.6 CM/SEC
BH CV ECHO MEAS - PULM S/D: 1.79
BH CV ECHO MEAS - PULM SYS VEL: 101 CM/SEC
BH CV ECHO MEAS - RAP SYSTOLE: 15 MMHG
BH CV ECHO MEAS - RVOT DIAM: 3 CM
BH CV ECHO MEAS - RVSP: 69 MMHG
BH CV ECHO MEAS - SV(LVOT): 93.2 ML
BH CV ECHO MEAS - SV(MOD-SP2): 90 ML
BH CV ECHO MEAS - SV(MOD-SP4): 43 ML
BH CV ECHO MEAS - SVI(LVOT): 38.4 ML/M2
BH CV ECHO MEAS - SVI(MOD-SP2): 37 ML/M2
BH CV ECHO MEAS - SVI(MOD-SP4): 17.7 ML/M2
BH CV ECHO MEAS - TAPSE (>1.6): 3.2 CM
BH CV ECHO MEAS - TR MAX PG: 54 MMHG
BH CV ECHO MEAS - TR MAX VEL: 367.5 CM/SEC
BH CV ECHO MEASUREMENTS AVERAGE E/E' RATIO: 19.64
BH CV XLRA - RV BASE: 4.6 CM
BH CV XLRA - RV LENGTH: 9 CM
BH CV XLRA - RV MID: 4.3 CM
BH CV XLRA - TDI S': 18.1 CM/SEC
BUN SERPL-MCNC: 10 MG/DL (ref 8–23)
BUN/CREAT SERPL: 10.6 (ref 7–25)
CALCIUM SPEC-SCNC: 8.6 MG/DL (ref 8.6–10.5)
CHLORIDE SERPL-SCNC: 101 MMOL/L (ref 98–107)
CO2 SERPL-SCNC: 24.4 MMOL/L (ref 22–29)
CREAT SERPL-MCNC: 0.94 MG/DL (ref 0.57–1)
DEPRECATED RDW RBC AUTO: 49.9 FL (ref 37–54)
EGFRCR SERPLBLD CKD-EPI 2021: 67.5 ML/MIN/1.73
ERYTHROCYTE [DISTWIDTH] IN BLOOD BY AUTOMATED COUNT: 17 % (ref 12.3–15.4)
GLUCOSE SERPL-MCNC: 89 MG/DL (ref 65–99)
HCT VFR BLD AUTO: 26.1 % (ref 34–46.6)
HGB BLD-MCNC: 8.2 G/DL (ref 12–15.9)
LEFT ATRIUM VOLUME INDEX: 34.5 ML/M2
LV EF BIPLANE MOD: 68.5 %
MCH RBC QN AUTO: 25.5 PG (ref 26.6–33)
MCHC RBC AUTO-ENTMCNC: 31.4 G/DL (ref 31.5–35.7)
MCV RBC AUTO: 81.1 FL (ref 79–97)
PLATELET # BLD AUTO: 323 10*3/MM3 (ref 140–450)
PMV BLD AUTO: 7.9 FL (ref 6–12)
POTASSIUM SERPL-SCNC: 4.4 MMOL/L (ref 3.5–5.2)
RBC # BLD AUTO: 3.22 10*6/MM3 (ref 3.77–5.28)
SODIUM SERPL-SCNC: 135 MMOL/L (ref 136–145)
WBC NRBC COR # BLD AUTO: 6.92 10*3/MM3 (ref 3.4–10.8)

## 2025-05-26 PROCEDURE — 25010000002 FUROSEMIDE PER 20 MG: Performed by: INTERNAL MEDICINE

## 2025-05-26 PROCEDURE — 94761 N-INVAS EAR/PLS OXIMETRY MLT: CPT

## 2025-05-26 PROCEDURE — 93306 TTE W/DOPPLER COMPLETE: CPT | Performed by: INTERNAL MEDICINE

## 2025-05-26 PROCEDURE — 80048 BASIC METABOLIC PNL TOTAL CA: CPT | Performed by: INTERNAL MEDICINE

## 2025-05-26 PROCEDURE — 93306 TTE W/DOPPLER COMPLETE: CPT

## 2025-05-26 PROCEDURE — 25010000002 CEFTRIAXONE PER 250 MG: Performed by: INTERNAL MEDICINE

## 2025-05-26 PROCEDURE — 94799 UNLISTED PULMONARY SVC/PX: CPT

## 2025-05-26 PROCEDURE — 87481 CANDIDA DNA AMP PROBE: CPT | Performed by: HOSPITALIST

## 2025-05-26 PROCEDURE — 85027 COMPLETE CBC AUTOMATED: CPT | Performed by: INTERNAL MEDICINE

## 2025-05-26 PROCEDURE — 97162 PT EVAL MOD COMPLEX 30 MIN: CPT | Performed by: PHYSICAL THERAPIST

## 2025-05-26 PROCEDURE — 99222 1ST HOSP IP/OBS MODERATE 55: CPT | Performed by: INTERNAL MEDICINE

## 2025-05-26 RX ORDER — SODIUM CHLORIDE 0.9 % (FLUSH) 0.9 %
20 SYRINGE (ML) INJECTION AS NEEDED
Status: DISCONTINUED | OUTPATIENT
Start: 2025-05-26 | End: 2025-06-03 | Stop reason: HOSPADM

## 2025-05-26 RX ORDER — SODIUM CHLORIDE 0.9 % (FLUSH) 0.9 %
10 SYRINGE (ML) INJECTION EVERY 12 HOURS SCHEDULED
Status: DISCONTINUED | OUTPATIENT
Start: 2025-05-26 | End: 2025-06-03 | Stop reason: HOSPADM

## 2025-05-26 RX ORDER — SODIUM CHLORIDE 0.9 % (FLUSH) 0.9 %
10 SYRINGE (ML) INJECTION AS NEEDED
Status: DISCONTINUED | OUTPATIENT
Start: 2025-05-26 | End: 2025-06-03 | Stop reason: HOSPADM

## 2025-05-26 RX ORDER — SODIUM CHLORIDE 9 MG/ML
40 INJECTION, SOLUTION INTRAVENOUS AS NEEDED
Status: DISCONTINUED | OUTPATIENT
Start: 2025-05-26 | End: 2025-06-03 | Stop reason: HOSPADM

## 2025-05-26 RX ADMIN — IPRATROPIUM BROMIDE AND ALBUTEROL SULFATE 3 ML: .5; 3 SOLUTION RESPIRATORY (INHALATION) at 20:27

## 2025-05-26 RX ADMIN — FUROSEMIDE 40 MG: 10 INJECTION, SOLUTION INTRAMUSCULAR; INTRAVENOUS at 20:48

## 2025-05-26 RX ADMIN — RIVAROXABAN 20 MG: 20 TABLET, FILM COATED ORAL at 18:37

## 2025-05-26 RX ADMIN — FERROUS SULFATE TAB 325 MG (65 MG ELEMENTAL FE) 325 MG: 325 (65 FE) TAB at 09:38

## 2025-05-26 RX ADMIN — CEFTRIAXONE 2000 MG: 2 INJECTION, POWDER, FOR SOLUTION INTRAMUSCULAR; INTRAVENOUS at 09:38

## 2025-05-26 RX ADMIN — IPRATROPIUM BROMIDE AND ALBUTEROL SULFATE 3 ML: .5; 3 SOLUTION RESPIRATORY (INHALATION) at 14:21

## 2025-05-26 RX ADMIN — GUAIFENESIN 1200 MG: 600 TABLET, EXTENDED RELEASE ORAL at 20:47

## 2025-05-26 RX ADMIN — FUROSEMIDE 40 MG: 10 INJECTION, SOLUTION INTRAMUSCULAR; INTRAVENOUS at 09:38

## 2025-05-26 RX ADMIN — IPRATROPIUM BROMIDE AND ALBUTEROL SULFATE 3 ML: .5; 3 SOLUTION RESPIRATORY (INHALATION) at 11:02

## 2025-05-26 RX ADMIN — Medication 10 ML: at 22:05

## 2025-05-26 RX ADMIN — MEGESTROL ACETATE 40 MG: 40 TABLET ORAL at 09:39

## 2025-05-26 RX ADMIN — LIDOCAINE PAIN RELIEF 1 PATCH: 560 PATCH TOPICAL at 09:39

## 2025-05-26 RX ADMIN — DEXTROMETHORPHAN 60 MG: 30 SUSPENSION, EXTENDED RELEASE ORAL at 20:47

## 2025-05-26 RX ADMIN — CEFTRIAXONE 2000 MG: 2 INJECTION, POWDER, FOR SOLUTION INTRAMUSCULAR; INTRAVENOUS at 20:49

## 2025-05-26 RX ADMIN — Medication 10 ML: at 13:44

## 2025-05-26 RX ADMIN — METOPROLOL SUCCINATE 12.5 MG: 25 TABLET, EXTENDED RELEASE ORAL at 09:39

## 2025-05-26 NOTE — THERAPY EVALUATION
Patient Name: Emely Solomon  : 1960    MRN: [id number]                              Today's Date: 2025       Admit Date: 2025    Visit Dx:     ICD-10-CM ICD-9-CM   1. Acute on chronic congestive heart failure, unspecified heart failure type  I50.9 428.0     Patient Active Problem List   Diagnosis    Chronic diastolic CHF (congestive heart failure)    PFO (patent foramen ovale)    Paradoxical embolism    Essential hypertension    Pulmonary hypertension    Upper back pain    Bacteremia due to group B Streptococcus    ARIEL on CPAP    Class 3 severe obesity due to excess calories with serious comorbidity and body mass index (BMI) of 60.0 to 69.9 in adult    History of DVT (deep vein thrombosis)    Lymphedema    Anemia, chronic disease    Iron deficiency    Post-menopausal bleeding    Thickened endometrium    Generalized muscle weakness    Right bundle branch block (RBBB) with left anterior fascicular block (LAFB)    PVD (peripheral vascular disease) with claudication    Iliac vein stenosis, right    Hypoxia    Hyperglycemia    History of pulmonary embolism    Morbid obesity    Hyponatremia    Bacterial endocarditis    Sepsis due to methicillin susceptible Staphylococcus aureus (MSSA) with acute hypoxic respiratory failure without septic shock    Acute on chronic heart failure with preserved ejection fraction (HFpEF)    On home oxygen therapy    Type 2 myocardial infarction    Acute loss of vision, bilateral    Chronic respiratory failure with hypoxia    Acute loss of vision    Acute exacerbation of CHF (congestive heart failure)     Past Medical History:   Diagnosis Date    Arthritis     Cellulitis     2017, with Group B Strep bacteremia and sepsis    Chronic deep vein thrombosis (DVT) of left popliteal vein 2015, 2016    Chronic diastolic congestive heart failure     COVID-19 virus infection 2020    Heart murmur     Hypertension     Iliac vein stenosis, right  05/15/2024    Lipoedema     Lymphedema     other    Morbid obesity     ARIEL on CPAP     PFO (patent foramen ovale)     Postthrombotic syndrome of left lower extremity without complications 12/17/2015    postphletibis    Pulmonary embolism 04/14/2016    Pulmonary hypertension     multifactorial (dCHF, obesity/ARIEL, hx PE), mild by echo 1/2016    Right bundle branch block (RBBB) with left anterior fascicular block (LAFB)     Sepsis 09/18/2020    Type 2 myocardial infarction 05/20/2025     Past Surgical History:   Procedure Laterality Date    ARTERIOGRAM  07/2015    BRONCHOSCOPY N/A 07/21/2017    Procedure: BRONCHOSCOPY with wash;  Surgeon: Caleb Garcia MD;  Location: [facility] ENDOSCOPY;  Service:     COLONOSCOPY      COLONOSCOPY N/A 01/26/2023    Procedure: COLONOSCOPY to CECUM AND TERM ILEUM;  Surgeon: Jj Dean MD;  Location: [facility] ENDOSCOPY;  Service: Gastroenterology;  Laterality: N/A;  PRE OP -screening  POST OP -  NORMAL    D & C HYSTEROSCOPY N/A 03/08/2022    Procedure: DILATATION AND CURETTAGE with hysteroscopy;  Surgeon: Kaylah Land DO;  Location: [facility] MAIN OR;  Service: Gynecology Oncology;  Laterality: N/A;    DILATATION AND CURETTAGE  04/11/2011    OTHER SURGICAL HISTORY  09/2015    IVC filter    OTHER SURGICAL HISTORY      left LE revascularization    OTHER SURGICAL HISTORY      ALESSIA and LEV scan    THROMBECTOMY  07/2015      General Information       Row Name 05/26/25 2968          Physical Therapy Time and Intention    Document Type evaluation  -     Mode of Treatment individual therapy;physical therapy  -       Row Name 05/26/25 8403          General Information    Patient Profile Reviewed yes  -KH     Prior Level of Function --  recently d/c to rehab, now back. Pt reports she walks with Rwx at baseline  -     Existing Precautions/Restrictions fall  -     Barriers to Rehab medically complex;previous functional deficit  -       Row Name 05/26/25 3697          Living  Environment    Current Living Arrangements --  Admitted from rehab and plans to return, per patient  -     People in Home facility resident  -       Row Name 05/26/25 1659          Cognition    Orientation Status (Cognition) oriented x 4  -       Row Name 05/26/25 1659          Safety Issues/Impairments Affecting Functional Mobility    Impairments Affecting Function (Mobility) balance;endurance/activity tolerance;strength;range of motion (ROM)  -               User Key  (r) = Recorded By, (t) = Taken By, (c) = Cosigned By      Initials Name Provider Type    Ale Pina, PT Physical Therapist                   Mobility       Row Name 05/26/25 1700          Bed Mobility    Bed Mobility supine-sit;sit-supine  -     Supine-Sit [place] (Bed Mobility) contact guard  -     Sit-Supine [place] (Bed Mobility) moderate assist (50% patient effort)  To assist BLE into bed  -     Assistive Device (Bed Mobility) head of bed elevated;bed rails  -       Row Name 05/26/25 1700          Sit-Stand Transfer    Sit-Stand [place] (Transfers) moderate assist (50% patient effort)  -     Assistive Device (Sit-Stand Transfers) walker, front-wheeled  -       Row Name 05/26/25 1700          Gait/Stairs (Locomotion)    [place] Level (Gait) minimum assist (75% patient effort);moderate assist (50% patient effort)  -     Assistive Device (Gait) walker, front-wheeled  -     Distance in Feet (Gait) --  4 sidesteps to HOB  -     Deviations/Abnormal Patterns (Gait) stride length decreased;gait speed decreased  -     Bilateral Gait Deviations forward flexed posture  -     Comment, (Gait/Stairs) Patient will distance limited by weakness and fatigue  -               User Key  (r) = Recorded By, (t) = Taken By, (c) = Cosigned By      Initials Name Provider Type    Ale Pina, PT Physical Therapist                   Obj/Interventions       Row Name 05/26/25 1702           Range of Motion Comprehensive    Comment, General Range of Motion BLE limited 25%  -       Row Name 05/26/25 1701          Strength Comprehensive (MMT)    Comment, General Manual Muscle Testing (MMT) Assessment Generalized weakness, functionally at least 3/5  -       Row Name 05/26/25 1701          Motor Skills    Therapeutic Exercise --  BLE AP, LAQ, seated marching x 10 reps  -       Row Name 05/26/25 1701          Balance    Balance Assessment sitting static balance;standing static balance;standing dynamic balance  -     Static Sitting Balance standby assist  -     Position, Sitting Balance sitting edge of bed;unsupported  -     Static Standing Balance minimal assist  -     Dynamic Standing Balance minimal assist;moderate assist  -     Position/Device Used, Standing Balance walker, rolling  -KH               User Key  (r) = Recorded By, (t) = Taken By, (c) = Cosigned By      Initials Name Provider Type    Ale Pina, PT Physical Therapist                   Goals/Plan       Row Name 05/26/25 1706          Bed Mobility Goal 1 (PT)    Activity/Assistive Device (Bed Mobility Goal 1, PT) bed mobility activities, all  -KH     [place] Level/Cues Needed (Bed Mobility Goal 1, PT) contact guard required  -     Time Frame (Bed Mobility Goal 1, PT) 10 days  -       Row Name 05/26/25 1706          Transfer Goal 1 (PT)    Activity/Assistive Device (Transfer Goal 1, PT) sit-to-stand/stand-to-sit;bed-to-chair/chair-to-bed;walker, rolling  -KH     [place] Level/Cues Needed (Transfer Goal 1, PT) contact guard required  -     Time Frame (Transfer Goal 1, PT) 10 days  -       Row Name 05/26/25 1706          Gait Training Goal 1 (PT)    Activity/Assistive Device (Gait Training Goal 1, PT) gait (walking locomotion);walker, rolling  -KH     [place] Level (Gait Training Goal 1, PT) contact guard required  -KH     Distance (Gait Training Goal 1, PT) 25 ft  -     Time Frame (Gait  Training Goal 1, PT) 10 days  -       Row Name 05/26/25 1706          Patient Education Goal (PT)    Activity (Patient Education Goal, PT) HEP  -     [place]/Cues/Accuracy (Memory Goal 2, PT) demonstrates adequately  -     Time Frame (Patient Education Goal, PT) 10 days  -       Row Name 05/26/25 1706          Therapy Assessment/Plan (PT)    Planned Therapy Interventions (PT) balance training;bed mobility training;gait training;home exercise program;patient/family education;transfer training;strengthening;ROM (range of motion)  -               User Key  (r) = Recorded By, (t) = Taken By, (c) = Cosigned By      Initials Name Provider Type     Ale Guzman, PT Physical Therapist                   Clinical Impression       Row Name 05/26/25 1702          Pain    Pretreatment Pain Rating 0/10 - no pain  -     Posttreatment Pain Rating 0/10 - no pain  -     Pain Management Interventions movement retraining implemented  -       Row Name 05/26/25 1702          Plan of Care Review    Plan of Care Reviewed With patient  -     Outcome Evaluation Patient admitted from rehab with exacerbation of CHF.  Patient was recently hospitalized, but reports she was independently mobile with rolling walker prior to admission.  Patient was in bed and agreeable to therapy.  She transition to sitting edge of bed with little assistance and use of bed rails.  She stood with mod assist and was able to take a few shuffling sidesteps to HOB with Rwx.  Patient was unsteady and fatigued quickly and standing.  Patient reported blurry vision while standing.  She complained of shortness of breath with activity but O2 sat remained above 95% on supplemental oxygen.  Patient presents with generalized weakness, limited endurance, impaired balance and functional mobility below baseline.  She would benefit from PT to address these impairments while admitted.  Patient plans to return to rehab.  -       Row Name 05/26/25  1702          Therapy Assessment/Plan (PT)    Patient/Family Therapy Goals Statement (PT) Return to prior level of function  -KH     Rehab Potential (PT) good  -KH     Criteria for Skilled Interventions Met (PT) yes  -KH     Therapy Frequency (PT) 5 times/wk  -       Row Name 05/26/25 1702          Positioning and Restraints    Pre-Treatment Position in bed  -KH     Post Treatment Position bed  -KH     In Bed fowlers;call light within reach;encouraged to call for assist;exit alarm on;notified nsg;side rails up x3  -               User Key  (r) = Recorded By, (t) = Taken By, (c) = Cosigned By      Initials Name Provider Type    Ale Pina, CATRINA Physical Therapist                   Outcome Measures       Row Name 05/26/25 1706 05/26/25 0817       How much help from another person do you currently need...    Turning from your back to your side while in flat bed without using bedrails? 3  -KH 3  -TH    Moving from lying on back to sitting on the side of a flat bed without bedrails? 2  -KH 2  -TH    Moving to and from a bed to a chair (including a wheelchair)? 2  -KH 2  -TH    Standing up from a chair using your arms (e.g., wheelchair, bedside chair)? 2  -KH 2  -TH    Climbing 3-5 steps with a railing? 1  -KH 1  -TH    To walk in hospital room? 2  -KH 2  -TH    AM-PAC 6 Clicks Score (PT) 12  -KH 12  -TH    Highest Level of Mobility Goal Move to Chair/Commode-4  -KH Move to Chair/Commode-4  -TH      Row Name 05/26/25 1706          Functional Assessment    Outcome Measure Options AM-PAC 6 Clicks Basic Mobility (PT)  -               User Key  (r) = Recorded By, (t) = Taken By, (c) = Cosigned By      Initials Name Provider Type    Ale Pina, CATRINA Physical Therapist    Cynthia Thompson, RN Registered Nurse                                 Physical Therapy Education       Title: PT OT SLP Therapies (Done)       Topic: Physical Therapy (Done)       Point: Mobility training (Done)        Learning Progress Summary            Patient Acceptance, TB,E, VU by  at 5/26/2025 0825                      Point: Home exercise program (Done)       Learning Progress Summary            Patient Acceptance, TB,E, VU by  at 5/26/2025 0825                      Point: Body mechanics (Done)       Learning Progress Summary            Patient Acceptance, TB,E, VU by  at 5/26/2025 0825                      Point: Precautions (Done)       Learning Progress Summary            Patient Acceptance, TB,E, VU by  at 5/26/2025 0825                                      User Key       Initials Effective Dates Name Provider Type Discipline     06/16/21 -  Cynthia Cates, RN Registered Nurse Nurse                  PT Recommendation and Plan  Planned Therapy Interventions (PT): balance training, bed mobility training, gait training, home exercise program, patient/family education, transfer training, strengthening, ROM (range of motion)  Outcome Evaluation: Patient admitted from rehab with exacerbation of CHF.  Patient was recently hospitalized, but reports she was independently mobile with rolling walker prior to admission.  Patient was in bed and agreeable to therapy.  She transition to sitting edge of bed with little assistance and use of bed rails.  She stood with mod assist and was able to take a few shuffling sidesteps to HOB with Rwx.  Patient was unsteady and fatigued quickly and standing.  Patient reported blurry vision while standing.  She complained of shortness of breath with activity but O2 sat remained above 95% on supplemental oxygen.  Patient presents with generalized weakness, limited endurance, impaired balance and functional mobility below baseline.  She would benefit from PT to address these impairments while admitted.  Patient plans to return to rehab.     Time Calculation:         PT Charges       Row Name 05/26/25 6497 05/26/25 1229          Time Calculation    Start Time 1628  -KH --     Stop Time  1648  - --     Time Calculation (min) 20 min  - --     PT Received On 05/26/25  - --     PT - Next Appointment 05/27/25  -[place] 05/27/25  -     PT Goal Re-Cert Due Date 06/05/25  - --               User Key  (r) = Recorded By, (t) = Taken By, (c) = Cosigned By      Initials Name Provider Type    Ale Pina, PT Physical Therapist                  Therapy Charges for Today       Code Description Service Date Service Provider Modifiers Qty    09660168055 HC PT EVAL MOD COMPLEXITY 3 5/26/2025 Ale Guzman, PT GP 1            PT G-Codes  Outcome Measure Options: AM-PAC 6 Clicks Basic Mobility (PT)  AM-PAC 6 Clicks Score (PT): 12  PT Discharge Summary  Anticipated Discharge Disposition (PT): skilled nursing facility    Ale Guzman, PT  5/26/2025

## 2025-05-26 NOTE — PROGRESS NOTES
Case Management Discharge Note      Final Note: DC'd to [facility] skilled bed via [facility] 5/24    Provided Post Acute Provider List?: N/A  N/A Provider List Comment: Has been at [facility] and plans to return    Selected Continued Care - Discharged on 5/24/2025 Admission date: 5/20/2025 - Discharge disposition: Skilled Nursing Facility (DC - External)      Destination Coordination complete.      Service Provider Services Address Phone Fax Patient Preferred    [facility] [address] [address] [phone] --                          Selected Continued Care - Episodes Includes continued care and service providers with selected services from the active episodes listed below          Selected Continued Care - Prior Encounters Includes continued care and service providers with selected services from prior encounters from 2/19/2025 to 5/24/2025      Discharged on 5/10/2025 Admission date: 5/4/2025 - Discharge disposition: Skilled Nursing Facility (DC - External)      Destination       Service Provider Services Address Phone Fax Patient Preferred    [facility] [address] [address] [phone] --              Home Medical Care       Service Provider Services Address Phone Fax Patient Preferred    [facility] [address], [address], [place] [address] [phone] --                          Transportation Services  Ambulance: [facility]    Final Discharge Disposition Code: 03 - skilled nursing facility (SNF)

## 2025-05-26 NOTE — PLAN OF CARE
Goal Outcome Evaluation:  Plan of Care Reviewed With: patient    Outcome Evaluation: Pt admitted with CHF. IV Lasix given per order with 2350cc urine output overnight. VSS on home CPAP with 4L O2 blended. PRN Delsym/ Mucinex given d/t frequent dry cough. Exernal catheter in place with total bed bath provided. No complaints of pain. Will continue to monitor and update as needed.

## 2025-05-26 NOTE — PROGRESS NOTES
"    DAILY PROGRESS NOTE  [facility]    Patient Identification:  Name: Emely Solomon  Age: 64 y.o.  Sex: female  :  1960  MRN: [id number]         Primary Care Physician: Adilson Wilkerson MD    Subjective:  Interval History: Patient unsure if she has received any of her Xarelto with recent brief transfer.  Admits to shortness of breath but not complain of any chest pain to me.  Denies nausea or vomiting fever confusion.  O2 requirements currently not that impressive ranging between 2-4 L and she is otherwise conversational without any significant dyspnea noted with conversation at the time of my exam.  No family present/Case discussed with cardiology MD    Objective:    Scheduled Meds:cefTRIAXone (ROCEPHIN) 2,000 mg in sodium chloride 0.9 % 100 mL MBP, 2,000 mg, Intravenous, Q12H  ferrous sulfate, 325 mg, Oral, Daily With Breakfast  furosemide, 40 mg, Intravenous, Q12H  ipratropium-albuterol, 3 mL, Nebulization, 4x Daily - RT  Lidocaine, 1 patch, Transdermal, Q24H  megestrol, 40 mg, Oral, Daily  metoprolol succinate XL, 12.5 mg, Oral, Daily  rivaroxaban, 20 mg, Oral, Daily With Dinner      Continuous Infusions:     Vital signs in last 24 hours:  Temp:  [97.4 °F (36.3 °C)-99.8 °F (37.7 °C)] 99.8 °F (37.7 °C)  Heart Rate:  [75-85] 83  Resp:  [16-20] 18  BP: (107-140)/(68-83) 130/76    Intake/Output:    Intake/Output Summary (Last 24 hours) at 2025 0819  Last data filed at 2025 0438  Gross per 24 hour   Intake --   Output 2350 ml   Net -2350 ml       Exam:  /76 (BP Location: Right arm, Patient Position: Lying)   Pulse 83   Temp 99.8 °F (37.7 °C) (Oral)   Resp 18   Ht 160 cm (62.99\")   Wt (!) 160 kg (352 lb 11.8 oz)   SpO2 92%   BMI 62.50 kg/m²     General Appearance:    Alert, cooperative, NT, AAOx3                          Head:    Normocephalic, without obvious abnormality, atraumatic                           Eyes:    PERRLA, conjunctivae/corneas clear, EOM's " intact, both eyes                         Throat:   Lips, tongue, gums normal; oral mucosa pink and moist                           Neck:   Supple, symmetrical, trachea midline, no JVD                         Lungs:    Diminished/muffled secondary to body habitus but I truly could not appreciate much abnormalities on what I could hear to auscultation bilaterally, respirations unlabored with conversation.  99% 21 L                 Chest Wall:    No tenderness or deformity                          Heart:    Regular rate and rhythm, S1 and S2 normal, no murmur, no rub or gallop                  Abdomen:     Soft, nontender, bowel sounds active, MO/BMI 62+                 extremities: Lymphedema based on morbid obesity but I did not feel anything was pitting to her lower extremities with significant stasis changes as well                        Pulses:   Pulses palpable in all extremities                  Neurologic:   CNII-XII intact     Data Review:  Labs in chart were reviewed.    Assessment:  Active Hospital Problems    Diagnosis  POA    **Acute exacerbation of CHF (congestive heart failure) [I50.9]  Yes      Resolved Hospital Problems   No resolved problems to display.       Plan:    Acute hypoxia secondary to concerns for PE given recent holding of AC due to GYN bleeding compounded by morbid obesity/ARIEL with a past history of mitral valve endocarditis [facility] Dr. Mclean (DC summary reviewed)   - Case discussed at length with  -he is checking an echo and wishes to continue with IV Lasix though CHF in regards to acute exacerbation does not seem all that prominent on exam   - Vision changes previously evaluated by ophthalmology with no indication to pursue further workup with MRI/neuroevaluation   - Maintained on Xarelto though has been held as well due to complications from vaginal bleeding deemed chronic with the addition of progesterone and plans for outpatient  follow-up   - Stop date for Rocephin per ID is 6/17/2025 (WBC normal)   - Patient was felt to be euvolemic with no CHF complications at time of discharge   - Hgb at recent discharge 7.2 trended up to 7.9 now 8.2   - Other labs otherwise reassuring   - Troponins negative.  Chest x-ray not impressive and EKG without acute change with O2 requirements ranging 2-4 L currently with ARIEL/CPAP   - Recent 5/5/2025 echo shows EF of 66 to 70% with grade 1 diastolic dysfunction compounded by large mobile echodensity on mitral valve without effusion and cardiology to repeat the echo   - Status post 40 mg IV Lasix x 1 I will defer further diuretic management to cards as they wish to continue every 12 for now   - CTA of the chest to ensure no PE compounding her oxygen complaint/dyspnea.  Her creatinine is normal    Toño Giron MD  5/26/2025  08:19 EDT

## 2025-05-26 NOTE — H&P
HISTORY AND PHYSICAL   [facility]        Date of Admission: 2025  Patient Identification:  Name: Emely Soloomn  Age: 64 y.o.  Sex: female  :  1960  MRN: [id number]                     Primary Care Physician: Adilson Wilkerson MD    Chief Complaint:  64 year old female was sent in from a rehab facility due to low oxygen sats and low blood pressure; she is not on oxygen at baseline; she was sent there yesterday after a hospital stay for changes in visio; eh is on antibiotics for mitral valve endocarditis; denies fever or chills; no chest pain;     History of Present Illness:   As above    Past Medical History:  Past Medical History:   Diagnosis Date    Arthritis     Cellulitis     2017, with Group B Strep bacteremia and sepsis    Chronic deep vein thrombosis (DVT) of left popliteal vein 2015, 2016    Chronic diastolic congestive heart failure     COVID-19 virus infection 2020    Heart murmur     Hypertension     Iliac vein stenosis, right 05/15/2024    Lipoedema     Lymphedema     other    Morbid obesity     ARIEL on CPAP     PFO (patent foramen ovale)     Postthrombotic syndrome of left lower extremity without complications 2015    postphletibis    Pulmonary embolism 2016    Pulmonary hypertension     multifactorial (dCHF, obesity/ARIEL, hx PE), mild by echo 2016    Right bundle branch block (RBBB) with left anterior fascicular block (LAFB)     Sepsis 2020    Type 2 myocardial infarction 2025     Past Surgical History:  Past Surgical History:   Procedure Laterality Date    ARTERIOGRAM  2015    BRONCHOSCOPY N/A 2017    Procedure: BRONCHOSCOPY with wash;  Surgeon: Caleb Garcia MD;  Location: [facility] ENDOSCOPY;  Service:     COLONOSCOPY      COLONOSCOPY N/A 2023    Procedure: COLONOSCOPY to CECUM AND TERM ILEUM;  Surgeon: Jj Dean MD;  Location: [facility] ENDOSCOPY;  Service: Gastroenterology;  Laterality:  N/A;  PRE OP -screening  POST OP -  NORMAL    D & C HYSTEROSCOPY N/A 03/08/2022    Procedure: DILATATION AND CURETTAGE with hysteroscopy;  Surgeon: Kaylah Land DO;  Location: [facility] MAIN OR;  Service: Gynecology Oncology;  Laterality: N/A;    DILATATION AND CURETTAGE  04/11/2011    OTHER SURGICAL HISTORY  09/2015    IVC filter    OTHER SURGICAL HISTORY      left LE revascularization    OTHER SURGICAL HISTORY      ALESSIA and LEV scan    THROMBECTOMY  07/2015      Home Meds:  Medications Prior to Admission   Medication Sig Dispense Refill Last Dose/Taking    cefTRIAXone 2,000 mg in sodium chloride 0.9 % 100 mL IVPB Infuse 2,000 mg into a venous catheter Every 12 (Twelve) Hours for 74 doses. Indications: Endocarditis   5/24/2025    ferrous sulfate 325 (65 FE) MG tablet Take 1 tablet by mouth Daily With Breakfast.   5/24/2025    megestrol (MEGACE) 40 MG tablet Take 1 tablet by mouth Daily. 30 tablet 0 5/24/2025    metoprolol succinate XL (TOPROL-XL) 25 MG 24 hr tablet Take 0.5 tablets by mouth Daily.   5/24/2025    miconazole (MICOTIN) 2 % powder Apply 1 Application topically to the appropriate area as directed Every 12 (Twelve) Hours. 43 g 0 5/24/2025    acetaminophen (TYLENOL) 325 MG tablet Take 2 tablets by mouth Every 6 (Six) Hours As Needed for Mild Pain.   Unknown    cadexomer iodine (IODOSORB) 0.9 % gel Apply 1 Application topically to the appropriate area as directed Daily As Needed for Wound Care. Apply to open wound to rt lower leg (Patient taking differently: Apply 1 Application topically to the appropriate area as directed Daily As Needed for Wound Care.)   Unknown    dextromethorphan polistirex ER (DELSYM) 30 MG/5ML Suspension Extended Release oral suspension Take 10 mL by mouth 2 (Two) Times a Day As Needed (As needed for cough).   Unknown    Dimethicone-Zinc Oxide-Vit A-D ([employer] ZINC OXIDE DIAPER EX) Apply 1 Application topically to the appropriate area as directed 2 (Two) Times a Day.   Unknown     Emollient (Aquaphor Advanced Therapy) 41 % ointment Apply 1 Application topically to the appropriate area as directed 2 (Two) Times a Day.   Unknown    furosemide (LASIX) 40 MG tablet Take 1 tablet by mouth Daily.   Unknown    guaiFENesin ER 1200 MG tablet sustained-release 12 hour Take 1 tablet by mouth 2 (Two) Times a Day As Needed.   Unknown    ipratropium-albuterol (DUO-NEB) 0.5-2.5 mg/3 ml nebulizer Take 3 mL by nebulization 4 (Four) Times a Day.   Unknown    Lidocaine 4 % Place 1 patch on the skin as directed by provider Daily. Apply to skin on upper back remove patch in 12 hours. Remove & Discard patch within 12 hours or as directed by MD   Unknown    melatonin 5 MG tablet tablet Take 1 tablet by mouth At Night As Needed (sleep).   Unknown    [Paused] rivaroxaban (Xarelto) 20 MG tablet Take 1 tablet by mouth Daily With Dinner. Indications: Atrial Fibrillation   Unknown    sennosides-docusate (PERICOLACE) 8.6-50 MG per tablet Take 2 tablets by mouth At Night As Needed for Constipation.   Unknown       Allergies:  Allergies   Allergen Reactions    Cephalexin Hives and Rash     Diffuse rash on cephalexin 1 g PO q6h on 6/17/20  Tolerated zosyn and PCN G September 2020 without issue    Clindamycin/Lincomycin Hives     Immunizations:  Immunization History   Administered Date(s) Administered    COVID-19 (PFIZER) 12YRS+ (COMIRNATY) 09/24/2024    COVID-19 (PFIZER) BIVALENT 12+YRS 12/08/2022    COVID-19 (PFIZER) Purple Cap Monovalent 03/22/2021, 04/12/2021, 12/07/2021    Covid-19 (Pfizer) Gray Cap Monovalent 06/06/2022    Fluzone (or Fluarix & Flulaval for VFC) >6mos 09/19/2020, 10/05/2021, 12/08/2022    Fluzone High-Dose 65+YRS 09/24/2024    Influenza, Unspecified 12/08/2022    Pneumococcal Conjugate 20-Valent (PCV20) 02/08/2023    Shingrix 02/08/2023, 06/08/2023    Tdap 09/15/2016     Social History:   Social History     Social History Narrative    Not on file     Social History     Socioeconomic History    Marital  "status:    Tobacco Use    Smoking status: Never    Smokeless tobacco: Never   Vaping Use    Vaping status: Never Used   Substance and Sexual Activity    Alcohol use: Not Currently     Comment: occassionally    Drug use: Never    Sexual activity: Not Currently     Partners: Male     Birth control/protection: Post-menopausal       Family History:  Family History   Problem Relation Age of Onset    Breast cancer Mother     Hypertension Other     Ovarian cancer Neg Hx     Uterine cancer Neg Hx     Colon cancer Neg Hx     Malig Hyperthermia Neg Hx         Review of Systems  See history of present illness and past medical history.  Patient denies headache, dizziness, syncope, falls, trauma, change in vision, change in hearing, change in taste, changes in weight, changes in appetite, focal weakness, numbness, or paresthesia.  Patient denies chest pain, palpitations, dyspnea, orthopnea, PND, cough, sinus pressure, rhinorrhea, epistaxis, hemoptysis, nausea, vomiting,hematemesis, diarrhea, constipation or hematochezia.  Denies cold or heat intolerance, polydipsia, polyuria, polyphagia. Denies hematuria, pyuria, dysuria, hesitancy, frequency or urgency. Denies consumption of raw and under cooked meats foods or change in water source.  Denies fever, chills, sweats, night sweats.  Denies missing any routine medications. Remainder of ROS is negative.    Objective:  T Max 24 hrs: Temp (24hrs), Av.5 °F (36.9 °C), Min:97.4 °F (36.3 °C), Max:99.4 °F (37.4 °C)    Vitals Ranges:   Temp:  [97.4 °F (36.3 °C)-99.4 °F (37.4 °C)] 98.1 °F (36.7 °C)  Heart Rate:  [75-85] 79  Resp:  [16-20] 18  BP: (107-140)/(68-83) 112/74      Exam:  /74 (BP Location: Right arm, Patient Position: Lying)   Pulse 79   Temp 98.1 °F (36.7 °C) (Oral)   Resp 18   Ht 160 cm (62.99\")   Wt (!) 158 kg (349 lb)   SpO2 93%   BMI 61.84 kg/m²     General Appearance:    Alert, cooperative, no distress, appears stated age   Head:    Normocephalic, " without obvious abnormality, atraumatic   Eyes:    PERRL, conjunctivae/corneas clear, EOM's intact, both eyes   Ears:    Normal external ear canals, both ears   Nose:   Nares normal, septum midline, mucosa normal, no drainage    or sinus tenderness   Throat:   Lips, mucosa, and tongue normal   Neck:   Supple, symmetrical, trachea midline, no adenopathy;     thyroid:  no enlargement/tenderness/nodules; no carotid    bruit or JVD   Back:     Symmetric, no curvature, ROM normal, no CVA tenderness   Lungs:     Decreased breath sounds bilaterally, respirations unlabored   Chest Wall:    No tenderness or deformity    Heart:    Regular rate and rhythm, S1 and S2 normal, no murmur, rub   or gallop   Abdomen:     Soft, nontender, bowel sounds active all four quadrants,     no masses, no hepatomegaly, no splenomegaly   Extremities:   Extremities normal, atraumatic, no cyanosis or edema                       .    Data Review:  Labs in chart were reviewed.  WBC   Date Value Ref Range Status   05/25/2025 6.93 3.40 - 10.80 10*3/mm3 Final     Hemoglobin   Date Value Ref Range Status   05/25/2025 7.9 (L) 12.0 - 15.9 g/dL Final     Hematocrit   Date Value Ref Range Status   05/25/2025 26.1 (L) 34.0 - 46.6 % Final     Platelets   Date Value Ref Range Status   05/25/2025 341 140 - 450 10*3/mm3 Final     Sodium   Date Value Ref Range Status   05/25/2025 136 136 - 145 mmol/L Final     Potassium   Date Value Ref Range Status   05/25/2025 4.4 3.5 - 5.2 mmol/L Final     Chloride   Date Value Ref Range Status   05/25/2025 104 98 - 107 mmol/L Final     CO2   Date Value Ref Range Status   05/25/2025 23.0 22.0 - 29.0 mmol/L Final     BUN   Date Value Ref Range Status   05/25/2025 10 8 - 23 mg/dL Final     Creatinine   Date Value Ref Range Status   05/25/2025 0.85 0.57 - 1.00 mg/dL Final     Glucose   Date Value Ref Range Status   05/25/2025 89 65 - 99 mg/dL Final     Calcium   Date Value Ref Range Status   05/25/2025 8.4 (L) 8.6 - 10.5 mg/dL  Final     AST (SGOT)   Date Value Ref Range Status   05/25/2025 13 1 - 32 U/L Final     ALT (SGPT)   Date Value Ref Range Status   05/25/2025 8 1 - 33 U/L Final     Alkaline Phosphatase   Date Value Ref Range Status   05/25/2025 115 39 - 117 U/L Final                Imaging Results (All)       Procedure Component Value Units Date/Time    XR Chest 1 View [[id number]] Collected: 05/25/25 1405     Updated: 05/25/25 1410    Narrative:      XR CHEST 1 VW-     Clinical: Cough and shortness of breath     COMPARISON examination dated 5/4/2025     FINDINGS: There is cardiac enlargement. The mediastinum is stable. There  is a right upper extremity PICC line in position and the tip  superimposes the superior vena cava, this position is satisfactory.  There is prominence of the central vasculature. Suspect either pulmonary  arterial hypertension or early/mild congestion. No gross pulmonary  edema. No gross pleural effusion seen. The remainder is unremarkable.     This report was finalized on 5/25/2025 2:07 PM by Dr. Dhruv Sanchez M.D  on Workstation: [id number]                 Assessment:  Active Hospital Problems    Diagnosis  POA    **Acute exacerbation of CHF (congestive heart failure) [I50.9]diastolic  Yes      Resolved Hospital Problems   No resolved problems to display.   Acute hypoxic respiratory failure  Pulmonary hypertension  Obesity  Sleep apnea  Mitral valve endocarditis    Plan:  Will continue diuresis  Continue antibiotics  Ask cardiology to see her  Resume xarelto but stop if she has further vaginal bleeding  Monitor on telemetry  Dw patient, family, nurse and ed provider  Patient is full code and spouse is cesilia Shultz Pete Street MD  5/25/2025  20:17 EDT

## 2025-05-26 NOTE — CONSULTS
Date of Hospital Visit: 2025  Encounter Provider: Jack Arriaga Jr, MD  Place of Service: [facility] CARDIOLOGY  Patient Name: Emely Solomon  :1960  Referral Provider: Lexii Street, *    Chief complaint: Acute hypoxic respiratory failure    History of Present Illness: 64-year-old female with known history of primary hypertension, ARIEL on CPAP, lymphedema, morbid obesity, history of DVT/PE who was recently diagnosed with mitral valve endocarditis.  She was initially hospitalized early May and diagnosed with mitral valve endocarditis with large vegetation on the posterior leaflet.  She was not felt to be a good surgical candidate and was recommended that she start on a 6-week antibiotic course.  She was discharged to outpatient rehab but was back in the hospital on 2025 with acute visual changes.  He was noted to have worsening anemia with concern for ongoing anticoagulation.  She was seen by the inpatient cardiology team.  She was noted to have vaginal bleeding during the hospital stay and gynecology was consulted.  Since it was an endometrial bleed the gynecology service recommended progesterone.  Xarelto was held at discharge until the patient had more than 24 to 48 hours free from vaginal bleeding.  Uncertain if this was ever restarted.  Per ER notes she was hypoxic on presentation.  He was not tachycardic on presentation.  Last echocardiogram only noted mild regurgitation with the valvular vegetation.  She denies angina.  Chronic lower extremity edema.  She notes cough and orthopnea.  She is in mild distress at time of interview but only on 2 L oxygen and admits that she just became very upset and hung up on someone that called her in the hospital.      Past Medical History:   Diagnosis Date    Arthritis     Cellulitis     2017, with Group B Strep bacteremia and sepsis    Chronic deep vein thrombosis (DVT) of left popliteal vein 2015,  04/14/2016    Chronic diastolic congestive heart failure     COVID-19 virus infection 11/2020    Heart murmur     Hypertension     Iliac vein stenosis, right 05/15/2024    Lipoedema     Lymphedema     other    Morbid obesity     ARIEL on CPAP     PFO (patent foramen ovale)     Postthrombotic syndrome of left lower extremity without complications 12/17/2015    postphletibis    Pulmonary embolism 04/14/2016    Pulmonary hypertension     multifactorial (dCHF, obesity/ARIEL, hx PE), mild by echo 1/2016    Right bundle branch block (RBBB) with left anterior fascicular block (LAFB)     Sepsis 09/18/2020    Type 2 myocardial infarction 05/20/2025       Past Surgical History:   Procedure Laterality Date    ARTERIOGRAM  07/2015    BRONCHOSCOPY N/A 07/21/2017    Procedure: BRONCHOSCOPY with wash;  Surgeon: Caleb Garcia MD;  Location: [facility] ENDOSCOPY;  Service:     COLONOSCOPY      COLONOSCOPY N/A 01/26/2023    Procedure: COLONOSCOPY to CECUM AND TERM ILEUM;  Surgeon: Jj Dean MD;  Location: [facility] ENDOSCOPY;  Service: Gastroenterology;  Laterality: N/A;  PRE OP -screening  POST OP -  NORMAL    D & C HYSTEROSCOPY N/A 03/08/2022    Procedure: DILATATION AND CURETTAGE with hysteroscopy;  Surgeon: Kaylah Land DO;  Location: [facility] MAIN OR;  Service: Gynecology Oncology;  Laterality: N/A;    DILATATION AND CURETTAGE  04/11/2011    OTHER SURGICAL HISTORY  09/2015    IVC filter    OTHER SURGICAL HISTORY      left LE revascularization    OTHER SURGICAL HISTORY      ALESSIA and LEV scan    THROMBECTOMY  07/2015       Medications Prior to Admission   Medication Sig Dispense Refill Last Dose/Taking    cefTRIAXone 2,000 mg in sodium chloride 0.9 % 100 mL IVPB Infuse 2,000 mg into a venous catheter Every 12 (Twelve) Hours for 74 doses. Indications: Endocarditis   5/24/2025    ferrous sulfate 325 (65 FE) MG tablet Take 1 tablet by mouth Daily With Breakfast.   5/24/2025    megestrol (MEGACE) 40 MG tablet Take 1 tablet by mouth  Daily. 30 tablet 0 5/24/2025    metoprolol succinate XL (TOPROL-XL) 25 MG 24 hr tablet Take 0.5 tablets by mouth Daily.   5/24/2025    miconazole (MICOTIN) 2 % powder Apply 1 Application topically to the appropriate area as directed Every 12 (Twelve) Hours. 43 g 0 5/24/2025    acetaminophen (TYLENOL) 325 MG tablet Take 2 tablets by mouth Every 6 (Six) Hours As Needed for Mild Pain.   Unknown    cadexomer iodine (IODOSORB) 0.9 % gel Apply 1 Application topically to the appropriate area as directed Daily As Needed for Wound Care. Apply to open wound to rt lower leg (Patient taking differently: Apply 1 Application topically to the appropriate area as directed Daily As Needed for Wound Care.)   Unknown    dextromethorphan polistirex ER (DELSYM) 30 MG/5ML Suspension Extended Release oral suspension Take 10 mL by mouth 2 (Two) Times a Day As Needed (As needed for cough).   Unknown    Dimethicone-Zinc Oxide-Vit A-D ([employer] ZINC OXIDE DIAPER EX) Apply 1 Application topically to the appropriate area as directed 2 (Two) Times a Day.   Unknown    Emollient (Aquaphor Advanced Therapy) 41 % ointment Apply 1 Application topically to the appropriate area as directed 2 (Two) Times a Day.   Unknown    furosemide (LASIX) 40 MG tablet Take 1 tablet by mouth Daily.   Unknown    guaiFENesin ER 1200 MG tablet sustained-release 12 hour Take 1 tablet by mouth 2 (Two) Times a Day As Needed.   Unknown    ipratropium-albuterol (DUO-NEB) 0.5-2.5 mg/3 ml nebulizer Take 3 mL by nebulization 4 (Four) Times a Day.   Unknown    Lidocaine 4 % Place 1 patch on the skin as directed by provider Daily. Apply to skin on upper back remove patch in 12 hours. Remove & Discard patch within 12 hours or as directed by MD   Unknown    melatonin 5 MG tablet tablet Take 1 tablet by mouth At Night As Needed (sleep).   Unknown    [Paused] rivaroxaban (Xarelto) 20 MG tablet Take 1 tablet by mouth Daily With Dinner. Indications: Atrial Fibrillation   Unknown     sennosides-docusate (PERICOLACE) 8.6-50 MG per tablet Take 2 tablets by mouth At Night As Needed for Constipation.   Unknown       Current Meds  Scheduled Meds:cefTRIAXone (ROCEPHIN) 2,000 mg in sodium chloride 0.9 % 100 mL MBP, 2,000 mg, Intravenous, Q12H  ferrous sulfate, 325 mg, Oral, Daily With Breakfast  furosemide, 40 mg, Intravenous, Q12H  ipratropium-albuterol, 3 mL, Nebulization, 4x Daily - RT  Lidocaine, 1 patch, Transdermal, Q24H  megestrol, 40 mg, Oral, Daily  metoprolol succinate XL, 12.5 mg, Oral, Daily  rivaroxaban, 20 mg, Oral, Daily With Dinner      Continuous Infusions:   PRN Meds:.  acetaminophen **OR** acetaminophen **OR** acetaminophen    senna-docusate sodium **AND** polyethylene glycol **AND** bisacodyl **AND** bisacodyl    dextromethorphan polistirex ER    guaiFENesin    melatonin    ondansetron ODT **OR** ondansetron    sennosides-docusate    Allergies as of 05/25/2025 - Reviewed 05/25/2025   Allergen Reaction Noted    Cephalexin Hives and Rash 06/17/2020    Clindamycin/lincomycin Hives 09/14/2021       Social History     Socioeconomic History    Marital status:    Tobacco Use    Smoking status: Never    Smokeless tobacco: Never   Vaping Use    Vaping status: Never Used   Substance and Sexual Activity    Alcohol use: Not Currently     Comment: occassionally    Drug use: Never    Sexual activity: Not Currently     Partners: Male     Birth control/protection: Post-menopausal       Family History   Problem Relation Age of Onset    Breast cancer Mother     Hypertension Other     Ovarian cancer Neg Hx     Uterine cancer Neg Hx     Colon cancer Neg Hx     Malig Hyperthermia Neg Hx        Review of Systems   Constitutional: Positive for malaise/fatigue. Negative for chills and fever.   HENT:  Negative for hoarse voice and sore throat.    Eyes:  Negative for double vision and photophobia.   Cardiovascular:  Positive for dyspnea on exertion and leg swelling. Negative for chest pain,  "near-syncope, orthopnea, palpitations, paroxysmal nocturnal dyspnea and syncope.   Respiratory:  Positive for cough and shortness of breath. Negative for wheezing.    Skin:  Negative for poor wound healing and rash.   Musculoskeletal:  Negative for arthritis and joint swelling.   Gastrointestinal:  Negative for bloating, abdominal pain, hematemesis and hematochezia.   Neurological:  Negative for dizziness and focal weakness.   Psychiatric/Behavioral:  Negative for depression and suicidal ideas.             Objective:   Temp:  [97.4 °F (36.3 °C)-99.8 °F (37.7 °C)] 99.8 °F (37.7 °C)  Heart Rate:  [75-85] 83  Resp:  [16-20] 18  BP: (107-140)/(68-83) 130/76  Body mass index is 62.5 kg/m².  Flowsheet Rows      Flowsheet Row First Filed Value   Admission Height 160 cm (62.99\") Documented at 05/25/2025 1303   Admission Weight 158 kg (349 lb) Documented at 05/25/2025 1303          Vitals:    05/26/25 0718   BP: 130/76   Pulse: 83   Resp: 18   Temp: 99.8 °F (37.7 °C)   SpO2: 92%       Vitals reviewed.   Constitutional:       Appearance: Frail. Acutely ill-appearing.   Neck:      Comments: JVD difficult to assess second body habitus  Pulmonary:      Effort: Pulmonary effort is normal.      Comments: Poor air entry may be difficult examination secondary to body habitus  Chest:      Chest wall: Not tender to palpatation.   Cardiovascular:      PMI at left midclavicular line. Normal rate. Regular rhythm. Normal S1. Normal S2.       Murmurs: There is a systolic murmur.      No gallop.  No click. No rub.   Pulses:     Intact distal pulses.   Edema:     Pretibial: bilateral 2+ edema of the pretibial area.     Ankle: bilateral 2+ edema of the ankle.     Feet: bilateral 2+ edema of the feet.  Abdominal:      General: Bowel sounds are normal.      Palpations: Abdomen is soft.      Tenderness: There is no abdominal tenderness.   Musculoskeletal: Normal range of motion.         General: No tenderness. Skin:     General: Skin is warm and " dry.   Neurological:      General: No focal deficit present.      Mental Status: Alert and oriented to person, place and time.                 Lab Review:      Results from last 7 days   Lab Units 05/26/25  0401 05/25/25  1344   SODIUM mmol/L 135* 136   POTASSIUM mmol/L 4.4 4.4   CHLORIDE mmol/L 101 104   CO2 mmol/L 24.4 23.0   BUN mg/dL 10 10   CREATININE mg/dL 0.94 0.85   CALCIUM mg/dL 8.6 8.4*   BILIRUBIN mg/dL  --  0.4   ALK PHOS U/L  --  115   ALT (SGPT) U/L  --  8   AST (SGOT) U/L  --  13   GLUCOSE mg/dL 89 89     Results from last 7 days   Lab Units 05/25/25  1533 05/25/25  1344   HSTROP T ng/L 26* 27*     @LABRCNTbnp@  Results from last 7 days   Lab Units 05/26/25  0401 05/25/25  1344 05/24/25  0526   WBC 10*3/mm3 6.92 6.93 6.79   HEMOGLOBIN g/dL 8.2* 7.9* 7.2*   HEMATOCRIT % 26.1* 26.1* 23.0*   PLATELETS 10*3/mm3 323 341 338             @LABRCNTIP(chol,trig,hdl,ldl)    I personally viewed and interpreted the patient's EKG/Telemetry data    Acute exacerbation of CHF (congestive heart failure)    Assessment and Plan:    Acute hypoxic respiratory failure -etiology uncertain.  She is mildly hypervolemic and much regurgitation could be worse at this time.  We will repeat an echocardiogram.  She also had interruption in anticoagulation with history of VTE and has been immobile.  Case details discussed with Dr. Giron who will check a CTA chest to rule out pulmonary embolism.  Follow diagnostic testing results further treatment recommendations.  Will continue IV diuretics for now.  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  Not consistent with ACS.    Jack Arriaga Jr,  MD  05/26/25  09:50 EDT.  Time spent in reviewing chart, discussion and examination:

## 2025-05-26 NOTE — PLAN OF CARE
Goal Outcome Evaluation:  Plan of Care Reviewed With: patient           Outcome Evaluation: Patient admitted from rehab with exacerbation of CHF.  Patient was recently hospitalized, but reports she was independently mobile with rolling walker prior to admission.  Patient was in bed and agreeable to therapy.  She transition to sitting edge of bed with little assistance and use of bed rails.  She stood with mod assist and was able to take a few shuffling sidesteps to HOB with Rwx.  Patient was unsteady and fatigued quickly and standing.  Patient reported blurry vision while standing.  She complained of shortness of breath with activity but O2 sat remained above 95% on supplemental oxygen.  Patient presents with generalized weakness, limited endurance, impaired balance and functional mobility below baseline.  She would benefit from PT to address these impairments while admitted.  Patient plans to return to rehab.    Anticipated Discharge Disposition (PT): skilled nursing facility

## 2025-05-26 NOTE — SIGNIFICANT NOTE
05/26/25 1222   OTHER   Discipline physical therapist   Rehab Time/Intention   Session Not Performed patient unavailable for evaluation  (off the floor to Echo. Will follow up tomorrow)   Recommendation   PT - Next Appointment 05/27/25

## 2025-05-27 ENCOUNTER — APPOINTMENT (OUTPATIENT)
Dept: CT IMAGING | Facility: HOSPITAL | Age: 65
End: 2025-05-27
Payer: MEDICARE

## 2025-05-27 LAB
ABO GROUP BLD: NORMAL
ANION GAP SERPL CALCULATED.3IONS-SCNC: 10 MMOL/L (ref 5–15)
BLD GP AB SCN SERPL QL: NEGATIVE
BUN SERPL-MCNC: 13 MG/DL (ref 8–23)
BUN/CREAT SERPL: 11.7 (ref 7–25)
C AURIS DNA SPEC QL NAA+NON-PROBE: NOT DETECTED
CALCIUM SPEC-SCNC: 8.3 MG/DL (ref 8.6–10.5)
CHLORIDE SERPL-SCNC: 99 MMOL/L (ref 98–107)
CO2 SERPL-SCNC: 27 MMOL/L (ref 22–29)
CREAT SERPL-MCNC: 1.11 MG/DL (ref 0.57–1)
EGFRCR SERPLBLD CKD-EPI 2021: 55.3 ML/MIN/1.73
GLUCOSE SERPL-MCNC: 87 MG/DL (ref 65–99)
HCT VFR BLD AUTO: 23 % (ref 34–46.6)
HGB BLD-MCNC: 7.1 G/DL (ref 12–15.9)
POTASSIUM SERPL-SCNC: 3.9 MMOL/L (ref 3.5–5.2)
RH BLD: POSITIVE
SODIUM SERPL-SCNC: 136 MMOL/L (ref 136–145)
T&S EXPIRATION DATE: NORMAL

## 2025-05-27 PROCEDURE — 86900 BLOOD TYPING SEROLOGIC ABO: CPT | Performed by: HOSPITALIST

## 2025-05-27 PROCEDURE — 86901 BLOOD TYPING SEROLOGIC RH(D): CPT | Performed by: HOSPITALIST

## 2025-05-27 PROCEDURE — 97530 THERAPEUTIC ACTIVITIES: CPT

## 2025-05-27 PROCEDURE — 85018 HEMOGLOBIN: CPT | Performed by: HOSPITALIST

## 2025-05-27 PROCEDURE — 99232 SBSQ HOSP IP/OBS MODERATE 35: CPT | Performed by: STUDENT IN AN ORGANIZED HEALTH CARE EDUCATION/TRAINING PROGRAM

## 2025-05-27 PROCEDURE — 80048 BASIC METABOLIC PNL TOTAL CA: CPT | Performed by: HOSPITALIST

## 2025-05-27 PROCEDURE — 25010000002 FUROSEMIDE PER 20 MG: Performed by: HOSPITALIST

## 2025-05-27 PROCEDURE — 25010000002 CEFTRIAXONE PER 250 MG: Performed by: INTERNAL MEDICINE

## 2025-05-27 PROCEDURE — P9016 RBC LEUKOCYTES REDUCED: HCPCS

## 2025-05-27 PROCEDURE — 94664 DEMO&/EVAL PT USE INHALER: CPT

## 2025-05-27 PROCEDURE — 25510000001 IOPAMIDOL PER 1 ML: Performed by: HOSPITALIST

## 2025-05-27 PROCEDURE — 25810000003 SODIUM CHLORIDE 0.9 % SOLUTION: Performed by: HOSPITALIST

## 2025-05-27 PROCEDURE — 85014 HEMATOCRIT: CPT | Performed by: HOSPITALIST

## 2025-05-27 PROCEDURE — 36430 TRANSFUSION BLD/BLD COMPNT: CPT

## 2025-05-27 PROCEDURE — 94799 UNLISTED PULMONARY SVC/PX: CPT

## 2025-05-27 PROCEDURE — 71275 CT ANGIOGRAPHY CHEST: CPT

## 2025-05-27 PROCEDURE — 86923 COMPATIBILITY TEST ELECTRIC: CPT

## 2025-05-27 PROCEDURE — 94761 N-INVAS EAR/PLS OXIMETRY MLT: CPT

## 2025-05-27 PROCEDURE — 86850 RBC ANTIBODY SCREEN: CPT | Performed by: HOSPITALIST

## 2025-05-27 PROCEDURE — 86900 BLOOD TYPING SEROLOGIC ABO: CPT

## 2025-05-27 PROCEDURE — 97166 OT EVAL MOD COMPLEX 45 MIN: CPT

## 2025-05-27 RX ORDER — FUROSEMIDE 10 MG/ML
20 INJECTION INTRAMUSCULAR; INTRAVENOUS ONCE
Status: COMPLETED | OUTPATIENT
Start: 2025-05-27 | End: 2025-05-27

## 2025-05-27 RX ORDER — METOPROLOL SUCCINATE 25 MG/1
25 TABLET, EXTENDED RELEASE ORAL DAILY
Status: DISCONTINUED | OUTPATIENT
Start: 2025-05-27 | End: 2025-06-03 | Stop reason: HOSPADM

## 2025-05-27 RX ORDER — IOPAMIDOL 755 MG/ML
100 INJECTION, SOLUTION INTRAVASCULAR
Status: COMPLETED | OUTPATIENT
Start: 2025-05-27 | End: 2025-05-27

## 2025-05-27 RX ADMIN — SODIUM CHLORIDE 500 ML: 9 INJECTION, SOLUTION INTRAVENOUS at 08:52

## 2025-05-27 RX ADMIN — Medication 10 ML: at 21:26

## 2025-05-27 RX ADMIN — FUROSEMIDE 20 MG: 10 INJECTION, SOLUTION INTRAMUSCULAR; INTRAVENOUS at 17:39

## 2025-05-27 RX ADMIN — IPRATROPIUM BROMIDE AND ALBUTEROL SULFATE 3 ML: .5; 3 SOLUTION RESPIRATORY (INHALATION) at 20:33

## 2025-05-27 RX ADMIN — IPRATROPIUM BROMIDE AND ALBUTEROL SULFATE 3 ML: .5; 3 SOLUTION RESPIRATORY (INHALATION) at 07:26

## 2025-05-27 RX ADMIN — CEFTRIAXONE 2000 MG: 2 INJECTION, POWDER, FOR SOLUTION INTRAMUSCULAR; INTRAVENOUS at 21:26

## 2025-05-27 RX ADMIN — Medication 10 ML: at 08:52

## 2025-05-27 RX ADMIN — DEXTROMETHORPHAN 60 MG: 30 SUSPENSION, EXTENDED RELEASE ORAL at 18:22

## 2025-05-27 RX ADMIN — METOPROLOL SUCCINATE 25 MG: 25 TABLET, EXTENDED RELEASE ORAL at 08:52

## 2025-05-27 RX ADMIN — FUROSEMIDE 20 MG: 10 INJECTION, SOLUTION INTRAMUSCULAR; INTRAVENOUS at 08:53

## 2025-05-27 RX ADMIN — FERROUS SULFATE TAB 325 MG (65 MG ELEMENTAL FE) 325 MG: 325 (65 FE) TAB at 08:52

## 2025-05-27 RX ADMIN — RIVAROXABAN 20 MG: 20 TABLET, FILM COATED ORAL at 17:39

## 2025-05-27 RX ADMIN — CEFTRIAXONE 2000 MG: 2 INJECTION, POWDER, FOR SOLUTION INTRAMUSCULAR; INTRAVENOUS at 08:53

## 2025-05-27 RX ADMIN — IPRATROPIUM BROMIDE AND ALBUTEROL SULFATE 3 ML: .5; 3 SOLUTION RESPIRATORY (INHALATION) at 15:46

## 2025-05-27 RX ADMIN — MEGESTROL ACETATE 40 MG: 40 TABLET ORAL at 08:54

## 2025-05-27 RX ADMIN — IOPAMIDOL 95 ML: 755 INJECTION, SOLUTION INTRAVENOUS at 09:58

## 2025-05-27 NOTE — PLAN OF CARE
Goal Outcome Evaluation:  Plan of Care Reviewed With: patient        Progress: no change  Outcome Evaluation: Transfer from CVU, currently on room air and up in chair. Type and screen completed and 1/2 units currently running. Patient to recieved Lasix in between units. PICC line dressing completed. Pulmonary consulted, CTA chest completed. Will continue on Xarelto after verifying with Dr. Giron. Family at bedside. A little forgetful at times.

## 2025-05-27 NOTE — PROGRESS NOTES
"    DAILY PROGRESS NOTE  [facility]    Patient Identification:  Name: Emely Solomon  Age: 64 y.o.  Sex: female  :  1960  MRN: [id number]         Primary Care Physician: Adilson Wilkerson MD    Subjective:  Interval History: Patient unfortunately anteriors over her room change.  I reassured her.  This a.m. when I reviewed her labs Istalol a slight bump in her creatinine 1.1.  I did not feel her pitting edema was all that horrible the other day so I held further IV diuretics and gave her a little bit of IVF with 500 cc for renal protection given the fact that she had pending CTA still to this point.  Unfortunately patient received IV Lasix this a.m. per RN.  Patient not complaining of any chest pain and ultimately states that she is feeling better overall and not really short of breath either.    Objective: Case discussed with managing RN x 2 as well as with spouse at bedside    Scheduled Meds:cefTRIAXone (ROCEPHIN) 2,000 mg in sodium chloride 0.9 % 100 mL MBP, 2,000 mg, Intravenous, Q12H  ferrous sulfate, 325 mg, Oral, Daily With Breakfast  ipratropium-albuterol, 3 mL, Nebulization, 4x Daily - RT  Lidocaine, 1 patch, Transdermal, Q24H  megestrol, 40 mg, Oral, Daily  metoprolol succinate XL, 12.5 mg, Oral, Daily  rivaroxaban, 20 mg, Oral, Daily With Dinner  sodium chloride, 500 mL, Intravenous, Once  sodium chloride, 10 mL, Intravenous, Q12H      Continuous Infusions:     Vital signs in last 24 hours:  Temp:  [97.7 °F (36.5 °C)-98.7 °F (37.1 °C)] 98.1 °F (36.7 °C)  Heart Rate:  [74-91] 83  Resp:  [18-20] 20  BP: (113-128)/(72-78) 113/72    Intake/Output:    Intake/Output Summary (Last 24 hours) at 2025 0741  Last data filed at 2025 0010  Gross per 24 hour   Intake 580 ml   Output 3750 ml   Net -3170 ml       Exam:  /72 (Patient Position: Lying)   Pulse 83   Temp 98.1 °F (36.7 °C) (Oral)   Resp 20   Ht 160 cm (62.99\")   Wt (!) 161 kg (354 lb)   SpO2 99%   BMI 62.73 " kg/m²     General Appearance:    Alert, cooperative, NT, AAOx3, tearful at times                         Throat:   Oral mucosa pink and moist                           Neck:   Supple, symmetrical, trachea midline, no JVD                         Lungs:    Clear to auscultation bilaterally, respirations unlabored                 Chest Wall:    No tenderness or deformity                          Heart:    Regular rate and rhythm, S1 and S2 normal                  Abdomen:     Soft, nontender, bowel sounds active, morbidly obese/BMI 62+                 extremities: Chronic lymphedema but I do not feel she is horribly volume overloaded and nothing really pitting.  Stasis changes noted.  No cellulitis                        Pulses:   Pulses palpable in all extremities                  Neurologic:   CNII-XII intact     Data Review:  Labs in chart were reviewed.    Assessment:  Active Hospital Problems    Diagnosis  POA    **Acute exacerbation of CHF (congestive heart failure) [I50.9]  Yes      Resolved Hospital Problems   No resolved problems to display.       Plan:    Acute hypoxia secondary to concerns for PE given recent holding of AC due to GYN bleeding compounded by morbid obesity/ARIEL with a past history of mitral valve endocarditis just discharged to outlying facility 5/24/2025 per Dr. Mclean (DC summary reviewed)              - Case discussed at length with  yesterday as he is checking an echo and wishes to continue with IV Lasix though CHF in regards to mild acute exacerbation.  Echo shows normal EF with grade 2 diastolic dysfunction mild aortic valve stenosis with persistence of small mobile echodensity              - Maintained on Xarelto though has been held as well due to complications from vaginal bleeding deemed chronic with the addition of progesterone and plans for outpatient follow-up.  Hemoglobin further trending down 8.2-7.1 and I am going to go ahead and type and screen patient and transfuse 2  units packed red blood cells today given her need for AC.  20 mg of Lasix IV ordered x 1 between units.  On p.o. iron              - Stop date for Rocephin per ID is 6/17/2025 (WBC normal)              - Troponins negative.  Chest x-ray not impressive and EKG without acute change with O2 requirements ranging 2-4 L currently with ARIEL/CPAP              - Recent 5/5/2025 echo shows EF of 66 to 70% with grade 1 diastolic dysfunction compounded by large mobile echodensity on mitral valve without effusion and cardiology to repeat the echo              - CTA of the chest to ensure no PE compounding her oxygen complaint/dyspnea.  Her creatinine was normal and CTA still not complete today.  I have discontinued IV Lasix for right now for renal protection and will give a gentle 500 cc bolus in hopes the creatinine does not cause any renal dysfunction/ARSEN.  Will await further recommendations from cardiology on transition to oral Lasix but may want to trend creatinine another day to ensure no further escalation        Trend RFP with mag in a.m. as well as uric    Transfuse 2 units packed red blood cells today with 20 mg of IV Lasix in between units.  Unfortunately RN in error already gave the Lasix this morning.  This was discussed with new managing RN as patient was transferred from 2 S. over to 4 E.    Overall tough clinical situation      Addendum: CTA equivocal.  AC was Xarelto continued despite GYN bleeding given concern for possible PE.  Pulmonary consult placed    Toño Giron MD  5/27/2025  07:41 EDT

## 2025-05-27 NOTE — THERAPY TREATMENT NOTE
Patient Name: Emely Solomon  : 1960    MRN: [id number]                              Today's Date: 2025       Admit Date: 2025    Visit Dx:     ICD-10-CM ICD-9-CM   1. Acute on chronic congestive heart failure, unspecified heart failure type  I50.9 428.0     Patient Active Problem List   Diagnosis    Chronic diastolic CHF (congestive heart failure)    PFO (patent foramen ovale)    Paradoxical embolism    Essential hypertension    Pulmonary hypertension    Upper back pain    Bacteremia due to group B Streptococcus    ARIEL on CPAP    Class 3 severe obesity due to excess calories with serious comorbidity and body mass index (BMI) of 60.0 to 69.9 in adult    History of DVT (deep vein thrombosis)    Lymphedema    Anemia, chronic disease    Iron deficiency    Post-menopausal bleeding    Thickened endometrium    Generalized muscle weakness    Right bundle branch block (RBBB) with left anterior fascicular block (LAFB)    PVD (peripheral vascular disease) with claudication    Iliac vein stenosis, right    Hypoxia    Hyperglycemia    History of pulmonary embolism    Morbid obesity    Hyponatremia    Bacterial endocarditis    Sepsis due to methicillin susceptible Staphylococcus aureus (MSSA) with acute hypoxic respiratory failure without septic shock    Acute on chronic heart failure with preserved ejection fraction (HFpEF)    On home oxygen therapy    Type 2 myocardial infarction    Acute loss of vision, bilateral    Chronic respiratory failure with hypoxia    Acute loss of vision    Acute exacerbation of CHF (congestive heart failure)     Past Medical History:   Diagnosis Date    Arthritis     Cellulitis     2017, with Group B Strep bacteremia and sepsis    Chronic deep vein thrombosis (DVT) of left popliteal vein 2015, 2016    Chronic diastolic congestive heart failure     COVID-19 virus infection 2020    Heart murmur     Hypertension     Iliac vein stenosis, right  05/15/2024    Lipoedema     Lymphedema     other    Morbid obesity     ARIEL on CPAP     PFO (patent foramen ovale)     Postthrombotic syndrome of left lower extremity without complications 12/17/2015    postphletibis    Pulmonary embolism 04/14/2016    Pulmonary hypertension     multifactorial (dCHF, obesity/ARIEL, hx PE), mild by echo 1/2016    Right bundle branch block (RBBB) with left anterior fascicular block (LAFB)     Sepsis 09/18/2020    Type 2 myocardial infarction 05/20/2025     Past Surgical History:   Procedure Laterality Date    ARTERIOGRAM  07/2015    BRONCHOSCOPY N/A 07/21/2017    Procedure: BRONCHOSCOPY with wash;  Surgeon: Caleb Garcia MD;  Location: [facility] ENDOSCOPY;  Service:     COLONOSCOPY      COLONOSCOPY N/A 01/26/2023    Procedure: COLONOSCOPY to CECUM AND TERM ILEUM;  Surgeon: Jj Dean MD;  Location: [facility] ENDOSCOPY;  Service: Gastroenterology;  Laterality: N/A;  PRE OP -screening  POST OP -  NORMAL    D & C HYSTEROSCOPY N/A 03/08/2022    Procedure: DILATATION AND CURETTAGE with hysteroscopy;  Surgeon: Kaylah Land DO;  Location: [facility] MAIN OR;  Service: Gynecology Oncology;  Laterality: N/A;    DILATATION AND CURETTAGE  04/11/2011    OTHER SURGICAL HISTORY  09/2015    IVC filter    OTHER SURGICAL HISTORY      left LE revascularization    OTHER SURGICAL HISTORY      ALESSIA and LEV scan    THROMBECTOMY  07/2015      General Information       Row Name 05/27/25 1538          Physical Therapy Time and Intention    Document Type therapy note (daily note) (P)   -AA     Mode of Treatment physical therapy;co-treatment;occupational therapy (P)   -AA       Row Name 05/27/25 1538          General Information    Patient Profile Reviewed yes (P)   -AA     Existing Precautions/Restrictions fall (P)   -AA       Row Name 05/27/25 1537          Cognition    Orientation Status (Cognition) oriented x 4 (P)   -AA       Row Name 05/27/25 1533          Safety Issues/Impairments Affecting Functional  Mobility    Impairments Affecting Function (Mobility) balance;endurance/activity tolerance;strength;range of motion (ROM) (P)   -AA               User Key  (r) = Recorded By, (t) = Taken By, (c) = Cosigned By      Initials Name Provider Type    Simone Wadsworth, PT Student PT Student                   Mobility       Row Name 05/27/25 1539          Bed Mobility    Bed Mobility supine-sit (P)   -AA     Supine-Sit [place] (Bed Mobility) standby assist (P)   -AA     Assistive Device (Bed Mobility) head of bed elevated;bed rails (P)   -AA       Row Name 05/27/25 1539          Sit-Stand Transfer    Sit-Stand [place] (Transfers) minimum assist (75% patient effort);2 person assist (P)   -AA     Assistive Device (Sit-Stand Transfers) walker, front-wheeled (P)   -AA       Row Name 05/27/25 1539          Gait/Stairs (Locomotion)    [place] Level (Gait) minimum assist (75% patient effort);2 person assist (P)   -AA     Assistive Device (Gait) walker, front-wheeled (P)   -AA     Distance in Feet (Gait) 2 (P)   -AA     Deviations/Abnormal Patterns (Gait) stride length decreased;gait speed decreased (P)   -AA     Bilateral Gait Deviations forward flexed posture (P)   -AA     Comment, (Gait/Stairs) Patient ambulated a few feet from EOB to recliner (P)   -AA               User Key  (r) = Recorded By, (t) = Taken By, (c) = Cosigned By      Initials Name Provider Type    Simone Wadsworth PT Student PT Student                   Obj/Interventions       Row Name 05/27/25 1543          Range of Motion Comprehensive    General Range of Motion lower extremity range of motion deficits identified (P)   -AA       Row Name 05/27/25 1543          Strength Comprehensive (MMT)    Comment, General Manual Muscle Testing (MMT) Assessment Generalized weakness noted (P)   -AA               User Key  (r) = Recorded By, (t) = Taken By, (c) = Cosigned By      Initials Name Provider Type    Simone Wadsworth, PT Student PT Student                    Goals/Plan    No documentation.                  Clinical Impression       Row Name 05/27/25 1543          Pain    Pre/Posttreatment Pain Comment Pt had c/o minor pain in R knee (P)   -AA       Row Name 05/27/25 1543          Plan of Care Review    Plan of Care Reviewed With patient;sibling (P)   -AA     Outcome Evaluation Pt seen for PT this PM. Pt moved from supine to sitting EOB with SBA and head of bed elevated/use of bed rails. Pt completed STS from EOB to rwx with Cirilo x2 and then walked 2 ft to recliner. Pt returned to recliner and sat down with Cirilo x2. Pt able to scoot back in recliner independently with difficulty. Pt had slight pain in L knee during todays session. Plan to go to SNF upon DC. PT will continue to follow. (P)   -AA       Row Name 05/27/25 1543          Therapy Assessment/Plan (PT)    Rehab Potential (PT) good (P)   -AA     Criteria for Skilled Interventions Met (PT) yes (P)   -AA     Therapy Frequency (PT) 5 times/wk (P)   -AA       Row Name 05/27/25 1543          Positioning and Restraints    Pre-Treatment Position in bed (P)   -AA     Post Treatment Position chair (P)   -AA     In Chair reclined;call light within reach;encouraged to call for assist;exit alarm on;with family/caregiver (P)   -AA               User Key  (r) = Recorded By, (t) = Taken By, (c) = Cosigned By      Initials Name Provider Type    Simone Wadsworth, PT Student PT Student                   Outcome Measures       Row Name 05/27/25 1548 05/27/25 1030       How much help from another person do you currently need...    Turning from your back to your side while in flat bed without using bedrails? 3 (P)   -AA 3  -JH    Moving from lying on back to sitting on the side of a flat bed without bedrails? 2 (P)   -AA 2  -JH    Moving to and from a bed to a chair (including a wheelchair)? 3 (P)   -AA 2  -JH    Standing up from a chair using your arms (e.g., wheelchair, bedside chair)? 3 (P)   -AA 2  -JH    Climbing 3-5 steps with a  railing? 1 (P)   - 1  -    To walk in hospital room? 2 (P)   - 3  -    AM-PAC 6 Clicks Score (PT) 14 (P)   - 13  -    Highest Level of Mobility Goal Move to Chair/Commode-4 (P)   - Move to Chair/Commode-4  -      Row Name 05/27/25 1548          Functional Assessment    Outcome Measure Options AM-PAC 6 Clicks Basic Mobility (PT) (P)   -               User Key  (r) = Recorded By, (t) = Taken By, (c) = Cosigned By      Initials Name Provider Type     Kamilla Daly RN Registered Nurse    Siomne Wadsworth, PT Student PT Student                                 Physical Therapy Education       Title: PT OT SLP Therapies (Done)       Topic: Physical Therapy (Done)       Point: Mobility training (Done)       Learning Progress Summary            Patient Acceptance, E,TB, VU,NR by  at 5/27/2025 1549    Acceptance, TB,E, VU by  at 5/26/2025 0825                      Point: Home exercise program (Done)       Learning Progress Summary            Patient Acceptance, E,TB, VU,NR by  at 5/27/2025 1549    Acceptance, TB,E, VU by  at 5/26/2025 0825                      Point: Body mechanics (Done)       Learning Progress Summary            Patient Acceptance, E,TB, VU,NR by  at 5/27/2025 1549    Acceptance, TB,E, VU by  at 5/26/2025 0825                      Point: Precautions (Done)       Learning Progress Summary            Patient Acceptance, E,TB, VU,NR by  at 5/27/2025 1549    Acceptance, TB,E, VU by  at 5/26/2025 0825                                      User Key       Initials Effective Dates Name Provider Type [facility] 06/16/21 -  Cynthia Cates, RN Registered Nurse Nurse     01/22/25 -  Simone Mcfadden, PT Student PT Student PT                  PT Recommendation and Plan     Outcome Evaluation: (P) Pt seen for PT this PM. Pt moved from supine to sitting EOB with SBA and head of bed elevated/use of bed rails. Pt completed STS from EOB to rwx with Cirilo x2 and then walked 2 ft to  recliner. Pt returned to recliner and sat down with Cirilo x2. Pt able to scoot back in recliner independently with difficulty. Pt had slight pain in L knee during todays session. Plan to go to SNF upon DC. PT will continue to follow.     Time Calculation:         PT Charges       Row Name 05/27/25 1549             Time Calculation    Start Time 1515 (P)   -AA      Stop Time 1533 (P)   -AA      Time Calculation (min) 18 min (P)   -AA      PT Received On 05/27/25 (P)   -AA      PT - Next Appointment 05/28/25 (P)   -AA      PT Goal Re-Cert Due Date 06/05/25 (P)   -AA         Time Calculation- PT    Total Timed Code Minutes- PT 18 minute(s) (P)   -AA         Timed Charges    90994 - PT Therapeutic Activity Minutes 18 (P)   -AA         Total Minutes    Timed Charges Total Minutes 18 (P)   -AA       Total Minutes 18 (P)   -AA                User Key  (r) = Recorded By, (t) = Taken By, (c) = Cosigned By      Initials Name Provider Type    Simone Wadsworth, PT Student PT Student                      PT G-Codes  Outcome Measure Options: (P) AM-PAC 6 Clicks Basic Mobility (PT)  AM-PAC 6 Clicks Score (PT): (P) 14  PT Discharge Summary  Anticipated Discharge Disposition (PT): (P) skilled nursing facility    Simone Mcfadden, PT Student  5/27/2025

## 2025-05-27 NOTE — PLAN OF CARE
Goal Outcome Evaluation:  Plan of Care Reviewed With: (P) patient, sibling           Outcome Evaluation: (P) Pt seen for PT this PM. Pt moved from supine to sitting EOB with SBA and head of bed elevated/use of bed rails. Pt completed STS from EOB to rwx with Cirilo x2 and then walked 2 ft to recliner. Pt returned to recliner and sat down with Cirilo x2. Pt able to scoot back in recliner independently with difficulty. Pt had slight pain in L knee during todays session. Plan to go to SNF upon DC. PT will continue to follow.    Anticipated Discharge Disposition (PT): (P) skilled nursing facility

## 2025-05-27 NOTE — PROGRESS NOTES
Hospital Follow Up    LOS:  LOS: 2 days   Patient Name: Emely Solomon  Age/Sex: 64 y.o. female  : 1960  MRN: [id number]    Day of Service: 25   Length of Stay: 2  Encounter Provider: Mateo Garland MD  Place of Service: [facility] CARDIOLOGY  Patient Care Team:  Adilson Wilkerson MD as PCP - General (Family Medicine)  [place] Segovia MD as Referring Physician (Obstetrics and Gynecology)  Brennan Rogers MD as Cardiologist (Cardiology)  Caleb Garcia MD as Consulting Physician (Pulmonary Disease)  Jess Sanz, RAMIN as Ambulatory  (Population Health)    Subjective:     Chief Complaint: CHF    Interval History:   No acute complaints   Overnight: Afebrile, hemodynamically stable.  3.7 L of urine output on Lasix 40 mg twice daily, though charted weight unchanged.  Creatinine 0.1 from 0.9.  Hemoglobin has been around 7.  CTA pending.  Echocardiogram concerning for possible mitral stenosis mean gradient 9 mmHg.    Objective:     Objective:  Temp:  [97.7 °F (36.5 °C)-99.8 °F (37.7 °C)] 98.1 °F (36.7 °C)  Heart Rate:  [74-91] 82  Resp:  [18] 18  BP: (113-130)/(72-78) 113/72     Intake/Output Summary (Last 24 hours) at 2025 0616  Last data filed at 2025 0010  Gross per 24 hour   Intake 580 ml   Output 3750 ml   Net -3170 ml     Body mass index is 62.73 kg/m².      25  0525 25  1133 25  0320   Weight: (!) 160 kg (352 lb 11.8 oz) (!) 160 kg (352 lb) (!) 161 kg (354 lb)     Weight change: 1.361 kg (3 lb)      Physical Exam:   General : Alert, cooperative, in no acute distress.  Neuro: Alert,cooperative and oriented.  Lungs: on o2. Normal respiratory effort and rate.  CV: Regular rate and rhythm, normal S1 and S2, ARIELLE 3/6  ABD: Soft, nontender, nondistended. Positive bowel sounds.  Extr: chronic lymphedema     Lab Review:   Results from last 7 days   Lab Units 25  0456 25  0401 25  1344 25  0654  "05/20/25  1111   SODIUM mmol/L 136 135* 136   < > 134*   POTASSIUM mmol/L 3.9 4.4 4.4   < > 3.9   CHLORIDE mmol/L 99 101 104   < > 99   CO2 mmol/L 27.0 24.4 23.0   < > 25.9   BUN mg/dL 13 10 10   < > 11   CREATININE mg/dL 1.11* 0.94 0.85   < > 0.91   GLUCOSE mg/dL 87 89 89   < > 90   CALCIUM mg/dL 8.3* 8.6 8.4*   < > 8.6   AST (SGOT) U/L  --   --  13  --  11   ALT (SGPT) U/L  --   --  8  --  9    < > = values in this interval not displayed.     Results from last 7 days   Lab Units 05/25/25  1533 05/25/25  1344   HSTROP T ng/L 26* 27*     Results from last 7 days   Lab Units 05/27/25  0456 05/26/25  0401 05/25/25  1344   WBC 10*3/mm3  --  6.92 6.93   HEMOGLOBIN g/dL 7.1* 8.2* 7.9*   HEMATOCRIT % 23.0* 26.1* 26.1*   PLATELETS 10*3/mm3  --  323 341                   Invalid input(s): \"LDLCALC\"  Results from last 7 days   Lab Units 05/25/25  1344   PROBNP pg/mL 1,946.0*         I reviewed the patient's new clinical results.  I personally viewed and interpreted the patient's EKG  Current Medications:   Scheduled Meds:cefTRIAXone (ROCEPHIN) 2,000 mg in sodium chloride 0.9 % 100 mL MBP, 2,000 mg, Intravenous, Q12H  ferrous sulfate, 325 mg, Oral, Daily With Breakfast  furosemide, 40 mg, Intravenous, Q12H  ipratropium-albuterol, 3 mL, Nebulization, 4x Daily - RT  Lidocaine, 1 patch, Transdermal, Q24H  megestrol, 40 mg, Oral, Daily  metoprolol succinate XL, 12.5 mg, Oral, Daily  rivaroxaban, 20 mg, Oral, Daily With Dinner  sodium chloride, 10 mL, Intravenous, Q12H      Continuous Infusions:     Allergies:  Allergies   Allergen Reactions    Cephalexin Hives and Rash     Diffuse rash on cephalexin 1 g PO q6h on 6/17/20  Tolerated zosyn and PCN G September 2020 without issue    Clindamycin/Lincomycin Hives       Assessment/Plan:   Acute hypoxic respiratory failure -likely multifactorial.  CTA chest is pending she did have interruption of her anticoagulation.  Cardiogram was stable mitral regurgitation but there is concern for " increased gradients suggestive of mitral stenosis.    History of MV endocarditis -continue antibiotics  Concern for mitral stenosis: Mean gradient of 9 on echocardiogram.  This is in the setting of mitral valve endocarditis.  Elevated from prior.  Heart rate in the mid 80s.  She is not a candidate for any surgical intervention due to her morbid obesity underlying comorbidities.  She was previously evaluated by CV surgery.  Will continue diuresis and increase beta-blocker to 25 mg from 12.5  ARIEL on CPAP  Morbid obesity  Nonrheumatic mitral regurgitation  History of VTE/PE on anticoagulation  History of recent endometrial bleed  Pulmonary hypertension  Acute on chronic diastolic heart failure  Chronic anemia  Elevated cardiac biomarkers -minimally elevated in the setting of acute on chronic diastolic heart failure.  Not consistent with ACS.    Overall this is a tough situation.  She has known mitral valve endocarditis and is not a surgical candidate.  Possibly complicated by mitral stenosis.  Volume status very difficult evaluate.  Pending CT.  For now I think we should increase her beta-blocker to try to give more diastolic filling time, follow-up CT, and possibly transition to oral Lasix.     Mateo Garland MD  05/27/25  06:16 EDT

## 2025-05-27 NOTE — DISCHARGE PLACEMENT REQUEST
"Emely Solomon (64 y.o. Female)       Date of Birth   1960    Social Security Number       Address   [address] [place] [id number]    Home Phone   [phone]    MRN   [id number]       Restorationism   Non-Quaker    Marital Status                               Admission Date   5/25/2025    Admission Type   Emergency    Admitting Provider   Lexii Street MD    Attending Provider   Toño Giron MD    Department, Room/Bed   [address], [address]/1       Discharge Date       Discharge Disposition       Discharge Destination                                 Attending Provider: Toño Giron MD    Allergies: Cephalexin, Clindamycin/lincomycin    Isolation: Contact   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)   Code Status: CPR    Ht: 160 cm (62.99\")   Wt: 161 kg (354 lb)    Admission Cmt: None   Principal Problem: Acute exacerbation of CHF (congestive heart failure) [I50.9]                   Active Insurance as of 5/25/2025       Primary Coverage       Payor Plan Insurance Group Employer/Plan Group    MEDICARE MEDICARE A & B        Payor Plan Address Payor Plan Phone Number Payor Plan Fax Number Effective Dates    [address] [phone]  7/1/2024 - None Entered    [facility] [id number]         Subscriber Name Subscriber Birth Date Member ID       EMELY SOLOMON 1960 [id number]               Secondary Coverage       Payor Plan Insurance Group Employer/Plan Group    [facility] SUPP [id number]       Payor Plan Address Payor Plan Phone Number Payor Plan Fax Number Effective Dates    [address]   7/1/2024 - None Entered    [facility] [id number]         Subscriber Name Subscriber Birth Date Member ID       EMELY SOLOMON 1960 [id number]                     Emergency Contacts        (Rel.) Home Phone Work Phone Mobile Phone    Ghanshyam Solomon (Spouse) [phone] -- [phone]    Adilson Cintron " (Brother) [phone] -- [phone]

## 2025-05-27 NOTE — PROGRESS NOTES
Patient found on RA with SpO2 98% and good plethsmograph.  Patient left off oxygen at this time.  Patient RN aware.

## 2025-05-28 LAB
ALBUMIN SERPL-MCNC: 2.8 G/DL (ref 3.5–5.2)
ANION GAP SERPL CALCULATED.3IONS-SCNC: 9.1 MMOL/L (ref 5–15)
BH BB BLOOD EXPIRATION DATE: NORMAL
BH BB BLOOD EXPIRATION DATE: NORMAL
BH BB BLOOD TYPE BARCODE: 6200
BH BB BLOOD TYPE BARCODE: 6200
BH BB DISPENSE STATUS: NORMAL
BH BB DISPENSE STATUS: NORMAL
BH BB PRODUCT CODE: NORMAL
BH BB PRODUCT CODE: NORMAL
BH BB UNIT NUMBER: NORMAL
BH BB UNIT NUMBER: NORMAL
BUN SERPL-MCNC: 15 MG/DL (ref 8–23)
BUN/CREAT SERPL: 14.9 (ref 7–25)
CALCIUM SPEC-SCNC: 8.3 MG/DL (ref 8.6–10.5)
CHLORIDE SERPL-SCNC: 98 MMOL/L (ref 98–107)
CO2 SERPL-SCNC: 25.9 MMOL/L (ref 22–29)
CREAT SERPL-MCNC: 1.01 MG/DL (ref 0.57–1)
CROSSMATCH INTERPRETATION: NORMAL
CROSSMATCH INTERPRETATION: NORMAL
DEPRECATED RDW RBC AUTO: 48.1 FL (ref 37–54)
EGFRCR SERPLBLD CKD-EPI 2021: 61.9 ML/MIN/1.73
ERYTHROCYTE [DISTWIDTH] IN BLOOD BY AUTOMATED COUNT: 15.9 % (ref 12.3–15.4)
GLUCOSE SERPL-MCNC: 88 MG/DL (ref 65–99)
HCT VFR BLD AUTO: 26.1 % (ref 34–46.6)
HCT VFR BLD AUTO: 26.1 % (ref 34–46.6)
HGB BLD-MCNC: 8.1 G/DL (ref 12–15.9)
HGB BLD-MCNC: 8.1 G/DL (ref 12–15.9)
MAGNESIUM SERPL-MCNC: 1.6 MG/DL (ref 1.6–2.4)
MCH RBC QN AUTO: 25.4 PG (ref 26.6–33)
MCHC RBC AUTO-ENTMCNC: 31 G/DL (ref 31.5–35.7)
MCV RBC AUTO: 81.8 FL (ref 79–97)
PHOSPHATE SERPL-MCNC: 3.5 MG/DL (ref 2.5–4.5)
PLATELET # BLD AUTO: 307 10*3/MM3 (ref 140–450)
PMV BLD AUTO: 8 FL (ref 6–12)
POTASSIUM SERPL-SCNC: 3.7 MMOL/L (ref 3.5–5.2)
RBC # BLD AUTO: 3.19 10*6/MM3 (ref 3.77–5.28)
SODIUM SERPL-SCNC: 133 MMOL/L (ref 136–145)
UNIT  ABO: NORMAL
UNIT  ABO: NORMAL
UNIT  RH: NORMAL
UNIT  RH: NORMAL
URATE SERPL-MCNC: 7.3 MG/DL (ref 2.4–5.7)
WBC NRBC COR # BLD AUTO: 6.95 10*3/MM3 (ref 3.4–10.8)

## 2025-05-28 PROCEDURE — 94761 N-INVAS EAR/PLS OXIMETRY MLT: CPT

## 2025-05-28 PROCEDURE — 97530 THERAPEUTIC ACTIVITIES: CPT

## 2025-05-28 PROCEDURE — 85027 COMPLETE CBC AUTOMATED: CPT | Performed by: HOSPITALIST

## 2025-05-28 PROCEDURE — 84550 ASSAY OF BLOOD/URIC ACID: CPT | Performed by: HOSPITALIST

## 2025-05-28 PROCEDURE — 85014 HEMATOCRIT: CPT | Performed by: HOSPITALIST

## 2025-05-28 PROCEDURE — 80069 RENAL FUNCTION PANEL: CPT | Performed by: HOSPITALIST

## 2025-05-28 PROCEDURE — 85018 HEMOGLOBIN: CPT | Performed by: HOSPITALIST

## 2025-05-28 PROCEDURE — 99232 SBSQ HOSP IP/OBS MODERATE 35: CPT | Performed by: NURSE PRACTITIONER

## 2025-05-28 PROCEDURE — 83735 ASSAY OF MAGNESIUM: CPT | Performed by: HOSPITALIST

## 2025-05-28 PROCEDURE — 94799 UNLISTED PULMONARY SVC/PX: CPT

## 2025-05-28 PROCEDURE — 94664 DEMO&/EVAL PT USE INHALER: CPT

## 2025-05-28 PROCEDURE — 25010000002 CEFTRIAXONE PER 250 MG: Performed by: INTERNAL MEDICINE

## 2025-05-28 RX ORDER — POTASSIUM CHLORIDE 1500 MG/1
40 TABLET, EXTENDED RELEASE ORAL ONCE
Status: COMPLETED | OUTPATIENT
Start: 2025-05-28 | End: 2025-05-28

## 2025-05-28 RX ORDER — FUROSEMIDE 40 MG/1
40 TABLET ORAL DAILY
Status: DISCONTINUED | OUTPATIENT
Start: 2025-05-29 | End: 2025-06-03 | Stop reason: HOSPADM

## 2025-05-28 RX ADMIN — POTASSIUM CHLORIDE 40 MEQ: 1500 TABLET, EXTENDED RELEASE ORAL at 09:28

## 2025-05-28 RX ADMIN — FERROUS SULFATE TAB 325 MG (65 MG ELEMENTAL FE) 325 MG: 325 (65 FE) TAB at 09:29

## 2025-05-28 RX ADMIN — RIVAROXABAN 20 MG: 20 TABLET, FILM COATED ORAL at 17:38

## 2025-05-28 RX ADMIN — Medication 10 ML: at 09:29

## 2025-05-28 RX ADMIN — DEXTROMETHORPHAN 60 MG: 30 SUSPENSION, EXTENDED RELEASE ORAL at 09:33

## 2025-05-28 RX ADMIN — CEFTRIAXONE 2000 MG: 2 INJECTION, POWDER, FOR SOLUTION INTRAMUSCULAR; INTRAVENOUS at 09:28

## 2025-05-28 RX ADMIN — IPRATROPIUM BROMIDE AND ALBUTEROL SULFATE 3 ML: .5; 3 SOLUTION RESPIRATORY (INHALATION) at 07:24

## 2025-05-28 RX ADMIN — IPRATROPIUM BROMIDE AND ALBUTEROL SULFATE 3 ML: .5; 3 SOLUTION RESPIRATORY (INHALATION) at 11:25

## 2025-05-28 RX ADMIN — Medication 10 ML: at 21:12

## 2025-05-28 RX ADMIN — IPRATROPIUM BROMIDE AND ALBUTEROL SULFATE 3 ML: .5; 3 SOLUTION RESPIRATORY (INHALATION) at 15:12

## 2025-05-28 RX ADMIN — CEFTRIAXONE 2000 MG: 2 INJECTION, POWDER, FOR SOLUTION INTRAMUSCULAR; INTRAVENOUS at 21:12

## 2025-05-28 RX ADMIN — METOPROLOL SUCCINATE 25 MG: 25 TABLET, EXTENDED RELEASE ORAL at 09:28

## 2025-05-28 RX ADMIN — MEGESTROL ACETATE 40 MG: 40 TABLET ORAL at 09:28

## 2025-05-28 NOTE — PLAN OF CARE
Goal Outcome Evaluation:  Plan of Care Reviewed With: patient, spouse           Outcome Evaluation: Pt seen this morning for OT/PT treatment. Performed supine>sit w/ SBA. Dep for LBD at EOB. Performed STS and a few steps to the recliner w/ Min A x2 + RW. Pt moves slowly, is unsteady throughout, and fatigues quickly. Pt is not performing at PLOF and would benefit from continued OT to address strength, balance, and activity tolerance while inpatient.    Anticipated Discharge Disposition (OT): skilled nursing facility                         No

## 2025-05-28 NOTE — PROGRESS NOTES
[facility]  [phone]  Dr. Bhanu Chin    Subjective:  LOS: 3    No acute events overnight.  She feels like she is doing okay at this time.    Objective:  Vital Signs past 24hrs    Temp range: Temp (24hrs), Av.3 °F (36.8 °C), Min:97.5 °F (36.4 °C), Max:99 °F (37.2 °C)    BP range: BP: ()/(57-79) 117/73  Pulse range: Heart Rate:  [75-94] 90  Resp rate range: Resp:  [18-20] 20  (!) 152 kg (335 lb 15.7 oz); Body mass index is 59.54 kg/m².    Device (Oxygen Therapy): nasal cannulaFlow (L/min) (Oxygen Therapy):  [2] 2    Oxygen range:SpO2:  [92 %-100 %] 97 %            Intake/Output Summary (Last 24 hours) at 2025 1416  Last data filed at 2025 1313  Gross per 24 hour   Intake 1330.83 ml   Output 900 ml   Net 430.83 ml       Physical Exam  Constitutional:       Appearance: Normal appearance.   HENT:      Head: Normocephalic and atraumatic.      Nose: Nose normal.      Mouth/Throat:      Mouth: Mucous membranes are moist.      Pharynx: Oropharynx is clear.   Eyes:      Extraocular Movements: Extraocular movements intact.      Conjunctiva/sclera: Conjunctivae normal.   Cardiovascular:      Rate and Rhythm: Normal rate and regular rhythm.      Pulses: Normal pulses.      Heart sounds: Normal heart sounds. No murmur heard.  Pulmonary:      Effort: Pulmonary effort is normal. No respiratory distress.      Breath sounds: No stridor. No wheezing or rales.      Comments: On 2 L nasal cannula  Abdominal:      General: Abdomen is flat. Bowel sounds are normal.      Palpations: Abdomen is soft.   Skin:     General: Skin is warm and dry.   Neurological:      General: No focal deficit present.      Mental Status: She is alert and oriented to person, place, and time. Mental status is at baseline.   Psychiatric:         Mood and Affect: Mood normal.         Behavior: Behavior normal.            Result Review:  I have reviewed the results from last note by LPC physician and agree with these  findings:  [x]  Laboratory accordion  [x]  Microbiology  [x]  Radiology  [x]  EKG/Telemetry   [x]  Cardiology/Vascular   [x]  Pathology  [x]  Old records  []  Other:    Medication Review:  I have reviewed the current MAR.  Antibiotics  cefTRIAXone (ROCEPHIN) 2000 mg IVPB in 100 mL NS (MBP)  CEFTRIAXONE 2000 MG IVPB  ML NS MBP (CD)     Scheduled Medications  cefTRIAXone (ROCEPHIN) 2,000 mg in sodium chloride 0.9 % 100 mL MBP, 2,000 mg, Intravenous, Q12H  ferrous sulfate, 325 mg, Oral, Daily With Breakfast  [START ON 5/29/2025] furosemide, 40 mg, Oral, Daily  ipratropium-albuterol, 3 mL, Nebulization, 4x Daily - RT  Lidocaine, 1 patch, Transdermal, Q24H  megestrol, 40 mg, Oral, Daily  metoprolol succinate XL, 25 mg, Oral, Daily  rivaroxaban, 20 mg, Oral, Daily With Dinner  sodium chloride, 10 mL, Intravenous, Q12H      ICU Drips     PRN Medications    acetaminophen **OR** acetaminophen **OR** acetaminophen    senna-docusate sodium **AND** polyethylene glycol **AND** bisacodyl **AND** bisacodyl    dextromethorphan polistirex ER    guaiFENesin    melatonin    ondansetron ODT **OR** ondansetron    sennosides-docusate    sodium chloride    sodium chloride    sodium chloride    Lines, Drains & Airways       Active LDAs       Name Placement date Placement time Site Days    PICC Single Lumen 05/09/25 Right Cephalic 05/09/25  1611  Cephalic  18    External Urinary Catheter 05/20/25  --  --  8    External Urinary Catheter 05/25/25 1651  --  2                    Diet Orders (active) (From admission, onward)       Start     Ordered    05/28/25 1800  Dietary Nutrition Supplements Greg  2 Times Daily       05/28/25 1000    05/28/25 1200  Dietary Nutrition Supplements Boost Glucose Control (Glucerna Shake)  Daily With Breakfast, Lunch & Dinner       05/28/25 1000    05/25/25 1655  Diet: Cardiac; Healthy Heart (2-3 Na+); Fluid Consistency: Thin (IDDSI 0)  Diet Effective Now         05/25/25 1655                       Assessment:  Hypoxia  Recent vaginal bleeding  History of DVT/PE 2015  Chronic heart failure with preserved ejection fraction  ARIEL, on CPAP  Mitral valve endocarditis, on antibiotics  Limited mobility  Morbidly obese, BMI 59    Plan of Treatment  - Agree with Dr. Garcia's assessment last night, I do not feel like she has an acute PE from clinical assessment  - Continue home Xarelto  - Continue using home CPAP  - Primary team thinking about reconsulting gynecology  - On Rocephin for endocarditis, stop date 6/17/2025  - Wean O2 as tolerated, goal O2 sats > 92%. Currently requiring 2L NC, I turned down to 1L and will continue to monitor.    Bhanu Chin MD  [facility], [facility]  Pulmonary and Critical Care Medicine

## 2025-05-28 NOTE — CASE MANAGEMENT/SOCIAL WORK
Case Management Readmission Assessment Note    Case Management Readmission Assessment (all recorded)       Readmission Interview       Row Name 05/28/25 1040             Readmission Indications    Is the patient and/or family able to complete the readmission assessment questions? Yes      Is this hospitalization related to the prior hospital diagnosis? No        Row Name 05/28/25 1040             Recommendation for rehospitalization    Did you speak with your physician prior to coming to the hospital Yes        Row Name 05/28/25 1040             Follow-up Appointments    Do you have a PCP? Yes      Did you have an appointment with PCP after your hospitalization? No      Did you have an appointment with a Specialist? No      Are you current with the Pulmonary Clinic? No      Are you current with the CHF Clinic? No        Row Name 05/28/25 1040             Medications    Did you have newly prescribed medications at discharge? Yes      Did you understand the reasons for your medications at discharge and how to take them? Yes      Did you understand the side effects of your medications? Yes      Are you taking all of you prescribed medications? Yes      What pharmacy was used to fill prescription(s)? [facility]      Were medications picked up? Yes        Row Name 05/28/25 1040             Discharge Instructions    Did you understand your discharge instructions? Yes      Did your family/caregiver hear your instructions? Yes      Were you told to eat a special diet? No      Were you given a number of someone to call if you had questions or concerns? Yes        Row Name 05/28/25 1040             Index discharge location/services    Where did you go upon discharge? Skilled Nursing Facility      Do you have supportive family or friends in the home? Yes      Was the provider seen at the facility? Yes      Which Skilled Nursing Facility were your admitted from? [facility]         Row Name 05/28/25 1040             Discharge Readiness    On a scale of 1-5 (5 being well prepared), how ready were you for discharge 4        Row Name 05/28/25 1040             Palliative Care/Hospice    Are you current with Palliative Care? No      Are you current with Hospice Care? No        Row Name 05/25/25 1657             Advance Directives (For Healthcare)    Pre-existing AND/MOST/POLST Order Yes, notify physician for order      Advance Directive Status Patient has advance directive, copy in chart      Type of Advance Directive Health care directive/Living will      Have you reviewed your Advance Directive and is it valid for this stay? Yes      Literature Provided on Advance Directives No      Patient Requests Assistance on Advance Directives Patient Declined

## 2025-05-28 NOTE — THERAPY TREATMENT NOTE
Patient Name: Emely Solomon  : 1960    MRN: [id number]                              Today's Date: 2025       Admit Date: 2025    Visit Dx:     ICD-10-CM ICD-9-CM   1. Acute on chronic congestive heart failure, unspecified heart failure type  I50.9 428.0     Patient Active Problem List   Diagnosis    Chronic diastolic CHF (congestive heart failure)    PFO (patent foramen ovale)    Paradoxical embolism    Essential hypertension    Pulmonary hypertension    Upper back pain    Bacteremia due to group B Streptococcus    ARIEL on CPAP    Class 3 severe obesity due to excess calories with serious comorbidity and body mass index (BMI) of 60.0 to 69.9 in adult    History of DVT (deep vein thrombosis)    Lymphedema    Anemia, chronic disease    Iron deficiency    Post-menopausal bleeding    Thickened endometrium    Generalized muscle weakness    Right bundle branch block (RBBB) with left anterior fascicular block (LAFB)    PVD (peripheral vascular disease) with claudication    Iliac vein stenosis, right    Hypoxia    Hyperglycemia    History of pulmonary embolism    Morbid obesity    Hyponatremia    Bacterial endocarditis    Sepsis due to methicillin susceptible Staphylococcus aureus (MSSA) with acute hypoxic respiratory failure without septic shock    Acute on chronic heart failure with preserved ejection fraction (HFpEF)    On home oxygen therapy    Type 2 myocardial infarction    Acute loss of vision, bilateral    Chronic respiratory failure with hypoxia    Acute loss of vision    Acute exacerbation of CHF (congestive heart failure)     Past Medical History:   Diagnosis Date    Arthritis     Cellulitis     2017, with Group B Strep bacteremia and sepsis    Chronic deep vein thrombosis (DVT) of left popliteal vein 2015, 2016    Chronic diastolic congestive heart failure     COVID-19 virus infection 2020    Heart murmur     Hypertension     Iliac vein stenosis, right  05/15/2024    Lipoedema     Lymphedema     other    Morbid obesity     ARIEL on CPAP     PFO (patent foramen ovale)     Postthrombotic syndrome of left lower extremity without complications 12/17/2015    postphletibis    Pulmonary embolism 04/14/2016    Pulmonary hypertension     multifactorial (dCHF, obesity/ARIEL, hx PE), mild by echo 1/2016    Right bundle branch block (RBBB) with left anterior fascicular block (LAFB)     Sepsis 09/18/2020    Type 2 myocardial infarction 05/20/2025     Past Surgical History:   Procedure Laterality Date    ARTERIOGRAM  07/2015    BRONCHOSCOPY N/A 07/21/2017    Procedure: BRONCHOSCOPY with wash;  Surgeon: Caleb Garcia MD;  Location: [facility] ENDOSCOPY;  Service:     COLONOSCOPY      COLONOSCOPY N/A 01/26/2023    Procedure: COLONOSCOPY to CECUM AND TERM ILEUM;  Surgeon: Jj Dean MD;  Location: [facility] ENDOSCOPY;  Service: Gastroenterology;  Laterality: N/A;  PRE OP -screening  POST OP -  NORMAL    D & C HYSTEROSCOPY N/A 03/08/2022    Procedure: DILATATION AND CURETTAGE with hysteroscopy;  Surgeon: Kaylah Land DO;  Location: [facility] MAIN OR;  Service: Gynecology Oncology;  Laterality: N/A;    DILATATION AND CURETTAGE  04/11/2011    OTHER SURGICAL HISTORY  09/2015    IVC filter    OTHER SURGICAL HISTORY      left LE revascularization    OTHER SURGICAL HISTORY      ALESSIA and LEV scan    THROMBECTOMY  07/2015      General Information       Row Name 05/28/25 1228 05/28/25 0752       OT Time and Intention    Subjective Information complains of;weakness;fatigue  -KG --    Document Type therapy note (daily note)  -KG evaluation  -JR    Mode of Treatment co-treatment;physical therapy;occupational therapy  cotreat appropriate due to pt acuity level, needing 2 person assist for mobility, and to maximize safety of pt and staff.  -KG physical therapy;co-treatment;occupational therapy  -JR    Patient Effort good  -KG --    Symptoms Noted During/After Treatment fatigue  -KG --      Row  Name 05/28/25 1228 05/28/25 0752       General Information    Patient Profile Reviewed yes  -KG yes  -JR    Prior Level of Function -- --  Patient at SNF now for rehab.  Reports I ADLs prior  -JR    Existing Precautions/Restrictions fall  -KG fall  -JR    Barriers to Rehab -- medically complex  -JR      Row Name 05/28/25 0752          Living Environment    Current Living Arrangements other (see comments)  Admit from SNF Rehab, plans to d/c back to home.  -JR     People in Home facility resident  -JR       Row Name 05/28/25 0752          Cognition    Orientation Status (Cognition) oriented x 4  -JR       Row Name 05/28/25 1228 05/28/25 0752       Safety Issues/Impairments Affecting Functional Mobility    Impairments Affecting Function (Mobility) balance;endurance/activity tolerance;pain;strength;range of motion (ROM)  -KG balance;endurance/activity tolerance;strength;range of motion (ROM)  -JR    Comment, Safety Issues/Impairments (Mobility) -- PT/OT cotreatment medically appropriate and necessary due to patient acuity level, to maximize therapeutic benefit due to impaired act tolerance, and for safety of patient and staff. Treatment focused on progression of care and goals established in POC.  -JR              User Key  (r) = Recorded By, (t) = Taken By, (c) = Cosigned By      Initials Name Provider Type    KG Carlos Lopez, OT Occupational Therapist    JR Jack Parra OT Occupational Therapist                     Mobility/ADL's       Row Name 05/28/25 1229 05/28/25 0754       Bed Mobility    Bed Mobility supine-sit  -KG supine-sit  -JR    Supine-Sit [place] (Bed Mobility) contact guard  -KG standby assist  -JR    Assistive Device (Bed Mobility) -- head of bed elevated;bed rails  -JR      Row Name 05/28/25 1229          Transfers    Transfers sit-stand transfer;stand-sit transfer  -KG       Row Name 05/28/25 1229 05/28/25 0754       Sit-Stand Transfer    Sit-Stand [place] (Transfers) minimum assist (75%  patient effort);2 person assist  -KG minimum assist (75% patient effort);2 person assist  -JR    Assistive Device (Sit-Stand Transfers) walker, front-wheeled  -KG walker, front-wheeled  -JR      Row Name 05/28/25 1229          Stand-Sit Transfer    Stand-Sit [place] (Transfers) minimum assist (75% patient effort);2 person assist  -KG     Assistive Device (Stand-Sit Transfers) walker, front-wheeled  -KG       Row Name 05/28/25 1229 05/28/25 0754       Functional Mobility    Functional Mobility- Ind. Level minimum assist (75% patient effort);2 person assist required  -KG --    Functional Mobility- Device walker, front-wheeled  -KG --    Functional Mobility- Comment short distance from EOB to recliner -- pt moves slowly, unsteady throughout, fatigues quickly  -KG --    Patient was able to Ambulate -- yes  -[facility] Name 05/28/25 1229          Activities of Daily Living    BADL Assessment/Intervention lower body dressing  -KG       Row Name 05/28/25 1229          Lower Body Dressing Assessment/Training    [place] Level (Lower Body Dressing) don;socks;dependent (less than 25% patient effort)  -KG               User Key  (r) = Recorded By, (t) = Taken By, (c) = Cosigned By      Initials Name Provider Type    KG Carlos Lopez OT Occupational Therapist    JR Jack Parra OT Occupational Therapist                   Obj/Interventions       Row Name 05/28/25 0755          Sensory Assessment (Somatosensory)    Sensory Assessment (Somatosensory) sensation intact  -[facility] Name 05/28/25 0755          Vision Assessment/Intervention    Visual Impairment/Limitations WFL  -       Row Name 05/28/25 0755          Range of Motion Comprehensive    General Range of Motion bilateral upper extremity ROM WNL  -JR     Comment, General Range of Motion BUE WFL  -       Row Name 05/28/25 0755          Strength Comprehensive (MMT)    General Manual Muscle Testing (MMT) Assessment upper extremity strength deficits identified   -JR     Comment, General Manual Muscle Testing (MMT) Assessment BUE 3+/5  -JR       Row Name 05/28/25 1230          Motor Skills    Motor Skills functional endurance  -KG     Functional Endurance fair/fair-  -KG       Row Name 05/28/25 1230 05/28/25 0755       Balance    Balance Assessment sitting static balance;sitting dynamic balance;sit to stand dynamic balance;standing static balance;standing dynamic balance  -KG sitting static balance;sitting dynamic balance  -JR    Static Sitting Balance standby assist  -KG standby assist  -JR    Dynamic Sitting Balance standby assist  -KG standby assist  -JR    Position, Sitting Balance sitting edge of bed  -KG sitting edge of bed  -JR    Sit to Stand Dynamic Balance minimal assist;2-person assist  -KG --    Static Standing Balance minimal assist;2-person assist  -KG minimal assist  -JR    Dynamic Standing Balance minimal assist;2-person assist  -KG minimal assist  -JR    Position/Device Used, Standing Balance walker, front-wheeled;supported  -KG supported;walker, front-wheeled  -JR    Balance Interventions sitting;standing;sit to stand;supported;static;dynamic;occupation based/functional task  -KG --              User Key  (r) = Recorded By, (t) = Taken By, (c) = Cosigned By      Initials Name Provider Type    KG Carlos Lopez, FABY Occupational Therapist    JR Jack Parra OT Occupational Therapist                   Goals/Plan       Row Name 05/28/25 0801          Bed Mobility Goal 1 (OT)    Activity/Assistive Device (Bed Mobility Goal 1, OT) bed mobility activities, all  -JR     [place] Level/Cues Needed (Bed Mobility Goal 1, OT) modified independence  -JR     Time Frame (Bed Mobility Goal 1, OT) 2 weeks;short term goal (STG)  -JR     Progress/Outcomes (Bed Mobility Goal 1, OT) new goal  -JR       Row Name 05/28/25 0801          Transfer Goal 1 (OT)    Activity/Assistive Device (Transfer Goal 1, OT) transfers, all  -JR     [place] Level/Cues Needed (Transfer Goal  1, OT) modified independence  -JR     Time Frame (Transfer Goal 1, OT) 2 weeks;short term goal (STG)  -JR     Progress/Outcome (Transfer Goal 1, OT) new goal  -[facility] Name 05/28/25 0801          Bathing Goal 1 (OT)    Activity/Device (Bathing Goal 1, OT) bathing skills, all  -JR     [place] Level/Cues Needed (Bathing Goal 1, OT) modified independence  -JR     Time Frame (Bathing Goal 1, OT) 2 weeks;short term goal (STG)  -JR     Progress/Outcomes (Bathing Goal 1, OT) new [facility]  -[facility] Name 05/28/25 0801          Dressing Goal 1 (OT)    Activity/Device (Dressing Goal 1, OT) dressing skills, all  -JR     [place]/Cues Needed (Dressing Goal 1, OT) modified independence  -JR     Time Frame (Dressing Goal 1, OT) 2 weeks;short term goal (STG)  -JR     Progress/Outcome (Dressing Goal 1, OT) new [facility]  -JR       Row Name 05/28/25 0801          Toileting Goal 1 (OT)    Activity/Device (Toileting Goal 1, OT) toileting skills, all  -JR     [place] Level/Cues Needed (Toileting Goal 1, OT) modified independence  -JR     Time Frame (Toileting Goal 1, OT) short term goal (STG);2 weeks  -JR     Progress/Outcome (Toileting Goal 1, OT) new [facility]  -JR       Row Name 05/28/25 0801          Grooming Goal 1 (OT)    Activity/Device (Grooming Goal 1, OT) grooming skills, all  -JR     [place] (Grooming Goal 1, OT) modified independence  -JR     Time Frame (Grooming Goal 1, OT) short term goal (STG);2 weeks  -JR     Progress/Outcome (Grooming Goal 1, OT) new goal  -JR       Row Name 05/28/25 0801          Therapy Assessment/Plan (OT)    Planned Therapy Interventions (OT) activity tolerance training;adaptive equipment training;BADL retraining;neuromuscular control/coordination retraining;functional balance retraining;occupation/activity based interventions;passive ROM/stretching;transfer/mobility retraining;strengthening exercise;ROM/therapeutic exercise  -JR               User Key  (r) = Recorded By, (t) = Taken  By, (c) = Cosigned By      Initials Name Provider Type    Jack Perez, OT Occupational Therapist                   Clinical Impression       Row Name 05/28/25 1230 05/28/25 0755       Pain Assessment    Pretreatment Pain Rating 0/10 - no pain  -KG --    Pre/Posttreatment Pain Comment -- --  -      Row Name 05/28/25 1230 05/28/25 0755       Plan of Care Review    Plan of Care Reviewed With patient;spouse  -KG patient;daughter  -    Progress -- improving  -    Outcome Evaluation Pt seen this morning for OT/PT treatment. Performed supine>sit w/ SBA. Dep for LBD at EOB. Performed STS and a few steps to the recliner w/ Min A x2 + RW. Pt moves slowly, is unsteady throughout, and fatigues quickly. Pt is not performing at PLOF and would benefit from continued OT to address strength, balance, and activity tolerance while inpatient.  -KG 64 y.o. female admitted to [facility] for exacerbation of CHF. Patient is currently at SNF for Rehab and plans to return to SNF prior to d/c to home. At baseline, patient lives with spouse in Apartment ([address]) Independent with ADLs and functional mobility (RW). A&Ox4, BUE wFL, 3+/5. Patient resting in bed with daughter present. Patient transitioned to EOB with SBA. STS from EOB with Min A x2 and Rw. Patient able to ambulate short distance to recliner chair with Min A x2 and Rw. Patient very fatigued and exhausted at end of functional transfer. OT would be beneficial to address underlying physical deficits and increase ADLs. Anticipate patient d/c back to SNF for continued progress.  -      Row Name 05/28/25 9419          Therapy Assessment/Plan (OT)    Rehab Potential (OT) good  -     Criteria for Skilled Therapeutic Interventions Met (OT) yes  -     Therapy Frequency (OT) 5 times/wk  -       Row Name 05/28/25 1230 05/28/25 075       Therapy Plan Review/Discharge Plan (OT)    Anticipated Discharge Disposition (OT) skilled nursing facility  - skilled nursing facility  -JR Page  Name 05/28/25 1230 05/28/25 0755       Vital Signs    O2 Delivery Pre Treatment -- room air  -JR    O2 Delivery Intra Treatment -- room air  -JR    O2 Delivery Post Treatment -- room air  -JR    Pre Patient Position Supine  -KG Supine  -JR    Intra Patient Position Standing  -KG Standing  -JR    Post Patient Position Sitting  -KG Sitting  -JR      Row Name 05/28/25 1230 05/28/25 0755       Positioning and Restraints    Pre-Treatment Position in bed  -KG in bed  -JR    Post Treatment Position chair  -KG chair  -JR    In Chair notified nsg;reclined;call light within reach;encouraged to call for assist;exit alarm on;with family/caregiver  -KG notified nsg;reclined;call light within reach;encouraged to call for assist;exit alarm on;with family/caregiver  -JR              User Key  (r) = Recorded By, (t) = Taken By, (c) = Cosigned By      Initials Name Provider Type    KG Carlos Lopez, OT Occupational Therapist    JR Jack Parra OT Occupational Therapist                   Outcome Measures       Row Name 05/28/25 0801          How much help from another is currently needed...    Putting on and taking off regular lower body clothing? 2  -JR     Bathing (including washing, rinsing, and drying) 2  -JR     Toileting (which includes using toilet bed pan or urinal) 2  -JR     Putting on and taking off regular upper body clothing 2  -JR     Taking care of personal grooming (such as brushing teeth) 3  -JR     Eating meals 3  -JR     AM-PAC 6 Clicks Score (OT) 14  -JR       Row Name 05/28/25 1130 05/28/25 0928       How much help from another person do you currently need...    Turning from your back to your side while in flat bed without using bedrails? 3  -EJ 3  -JH    Moving from lying on back to sitting on the side of a flat bed without bedrails? 3  -EJ 2  -JH    Moving to and from a bed to a chair (including a wheelchair)? 3  -EJ 3  -JH    Standing up from a chair using your arms (e.g., wheelchair, bedside chair)? 3  -EJ 3   -    Climbing 3-5 steps with a railing? 1  -EJ 1  -    To walk in hospital room? 2  -EJ 2  -JH    AM-PAC 6 Clicks Score (PT) 15  -EJ 14  -JH    Highest Level of Mobility Goal Move to Chair/Commode-4  -EJ Move to Chair/Commode-4  -JH      Row Name 05/28/25 0801          Modified [place] Scale    Modified [place] Scale 4 - Moderately severe disability.  Unable to walk without assistance, and unable to attend to own bodily needs without assistance.  -       Row Name 05/28/25 0801          Functional Assessment    Outcome Measure Options AM-PAC 6 Clicks Daily Activity (OT);Modified [place]  -               User Key  (r) = Recorded By, (t) = Taken By, (c) = Cosigned By      Initials Name Provider Type    Kamilla Curtis, RN Registered Nurse    Jane Amanda, PT Physical Therapist    Jack Perez OT Occupational Therapist                    Occupational Therapy Education       Title: PT OT SLP Therapies (Done)       Topic: Occupational Therapy (Done)       Point: ADL training (Done)       Learning Progress Summary            Patient RISSA Lisa VU by  at 5/28/2025 0803    Comment: Role of OT                      Point: Home exercise program (Done)       Learning Progress Summary            Patient RISSA Lisa VU by  at 5/28/2025 0803    Comment: Role of OT                      Point: Precautions (Done)       Learning Progress Summary            Patient RISSA Lisa VU by  at 5/28/2025 0803    Comment: Role of OT                      Point: Body mechanics (Done)       Learning Progress Summary            Patient RISSA Lisa VU by  at 5/28/2025 0803    Comment: Role of OT                                      User Key       Initials Effective Dates Name Provider Type Reji JOHANSEN 07/24/24 -  Jack Parra OT Occupational Therapist OT                  OT Recommendation and Plan     Plan of Care Review  Plan of Care Reviewed With: patient, spouse  Outcome Evaluation: Pt seen this morning for OT/PT treatment.  Performed supine>sit w/ SBA. Dep for LBD at EOB. Performed STS and a few steps to the recliner w/ Min A x2 + RW. Pt moves slowly, is unsteady throughout, and fatigues quickly. Pt is not performing at PLOF and would benefit from continued OT to address strength, balance, and activity tolerance while inpatient.     Time Calculation:         Time Calculation- OT       Row Name 05/28/25 1233 05/28/25 0805          Time Calculation- OT    OT Start Time 1012  -KG 1515  -JR     OT Stop Time 1027  -KG 1533  -JR     OT Time Calculation (min) 15 min  -KG 18 min  -JR     Total Timed Code Minutes- OT 15 minute(s)  -KG 10 minute(s)  -JR     OT Received On 05/28/25  -KG 05/27/25  -JR     OT - Next Appointment 05/29/25  -KG 05/28/25  -JR     OT Goal Re-Cert Due Date -- 06/11/25  -JR        Timed Charges    77339 - OT Therapeutic Activity Minutes 15  -KG 10  -JR        Untimed Charges    OT Eval/Re-eval Minutes -- 8  -JR        Total Minutes    Timed Charges Total Minutes 15  -KG 10  -JR     Untimed Charges Total Minutes -- 8  -JR      Total Minutes 15  -KG 18  -JR               User Key  (r) = Recorded By, (t) = Taken By, (c) = Cosigned By      Initials Name Provider Type    Carlos Douglas OT Occupational Therapist    Jack Perze OT Occupational Therapist                  Therapy Charges for Today       Code Description Service Date Service Provider Modifiers Qty    [id number]  OT THERAPEUTIC ACT EA 15 MIN 5/28/2025 Carlos Lopez OT GO 1                 Carlos Lopez OT  5/28/2025

## 2025-05-28 NOTE — PLAN OF CARE
Goal Outcome Evaluation:  Plan of Care Reviewed With: patient, daughter        Progress: improving  Outcome Evaluation: 64 y.o. female admitted to [facility] for exacerbation of CHF. Patient is currently at SNF for Rehab and plans to return to SNF prior to d/c to home. At baseline, patient lives with spouse in Apartment ([address]) Independent with ADLs and functional mobility (RW). A&Ox4, BUE wFL, 3+/5. Patient resting in bed with daughter present. Patient transitioned to EOB with SBA. STS from EOB with Min A x2 and Rw. Patient able to ambulate short distance to recliner chair with Min A x2 and Rw. Patient very fatigued and exhausted at end of functional transfer. OT would be beneficial to address underlying physical deficits and increase ADLs. Anticipate patient d/c back to SNF for continued progress.    Anticipated Discharge Disposition (OT): skilled nursing facility

## 2025-05-28 NOTE — THERAPY EVALUATION
Patient Name: Emely Solomon  : 1960    MRN: [id number]                              Today's Date: 2025       Admit Date: 2025    Visit Dx:     ICD-10-CM ICD-9-CM   1. Acute on chronic congestive heart failure, unspecified heart failure type  I50.9 428.0     Patient Active Problem List   Diagnosis    Chronic diastolic CHF (congestive heart failure)    PFO (patent foramen ovale)    Paradoxical embolism    Essential hypertension    Pulmonary hypertension    Upper back pain    Bacteremia due to group B Streptococcus    ARIEL on CPAP    Class 3 severe obesity due to excess calories with serious comorbidity and body mass index (BMI) of 60.0 to 69.9 in adult    History of DVT (deep vein thrombosis)    Lymphedema    Anemia, chronic disease    Iron deficiency    Post-menopausal bleeding    Thickened endometrium    Generalized muscle weakness    Right bundle branch block (RBBB) with left anterior fascicular block (LAFB)    PVD (peripheral vascular disease) with claudication    Iliac vein stenosis, right    Hypoxia    Hyperglycemia    History of pulmonary embolism    Morbid obesity    Hyponatremia    Bacterial endocarditis    Sepsis due to methicillin susceptible Staphylococcus aureus (MSSA) with acute hypoxic respiratory failure without septic shock    Acute on chronic heart failure with preserved ejection fraction (HFpEF)    On home oxygen therapy    Type 2 myocardial infarction    Acute loss of vision, bilateral    Chronic respiratory failure with hypoxia    Acute loss of vision    Acute exacerbation of CHF (congestive heart failure)     Past Medical History:   Diagnosis Date    Arthritis     Cellulitis     2017, with Group B Strep bacteremia and sepsis    Chronic deep vein thrombosis (DVT) of left popliteal vein 2015, 2016    Chronic diastolic congestive heart failure     COVID-19 virus infection 2020    Heart murmur     Hypertension     Iliac vein stenosis, right  05/15/2024    Lipoedema     Lymphedema     other    Morbid obesity     ARIEL on CPAP     PFO (patent foramen ovale)     Postthrombotic syndrome of left lower extremity without complications 12/17/2015    postphletibis    Pulmonary embolism 04/14/2016    Pulmonary hypertension     multifactorial (dCHF, obesity/ARIEL, hx PE), mild by echo 1/2016    Right bundle branch block (RBBB) with left anterior fascicular block (LAFB)     Sepsis 09/18/2020    Type 2 myocardial infarction 05/20/2025     Past Surgical History:   Procedure Laterality Date    ARTERIOGRAM  07/2015    BRONCHOSCOPY N/A 07/21/2017    Procedure: BRONCHOSCOPY with wash;  Surgeon: Caleb Garcia MD;  Location: [facility] ENDOSCOPY;  Service:     COLONOSCOPY      COLONOSCOPY N/A 01/26/2023    Procedure: COLONOSCOPY to CECUM AND TERM ILEUM;  Surgeon: Jj Dean MD;  Location: [facility] ENDOSCOPY;  Service: Gastroenterology;  Laterality: N/A;  PRE OP -screening  POST OP -  NORMAL    D & C HYSTEROSCOPY N/A 03/08/2022    Procedure: DILATATION AND CURETTAGE with hysteroscopy;  Surgeon: Kaylah Land DO;  Location: [facility] MAIN OR;  Service: Gynecology Oncology;  Laterality: N/A;    DILATATION AND CURETTAGE  04/11/2011    OTHER SURGICAL HISTORY  09/2015    IVC filter    OTHER SURGICAL HISTORY      left LE revascularization    OTHER SURGICAL HISTORY      ALESSIA and LEV scan    THROMBECTOMY  07/2015      General Information       Row Name 05/28/25 0752          OT Time and Intention    Document Type evaluation  -JR     Mode of Treatment physical therapy;co-treatment;occupational therapy  -JR       Row Name 05/28/25 0752          General Information    Patient Profile Reviewed yes  -JR     Prior Level of Function --  Patient at SNF now for rehab.  Reports I ADLs prior  -JR     Existing Precautions/Restrictions fall  -JR     Barriers to Rehab medically complex  -JR       Row Name 05/28/25 0753          Living Environment    Current Living Arrangements other (see  comments)  Admit from SNF Rehab, plans to d/c back to home.  -JR     People in Home facility resident  -JR       Row Name 05/28/25 0752          Cognition    Orientation Status (Cognition) oriented x 4  -JR       Row Name 05/28/25 0752          Safety Issues/Impairments Affecting Functional Mobility    Impairments Affecting Function (Mobility) balance;endurance/activity tolerance;strength;range of motion (ROM)  -JR     Comment, Safety Issues/Impairments (Mobility) PT/OT cotreatment medically appropriate and necessary due to patient acuity level, to maximize therapeutic benefit due to impaired act tolerance, and for safety of patient and staff. Treatment focused on progression of care and goals established in POC.  -JR               User Key  (r) = Recorded By, (t) = Taken By, (c) = Cosigned By      Initials Name Provider Type    Jack Perez OT Occupational Therapist                     Mobility/ADL's       Row Name 05/28/25 0754          Bed Mobility    Bed Mobility supine-sit  -     Supine-Sit [place] (Bed Mobility) standby assist  -     Assistive Device (Bed Mobility) head of bed elevated;bed rails  -       Row Name 05/28/25 0754          Sit-Stand Transfer    Sit-Stand [place] (Transfers) minimum assist (75% patient effort);2 person assist  -     Assistive Device (Sit-Stand Transfers) walker, front-wheeled  -       Row Name 05/28/25 0754          Functional Mobility    Patient was able to Ambulate yes  -               User Key  (r) = Recorded By, (t) = Taken By, (c) = Cosigned By      Initials Name Provider Type    Jack Perez OT Occupational Therapist                   Obj/Interventions       Row Name 05/28/25 0752          Sensory Assessment (Somatosensory)    Sensory Assessment (Somatosensory) sensation intact  -       Row Name 05/28/25 0756          Vision Assessment/Intervention    Visual Impairment/Limitations WFL  -       Row Name 05/28/25 0756          Range of Motion  Comprehensive    General Range of Motion bilateral upper extremity ROM WNL  -JR     Comment, General Range of Motion BUE WFL  -JR       Row Name 05/28/25 0758          Strength Comprehensive (MMT)    General Manual Muscle Testing (MMT) Assessment upper extremity strength deficits identified  -JR     Comment, General Manual Muscle Testing (MMT) Assessment BUE 3+/5  -JR       Row Name 05/28/25 0756          Balance    Balance Assessment sitting static balance;sitting dynamic balance  -JR     Static Sitting Balance standby assist  -JR     Dynamic Sitting Balance standby assist  -JR     Position, Sitting Balance sitting edge of bed  -JR     Static Standing Balance minimal assist  -JR     Dynamic Standing Balance minimal assist  -JR     Position/Device Used, Standing Balance supported;walker, front-wheeled  -JR               User Key  (r) = Recorded By, (t) = Taken By, (c) = Cosigned By      Initials Name Provider Type    Jack Perez, OT Occupational Therapist                   Goals/Plan       Row Name 05/28/25 0801          Bed Mobility Goal 1 (OT)    Activity/Assistive Device (Bed Mobility Goal 1, OT) bed mobility activities, all  -JR     [place] Level/Cues Needed (Bed Mobility Goal 1, OT) modified independence  -JR     Time Frame (Bed Mobility Goal 1, OT) 2 weeks;short term goal (STG)  -JR     Progress/Outcomes (Bed Mobility Goal 1, OT) new goal  -       Row Name 05/28/25 0801          Transfer Goal 1 (OT)    Activity/Assistive Device (Transfer Goal 1, OT) transfers, all  -JR     [place] Level/Cues Needed (Transfer Goal 1, OT) modified independence  -JR     Time Frame (Transfer Goal 1, OT) 2 weeks;short term goal (STG)  -JR     Progress/Outcome (Transfer Goal 1, OT) new goal  -JR       Row Name 05/28/25 0801          Bathing Goal 1 (OT)    Activity/Device (Bathing Goal 1, OT) bathing skills, all  -JR     [place] Level/Cues Needed (Bathing Goal 1, OT) modified independence  -JR     Time Frame  (Bathing Goal 1, OT) 2 weeks;short term goal (STG)  -JR     Progress/Outcomes (Bathing Goal 1, OT) new goal  -       Row Name 05/28/25 0801          Dressing Goal 1 (OT)    Activity/Device (Dressing Goal 1, OT) dressing skills, all  -JR     [place]/Cues Needed (Dressing Goal 1, OT) modified independence  -JR     Time Frame (Dressing Goal 1, OT) 2 weeks;short term goal (STG)  -JR     Progress/Outcome (Dressing Goal 1, OT) new goal  -       Row Name 05/28/25 0801          Toileting Goal 1 (OT)    Activity/Device (Toileting Goal 1, OT) toileting skills, all  -JR     [place] Level/Cues Needed (Toileting Goal 1, OT) modified independence  -JR     Time Frame (Toileting Goal 1, OT) short term goal (STG);2 weeks  -JR     Progress/Outcome (Toileting Goal 1, OT) new goal  -       Row Name 05/28/25 0801          Grooming Goal 1 (OT)    Activity/Device (Grooming Goal 1, OT) grooming skills, all  -JR     [place] (Grooming Goal 1, OT) modified independence  -JR     Time Frame (Grooming Goal 1, OT) short term goal (STG);2 weeks  -JR     Progress/Outcome (Grooming Goal 1, OT) new goal  -       Row Name 05/28/25 0801          Therapy Assessment/Plan (OT)    Planned Therapy Interventions (OT) activity tolerance training;adaptive equipment training;BADL retraining;neuromuscular control/coordination retraining;functional balance retraining;occupation/activity based interventions;passive ROM/stretching;transfer/mobility retraining;strengthening exercise;ROM/therapeutic exercise  -               User Key  (r) = Recorded By, (t) = Taken By, (c) = Cosigned By      Initials Name Provider Type    Jack Perez, OT Occupational Therapist                   Clinical Impression       Row Name 05/28/25 0755          Pain Assessment    Pre/Posttreatment Pain Comment --  -       Row Name 05/28/25 0755          Plan of Care Review    Plan of Care Reviewed With patient;daughter  -JR     Progress improving  -JR     Outcome  Evaluation 64 y.o. female admitted to [facility] for exacerbation of CHF. Patient is currently at SNF for Rehab and plans to return to SNF prior to d/c to home. At baseline, patient lives with spouse in Apartment ([address]) Independent with ADLs and functional mobility (RW). A&Ox4, BUE wFL, 3+/5. Patient resting in bed with daughter present. Patient transitioned to EOB with SBA. STS from EOB with Min A x2 and Rw. Patient able to ambulate short distance to recliner chair with Min A x2 and Rw. Patient very fatigued and exhausted at end of functional transfer. OT would be beneficial to address underlying physical deficits and increase ADLs. Anticipate patient d/c back to SNF for continued progress.  -       Row Name 05/28/25 0755          Therapy Assessment/Plan (OT)    Rehab Potential (OT) good  -     Criteria for Skilled Therapeutic Interventions Met (OT) yes  -     Therapy Frequency (OT) 5 times/wk  -       Row Name 05/28/25 0755          Therapy Plan Review/Discharge Plan (OT)    Anticipated Discharge Disposition (OT) skilled nursing facility  -       Row Name 05/28/25 0755          Vital Signs    O2 Delivery Pre Treatment room air  -JR     O2 Delivery Intra Treatment room air  -JR     O2 Delivery Post Treatment room air  -JR     Pre Patient Position Supine  -JR     Intra Patient Position Standing  -     Post Patient Position Sitting  -       Row Name 05/28/25 0755          Positioning and Restraints    Pre-Treatment Position in bed  -JR     Post Treatment Position chair  -JR     In Chair notified nsg;reclined;call light within reach;encouraged to call for assist;exit alarm on;with family/caregiver  -               User Key  (r) = Recorded By, (t) = Taken By, (c) = Cosigned By      Initials Name Provider Type    Jack Perez, OT Occupational Therapist                   Outcome Measures       Row Name 05/28/25 0801          How much help from another is currently needed...    Putting on and taking off regular  lower body clothing? 2  -JR     Bathing (including washing, rinsing, and drying) 2  -JR     Toileting (which includes using toilet bed pan or urinal) 2  -JR     Putting on and taking off regular upper body clothing 2  -JR     Taking care of personal grooming (such as brushing teeth) 3  -JR     Eating meals 3  -JR     AM-PAC 6 Clicks Score (OT) 14  -       Row Name 05/27/25 2030          How much help from another person do you currently need...    Turning from your back to your side while in flat bed without using bedrails? 3  -AH     Moving from lying on back to sitting on the side of a flat bed without bedrails? 2  -AH     Moving to and from a bed to a chair (including a wheelchair)? 3  -AH     Standing up from a chair using your arms (e.g., wheelchair, bedside chair)? 3  -AH     Climbing 3-5 steps with a railing? 1  -AH     To walk in hospital room? 2  -     AM-PAC 6 Clicks Score (PT) 14  -     Highest Level of Mobility Goal Move to Chair/Commode-4  -       Row Name 05/28/25 0801          Modified Briseyda Scale    Modified Briseyda Scale 4 - Moderately severe disability.  Unable to walk without assistance, and unable to attend to own bodily needs without assistance.  -       Row Name 05/28/25 0801          Functional Assessment    Outcome Measure Options AM-PAC 6 Clicks Daily Activity (OT);Modified [place]  -               User Key  (r) = Recorded By, (t) = Taken By, (c) = Cosigned By      Initials Name Provider Type     Bridgette Nunez, RN Registered Nurse    Jack Perez OT Occupational Therapist                    Occupational Therapy Education       Title: PT OT SLP Therapies (Done)       Topic: Occupational Therapy (Done)       Point: ADL training (Done)       Learning Progress Summary            RISSA Brown VU by JR at 5/28/2025 0803    Comment: Role of OT                      Point: Home exercise program (Done)       Learning Progress Summary            RISSA Brown, ELIZABETH by  at  5/28/2025 0803    Comment: Role of OT                      Point: Precautions (Done)       Learning Progress Summary            Patient RISSA Lisa, VU by  at 5/28/2025 0803    Comment: Role of OT                      Point: Body mechanics (Done)       Learning Progress Summary            Patient RISSA Lisa VU by  at 5/28/2025 0803    Comment: Role of OT                                      User Key       Initials Effective Dates Name Provider Type Discipline     07/24/24 -  Jack Parra OT Occupational Therapist OT                  OT Recommendation and Plan  Planned Therapy Interventions (OT): activity tolerance training, adaptive equipment training, BADL retraining, neuromuscular control/coordination retraining, functional balance retraining, occupation/activity based interventions, passive ROM/stretching, transfer/mobility retraining, strengthening exercise, ROM/therapeutic exercise  Therapy Frequency (OT): 5 times/wk  Plan of Care Review  Plan of Care Reviewed With: patient, daughter  Progress: improving  Outcome Evaluation: 64 y.o. female admitted to [facility] for exacerbation of CHF. Patient is currently at SNF for Rehab and plans to return to SNF prior to d/c to home. At baseline, patient lives with spouse in Apartment ([address]) Independent with ADLs and functional mobility (RW). A&Ox4, BUE wFL, 3+/5. Patient resting in bed with daughter present. Patient transitioned to EOB with SBA. STS from EOB with Min A x2 and Rw. Patient able to ambulate short distance to recliner chair with Min A x2 and Rw. Patient very fatigued and exhausted at end of functional transfer. OT would be beneficial to address underlying physical deficits and increase ADLs. Anticipate patient d/c back to SNF for continued progress.     Time Calculation:   Evaluation Complexity (OT)  Review Occupational Profile/Medical/Therapy History Complexity: expanded/moderate complexity  Assessment, Occupational Performance/Identification of Deficit  Complexity: 3-5 performance deficits  Clinical Decision Making Complexity (OT): detailed assessment/moderate complexity  Overall Complexity of Evaluation (OT): moderate complexity     Time Calculation- OT       Row Name 05/28/25 0805             Time Calculation- OT    OT Start Time 1515  -JR      OT Stop Time 1533  -JR      OT Time Calculation (min) 18 min  -JR      Total Timed Code Minutes- OT 10 minute(s)  -JR      OT Received On 05/27/25  -JR      OT - Next Appointment 05/28/25  -      OT Goal Re-Cert Due Date 06/11/25  -JR         Timed Charges    22341 - OT Therapeutic Activity Minutes 10  -JR         Untimed Charges    OT Eval/Re-eval Minutes 8  -JR         Total Minutes    Timed Charges Total Minutes 10  -JR      Untimed Charges Total Minutes 8  -JR       Total Minutes 18  -JR                User Key  (r) = Recorded By, (t) = Taken By, (c) = Cosigned By      Initials Name Provider Type    Jack Perez OT Occupational Therapist                  Therapy Charges for Today       Code Description Service Date Service Provider Modifiers Qty    04137877613 HC OT THERAPEUTIC ACT EA 15 MIN 5/27/2025 Jack Parra OT GO 1    39619055208 HC OT EVAL MOD COMPLEXITY 3 5/27/2025 Jack Parra OT GO 1                 Jack Parra OT  5/28/2025

## 2025-05-28 NOTE — PROGRESS NOTES
"Nutrition Services    Patient Name: Emely Solomon  YOB: 1960  MRN: [id number]  Admission date: 5/25/2025    Assessment Date:  05/28/25    NUTRITION EVALUATION      Reason for Encounter Pressure Injury and/or Non-Healing Wound   Diagnosis/Problem Admission Diagnosis:  Acute exacerbation of CHF (congestive heart failure) [I50.9]  Acute on chronic congestive heart failure, unspecified heart failure type [I50.9]    Problem List:    Acute exacerbation of CHF (congestive heart failure)     Narrative 64 yoF presents with cough, hypoxia, hypotension.   PMH: CHF, HTN, pulmonary hypertension, obesity        PO Diet Diet: Cardiac; Healthy Heart (2-3 Na+); Fluid Consistency: Thin (IDDSI 0)   Allergies NKFA   Supplements n/a   PO Intake % 100%       Chewing/Swallowing Difficulty no issues identified at this time       Medications reviewed, megace, iron, IV abx   Labs  reviewed       Physical Findings alert, obese, oriented     Edema 2+ (mild), 3+ (moderate)    GI Function normoactive   Skin Status pressure injury: sacral spine st 2     Lines/Drains none   I/O reviewed        Height  Weight  BMI  Weight Trend     Height: 160 cm (62.99\")  Weight: (!) 152 kg (335 lb 15.7 oz) (05/28/25 0500)  Body mass index is 59.54 kg/m².  Stable, Loss, Gain    Weight change:  weight flux indicated r/t CHF, edema, diuretics   Estimated needs   Estimated Requirements         Weight used  52.3kg    Calories  0670-9090 (30-35 kcal/kg)    Protein  105-130 (2.0 gm/kg, 2.5 gm/kg)    Fluid   (1 mL/kcal)      NFPE Not indicated at this time       Nutrition Problem (PES) Problem: Increased Nutrient Needs  Etiology: Medical Diagnosis - pressure injury     Signs/Symptoms: Protein and Report/Observation, skin breakdown       Intervention/Plan Greg BID  Boost GC TID for additional protein     RD to follow up per protocol.     Results from last 7 days   Lab Units 05/28/25  0226 05/27/25  0456 05/26/25  0401 05/25/25  1344   SODIUM mmol/L 133* " 136 135* 136   POTASSIUM mmol/L 3.7 3.9 4.4 4.4   CHLORIDE mmol/L 98 99 101 104   CO2 mmol/L 25.9 27.0 24.4 23.0   BUN mg/dL 15.0 13 10 10   CREATININE mg/dL 1.01* 1.11* 0.94 0.85   CALCIUM mg/dL 8.3* 8.3* 8.6 8.4*   BILIRUBIN mg/dL  --   --   --  0.4   ALK PHOS U/L  --   --   --  115   ALT (SGPT) U/L  --   --   --  8   AST (SGOT) U/L  --   --   --  13   GLUCOSE mg/dL 88 87 89 89     Results from last 7 days   Lab Units 05/28/25  0226   MAGNESIUM mg/dL 1.6   PHOSPHORUS mg/dL 3.5   HEMOGLOBIN g/dL 8.1*  8.1*   HEMATOCRIT % 26.1*  26.1*     Lab Results   Component Value Date    HGBA1C 5.40 05/04/2025     Wt Readings from Last 10 Encounters:   05/28/25 (!) 152 kg (335 lb 15.7 oz)   05/24/25 (!) 158 kg (348 lb 15.8 oz)   05/20/25 (!) 183 kg (403 lb)   05/10/25 (!) 190 kg (418 lb 9.6 oz)   03/24/25 (!) 163 kg (358 lb 6.4 oz)   01/24/25 (!) 168 kg (370 lb)   01/22/25 (!) 169 kg (373 lb)   12/19/24 (!) 168 kg (370 lb 9.6 oz)   11/07/24 (!) 166 kg (367 lb)   10/23/24 (!) 167 kg (368 lb)       Electronically signed by:  Maryellen Guerrero RD  05/28/25 09:33 EDT

## 2025-05-28 NOTE — PLAN OF CARE
Goal Outcome Evaluation:  Plan of Care Reviewed With: patient        Progress: no change  Outcome Evaluation: A&O x3 forgetful, on 2L O2 & home CPAP with O2, q2 turns encouraged, IV antbiotics continued, possible switch IV to PO Lasix 5/58 per Cardio, midline care, 2 uPRBC, Hgb UP TO 8.1, plan of care onging, ISO for MRSA, call light withinr reach, bed alarm on

## 2025-05-28 NOTE — PLAN OF CARE
Goal Outcome Evaluation:  Plan of Care Reviewed With: patient           Outcome Evaluation: Pt seen for PT this AM. She is agreeable to therapy, no complaints at i stime. Pt resting in bed upon entry,  present in room. Pt transitioned to EOB w SBA/CGA. she stood w min A x 2 using rwx and was able to take several small steps from bed to chair w min A x 2 using Rwx. Pt demos slow pace w some difficulty picking up feet. She is limited by weakness and fatigue. Pt left in recliner w all needs in reach. Recommend SNF at NH. Will continue to follow and progress as tolerated.

## 2025-05-28 NOTE — PROGRESS NOTES
"    Patient Name: Emely Solomon  :1960  64 y.o.      Patient Care Team:  Adilson Wilkerson MD as PCP - General (Family Medicine)  Florida Segovia MD as Referring Physician (Obstetrics and Gynecology)  Brennan Rogers MD as Cardiologist (Cardiology)  Caleb Garcia MD as Consulting Physician (Pulmonary Disease)  Jess Sanz, RN as Ambulatory  ([facility] [place])    Chief Complaint: follow up mitral valve stenosis and endocarditis     Interval History:    Good UOP. She is sitting up in the chair. Had some blood drop when she was up. Suspected from GYN bleeding.    Objective   Vital Signs  Temp:  [97.5 °F (36.4 °C)-99.1 °F (37.3 °C)] 97.5 °F (36.4 °C)  Heart Rate:  [75-94] 89  Resp:  [18-20] 20  BP: ()/(57-79) 115/72    Intake/Output Summary (Last 24 hours) at 2025 1257  Last data filed at 2025 0928  Gross per 24 hour   Intake 1090.83 ml   Output 900 ml   Net 190.83 ml     Flowsheet Rows      Flowsheet Row First Filed Value   Admission Height 160 cm (62.99\") Documented at 2025 1303   Admission Weight 158 kg (349 lb) Documented at 2025 1303            Physical Exam:   General Appearance:    Alert, cooperative, in no acute distress   Lungs:     Clear to auscultation.  Normal respiratory effort and rate.      Heart:    Regular rhythm and normal rate, normal S1 and S2, no murmurs, gallops or rubs.     Chest Wall:    No abnormalities observed   Abdomen:     Soft, nontender, positive bowel sounds.     Extremities:   no cyanosis, clubbing or edema.  No marked joint deformities.  Adequate musculoskeletal strength.       Results Review:    Results from last 7 days   Lab Units 25  0226   SODIUM mmol/L 133*   POTASSIUM mmol/L 3.7   CHLORIDE mmol/L 98   CO2 mmol/L 25.9   BUN mg/dL 15.0   CREATININE mg/dL 1.01*   GLUCOSE mg/dL 88   CALCIUM mg/dL 8.3*     Results from last 7 days   Lab Units 25  1533 25  1344   HSTROP T ng/L 26* 27*     Results from last 7 " days   Lab Units 05/28/25  0226   WBC 10*3/mm3 6.95   HEMOGLOBIN g/dL 8.1*  8.1*   HEMATOCRIT % 26.1*  26.1*   PLATELETS 10*3/mm3 307         Results from last 7 days   Lab Units 05/28/25  0226   MAGNESIUM mg/dL 1.6                   Medication Review:   cefTRIAXone (ROCEPHIN) 2,000 mg in sodium chloride 0.9 % 100 mL MBP, 2,000 mg, Intravenous, Q12H  ferrous sulfate, 325 mg, Oral, Daily With Breakfast  [START ON 5/29/2025] furosemide, 40 mg, Oral, Daily  ipratropium-albuterol, 3 mL, Nebulization, 4x Daily - RT  Lidocaine, 1 patch, Transdermal, Q24H  megestrol, 40 mg, Oral, Daily  metoprolol succinate XL, 25 mg, Oral, Daily  rivaroxaban, 20 mg, Oral, Daily With Dinner  sodium chloride, 10 mL, Intravenous, Q12H              Assessment & Plan   Acute hypoxic respiratory failure , likely multifactorial . PE felt to be unlikely.   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 Elevated gradients from prior. 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 Labs and UOP are stable on oral diuretic.     Vitals stable.     Continue current regimen. May be able to titrate beta blocker further tomorrow.    LUCÍA Conley  [facility]  05/28/25  12:57 EDT

## 2025-05-28 NOTE — CONSULTS
[facility]  [phone]  Dr. Caleb Garcia      Subjective   LOS: 2 days     Thank you for this consultation.  64-year-old female who follows at [facility] office with me.  Has chronic HFpEF and obstructive sleep apnea.  States that she was brought into the hospital after a fall.  Looks like she had low blood pressure and low oxygen levels.  She was recently at this facility and discharged on 5/24/2025 after starting treatment for acute bacterial endocarditis.  During recent hospitalization she has been noted to have some vaginal bleeding and gynecology recommended progesterone treatment and holding Xarelto for 24 to 48 hours until cessation of bleeding.  Due to interruption of Xarelto, on current admission CTA chest was recommended.  This was unfortunately equivocal for acute PE and we were asked to see the patient.  Patient does have a history of DVT/PE in 2015.  She is considered high risk for recurrent PE due to relative immobility and superobesity.  She was last seen by hematology on 5/7/2025 and with recommendation to continue anticoagulation.  Patient reports today that she has not had much vaginal bleeding.  She does have recommendations from GYN for eventual surgery and has an appointment with outpatient gynecologist on June 9 for the same.  It is currently difficult to assess patient's oxygen requirements as she is on a CPAP device.  However according to the recorded vitals she has been on 2 L oxygen or room air prior to this.  She denies subjective shortness of breath    Emely Solomon  reports that she does not currently use alcohol.,  reports that she has never smoked. She has never used smokeless tobacco.     Past Hx:  has a past medical history of Arthritis, Cellulitis, Chronic deep vein thrombosis (DVT) of left popliteal vein (12/17/2015), Chronic diastolic congestive heart failure, COVID-19 virus infection (11/2020), Heart murmur, Hypertension, Iliac vein stenosis, right (05/15/2024),  Lipoedema, Lymphedema, Morbid obesity, ARIEL on CPAP, PFO (patent foramen ovale), Postthrombotic syndrome of left lower extremity without complications (12/17/2015), Pulmonary embolism (04/14/2016), Pulmonary hypertension, Right bundle branch block (RBBB) with left anterior fascicular block (LAFB), Sepsis (09/18/2020), and Type 2 myocardial infarction (05/20/2025).  Surg Hx:  has a past surgical history that includes Dilation and curettage of uterus (04/11/2011); Bronchoscopy (N/A, 07/21/2017); Colonoscopy; d & c hysteroscopy (N/A, 03/08/2022); Colonoscopy (N/A, 01/26/2023); Other surgical history (09/2015); Thrombectomy (07/2015); Arteriogram (07/2015); Other surgical history; and Other surgical history.  FH: family history includes Breast cancer in her mother; Hypertension in an other family member.  SH:  reports that she has never smoked. She has never used smokeless tobacco. She reports that she does not currently use alcohol. She reports that she does not use drugs.    Medications Prior to Admission   Medication Sig Dispense Refill Last Dose/Taking    cefTRIAXone 2,000 mg in sodium chloride 0.9 % 100 mL IVPB Infuse 2,000 mg into a venous catheter Every 12 (Twelve) Hours for 74 doses. Indications: Endocarditis   5/24/2025    ferrous sulfate 325 (65 FE) MG tablet Take 1 tablet by mouth Daily With Breakfast.   5/24/2025    megestrol (MEGACE) 40 MG tablet Take 1 tablet by mouth Daily. 30 tablet 0 5/24/2025    metoprolol succinate XL (TOPROL-XL) 25 MG 24 hr tablet Take 0.5 tablets by mouth Daily.   5/24/2025    miconazole (MICOTIN) 2 % powder Apply 1 Application topically to the appropriate area as directed Every 12 (Twelve) Hours. 43 g 0 5/24/2025    acetaminophen (TYLENOL) 325 MG tablet Take 2 tablets by mouth Every 6 (Six) Hours As Needed for Mild Pain.   Unknown    cadexomer iodine (IODOSORB) 0.9 % gel Apply 1 Application topically to the appropriate area as directed Daily As Needed for Wound Care. Apply to open  wound to rt lower leg (Patient taking differently: Apply 1 Application topically to the appropriate area as directed Daily As Needed for Wound Care.)   Unknown    dextromethorphan polistirex ER (DELSYM) 30 MG/5ML Suspension Extended Release oral suspension Take 10 mL by mouth 2 (Two) Times a Day As Needed (As needed for cough).   Unknown    Dimethicone-Zinc Oxide-Vit A-D ([employer] ZINC OXIDE DIAPER EX) Apply 1 Application topically to the appropriate area as directed 2 (Two) Times a Day.   Unknown    Emollient (Aquaphor Advanced Therapy) 41 % ointment Apply 1 Application topically to the appropriate area as directed 2 (Two) Times a Day.   Unknown    furosemide (LASIX) 40 MG tablet Take 1 tablet by mouth Daily.   Unknown    guaiFENesin ER 1200 MG tablet sustained-release 12 hour Take 1 tablet by mouth 2 (Two) Times a Day As Needed.   Unknown    ipratropium-albuterol (DUO-NEB) 0.5-2.5 mg/3 ml nebulizer Take 3 mL by nebulization 4 (Four) Times a Day.   Unknown    Lidocaine 4 % Place 1 patch on the skin as directed by provider Daily. Apply to skin on upper back remove patch in 12 hours. Remove & Discard patch within 12 hours or as directed by MD   Unknown    melatonin 5 MG tablet tablet Take 1 tablet by mouth At Night As Needed (sleep).   Unknown    [Paused] rivaroxaban (Xarelto) 20 MG tablet Take 1 tablet by mouth Daily With Dinner. Indications: Atrial Fibrillation   Unknown    sennosides-docusate (PERICOLACE) 8.6-50 MG per tablet Take 2 tablets by mouth At Night As Needed for Constipation.   Unknown     Allergies   Allergen Reactions    Cephalexin Hives and Rash     Diffuse rash on cephalexin 1 g PO q6h on 6/17/20  Tolerated zosyn and PCN G September 2020 without issue    Clindamycin/Lincomycin Hives       Review of Systems   Constitutional:  Negative for chills and fever.   HENT:  Negative for congestion and sore throat.    Respiratory:  Negative for cough and shortness of breath.    Cardiovascular:  Negative for chest  pain and leg swelling.   Gastrointestinal:  Negative for abdominal pain, nausea and vomiting.   Genitourinary:  Negative for dysuria, hematuria and vaginal bleeding.   Musculoskeletal:  Negative for arthralgias and back pain.   Skin:  Negative for pallor and rash.   Neurological:  Negative for seizures and headaches.   Psychiatric/Behavioral:  Negative for agitation and confusion.    Please note review of systems from patient not be completely reliable    Vital Signs past 24hrs  BP range: BP: ()/(57-79) 124/79  Pulse range: Heart Rate:  [82-94] 88  Resp rate range: Resp:  [18-21] 20  Temp range: Temp (24hrs), Av.5 °F (36.9 °C), Min:98.1 °F (36.7 °C), Max:99.1 °F (37.3 °C)    Oxygen range: SpO2:  [92 %-100 %] 100 %; Flow (L/min) (Oxygen Therapy):  [0-2] 2;   Device (Oxygen Therapy): nasal cannula  (!) 161 kg (354 lb); Body mass index is 62.73 kg/m².  Net IO Since Admission: -6,530 mL [25]      Mechanical Ventilator:     Adult female who is laying in bed.  Currently with her BiPAP mask on.  Pupils equal and react light.  Oropharynx not examined.  Lungs reveal bilateral air entry clear to auscultation.  No rales rhonchi wheeze.  Percussion note resonant chest expansion equal no chest wall deformity or tenderness.  Neck is large in size.  Trachea midline.  Thyroid not enlarged.  Heart examination S1-S2 present rhythm regular no murmurs.   chronic changes in the lower extremities with edema appears improved.  Abdomen is super obese soft nontender bowel sounds present no liver spleen enlargement no peripheral cyanosis clubbing.  Moves all 4 extremities.  No obvious lymphadenopathy though difficult to examine.    Results Review:    I have reviewed the laboratory and imaging data from current admission. My annotations are as noted in assessment and plan.  Result Review:  I have personally reviewed the results from the time of this admission to 2025 20:54 EDT and agree with these findings:  [x]   Laboratory list / accordion  [x]  Microbiology  [x]  Radiology  []  EKG/Telemetry   []  Cardiology/Vascular   []  Pathology  []  Old records  []  Other:        Medication Review:  I have reviewed the current MAR. My annotations are as noted in assessment and plan.       Lines, Drains & Airways       Active LDAs       Name Placement date Placement time Site Days    PICC Single Lumen 05/09/25 Right Cephalic 05/09/25  1611  Cephalic  18    External Urinary Catheter 05/20/25  --  --  7    External Urinary Catheter 05/25/25 1651  --  2                  Isolation status: Contact    Dietary Orders (From admission, onward)       Start     Ordered    05/25/25 1655  Diet: Cardiac; Healthy Heart (2-3 Na+); Fluid Consistency: Thin (IDDSI 0)  Diet Effective Now        References:    Diet Order Definitions   Question Answer Comment   Diets: Cardiac    Cardiac Diet: Healthy Heart (2-3 Na+)    Fluid Consistency: Thin (IDDSI 0)        05/25/25 1655                    Isolation:  Contact    PCCM Problems  Hypoxia  History DVT/PE on chronic anticoagulation  Recent vaginal bleeding  Relevant Medical Diagnoses  ARIEL on CPAP  Chronic HFpEF  Mitral valve endocarditis on antibiotics  Limited mobility, uses walker  Super super obese    Plan of Treatment  CTA chest was likely sufficient to rule out large central PE.  Segmental PE may be missed due to suboptimal timing of the contrast dye.  However I do not think she has an acute PE clinically speaking.      On the other hand recent evaluation by hematology suggests high risk for recurrent pulmonary embolism and therefore continuation of anticoagulation.  Per the GYN notes her anticoagulation can be held for 48 hours and then resumed if no significant vaginal bleeding.  GYN and hematology recommended use of heparin drip or Lovenox initially.  Looks like the patient has been restarted on her home Xarelto.  As such I would continue this unless there is large vaginal bleeding which  requires us  to hold the anticoagulation temporarily.  Eventually patient plans to have hysterectomy for surgical correction of vaginal bleeding after which this should be of no longer any concern.    She uses her home CPAP machine and is doing well with it.        Electronically signed by Caleb Garcia MD, 05/27/25, 8:54 PM EDT.      Part of this note may be an electronic transcription/translation of spoken language to printed text using the Dragon Dictation System.

## 2025-05-28 NOTE — CASE MANAGEMENT/SOCIAL WORK
Discharge Planning Assessment  [facility]     Patient Name: Emely Solomon  MRN: [id number]  Today's Date: 5/28/2025    Admit Date: 5/25/2025    Plan: [facility] when medically stable   Discharge Needs Assessment       Row Name 05/28/25 1044       Living Environment    People in Home spouse    Current Living Arrangements extended care facility    Potentially Unsafe Housing Conditions none    Primary Care Provided by self    Provides Primary Care For no one    Family Caregiver if Needed spouse    Quality of Family Relationships helpful;involved;supportive    Able to Return to Prior Arrangements yes       Resource/Environmental Concerns    Resource/Environmental Concerns none    Transportation Concerns none       Transition Planning    Patient/Family Anticipates Transition to inpatient rehabilitation facility    Patient/Family Anticipated Services at Transition rehabilitation services    Transportation Anticipated health plan transportation       Discharge Needs Assessment    Readmission Within the Last 30 Days current reason for admission unrelated to previous admission    Current Outpatient/Agency/Support Group inpatient rehabilitation facility    Concerns to be Addressed care coordination/care conferences;discharge planning    Anticipated Changes Related to Illness none    Equipment Needed After Discharge none    Outpatient/Agency/Support Group Needs inpatient rehabilitation facility    Discharge Facility/Level of Care Needs rehabilitation facility    Provided Post Acute Provider List? N/A    Provided Post Acute Provider Quality & Resource List? N/A    Offered/Gave Vendor List no    Patient's Choice of Community Agency(s) [facility]                   Discharge Plan       Row Name 05/28/25 1045       Plan    Plan Return to [facility] when medically stable    Patient/Family in Agreement with Plan yes    Plan Comments IMM noted. CCP met with the patient at bedside,  introduced self, and explained the role of CCP. CCP confirmed she was admitted from [facility], and she was there for short-term rehab. CCP explained both PT/OT are still recommending SNF at discharge, and she is agreeable to return to [facility] SNF at discharge. CCP [facility] who confirmed the patient was there for short-term rehab and can return at discharge. CCP to follow. BEE, SIDW.                  Continued Care and Services - Admitted Since 5/25/2025       Destination       Service Provider Request Status Services Address Phone Fax Patient Preferred    [facility] Accept[facility] -- [address] [address] [phone] --                  Selected Continued Care - Episodes Includes continued care and service providers with selected services from the active episodes listed below          Selected Continued Care - Prior Encounters Includes continued care and service providers with selected services from prior encounters from 2/24/2025 to 5/28/2025      Discharged on 5/24/2025 Admission date: 5/20/2025 - Discharge disposition: Skilled Nursing Facility (DC - External)      Destination       Service Provider Services Address Phone Fax Patient Preferred    [facility] [address] [address] [phone] --                      Discharged on 5/10/2025 Admission date: 5/4/2025 - Discharge disposition: Skilled Nursing Facility (DC - External)      Destination       Service Provider Services Address Phone Fax Patient Preferred    [facility] [address] [address] [phone] --              Home Medical Care       Service Provider Services Address Phone Fax Patient Preferred    [facility] [address], [address], [place] [id number]  [phone] --                          Expected Discharge Date and Time       Expected Discharge Date Expected Discharge Time    May 29, 2025            Demographic Summary       Row Name 05/28/25 1035       General Information    Admission Type inpatient    Arrived From subacute/long-term acute care    Required Notices Provided Important Message from Medicare    Referral Source admission list    Reason for Consult discharge planning    Preferred Language English                   Functional Status       Row Name 05/28/25 1040       Functional Status    Usual Activity Tolerance moderate    Current Activity Tolerance poor       Functional Status, IADL    Medications assistive equipment and person    Meal Preparation assistive equipment and person    Housekeeping assistive equipment and person    Laundry assistive equipment and person    Shopping assistive equipment and person       Mental Status    General Appearance WDL WDL       Mental Status Summary    Recent Changes in Mental Status/Cognitive Functioning no changes       Employment/    Employment Status retired                   Psychosocial    No documentation.                  Abuse/Neglect    No documentation.                  Legal    No documentation.                  Substance Abuse    No documentation.                  Patient Forms    No documentation.

## 2025-05-28 NOTE — PROGRESS NOTES
"    DAILY PROGRESS NOTE  [facility]    Patient Identification:  Name: Emely Solomon  Age: 64 y.o.  Sex: female  :  1960  MRN: [id number]         Primary Care Physician: Adilson Wilkerson MD    Subjective:  Interval History: Sitting up in bedside chair.  Clinically looks a bit more stable today.  She was tearful yesterday and I can appreciate that today.  She states her breathing is at baseline and she is not really complaining of any dyspnea.  Denies chest pain.  She is unsure whether or not she has bleeding or not.  I can see in the bed that there is definitely issues with some drops of blood.    Objective: Case discussed with managing RN and also all questions answered to spouse at bedside    Scheduled Meds:cefTRIAXone (ROCEPHIN) 2,000 mg in sodium chloride 0.9 % 100 mL MBP, 2,000 mg, Intravenous, Q12H  ferrous sulfate, 325 mg, Oral, Daily With Breakfast  ipratropium-albuterol, 3 mL, Nebulization, 4x Daily - RT  Lidocaine, 1 patch, Transdermal, Q24H  megestrol, 40 mg, Oral, Daily  metoprolol succinate XL, 25 mg, Oral, Daily  rivaroxaban, 20 mg, Oral, Daily With Dinner  sodium chloride, 10 mL, Intravenous, Q12H      Continuous Infusions:     Vital signs in last 24 hours:  Temp:  [97.5 °F (36.4 °C)-99.1 °F (37.3 °C)] 97.5 °F (36.4 °C)  Heart Rate:  [75-94] 85  Resp:  [18-20] 20  BP: ()/(57-79) 115/72    Intake/Output:    Intake/Output Summary (Last 24 hours) at 2025 1112  Last data filed at 2025 0928  Gross per 24 hour   Intake 1090.83 ml   Output 1550 ml   Net -459.17 ml       Exam:  /72 (BP Location: Left arm, Patient Position: Sitting)   Pulse 85   Temp 97.5 °F (36.4 °C) (Oral)   Resp 20   Ht 160 cm (62.99\")   Wt (!) 152 kg (335 lb 15.7 oz)   SpO2 96%   BMI 59.54 kg/m²     General Appearance:    Alert, cooperative, nontoxic, AAOx3                         Throat:   Oral mucosa pink and moist                           Neck:   No JVD/very large diameter     "                     Lungs:    Diminished bases otherwise clear to auscultation bilaterally, respirations unlabored for what I can appreciate given body habitus                 Chest Wall:    No tenderness or deformity                          Heart:    Regular rate and rhythm, S1 and S2 normal                  Abdomen:     Soft, nontender, bowel sounds active                 Extremities: Lymphedema with stasis changes but nothing pitting                        Pulses:   Pulses palpable in all extremities                  Neurologic:   CNII-XII intact       Data Review:  Labs in chart were reviewed.    Assessment:  Active Hospital Problems    Diagnosis  POA    **Acute exacerbation of CHF (congestive heart failure) [I50.9]  Yes      Resolved Hospital Problems   No resolved problems to display.       Plan:    CTA chest unfortunately equivocal.  I appreciate pulmonary input as they do feel PE is low clinical suspicion and I concur but will continue with Xarelto for now    GYN bleeding is the issue.  Evaluated previously by Dr. Melvina Barnard/GYN is a reviewed old medical records and patient is on progesterone therapy   - You can visualize some of the drops of blood noted in the bed and per discussion with the RN nothing profusely bleeding.   - Transfused 2 units the other day and her hemoglobin trended 7.1-8.1 and I think we may have to reconsult GYN in the next 24 hours for some type of intervention due to the need for anticoagulation for both with concerns of possible PE as well as A-fib   - P.o. Lasix-uric 7.3-chronic lymphedema but I think resting fluid balance seems euvolemic for her.  Current creatinine 1.01 with stable electrolytes other than mild sodium of 133   - Stop date Rocephin is 6/17/2025 treated for endocarditis   - Cardiology has also evaluated and their input was appreciated.  Recent 5/5/2025 echo noted      Overall very tough clinical situation and will take it day by day pending clinical  progression/labs surveillance    Position: Patient will remain in house an additional couple days as we need to watch hemoglobin closely and perhaps intervene on vaginal bleeding    Toño Giron MD  5/28/2025  11:12 EDT

## 2025-05-28 NOTE — PLAN OF CARE
Goal Outcome Evaluation:  Plan of Care Reviewed With: patient        Progress: improving  Outcome Evaluation: No c/o pain, still with a NP cough and soa with acitivity. Patient to start PO lasix tomorrow, monitor hgb in the morning. Bleeding noted on chair when patient went back to bed this afternoon, does not appear to be vaginal bleeding, but coming from a wound on the back of her leg. KCL 40meq given x1 dose. Currently on 2L nc and home cpap in room to wear with sleep.

## 2025-05-28 NOTE — THERAPY TREATMENT NOTE
Patient Name: Emely Solomon  : 1960    MRN: [id number]                              Today's Date: 2025       Admit Date: 2025    Visit Dx:     ICD-10-CM ICD-9-CM   1. Acute on chronic congestive heart failure, unspecified heart failure type  I50.9 428.0     Patient Active Problem List   Diagnosis    Chronic diastolic CHF (congestive heart failure)    PFO (patent foramen ovale)    Paradoxical embolism    Essential hypertension    Pulmonary hypertension    Upper back pain    Bacteremia due to group B Streptococcus    ARIEL on CPAP    Class 3 severe obesity due to excess calories with serious comorbidity and body mass index (BMI) of 60.0 to 69.9 in adult    History of DVT (deep vein thrombosis)    Lymphedema    Anemia, chronic disease    Iron deficiency    Post-menopausal bleeding    Thickened endometrium    Generalized muscle weakness    Right bundle branch block (RBBB) with left anterior fascicular block (LAFB)    PVD (peripheral vascular disease) with claudication    Iliac vein stenosis, right    Hypoxia    Hyperglycemia    History of pulmonary embolism    Morbid obesity    Hyponatremia    Bacterial endocarditis    Sepsis due to methicillin susceptible Staphylococcus aureus (MSSA) with acute hypoxic respiratory failure without septic shock    Acute on chronic heart failure with preserved ejection fraction (HFpEF)    On home oxygen therapy    Type 2 myocardial infarction    Acute loss of vision, bilateral    Chronic respiratory failure with hypoxia    Acute loss of vision    Acute exacerbation of CHF (congestive heart failure)     Past Medical History:   Diagnosis Date    Arthritis     Cellulitis     2017, with Group B Strep bacteremia and sepsis    Chronic deep vein thrombosis (DVT) of left popliteal vein 2015, 2016    Chronic diastolic congestive heart failure     COVID-19 virus infection 2020    Heart murmur     Hypertension     Iliac vein stenosis, right  05/15/2024    Lipoedema     Lymphedema     other    Morbid obesity     ARIEL on CPAP     PFO (patent foramen ovale)     Postthrombotic syndrome of left lower extremity without complications 12/17/2015    postphletibis    Pulmonary embolism 04/14/2016    Pulmonary hypertension     multifactorial (dCHF, obesity/ARIEL, hx PE), mild by echo 1/2016    Right bundle branch block (RBBB) with left anterior fascicular block (LAFB)     Sepsis 09/18/2020    Type 2 myocardial infarction 05/20/2025     Past Surgical History:   Procedure Laterality Date    ARTERIOGRAM  07/2015    BRONCHOSCOPY N/A 07/21/2017    Procedure: BRONCHOSCOPY with wash;  Surgeon: Caleb Garcia MD;  Location: [facility] ENDOSCOPY;  Service:     COLONOSCOPY      COLONOSCOPY N/A 01/26/2023    Procedure: COLONOSCOPY to CECUM AND TERM ILEUM;  Surgeon: Jj Dean MD;  Location: [facility] ENDOSCOPY;  Service: Gastroenterology;  Laterality: N/A;  PRE OP -screening  POST OP -  NORMAL    D & C HYSTEROSCOPY N/A 03/08/2022    Procedure: DILATATION AND CURETTAGE with hysteroscopy;  Surgeon: Kaylah Land DO;  Location: [facility] MAIN OR;  Service: Gynecology Oncology;  Laterality: N/A;    DILATATION AND CURETTAGE  04/11/2011    OTHER SURGICAL HISTORY  09/2015    IVC filter    OTHER SURGICAL HISTORY      left LE revascularization    OTHER SURGICAL HISTORY      ALESSIA and LEV scan    THROMBECTOMY  07/2015      General Information       Row Name 05/28/25 1123          Physical Therapy Time and Intention    Document Type therapy note (daily note)  -EJ     Mode of Treatment co-treatment;occupational therapy;physical therapy;other (see comments)  -EJ       Row Name 05/28/25 1123          General Information    Existing Precautions/Restrictions fall  -EJ       Row Name 05/28/25 1123          Safety Issues/Impairments Affecting Functional Mobility    Comment, Safety Issues/Impairments (Mobility) Co treatment medically appropriate and necessary due to patient acuity level,  activity tolerance and safety of patient and staff. Treatment is focusing on progression of care and goals established in the POC.  -EJ               User Key  (r) = Recorded By, (t) = Taken By, (c) = Cosigned By      Initials Name Provider Type    Jane Amanda, PT Physical Therapist                   Mobility       Row Name 05/28/25 1123          Bed Mobility    Supine-Sit Iron (Bed Mobility) standby assist;contact guard;verbal cues  -EJ     Assistive Device (Bed Mobility) head of bed elevated;bed rails  -EJ       Row Name 05/28/25 1123          Sit-Stand Transfer    Sit-Stand Iron (Transfers) minimum assist (75% patient effort);2 person assist  -EJ     Assistive Device (Sit-Stand Transfers) walker, front-wheeled  -EJ       Row Name 05/28/25 1123          Gait/Stairs (Locomotion)    Iron Level (Gait) minimum assist (75% patient effort);2 person assist  -EJ     Assistive Device (Gait) walker, front-wheeled  -EJ     Distance in Feet (Gait) 3  -EJ     Deviations/Abnormal Patterns (Gait) urvashi decreased;stride length decreased;gait speed decreased  -EJ     Bilateral Gait Deviations forward flexed posture;heel strike decreased  -EJ     Comment, (Gait/Stairs) small steps from bed to chair, some difficulty picking up feet  -EJ               User Key  (r) = Recorded By, (t) = Taken By, (c) = Cosigned By      Initials Name Provider Type    Jane Amanda, PT Physical Therapist                   Obj/Interventions    No documentation.                  Goals/Plan    No documentation.                  Clinical Impression       Row Name 05/28/25 1124          Pain    Pretreatment Pain Rating 0/10 - no pain  -EJ     Posttreatment Pain Rating 0/10 - no pain  -EJ       Row Name 05/28/25 1124          Plan of Care Review    Plan of Care Reviewed With patient  -EJ     Outcome Evaluation Pt seen for PT this AM. She is agreeable to therapy, no complaints at i stime. Pt resting in bed upon  entry,  present in room. Pt transitioned to EOB w SBA/CGA. she stood w min A x 2 using rwx and was able to take several small steps from bed to chair w min A x 2 using Rwx. Pt demos slow pace w some difficulty picking up feet. She is limited by weakness and fatigue. Pt left in recliner w all needs in reach. Recommend SNF at [place]. Will continue to follow and progress as tolerated.  -EJ       Row Name 05/28/25 1124          Positioning and Restraints    Pre-Treatment Position in bed  -EJ     Post Treatment Position chair  -EJ     In Chair notified nsg;reclined;call light within reach;encouraged to call for assist;exit alarm on;with family/caregiver  -EJ               User Key  (r) = Recorded By, (t) = Taken By, (c) = Cosigned By      Initials Name Provider Type    Jane Amanda, PT Physical Therapist                   Outcome Measures       Row Name 05/28/25 1130 05/28/25 0928       How much help from another person do you currently need...    Turning from your back to your side while in flat bed without using bedrails? 3  -EJ 3  -JH    Moving from lying on back to sitting on the side of a flat bed without bedrails? 3  -EJ 2  -JH    Moving to and from a bed to a chair (including a wheelchair)? 3  -EJ 3  -JH    Standing up from a chair using your arms (e.g., wheelchair, bedside chair)? 3  -EJ 3  -JH    Climbing 3-5 steps with a railing? 1  -EJ 1  -JH    To walk in hospital room? 2  -EJ 2  -JH    AM-PAC 6 Clicks Score (PT) 15  -EJ 14  -JH    Highest Level of Mobility Goal Move to Chair/Commode-4  -EJ Move to Chair/Commode-4  -JH      Row Name 05/28/25 0801          Modified Briseyda Scale    Modified [place] Scale 4 - Moderately severe disability.  Unable to walk without assistance, and unable to attend to own bodily needs without assistance.  -JR       Row Name 05/28/25 0801          Functional Assessment    Outcome Measure Options AM-PAC 6 Clicks Daily Activity (OT);Modified [place]  -JR               User  Key  (r) = Recorded By, (t) = Taken By, (c) = Cosigned By      Initials Name Provider Type    Kamilla Curtis, RN Registered Nurse    Jane Amanda, PT Physical Therapist    Jack Perez, OT Occupational Therapist                                 Physical Therapy Education       Title: PT OT SLP Therapies (Done)       Topic: Physical Therapy (Done)       Point: Mobility training (Done)       Learning Progress Summary            Patient Acceptance, E,TB,D, VU,DU,NR by  at 5/28/2025 0151    Acceptance, E,TB, VU,NR by  at 5/27/2025 1549    Acceptance, TB,E, VU by  at 5/26/2025 0825                      Point: Home exercise program (Done)       Learning Progress Summary            Patient Acceptance, E,TB,D, VU,DU,NR by  at 5/28/2025 0151    Acceptance, E,TB, VU,NR by  at 5/27/2025 1549    Acceptance, TB,E, VU by  at 5/26/2025 0825                      Point: Body mechanics (Done)       Learning Progress Summary            Patient Acceptance, E,TB,D, VU,DU,NR by  at 5/28/2025 0151    Acceptance, E,TB, VU,NR by  at 5/27/2025 1549    Acceptance, TB,E, VU by  at 5/26/2025 0825                      Point: Precautions (Done)       Learning Progress Summary            Patient Acceptance, E,TB,D, VU,DU,NR by  at 5/28/2025 0151    Acceptance, E,TB, VU,NR by  at 5/27/2025 1549    Acceptance, TB,E, VU by  at 5/26/2025 0825                                      User Key       Initials Effective Dates Name Provider Type Discipline     06/16/21 -  Bridgette Nunez, RN Registered Nurse Nurse     06/16/21 -  Cynthia Cates, RN Registered Nurse Nurse     01/22/25 -  Simone Mcfadden, CATRINA Student PT Student PT                  PT Recommendation and Plan     Outcome Evaluation: Pt seen for PT this AM. She is agreeable to therapy, no complaints at hti stime. Pt resting in bed upon entry,  present in room. Pt transitioned to EOB w SBA/CGA. she stood w min A x 2 using rwx and was able to take several  small steps from bed to chair w min A x 2 using Rwx. Pt demos slow pace w some difficulty picking up feet. She is limited by weakness and fatigue. Pt left in recliner w all needs in reach. Recommend SNF at [place]. Will continue to follow and progress as tolerated.     Time Calculation:         PT Charges       Row Name 05/28/25 1130             Time Calculation    Start Time 1013  -EJ      Stop Time 1030  -EJ      Time Calculation (min) 17 min  -EJ      PT Received On 05/28/25  -EJ      PT - Next Appointment 05/29/25  -EJ                User Key  (r) = Recorded By, (t) = Taken By, (c) = Cosigned By      Initials Name Provider Type    Jane Amanda, PT Physical Therapist                  Therapy Charges for Today       Code Description Service Date Service Provider Modifiers Qty    [id number]  PT THERAPEUTIC ACT EA 15 MIN 5/28/2025 Jane Salas, PT GP 1            PT G-Codes  Outcome Measure Options: AM-PAC 6 Clicks Daily Activity (OT), Modified [place]  AM-PAC 6 Clicks Score (PT): 15  AM-PAC 6 Clicks Score (OT): 14  Modified [place] Scale: 4 - Moderately severe disability.  Unable to walk without assistance, and unable to attend to own bodily needs without assistance.       Jane Salas PT  5/28/2025

## 2025-05-29 LAB
DEPRECATED RDW RBC AUTO: 49.8 FL (ref 37–54)
ERYTHROCYTE [DISTWIDTH] IN BLOOD BY AUTOMATED COUNT: 16.2 % (ref 12.3–15.4)
HCT VFR BLD AUTO: 31.8 % (ref 34–46.6)
HGB BLD-MCNC: 9.9 G/DL (ref 12–15.9)
MCH RBC QN AUTO: 26.1 PG (ref 26.6–33)
MCHC RBC AUTO-ENTMCNC: 31.1 G/DL (ref 31.5–35.7)
MCV RBC AUTO: 83.9 FL (ref 79–97)
PLATELET # BLD AUTO: 337 10*3/MM3 (ref 140–450)
PMV BLD AUTO: 7.9 FL (ref 6–12)
RBC # BLD AUTO: 3.79 10*6/MM3 (ref 3.77–5.28)
WBC NRBC COR # BLD AUTO: 7.48 10*3/MM3 (ref 3.4–10.8)

## 2025-05-29 PROCEDURE — 97535 SELF CARE MNGMENT TRAINING: CPT

## 2025-05-29 PROCEDURE — 97530 THERAPEUTIC ACTIVITIES: CPT

## 2025-05-29 PROCEDURE — 85027 COMPLETE CBC AUTOMATED: CPT | Performed by: HOSPITALIST

## 2025-05-29 PROCEDURE — 99232 SBSQ HOSP IP/OBS MODERATE 35: CPT | Performed by: NURSE PRACTITIONER

## 2025-05-29 PROCEDURE — 94799 UNLISTED PULMONARY SVC/PX: CPT

## 2025-05-29 PROCEDURE — 94761 N-INVAS EAR/PLS OXIMETRY MLT: CPT

## 2025-05-29 PROCEDURE — 25010000002 CEFTRIAXONE PER 250 MG: Performed by: INTERNAL MEDICINE

## 2025-05-29 PROCEDURE — 94760 N-INVAS EAR/PLS OXIMETRY 1: CPT

## 2025-05-29 PROCEDURE — 94664 DEMO&/EVAL PT USE INHALER: CPT

## 2025-05-29 RX ADMIN — IPRATROPIUM BROMIDE AND ALBUTEROL SULFATE 3 ML: .5; 3 SOLUTION RESPIRATORY (INHALATION) at 15:09

## 2025-05-29 RX ADMIN — CEFTRIAXONE 2000 MG: 2 INJECTION, POWDER, FOR SOLUTION INTRAMUSCULAR; INTRAVENOUS at 09:38

## 2025-05-29 RX ADMIN — Medication 10 ML: at 09:45

## 2025-05-29 RX ADMIN — FUROSEMIDE 40 MG: 40 TABLET ORAL at 09:38

## 2025-05-29 RX ADMIN — RIVAROXABAN 20 MG: 20 TABLET, FILM COATED ORAL at 17:37

## 2025-05-29 RX ADMIN — CEFTRIAXONE 2000 MG: 2 INJECTION, POWDER, FOR SOLUTION INTRAMUSCULAR; INTRAVENOUS at 20:21

## 2025-05-29 RX ADMIN — IPRATROPIUM BROMIDE AND ALBUTEROL SULFATE 3 ML: .5; 3 SOLUTION RESPIRATORY (INHALATION) at 07:48

## 2025-05-29 RX ADMIN — IPRATROPIUM BROMIDE AND ALBUTEROL SULFATE 3 ML: .5; 3 SOLUTION RESPIRATORY (INHALATION) at 21:59

## 2025-05-29 RX ADMIN — FERROUS SULFATE TAB 325 MG (65 MG ELEMENTAL FE) 325 MG: 325 (65 FE) TAB at 09:38

## 2025-05-29 RX ADMIN — MEGESTROL ACETATE 40 MG: 40 TABLET ORAL at 09:45

## 2025-05-29 RX ADMIN — IPRATROPIUM BROMIDE AND ALBUTEROL SULFATE 3 ML: .5; 3 SOLUTION RESPIRATORY (INHALATION) at 11:15

## 2025-05-29 RX ADMIN — Medication 10 ML: at 20:22

## 2025-05-29 RX ADMIN — MICONAZOLE NITRATE 1 APPLICATION: 2 POWDER TOPICAL at 20:22

## 2025-05-29 RX ADMIN — DEXTROMETHORPHAN 60 MG: 30 SUSPENSION, EXTENDED RELEASE ORAL at 20:27

## 2025-05-29 RX ADMIN — METOPROLOL SUCCINATE 25 MG: 25 TABLET, EXTENDED RELEASE ORAL at 09:38

## 2025-05-29 NOTE — NURSING NOTE
Reason for Visit: WOC Team consult for left posterior thigh. Patient known to the WOC team from prior admissions. Patient BMI 62, morbid obesity with lymphedema component. Patient has chronic issues with MASD and intertrigo in skin folds.  Patient usually lives at home with spouse but recently has been staying at SNF since earlier this month, skin conditions look better today compared to prior admissions.    Legs with lymphedema, cobblestone skin changes. Deep skin folds moist, some with yeast, recommend managing with high quality hygiene, keep skin clean and dry, miconazole powder to folds, tuck ABD pads or male wraps in moisture prone folds to wick and manage the moisture.     Skin to gluteal cleft has some mild MASD. Left posterior thigh has some scattered partial thickness skin loss secondary to moisture and friction. Recommend to use zinc barrier cream to gluteal cleft and folds. An Allevyn Sacral dressing or 6x6 Optifoam dressing can be applied to posterior thigh for treatment/ prevention of friction. She is on a Pro Plus mattress for adequate pressure redistribution and control of microclimate.     Treatment Plan/Recommendations: As noted above under each skin problem. Continue on Pro plus mattress. Wound care and prevention standing orders placed into EPIC and discussed with RN.     Wound Team Follow up Plan: No WOC nurse follow up needed.

## 2025-05-29 NOTE — PLAN OF CARE
Problem: Adult Inpatient Plan of Care  Goal: Plan of Care Review  Outcome: Progressing  Flowsheets (Taken 5/29/2025 9185)  Progress: improving  Outcome Evaluation: Vitals stable. Still requiring 1-2L O2, dropped to 82% on room air while sitting up in chair. Will continue to wean O2 as tolerated. Wound care RN assessed - see recommendations. PICC remains in place, IV Rocephin continued. Worked with OT, x1 assist to chair. Plan of care ongoing.  Plan of Care Reviewed With: patient

## 2025-05-29 NOTE — PROGRESS NOTES
"    DAILY PROGRESS NOTE  [facility]    Patient Identification:  Name: Emely Solomon  Age: 64 y.o.  Sex: female  :  1960  MRN: [id number]         Primary Care Physician: Adilson Wilkerson MD    Subjective:  Interval History: Sitting in bedside chair peeling skin from her hands.  She is not appear in any acute distress.  She is conversational and pleasant.  Denies any new chest pain or worsening in breathing.  Not the best of historians at times    Objective: Nontoxic in appearance    Scheduled Meds:cefTRIAXone (ROCEPHIN) 2,000 mg in sodium chloride 0.9 % 100 mL MBP, 2,000 mg, Intravenous, Q12H  ferrous sulfate, 325 mg, Oral, Daily With Breakfast  furosemide, 40 mg, Oral, Daily  ipratropium-albuterol, 3 mL, Nebulization, 4x Daily - RT  Lidocaine, 1 patch, Transdermal, Q24H  megestrol, 40 mg, Oral, Daily  metoprolol succinate XL, 25 mg, Oral, Daily  rivaroxaban, 20 mg, Oral, Daily With Dinner  sodium chloride, 10 mL, Intravenous, Q12H      Continuous Infusions:     Vital signs in last 24 hours:  Temp:  [98.2 °F (36.8 °C)-99.3 °F (37.4 °C)] 99.3 °F (37.4 °C)  Heart Rate:  [82-91] 82  Resp:  [18-20] 18  BP: (105-123)/(61-80) 105/61    Intake/Output:    Intake/Output Summary (Last 24 hours) at 2025 1041  Last data filed at 2025 0728  Gross per 24 hour   Intake 240 ml   Output 1300 ml   Net -1060 ml       Exam:  /61 (BP Location: Left arm, Patient Position: Sitting)   Pulse 82   Temp 99.3 °F (37.4 °C) (Oral)   Resp 18   Ht 160 cm (62.99\")   Wt (!) 152 kg (336 lb 3.2 oz)   SpO2 95%   BMI 59.57 kg/m²     General Appearance:    Alert, cooperative, AOx3                         Throat:   Oral mucosa pink and moist                           Neck:   No JVD                         Lungs:    Clear to auscultation bilaterally, respirations unlabored                          Heart:    Regular rate and rhythm, S1 and S2 normal                  Abdomen:     Soft, nontender, bowel " sounds active                 Extremities: Lymphedema but good wrinkling of skin throughout upper and lower extremities and nothing pitting                        Pulses:   Pulses palpable in all extremities                  Neurologic:   CNII-XII intact       Data Review:  Labs in chart were reviewed.    Assessment:  Active Hospital Problems    Diagnosis  POA    **Acute exacerbation of CHF (congestive heart failure) [I50.9]  Yes      Resolved Hospital Problems   No resolved problems to display.       Plan:    CTA chest unfortunately equivocal.  I appreciate pulmonary input as they do feel PE is low clinical suspicion and I concur but will continue with Xarelto for now       GYN bleeding is the issue versus possibly coming from a wound per RN evaluation and I will have wound care further evaluate this closer.  Evaluated previously by Dr. Melvina Barnard/GYN and patient is on progesterone therapy.  Pending wound evaluation, may consider reconsultation with GYN if any further intervention is needed now given her necessity for AC              - Transfused 2 units pRBCs and hemoglobin has trended 8.1-9.9              - P.o. Lasix-uric 7.3-chronic lymphedema but I think overall fluid balance seems euvolemic for her.  Current creatinine 1.01 with stable electrolytes other than mild sodium of 133              - Stop date Rocephin is 6/17/2025 treated for endocarditis              - Cardiology has also evaluated and their input was appreciated.  Recent 5/5/2025 echo noted        Overall very tough clinical situation and will take it day by day pending clinical progression/labs surveillance       Disposition: Need wound to evaluate and help clarify if bleeding is coming from a wound or perhaps vaginal.  See above    Spouse not present at bedside today so I called [phone] at 1045    Toño Giron MD  5/29/2025  10:41 EDT

## 2025-05-29 NOTE — THERAPY TREATMENT NOTE
Patient Name: Emely Solomon  : 1960    MRN: [id number]                              Today's Date: 2025       Admit Date: 2025    Visit Dx:     ICD-10-CM ICD-9-CM   1. Acute on chronic congestive heart failure, unspecified heart failure type  I50.9 428.0     Patient Active Problem List   Diagnosis    Chronic diastolic CHF (congestive heart failure)    PFO (patent foramen ovale)    Paradoxical embolism    Essential hypertension    Pulmonary hypertension    Upper back pain    Bacteremia due to group B Streptococcus    ARIEL on CPAP    Class 3 severe obesity due to excess calories with serious comorbidity and body mass index (BMI) of 60.0 to 69.9 in adult    History of DVT (deep vein thrombosis)    Lymphedema    Anemia, chronic disease    Iron deficiency    Post-menopausal bleeding    Thickened endometrium    Generalized muscle weakness    Right bundle branch block (RBBB) with left anterior fascicular block (LAFB)    PVD (peripheral vascular disease) with claudication    Iliac vein stenosis, right    Hypoxia    Hyperglycemia    History of pulmonary embolism    Morbid obesity    Hyponatremia    Bacterial endocarditis    Sepsis due to methicillin susceptible Staphylococcus aureus (MSSA) with acute hypoxic respiratory failure without septic shock    Acute on chronic heart failure with preserved ejection fraction (HFpEF)    On home oxygen therapy    Type 2 myocardial infarction    Acute loss of vision, bilateral    Chronic respiratory failure with hypoxia    Acute loss of vision    Acute exacerbation of CHF (congestive heart failure)     Past Medical History:   Diagnosis Date    Arthritis     Cellulitis     2017, with Group B Strep bacteremia and sepsis    Chronic deep vein thrombosis (DVT) of left popliteal vein 2015, 2016    Chronic diastolic congestive heart failure     COVID-19 virus infection 2020    Heart murmur     Hypertension     Iliac vein stenosis, right  05/15/2024    Lipoedema     Lymphedema     other    Morbid obesity     ARIEL on CPAP     PFO (patent foramen ovale)     Postthrombotic syndrome of left lower extremity without complications 12/17/2015    postphletibis    Pulmonary embolism 04/14/2016    Pulmonary hypertension     multifactorial (dCHF, obesity/ARIEL, hx PE), mild by echo 1/2016    Right bundle branch block (RBBB) with left anterior fascicular block (LAFB)     Sepsis 09/18/2020    Type 2 myocardial infarction 05/20/2025     Past Surgical History:   Procedure Laterality Date    ARTERIOGRAM  07/2015    BRONCHOSCOPY N/A 07/21/2017    Procedure: BRONCHOSCOPY with wash;  Surgeon: Caleb Garcia MD;  Location: [facility] ENDOSCOPY;  Service:     COLONOSCOPY      COLONOSCOPY N/A 01/26/2023    Procedure: COLONOSCOPY to CECUM AND TERM ILEUM;  Surgeon: Jj Dean MD;  Location: [facility] ENDOSCOPY;  Service: Gastroenterology;  Laterality: N/A;  PRE OP -screening  POST OP -  NORMAL    D & C HYSTEROSCOPY N/A 03/08/2022    Procedure: DILATATION AND CURETTAGE with hysteroscopy;  Surgeon: Kaylah Land DO;  Location: [facility] MAIN OR;  Service: Gynecology Oncology;  Laterality: N/A;    DILATATION AND CURETTAGE  04/11/2011    OTHER SURGICAL HISTORY  09/2015    IVC filter    OTHER SURGICAL HISTORY      left LE revascularization    OTHER SURGICAL HISTORY      ALESSIA and LEV scan    THROMBECTOMY  07/2015      General Information       Row Name 05/29/25 1222          OT Time and Intention    Document Type therapy note (daily note)  -BC     Mode of Treatment occupational therapy  -BC     Patient Effort good  -BC       Row Name 05/29/25 1222          General Information    Patient Profile Reviewed yes  -BC     Existing Precautions/Restrictions fall  -BC       Row Name 05/29/25 1222          Cognition    Orientation Status (Cognition) oriented x 4  -BC       Row Name 05/29/25 1222          Safety Issues/Impairments Affecting Functional Mobility    Impairments Affecting  Function (Mobility) balance;endurance/activity tolerance;pain;strength;range of motion (ROM)  -BC               User Key  (r) = Recorded By, (t) = Taken By, (c) = Cosigned By      Initials Name Provider Type    BC Jimmie Deshpande OT Occupational Therapist                     Mobility/ADL's       Row Name 05/29/25 1224          Bed Mobility    Bed Mobility supine-sit  -BC     Supine-Sit [place] (Bed Mobility) standby assist  -BC     Bed Mobility, Safety Issues decreased use of arms for pushing/pulling;decreased use of legs for bridging/pushing  -BC     Assistive Device (Bed Mobility) head of bed elevated;bed rails  -BC       Row Name 05/29/25 1224          Transfers    Transfers sit-stand transfer;stand-sit transfer;bed-chair transfer  -BC       Row Name 05/29/25 1224          Bed-Chair Transfer    Bed-Chair [place] (Transfers) contact guard;verbal cues  -BC     Assistive Device (Bed-Chair Transfers) walker, front-wheeled  -BC     Comment, (Bed-Chair Transfer) increased time for movements, Close SBA/CGA for safety ~6 steps to chair.  -BC       Row Name 05/29/25 1224          Sit-Stand Transfer    Sit-Stand [place] (Transfers) standby assist  -BC     Assistive Device (Sit-Stand Transfers) walker, front-wheeled  -BC       Row Name 05/29/25 1224          Stand-Sit Transfer    Stand-Sit [place] (Transfers) standby assist  -BC     Assistive Device (Stand-Sit Transfers) walker, front-wheeled  -BC       Row Name 05/29/25 1224          Functional Mobility    Functional Mobility- Ind. Level contact guard assist;verbal cues required  -BC     Functional Mobility- Device walker, front-wheeled  -BC     Functional Mobility- Safety Issues weight-shifting ability decreased  -BC     Functional Mobility- Comment short distance in room ~6 feet to the recliner CGA for safety and increased time for mvmts  -BC     Patient was able to Ambulate yes  -BC       Row Name 05/29/25 1224          Activities of Daily Living     BADL Assessment/Intervention lower body dressing;grooming;toileting  -BC       Row Name 05/29/25 1224          Lower Body Dressing Assessment/Training    [place] Level (Lower Body Dressing) don;socks;dependent (less than 25% patient effort)  -BC       Row Name 05/29/25 1224          Grooming Assessment/Training    [place] Level (Grooming) grooming skills;hair care, combing/brushing;oral care regimen;wash face, hands;set up  -BC     Position (Grooming) edge of bed sitting;unsupported sitting  -BC     Comment, (Grooming) full grooming routine seated EOB, increased time to attempt to comb hair out due to knotting. Pt was able to assist w/ reaching. She reports of back itching and scratching w/ small scratches LL back and skin tag bleeding On upper R back, RN notified. No active bleeding at end of session  -BC       Row Name 05/29/25 1224          Toileting Assessment/Training    [place] Level (Toileting) toileting skills;dependent (less than 25% patient effort)  -BC     Comment, (Toileting) purewick  -BC               User Key  (r) = Recorded By, (t) = Taken By, (c) = Cosigned By      Initials Name Provider Type    Jimmie Adames OT Occupational Therapist                   Obj/Interventions       Row Name 05/29/25 1232          Motor Skills    Functional Endurance fair  -BC       Row Name 05/29/25 1232          Balance    Balance Assessment sitting static balance;sitting dynamic balance  -BC     Static Sitting Balance standby assist  -BC     Dynamic Sitting Balance standby assist  -BC     Position, Sitting Balance sitting edge of bed;unsupported  -BC     Static Standing Balance standby assist  -BC     Dynamic Standing Balance contact guard  -BC     Position/Device Used, Standing Balance supported;walker, front-wheeled  -BC     Balance Interventions sitting;standing  -BC               User Key  (r) = Recorded By, (t) = Taken By, (c) = Cosigned By      Initials Name Provider Type    SABINE Deshpande  FABY Samuel Occupational Therapist                   Goals/Plan    No documentation.                  Clinical Impression       Row Name 05/29/25 1234          Pain Assessment    Pretreatment Pain Rating 0/10 - no pain  -BC     Posttreatment Pain Rating 0/10 - no pain  -BC       Row Name 05/29/25 1234          Plan of Care Review    Plan of Care Reviewed With patient  -BC     Outcome Evaluation Pt tolerated OT session well. She was received supine in bed w/ NC on. Pt tolerated bed mobility well w/ supervision w increased time for mvmts. Pt was able to progress with walker to take steps to chair following seated grooming tasks. Pt would continue to benefit from skilled OT Services with DC REC continued therapy.  -BC       Row Name 05/29/25 1234          Therapy Plan Review/Discharge Plan (OT)    Anticipated Discharge Disposition (OT) skilled nursing facility;sub acute care setting  -BC       Row Name 05/29/25 1234          Vital Signs    O2 Delivery Pre Treatment supplemental O2  -BC     O2 Delivery Post Treatment supplemental O2  -BC     Pre Patient Position Supine  -BC     Intra Patient Position Standing  -BC     Post Patient Position Sitting  -BC       Row Name 05/29/25 1234          Positioning and Restraints    Pre-Treatment Position in bed  -BC     Post Treatment Position chair  -BC     In Chair notified nsg;reclined;sitting;call light within reach;encouraged to call for assist;exit alarm on  due to body habitus legs pushing recliner down somewhat- trash can placed per pt request completed yesterday to keep her comfortable. Nusing aide present at end of session and RN notified left status  -BC               User Key  (r) = Recorded By, (t) = Taken By, (c) = Cosigned By      Initials Name Provider Type    Jimmie Adames OT Occupational Therapist                   Outcome Measures       Row Name 05/29/25 1239          How much help from another is currently needed...    Putting on and taking off regular  lower body clothing? 1  -BC     Bathing (including washing, rinsing, and drying) 2  -BC     Toileting (which includes using toilet bed pan or urinal) 2  -BC     Putting on and taking off regular upper body clothing 2  -BC     Taking care of personal grooming (such as brushing teeth) 3  -BC     Eating meals 3  -BC     AM-PAC 6 Clicks Score (OT) 13  -BC       Row Name 05/29/25 0940          How much help from another person do you currently need...    Turning from your back to your side while in flat bed without using bedrails? 3  -JULIAN     Moving from lying on back to sitting on the side of a flat bed without bedrails? 3  -JULIAN     Moving to and from a bed to a chair (including a wheelchair)? 3  -JULIAN     Standing up from a chair using your arms (e.g., wheelchair, bedside chair)? 3  -JULIAN     Climbing 3-5 steps with a railing? 1  -JULIAN     To walk in hospital room? 2  -JULIAN     AM-PAC 6 Clicks Score (PT) 15  -JULIAN     Highest Level of Mobility Goal Move to Chair/Commode-4  -JULIAN       Row Name 05/29/25 1239          Functional Assessment    Outcome Measure Options AM-PAC 6 Clicks Daily Activity (OT)  -BC               User Key  (r) = Recorded By, (t) = Taken By, (c) = Cosigned By      Initials Name Provider Type    Dayana Kennedy RN Registered Nurse    Jimmie Adames OT Occupational Therapist                    Occupational Therapy Education       Title: PT OT SLP Therapies (Done)       Topic: Occupational Therapy (Done)       Point: ADL training (Done)       Learning Progress Summary            RISSA Brown VU by  at 5/28/2025 0803    Comment: Role of OT                      Point: Home exercise program (Done)       Learning Progress Summary            RISSA Brown VU by  at 5/28/2025 0803    Comment: Role of OT                      Point: Precautions (Done)       Learning Progress Summary            RISSA Brown VU by  at 5/28/2025 0803    Comment: Role of OT                      Point: Body  mechanics (Done)       Learning Progress Summary            Patient RISSA Lisa VU by  at 5/28/2025 0803    Comment: Role of OT                                      User Key       Initials Effective Dates Name Provider Type Discipline     07/24/24 -  Jack Parra OT Occupational Therapist OT                  OT Recommendation and Plan     Plan of Care Review  Plan of Care Reviewed With: patient  Outcome Evaluation: Pt tolerated OT session well. She was received supine in bed w/ NC on. Pt tolerated bed mobility well w/ supervision w increased time for mvmts. Pt was able to progress with walker to take steps to chair following seated grooming tasks. Pt would continue to benefit from skilled OT Services with DC REC continued therapy.     Time Calculation:         Time Calculation- OT       Row Name 05/29/25 1240             Time Calculation- OT    OT Start Time 0840  -BC      OT Stop Time 0912  -BC      OT Time Calculation (min) 32 min  -BC      Total Timed Code Minutes- OT 32 minute(s)  -BC      OT Received On 05/29/25  -BC      OT - Next Appointment 05/30/25  -BC         Timed Charges    43428 - OT Therapeutic Activity Minutes 12  -BC      18949 - OT Self Care/Mgmt Minutes 20  -BC         Total Minutes    Timed Charges Total Minutes 32  -BC       Total Minutes 32  -BC                User Key  (r) = Recorded By, (t) = Taken By, (c) = Cosigned By      Initials Name Provider Type    BC Jimmie Deshpande OT Occupational Therapist                  Therapy Charges for Today       Code Description Service Date Service Provider Modifiers Qty    42691180521 HC OT THERAPEUTIC ACT EA 15 MIN 5/29/2025 Jimmie Deshpande OT GO 1    79584687168 HC OT SELF CARE/MGMT/TRAIN EA 15 MIN 5/29/2025 Jimmie Deshpande OT GO 1                 Jimmie Deshpande OT  5/29/2025

## 2025-05-29 NOTE — PROGRESS NOTES
"    Patient Name: Emely Solomon  :1960  64 y.o.      Patient Care Team:  Adilson Wilkerson MD as PCP - General (Family Medicine)  [place] Segovia MD as Referring Physician (Obstetrics and Gynecology)  Brennan Rogers MD as Cardiologist (Cardiology)  Caleb Garcia MD as Consulting Physician (Pulmonary Disease)  Jess Sanz, RN as Ambulatory  ([facility])    Chief Complaint: follow up mitral valve stenosis and endocarditis     Interval History:   Sitting up in the chair. No complaints.     Objective   Vital Signs  Temp:  [98.1 °F (36.7 °C)-99.3 °F (37.4 °C)] 98.1 °F (36.7 °C)  Heart Rate:  [82-91] 91  Resp:  [16-20] 20  BP: (104-123)/(61-81) 104/81    Intake/Output Summary (Last 24 hours) at 2025 1426  Last data filed at 2025 1338  Gross per 24 hour   Intake 300 ml   Output 1300 ml   Net -1000 ml     Flowsheet Rows      Flowsheet Row First Filed Value   Admission Height 160 cm (62.99\") Documented at 2025 1303   Admission Weight 158 kg (349 lb) Documented at 2025 1303            Physical Exam:   General Appearance:    Alert, cooperative, in no acute distress   Lungs:     Clear to auscultation.  Normal respiratory effort and rate.      Heart:    Regular rhythm and normal rate, normal S1 and S2, no murmurs, gallops or rubs.     Chest Wall:    No abnormalities observed   Abdomen:     Soft, nontender, positive bowel sounds.     Extremities:   no cyanosis, clubbing or edema.  No marked joint deformities.  Adequate musculoskeletal strength.       Results Review:    Results from last 7 days   Lab Units 25  0226   SODIUM mmol/L 133*   POTASSIUM mmol/L 3.7   CHLORIDE mmol/L 98   CO2 mmol/L 25.9   BUN mg/dL 15.0   CREATININE mg/dL 1.01*   GLUCOSE mg/dL 88   CALCIUM mg/dL 8.3*     Results from last 7 days   Lab Units 25  1533 25  1344   HSTROP T ng/L 26* 27*     Results from last 7 days   Lab Units 25  0911   WBC 10*3/mm3 7.48   HEMOGLOBIN g/dL " 9.9*   HEMATOCRIT % 31.8*   PLATELETS 10*3/mm3 337         Results from last 7 days   Lab Units 05/28/25  0226   MAGNESIUM mg/dL 1.6                   Medication Review:   cefTRIAXone (ROCEPHIN) 2,000 mg in sodium chloride 0.9 % 100 mL MBP, 2,000 mg, Intravenous, Q12H  ferrous sulfate, 325 mg, Oral, Daily With Breakfast  furosemide, 40 mg, Oral, Daily  ipratropium-albuterol, 3 mL, Nebulization, 4x Daily - RT  Lidocaine, 1 patch, Transdermal, Q24H  megestrol, 40 mg, Oral, Daily  metoprolol succinate XL, 25 mg, Oral, Daily  rivaroxaban, 20 mg, Oral, Daily With Dinner  sodium chloride, 10 mL, Intravenous, Q12H              Assessment & Plan   Acute hypoxic respiratory failure , likely multifactorial . PE felt to be unlikely.   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 Elevated gradients from prior. 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 Labs and UOP are stable on oral diuretic.     Vitals stable.     Continue current regimen. BP is marginal. Will leave beta blocker at current dose.     Seems near baseline. Not a lot to add from a cardiac standpoint. Will see as needed. Has an appt 6/16/25 in office.     LUCÍA Conley  [facility]  05/29/25  14:26 EDT

## 2025-05-29 NOTE — PLAN OF CARE
Goal Outcome Evaluation:  Plan of Care Reviewed With: patient           Outcome Evaluation: Pt tolerated OT session well. She was received supine in bed w/ NC on. Pt tolerated bed mobility well w/ supervision w increased time for mvmts. Pt was able to progress with walker to take steps to chair following seated grooming tasks. Pt would continue to benefit from skilled OT Services with DC REC continued therapy.    Anticipated Discharge Disposition (OT): skilled nursing facility, sub acute care setting

## 2025-05-29 NOTE — PROGRESS NOTES
[facility]  [phone]  Dr. Bhanu Chin    Subjective:  LOS: 4    No acute events overnight.  Doing well.    Objective:  Vital Signs past 24hrs    Temp range: Temp (24hrs), Av.9 °F (37.2 °C), Min:98.2 °F (36.8 °C), Max:99.3 °F (37.4 °C)    BP range: BP: (105-123)/(61-80) 105/61  Pulse range: Heart Rate:  [82-91] 86  Resp rate range: Resp:  [16-20] 16  (!) 152 kg (336 lb 3.2 oz); Body mass index is 59.57 kg/m².    Device (Oxygen Therapy): nasal cannulaFlow (L/min) (Oxygen Therapy):  [1-2] 1    Oxygen range:SpO2:  [95 %-99 %] 95 %            Intake/Output Summary (Last 24 hours) at 2025 1137  Last data filed at 2025 0940  Gross per 24 hour   Intake 540 ml   Output 1300 ml   Net -760 ml       Physical Exam  Constitutional:       Appearance: Normal appearance.   HENT:      Head: Normocephalic and atraumatic.      Nose: Nose normal.      Mouth/Throat:      Mouth: Mucous membranes are moist.      Pharynx: Oropharynx is clear.   Eyes:      Extraocular Movements: Extraocular movements intact.      Conjunctiva/sclera: Conjunctivae normal.   Cardiovascular:      Rate and Rhythm: Normal rate and regular rhythm.      Pulses: Normal pulses.      Heart sounds: Normal heart sounds. No murmur heard.  Pulmonary:      Effort: Pulmonary effort is normal. No respiratory distress.      Breath sounds: No stridor. No wheezing or rales.      Comments: On 2 L nasal cannula  Abdominal:      General: Abdomen is flat. Bowel sounds are normal.      Palpations: Abdomen is soft.   Skin:     General: Skin is warm and dry.   Neurological:      General: No focal deficit present.      Mental Status: She is alert and oriented to person, place, and time. Mental status is at baseline.   Psychiatric:         Mood and Affect: Mood normal.         Behavior: Behavior normal.            Result Review:  I have reviewed the results from last note by LPC physician and agree with these findings:  [x]  Laboratory accordion  [x]   Microbiology  [x]  Radiology  [x]  EKG/Telemetry   [x]  Cardiology/Vascular   [x]  Pathology  [x]  Old records  []  Other:    Medication Review:  I have reviewed the current MAR.  Antibiotics  cefTRIAXone (ROCEPHIN) 2000 mg IVPB in 100 mL NS (MBP)  CEFTRIAXONE 2000 MG IVPB  ML NS MBP (CD)     Scheduled Medications  cefTRIAXone (ROCEPHIN) 2,000 mg in sodium chloride 0.9 % 100 mL MBP, 2,000 mg, Intravenous, Q12H  ferrous sulfate, 325 mg, Oral, Daily With Breakfast  furosemide, 40 mg, Oral, Daily  ipratropium-albuterol, 3 mL, Nebulization, 4x Daily - RT  Lidocaine, 1 patch, Transdermal, Q24H  megestrol, 40 mg, Oral, Daily  metoprolol succinate XL, 25 mg, Oral, Daily  rivaroxaban, 20 mg, Oral, Daily With Dinner  sodium chloride, 10 mL, Intravenous, Q12H      ICU Drips     PRN Medications    acetaminophen **OR** acetaminophen **OR** acetaminophen    senna-docusate sodium **AND** polyethylene glycol **AND** bisacodyl **AND** bisacodyl    dextromethorphan polistirex ER    guaiFENesin    melatonin    ondansetron ODT **OR** ondansetron    sennosides-docusate    sodium chloride    sodium chloride    sodium chloride    Lines, Drains & Airways       Active LDAs       Name Placement date Placement time Site Days    PICC Single Lumen 05/09/25 Right Cephalic 05/09/25  1611  Cephalic  18    External Urinary Catheter 05/20/25  --  --  8    External Urinary Catheter 05/25/25  1651  --  2                    Diet Orders (active) (From admission, onward)       Start     Ordered    05/28/25 1800  Dietary Nutrition Supplements Greg  2 Times Daily       05/28/25 1000    05/28/25 1200  Dietary Nutrition Supplements Boost Glucose Control (Glucerna Shake)  Daily With Breakfast, Lunch & Dinner       05/28/25 1000    05/25/25 1655  Diet: Cardiac; Healthy Heart (2-3 Na+); Fluid Consistency: Thin (IDDSI 0)  Diet Effective Now         05/25/25 1655                      Assessment:  Hypoxia  Recent vaginal bleeding  History of DVT/PE  2015  Chronic heart failure with preserved ejection fraction  ARIEL, on CPAP  Mitral valve endocarditis, on antibiotics  Limited mobility  Morbidly obese, BMI 59    Plan of Treatment  - Low likelihood to have PE on the CTA chest  - Continue home Xarelto  - Continue using home CPAP  - Primary team thinking about reconsulting gynecology  - On Rocephin for endocarditis, stop date 6/17/2025  - Wean O2 as tolerated, goal O2 sats > 92%. Currently requiring 1L NC.    Overall, she is close to her baseline from a respiratory standpoint. Will try to wean her O2 off completely today and see how she does.      Bhanu Chin MD  [facility], [facility]  Pulmonary and Critical Care Medicine

## 2025-05-29 NOTE — PLAN OF CARE
Goal Outcome Evaluation:  Plan of Care Reviewed With: patient        Progress: no change  Outcome Evaluation: VSS afebrile, Pt remains on 2lpm NC with home cpap machine used for sleep. No complaints of pain, pt had one BM this shift. Pending am labs. Plan of care ongoing.

## 2025-05-30 PROBLEM — I26.99 ACUTE PULMONARY EMBOLISM: Status: ACTIVE | Noted: 2025-05-30

## 2025-05-30 LAB
ANION GAP SERPL CALCULATED.3IONS-SCNC: 12 MMOL/L (ref 5–15)
BUN SERPL-MCNC: 17 MG/DL (ref 8–23)
BUN/CREAT SERPL: 19.1 (ref 7–25)
CALCIUM SPEC-SCNC: 8.4 MG/DL (ref 8.6–10.5)
CHLORIDE SERPL-SCNC: 99 MMOL/L (ref 98–107)
CO2 SERPL-SCNC: 22 MMOL/L (ref 22–29)
CREAT SERPL-MCNC: 0.89 MG/DL (ref 0.57–1)
DEPRECATED RDW RBC AUTO: 48.1 FL (ref 37–54)
EGFRCR SERPLBLD CKD-EPI 2021: 72.1 ML/MIN/1.73
ERYTHROCYTE [DISTWIDTH] IN BLOOD BY AUTOMATED COUNT: 16 % (ref 12.3–15.4)
GLUCOSE SERPL-MCNC: 83 MG/DL (ref 65–99)
HCT VFR BLD AUTO: 26.1 % (ref 34–46.6)
HGB BLD-MCNC: 8.1 G/DL (ref 12–15.9)
MCH RBC QN AUTO: 25.6 PG (ref 26.6–33)
MCHC RBC AUTO-ENTMCNC: 31 G/DL (ref 31.5–35.7)
MCV RBC AUTO: 82.6 FL (ref 79–97)
PLATELET # BLD AUTO: 290 10*3/MM3 (ref 140–450)
PMV BLD AUTO: 7.9 FL (ref 6–12)
POTASSIUM SERPL-SCNC: 4 MMOL/L (ref 3.5–5.2)
RBC # BLD AUTO: 3.16 10*6/MM3 (ref 3.77–5.28)
SODIUM SERPL-SCNC: 133 MMOL/L (ref 136–145)
URATE SERPL-MCNC: 6.6 MG/DL (ref 2.4–5.7)
WBC NRBC COR # BLD AUTO: 5.86 10*3/MM3 (ref 3.4–10.8)

## 2025-05-30 PROCEDURE — 94799 UNLISTED PULMONARY SVC/PX: CPT

## 2025-05-30 PROCEDURE — 85027 COMPLETE CBC AUTOMATED: CPT | Performed by: HOSPITALIST

## 2025-05-30 PROCEDURE — 84550 ASSAY OF BLOOD/URIC ACID: CPT | Performed by: HOSPITALIST

## 2025-05-30 PROCEDURE — 80048 BASIC METABOLIC PNL TOTAL CA: CPT | Performed by: HOSPITALIST

## 2025-05-30 PROCEDURE — 94761 N-INVAS EAR/PLS OXIMETRY MLT: CPT

## 2025-05-30 PROCEDURE — 97530 THERAPEUTIC ACTIVITIES: CPT

## 2025-05-30 PROCEDURE — 94760 N-INVAS EAR/PLS OXIMETRY 1: CPT

## 2025-05-30 PROCEDURE — 94664 DEMO&/EVAL PT USE INHALER: CPT

## 2025-05-30 PROCEDURE — 25010000002 CEFTRIAXONE PER 250 MG: Performed by: INTERNAL MEDICINE

## 2025-05-30 RX ORDER — MEDROXYPROGESTERONE ACETATE 10 MG
10 TABLET ORAL DAILY
Status: DISCONTINUED | OUTPATIENT
Start: 2025-05-30 | End: 2025-06-03 | Stop reason: HOSPADM

## 2025-05-30 RX ADMIN — METOPROLOL SUCCINATE 25 MG: 25 TABLET, EXTENDED RELEASE ORAL at 09:25

## 2025-05-30 RX ADMIN — IPRATROPIUM BROMIDE AND ALBUTEROL SULFATE 3 ML: .5; 3 SOLUTION RESPIRATORY (INHALATION) at 10:46

## 2025-05-30 RX ADMIN — DEXTROMETHORPHAN 60 MG: 30 SUSPENSION, EXTENDED RELEASE ORAL at 20:31

## 2025-05-30 RX ADMIN — Medication 10 ML: at 20:31

## 2025-05-30 RX ADMIN — IPRATROPIUM BROMIDE AND ALBUTEROL SULFATE 3 ML: .5; 3 SOLUTION RESPIRATORY (INHALATION) at 08:05

## 2025-05-30 RX ADMIN — CEFTRIAXONE 2000 MG: 2 INJECTION, POWDER, FOR SOLUTION INTRAMUSCULAR; INTRAVENOUS at 20:31

## 2025-05-30 RX ADMIN — IPRATROPIUM BROMIDE AND ALBUTEROL SULFATE 3 ML: .5; 3 SOLUTION RESPIRATORY (INHALATION) at 22:18

## 2025-05-30 RX ADMIN — RIVAROXABAN 20 MG: 20 TABLET, FILM COATED ORAL at 17:33

## 2025-05-30 RX ADMIN — MICONAZOLE NITRATE 1 APPLICATION: 2 POWDER TOPICAL at 09:26

## 2025-05-30 RX ADMIN — GUAIFENESIN 1200 MG: 600 TABLET, EXTENDED RELEASE ORAL at 20:31

## 2025-05-30 RX ADMIN — MICONAZOLE NITRATE 1 APPLICATION: 2 POWDER TOPICAL at 20:31

## 2025-05-30 RX ADMIN — CEFTRIAXONE 2000 MG: 2 INJECTION, POWDER, FOR SOLUTION INTRAMUSCULAR; INTRAVENOUS at 09:19

## 2025-05-30 RX ADMIN — MEDROXYPROGESTERONE ACETATE 10 MG: 10 TABLET ORAL at 09:25

## 2025-05-30 RX ADMIN — Medication 10 ML: at 09:26

## 2025-05-30 RX ADMIN — FUROSEMIDE 40 MG: 40 TABLET ORAL at 09:25

## 2025-05-30 RX ADMIN — FERROUS SULFATE TAB 325 MG (65 MG ELEMENTAL FE) 325 MG: 325 (65 FE) TAB at 09:21

## 2025-05-30 NOTE — PLAN OF CARE
Goal Outcome Evaluation:  Plan of Care Reviewed With: step-parent(s)           Outcome Evaluation: Pt seen for PT this afternoon. She is up in chair upon entry to room. Pt doing well, no complaints at this time. She is able to stand w min A using Rwx w verbal cues for proper hand placement. Pt then able to slightly increase ambulation distance today, ambulating approx 10 ft w CGA and Rwx. She demos slow pace and is limited by fatigue/weakness, but no overt unsteadiness. Pt returned to chair at end of session w all needs in reach. Plans for rehab upon DC. Will continue to follow and progress as tolerated.

## 2025-05-30 NOTE — PLAN OF CARE
Goal Outcome Evaluation:  Plan of Care Reviewed With: patient        Progress: no change  Outcome Evaluation: VSS. 2L NC when awake, CPAP+3L when sleeping. IV rocephin continued. PICC in place with good blood return. Turns and skin care provided. x2 BM this shift. Mepilex to back left thigh changed. Heel mepilexes remain intact. Folds cleaned and dried - nystatin powder placed. Some bleeding from vagina noted when turning and cleaning patient

## 2025-05-30 NOTE — THERAPY TREATMENT NOTE
Patient Name: Emely Solomon  : 1960    MRN: [id number]                              Today's Date: 2025       Admit Date: 2025    Visit Dx:     ICD-10-CM ICD-9-CM   1. Acute on chronic congestive heart failure, unspecified heart failure type  I50.9 428.0     Patient Active Problem List   Diagnosis    Chronic diastolic CHF (congestive heart failure)    PFO (patent foramen ovale)    Paradoxical embolism    Essential hypertension    Pulmonary hypertension    Upper back pain    Bacteremia due to group B Streptococcus    ARIEL on CPAP    Class 3 severe obesity due to excess calories with serious comorbidity and body mass index (BMI) of 60.0 to 69.9 in adult    History of DVT (deep vein thrombosis)    Lymphedema    Anemia, chronic disease    Iron deficiency    Post-menopausal bleeding    Thickened endometrium    Generalized muscle weakness    Right bundle branch block (RBBB) with left anterior fascicular block (LAFB)    PVD (peripheral vascular disease) with claudication    Iliac vein stenosis, right    Hypoxia    Hyperglycemia    History of pulmonary embolism    Morbid obesity    Hyponatremia    Bacterial endocarditis    Sepsis due to methicillin susceptible Staphylococcus aureus (MSSA) with acute hypoxic respiratory failure without septic shock    Acute on chronic heart failure with preserved ejection fraction (HFpEF)    On home oxygen therapy    Type 2 myocardial infarction    Acute loss of vision, bilateral    Chronic respiratory failure with hypoxia    Acute loss of vision    Acute exacerbation of CHF (congestive heart failure)    Acute pulmonary embolism     Past Medical History:   Diagnosis Date    Arthritis     Cellulitis     2017, with Group B Strep bacteremia and sepsis    Chronic deep vein thrombosis (DVT) of left popliteal vein 2015, 2016    Chronic diastolic congestive heart failure     COVID-19 virus infection 2020    Heart murmur     Hypertension     Iliac  vein stenosis, right 05/15/2024    Lipoedema     Lymphedema     other    Morbid obesity     ARIEL on CPAP     PFO (patent foramen ovale)     Postthrombotic syndrome of left lower extremity without complications 12/17/2015    postphletibis    Pulmonary embolism 04/14/2016    Pulmonary hypertension     multifactorial (dCHF, obesity/ARIEL, hx PE), mild by echo 1/2016    Right bundle branch block (RBBB) with left anterior fascicular block (LAFB)     Sepsis 09/18/2020    Type 2 myocardial infarction 05/20/2025     Past Surgical History:   Procedure Laterality Date    ARTERIOGRAM  07/2015    BRONCHOSCOPY N/A 07/21/2017    Procedure: BRONCHOSCOPY with wash;  Surgeon: Caleb Garcia MD;  Location: [facility] ENDOSCOPY;  Service:     COLONOSCOPY      COLONOSCOPY N/A 01/26/2023    Procedure: COLONOSCOPY to CECUM AND TERM ILEUM;  Surgeon: Jj Dean MD;  Location: [facility] ENDOSCOPY;  Service: Gastroenterology;  Laterality: N/A;  PRE OP -screening  POST OP -  NORMAL    D & C HYSTEROSCOPY N/A 03/08/2022    Procedure: DILATATION AND CURETTAGE with hysteroscopy;  Surgeon: Kaylah Land DO;  Location: [facility] MAIN OR;  Service: Gynecology Oncology;  Laterality: N/A;    DILATATION AND CURETTAGE  04/11/2011    OTHER SURGICAL HISTORY  09/2015    IVC filter    OTHER SURGICAL HISTORY      left LE revascularization    OTHER SURGICAL HISTORY      ALESSIA and LEV scan    THROMBECTOMY  07/2015      General Information       Row Name 05/30/25 1452          Physical Therapy Time and Intention    Document Type therapy note (daily note)  -EJ     Mode of Treatment physical therapy  -EJ       Row Name 05/30/25 1452          General Information    Existing Precautions/Restrictions fall  -EJ               User Key  (r) = Recorded By, (t) = Taken By, (c) = Cosigned By      Initials Name Provider Type    EJ Jane Salas, PT Physical Therapist                   Mobility       Row Name 05/30/25 1455          Bed Mobility    Comment, (Bed  Mobility) up in chair  -EJ       Row Name 05/30/25 1452          Sit-Stand Transfer    Sit-Stand [place] (Transfers) verbal cues;nonverbal cues (demo/gesture);minimum assist (75% patient effort)  -EJ     Assistive Device (Sit-Stand Transfers) walker, front-wheeled  -EJ     Comment, (Sit-Stand Transfer) cues for hand placement  -EJ       [facility] Name 05/30/25 1452          Gait/Stairs (Locomotion)    [place] Level (Gait) verbal cues;nonverbal cues (demo/gesture);contact guard  -EJ     Assistive Device (Gait) walker, front-wheeled  -EJ     Distance in Feet (Gait) 10  -EJ     Deviations/Abnormal Patterns (Gait) urvashi decreased;gait speed decreased;stride length decreased  -EJ     Bilateral Gait Deviations forward flexed posture;heel strike decreased  -EJ     Comment, (Gait/Stairs) slow pace, small steps, able to ambulate forward and back from chair. limited by fatigue  -EJ               User Key  (r) = Recorded By, (t) = Taken By, (c) = Cosigned By      Initials Name Provider Type    Jane Amanda, PT Physical Therapist                   Obj/Interventions       Row Name 05/30/25 1453          Motor Skills    Therapeutic Exercise --  educated pt on supine/seated exercises including AP, quad sets, and heel slides. she verbalizes and demos understanding  -EJ       Row Name 05/30/25 1453          Balance    Static Standing Balance contact guard  -EJ     Dynamic Standing Balance contact guard  -EJ     Position/Device Used, Standing Balance walker, front-wheeled  -EJ               User Key  (r) = Recorded By, (t) = Taken By, (c) = Cosigned By      Initials Name Provider Type    Jane Amanda, PT Physical Therapist                   Goals/Plan    No documentation.                  Clinical Impression       Row Name 05/30/25 1458          Pain    Pretreatment Pain Rating 0/10 - no pain  -EJ     Posttreatment Pain Rating 0/10 - no pain  -EJ       Row Name 05/30/25 1452          Plan of Care Review     Plan of Care Reviewed With step-parent(s)  -EJ     Outcome Evaluation Pt seen for PT this afternoon. She is up in chair upon entry to room. Pt doing well, no complaints at this time. She is able to stand w min A using Rwx w verbal cues for proper hand placement. Pt then able to slightly increase ambulation distance today, ambulating approx 10 ft w CGA and Rwx. She demos slow pace and is limited by fatigue/weakness, but no overt unsteadiness. Pt returned to chair at end of session w all needs in reach. Plans for rehab upon DC. Will continue to follow and progress as tolerated.  -EJ       Row Name 05/30/25 1455          Positioning and Restraints    Pre-Treatment Position sitting in chair/recliner  -EJ     Post Treatment Position chair  -EJ     In Chair notified nsg;reclined;call light within reach;encouraged to call for assist;exit alarm on  -EJ               User Key  (r) = Recorded By, (t) = Taken By, (c) = Cosigned By      Initials Name Provider Type    Jane Amanda, PT Physical Therapist                   Outcome Measures       Row Name 05/30/25 1503 05/30/25 0921       How much help from another person do you currently need...    Turning from your back to your side while in flat bed without using bedrails? 3  -EJ 3  -JULIAN    Moving from lying on back to sitting on the side of a flat bed without bedrails? 3  -EJ 3  -JULIAN    Moving to and from a bed to a chair (including a wheelchair)? 3  -EJ 3  -JULIAN    Standing up from a chair using your arms (e.g., wheelchair, bedside chair)? 3  -EJ 3  -JULIAN    Climbing 3-5 steps with a railing? 1  -EJ 1  -JULIAN    To walk in hospital room? 3  -EJ 2  -JULIAN    AM-PAC 6 Clicks Score (PT) 16  -EJ 15  -JULIAN    Highest Level of Mobility Goal Stand (1 or More Minutes)-5  -EJ Move to Chair/Commode-4  -JULIAN              User Key  (r) = Recorded By, (t) = Taken By, (c) = Cosigned By      Initials Name Provider Type    Jane Amanda, PT Physical Therapist    Dayana Kennedy RN  Registered Nurse                                 Physical Therapy Education       Title: PT OT SLP Therapies (Done)       Topic: Physical Therapy (Done)       Point: Mobility training (Done)       Learning Progress Summary            Patient Acceptance, E,TB,D, VU,DU,NR by  at 5/28/2025 0151    Acceptance, E,TB, VU,NR by  at 5/27/2025 1549    Acceptance, TB,E, VU by  at 5/26/2025 0825                      Point: Home exercise program (Done)       Learning Progress Summary            Patient Acceptance, E,TB,D, VU,DU,NR by  at 5/28/2025 0151    Acceptance, E,TB, VU,NR by  at 5/27/2025 1549    Acceptance, TB,E, VU by  at 5/26/2025 0825                      Point: Body mechanics (Done)       Learning Progress Summary            Patient Acceptance, E,TB,D, VU,DU,NR by  at 5/28/2025 0151    Acceptance, E,TB, VU,NR by  at 5/27/2025 1549    Acceptance, TB,E, VU by  at 5/26/2025 0825                      Point: Precautions (Done)       Learning Progress Summary            Patient Acceptance, E,TB,D, VU,DU,NR by  at 5/28/2025 0151    Acceptance, E,TB, VU,NR by  at 5/27/2025 1549    Acceptance, TB,E, VU by  at 5/26/2025 0825                                      User Key       Initials Effective Dates Name Provider Type Discipline     06/16/21 -  Bridgette Nunez, RN Registered Nurse Nurse     06/16/21 -  Cynthia Cates, RN Registered Nurse Nurse     01/22/25 -  Simone Mcfadden, CATRINA Student PT Student PT                  PT Recommendation and Plan     Outcome Evaluation: Pt seen for PT this afternoon. She is up in chair upon entry to room. Pt doing well, no complaints at this time. She is able to stand w min A using Rwx w verbal cues for proper hand placement. Pt then able to slightly increase ambulation distance today, ambulating approx 10 ft w CGA and Rwx. She demos slow pace and is limited by fatigue/weakness, but no overt unsteadiness. Pt returned to chair at end of session w all needs in reach.  Plans for rehab upon DC. Will continue to follow and progress as tolerated.     Time Calculation:         PT Charges       Row Name 05/30/25 1503             Time Calculation    Start Time 1420  -EJ      Stop Time 1435  -EJ      Time Calculation (min) 15 min  -EJ      PT Received On 05/30/25  -EJ      PT - Next Appointment 06/02/25  -EJ                User Key  (r) = Recorded By, (t) = Taken By, (c) = Cosigned By      Initials Name Provider Type     Jane Salas, PT Physical Therapist                  Therapy Charges for Today       Code Description Service Date Service Provider Modifiers Qty    [id number]  PT THERAPEUTIC ACT EA 15 MIN 5/30/2025 Jane Salsa, PT GP 1            PT G-Codes  Outcome Measure Options: AM-PAC 6 Clicks Daily Activity (OT)  AM-PAC 6 Clicks Score (PT): 16  AM-PAC 6 Clicks Score (OT): 13  Modified [place] Scale: 4 - Moderately severe disability.  Unable to walk without assistance, and unable to attend to own bodily needs without assistance.       Jane Salas, PT  5/30/2025

## 2025-05-30 NOTE — CONSULTS
[facility]                   GYN consult   Emely Solomon  : 1960  MRN: [id number]  [facility]: [id number]    History and Physical    Subjective   Emely Solomon is a 64 y.o. year old who has been in and out of the hospital since early this month.  On  she was admitted status post a fall where her  was not able to get her up and hypoxia, she has multiple medical issues and at that visit was diagnosed with bacteremia and endocarditis.  Patient has a history of morbid obesity, congestive heart failure, kidney failure, hepatitis C, anemia, and history of DVT on chronic anticoagulation.  She was readmitted on  with visual changes and soon thereafter was seen by Dr. Colleen Corbin for vaginal bleeding.  She had a thickened endometrium and was placed on progesterone, and has been seen by the gynecologist Dr. Davida De Jesus.  It was recommended at that point that patient had a hysteroscope with possibly an IUD placement, but patient refused at that time.  Patient does have an appointment with Dr. Davida De Jesus on  next month.  Patient was readmitted on  for low oxygen sats and low blood pressure.  At this time patient reports that she does not think she was taking the prescribed progesterone at home, but they has started that medication today.  Patient is currently on Xarelto and is not having active vaginal bleeding though there was some spotting noted last night, and that is why we were consulted    Past Medical History:   Diagnosis Date    Chronic deep vein thrombosis (DVT) of left popliteal vein 2015, 2016    Postthrombotic syndrome of left lower extremity without complications 2015    postphletibis    Pulmonary embolism 2016    Sepsis 2020    COVID-19 virus infection 2020    Iliac vein stenosis, right 05/15/2024    Type 2 myocardial infarction 2025    Arthritis     Cellulitis     2017, with Group B Strep bacteremia and sepsis    Chronic  diastolic congestive heart failure     Heart murmur     Hypertension     Lipoedema     Lymphedema     other    Morbid obesity     ARIEL on CPAP     PFO (patent foramen ovale)     Pulmonary hypertension     multifactorial (dCHF, obesity/ARIEL, hx PE), mild by echo 2016    Right bundle branch block (RBBB) with left anterior fascicular block (LAFB)      Past Surgical History:   Procedure Laterality Date    DILATATION AND CURETTAGE  2011    THROMBECTOMY  2015    ARTERIOGRAM  2015    OTHER SURGICAL HISTORY  2015    IVC filter    BRONCHOSCOPY N/A 2017    Procedure: BRONCHOSCOPY with wash;  Surgeon: Caleb Garcia MD;  Location: [facility] ENDOSCOPY;  Service:     D & C HYSTEROSCOPY N/A 2022    Procedure: DILATATION AND CURETTAGE with hysteroscopy;  Surgeon: Kaylah Land DO;  Location: [facility] MAIN OR;  Service: Gynecology Oncology;  Laterality: N/A;    COLONOSCOPY N/A 2023    Procedure: COLONOSCOPY to CECUM AND TERM ILEUM;  Surgeon: Jj Dean MD;  Location: [facility] ENDOSCOPY;  Service: Gastroenterology;  Laterality: N/A;  PRE OP -screening  POST OP -  NORMAL    COLONOSCOPY      OTHER SURGICAL HISTORY      left LE revascularization    OTHER SURGICAL HISTORY      ALESSIA and LEV scan     OB History    Para Term  AB Living   0 0 0 0 0 0   SAB IAB Ectopic Molar Multiple Live Births   0 0 0 0 0 0     Social History    Tobacco Use      Smoking status: Never      Smokeless tobacco: Never      Current Facility-Administered Medications:     acetaminophen (TYLENOL) tablet 650 mg, 650 mg, Oral, Q4H PRN **OR** acetaminophen (TYLENOL) 160 MG/5ML oral solution 650 mg, 650 mg, Oral, Q4H PRN **OR** acetaminophen (TYLENOL) suppository 650 mg, 650 mg, Rectal, Q4H PRN, Lexii Street MD    sennosides-docusate (PERICOLACE) 8.6-50 MG per tablet 2 tablet, 2 tablet, Oral, BID PRN **AND** polyethylene glycol (MIRALAX) packet 17 g, 17 g, Oral, Daily PRN **AND** bisacodyl (DULCOLAX) EC  tablet 5 mg, 5 mg, Oral, Daily PRN **AND** bisacodyl (DULCOLAX) suppository 10 mg, 10 mg, Rectal, Daily PRN, Lexii Street MD    cefTRIAXone (ROCEPHIN) 2,000 mg in sodium chloride 0.9 % 100 mL MBP, 2,000 mg, Intravenous, Q12H, Lexii Street MD, Last Rate: 200 mL/hr at 05/30/25 0919, 2,000 mg at 05/30/25 0919    dextromethorphan polistirex ER (DELSYM) 30 MG/5ML oral suspension 60 mg, 60 mg, Oral, BID PRN, Lexii Street MD, 60 mg at 05/29/25 2027    ferrous sulfate tablet 325 mg, 325 mg, Oral, Daily With Breakfast, Lexii Street MD, 325 mg at 05/30/25 0921    furosemide (LASIX) tablet 40 mg, 40 mg, Oral, Daily, Toño Giron MD, 40 mg at 05/30/25 0925    guaiFENesin (MUCINEX) 12 hr tablet 1,200 mg, 1,200 mg, Oral, BID PRN, Lexii Street MD, 1,200 mg at 05/26/25 2047    ipratropium-albuterol (DUO-NEB) nebulizer solution 3 mL, 3 mL, Nebulization, 4x Daily - RT, Lexii Street MD, 3 mL at 05/30/25 1046    Lidocaine 4 % 1 patch, 1 patch, Transdermal, Q24H, Lexii Street MD, 1 patch at 05/26/25 0939    medroxyPROGESTERone (PROVERA) tablet 10 mg, 10 mg, Oral, Daily, Toño Giron MD, 10 mg at 05/30/25 0925    melatonin tablet 2.5 mg, 2.5 mg, Oral, Nightly PRN, Lexii Street MD    metoprolol succinate XL (TOPROL-XL) 24 hr tablet 25 mg, 25 mg, Oral, Daily, Mateo Garland MD, 25 mg at 05/30/25 0925    miconazole (MICOTIN) 2 % powder 1 Application, 1 Application, Topical, Q12H, Toño Giron MD, 1 Application at 05/30/25 0938    ondansetron ODT (ZOFRAN-ODT) disintegrating tablet 4 mg, 4 mg, Oral, Q6H PRN **OR** ondansetron (ZOFRAN) injection 4 mg, 4 mg, Intravenous, Q6H PRN, Lexii Street MD    rivaroxaban (XARELTO) tablet 20 mg, 20 mg, Oral, Daily With Dinner, Lexii Street MD, 20 mg at 05/29/25 1737    sennosides-docusate (PERICOLACE) 8.6-50 MG per tablet 2 tablet, 2 tablet, Oral, Nightly PRN, Lexii Street,  MD    sodium chloride 0.9 % flush 10 mL, 10 mL, Intravenous, Q12H, Toño Giron MD, 10 mL at 05/30/25 0926    sodium chloride 0.9 % flush 10 mL, 10 mL, Intravenous, PRN, Toño Giron MD    sodium chloride 0.9 % flush 20 mL, 20 mL, Intravenous, PRN, Toño Giron MD    sodium chloride 0.9 % infusion 40 mL, 40 mL, Intravenous, PRN, Toño Giron MD    Allergies   Allergen Reactions    Cephalexin Hives and Rash     Diffuse rash on cephalexin 1 g PO q6h on 6/17/20  Tolerated zosyn and PCN G September 2020 without issue    Clindamycin/Lincomycin Hives       Review of Systems   Constitutional:  Negative for chills and fever.   HENT: Negative.     Eyes:  Negative for photophobia and visual disturbance.   Respiratory:  Positive for shortness of breath.    Cardiovascular:  Negative for chest pain.   Gastrointestinal:  Negative for nausea.   Psychiatric/Behavioral:  The patient is not nervous/anxious.          Objective     Vital Signs: See nursing documentation   General: Morbidly obese white female, appears chronically short of breath with cough, appropriate mentation, no acute distress   Mental status: Alert and oriented   Heart: Not performed.   Lungs: Patient with oxygen, appears pale and weak, gravelly cough   Abdomen: Not performed.   Pelvis: Not performed.   Labs  Lab Results   Component Value Date     05/30/2025    HGB 8.1 (L) 05/30/2025    HCT 26.1 (L) 05/30/2025    WBC 5.86 05/30/2025     (L) 05/30/2025    K 4.0 05/30/2025    CL 99 05/30/2025    CO2 22.0 05/30/2025    BUN 17.0 05/30/2025    CREATININE 0.89 05/30/2025    GLUCOSE 83 05/30/2025    ALBUMIN 2.8 (L) 05/28/2025    CALCIUM 8.4 (L) 05/30/2025    AST 13 05/25/2025    ALT 8 05/25/2025    BILITOT 0.4 05/25/2025        Assessment   Recent history of thickened endometrium and postmenopausal bleeding while on anticoagulants for various other medical conditions     Plan   Would recommend continuing patient on p.o.  progesterone, would discontinue Xarelto for any active worrisome vaginal bleeding during this hospitalization, and strongly recommend patient to follow-up with Dr. Davida De Jesus on her appointment on June 9.  Will sign off for now unless there are any other pressing concerns, please contact us if other worrisome events should occur during current hospitalization        Davida Nelson MD       (Pt's PCP is Adilson Wilkerson MD)

## 2025-05-30 NOTE — PROGRESS NOTES
"    DAILY PROGRESS NOTE  [facility]    Patient Identification:  Name: Emely Solomon  Age: 64 y.o.  Sex: female  :  1960  MRN: [id number]         Primary Care Physician: Adilson Wilkerson MD    Subjective:  Interval History: Makes comment about eye issues.  She was just evaluated by ophthalmology on recent admission.  I think her underlying anxieties are affecting interpretation of some of her symptoms.  No loss of consciousness or focal loss of function.  Trying to get her to answer simple questions can be tough sometimes.  Otherwise denies chest pain or troubles breathing.  She seems unsure if she is actually having blood loss from her vagina which I have a hard time understanding    Objective: Spouse at bedside and case discussed.  Also discussed case with managing RN    Scheduled Meds:cefTRIAXone (ROCEPHIN) 2,000 mg in sodium chloride 0.9 % 100 mL MBP, 2,000 mg, Intravenous, Q12H  ferrous sulfate, 325 mg, Oral, Daily With Breakfast  furosemide, 40 mg, Oral, Daily  ipratropium-albuterol, 3 mL, Nebulization, 4x Daily - RT  Lidocaine, 1 patch, Transdermal, Q24H  medroxyPROGESTERone, 10 mg, Oral, Daily  metoprolol succinate XL, 25 mg, Oral, Daily  miconazole, 1 Application, Topical, Q12H  rivaroxaban, 20 mg, Oral, Daily With Dinner  sodium chloride, 10 mL, Intravenous, Q12H      Continuous Infusions:     Vital signs in last 24 hours:  Temp:  [98.1 °F (36.7 °C)-98.4 °F (36.9 °C)] 98.4 °F (36.9 °C)  Heart Rate:  [77-91] 87  Resp:  [16-20] 18  BP: ()/(60-81) 118/65    Intake/Output:    Intake/Output Summary (Last 24 hours) at 2025 1146  Last data filed at 2025 0921  Gross per 24 hour   Intake 540 ml   Output 600 ml   Net -60 ml       Exam:  /65 (BP Location: Left arm, Patient Position: Lying)   Pulse 87   Temp 98.4 °F (36.9 °C) (Oral)   Resp 18   Ht 160 cm (62.99\")   Wt (!) 159 kg (350 lb 8.5 oz)   SpO2 97%   BMI 62.11 kg/m²     General Appearance:    Alert, " cooperative, nontoxic, AAOx3, poor historian                         Throat:   Oral mucosa pink and moist                           Neck:   Supple, symmetrical, trachea midline, no JVD                         Lungs:    Clear to auscultation bilaterally, respirations unlabored                          Heart:    Regular rate and rhythm, S1 and S2 normal                  Abdomen:     Soft, nontender, bowel sounds active                 Extremities: Lymphedema with stasis changes but nothing pitting                        Pulses:   Pulses palpable in all extremities                            Skin:   Skin is warm and dry, no bruising or ecchymosis                  Neurologic:   CNII-XII intact, moving all/GW      Data Review:  Labs in chart were reviewed.    Assessment:  Active Hospital Problems    Diagnosis  POA    **Acute pulmonary embolism [I26.99]  Unknown    Bacterial endocarditis [I33.0]  Yes    Morbid obesity [E66.01]  Yes    Lymphedema [I89.0]  Yes    Anemia, chronic disease [D63.8]  Yes    ARIEL on CPAP [G47.33]  Yes    Pulmonary hypertension [I27.20]  Yes    PFO (patent foramen ovale) [Q21.12]  Not Applicable    Paradoxical embolism [I74.9]  Yes      Resolved Hospital Problems   No resolved problems to display.       Plan:    CTA chest unfortunately equivocal.  I appreciate pulmonary input as they do feel PE is low clinical suspicion and I concur but will continue with Xarelto for now given her past history of DVT and PE compounded by body habitus and high risk for reoccurrence        GYN bleeding is still an issue.  Per review of previous note by Dr. Barnard, progesterone therapy was endorsed but when I look at her MAR she was on Megace as opposed to progesterone and I change that this morning.  We had wound care evaluate for a scar on her leg that is not the source of blood per previous thoughts by staff and RN confirms that patient is having persistent vaginal bleeding.  Due to her need to remain on AC with  concerns for any safety with prolonged removal, we are reconsulting GYN to evaluate.  I know she has an outpatient appointment with Dr. De Jesus early June but we may have to do some type of intervention prior to that?      ABLA has required transfusion on this admission of 2 units and unfortunately hemoglobin is continuing to drop 9.9-8.1    Stop date Rocephin is 6/17/2025 treated for endocarditis    Cardiology has also evaluated and their input was appreciated.  Recent 5/5/2025 echo noted.  Otherwise stable from their perspective and have subsequently signed off and any previous acute exacerbation of CHF is resolved     AREIL-CPAP at bedside     Overall very tough clinical situation and will take it day by day pending clinical progression/labs surveillance        Disposition: On pause given persistence of vaginal bleeding with need for anticoagulation     Case discussed with spouse at bedside.  If not present please try to call    Toño Giron MD  5/30/2025  11:46 EDT

## 2025-05-30 NOTE — PROGRESS NOTES
[facility]  [phone]  Dr. Bhanu Chin    Subjective:  LOS: 5    No acute events overnight.  Currently on 2 L nasal cannula.    Objective:  Vital Signs past 24hrs    Temp range: Temp (24hrs), Av.4 °F (36.9 °C), Min:98.1 °F (36.7 °C), Max:98.6 °F (37 °C)    BP range: BP: ()/(54-80) 108/54  Pulse range: Heart Rate:  [77-91] 81  Resp rate range: Resp:  [16-20] 18  (!) 159 kg (350 lb 8.5 oz); Body mass index is 62.11 kg/m².    Device (Oxygen Therapy): nasal cannulaFlow (L/min) (Oxygen Therapy):  [1-2] 1    Oxygen range:SpO2:  [90 %-98 %] 91 %            Intake/Output Summary (Last 24 hours) at 2025 1723  Last data filed at 2025 1350  Gross per 24 hour   Intake 900 ml   Output 600 ml   Net 300 ml       Physical Exam  Constitutional:       Appearance: Normal appearance.   HENT:      Head: Normocephalic and atraumatic.      Nose: Nose normal.      Mouth/Throat:      Mouth: Mucous membranes are moist.      Pharynx: Oropharynx is clear.   Eyes:      Extraocular Movements: Extraocular movements intact.      Conjunctiva/sclera: Conjunctivae normal.   Cardiovascular:      Rate and Rhythm: Normal rate and regular rhythm.      Pulses: Normal pulses.      Heart sounds: Normal heart sounds. No murmur heard.  Pulmonary:      Effort: Pulmonary effort is normal. No respiratory distress.      Breath sounds: Normal breath sounds. No stridor. No wheezing or rales.   Abdominal:      General: Abdomen is flat. Bowel sounds are normal.      Palpations: Abdomen is soft.   Skin:     General: Skin is warm and dry.   Neurological:      General: No focal deficit present.      Mental Status: She is alert and oriented to person, place, and time. Mental status is at baseline.   Psychiatric:         Mood and Affect: Mood normal.         Behavior: Behavior normal.            Result Review:  I have reviewed the results from last note by LPC physician and agree with these findings:  [x]  Laboratory  accordion  [x]  Microbiology  [x]  Radiology  [x]  EKG/Telemetry   [x]  Cardiology/Vascular   [x]  Pathology  [x]  Old records  []  Other:    Medication Review:  I have reviewed the current MAR.  Antibiotics  cefTRIAXone (ROCEPHIN) 2000 mg IVPB in 100 mL NS (MBP)  CEFTRIAXONE 2000 MG IVPB  ML NS MBP (CD)     Scheduled Medications  cefTRIAXone (ROCEPHIN) 2,000 mg in sodium chloride 0.9 % 100 mL MBP, 2,000 mg, Intravenous, Q12H  ferrous sulfate, 325 mg, Oral, Daily With Breakfast  furosemide, 40 mg, Oral, Daily  ipratropium-albuterol, 3 mL, Nebulization, 4x Daily - RT  Lidocaine, 1 patch, Transdermal, Q24H  medroxyPROGESTERone, 10 mg, Oral, Daily  metoprolol succinate XL, 25 mg, Oral, Daily  miconazole, 1 Application, Topical, Q12H  rivaroxaban, 20 mg, Oral, Daily With Dinner  sodium chloride, 10 mL, Intravenous, Q12H      ICU Drips     PRN Medications    acetaminophen **OR** acetaminophen **OR** acetaminophen    senna-docusate sodium **AND** polyethylene glycol **AND** bisacodyl **AND** bisacodyl    dextromethorphan polistirex ER    guaiFENesin    melatonin    ondansetron ODT **OR** ondansetron    sennosides-docusate    sodium chloride    sodium chloride    sodium chloride    Lines, Drains & Airways       Active LDAs       Name Placement date Placement time Site Days    PICC Single Lumen 05/09/25 Right Cephalic 05/09/25  1611  Cephalic  21    External Urinary Catheter 05/20/25  --  --  10    External Urinary Catheter 05/25/25  1651  --  5                    Diet Orders (active) (From admission, onward)       Start     Ordered    05/28/25 1800  Dietary Nutrition Supplements Greg  2 Times Daily       05/28/25 1000    05/28/25 1200  Dietary Nutrition Supplements Boost Glucose Control (Glucerna Shake)  Daily With Breakfast, Lunch & Dinner       05/28/25 1000    05/25/25 1655  Diet: Cardiac; Healthy Heart (2-3 Na+); Fluid Consistency: Thin (IDDSI 0)  Diet Effective Now         05/25/25 1655                       Assessment:  Hypoxia  Recent vaginal bleeding  History of DVT/PE 2015  Chronic heart failure with preserved ejection fraction  ARIEL, on CPAP  Mitral valve endocarditis, on antibiotics  Limited mobility  Morbidly obese, BMI 59      Plan of Treatment  - Low likelihood to have PE on the CTA chest  - Gynecology recommended stopping blood thinner and to f/u with Dr. Davida De Jesus on June 9th.  - Continue using home CPAP  - Primary team thinking about reconsulting gynecology  - On Rocephin for endocarditis, stop date 6/17/2025  - Wean O2 as tolerated, goal O2 sats > 92%. Currently requiring 1L NC.  - Follows with Dr. Garcia in the pulmonary clinic    Currently, her respiratory status is stable.  Pulmonary will sign off.  If there are any questions or concerns, please call us.      Bhanu Chin MD  [facility], [facility]  Pulmonary and Critical Care Medicine

## 2025-05-30 NOTE — CASE MANAGEMENT/SOCIAL WORK
Continued Stay Note  [facility]     Patient Name: Emely Solomon  MRN: [id number]  Today's Date: 5/30/2025    Admit Date: 5/25/2025    Plan: Return to [facility]. Per Yuri, can accept over weekend if medically ready. Will continue IV Rocephin for endocarditis, stop date 6/17/2025.   Discharge Plan       Row Name 05/30/25 1230       Plan    Plan Return to [facility]. Per Yuri, can accept over weekend if medically ready. Will continue IV Rocephin for endocarditis, stop date 6/17/2025.    Patient/Family in Agreement with Plan yes    Plan Comments Spoke with [facility] and they can accept pt back over weekend if medically ready. Will continue IV Rocephin with stop date of 6/17/25. Red Doe RN-BC                   Discharge Codes    No documentation.                 Expected Discharge Date and Time       Expected Discharge Date Expected Discharge Time    May 31, 2025               Red Doe, RN

## 2025-05-30 NOTE — PLAN OF CARE
Problem: Adult Inpatient Plan of Care  Goal: Plan of Care Review  Outcome: Progressing  Flowsheets (Taken 5/30/2025 2176)  Progress: improving  Outcome Evaluation: Vitals stable, Requiring 1-2L today, unable to wean to room air. Worked with PT, up to chair. Wound care provided, Q2 turns, pro+bed, waffle cushion while in chair. +BM. GYN consult regarding vaginal bleeding - see note. Plan of care ongoing. Plan to go back SNF at discharge, [facility] can accept over the weekend.  Plan of Care Reviewed With: patient

## 2025-05-30 NOTE — NURSING NOTE
Wound/ostomy - consult received regarding skin concerns to gluteals and R heel. Patient known to the WOC team from prior admissions. Went to see patient, she was up in chair so not easily able to assess skin to gluteals due to very limited mobility and morbid obesity.     Patient has a known DTI to R heel, WOC nurse saw patient's heel on 5/21 during that admission. Photo from yesterday observed, it appears to look the same as it did on 5/21 with skin intact, maroon/purple. Recommend to keep heels offloaded and use of the foam heel protectors.     Spoke to patient's nurse regarding the linear maroon discoloration in the outline of a bedpan that happened yesterday 5/29. Patient's nurse states that she verified verbally with the aid that patient was on the bedpan no longer than 10 minutes. Did not assess skin at this time due to patient being up in chair, nurse reports that she assessed area earlier and it is fading and lighter in color today.       Patient is on a Pro + specialty bed/mattress.    WOC team will f/u with patient next week for re-assessment

## 2025-05-31 LAB
DEPRECATED RDW RBC AUTO: 47.8 FL (ref 37–54)
ERYTHROCYTE [DISTWIDTH] IN BLOOD BY AUTOMATED COUNT: 16.1 % (ref 12.3–15.4)
HCT VFR BLD AUTO: 25.2 % (ref 34–46.6)
HGB BLD-MCNC: 7.9 G/DL (ref 12–15.9)
MCH RBC QN AUTO: 25.7 PG (ref 26.6–33)
MCHC RBC AUTO-ENTMCNC: 31.3 G/DL (ref 31.5–35.7)
MCV RBC AUTO: 82.1 FL (ref 79–97)
PLATELET # BLD AUTO: 297 10*3/MM3 (ref 140–450)
PMV BLD AUTO: 8 FL (ref 6–12)
RBC # BLD AUTO: 3.07 10*6/MM3 (ref 3.77–5.28)
WBC NRBC COR # BLD AUTO: 6.71 10*3/MM3 (ref 3.4–10.8)

## 2025-05-31 PROCEDURE — 85027 COMPLETE CBC AUTOMATED: CPT | Performed by: HOSPITALIST

## 2025-05-31 PROCEDURE — 94799 UNLISTED PULMONARY SVC/PX: CPT

## 2025-05-31 PROCEDURE — 94760 N-INVAS EAR/PLS OXIMETRY 1: CPT

## 2025-05-31 PROCEDURE — 94761 N-INVAS EAR/PLS OXIMETRY MLT: CPT

## 2025-05-31 PROCEDURE — 25010000002 CEFTRIAXONE PER 250 MG: Performed by: INTERNAL MEDICINE

## 2025-05-31 PROCEDURE — 94664 DEMO&/EVAL PT USE INHALER: CPT

## 2025-05-31 RX ADMIN — CEFTRIAXONE 2000 MG: 2 INJECTION, POWDER, FOR SOLUTION INTRAMUSCULAR; INTRAVENOUS at 21:24

## 2025-05-31 RX ADMIN — DEXTROMETHORPHAN 60 MG: 30 SUSPENSION, EXTENDED RELEASE ORAL at 16:21

## 2025-05-31 RX ADMIN — Medication 10 ML: at 21:24

## 2025-05-31 RX ADMIN — METOPROLOL SUCCINATE 25 MG: 25 TABLET, EXTENDED RELEASE ORAL at 08:07

## 2025-05-31 RX ADMIN — Medication 10 ML: at 08:07

## 2025-05-31 RX ADMIN — GUAIFENESIN 1200 MG: 600 TABLET, EXTENDED RELEASE ORAL at 21:24

## 2025-05-31 RX ADMIN — FUROSEMIDE 40 MG: 40 TABLET ORAL at 08:07

## 2025-05-31 RX ADMIN — MICONAZOLE NITRATE 1 APPLICATION: 2 POWDER TOPICAL at 21:24

## 2025-05-31 RX ADMIN — MICONAZOLE NITRATE 1 APPLICATION: 2 POWDER TOPICAL at 12:08

## 2025-05-31 RX ADMIN — FERROUS SULFATE TAB 325 MG (65 MG ELEMENTAL FE) 325 MG: 325 (65 FE) TAB at 08:07

## 2025-05-31 RX ADMIN — CEFTRIAXONE 2000 MG: 2 INJECTION, POWDER, FOR SOLUTION INTRAMUSCULAR; INTRAVENOUS at 09:49

## 2025-05-31 RX ADMIN — IPRATROPIUM BROMIDE AND ALBUTEROL SULFATE 3 ML: .5; 3 SOLUTION RESPIRATORY (INHALATION) at 20:37

## 2025-05-31 RX ADMIN — IPRATROPIUM BROMIDE AND ALBUTEROL SULFATE 3 ML: .5; 3 SOLUTION RESPIRATORY (INHALATION) at 14:31

## 2025-05-31 RX ADMIN — IPRATROPIUM BROMIDE AND ALBUTEROL SULFATE 3 ML: .5; 3 SOLUTION RESPIRATORY (INHALATION) at 06:49

## 2025-05-31 RX ADMIN — MEDROXYPROGESTERONE ACETATE 10 MG: 10 TABLET ORAL at 08:06

## 2025-05-31 NOTE — PLAN OF CARE
Goal Outcome Evaluation:  Plan of Care Reviewed With: patient        Progress: no change  Outcome Evaluation: Vitals stable. q2h turn. IV abx cont'd. PICC intact. Vaginal Bleeding not present this shift. Pt needs met and questions answered at this time. Will continue to monitor

## 2025-05-31 NOTE — PROGRESS NOTES
"    DAILY PROGRESS NOTE  [facility]    Patient Identification:  Name: Emely Solomon  Age: 64 y.o.  Sex: female  :  1960  MRN: [id number]         Primary Care Physician: Adilson Wilkerson MD    Subjective:  Patient is lying on the bed and is in no major distress.  She continues to have some vaginal bleeding.  Currently on 2 L of oxygen and normally does not use any home O2.    Scheduled Meds:cefTRIAXone (ROCEPHIN) 2,000 mg in sodium chloride 0.9 % 100 mL MBP, 2,000 mg, Intravenous, Q12H  ferrous sulfate, 325 mg, Oral, Daily With Breakfast  furosemide, 40 mg, Oral, Daily  ipratropium-albuterol, 3 mL, Nebulization, 4x Daily - RT  Lidocaine, 1 patch, Transdermal, Q24H  medroxyPROGESTERone, 10 mg, Oral, Daily  metoprolol succinate XL, 25 mg, Oral, Daily  miconazole, 1 Application, Topical, Q12H  rivaroxaban, 20 mg, Oral, Daily With Dinner  sodium chloride, 10 mL, Intravenous, Q12H      Continuous Infusions:     Vital signs in last 24 hours:  Temp:  [99 °F (37.2 °C)-99.5 °F (37.5 °C)] 99.1 °F (37.3 °C)  Heart Rate:  [81-89] 87  Resp:  [18] 18  BP: ()/(52-77) 110/56    Intake/Output:    Intake/Output Summary (Last 24 hours) at 2025 1636  Last data filed at 2025 1359  Gross per 24 hour   Intake 360 ml   Output 1700 ml   Net -1340 ml       Exam:  /56 (BP Location: Left arm, Patient Position: Lying)   Pulse 87   Temp 99.1 °F (37.3 °C) (Oral)   Resp 18   Ht 160 cm (62.99\")   Wt (!) 159 kg (350 lb 8.5 oz)   SpO2 95%   BMI 62.11 kg/m²     General Appearance:    Alert, cooperative, nontoxic, AAOx3, poor historian                         Throat:   Oral mucosa pink and moist                           Neck:   Supple, symmetrical, trachea midline, no JVD                         Lungs:    Clear to auscultation bilaterally, respirations unlabored                          Heart:    Regular rate and rhythm, S1 and S2 normal                  Abdomen:     Soft, nontender, bowel " sounds active                 Extremities: Lymphedema with stasis changes but nothing pitting                        Pulses:   Pulses palpable in all extremities                            Skin:   Skin is warm and dry, no bruising or ecchymosis                  Neurologic:   CNII-XII intact, moving all/GW      Data Review:  Labs in chart were reviewed.    Assessment:  Active Hospital Problems    Diagnosis  POA    **Acute pulmonary embolism [I26.99]  Unknown    Bacterial endocarditis [I33.0]  Yes    Morbid obesity [E66.01]  Yes    Lymphedema [I89.0]  Yes    Anemia, chronic disease [D63.8]  Yes    ARIEL on CPAP [G47.33]  Yes    Pulmonary hypertension [I27.20]  Yes    PFO (patent foramen ovale) [Q21.12]  Not Applicable    Paradoxical embolism [I74.9]  Yes      Resolved Hospital Problems   No resolved problems to display.       Plan:    1.Vaginal bleeding, progesterone has been initiated by GYN and recommended to hold Xarelto.  Continue to monitor H&H closely.  2.  History of DVT/PE, Xarelto will be held until vaginal bleed improves and can be resumed thereafter.  Follow-up with Dr. Davida De Jesus on June 9 regarding the same.  3.  Acute blood loss anemia secondary #1, status post 2 units of PRBC transfusion during this hospital stay.  4.  History of endocarditis, currently on Rocephin and stop date is 6/17/2025.  5.  Obstructive sleep apnea, continue with home CPAP.  6.  Obesity, counseled to lose weight.  7.  Acute hypoxic respiratory failure, currently on 2 L of oxygen which will be continued and CT chest is suggestive of multifocal pneumonia and is already on Rocephin.  Pulmonary is following along.      Best Harp MD  5/31/2025  16:36 EDT

## 2025-05-31 NOTE — PLAN OF CARE
Goal Outcome Evaluation:  Plan of Care Reviewed With: patient        Progress: no change  Outcome Evaluation: VSS. 1-2L NC, CPAP at night. PICC in place. IV rocephin continued. Turns and legs elevated. No vaginal bleeding noted. Possible D/C back to [facility] today or tomorrow    AM hgb: 7.9

## 2025-06-01 LAB
ANION GAP SERPL CALCULATED.3IONS-SCNC: 12 MMOL/L (ref 5–15)
BASOPHILS # BLD AUTO: 0.06 10*3/MM3 (ref 0–0.2)
BASOPHILS NFR BLD AUTO: 0.8 % (ref 0–1.5)
BUN SERPL-MCNC: 16 MG/DL (ref 8–23)
BUN/CREAT SERPL: 15.7 (ref 7–25)
CALCIUM SPEC-SCNC: 8.5 MG/DL (ref 8.6–10.5)
CHLORIDE SERPL-SCNC: 96 MMOL/L (ref 98–107)
CO2 SERPL-SCNC: 22 MMOL/L (ref 22–29)
CREAT SERPL-MCNC: 1.02 MG/DL (ref 0.57–1)
DEPRECATED RDW RBC AUTO: 48.7 FL (ref 37–54)
EGFRCR SERPLBLD CKD-EPI 2021: 61.2 ML/MIN/1.73
EOSINOPHIL # BLD AUTO: 0.52 10*3/MM3 (ref 0–0.4)
EOSINOPHIL NFR BLD AUTO: 7.4 % (ref 0.3–6.2)
ERYTHROCYTE [DISTWIDTH] IN BLOOD BY AUTOMATED COUNT: 16.1 % (ref 12.3–15.4)
GLUCOSE SERPL-MCNC: 85 MG/DL (ref 65–99)
HCT VFR BLD AUTO: 25.6 % (ref 34–46.6)
HGB BLD-MCNC: 8 G/DL (ref 12–15.9)
IMM GRANULOCYTES # BLD AUTO: 0.07 10*3/MM3 (ref 0–0.05)
IMM GRANULOCYTES NFR BLD AUTO: 1 % (ref 0–0.5)
LYMPHOCYTES # BLD AUTO: 1.07 10*3/MM3 (ref 0.7–3.1)
LYMPHOCYTES NFR BLD AUTO: 15.2 % (ref 19.6–45.3)
MCH RBC QN AUTO: 25.9 PG (ref 26.6–33)
MCHC RBC AUTO-ENTMCNC: 31.3 G/DL (ref 31.5–35.7)
MCV RBC AUTO: 82.8 FL (ref 79–97)
MONOCYTES # BLD AUTO: 0.47 10*3/MM3 (ref 0.1–0.9)
MONOCYTES NFR BLD AUTO: 6.7 % (ref 5–12)
NEUTROPHILS NFR BLD AUTO: 4.87 10*3/MM3 (ref 1.7–7)
NEUTROPHILS NFR BLD AUTO: 68.9 % (ref 42.7–76)
NRBC BLD AUTO-RTO: 0 /100 WBC (ref 0–0.2)
PLATELET # BLD AUTO: 299 10*3/MM3 (ref 140–450)
PMV BLD AUTO: 8.2 FL (ref 6–12)
POTASSIUM SERPL-SCNC: 4.2 MMOL/L (ref 3.5–5.2)
RBC # BLD AUTO: 3.09 10*6/MM3 (ref 3.77–5.28)
SODIUM SERPL-SCNC: 130 MMOL/L (ref 136–145)
WBC NRBC COR # BLD AUTO: 7.06 10*3/MM3 (ref 3.4–10.8)

## 2025-06-01 PROCEDURE — 94799 UNLISTED PULMONARY SVC/PX: CPT

## 2025-06-01 PROCEDURE — 85025 COMPLETE CBC W/AUTO DIFF WBC: CPT | Performed by: INTERNAL MEDICINE

## 2025-06-01 PROCEDURE — 94761 N-INVAS EAR/PLS OXIMETRY MLT: CPT

## 2025-06-01 PROCEDURE — 94664 DEMO&/EVAL PT USE INHALER: CPT

## 2025-06-01 PROCEDURE — 25010000002 CEFTRIAXONE PER 250 MG: Performed by: INTERNAL MEDICINE

## 2025-06-01 PROCEDURE — 80048 BASIC METABOLIC PNL TOTAL CA: CPT | Performed by: INTERNAL MEDICINE

## 2025-06-01 RX ADMIN — CEFTRIAXONE 2000 MG: 2 INJECTION, POWDER, FOR SOLUTION INTRAMUSCULAR; INTRAVENOUS at 08:24

## 2025-06-01 RX ADMIN — IPRATROPIUM BROMIDE AND ALBUTEROL SULFATE 3 ML: .5; 3 SOLUTION RESPIRATORY (INHALATION) at 06:45

## 2025-06-01 RX ADMIN — IPRATROPIUM BROMIDE AND ALBUTEROL SULFATE 3 ML: .5; 3 SOLUTION RESPIRATORY (INHALATION) at 10:20

## 2025-06-01 RX ADMIN — METOPROLOL SUCCINATE 25 MG: 25 TABLET, EXTENDED RELEASE ORAL at 08:24

## 2025-06-01 RX ADMIN — IPRATROPIUM BROMIDE AND ALBUTEROL SULFATE 3 ML: .5; 3 SOLUTION RESPIRATORY (INHALATION) at 14:36

## 2025-06-01 RX ADMIN — CEFTRIAXONE 2000 MG: 2 INJECTION, POWDER, FOR SOLUTION INTRAMUSCULAR; INTRAVENOUS at 20:33

## 2025-06-01 RX ADMIN — FUROSEMIDE 40 MG: 40 TABLET ORAL at 08:24

## 2025-06-01 RX ADMIN — MEDROXYPROGESTERONE ACETATE 10 MG: 10 TABLET ORAL at 08:24

## 2025-06-01 RX ADMIN — FERROUS SULFATE TAB 325 MG (65 MG ELEMENTAL FE) 325 MG: 325 (65 FE) TAB at 08:24

## 2025-06-01 RX ADMIN — GUAIFENESIN 1200 MG: 600 TABLET, EXTENDED RELEASE ORAL at 23:01

## 2025-06-01 RX ADMIN — IPRATROPIUM BROMIDE AND ALBUTEROL SULFATE 3 ML: .5; 3 SOLUTION RESPIRATORY (INHALATION) at 20:20

## 2025-06-01 RX ADMIN — MICONAZOLE NITRATE 1 APPLICATION: 2 POWDER TOPICAL at 22:50

## 2025-06-01 RX ADMIN — GUAIFENESIN 1200 MG: 600 TABLET, EXTENDED RELEASE ORAL at 14:16

## 2025-06-01 RX ADMIN — DEXTROMETHORPHAN 60 MG: 30 SUSPENSION, EXTENDED RELEASE ORAL at 21:29

## 2025-06-01 RX ADMIN — DEXTROMETHORPHAN 60 MG: 30 SUSPENSION, EXTENDED RELEASE ORAL at 08:26

## 2025-06-01 RX ADMIN — Medication 10 ML: at 20:34

## 2025-06-01 RX ADMIN — MICONAZOLE NITRATE 1 APPLICATION: 2 POWDER TOPICAL at 08:29

## 2025-06-01 RX ADMIN — Medication 10 ML: at 11:23

## 2025-06-01 NOTE — PLAN OF CARE
Goal Outcome Evaluation:  Plan of Care Reviewed With: patient        Progress: no change  Outcome Evaluation: VSS afebrile. IV abx continues to RUE single lumen picc line, intact. No bleeding anywhere noted. Pt appears to have slept well using home CPAP during the shift. Plan of care ongoing.

## 2025-06-01 NOTE — PROGRESS NOTES
"    DAILY PROGRESS NOTE  [facility]    Patient Identification:  Name: Emely Solomon  Age: 64 y.o.  Sex: female  :  1960  MRN: [id number]         Primary Care Physician: Adilson Wilkerson MD    Subjective:  Patient is lying on the bed and is in no major distress.  Today she denies any vaginal bleeding.  Currently on 2 L of oxygen and normally does not use any home O2.    Scheduled Meds:cefTRIAXone (ROCEPHIN) 2,000 mg in sodium chloride 0.9 % 100 mL MBP, 2,000 mg, Intravenous, Q12H  ferrous sulfate, 325 mg, Oral, Daily With Breakfast  furosemide, 40 mg, Oral, Daily  ipratropium-albuterol, 3 mL, Nebulization, 4x Daily - RT  Lidocaine, 1 patch, Transdermal, Q24H  medroxyPROGESTERone, 10 mg, Oral, Daily  metoprolol succinate XL, 25 mg, Oral, Daily  miconazole, 1 Application, Topical, Q12H  [Held by provider] rivaroxaban, 20 mg, Oral, Daily With Dinner  sodium chloride, 10 mL, Intravenous, Q12H      Continuous Infusions:     Vital signs in last 24 hours:  Temp:  [97.7 °F (36.5 °C)-99.3 °F (37.4 °C)] 97.7 °F (36.5 °C)  Heart Rate:  [76-93] 80  Resp:  [20-24] 20  BP: (114-128)/(72-75) 115/75    Intake/Output:    Intake/Output Summary (Last 24 hours) at 2025 1615  Last data filed at 2025 1416  Gross per 24 hour   Intake 600 ml   Output 1275 ml   Net -675 ml       Exam:  /75 (BP Location: Left arm, Patient Position: Sitting)   Pulse 80   Temp 97.7 °F (36.5 °C) (Oral)   Resp 20   Ht 160 cm (62.99\")   Wt (!) 156 kg (343 lb 7.6 oz)   SpO2 96%   BMI 60.86 kg/m²     General Appearance:    Alert, cooperative, nontoxic, AAOx3, poor historian                         Throat:   Oral mucosa pink and moist                           Neck:   Supple, symmetrical, trachea midline, no JVD                         Lungs:    Clear to auscultation bilaterally, respirations unlabored                          Heart:    Regular rate and rhythm, S1 and S2 normal                  Abdomen:     Soft, " nontender, bowel sounds active                 Extremities: Lymphedema with stasis changes but nothing pitting                        Pulses:   Pulses palpable in all extremities                            Skin:   Skin is warm and dry, no bruising or ecchymosis                  Neurologic:   CNII-XII intact, moving all/GW      Data Review:  Labs in chart were reviewed.    Assessment:  Active Hospital Problems    Diagnosis  POA    **Acute pulmonary embolism [I26.99]  Unknown    Bacterial endocarditis [I33.0]  Yes    Morbid obesity [E66.01]  Yes    Lymphedema [I89.0]  Yes    Anemia, chronic disease [D63.8]  Yes    ARIEL on CPAP [G47.33]  Yes    Pulmonary hypertension [I27.20]  Yes    PFO (patent foramen ovale) [Q21.12]  Not Applicable    Paradoxical embolism [I74.9]  Yes      Resolved Hospital Problems   No resolved problems to display.       Plan:    1.Vaginal bleeding, progesterone has been initiated by GYN on 530/25.  Xarelto has been held on 5/31/2025 and today is her first day with no bleeding.  Hopefully we can resume Xarelto tomorrow and monitor for any rebleed and can follow-up with OB/GYN as an outpatient basis.Follow-up with Dr. Davida De Jesus on June 9 regarding the same.    2.  History of DVT/PE, Xarelto will be held until vaginal bleed improves and can be resumed thereafter.      3.  Acute blood loss anemia secondary #1, status post 2 units of PRBC transfusion during this hospital stay.    4.  History of endocarditis, currently on Rocephin and stop date is 6/17/2025.    5.  Obstructive sleep apnea, continue with home CPAP.    6.  Obesity, counseled to lose weight.    7.  Acute hypoxic respiratory failure, currently on 2 L of oxygen which will be continued and CT chest is suggestive of multifocal pneumonia and is already on Rocephin.  Pulmonary is following along.      Best Harp MD  6/1/2025  16:15 EDT

## 2025-06-01 NOTE — PLAN OF CARE
Goal Outcome Evaluation:  Plan of Care Reviewed With: patient        Progress: no change  Outcome Evaluation: Vitals stable. IV abx cont'd. vaginal bleeding not present. PICC intact. Pt needs met and questions answered at this time. Will continue to monitor

## 2025-06-02 LAB
ANION GAP SERPL CALCULATED.3IONS-SCNC: 10.8 MMOL/L (ref 5–15)
BASOPHILS # BLD AUTO: 0.04 10*3/MM3 (ref 0–0.2)
BASOPHILS NFR BLD AUTO: 0.7 % (ref 0–1.5)
BUN SERPL-MCNC: 19 MG/DL (ref 8–23)
BUN/CREAT SERPL: 21.6 (ref 7–25)
CALCIUM SPEC-SCNC: 8.9 MG/DL (ref 8.6–10.5)
CHLORIDE SERPL-SCNC: 99 MMOL/L (ref 98–107)
CO2 SERPL-SCNC: 24.2 MMOL/L (ref 22–29)
CREAT SERPL-MCNC: 0.88 MG/DL (ref 0.57–1)
DEPRECATED RDW RBC AUTO: 48.1 FL (ref 37–54)
EGFRCR SERPLBLD CKD-EPI 2021: 73 ML/MIN/1.73
EOSINOPHIL # BLD AUTO: 0.64 10*3/MM3 (ref 0–0.4)
EOSINOPHIL NFR BLD AUTO: 10.5 % (ref 0.3–6.2)
ERYTHROCYTE [DISTWIDTH] IN BLOOD BY AUTOMATED COUNT: 16 % (ref 12.3–15.4)
GLUCOSE SERPL-MCNC: 89 MG/DL (ref 65–99)
HCT VFR BLD AUTO: 25.5 % (ref 34–46.6)
HGB BLD-MCNC: 7.9 G/DL (ref 12–15.9)
IMM GRANULOCYTES # BLD AUTO: 0.05 10*3/MM3 (ref 0–0.05)
IMM GRANULOCYTES NFR BLD AUTO: 0.8 % (ref 0–0.5)
LYMPHOCYTES # BLD AUTO: 0.95 10*3/MM3 (ref 0.7–3.1)
LYMPHOCYTES NFR BLD AUTO: 15.5 % (ref 19.6–45.3)
MCH RBC QN AUTO: 25.6 PG (ref 26.6–33)
MCHC RBC AUTO-ENTMCNC: 31 G/DL (ref 31.5–35.7)
MCV RBC AUTO: 82.5 FL (ref 79–97)
MONOCYTES # BLD AUTO: 0.46 10*3/MM3 (ref 0.1–0.9)
MONOCYTES NFR BLD AUTO: 7.5 % (ref 5–12)
NEUTROPHILS NFR BLD AUTO: 3.97 10*3/MM3 (ref 1.7–7)
NEUTROPHILS NFR BLD AUTO: 65 % (ref 42.7–76)
NRBC BLD AUTO-RTO: 0 /100 WBC (ref 0–0.2)
PLATELET # BLD AUTO: 310 10*3/MM3 (ref 140–450)
PMV BLD AUTO: 8.2 FL (ref 6–12)
POTASSIUM SERPL-SCNC: 3.9 MMOL/L (ref 3.5–5.2)
RBC # BLD AUTO: 3.09 10*6/MM3 (ref 3.77–5.28)
SODIUM SERPL-SCNC: 134 MMOL/L (ref 136–145)
WBC NRBC COR # BLD AUTO: 6.11 10*3/MM3 (ref 3.4–10.8)

## 2025-06-02 PROCEDURE — 94799 UNLISTED PULMONARY SVC/PX: CPT

## 2025-06-02 PROCEDURE — 25010000002 CEFTRIAXONE PER 250 MG: Performed by: INTERNAL MEDICINE

## 2025-06-02 PROCEDURE — 80048 BASIC METABOLIC PNL TOTAL CA: CPT | Performed by: INTERNAL MEDICINE

## 2025-06-02 PROCEDURE — 94664 DEMO&/EVAL PT USE INHALER: CPT

## 2025-06-02 PROCEDURE — 85025 COMPLETE CBC W/AUTO DIFF WBC: CPT | Performed by: INTERNAL MEDICINE

## 2025-06-02 PROCEDURE — 94761 N-INVAS EAR/PLS OXIMETRY MLT: CPT

## 2025-06-02 RX ADMIN — FUROSEMIDE 40 MG: 40 TABLET ORAL at 09:04

## 2025-06-02 RX ADMIN — IPRATROPIUM BROMIDE AND ALBUTEROL SULFATE 3 ML: .5; 3 SOLUTION RESPIRATORY (INHALATION) at 09:11

## 2025-06-02 RX ADMIN — IPRATROPIUM BROMIDE AND ALBUTEROL SULFATE 3 ML: .5; 3 SOLUTION RESPIRATORY (INHALATION) at 16:27

## 2025-06-02 RX ADMIN — CEFTRIAXONE 2000 MG: 2 INJECTION, POWDER, FOR SOLUTION INTRAMUSCULAR; INTRAVENOUS at 20:25

## 2025-06-02 RX ADMIN — IPRATROPIUM BROMIDE AND ALBUTEROL SULFATE 3 ML: .5; 3 SOLUTION RESPIRATORY (INHALATION) at 21:57

## 2025-06-02 RX ADMIN — CEFTRIAXONE 2000 MG: 2 INJECTION, POWDER, FOR SOLUTION INTRAMUSCULAR; INTRAVENOUS at 09:04

## 2025-06-02 RX ADMIN — Medication 10 ML: at 20:24

## 2025-06-02 RX ADMIN — METOPROLOL SUCCINATE 25 MG: 25 TABLET, EXTENDED RELEASE ORAL at 09:03

## 2025-06-02 RX ADMIN — RIVAROXABAN 20 MG: 20 TABLET, FILM COATED ORAL at 19:29

## 2025-06-02 RX ADMIN — Medication 10 ML: at 09:05

## 2025-06-02 RX ADMIN — MICONAZOLE NITRATE 1 APPLICATION: 2 POWDER TOPICAL at 20:25

## 2025-06-02 RX ADMIN — FERROUS SULFATE TAB 325 MG (65 MG ELEMENTAL FE) 325 MG: 325 (65 FE) TAB at 09:03

## 2025-06-02 RX ADMIN — IPRATROPIUM BROMIDE AND ALBUTEROL SULFATE 3 ML: .5; 3 SOLUTION RESPIRATORY (INHALATION) at 11:57

## 2025-06-02 RX ADMIN — MEDROXYPROGESTERONE ACETATE 10 MG: 10 TABLET ORAL at 09:04

## 2025-06-02 RX ADMIN — MICONAZOLE NITRATE 1 APPLICATION: 2 POWDER TOPICAL at 09:06

## 2025-06-02 RX ADMIN — DEXTROMETHORPHAN 60 MG: 30 SUSPENSION, EXTENDED RELEASE ORAL at 12:52

## 2025-06-02 NOTE — PLAN OF CARE
Goal Outcome Evaluation:  Plan of Care Reviewed With: patient        Progress: no change  Outcome Evaluation: Vital signs stable. Cough managed with PRN mucinex and delsym. IV abx given as ordered. Frequent weight shift assistance provided. Home CPAP used during sleep. 2L nc. Plan to discharge to rehab. Safety maintained. Plan of care ongoing.

## 2025-06-02 NOTE — PROGRESS NOTES
Enter Query Response Below      Query Response: Hypoxia only, acute respiratory failure was not supported              If applicable, please update the problem list.

## 2025-06-02 NOTE — PLAN OF CARE
Problem: Adult Inpatient Plan of Care  Goal: Plan of Care Review  Outcome: Progressing   Goal Outcome Evaluation:      Vss.Remains on iv abx, picc in place.Dsgs changed on bilat heels.Barrier cream applied to coccyx/buttocks throughout the day.Miconazole powder applied under skin folds.Contact precautions maintained.Prn delsym given as ordered for c/o cough.No c/o pain or n/v.

## 2025-06-02 NOTE — PROGRESS NOTES
Name: Emely Solomon ADMIT: 2025   : 1960  PCP: Adilson Wilkerson MD    MRN: [id number] LOS: 8 days   AGE/SEX: 64 y.o. female  ROOM: [facility]     Subjective   Subjective   Patient seen this morning.  No acute events overnight.  Overnight.  No further reports of vaginal bleeding.  Remains on 2 L nasal cannula, denies shortness of breath, chest pain    Review of Systems   As above  Objective   Objective   Vital Signs  Temp:  [97.7 °F (36.5 °C)-98.8 °F (37.1 °C)] 98.6 °F (37 °C)  Heart Rate:  [] 86  Resp:  [18-20] 18  BP: (103-123)/(64-75) 103/75  SpO2:  [92 %-96 %] 94 %  on  Flow (L/min) (Oxygen Therapy):  [2-3] 2;   Device (Oxygen Therapy): nasal cannula  Body mass index is 59.89 kg/m².  Physical Exam    General: Alert, laying in bed, not in distress, obese  HEENT: Normocephalic, atraumatic  CV: Regular rate and rhythm, no murmurs rubs or gallops  Lungs: Clear to auscultation bilaterally, no crackles or wheezes  Abdomen: Soft, nontender, nondistended  Extremities: Bilateral lower extremity lymphedema, chronic venous stasis changes    Results Review     I reviewed the patient's new clinical results.  Results from last 7 days   Lab Units 2555 258 25  0649   WBC 10*3/mm3 6.11 7.06 6.71 5.86   HEMOGLOBIN g/dL 7.9* 8.0* 7.9* 8.1*   PLATELETS 10*3/mm3 310 299 297 290     Results from last 7 days   Lab Units 25  0555 25  0408 25  0649 25  0226   SODIUM mmol/L 134* 130* 133* 133*   POTASSIUM mmol/L 3.9 4.2 4.0 3.7   CHLORIDE mmol/L 99 96* 99 98   CO2 mmol/L 24.2 22.0 22.0 25.9   BUN mg/dL 19.0 16.0 17.0 15.0   CREATININE mg/dL 0.88 1.02* 0.89 1.01*   GLUCOSE mg/dL 89 85 83 88   Estimated Creatinine Clearance: 93.2 mL/min (by C-G formula based on SCr of 0.88 mg/dL).  Results from last 7 days   Lab Units 25  0226   ALBUMIN g/dL 2.8*     Results from last 7 days   Lab Units 25  0555 25  0408 25  0649  "05/28/25  0226   CALCIUM mg/dL 8.9 8.5* 8.4* 8.3*   ALBUMIN g/dL  --   --   --  2.8*   MAGNESIUM mg/dL  --   --   --  1.6   PHOSPHORUS mg/dL  --   --   --  3.5       COVID19   Date Value Ref Range Status   05/04/2025 Not Detected Not Detected - Ref. Range Final   03/07/2022 Not Detected Not Detected - Ref. Range Final     No results found for: \"HGBA1C\", \"POCGLU\"        CT Angiogram Chest  Narrative: CT ANGIOGRAM OF THE CHEST. MULTIPLE CORONAL, SAGITTAL, AND 3-D  RECONSTRUCTIONS.     HISTORY: Hypoxia     TECHNIQUE: Radiation dose reduction techniques were utilized, including  automated exposure control and exposure modulation based on body size.   CT angiogram of the chest was performed. Multiple coronal, sagittal, and  3-D reconstruction images were obtained.      COMPARISON: CT chest 5/6/2025 and CTA chest 9/24/2016     Impression: FINDINGS AND IMPRESSION:      Please note evaluation significantly suboptimal due to patient body  habitus.     Thoracic adenopathy is present. Index right hilar node measures 1.3 cm  in short dimension. Index precarinal node measures 1 cm in short axis  dimension. No significant pericardial effusion. The thoracic aorta is  not well evaluated due to contrast timing. Presumed PICC tip terminates  near the brachiocephalic confluence     MAIN PULMONARY ARTERY IS DILATED, MEASURING UP TO APPROXIMATE 4.3 CM IN  DIAMETER. THERE ARE FINDINGS OF PRESUMED MIXING ARTIFACT HARDENING AND  STREAK ARTIFACT WITHIN THE BILATERAL PULMONARY ARTERIES WHICH  SIGNIFICANTLY LIMIT EVALUATION. WHILE NO DEFINITE FINDINGS OF PULMONARY  ARTERIAL EMBOLISM ARE SEEN, PLEASE NOTE EVALUATION IS SIGNIFICANTLY  SUBOPTIMAL, PARTICULARLY WITHIN THE PERIPHERAL VASCULATURE, AND  UNDERLYING PULMONARY EMBOLISM CANNOT BE EXCLUDED WITH THIS EXAMINATION.  IF THERE IS CONTINUED CLINICAL CONCERN FOR PULMONARY ARTERIAL EMBOLISM,  RECOMMEND REPEAT CTA CHEST WITH ALTERED TIMING TO BETTER EVALUATE THE  PULMONARY ARTERIES.   "   Previously seen area of pulmonary opacification within the left lower  lobe has improved. There is a new small to moderate sized area of  pulmonary opacification within the posterior aspect of the right upper  lobe. There is also mild worsening of pulmonary pacification within the  inferolateral aspect of the left upper lobe. Findings raise concern for  multifocal pneumonia in the appropriate context with asymmetric edema  less likely. Correlation with patient history recommended. Additionally,  given the somewhat focal nature, continued attention on follow-up with  chest CT in 8 weeks recommended to ensure resolution exclude the  possibility malignancy. Thoracic adenopathy can be followed up at this  time as well.     Significant background mosaic attenuation is present, a finding most  commonly seen in the setting of small airways disease. Small vessel  disease much more rare entity.     No suspicious lytic or blastic osseous lesions.     This report was finalized on 5/27/2025 12:28 PM by Dr. Evert Garland M.D on Workstation: [id number]       Scheduled Medications  cefTRIAXone (ROCEPHIN) 2,000 mg in sodium chloride 0.9 % 100 mL MBP, 2,000 mg, Intravenous, Q12H  ferrous sulfate, 325 mg, Oral, Daily With Breakfast  furosemide, 40 mg, Oral, Daily  ipratropium-albuterol, 3 mL, Nebulization, 4x Daily - RT  Lidocaine, 1 patch, Transdermal, Q24H  medroxyPROGESTERone, 10 mg, Oral, Daily  metoprolol succinate XL, 25 mg, Oral, Daily  miconazole, 1 Application, Topical, Q12H  rivaroxaban, 20 mg, Oral, Daily With Dinner  sodium chloride, 10 mL, Intravenous, Q12H    Infusions   Diet  Diet: Cardiac; Healthy Heart (2-3 Na+); Fluid Consistency: Thin (IDDSI 0)    I have personally reviewed     [x]  Laboratory   [x]  Microbiology   [x]  Radiology   [x]  EKG/Telemetry  [x]  Cardiology/Vascular   []  Pathology    []  Records       Assessment/Plan     Active Hospital Problems    Diagnosis  POA    **Acute pulmonary embolism  [I26.99]  Unknown    Bacterial endocarditis [I33.0]  Yes    Morbid obesity [E66.01]  Yes    Lymphedema [I89.0]  Yes    Anemia, chronic disease [D63.8]  Yes    ARIEL on CPAP [G47.33]  Yes    Pulmonary hypertension [I27.20]  Yes    PFO (patent foramen ovale) [Q21.12]  Not Applicable    Paradoxical embolism [I74.9]  Yes      Resolved Hospital Problems   No resolved problems to display.         Vaginal bleeding,   -progesterone has been initiated by GYN on 530/25.    -Xarelto has been held on 5/31/2025 and today is her first day with no bleeding.    OB/GYN evaluated, recommended to discontinue Xarelto for any active worrisome vaginal bleeding during this hospitalization, and strongly recommend patient to follow-up with Dr. Davida De Jesus on her appointment on June 9.   -Resume Xarelto today as no signs of bleeding     History of DVT/PE,   -Patient is high risk for recurrent PE due to relative immobility and obesity  - Was seen by hematology on 05/7/25 with recommendations to continue anticoagulation  - Xarelto resumed 06/2/25 20 mg with dinner daily, if has recurrent vaginal bleeding transition to lower dose 10 mg for prophylaxis as patient remains high risk for recurrent PE       Acute blood loss anemia secondary vaginal bleeding  status post 2 units of PRBC transfusion during this hospital stay.  Hemoglobin stable around 8.  Monitor daily     History of endocarditis,   currently on Rocephin and stop date is 6/17/2025.      Obstructive sleep apnea,   continue with home CPAP.     Obesity,   counseled to lose weight.     hypoxia  currently on 2 L of oxygen which will be continued   CT chest is suggestive of multifocal pneumonia and is already on Rocephin.    Pulmonary evaluated, low suspicion for PE      Xarelto (home med) for DVT prophylaxis.  Full code.  Discussed with patient.  Disposition: SNF, in 1-2 days  Expected Discharge Date: 6/3/2025; Expected Discharge Time:        Copied text in this note has been reviewed and is  accurate as of 06/02/25.         Dictated utilizing Dragon dictation        Jairon Wilkins MD  [facility]  06/02/25  10:39 EDT

## 2025-06-02 NOTE — SIGNIFICANT NOTE
06/02/25 1342   OTHER   Discipline physical therapist   Rehab Time/Intention   Session Not Performed patient/family declined treatment;other (see comments)  (attempted to see for therapy, however, pt reports that she just returned to bed and has already been up several times. she requests to rest at this time. will follow up as time allows.)   Recommendation   PT - Next Appointment 06/03/25

## 2025-06-02 NOTE — SIGNIFICANT NOTE
06/02/25 1634   OTHER   Discipline occupational therapist   Rehab Time/Intention   Session Not Performed patient/family declined treatment  (OT attempt this AM. Pt reports she had just returned to bed with nursing staff, preference to rest. OT to f/u as appropriate per POC.)   Recommendation   OT - Next Appointment 06/03/25

## 2025-06-03 VITALS
RESPIRATION RATE: 16 BRPM | TEMPERATURE: 98.1 F | HEIGHT: 63 IN | OXYGEN SATURATION: 94 % | DIASTOLIC BLOOD PRESSURE: 67 MMHG | WEIGHT: 293 LBS | HEART RATE: 83 BPM | SYSTOLIC BLOOD PRESSURE: 127 MMHG | BODY MASS INDEX: 51.91 KG/M2

## 2025-06-03 LAB
ANION GAP SERPL CALCULATED.3IONS-SCNC: 11.3 MMOL/L (ref 5–15)
BASOPHILS # BLD AUTO: 0.04 10*3/MM3 (ref 0–0.2)
BASOPHILS NFR BLD AUTO: 0.8 % (ref 0–1.5)
BUN SERPL-MCNC: 16 MG/DL (ref 8–23)
BUN/CREAT SERPL: 19.8 (ref 7–25)
CALCIUM SPEC-SCNC: 8.8 MG/DL (ref 8.6–10.5)
CHLORIDE SERPL-SCNC: 102 MMOL/L (ref 98–107)
CO2 SERPL-SCNC: 23.7 MMOL/L (ref 22–29)
CREAT SERPL-MCNC: 0.81 MG/DL (ref 0.57–1)
DEPRECATED RDW RBC AUTO: 48.2 FL (ref 37–54)
EGFRCR SERPLBLD CKD-EPI 2021: 80.7 ML/MIN/1.73
EOSINOPHIL # BLD AUTO: 0.63 10*3/MM3 (ref 0–0.4)
EOSINOPHIL NFR BLD AUTO: 12.5 % (ref 0.3–6.2)
ERYTHROCYTE [DISTWIDTH] IN BLOOD BY AUTOMATED COUNT: 16.2 % (ref 12.3–15.4)
GLUCOSE SERPL-MCNC: 87 MG/DL (ref 65–99)
HCT VFR BLD AUTO: 25.9 % (ref 34–46.6)
HGB BLD-MCNC: 8 G/DL (ref 12–15.9)
IMM GRANULOCYTES # BLD AUTO: 0.04 10*3/MM3 (ref 0–0.05)
IMM GRANULOCYTES NFR BLD AUTO: 0.8 % (ref 0–0.5)
LYMPHOCYTES # BLD AUTO: 0.86 10*3/MM3 (ref 0.7–3.1)
LYMPHOCYTES NFR BLD AUTO: 17.1 % (ref 19.6–45.3)
MCH RBC QN AUTO: 25.4 PG (ref 26.6–33)
MCHC RBC AUTO-ENTMCNC: 30.9 G/DL (ref 31.5–35.7)
MCV RBC AUTO: 82.2 FL (ref 79–97)
MONOCYTES # BLD AUTO: 0.39 10*3/MM3 (ref 0.1–0.9)
MONOCYTES NFR BLD AUTO: 7.8 % (ref 5–12)
NEUTROPHILS NFR BLD AUTO: 3.06 10*3/MM3 (ref 1.7–7)
NEUTROPHILS NFR BLD AUTO: 61 % (ref 42.7–76)
NRBC BLD AUTO-RTO: 0 /100 WBC (ref 0–0.2)
PLATELET # BLD AUTO: 344 10*3/MM3 (ref 140–450)
PMV BLD AUTO: 8 FL (ref 6–12)
POTASSIUM SERPL-SCNC: 3.9 MMOL/L (ref 3.5–5.2)
RBC # BLD AUTO: 3.15 10*6/MM3 (ref 3.77–5.28)
SODIUM SERPL-SCNC: 137 MMOL/L (ref 136–145)
WBC NRBC COR # BLD AUTO: 5.02 10*3/MM3 (ref 3.4–10.8)

## 2025-06-03 PROCEDURE — 94799 UNLISTED PULMONARY SVC/PX: CPT

## 2025-06-03 PROCEDURE — 25010000002 CEFTRIAXONE PER 250 MG: Performed by: INTERNAL MEDICINE

## 2025-06-03 PROCEDURE — 94761 N-INVAS EAR/PLS OXIMETRY MLT: CPT

## 2025-06-03 PROCEDURE — 85025 COMPLETE CBC W/AUTO DIFF WBC: CPT | Performed by: INTERNAL MEDICINE

## 2025-06-03 PROCEDURE — 80048 BASIC METABOLIC PNL TOTAL CA: CPT | Performed by: INTERNAL MEDICINE

## 2025-06-03 PROCEDURE — 94664 DEMO&/EVAL PT USE INHALER: CPT

## 2025-06-03 RX ORDER — MEDROXYPROGESTERONE ACETATE 10 MG
10 TABLET ORAL DAILY
Start: 2025-06-04

## 2025-06-03 RX ADMIN — MICONAZOLE NITRATE 1 APPLICATION: 2 POWDER TOPICAL at 08:42

## 2025-06-03 RX ADMIN — FUROSEMIDE 40 MG: 40 TABLET ORAL at 08:39

## 2025-06-03 RX ADMIN — FERROUS SULFATE TAB 325 MG (65 MG ELEMENTAL FE) 325 MG: 325 (65 FE) TAB at 08:38

## 2025-06-03 RX ADMIN — Medication 10 ML: at 08:39

## 2025-06-03 RX ADMIN — MEDROXYPROGESTERONE ACETATE 10 MG: 10 TABLET ORAL at 08:38

## 2025-06-03 RX ADMIN — IPRATROPIUM BROMIDE AND ALBUTEROL SULFATE 3 ML: .5; 3 SOLUTION RESPIRATORY (INHALATION) at 08:26

## 2025-06-03 RX ADMIN — CEFTRIAXONE 2000 MG: 2 INJECTION, POWDER, FOR SOLUTION INTRAMUSCULAR; INTRAVENOUS at 10:26

## 2025-06-03 RX ADMIN — DEXTROMETHORPHAN 60 MG: 30 SUSPENSION, EXTENDED RELEASE ORAL at 02:07

## 2025-06-03 RX ADMIN — METOPROLOL SUCCINATE 25 MG: 25 TABLET, EXTENDED RELEASE ORAL at 08:39

## 2025-06-03 RX ADMIN — IPRATROPIUM BROMIDE AND ALBUTEROL SULFATE 3 ML: .5; 3 SOLUTION RESPIRATORY (INHALATION) at 12:18

## 2025-06-03 NOTE — NURSING NOTE
Patient discharged in stable condition via [facility] van to go to [facility]. Spouse took belongings for patient. Right upper arm PICC left in place for continued treatment of endocarditis. Report called to Rossana.

## 2025-06-03 NOTE — PLAN OF CARE
Goal Outcome Evaluation:  Plan of Care Reviewed With: patient        Progress: no change  Outcome Evaluation: Vital signs stable. Cough managed with PRN delsym. IV abx given as ordered. Frequent weight shift assistance provided. 2L nc. Up to BSC. Plan to discharge to rehab. Safety maintained. Plan of care ongoing.

## 2025-06-03 NOTE — DISCHARGE PLACEMENT REQUEST
"Emely Solomon (64 y.o. Female)       Date of Birth   1960    Social Security Number       Address   [address] [facility] [address]    Home Phone   [phone]    MRN   [id number]       Yazidi   Non-Islam    Marital Status                               Admission Date   5/25/2025    Admission Type   Emergency    Admitting Provider   Lexii Street MD    Attending Provider   Jairon Wilkins MD    Department, Room/Bed   [address], E463/1       Discharge Date       Discharge Disposition   Skilled Nursing Facility (DC - External)    Discharge Destination                                 Attending Provider: Jairon Wilkins MD    Allergies: Cephalexin, Clindamycin/lincomycin    Isolation: Contact   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)   Code Status: CPR    Ht: 160 cm (62.99\")   Wt: 152 kg (334 lb 10.5 oz)    Admission Cmt: None   Principal Problem: Acute pulmonary embolism [I26.99]                   Active Insurance as of 5/25/2025       Primary Coverage       Payor Plan Insurance Group Employer/Plan Group    MEDICARE MEDICARE A & B        Payor Plan Address Payor Plan Phone Number Payor Plan Fax Number Effective Dates    [address] [phone]  7/1/2024 - None Entered    [facility] [id number]         Subscriber Name Subscriber Birth Date Member ID       EMELY SOLOMON 1960 [id number]               Secondary Coverage       Payor Plan Insurance Group Employer/Plan Group    [facility] SUPP [id number]       Payor Plan Address Payor Plan Phone Number Payor Plan Fax Number Effective Dates    [address]   7/1/2024 - None Entered    [facility] [id number]         Subscriber Name Subscriber Birth Date Member ID       EMELY SOLOMON 1960 [id number]                     Emergency Contacts        (Rel.) Home Phone Work Phone Mobile Phone    Ghanshyam Solomon (Spouse) [phone] -- [phone] "    SaidaAdilson (Brother) [phone] -- [phone]                   Discharge Summary        Jairon Wilkins MD at 25 0941              Patient Name: Emely Solomon  : 1960  MRN: [id number]    Date of Admission: 2025  Date of Discharge:  6/3/2025  Primary Care Physician: Adilson Wilkerson MD      Chief Complaint:   Cough, Hypotension, and Shortness of Breath      Discharge Diagnoses     Active Hospital Problems    Diagnosis  POA    **Acute pulmonary embolism [I26.99]  Unknown    Bacterial endocarditis [I33.0]  Yes    Morbid obesity [E66.01]  Yes    Hypoxia [R09.02]  Yes    Lymphedema [I89.0]  Yes    Anemia, chronic disease [D63.8]  Yes    ARIEL on CPAP [G47.33]  Yes    Pulmonary hypertension [I27.20]  Yes    PFO (patent foramen ovale) [Q21.12]  Not Applicable    Paradoxical embolism [I74.9]  Yes      Resolved Hospital Problems   No resolved problems to display.        Hospital Course     Patient is a 64-year-old female with a history of including but not limited to DVT/PE on Xarelto, recently diagnosed mitral valve endocarditis on IV ceftriaxone, hypertension, pulmonary hypertension, ARIEL on CPAP, lymphedema, presented from nursing facility with low blood pressure, hypotension, hypoxemia.       Hypoxia  Pneumonia  ARIEL on CPAP   -CT chest is suggestive of multifocal pneumonia and is already on Rocephin.    -Pulmonary evaluated, low likelihood to have PE on CTA chest per pulmonology  -Remains on 2 L nasal cannula.  Wean off O2 as able, O2 saturation goal 90% and above  - Continue DuoNebs, encourage incentive spirometry         Vaginal bleeding,   -progesterone has been initiated by GYN on 25.    -Xarelto has been held on 2025 and resumed 25 with no further episodes of vaginal bleeding reported  -OB/GYN evaluated, recommended to discontinue Xarelto for any active worrisome vaginal bleeding during this hospitalization, and strongly recommend patient to follow-up with   Davida De Jesus on her appointment on June 9 As scheduled  - Monitor for signs of recurrent vaginal bleeding  -Continued progesterone     History of DVT/PE,   -Patient is high risk for recurrent PE due to relative immobility and obesity  -Was seen by hematology on 05/7/25 with recommendations to continue anticoagulation  - Xarelto resumed 06/2/25  20 mg with dinner daily, if has recurrent vaginal bleeding, will need to be considered transition to lower dose 10 mg for prophylaxis as patient remains high risk for recurrent PE        Acute blood loss anemia secondary vaginal bleeding  status post 2 units of PRBC transfusion during this hospital stay.  Hemoglobin has been stable around 8 for the last several days.   - Recommend to repeat CBC n 2-3 days.  Monitor for signs of recurrent vaginal bleeding.           History of endocarditis,   currently on Rocephin and stop date is 6/17/2025, continue IV ceftriaxone as scheduled  - Follow-up with infectious disease, Dr. Nunez on 06/16/2025 as scheduled    Acute on chronic diastolic heart failure  -Continue p.o. Lasix 40 mg daily     Obstructive sleep apnea,   continue with home CPAP.     Obesity,   Class II modifications, diet and weight loss strongly encouraged            At the time of discharge patient was told to take all medications as prescribed, keep all follow-up appointments, and call their doctor or return to the hospital with any worsening or concerning symptoms.             Day of Discharge     Subjective:  Patient seen this morning.  No acute events overnight.  Xarelto resumed yesterday, no signs of recurrent vaginal bleeding.  Level has been stable.  History of chronic pain.    Physical Exam:  Temp:  [98.1 °F (36.7 °C)-100 °F (37.8 °C)] 98.1 °F (36.7 °C)  Heart Rate:  [78-87] 83  Resp:  [16-20] 16  BP: (107-127)/(65-75) 127/67  Body mass index is 59.3 kg/m².  Physical Exam  General: Alert, laying in bed, not in distress, obese  HEENT: Normocephalic, atraumatic  CV:  Regular rate and rhythm, no murmurs rubs or gallops  Lungs: CTA anteriorly, no wheezing,  Abdomen: Soft, nontender, nondistended  Extremities: Bilateral lower extremity lymphedema, chronic venous stasis changes       Consultants     Consult Orders (all) (From admission, onward)       Start     Ordered    05/30/25 1143  Inpatient Obstetrics / Gynecology Consult  Once        Specialty:  Obstetrics and Gynecology  Provider:  Davida Nelson MD    05/30/25 1142    05/27/25 1353  Inpatient Pulmonology Consult  Once        Specialty:  Pulmonary Disease  Provider:  Adryan King MD    05/27/25 1352    05/25/25 2031  Inpatient Cardiology Consult  Once        Specialty:  Cardiology  Provider:  Brennan Rogers MD    05/25/25 2030    05/25/25 1941  Inpatient Case Management  Consult  Once        Provider:  (Not yet assigned)    05/25/25 1942    05/25/25 1449  [facility] (on-call MD unless specified) Details  Once        Specialty:  Hospitalist  Provider:  (Not yet assigned)    05/25/25 1449                  Procedures     * Surgery not found *      Imaging Results (All)       Procedure Component Value Units Date/Time    CT Angiogram Chest [[id number]] Collected: 05/27/25 1143     Updated: 05/27/25 1231    Narrative:      CT ANGIOGRAM OF THE CHEST. MULTIPLE CORONAL, SAGITTAL, AND 3-D  RECONSTRUCTIONS.     HISTORY: Hypoxia     TECHNIQUE: Radiation dose reduction techniques were utilized, including  automated exposure control and exposure modulation based on body size.   CT angiogram of the chest was performed. Multiple coronal, sagittal, and  3-D reconstruction images were obtained.      COMPARISON: CT chest 5/6/2025 and CTA chest 9/24/2016       Impression:      FINDINGS AND IMPRESSION:      Please note evaluation significantly suboptimal due to patient body  habitus.     Thoracic adenopathy is present. Index right hilar node measures 1.3 cm  in short dimension. Index precarinal node measures 1 cm in short  axis  dimension. No significant pericardial effusion. The thoracic aorta is  not well evaluated due to contrast timing. Presumed PICC tip terminates  near the brachiocephalic confluence     MAIN PULMONARY ARTERY IS DILATED, MEASURING UP TO APPROXIMATE 4.3 CM IN  DIAMETER. THERE ARE FINDINGS OF PRESUMED MIXING ARTIFACT HARDENING AND  STREAK ARTIFACT WITHIN THE BILATERAL PULMONARY ARTERIES WHICH  SIGNIFICANTLY LIMIT EVALUATION. WHILE NO DEFINITE FINDINGS OF PULMONARY  ARTERIAL EMBOLISM ARE SEEN, PLEASE NOTE EVALUATION IS SIGNIFICANTLY  SUBOPTIMAL, PARTICULARLY WITHIN THE PERIPHERAL VASCULATURE, AND  UNDERLYING PULMONARY EMBOLISM CANNOT BE EXCLUDED WITH THIS EXAMINATION.  IF THERE IS CONTINUED CLINICAL CONCERN FOR PULMONARY ARTERIAL EMBOLISM,  RECOMMEND REPEAT CTA CHEST WITH ALTERED TIMING TO BETTER EVALUATE THE  PULMONARY ARTERIES.     Previously seen area of pulmonary opacification within the left lower  lobe has improved. There is a new small to moderate sized area of  pulmonary opacification within the posterior aspect of the right upper  lobe. There is also mild worsening of pulmonary pacification within the  inferolateral aspect of the left upper lobe. Findings raise concern for  multifocal pneumonia in the appropriate context with asymmetric edema  less likely. Correlation with patient history recommended. Additionally,  given the somewhat focal nature, continued attention on follow-up with  chest CT in 8 weeks recommended to ensure resolution exclude the  possibility malignancy. Thoracic adenopathy can be followed up at this  time as well.     Significant background mosaic attenuation is present, a finding most  commonly seen in the setting of small airways disease. Small vessel  disease much more rare entity.     No suspicious lytic or blastic osseous lesions.     This report was finalized on 5/27/2025 12:28 PM by Dr. Evert Garland M.D on Workstation: [id number]       XR Chest 1 View [[id number]] Collected:  05/25/25 1405     Updated: 05/25/25 1410    Narrative:      XR CHEST 1 VW-     Clinical: Cough and shortness of breath     COMPARISON examination dated 5/4/2025     FINDINGS: There is cardiac enlargement. The mediastinum is stable. There  is a right upper extremity PICC line in position and the tip  superimposes the superior vena cava, this position is satisfactory.  There is prominence of the central vasculature. Suspect either pulmonary  arterial hypertension or early/mild congestion. No gross pulmonary  edema. No gross pleural effusion seen. The remainder is unremarkable.     This report was finalized on 5/25/2025 2:07 PM by Dr. Dhruv Sanchez M.D  on Workstation: [id number]             Results for orders placed during the hospital encounter of 07/24/24    Venous w Reflux Lower Extremity - Unilateral CAR    Interpretation Summary    Right saphenofemoral junction reflux with mild proximal saphenous vein reflux with poorly visualized greater saphenous medial branch.  Short segment of varicosity noted in the medial calf.  Lesser saphenous vein nonvisualized.  Poorly visualized deep vein structures without DVT noted.    Results for orders placed during the hospital encounter of 05/25/25    Adult Transthoracic Echo Complete W/ Cont if Necessary Per Protocol    Interpretation Summary    Left ventricular systolic function is normal. Left ventricular ejection fraction appears to be 66 - 70%.    Left ventricular diastolic function is consistent with (grade II w/high LAP) pseudonormalization.    The right ventricular cavity is mild to moderately dilated.    The left atrial cavity is mildly dilated.    Mild aortic valve stenosis is present.  Valvular hemodynamics are similar to previous echocardiogram.    Estimated right ventricular systolic pressure from tricuspid regurgitation is markedly elevated (>55 mmHg).    There is thickening of the posterior leaflet with a small mobile echodensity that is not well-visualized in  "multiple views. Echocardiographic picture is similar to previous transthoracic echocardiogram from 5/5/2025. Mild mitral valve regurgitation is present.  Mean transvalvular gradient is doubled from previous study.  The valve does not look any more stenotic.  This may be secondary to regurgitant volume that is ill-defined given the eccentric nature of regurgitation.  Mild mitral valve stenosis is present.  If there is no other clinical etiology found for the acute hypoxic presentation may consider LYNETTE for clarification.    Pertinent Labs     Results from last 7 days   Lab Units 06/03/25 0537 06/02/25 0555 06/01/25 0408 05/31/25  0408   WBC 10*3/mm3 5.02 6.11 7.06 6.71   HEMOGLOBIN g/dL 8.0* 7.9* 8.0* 7.9*   PLATELETS 10*3/mm3 344 310 299 297     Results from last 7 days   Lab Units 06/03/25 0537 06/02/25 0555 06/01/25 0408 05/30/25  0649   SODIUM mmol/L 137 134* 130* 133*   POTASSIUM mmol/L 3.9 3.9 4.2 4.0   CHLORIDE mmol/L 102 99 96* 99   CO2 mmol/L 23.7 24.2 22.0 22.0   BUN mg/dL 16.0 19.0 16.0 17.0   CREATININE mg/dL 0.81 0.88 1.02* 0.89   GLUCOSE mg/dL 87 89 85 83   Estimated Creatinine Clearance: 100.8 mL/min (by C-G formula based on SCr of 0.81 mg/dL).  Results from last 7 days   Lab Units 05/28/25  0226   ALBUMIN g/dL 2.8*     Results from last 7 days   Lab Units 06/03/25 0537 06/02/25 0555 06/01/25 0408 05/30/25  0649 05/28/25  0226   CALCIUM mg/dL 8.8 8.9 8.5* 8.4* 8.3*   ALBUMIN g/dL  --   --   --   --  2.8*   MAGNESIUM mg/dL  --   --   --   --  1.6   PHOSPHORUS mg/dL  --   --   --   --  3.5         Results from last 7 days   Lab Units 05/30/25  0649   URIC ACID mg/dL 6.6*         Invalid input(s): \"LDLCALC\"          Test Results Pending at Discharge       Discharge Details        Discharge Medications        New Medications        Instructions Start Date   medroxyPROGESTERone 10 MG tablet  Commonly known as: PROVERA   10 mg, Oral, Daily   Start Date: June 4, 2025     rivaroxaban 20 MG " tablet  Commonly known as: Xarelto   20 mg, Oral, Daily With Dinner             Continue These Medications        Instructions Start Date   acetaminophen 325 MG tablet  Commonly known as: TYLENOL   650 mg, Oral, Every 6 Hours PRN      Aquaphor Advanced Therapy 41 % ointment   1 Application, 2 Times Daily      cadexomer iodine 0.9 % gel  Commonly known as: IODOSORB   1 Application, Topical, Daily PRN, Apply to open wound to rt lower leg      cefTRIAXone 2,000 mg in sodium chloride 0.9 % 100 mL IVPB   2,000 mg, Intravenous, Every 12 Hours      [employer] ZINC OXIDE DIAPER EX   1 Application, 2 Times Daily      dextromethorphan polistirex ER 30 MG/5ML Suspension Extended Release oral suspension  Commonly known as: DELSYM   60 mg, Oral, 2 Times Daily PRN      ferrous sulfate 325 (65 FE) MG tablet   325 mg, Oral, Daily With Breakfast      furosemide 40 MG tablet  Commonly known as: LASIX   40 mg, Oral, Daily      guaiFENesin ER 1200 MG tablet sustained-release 12 hour   1 tablet, 2 Times Daily PRN      ipratropium-albuterol 0.5-2.5 mg/3 ml nebulizer  Commonly known as: DUO-NEB   3 mL, Nebulization, 4 Times Daily - RT      Lidocaine 4 %   1 patch, Transdermal, Every 24 Hours Scheduled, Apply to skin on upper back remove patch in 12 hours. Remove & Discard patch within 12 hours or as directed by MD      melatonin 5 MG tablet tablet   5 mg, Oral, Nightly PRN      metoprolol succinate XL 25 MG 24 hr tablet  Commonly known as: TOPROL-XL   12.5 mg, Oral, Daily      miconazole 2 % powder  Commonly known as: MICOTIN   1 Application, Topical, Every 12 Hours Scheduled      sennosides-docusate 8.6-50 MG per tablet  Commonly known as: PERICOLACE   2 tablets, Oral, Nightly PRN             Stop These Medications      megestrol 40 MG tablet  Commonly known as: MEGACE              Allergies   Allergen Reactions    Cephalexin Hives and Rash     Diffuse rash on cephalexin 1 g PO q6h on 6/17/20  Tolerated zosyn and PCN G September 2020 without  issue    Clindamycin/Lincomycin Hives       Discharge Disposition:  Skilled Nursing Facility (DC - External)      Discharge Diet:  Diet Order   Procedures    Diet: Cardiac; Healthy Heart (2-3 Na+); Fluid Consistency: Thin (IDDSI 0)       Discharge Activity:       CODE STATUS:    Code Status and Medical Interventions: CPR (Attempt to Resuscitate); Full   Ordered at: 05/25/25 5758     Code Status (Patient has no pulse and is not breathing):    CPR (Attempt to Resuscitate)     Medical Interventions (Patient has pulse or is breathing):    Full       Future Appointments   Date Time Provider Department Center   6/16/2025 10:20 AM Tania Nunez MD [facility]   6/16/2025  2:00 PM [facility] ECHO/VAS BACK [facility] E[facility]   6/16/2025  3:00 PM Tessy Fink APRN [facility] CD [id number] [facility]   8/20/2025  9:30 AM Brennan Rogers MD [facility] CD [id number]   9/30/2025  9:30 AM Adilson Wilkerson MD [facility]      Contact information for follow-up providers       Adilson Wilkerson MD. Schedule an appointment as soon as possible for a visit in 1 week(s).    Specialty: Family Medicine  Contact information:  [address] [facility] [id number] [phone]               Davida De Jesus MD Follow up on 6/9/2025.    Specialty: Gynecologic Oncology  Why: Follow-up with gynecology oncology on June 9 as scheduled  Contact information:  [address] [facility] [id number] [phone]               Tania Nunez MD Follow up on 6/16/2025.    Specialty: Infectious Diseases  Contact information:  [address] [id number] [phone]                       Contact information for after-discharge care       Destination       [facility] .    Service: Skilled Nursing  Contact information:  [address] [facility] [address] [phone]                                   Time Spent on Discharge:  Greater than 30 minutes      Jairon Wilkins MD  [facility]  06/03/25  09:41 EDT                Electronically signed by Jairon Arevalo MD at 06/03/25 1003       Discharge Order (From admission, onward)       Start     Ordered    06/03/25 0942  Discharge patient  Once        Expected Discharge Date: 06/03/25   Discharge Disposition: Skilled Nursing Facility (DC - External)   Physician of Record for Attribution - Please select from Treatment Team: JAIRON AREVALO [[id number]]   Review needed by CMO to determine Physician of Record: No      Question Answer Comment   Physician of Record for Attribution - Please select from Treatment Team JAIRON AREVALO    Review needed by CMO to determine Physician of Record No        06/03/25 0941

## 2025-06-03 NOTE — DISCHARGE SUMMARY
Patient Name: Emely Solomon  : 1960  MRN: [id number]    Date of Admission: 2025  Date of Discharge:  6/3/2025  Primary Care Physician: Adilson Wilkerson MD      Chief Complaint:   Cough, Hypotension, and Shortness of Breath      Discharge Diagnoses     Active Hospital Problems    Diagnosis  POA    **Acute pulmonary embolism [I26.99]  Unknown    Bacterial endocarditis [I33.0]  Yes    Morbid obesity [E66.01]  Yes    Hypoxia [R09.02]  Yes    Lymphedema [I89.0]  Yes    Anemia, chronic disease [D63.8]  Yes    ARIEL on CPAP [G47.33]  Yes    Pulmonary hypertension [I27.20]  Yes    PFO (patent foramen ovale) [Q21.12]  Not Applicable    Paradoxical embolism [I74.9]  Yes      Resolved Hospital Problems   No resolved problems to display.        Hospital Course     Patient is a 64-year-old female with a history of including but not limited to DVT/PE on Xarelto, recently diagnosed mitral valve endocarditis on IV ceftriaxone, hypertension, pulmonary hypertension, ARIEL on CPAP, lymphedema, presented from nursing facility with low blood pressure, hypotension, hypoxemia.       Hypoxia  Pneumonia  ARIEL on CPAP   -CT chest is suggestive of multifocal pneumonia and is already on Rocephin.    -Pulmonary evaluated, low likelihood to have PE on CTA chest per pulmonology  -Remains on 2 L nasal cannula.  Wean off O2 as able, O2 saturation goal 90% and above  - Continue DuoNebs, encourage incentive spirometry         Vaginal bleeding,   -progesterone has been initiated by GYN on 25.    -Xarelto has been held on 2025 and resumed 25 with no further episodes of vaginal bleeding reported  -OB/GYN evaluated, recommended to discontinue Xarelto for any active worrisome vaginal bleeding during this hospitalization, and strongly recommend patient to follow-up with Dr. Davida De Jesus on her appointment on  As scheduled  - Monitor for signs of recurrent vaginal bleeding  -Continued progesterone     History of  DVT/PE,   -Patient is high risk for recurrent PE due to relative immobility and obesity  -Was seen by hematology on 05/7/25 with recommendations to continue anticoagulation  - Xarelto resumed 06/2/25  20 mg with dinner daily, if has recurrent vaginal bleeding, will need to be considered transition to lower dose 10 mg for prophylaxis as patient remains high risk for recurrent PE        Acute blood loss anemia secondary vaginal bleeding  status post 2 units of PRBC transfusion during this hospital stay.  Hemoglobin has been stable around 8 for the last several days.   - Recommend to repeat CBC n 2-3 days.  Monitor for signs of recurrent vaginal bleeding.           History of endocarditis,   currently on Rocephin and stop date is 6/17/2025, continue IV ceftriaxone as scheduled  - Follow-up with infectious disease, Dr. Nunez on 06/16/2025 as scheduled    Acute on chronic diastolic heart failure  -Continue p.o. Lasix 40 mg daily     Obstructive sleep apnea,   continue with home CPAP.     Obesity,   Class II modifications, diet and weight loss strongly encouraged            At the time of discharge patient was told to take all medications as prescribed, keep all follow-up appointments, and call their doctor or return to the hospital with any worsening or concerning symptoms.             Day of Discharge     Subjective:  Patient seen this morning.  No acute events overnight.  Xarelto resumed yesterday, no signs of recurrent vaginal bleeding.  Level has been stable.  History of chronic pain.    Physical Exam:  Temp:  [98.1 °F (36.7 °C)-100 °F (37.8 °C)] 98.1 °F (36.7 °C)  Heart Rate:  [78-87] 83  Resp:  [16-20] 16  BP: (107-127)/(65-75) 127/67  Body mass index is 59.3 kg/m².  Physical Exam  General: Alert, laying in bed, not in distress, obese  HEENT: Normocephalic, atraumatic  CV: Regular rate and rhythm, no murmurs rubs or gallops  Lungs: CTA anteriorly, no wheezing,  Abdomen: Soft, nontender, nondistended  Extremities:  Bilateral lower extremity lymphedema, chronic venous stasis changes       Consultants     Consult Orders (all) (From admission, onward)       Start     Ordered    05/30/25 1143  Inpatient Obstetrics / Gynecology Consult  Once        Specialty:  Obstetrics and Gynecology  Provider:  Davida Nelson MD    05/30/25 1142    05/27/25 1353  Inpatient Pulmonology Consult  Once        Specialty:  Pulmonary Disease  Provider:  Adryan King MD    05/27/25 1352    05/25/25 2031  Inpatient Cardiology Consult  Once        Specialty:  Cardiology  Provider:  Brennan Rogers MD    05/25/25 2030    05/25/25 1941  Inpatient Case Management  Consult  Once        Provider:  (Not yet assigned)    05/25/25 1942    05/25/25 1449  [facility] (on-call MD unless specified) Details  Once        Specialty:  Hospitalist  Provider:  (Not yet assigned)    05/25/25 1449                  Procedures     * Surgery not found *      Imaging Results (All)       Procedure Component Value Units Date/Time    CT Angiogram Chest [[id number]] Collected: 05/27/25 1143     Updated: 05/27/25 1231    Narrative:      CT ANGIOGRAM OF THE CHEST. MULTIPLE CORONAL, SAGITTAL, AND 3-D  RECONSTRUCTIONS.     HISTORY: Hypoxia     TECHNIQUE: Radiation dose reduction techniques were utilized, including  automated exposure control and exposure modulation based on body size.   CT angiogram of the chest was performed. Multiple coronal, sagittal, and  3-D reconstruction images were obtained.      COMPARISON: CT chest 5/6/2025 and CTA chest 9/24/2016       Impression:      FINDINGS AND IMPRESSION:      Please note evaluation significantly suboptimal due to patient body  habitus.     Thoracic adenopathy is present. Index right hilar node measures 1.3 cm  in short dimension. Index precarinal node measures 1 cm in short axis  dimension. No significant pericardial effusion. The thoracic aorta is  not well evaluated due to contrast timing. Presumed PICC tip  terminates  near the brachiocephalic confluence     MAIN PULMONARY ARTERY IS DILATED, MEASURING UP TO APPROXIMATE 4.3 CM IN  DIAMETER. THERE ARE FINDINGS OF PRESUMED MIXING ARTIFACT HARDENING AND  STREAK ARTIFACT WITHIN THE BILATERAL PULMONARY ARTERIES WHICH  SIGNIFICANTLY LIMIT EVALUATION. WHILE NO DEFINITE FINDINGS OF PULMONARY  ARTERIAL EMBOLISM ARE SEEN, PLEASE NOTE EVALUATION IS SIGNIFICANTLY  SUBOPTIMAL, PARTICULARLY WITHIN THE PERIPHERAL VASCULATURE, AND  UNDERLYING PULMONARY EMBOLISM CANNOT BE EXCLUDED WITH THIS EXAMINATION.  IF THERE IS CONTINUED CLINICAL CONCERN FOR PULMONARY ARTERIAL EMBOLISM,  RECOMMEND REPEAT CTA CHEST WITH ALTERED TIMING TO BETTER EVALUATE THE  PULMONARY ARTERIES.     Previously seen area of pulmonary opacification within the left lower  lobe has improved. There is a new small to moderate sized area of  pulmonary opacification within the posterior aspect of the right upper  lobe. There is also mild worsening of pulmonary pacification within the  inferolateral aspect of the left upper lobe. Findings raise concern for  multifocal pneumonia in the appropriate context with asymmetric edema  less likely. Correlation with patient history recommended. Additionally,  given the somewhat focal nature, continued attention on follow-up with  chest CT in 8 weeks recommended to ensure resolution exclude the  possibility malignancy. Thoracic adenopathy can be followed up at this  time as well.     Significant background mosaic attenuation is present, a finding most  commonly seen in the setting of small airways disease. Small vessel  disease much more rare entity.     No suspicious lytic or blastic osseous lesions.     This report was finalized on 5/27/2025 12:28 PM by Dr. Evert Garland M.D on Workstation: [id number]       XR Chest 1 View [[id number]] Collected: 05/25/25 1405     Updated: 05/25/25 1410    Narrative:      XR CHEST 1 VW-     Clinical: Cough and shortness of breath     COMPARISON  examination dated 5/4/2025     FINDINGS: There is cardiac enlargement. The mediastinum is stable. There  is a right upper extremity PICC line in position and the tip  superimposes the superior vena cava, this position is satisfactory.  There is prominence of the central vasculature. Suspect either pulmonary  arterial hypertension or early/mild congestion. No gross pulmonary  edema. No gross pleural effusion seen. The remainder is unremarkable.     This report was finalized on 5/25/2025 2:07 PM by Dr. Dhruv Sanchez M.D  on Workstation: [id number]             Results for orders placed during the hospital encounter of 07/24/24    Venous w Reflux Lower Extremity - Unilateral CAR    Interpretation Summary    Right saphenofemoral junction reflux with mild proximal saphenous vein reflux with poorly visualized greater saphenous medial branch.  Short segment of varicosity noted in the medial calf.  Lesser saphenous vein nonvisualized.  Poorly visualized deep vein structures without DVT noted.    Results for orders placed during the hospital encounter of 05/25/25    Adult Transthoracic Echo Complete W/ Cont if Necessary Per Protocol    Interpretation Summary    Left ventricular systolic function is normal. Left ventricular ejection fraction appears to be 66 - 70%.    Left ventricular diastolic function is consistent with (grade II w/high LAP) pseudonormalization.    The right ventricular cavity is mild to moderately dilated.    The left atrial cavity is mildly dilated.    Mild aortic valve stenosis is present.  Valvular hemodynamics are similar to previous echocardiogram.    Estimated right ventricular systolic pressure from tricuspid regurgitation is markedly elevated (>55 mmHg).    There is thickening of the posterior leaflet with a small mobile echodensity that is not well-visualized in multiple views. Echocardiographic picture is similar to previous transthoracic echocardiogram from 5/5/2025. Mild mitral valve  "regurgitation is present.  Mean transvalvular gradient is doubled from previous study.  The valve does not look any more stenotic.  This may be secondary to regurgitant volume that is ill-defined given the eccentric nature of regurgitation.  Mild mitral valve stenosis is present.  If there is no other clinical etiology found for the acute hypoxic presentation may consider LYNETTE for clarification.    Pertinent Labs     Results from last 7 days   Lab Units 06/03/25 0537 06/02/25  0555 06/01/25 0408 05/31/25  0408   WBC 10*3/mm3 5.02 6.11 7.06 6.71   HEMOGLOBIN g/dL 8.0* 7.9* 8.0* 7.9*   PLATELETS 10*3/mm3 344 310 299 297     Results from last 7 days   Lab Units 06/03/25 0537 06/02/25  0555 06/01/25 0408 05/30/25  0649   SODIUM mmol/L 137 134* 130* 133*   POTASSIUM mmol/L 3.9 3.9 4.2 4.0   CHLORIDE mmol/L 102 99 96* 99   CO2 mmol/L 23.7 24.2 22.0 22.0   BUN mg/dL 16.0 19.0 16.0 17.0   CREATININE mg/dL 0.81 0.88 1.02* 0.89   GLUCOSE mg/dL 87 89 85 83   Estimated Creatinine Clearance: 100.8 mL/min (by C-G formula based on SCr of 0.81 mg/dL).  Results from last 7 days   Lab Units 05/28/25  0226   ALBUMIN g/dL 2.8*     Results from last 7 days   Lab Units 06/03/25 0537 06/02/25 0555 06/01/25 0408 05/30/25  0649 05/28/25  0226   CALCIUM mg/dL 8.8 8.9 8.5* 8.4* 8.3*   ALBUMIN g/dL  --   --   --   --  2.8*   MAGNESIUM mg/dL  --   --   --   --  1.6   PHOSPHORUS mg/dL  --   --   --   --  3.5         Results from last 7 days   Lab Units 05/30/25  0649   URIC ACID mg/dL 6.6*         Invalid input(s): \"LDLCALC\"          Test Results Pending at Discharge       Discharge Details        Discharge Medications        New Medications        Instructions Start Date   medroxyPROGESTERone 10 MG tablet  Commonly known as: PROVERA   10 mg, Oral, Daily   Start Date: June 4, 2025     rivaroxaban 20 MG tablet  Commonly known as: Xarelto   20 mg, Oral, Daily With Dinner             Continue These Medications        Instructions Start Date "   acetaminophen 325 MG tablet  Commonly known as: TYLENOL   650 mg, Oral, Every 6 Hours PRN      Aquaphor Advanced Therapy 41 % ointment   1 Application, 2 Times Daily      cadexomer iodine 0.9 % gel  Commonly known as: IODOSORB   1 Application, Topical, Daily PRN, Apply to open wound to rt lower leg      cefTRIAXone 2,000 mg in sodium chloride 0.9 % 100 mL IVPB   2,000 mg, Intravenous, Every 12 Hours      [employer] ZINC OXIDE DIAPER EX   1 Application, 2 Times Daily      dextromethorphan polistirex ER 30 MG/5ML Suspension Extended Release oral suspension  Commonly known as: DELSYM   60 mg, Oral, 2 Times Daily PRN      ferrous sulfate 325 (65 FE) MG tablet   325 mg, Oral, Daily With Breakfast      furosemide 40 MG tablet  Commonly known as: LASIX   40 mg, Oral, Daily      guaiFENesin ER 1200 MG tablet sustained-release 12 hour   1 tablet, 2 Times Daily PRN      ipratropium-albuterol 0.5-2.5 mg/3 ml nebulizer  Commonly known as: DUO-NEB   3 mL, Nebulization, 4 Times Daily - RT      Lidocaine 4 %   1 patch, Transdermal, Every 24 Hours Scheduled, Apply to skin on upper back remove patch in 12 hours. Remove & Discard patch within 12 hours or as directed by MD      melatonin 5 MG tablet tablet   5 mg, Oral, Nightly PRN      metoprolol succinate XL 25 MG 24 hr tablet  Commonly known as: TOPROL-XL   12.5 mg, Oral, Daily      miconazole 2 % powder  Commonly known as: MICOTIN   1 Application, Topical, Every 12 Hours Scheduled      sennosides-docusate 8.6-50 MG per tablet  Commonly known as: PERICOLACE   2 tablets, Oral, Nightly PRN             Stop These Medications      megestrol 40 MG tablet  Commonly known as: MEGACE              Allergies   Allergen Reactions    Cephalexin Hives and Rash     Diffuse rash on cephalexin 1 g PO q6h on 6/17/20  Tolerated zosyn and PCN G September 2020 without issue    Clindamycin/Lincomycin Hives       Discharge Disposition:  Skilled Nursing Facility (DC - External)      Discharge Diet:  Diet  Order   Procedures    Diet: Cardiac; Healthy Heart (2-3 Na+); Fluid Consistency: Thin (IDDSI 0)       Discharge Activity:       CODE STATUS:    Code Status and Medical Interventions: CPR (Attempt to Resuscitate); Full   Ordered at: 05/25/25 5424     Code Status (Patient has no pulse and is not breathing):    CPR (Attempt to Resuscitate)     Medical Interventions (Patient has pulse or is breathing):    Full       Future Appointments   Date Time Provider Department Center   6/16/2025 10:20 AM Tania Nunez MD [facility]   6/16/2025  2:00 PM [facility] ECHO/VAS BACK [facility] ECHO [facility]   6/16/2025  3:00 PM Tessy Fink APRN [id number]   8/20/2025  9:30 AM Brennan Rogers MD [id number] [facility]   9/30/2025  9:30 AM Adilson Wilkerson MD [facility]  [facility]      Contact information for follow-up providers       Adilson Wilkerson MD. Schedule an appointment as soon as possible for a visit in 1 week(s).    Specialty: Family Medicine  Contact information:  [address] [id number]  [phone]               Davida De Jesus MD Follow up on 6/9/2025.    Specialty: Gynecologic Oncology  Why: Follow-up with gynecology oncology on June 9 as scheduled  Contact information:  [address] [facility] [address]  [phone]               Tania Nunez MD Follow up on 6/16/2025.    Specialty: Infectious Diseases  Contact information:  [address] [address]  [phone]                       Contact information for after-discharge care       Destination       [facility] .    Service: Skilled Nursing  Contact information:  [address] [facility] [id number] [phone]                                   Time Spent on Discharge:  Greater than 30 minutes      Jairon Wilkins MD  [facility]  06/03/25  09:41 EDT

## 2025-06-04 NOTE — CASE MANAGEMENT/SOCIAL WORK
Case Management Discharge Note      Final Note: [facility] continue IV Rocephin for endocarditis with stop date 6/17. Transport [facility]    Provided Post Acute Provider List?: N/A  Provided Post Acute Provider Quality & Resource List?: N/A    Selected Continued Care - Discharged on 6/3/2025 Admission date: 5/25/2025 - Discharge disposition: Skilled Nursing Facility (DC - External)      Destination Coordination complete.      Service Provider Services Address Phone Fax Patient Preferred    [facility] [address] [facility] [facility] [address] [phone] --              Durable Medical Equipment    No services have been selected for the patient.                Dialysis/Infusion    No services have been selected for the patient.                Home Medical Care    No services have been selected for the patient.                Therapy    No services have been selected for the patient.                Community Resources    No services have been selected for the patient.                Community & DME    No services have been selected for the patient.                    Selected Continued Care - Episodes Includes continued care and service providers with selected services from the active episodes listed below          Selected Continued Care - Prior Encounters Includes continued care and service providers with selected services from prior encounters from 2/24/2025 to 6/3/2025      Discharged on 5/24/2025 Admission date: 5/20/2025 - Discharge disposition: Skilled Nursing Facility (DC - External)      Destination       Service Provider Services Address Phone Fax Patient Preferred    [facility] [address] [facility] [facility] [address] [phone] --                      Discharged on 5/10/2025 Admission date: 5/4/2025 - Discharge disposition: Skilled Nursing Facility (DC - External)      Destination        Service Provider Services Address Phone Fax Patient Preferred    [facility] [address] [facility] [address] [phone] --              Home Medical Care       Service Provider Services Address Phone Fax Patient Preferred    [facility] [address] [facility] [address] [id number] [phone] --                          Transportation Services  [facility]: Lorne Saab    Final Discharge Disposition Code: 03 - skilled nursing facility (SNF)

## 2025-06-11 ENCOUNTER — PATIENT OUTREACH (OUTPATIENT)
Dept: CASE MANAGEMENT | Facility: OTHER | Age: 65
End: 2025-06-11
Payer: MEDICARE

## 2025-06-11 NOTE — OUTREACH NOTE
AMBULATORY CASE MANAGEMENT NOTE    Names and Relationships of Patient/Support Persons: Contact: [facility]; Relationship: Other -     SNF Follow-up    Questions/Answers      Flowsheet Row Responses   Acute Facility Discharged From [facility]   Acute Discharge Date 06/03/25   Acute Facility Diagnoses   **Acute pulmonary embolism (I26.99)     Unknown    Bacterial endocarditis (I33.0)     Yes    Morbid obesity (E66.01)     Yes    Hypoxia (R09.02)   Purpose of SNF Admission PT, OT, SN   Who is the insurance provider or payor of patient stay? Medicare          Patient Outreach    Outgoing call to [facility].  Verified that the patient remains in SNF.   for Chidi tan  to request an estimated DC date.  Outreach for follow up is scheduled for one week.   Jess LEES  Ambulatory Case Management    6/11/2025, 10:53 EDT

## 2025-06-16 ENCOUNTER — OFFICE VISIT (OUTPATIENT)
Dept: INFECTIOUS DISEASES | Facility: CLINIC | Age: 65
End: 2025-06-16
Payer: MEDICARE

## 2025-06-16 VITALS
SYSTOLIC BLOOD PRESSURE: 115 MMHG | DIASTOLIC BLOOD PRESSURE: 75 MMHG | HEART RATE: 103 BPM | TEMPERATURE: 97.3 F | RESPIRATION RATE: 16 BRPM

## 2025-06-16 DIAGNOSIS — B95.5 STREPTOCOCCAL BACTEREMIA: Primary | ICD-10-CM

## 2025-06-16 DIAGNOSIS — R78.81 STREPTOCOCCAL BACTEREMIA: Primary | ICD-10-CM

## 2025-06-16 DIAGNOSIS — I05.9 ENDOCARDITIS OF MITRAL VALVE: ICD-10-CM

## 2025-06-16 PROCEDURE — 3078F DIAST BP <80 MM HG: CPT | Performed by: INTERNAL MEDICINE

## 2025-06-16 PROCEDURE — 99214 OFFICE O/P EST MOD 30 MIN: CPT | Performed by: INTERNAL MEDICINE

## 2025-06-16 PROCEDURE — 3074F SYST BP LT 130 MM HG: CPT | Performed by: INTERNAL MEDICINE

## 2025-06-16 NOTE — PROGRESS NOTES
Reason for clinic visits: Follow up group B strep bacteremia and mitral valve endocarditis    HPI: Emely Solomon is a 64 y.o. female who was last seen in the hospital on May 9.  Since that time she has done well.  She denies any fevers chills or night sweats.  She has tolerated antibiotics without abdominal pain nausea vomiting or diarrhea.  PICC line is in place.  She denies any issues specifically no erythema pain or drainage    Past Medical History:   Diagnosis Date    Arthritis     Cellulitis     11/2017, with Group B Strep bacteremia and sepsis    Chronic deep vein thrombosis (DVT) of left popliteal vein 12/17/2015 02/04/2016, 04/14/2016    Chronic diastolic congestive heart failure     COVID-19 virus infection 11/2020    Heart murmur     Hypertension     Iliac vein stenosis, right 05/15/2024    Lipoedema     Lymphedema     other    Morbid obesity     ARIEL on CPAP     PFO (patent foramen ovale)     Postthrombotic syndrome of left lower extremity without complications 12/17/2015    postphletibis    Pulmonary embolism 04/14/2016    Pulmonary hypertension     multifactorial (dCHF, obesity/ARIEL, hx PE), mild by echo 1/2016    Right bundle branch block (RBBB) with left anterior fascicular block (LAFB)     Sepsis 09/18/2020    Type 2 myocardial infarction 05/20/2025       Past Surgical History:   Procedure Laterality Date    ARTERIOGRAM  07/2015    BRONCHOSCOPY N/A 07/21/2017    Procedure: BRONCHOSCOPY with wash;  Surgeon: Caleb Garcia MD;  Location: [facility] ENDOSCOPY;  Service:     COLONOSCOPY      COLONOSCOPY N/A 01/26/2023    Procedure: COLONOSCOPY to CECUM AND TERM ILEUM;  Surgeon: Jj Dean MD;  Location: [facility] ENDOSCOPY;  Service: Gastroenterology;  Laterality: N/A;  PRE OP -screening  POST OP -  NORMAL    D & C HYSTEROSCOPY N/A 03/08/2022    Procedure: DILATATION AND CURETTAGE with hysteroscopy;  Surgeon: Kaylah Land DO;  Location: [facility] MAIN OR;  Service: Gynecology Oncology;   Laterality: N/A;    DILATATION AND CURETTAGE  04/11/2011    OTHER SURGICAL HISTORY  09/2015    IVC filter    OTHER SURGICAL HISTORY      left LE revascularization    OTHER SURGICAL HISTORY      ALESSIA and LEV scan    THROMBECTOMY  07/2015       Social History   reports that she has never smoked. She has never been exposed to tobacco smoke. She has never used smokeless tobacco. She reports that she does not currently use alcohol. She reports that she does not use drugs.    Family History  family history includes Breast cancer in her mother; Hypertension in an other family member.    Allergies   Allergen Reactions    Cephalexin Hives and Rash     Diffuse rash on cephalexin 1 g PO q6h on 6/17/20  Tolerated zosyn and PCN G September 2020 without issue    Clindamycin/Lincomycin Hives       The medication list has been reviewed and updated.     Review of Systems  Pertinent items are noted in HPI, all other systems reviewed and negative    Vital Signs   Temp:  [97.3 °F (36.3 °C)] 97.3 °F (36.3 °C)  Heart Rate:  [103] 103  Resp:  [16] 16  BP: (115)/(75) 115/75    Physical Exam:   General: In no acute distress   Respiratory: Breathing comfortably on room air  Psychiatric: Normal mood and affect     Lab Results   Component Value Date    WBC 5.02 06/03/2025    HGB 8.0 (L) 06/03/2025    HCT 25.9 (L) 06/03/2025    MCV 82.2 06/03/2025     06/03/2025       Lab Results   Component Value Date    GLUCOSE 87 06/03/2025    BUN 16.0 06/03/2025    CREATININE 0.81 06/03/2025    EGFRIFNONA 42 (L) 08/30/2021    EGFRIFAFRI 59 (L) 04/15/2021    BCR 19.8 06/03/2025    CO2 23.7 06/03/2025    CALCIUM 8.8 06/03/2025    ALBUMIN 2.8 (L) 05/28/2025    AST 13 05/25/2025    ALT 8 05/25/2025       Lab Results   Component Value Date    SEDRATE 96 (H) 05/20/2025       Lab Results   Component Value Date    CRP 11.45 (H) 05/20/2025     5/6 BCx Negx 2  5/4 BCx group B strep x 2    Assessment:  This is a 64 y.o. female who presents to clinic today for  follow up of group B strep bacteremia and mitral valve endocarditis.  She was evaluated by cardiac surgery who opted medical management only given her comorbidities.  The patient will have completed a 6-week course of antibiotics by tomorrow June 17 at that time further antimicrobials are not indicated.  The PICC line will be pulled at her nursing home.  We will obtain blood cultures times two 1 week after the patient is done with her antibiotics to ensure resolution of bacteremia.  I have reviewed the weekly labs with her and her family and answered all questions      Plan:   Patient will complete a 6-week course of ceftriaxone tomorrow  PICC line will be pulled by nursing home  Will obtain blood cultures x 2 at the nursing home 1 week after the patient is done with antibiotic therapy  Return to Infectious Disease clinic as needed

## 2025-06-18 ENCOUNTER — PATIENT OUTREACH (OUTPATIENT)
Dept: CASE MANAGEMENT | Facility: OTHER | Age: 65
End: 2025-06-18
Payer: MEDICARE

## 2025-06-18 NOTE — OUTREACH NOTE
AMBULATORY CASE MANAGEMENT NOTE    Names and Relationships of Patient/Support Persons: Contact: [facility]; Relationship: Other -     SNF Follow-up    Questions/Answers      Flowsheet Row Responses   Acute Facility Discharged From [facility]   Acute Discharge Date 06/03/25   Acute Facility Diagnoses   **Acute pulmonary embolism (I26.99)     Unknown    Bacterial endocarditis (I33.0)     Yes    Morbid obesity (E66.01)     Yes    Hypoxia (R09.02)   Purpose of SNF Admission PT, OT, SN   Who is the insurance provider or payor of patient stay? Medicare          Patient Outreach    Outgoing call to [facility]  for follow up.  Verified that the patient remains in SNF.  Verified that there is not an estimated DC date at this time.  Outreach for one week follow up is scheduled.       Jess LEES  Ambulatory Case Management    6/18/2025, 13:31 EDT

## 2025-06-24 ENCOUNTER — TELEPHONE (OUTPATIENT)
Dept: INFECTIOUS DISEASES | Facility: CLINIC | Age: 65
End: 2025-06-24
Payer: MEDICARE

## 2025-06-24 NOTE — TELEPHONE ENCOUNTER
[phone] and spoke w/ Nichelle. She stated that the blood cultures had been drawn today and that they would fax the results when finalized.

## 2025-06-26 ENCOUNTER — READMISSION MANAGEMENT (OUTPATIENT)
Dept: CALL CENTER | Facility: HOSPITAL | Age: 65
End: 2025-06-26
Payer: MEDICARE

## 2025-06-26 ENCOUNTER — PATIENT OUTREACH (OUTPATIENT)
Dept: CASE MANAGEMENT | Facility: OTHER | Age: 65
End: 2025-06-26
Payer: MEDICARE

## 2025-06-26 NOTE — OUTREACH NOTE
Prep Survey      Flowsheet Row Responses   [place] facility patient discharged from? Non-BH   Is LACE score < 7 ? Non-BH Discharge   Eligibility [facility]   Date of Admission 06/03/25   Date of Discharge 06/27/25   Discharge Disposition Home-Health Care Sv   Discharge diagnosis Acute pulmonary embolism   Does the patient have one of the following disease processes/diagnoses(primary or secondary)? Other   Does the patient have Home health ordered? Yes   What is the Home health agency?  [facility]   Prep survey completed? Yes            Laura VILLAFUERTE - Registered Nurse

## 2025-06-26 NOTE — OUTREACH NOTE
AMBULATORY CASE MANAGEMENT NOTE    Names and Relationships of Patient/Support Persons: Contact: [facility]; Relationship: Other -     SNF Follow-up    Questions/Answers      Flowsheet Row Responses   Acute Facility Discharged From [facility]   Acute Discharge Date 06/03/25   Acute Facility Diagnoses   **Acute pulmonary embolism (I26.99)     Unknown    Bacterial endocarditis (I33.0)     Yes    Morbid obesity (E66.01)     Yes    Hypoxia (R09.02)   Purpose of SNF Admission PT, OT, SN   Who is the insurance provider or payor of patient stay? Medicare   Skilled Nursing Discharge Date? 06/27/25   Home Health Agency at discharge? Yes   Name of Home Health Agency? [facility]          Patient Outreach    Outreach for follow up, spoke with [facility].  Verified that the patient remains in skilled rehab.  Planned DC is for tomorrow 6/27/2025 with plan for home with [facility].     Jess LEES  Ambulatory Case Management    6/26/2025, 15:23 EDT

## 2025-06-27 ENCOUNTER — TELEPHONE (OUTPATIENT)
Dept: INFECTIOUS DISEASES | Facility: CLINIC | Age: 65
End: 2025-06-27
Payer: MEDICARE

## 2025-06-27 NOTE — TELEPHONE ENCOUNTER
I called [facility] and spoke w/ patient's nurse Sushma. She verified that blood cultures were drawn 6/24/25 but no final results have come back yet. States that the preliminary result came back stating that no growth found at 24 hours. She stated that she would fax the final results when they receive them.

## 2025-06-30 ENCOUNTER — TRANSITIONAL CARE MANAGEMENT TELEPHONE ENCOUNTER (OUTPATIENT)
Dept: CALL CENTER | Facility: HOSPITAL | Age: 65
End: 2025-06-30
Payer: MEDICARE

## 2025-06-30 NOTE — OUTREACH NOTE
Call Center TCM Note      Flowsheet Row Responses   [facility] patient [facility]   Does the patient have one of the following disease processes/diagnoses(primary or secondary)? Other   TCM attempt successful? No   Unsuccessful attempts Attempt 1            Latasha CHEEMA - Licensed Nurse    6/30/2025, 14:51 EDT

## 2025-06-30 NOTE — OUTREACH NOTE
Call Center TCM Note      Flowsheet Row Responses   [facility] patient discharged [facility]? [facility]-  [facility]   Does the patient have one of the following disease processes/diagnoses(primary or secondary)? Other   TCM attempt successful? No   Unsuccessful attempts Attempt 2            Latasha CHEEMA - Licensed Nurse    6/30/2025, 16:18 EDT

## 2025-07-01 ENCOUNTER — TRANSITIONAL CARE MANAGEMENT TELEPHONE ENCOUNTER (OUTPATIENT)
Dept: CALL CENTER | Facility: HOSPITAL | Age: 65
End: 2025-07-01
Payer: MEDICARE

## 2025-07-01 NOTE — OUTREACH NOTE
Call Center TCM Note      Flowsheet Row Responses   [facility] patient discharged from? Non-  [[facility]]   Does the patient have one of the following disease processes/diagnoses(primary or secondary)? Other   TCM attempt successful? No   Unsuccessful attempts Attempt 3   Wrap up additional comments D/C DX,  acute Pulm Embolism, endocarditis - Unable to reach pt x 3 attempt for TCM  call. Pt is not yet scheduled for TCM APPT with PCP Dr Wilkerson. [facility]03, [facility]            YOSHI FELIZ - Medical Assistant    7/1/2025, 13:52 EDT

## 2025-07-02 ENCOUNTER — TELEPHONE (OUTPATIENT)
Dept: FAMILY MEDICINE CLINIC | Facility: CLINIC | Age: 65
End: 2025-07-02
Payer: MEDICARE

## 2025-07-02 ENCOUNTER — TELEPHONE (OUTPATIENT)
Dept: FAMILY MEDICINE CLINIC | Facility: CLINIC | Age: 65
End: 2025-07-02

## 2025-07-02 ENCOUNTER — PATIENT OUTREACH (OUTPATIENT)
Dept: CASE MANAGEMENT | Facility: OTHER | Age: 65
End: 2025-07-02
Payer: MEDICARE

## 2025-07-02 NOTE — TELEPHONE ENCOUNTER
Lab order was sent on a [facility] form and not a [facility] form, contacted the office and they are resending the order under the correct lab

## 2025-07-02 NOTE — TELEPHONE ENCOUNTER
"Attempted to return patient's call. Unable to leave detailed message per verbal release not specifying if this can be done by our office.    Relay     \"Dr. Wilkerson is currently out of the office, so I requested assistance from another provider in office. Dr. Ro advised that, if you are seeing a gynecologist, that you address those concerns with them since your concerns are specific to gynecology. We do not have the information about what labs need to be drawn that your gynecologist is recommending. Since we do not have this information, we cannot order labs. Please get in contact with their office for advice.\"  "

## 2025-07-02 NOTE — TELEPHONE ENCOUNTER
Caller: VINICIUS HASKINS    Relationship:     Best call back number: [phone]     What was the call regarding: PATIENTS SISTER CALLING STATING THAT  OFFICE WAS SUPPOSED TO SEND LAB ORDERS FOR HER TO GET DRAWN SHE WOULD LIKE TO KNOW IF THIS HAS BEEN RECEIVED AND IF SHE COULD SCHEDULE FOR TOMORROW

## 2025-07-02 NOTE — TELEPHONE ENCOUNTER
Name: Emely Solomon    Relationship: Self    HUB PROVIDED THE RELAY MESSAGE FROM THE OFFICE   PATIENT VOICED UNDERSTANDING AND HAS NO FURTHER QUESTIONS AT THIS TIME

## 2025-07-02 NOTE — OUTREACH NOTE
AMBULATORY CASE MANAGEMENT NOTE    Names and Relationships of Patient/Support Persons: Contact: Emely Solomon; Relationship: Self -     SNF Follow-up    Questions/Answers      Flowsheet Row Responses   Acute Facility Discharged From [facility]   Acute Discharge Date 06/03/25   Acute Facility Diagnoses   **Acute pulmonary embolism (I26.99)     Unknown    Bacterial endocarditis (I33.0)     Yes    Morbid obesity (E66.01)     Yes    Hypoxia (R09.02)   Purpose of SNF Admission PT, OT, SN   Who is the insurance provider or payor of patient stay? Medicare   Skilled Nursing Discharge Date? 06/27/25   Home Health Agency at discharge? No   DME Needed at Discharge? No   Are there any medication concerns at Discharge No   Does the patient have a follow-up appointment? No  [states she prefers to make her appointment and plans to do this]          Patient Outreach    Outgoing call to the patient for follow up.  Introduced self and explained role and purpose of outreach. She denies any questions or needs.  Discussed HRCM program and case management services and she declined.   Jess LEES  Ambulatory Case Management    7/2/2025, 13:04 EDT

## 2025-07-02 NOTE — TELEPHONE ENCOUNTER
Caller: Emely Solomon    Relationship to patient: Self    Best call back number: [phone]     Patient is needing: PATIENT HAS BEEN EXPERIENCING VAGINAL BLEEDING WHICH IS UNUSUAL FOR HER AGE. SHE WOULD LIKE TO KNOW DR. GUNTER OPINION ON THAT.    ALSO HER GYNECOLOGY SPECIALIST HAS REQUESTING THAT DR. GASPAR ORDER LABS FOR THWE PATIENT BUT THE PATIENT IS UNAWARE OF WHAT ONES SHE WANTED.    PLEASE CALL TO ADVISE

## 2025-07-11 ENCOUNTER — HOSPITAL ENCOUNTER (EMERGENCY)
Facility: HOSPITAL | Age: 65
Discharge: HOME | End: 2025-07-12
Attending: EMERGENCY MEDICINE
Payer: MEDICARE

## 2025-07-11 ENCOUNTER — APPOINTMENT (OUTPATIENT)
Dept: GENERAL RADIOLOGY | Facility: HOSPITAL | Age: 65
End: 2025-07-11
Payer: MEDICARE

## 2025-07-11 DIAGNOSIS — D64.9 CHRONIC ANEMIA: ICD-10-CM

## 2025-07-11 DIAGNOSIS — R53.1 GENERALIZED WEAKNESS: Primary | ICD-10-CM

## 2025-07-11 DIAGNOSIS — N17.9 ACUTE RENAL FAILURE, UNSPECIFIED ACUTE RENAL FAILURE TYPE: ICD-10-CM

## 2025-07-11 LAB
BASOPHILS # BLD AUTO: 0.04 10*3/MM3 (ref 0–0.2)
BASOPHILS NFR BLD AUTO: 0.5 % (ref 0–1.5)
DEPRECATED RDW RBC AUTO: 49.3 FL (ref 37–54)
EOSINOPHIL # BLD AUTO: 0.08 10*3/MM3 (ref 0–0.4)
EOSINOPHIL NFR BLD AUTO: 1 % (ref 0.3–6.2)
ERYTHROCYTE [DISTWIDTH] IN BLOOD BY AUTOMATED COUNT: 16.5 % (ref 12.3–15.4)
HCT VFR BLD AUTO: 26.6 % (ref 34–46.6)
HGB BLD-MCNC: 8.4 G/DL (ref 12–15.9)
IMM GRANULOCYTES # BLD AUTO: 0.03 10*3/MM3 (ref 0–0.05)
IMM GRANULOCYTES NFR BLD AUTO: 0.4 % (ref 0–0.5)
LYMPHOCYTES # BLD AUTO: 1.1 10*3/MM3 (ref 0.7–3.1)
LYMPHOCYTES NFR BLD AUTO: 14 % (ref 19.6–45.3)
MCH RBC QN AUTO: 26.4 PG (ref 26.6–33)
MCHC RBC AUTO-ENTMCNC: 31.6 G/DL (ref 31.5–35.7)
MCV RBC AUTO: 83.6 FL (ref 79–97)
MONOCYTES # BLD AUTO: 0.68 10*3/MM3 (ref 0.1–0.9)
MONOCYTES NFR BLD AUTO: 8.7 % (ref 5–12)
NEUTROPHILS NFR BLD AUTO: 5.93 10*3/MM3 (ref 1.7–7)
NEUTROPHILS NFR BLD AUTO: 75.4 % (ref 42.7–76)
NRBC BLD AUTO-RTO: 0 /100 WBC (ref 0–0.2)
PLATELET # BLD AUTO: 373 10*3/MM3 (ref 140–450)
PMV BLD AUTO: 7.9 FL (ref 6–12)
PROCALCITONIN SERPL-MCNC: 0.09 NG/ML (ref 0–0.25)
RBC # BLD AUTO: 3.18 10*6/MM3 (ref 3.77–5.28)
WBC NRBC COR # BLD AUTO: 7.86 10*3/MM3 (ref 3.4–10.8)

## 2025-07-11 PROCEDURE — 99284 EMERGENCY DEPT VISIT MOD MDM: CPT

## 2025-07-11 PROCEDURE — 84145 PROCALCITONIN (PCT): CPT | Performed by: EMERGENCY MEDICINE

## 2025-07-11 PROCEDURE — 93010 ELECTROCARDIOGRAM REPORT: CPT | Performed by: INTERNAL MEDICINE

## 2025-07-11 PROCEDURE — 85025 COMPLETE CBC W/AUTO DIFF WBC: CPT | Performed by: EMERGENCY MEDICINE

## 2025-07-11 PROCEDURE — 93005 ELECTROCARDIOGRAM TRACING: CPT | Performed by: EMERGENCY MEDICINE

## 2025-07-11 PROCEDURE — 71045 X-RAY EXAM CHEST 1 VIEW: CPT

## 2025-07-11 PROCEDURE — 80053 COMPREHEN METABOLIC PANEL: CPT | Performed by: EMERGENCY MEDICINE

## 2025-07-12 VITALS
DIASTOLIC BLOOD PRESSURE: 77 MMHG | BODY MASS INDEX: 51.91 KG/M2 | OXYGEN SATURATION: 100 % | RESPIRATION RATE: 18 BRPM | WEIGHT: 293 LBS | HEART RATE: 76 BPM | TEMPERATURE: 98.9 F | SYSTOLIC BLOOD PRESSURE: 118 MMHG | HEIGHT: 63 IN

## 2025-07-12 LAB
ALBUMIN SERPL-MCNC: 3.7 G/DL (ref 3.5–5.2)
ALBUMIN/GLOB SERPL: 0.8 G/DL
ALP SERPL-CCNC: 97 U/L (ref 39–117)
ALT SERPL W P-5'-P-CCNC: 10 U/L (ref 1–33)
ANION GAP SERPL CALCULATED.3IONS-SCNC: 15.4 MMOL/L (ref 5–15)
AST SERPL-CCNC: 24 U/L (ref 1–32)
BILIRUB SERPL-MCNC: 0.6 MG/DL (ref 0–1.2)
BUN SERPL-MCNC: 26 MG/DL (ref 8–23)
BUN/CREAT SERPL: 17 (ref 7–25)
CALCIUM SPEC-SCNC: 9.3 MG/DL (ref 8.6–10.5)
CHLORIDE SERPL-SCNC: 100 MMOL/L (ref 98–107)
CO2 SERPL-SCNC: 19.6 MMOL/L (ref 22–29)
CREAT SERPL-MCNC: 1.53 MG/DL (ref 0.57–1)
EGFRCR SERPLBLD CKD-EPI 2021: 37.6 ML/MIN/1.73
GLOBULIN UR ELPH-MCNC: 4.8 GM/DL
GLUCOSE SERPL-MCNC: 109 MG/DL (ref 65–99)
POTASSIUM SERPL-SCNC: 4.5 MMOL/L (ref 3.5–5.2)
PROT SERPL-MCNC: 8.5 G/DL (ref 6–8.5)
QT INTERVAL: 413 MS
QTC INTERVAL: 481 MS
SODIUM SERPL-SCNC: 135 MMOL/L (ref 136–145)

## 2025-07-12 PROCEDURE — 25810000003 SODIUM CHLORIDE 0.9 % SOLUTION: Performed by: EMERGENCY MEDICINE

## 2025-07-12 RX ADMIN — SODIUM CHLORIDE 500 ML: 9 INJECTION, SOLUTION INTRAVENOUS at 00:25

## 2025-07-12 NOTE — ED PROVIDER NOTES
EMERGENCY DEPARTMENT ENCOUNTER  Room Number:  19/19  PCP: Adilson Wilkerson MD  Independent Historians: Patient, EMS who brought patient      HPI:  Chief Complaint: had concerns including Weakness - Generalized.     A complete HPI/ROS/PMH/PSH/SH/FH are unobtainable due to:   Chronic or social conditions impacting patient care (Social Determinants of Health):       Context: Emely Solomon is a 64 y.o. female with a medical history of endocarditis, hypertension, anemia who presents to the ED c/o acute generalized weakness.  Patient was discharged from rehab about 1 week ago and is at home with her .  She generally gets around with a walker.  Today she had more difficulty getting up and moving around secondary to generalized weakness.  She denies recent fever.  Does report some mild ongoing vaginal bleeding which is being worked up as an outpatient.  Denies chest pain or trouble breathing.  Denies nausea vomiting or diarrhea.  Denies dysuria.      Review of prior external notes (non-ED) -and- Review of prior external test results outside of this encounter:   I reviewed prior medical records note patient was hospitalized in late May with hypotension and hypoxia.  Patient was felt to have some multifocal pneumonia.  Chronic conditions include history of endocarditis, hypertension, obesity and sleep apnea.      Prescription drug monitoring program review:         PAST MEDICAL HISTORY  Active Ambulatory Problems     Diagnosis Date Noted    Chronic diastolic CHF (congestive heart failure)     PFO (patent foramen ovale)     Paradoxical embolism     Essential hypertension     Pulmonary hypertension     Upper back pain 11/12/2017    Bacteremia due to group B Streptococcus 11/13/2017    ARIEL on CPAP 11/14/2017    Class 3 severe obesity due to excess calories with serious comorbidity and body mass index (BMI) of 60.0 to 69.9 in adult 06/03/2019    History of DVT (deep vein thrombosis) 06/04/2020    Lymphedema  06/04/2020    Anemia, chronic disease 06/04/2020    Iron deficiency 11/16/2020    Post-menopausal bleeding 03/01/2022    Thickened endometrium 03/01/2022    Generalized muscle weakness 09/30/2020    Right bundle branch block (RBBB) with left anterior fascicular block (LAFB)     PVD (peripheral vascular disease) with claudication 04/26/2024    Iliac vein stenosis, right 05/15/2024    Hypoxia 05/04/2025    Hyperglycemia 05/04/2025    History of pulmonary embolism 05/04/2025    Morbid obesity 05/04/2025    Hyponatremia 05/05/2025    Bacterial endocarditis 05/05/2025    Sepsis due to methicillin susceptible Staphylococcus aureus (MSSA) with acute hypoxic respiratory failure without septic shock 05/10/2025    Acute on chronic heart failure with preserved ejection fraction (HFpEF)     On home oxygen therapy     Type 2 myocardial infarction 05/20/2025    Acute loss of vision, bilateral 05/20/2025    Chronic respiratory failure with hypoxia 05/20/2025    Acute loss of vision 05/21/2025    Acute exacerbation of CHF (congestive heart failure) 05/25/2025    Acute pulmonary embolism 05/30/2025     Resolved Ambulatory Problems     Diagnosis Date Noted    Chronic cough 07/21/2017    Sepsis 11/12/2017    Fever and chills 11/12/2017    Acute kidney failure 11/12/2017    Hypoxia 11/14/2017    Cellulitis 11/14/2017    Cellulitis of right anterior lower leg 06/04/2020    Cellulitis of right lower extremity 06/04/2020    Hyponatremia 06/04/2020    Syncope 06/12/2020    Sepsis 09/18/2020    Chronic renal failure (CRF), stage 3 (moderate) 09/19/2020    COVID-19 virus detected 09/19/2020    Acute conjunctivitis of right eye 09/23/2020    Encounter for screening for malignant neoplasm of colon 02/15/2022    Hypo-osmolality and hyponatremia 09/29/2020    Venous stasis ulcer of right calf limited to breakdown of skin without varicose veins 05/15/2024    Chronic venous hypertension with ulcer and inflammation involving right side 05/15/2024     Venous stasis ulcer of ankle with fat layer exposed 07/24/2024    Fall 05/04/2025    Acute wheezy bronchitis 05/04/2025    Elevated troponin 05/04/2025     Past Medical History:   Diagnosis Date    Arthritis     Chronic deep vein thrombosis (DVT) of left popliteal vein 12/17/2015    Chronic diastolic congestive heart failure     COVID-19 virus infection 11/2020    Heart murmur     Hypertension     Lipoedema     Postthrombotic syndrome of left lower extremity without complications 12/17/2015    Pulmonary embolism 04/14/2016         PAST SURGICAL HISTORY  Past Surgical History:   Procedure Laterality Date    ARTERIOGRAM  07/2015    BRONCHOSCOPY N/A 07/21/2017    Procedure: BRONCHOSCOPY with wash;  Surgeon: Caleb Garcia MD;  Location: [facility] ENDOSCOPY;  Service:     COLONOSCOPY      COLONOSCOPY N/A 01/26/2023    Procedure: COLONOSCOPY to CECUM AND TERM ILEUM;  Surgeon: Jj Dean MD;  Location: [facility] ENDOSCOPY;  Service: Gastroenterology;  Laterality: N/A;  PRE OP -screening  POST OP -  NORMAL    D & C HYSTEROSCOPY N/A 03/08/2022    Procedure: DILATATION AND CURETTAGE with hysteroscopy;  Surgeon: Kaylah Land DO;  Location: [facility] MAIN OR;  Service: Gynecology Oncology;  Laterality: N/A;    DILATATION AND CURETTAGE  04/11/2011    OTHER SURGICAL HISTORY  09/2015    IVC filter    OTHER SURGICAL HISTORY      left LE revascularization    OTHER SURGICAL HISTORY      ALESSIA and LEV scan    THROMBECTOMY  07/2015         FAMILY HISTORY  Family History   Problem Relation Age of Onset    Breast cancer Mother     Hypertension Other     Ovarian cancer Neg Hx     Uterine cancer Neg Hx     Colon cancer Neg Hx     Malig Hyperthermia Neg Hx          SOCIAL HISTORY  Social History     Socioeconomic History    Marital status:    Tobacco Use    Smoking status: Never     Passive exposure: Never    Smokeless tobacco: Never   Vaping Use    Vaping status: Never Used   Substance and Sexual Activity    Alcohol use: Not  Currently     Comment: occassionally    Drug use: Never    Sexual activity: Not Currently     Partners: Male     Birth control/protection: Post-menopausal         ALLERGIES  Cephalexin and Clindamycin/lincomycin      REVIEW OF SYSTEMS  Review of Systems   Constitutional:  Negative for fever.   Respiratory:  Positive for shortness of breath (Chronic shortness of breath, unchanged from baseline).    Cardiovascular:  Negative for chest pain.   Neurological:  Positive for weakness.   All other systems reviewed and are negative.    Included in HPI  All systems reviewed and negative except for those discussed in HPI.      PHYSICAL EXAM    I have reviewed the triage vital signs and nursing notes.    ED Triage Vitals [07/11/25 2139]   Temp Heart Rate Resp BP SpO2   98.9 °F (37.2 °C) 88 20 122/76 97 %      Temp src Heart Rate Source Patient Position BP Location FiO2 (%)   Oral -- -- -- --       Physical Exam  GENERAL: 64-year-old female in no obvious distress.  Triage vitals reviewed and are fairly unremarkable.  SKIN: Warm, dry  HENT: Normocephalic, atraumatic  EYES: no scleral icterus  CV: regular rhythm, regular rate-no murmur  RESPIRATORY: normal effort, lungs clear-O2 sats upper 90s room air  ABDOMEN: soft, nontender, nondistended  MUSCULOSKELETAL: no deformity-there is chronic appearing lymphedema of both extremities  NEURO: alert, moves all extremities, follows commands  Strength-mild generalized weakness without focal deficit      LAB RESULTS  Recent Results (from the past 24 hours)   ECG 12 Lead Other; Generalized weakness    Collection Time: 07/11/25 10:26 PM   Result Value Ref Range    QT Interval 413 ms    QTC Interval 481 ms   Comprehensive Metabolic Panel    Collection Time: 07/11/25 10:57 PM    Specimen: Blood   Result Value Ref Range    Glucose 109 (H) 65 - 99 mg/dL    BUN 26.0 (H) 8.0 - 23.0 mg/dL    Creatinine 1.53 (H) 0.57 - 1.00 mg/dL    Sodium 135 (L) 136 - 145 mmol/L    Potassium 4.5 3.5 - 5.2 mmol/L     Chloride 100 98 - 107 mmol/L    CO2 19.6 (L) 22.0 - 29.0 mmol/L    Calcium 9.3 8.6 - 10.5 mg/dL    Total Protein 8.5 6.0 - 8.5 g/dL    Albumin 3.7 3.5 - 5.2 g/dL    ALT (SGPT) 10 1 - 33 U/L    AST (SGOT) 24 1 - 32 U/L    Alkaline Phosphatase 97 39 - 117 U/L    Total Bilirubin 0.6 0.0 - 1.2 mg/dL    Globulin 4.8 gm/dL    A/G Ratio 0.8 g/dL    BUN/Creatinine Ratio 17.0 7.0 - 25.0    Anion Gap 15.4 (H) 5.0 - 15.0 mmol/L    eGFR 37.6 (L) >60.0 mL/min/1.73   Procalcitonin    Collection Time: 07/11/25 10:57 PM    Specimen: Blood   Result Value Ref Range    Procalcitonin 0.09 0.00 - 0.25 ng/mL   CBC Auto Differential    Collection Time: 07/11/25 10:57 PM    Specimen: Blood   Result Value Ref Range    WBC 7.86 3.40 - 10.80 10*3/mm3    RBC 3.18 (L) 3.77 - 5.28 10*6/mm3    Hemoglobin 8.4 (L) 12.0 - 15.9 g/dL    Hematocrit 26.6 (L) 34.0 - 46.6 %    MCV 83.6 79.0 - 97.0 fL    MCH 26.4 (L) 26.6 - 33.0 pg    MCHC 31.6 31.5 - 35.7 g/dL    RDW 16.5 (H) 12.3 - 15.4 %    RDW-SD 49.3 37.0 - 54.0 fl    MPV 7.9 6.0 - 12.0 fL    Platelets 373 140 - 450 10*3/mm3    Neutrophil % 75.4 42.7 - 76.0 %    Lymphocyte % 14.0 (L) 19.6 - 45.3 %    Monocyte % 8.7 5.0 - 12.0 %    Eosinophil % 1.0 0.3 - 6.2 %    Basophil % 0.5 0.0 - 1.5 %    Immature Grans % 0.4 0.0 - 0.5 %    Neutrophils, Absolute 5.93 1.70 - 7.00 10*3/mm3    Lymphocytes, Absolute 1.10 0.70 - 3.10 10*3/mm3    Monocytes, Absolute 0.68 0.10 - 0.90 10*3/mm3    Eosinophils, Absolute 0.08 0.00 - 0.40 10*3/mm3    Basophils, Absolute 0.04 0.00 - 0.20 10*3/mm3    Immature Grans, Absolute 0.03 0.00 - 0.05 10*3/mm3    nRBC 0.0 0.0 - 0.2 /100 WBC         RADIOLOGY  XR Chest 1 View  Result Date: 7/11/2025  CXR ONE VIEW  HISTORY: Generalized weakness  COMPARISON: 5/25/2025  TECHNIQUE: single portable AP       Redemonstrated mild cardiomegaly.  The lungs are normally aerated. There is no pleural effusion or pneumothorax.    This report was finalized on 7/11/2025 10:49 PM by CHIP IsaacD  on Workstation: [id number]          MEDICATIONS GIVEN IN ER  Medications   sodium chloride 0.9 % bolus 500 mL (has no administration in time range)         ORDERS PLACED DURING THIS VISIT:  Orders Placed This Encounter   Procedures    XR Chest 1 View    Comprehensive Metabolic Panel    Procalcitonin    CBC Auto Differential    ECG 12 Lead Other; Generalized weakness    CBC & Differential         OUTPATIENT MEDICATION MANAGEMENT:  Current Facility-Administered Medications Ordered in Epic   Medication Dose Route Frequency Provider Last Rate Last Admin    sodium chloride 0.9 % bolus 500 mL  500 mL Intravenous Once Elenita, Abilio MENDEZ MD         Current Outpatient Medications Ordered in Epic   Medication Sig Dispense Refill    acetaminophen (TYLENOL) 325 MG tablet Take 2 tablets by mouth Every 6 (Six) Hours As Needed for Mild Pain.      cadexomer iodine (IODOSORB) 0.9 % gel Apply 1 Application topically to the appropriate area as directed Daily As Needed for Wound Care. Apply to open wound to rt lower leg (Patient not taking: Reported on 6/16/2025)      dextromethorphan polistirex ER (DELSYM) 30 MG/5ML Suspension Extended Release oral suspension Take 10 mL by mouth 2 (Two) Times a Day As Needed (As needed for cough).      Dimethicone-Zinc Oxide-Vit A-D ([employer] ZINC OXIDE DIAPER EX) Apply 1 Application topically to the appropriate area as directed 2 (Two) Times a Day. (Patient not taking: Reported on 6/16/2025)      Emollient (Aquaphor Advanced Therapy) 41 % ointment Apply 1 Application topically to the appropriate area as directed 2 (Two) Times a Day.      ferrous sulfate 325 (65 FE) MG tablet Take 1 tablet by mouth Daily With Breakfast.      furosemide (LASIX) 40 MG tablet Take 1 tablet by mouth Daily.      guaiFENesin ER 1200 MG tablet sustained-release 12 hour Take 1 tablet by mouth 2 (Two) Times a Day As Needed. (Patient not taking: Reported on 6/16/2025)      ipratropium-albuterol (DUO-NEB) 0.5-2.5 mg/3 ml nebulizer  Take 3 mL by nebulization 4 (Four) Times a Day.      Lidocaine 4 % Place 1 patch on the skin as directed by provider Daily. Apply to skin on upper back remove patch in 12 hours. Remove & Discard patch within 12 hours or as directed by MD      medroxyPROGESTERone (PROVERA) 10 MG tablet Take 1 tablet by mouth Daily.      melatonin 5 MG tablet tablet Take 1 tablet by mouth At Night As Needed (sleep).      metoprolol succinate XL (TOPROL-XL) 25 MG 24 hr tablet Take 0.5 tablets by mouth Daily.      miconazole (MICOTIN) 2 % powder Apply 1 Application topically to the appropriate area as directed Every 12 (Twelve) Hours. 43 g 0    rivaroxaban (Xarelto) 20 MG tablet Take 1 tablet by mouth Daily With Dinner. Indications: Atrial Fibrillation (Patient not taking: Reported on 6/16/2025)      sennosides-docusate (PERICOLACE) 8.6-50 MG per tablet Take 2 tablets by mouth At Night As Needed for Constipation. (Patient not taking: Reported on 6/16/2025)           PROCEDURES  Procedures            PROGRESS, DATA ANALYSIS, CONSULTS, AND MEDICAL DECISION MAKING  All labs have been independently interpreted by me.  All radiology studies have been reviewed by me. All EKG's have been independently viewed and interpreted by me.  Discussion below represents my analysis of pertinent findings related to patient's condition, differential diagnosis, treatment plan and final disposition.    Differential diagnosis includes but is not limited to anemia, electrolyte disturbance, dehydration, arrhythmia, underlying infection.      ED Course as of 07/12/25 0024   Fri Jul 11, 2025   2233 EKG independently interpreted by me    Time 10:26 PM  Sinus 82  Normal P waves and ID intervals  QRS-normal axis, unremarkable QRS  ST, T waves-nonspecific changes  When compared to 5/2025 ventricular ectopy is no longer present [DB]      ED Course User Index  [DB] Abilio Kwong MD             AS OF 00:24 EDT VITALS:    BP - 118/59  HR - 82  TEMP - 98.9 °F (37.2 °C)  (Oral)  O2 SATS - 99%    COMPLEXITY OF CARE  64-year-old female with history of prior endocarditis, DVT, sleep apnea, CHF presents with generalized weakness.    I did independently evaluate interpret all ED testing notable for the following:    Chest x-ray without obvious acute disease  CBC notable for normal white count of 7.8 which would go against underlying infection.  Hemoglobin of 8.4 is increased from baseline anemia and would go against worsening of chronic anemia.  Chemistries notable for elevated BUN/creatinine of 26 and 1.53 suggestive of some acute renal failure.  Patient is on Lasix and I think she is little bit over diuresed.    Patient was treated with IV fluids.    We did discuss potential admission for continued fluids and repeat labs but patient is feeling better would rather go home.   is comfortable with plan to go home and says he can take care of her.  Will cut Lasix from 40 mg to 20 mg.      DIAGNOSIS  Final diagnoses:   Generalized weakness   Acute renal failure, unspecified acute renal failure type         DISPOSITION  ED Disposition       ED Disposition   Discharge    Condition   Stable    Comment   --                Please note that portions of this document were completed with a voice recognition program.    Note Disclaimer: At [facility], we believe that sharing information builds trust and better relationships. You are receiving this note because you recently visited [facility]. It is possible you will see health information before a provider has talked with you about it. This kind of information can be easy to misunderstand. To help you fully understand what it means for your health, we urge you to discuss this note with your provider.         Abilio Kwong MD  07/12/25 0120

## 2025-07-12 NOTE — DISCHARGE INSTRUCTIONS
Blood work showed that your slightly dehydrated.  Would like you to decrease your Lasix dose from 40 mg a day to 20 mg a day.  Please continue to drink plenty of fluids.  You do also have some anemia but it is actually improved from last admission.  Blood work did not show underlying evidence of infection.

## 2025-07-14 ENCOUNTER — TELEPHONE (OUTPATIENT)
Dept: CARDIOLOGY | Age: 65
End: 2025-07-14
Payer: MEDICARE

## 2025-07-14 NOTE — TELEPHONE ENCOUNTER
For documentation purposes.    She is having postmenopausal bleeding.    She is on rivaroxaban for history of DVT and PE.  We do not manage her rivaroxaban.      In May 2025, she was hospitalized for acute bacterial endocarditis and acute hypoxic respiratory failure due to sepsis.  She was on IV antibiotics for 6 weeks and this was probably just completed.    She is scheduled for a repeat echocardiogram on 7/16.  Further recommendations to follow after the echocardiogram is completed.

## 2025-07-14 NOTE — TELEPHONE ENCOUNTER
Pt is scheduled for hysteroscopy / D&C with Dr. Robina De Jesus under general ANES.  They are pre op risk assessment .  Pt is also on Xarelto, but for DVT.

## 2025-07-16 ENCOUNTER — HOSPITAL ENCOUNTER (OUTPATIENT)
Dept: CARDIOLOGY | Facility: HOSPITAL | Age: 65
Discharge: HOME | End: 2025-07-16
Payer: MEDICARE

## 2025-07-16 ENCOUNTER — DOCUMENTATION (OUTPATIENT)
Dept: CARDIOLOGY | Age: 65
End: 2025-07-16
Payer: MEDICARE

## 2025-07-16 ENCOUNTER — APPOINTMENT (OUTPATIENT)
Dept: GENERAL RADIOLOGY | Facility: HOSPITAL | Age: 65
DRG: 291 | End: 2025-07-16
Payer: MEDICARE

## 2025-07-16 ENCOUNTER — HOSPITAL ENCOUNTER (INPATIENT)
Facility: HOSPITAL | Age: 65
LOS: 10 days | Discharge: NURSING, REHAB OR LONG-TERM CARE | DRG: 291 | End: 2025-07-26
Attending: EMERGENCY MEDICINE | Admitting: HOSPITALIST
Payer: MEDICARE

## 2025-07-16 VITALS
SYSTOLIC BLOOD PRESSURE: 119 MMHG | RESPIRATION RATE: 24 BRPM | HEART RATE: 68 BPM | DIASTOLIC BLOOD PRESSURE: 97 MMHG | OXYGEN SATURATION: 100 %

## 2025-07-16 VITALS
HEART RATE: 70 BPM | HEIGHT: 63 IN | WEIGHT: 293 LBS | BODY MASS INDEX: 51.91 KG/M2 | DIASTOLIC BLOOD PRESSURE: 90 MMHG | SYSTOLIC BLOOD PRESSURE: 120 MMHG

## 2025-07-16 DIAGNOSIS — Z86.79 HISTORY OF BACTERIAL ENDOCARDITIS: ICD-10-CM

## 2025-07-16 DIAGNOSIS — R55 SYNCOPE AND COLLAPSE: Primary | ICD-10-CM

## 2025-07-16 DIAGNOSIS — I50.9 ACUTE ON CHRONIC CONGESTIVE HEART FAILURE, UNSPECIFIED HEART FAILURE TYPE: ICD-10-CM

## 2025-07-16 DIAGNOSIS — I33.0 ACUTE BACTERIAL ENDOCARDITIS: ICD-10-CM

## 2025-07-16 DIAGNOSIS — D64.9 CHRONIC ANEMIA: ICD-10-CM

## 2025-07-16 DIAGNOSIS — N17.9 AKI (ACUTE KIDNEY INJURY): ICD-10-CM

## 2025-07-16 DIAGNOSIS — I50.33 ACUTE ON CHRONIC HEART FAILURE WITH PRESERVED EJECTION FRACTION (HFPEF): ICD-10-CM

## 2025-07-16 DIAGNOSIS — I27.20 PULMONARY HYPERTENSION: ICD-10-CM

## 2025-07-16 DIAGNOSIS — I34.0 SEVERE MITRAL REGURGITATION: ICD-10-CM

## 2025-07-16 PROBLEM — J96.01 ACUTE RESPIRATORY FAILURE WITH HYPOXIA: Status: ACTIVE | Noted: 2025-07-16

## 2025-07-16 LAB
ABO GROUP BLD: NORMAL
ALBUMIN SERPL-MCNC: 3.5 G/DL (ref 3.5–5.2)
ALBUMIN/GLOB SERPL: 0.8 G/DL
ALP SERPL-CCNC: 95 U/L (ref 39–117)
ALT SERPL W P-5'-P-CCNC: 17 U/L (ref 1–33)
ANION GAP SERPL CALCULATED.3IONS-SCNC: 12.7 MMOL/L (ref 5–15)
AORTIC DIMENSIONLESS INDEX: 0.54 (DI)
ASCENDING AORTA: 3.6 CM
AST SERPL-CCNC: 24 U/L (ref 1–32)
AV MEAN PRESS GRAD SYS DOP V1V2: 18.1 MMHG
AV VMAX SYS DOP: 281.4 CM/SEC
BASOPHILS # BLD AUTO: 0.04 10*3/MM3 (ref 0–0.2)
BASOPHILS NFR BLD AUTO: 0.6 % (ref 0–1.5)
BH CV ECHO MEAS - ACS: 1.9 CM
BH CV ECHO MEAS - AO MAX PG: 31.7 MMHG
BH CV ECHO MEAS - AO V2 VTI: 64.7 CM
BH CV ECHO MEAS - AVA(I,D): 1.7 CM2
BH CV ECHO MEAS - EDV(CUBED): 73.6 ML
BH CV ECHO MEAS - EDV(MOD-SP2): 182 ML
BH CV ECHO MEAS - EDV(MOD-SP4): 172 ML
BH CV ECHO MEAS - EF(MOD-SP2): 61.5 %
BH CV ECHO MEAS - EF(MOD-SP4): 65.7 %
BH CV ECHO MEAS - ESV(CUBED): 27.3 ML
BH CV ECHO MEAS - ESV(MOD-SP2): 70 ML
BH CV ECHO MEAS - ESV(MOD-SP4): 59 ML
BH CV ECHO MEAS - FS: 28.1 %
BH CV ECHO MEAS - IVS/LVPW: 0.96 CM
BH CV ECHO MEAS - IVSD: 0.93 CM
BH CV ECHO MEAS - LAT PEAK E' VEL: 16.1 CM/SEC
BH CV ECHO MEAS - LV DIASTOLIC VOL/BSA (35-75): 71.4 CM2
BH CV ECHO MEAS - LV MASS(C)D: 127.8 GRAMS
BH CV ECHO MEAS - LV MAX PG: 9.1 MMHG
BH CV ECHO MEAS - LV MEAN PG: 6 MMHG
BH CV ECHO MEAS - LV SYSTOLIC VOL/BSA (12-30): 24.5 CM2
BH CV ECHO MEAS - LV V1 MAX: 151 CM/SEC
BH CV ECHO MEAS - LV V1 VTI: 34.8 CM
BH CV ECHO MEAS - LVIDD: 4.2 CM
BH CV ECHO MEAS - LVIDS: 3 CM
BH CV ECHO MEAS - LVOT AREA: 3.2 CM2
BH CV ECHO MEAS - LVOT DIAM: 2.01 CM
BH CV ECHO MEAS - LVPWD: 0.97 CM
BH CV ECHO MEAS - MED PEAK E' VEL: 9.8 CM/SEC
BH CV ECHO MEAS - MR MAX PG: 85.2 MMHG
BH CV ECHO MEAS - MR MAX VEL: 461.6 CM/SEC
BH CV ECHO MEAS - MV A DUR: 0.13 SEC
BH CV ECHO MEAS - MV A MAX VEL: 120.1 CM/SEC
BH CV ECHO MEAS - MV DEC SLOPE: 952.2 CM/SEC2
BH CV ECHO MEAS - MV DEC TIME: 0.2 SEC
BH CV ECHO MEAS - MV E MAX VEL: 165 CM/SEC
BH CV ECHO MEAS - MV E/A: 1.37
BH CV ECHO MEAS - MV MAX PG: 11.6 MMHG
BH CV ECHO MEAS - MV MEAN PG: 4.3 MMHG
BH CV ECHO MEAS - MV P1/2T: 52.6 MSEC
BH CV ECHO MEAS - MV V2 VTI: 44.8 CM
BH CV ECHO MEAS - MVA(P1/2T): 4.2 CM2
BH CV ECHO MEAS - MVA(VTI): 2.46 CM2
BH CV ECHO MEAS - PA V2 MAX: 124.4 CM/SEC
BH CV ECHO MEAS - PULM A REVS DUR: 0.13 SEC
BH CV ECHO MEAS - PULM A REVS VEL: 35.9 CM/SEC
BH CV ECHO MEAS - PULM DIAS VEL: 60.1 CM/SEC
BH CV ECHO MEAS - PULM S/D: 0.75
BH CV ECHO MEAS - PULM SYS VEL: 45.1 CM/SEC
BH CV ECHO MEAS - RAP SYSTOLE: 15 MMHG
BH CV ECHO MEAS - RVOT DIAM: 2.01 CM
BH CV ECHO MEAS - RVSP: 86.7 MMHG
BH CV ECHO MEAS - SV(LVOT): 110.2 ML
BH CV ECHO MEAS - SV(MOD-SP2): 112 ML
BH CV ECHO MEAS - SV(MOD-SP4): 113 ML
BH CV ECHO MEAS - SVI(LVOT): 45.8 ML/M2
BH CV ECHO MEAS - SVI(MOD-SP2): 46.5 ML/M2
BH CV ECHO MEAS - SVI(MOD-SP4): 46.9 ML/M2
BH CV ECHO MEAS - TAPSE (>1.6): 2.9 CM
BH CV ECHO MEAS - TR MAX PG: 71.7 MMHG
BH CV ECHO MEAS - TR MAX VEL: 423.3 CM/SEC
BH CV ECHO MEASUREMENTS AVERAGE E/E' RATIO: 12.74
BH CV XLRA - RV BASE: 4.1 CM
BH CV XLRA - RV LENGTH: 7.6 CM
BH CV XLRA - RV MID: 2.7 CM
BH CV XLRA - TDI S': 15.7 CM/SEC
BILIRUB SERPL-MCNC: 0.4 MG/DL (ref 0–1.2)
BLD GP AB SCN SERPL QL: NEGATIVE
BUN SERPL-MCNC: 24 MG/DL (ref 8–23)
BUN/CREAT SERPL: 17.8 (ref 7–25)
CALCIUM SPEC-SCNC: 9.3 MG/DL (ref 8.6–10.5)
CHLORIDE SERPL-SCNC: 103 MMOL/L (ref 98–107)
CO2 SERPL-SCNC: 21.3 MMOL/L (ref 22–29)
CREAT SERPL-MCNC: 1.35 MG/DL (ref 0.57–1)
D-LACTATE SERPL-SCNC: 1.5 MMOL/L (ref 0.5–2)
DEPRECATED RDW RBC AUTO: 50.3 FL (ref 37–54)
EGFRCR SERPLBLD CKD-EPI 2021: 43.7 ML/MIN/1.73
EOSINOPHIL # BLD AUTO: 0.02 10*3/MM3 (ref 0–0.4)
EOSINOPHIL NFR BLD AUTO: 0.3 % (ref 0.3–6.2)
ERYTHROCYTE [DISTWIDTH] IN BLOOD BY AUTOMATED COUNT: 16 % (ref 12.3–15.4)
GEN 5 1HR TROPONIN T REFLEX: 32 NG/L
GLOBULIN UR ELPH-MCNC: 4.5 GM/DL
GLUCOSE SERPL-MCNC: 96 MG/DL (ref 65–99)
HCT VFR BLD AUTO: 25.8 % (ref 34–46.6)
HGB BLD-MCNC: 7.8 G/DL (ref 12–15.9)
IMM GRANULOCYTES # BLD AUTO: 0.09 10*3/MM3 (ref 0–0.05)
IMM GRANULOCYTES NFR BLD AUTO: 1.4 % (ref 0–0.5)
INR PPP: 3.29 (ref 0.9–1.1)
LEFT ATRIUM VOLUME INDEX: 56.4 ML/M2
LV EF BIPLANE MOD: 62.5 %
LYMPHOCYTES # BLD AUTO: 0.64 10*3/MM3 (ref 0.7–3.1)
LYMPHOCYTES NFR BLD AUTO: 9.9 % (ref 19.6–45.3)
MAGNESIUM SERPL-MCNC: 2.4 MG/DL (ref 1.6–2.4)
MCH RBC QN AUTO: 26.1 PG (ref 26.6–33)
MCHC RBC AUTO-ENTMCNC: 30.2 G/DL (ref 31.5–35.7)
MCV RBC AUTO: 86.3 FL (ref 79–97)
MONOCYTES # BLD AUTO: 0.32 10*3/MM3 (ref 0.1–0.9)
MONOCYTES NFR BLD AUTO: 5 % (ref 5–12)
NEUTROPHILS NFR BLD AUTO: 5.34 10*3/MM3 (ref 1.7–7)
NEUTROPHILS NFR BLD AUTO: 82.8 % (ref 42.7–76)
NRBC BLD AUTO-RTO: 0 /100 WBC (ref 0–0.2)
NT-PROBNP SERPL-MCNC: 3683 PG/ML (ref 0–900)
PHOSPHATE SERPL-MCNC: 3.8 MG/DL (ref 2.5–4.5)
PLATELET # BLD AUTO: 369 10*3/MM3 (ref 140–450)
PMV BLD AUTO: 8.2 FL (ref 6–12)
POTASSIUM SERPL-SCNC: 4.7 MMOL/L (ref 3.5–5.2)
PROCALCITONIN SERPL-MCNC: 0.11 NG/ML (ref 0–0.25)
PROT SERPL-MCNC: 8 G/DL (ref 6–8.5)
PROTHROMBIN TIME: 33.9 SECONDS (ref 11.7–14.2)
QT INTERVAL: 466 MS
QTC INTERVAL: 485 MS
RBC # BLD AUTO: 2.99 10*6/MM3 (ref 3.77–5.28)
RH BLD: POSITIVE
SINUS: 2.9 CM
SODIUM SERPL-SCNC: 137 MMOL/L (ref 136–145)
STJ: 3.1 CM
T&S EXPIRATION DATE: NORMAL
TROPONIN T % DELTA: -6
TROPONIN T NUMERIC DELTA: -2 NG/L
TROPONIN T SERPL HS-MCNC: 34 NG/L
WBC NRBC COR # BLD AUTO: 6.45 10*3/MM3 (ref 3.4–10.8)

## 2025-07-16 PROCEDURE — 86901 BLOOD TYPING SEROLOGIC RH(D): CPT | Performed by: EMERGENCY MEDICINE

## 2025-07-16 PROCEDURE — 94760 N-INVAS EAR/PLS OXIMETRY 1: CPT

## 2025-07-16 PROCEDURE — 71045 X-RAY EXAM CHEST 1 VIEW: CPT

## 2025-07-16 PROCEDURE — 94799 UNLISTED PULMONARY SVC/PX: CPT

## 2025-07-16 PROCEDURE — 93010 ELECTROCARDIOGRAM REPORT: CPT | Performed by: INTERNAL MEDICINE

## 2025-07-16 PROCEDURE — 25010000002 FUROSEMIDE PER 20 MG: Performed by: EMERGENCY MEDICINE

## 2025-07-16 PROCEDURE — 36415 COLL VENOUS BLD VENIPUNCTURE: CPT | Performed by: EMERGENCY MEDICINE

## 2025-07-16 PROCEDURE — 99285 EMERGENCY DEPT VISIT HI MDM: CPT

## 2025-07-16 PROCEDURE — 83880 ASSAY OF NATRIURETIC PEPTIDE: CPT | Performed by: EMERGENCY MEDICINE

## 2025-07-16 PROCEDURE — 94761 N-INVAS EAR/PLS OXIMETRY MLT: CPT

## 2025-07-16 PROCEDURE — 86900 BLOOD TYPING SEROLOGIC ABO: CPT | Performed by: EMERGENCY MEDICINE

## 2025-07-16 PROCEDURE — 83605 ASSAY OF LACTIC ACID: CPT | Performed by: EMERGENCY MEDICINE

## 2025-07-16 PROCEDURE — 87040 BLOOD CULTURE FOR BACTERIA: CPT | Performed by: EMERGENCY MEDICINE

## 2025-07-16 PROCEDURE — 83735 ASSAY OF MAGNESIUM: CPT | Performed by: EMERGENCY MEDICINE

## 2025-07-16 PROCEDURE — 93306 TTE W/DOPPLER COMPLETE: CPT

## 2025-07-16 PROCEDURE — 85025 COMPLETE CBC W/AUTO DIFF WBC: CPT | Performed by: EMERGENCY MEDICINE

## 2025-07-16 PROCEDURE — 84100 ASSAY OF PHOSPHORUS: CPT | Performed by: EMERGENCY MEDICINE

## 2025-07-16 PROCEDURE — 94640 AIRWAY INHALATION TREATMENT: CPT

## 2025-07-16 PROCEDURE — 84484 ASSAY OF TROPONIN QUANT: CPT | Performed by: EMERGENCY MEDICINE

## 2025-07-16 PROCEDURE — 93005 ELECTROCARDIOGRAM TRACING: CPT | Performed by: EMERGENCY MEDICINE

## 2025-07-16 PROCEDURE — 84145 PROCALCITONIN (PCT): CPT | Performed by: EMERGENCY MEDICINE

## 2025-07-16 PROCEDURE — 85610 PROTHROMBIN TIME: CPT | Performed by: EMERGENCY MEDICINE

## 2025-07-16 PROCEDURE — 80053 COMPREHEN METABOLIC PANEL: CPT | Performed by: EMERGENCY MEDICINE

## 2025-07-16 PROCEDURE — 73630 X-RAY EXAM OF FOOT: CPT

## 2025-07-16 PROCEDURE — 87147 CULTURE TYPE IMMUNOLOGIC: CPT | Performed by: EMERGENCY MEDICINE

## 2025-07-16 PROCEDURE — 86850 RBC ANTIBODY SCREEN: CPT | Performed by: EMERGENCY MEDICINE

## 2025-07-16 PROCEDURE — 87154 CUL TYP ID BLD PTHGN 6+ TRGT: CPT | Performed by: EMERGENCY MEDICINE

## 2025-07-16 RX ORDER — POLYETHYLENE GLYCOL 3350 17 G/17G
17 POWDER, FOR SOLUTION ORAL DAILY PRN
Status: DISCONTINUED | OUTPATIENT
Start: 2025-07-16 | End: 2025-07-26 | Stop reason: HOSPADM

## 2025-07-16 RX ORDER — SODIUM CHLORIDE 0.9 % (FLUSH) 0.9 %
10 SYRINGE (ML) INJECTION AS NEEDED
Status: DISCONTINUED | OUTPATIENT
Start: 2025-07-16 | End: 2025-07-26 | Stop reason: HOSPADM

## 2025-07-16 RX ORDER — BISACODYL 5 MG/1
5 TABLET, DELAYED RELEASE ORAL DAILY PRN
Status: DISCONTINUED | OUTPATIENT
Start: 2025-07-16 | End: 2025-07-26 | Stop reason: HOSPADM

## 2025-07-16 RX ORDER — METOPROLOL SUCCINATE 25 MG/1
12.5 TABLET, EXTENDED RELEASE ORAL DAILY
Status: DISCONTINUED | OUTPATIENT
Start: 2025-07-16 | End: 2025-07-26 | Stop reason: HOSPADM

## 2025-07-16 RX ORDER — SODIUM CHLORIDE 9 MG/ML
40 INJECTION, SOLUTION INTRAVENOUS AS NEEDED
Status: DISCONTINUED | OUTPATIENT
Start: 2025-07-16 | End: 2025-07-26 | Stop reason: HOSPADM

## 2025-07-16 RX ORDER — BISACODYL 10 MG
10 SUPPOSITORY, RECTAL RECTAL DAILY PRN
Status: DISCONTINUED | OUTPATIENT
Start: 2025-07-16 | End: 2025-07-26 | Stop reason: HOSPADM

## 2025-07-16 RX ORDER — ONDANSETRON 2 MG/ML
4 INJECTION INTRAMUSCULAR; INTRAVENOUS EVERY 6 HOURS PRN
Status: DISCONTINUED | OUTPATIENT
Start: 2025-07-16 | End: 2025-07-26 | Stop reason: HOSPADM

## 2025-07-16 RX ORDER — MEDROXYPROGESTERONE ACETATE 10 MG
10 TABLET ORAL DAILY
Status: DISCONTINUED | OUTPATIENT
Start: 2025-07-16 | End: 2025-07-26 | Stop reason: HOSPADM

## 2025-07-16 RX ORDER — AMOXICILLIN 250 MG
2 CAPSULE ORAL 2 TIMES DAILY PRN
Status: DISCONTINUED | OUTPATIENT
Start: 2025-07-16 | End: 2025-07-26 | Stop reason: HOSPADM

## 2025-07-16 RX ORDER — LIDOCAINE 4 G/G
1 PATCH TOPICAL
Status: DISCONTINUED | OUTPATIENT
Start: 2025-07-16 | End: 2025-07-26 | Stop reason: HOSPADM

## 2025-07-16 RX ORDER — SODIUM CHLORIDE 0.9 % (FLUSH) 0.9 %
10 SYRINGE (ML) INJECTION EVERY 12 HOURS SCHEDULED
Status: DISCONTINUED | OUTPATIENT
Start: 2025-07-16 | End: 2025-07-26 | Stop reason: HOSPADM

## 2025-07-16 RX ORDER — FUROSEMIDE 10 MG/ML
40 INJECTION INTRAMUSCULAR; INTRAVENOUS ONCE
Status: COMPLETED | OUTPATIENT
Start: 2025-07-16 | End: 2025-07-16

## 2025-07-16 RX ORDER — IPRATROPIUM BROMIDE AND ALBUTEROL SULFATE 2.5; .5 MG/3ML; MG/3ML
3 SOLUTION RESPIRATORY (INHALATION)
Status: DISCONTINUED | OUTPATIENT
Start: 2025-07-16 | End: 2025-07-26 | Stop reason: HOSPADM

## 2025-07-16 RX ADMIN — IPRATROPIUM BROMIDE AND ALBUTEROL SULFATE 3 ML: .5; 3 SOLUTION RESPIRATORY (INHALATION) at 19:34

## 2025-07-16 RX ADMIN — Medication 10 ML: at 20:45

## 2025-07-16 RX ADMIN — RIVAROXABAN 20 MG: 20 TABLET, FILM COATED ORAL at 20:45

## 2025-07-16 RX ADMIN — NYSTATIN 1 APPLICATION: 100000 OINTMENT TOPICAL at 21:00

## 2025-07-16 RX ADMIN — IPRATROPIUM BROMIDE AND ALBUTEROL SULFATE 3 ML: .5; 3 SOLUTION RESPIRATORY (INHALATION) at 15:18

## 2025-07-16 RX ADMIN — FUROSEMIDE 40 MG: 10 INJECTION, SOLUTION INTRAMUSCULAR; INTRAVENOUS at 12:18

## 2025-07-16 RX ADMIN — ANTI-FUNGAL POWDER MICONAZOLE NITRATE TALC FREE 1 APPLICATION: 1.42 POWDER TOPICAL at 17:02

## 2025-07-16 RX ADMIN — Medication 10 ML: at 17:03

## 2025-07-16 RX ADMIN — NYSTATIN 1 APPLICATION: 100000 OINTMENT TOPICAL at 17:02

## 2025-07-16 NOTE — NURSING NOTE
Reason for Visit: WOC Team consult for devora LE's. Pt with past medical history of CHF, hypertension, pulmonary hypertension, lipedema, PVD, PE on xarelto who presented to the ED c/o acute syncope. I saw her while still in the ED, on a stretcher, awaiting admission to the hospital. She is well known to our service from previous admissions.  I am unable to assess her gluteal skin since she is on a stretcher and unable to roll. She has moist intertriginous dermatitis with yeast component in skin folds, nano to devora LE's. She also has a phlegmonous blister to the plantar aspect of right heel, for which she will need a podiatry consult. OT has been consulted for lymphedema wraps.      07/16/25 1458   Wound Right posterior heel Neuropathic Ulcer   No placement date or time found.   Present on Original Admission: Yes  Side: Right  Orientation: posterior  Location: heel  Primary Wound Type: Neuropathic Ulcer   Base   (phlegmonous blister to plantar aspect of right heel)   Periwound intact;dry   Periwound Temperature warm   Wound Length (cm) 6 cm   Wound Width (cm) 4 cm   Wound Surface Area (cm^2) 18.85 cm^2   Drainage Amount none   Dressing Care open to air     Treatment Plan/Recommendations: Pt will need to be transferred to a Pro Plus mattress with turning side to side. Magic Barrier cream has been ordered 3x day with pillow cases or Male incontinence wraps placed between skin folds to wick moisture and prevent skin on skin friction. She will need an evaluation by podiatry for the right plantar heel blister, I have notified the RN and Dr Lozano. Wound care and prevention standing orders placed into [facility].     Wound Team Follow up Plan: WOC nurse will follow along in chart and see pt for follow up as needed.

## 2025-07-16 NOTE — PROGRESS NOTES
PT being transferred to ER per Dr. Rosenthal's request. PT being T/F via stretcher with RN x2, transport monitor, O2 @ 10L with non-rebreather in place. PT alert and oriented. VSS. Spouse has patient's belongings including walker and purse.

## 2025-07-16 NOTE — PROGRESS NOTES
Called to a rapid response this morning on this patient-she collapsed trying to walk back to the echo department.  No obvious external injuries.  She does not remember entirely what happened.  She was using walker and then she woke up on the ground with people around her.  Her blood sugar was 147, her oxygen saturation was 65% on room air and her blood pressure was 120/90.  She was oriented and complained of no injuries.  We did get her on a stretcher and took her back to get her echo done but she was very breathless.  We may need to send her to the emergency department after her echocardiogram is complete.  She is supposed to be on 2 L of oxygen at home was not wearing oxygen this morning when she came in.  She is not very mobile.

## 2025-07-16 NOTE — ED PROVIDER NOTES
EMERGENCY DEPARTMENT ENCOUNTER    Room Number:  13/13  PCP: Adilson Wilkerson MD  Independent Historians: EM_Historian: Patient    HPI:  Chief Complaint: had concerns including Syncope.      A complete HPI/ROS/PMH/PSH/SH/FH are unobtainable due to: EM_Unobtainable History : None    Chronic or social conditions impacting patient care (Social Determinants of Health): None      Context: Emely Solomon is a 64 y.o. female with a medical history of CHF, hypertension, pulmonary hypertension, lipedema, PVD, PE on xarelto who presents to the ED c/o acute syncope.  Patient does have 2 L of oxygen to use as needed but says she does not generally need it.  She was at her appointment to have an outpatient echo when she had syncope without a prodrome in the waiting room.  Pt hypoxic and placed on oxygen by echo staff.  Limited echo was performed which looks like a CHF exacerbation and new severe mitral insufficiency as reported by phone from Dr. Rosenthal.  Pt placed on oxygen and brought to ER with concern for need for admission.        Review of prior external notes (non-ED) -and- Review of prior external test results outside of this encounter: I reviewed discharge summary from [facility].  Patient admitted at that time with blood pressure and hypoxemia.  Patient noted to have multifocal pneumonia on Rocephin long-term treatment after mitral valve endocarditis--end date 6/17/25.  Pt with h/o vaginal bleeding being evaluated by gyn onc, Dr. De Jesus at [facility]. Pt did require transfusion while inpt.    Echo 5/26/25 with EF 66-70 % with diastolic dysfunction, rv mild to mod dilation, left atrial dilation, mild aortic valve stenosis, mild mitral regurg with thickening of post leaflet with small mobile echodensity, mild mitral stenosis    Prescription drug monitoring program review:     EM_[place] : N/A    PAST MEDICAL HISTORY  Active Ambulatory Problems     Diagnosis Date Noted    Chronic diastolic CHF (congestive heart failure)      PFO (patent foramen ovale)     Paradoxical embolism     Essential hypertension     Pulmonary hypertension     Upper back pain 11/12/2017    Bacteremia due to group B Streptococcus 11/13/2017    ARIEL on CPAP 11/14/2017    Class 3 severe obesity due to excess calories with serious comorbidity and body mass index (BMI) of 60.0 to 69.9 in adult 06/03/2019    History of DVT (deep vein thrombosis) 06/04/2020    Lymphedema 06/04/2020    Anemia, chronic disease 06/04/2020    Iron deficiency 11/16/2020    Post-menopausal bleeding 03/01/2022    Thickened endometrium 03/01/2022    Generalized muscle weakness 09/30/2020    Right bundle branch block (RBBB) with left anterior fascicular block (LAFB)     PVD (peripheral vascular disease) with claudication 04/26/2024    Iliac vein stenosis, right 05/15/2024    Hypoxia 05/04/2025    Hyperglycemia 05/04/2025    History of pulmonary embolism 05/04/2025    Morbid obesity 05/04/2025    Hyponatremia 05/05/2025    Bacterial endocarditis 05/05/2025    Sepsis due to methicillin susceptible Staphylococcus aureus (MSSA) with acute hypoxic respiratory failure without septic shock 05/10/2025    Acute on chronic heart failure with preserved ejection fraction (HFpEF)     On home oxygen therapy     Type 2 myocardial infarction 05/20/2025    Acute loss of vision, bilateral 05/20/2025    Chronic respiratory failure with hypoxia 05/20/2025    Acute loss of vision 05/21/2025    Acute exacerbation of CHF (congestive heart failure) 05/25/2025    Acute pulmonary embolism 05/30/2025     Resolved Ambulatory Problems     Diagnosis Date Noted    Chronic cough 07/21/2017    Sepsis 11/12/2017    Fever and chills 11/12/2017    Acute kidney failure 11/12/2017    Hypoxia 11/14/2017    Cellulitis 11/14/2017    Cellulitis of right anterior lower leg 06/04/2020    Cellulitis of right lower extremity 06/04/2020    Hyponatremia 06/04/2020    Syncope 06/12/2020    Sepsis 09/18/2020    Chronic renal failure (CRF), stage  3 (moderate) 09/19/2020    COVID-19 virus detected 09/19/2020    Acute conjunctivitis of right eye 09/23/2020    Encounter for screening for malignant neoplasm of colon 02/15/2022    Hypo-osmolality and hyponatremia 09/29/2020    Venous stasis ulcer of right calf limited to breakdown of skin without varicose veins 05/15/2024    Chronic venous hypertension with ulcer and inflammation involving right side 05/15/2024    Venous stasis ulcer of ankle with fat layer exposed 07/24/2024    Fall 05/04/2025    Acute wheezy bronchitis 05/04/2025    Elevated troponin 05/04/2025     Past Medical History:   Diagnosis Date    Arthritis     Chronic deep vein thrombosis (DVT) of left popliteal vein 12/17/2015    Chronic diastolic congestive heart failure     COVID-19 virus infection 11/2020    Heart murmur     Hypertension     Lipoedema     Postthrombotic syndrome of left lower extremity without complications 12/17/2015    Pulmonary embolism 04/14/2016         PAST SURGICAL HISTORY  Past Surgical History:   Procedure Laterality Date    ARTERIOGRAM  07/2015    BRONCHOSCOPY N/A 07/21/2017    Procedure: BRONCHOSCOPY with wash;  Surgeon: Caleb Garcia MD;  Location: [facility] ENDOSCOPY;  Service:     COLONOSCOPY      COLONOSCOPY N/A 01/26/2023    Procedure: COLONOSCOPY to CECUM AND TERM ILEUM;  Surgeon: Jj Dean MD;  Location: [facility] ENDOSCOPY;  Service: Gastroenterology;  Laterality: N/A;  PRE OP -screening  POST OP -  NORMAL    D & C HYSTEROSCOPY N/A 03/08/2022    Procedure: DILATATION AND CURETTAGE with hysteroscopy;  Surgeon: Kaylah Land DO;  Location: [facility] MAIN OR;  Service: Gynecology Oncology;  Laterality: N/A;    DILATATION AND CURETTAGE  04/11/2011    OTHER SURGICAL HISTORY  09/2015    IVC filter    OTHER SURGICAL HISTORY      left LE revascularization    OTHER SURGICAL HISTORY      ALESSIA and LEV scan    THROMBECTOMY  07/2015         FAMILY HISTORY  Family History   Problem Relation Age of Onset    Breast cancer  Mother     Hypertension Other     Ovarian cancer Neg Hx     Uterine cancer Neg Hx     Colon cancer Neg Hx     Malig Hyperthermia Neg Hx          SOCIAL HISTORY  Social History     Socioeconomic History    Marital status:    Tobacco Use    Smoking status: Never     Passive exposure: Never    Smokeless tobacco: Never   Vaping Use    Vaping status: Never Used   Substance and Sexual Activity    Alcohol use: Not Currently     Comment: occassionally    Drug use: Never    Sexual activity: Not Currently     Partners: Male     Birth control/protection: Post-menopausal         ALLERGIES  Cephalexin and Clindamycin/lincomycin        REVIEW OF SYSTEMS  Review of Systems  Included in HPI  All systems reviewed and negative except for those discussed in HPI.      PHYSICAL EXAM    I have reviewed the triage vital signs and nursing notes.    ED Triage Vitals   Temp Pulse Resp BP SpO2   -- -- -- -- --      Temp src Heart Rate Source Patient Position BP Location FiO2 (%)   -- -- -- -- --       Physical Exam  GENERAL: cooperative and conversant f, obese, tachypneic, alert  SKIN: Warm, dry, gen pallor  HENT: Normocephalic, atraumatic  EYES: no scleral icterus, eomi  CV: regular rhythm, regular rate, loud systolic murmur left mid clavicular line  RESPIRATORY: normal effort, diminished at bases  ABDOMEN: soft, nontender, nondistended  MUSCULOSKELETAL: chronic appearing lymphedema all 4 exts  NEURO: alert, moves all extremities, follows commands                                                                   LAB RESULTS  Recent Results (from the past 24 hours)   Adult Transthoracic Echo Complete w/ Color, Spectral and Contrast if Necessary Per Protocol    Collection Time: 07/16/25  7:53 AM   Result Value Ref Range    EF(MOD-bp) 62.5 %    LV Sys Vol (BSA corrected) 24.5 cm2    LV Brown Vol (BSA corrected) 71.4 cm2    LVOT area 3.2 cm2    LVOT diam 2.01 cm    EDV(MOD-sp2) 182.0 ml    EDV(MOD-sp4) 172.0 ml    ESV(MOD-sp2) 70.0 ml     ESV(MOD-sp4) 59.0 ml    SV(MOD-sp2) 112.0 ml    SV(MOD-sp4) 113.0 ml    SVi(MOD-SP2) 46.5 ml/m2    SVi(MOD-SP4) 46.9 ml/m2    SVi (LVOT) 45.8 ml/m2    EF(MOD-sp2) 61.5 %    EF(MOD-sp4) 65.7 %    MV E max camacho 165.0 cm/sec    MV A max camacho 120.1 cm/sec    MV dec time 0.20 sec    MV E/A 1.37     Pulm A Revs Dur 0.13 sec    MV A dur 0.13 sec    LA ESV Index (BP) 56.4 ml/m2    Med Peak E' Camacho 9.8 cm/sec    Lat Peak E' Camacho 16.1 cm/sec    TR max camacho 423.3 cm/sec    Avg E/e' ratio 12.74     SV(LVOT) 110.2 ml    RV Base 4.1 cm    RV Mid 2.7 cm    RV Length 7.6 cm    TAPSE (>1.6) 2.9 cm    Pulm Sys Camacho 45.1 cm/sec    Pulm Brown Camacho 60.1 cm/sec    Pulm S/D 0.75     Pulm A Revs Camacho 35.9 cm/sec    LV V1 max 151.0 cm/sec    LV V1 max PG 9.1 mmHg    LV V1 mean PG 6.0 mmHg    LV V1 VTI 34.8 cm    Ao pk camacho 281.4 cm/sec    Ao max PG 31.7 mmHg    Ao mean PG 18.1 mmHg    Ao V2 VTI 64.7 cm    KEN(I,D) 1.70 cm2    Dimensionless Index 0.54 (DI)    MV max PG 11.6 mmHg    MV mean PG 4.3 mmHg    MV V2 VTI 44.8 cm    MV P1/2t 52.6 msec    MVA(P1/2t) 4.2 cm2    MVA(VTI) 2.46 cm2    MV dec slope 952.2 cm/sec2    MR max camacho 461.6 cm/sec    MR max PG 85.2 mmHg    TR max PG 71.7 mmHg    RVSP(TR) 86.7 mmHg    RAP systole 15.0 mmHg    RVOT diam 2.01 cm    PA V2 max 124.4 cm/sec    Sinus 2.9 cm    STJ 3.1 cm    ACS 1.9 cm    Ascending aorta 3.6 cm    LVIDd 4.2 cm    LVIDs 3.0 cm    IVSd 0.93 cm    LVPWd 0.97 cm    FS 28.1 %    IVS/LVPW 0.96 cm    ESV(cubed) 27.3 ml    EDV(cubed) 73.6 ml    LV mass(C)d 127.8 grams    RV S' 15.7 cm/sec   ECG 12 Lead Syncope    Collection Time: 07/16/25  9:00 AM   Result Value Ref Range    QT Interval 466 ms    QTC Interval 485 ms   Comprehensive Metabolic Panel    Collection Time: 07/16/25  9:47 AM    Specimen: Arm, Left; Blood   Result Value Ref Range    Glucose 96 65 - 99 mg/dL    BUN 24.0 (H) 8.0 - 23.0 mg/dL    Creatinine 1.35 (H) 0.57 - 1.00 mg/dL    Sodium 137 136 - 145 mmol/L    Potassium 4.7 3.5 - 5.2 mmol/L     Chloride 103 98 - 107 mmol/L    CO2 21.3 (L) 22.0 - 29.0 mmol/L    Calcium 9.3 8.6 - 10.5 mg/dL    Total Protein 8.0 6.0 - 8.5 g/dL    Albumin 3.5 3.5 - 5.2 g/dL    ALT (SGPT) 17 1 - 33 U/L    AST (SGOT) 24 1 - 32 U/L    Alkaline Phosphatase 95 39 - 117 U/L    Total Bilirubin 0.4 0.0 - 1.2 mg/dL    Globulin 4.5 gm/dL    A/G Ratio 0.8 g/dL    BUN/Creatinine Ratio 17.8 7.0 - 25.0    Anion Gap 12.7 5.0 - 15.0 mmol/L    eGFR 43.7 (L) >60.0 mL/min/1.73   Protime-INR    Collection Time: 07/16/25  9:47 AM    Specimen: Arm, Left; Blood   Result Value Ref Range    Protime 33.9 (H) 11.7 - 14.2 Seconds    INR 3.29 (H) 0.90 - 1.10   BNP    Collection Time: 07/16/25  9:47 AM    Specimen: Arm, Left; Blood   Result Value Ref Range    proBNP 3,683.0 (H) 0.0 - 900.0 pg/mL   High Sensitivity Troponin T    Collection Time: 07/16/25  9:47 AM    Specimen: Arm, Left; Blood   Result Value Ref Range    HS Troponin T 34 (H) <14 ng/L   Lactic Acid, Plasma    Collection Time: 07/16/25  9:47 AM    Specimen: Arm, Left; Blood   Result Value Ref Range    Lactate 1.5 0.5 - 2.0 mmol/L   Procalcitonin    Collection Time: 07/16/25  9:47 AM    Specimen: Arm, Left; Blood   Result Value Ref Range    Procalcitonin 0.11 0.00 - 0.25 ng/mL   Magnesium    Collection Time: 07/16/25  9:47 AM    Specimen: Arm, Left; Blood   Result Value Ref Range    Magnesium 2.4 1.6 - 2.4 mg/dL   Phosphorus    Collection Time: 07/16/25  9:47 AM    Specimen: Arm, Left; Blood   Result Value Ref Range    Phosphorus 3.8 2.5 - 4.5 mg/dL   CBC Auto Differential    Collection Time: 07/16/25  9:47 AM    Specimen: Arm, Left; Blood   Result Value Ref Range    WBC 6.45 3.40 - 10.80 10*3/mm3    RBC 2.99 (L) 3.77 - 5.28 10*6/mm3    Hemoglobin 7.8 (L) 12.0 - 15.9 g/dL    Hematocrit 25.8 (L) 34.0 - 46.6 %    MCV 86.3 79.0 - 97.0 fL    MCH 26.1 (L) 26.6 - 33.0 pg    MCHC 30.2 (L) 31.5 - 35.7 g/dL    RDW 16.0 (H) 12.3 - 15.4 %    RDW-SD 50.3 37.0 - 54.0 fl    MPV 8.2 6.0 - 12.0 fL    Platelets  369 140 - 450 10*3/mm3    Neutrophil % 82.8 (H) 42.7 - 76.0 %    Lymphocyte % 9.9 (L) 19.6 - 45.3 %    Monocyte % 5.0 5.0 - 12.0 %    Eosinophil % 0.3 0.3 - 6.2 %    Basophil % 0.6 0.0 - 1.5 %    Immature Grans % 1.4 (H) 0.0 - 0.5 %    Neutrophils, Absolute 5.34 1.70 - 7.00 10*3/mm3    Lymphocytes, Absolute 0.64 (L) 0.70 - 3.10 10*3/mm3    Monocytes, Absolute 0.32 0.10 - 0.90 10*3/mm3    Eosinophils, Absolute 0.02 0.00 - 0.40 10*3/mm3    Basophils, Absolute 0.04 0.00 - 0.20 10*3/mm3    Immature Grans, Absolute 0.09 (H) 0.00 - 0.05 10*3/mm3    nRBC 0.0 0.0 - 0.2 /100 WBC   High Sensitivity Troponin T 1Hr    Collection Time: 07/16/25 11:02 AM    Specimen: Arm, Right; Blood   Result Value Ref Range    HS Troponin T 32 (H) <14 ng/L    Troponin T Numeric Delta -2 ng/L    Troponin T % Delta -6 Abnormal if >/= 20%         RADIOLOGY  XR Chest 1 View  Result Date: 7/16/2025  XR CHEST 1 VW-  HISTORY: Female who is 64 years-old, syncope, congestive heart failure  TECHNIQUE: Frontal view of the chest  COMPARISON: 7/11/2025  FINDINGS: The heart is enlarged. Pulmonary vasculature is mildly congested. Aorta is tortuous. Small right basilar atelectasis or infiltrate, follow-up suggested. There may be minimal right pleural effusion. No pneumothorax. No acute osseous process.      As described.  This report was finalized on 7/16/2025 9:30 AM by Dr. Damon Chavez M.D on Workstation: [id number]      Adult Transthoracic Echo Complete w/ Color, Spectral and Contrast if Necessary Per Protocol  Result Date: 7/16/2025    Left ventricular systolic function is normal. Calculated left ventricular EF = 62.5%   Left ventricular diastolic function was normal.   The right ventricular cavity is mildly dilated.   Left atrial volume is severely increased.   The right atrial cavity is moderately dilated.   There is calcification of the aortic valve.   There is bileaflet mitral valve thickening present.   Severe mitral valve regurgitation is present.    Estimated right ventricular systolic pressure from tricuspid regurgitation is markedly elevated (>55 mmHg). Calculated right ventricular systolic pressure from tricuspid regurgitation is 87 mmHg.   Severe pulmonary hypertension is present.         MEDICATIONS GIVEN IN ER  Medications   sodium chloride 0.9 % flush 10 mL (has no administration in time range)   furosemide (LASIX) injection 40 mg (has no administration in time range)         ORDERS PLACED DURING THIS VISIT:  Orders Placed This Encounter   Procedures    Blood Culture - Blood,    Blood Culture - Blood,    XR Chest 1 View    Comprehensive Metabolic Panel    Protime-INR    BNP    High Sensitivity Troponin T    Lactic Acid, Plasma    Procalcitonin    Magnesium    Phosphorus    CBC Auto Differential    High Sensitivity Troponin T 1Hr    Orthostatic Vitals    [facility] (on-call MD unless specified) Details    ECG 12 Lead Syncope    Telemetry Scan    Type & Screen    Insert Peripheral IV    Inpatient Admission    CBC & Differential         OUTPATIENT MEDICATION MANAGEMENT:  Current Facility-Administered Medications Ordered in Epic   Medication Dose Route Frequency Provider Last Rate Last Admin    furosemide (LASIX) injection 40 mg  40 mg Intravenous Once Komal Najera MD        sodium chloride 0.9 % flush 10 mL  10 mL Intravenous PRN Komal Najera MD         Current Outpatient Medications Ordered in Epic   Medication Sig Dispense Refill    acetaminophen (TYLENOL) 325 MG tablet Take 2 tablets by mouth Every 6 (Six) Hours As Needed for Mild Pain.      Emollient (Aquaphor Advanced Therapy) 41 % ointment Apply 1 Application topically to the appropriate area as directed 2 (Two) Times a Day.      ferrous sulfate 325 (65 FE) MG tablet Take 1 tablet by mouth Daily With Breakfast.      furosemide (LASIX) 40 MG tablet Take 1 tablet by mouth Daily.      guaiFENesin ER 1200 MG tablet sustained-release 12 hour Take 1 tablet by mouth 2 (Two) Times a Day As  Needed.      ipratropium-albuterol (DUO-NEB) 0.5-2.5 mg/3 ml nebulizer Take 3 mL by nebulization 4 (Four) Times a Day.      Lidocaine 4 % Place 1 patch on the skin as directed by provider Daily. Apply to skin on upper back remove patch in 12 hours. Remove & Discard patch within 12 hours or as directed by MD      medroxyPROGESTERone (PROVERA) 10 MG tablet Take 1 tablet by mouth Daily.      melatonin 5 MG tablet tablet Take 1 tablet by mouth At Night As Needed (sleep).      metoprolol succinate XL (TOPROL-XL) 25 MG 24 hr tablet Take 0.5 tablets by mouth Daily.      rivaroxaban (Xarelto) 20 MG tablet Take 1 tablet by mouth Daily With Dinner. Indications: Atrial Fibrillation (Patient taking differently: Take 1 tablet by mouth Daily With Dinner.)           PROCEDURES  Procedures            PROGRESS, DATA ANALYSIS, CONSULTS, AND MEDICAL DECISION MAKING  All labs have been independently interpreted by me.  All radiology studies have been reviewed by me. All EKG's have been independently viewed and interpreted by me.  Discussion below represents my analysis of pertinent findings related to patient's condition, differential diagnosis, treatment plan and final disposition.    The differential diagnosis for syncope, collapse, or even near syncope/lightheadedness is quite broad and includes but is not limited to: hypoglycemia, orthostasis, vasovagal syncope, seizure, cardiac syncope, PE, ectopic pregnancy, profound anemia, aortic dissection, severe aortic stenosis, stroke, sah, gi bleeding, intoxication and medication effects.    This patient presents with syncope or near-syncope. Seizure is less likely given the history and exam (lack of postictal period). No neurologic deficits on exam indicating a stroke, and no signs of head trauma or injury. Given no signs of trauma or neurologic deficit, I will withhold further imaging of the head per ACEP Choosing Wisely Recommendations. Additionally, the [facility] clinical policy on syncope  evaluation recommends laboratory testing and advanced investigative testing such as echocardiography or head CT scanning need not be routinely performed unless guided by specific findings in the history or physical examination.          Clinical Scores:              [place] Syncope Risk score (for 30 day events): 4 Applicable to patients =16 years old presenting =24 hours of syncope. Do not use if: prolonged (>5 min) LOC, change in mental status from baseline, obvious witnessed seizure, major trauma, intoxication, language barrier, or head trauma causing LOC. This calculator is externally validated, according to data presented at the Society for Academic Emergency Medicine Annual Meeting 2018.  Nine measures:  Vasovagal symptom predisposition (Triggered by being in a warm crowded place, prolonged standing, fear, emotion, or pain) (-1)  Heart disease history CAD, atrial fibrillation or flutter, CHF, valvular disease (+1)  Any systolic Blood Pressure reading <90 mmHg or >180 mmHg (+2)  Troponin elevated (+2)  QRS Axis abnormal (<30 or >100 degrees) (+1)  QRS Duration >130 ms (+1)  QTc >480 ms (+2)  ED diagnosis based on eval:   Vasovagal Syncope (-2)  Cardiac Syncope (+2)  (Neither (0))  ___________________________________  Score -3 to 0: Very low risk (serious adverse events <=1.9% in 30 days)  Score 1 to 3: Medium risk (serious adverse events >=3% in 30 days)  Score 4 to 5: High risk  Score >=6: Very high risk    ED Course as of 07/16/25 1140   Wed Jul 16, 2025   0929 EKG ER MD interpretation   Time: 9: 00  Rhythm and rate: Sinus rhythm at a rate of 65  Axis: Normal  P waves: Normal  QRS complexes: Incomplete right bundle branch block and left anterior fascicular block  Comparison EKG is from July 11, 2025  [AR]   1602 I viewed patient chest x-ray and on my interpretation patient has cardiomegaly and vascular congestion with right lower lobe infiltrate and/or effusion [AR]   1104 I discussed all results with  patient and family.  Patient amenable to being admitted for further care.  Patient does state that she still sees Dr. De Jesus with gynecology oncology at [facility] with a procedure planned for the end of July if her other medical issues can be stabilized.  Patient denies any vaginal bleeding. [AR]   0622 I discussed case with Dr Lozano--plan admission [AR]      ED Course User Index  [AR] Komal Najera MD             AS OF 11:40 EDT VITALS:    BP - 131/81  HR - 61  TEMP - 97.8 °F (36.6 °C) (Oral)  O2 SATS - 98%      COMPLEXITY OF CARE  The patient requires admission.    DIAGNOSIS  Final diagnoses:   Syncope and collapse   Acute on chronic congestive heart failure, unspecified heart failure type   Severe mitral regurgitation   Chronic anemia   STEFANIA (acute kidney injury)         DISPOSITION  ED Disposition       ED Disposition   Decision to Admit    Condition   --    Comment   Level of Care: Telemetry [5]   Diagnosis: Syncope and collapse [780.2.ICD-9-CM]   Admitting Physician: VIC LOZANO LOC [[id number]]   Attending Physician: VIC LOZANO LOC [[id number]]   Certification: I Certify That Inpatient Hospital Services Are Medically Necessary For Greater Than 2 Midnights                  Please note that portions of this document were completed with a voice recognition program.    Note Disclaimer: At [facility], we believe that sharing information builds trust and better relationships. You are receiving this note because you recently visited [facility]. It is possible you will see health information before a provider has talked with you about it. This kind of information can be easy to misunderstand. To help you fully understand what it means for your health, we urge you to discuss this note with your provider.         Komal Najera MD  07/16/25 [id number]

## 2025-07-16 NOTE — PROGRESS NOTES
Clinical Pharmacy Services: Medication History    Emely Solomon is a 64 y.o. female presenting to [facility] for   Chief Complaint   Patient presents with    Syncope       She  has a past medical history of Arthritis, Cellulitis, Chronic deep vein thrombosis (DVT) of left popliteal vein (12/17/2015), Chronic diastolic congestive heart failure, COVID-19 virus infection (11/2020), Heart murmur, Hypertension, Iliac vein stenosis, right (05/15/2024), Lipoedema, Lymphedema, Morbid obesity, ARIEL on CPAP, PFO (patent foramen ovale), Postthrombotic syndrome of left lower extremity without complications (12/17/2015), Pulmonary embolism (04/14/2016), Pulmonary hypertension, Right bundle branch block (RBBB) with left anterior fascicular block (LAFB), Sepsis (09/18/2020), and Type 2 myocardial infarction (05/20/2025).    Allergies as of 07/16/2025 - Reviewed 07/16/2025   Allergen Reaction Noted    Cephalexin Hives and Rash 06/17/2020    Clindamycin/lincomycin Hives 09/14/2021       Medication information was obtained from: Pharmacy  Pharmacy and Phone Number:   [id number] - [place], [place] - [address] [facility] - [phone]  - [phone] FX  [address]  [facility] [address]  Phone: [phone] Fax: [phone]          Prior to Admission Medications       Prescriptions Last Dose Informant Patient Reported? Taking?    acetaminophen (TYLENOL) 325 MG tablet  Self No Yes    Take 2 tablets by mouth Every 6 (Six) Hours As Needed for Mild Pain.    Emollient (Aquaphor Advanced Therapy) 41 % ointment  Nursing Home, Pharmacy Yes Yes    Apply 1 Application topically to the appropriate area as directed 2 (Two) Times a Day.    ferrous sulfate 325 (65 FE) MG tablet  Pharmacy No Yes    Take 1 tablet by mouth Daily With Breakfast.    furosemide (LASIX) 40 MG tablet  Pharmacy No Yes    Take 1 tablet by mouth Daily.    guaiFENesin ER 1200 MG tablet  sustained-release 12 hour  Pharmacy Yes Yes    Take 1 tablet by mouth 2 (Two) Times a Day As Needed.    ipratropium-albuterol (DUO-NEB) 0.5-2.5 mg/3 ml nebulizer  Self No Yes    Take 3 mL by nebulization 4 (Four) Times a Day.    Lidocaine 4 %  Pharmacy No Yes    Place 1 patch on the skin as directed by provider Daily. Apply to skin on upper back remove patch in 12 hours. Remove & Discard patch within 12 hours or as directed by MD    medroxyPROGESTERone (PROVERA) 10 MG tablet  Pharmacy No Yes    Take 1 tablet by mouth Daily.    melatonin 5 MG tablet tablet  Pharmacy No Yes    Take 1 tablet by mouth At Night As Needed (sleep).    metoprolol succinate XL (TOPROL-XL) 25 MG 24 hr tablet  Pharmacy No Yes    Take 0.5 tablets by mouth Daily.    rivaroxaban (Xarelto) 20 MG tablet  Pharmacy No Yes    Take 1 tablet by mouth Daily With Dinner. Indications: Atrial Fibrillation    Patient taking differently:  Take 1 tablet by mouth Daily With Dinner.              Medication notes:     This medication list is complete to the best of my knowledge as of 7/16/2025    Please call if questions.    Pipo Rosas  Medication History Technician  [phone]    7/16/2025 09:58 EDT

## 2025-07-16 NOTE — H&P
Patient Identification:  Name: Emely Solomon  Age: 64 y.o.  Sex: female  :  1960  MRN: [id number]         Primary Care Physician: Adilson Wilkerson MD    Chief Complaint   Patient presents with    Syncope     Subjective   Patient is a 64 y.o. female sent to the ED by cardiologist. She was getting an outpatient echocardiogram and had a possible syncopal episode-while walking back to echo. She was hypoxic 65% on room air. She was seen in the ED 5 days ago for generalized weakness, had elevated BUN and creatinine and was given fluids. She felt better and wanted to go home and Lasix was decreased. Leg swelling has increased. She has also had dyspnea for the last day or so. Echo was done:    Results for orders placed during the hospital encounter of 25    Adult Transthoracic Echo Complete w/ Color, Spectral and Contrast if Necessary Per Protocol    Interpretation Summary    Left ventricular systolic function is normal. Calculated left ventricular EF = 62.5%    Left ventricular diastolic function was normal.    The right ventricular cavity is mildly dilated.    Left atrial volume is severely increased.    The right atrial cavity is moderately dilated.    There is calcification of the aortic valve.    There is bileaflet mitral valve thickening present.    Severe mitral valve regurgitation is present.    Estimated right ventricular systolic pressure from tricuspid regurgitation is markedly elevated (>55 mmHg). Calculated right ventricular systolic pressure from tricuspid regurgitation is 87 mmHg.    Severe pulmonary hypertension is present.     She has a history of DVT/PE on anticoagulation, mitral valve endocarditis completed antibiotics (2025), was at rehab/SNF discharged home maybe a couple weeks ago. Hypertension, pulmonary hypertension ARIEL on CPAP, chronic lymphedema followed by wound clinic, vaginal bleeding and blood loss anemia from that has required transfusions in the past, morbid  obesity. She is followed by [facility] gynecology oncology. She has an elevated . GYN oncology was planning hysteroscopy, D+C.     Past Medical History:   Diagnosis Date    Arthritis     Cellulitis     11/2017, with Group B Strep bacteremia and sepsis    Chronic deep vein thrombosis (DVT) of left popliteal vein 12/17/2015 02/04/2016, 04/14/2016    Chronic diastolic congestive heart failure     COVID-19 virus infection 11/2020    Heart murmur     Hypertension     Iliac vein stenosis, right 05/15/2024    Lipoedema     Lymphedema     other    Morbid obesity     ARIEL on CPAP     PFO (patent foramen ovale)     Postthrombotic syndrome of left lower extremity without complications 12/17/2015    postphletibis    Pulmonary embolism 04/14/2016    Pulmonary hypertension     multifactorial (dCHF, obesity/ARIEL, hx PE), mild by echo 1/2016    Right bundle branch block (RBBB) with left anterior fascicular block (LAFB)     Sepsis 09/18/2020    Type 2 myocardial infarction 05/20/2025     Past Surgical History:   Procedure Laterality Date    ARTERIOGRAM  07/2015    BRONCHOSCOPY N/A 07/21/2017    Procedure: BRONCHOSCOPY with wash;  Surgeon: Caleb Garcia MD;  Location: [facility] ENDOSCOPY;  Service:     COLONOSCOPY      COLONOSCOPY N/A 01/26/2023    Procedure: COLONOSCOPY to CECUM AND TERM ILEUM;  Surgeon: Jj Dean MD;  Location: [facility] ENDOSCOPY;  Service: Gastroenterology;  Laterality: N/A;  PRE OP -screening  POST OP -  NORMAL    D & C HYSTEROSCOPY N/A 03/08/2022    Procedure: DILATATION AND CURETTAGE with hysteroscopy;  Surgeon: Kaylah Land DO;  Location: [facility] MAIN OR;  Service: Gynecology Oncology;  Laterality: N/A;    DILATATION AND CURETTAGE  04/11/2011    OTHER SURGICAL HISTORY  09/2015    IVC filter    OTHER SURGICAL HISTORY      left LE revascularization    OTHER SURGICAL HISTORY      ALESSIA and LEV scan    THROMBECTOMY  07/2015     Documented medications reviewed    Family History   Problem Relation Age of  Onset    Breast cancer Mother     Hypertension Other     Ovarian cancer Neg Hx     Uterine cancer Neg Hx     Colon cancer Neg Hx     Malig Hyperthermia Neg Hx      Social History     Tobacco Use    Smoking status: Never     Passive exposure: Never    Smokeless tobacco: Never   Vaping Use    Vaping status: Never Used   Substance Use Topics    Alcohol use: Not Currently     Comment: occassionally    Drug use: Never     Objective      Vital Signs  Temp:  [97.8 °F (36.6 °C)] 97.8 °F (36.6 °C)  Heart Rate:  [61-70] 64  Resp:  [22-26] 26  BP: (106-139)/() 121/61  There is no height or weight on file to calculate BMI.    Physical Exam  Constitutional:       General: She is in acute distress.      Appearance: She is obese. She is ill-appearing.   Cardiovascular:      Rate and Rhythm: Normal rate and regular rhythm.   Pulmonary:      Effort: Tachypnea present.      Breath sounds: No decreased breath sounds, wheezing or rhonchi.   Abdominal:      Palpations: Abdomen is soft.      Tenderness: There is no abdominal tenderness. There is no guarding or rebound.   Musculoskeletal:         General: Swelling present.   Neurological:      Mental Status: She is oriented to person, place, and time.   Psychiatric:         Mood and Affect: Mood normal.         Behavior: Behavior normal.         Assessment & Plan   Active Hospital Problems    Diagnosis  POA    **Syncope and collapse [R55]  Yes    Acute respiratory failure with hypoxia [J96.01]  Unknown    History of bacterial endocarditis [Z86.79]  Not Applicable    Severe mitral regurgitation [I34.0]  Unknown    Chronic respiratory failure with hypoxia [J96.11]  Yes    Acute on chronic heart failure with preserved ejection fraction (HFpEF) [I50.33]  Yes    History of pulmonary embolism [Z86.711]  Yes    Iron deficiency [E61.1]  Yes    History of DVT (deep vein thrombosis) [Z86.718]  Not Applicable    Lymphedema [I89.0]  Yes    ARIEL on CPAP [G47.33]  Yes    Chronic diastolic CHF  (congestive heart failure) [I50.32]  Yes    Essential hypertension [I10]  Yes    Pulmonary hypertension [I27.20]  Yes      Resolved Hospital Problems   No resolved problems to display.     64 y.o. female     Syncope  Severe MR (mild on 5/26/25 echo)  History of mitral valve endocarditis  Acute hypoxic respiratory failure  CHF  Severe pulmonary hypertension  She has had weakness and dyspnea and increased swelling  Given Lasix in the ED (she was seen in ED less than a week ago diagnosed with STEFANIA given fluids Lasix reduced). see how she responds monitoring renal function.   BCx drawn in ED  Cardiology consulted    History of DVT/PE Continue anticoagulation  ARIEL  Chronic lymphedema  Immobility    Postmenopausal bleeding  Blood loss anemia  Elevated   Has GYN/oncology procedure scheduled  Monitor for bleeding and worsening anemia while on AC. Hemoglobin low but similar to previous values    [x] Reconciled medications.  [] Awaiting completion of PTA med list.    Discussed with patient, family, and nursing staff and Dr. Najera.    Darwin Lozano MD  [facility]  07/16/25 13:02 EDT

## 2025-07-16 NOTE — ED NOTES
"   07/16/25 1150   Peripheral IV 07/16/25 1150 20 G Anterior;Distal;Right;Upper Arm   Placement date: If unknown, DO NOT use \"Add Comment\" note/Placement time: If unknown, DO NOT use \"Add Comment\" note: 07/16/25 1150   Hand Hygiene Completed: Yes  Size (Gauge): 20 G  Orientation: Anterior;Distal;Right;Upper  Location: Arm  Site Prep: C...   Site Assessment Clean;Dry;Intact   Dressing Type Transparent   Line Status Blood return noted;Flushed;Saline locked   Dressing Status Clean;Dry;Intact     Ultrasound Inserted IV Site: right upper basilic  Catheter Length: 2.5\"  Diameter: 0.45  Depth: 2.0 cm    Vascular Access Score = 5  1) Palpable / Visible / Distended  2) Palpable / Visible / Not Distended  3) Easily Palpable / Not Visibile  4) Poorly Palpable / Visible  5) Poorly / Nonpalpable / NV     Angiocath visualized and advanced in the venous lumen. Flush visualized superiorly to insertion site in venous lumen. Blood return noted and collected during insertion. No signs of phlebitis noted. Standard IV dressing placed and secured.      "

## 2025-07-16 NOTE — ED NOTES
Nursing report ED to floor  Emely Solomon  64 y.o.  female    HPI :  HPI  Stated Reason for Visit: syncope    Chief Complaint  Chief Complaint   Patient presents with    Syncope       Admitting doctor:   Darwin Lozano MD    Admitting diagnosis:   The primary encounter diagnosis was Syncope and collapse. Diagnoses of Acute on chronic congestive heart failure, unspecified heart failure type, Severe mitral regurgitation, Chronic anemia, and STEFANIA (acute kidney injury) were also pertinent to this visit.    Code status:   Current Code Status       Date Active Code Status Order ID Comments User Context       7/16/2025 1252 CPR (Attempt to Resuscitate) [id number]  Darwin Lozano MD ED        Question Answer    Code Status (Patient has no pulse and is not breathing) CPR (Attempt to Resuscitate)    Medical Interventions (Patient has pulse or is breathing) Full Support                    Allergies:   Cephalexin and Clindamycin/lincomycin    Isolation:   Contact    Intake and Output  No intake or output data in the 24 hours ending 07/16/25 1342    Weight:   There were no vitals filed for this visit.    Most recent vitals:   Vitals:    07/16/25 1217 07/16/25 1218 07/16/25 1300 07/16/25 1330   BP:  121/61 100/72 138/73   BP Location:       Patient Position:       Pulse: 63 64 62 58   Resp:       Temp:       TempSrc:       SpO2: 91%  100% 98%       Active LDAs/IV Access:   Lines, Drains & Airways       Active LDAs       Name Placement date Placement time Site Days    Peripheral IV 07/16/25 1150 20 G Anterior;Distal;Right;Upper Arm 07/16/25  1150  Arm  less than 1                    Labs (abnormal labs have a star):   Labs Reviewed   COMPREHENSIVE METABOLIC PANEL - Abnormal; Notable for the following components:       Result Value    BUN 24.0 (*)     Creatinine 1.35 (*)     CO2 21.3 (*)     eGFR 43.7 (*)     All other components within normal limits    Narrative:     GFR Categories in Chronic Kidney Disease (CKD)               GFR Category          GFR (mL/min/1.73)    Interpretation  G1                    90 or greater        Normal or high (1)  G2                    60-89                Mild decrease (1)  G3a                   45-59                Mild to moderate decrease  G3b                   30-44                Moderate to severe decrease  G4                    15-29                Severe decrease  G5                    14 or less           Kidney failure    (1)In the absence of evidence of kidney disease, neither GFR category G1 or G2 fulfill the criteria for CKD.    eGFR calculation 2021 CKD-EPI creatinine equation, which does not include race as a factor   PROTIME-INR - Abnormal; Notable for the following components:    Protime 33.9 (*)     INR 3.29 (*)     All other components within normal limits   BNP (IN-HOUSE) - Abnormal; Notable for the following components:    proBNP 3,683.0 (*)     All other components within normal limits    Narrative:     This assay is used as an aid in the diagnosis of individuals suspected of having heart failure. It can be used as an aid in the diagnosis of acute decompensated heart failure (ADHF) in patients presenting with signs and symptoms of ADHF to the emergency department (ED). In addition, NT-proBNP of <300 pg/mL indicates ADHF is not likely.    Age Range Result Interpretation  NT-proBNP Concentration (pg/mL:      <50             Positive            >450                   Gray                 300-450                    Negative             <300    50-75           Positive            >900                  Gray                300-900                  Negative            <300      >75             Positive            >1800                  Gray                300-1800                  Negative            <300   TROPONIN - Abnormal; Notable for the following components:    HS Troponin T 34 (*)     All other components within normal limits    Narrative:     High Sensitive Troponin T Reference  Range:  <14.0 ng/L- Negative Female for AMI  <22.0 ng/L- Negative Male for AMI  >=14 - Abnormal Female indicating possible myocardial injury.  >=22 - Abnormal Male indicating possible myocardial injury.   Clinicians would have to utilize clinical acumen, EKG, Troponin, and serial changes to determine if it is an Acute Myocardial Infarction or myocardial injury due to an underlying chronic condition.        CBC WITH AUTO DIFFERENTIAL - Abnormal; Notable for the following components:    RBC 2.99 (*)     Hemoglobin 7.8 (*)     Hematocrit 25.8 (*)     MCH 26.1 (*)     MCHC 30.2 (*)     RDW 16.0 (*)     Neutrophil % 82.8 (*)     Lymphocyte % 9.9 (*)     Immature Grans % 1.4 (*)     Lymphocytes, Absolute 0.64 (*)     Immature Grans, Absolute 0.09 (*)     All other components within normal limits   HIGH SENSITIVITIY TROPONIN T 1HR - Abnormal; Notable for the following components:    HS Troponin T 32 (*)     All other components within normal limits    Narrative:     High Sensitive Troponin T Reference Range:  <14.0 ng/L- Negative Female for AMI  <22.0 ng/L- Negative Male for AMI  >=14 - Abnormal Female indicating possible myocardial injury.  >=22 - Abnormal Male indicating possible myocardial injury.   Clinicians would have to utilize clinical acumen, EKG, Troponin, and serial changes to determine if it is an Acute Myocardial Infarction or myocardial injury due to an underlying chronic condition.        LACTIC ACID, PLASMA - Normal   PROCALCITONIN - Normal    Narrative:     As a Marker for Sepsis (Non-Neonates):    1. <0.5 ng/mL represents a low risk of severe sepsis and/or septic shock.  2. >2 ng/mL represents a high risk of severe sepsis and/or septic shock.    As a Marker for Lower Respiratory Tract Infections that require antibiotic therapy:    PCT on Admission    Antibiotic Therapy       6-12 Hrs later    >0.5                Strongly Recommended  >0.25 - <0.5        Recommended   0.1 - 0.25          Discouraged         "      Remeasure/reassess PCT  <0.1                Strongly Discouraged     Remeasure/reassess PCT    As 28 day mortality risk marker: \"Change in Procalcitonin Result\" (>80% or <=80%) if Day 0 (or Day 1) and Day 4 values are available. Refer to http://www.[email or web address]-pct-calculator.com    Change in PCT <=80%  A decrease of PCT levels below or equal to 80% defines a positive change in PCT test result representing a higher risk for 28-day all-cause mortality of patients diagnosed with severe sepsis for septic shock.    Change in PCT >80%  A decrease of PCT levels of more than 80% defines a negative change in PCT result representing a lower risk for 28-day all-cause mortality of patients diagnosed with severe sepsis or septic shock.      MAGNESIUM - Normal   PHOSPHORUS - Normal   BLOOD CULTURE   BLOOD CULTURE   TYPE AND SCREEN   CBC AND DIFFERENTIAL    Narrative:     The following orders were created for panel order CBC & Differential.  Procedure                               Abnormality         Status                     ---------                               -----------         ------                     CBC Auto Differential[[id number]]        Abnormal            Final result                 Please view results for these tests on the individual orders.       EKG:   Telemetry Scan   Final Result      ECG 12 Lead Syncope   Preliminary Result   HEART RATE=65  bpm   RR Tiykkbdk=854  ms   CT Spggcepf=153  ms   P Horizontal Axis=25  deg   P Front Axis=62  deg   QRSD Mctlctko=552  ms   QT Zhpykwvs=146  ms   NJmI=822  ms   QRS Axis=-23  deg   T Wave Axis=19  deg   - ABNORMAL ECG -   Sinus rhythm   Incomplete RBBB and LAFB   Low voltage, precordial leads   Date and Time of Study:2025-07-16 09:00:43          Meds given in ED:   Medications   sodium chloride 0.9 % flush 10 mL (has no administration in time range)   ipratropium-albuterol (DUO-NEB) nebulizer solution 3 mL (has no administration in time range)   Lidocaine 4 % 1 patch " (has no administration in time range)   medroxyPROGESTERone (PROVERA) tablet 10 mg (has no administration in time range)   metoprolol succinate XL (TOPROL-XL) 24 hr tablet 12.5 mg (has no administration in time range)   rivaroxaban (XARELTO) tablet 20 mg (has no administration in time range)   melatonin tablet 5 mg (has no administration in time range)   sodium chloride 0.9 % flush 10 mL (has no administration in time range)   sodium chloride 0.9 % flush 10 mL (has no administration in time range)   sodium chloride 0.9 % infusion 40 mL (has no administration in time range)   ondansetron (ZOFRAN) injection 4 mg (has no administration in time range)   sennosides-docusate (PERICOLACE) 8.6-50 MG per tablet 2 tablet (has no administration in time range)     And   polyethylene glycol (MIRALAX) packet 17 g (has no administration in time range)     And   bisacodyl (DULCOLAX) EC tablet 5 mg (has no administration in time range)     And   bisacodyl (DULCOLAX) suppository 10 mg (has no administration in time range)   miconazole (MICOTIN) 2 % powder 1 Application (has no administration in time range)   furosemide (LASIX) injection 40 mg (40 mg Intravenous Given 7/16/25 1218)       Imaging results:  XR Chest 1 View  Result Date: 7/16/2025  As described.  This report was finalized on 7/16/2025 9:30 AM by Dr. Damon Chavez M.D on Workstation: [id number]        Ambulatory status:   - bedrest    Social issues:   Social History     Socioeconomic History    Marital status:    Tobacco Use    Smoking status: Never     Passive exposure: Never    Smokeless tobacco: Never   Vaping Use    Vaping status: Never Used   Substance and Sexual Activity    Alcohol use: Not Currently     Comment: occassionally    Drug use: Never    Sexual activity: Not Currently     Partners: Male     Birth control/protection: Post-menopausal       Peripheral Neurovascular  Peripheral Neurovascular (Adult)  Peripheral Neurovascular WDL: .WDL except,  neurovascular assessment lower, all, pulse assessment  Pulse Assessment: radial  LUE Neurovascular Assessment  Temperature LUE: warm  Color LUE: no discoloration  Sensation LUE: no tenderness, no numbness, no tingling  RUE Neurovascular Assessment  Temperature RUE: warm  Color RUE: no discoloration  Sensation RUE: no numbness, no tenderness, no tingling  LLE Neurovascular Assessment  Temperature LLE: warm  Color LLE: rickey  Sensation LLE: no numbness, no tenderness, no tingling  RLE Neurovascular Assessment  Temperature RLE: warm  Color RLE: rickey  Sensation RLE: no numbness, no tenderness, no tingling    Neuro Cognitive  Neuro Cognitive (Adult)  Cognitive/Neuro/Behavioral WDL: WDL, level of consciousness, orientation  Level of Consciousness: Alert  Orientation: oriented x 4    Learning  Learning Assessment  Learning Readiness and Ability: no barriers identified  Education Provided  Person Taught: patient  Teaching Method: verbal instruction  Education Outcome Evaluation: acceptance expressed    Respiratory  Respiratory WDL  Respiratory WDL: .WDL except, rhythm/pattern, expansion/retractions  Rhythm/Pattern, Respiratory: shortness of breath, shallow, labored  Expansion/Accessory Muscles/Retractions: accessory muscle use  Breath Sounds  All Lung Fields Breath Sounds: All Fields  All Lung Fields Breath Sounds: wheezes, inspiratory, Anterior:    Abdominal Pain       Pain Assessments  Pain (Adult)  (0-10) Pain Rating: Rest: 0    NIH Stroke Scale       Martha Servin RN  07/16/25 13:42 EDT

## 2025-07-17 LAB
ANION GAP SERPL CALCULATED.3IONS-SCNC: 9 MMOL/L (ref 5–15)
ARTERIAL PATENCY WRIST A: POSITIVE
ATMOSPHERIC PRESS: 748.8 MMHG
BASE EXCESS BLDA CALC-SCNC: 2.8 MMOL/L (ref 0–2)
BDY SITE: ABNORMAL
BUN SERPL-MCNC: 24 MG/DL (ref 8–23)
BUN/CREAT SERPL: 18.9 (ref 7–25)
CALCIUM SPEC-SCNC: 9 MG/DL (ref 8.6–10.5)
CHLORIDE SERPL-SCNC: 103 MMOL/L (ref 98–107)
CO2 SERPL-SCNC: 25 MMOL/L (ref 22–29)
CREAT SERPL-MCNC: 1.27 MG/DL (ref 0.57–1)
DEPRECATED RDW RBC AUTO: 48.4 FL (ref 37–54)
EGFRCR SERPLBLD CKD-EPI 2021: 47 ML/MIN/1.73
ERYTHROCYTE [DISTWIDTH] IN BLOOD BY AUTOMATED COUNT: 15.7 % (ref 12.3–15.4)
GAS FLOW AIRWAY: 10 LPM
GLUCOSE BLDC GLUCOMTR-MCNC: 153 MG/DL (ref 70–130)
GLUCOSE SERPL-MCNC: 97 MG/DL (ref 65–99)
HCO3 BLDA-SCNC: 30 MMOL/L (ref 22–28)
HCT VFR BLD AUTO: 24 % (ref 34–46.6)
HCT VFR BLD AUTO: 27.7 % (ref 34–46.6)
HEMODILUTION: NO
HGB BLD-MCNC: 7.3 G/DL (ref 12–15.9)
HGB BLD-MCNC: 8.2 G/DL (ref 12–15.9)
Lab: ABNORMAL
MAGNESIUM SERPL-MCNC: 2.3 MG/DL (ref 1.6–2.4)
MCH RBC QN AUTO: 25.8 PG (ref 26.6–33)
MCHC RBC AUTO-ENTMCNC: 30.4 G/DL (ref 31.5–35.7)
MCV RBC AUTO: 84.8 FL (ref 79–97)
MODALITY: ABNORMAL
NOTIFIED WHO: ABNORMAL
NT-PROBNP SERPL-MCNC: 4106 PG/ML (ref 0–900)
PCO2 BLDA: 61 MM HG (ref 35–45)
PH BLDA: 7.3 PH UNITS (ref 7.35–7.45)
PHOSPHATE SERPL-MCNC: 4.2 MG/DL (ref 2.5–4.5)
PLATELET # BLD AUTO: 340 10*3/MM3 (ref 140–450)
PMV BLD AUTO: 8.1 FL (ref 6–12)
PO2 BLDA: 76.8 MM HG (ref 80–100)
POTASSIUM SERPL-SCNC: 4.5 MMOL/L (ref 3.5–5.2)
RBC # BLD AUTO: 2.83 10*6/MM3 (ref 3.77–5.28)
READ BACK: YES
SAO2 % BLDCOA: 93.2 % (ref 92–98.5)
SODIUM SERPL-SCNC: 137 MMOL/L (ref 136–145)
TOTAL RATE: 20 BREATHS/MINUTE
WBC NRBC COR # BLD AUTO: 4.99 10*3/MM3 (ref 3.4–10.8)

## 2025-07-17 PROCEDURE — 99222 1ST HOSP IP/OBS MODERATE 55: CPT | Performed by: INTERNAL MEDICINE

## 2025-07-17 PROCEDURE — 85027 COMPLETE CBC AUTOMATED: CPT | Performed by: HOSPITALIST

## 2025-07-17 PROCEDURE — 85018 HEMOGLOBIN: CPT | Performed by: HOSPITALIST

## 2025-07-17 PROCEDURE — 25010000002 FUROSEMIDE PER 20 MG: Performed by: HOSPITALIST

## 2025-07-17 PROCEDURE — 85014 HEMATOCRIT: CPT | Performed by: HOSPITALIST

## 2025-07-17 PROCEDURE — 36600 WITHDRAWAL OF ARTERIAL BLOOD: CPT

## 2025-07-17 PROCEDURE — 94761 N-INVAS EAR/PLS OXIMETRY MLT: CPT

## 2025-07-17 PROCEDURE — 94799 UNLISTED PULMONARY SVC/PX: CPT

## 2025-07-17 PROCEDURE — 29581 APPL MULTLAYER CMPRN SYS LEG: CPT

## 2025-07-17 PROCEDURE — 84100 ASSAY OF PHOSPHORUS: CPT | Performed by: HOSPITALIST

## 2025-07-17 PROCEDURE — 82803 BLOOD GASES ANY COMBINATION: CPT

## 2025-07-17 PROCEDURE — 82948 REAGENT STRIP/BLOOD GLUCOSE: CPT

## 2025-07-17 PROCEDURE — 83880 ASSAY OF NATRIURETIC PEPTIDE: CPT | Performed by: INTERNAL MEDICINE

## 2025-07-17 PROCEDURE — 25010000002 BUMETANIDE PER 0.5 MG: Performed by: INTERNAL MEDICINE

## 2025-07-17 PROCEDURE — 97535 SELF CARE MNGMENT TRAINING: CPT

## 2025-07-17 PROCEDURE — 83735 ASSAY OF MAGNESIUM: CPT | Performed by: HOSPITALIST

## 2025-07-17 PROCEDURE — 87481 CANDIDA DNA AMP PROBE: CPT | Performed by: HOSPITALIST

## 2025-07-17 PROCEDURE — 80048 BASIC METABOLIC PNL TOTAL CA: CPT | Performed by: HOSPITALIST

## 2025-07-17 RX ORDER — BUMETANIDE 0.25 MG/ML
2 INJECTION, SOLUTION INTRAMUSCULAR; INTRAVENOUS ONCE
Status: COMPLETED | OUTPATIENT
Start: 2025-07-17 | End: 2025-07-17

## 2025-07-17 RX ORDER — FUROSEMIDE 10 MG/ML
40 INJECTION INTRAMUSCULAR; INTRAVENOUS EVERY 12 HOURS
Status: DISCONTINUED | OUTPATIENT
Start: 2025-07-17 | End: 2025-07-18

## 2025-07-17 RX ORDER — HYDROCODONE BITARTRATE AND ACETAMINOPHEN 5; 325 MG/1; MG/1
1 TABLET ORAL EVERY 6 HOURS PRN
Refills: 0 | Status: COMPLETED | OUTPATIENT
Start: 2025-07-17 | End: 2025-07-17

## 2025-07-17 RX ADMIN — IPRATROPIUM BROMIDE AND ALBUTEROL SULFATE 3 ML: .5; 3 SOLUTION RESPIRATORY (INHALATION) at 10:32

## 2025-07-17 RX ADMIN — Medication 10 ML: at 20:23

## 2025-07-17 RX ADMIN — MEDROXYPROGESTERONE ACETATE 10 MG: 10 TABLET ORAL at 13:01

## 2025-07-17 RX ADMIN — ANTI-FUNGAL POWDER MICONAZOLE NITRATE TALC FREE 1 APPLICATION: 1.42 POWDER TOPICAL at 21:46

## 2025-07-17 RX ADMIN — BUMETANIDE 2 MG: 0.25 INJECTION INTRAMUSCULAR; INTRAVENOUS at 20:16

## 2025-07-17 RX ADMIN — IPRATROPIUM BROMIDE AND ALBUTEROL SULFATE 3 ML: .5; 3 SOLUTION RESPIRATORY (INHALATION) at 21:00

## 2025-07-17 RX ADMIN — FUROSEMIDE 40 MG: 10 INJECTION, SOLUTION INTRAMUSCULAR; INTRAVENOUS at 13:01

## 2025-07-17 RX ADMIN — LIDOCAINE 1 PATCH: 4 PATCH TOPICAL at 08:54

## 2025-07-17 RX ADMIN — ANTI-FUNGAL POWDER MICONAZOLE NITRATE TALC FREE 1 APPLICATION: 1.42 POWDER TOPICAL at 08:55

## 2025-07-17 RX ADMIN — HYDROCODONE BITARTRATE AND ACETAMINOPHEN 1 TABLET: 5; 325 TABLET ORAL at 20:15

## 2025-07-17 RX ADMIN — NYSTATIN 1 APPLICATION: 100000 OINTMENT TOPICAL at 20:16

## 2025-07-17 RX ADMIN — NYSTATIN 1 APPLICATION: 100000 OINTMENT TOPICAL at 08:54

## 2025-07-17 RX ADMIN — NYSTATIN 1 APPLICATION: 100000 OINTMENT TOPICAL at 13:54

## 2025-07-17 RX ADMIN — IPRATROPIUM BROMIDE AND ALBUTEROL SULFATE 3 ML: .5; 3 SOLUTION RESPIRATORY (INHALATION) at 07:23

## 2025-07-17 RX ADMIN — METOPROLOL SUCCINATE 12.5 MG: 25 TABLET, EXTENDED RELEASE ORAL at 08:54

## 2025-07-17 RX ADMIN — Medication 10 ML: at 08:55

## 2025-07-17 RX ADMIN — IPRATROPIUM BROMIDE AND ALBUTEROL SULFATE 3 ML: .5; 3 SOLUTION RESPIRATORY (INHALATION) at 15:07

## 2025-07-17 NOTE — CONSULTS
Podiatry Consult Note      Patient: Emely Solomon Admit Date: 07/16/2025    Age: 64 y.o.   PCP: Adilson Wilkerson MD    MRN: [id number]  Room: [facility]/        Subjective     Chief Complaint     Chief Complaint   Patient presents with    Syncope        HPI       This is a 64-year-old female who has a superficial blister to her plantar right heel.  Patient was seen yesterday by wound care team and we were consulted for further evaluation of the plantar heel.  Patient denies any pain to this area.  No current active drainage.    Past Medical History     Past Medical History:   Diagnosis Date    Arthritis     Cellulitis     11/2017, with Group B Strep bacteremia and sepsis    Chronic deep vein thrombosis (DVT) of left popliteal vein 12/17/2015 02/04/2016, 04/14/2016    Chronic diastolic congestive heart failure     COVID-19 virus infection 11/2020    Heart murmur     Hypertension     Iliac vein stenosis, right 05/15/2024    Lipoedema     Lymphedema     other    Morbid obesity     ARIEL on CPAP     PFO (patent foramen ovale)     Postthrombotic syndrome of left lower extremity without complications 12/17/2015    postphletibis    Pulmonary embolism 04/14/2016    Pulmonary hypertension     multifactorial (dCHF, obesity/ARIEL, hx PE), mild by echo 1/2016    Right bundle branch block (RBBB) with left anterior fascicular block (LAFB)     Sepsis 09/18/2020    Type 2 myocardial infarction 05/20/2025        Past Surgical History:   Procedure Laterality Date    ARTERIOGRAM  07/2015    BRONCHOSCOPY N/A 07/21/2017    Procedure: BRONCHOSCOPY with wash;  Surgeon: Caleb Garcia MD;  Location: [facility] ENDOSCOPY;  Service:     COLONOSCOPY      COLONOSCOPY N/A 01/26/2023    Procedure: COLONOSCOPY to CECUM AND TERM ILEUM;  Surgeon: Jj Dean MD;  Location: [facility] ENDOSCOPY;  Service: Gastroenterology;  Laterality: N/A;  PRE OP -screening  POST OP -  NORMAL    D & C HYSTEROSCOPY N/A 03/08/2022    Procedure: DILATATION AND  CURETTAGE with hysteroscopy;  Surgeon: Kaylah Land DO;  Location: [facility] MAIN OR;  Service: Gynecology Oncology;  Laterality: N/A;    DILATATION AND CURETTAGE  04/11/2011    OTHER SURGICAL HISTORY  09/2015    IVC filter    OTHER SURGICAL HISTORY      left LE revascularization    OTHER SURGICAL HISTORY      ALESSIA and LEV scan    THROMBECTOMY  07/2015        Allergies   Allergen Reactions    Cephalexin Hives and Rash     Diffuse rash on cephalexin 1 g PO q6h on 6/17/20  Tolerated zosyn and PCN G September 2020 without issue    Clindamycin/Lincomycin Hives        Social History     Tobacco Use   Smoking Status Never    Passive exposure: Never   Smokeless Tobacco Never        Objective   Physical Exam    Vitals:    07/17/25 0723   BP: 134/74   Pulse: 64   Resp: 18   Temp: 98.1 °F (36.7 °C)   SpO2: 90%          Significant edema to the lower extremities.  The right heel has a large plantar bulla with serous fluid.  No surrounding periwound erythema.  No purulence.  No malodor.    Labs     Lab Results   Component Value Date    HGBA1C 5.40 05/04/2025    POCGLU 106 06/05/2020    SEDRATE 96 (H) 05/20/2025        CBC:      Lab 07/17/25  0357 07/16/25  0947 07/11/25  2257   WBC 4.99 6.45 7.86   HEMOGLOBIN 7.3* 7.8* 8.4*   HEMATOCRIT 24.0* 25.8* 26.6*   PLATELETS 340 369 373   NEUTROS ABS  --  5.34 5.93   IMMATURE GRANS (ABS)  --  0.09* 0.03   LYMPHS ABS  --  0.64* 1.10   MONOS ABS  --  0.32 0.68   EOS ABS  --  0.02 0.08   MCV 84.8 86.3 83.6          Results for orders placed or performed during the hospital encounter of 05/04/25   Blood Culture - Blood, Hand, Right    Specimen: Hand, Right; Blood   Result Value Ref Range    Blood Culture No growth at 5 days         XR Foot 3+ View Right  XR FOOT 3+ VW RIGHT-     Clinical: Wound along the plantar surface of the heel fat pad     FINDINGS: There is ankle and foot edema. No soft tissue gas or  radiopaque foreign body is demonstrated.     There is a moderate size calcaneal  spur. There is bone hypertrophy at  the Achilles insertion. No cortical bone destruction of the calcaneus to  suggest osteomyelitis. No forefoot abnormality is demonstrated. Midfoot  articulations preserved. The remainder is unremarkable.     This report was finalized on 7/16/2025 9:06 PM by Dr. Dhruv Sanchez M.D  on Workstation: [id number]     XR Chest 1 View  Narrative: XR CHEST 1 VW-     HISTORY: Female who is 64 years-old, syncope, congestive heart failure     TECHNIQUE: Frontal view of the chest     COMPARISON: 7/11/2025     FINDINGS: The heart is enlarged. Pulmonary vasculature is mildly  congested. Aorta is tortuous. Small right basilar atelectasis or  infiltrate, follow-up suggested. There may be minimal right pleural  effusion. No pneumothorax. No acute osseous process.     Impression: As described.     This report was finalized on 7/16/2025 9:30 AM by Dr. Damon Chavez M.D on Workstation: [id number]     Adult Transthoracic Echo Complete w/ Color, Spectral and Contrast if Necessary Per Protocol    Left ventricular systolic function is normal. Calculated left   ventricular EF = 62.5%    Left ventricular diastolic function was normal.    The right ventricular cavity is mildly dilated.    Left atrial volume is severely increased.    The right atrial cavity is moderately dilated.    There is calcification of the aortic valve.    There is bileaflet mitral valve thickening present.    Severe mitral valve regurgitation is present.    Estimated right ventricular systolic pressure from tricuspid   regurgitation is markedly elevated (>55 mmHg). Calculated right   ventricular systolic pressure from tricuspid regurgitation is 87 mmHg.    Severe pulmonary hypertension is present.       Assessment/Plan       64-year-old female with significant edema of her lower extremities, right foot plantar blister    -patient examined and evaluated by myself  - Procedure: Plantar right foot was scrubbed with alcohol.  A sharp  scissor was sharply incised to the central aspect of the blister and a 2 cm cut was made.  Serous fluid was removed from the blister and no purulence was expressed.  The roof of the blister was left intact and a Betadine dressing was applied to the right heel.  Patient tolerated this procedure well.  - Nursing was at bedside and advised to do Betadine wet-to-dry's daily to the right plantar foot  - No further intervention needed from my standpoint      Louie Kendrick DPM  [facility]  Office: [phone]

## 2025-07-17 NOTE — PROGRESS NOTES
Discharge Planning Assessment  [facility]     Patient Name: Emely Solomon  MRN: [id number]  Today's Date: 7/17/2025    Admit Date: 7/16/2025    Plan: [facility] - referral pending.   Discharge Needs Assessment       Row Name 07/17/25 1504       Living Environment    People in Home spouse    Name(s) of People in Home Clemente Morrissey    Current Living Arrangements home    Potentially Unsafe Housing Conditions none    Primary Care Provided by spouse/significant other    Provides Primary Care For no one, unable/limited ability to care for self    Family Caregiver if Needed spouse    Family Caregiver Names  Ghanshyam [phone]    Quality of Family Relationships helpful    Able to Return to Prior Arrangements no  Will need SNF       Resource/Environmental Concerns    Resource/Environmental Concerns none    Transportation Concerns none       Transition Planning    Patient/Family Anticipates Transition to inpatient rehabilitation facility    Patient/Family Anticipated Services at Transition skilled nursing;rehabilitation services    Transportation Anticipated health plan transportation       Discharge Needs Assessment    Readmission Within the Last 30 Days no previous admission in last 30 days    Equipment Currently Used at Home cpap;walker, rolling;grab bar;shower chair    Concerns to be Addressed basic needs;discharge planning    Anticipated Changes Related to Illness none    Equipment Needed After Discharge none    Discharge Facility/Level of Care Needs nursing facility, skilled    Provided Post Acute Provider List? Yes    Post Acute Provider List Nursing Home    N/A Provider List Comment Was recently at [facility] and would like to return    Delivered To Support Person    Support Person Clemente Morrissey    Method of Delivery In person                   Discharge Plan       Row Name 07/17/25 1511       Plan    Plan [facility] - referral pending.    Patient/Family in Agreement with  Plan yes    Plan Comments Spoke with  Ghanshyam [phone] and brother Bob Cintron [phone] in [facility].  They live in a house with 3 NICOLAS.  She has a walker, grab bars, shower chair, C=pap.  She has used HH in the past and had a recent stay at [facility].  PCP is Dr. Adilson Wilkerson and pharmacy is [facility].  They are agreeable to referral to [facility].  They are concerned if patient should see psych - if part of her lack of progress is due to depression/anxiety.  Call to Adele - she will check if psych could see patient while in rehab.  CCP will follow.  Galina FAUSTIN                  Continued Care and Services - Admitted Since 7/16/2025       Destination       Service Provider Request Status Services Address Phone Fax Patient Preferred    [facility] Pending - Request Sent -- [address] ([place], [place]) [address] [phone] --                  Selected Continued Care - Episodes Includes continued care and service providers with selected services from the active episodes listed below      High Risk Care Management Episode start date: 7/17/2025 (Paused)   There are no active outsourced providers for this episode.                 Selected Continued Care - Prior Encounters Includes continued care and service providers with selected services from prior encounters from 4/17/2025 to 7/17/2025      Discharged on 6/3/2025 Admission date: 5/25/2025 - Discharge disposition: Skilled Nursing Facility (DC - External)      Destination       Service Provider Services Address Phone Fax Patient Preferred    [facility] [address] ([place], [place]) [address] [phone] --                      Discharged on 5/24/2025 Admission date: 5/20/2025 - Discharge disposition: Skilled Nursing Facility (DC - External)      Destination       Service Provider Services Address Phone Fax  Patient Preferred    [facility] [address] [place] [address] [phone] --                      Discharged on 5/10/2025 Admission date: 5/4/2025 - Discharge disposition: Skilled Nursing Facility (DC - External)      Destination       Service Provider Services Address Phone Fax Patient Preferred    [facility] [address] [address] [phone] --              Home Medical Care       Service Provider Services Address Phone Fax Patient Preferred    [facility] [address] [place] [address], [address], [facility] [address] [phone] --                          Expected Discharge Date and Time       Expected Discharge Date Expected Discharge Time    Jul 20, 2025            Demographic Summary       Row Name 07/17/25 1503       General Information    Admission Type inpatient    Arrived From home    Referral Source admission list    Reason for Consult discharge planning    Preferred Language English                   Functional Status       Row Name 07/17/25 1504       Functional Status    Usual Activity Tolerance moderate    Current Activity Tolerance fair       Functional Status, IADL    Medications completely dependent    Meal Preparation completely dependent    Housekeeping completely dependent    Laundry completely dependent    Shopping completely dependent       Mental Status    General Appearance WDL WDL       Mental Status Summary    Recent Changes in Mental Status/Cognitive Functioning unable to assess                               Becky S. [place], RN

## 2025-07-17 NOTE — CONSULTS
[facility] CVI      Name: Emely Solomon ADMIT: 2025   : 1960  LOS: 1 days   MRN: [id number]     ROOM: [facility]/       Patient Care Team:  Adilson Wilkerson MD as PCP - General (Family Medicine)  Florida Segovia MD as Referring Physician (Obstetrics and Gynecology)  Brennan Rogers MD as Cardiologist (Cardiology)  Caleb Garcia MD as Consulting Physician (Pulmonary Disease)  Jess Sanz, RN as Ambulatory  ([facility])    Referring Provider: Dr. Lozano  Reason for Consultation: Hypoxia        Chief complaint:   Syncope    History of present illness:    Subjective   This is a 64-year-old female patient, lifelong non-smoker with no prior history of lung disease.  HFpEF.  Severe MRCarlos  ARIEL, on auto CPAP 15-20.  History of DVT on AC.    Patient presented to the hospital on  for syncope associated with weakness.  Presenting SpO2 73%.  She required 6 L oxygen but today her oxygen requirement increased to 10 L.      Patient seems to be labored while talking and she is not really able to provide with good history.  She first denied shortness of breath then she said that she does not know how long she has been having trouble breathing.  She indicated taking a diuretic every other day after she has consulted with her provider.  She said that she uses her CPAP every night.  She denies cough.  No chest pain.    Data:  Echo 2025: EF 66-70%; grade 2 diastolic dysfunction; RVSP >55; LA mildly dilated.    Review of Systems  Very limited history from her due to being poor historian.    History  Past Medical History:   Diagnosis Date    Arthritis     Cellulitis     2017, with Group B Strep bacteremia and sepsis    Chronic deep vein thrombosis (DVT) of left popliteal vein 2015, 2016    Chronic diastolic congestive heart failure     COVID-19 virus infection 2020    Heart murmur     Hypertension     Iliac vein stenosis, right  05/15/2024    Lipoedema     Lymphedema     other    Morbid obesity     ARIEL on CPAP     PFO (patent foramen ovale)     Postthrombotic syndrome of left lower extremity without complications 12/17/2015    postphletibis    Pulmonary embolism 04/14/2016    Pulmonary hypertension     multifactorial (dCHF, obesity/ARIEL, hx PE), mild by echo 1/2016    Right bundle branch block (RBBB) with left anterior fascicular block (LAFB)     Sepsis 09/18/2020    Type 2 myocardial infarction 05/20/2025   ,   Past Surgical History:   Procedure Laterality Date    ARTERIOGRAM  07/2015    BRONCHOSCOPY N/A 07/21/2017    Procedure: BRONCHOSCOPY with wash;  Surgeon: Caleb Garcia MD;  Location: [facility] ENDOSCOPY;  Service:     COLONOSCOPY      COLONOSCOPY N/A 01/26/2023    Procedure: COLONOSCOPY to CECUM AND TERM ILEUM;  Surgeon: Jj Dean MD;  Location: [facility] ENDOSCOPY;  Service: Gastroenterology;  Laterality: N/A;  PRE OP -screening  POST OP -  NORMAL    D & C HYSTEROSCOPY N/A 03/08/2022    Procedure: DILATATION AND CURETTAGE with hysteroscopy;  Surgeon: Kaylah Land DO;  Location: [facility] MAIN OR;  Service: Gynecology Oncology;  Laterality: N/A;    DILATATION AND CURETTAGE  04/11/2011    OTHER SURGICAL HISTORY  09/2015    IVC filter    OTHER SURGICAL HISTORY      left LE revascularization    OTHER SURGICAL HISTORY      ALESSIA and LEV scan    THROMBECTOMY  07/2015   ,   Family History   Problem Relation Age of Onset    Breast cancer Mother     Hypertension Other     Ovarian cancer Neg Hx     Uterine cancer Neg Hx     Colon cancer Neg Hx     Malig Hyperthermia Neg Hx    ,   Social History     Socioeconomic History    Marital status:    Tobacco Use    Smoking status: Never     Passive exposure: Never    Smokeless tobacco: Never   Vaping Use    Vaping status: Never Used   Substance and Sexual Activity    Alcohol use: Not Currently     Comment: occassionally    Drug use: Never    Sexual activity: Not Currently     Partners:  Male     Birth control/protection: Post-menopausal     E-cigarette/Vaping    E-cigarette/Vaping Use Never User      E-cigarette/Vaping Substances    Nicotine No     THC No     CBD No     Flavoring No      E-cigarette/Vaping Devices    Disposable No     Pre-filled or Refillable Cartridge No     Refillable Tank No     Pre-filled Pod No          ,   Medications Prior to Admission   Medication Sig Dispense Refill Last Dose/Taking    acetaminophen (TYLENOL) 325 MG tablet Take 2 tablets by mouth Every 6 (Six) Hours As Needed for Mild Pain.   Taking As Needed    Emollient (Aquaphor Advanced Therapy) 41 % ointment Apply 1 Application topically to the appropriate area as directed 2 (Two) Times a Day.   Taking    ferrous sulfate 325 (65 FE) MG tablet Take 1 tablet by mouth Daily With Breakfast.   Taking    furosemide (LASIX) 40 MG tablet Take 1 tablet by mouth Daily.   Taking    guaiFENesin ER 1200 MG tablet sustained-release 12 hour Take 1 tablet by mouth 2 (Two) Times a Day As Needed.   Taking As Needed    ipratropium-albuterol (DUO-NEB) 0.5-2.5 mg/3 ml nebulizer Take 3 mL by nebulization 4 (Four) Times a Day.   Taking    Lidocaine 4 % Place 1 patch on the skin as directed by provider Daily. Apply to skin on upper back remove patch in 12 hours. Remove & Discard patch within 12 hours or as directed by MD   Taking    medroxyPROGESTERone (PROVERA) 10 MG tablet Take 1 tablet by mouth Daily.   Taking    melatonin 5 MG tablet tablet Take 1 tablet by mouth At Night As Needed (sleep).   Taking As Needed    metoprolol succinate XL (TOPROL-XL) 25 MG 24 hr tablet Take 0.5 tablets by mouth Daily.   Taking    rivaroxaban (Xarelto) 20 MG tablet Take 1 tablet by mouth Daily With Dinner. Indications: Atrial Fibrillation (Patient taking differently: Take 1 tablet by mouth Daily With Dinner.)   Taking Differently   , Scheduled Meds:  furosemide, 40 mg, Intravenous, Q12H  hydrocortisone-bacitracin-zinc oxide-nystatin, 1 Application, Topical,  TID  ipratropium-albuterol, 3 mL, Nebulization, 4x Daily - RT  Lidocaine, 1 patch, Transdermal, Q24H  medroxyPROGESTERone, 10 mg, Oral, Daily  metoprolol succinate XL, 12.5 mg, Oral, Daily  miconazole, 1 Application, Topical, Q12H  [Held by provider] rivaroxaban, 20 mg, Oral, Daily With Dinner  sodium chloride, 10 mL, Intravenous, Q12H   , Continuous Infusions:   , PRN Meds:    senna-docusate sodium **AND** polyethylene glycol **AND** bisacodyl **AND** bisacodyl    HYDROcodone-acetaminophen    melatonin    ondansetron    [COMPLETED] Insert Peripheral IV **AND** sodium chloride    sodium chloride    sodium chloride, and Allergies:  Cephalexin and Clindamycin/lincomycin    Objective     Vital Signs   Temp:  [97.4 °F (36.3 °C)-98.1 °F (36.7 °C)] 97.4 °F (36.3 °C)  Heart Rate:  [61-77] 71  Resp:  [18] 18  BP: (103-134)/(74-85) 120/80    PPE used per hospital policy    Physical Exam:  Constitutional: Not in acute distress.  Eyes: Injected conjunctivae, EOMI. pupils equal reactive to light.  ENMT: Martinez 3. No oral thrush.   Neck: Large. Trachea midline. No thyromegaly  Heart: RRR, no murmur  Lungs: Labored breathing.  Equal but diminished throughout.  Expiratory wheezing.  Abdomen: Obese. Soft. No tenderness or dullness. No HSM.  Extremities: No cyanosis, clubbing but grade 4 pitting edema.  Warm extremities and well-perfused.  Neuro: Conscious, alert, oriented x3.  Strength 5/5 in arms.  Psych: Appropriate mood and affect.    Integumentary: No rash.  Normal skin turgor  Lymphatic: No palpable cervical or supraclavicular lymph nodes.      Diagnostic imaging:  I personally and independently reviewed the following images:   CXR 7/16/2025: Increased interstitial pulmonary infiltrates bilaterally and bilateral lower lobe consolidation/atelectasis    Laboratory workup:    Results from last 7 days   Lab Units 07/17/25  0357 07/16/25  0947 07/11/25  2257   SODIUM mmol/L 137 137 135*   POTASSIUM mmol/L 4.5 4.7 4.5   CHLORIDE  mmol/L 103 103 100   CO2 mmol/L 25.0 21.3* 19.6*   BUN mg/dL 24.0* 24.0* 26.0*   CREATININE mg/dL 1.27* 1.35* 1.53*   GLUCOSE mg/dL 97 96 109*   CALCIUM mg/dL 9.0 9.3 9.3     Results from last 7 days   Lab Units 07/16/25  1102 07/16/25  0947   HSTROP T ng/L 32* 34*     Results from last 7 days   Lab Units 07/17/25  1452 07/17/25  0357 07/16/25  0947 07/11/25  2257   WBC 10*3/mm3  --  4.99 6.45 7.86   HEMOGLOBIN g/dL 8.2* 7.3* 7.8* 8.4*   HEMATOCRIT % 27.7* 24.0* 25.8* 26.6*   PLATELETS 10*3/mm3  --  340 369 373     Results from last 7 days   Lab Units 07/16/25  0947   INR  3.29*     Results from last 7 days   Lab Units 07/16/25  0947   PROBNP pg/mL 3,683.0*       Assessment   Acute on chronic HFpEF  Pulmonary edema  Severe pulmonary hypertension, RVSP>55, probably group 2 and possibly group 3.  Bilateral lower lobe atelectasis  Acute hypoxic respiratory failure, secondary to the above    Recommendations:    Check stat ABG.  Patient may require NIPPV.  Initiate empiric bronchodilators although less likely to have bronchospasm.  Repeat BNP.  Give 2 mg of IV Bumex stat for fluid overload  If no hypercapnia then we will continue her on her home CPAP at night.        Bela Wren MD  07/17/25  17:49 EDT

## 2025-07-17 NOTE — DISCHARGE PLACEMENT REQUEST
"Emely Solomon (64 y.o. Female)       Date of Birth   1960    Social Security Number       Address   [address] [place] [address]    Home Phone   [phone]    MRN   [id number]       Rastafari   Non-Orthodox    Marital Status                               Admission Date   7/16/2025    Admission Type   Emergency    Admitting Provider   Darwin Lozano MD    Attending Provider   Darwin Lozano MD    Department, Room/Bed   [facility] CVI, 3112/1       Discharge Date       Discharge Disposition       Discharge Destination                                 Attending Provider: Darwin Lozano MD    Allergies: Cephalexin, Clindamycin/lincomycin    Isolation: Contact   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), Candida Auris (rule out) (07/16/25)   Code Status: CPR    Ht: 160 cm (63\")   Wt: 160 kg (351 lb 13.7 oz)    Admission Cmt: None   Principal Problem: Syncope and collapse [R55]                   Active Insurance as of 7/16/2025       Primary Coverage       Payor Plan Insurance Group Employer/Plan Group    MEDICARE MEDICARE A & B        Payor Plan Address Payor Plan Phone Number Payor Plan Fax Number Effective Dates    [address] [phone]  7/1/2024 - None Entered    [facility] [id number]         Subscriber Name Subscriber Birth Date Member ID       EMELY SOLOMON 1960 [id number]               Secondary Coverage       Payor Plan Insurance Group Employer/Plan Group    ANTHEM BLUE CROSS [facility] SUPP [id number]       Payor Plan Address Payor Plan Phone Number Payor Plan Fax Number Effective Dates    [address]   7/1/2024 - None Entered    [facility] [address]         Subscriber Name Subscriber Birth Date Member ID       EMELY SOLOMON 1960 [id number]                     Emergency Contacts        (Rel.) Home Phone Work Phone Mobile Phone    Ghanshyam Solomon (Spouse) [phone] -- [phone]    Adilson Cintron (Brother) [phone] -- " [phone]

## 2025-07-17 NOTE — PLAN OF CARE
Goal Outcome Evaluation:  Plan of Care Reviewed With: patient        Progress: no change  Outcome Evaluation: Patient is alert and oriented x4, saturating well on 2L of oxygen via nasal cannula,NSR on Tele. No acute changes overnight. Fall prevention protocol maintained. VSS.

## 2025-07-17 NOTE — PROGRESS NOTES
Name: Emely Solomon ADMIT: 2025   : 1960  PCP: Adilson Wilkerson MD    MRN: [id number] LOS: 1 days   AGE/SEX: 64 y.o. female  ROOM: [facility]     Subjective   Subjective   Complaining of shortness of breath. Vaginal bleeding (blood in [employer])     Objective   Objective   Vital Signs  Temp:  [97.4 °F (36.3 °C)-98.1 °F (36.7 °C)] 97.4 °F (36.3 °C)  Heart Rate:  [52-68] 68  Resp:  [18-20] 18  BP: (103-138)/(73-86) 120/80  SpO2:  [85 %-100 %] 90 %  on  Flow (L/min) (Oxygen Therapy):  [2-6] 6;   Device (Oxygen Therapy): nasal cannula  Body mass index is 62.33 kg/m².    Physical Exam  Constitutional:       Appearance: She is obese. She is ill-appearing. She is not toxic-appearing.   HENT:      Head: Normocephalic and atraumatic.   Cardiovascular:      Rate and Rhythm: Normal rate and regular rhythm.   Pulmonary:      Effort: Respiratory distress present.      Breath sounds: Decreased breath sounds present.      Comments: Speaking in short sentences  Abdominal:      General: Bowel sounds are normal.      Palpations: Abdomen is soft.      Tenderness: There is no abdominal tenderness.   Musculoskeletal:      Right lower leg: Edema present.      Left lower leg: Edema present.      Comments: Leg wrapped   Skin:     General: Skin is warm and dry.   Neurological:      Mental Status: She is alert and oriented to person, place, and time.   Psychiatric:         Mood and Affect: Mood normal.         Behavior: Behavior normal.     Results Review  I reviewed the patient's new clinical results.  Results from last 7 days   Lab Units 25  0357 25  0947 25  2257   WBC 10*3/mm3 4.99 6.45 7.86   HEMOGLOBIN g/dL 7.3* 7.8* 8.4*   PLATELETS 10*3/mm3 340 369 373     Results from last 7 days   Lab Units 25  0357 25  0947 25  2257   SODIUM mmol/L 137 137 135*   POTASSIUM mmol/L 4.5 4.7 4.5   CHLORIDE mmol/L 103 103 100   CO2 mmol/L 25.0 21.3* 19.6*   BUN mg/dL 24.0* 24.0* 26.0*   CREATININE  "mg/dL 1.27* 1.35* 1.53*   GLUCOSE mg/dL 97 96 109*     Lab Results   Component Value Date    ANIONGAP 9.0 07/17/2025     Estimated Creatinine Clearance: 66.5 mL/min (A) (by C-G formula based on SCr of 1.27 mg/dL (H)).   Lab Results   Component Value Date    EGFR 47.0 (L) 07/17/2025     Results from last 7 days   Lab Units 07/16/25  0947 07/11/25  2257   ALBUMIN g/dL 3.5 3.7   BILIRUBIN mg/dL 0.4 0.6   ALK PHOS U/L 95 97   AST (SGOT) U/L 24 24   ALT (SGPT) U/L 17 10     Results from last 7 days   Lab Units 07/17/25  0357 07/16/25  0947 07/11/25  2257   CALCIUM mg/dL 9.0 9.3 9.3   ALBUMIN g/dL  --  3.5 3.7   MAGNESIUM mg/dL 2.3 2.4  --    PHOSPHORUS mg/dL 4.2 3.8  --      Results from last 7 days   Lab Units 07/16/25  0947 07/11/25  2257   PROCALCITONIN ng/mL 0.11 0.09   LACTATE mmol/L 1.5  --      No results found for: \"HGBA1C\", \"POCGLU\"    XR Chest 1 View  Result Date: 7/16/2025  As described.  This report was finalized on 7/16/2025 9:30 AM by Dr. Damon Chavez M.D on Workstation: [id number]        Scheduled Meds  furosemide, 40 mg, Intravenous, Q12H  hydrocortisone-bacitracin-zinc oxide-nystatin, 1 Application, Topical, TID  ipratropium-albuterol, 3 mL, Nebulization, 4x Daily - RT  Lidocaine, 1 patch, Transdermal, Q24H  medroxyPROGESTERone, 10 mg, Oral, Daily  metoprolol succinate XL, 12.5 mg, Oral, Daily  miconazole, 1 Application, Topical, Q12H  rivaroxaban, 20 mg, Oral, Daily With Dinner  sodium chloride, 10 mL, Intravenous, Q12H    Continuous Infusions   PRN Meds    senna-docusate sodium **AND** polyethylene glycol **AND** bisacodyl **AND** bisacodyl    melatonin    ondansetron    [COMPLETED] Insert Peripheral IV **AND** sodium chloride    sodium chloride    sodium chloride     Diet  Diet: Cardiac; Healthy Heart (2-3 Na+); Fluid Consistency: Thin (IDDSI 0)      Intake/Output Summary (Last 24 hours) at 7/17/2025 1315  Last data filed at 7/17/2025 1258  Gross per 24 hour   Intake 222 ml   Output 1200 ml   Net " -978 ml         Assessment/Plan     Active Hospital Problems    Diagnosis  POA    **Syncope and collapse [R55]  Yes    Acute respiratory failure with hypoxia [J96.01]  Unknown    History of bacterial endocarditis [Z86.79]  Not Applicable    Severe mitral regurgitation [I34.0]  Unknown    Chronic respiratory failure with hypoxia [J96.11]  Yes    Acute on chronic heart failure with preserved ejection fraction (HFpEF) [I50.33]  Yes    History of pulmonary embolism [Z86.711]  Yes    Iron deficiency [E61.1]  Yes    History of DVT (deep vein thrombosis) [Z86.718]  Not Applicable    Lymphedema [I89.0]  Yes    ARIEL on CPAP [G47.33]  Yes    Chronic diastolic CHF (congestive heart failure) [I50.32]  Yes    Essential hypertension [I10]  Yes    Pulmonary hypertension [I27.20]  Yes      Resolved Hospital Problems   No resolved problems to display.     Patient is a 64 y.o. female     Syncope  Severe MR now (was mild on 5/26/25 echo)  History of mitral valve endocarditis (antibiotics completed 6/17/2025)  Acute hypoxic respiratory failure  CHF  Severe pulmonary hypertension  She has had weakness and dyspnea and increased swelling  BCx drawn in ED no growth at 24 hours  Appreciate cardiology-not a surgical candidate  Continue diuresis, monitor renal function, weights  Discussed with patient multiple medical problems, prognosis, and goals of care. She is not sure about her CODE STATUS. She plans to discuss with her .     History of DVT/PE on xarelto for anticoagulation. She states it has been years but does not know how long ago. For now given anemia and continued vaginal bleeding will hold. Discussed with her she is at increased risk of clot (including PE and death) due to immobility and she is aware.     Postmenopausal bleeding  Blood loss anemia  Elevated   Has GYN/oncology procedure scheduled but not in condition for it now  Monitor for bleeding and worsening anemia. May need transfusion. As above stopping  anticoagulation    Right plantar heel blister  X-ray nothing to suggest osteomyelitis.  Appreciate podiatry. Status post excision-serous fluid. No further intervention needed      ARIEL  Chronic lymphedema  Immobility chronic    DVT prophylaxis  Xarelto (home med) but with continued bleeding and anemia will hold for now.    Discharge  TBD  Expected Discharge Date: 7/20/2025; Expected Discharge Time:     Discussed with patient, family, and nursing staff    Discussed with patient and  CODE STATUS, multiple medical problems (some precluding treatment for others), poor prognosis, goals of care. She is not sure about CODE STATUS and she would like to talk with her primary cardiologist. Will ask palliative care to help with discussions also    Darwin Lozano MD  [facility]  07/17/25 13:09 EDT

## 2025-07-17 NOTE — CONSULTS
Cardiology History & Physical / Consultation      Patient Name: Emely Solomon  Age/Sex: 64 y.o. female  : 1960  MRN: [id number]    Date of Admission: 2025  Date of Encounter Visit: 25  Encounter Provider: Jack Chambers MD  Referring Provider: Darwin Lozano MD  Place of Service: [facility] CARDIOLOGY  Patient Care Team:  Adilson Wilkerson MD as PCP - General (Family Medicine)  Florida Segovia MD as Referring Physician (Obstetrics and Gynecology)  Brennan Rogers MD as Cardiologist (Cardiology)  Caleb Garcia MD as Consulting Physician (Pulmonary Disease)  Jess Sanz, RN as Ambulatory  ([facility])          Subjective:     Chief Complaint: Syncope    Reason for consultation: Severe MR    History of Present Illness:  Emely Solomon is a 64 y.o. female followed by Dr Rogers in office. She has a pertinent medical history of  hypertension, obstructive sleep apnea on CPAP, lymphedema, chronic shortness of breath, and obesity.    In  she was diagnosed with pulmonary embolism and acute DVT.  At that time she had an undiagnosed PFO and developed paradoxical atrial embolus to the left lower extremity.  She underwent thrombectomy and was started on warfarin.  She was subtherapeutic when she was sent home and developed a pulmonary embolism.  She was changed to rivaroxaban.  In 2016, a repeat echocardiogram showed significant improvement of the right side of her heart and pulmonary hypertension.  In May of 2025 she was getting out of her shower, slipped and fell.  She was noted to be febrile on admission, and blood cultures grew group B strep.  And echocardiogram was performed and showed a large vegetation and endocarditis.  She was ultimately diagnosed with mitral valve endocarditis with large vegetation on the posterior leaflet of the mitral valve and started on a 6-week course of IV antibiotics. CV surgery recommended  medical management due to multiple comorbidities and high morbility/mortality with open heart surgery. She was noted to have acute on chronic diastolic/right-sided heart failure and a type II non-STEMI. She was hyponatremic suggestive of SIADH and nephrology was following. She was anemic with a hemoglobin in the 8 range and liver function testing was elevated due to possible hepatic congestion. In follow-up from her fall, her biggest concern was acute vision changes.  She reportedly sees black spots but denies any other neurological symptoms.     She presented to our ER after a witnessed syncopal episode, no prodrome, while here for an outpatient echocardiogram.  At the time of her episode her O2 saturation was 65% on room air and she was placed on 2LNC.  Patient states that she does have home O2 available, but generally does not need it.  A limited echo was performed prior to coming to the ED which was consistent for CHF exacerbation with new severe mitral insufficiency.  High-sensitivity troponin 34, repeat 32      proBNP 3683    Of note, she was here 5 days ago for generalized weakness and elevated BUN and creatinine.  She was given IV fluids, her Lasix was decreased, and she was discharged home.    Echocardiogram 7/16/2025    Left ventricular systolic function is normal. Calculated left ventricular EF = 62.5%    Left ventricular diastolic function was normal.    The right ventricular cavity is mildly dilated.    Left atrial volume is severely increased.    The right atrial cavity is moderately dilated.    There is calcification of the aortic valve.    There is bileaflet mitral valve thickening present.    Severe mitral valve regurgitation is present.    Estimated right ventricular systolic pressure from tricuspid regurgitation is markedly elevated (>55 mmHg). Calculated right ventricular systolic pressure from tricuspid regurgitation is 87 mmHg.    Severe pulmonary hypertension is present.      Past Medical  History:  Past Medical History:   Diagnosis Date    Arthritis     Cellulitis     11/2017, with Group B Strep bacteremia and sepsis    Chronic deep vein thrombosis (DVT) of left popliteal vein 12/17/2015 02/04/2016, 04/14/2016    Chronic diastolic congestive heart failure     COVID-19 virus infection 11/2020    Heart murmur     Hypertension     Iliac vein stenosis, right 05/15/2024    Lipoedema     Lymphedema     other    Morbid obesity     ARIEL on CPAP     PFO (patent foramen ovale)     Postthrombotic syndrome of left lower extremity without complications 12/17/2015    postphletibis    Pulmonary embolism 04/14/2016    Pulmonary hypertension     multifactorial (dCHF, obesity/ARIEL, hx PE), mild by echo 1/2016    Right bundle branch block (RBBB) with left anterior fascicular block (LAFB)     Sepsis 09/18/2020    Type 2 myocardial infarction 05/20/2025       Past Surgical History:   Procedure Laterality Date    ARTERIOGRAM  07/2015    BRONCHOSCOPY N/A 07/21/2017    Procedure: BRONCHOSCOPY with wash;  Surgeon: Caleb Garcia MD;  Location: [facility] ENDOSCOPY;  Service:     COLONOSCOPY      COLONOSCOPY N/A 01/26/2023    Procedure: COLONOSCOPY to CECUM AND TERM ILEUM;  Surgeon: Jj Dean MD;  Location: [facility] ENDOSCOPY;  Service: Gastroenterology;  Laterality: N/A;  PRE OP -screening  POST OP -  NORMAL    D & C HYSTEROSCOPY N/A 03/08/2022    Procedure: DILATATION AND CURETTAGE with hysteroscopy;  Surgeon: Kaylah Land DO;  Location: [facility] MAIN OR;  Service: Gynecology Oncology;  Laterality: N/A;    DILATATION AND CURETTAGE  04/11/2011    OTHER SURGICAL HISTORY  09/2015    IVC filter    OTHER SURGICAL HISTORY      left LE revascularization    OTHER SURGICAL HISTORY      ALESSIA and LEV scan    THROMBECTOMY  07/2015       Home Medications:   Medications Prior to Admission   Medication Sig Dispense Refill Last Dose/Taking    acetaminophen (TYLENOL) 325 MG tablet Take 2 tablets by mouth Every 6 (Six) Hours As  Needed for Mild Pain.   Taking As Needed    Emollient (Aquaphor Advanced Therapy) 41 % ointment Apply 1 Application topically to the appropriate area as directed 2 (Two) Times a Day.   Taking    ferrous sulfate 325 (65 FE) MG tablet Take 1 tablet by mouth Daily With Breakfast.   Taking    furosemide (LASIX) 40 MG tablet Take 1 tablet by mouth Daily.   Taking    guaiFENesin ER 1200 MG tablet sustained-release 12 hour Take 1 tablet by mouth 2 (Two) Times a Day As Needed.   Taking As Needed    ipratropium-albuterol (DUO-NEB) 0.5-2.5 mg/3 ml nebulizer Take 3 mL by nebulization 4 (Four) Times a Day.   Taking    Lidocaine 4 % Place 1 patch on the skin as directed by provider Daily. Apply to skin on upper back remove patch in 12 hours. Remove & Discard patch within 12 hours or as directed by MD   Taking    medroxyPROGESTERone (PROVERA) 10 MG tablet Take 1 tablet by mouth Daily.   Taking    melatonin 5 MG tablet tablet Take 1 tablet by mouth At Night As Needed (sleep).   Taking As Needed    metoprolol succinate XL (TOPROL-XL) 25 MG 24 hr tablet Take 0.5 tablets by mouth Daily.   Taking    rivaroxaban (Xarelto) 20 MG tablet Take 1 tablet by mouth Daily With Dinner. Indications: Atrial Fibrillation (Patient taking differently: Take 1 tablet by mouth Daily With Dinner.)   Taking Differently       Allergies:  Allergies   Allergen Reactions    Cephalexin Hives and Rash     Diffuse rash on cephalexin 1 g PO q6h on 6/17/20  Tolerated zosyn and PCN G September 2020 without issue    Clindamycin/Lincomycin Hives       Past Social History:  Social History     Socioeconomic History    Marital status:    Tobacco Use    Smoking status: Never     Passive exposure: Never    Smokeless tobacco: Never   Vaping Use    Vaping status: Never Used   Substance and Sexual Activity    Alcohol use: Not Currently     Comment: occassionally    Drug use: Never    Sexual activity: Not Currently     Partners: Male     Birth control/protection:  Post-menopausal       Past Family History: History reviewed. No pertinent family history.   Family History   Problem Relation Age of Onset    Breast cancer Mother     Hypertension Other     Ovarian cancer Neg Hx     Uterine cancer Neg Hx     Colon cancer Neg Hx     Malig Hyperthermia Neg Hx        Review of Systems        Objective:     Objective:  Temp:  [97.4 °F (36.3 °C)-98.1 °F (36.7 °C)] 98.1 °F (36.7 °C)  Heart Rate:  [52-68] 68  Resp:  [18-20] 18  BP: (100-138)/(61-86) 134/74    Intake/Output Summary (Last 24 hours) at 7/17/2025 1127  Last data filed at 7/17/2025 0020  Gross per 24 hour   Intake 222 ml   Output 700 ml   Net -478 ml     Body mass index is 62.33 kg/m².      07/17/25  0020 07/17/25  0546   Weight: (!) 160 kg (351 lb 13.7 oz) (!) 160 kg (351 lb 13.7 oz)           Physical Exam:   Constitutional:       Appearance: Chronically ill-appearing.   Pulmonary:      Breath sounds: Normal breath sounds.   Cardiovascular:      Normal rate. Regular rhythm. Normal S1. Normal S2.       Murmurs: There is a grade 3/6 high frequency blowing holosystolic murmur at the apex.      No gallop.  No click. No rub.   Pulses:     Intact distal pulses.   Edema:     Peripheral edema present.     Pretibial: bilateral 3+ edema of the pretibial area.     Ankle: bilateral 3+ edema of the ankle.     Feet: bilateral 3+ edema of the feet.         Labs:   Lab Review:     Results from last 7 days   Lab Units 07/17/25  0357 07/16/25  0947 07/11/25  2257   SODIUM mmol/L 137 137 135*   POTASSIUM mmol/L 4.5 4.7 4.5   CHLORIDE mmol/L 103 103 100   CO2 mmol/L 25.0 21.3* 19.6*   BUN mg/dL 24.0* 24.0* 26.0*   CREATININE mg/dL 1.27* 1.35* 1.53*   GLUCOSE mg/dL 97 96 109*   CALCIUM mg/dL 9.0 9.3 9.3   AST (SGOT) U/L  --  24 24   ALT (SGPT) U/L  --  17 10     Results from last 7 days   Lab Units 07/16/25  1102 07/16/25  0947   HSTROP T ng/L 32* 34*     Results from last 7 days   Lab Units 07/17/25  0357   WBC 10*3/mm3 4.99   HEMOGLOBIN g/dL  7.3*   HEMATOCRIT % 24.0*   PLATELETS 10*3/mm3 340     Results from last 7 days   Lab Units 07/16/25  0947   INR  3.29*         Results from last 7 days   Lab Units 07/17/25  0357   MAGNESIUM mg/dL 2.3         Results from last 7 days   Lab Units 07/16/25  0947   PROBNP pg/mL 3,683.0*                 PREVIOUS EKG:          EKG:               Assessment:       Syncope and collapse    Chronic diastolic CHF (congestive heart failure)    Essential hypertension    Pulmonary hypertension    ARIEL on CPAP    History of DVT (deep vein thrombosis)    Lymphedema    Iron deficiency    History of pulmonary embolism    Acute on chronic heart failure with preserved ejection fraction (HFpEF)    Chronic respiratory failure with hypoxia    Acute respiratory failure with hypoxia    History of bacterial endocarditis    Severe mitral regurgitation        Plan:     1.  Syncopal episode.  Patient really did not have a prodrome.  This is obviously concerning giving her echocardiogram.  2.  Endocarditis.  Patient has known endocarditis of her mitral valve.  On 5/26/2025 she had mild mitral regurgitation.  Patient now has severe mitral regurgitation.  Unfortunately it is probably the underlying cause of problem #1 whether from hypoxia and/or arrhythmia.  3.  Lymphedema patient is absolutely full of fluid her legs were incredibly dilated her skin was thin.  4.  Hypertension  5.  Severe pulmonary hypertension.  6.  Patient has a plantar wound.  Podiatry has evaluated and treated.  Unfortunately with her other comorbidities this definitely complicates things even more so.  There are not really good options.  Will attempt to diurese.  Surgery has turned her down in the past for any type of intervention.  Any type of surgical intervention I agree would be an absolute disaster even in this terrible scenario.  Will diurese see how she does and go from there watcher monitor.  7.  DVT/PE  8.  Will follow monitor    Thank you for allowing me to  participate in the care of Emely Solomon. Feel free to contact me directly with any further questions or concerns.    Jack Chambers MD  [facility]  07/17/25  11:27 EDT

## 2025-07-17 NOTE — PLAN OF CARE
Goal Outcome Evaluation:         Patient seen for BLE Lymphedema wrapping.  LLE wrapped with light compression from toes to knee.  RLE wrapping from ankle to calf.  Nursing to address area on Right heel and skin fold weeping.  OT to check on wraps M-F and nursing to address Sat/Sun.Cont OT as tolerated.

## 2025-07-18 LAB
ANION GAP SERPL CALCULATED.3IONS-SCNC: 10.9 MMOL/L (ref 5–15)
ARTERIAL PATENCY WRIST A: POSITIVE
ATMOSPHERIC PRESS: 749.1 MMHG
ATMOSPHERIC PRESS: 750.1 MMHG
ATMOSPHERIC PRESS: 752.7 MMHG
BACTERIA BLD CULT: ABNORMAL
BASE EXCESS BLDA CALC-SCNC: 5.3 MMOL/L (ref 0–2)
BASE EXCESS BLDA CALC-SCNC: 7.7 MMOL/L (ref 0–2)
BASE EXCESS BLDA CALC-SCNC: 9.8 MMOL/L (ref 0–2)
BDY SITE: ABNORMAL
BOTTLE TYPE: ABNORMAL
BUN SERPL-MCNC: 24 MG/DL (ref 8–23)
BUN/CREAT SERPL: 19.8 (ref 7–25)
C AURIS DNA SPEC QL NAA+NON-PROBE: NOT DETECTED
CALCIUM SPEC-SCNC: 9 MG/DL (ref 8.6–10.5)
CHLORIDE SERPL-SCNC: 104 MMOL/L (ref 98–107)
CO2 SERPL-SCNC: 26.1 MMOL/L (ref 22–29)
CREAT SERPL-MCNC: 1.21 MG/DL (ref 0.57–1)
DEPRECATED RDW RBC AUTO: 51.4 FL (ref 37–54)
DEVICE COMMENT: ABNORMAL
DEVICE COMMENT: ABNORMAL
EGFRCR SERPLBLD CKD-EPI 2021: 49.8 ML/MIN/1.73
EPAP: 5
ERYTHROCYTE [DISTWIDTH] IN BLOOD BY AUTOMATED COUNT: 16.1 % (ref 12.3–15.4)
GLUCOSE SERPL-MCNC: 95 MG/DL (ref 65–99)
HCO3 BLDA-SCNC: 32.4 MMOL/L (ref 22–28)
HCO3 BLDA-SCNC: 34.9 MMOL/L (ref 22–28)
HCO3 BLDA-SCNC: 36.2 MMOL/L (ref 22–28)
HCT VFR BLD AUTO: 25.1 % (ref 34–46.6)
HCT VFR BLD AUTO: 25.8 % (ref 34–46.6)
HCT VFR BLD AUTO: 26.2 % (ref 34–46.6)
HEMODILUTION: NO
HGB BLD-MCNC: 7.7 G/DL (ref 12–15.9)
HGB BLD-MCNC: 7.8 G/DL (ref 12–15.9)
HGB BLD-MCNC: 7.8 G/DL (ref 12–15.9)
INHALED O2 CONCENTRATION: 40 %
INHALED O2 CONCENTRATION: 50 %
INHALED O2 CONCENTRATION: 50 %
INSPIRATORY TIME: 1
INSPIRATORY TIME: 1
Lab: ABNORMAL
MAGNESIUM SERPL-MCNC: 2 MG/DL (ref 1.6–2.4)
MCH RBC QN AUTO: 26.2 PG (ref 26.6–33)
MCHC RBC AUTO-ENTMCNC: 29.8 G/DL (ref 31.5–35.7)
MCV RBC AUTO: 87.9 FL (ref 79–97)
MODALITY: ABNORMAL
NOTIFIED WHO: ABNORMAL
O2 A-A PPRESDIFF RESPIRATORY: 0.3 MMHG
O2 A-A PPRESDIFF RESPIRATORY: 0.4 MMHG
O2 A-A PPRESDIFF RESPIRATORY: 0.4 MMHG
PAW @ PEAK INSP FLOW SETTING VENT: 17 CMH2O
PAW @ PEAK INSP FLOW SETTING VENT: 18 CMH2O
PAW @ PEAK INSP FLOW SETTING VENT: 23 CMH2O
PCO2 BLDA: 60.4 MM HG (ref 35–45)
PCO2 BLDA: 63.3 MM HG (ref 35–45)
PCO2 BLDA: 65.5 MM HG (ref 35–45)
PEEP RESPIRATORY: 5 CM[H2O]
PEEP RESPIRATORY: 5 CM[H2O]
PH BLDA: 7.32 PH UNITS (ref 7.35–7.45)
PH BLDA: 7.33 PH UNITS (ref 7.35–7.45)
PH BLDA: 7.39 PH UNITS (ref 7.35–7.45)
PHOSPHATE SERPL-MCNC: 3.8 MG/DL (ref 2.5–4.5)
PLATELET # BLD AUTO: 366 10*3/MM3 (ref 140–450)
PMV BLD AUTO: 8.3 FL (ref 6–12)
PO2 BLD: 164 MM[HG] (ref 0–500)
PO2 BLD: 231 MM[HG] (ref 0–500)
PO2 BLD: 252 MM[HG] (ref 0–500)
PO2 BLDA: 100.8 MM HG (ref 80–100)
PO2 BLDA: 115.5 MM HG (ref 80–100)
PO2 BLDA: 81.8 MM HG (ref 80–100)
POTASSIUM SERPL-SCNC: 3.9 MMOL/L (ref 3.5–5.2)
RBC # BLD AUTO: 2.98 10*6/MM3 (ref 3.77–5.28)
READ BACK: ABNORMAL
READ BACK: YES
READ BACK: YES
SAO2 % BLDCOA: 94.5 % (ref 92–98.5)
SAO2 % BLDCOA: 97.5 % (ref 92–98.5)
SAO2 % BLDCOA: 98 % (ref 92–98.5)
SET MECH RESP RATE: 20
SET MECH RESP RATE: 24
SET MECH RESP RATE: 24
SODIUM SERPL-SCNC: 141 MMOL/L (ref 136–145)
TOTAL RATE: 22 BREATHS/MINUTE
TOTAL RATE: 25 BREATHS/MINUTE
TOTAL RATE: 28 BREATHS/MINUTE
VENTILATOR MODE: ABNORMAL
VENTILATOR MODE: ABNORMAL
VT ON VENT VENT: 450 ML
VT ON VENT VENT: 500 ML
WBC NRBC COR # BLD AUTO: 6.9 10*3/MM3 (ref 3.4–10.8)

## 2025-07-18 PROCEDURE — 94799 UNLISTED PULMONARY SVC/PX: CPT

## 2025-07-18 PROCEDURE — 83735 ASSAY OF MAGNESIUM: CPT | Performed by: HOSPITALIST

## 2025-07-18 PROCEDURE — 85027 COMPLETE CBC AUTOMATED: CPT | Performed by: HOSPITALIST

## 2025-07-18 PROCEDURE — 97166 OT EVAL MOD COMPLEX 45 MIN: CPT

## 2025-07-18 PROCEDURE — 82803 BLOOD GASES ANY COMBINATION: CPT

## 2025-07-18 PROCEDURE — 25010000002 FUROSEMIDE PER 20 MG: Performed by: STUDENT IN AN ORGANIZED HEALTH CARE EDUCATION/TRAINING PROGRAM

## 2025-07-18 PROCEDURE — 99232 SBSQ HOSP IP/OBS MODERATE 35: CPT | Performed by: STUDENT IN AN ORGANIZED HEALTH CARE EDUCATION/TRAINING PROGRAM

## 2025-07-18 PROCEDURE — 94660 CPAP INITIATION&MGMT: CPT

## 2025-07-18 PROCEDURE — 36600 WITHDRAWAL OF ARTERIAL BLOOD: CPT

## 2025-07-18 PROCEDURE — 97162 PT EVAL MOD COMPLEX 30 MIN: CPT

## 2025-07-18 PROCEDURE — 25010000002 FUROSEMIDE PER 20 MG: Performed by: HOSPITALIST

## 2025-07-18 PROCEDURE — 94664 DEMO&/EVAL PT USE INHALER: CPT

## 2025-07-18 PROCEDURE — 85014 HEMATOCRIT: CPT | Performed by: HOSPITALIST

## 2025-07-18 PROCEDURE — 84100 ASSAY OF PHOSPHORUS: CPT | Performed by: HOSPITALIST

## 2025-07-18 PROCEDURE — 36415 COLL VENOUS BLD VENIPUNCTURE: CPT | Performed by: HOSPITALIST

## 2025-07-18 PROCEDURE — 85018 HEMOGLOBIN: CPT | Performed by: HOSPITALIST

## 2025-07-18 PROCEDURE — 97530 THERAPEUTIC ACTIVITIES: CPT

## 2025-07-18 PROCEDURE — 29581 APPL MULTLAYER CMPRN SYS LEG: CPT

## 2025-07-18 PROCEDURE — 94761 N-INVAS EAR/PLS OXIMETRY MLT: CPT

## 2025-07-18 PROCEDURE — 80048 BASIC METABOLIC PNL TOTAL CA: CPT | Performed by: HOSPITALIST

## 2025-07-18 RX ORDER — FUROSEMIDE 10 MG/ML
80 INJECTION INTRAMUSCULAR; INTRAVENOUS
Status: DISCONTINUED | OUTPATIENT
Start: 2025-07-18 | End: 2025-07-23

## 2025-07-18 RX ADMIN — IPRATROPIUM BROMIDE AND ALBUTEROL SULFATE 3 ML: .5; 3 SOLUTION RESPIRATORY (INHALATION) at 22:13

## 2025-07-18 RX ADMIN — NYSTATIN 1 APPLICATION: 100000 OINTMENT TOPICAL at 17:53

## 2025-07-18 RX ADMIN — MEDROXYPROGESTERONE ACETATE 10 MG: 10 TABLET ORAL at 12:11

## 2025-07-18 RX ADMIN — IPRATROPIUM BROMIDE AND ALBUTEROL SULFATE 3 ML: .5; 3 SOLUTION RESPIRATORY (INHALATION) at 11:33

## 2025-07-18 RX ADMIN — METOPROLOL SUCCINATE 12.5 MG: 25 TABLET, EXTENDED RELEASE ORAL at 12:11

## 2025-07-18 RX ADMIN — IPRATROPIUM BROMIDE AND ALBUTEROL SULFATE 3 ML: .5; 3 SOLUTION RESPIRATORY (INHALATION) at 07:19

## 2025-07-18 RX ADMIN — RIVAROXABAN 20 MG: 20 TABLET, FILM COATED ORAL at 17:53

## 2025-07-18 RX ADMIN — Medication 10 ML: at 21:17

## 2025-07-18 RX ADMIN — FUROSEMIDE 80 MG: 10 INJECTION, SOLUTION INTRAMUSCULAR; INTRAVENOUS at 17:53

## 2025-07-18 RX ADMIN — FUROSEMIDE 40 MG: 10 INJECTION, SOLUTION INTRAMUSCULAR; INTRAVENOUS at 00:56

## 2025-07-18 RX ADMIN — FUROSEMIDE 80 MG: 10 INJECTION, SOLUTION INTRAMUSCULAR; INTRAVENOUS at 12:10

## 2025-07-18 RX ADMIN — NYSTATIN 1 APPLICATION: 100000 OINTMENT TOPICAL at 12:11

## 2025-07-18 RX ADMIN — NYSTATIN 1 APPLICATION: 100000 OINTMENT TOPICAL at 21:16

## 2025-07-18 RX ADMIN — Medication 10 ML: at 12:12

## 2025-07-18 RX ADMIN — IPRATROPIUM BROMIDE AND ALBUTEROL SULFATE 3 ML: .5; 3 SOLUTION RESPIRATORY (INHALATION) at 15:50

## 2025-07-18 RX ADMIN — ANTI-FUNGAL POWDER MICONAZOLE NITRATE TALC FREE 1 APPLICATION: 1.42 POWDER TOPICAL at 21:17

## 2025-07-18 RX ADMIN — ANTI-FUNGAL POWDER MICONAZOLE NITRATE TALC FREE 1 APPLICATION: 1.42 POWDER TOPICAL at 12:12

## 2025-07-18 NOTE — PROGRESS NOTES
Name: Emely Solomon ADMIT: 2025   : 1960  PCP: Adilson Wilkerson MD    MRN: [id number] LOS: 2 days   AGE/SEX: 64 y.o. female  ROOM: [facility]/     Subjective   Subjective   She states breathing improved. She is on NIPPV. Salvador was considered to help quantify urine output instead of pure wick but vaginal bleeding has stopped     Objective   Objective   Vital Signs  Temp:  [97.4 °F (36.3 °C)-98.6 °F (37 °C)] 98.6 °F (37 °C)  Heart Rate:  [65-77] 70  Resp:  [18-28] 28  BP: (108-137)/(72-97) 118/73  SpO2:  [89 %-100 %] 98 %  on  Flow (L/min) (Oxygen Therapy):  [6-10] 10;   Device (Oxygen Therapy): NPPV/NIV  Body mass index is 55.85 kg/m².    Physical Exam  Constitutional:       Appearance: She is obese. She is ill-appearing. She is not toxic-appearing.   HENT:      Head: Normocephalic and atraumatic.   Cardiovascular:      Rate and Rhythm: Normal rate and regular rhythm.   Pulmonary:      Effort: No respiratory distress.      Breath sounds: Decreased breath sounds present.   Abdominal:      General: Bowel sounds are normal.      Palpations: Abdomen is soft.      Tenderness: There is no abdominal tenderness.   Musculoskeletal:      Right lower leg: Edema present.      Left lower leg: Edema present.   Skin:     General: Skin is warm and dry.   Neurological:      Mental Status: She is alert and oriented to person, place, and time.   Psychiatric:         Mood and Affect: Mood normal.         Behavior: Behavior normal.     Results Review  I reviewed the patient's new clinical results.  Results from last 7 days   Lab Units 25  0308 25  1452 25  0357 25  0947 25  2257   WBC 10*3/mm3 6.90  --  4.99 6.45 7.86   HEMOGLOBIN g/dL 7.8* 8.2* 7.3* 7.8* 8.4*   PLATELETS 10*3/mm3 366  --  340 369 373     Results from last 7 days   Lab Units 25  0308 25  0357 25  0947 25  2257   SODIUM mmol/L 141 137 137 135*   POTASSIUM mmol/L 3.9 4.5 4.7 4.5   CHLORIDE mmol/L 104  103 103 100   CO2 mmol/L 26.1 25.0 21.3* 19.6*   BUN mg/dL 24.0* 24.0* 24.0* 26.0*   CREATININE mg/dL 1.21* 1.27* 1.35* 1.53*   GLUCOSE mg/dL 95 97 96 109*     Lab Results   Component Value Date    ANIONGAP 10.9 07/18/2025     Estimated Creatinine Clearance: 64.8 mL/min (A) (by C-G formula based on SCr of 1.21 mg/dL (H)).   Lab Results   Component Value Date    EGFR 49.8 (L) 07/18/2025     Results from last 7 days   Lab Units 07/16/25  0947 07/11/25  2257   ALBUMIN g/dL 3.5 3.7   BILIRUBIN mg/dL 0.4 0.6   ALK PHOS U/L 95 97   AST (SGOT) U/L 24 24   ALT (SGPT) U/L 17 10     Results from last 7 days   Lab Units 07/18/25  0308 07/17/25  0357 07/16/25  0947 07/11/25  2257   CALCIUM mg/dL 9.0 9.0 9.3 9.3   ALBUMIN g/dL  --   --  3.5 3.7   MAGNESIUM mg/dL 2.0 2.3 2.4  --    PHOSPHORUS mg/dL 3.8 4.2 3.8  --      Results from last 7 days   Lab Units 07/16/25  0947 07/11/25  2257   PROCALCITONIN ng/mL 0.11 0.09   LACTATE mmol/L 1.5  --      Glucose   Date/Time Value Ref Range Status   07/17/2025 1656 153 (H) 70 - 130 mg/dL Final       XR Chest 1 View  Result Date: 7/16/2025  As described.  This report was finalized on 7/16/2025 9:30 AM by Dr. Damon Chavez M.D on Workstation: [id number]        Scheduled Meds  furosemide, 40 mg, Intravenous, Q12H  hydrocortisone-bacitracin-zinc oxide-nystatin, 1 Application, Topical, TID  ipratropium-albuterol, 3 mL, Nebulization, 4x Daily - RT  Lidocaine, 1 patch, Transdermal, Q24H  medroxyPROGESTERone, 10 mg, Oral, Daily  metoprolol succinate XL, 12.5 mg, Oral, Daily  miconazole, 1 Application, Topical, Q12H  [Held by provider] rivaroxaban, 20 mg, Oral, Daily With Dinner  sodium chloride, 10 mL, Intravenous, Q12H    Continuous Infusions   PRN Meds    senna-docusate sodium **AND** polyethylene glycol **AND** bisacodyl **AND** bisacodyl    melatonin    ondansetron    [COMPLETED] Insert Peripheral IV **AND** sodium chloride    sodium chloride    sodium chloride     Diet  Diet: Cardiac;  Healthy Heart (2-3 Na+); Fluid Consistency: Thin (IDDSI 0)      Intake/Output Summary (Last 24 hours) at 7/18/2025 0757  Last data filed at 7/18/2025 0622  Gross per 24 hour   Intake 240 ml   Output 4900 ml   Net -4660 ml                                Assessment/Plan     Active Hospital Problems    Diagnosis  POA    **Syncope and collapse [R55]  Yes    Acute respiratory failure with hypoxia [J96.01]  Unknown    History of bacterial endocarditis [Z86.79]  Not Applicable    Severe mitral regurgitation [I34.0]  Unknown    Chronic respiratory failure with hypoxia [J96.11]  Yes    Acute on chronic heart failure with preserved ejection fraction (HFpEF) [I50.33]  Yes    History of pulmonary embolism [Z86.711]  Yes    Iron deficiency [E61.1]  Yes    History of DVT (deep vein thrombosis) [Z86.718]  Not Applicable    Lymphedema [I89.0]  Yes    ARIEL on CPAP [G47.33]  Yes    Chronic diastolic CHF (congestive heart failure) [I50.32]  Yes    Essential hypertension [I10]  Yes    Pulmonary hypertension [I27.20]  Yes      Resolved Hospital Problems   No resolved problems to display.     Patient is a 64 y.o. female presented with progressive weakness, dyspnea, swelling    Syncope  Severe MR now (was mild on 5/26/25 echo)  History of mitral valve endocarditis (antibiotics completed 6/17/2025)  Acute hypoxic respiratory failure  CHF  Severe pulmonary hypertension  BCx drawn in ED no growth at 24 hours  Appreciate cardiology-not a surgical candidate  Appreciate pulmonology. Now on NIPPV  Continue diuresis, monitor renal function, weights 4.6 L last 24 hours. On Lasix 40 twice daily but also received a dose of Bumex 2 mg last night.     History of DVT/PE xarelto for anticoagulation held d/t vaginal bleeding. Discussed with cardiology: 2015 she developed an extensive DVT and PE, and then embolized through her PFO to the left leg arteries and required surgery. For now resume anticoagulation since bleeding is stopped and hemoglobin seems  fairly stable. Transfuse if needed     Postmenopausal bleeding  Blood loss anemia  Elevated   Has GYN/oncology procedure scheduled but not in condition for it now  Monitor for blood loss anemia may need transfusion as anticoagulation being resumed    Right plantar heel blister  X-ray nothing to suggest osteomyelitis.  Appreciate podiatry. Status post excision-serous fluid. No further intervention needed. Wound care    ARIEL  Chronic lymphedema  Immobility chronic    DVT prophylaxis  Xarelto (home med)     Discharge  TBD  Expected Discharge Date: 7/22/2025; Expected Discharge Time:     Discussed with patient and nursing staff and Dr. Ken Lozano MD  [facility]  07/18/25 07:57 EDT

## 2025-07-18 NOTE — THERAPY EVALUATION
Patient Name: Emely Solomon  : 1960    MRN: [id number]                              Today's Date: 2025       Admit Date: 2025    Visit Dx:     ICD-10-CM ICD-9-CM   1. Syncope and collapse  R55 780.2   2. Acute on chronic congestive heart failure, unspecified heart failure type  I50.9 428.0   3. Severe mitral regurgitation  I34.0 424.0   4. Chronic anemia  D64.9 285.9   5. STEFANIA (acute kidney injury)  N17.9 584.9     Patient Active Problem List   Diagnosis    Chronic diastolic CHF (congestive heart failure)    PFO (patent foramen ovale)    Paradoxical embolism    Essential hypertension    Pulmonary hypertension    Upper back pain    Bacteremia due to group B Streptococcus    ARIEL on CPAP    Class 3 severe obesity due to excess calories with serious comorbidity and body mass index (BMI) of 60.0 to 69.9 in adult    History of DVT (deep vein thrombosis)    Lymphedema    Anemia, chronic disease    Iron deficiency    Post-menopausal bleeding    Thickened endometrium    Generalized muscle weakness    Right bundle branch block (RBBB) with left anterior fascicular block (LAFB)    PVD (peripheral vascular disease) with claudication    Iliac vein stenosis, right    Hypoxia    Hyperglycemia    History of pulmonary embolism    Morbid obesity    Hyponatremia    Bacterial endocarditis    Sepsis due to methicillin susceptible Staphylococcus aureus (MSSA) with acute hypoxic respiratory failure without septic shock    Acute on chronic heart failure with preserved ejection fraction (HFpEF)    On home oxygen therapy    Type 2 myocardial infarction    Acute loss of vision, bilateral    Chronic respiratory failure with hypoxia    Acute loss of vision    Acute exacerbation of CHF (congestive heart failure)    Acute pulmonary embolism    Syncope and collapse    Acute respiratory failure with hypoxia    History of bacterial endocarditis    Severe mitral regurgitation     Past Medical History:   Diagnosis Date    Arthritis      Cellulitis     11/2017, with Group B Strep bacteremia and sepsis    Chronic deep vein thrombosis (DVT) of left popliteal vein 12/17/2015 02/04/2016, 04/14/2016    Chronic diastolic congestive heart failure     COVID-19 virus infection 11/2020    Heart murmur     Hypertension     Iliac vein stenosis, right 05/15/2024    Lipoedema     Lymphedema     other    Morbid obesity     ARIEL on CPAP     PFO (patent foramen ovale)     Postthrombotic syndrome of left lower extremity without complications 12/17/2015    postphletibis    Pulmonary embolism 04/14/2016    Pulmonary hypertension     multifactorial (dCHF, obesity/ARIEL, hx PE), mild by echo 1/2016    Right bundle branch block (RBBB) with left anterior fascicular block (LAFB)     Sepsis 09/18/2020    Type 2 myocardial infarction 05/20/2025     Past Surgical History:   Procedure Laterality Date    ARTERIOGRAM  07/2015    BRONCHOSCOPY N/A 07/21/2017    Procedure: BRONCHOSCOPY with wash;  Surgeon: Caleb Garcia MD;  Location: [facility] ENDOSCOPY;  Service:     COLONOSCOPY      COLONOSCOPY N/A 01/26/2023    Procedure: COLONOSCOPY to CECUM AND TERM ILEUM;  Surgeon: Jj Dean MD;  Location: [facility] ENDOSCOPY;  Service: Gastroenterology;  Laterality: N/A;  PRE OP -screening  POST OP -  NORMAL    D & C HYSTEROSCOPY N/A 03/08/2022    Procedure: DILATATION AND CURETTAGE with hysteroscopy;  Surgeon: Kaylah Land DO;  Location: [facility] MAIN OR;  Service: Gynecology Oncology;  Laterality: N/A;    DILATATION AND CURETTAGE  04/11/2011    OTHER SURGICAL HISTORY  09/2015    IVC filter    OTHER SURGICAL HISTORY      left LE revascularization    OTHER SURGICAL HISTORY      ALESSIA and LEV scan    THROMBECTOMY  07/2015      General Information       Row Name 07/18/25 1532          Physical Therapy Time and Intention    Document Type evaluation  -MS     Mode of Treatment occupational therapy;physical therapy;co-treatment  Activity limitation due to fatigue/weakness; Working on  functional balance and strengthening while performing ADL's  -MS       Row Name 07/18/25 1532          General Information    Patient Profile Reviewed yes  -MS     Prior Level of Function --  use of Rwx for ambulation just prior to admission per pt. report  -MS     Existing Precautions/Restrictions fall;oxygen therapy device and L/min   -MS     Barriers to Rehab none identified  -MS       Row Name 07/18/25 1532          Cognition    Orientation Status (Cognition) oriented x 3  -MS               User Key  (r) = Recorded By, (t) = Taken By, (c) = Cosigned By      Initials Name Provider Type    Juan Nolan, PT Physical Therapist                   Mobility       Row Name 07/18/25 1533          Bed Mobility    Supine-Sit [place] (Bed Mobility) moderate assist (50% patient effort);2 person assist  -MS     Sit-Supine [place] (Bed Mobility) maximum assist (25% patient effort);2 person assist  -MS       Row Name 07/18/25 1533          Sit-Stand Transfer    Sit-Stand [place] (Transfers) maximum assist (25% patient effort);2 person assist  -MS     Assistive Device (Sit-Stand Transfers) --  HHA x 2  -MS     Comment, (Sit-Stand Transfer) Bilateral feet blocked for safety.  Once standing upright, pt. only able to maintain her stance ~ 10 seconds due to increased fatigue, weakness, and SOA.  -MS               User Key  (r) = Recorded By, (t) = Taken By, (c) = Cosigned By      Initials Name Provider Type    Jaun Nolan, PT Physical Therapist                   Obj/Interventions       Row Name 07/18/25 1534          Range of Motion Comprehensive    Comment, General Range of Motion BLE (Imp. 25%)  -MS       Row Name 07/18/25 1534          Strength Comprehensive (MMT)    Comment, General Manual Muscle Testing (MMT) Assessment BLE (3-/5)  -MS               User Key  (r) = Recorded By, (t) = Taken By, (c) = Cosigned By      Initials Name Provider Type    Juan Nolan, PT Physical Therapist                    Goals/Plan       Row Name 07/18/25 1535          Bed Mobility Goal 1 (PT)    Activity/Assistive Device (Bed Mobility Goal 1, PT) bed mobility activities, all  -MS     Door Level/Cues Needed (Bed Mobility Goal 1, PT) minimum assist (75% or more patient effort)  -MS     Time Frame (Bed Mobility Goal 1, PT) long term goal (LTG);1 week  -MS       Row Name 07/18/25 1535          Transfer Goal 1 (PT)    Activity/Assistive Device (Transfer Goal 1, PT) transfers, all;walker, rolling  -MS     Door Level/Cues Needed (Transfer Goal 1, PT) minimum assist (75% or more patient effort)  -MS     Time Frame (Transfer Goal 1, PT) long term goal (LTG);1 week  -MS       [facility] Name 07/18/25 1535          Gait Training Goal 1 (PT)    Activity/Assistive Device (Gait Training Goal 1, PT) gait (walking locomotion);walker, rolling  -MS     Door Level (Gait Training Goal 1, PT) minimum assist (75% or more patient effort)  -MS     Distance (Gait Training Goal 1, PT) 20 feet  -MS     Time Frame (Gait Training Goal 1, PT) long term goal (LTG);1 week  -MS       Row Name 07/18/25 1539          Therapy Assessment/Plan (PT)    Planned Therapy Interventions (PT) balance training;bed mobility training;home exercise program;patient/family education;gait training;postural re-education;transfer training;strengthening;ROM (range of motion)  -MS               User Key  (r) = Recorded By, (t) = Taken By, (c) = Cosigned By      Initials Name Provider Type    MS Juan Weeks, PT Physical Therapist                   Clinical Impression       Row Name 07/18/25 1534          Pain    Pretreatment Pain Rating 0/10 - no pain  -MS     Posttreatment Pain Rating 0/10 - no pain  -MS       Row Name 07/18/25 1534          Plan of Care Review    Plan of Care Reviewed With patient  -MS       [facility] Name 07/18/25 1534          Therapy Assessment/Plan (PT)    Rehab Potential (PT) good  -MS     Criteria for Skilled Interventions Met (PT)  meets criteria  -MS     Therapy Frequency (PT) 6 times/wk  -MS       Row Name 07/18/25 1534          Vital Signs    Pre SpO2 (%) 97  -MS     O2 Delivery Pre Treatment supplemental O2  -MS     Intra SpO2 (%) 82  -MS     O2 Delivery Intra Treatment supplemental O2  -MS     Post SpO2 (%) 92  -MS     O2 Delivery Post Treatment supplemental O2  -MS       Row Name 07/18/25 1534          Positioning and Restraints    Pre-Treatment Position in bed  -MS     Post Treatment Position bed  -MS     In Bed notified nsg;supine;call light within reach;encouraged to call for assist;exit alarm on;with OT;with other staff  All lines intact. V.S.S.  -MS               User Key  (r) = Recorded By, (t) = Taken By, (c) = Cosigned By      Initials Name Provider Type    Juan Nolan, PT Physical Therapist                   Outcome Measures       Row Name 07/18/25 1535 07/18/25 0855       How much help from another person do you currently need...    Turning from your back to your side while in flat bed without using bedrails? 2  -MS 2  -JN    Moving from lying on back to sitting on the side of a flat bed without bedrails? 2  -MS 1  -JN    Moving to and from a bed to a chair (including a wheelchair)? 2  -MS 1  -JN    Standing up from a chair using your arms (e.g., wheelchair, bedside chair)? 2  -MS 2  -JN    Climbing 3-5 steps with a railing? 2  -MS 1  -JN    To walk in hospital room? 2  -MS 2  -JN    AM-PAC 6 Clicks Score (PT) 12  -MS 9  -JN    Highest Level of Mobility Goal Move to Chair/Commode-4  -MS Sit at Edge of Bed-3  -JN      Row Name 07/18/25 1535          Functional Assessment    Outcome Measure Options AM-PAC 6 Clicks Basic Mobility (PT)  -MS               User Key  (r) = Recorded By, (t) = Taken By, (c) = Cosigned By      Initials Name Provider Type    Juan Nolan, PT Physical Therapist    Raquel Atwood RN Registered Nurse                                 Physical Therapy Education       Title: PT OT SLP  Therapies (In Progress)       Topic: Physical Therapy (In Progress)       Point: Mobility training (Done)       Learning Progress Summary            Patient Acceptance, E,D, VU,NR by MS at 7/18/2025 1535                      Point: Home exercise program (Not Started)       Learner Progress:  Not documented in this visit.              Point: Body mechanics (Done)       Learning Progress Summary            Patient Acceptance, E,D, VU,NR by MS at 7/18/2025 1535                      Point: Precautions (Done)       Learning Progress Summary            Patient Acceptance, E,D, VU,NR by MS at 7/18/2025 1535                                      User Key       Initials Effective Dates Name Provider Type Discipline    MS 06/16/21 -  Juan Weeks, PT Physical Therapist PT                  PT Recommendation and Plan  Planned Therapy Interventions (PT): balance training, bed mobility training, home exercise program, patient/family education, gait training, postural re-education, transfer training, strengthening, ROM (range of motion)  Outcome Evaluation: Pt. is a 64 year old Female admitted to the hospital with Acute Syncope.  Pt. reports that prior to admission she was using a Rwx for ambulation.  Pt. currently presents with decreased strength, decreased balance, and decreased tolerance to functional activity.  This PM, pt. requires Mod-Max. assist x2 for bed mobility and Max. assist x 2 (with HHA x 2) for sit <-> stand transfers.  Pt. unable to maintain standing position for more than ~10 seconds due to overall fatigue/weakness/increased SOA.  Verbal/tactile cues given throughout for posture correction.  Pt. will benefit from skilled inpt. P.T. to address her functional deficits and to assist pt. in regaining her maximum level of independence with functional mobility.     Time Calculation:         PT Charges       Row Name 07/18/25 1877             Time Calculation    Start Time 1325  -MS      Stop Time 1340  -MS       Time Calculation (min) 15 min  -MS      PT Received On 07/18/25  -MS      PT - Next Appointment 07/19/25  -MS      PT Goal Re-Cert Due Date 07/25/25  -MS         Time Calculation- PT    Total Timed Code Minutes- PT 15 minute(s)  -MS                User Key  (r) = Recorded By, (t) = Taken By, (c) = Cosigned By      Initials Name Provider Type    Juan Nolan, PT Physical Therapist                  Therapy Charges for Today       Code Description Service Date Service Provider Modifiers Qty    34282644981 HC PT EVAL MOD COMPLEXITY 3 7/18/2025 Juan Weeks, PT GP 1    81400642963 HC PT THERAPEUTIC ACT EA 15 MIN 7/18/2025 Juan Weeks, PT GP 1            PT G-Codes  Outcome Measure Options: AM-PAC 6 Clicks Basic Mobility (PT)  AM-PAC 6 Clicks Score (PT): 12  AM-PAC 6 Clicks Score (OT): 14  PT Discharge Summary  Anticipated Discharge Disposition (PT): skilled nursing facility    Juan Weeks, PT  7/18/2025

## 2025-07-18 NOTE — PLAN OF CARE
Goal Outcome Evaluation:        Lymphedema:  BLE rewrapped with light compression.  No redness or pain with wraps, per nursing and patient.  Skin folds above Lymph wraps continue to weep.  Nursing to address wrapping over weekend.  Cont OT as tolerated.

## 2025-07-18 NOTE — PLAN OF CARE
Goal Outcome Evaluation:  Plan of Care Reviewed With: patient           Outcome Evaluation: 64 y.o. female admitted to [facility] after syncope episode withCardio appt at hospital.  At baseline, patient lives with spouse at home (3 [place]ICOLAS) Independent with ADLs and functional mobility (Rw).  A&Ox4, BUE wFL, 3+/5.  Patient presents to OT with decreased strength, activity tolerance, coordination and balance.  Nursing reports patient had to stay on Bipap all night and day until afternoon.  Patient resting in bed upon arrival.  Patient able to transition to EOB with Mod/Max A x2.  Once stabilized, patient required CGA for sitting balance. STS from EOB with Max A x2 and HHA x2.  Patient O2 desat to mid/low 80s and required Max A x2 to return to supine and repositioned.  OT would be beneficial to address underlying physical deficits and increase ADLs. Anticipate patient d/c to SNF for continued progress.    Anticipated Discharge Disposition (OT): skilled nursing facility

## 2025-07-18 NOTE — PLAN OF CARE
Goal Outcome Evaluation:  Plan of Care Reviewed With: patient           Outcome Evaluation: Pt. is a 64 year old Female admitted to the hospital with Acute Syncope.  Pt. reports that prior to admission she was using a Rwx for ambulation.  Pt. currently presents with decreased strength, decreased balance, and decreased tolerance to functional activity.  This PM, pt. requires Mod-Max. assist x2 for bed mobility and Max. assist x 2 (with HHA x 2) for sit <-> stand transfers.  Pt. unable to maintain standing position for more than ~10 seconds due to overall fatigue/weakness/increased SOA.  Verbal/tactile cues given throughout for posture correction.  Pt. will benefit from skilled inpt. P.T. to address her functional deficits and to assist pt. in regaining her maximum level of independence with functional mobility.    Anticipated Discharge Disposition (PT): skilled nursing facility

## 2025-07-18 NOTE — PROGRESS NOTES
[facility] CVI      Name: Emely Solomon ADMIT: 2025   : 1960  LOS: 2 days   MRN: [id number]     ROOM: [facility]/       Patient Care Team:  Adilson Wilkerson MD as PCP - General (Family Medicine)  Florida Segovia MD as Referring Physician (Obstetrics and Gynecology)  Brennan Rogers MD as Cardiologist (Cardiology)  Caleb Garcia MD as Consulting Physician (Pulmonary Disease)  Jess Sanz, RN as Ambulatory  ([facility])      Chief Complaint:  F/up respiratory failure    Subjective   Interval History    On NIPPV.  Requiring 4 L oxygen when taken off the NIPPV.  Feels better.  Still has dyspnea though.  No cough.    REVIEW OF SYSTEMS:   CARDIOVASCULAR: No chest pain, chest pressure or chest discomfort. No palpitations but edema.    GASTROINTESTINAL: No anorexia, nausea, vomiting or diarrhea. No abdominal pain.  CONSTITUTIONAL: No fever or chills.     Ventilator/Non-Invasive Ventilation Settings (From admission, onward)       Start     Ordered    25 0407  NIPPV (CPAP or BIPAP)  Until Discontinued        Question Answer Comment   Indication Acute Respiratory Failure    Type AVAPS/PC/PS    Backup Rate 20    Target VT (mL) 500    EPAP/PEEP (cm H2O) 5    Min Pressure (cm H2O) 8    Max Pressure (cm H2O) 28    Titrate Oxygen for SpO2 90% or Greater        25 0406    25 2231  NIPPV (CPAP or BIPAP)  Until Discontinued,   Status:  Canceled        Question Answer Comment   Indication Acute Respiratory Failure    Type AVAPS/PC/PS    Backup Rate 16    Target VT (mL) 450    EPAP/PEEP (cm H2O) 5    Min Pressure (cm H2O) 8    Max Pressure (cm H2O) 24    Titrate Oxygen for SpO2 90% or Greater        25 2231    25 1919  NIPPV (CPAP or BIPAP)  As Needed-RT,   Status:  Canceled        Question Answer Comment   Indication Acute Respiratory Failure    Type AVAPS/PC/PS    Backup Rate 12    Target VT (mL) 450   "  EPAP/PEEP (cm H2O) 10    Min Pressure (cm H2O) 15    Max Pressure (cm H2O) 25    Titrate Oxygen for SpO2 90% or Greater        07/17/25 1919                          Physical Exam:     Vital Signs  Temp:  [98.1 °F (36.7 °C)-98.6 °F (37 °C)] 98.6 °F (37 °C)  Heart Rate:  [65-77] 71  Resp:  [18-28] 23  BP: (108-137)/(72-97) 129/79    Intake/Output Summary (Last 24 hours) at 7/18/2025 1458  Last data filed at 7/18/2025 1327  Gross per 24 hour   Intake 462 ml   Output 6100 ml   Net -5638 ml     Flowsheet Rows      Flowsheet Row First Filed Value   Admission Height 160 cm (63\") Documented at 07/17/2025 0020   Admission Weight 160 kg (351 lb 13.7 oz) Documented at 07/17/2025 0020            PPE used per hospital policy    General Appearance:   Alert, cooperative, in no acute distress   ENMT:  Mallampati score 3, moist mucous membrane   Eyes:  Pupils equal and reactive to light. EOMI   Neck:   Large. Trachea midline. No thyromegaly.   Lungs:   Significantly diminished overall.  Coarse.  Minimal expiratory wheezing.    Heart:   Regular rhythm and normal rate, normal S1 and S2, no         murmur   Skin:   No rash or ecchymosis   Abdomen:    Obese. Soft. No tenderness. No HSM.   Neuro/psych:  Conscious, alert, oriented x3. Strength 5/5 in upper and lower  ext.  Appropriate mood and affect   Extremities:  No cyanosis, clubbing but edema.  Warm extremities and well-perfused          Results Review:        Results from last 7 days   Lab Units 07/18/25  0308 07/17/25  0357 07/16/25  0947   SODIUM mmol/L 141 137 137   POTASSIUM mmol/L 3.9 4.5 4.7   CHLORIDE mmol/L 104 103 103   CO2 mmol/L 26.1 25.0 21.3*   BUN mg/dL 24.0* 24.0* 24.0*   CREATININE mg/dL 1.21* 1.27* 1.35*   GLUCOSE mg/dL 95 97 96   CALCIUM mg/dL 9.0 9.0 9.3     Results from last 7 days   Lab Units 07/16/25  1102 07/16/25  0947   HSTROP T ng/L 32* 34*     Results from last 7 days   Lab Units 07/18/25  0828 07/18/25  0308 07/17/25  1452 07/17/25  0357 07/16/25  0947 "   WBC 10*3/mm3  --  6.90  --  4.99 6.45   HEMOGLOBIN g/dL 7.7* 7.8* 8.2* 7.3* 7.8*   HEMATOCRIT % 25.1* 26.2* 27.7* 24.0* 25.8*   PLATELETS 10*3/mm3  --  366  --  340 369     Results from last 7 days   Lab Units 07/16/25  0947   INR  3.29*     Results from last 7 days   Lab Units 07/17/25  0357   PROBNP pg/mL 4,106.0*                   Results from last 7 days   Lab Units 07/18/25  1143 07/18/25  0647 07/18/25  0355 07/17/25  2107   PH, ARTERIAL pH units 7.386 7.334* 7.318* 7.299*   PCO2, ARTERIAL mm Hg 60.4* 65.5* 63.3* 61.0*   PO2 ART mm Hg 100.8* 115.5* 81.8 76.8*   O2 SATURATION ART % 97.5 98.0 94.5 93.2   FLOW RATE lpm  --   --   --  10.0000   MODALITY  BiPap BiPap BiPap HFNC         I reviewed the patient's new clinical results.        Medication Review:   furosemide, 80 mg, Intravenous, BID Diuretics  hydrocortisone-bacitracin-zinc oxide-nystatin, 1 Application, Topical, TID  ipratropium-albuterol, 3 mL, Nebulization, 4x Daily - RT  Lidocaine, 1 patch, Transdermal, Q24H  medroxyPROGESTERone, 10 mg, Oral, Daily  metoprolol succinate XL, 12.5 mg, Oral, Daily  miconazole, 1 Application, Topical, Q12H  rivaroxaban, 20 mg, Oral, Daily With Dinner  sodium chloride, 10 mL, Intravenous, Q12H            Assessment     Acute on chronic HFpEF  Pulmonary edema, acute  Severe pulmonary hypertension, RVSP>55, probably group 2 and possibly group 3.  Bilateral lower lobe atelectasis  Acute on probably chronic hypercapnic respiratory failure  Acute hypoxic respiratory failure, secondary to the above  Severe MR  ARIEL, on auto CPAP 15-20        Plan     NIPPV: settings reviewed and adjusted.  Use NIPPV at night and as needed during the day.  Eventually she may require outpatient NIPPV instead of her home CPAP due to significant hypercapnia.  Will reassess prior to DC.  Lasix 80 mg IV twice daily  DuoNeb 4 times a day  Xarelto 20 mg daily            Bela Wren MD  07/18/25  14:58 EDT          This note was dictated utilizing  Casey dictation

## 2025-07-18 NOTE — CONSULTS
[facility] Cardiac Surgery      Patient Care Team:  Adilson Wilkerson MD as PCP - General (Family Medicine)  Florida Segovia MD as Referring Physician (Obstetrics and Gynecology)  Brennan Rogers MD as Cardiologist (Cardiology)  Caleb Garcia MD as Consulting Physician (Pulmonary Disease)  Jess Sanz, RN as Ambulatory  ([facility])    Chief complaint: Syncope, Severe MR    Subjective     History of Present Illness  Patient is a 64 y.o. female with a past medical history including HTN, endocarditis, CHF, DVT/PE, ARIEL, lymphedema, pulmonary HTN, and morbid obesity. She was seen in the ER on 5/4/25 with a chief complaint of back pain following a fall. Upon arrival to ER she was found to have lower extremity edema, a dry cough, temperature of 100.6, an elevated BNP of 2994, and positive blood cultures for Group B strep. She was started on IV abx and admitted for further monitoring. A LYNETTE was completed on 5/5 and showed a large mobile echodensity attached to the atrial surface of the posterior mitral valve leaflet concerning for vegetation, mild mitral regurgitation, and mild to moderate tricuspid regurgitation.  Cardiac surgery was consulted for surgical evaluation, she was found to not be a good surgical candidate and recommended for medical management. She was recently seen in the ER on 7/11 for acute generalized weakness as well as an elevated BUN (26) and creatinine (1.53) and was given IV fluids. CXR showed now acute disease. Home lasix was decreased from 40 mg to 20 mg at discharge. She also reported ongoing vaginal bleeding that is being worked up outpatient. She was sent outpatient to the cardiologist on 7/16  for an echocardiogram and had a possible syncopal episode while walking back to the echo.  She was found to be hypoxic 65% on room air. She typically wears 2L of oxygen at home but was not wearing any at the appointment. Cardiologist was called for a rapid response. Patient  was taken back for the echocardiogram and sent to the ER for hypoxia and syncopal episode. Echo revealed CHF exacerbation and severe mitral valve insufficiency. Echo findings listed below. Oxygen requirements increased up to 10L nc. She is also being seen by wound care and podiatry for a known foot blister wound. CT surgery consulted for surgical evaluation.          Echo Interpretation Summary (7/16/25)      Left ventricular systolic function is normal. Calculated left ventricular EF = 62.5%    Left ventricular diastolic function was normal.    The right ventricular cavity is mildly dilated.    Left atrial volume is severely increased.    The right atrial cavity is moderately dilated.    There is calcification of the aortic valve.    There is bileaflet mitral valve thickening present.    Severe mitral valve regurgitation is present.    Estimated right ventricular systolic pressure from tricuspid regurgitation is markedly elevated (>55 mmHg). Calculated right ventricular systolic pressure from tricuspid regurgitation is 87 mmHg.    Severe pulmonary hypertension is present.    Review of Systems     Past Medical History:   Diagnosis Date    Arthritis     Cellulitis     11/2017, with Group B Strep bacteremia and sepsis    Chronic deep vein thrombosis (DVT) of left popliteal vein 12/17/2015 02/04/2016, 04/14/2016    Chronic diastolic congestive heart failure     COVID-19 virus infection 11/2020    Heart murmur     Hypertension     Iliac vein stenosis, right 05/15/2024    Lipoedema     Lymphedema     other    Morbid obesity     ARIEL on CPAP     PFO (patent foramen ovale)     Postthrombotic syndrome of left lower extremity without complications 12/17/2015    postphletibis    Pulmonary embolism 04/14/2016    Pulmonary hypertension     multifactorial (dCHF, obesity/ARIEL, hx PE), mild by echo 1/2016    Right bundle branch block (RBBB) with left anterior fascicular block (LAFB)     Sepsis 09/18/2020    Type 2 myocardial  infarction 05/20/2025     Past Surgical History:   Procedure Laterality Date    ARTERIOGRAM  07/2015    BRONCHOSCOPY N/A 07/21/2017    Procedure: BRONCHOSCOPY with wash;  Surgeon: Caleb Garcia MD;  Location: [facility] ENDOSCOPY;  Service:     COLONOSCOPY      COLONOSCOPY N/A 01/26/2023    Procedure: COLONOSCOPY to CECUM AND TERM ILEUM;  Surgeon: Jj Dean MD;  Location: [facility] ENDOSCOPY;  Service: Gastroenterology;  Laterality: N/A;  PRE OP -screening  POST OP -  NORMAL    D & C HYSTEROSCOPY N/A 03/08/2022    Procedure: DILATATION AND CURETTAGE with hysteroscopy;  Surgeon: Kaylah Land DO;  Location: [facility] MAIN OR;  Service: Gynecology Oncology;  Laterality: N/A;    DILATATION AND CURETTAGE  04/11/2011    OTHER SURGICAL HISTORY  09/2015    IVC filter    OTHER SURGICAL HISTORY      left LE revascularization    OTHER SURGICAL HISTORY      ALESSIA and LEV scan    THROMBECTOMY  07/2015     Family History   Problem Relation Age of Onset    Breast cancer Mother     Hypertension Other     Ovarian cancer Neg Hx     Uterine cancer Neg Hx     Colon cancer Neg Hx     Malig Hyperthermia Neg Hx      Social History     Tobacco Use    Smoking status: Never     Passive exposure: Never    Smokeless tobacco: Never   Vaping Use    Vaping status: Never Used   Substance Use Topics    Alcohol use: Not Currently     Comment: occassionally    Drug use: Never     Medications Prior to Admission   Medication Sig Dispense Refill Last Dose/Taking    acetaminophen (TYLENOL) 325 MG tablet Take 2 tablets by mouth Every 6 (Six) Hours As Needed for Mild Pain.   Taking As Needed    Emollient (Aquaphor Advanced Therapy) 41 % ointment Apply 1 Application topically to the appropriate area as directed 2 (Two) Times a Day.   Taking    ferrous sulfate 325 (65 FE) MG tablet Take 1 tablet by mouth Daily With Breakfast.   Taking    furosemide (LASIX) 40 MG tablet Take 1 tablet by mouth Daily.   Taking    guaiFENesin ER 1200 MG tablet  "sustained-release 12 hour Take 1 tablet by mouth 2 (Two) Times a Day As Needed.   Taking As Needed    ipratropium-albuterol (DUO-NEB) 0.5-2.5 mg/3 ml nebulizer Take 3 mL by nebulization 4 (Four) Times a Day.   Taking    Lidocaine 4 % Place 1 patch on the skin as directed by provider Daily. Apply to skin on upper back remove patch in 12 hours. Remove & Discard patch within 12 hours or as directed by MD   Taking    medroxyPROGESTERone (PROVERA) 10 MG tablet Take 1 tablet by mouth Daily.   Taking    melatonin 5 MG tablet tablet Take 1 tablet by mouth At Night As Needed (sleep).   Taking As Needed    metoprolol succinate XL (TOPROL-XL) 25 MG 24 hr tablet Take 0.5 tablets by mouth Daily.   Taking    rivaroxaban (Xarelto) 20 MG tablet Take 1 tablet by mouth Daily With Dinner. Indications: Atrial Fibrillation (Patient taking differently: Take 1 tablet by mouth Daily With Dinner.)   Taking Differently     furosemide, 80 mg, Intravenous, BID Diuretics  hydrocortisone-bacitracin-zinc oxide-nystatin, 1 Application, Topical, TID  ipratropium-albuterol, 3 mL, Nebulization, 4x Daily - RT  Lidocaine, 1 patch, Transdermal, Q24H  medroxyPROGESTERone, 10 mg, Oral, Daily  metoprolol succinate XL, 12.5 mg, Oral, Daily  miconazole, 1 Application, Topical, Q12H  rivaroxaban, 20 mg, Oral, Daily With Dinner  sodium chloride, 10 mL, Intravenous, Q12H      Allergies:  Cephalexin and Clindamycin/lincomycin    Objective      Vital Signs  Temp:  [97.4 °F (36.3 °C)-98.6 °F (37 °C)] 98.6 °F (37 °C)  Heart Rate:  [65-77] 66  Resp:  [18-28] 28  BP: (108-137)/(72-97) 118/73    Flowsheet Rows      Flowsheet Row First Filed Value   Admission Height 160 cm (63\") Documented at 07/17/2025 0020   Admission Weight 160 kg (351 lb 13.7 oz) Documented at 07/17/2025 0020          160 cm (63\")    Physical Exam    Results Review:   Lab Results (last 24 hours)       Procedure Component Value Units Date/Time    CANDIDA AURIS PCR - Swab, Axilla Right, Axilla Left " and Groin [[id number]]  (Normal) Collected: 07/17/25 0809    Specimen: Swab from Axilla Right, Axilla Left and Groin Updated: 07/18/25 1047     JAUN AURIS PCR Not Detected    Blood Culture - Blood, Wrist, Right [[id number]]  (Normal) Collected: 07/16/25 0956    Specimen: Blood from Wrist, Right Updated: 07/18/25 1015     Blood Culture No growth at 2 days    Blood Culture - Blood, Arm, Left [[id number]]  (Normal) Collected: 07/16/25 1007    Specimen: Blood from Arm, Left Updated: 07/18/25 1015     Blood Culture No growth at 2 days    Narrative:      Less than seven (7) mL's of blood was collected.  Insufficient quantity may yield false negative results.    Hemoglobin & Hematocrit, Blood [[id number]]  (Abnormal) Collected: 07/18/25 0828    Specimen: Blood Updated: 07/18/25 0905     Hemoglobin 7.7 g/dL      Hematocrit 25.1 %     Blood Gas, Arterial -Obtain on: Current Settings [[id number]]  (Abnormal) Collected: 07/18/25 0647    Specimen: Arterial Blood Updated: 07/18/25 0816     Site Arterial: left radial     Hammad's Test Positive     pH, Arterial 7.334 pH units      pCO2, Arterial 65.5 mm Hg      pO2, Arterial 115.5 mm Hg      HCO3, Arterial 34.9 mmol/L      Base Excess, Arterial 7.7 mmol/L      Comment: Serial Number: [id number]:  [id number]        O2 Saturation, Arterial 98.0 %      A-a DO2 0.4 mmHg      Barometric Pressure for Blood Gas 749.1000 mmHg      Modality BiPap     FIO2 50 %      Set Tidal Volume 500     Set Mech Resp Rate 20     Rate 22 Breaths/minute      PEEP 5     PIP 23 cmH2O      EPAP 5     Notified Who --     Read Back --     Notified Time --     Hemodilution No     PO2/FIO2 231    Basic Metabolic Panel [[id number]]  (Abnormal) Collected: 07/18/25 0308    Specimen: Blood Updated: 07/18/25 0405     Glucose 95 mg/dL      BUN 24.0 mg/dL      Creatinine 1.21 mg/dL      Sodium 141 mmol/L      Potassium 3.9 mmol/L      Comment: Slight hemolysis detected by analyzer. Result may be falsely elevated.         Chloride 104 mmol/L      CO2 26.1 mmol/L      Calcium 9.0 mg/dL      BUN/Creatinine Ratio 19.8     Anion Gap 10.9 mmol/L      eGFR 49.8 mL/min/1.73     Narrative:      GFR Categories in Chronic Kidney Disease (CKD)              GFR Category          GFR (mL/min/1.73)    Interpretation  G1                    90 or greater        Normal or high (1)  G2                    60-89                Mild decrease (1)  G3a                   45-59                Mild to moderate decrease  G3b                   30-44                Moderate to severe decrease  G4                    15-29                Severe decrease  G5                    14 or less           Kidney failure    (1)In the absence of evidence of kidney disease, neither GFR category G1 or G2 fulfill the criteria for CKD.    eGFR calculation 2021 CKD-EPI creatinine equation, which does not include race as a factor    Magnesium [[id number]]  (Normal) Collected: 07/18/25 0308    Specimen: Blood Updated: 07/18/25 0405     Magnesium 2.0 mg/dL     Phosphorus [[id number]]  (Normal) Collected: 07/18/25 0308    Specimen: Blood Updated: 07/18/25 0405     Phosphorus 3.8 mg/dL     Blood Gas, Arterial - [[id number]]  (Abnormal) Collected: 07/18/25 0355    Specimen: Arterial Blood Updated: 07/18/25 0400     Site Right Radial     Hammad's Test Positive     pH, Arterial 7.318 pH units      pCO2, Arterial 63.3 mm Hg      pO2, Arterial 81.8 mm Hg      HCO3, Arterial 32.4 mmol/L      Base Excess, Arterial 5.3 mmol/L      Comment: Serial Number: [id number]:  [id number]        O2 Saturation, Arterial 94.5 %      A-a DO2 0.3 mmHg      Barometric Pressure for Blood Gas 750.1000 mmHg      Modality BiPap     FIO2 50 %      Ventilator Mode NIV     Set Tidal Volume 450     Set Mech Resp Rate 24     Rate 25 Breaths/minute      PIP 17 cmH2O      Notified Who Boris Cano     Read Back Yes     Notified Time --     Hemodilution No     Inspiratory Time 1     Device Comment Min 8 Max 24 Epap 5 Sat 100%      PO2/FIO2 164    CBC (No Diff) [[id number]]  (Abnormal) Collected: 07/18/25 0308    Specimen: Blood Updated: 07/18/25 0342     WBC 6.90 10*3/mm3      RBC 2.98 10*6/mm3      Hemoglobin 7.8 g/dL      Hematocrit 26.2 %      MCV 87.9 fL      MCH 26.2 pg      MCHC 29.8 g/dL      RDW 16.1 %      RDW-SD 51.4 fl      MPV 8.3 fL      Platelets 366 10*3/mm3     Blood Gas, Arterial - [[id number]]  (Abnormal) Collected: 07/17/25 2107    Specimen: Arterial Blood Updated: 07/17/25 2111     Site Right Radial     Hammad's Test Positive     pH, Arterial 7.299 pH units      pCO2, Arterial 61.0 mm Hg      pO2, Arterial 76.8 mm Hg      HCO3, Arterial 30.0 mmol/L      Base Excess, Arterial 2.8 mmol/L      Comment: Serial Number: [id number]:  [id number]        O2 Saturation, Arterial 93.2 %      Barometric Pressure for Blood Gas 748.8000 mmHg      Modality HFNC     Flow Rate 10.0000 lpm      Rate 20 Breaths/minute      Notified Who tyra ruiz     Read Back Yes     Notified Time --     Hemodilution No    BNP [[id number]]  (Abnormal) Collected: 07/17/25 0357    Specimen: Blood Updated: 07/17/25 1904     proBNP 4,106.0 pg/mL     Narrative:      This assay is used as an aid in the diagnosis of individuals suspected of having heart failure. It can be used as an aid in the diagnosis of acute decompensated heart failure (ADHF) in patients presenting with signs and symptoms of ADHF to the emergency department (ED). In addition, NT-proBNP of <300 pg/mL indicates ADHF is not likely.    Age Range Result Interpretation  NT-proBNP Concentration (pg/mL:      <50             Positive            >450                   Gray                 300-450                    Negative             <300    50-75           Positive            >900                  Gray                300-900                  Negative            <300      >75             Positive            >1800                  Gray                300-1800                  Negative            <300     POC Glucose Once [[id number]]  (Abnormal) Collected: 07/17/25 1656    Specimen: Blood Updated: 07/17/25 1657     Glucose 153 mg/dL     Hemoglobin & Hematocrit, Blood [[id number]]  (Abnormal) Collected: 07/17/25 1452    Specimen: Blood from Arm, Left Updated: 07/17/25 1512     Hemoglobin 8.2 g/dL      Hematocrit 27.7 %                 Assessment & Plan       Syncope and collapse    Chronic diastolic CHF (congestive heart failure)    Essential hypertension    Pulmonary hypertension    ARIEL on CPAP    History of DVT (deep vein thrombosis)    Lymphedema    Iron deficiency    History of pulmonary embolism    Acute on chronic heart failure with preserved ejection fraction (HFpEF)    Chronic respiratory failure with hypoxia    Acute respiratory failure with hypoxia    History of bacterial endocarditis    Severe mitral regurgitation      Assessment & Plan    - Syncopal episode  - Endocarditis - known endocarditis of the mitral valve. On 5/26/2 she had mild mitral regurgitation, she now has severe mitral regurgitation.  - Lymphedema  - Hypertension  - Plantar Wound - wound care and podiatry onboard.  - Severe pulmonary HTN  - Iron deficiency  - Chronic diastolic CHF  - Acute Respiratory Failure with Hypoxia       - Consulted for surgical evaluation for worsened mitral valve insufficiency. Dr. Martinez discussed findings with Dr. Terry. With her multiple co-morbidities and current foot wound, she is not a good surgical candidate. Plans to possibly go forward with LYNETTE and mitral clip in the future.    Thank you for allowing us to participate in the care of this patient.      LUCÍA Cronin  07/18/25  11:08 EDT

## 2025-07-18 NOTE — PROGRESS NOTES
I s/w Emely. 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 Her long term prognosis is very poor. However, she is very scared and overwhelmed right now, and she is just not in a place to be able to effectively discuss GOC/code status, etc. I explained that if she was overwhelmed, we could table it for now and keep her full code status, and that's what she wishes to do. I explained that she is diuresing well, and that the test on Monday will give us information as to whether or not she would be a candidate for MitraClip, and that we would just take it day by day for now.

## 2025-07-18 NOTE — NURSING NOTE
CWON note: Podiatry has seen the pt and recommended dressing changes for the heel wound. Please see our full assessment dated 7/16. WOC nurse will sign off at this time. Please re-consult for any additional needs.

## 2025-07-18 NOTE — THERAPY EVALUATION
Patient Name: Emely Solomon  : 1960    MRN: [id number]                              Today's Date: 2025       Admit Date: 2025    Visit Dx:     ICD-10-CM ICD-9-CM   1. Syncope and collapse  R55 780.2   2. Acute on chronic congestive heart failure, unspecified heart failure type  I50.9 428.0   3. Severe mitral regurgitation  I34.0 424.0   4. Chronic anemia  D64.9 285.9   5. STEFANIA (acute kidney injury)  N17.9 584.9     Patient Active Problem List   Diagnosis    Chronic diastolic CHF (congestive heart failure)    PFO (patent foramen ovale)    Paradoxical embolism    Essential hypertension    Pulmonary hypertension    Upper back pain    Bacteremia due to group B Streptococcus    ARIEL on CPAP    Class 3 severe obesity due to excess calories with serious comorbidity and body mass index (BMI) of 60.0 to 69.9 in adult    History of DVT (deep vein thrombosis)    Lymphedema    Anemia, chronic disease    Iron deficiency    Post-menopausal bleeding    Thickened endometrium    Generalized muscle weakness    Right bundle branch block (RBBB) with left anterior fascicular block (LAFB)    PVD (peripheral vascular disease) with claudication    Iliac vein stenosis, right    Hypoxia    Hyperglycemia    History of pulmonary embolism    Morbid obesity    Hyponatremia    Bacterial endocarditis    Sepsis due to methicillin susceptible Staphylococcus aureus (MSSA) with acute hypoxic respiratory failure without septic shock    Acute on chronic heart failure with preserved ejection fraction (HFpEF)    On home oxygen therapy    Type 2 myocardial infarction    Acute loss of vision, bilateral    Chronic respiratory failure with hypoxia    Acute loss of vision    Acute exacerbation of CHF (congestive heart failure)    Acute pulmonary embolism    Syncope and collapse    Acute respiratory failure with hypoxia    History of bacterial endocarditis    Severe mitral regurgitation     Past Medical History:   Diagnosis Date    Arthritis      Cellulitis     11/2017, with Group B Strep bacteremia and sepsis    Chronic deep vein thrombosis (DVT) of left popliteal vein 12/17/2015 02/04/2016, 04/14/2016    Chronic diastolic congestive heart failure     COVID-19 virus infection 11/2020    Heart murmur     Hypertension     Iliac vein stenosis, right 05/15/2024    Lipoedema     Lymphedema     other    Morbid obesity     ARIEL on CPAP     PFO (patent foramen ovale)     Postthrombotic syndrome of left lower extremity without complications 12/17/2015    postphletibis    Pulmonary embolism 04/14/2016    Pulmonary hypertension     multifactorial (dCHF, obesity/ARIEL, hx PE), mild by echo 1/2016    Right bundle branch block (RBBB) with left anterior fascicular block (LAFB)     Sepsis 09/18/2020    Type 2 myocardial infarction 05/20/2025     Past Surgical History:   Procedure Laterality Date    ARTERIOGRAM  07/2015    BRONCHOSCOPY N/A 07/21/2017    Procedure: BRONCHOSCOPY with wash;  Surgeon: Caleb Garcia MD;  Location: [facility] ENDOSCOPY;  Service:     COLONOSCOPY      COLONOSCOPY N/A 01/26/2023    Procedure: COLONOSCOPY to CECUM AND TERM ILEUM;  Surgeon: Jj Dean MD;  Location: [facility] ENDOSCOPY;  Service: Gastroenterology;  Laterality: N/A;  PRE OP -screening  POST OP -  NORMAL    D & C HYSTEROSCOPY N/A 03/08/2022    Procedure: DILATATION AND CURETTAGE with hysteroscopy;  Surgeon: Kaylah Land DO;  Location: [facility] MAIN OR;  Service: Gynecology Oncology;  Laterality: N/A;    DILATATION AND CURETTAGE  04/11/2011    OTHER SURGICAL HISTORY  09/2015    IVC filter    OTHER SURGICAL HISTORY      left LE revascularization    OTHER SURGICAL HISTORY      ALESSIA and LEV scan    THROMBECTOMY  07/2015      General Information       Row Name 07/18/25 1521          OT Time and Intention    Document Type evaluation  -JR     Mode of Treatment occupational therapy;physical therapy;co-treatment  -JR     Symptoms Noted During/After Treatment fatigue;shortness of breath   -JR       Row Name 07/18/25 1521          General Information    Patient Profile Reviewed yes  -JR     Prior Level of Function independent:;ADL's  -JR     Barriers to Rehab medically complex  -JR       Row Name 07/18/25 1521          Living Environment    Current Living Arrangements home  -JR     People in Home spouse  -JR       Row Name 07/18/25 1521          Home Main Entrance    Number of Stairs, Main Entrance three  -JR     Stair Railings, Main Entrance railings safe and in good condition  -JR       Row Name 07/18/25 1521          Cognition    Orientation Status (Cognition) oriented x 4  -JR       Row Name 07/18/25 1521          Safety Issues/Impairments Affecting Functional Mobility    Impairments Affecting Function (Mobility) balance;coordination;endurance/activity tolerance;shortness of breath;strength  -JR     Comment, Safety Issues/Impairments (Mobility) PT/OT cotreatment medically appropriate and necessary due to patient acuity level, to maximize therapeutic benefit due to impaired act tolerance, and for safety of patient and staff. Treatment focused on progression of care and goals established in POC.  -JR               User Key  (r) = Recorded By, (t) = Taken By, (c) = Cosigned By      Initials Name Provider Type    Jack Perez OT Occupational Therapist                   Lymphedema       Row Name 07/18/25 1500 07/17/25 1600          Skin Changes/Observations    Location/Assessment Lower Extremity  -JR Lower Extremity  -JR     Lower Extremity Conditions bilateral:  -JR bilateral:  -JR     Skin Observations Comment weeping areas above wraps  -JR weaping areas above wraps  -JR     Recorded by [JR] Jack Parra OT [JR] Jack Parra OT            Compression/Skin Care    Compression/Skin Care skin care;wrapping location;bandaging;compression garment;remove bandages  -JR skin care;wrapping location;bandaging;compression garment  -JR     Skin Care washed/dried;moisturizing lotion applied  -JR  washed/dried;lotion applied  -JR     Wrapping Location -- lower extremity  -JR     Wrapping Location LE left:;foot to knee;right:;calf  -JR left:;foot to knee;right:;calf  -JR     Wrapping Comments No wraping over weeping skin folds  -JR Do not wrap over weaping skin folds  -JR     Bandage Layers cotton liner;padding/fluff layer;short-stretch bandages (comment size/quantity)  -JR cotton liner;padding/fluff layer;short-stretch bandages (comment size/quantity)  -JR     Bandaging Comments LLE 10 compression, artiflex 15. RLE 8 compression, artiflex 10  -JR LLE 10 compression, artiflex 15. RLE 8 compression, artiflex 10  -JR     Bandaging Technique light compression  -JR light compression  -JR     Remove Bandages Removed and rewrapped with light compression  -JR --     Recorded by [JR] Jack Parra OT [JR] Jack Parra OT               User Key  (r) = Recorded By, (t) = Taken By, (c) = Cosigned By      Initials Name Effective Dates    Jack Perez OT 07/24/24 -                    Mobility/ADL's       Row Name 07/18/25 1522          Bed Mobility    Bed Mobility bed mobility (all) activities;supine-sit;sit-supine  -JR     Supine-Sit [place] (Bed Mobility) moderate assist (50% patient effort);2 person assist;maximum assist (25% patient effort)  -     Sit-Supine [place] (Bed Mobility) maximum assist (25% patient effort);2 person assist  -     Assistive Device (Bed Mobility) head of bed elevated  -       Row Name 07/18/25 1522          Transfers    Transfers sit-stand transfer  -       Row Name 07/18/25 1522          Sit-Stand Transfer    Sit-Stand [place] (Transfers) 2 person assist;maximum assist (25% patient effort)  -     Comment, (Sit-Stand Transfer) HHA  -               User Key  (r) = Recorded By, (t) = Taken By, (c) = Cosigned By      Initials Name Provider Type    Jack Perez OT Occupational Therapist                   Obj/Interventions       Row Name 07/18/25 1527          Sensory  Assessment (Somatosensory)    Sensory Assessment (Somatosensory) sensation intact  -JR       Row Name 07/18/25 1527          Vision Assessment/Intervention    Visual Impairment/Limitations WFL  -JR       Row Name 07/18/25 1527          Range of Motion Comprehensive    General Range of Motion bilateral upper extremity ROM WFL  -JR     Comment, General Range of Motion BUE WFL  -JR       Row Name 07/18/25 1527          Strength Comprehensive (MMT)    General Manual Muscle Testing (MMT) Assessment upper extremity strength deficits identified  -JR     Comment, General Manual Muscle Testing (MMT) Assessment BUE 3+/5  -JR       Row Name 07/18/25 1527          Balance    Balance Assessment sitting static balance  -JR     Static Sitting Balance contact guard  -JR     Position, Sitting Balance sitting edge of bed  -JR               User Key  (r) = Recorded By, (t) = Taken By, (c) = Cosigned By      Initials Name Provider Type    Jack Perez, OT Occupational Therapist                   Goals/Plan       [facility] Name 07/18/25 1539          Bed Mobility Goal 1 (OT)    Activity/Assistive Device (Bed Mobility Goal 1, OT) bed mobility activities, all  -JR     [place] Level/Cues Needed (Bed Mobility Goal 1, OT) modified independence  -JR     Time Frame (Bed Mobility Goal 1, OT) short term goal (STG);2 weeks  -JR     Progress/Outcomes (Bed Mobility Goal 1, OT) new goal  -[facility] Name 07/18/25 1539          Transfer Goal 1 (OT)    Activity/Assistive Device (Transfer Goal 1, OT) transfers, all  -JR     [place] Level/Cues Needed (Transfer Goal 1, OT) modified independence  -JR     Time Frame (Transfer Goal 1, OT) short term goal (STG);2 weeks  -JR     Progress/Outcome (Transfer Goal 1, OT) new goal  -       Row Name 07/18/25 1539          Bathing Goal 1 (OT)    Activity/Device (Bathing Goal 1, OT) bathing skills, all  -JR     [place] Level/Cues Needed (Bathing Goal 1, OT) modified independence  -JR     Time Frame  (Bathing Goal 1, OT) short term goal (STG);2 weeks  -JR     Progress/Outcomes (Bathing Goal 1, OT) new goal  -       Row Name 07/18/25 1539          Dressing Goal 1 (OT)    Activity/Device (Dressing Goal 1, OT) dressing skills, all  -JR     [place]/Cues Needed (Dressing Goal 1, OT) modified independence  -JR     Time Frame (Dressing Goal 1, OT) short term goal (STG);2 weeks  -JR     Progress/Outcome (Dressing Goal 1, OT) new goal  -JR       Row Name 07/18/25 1539          Toileting Goal 1 (OT)    Activity/Device (Toileting Goal 1, OT) toileting skills, all  -JR     [place] Level/Cues Needed (Toileting Goal 1, OT) modified independence  -JR     Time Frame (Toileting Goal 1, OT) short term goal (STG);2 weeks  -JR     Progress/Outcome (Toileting Goal 1, OT) new goal  -       Row Name 07/18/25 1539          Grooming Goal 1 (OT)    Activity/Device (Grooming Goal 1, OT) grooming skills, all  -JR     [place] (Grooming Goal 1, OT) modified independence  -JR     Time Frame (Grooming Goal 1, OT) short term goal (STG);2 weeks  -JR     Progress/Outcome (Grooming Goal 1, OT) new goal  -       Row Name 07/18/25 1534          Therapy Assessment/Plan (OT)    Planned Therapy Interventions (OT) activity tolerance training;adaptive equipment training;BADL retraining;functional balance retraining;neuromuscular control/coordination retraining;passive ROM/stretching;strengthening exercise;transfer/mobility retraining;ROM/therapeutic exercise  -               User Key  (r) = Recorded By, (t) = Taken By, (c) = Cosigned By      Initials Name Provider Type    Jack Perez OT Occupational Therapist                   Clinical Impression       Row Name 07/18/25 1535          Plan of Care Review    Plan of Care Reviewed With patient  -JR     Outcome Evaluation 64 y.o. female admitted to [facility] after syncope episode withCardio appt at hospital.  At baseline, patient lives with spouse at home (3 NICOLAS) Independent with ADLs and  functional mobility (Rw).  A&Ox4, BUE wFL, 3+/5.  Patient presents to OT with decreased strength, activity tolerance, coordination and balance.  Nursing reports patient had to stay on Bipap all night and day until afternoon.  Patient resting in bed upon arrival.  Patient able to transition to EOB with Mod/Max A x2.  Once stabilized, patient required CGA for sitting balance. STS from EOB with Max A x2 and HHA x2.  Patient O2 desat to mid/low 80s and required Max A x2 to return to supine and repositioned.  OT would be beneficial to address underlying physical deficits and increase ADLs. Anticipate patient d/c to SNF for continued progress.  -       Row Name 07/18/25 1532          Therapy Assessment/Plan (OT)    Rehab Potential (OT) good  -     Criteria for Skilled Therapeutic Interventions Met (OT) yes;skilled treatment is necessary  -     Therapy Frequency (OT) 5 times/wk  -       Row Name 07/18/25 1532          Therapy Plan Review/Discharge Plan (OT)    Anticipated Discharge Disposition (OT) skilled nursing facility  -       Row Name 07/18/25 1532          Vital Signs    O2 Delivery Pre Treatment supplemental O2  -JR     O2 Delivery Intra Treatment supplemental O2  -JR     O2 Delivery Post Treatment supplemental O2  -JR     Pre Patient Position Supine  -JR     Intra Patient Position Standing  -JR     Post Patient Position Supine  -JR       Row Name 07/18/25 1532          Positioning and Restraints    Pre-Treatment Position in bed  -JR     Post Treatment Position bed  -JR     In Bed notified nsg;call light within reach;encouraged to call for assist;exit alarm on  -JR               User Key  (r) = Recorded By, (t) = Taken By, (c) = Cosigned By      Initials Name Provider Type    Jack Perez, OT Occupational Therapist                   Outcome Measures       Row Name 07/18/25 6508          How much help from another is currently needed...    Putting on and taking off regular lower body clothing? 2  -JR      Bathing (including washing, rinsing, and drying) 2  -JR     Toileting (which includes using toilet bed pan or urinal) 2  -JR     Putting on and taking off regular upper body clothing 2  -JR     Taking care of personal grooming (such as brushing teeth) 3  -JR     Eating meals 3  -JR     AM-PAC 6 Clicks Score (OT) 14  -       Row Name 07/18/25 1535 07/18/25 0855       How much help from another person do you currently need...    Turning from your back to your side while in flat bed without using bedrails? 2  -MS 2  -JN    Moving from lying on back to sitting on the side of a flat bed without bedrails? 2  -MS 1  -JN    Moving to and from a bed to a chair (including a wheelchair)? 2  -MS 1  -JN    Standing up from a chair using your arms (e.g., wheelchair, bedside chair)? 2  -MS 2  -JN    Climbing 3-5 steps with a railing? 2  -MS 1  -JN    To walk in hospital room? 2  -MS 2  -JN    AM-PAC 6 Clicks Score (PT) 12  -MS 9  -JN    Highest Level of Mobility Goal Move to Chair/Commode-4  -MS Sit at Edge of Bed-3  -JN      Row Name 07/18/25 1540          Modified Jewett Scale    Modified Jewett Scale 4 - Moderately severe disability.  Unable to walk without assistance, and unable to attend to own bodily needs without assistance.  -       Row Name 07/18/25 1540 07/18/25 1535       Functional Assessment    Outcome Measure Options AM-PAC 6 Clicks Daily Activity (OT);Modified Jewett  - AM-PAC 6 Clicks Basic Mobility (PT)  -MS              User Key  (r) = Recorded By, (t) = Taken By, (c) = Cosigned By      Initials Name Provider Type    MS MickyDeepaew AYAAN, PT Physical Therapist    Jack Perez, OT Occupational Therapist    Raquel Atwood, RN Registered Nurse                    Occupational Therapy Education       Title: PT OT SLP Therapies (In Progress)       Topic: Occupational Therapy (Done)       Point: ADL training (Done)       Learning Progress Summary            Patient RISSA Lisa, VU by  at 7/18/2025 1540     Comment: Role of OT    RISSA Lisa VU by  at 7/17/2025 1619    Comment: OT educated patient/nursing on purpose of bandages and precautions including removing bandages if extreme itching/pain or concerns for circulation.                      Point: Home exercise program (Done)       Learning Progress Summary            Patient RISSA Lisa, VU by  at 7/18/2025 1540    Comment: Role of OT    RISSA Lisa VU by  at 7/17/2025 1619    Comment: OT educated patient/nursing on purpose of bandages and precautions including removing bandages if extreme itching/pain or concerns for circulation.                      Point: Precautions (Done)       Learning Progress Summary            Patient RISSA Lisa, VU by  at 7/18/2025 1540    Comment: Role of OT    RISSA Lisa VU by  at 7/17/2025 1619    Comment: OT educated patient/nursing on purpose of bandages and precautions including removing bandages if extreme itching/pain or concerns for circulation.                      Point: Body mechanics (Done)       Learning Progress Summary            Patient RISSA Lisa VU by  at 7/18/2025 1540    Comment: Role of OT    RISSA Lisa VU by  at 7/17/2025 1619    Comment: OT educated patient/nursing on purpose of bandages and precautions including removing bandages if extreme itching/pain or concerns for circulation.                                      User Key       Initials Effective Dates Name Provider Type Discipline     07/24/24 -  Jack Parra OT Occupational Therapist OT                  OT Recommendation and Plan  Planned Therapy Interventions (OT): activity tolerance training, adaptive equipment training, BADL retraining, functional balance retraining, neuromuscular control/coordination retraining, passive ROM/stretching, strengthening exercise, transfer/mobility retraining, ROM/therapeutic exercise  Therapy Frequency (OT): 5 times/wk  Plan of Care Review  Plan of Care Reviewed With: patient  Outcome Evaluation: 64 y.o. female admitted  to [facility] after syncope episode withCardio appt at hospital.  At baseline, patient lives with spouse at home (3 NICOLAS) Independent with ADLs and functional mobility (Rw).  A&Ox4, BUE wFL, 3+/5.  Patient presents to OT with decreased strength, activity tolerance, coordination and balance.  Nursing reports patient had to stay on Bipap all night and day until afternoon.  Patient resting in bed upon arrival.  Patient able to transition to EOB with Mod/Max A x2.  Once stabilized, patient required CGA for sitting balance. STS from EOB with Max A x2 and HHA x2.  Patient O2 desat to mid/low 80s and required Max A x2 to return to supine and repositioned.  OT would be beneficial to address underlying physical deficits and increase ADLs. Anticipate patient d/c to SNF for continued progress.     Time Calculation:   Evaluation Complexity (OT)  Review Occupational Profile/Medical/Therapy History Complexity: expanded/moderate complexity  Assessment, Occupational Performance/Identification of Deficit Complexity: 3-5 performance deficits  Clinical Decision Making Complexity (OT): detailed assessment/moderate complexity  Overall Complexity of Evaluation (OT): moderate complexity     Time Calculation- OT       Row Name 07/18/25 1541 07/18/25 1518          Time Calculation- OT    OT Start Time 1330  -JR 1430  -JR     OT Stop Time 1400  -JR 1455  -JR     OT Time Calculation (min) 30 min  -JR 25 min  -JR     Total Timed Code Minutes- OT 15 minute(s)  -JR 25 minute(s)  -JR     OT Received On 07/18/25  -JR --     OT - Next Appointment 07/21/25  -JR --     OT Goal Re-Cert Due Date 08/01/25  -JR --        Timed Charges    19315 - OT Therapeutic Activity Minutes 15  -JR --        Untimed Charges    OT Eval/Re-eval Minutes 15  -JR --        Total Minutes    Timed Charges Total Minutes 15  -JR --     Untimed Charges Total Minutes 15  -JR --      Total Minutes 30  -JR --               User Key  (r) = Recorded By, (t) = Taken By, (c) = Cosigned By       Initials Name Provider Type     Jack Parra OT Occupational Therapist                  Therapy Charges for Today       Code Description Service Date Service Provider Modifiers Qty    07956068201 HC OT SELF CARE/MGMT/TRAIN EA 15 MIN 7/17/2025 Jack Parra OT GO 2    17888591788 HC OT MULTI LAYER COMP SYS BELOW KNEE BILATERAL 7/17/2025 Jack Parra OT  2    32140769360 HC OT MULTI LAYER COMP SYS BELOW KNEE BILATERAL 7/18/2025 Jack Parra OT  2    88099506681 HC OT THERAPEUTIC ACT EA 15 MIN 7/18/2025 Jack Parra OT GO 1    33773189469 HC OT EVAL MOD COMPLEXITY 3 7/18/2025 Jack Parra OT GO 1                 Jack Parra OT  7/18/2025

## 2025-07-18 NOTE — PROGRESS NOTES
Pulm/ CC Cross Coverage Note    Called about critical ABG.  Reviewed consult note from Dr. Wren.    Patient showing evidence of partially compensated respiratory acidosis on 10 L high flow nasal cannula with a pH of 7.299, PaCO2 61.1, HCO3 30.0.  Patient was placed on her home CPAP, however with evidence of hypercapnia-will transition to NIPPV with repeat ABG ordered to assess for improvement.

## 2025-07-18 NOTE — PROGRESS NOTES
LOS: 2 days   Patient Care Team:  Adilson Wilkerson MD as PCP - General (Family Medicine)  Florida Segovia MD as Referring Physician (Obstetrics and Gynecology)  Brennan Rogers MD as Cardiologist (Cardiology)  Caleb Garcia MD as Consulting Physician (Pulmonary Disease)  Jess Sanz, RN as Ambulatory  ([facility])    Chief Complaint:  Following for dyspnea in the setting of mitral regurgitation    Interval History:   Good urine output.  Breathing is a bit improved.  We discussed her prognosis today.    Objective   Vital Signs  Temp:  [97.4 °F (36.3 °C)-98.6 °F (37 °C)] 98.6 °F (37 °C)  Heart Rate:  [65-77] 66  Resp:  [18-28] 28  BP: (108-137)/(72-97) 118/73    Intake/Output Summary (Last 24 hours) at 7/18/2025 0849  Last data filed at 7/18/2025 0622  Gross per 24 hour   Intake 240 ml   Output 4900 ml   Net -4660 ml       Comfortable NAD, resting in bed on BiPAP  PERRL, conjunctivae clear  Neck supple, no JVD or thyromegaly appreciated  S1/S2 RRR, no m/r/g  Lungs CTA B, normal effort  Abdomen S/NT/ND (+) BS, no HSM appreciated  Extremities warm, no clubbing, cyanosis, significant 3+ lower extremity edema   No visible or palpable skin lesions  A/O x 3, mood and affect appropriate    Results Review:      Results from last 7 days   Lab Units 07/18/25  0308 07/17/25  0357 07/16/25  0947   SODIUM mmol/L 141 137 137   POTASSIUM mmol/L 3.9 4.5 4.7   CHLORIDE mmol/L 104 103 103   CO2 mmol/L 26.1 25.0 21.3*   BUN mg/dL 24.0* 24.0* 24.0*   CREATININE mg/dL 1.21* 1.27* 1.35*   GLUCOSE mg/dL 95 97 96   CALCIUM mg/dL 9.0 9.0 9.3     Results from last 7 days   Lab Units 07/16/25  1102 07/16/25  0947   HSTROP T ng/L 32* 34*     Results from last 7 days   Lab Units 07/18/25  0308 07/17/25  1452 07/17/25  0357 07/16/25  0947   WBC 10*3/mm3 6.90  --  4.99 6.45   HEMOGLOBIN g/dL 7.8* 8.2* 7.3* 7.8*   HEMATOCRIT % 26.2* 27.7* 24.0* 25.8*   PLATELETS 10*3/mm3 366  --  340 369     Results from last 7 days    Lab Units 07/16/25  0947   INR  3.29*         Results from last 7 days   Lab Units 07/18/25  0308   MAGNESIUM mg/dL 2.0           I reviewed the patient's new clinical results.  I personally viewed and interpreted the patient's EKG/Telemetry data        Medication Review:   furosemide, 40 mg, Intravenous, Q12H  hydrocortisone-bacitracin-zinc oxide-nystatin, 1 Application, Topical, TID  ipratropium-albuterol, 3 mL, Nebulization, 4x Daily - RT  Lidocaine, 1 patch, Transdermal, Q24H  medroxyPROGESTERone, 10 mg, Oral, Daily  metoprolol succinate XL, 12.5 mg, Oral, Daily  miconazole, 1 Application, Topical, Q12H  [Held by provider] rivaroxaban, 20 mg, Oral, Daily With Dinner  sodium chloride, 10 mL, Intravenous, Q12H             Assessment & Plan       Syncope and collapse    Chronic diastolic CHF (congestive heart failure)    Essential hypertension    Pulmonary hypertension    ARIEL on CPAP    History of DVT (deep vein thrombosis)    Lymphedema    Iron deficiency    History of pulmonary embolism    Acute on chronic heart failure with preserved ejection fraction (HFpEF)    Chronic respiratory failure with hypoxia    Acute respiratory failure with hypoxia    History of bacterial endocarditis    Severe mitral regurgitation    Syncopal seen episode.  Patient was ambulating to her outpatient echocardiogram which was obtained July 16, 2025, and then passed out.  She is post be on 2 L nasal cannula but was markedly hypoxic.  Likely secondary pulmonary hypertension due to severe mitral regurgitation  Mitral regurgitation, now severe, secondary to healed endocarditis, group B strep  I think this is driving her hypoxia, pulmonary hypertension, and multiple acute decompensations  In reviewing her echocardiograms, the mitral regurgitation is significantly worse than prior  Unfortunately, she was not deemed a surgical candidate for intervention during her initial hospitalization, but at that time her mitral regurgitation was only  mild, it is clearly worse now.  She was again eval by cardiac surgery and given her comorbidities was deemed not a surgical candidate.  However, she also has a worsening plantar wound, which would complicate things  Unfortunately, we may be in a circumstance where there is prohibitively high surgical risk, and unfortunately both of these issues are going to carry a poor prognosis.  Discussed with Dr. Martinez, will arrange for transesophageal echocardiogram on Monday to determine if there is MitraClip candidacy, this may be her only option.  Lymphedema, with a likely large component of versus edema secondary to CHF  Hypertension   Severe pulmonary hypertension secondary to severe mitral regurgitation/#2  Acute on chronic HFpEF, secondary to #2  Continue diuresis with IV Lasix 80 twice daily, diuresed aggressively given her  Plantar wound.  Podiatry evaluating.  Also complicates any surgical planning per #2.  History of DVT and PE    Unfortunately, she does not have a lot of options, and was again deemed not a surgical candidate.  Arrange for LYNETTE on Monday to determine MitraClip candidacy    Shahriar Terry MD  07/18/25  08:49 EDT      Part of this note may be an electronic transcription/translation of spoken language to printed text using the Dragon Dictation System.

## 2025-07-18 NOTE — PLAN OF CARE
Problem: Adult Inpatient Plan of Care  Goal: Plan of Care Review  Outcome: Progressing  Flowsheets (Taken 7/18/2025 0612)  Outcome Evaluation: Patient is alert and oriented x 4, saturating well on hospital bipap. On IV lasix and repeat ABG this morning. Fall risk preention protocol maintained.   Goal Outcome Evaluation:              Outcome Evaluation: Patient is alert and oriented x 4, saturating well on hospital bipap. On IV lasix and repeat ABG this morning. Fall risk preention protocol maintained.

## 2025-07-18 NOTE — CONSULTS
.      [facility]   Inpatient Palliative Care Consultation  Patient Name: Emely Solomon   Patient : 1960   Date: 2025   Reason for Consultation: Goals of care  Inpatient Palliative Care Team Consult  Consult performed by: Adenike Young MD  Consult ordered by: Darwin Lozano MD            Chief Complaint: Dyspnea    Information obtained from: Patient    Summary of Palliative Illness and Palliative Assessment: This is a 64-year-old female with a primary diagnosis of diastolic heart failure with chronic respiratory failure and recent treatment for mitral valve endocarditis.  She was sent to the ER from her cardiologist appointment for syncopal episode.  She states prior to this visit she was in her normal state of health.  She was headed back to get an echocardiogram when she lost consciousness.  She denied any prodrome.  She states she had recently been in rehab after a complicated hospital stay where she completed IV antibiotics for bacterial endocarditis.  She states she got home and was just going for a routine follow-up appointment prior to this admission.  Today she reports she is dyspneic at rest.  She denies pain.  Otherwise, when asked about her condition she is unable to articulate clearly the most recent results.  She does also report some vaginal bleeding that have been occurring And was due to follow-up for further evaluation with gynecology.      Discussion of Patient/Family Treatment Preferences and Goals of Care: There was a voluntary discussion of advance planning and goals of care discussion. The following were present for the visit: patient    Summary of Discussion: I spent significant time reviewing the patient's clinical course with her.  We reviewed her most recent echocardiogram compared to the one from May.  We discussed her baseline breathing status as well as the complicating factor of her potential gynecologic malignancy.  I asked her around her  treatment goals and preferences.  We discussed CODE STATUS and I inquired as to whether she had given thought to what she would want to happen in that case.  She is very overwhelmed by the information around her cardiac status as well as her overall condition.  She states she had not given thought previously to her CODE STATUS or her treatment preferences.  I offered to call her  but at this time she deferred as she said he was out of town and needed some time away.        Past Medical and Surgical History:    Past Medical History:   Diagnosis Date    Arthritis     Cellulitis     11/2017, with Group B Strep bacteremia and sepsis    Chronic deep vein thrombosis (DVT) of left popliteal vein 12/17/2015 02/04/2016, 04/14/2016    Chronic diastolic congestive heart failure     COVID-19 virus infection 11/2020    Heart murmur     Hypertension     Iliac vein stenosis, right 05/15/2024    Lipoedema     Lymphedema     other    Morbid obesity     ARIEL on CPAP     PFO (patent foramen ovale)     Postthrombotic syndrome of left lower extremity without complications 12/17/2015    postphletibis    Pulmonary embolism 04/14/2016    Pulmonary hypertension     multifactorial (dCHF, obesity/ARIEL, hx PE), mild by echo 1/2016    Right bundle branch block (RBBB) with left anterior fascicular block (LAFB)     Sepsis 09/18/2020    Type 2 myocardial infarction 05/20/2025      Past Surgical History:   Procedure Laterality Date    ARTERIOGRAM  07/2015    BRONCHOSCOPY N/A 07/21/2017    Procedure: BRONCHOSCOPY with wash;  Surgeon: Caleb Garcia MD;  Location: [facility] ENDOSCOPY;  Service:     COLONOSCOPY      COLONOSCOPY N/A 01/26/2023    Procedure: COLONOSCOPY to CECUM AND TERM ILEUM;  Surgeon: Jj Dean MD;  Location: [facility] ENDOSCOPY;  Service: Gastroenterology;  Laterality: N/A;  PRE OP -screening  POST OP -  NORMAL    D & C HYSTEROSCOPY N/A 03/08/2022    Procedure: DILATATION AND CURETTAGE with hysteroscopy;  Surgeon:  "Kaylah Land, DO;  Location: [facility] MAIN OR;  Service: Gynecology Oncology;  Laterality: N/A;    DILATATION AND CURETTAGE  04/11/2011    OTHER SURGICAL HISTORY  09/2015    IVC filter    OTHER SURGICAL HISTORY      left LE revascularization    OTHER SURGICAL HISTORY      ALESSIA and LEV scan    THROMBECTOMY  07/2015          Palliative Care Psychosocial and Spiritual Screening    Living situation lives at home with her .  Recently discharged from rehab.  No children          Physical Assessment:   /79 (BP Location: Left arm, Patient Position: Lying)   Pulse 71   Temp 98.6 °F (37 °C) (Oral)   Resp 23   Ht 160 cm (63\")   Wt (!) 143 kg (315 lb 4.8 oz)   SpO2 91%   BMI 55.85 kg/m²    Palliative Performance Scale: Performance 30% based on the following measures: Ambulation: Totally bed bound, Activity and Evidence of Disease: Unable to do any work, extensive evidence of disease, Self-Care: Total care required,  Intake: Reduced, LOC: Full, drowsy or confusion  Physical Exam   Chronically ill-appearing female who is dyspneic on exam  She is obese  Cardiac exam reveals regular rate and rhythm  Pulmonary effort shows tachypnea with decreased breath sounds throughout  Extremities with bilateral edema  Anxious affect but able to answer questions    Current Facility-Administered Medications:     sennosides-docusate (PERICOLACE) 8.6-50 MG per tablet 2 tablet, 2 tablet, Oral, BID PRN **AND** polyethylene glycol (MIRALAX) packet 17 g, 17 g, Oral, Daily PRN **AND** bisacodyl (DULCOLAX) EC tablet 5 mg, 5 mg, Oral, Daily PRN **AND** bisacodyl (DULCOLAX) suppository 10 mg, 10 mg, Rectal, Daily PRN, Darwin Lozano MD    furosemide (LASIX) injection 80 mg, 80 mg, Intravenous, BID Diuretics, Shahriar Terry MD, 80 mg at 07/18/25 1210    hydrocortisone-bacitracin-zinc oxide-nystatin (MAGIC BARRIER) ointment 1 Application, 1 Application, Topical, TID, Darwin Lozano MD, 1 Application at 07/18/25 1211    " ipratropium-albuterol (DUO-NEB) nebulizer solution 3 mL, 3 mL, Nebulization, 4x Daily - RT, Darwin Lozano MD, 3 mL at 07/18/25 1133    Lidocaine 4 % 1 patch, 1 patch, Transdermal, Q24H, Darwin Lozano MD, 1 patch at 07/17/25 0854    medroxyPROGESTERone (PROVERA) tablet 10 mg, 10 mg, Oral, Daily, Darwin Lozano MD, 10 mg at 07/18/25 1211    melatonin tablet 5 mg, 5 mg, Oral, Nightly PRN, Darwin Lozano MD    metoprolol succinate XL (TOPROL-XL) 24 hr tablet 12.5 mg, 12.5 mg, Oral, Daily, Darwin Lozano MD, 12.5 mg at 07/18/25 1211    miconazole (MICOTIN) 2 % powder 1 Application, 1 Application, Topical, Q12H, Darwin Lozano MD, 1 Application at 07/18/25 1212    ondansetron (ZOFRAN) injection 4 mg, 4 mg, Intravenous, Q6H PRN, Darwin Lozano MD    rivaroxaban (XARELTO) tablet 20 mg, 20 mg, Oral, Daily With Dinner, Darwin Lozano MD, 20 mg at 07/16/25 2045    [COMPLETED] Insert Peripheral IV, , , Once **AND** sodium chloride 0.9 % flush 10 mL, 10 mL, Intravenous, PRN, Komal Najera MD    sodium chloride 0.9 % flush 10 mL, 10 mL, Intravenous, Q12H, Darwin Lozano MD, 10 mL at 07/18/25 1212    sodium chloride 0.9 % flush 10 mL, 10 mL, Intravenous, PRN, Darwin Lozano MD    sodium chloride 0.9 % infusion 40 mL, 40 mL, Intravenous, PRN, Darwin Lozano MD     Allergies:   Allergies   Allergen Reactions    Cephalexin Hives and Rash     Diffuse rash on cephalexin 1 g PO q6h on 6/17/20  Tolerated zosyn and PCN G September 2020 without issue    Clindamycin/Lincomycin Hives          Data (labs/images reviewed):   WBC   Date Value Ref Range Status   07/18/2025 6.90 3.40 - 10.80 10*3/mm3 Final     RBC   Date Value Ref Range Status   07/18/2025 2.98 (L) 3.77 - 5.28 10*6/mm3 Final     Hemoglobin   Date Value Ref Range Status   07/18/2025 7.7 (L) 12.0 - 15.9 g/dL Final     Hematocrit   Date Value Ref Range Status   07/18/2025 25.1 (L) 34.0 - 46.6 % Final     MCV   Date Value Ref Range Status    07/18/2025 87.9 79.0 - 97.0 fL Final     MCH   Date Value Ref Range Status   07/18/2025 26.2 (L) 26.6 - 33.0 pg Final     MCHC   Date Value Ref Range Status   07/18/2025 29.8 (L) 31.5 - 35.7 g/dL Final     RDW   Date Value Ref Range Status   07/18/2025 16.1 (H) 12.3 - 15.4 % Final     RDW-SD   Date Value Ref Range Status   07/18/2025 51.4 37.0 - 54.0 fl Final     MPV   Date Value Ref Range Status   07/18/2025 8.3 6.0 - 12.0 fL Final     Platelets   Date Value Ref Range Status   07/18/2025 366 140 - 450 10*3/mm3 Final     Neutrophil %   Date Value Ref Range Status   07/16/2025 82.8 (H) 42.7 - 76.0 % Final     Lymphocyte %   Date Value Ref Range Status   07/16/2025 9.9 (L) 19.6 - 45.3 % Final     Monocyte %   Date Value Ref Range Status   07/16/2025 5.0 5.0 - 12.0 % Final     Eosinophil %   Date Value Ref Range Status   07/16/2025 0.3 0.3 - 6.2 % Final     Basophil %   Date Value Ref Range Status   07/16/2025 0.6 0.0 - 1.5 % Final     Immature Grans %   Date Value Ref Range Status   07/16/2025 1.4 (H) 0.0 - 0.5 % Final     Neutrophils, Absolute   Date Value Ref Range Status   07/16/2025 5.34 1.70 - 7.00 10*3/mm3 Final     Lymphocytes, Absolute   Date Value Ref Range Status   07/16/2025 0.64 (L) 0.70 - 3.10 10*3/mm3 Final     Monocytes, Absolute   Date Value Ref Range Status   07/16/2025 0.32 0.10 - 0.90 10*3/mm3 Final     Eosinophils, Absolute   Date Value Ref Range Status   07/16/2025 0.02 0.00 - 0.40 10*3/mm3 Final     Basophils, Absolute   Date Value Ref Range Status   07/16/2025 0.04 0.00 - 0.20 10*3/mm3 Final     Immature Grans, Absolute   Date Value Ref Range Status   07/16/2025 0.09 (H) 0.00 - 0.05 10*3/mm3 Final     nRBC   Date Value Ref Range Status   07/16/2025 0.0 0.0 - 0.2 /100 WBC Final        Lab Results   Component Value Date    GLUCOSE 95 07/18/2025    BUN 24.0 (H) 07/18/2025    CREATININE 1.21 (H) 07/18/2025     07/18/2025    K 3.9 07/18/2025     07/18/2025    CALCIUM 9.0 07/18/2025     PROTEINTOT 8.0 07/16/2025    ALBUMIN 3.5 07/16/2025    ALT 17 07/16/2025    AST 24 07/16/2025    ALKPHOS 95 07/16/2025    BILITOT 0.4 07/16/2025    GLOB 4.5 07/16/2025    AGRATIO 0.8 07/16/2025    BCR 19.8 07/18/2025    ANIONGAP 10.9 07/18/2025    EGFR 49.8 (L) 07/18/2025        XR Foot 3+ View Right  XR FOOT 3+ VW RIGHT-     Clinical: Wound along the plantar surface of the heel fat pad     FINDINGS: There is ankle and foot edema. No soft tissue gas or  radiopaque foreign body is demonstrated.     There is a moderate size calcaneal spur. There is bone hypertrophy at  the Achilles insertion. No cortical bone destruction of the calcaneus to  suggest osteomyelitis. No forefoot abnormality is demonstrated. Midfoot  articulations preserved. The remainder is unremarkable.     This report was finalized on 7/16/2025 9:06 PM by Dr. Dhruv Sanchez M.D  on Workstation: [id number]     XR Chest 1 View  Narrative: XR CHEST 1 VW-     HISTORY: Female who is 64 years-old, syncope, congestive heart failure     TECHNIQUE: Frontal view of the chest     COMPARISON: 7/11/2025     FINDINGS: The heart is enlarged. Pulmonary vasculature is mildly  congested. Aorta is tortuous. Small right basilar atelectasis or  infiltrate, follow-up suggested. There may be minimal right pleural  effusion. No pneumothorax. No acute osseous process.     Impression: As described.     This report was finalized on 7/16/2025 9:30 AM by Dr. Damon Chavez M.D on Workstation: [id number]     Adult Transthoracic Echo Complete w/ Color, Spectral and Contrast if Necessary Per Protocol    Left ventricular systolic function is normal. Calculated left   ventricular EF = 62.5%    Left ventricular diastolic function was normal.    The right ventricular cavity is mildly dilated.    Left atrial volume is severely increased.    The right atrial cavity is moderately dilated.    There is calcification of the aortic valve.    There is bileaflet mitral valve thickening  present.    Severe mitral valve regurgitation is present.    Estimated right ventricular systolic pressure from tricuspid   regurgitation is markedly elevated (>55 mmHg). Calculated right   ventricular systolic pressure from tricuspid regurgitation is 87 mmHg.    Severe pulmonary hypertension is present.           Palliative Care Assessment and Recommendations: Emely Solomon is a 64 y.o. female with a primary palliative care diagnosis of severe mitral regurgitation complicated by chronic respiratory failure and diastolic heart failure and severe pulmonary hypertension. Palliative care was consulted for goals of care discussion.     Prognosis and Palliative Performance Scale: 30%  Disease State: Deteriorating despite treatments    Goals of Care Treatment Preferences   Palliative Care Decision Making Capacity: Intact  Healthcare Surrogate: Per healthcare directive  What is Most important to patient/family at this time: To get better  Code Status full  Other Considerations regarding life sustaining treatments: I had a lengthy discussion with the patient around her condition.  We reviewed the recent developments around her cardiac status and the reality that she is not a good surgical candidate.  We also reviewed resuscitation status and the likely outcome should she have a cardiopulmonary arrest.  At this time, she remains a full code.  She is not ready to place any limitations on her care.  She is very overwhelmed by the information and needs time to process potential decisions.  I offered to discuss this with her  but she requested I defer at this time.  She is agreeable to palliative care support and I told her we would continue to follow while she is in the hospital.  Advance Directives Present or Completed: Code Status clarification      Discussed plan with: MD, RN, and patient        I spent 75 minutes in care and coordination and direct care with the patient.  She is a high risk to decompensate.  Will  have our team follow up this weekend to discuss with family.      Thank you for consulting palliative care. If you need to reach the provider on call for the palliative care team please call [phone]      Adenike Young MD  7/18/2025 14:11 EDT

## 2025-07-19 LAB
ANION GAP SERPL CALCULATED.3IONS-SCNC: 11 MMOL/L (ref 5–15)
BACTERIA SPEC AEROBE CULT: ABNORMAL
BUN SERPL-MCNC: 21 MG/DL (ref 8–23)
BUN/CREAT SERPL: 18.4 (ref 7–25)
CALCIUM SPEC-SCNC: 8.4 MG/DL (ref 8.6–10.5)
CHLORIDE SERPL-SCNC: 96 MMOL/L (ref 98–107)
CO2 SERPL-SCNC: 32 MMOL/L (ref 22–29)
CREAT SERPL-MCNC: 1.14 MG/DL (ref 0.57–1)
DEPRECATED RDW RBC AUTO: 50.5 FL (ref 37–54)
EGFRCR SERPLBLD CKD-EPI 2021: 53.5 ML/MIN/1.73
ERYTHROCYTE [DISTWIDTH] IN BLOOD BY AUTOMATED COUNT: 15.8 % (ref 12.3–15.4)
GLUCOSE SERPL-MCNC: 92 MG/DL (ref 65–99)
GRAM STN SPEC: ABNORMAL
HCT VFR BLD AUTO: 25.7 % (ref 34–46.6)
HCT VFR BLD AUTO: 26.4 % (ref 34–46.6)
HCT VFR BLD AUTO: 27 % (ref 34–46.6)
HGB BLD-MCNC: 7.8 G/DL (ref 12–15.9)
HGB BLD-MCNC: 8 G/DL (ref 12–15.9)
HGB BLD-MCNC: 8.2 G/DL (ref 12–15.9)
ISOLATED FROM: ABNORMAL
MAGNESIUM SERPL-MCNC: 1.7 MG/DL (ref 1.6–2.4)
MCH RBC QN AUTO: 26.6 PG (ref 26.6–33)
MCHC RBC AUTO-ENTMCNC: 30.4 G/DL (ref 31.5–35.7)
MCV RBC AUTO: 87.7 FL (ref 79–97)
PHOSPHATE SERPL-MCNC: 3.1 MG/DL (ref 2.5–4.5)
PLATELET # BLD AUTO: 344 10*3/MM3 (ref 140–450)
PMV BLD AUTO: 8.1 FL (ref 6–12)
POTASSIUM SERPL-SCNC: 3 MMOL/L (ref 3.5–5.2)
POTASSIUM SERPL-SCNC: 3.5 MMOL/L (ref 3.5–5.2)
RBC # BLD AUTO: 2.93 10*6/MM3 (ref 3.77–5.28)
SODIUM SERPL-SCNC: 139 MMOL/L (ref 136–145)
WBC NRBC COR # BLD AUTO: 4.76 10*3/MM3 (ref 3.4–10.8)

## 2025-07-19 PROCEDURE — 84132 ASSAY OF SERUM POTASSIUM: CPT | Performed by: STUDENT IN AN ORGANIZED HEALTH CARE EDUCATION/TRAINING PROGRAM

## 2025-07-19 PROCEDURE — 99232 SBSQ HOSP IP/OBS MODERATE 35: CPT | Performed by: STUDENT IN AN ORGANIZED HEALTH CARE EDUCATION/TRAINING PROGRAM

## 2025-07-19 PROCEDURE — 85027 COMPLETE CBC AUTOMATED: CPT | Performed by: HOSPITALIST

## 2025-07-19 PROCEDURE — 85014 HEMATOCRIT: CPT | Performed by: HOSPITALIST

## 2025-07-19 PROCEDURE — 85018 HEMOGLOBIN: CPT | Performed by: HOSPITALIST

## 2025-07-19 PROCEDURE — 83735 ASSAY OF MAGNESIUM: CPT | Performed by: HOSPITALIST

## 2025-07-19 PROCEDURE — 29581 APPL MULTLAYER CMPRN SYS LEG: CPT

## 2025-07-19 PROCEDURE — 94664 DEMO&/EVAL PT USE INHALER: CPT

## 2025-07-19 PROCEDURE — 80048 BASIC METABOLIC PNL TOTAL CA: CPT | Performed by: HOSPITALIST

## 2025-07-19 PROCEDURE — 94799 UNLISTED PULMONARY SVC/PX: CPT

## 2025-07-19 PROCEDURE — 97530 THERAPEUTIC ACTIVITIES: CPT

## 2025-07-19 PROCEDURE — 84100 ASSAY OF PHOSPHORUS: CPT | Performed by: HOSPITALIST

## 2025-07-19 PROCEDURE — 36415 COLL VENOUS BLD VENIPUNCTURE: CPT | Performed by: HOSPITALIST

## 2025-07-19 PROCEDURE — 99222 1ST HOSP IP/OBS MODERATE 55: CPT | Performed by: INTERNAL MEDICINE

## 2025-07-19 PROCEDURE — 94761 N-INVAS EAR/PLS OXIMETRY MLT: CPT

## 2025-07-19 PROCEDURE — 25010000002 FUROSEMIDE PER 20 MG: Performed by: STUDENT IN AN ORGANIZED HEALTH CARE EDUCATION/TRAINING PROGRAM

## 2025-07-19 PROCEDURE — G0545 PR INHERENT VISIT TO INPT: HCPCS | Performed by: INTERNAL MEDICINE

## 2025-07-19 RX ORDER — POTASSIUM CHLORIDE 1500 MG/1
40 TABLET, EXTENDED RELEASE ORAL EVERY 4 HOURS
Status: COMPLETED | OUTPATIENT
Start: 2025-07-19 | End: 2025-07-19

## 2025-07-19 RX ADMIN — IPRATROPIUM BROMIDE AND ALBUTEROL SULFATE 3 ML: .5; 3 SOLUTION RESPIRATORY (INHALATION) at 07:45

## 2025-07-19 RX ADMIN — NYSTATIN 1 APPLICATION: 100000 OINTMENT TOPICAL at 08:10

## 2025-07-19 RX ADMIN — IPRATROPIUM BROMIDE AND ALBUTEROL SULFATE 3 ML: .5; 3 SOLUTION RESPIRATORY (INHALATION) at 20:20

## 2025-07-19 RX ADMIN — ANTI-FUNGAL POWDER MICONAZOLE NITRATE TALC FREE 1 APPLICATION: 1.42 POWDER TOPICAL at 20:42

## 2025-07-19 RX ADMIN — RIVAROXABAN 20 MG: 20 TABLET, FILM COATED ORAL at 18:21

## 2025-07-19 RX ADMIN — POTASSIUM CHLORIDE 40 MEQ: 1500 TABLET, EXTENDED RELEASE ORAL at 18:21

## 2025-07-19 RX ADMIN — MEDROXYPROGESTERONE ACETATE 10 MG: 10 TABLET ORAL at 08:09

## 2025-07-19 RX ADMIN — POTASSIUM CHLORIDE 40 MEQ: 1500 TABLET, EXTENDED RELEASE ORAL at 10:45

## 2025-07-19 RX ADMIN — IPRATROPIUM BROMIDE AND ALBUTEROL SULFATE 3 ML: .5; 3 SOLUTION RESPIRATORY (INHALATION) at 11:15

## 2025-07-19 RX ADMIN — METOPROLOL SUCCINATE 12.5 MG: 25 TABLET, EXTENDED RELEASE ORAL at 08:09

## 2025-07-19 RX ADMIN — Medication 10 ML: at 10:48

## 2025-07-19 RX ADMIN — NYSTATIN 1 APPLICATION: 100000 OINTMENT TOPICAL at 14:40

## 2025-07-19 RX ADMIN — FUROSEMIDE 80 MG: 10 INJECTION, SOLUTION INTRAMUSCULAR; INTRAVENOUS at 20:41

## 2025-07-19 RX ADMIN — ANTI-FUNGAL POWDER MICONAZOLE NITRATE TALC FREE 1 APPLICATION: 1.42 POWDER TOPICAL at 08:10

## 2025-07-19 RX ADMIN — Medication 10 ML: at 20:41

## 2025-07-19 RX ADMIN — FUROSEMIDE 80 MG: 10 INJECTION, SOLUTION INTRAMUSCULAR; INTRAVENOUS at 08:09

## 2025-07-19 RX ADMIN — NYSTATIN 1 APPLICATION: 100000 OINTMENT TOPICAL at 20:42

## 2025-07-19 RX ADMIN — POTASSIUM CHLORIDE 40 MEQ: 1500 TABLET, EXTENDED RELEASE ORAL at 13:53

## 2025-07-19 NOTE — PROGRESS NOTES
Consult Daily Progress Note  [facility]   07/19/25      Patient Name:  Emely Solomon  MRN:  [id number]   YOB: 1960  Age: 64 y.o.  Sex: female  LOS: 3    Reason for Consult:  Respiratory failure    Interval History:  No acute events overnight  Net -7.3 L over the past 24 hours  Metabolic alkalosis  2 L nasal cannula  Resting in bed comfortably  Eating her dinner  Denies any acute complaints  Breathing is improved  No chest pain or palpitations  No nausea vomiting    Physical Exam:  Vitals:    07/19/25 1327   BP: 120/77   Pulse: 77   Resp: 18   Temp: 98.1 °F (36.7 °C)   SpO2: 97%       Intake/Output    Intake/Output Summary (Last 24 hours) at 7/19/2025 1558  Last data filed at 7/19/2025 1327  Gross per 24 hour   Intake 578 ml   Output 6050 ml   Net -5472 ml     General: Alert, nontoxic, NAD  HEENT: NC/AT, EOMI, MMM  Neck: Supple, trachea midline  Cardiac: RRR, no murmur, gallops, rubs  Pulmonary: Decreased breath sounds at bases  GI: Soft, non-tender, non-distended, normal bowel sounds  Extremities: Warm, well perfused, 1+ LE edema  Skin: no visible rash  Neuro: CN II - XII grossly intact  Psychiatry: Normal mood and affect      Data Review:  Results from last 7 days   Lab Units 07/19/25  0753 07/19/25  0303 07/19/25  0014 07/18/25  1609 07/18/25  0828 07/18/25  0308 07/17/25  1452 07/17/25  0357 07/16/25  0947   WBC 10*3/mm3  --  4.76  --   --   --  6.90  --  4.99 6.45   HEMOGLOBIN g/dL 8.0* 7.8* 8.2* 7.8* 7.7* 7.8* 8.2* 7.3* 7.8*   PLATELETS 10*3/mm3  --  344  --   --   --  366  --  340 369     Results from last 7 days   Lab Units 07/19/25  0303 07/18/25  0308 07/17/25  0357 07/16/25  0947   SODIUM mmol/L 139 141 137 137   POTASSIUM mmol/L 3.0* 3.9 4.5 4.7   CHLORIDE mmol/L 96* 104 103 103   CO2 mmol/L 32.0* 26.1 25.0 21.3*   BUN mg/dL 21.0 24.0* 24.0* 24.0*   CREATININE mg/dL 1.14* 1.21* 1.27* 1.35*   GLUCOSE mg/dL 92 95 97 96   CALCIUM mg/dL 8.4* 9.0 9.0 9.3   MAGNESIUM mg/dL 1.7  2.0 2.3 2.4   PHOSPHORUS mg/dL 3.1 3.8 4.2 3.8   Estimated Creatinine Clearance: 65.1 mL/min (A) (by C-G formula based on SCr of 1.14 mg/dL (H)).    Results from last 7 days   Lab Units 07/19/25  0303 07/18/25  0308 07/17/25  0357 07/16/25  0947   AST (SGOT) U/L  --   --   --  24   ALT (SGPT) U/L  --   --   --  17   PROCALCITONIN ng/mL  --   --   --  0.11   LACTATE mmol/L  --   --   --  1.5   PLATELETS 10*3/mm3 344 366 340 369       Results from last 7 days   Lab Units 07/18/25  1143 07/18/25  0647 07/18/25  0355 07/17/25  2107   PH, ARTERIAL pH units 7.386 7.334* 7.318* 7.299*   PCO2, ARTERIAL mm Hg 60.4* 65.5* 63.3* 61.0*   PO2 ART mm Hg 100.8* 115.5* 81.8 76.8*   HCO3 ART mmol/L 36.2* 34.9* 32.4* 30.0*       Result Review:  I have personally reviewed the results from the time of this admission to 7/19/2025 15:58 EDT and agree with these findings:  [x]  Laboratory list / accordion  [x]  Microbiology  [x]  Radiology  []  EKG/Telemetry   [x]  Cardiology/Vascular   []  Pathology  [x]  Old records  []  Other:    ASSESSMENT  /  PLAN:    Acute on chronic HFpEF  Pulmonary edema, acute  Severe pulmonary hypertension, RVSP>55, probably group 2 and possibly group 3.  Bilateral lower lobe atelectasis  Acute on chronic hypercapnic respiratory failure  Acute hypoxic respiratory failure, secondary to the above  Severe MR  ARIEL, on auto CPAP 15-20  History of DVT/PE  History of mitral valve endocarditis    -Patient presented and found to have CHF exacerbation with pulmonary edema, severe pulmonary hypertension, acute on chronic respiratory failure  -Ongoing diuresis with Lasix, making significant urine output  - NIPPV: settings reviewed and adjusted.  Use NIPPV at night and as needed during the day.  Eventually she may require outpatient NIPPV instead of her home CPAP due to significant hypercapnia.  Will reassess prior to DC.  -Continue bronchodilator therapy with DuoNebs  -Xarelto for history of DVT/PE  - Continue supplemental  oxygen, states that she has been using it more during the daytime, will need walking oximetry prior to discharge    All issues new to me today.  Prior hospital course, labs and imaging reviewed.    Thank you for allowing us to participate in this patients care. Pulmonary will continue to follow.     Shady Blanco MD  [facility]  Pulmonary and Critical Care Medicine, Interventional Pulmonology    Parts of this note may be an electronic transcription/translation of spoken language to printed text using the Dragon dictation system.

## 2025-07-19 NOTE — PROGRESS NOTES
LOS: 3 days   Patient Care Team:  Adilson Wilkerson MD as PCP - General (Family Medicine)  Florida Segovia MD as Referring Physician (Obstetrics and Gynecology)  Brennan Rogers MD as Cardiologist (Cardiology)  Caleb Garcia MD as Consulting Physician (Pulmonary Disease)  Jess Sanz, RN as Ambulatory  ([facility])    Chief Complaint:  Following for CHF    Interval History:   Robust urine output yesterday, net -7 liters    Objective   Vital Signs  Temp:  [97.3 °F (36.3 °C)-98.6 °F (37 °C)] 97.3 °F (36.3 °C)  Heart Rate:  [66-83] 77  Resp:  [16-26] 16  BP: (121-129)/(74-82) 126/74    Intake/Output Summary (Last 24 hours) at 7/19/2025 0922  Last data filed at 7/19/2025 0252  Gross per 24 hour   Intake 722 ml   Output 8050 ml   Net -7328 ml       Comfortable NAD, resting in bed, appears much more comfortable than yesterday  PERRL, conjunctivae clear  Neck supple, JVD still noted to mandible   S1/S2 RRR, systolic murmur at left lower sternal border  Coarse breath sounds with Rales to bases although breath sounds distant due to habitus.  Abdomen S/NT/ND (+) BS, no HSM appreciated  Extremities warm, no clubbing, cyanosis, 3+ BLE edema/lymphedema   no visible or palpable skin lesions  A/O.  X 3, mood and affect appropriate    Results Review:      Results from last 7 days   Lab Units 07/19/25  0303 07/18/25  0308 07/17/25  0357   SODIUM mmol/L 139 141 137   POTASSIUM mmol/L 3.0* 3.9 4.5   CHLORIDE mmol/L 96* 104 103   CO2 mmol/L 32.0* 26.1 25.0   BUN mg/dL 21.0 24.0* 24.0*   CREATININE mg/dL 1.14* 1.21* 1.27*   GLUCOSE mg/dL 92 95 97   CALCIUM mg/dL 8.4* 9.0 9.0     Results from last 7 days   Lab Units 07/16/25  1102 07/16/25  0947   HSTROP T ng/L 32* 34*     Results from last 7 days   Lab Units 07/19/25  0753 07/19/25  0303 07/19/25  0014 07/18/25  0828 07/18/25  0308 07/17/25  1452 07/17/25  0357   WBC 10*3/mm3  --  4.76  --   --  6.90  --  4.99   HEMOGLOBIN g/dL 8.0* 7.8* 8.2*   < > 7.8*    < > 7.3*   HEMATOCRIT % 26.4* 25.7* 27.0*   < > 26.2*   < > 24.0*   PLATELETS 10*3/mm3  --  344  --   --  366  --  340    < > = values in this interval not displayed.     Results from last 7 days   Lab Units 07/16/25  0947   INR  3.29*         Results from last 7 days   Lab Units 07/19/25  0303   MAGNESIUM mg/dL 1.7           I reviewed the patient's new clinical results.  I personally viewed and interpreted the patient's EKG/Telemetry data        Medication Review:   furosemide, 80 mg, Intravenous, BID Diuretics  hydrocortisone-bacitracin-zinc oxide-nystatin, 1 Application, Topical, TID  ipratropium-albuterol, 3 mL, Nebulization, 4x Daily - RT  Lidocaine, 1 patch, Transdermal, Q24H  medroxyPROGESTERone, 10 mg, Oral, Daily  metoprolol succinate XL, 12.5 mg, Oral, Daily  miconazole, 1 Application, Topical, Q12H  potassium chloride ER, 40 mEq, Oral, Q4H  rivaroxaban, 20 mg, Oral, Daily With Dinner  sodium chloride, 10 mL, Intravenous, Q12H             Assessment & Plan       Syncope and collapse    Chronic diastolic CHF (congestive heart failure)    Essential hypertension    Pulmonary hypertension    ARIEL on CPAP    History of DVT (deep vein thrombosis)    Lymphedema    Iron deficiency    History of pulmonary embolism    Acute on chronic heart failure with preserved ejection fraction (HFpEF)    Chronic respiratory failure with hypoxia    Acute respiratory failure with hypoxia    History of bacterial endocarditis    Severe mitral regurgitation    Syncopal episode.  Patient was ambulating to her outpatient echocardiogram which was obtained July 16, 2025, and then passed out.  She was markedly hypoxic.  Likely secondary to pulmonary hypertension due to severe mitral regurgitation  Mitral regurgitation, now severe, secondary to healed/probable burned-out endocarditis, group B strep  I think this is driving her hypoxia, pulmonary hypertension, and multiple acute decompensations  In reviewing her echocardiograms, the mitral  regurgitation is significantly worse than prior  She remains not a surgical candidate despite the worsening MR, given her comorbidities.  Will arrange for LYNETTE on Monday, to investigate if there is any disease amenable to MitraClip procedure  Unfortunately, if she is not a surgical candidate, then I am afraid her prognosis is poor   Lymphedema, with a likely large component of versus edema secondary to CHF.  Continue IV diuresis, she is tolerating well.  Her respiratory requirements are improving.  Hypertension   Severe pulmonary hypertension secondary to severe mitral regurgitation/#2  Acute on chronic HFpEF, secondary to #2.  Diuresing aggressively and she is doing great with this.  Plantar wound.  Podiatry evaluating.  Also complicates any surgical planning per #2.  Possible endometrial cancer  History of DVT and PE     Continue IV diuresis today, she is on nasal cannula which is a big improvement.  Hopefully can get her dry by the time she gets her LYNETTE on Monday    Shahriar Terry MD  07/19/25  09:22 EDT      Part of this note may be an electronic transcription/translation of spoken language to printed text using the Dragon Dictation System.

## 2025-07-19 NOTE — PLAN OF CARE
Goal Outcome Evaluation:           Progress: improving  Outcome Evaluation: Pt AOx4, 2L NC, VSS, IV diuretics per MAR, I/O's monitored, wound care complete, 1800 diuretic held until K+ redraw completed from 1700 lab, possible LYNETTE on Mon if pt able to tolerate.

## 2025-07-19 NOTE — PLAN OF CARE
Goal Outcome Evaluation:  Plan of Care Reviewed With: patient        Progress: improving  Outcome Evaluation: Pt tolerated treatment fair this date. Required SBA for bed mobility, then maintained sitting balance on EOB for several minutes w/ SBA as well. Completed a few LE and UE exercises, though pt declined standing d/t dizziness and blurry vision.    Anticipated Discharge Disposition (PT): skilled nursing facility

## 2025-07-19 NOTE — PLAN OF CARE
Goal Outcome Evaluation:  Plan of Care Reviewed With: patient        Progress: no change  Outcome Evaluation: VSS, no complaints of pain this shift.  Pt on CPap overnight.  Skin care complete.  Pt alert and oriented x4, on 4L NC while awake.  Plan is for a possible LYNETTE on 7/21/25.  Continue to diurese.

## 2025-07-19 NOTE — THERAPY TREATMENT NOTE
Patient Name: Emely Solomon  : 1960    MRN: [id number]                              Today's Date: 2025       Admit Date: 2025    Visit Dx:     ICD-10-CM ICD-9-CM   1. Syncope and collapse  R55 780.2   2. Acute on chronic congestive heart failure, unspecified heart failure type  I50.9 428.0   3. Severe mitral regurgitation  I34.0 424.0   4. Chronic anemia  D64.9 285.9   5. STEFANIA (acute kidney injury)  N17.9 584.9     Patient Active Problem List   Diagnosis    Chronic diastolic CHF (congestive heart failure)    PFO (patent foramen ovale)    Paradoxical embolism    Essential hypertension    Pulmonary hypertension    Upper back pain    Bacteremia due to group B Streptococcus    ARIEL on CPAP    Class 3 severe obesity due to excess calories with serious comorbidity and body mass index (BMI) of 60.0 to 69.9 in adult    History of DVT (deep vein thrombosis)    Lymphedema    Anemia, chronic disease    Iron deficiency    Post-menopausal bleeding    Thickened endometrium    Generalized muscle weakness    Right bundle branch block (RBBB) with left anterior fascicular block (LAFB)    PVD (peripheral vascular disease) with claudication    Iliac vein stenosis, right    Hypoxia    Hyperglycemia    History of pulmonary embolism    Morbid obesity    Hyponatremia    Bacterial endocarditis    Sepsis due to methicillin susceptible Staphylococcus aureus (MSSA) with acute hypoxic respiratory failure without septic shock    Acute on chronic heart failure with preserved ejection fraction (HFpEF)    On home oxygen therapy    Type 2 myocardial infarction    Acute loss of vision, bilateral    Chronic respiratory failure with hypoxia    Acute loss of vision    Acute exacerbation of CHF (congestive heart failure)    Acute pulmonary embolism    Syncope and collapse    Acute respiratory failure with hypoxia    History of bacterial endocarditis    Severe mitral regurgitation     Past Medical History:   Diagnosis Date    Arthritis      Cellulitis     11/2017, with Group B Strep bacteremia and sepsis    Chronic deep vein thrombosis (DVT) of left popliteal vein 12/17/2015 02/04/2016, 04/14/2016    Chronic diastolic congestive heart failure     COVID-19 virus infection 11/2020    Heart murmur     Hypertension     Iliac vein stenosis, right 05/15/2024    Lipoedema     Lymphedema     other    Morbid obesity     ARIEL on CPAP     PFO (patent foramen ovale)     Postthrombotic syndrome of left lower extremity without complications 12/17/2015    postphletibis    Pulmonary embolism 04/14/2016    Pulmonary hypertension     multifactorial (dCHF, obesity/ARIEL, hx PE), mild by echo 1/2016    Right bundle branch block (RBBB) with left anterior fascicular block (LAFB)     Sepsis 09/18/2020    Type 2 myocardial infarction 05/20/2025     Past Surgical History:   Procedure Laterality Date    ARTERIOGRAM  07/2015    BRONCHOSCOPY N/A 07/21/2017    Procedure: BRONCHOSCOPY with wash;  Surgeon: Caleb Garcia MD;  Location: [facility] ENDOSCOPY;  Service:     COLONOSCOPY      COLONOSCOPY N/A 01/26/2023    Procedure: COLONOSCOPY to CECUM AND TERM ILEUM;  Surgeon: Jj Dean MD;  Location: [facility] ENDOSCOPY;  Service: Gastroenterology;  Laterality: N/A;  PRE OP -screening  POST OP -  NORMAL    D & C HYSTEROSCOPY N/A 03/08/2022    Procedure: DILATATION AND CURETTAGE with hysteroscopy;  Surgeon: Kaylah Land DO;  Location: [facility] MAIN OR;  Service: Gynecology Oncology;  Laterality: N/A;    DILATATION AND CURETTAGE  04/11/2011    OTHER SURGICAL HISTORY  09/2015    IVC filter    OTHER SURGICAL HISTORY      left LE revascularization    OTHER SURGICAL HISTORY      ALESSIA and LEV scan    THROMBECTOMY  07/2015      General Information       Row Name 07/19/25 1529          Physical Therapy Time and Intention    Document Type therapy note (daily note)  -SM     Mode of Treatment physical therapy  -       Row Name 07/19/25 1529          General Information    Existing  Precautions/Restrictions fall;oxygen therapy device and L/min  -       Row Name 07/19/25 1529          Cognition    Orientation Status (Cognition) oriented x 4  -               User Key  (r) = Recorded By, (t) = Taken By, (c) = Cosigned By      Initials Name Provider Type    Davida Brar PTA Physical Therapist Assistant                   Mobility       Row Name 07/19/25 1535          Bed Mobility    Bed Mobility supine-sit;sit-supine  -     Supine-Sit Moore (Bed Mobility) standby assist  -     Sit-Supine Moore (Bed Mobility) standby assist  -     Assistive Device (Bed Mobility) bed rails;head of bed elevated  -       Row Name 07/19/25 1535          Sit-Stand Transfer    Comment, (Sit-Stand Transfer) pt declined standing d/t dizziness and blurry vision  -               User Key  (r) = Recorded By, (t) = Taken By, (c) = Cosigned By      Initials Name Provider Type    Davida Brar PTA Physical Therapist Assistant                   Obj/Interventions       Row Name 07/19/25 1536          Motor Skills    Therapeutic Exercise --  seated AP, LAQ, marches x10 reps; UE shoulder flexion and punches x10 reps  -               User Key  (r) = Recorded By, (t) = Taken By, (c) = Cosigned By      Initials Name Provider Type    Davida Brar PTA Physical Therapist Assistant                   Goals/Plan    No documentation.                  Clinical Impression       Row Name 07/19/25 1539          Pain    Pretreatment Pain Rating 0/10 - no pain  -     Posttreatment Pain Rating 0/10 - no pain  -       Row Name 07/19/25 1539          Positioning and Restraints    Pre-Treatment Position in bed  -     Post Treatment Position bed  -     In Bed supine;call light within reach;encouraged to call for assist;exit alarm on;with family/caregiver  -               User Key  (r) = Recorded By, (t) = Taken By, (c) = Cosigned By      Initials Name Provider Type    Davida Brar  AZEB Thomas Physical Therapist Assistant                   Outcome Measures       Row Name 07/19/25 1544 07/19/25 0800       How much help from another person do you currently need...    Turning from your back to your side while in flat bed without using bedrails? 3  -SM 2  -JN    Moving from lying on back to sitting on the side of a flat bed without bedrails? 3  -SM 2  -JN    Moving to and from a bed to a chair (including a wheelchair)? 1  -SM 2  -JN    Standing up from a chair using your arms (e.g., wheelchair, bedside chair)? 2  -SM 2  -JN    Climbing 3-5 steps with a railing? 1  -SM 2  -JN    To walk in hospital room? 1  -SM 2  -JN    AM-PAC 6 Clicks Score (PT) 11  -SM 12  -JN    Highest Level of Mobility Goal Move to Chair/Commode-4  -SM Move to Chair/Commode-4  -JN      Row Name 07/19/25 1544          Functional Assessment    Outcome Measure Options AM-PAC 6 Clicks Basic Mobility (PT)  -               User Key  (r) = Recorded By, (t) = Taken By, (c) = Cosigned By      Initials Name Provider Type    Davida Brar PTA Physical Therapist Assistant    Raquel Atwood, RN Registered Nurse                                 Physical Therapy Education       Title: PT OT SLP Therapies (Done)       Topic: Physical Therapy (Done)       Point: Mobility training (Done)       Learning Progress Summary            Patient Acceptance, E,TB,D, VU,NR by  at 7/19/2025 1545    Acceptance, E,D, VU,NR by MS at 7/18/2025 1535                      Point: Home exercise program (Done)       Learning Progress Summary            Patient Acceptance, E,TB,D, VU,NR by  at 7/19/2025 1545                      Point: Body mechanics (Done)       Learning Progress Summary            Patient Acceptance, E,TB,D, VU,NR by  at 7/19/2025 1545    Acceptance, E,D, VU,NR by MS at 7/18/2025 1535                      Point: Precautions (Done)       Learning Progress Summary            Patient Acceptance, E,TB,D, VU,NR by  at 7/19/2025  1545    Acceptance, E,D, VU,NR by MS at 7/18/2025 1535                                      User Key       Initials Effective Dates Name Provider Type Discipline    MS 06/16/21 -  Juan Weeks, PT Physical Therapist PT     03/07/18 -  Davida Todd PTA Physical Therapist Assistant PT                  PT Recommendation and Plan     Progress: improving  Outcome Evaluation: Pt tolerated treatment fair this date. Required SBA for bed mobility, then maintained sitting balance on EOB for several minutes w/ SBA as well. Completed a few LE and UE exercises, though pt declined standing d/t dizziness and blurry vision.     Time Calculation:         PT Charges       Row Name 07/19/25 1551             Time Calculation    Start Time 1420  -      Stop Time 1436  -SM      Time Calculation (min) 16 min  -      PT Received On 07/19/25  -      PT - Next Appointment 07/21/25  -                User Key  (r) = Recorded By, (t) = Taken By, (c) = Cosigned By      Initials Name Provider Type     Davida Todd PTA Physical Therapist Assistant                  Therapy Charges for Today       Code Description Service Date Service Provider Modifiers Qty    44090932704 HC PT THERAPEUTIC ACT EA 15 MIN 7/19/2025 Davida Todd PTA GP 1    07813260197 HC PT THER SUPP EA 15 MIN 7/19/2025 Davida Todd PTA GP 1            PT G-Codes  Outcome Measure Options: AM-PAC 6 Clicks Basic Mobility (PT)  AM-PAC 6 Clicks Score (PT): 11  AM-PAC 6 Clicks Score (OT): 14  Modified Callaway Scale: 4 - Moderately severe disability.  Unable to walk without assistance, and unable to attend to own bodily needs without assistance.  PT Discharge Summary  Anticipated Discharge Disposition (PT): skilled nursing facility    Davida Todd PTA  7/19/2025

## 2025-07-19 NOTE — CONSULTS
"Referring Provider: Dr Chmapion    Reason for Consultation: History of endocarditis    History of present illness:  Emely Solomon is a 64 y.o. who I am asked to evaluate and give opinion for \"history of endocarditis\". 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  She was deemed not to be a surgical candidate at that time.  Medical management was recommended.  We treated her with 6 weeks of ceftriaxone.      She presented to the hospital on 7/16/2025 from the cardiology clinic/echocardiography due to syncope.  She had associated hypoxia.  Patient denies any preceding infectious symptoms such as fever.    After admission, she was seen by podiatry who unroofed the blister on her foot but there was no purulence or evidence of infection.    She has been diagnosed with severe mitral valve regurgitation.  Cardiac surgery was consulted and their note states that she is not a good surgical candidate and they recommend evaluation for possible mitral valve clip.  A LYNETTE is planned for 7/21.    She had a blood culture at admission positive for coagulase-negative Staphylococcus in 1/2 sets.  She has been afebrile.  Her WBC is normal.    Past Medical History:   Diagnosis Date    Arthritis     Cellulitis     11/2017, with Group B Strep bacteremia and sepsis    Chronic deep vein thrombosis (DVT) of left popliteal vein 12/17/2015 02/04/2016, 04/14/2016    Chronic diastolic congestive heart failure     COVID-19 virus infection 11/2020    Heart murmur     Hypertension     Iliac vein stenosis, right 05/15/2024    Lipoedema     Lymphedema     other    Morbid obesity     ARIEL on CPAP     PFO (patent foramen ovale)     Postthrombotic syndrome of left lower extremity without complications 12/17/2015    postphletibis    " Pulmonary embolism 04/14/2016    Pulmonary hypertension     multifactorial (dCHF, obesity/ARIEL, hx PE), mild by echo 1/2016    Right bundle branch block (RBBB) with left anterior fascicular block (LAFB)     Sepsis 09/18/2020    Type 2 myocardial infarction 05/20/2025       Past Surgical History:   Procedure Laterality Date    ARTERIOGRAM  07/2015    BRONCHOSCOPY N/A 07/21/2017    Procedure: BRONCHOSCOPY with wash;  Surgeon: Caleb Garcia MD;  Location: [facility] ENDOSCOPY;  Service:     COLONOSCOPY      COLONOSCOPY N/A 01/26/2023    Procedure: COLONOSCOPY to CECUM AND TERM ILEUM;  Surgeon: Jj Dean MD;  Location:  [facility] END[facility]COPY;  Service: Gastroenterology;  Laterality: N/A;  PRE OP -screening  POST OP -  NORMAL    D & C HYSTEROSCOPY N/A 03/08/2022    Procedure: DILATATION AND CURETTAGE with hysteroscopy;  Surgeon: Kaylah Land DO;  Location: [facility] MAIN OR;  Service: Gynecology Oncology;  Laterality: N/A;    DILATATION AND CURETTAGE  04/11/2011    OTHER SURGICAL HISTORY  09/2015    IVC filter    OTHER SURGICAL HISTORY      left LE revascularization    OTHER SURGICAL HISTORY      ALESSIA and LEV scan    THROMBECTOMY  07/2015       Social History:  Lives in [place], [place]      Antibiotic allergies and intolerances:    Cephalexin causes rash but she tolerates ceftriaxone, Zosyn, and penicillin G    Clindamycin causes hives    Medications:    Current Facility-Administered Medications:     sennosides-docusate (PERICOLACE) 8.6-50 MG per tablet 2 tablet, 2 tablet, Oral, BID PRN **AND** polyethylene glycol (MIRALAX) packet 17 g, 17 g, Oral, Daily PRN **AND** bisacodyl (DULCOLAX) EC tablet 5 mg, 5 mg, Oral, Daily PRN **AND** bisacodyl (DULCOLAX) suppository 10 mg, 10 mg, Rectal, Daily PRN, Darwin Lozano MD    furosemide (LASIX) injection 80 mg, 80 mg, Intravenous, BID Diuretics, Shahriar Terry MD, 80 mg at 07/18/25 8560    hydrocortisone-bacitracin-zinc oxide-nystatin (MAGIC BARRIER) ointment 1  Application, 1 Application, Topical, TID, Darwin Lozano MD, 1 Application at 07/18/25 2116    ipratropium-albuterol (DUO-NEB) nebulizer solution 3 mL, 3 mL, Nebulization, 4x Daily - RT, Darwin Lozano MD, 3 mL at 07/18/25 2213    Lidocaine 4 % 1 patch, 1 patch, Transdermal, Q24H, Darwin Lozano MD, 1 patch at 07/17/25 0854    medroxyPROGESTERone (PROVERA) tablet 10 mg, 10 mg, Oral, Daily, Darwin Lozano MD, 10 mg at 07/18/25 1211    melatonin tablet 5 mg, 5 mg, Oral, Nightly PRN, Darwin Lozano MD    metoprolol succinate XL (TOPROL-XL) 24 hr tablet 12.5 mg, 12.5 mg, Oral, Daily, Darwin Lozano MD, 12.5 mg at 07/18/25 1211    miconazole (MICOTIN) 2 % powder 1 Application, 1 Application, Topical, Q12H, Darwin Lozano MD, 1 Application at 07/18/25 2117    ondansetron (ZOFRAN) injection 4 mg, 4 mg, Intravenous, Q6H PRN, Darwin Lozano MD    rivaroxaban (XARELTO) tablet 20 mg, 20 mg, Oral, Daily With Dinner, Darwin Lozano MD, 20 mg at 07/18/25 1753    [COMPLETED] Insert Peripheral IV, , , Once **AND** sodium chloride 0.9 % flush 10 mL, 10 mL, Intravenous, PRN, Komal Najera MD    sodium chloride 0.9 % flush 10 mL, 10 mL, Intravenous, Q12H, Darwin Lozano MD, 10 mL at 07/18/25 2117    sodium chloride 0.9 % flush 10 mL, 10 mL, Intravenous, PRN, Darwin Lozano MD    sodium chloride 0.9 % infusion 40 mL, 40 mL, Intravenous, PRN, Darwin Lozano MD      Objective   Vital Signs   Temp:  [97.3 °F (36.3 °C)-98.6 °F (37 °C)] 97.3 °F (36.3 °C)  Heart Rate:  [66-79] 67  Resp:  [20-26] 20  BP: (121-129)/(75-82) 128/82    Physical Exam:   General: awake, alert, very, sitting up in bed  Eyes: no scleral icterus  Cardiovascular: NR  Respiratory: Mild tachypnea without wheezing  GI: Abdomen is soft, not tender, not distended  Neurological: Alert and oriented x 3  Psychiatric: Normal mood and affect   Vasc: PIV w/o erythema    Labs:     Lab Results   Component Value Date    WBC 4.76 07/19/2025     HGB 7.8 (L) 07/19/2025    HCT 25.7 (L) 07/19/2025    MCV 87.7 07/19/2025     07/19/2025       Lab Results   Component Value Date    GLUCOSE 92 07/19/2025    BUN 21.0 07/19/2025    CREATININE 1.14 (H) 07/19/2025    EGFRIFNONA 42 (L) 08/30/2021    EGFRIFAFRI 59 (L) 04/15/2021    BCR 18.4 07/19/2025    CO2 32.0 (H) 07/19/2025    CALCIUM 8.4 (L) 07/19/2025    ALBUMIN 3.5 07/16/2025    AST 24 07/16/2025    ALT 17 07/16/2025     Lab Results   Component Value Date    HGBA1C 5.40 05/04/2025       BNP 4106  Procalcitonin 0.1    Microbiology:  7/16 BCx: Coagulase-negative Staphylococcus in 1/2 sets  7/17 Candida auris PCR not detected    Radiology:  7/16 XR right foot does not show any evidence of osteomyelitis    7/16 CXR personally reviewed and shows cardiomegaly and pulmonary vascular congestion    Isolation:  Contact    ASSESSMENT/PLAN:  Contamination of blood culture  Severe mitral valve regurgitation  History of mitral valve endocarditis due to group B streptococcus  Super obesity BMI 51  Syncope    No current evidence of infection.  She is afebrile with a normal WBC.  Blood cultures positive for coagulase-negative Staphylococcus in 1/2 sets is consistent with a contaminant.  Provided education to patient regarding contaminated blood culture and the fact that antibiotics are not required for this condition.  She expressed understanding.    I suspect her worsening mitral valve regurgitation is due to sequelae of endocarditis rather than active infection.  I do not recommend any antibiotics at this time.    Thank you for allowing me to be involved in the care of this patient. Infectious diseases will sign off at this time, but please call us at [phone] if any further ID questions or new ID concerns.

## 2025-07-19 NOTE — PROGRESS NOTES
Name: Emely Solomon ADMIT: 2025   : 1960  PCP: Adilson Wilkerson MD    MRN: [id number] LOS: 3 days   AGE/SEX: 64 y.o. female  ROOM: [facility]/     Subjective   Subjective   Breathing improved.     Objective   Objective   Vital Signs  Temp:  [97.3 °F (36.3 °C)-98 °F (36.7 °C)] 97.3 °F (36.3 °C)  Heart Rate:  [67-83] 70  Resp:  [16-22] 18  BP: (121-128)/(74-82) 126/74  SpO2:  [94 %-100 %] 100 %  on  Flow (L/min) (Oxygen Therapy):  [2-4] 2;   Device (Oxygen Therapy): nasal cannula  Body mass index is 51.19 kg/m².    Physical Exam  Constitutional:       Appearance: She is obese. She is ill-appearing. She is not toxic-appearing.   HENT:      Head: Normocephalic and atraumatic.   Cardiovascular:      Rate and Rhythm: Normal rate and regular rhythm.   Pulmonary:      Effort: No respiratory distress.      Breath sounds: Decreased breath sounds present.   Abdominal:      General: Bowel sounds are normal.      Palpations: Abdomen is soft.      Tenderness: There is no abdominal tenderness.   Musculoskeletal:      Right lower leg: Edema present.      Left lower leg: Edema present.   Skin:     General: Skin is warm and dry.   Neurological:      Mental Status: She is alert and oriented to person, place, and time.   Psychiatric:         Mood and Affect: Mood is anxious.         Behavior: Behavior normal.     Results Review  I reviewed the patient's new clinical results.  Results from last 7 days   Lab Units 25  0753 25  0303 25  0014 25  1609 25  0828 25  0308 25  1452 25  0357 25  0947   WBC 10*3/mm3  --  4.76  --   --   --  6.90  --  4.99 6.45   HEMOGLOBIN g/dL 8.0* 7.8* 8.2* 7.8*   < > 7.8*   < > 7.3* 7.8*   PLATELETS 10*3/mm3  --  344  --   --   --  366  --  340 369    < > = values in this interval not displayed.     Results from last 7 days   Lab Units 25  0303 25  0308 25  0357 25  0947   SODIUM mmol/L 139 141 137 137   POTASSIUM  mmol/L 3.0* 3.9 4.5 4.7   CHLORIDE mmol/L 96* 104 103 103   CO2 mmol/L 32.0* 26.1 25.0 21.3*   BUN mg/dL 21.0 24.0* 24.0* 24.0*   CREATININE mg/dL 1.14* 1.21* 1.27* 1.35*   GLUCOSE mg/dL 92 95 97 96     Lab Results   Component Value Date    ANIONGAP 11.0 07/19/2025     Estimated Creatinine Clearance: 65.1 mL/min (A) (by C-G formula based on SCr of 1.14 mg/dL (H)).   Lab Results   Component Value Date    EGFR 53.5 (L) 07/19/2025     Results from last 7 days   Lab Units 07/16/25  0947   ALBUMIN g/dL 3.5   BILIRUBIN mg/dL 0.4   ALK PHOS U/L 95   AST (SGOT) U/L 24   ALT (SGPT) U/L 17     Results from last 7 days   Lab Units 07/19/25  0303 07/18/25  0308 07/17/25  0357 07/16/25  0947   CALCIUM mg/dL 8.4* 9.0 9.0 9.3   ALBUMIN g/dL  --   --   --  3.5   MAGNESIUM mg/dL 1.7 2.0 2.3 2.4   PHOSPHORUS mg/dL 3.1 3.8 4.2 3.8     Results from last 7 days   Lab Units 07/16/25  0947   PROCALCITONIN ng/mL 0.11   LACTATE mmol/L 1.5     Glucose   Date/Time Value Ref Range Status   07/17/2025 1656 153 (H) 70 - 130 mg/dL Final       No radiology results for the last day      Scheduled Meds  furosemide, 80 mg, Intravenous, BID Diuretics  hydrocortisone-bacitracin-zinc oxide-nystatin, 1 Application, Topical, TID  ipratropium-albuterol, 3 mL, Nebulization, 4x Daily - RT  Lidocaine, 1 patch, Transdermal, Q24H  medroxyPROGESTERone, 10 mg, Oral, Daily  metoprolol succinate XL, 12.5 mg, Oral, Daily  miconazole, 1 Application, Topical, Q12H  potassium chloride ER, 40 mEq, Oral, Q4H  rivaroxaban, 20 mg, Oral, Daily With Dinner  sodium chloride, 10 mL, Intravenous, Q12H    Continuous Infusions   PRN Meds    senna-docusate sodium **AND** polyethylene glycol **AND** bisacodyl **AND** bisacodyl    Calcium Replacement - Follow Nurse / BPA Driven Protocol    Magnesium Standard Dose Replacement - Follow Nurse / BPA Driven Protocol    melatonin    ondansetron    Phosphorus Replacement - Follow Nurse / BPA Driven Protocol    Potassium Replacement -  Follow Nurse / BPA Driven Protocol    [COMPLETED] Insert Peripheral IV **AND** sodium chloride    sodium chloride    sodium chloride     Diet  Diet: Cardiac; Healthy Heart (2-3 Na+); Fluid Consistency: Thin (IDDSI 0)  NPO Diet NPO Type: Strict NPO      Intake/Output Summary (Last 24 hours) at 7/19/2025 1358  Last data filed at 7/19/2025 0900  Gross per 24 hour   Intake 618 ml   Output 7350 ml   Net -6732 ml        Assessment/Plan     Active Hospital Problems    Diagnosis  POA    **Syncope and collapse [R55]  Yes    Acute respiratory failure with hypoxia [J96.01]  Unknown    History of bacterial endocarditis [Z86.79]  Not Applicable    Severe mitral regurgitation [I34.0]  Unknown    Chronic respiratory failure with hypoxia [J96.11]  Yes    Acute on chronic heart failure with preserved ejection fraction (HFpEF) [I50.33]  Yes    History of pulmonary embolism [Z86.711]  Yes    Iron deficiency [E61.1]  Yes    History of DVT (deep vein thrombosis) [Z86.718]  Not Applicable    Lymphedema [I89.0]  Yes    ARIEL on CPAP [G47.33]  Yes    Chronic diastolic CHF (congestive heart failure) [I50.32]  Yes    Essential hypertension [I10]  Yes    Pulmonary hypertension [I27.20]  Yes      Resolved Hospital Problems   No resolved problems to display.     Patient is a 64 y.o. female presented with progressive weakness, dyspnea, swelling    Syncope  Severe MR now (was mild on 5/26/25 echo)  History of mitral valve endocarditis (antibiotics completed 6/17/2025)  Acute hypoxic respiratory failure  CHF  Severe pulmonary hypertension  Not a good surgical candidate per cardiac surgery  BCx felt to be contaminant per ID worsening mitral valve regurgitation due to sequelae of endocarditis rather than active infection  Continue diuresis, monitor renal function, weights. 6.7 L last 24 hours. On Lasix 80 twice a day IV  Cardiology plans LYNETTE on Monday to determine MitraClip candidacy     History of DVT/PE xarelto for anticoagulation held d/t vaginal  bleeding on admission (has intermittent bleeding). 2015 she developed an extensive DVT and PE, and then embolized through her PFO to the left leg arteries and required surgery. Continuing anticoagulation. Hemoglobin stable stop frequent checking    Postmenopausal bleeding  Blood loss anemia  Elevated   Has GYN/oncology procedure scheduled outpatient  Monitor for blood loss anemia may need transfusion     Right plantar heel blister  X-ray nothing to suggest osteomyelitis.  Status post excision by podiatry-serous fluid. No further intervention needed. Wound care    ARIEL  Chronic lymphedema  Immobility chronic    DVT prophylaxis  Xarelto (home med)     Discharge  TBD  Expected Discharge Date: 7/22/2025; Expected Discharge Time:     Discussed with patient and nursing staff     Darwin Lozano MD  [facility]  07/19/25 13:58 EDT

## 2025-07-20 LAB
ANION GAP SERPL CALCULATED.3IONS-SCNC: 11 MMOL/L (ref 5–15)
BUN SERPL-MCNC: 16 MG/DL (ref 8–23)
BUN/CREAT SERPL: 15.4 (ref 7–25)
CALCIUM SPEC-SCNC: 8.8 MG/DL (ref 8.6–10.5)
CHLORIDE SERPL-SCNC: 90 MMOL/L (ref 98–107)
CO2 SERPL-SCNC: 39 MMOL/L (ref 22–29)
CREAT SERPL-MCNC: 1.04 MG/DL (ref 0.57–1)
DEPRECATED RDW RBC AUTO: 48.7 FL (ref 37–54)
EGFRCR SERPLBLD CKD-EPI 2021: 59.8 ML/MIN/1.73
ERYTHROCYTE [DISTWIDTH] IN BLOOD BY AUTOMATED COUNT: 15.9 % (ref 12.3–15.4)
GLUCOSE SERPL-MCNC: 87 MG/DL (ref 65–99)
HCT VFR BLD AUTO: 26.8 % (ref 34–46.6)
HGB BLD-MCNC: 8.4 G/DL (ref 12–15.9)
MAGNESIUM SERPL-MCNC: 1.6 MG/DL (ref 1.6–2.4)
MCH RBC QN AUTO: 26.5 PG (ref 26.6–33)
MCHC RBC AUTO-ENTMCNC: 31.3 G/DL (ref 31.5–35.7)
MCV RBC AUTO: 84.5 FL (ref 79–97)
PHOSPHATE SERPL-MCNC: 2.8 MG/DL (ref 2.5–4.5)
PLATELET # BLD AUTO: 402 10*3/MM3 (ref 140–450)
PMV BLD AUTO: 8 FL (ref 6–12)
POTASSIUM SERPL-SCNC: 3.6 MMOL/L (ref 3.5–5.2)
POTASSIUM SERPL-SCNC: 4.3 MMOL/L (ref 3.5–5.2)
RBC # BLD AUTO: 3.17 10*6/MM3 (ref 3.77–5.28)
SODIUM SERPL-SCNC: 140 MMOL/L (ref 136–145)
WBC NRBC COR # BLD AUTO: 4.58 10*3/MM3 (ref 3.4–10.8)

## 2025-07-20 PROCEDURE — 84132 ASSAY OF SERUM POTASSIUM: CPT | Performed by: STUDENT IN AN ORGANIZED HEALTH CARE EDUCATION/TRAINING PROGRAM

## 2025-07-20 PROCEDURE — 25010000002 ACETAZOLAMIDE PER 500 MG: Performed by: HOSPITALIST

## 2025-07-20 PROCEDURE — 99232 SBSQ HOSP IP/OBS MODERATE 35: CPT | Performed by: STUDENT IN AN ORGANIZED HEALTH CARE EDUCATION/TRAINING PROGRAM

## 2025-07-20 PROCEDURE — 94664 DEMO&/EVAL PT USE INHALER: CPT

## 2025-07-20 PROCEDURE — 25010000002 FUROSEMIDE PER 20 MG: Performed by: STUDENT IN AN ORGANIZED HEALTH CARE EDUCATION/TRAINING PROGRAM

## 2025-07-20 PROCEDURE — 80048 BASIC METABOLIC PNL TOTAL CA: CPT | Performed by: HOSPITALIST

## 2025-07-20 PROCEDURE — 94799 UNLISTED PULMONARY SVC/PX: CPT

## 2025-07-20 PROCEDURE — 83735 ASSAY OF MAGNESIUM: CPT | Performed by: HOSPITALIST

## 2025-07-20 PROCEDURE — 94761 N-INVAS EAR/PLS OXIMETRY MLT: CPT

## 2025-07-20 PROCEDURE — 36415 COLL VENOUS BLD VENIPUNCTURE: CPT | Performed by: HOSPITALIST

## 2025-07-20 PROCEDURE — 84100 ASSAY OF PHOSPHORUS: CPT | Performed by: HOSPITALIST

## 2025-07-20 PROCEDURE — 85027 COMPLETE CBC AUTOMATED: CPT | Performed by: HOSPITALIST

## 2025-07-20 RX ORDER — POTASSIUM CHLORIDE 1500 MG/1
40 TABLET, EXTENDED RELEASE ORAL EVERY 4 HOURS
Status: DISPENSED | OUTPATIENT
Start: 2025-07-20 | End: 2025-07-20

## 2025-07-20 RX ORDER — ACETAZOLAMIDE 500 MG/5ML
500 INJECTION, POWDER, LYOPHILIZED, FOR SOLUTION INTRAVENOUS ONCE
Status: COMPLETED | OUTPATIENT
Start: 2025-07-20 | End: 2025-07-20

## 2025-07-20 RX ADMIN — IPRATROPIUM BROMIDE AND ALBUTEROL SULFATE 3 ML: .5; 3 SOLUTION RESPIRATORY (INHALATION) at 06:26

## 2025-07-20 RX ADMIN — ACETAZOLAMIDE SODIUM 500 MG: 500 INJECTION, POWDER, LYOPHILIZED, FOR SOLUTION INTRAVENOUS at 15:43

## 2025-07-20 RX ADMIN — ANTI-FUNGAL POWDER MICONAZOLE NITRATE TALC FREE 1 APPLICATION: 1.42 POWDER TOPICAL at 20:20

## 2025-07-20 RX ADMIN — ANTI-FUNGAL POWDER MICONAZOLE NITRATE TALC FREE 1 APPLICATION: 1.42 POWDER TOPICAL at 08:13

## 2025-07-20 RX ADMIN — NYSTATIN 1 APPLICATION: 100000 OINTMENT TOPICAL at 08:13

## 2025-07-20 RX ADMIN — POTASSIUM CHLORIDE 40 MEQ: 1500 TABLET, EXTENDED RELEASE ORAL at 08:13

## 2025-07-20 RX ADMIN — MEDROXYPROGESTERONE ACETATE 10 MG: 10 TABLET ORAL at 08:13

## 2025-07-20 RX ADMIN — FUROSEMIDE 80 MG: 10 INJECTION, SOLUTION INTRAMUSCULAR; INTRAVENOUS at 08:13

## 2025-07-20 RX ADMIN — Medication 10 ML: at 08:13

## 2025-07-20 RX ADMIN — NYSTATIN 1 APPLICATION: 100000 OINTMENT TOPICAL at 15:48

## 2025-07-20 RX ADMIN — METOPROLOL SUCCINATE 12.5 MG: 25 TABLET, EXTENDED RELEASE ORAL at 08:13

## 2025-07-20 RX ADMIN — IPRATROPIUM BROMIDE AND ALBUTEROL SULFATE 3 ML: .5; 3 SOLUTION RESPIRATORY (INHALATION) at 20:51

## 2025-07-20 RX ADMIN — IPRATROPIUM BROMIDE AND ALBUTEROL SULFATE 3 ML: .5; 3 SOLUTION RESPIRATORY (INHALATION) at 10:51

## 2025-07-20 RX ADMIN — SENNOSIDES, DOCUSATE SODIUM 2 TABLET: 50; 8.6 TABLET, FILM COATED ORAL at 20:24

## 2025-07-20 RX ADMIN — IPRATROPIUM BROMIDE AND ALBUTEROL SULFATE 3 ML: .5; 3 SOLUTION RESPIRATORY (INHALATION) at 14:12

## 2025-07-20 RX ADMIN — NYSTATIN 1 APPLICATION: 100000 OINTMENT TOPICAL at 20:20

## 2025-07-20 RX ADMIN — FUROSEMIDE 80 MG: 10 INJECTION, SOLUTION INTRAMUSCULAR; INTRAVENOUS at 18:08

## 2025-07-20 RX ADMIN — RIVAROXABAN 20 MG: 20 TABLET, FILM COATED ORAL at 18:08

## 2025-07-20 RX ADMIN — Medication 10 ML: at 20:20

## 2025-07-20 NOTE — PROGRESS NOTES
LOS: 4 days   Patient Care Team:  Adilson Wilkerson MD as PCP - General (Family Medicine)  Florida Segovia MD as Referring Physician (Obstetrics and Gynecology)  Brennan Rogers MD as Cardiologist (Cardiology)  Caleb Garcia MD as Consulting Physician (Pulmonary Disease)  Jess Sanz, RN as Ambulatory  ([facility])    Chief Complaint:  Following for CHF    Interval History:   Still with robust urine.  Improving daily.  Continue with IV Lasix.  Her breathing is better.    Objective   Vital Signs  Temp:  [97.7 °F (36.5 °C)-98.1 °F (36.7 °C)] 98 °F (36.7 °C)  Heart Rate:  [69-77] 70  Resp:  [18-25] 18  BP: (119-132)/(72-82) 119/80    Intake/Output Summary (Last 24 hours) at 7/20/2025 0804  Last data filed at 7/20/2025 0457  Gross per 24 hour   Intake 328 ml   Output 5200 ml   Net -4872 ml       Comfortable NAD, resting in bed, appears much more comfortable than yesterday  PERRL, conjunctivae clear  Neck supple, JVD still noted to mandible   S1/S2 RRR, systolic murmur at left lower sternal border  Coarse breath sounds with Rales to bases although breath sounds distant due to habitus.  Abdomen S/NT/ND (+) BS, no HSM appreciated  Extremities warm, no clubbing, cyanosis, 3+ BLE edema/lymphedema   no visible or palpable skin lesions  A/O.  X 3, mood and affect appropriate    Results Review:      Results from last 7 days   Lab Units 07/20/25  0353 07/19/25  1807 07/19/25  0303 07/18/25  0308   SODIUM mmol/L 140  --  139 141   POTASSIUM mmol/L 3.6 3.5 3.0* 3.9   CHLORIDE mmol/L 90*  --  96* 104   CO2 mmol/L 39.0*  --  32.0* 26.1   BUN mg/dL 16.0  --  21.0 24.0*   CREATININE mg/dL 1.04*  --  1.14* 1.21*   GLUCOSE mg/dL 87  --  92 95   CALCIUM mg/dL 8.8  --  8.4* 9.0     Results from last 7 days   Lab Units 07/16/25  1102 07/16/25  0947   HSTROP T ng/L 32* 34*     Results from last 7 days   Lab Units 07/20/25  0353 07/19/25  0753 07/19/25  0303 07/18/25  0828 07/18/25  0308   WBC 10*3/mm3 4.58   --  4.76  --  6.90   HEMOGLOBIN g/dL 8.4* 8.0* 7.8*   < > 7.8*   HEMATOCRIT % 26.8* 26.4* 25.7*   < > 26.2*   PLATELETS 10*3/mm3 402  --  344  --  366    < > = values in this interval not displayed.     Results from last 7 days   Lab Units 07/16/25  0947   INR  3.29*         Results from last 7 days   Lab Units 07/20/25  0353   MAGNESIUM mg/dL 1.6           I reviewed the patient's new clinical results.  I personally viewed and interpreted the patient's EKG/Telemetry data        Medication Review:   furosemide, 80 mg, Intravenous, BID Diuretics  hydrocortisone-bacitracin-zinc oxide-nystatin, 1 Application, Topical, TID  ipratropium-albuterol, 3 mL, Nebulization, 4x Daily - RT  Lidocaine, 1 patch, Transdermal, Q24H  medroxyPROGESTERone, 10 mg, Oral, Daily  metoprolol succinate XL, 12.5 mg, Oral, Daily  miconazole, 1 Application, Topical, Q12H  potassium chloride ER, 40 mEq, Oral, Q4H  rivaroxaban, 20 mg, Oral, Daily With Dinner  sodium chloride, 10 mL, Intravenous, Q12H             Assessment & Plan       Syncope and collapse    Chronic diastolic CHF (congestive heart failure)    Essential hypertension    Pulmonary hypertension    ARIEL on CPAP    History of DVT (deep vein thrombosis)    Lymphedema    Iron deficiency    History of pulmonary embolism    Acute on chronic heart failure with preserved ejection fraction (HFpEF)    Chronic respiratory failure with hypoxia    Acute respiratory failure with hypoxia    History of bacterial endocarditis    Severe mitral regurgitation    Syncopal episode.  Patient was ambulating to her outpatient echocardiogram which was obtained July 16, 2025, and then passed out.  She was markedly hypoxic.  Likely secondary to pulmonary hypertension due to severe mitral regurgitation  Mitral regurgitation, now severe, secondary to healed/probable burned-out endocarditis, group B strep  I think this is driving her hypoxia, pulmonary hypertension, and multiple acute decompensations  In reviewing  her echocardiograms, the mitral regurgitation is significantly worse than prior  She remains not a surgical candidate despite the worsening MR, given her comorbidities.  Will arrange for LYNETTE on Monday, to investigate if there is any disease amenable to MitraClip procedure  Unfortunately, if she is not a surgical candidate, then I am afraid her prognosis is poor   Lipedema, with a likely large component  CHF.  Continue IV diuresis, she is tolerating well.  Her respiratory requirements are improving.  Hypertension   Severe pulmonary hypertension secondary to severe mitral regurgitation/#2  Acute on chronic HFpEF, secondary to #2.  Diuresing aggressively and she is doing great with this.  Plantar wound.  Podiatry evaluating.  Also complicates any surgical planning per #2.  Possible endometrial cancer  History of DVT and PE     Continue IV diuresis, tentatively plan for LYNETTE tomorrow.  Shahriar Terry MD  07/20/25  08:04 EDT      Part of this note may be an electronic transcription/translation of spoken language to printed text using the Dragon Dictation System.

## 2025-07-20 NOTE — PLAN OF CARE
Goal Outcome Evaluation:  Plan of Care Reviewed With: patient        Progress: improving  Outcome Evaluation: Pt AOx4, 2L NC, VSS, IV diuretics per MAR, I/O's monitored, wound care complete, K+ replacement, NPO at midnight for LYNETTE in the am

## 2025-07-20 NOTE — PROGRESS NOTES
Consult Daily Progress Note  [facility]   07/20/25      Patient Name:  Emely Solomon  MRN:  [id number]   YOB: 1960  Age: 64 y.o.  Sex: female  LOS: 4    Reason for Consult:  Respiratory failure    Interval History:  No acute events overnight  Net -4.8 L over the past 24 hours  2 L nasal cannula  Lying in bed comfortably  States her breathing is improved  States that she is a worrier   No chest pain or palpitations  No nausea vomiting    Physical Exam:  Vitals:    07/20/25 1051   BP:    Pulse: 74   Resp: 18   Temp:    SpO2: 99%       Intake/Output    Intake/Output Summary (Last 24 hours) at 7/20/2025 1256  Last data filed at 7/20/2025 1225  Gross per 24 hour   Intake 740 ml   Output 6500 ml   Net -5760 ml     General: Alert, nontoxic, NAD  HEENT: NC/AT, EOMI, MMM  Neck: Supple, trachea midline  Cardiac: RRR, no murmur, gallops, rubs  Pulmonary: Decreased breath sounds at bases  GI: Soft, non-tender, non-distended, normal bowel sounds  Extremities: Warm, well perfused, 1+ LE edema  Skin: no visible rash  Neuro: CN II - XII grossly intact  Psychiatry: Normal mood and affect      Data Review:  Results from last 7 days   Lab Units 07/20/25  0353 07/19/25  0753 07/19/25  0303 07/19/25  0014 07/18/25  1609 07/18/25  0828 07/18/25  0308 07/17/25  1452 07/17/25  0357 07/16/25  0947   WBC 10*3/mm3 4.58  --  4.76  --   --   --  6.90  --  4.99 6.45   HEMOGLOBIN g/dL 8.4* 8.0* 7.8* 8.2* 7.8* 7.7* 7.8*   < > 7.3* 7.8*   PLATELETS 10*3/mm3 402  --  344  --   --   --  366  --  340 369    < > = values in this interval not displayed.     Results from last 7 days   Lab Units 07/20/25  0353 07/19/25  1807 07/19/25  0303 07/18/25  0308 07/17/25  0357 07/16/25  0947   SODIUM mmol/L 140  --  139 141 137 137   POTASSIUM mmol/L 3.6 3.5 3.0* 3.9 4.5 4.7   CHLORIDE mmol/L 90*  --  96* 104 103 103   CO2 mmol/L 39.0*  --  32.0* 26.1 25.0 21.3*   BUN mg/dL 16.0  --  21.0 24.0* 24.0* 24.0*   CREATININE mg/dL  1.04*  --  1.14* 1.21* 1.27* 1.35*   GLUCOSE mg/dL 87  --  92 95 97 96   CALCIUM mg/dL 8.8  --  8.4* 9.0 9.0 9.3   MAGNESIUM mg/dL 1.6  --  1.7 2.0 2.3 2.4   PHOSPHORUS mg/dL 2.8  --  3.1 3.8 4.2 3.8   Estimated Creatinine Clearance: 70 mL/min (A) (by C-G formula based on SCr of 1.04 mg/dL (H)).    Results from last 7 days   Lab Units 07/20/25  0353 07/19/25  0303 07/18/25  0308 07/17/25  0357 07/16/25  0947   AST (SGOT) U/L  --   --   --   --  24   ALT (SGPT) U/L  --   --   --   --  17   PROCALCITONIN ng/mL  --   --   --   --  0.11   LACTATE mmol/L  --   --   --   --  1.5   PLATELETS 10*3/mm3 402 344 366   < > 369    < > = values in this interval not displayed.       Results from last 7 days   Lab Units 07/18/25  1143 07/18/25  0647 07/18/25  0355 07/17/25  2107   PH, ARTERIAL pH units 7.386 7.334* 7.318* 7.299*   PCO2, ARTERIAL mm Hg 60.4* 65.5* 63.3* 61.0*   PO2 ART mm Hg 100.8* 115.5* 81.8 76.8*   HCO3 ART mmol/L 36.2* 34.9* 32.4* 30.0*       Result Review:  I have personally reviewed the results from the time of this admission to 7/20/2025 12:56 EDT and agree with these findings:  [x]  Laboratory list / accordion  [x]  Microbiology  [x]  Radiology  []  EKG/Telemetry   [x]  Cardiology/Vascular   []  Pathology  [x]  Old records  []  Other:    ASSESSMENT  /  PLAN:    Acute on chronic HFpEF  Pulmonary edema, acute  Severe pulmonary hypertension, RVSP>55, probably group 2 and possibly group 3.  Bilateral lower lobe atelectasis  Acute on chronic hypercapnic respiratory failure  Acute hypoxic respiratory failure, secondary to the above  Severe MR  ARIEL, on auto CPAP 15-20  History of DVT/PE  History of mitral valve endocarditis    -Patient presented and found to have CHF exacerbation with pulmonary edema, severe pulmonary hypertension, acute on chronic respiratory failure  -Ongoing diuresis with Lasix, making significant urine output  - NIPPV: settings reviewed and adjusted.  Use NIPPV at night and as needed during the  day.  Eventually she may require outpatient NIPPV instead of her home CPAP due to significant hypercapnia.  Will reassess prior to DC.  -Continue bronchodilator therapy with DuoNebs  -Xarelto for history of DVT/PE  - On 2 L nasal cannula, was weaned down to room air with saturations maintaining greater than 90%.  Will need walking oximetry prior to discharge.  - Plan is for LYNETTE on Monday to evaluate for MitraClip  - Guarded prognosis due to the severity of her underlying cardiac disease and pulmonary hypertension    Thank you for allowing us to participate in this patients care. Pulmonary will continue to follow.     Shady Blanco MD  [facility]  Pulmonary and Critical Care Medicine, Interventional Pulmonology    Parts of this note may be an electronic transcription/translation of spoken language to printed text using the Dragon dictation system.

## 2025-07-20 NOTE — PROGRESS NOTES
Name: mEely Solomon ADMIT: 2025   : 1960  PCP: Adilson Wilkerson MD    MRN: [id number] LOS: 4 days   AGE/SEX: 64 y.o. female  ROOM: [facility]/     Subjective   Subjective   Patient resting in bed.  Good urine output.  No nausea or vomiting.  Tolerating breakfast morning.  Breathing continues to improve.    Review of Systems  As above     Objective   Objective   Vital Signs  Temp:  [97.7 °F (36.5 °C)-98.1 °F (36.7 °C)] 98 °F (36.7 °C)  Heart Rate:  [69-79] 74  Resp:  [18-25] 18  BP: (119-132)/(72-82) 119/80  SpO2:  [93 %-100 %] 99 %  on  Flow (L/min) (Oxygen Therapy):  [2-4] 2;   Device (Oxygen Therapy): nasal cannula  Body mass index is 49.55 kg/m².  Physical Exam    Results Review     I reviewed the patient's new clinical results.  Results from last 7 days   Lab Units 25  0353 25  0753 25  0303 25  0014 25  0828 25  0308 25  1452 25  035   WBC 10*3/mm3 4.58  --  4.76  --   --  6.90  --  4.99   HEMOGLOBIN g/dL 8.4* 8.0* 7.8* 8.2*   < > 7.8*   < > 7.3*   PLATELETS 10*3/mm3 402  --  344  --   --  366  --  340    < > = values in this interval not displayed.     Results from last 7 days   Lab Units 25  0353 25  1807 25  0303 25  0308 25  0357   SODIUM mmol/L 140  --  139 141 137   POTASSIUM mmol/L 3.6 3.5 3.0* 3.9 4.5   CHLORIDE mmol/L 90*  --  96* 104 103   CO2 mmol/L 39.0*  --  32.0* 26.1 25.0   BUN mg/dL 16.0  --  21.0 24.0* 24.0*   CREATININE mg/dL 1.04*  --  1.14* 1.21* 1.27*   GLUCOSE mg/dL 87  --  92 95 97   Estimated Creatinine Clearance: 70 mL/min (A) (by C-G formula based on SCr of 1.04 mg/dL (H)).  Results from last 7 days   Lab Units 25  0947   ALBUMIN g/dL 3.5   BILIRUBIN mg/dL 0.4   ALK PHOS U/L 95   AST (SGOT) U/L 24   ALT (SGPT) U/L 17     Results from last 7 days   Lab Units 25  0353 25  0303 25  0308 25  0357 25  0947   CALCIUM mg/dL 8.8 8.4* 9.0 9.0 9.3   ALBUMIN g/dL   --   --   --   --  3.5   MAGNESIUM mg/dL 1.6 1.7 2.0 2.3 2.4   PHOSPHORUS mg/dL 2.8 3.1 3.8 4.2 3.8     Results from last 7 days   Lab Units 07/16/25  0947   PROCALCITONIN ng/mL 0.11   LACTATE mmol/L 1.5     COVID19   Date Value Ref Range Status   05/04/2025 Not Detected Not Detected - Ref. Range Final   03/07/2022 Not Detected Not Detected - Ref. Range Final     Glucose   Date/Time Value Ref Range Status   07/17/2025 1656 153 (H) 70 - 130 mg/dL Final       XR Foot 3+ View Right  XR FOOT 3+ VW RIGHT-     Clinical: Wound along the plantar surface of the heel fat pad     FINDINGS: There is ankle and foot edema. No soft tissue gas or  radiopaque foreign body is demonstrated.     There is a moderate size calcaneal spur. There is bone hypertrophy at  the Achilles insertion. No cortical bone destruction of the calcaneus to  suggest osteomyelitis. No forefoot abnormality is demonstrated. Midfoot  articulations preserved. The remainder is unremarkable.     This report was finalized on 7/16/2025 9:06 PM by Dr. Dhruv Sanchez M.D  on Workstation: [id number]     XR Chest 1 View  Narrative: XR CHEST 1 VW-     HISTORY: Female who is 64 years-old, syncope, congestive heart failure     TECHNIQUE: Frontal view of the chest     COMPARISON: 7/11/2025     FINDINGS: The heart is enlarged. Pulmonary vasculature is mildly  congested. Aorta is tortuous. Small right basilar atelectasis or  infiltrate, follow-up suggested. There may be minimal right pleural  effusion. No pneumothorax. No acute osseous process.     Impression: As described.     This report was finalized on 7/16/2025 9:30 AM by Dr. Damon Chavez M.D on Workstation: [id number]     Adult Transthoracic Echo Complete w/ Color, Spectral and Contrast if Necessary Per Protocol    Left ventricular systolic function is normal. Calculated left   ventricular EF = 62.5%    Left ventricular diastolic function was normal.    The right ventricular cavity is mildly dilated.    Left  atrial volume is severely increased.    The right atrial cavity is moderately dilated.    There is calcification of the aortic valve.    There is bileaflet mitral valve thickening present.    Severe mitral valve regurgitation is present.    Estimated right ventricular systolic pressure from tricuspid   regurgitation is markedly elevated (>55 mmHg). Calculated right   ventricular systolic pressure from tricuspid regurgitation is 87 mmHg.    Severe pulmonary hypertension is present.    I reviewed the patient's daily medications.  Scheduled Medications  furosemide, 80 mg, Intravenous, BID Diuretics  hydrocortisone-bacitracin-zinc oxide-nystatin, 1 Application, Topical, TID  ipratropium-albuterol, 3 mL, Nebulization, 4x Daily - RT  Lidocaine, 1 patch, Transdermal, Q24H  medroxyPROGESTERone, 10 mg, Oral, Daily  metoprolol succinate XL, 12.5 mg, Oral, Daily  miconazole, 1 Application, Topical, Q12H  potassium chloride ER, 40 mEq, Oral, Q4H  rivaroxaban, 20 mg, Oral, Daily With Dinner  sodium chloride, 10 mL, Intravenous, Q12H    Infusions   Diet  Diet: Cardiac; Healthy Heart (2-3 Na+); Fluid Consistency: Thin (IDDSI 0)  NPO Diet NPO Type: Strict NPO         I have personally reviewed:  [x]  Laboratory   []  Microbiology   [x]  Radiology   []  EKG/Telemetry   [x]  Cardiology/Vascular   []  Pathology   [x]  Records     Assessment/Plan     Active Hospital Problems    Diagnosis  POA    **Syncope and collapse [R55]  Yes    Acute respiratory failure with hypoxia [J96.01]  Unknown    History of bacterial endocarditis [Z86.79]  Not Applicable    Severe mitral regurgitation [I34.0]  Unknown    Chronic respiratory failure with hypoxia [J96.11]  Yes    Acute on chronic heart failure with preserved ejection fraction (HFpEF) [I50.33]  Yes    History of pulmonary embolism [Z86.711]  Yes    Iron deficiency [E61.1]  Yes    History of DVT (deep vein thrombosis) [Z86.718]  Not Applicable    Lymphedema [I89.0]  Yes    ARIEL on CPAP [G47.33]   Yes    Chronic diastolic CHF (congestive heart failure) [I50.32]  Yes    Essential hypertension [I10]  Yes    Pulmonary hypertension [I27.20]  Yes      Resolved Hospital Problems   No resolved problems to display.       64 y.o. female admitted with Syncope and collapse.    Syncope  Severe MR now (was mild on 5/26/25 echo)  History of mitral valve endocarditis (antibiotics completed 6/17/2025)  Acute hypoxic respiratory failure  CHF  Severe pulmonary hypertension  Not a good surgical candidate per cardiac surgery  BCx felt to be contaminant per ID worsening mitral valve regurgitation due to sequelae of endocarditis rather than active infection  Continue diuresis, monitor renal function, weights. 5.2 L out last 24 hours. On Lasix 80 twice a day IV  Cardiology plans LYNETTE on Monday to determine MitraClip candidacy     History of DVT/PE xarelto for anticoagulation held d/t vaginal bleeding on admission (has intermittent bleeding). 2015 she developed an extensive DVT and PE, and then embolized through her PFO to the left leg arteries and required surgery. Continuing anticoagulation.      Postmenopausal bleeding  Blood loss anemia  Elevated   Has GYN/oncology procedure scheduled outpatient  Monitor for blood loss anemia may need transfusion      Right plantar heel blister  X-ray nothing to suggest osteomyelitis.  Status post excision by podiatry-serous fluid. No further intervention needed. Wound care     ARIEL  Chronic lymphedema  Immobility chronic    Xarelto (home med) for DVT prophylaxis.  Full code.  Discussed with patient and nursing staff.  Anticipate discharge to SNU facility in 2-3 days.    Expected Discharge Date: 7/22/2025; Expected Discharge Time:       Alonzo Bunch MD  [facility]  07/20/25  11:07 EDT

## 2025-07-21 ENCOUNTER — APPOINTMENT (OUTPATIENT)
Dept: POSTOP/PACU | Facility: HOSPITAL | Age: 65
DRG: 291 | End: 2025-07-21
Payer: MEDICARE

## 2025-07-21 ENCOUNTER — ANESTHESIA EVENT (OUTPATIENT)
Dept: POSTOP/PACU | Facility: HOSPITAL | Age: 65
End: 2025-07-21
Payer: MEDICARE

## 2025-07-21 ENCOUNTER — ANESTHESIA (OUTPATIENT)
Dept: POSTOP/PACU | Facility: HOSPITAL | Age: 65
End: 2025-07-21
Payer: MEDICARE

## 2025-07-21 VITALS — DIASTOLIC BLOOD PRESSURE: 70 MMHG | SYSTOLIC BLOOD PRESSURE: 126 MMHG | HEART RATE: 88 BPM | OXYGEN SATURATION: 98 %

## 2025-07-21 PROBLEM — R65.20: Status: RESOLVED | Noted: 2025-05-10 | Resolved: 2025-07-21

## 2025-07-21 PROBLEM — R73.9 HYPERGLYCEMIA: Status: RESOLVED | Noted: 2025-05-04 | Resolved: 2025-07-21

## 2025-07-21 PROBLEM — E66.813 CLASS 3 SEVERE OBESITY DUE TO EXCESS CALORIES WITH SERIOUS COMORBIDITY AND BODY MASS INDEX (BMI) OF 60.0 TO 69.9 IN ADULT: Status: RESOLVED | Noted: 2019-06-03 | Resolved: 2025-07-21

## 2025-07-21 PROBLEM — A41.01: Status: RESOLVED | Noted: 2025-05-10 | Resolved: 2025-07-21

## 2025-07-21 PROBLEM — I50.9 ACUTE EXACERBATION OF CHF (CONGESTIVE HEART FAILURE): Status: RESOLVED | Noted: 2025-05-25 | Resolved: 2025-07-21

## 2025-07-21 PROBLEM — I21.A1 TYPE 2 MYOCARDIAL INFARCTION: Status: RESOLVED | Noted: 2025-05-20 | Resolved: 2025-07-21

## 2025-07-21 PROBLEM — I73.9 PVD (PERIPHERAL VASCULAR DISEASE) WITH CLAUDICATION: Status: RESOLVED | Noted: 2024-04-26 | Resolved: 2025-07-21

## 2025-07-21 PROBLEM — E87.1 HYPONATREMIA: Status: RESOLVED | Noted: 2025-05-05 | Resolved: 2025-07-21

## 2025-07-21 PROBLEM — J96.01: Status: RESOLVED | Noted: 2025-05-10 | Resolved: 2025-07-21

## 2025-07-21 PROBLEM — I33.0 BACTERIAL ENDOCARDITIS: Status: RESOLVED | Noted: 2025-05-05 | Resolved: 2025-07-21

## 2025-07-21 PROBLEM — R09.02 HYPOXIA: Status: RESOLVED | Noted: 2025-05-04 | Resolved: 2025-07-21

## 2025-07-21 PROBLEM — H53.133 ACUTE LOSS OF VISION, BILATERAL: Status: RESOLVED | Noted: 2025-05-20 | Resolved: 2025-07-21

## 2025-07-21 PROBLEM — M54.9 UPPER BACK PAIN: Status: RESOLVED | Noted: 2017-11-12 | Resolved: 2025-07-21

## 2025-07-21 PROBLEM — J96.21 ACUTE ON CHRONIC RESPIRATORY FAILURE WITH HYPOXIA: Status: ACTIVE | Noted: 2025-07-16

## 2025-07-21 PROBLEM — H53.139 ACUTE LOSS OF VISION: Status: RESOLVED | Noted: 2025-05-21 | Resolved: 2025-07-21

## 2025-07-21 PROBLEM — I26.99 ACUTE PULMONARY EMBOLISM: Status: RESOLVED | Noted: 2025-05-30 | Resolved: 2025-07-21

## 2025-07-21 LAB
ANION GAP SERPL CALCULATED.3IONS-SCNC: 10.4 MMOL/L (ref 5–15)
BACTERIA SPEC AEROBE CULT: NORMAL
BH CV ECHO MEAS - TR MAX PG: 48 MMHG
BH CV ECHO MEAS - TR MAX VEL: 346.4 CM/SEC
BH CV ECHO SHUNT ASSESSMENT PERFORMED (HIDDEN SCRIPTING): 1
BUN SERPL-MCNC: 19 MG/DL (ref 8–23)
BUN/CREAT SERPL: 17 (ref 7–25)
CALCIUM SPEC-SCNC: 9.6 MG/DL (ref 8.6–10.5)
CHLORIDE SERPL-SCNC: 90 MMOL/L (ref 98–107)
CO2 SERPL-SCNC: 38.6 MMOL/L (ref 22–29)
CREAT SERPL-MCNC: 1.12 MG/DL (ref 0.57–1)
DEPRECATED RDW RBC AUTO: 50.1 FL (ref 37–54)
EGFRCR SERPLBLD CKD-EPI 2021: 54.7 ML/MIN/1.73
ERYTHROCYTE [DISTWIDTH] IN BLOOD BY AUTOMATED COUNT: 15.9 % (ref 12.3–15.4)
GLUCOSE SERPL-MCNC: 91 MG/DL (ref 65–99)
HCT VFR BLD AUTO: 33.8 % (ref 34–46.6)
HGB BLD-MCNC: 10.1 G/DL (ref 12–15.9)
MAGNESIUM SERPL-MCNC: 1.7 MG/DL (ref 1.6–2.4)
MCH RBC QN AUTO: 26.2 PG (ref 26.6–33)
MCHC RBC AUTO-ENTMCNC: 29.9 G/DL (ref 31.5–35.7)
MCV RBC AUTO: 87.6 FL (ref 79–97)
NT-PROBNP SERPL-MCNC: 1269 PG/ML (ref 0–900)
PHOSPHATE SERPL-MCNC: 4 MG/DL (ref 2.5–4.5)
PLATELET # BLD AUTO: 421 10*3/MM3 (ref 140–450)
PMV BLD AUTO: 7.9 FL (ref 6–12)
POTASSIUM SERPL-SCNC: 3.7 MMOL/L (ref 3.5–5.2)
RBC # BLD AUTO: 3.86 10*6/MM3 (ref 3.77–5.28)
SODIUM SERPL-SCNC: 139 MMOL/L (ref 136–145)
WBC NRBC COR # BLD AUTO: 4.52 10*3/MM3 (ref 3.4–10.8)

## 2025-07-21 PROCEDURE — 25010000002 PROPOFOL 10 MG/ML EMULSION: Performed by: NURSE ANESTHETIST, CERTIFIED REGISTERED

## 2025-07-21 PROCEDURE — 93312 ECHO TRANSESOPHAGEAL: CPT | Performed by: INTERNAL MEDICINE

## 2025-07-21 PROCEDURE — 36415 COLL VENOUS BLD VENIPUNCTURE: CPT | Performed by: HOSPITALIST

## 2025-07-21 PROCEDURE — 93312 ECHO TRANSESOPHAGEAL: CPT

## 2025-07-21 PROCEDURE — 25010000002 PHENYLEPHRINE 10 MG/ML SOLUTION: Performed by: NURSE ANESTHETIST, CERTIFIED REGISTERED

## 2025-07-21 PROCEDURE — 94761 N-INVAS EAR/PLS OXIMETRY MLT: CPT

## 2025-07-21 PROCEDURE — 99233 SBSQ HOSP IP/OBS HIGH 50: CPT | Performed by: INTERNAL MEDICINE

## 2025-07-21 PROCEDURE — 94664 DEMO&/EVAL PT USE INHALER: CPT

## 2025-07-21 PROCEDURE — 93325 DOPPLER ECHO COLOR FLOW MAPG: CPT | Performed by: INTERNAL MEDICINE

## 2025-07-21 PROCEDURE — 94660 CPAP INITIATION&MGMT: CPT

## 2025-07-21 PROCEDURE — 94799 UNLISTED PULMONARY SVC/PX: CPT

## 2025-07-21 PROCEDURE — 83735 ASSAY OF MAGNESIUM: CPT | Performed by: HOSPITALIST

## 2025-07-21 PROCEDURE — 80048 BASIC METABOLIC PNL TOTAL CA: CPT | Performed by: HOSPITALIST

## 2025-07-21 PROCEDURE — 25810000003 LACTATED RINGERS PER 1000 ML: Performed by: NURSE ANESTHETIST, CERTIFIED REGISTERED

## 2025-07-21 PROCEDURE — 84100 ASSAY OF PHOSPHORUS: CPT | Performed by: HOSPITALIST

## 2025-07-21 PROCEDURE — 85027 COMPLETE CBC AUTOMATED: CPT | Performed by: HOSPITALIST

## 2025-07-21 PROCEDURE — 93325 DOPPLER ECHO COLOR FLOW MAPG: CPT

## 2025-07-21 PROCEDURE — 93320 DOPPLER ECHO COMPLETE: CPT | Performed by: INTERNAL MEDICINE

## 2025-07-21 PROCEDURE — 93320 DOPPLER ECHO COMPLETE: CPT

## 2025-07-21 PROCEDURE — 25010000002 FUROSEMIDE PER 20 MG: Performed by: STUDENT IN AN ORGANIZED HEALTH CARE EDUCATION/TRAINING PROGRAM

## 2025-07-21 PROCEDURE — 83880 ASSAY OF NATRIURETIC PEPTIDE: CPT | Performed by: HOSPITALIST

## 2025-07-21 PROCEDURE — 25010000002 LIDOCAINE 2% SOLUTION: Performed by: NURSE ANESTHETIST, CERTIFIED REGISTERED

## 2025-07-21 PROCEDURE — 25010000002 ACETAZOLAMIDE PER 500 MG: Performed by: HOSPITALIST

## 2025-07-21 RX ORDER — SODIUM CHLORIDE, SODIUM LACTATE, POTASSIUM CHLORIDE, CALCIUM CHLORIDE 600; 310; 30; 20 MG/100ML; MG/100ML; MG/100ML; MG/100ML
INJECTION, SOLUTION INTRAVENOUS CONTINUOUS PRN
Status: DISCONTINUED | OUTPATIENT
Start: 2025-07-21 | End: 2025-07-21 | Stop reason: SURG

## 2025-07-21 RX ORDER — LIDOCAINE HYDROCHLORIDE 20 MG/ML
INJECTION, SOLUTION INFILTRATION; PERINEURAL AS NEEDED
Status: DISCONTINUED | OUTPATIENT
Start: 2025-07-21 | End: 2025-07-21 | Stop reason: SURG

## 2025-07-21 RX ORDER — PROPOFOL 10 MG/ML
VIAL (ML) INTRAVENOUS AS NEEDED
Status: DISCONTINUED | OUTPATIENT
Start: 2025-07-21 | End: 2025-07-21 | Stop reason: SURG

## 2025-07-21 RX ORDER — PHENYLEPHRINE HYDROCHLORIDE 10 MG/ML
INJECTION INTRAVENOUS AS NEEDED
Status: DISCONTINUED | OUTPATIENT
Start: 2025-07-21 | End: 2025-07-21 | Stop reason: SURG

## 2025-07-21 RX ORDER — ACETAZOLAMIDE 500 MG/5ML
500 INJECTION, POWDER, LYOPHILIZED, FOR SOLUTION INTRAVENOUS ONCE
Status: COMPLETED | OUTPATIENT
Start: 2025-07-21 | End: 2025-07-21

## 2025-07-21 RX ORDER — EPHEDRINE SULFATE 50 MG/ML
INJECTION, SOLUTION INTRAVENOUS AS NEEDED
Status: DISCONTINUED | OUTPATIENT
Start: 2025-07-21 | End: 2025-07-21 | Stop reason: SURG

## 2025-07-21 RX ADMIN — FUROSEMIDE 80 MG: 10 INJECTION, SOLUTION INTRAMUSCULAR; INTRAVENOUS at 08:39

## 2025-07-21 RX ADMIN — PHENYLEPHRINE HYDROCHLORIDE 100 MCG: 10 INJECTION INTRAVENOUS at 13:27

## 2025-07-21 RX ADMIN — NYSTATIN 1 APPLICATION: 100000 OINTMENT TOPICAL at 08:41

## 2025-07-21 RX ADMIN — PHENYLEPHRINE HYDROCHLORIDE 150 MCG: 10 INJECTION INTRAVENOUS at 13:15

## 2025-07-21 RX ADMIN — Medication 10 ML: at 20:06

## 2025-07-21 RX ADMIN — NYSTATIN 1 APPLICATION: 100000 OINTMENT TOPICAL at 14:19

## 2025-07-21 RX ADMIN — IPRATROPIUM BROMIDE AND ALBUTEROL SULFATE 3 ML: .5; 3 SOLUTION RESPIRATORY (INHALATION) at 06:24

## 2025-07-21 RX ADMIN — ACETAZOLAMIDE SODIUM 500 MG: 500 INJECTION, POWDER, LYOPHILIZED, FOR SOLUTION INTRAVENOUS at 17:06

## 2025-07-21 RX ADMIN — ANTI-FUNGAL POWDER MICONAZOLE NITRATE TALC FREE 1 APPLICATION: 1.42 POWDER TOPICAL at 19:55

## 2025-07-21 RX ADMIN — IPRATROPIUM BROMIDE AND ALBUTEROL SULFATE 3 ML: .5; 3 SOLUTION RESPIRATORY (INHALATION) at 10:45

## 2025-07-21 RX ADMIN — PROPOFOL 40 MG: 10 INJECTION, EMULSION INTRAVENOUS at 13:07

## 2025-07-21 RX ADMIN — SODIUM CHLORIDE, POTASSIUM CHLORIDE, SODIUM LACTATE AND CALCIUM CHLORIDE: 600; 310; 30; 20 INJECTION, SOLUTION INTRAVENOUS at 13:00

## 2025-07-21 RX ADMIN — EPHEDRINE SULFATE 10 MG: 50 INJECTION INTRAVENOUS at 13:10

## 2025-07-21 RX ADMIN — PHENYLEPHRINE HYDROCHLORIDE 100 MCG: 10 INJECTION INTRAVENOUS at 13:04

## 2025-07-21 RX ADMIN — ANTI-FUNGAL POWDER MICONAZOLE NITRATE TALC FREE 1 APPLICATION: 1.42 POWDER TOPICAL at 08:42

## 2025-07-21 RX ADMIN — RIVAROXABAN 20 MG: 20 TABLET, FILM COATED ORAL at 17:06

## 2025-07-21 RX ADMIN — IPRATROPIUM BROMIDE AND ALBUTEROL SULFATE 3 ML: .5; 3 SOLUTION RESPIRATORY (INHALATION) at 15:43

## 2025-07-21 RX ADMIN — FUROSEMIDE 80 MG: 10 INJECTION, SOLUTION INTRAMUSCULAR; INTRAVENOUS at 17:06

## 2025-07-21 RX ADMIN — PHENYLEPHRINE HYDROCHLORIDE 100 MCG: 10 INJECTION INTRAVENOUS at 13:24

## 2025-07-21 RX ADMIN — IPRATROPIUM BROMIDE AND ALBUTEROL SULFATE 3 ML: .5; 3 SOLUTION RESPIRATORY (INHALATION) at 20:18

## 2025-07-21 RX ADMIN — PHENYLEPHRINE HYDROCHLORIDE 200 MCG: 10 INJECTION INTRAVENOUS at 13:10

## 2025-07-21 RX ADMIN — PROPOFOL 140 MCG/KG/MIN: 10 INJECTION, EMULSION INTRAVENOUS at 13:05

## 2025-07-21 RX ADMIN — LIDOCAINE HYDROCHLORIDE 100 MG: 20 INJECTION, SOLUTION INFILTRATION; PERINEURAL at 13:04

## 2025-07-21 RX ADMIN — METOPROLOL SUCCINATE 12.5 MG: 25 TABLET, EXTENDED RELEASE ORAL at 08:40

## 2025-07-21 RX ADMIN — Medication 10 ML: at 08:42

## 2025-07-21 RX ADMIN — PHENYLEPHRINE HYDROCHLORIDE 100 MCG: 10 INJECTION INTRAVENOUS at 13:30

## 2025-07-21 RX ADMIN — MEDROXYPROGESTERONE ACETATE 10 MG: 10 TABLET ORAL at 14:19

## 2025-07-21 RX ADMIN — NYSTATIN 1 APPLICATION: 100000 OINTMENT TOPICAL at 19:55

## 2025-07-21 RX ADMIN — PROPOFOL 100 MG: 10 INJECTION, EMULSION INTRAVENOUS at 13:04

## 2025-07-21 RX ADMIN — PHENYLEPHRINE HYDROCHLORIDE 100 MCG: 10 INJECTION INTRAVENOUS at 13:20

## 2025-07-21 NOTE — SIGNIFICANT NOTE
07/21/25 1209   OTHER   Discipline occupational therapist   Rehab Time/Intention   Session Not Performed patient unavailable for treatment  (MARIELLA for LYNETTE; will f/u tomorrow)   Recommendation   OT - Next Appointment 07/22/25

## 2025-07-21 NOTE — PROGRESS NOTES
Consult Daily Progress Note  [facility]   07/21/25      Patient Name:  Emely Solomon  MRN:  [id number]   YOB: 1960  Age: 64 y.o.  Sex: female  LOS: 5    Reason for Consult:  Respiratory failure    Interval History:  No acute events overnight   LYNETTE performed today  Net -5.2 L over the past 24 hours  4L NC  Breathing is improved  No chest pain or palpitations  No nausea vomiting  Lying in bed comfortable    Physical Exam:  Vitals:    07/21/25 1543   BP:    Pulse: 82   Resp: 20   Temp:    SpO2: 95%       Intake/Output    Intake/Output Summary (Last 24 hours) at 7/21/2025 1549  Last data filed at 7/21/2025 1015  Gross per 24 hour   Intake 462 ml   Output 5000 ml   Net -4538 ml     General: Alert, nontoxic, NAD  HEENT: NC/AT, EOMI, MMM  Neck: Supple, trachea midline  Cardiac: RRR, no murmur, gallops, rubs  Pulmonary: Decreased breath sounds at bases  GI: Soft, non-tender, non-distended, normal bowel sounds  Extremities: Warm, well perfused, 1+ LE edema  Skin: no visible rash  Neuro: CN II - XII grossly intact  Psychiatry: Normal mood and affect      Data Review:  Results from last 7 days   Lab Units 07/21/25  0346 07/20/25  0353 07/19/25  0753 07/19/25  0303 07/19/25  0014 07/18/25  1609 07/18/25  0828 07/18/25  0308 07/17/25  1452 07/17/25  0357 07/16/25  0947   WBC 10*3/mm3 4.52 4.58  --  4.76  --   --   --  6.90  --  4.99 6.45   HEMOGLOBIN g/dL 10.1* 8.4* 8.0* 7.8* 8.2* 7.8* 7.7* 7.8*   < > 7.3* 7.8*   PLATELETS 10*3/mm3 421 402  --  344  --   --   --  366  --  340 369    < > = values in this interval not displayed.     Results from last 7 days   Lab Units 07/21/25  0347 07/20/25  1429 07/20/25  0353 07/19/25  1807 07/19/25  0303 07/18/25  0308 07/17/25  0357 07/16/25  0947   SODIUM mmol/L 139  --  140  --  139 141 137 137   POTASSIUM mmol/L 3.7 4.3 3.6 3.5 3.0* 3.9 4.5 4.7   CHLORIDE mmol/L 90*  --  90*  --  96* 104 103 103   CO2 mmol/L 38.6*  --  39.0*  --  32.0* 26.1 25.0 21.3*    BUN mg/dL 19.0  --  16.0  --  21.0 24.0* 24.0* 24.0*   CREATININE mg/dL 1.12*  --  1.04*  --  1.14* 1.21* 1.27* 1.35*   GLUCOSE mg/dL 91  --  87  --  92 95 97 96   CALCIUM mg/dL 9.6  --  8.8  --  8.4* 9.0 9.0 9.3   MAGNESIUM mg/dL 1.7  --  1.6  --  1.7 2.0 2.3 2.4   PHOSPHORUS mg/dL 4.0  --  2.8  --  3.1 3.8 4.2 3.8   Estimated Creatinine Clearance: 62.8 mL/min (A) (by C-G formula based on SCr of 1.12 mg/dL (H)).    Results from last 7 days   Lab Units 07/21/25  0346 07/20/25  0353 07/19/25  0303 07/17/25  0357 07/16/25  0947   AST (SGOT) U/L  --   --   --   --  24   ALT (SGPT) U/L  --   --   --   --  17   PROCALCITONIN ng/mL  --   --   --   --  0.11   LACTATE mmol/L  --   --   --   --  1.5   PLATELETS 10*3/mm3 421 402 344   < > 369    < > = values in this interval not displayed.       Results from last 7 days   Lab Units 07/18/25  1143 07/18/25  0647 07/18/25  0355 07/17/25  2107   PH, ARTERIAL pH units 7.386 7.334* 7.318* 7.299*   PCO2, ARTERIAL mm Hg 60.4* 65.5* 63.3* 61.0*   PO2 ART mm Hg 100.8* 115.5* 81.8 76.8*   HCO3 ART mmol/L 36.2* 34.9* 32.4* 30.0*       Result Review:  I have personally reviewed the results from the time of this admission to 7/21/2025 15:49 EDT and agree with these findings:  [x]  Laboratory list / accordion  [x]  Microbiology  [x]  Radiology  []  EKG/Telemetry   [x]  Cardiology/Vascular   []  Pathology  [x]  Old records  []  Other:    ASSESSMENT  /  PLAN:    Acute on chronic HFpEF  Pulmonary edema, acute  Severe pulmonary hypertension, RVSP>55, probably group 2 and possibly group 3.  Bilateral lower lobe atelectasis  Acute on chronic hypercapnic respiratory failure  Acute hypoxic respiratory failure, secondary to the above  Severe MR  ARIEL, on auto CPAP 15-20  History of DVT/PE  History of mitral valve endocarditis    -Patient presented and found to have CHF exacerbation with pulmonary edema, severe pulmonary hypertension, acute on chronic respiratory failure  -Ongoing diuresis with  Lasix, making significant urine output  -Diamox given for metabolic alkalosis  - NIPPV: settings reviewed and adjusted.  Use NIPPV at night and as needed during the day.  Eventually she may require outpatient NIPPV instead of her home CPAP due to significant hypercapnia.  Will reassess prior to DC.  -Continue bronchodilator therapy with DuoNebs  -Xarelto for history of DVT/PE  - Continue supplemental oxygen, SpO2 goal greater than 90%.  - LYNETTE performed today for evaluation of mitral clip.  - Guarded prognosis due to the severity of her underlying cardiac disease and pulmonary hypertension    Thank you for allowing us to participate in this patients care. Pulmonary will continue to follow.     Shady Blanco MD  [facility]  Pulmonary and Critical Care Medicine, Interventional Pulmonology    Parts of this note may be an electronic transcription/translation of spoken language to printed text using the Dragon dictation system.

## 2025-07-21 NOTE — PROGRESS NOTES
Name: Emely Solomon ADMIT: 2025   : 1960  PCP: Adilson Wilkerson MD    MRN: [id number] LOS: 5 days   AGE/SEX: 64 y.o. female  ROOM: [facility]/     Subjective   Subjective   Patient resting in bed.  Good urine output.  No nausea or vomiting.  Wanting to know when her procedure is so that she can eat afterwards.  Family at bedside    Review of Systems  As above     Objective   Objective   Vital Signs  Temp:  [98.3 °F (36.8 °C)-98.7 °F (37.1 °C)] 98.4 °F (36.9 °C)  Heart Rate:  [72-85] 80  Resp:  [18-25] 20  BP: (109-128)/(75-90) 109/78  SpO2:  [91 %-100 %] 100 %  on  Flow (L/min) (Oxygen Therapy):  [2-4] 4;   Device (Oxygen Therapy): nasal cannula  Body mass index is 46.96 kg/m².  Physical Exam  Constitutional:       General: She is not in acute distress.     Appearance: She is obese. She is not ill-appearing.   Cardiovascular:      Rate and Rhythm: Normal rate and regular rhythm.   Pulmonary:      Effort: Pulmonary effort is normal. No respiratory distress.   Abdominal:      General: Abdomen is flat. There is no distension.      Tenderness: There is no abdominal tenderness.   Musculoskeletal:         General: No swelling or deformity. Normal range of motion.      Right lower leg: Edema present.      Left lower leg: Edema present.   Skin:     General: Skin is warm and dry.   Neurological:      General: No focal deficit present.      Mental Status: She is alert. Mental status is at baseline.         Results Review     I reviewed the patient's new clinical results.  Results from last 7 days   Lab Units 25  0346 25  0353 25  0753 25  0303 25  0828 25  0308   WBC 10*3/mm3 4.52 4.58  --  4.76  --  6.90   HEMOGLOBIN g/dL 10.1* 8.4* 8.0* 7.8*   < > 7.8*   PLATELETS 10*3/mm3 421 402  --  344  --  366    < > = values in this interval not displayed.     Results from last 7 days   Lab Units 25  0347 25  1429 25  0353 25  1807 25  0303  "07/18/25  0308   SODIUM mmol/L 139  --  140  --  139 141   POTASSIUM mmol/L 3.7 4.3 3.6 3.5 3.0* 3.9   CHLORIDE mmol/L 90*  --  90*  --  96* 104   CO2 mmol/L 38.6*  --  39.0*  --  32.0* 26.1   BUN mg/dL 19.0  --  16.0  --  21.0 24.0*   CREATININE mg/dL 1.12*  --  1.04*  --  1.14* 1.21*   GLUCOSE mg/dL 91  --  87  --  92 95   Estimated Creatinine Clearance: 62.8 mL/min (A) (by C-G formula based on SCr of 1.12 mg/dL (H)).  Results from last 7 days   Lab Units 07/16/25  0947   ALBUMIN g/dL 3.5   BILIRUBIN mg/dL 0.4   ALK PHOS U/L 95   AST (SGOT) U/L 24   ALT (SGPT) U/L 17     Results from last 7 days   Lab Units 07/21/25  0347 07/20/25  0353 07/19/25  0303 07/18/25  0308 07/17/25  0357 07/16/25  0947   CALCIUM mg/dL 9.6 8.8 8.4* 9.0   < > 9.3   ALBUMIN g/dL  --   --   --   --   --  3.5   MAGNESIUM mg/dL 1.7 1.6 1.7 2.0   < > 2.4   PHOSPHORUS mg/dL 4.0 2.8 3.1 3.8   < > 3.8    < > = values in this interval not displayed.     Results from last 7 days   Lab Units 07/16/25  0947   PROCALCITONIN ng/mL 0.11   LACTATE mmol/L 1.5     COVID19   Date Value Ref Range Status   05/04/2025 Not Detected Not Detected - Ref. Range Final   03/07/2022 Not Detected Not Detected - Ref. Range Final     No results found for: \"HGBA1C\", \"POCGLU\"      XR Foot 3+ View Right  XR FOOT 3+ VW RIGHT-     Clinical: Wound along the plantar surface of the heel fat pad     FINDINGS: There is ankle and foot edema. No soft tissue gas or  radiopaque foreign body is demonstrated.     There is a moderate size calcaneal spur. There is bone hypertrophy at  the Achilles insertion. No cortical bone destruction of the calcaneus to  suggest osteomyelitis. No forefoot abnormality is demonstrated. Midfoot  articulations preserved. The remainder is unremarkable.     This report was finalized on 7/16/2025 9:06 PM by Dr. Dhruv Sanchez M.D  on Workstation: [id number]     XR Chest 1 View  Narrative: XR CHEST 1 VW-     HISTORY: Female who is 64 years-old, syncope, " congestive heart failure     TECHNIQUE: Frontal view of the chest     COMPARISON: 7/11/2025     FINDINGS: The heart is enlarged. Pulmonary vasculature is mildly  congested. Aorta is tortuous. Small right basilar atelectasis or  infiltrate, follow-up suggested. There may be minimal right pleural  effusion. No pneumothorax. No acute osseous process.     Impression: As described.     This report was finalized on 7/16/2025 9:30 AM by Dr. Damon Chavez M.D on Workstation: [id number]     Adult Transthoracic Echo Complete w/ Color, Spectral and Contrast if Necessary Per Protocol    Left ventricular systolic function is normal. Calculated left   ventricular EF = 62.5%    Left ventricular diastolic function was normal.    The right ventricular cavity is mildly dilated.    Left atrial volume is severely increased.    The right atrial cavity is moderately dilated.    There is calcification of the aortic valve.    There is bileaflet mitral valve thickening present.    Severe mitral valve regurgitation is present.    Estimated right ventricular systolic pressure from tricuspid   regurgitation is markedly elevated (>55 mmHg). Calculated right   ventricular systolic pressure from tricuspid regurgitation is 87 mmHg.    Severe pulmonary hypertension is present.    I reviewed the patient's daily medications.  Scheduled Medications  furosemide, 80 mg, Intravenous, BID Diuretics  hydrocortisone-bacitracin-zinc oxide-nystatin, 1 Application, Topical, TID  ipratropium-albuterol, 3 mL, Nebulization, 4x Daily - RT  Lidocaine, 1 patch, Transdermal, Q24H  medroxyPROGESTERone, 10 mg, Oral, Daily  metoprolol succinate XL, 12.5 mg, Oral, Daily  miconazole, 1 Application, Topical, Q12H  rivaroxaban, 20 mg, Oral, Daily With Dinner  sodium chloride, 10 mL, Intravenous, Q12H    Infusions   Diet  NPO Diet NPO Type: Strict NPO         I have personally reviewed:  [x]  Laboratory   []  Microbiology   [x]  Radiology   []  EKG/Telemetry   [x]   Cardiology/Vascular   []  Pathology   [x]  Records     Assessment/Plan     Active Hospital Problems    Diagnosis  POA    **Syncope and collapse [R55]  Yes    Acute respiratory failure with hypoxia [J96.01]  Unknown    History of bacterial endocarditis [Z86.79]  Not Applicable    Severe mitral regurgitation [I34.0]  Unknown    Chronic respiratory failure with hypoxia [J96.11]  Yes    Acute on chronic heart failure with preserved ejection fraction (HFpEF) [I50.33]  Yes    History of pulmonary embolism [Z86.711]  Yes    Iron deficiency [E61.1]  Yes    History of DVT (deep vein thrombosis) [Z86.718]  Not Applicable    Lymphedema [I89.0]  Yes    ARIEL on CPAP [G47.33]  Yes    Chronic diastolic CHF (congestive heart failure) [I50.32]  Yes    Essential hypertension [I10]  Yes    Pulmonary hypertension [I27.20]  Yes      Resolved Hospital Problems   No resolved problems to display.       64 y.o. female admitted with Syncope and collapse.    Syncope  Severe MR now (was mild on 5/26/25 echo)  History of mitral valve endocarditis (antibiotics completed 6/17/2025)  Acute hypoxic respiratory failure  CHF  Severe pulmonary hypertension  Not a good surgical candidate per cardiac surgery  BCx felt to be contaminant per ID worsening mitral valve regurgitation due to sequelae of endocarditis rather than active infection  Continue diuresis, monitor renal function, weights.  6.7 L out last 24 hours. On Lasix 80 twice a day IV  Cardiology plans LYNETTE on today to determine MitraClip candidacy     History of DVT/PE xarelto for anticoagulation held d/t vaginal bleeding on admission (has intermittent bleeding). 2015 she developed an extensive DVT and PE, and then embolized through her PFO to the left leg arteries and required surgery. Continuing anticoagulation.      Postmenopausal bleeding  Blood loss anemia  Elevated   Has GYN/oncology procedure scheduled outpatient  Monitor for blood loss anemia may need transfusion      Right plantar  heel blister  X-ray nothing to suggest osteomyelitis.  Status post excision by podiatry-serous fluid. No further intervention needed. Wound care     ARIEL  Chronic lymphedema  Immobility chronic    Xarelto (home med) for DVT prophylaxis.  Full code.  Discussed CODE STATUS, patient does not know what she wants to do.  Discussed with patient and nursing staff.  Anticipate discharge to SNU facility in 2-3 days.    Expected Discharge Date: 7/22/2025; Expected Discharge Time:       Alonzo Bunch MD  [facility]  07/21/25  11:36 EDT

## 2025-07-21 NOTE — ANESTHESIA POSTPROCEDURE EVALUATION
Patient: Emely Solomon    Procedure Summary       Date: 07/21/25 Room / Location: [facility] PACU    Anesthesia Start: 1255 Anesthesia Stop: 1334    Procedure: ADULT TRANSESOPHAGEAL ECHO (LYNETTE) W/ CONT IF NECESSARY PER PROTOCOL Diagnosis: (Valvular Disease)    Scheduled Providers: Brennan Rogers MD Provider: Nemo Crowder MD    Anesthesia Type: MAC ASA Status: 3            Anesthesia Type: MAC    Vitals  Vitals Value Taken Time   BP 97/68 07/21/25 14:00   Temp     Pulse 100 07/21/25 14:02   Resp 14 07/21/25 14:00   SpO2 88 % 07/21/25 14:02   Vitals shown include unfiled device data.        Anesthesia Post Evaluation

## 2025-07-21 NOTE — PLAN OF CARE
Goal Outcome Evaluation:      A/O x4, on 4L NC, NSR on tele. IV Lasix continues. LYNETTE completed today. Wound care per orders. VSS.

## 2025-07-21 NOTE — PROGRESS NOTES
Continued Stay Note  [facility]     Patient Name: Emely Solomon  MRN: [id number]  Today's Date: 7/21/2025    Admit Date: 7/16/2025    Plan: [facility], referral pending   Discharge Plan       Row Name 07/21/25 1513       Plan    Plan [facility], referral pending    Patient/Family in Agreement with Plan yes    Plan Comments Spoke with  at bedside.  Plan remains to go to [facility].  Message to Adele and waiting return call to be sure skilled bed is available.  Patient having LYNETTE today.  CCP following.  Galina FAUSTIN                    Expected Discharge Date and Time       Expected Discharge Date Expected Discharge Time    Jul 22, 2025               Becky S. [place], RN
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
## 2025-07-21 NOTE — TELEPHONE ENCOUNTER
Tessy/Mary Grace Angeles, from Dr. Davida De Jesus's office, called this morning. They are planning to do a hysteroscopy D&C on 7-30-25. They need cardiac clearance and anticoag instructions.    Call back: [phone], Option 2, Option 2  Fax: [phone]    Thank you,    Amanda Cote, RN  Triage [facility]  07/21/25 11:10 EDT

## 2025-07-21 NOTE — ANESTHESIA PREPROCEDURE EVALUATION
Anesthesia Evaluation     Patient summary reviewed and Nursing notes reviewed   NPO Solid Status: > 8 hours  NPO Liquid Status: > 2 hours           Airway   Mallampati: II  TM distance: >3 FB  Neck ROM: full  Possible difficult intubation and Large neck circumference  Dental - normal exam     Pulmonary - normal exam   (+) ,sleep apnea  Cardiovascular - normal exam  Exercise tolerance: good (4-7 METS)    ECG reviewed    (+) hypertension, valvular problems/murmurs, dysrhythmias, CHF , PVD      Neuro/Psych  (+) syncope  GI/Hepatic/Renal/Endo    (+) morbid obesity    Musculoskeletal (-) negative ROS    Abdominal    Substance History - negative use     OB/GYN negative ob/gyn ROS         Other                    Anesthesia Plan    ASA 3     MAC     intravenous induction     Anesthetic plan, risks, benefits, and alternatives have been provided, discussed and informed consent has been obtained with: patient.    CODE STATUS:    Code Status (Patient has no pulse and is not breathing): CPR (Attempt to Resuscitate)  Medical Interventions (Patient has pulse or is breathing): Full Support

## 2025-07-21 NOTE — PLAN OF CARE
Problem: Adult Inpatient Plan of Care  Goal: Plan of Care Review  7/21/2025 0512 by Boris Cano RN  Outcome: Progressing  Flowsheets (Taken 7/21/2025 0512)  Progress: improving  Outcome Evaluation: Patient is alert and oriented x 4, saturating well on 2L/min O2 support. For LYNETTE today, NPO after midnight. Fall risk prevention protocol maintained. Planned to discharge to rehab once medically stable.  Plan of Care Reviewed With: patient   Goal Outcome Evaluation:  Plan of Care Reviewed With: patient        Progress: improving  Outcome Evaluation: Patient is alert and oriented x 4, saturating well on 2L/min O2 support. For LYNETTE today, NPO after midnight. Fall risk prevention protocol maintained. Planned to discharge to rehab once medically stable.

## 2025-07-22 LAB
ANION GAP SERPL CALCULATED.3IONS-SCNC: 10 MMOL/L (ref 5–15)
BUN SERPL-MCNC: 25 MG/DL (ref 8–23)
BUN/CREAT SERPL: 18.2 (ref 7–25)
CALCIUM SPEC-SCNC: 9.4 MG/DL (ref 8.6–10.5)
CHLORIDE SERPL-SCNC: 90 MMOL/L (ref 98–107)
CO2 SERPL-SCNC: 36 MMOL/L (ref 22–29)
CREAT SERPL-MCNC: 1.37 MG/DL (ref 0.57–1)
DEPRECATED RDW RBC AUTO: 51.2 FL (ref 37–54)
EGFRCR SERPLBLD CKD-EPI 2021: 42.9 ML/MIN/1.73
ERYTHROCYTE [DISTWIDTH] IN BLOOD BY AUTOMATED COUNT: 16.2 % (ref 12.3–15.4)
GLUCOSE SERPL-MCNC: 109 MG/DL (ref 65–99)
HCT VFR BLD AUTO: 36.6 % (ref 34–46.6)
HGB BLD-MCNC: 11 G/DL (ref 12–15.9)
MAGNESIUM SERPL-MCNC: 2.1 MG/DL (ref 1.6–2.4)
MCH RBC QN AUTO: 26.2 PG (ref 26.6–33)
MCHC RBC AUTO-ENTMCNC: 30.1 G/DL (ref 31.5–35.7)
MCV RBC AUTO: 87.1 FL (ref 79–97)
PHOSPHATE SERPL-MCNC: 4 MG/DL (ref 2.5–4.5)
PLATELET # BLD AUTO: 418 10*3/MM3 (ref 140–450)
PMV BLD AUTO: 7.7 FL (ref 6–12)
POTASSIUM SERPL-SCNC: 3.3 MMOL/L (ref 3.5–5.2)
POTASSIUM SERPL-SCNC: 3.5 MMOL/L (ref 3.5–5.2)
RBC # BLD AUTO: 4.2 10*6/MM3 (ref 3.77–5.28)
SODIUM SERPL-SCNC: 136 MMOL/L (ref 136–145)
WBC NRBC COR # BLD AUTO: 6.63 10*3/MM3 (ref 3.4–10.8)

## 2025-07-22 PROCEDURE — 94664 DEMO&/EVAL PT USE INHALER: CPT

## 2025-07-22 PROCEDURE — 84132 ASSAY OF SERUM POTASSIUM: CPT | Performed by: STUDENT IN AN ORGANIZED HEALTH CARE EDUCATION/TRAINING PROGRAM

## 2025-07-22 PROCEDURE — 83735 ASSAY OF MAGNESIUM: CPT | Performed by: HOSPITALIST

## 2025-07-22 PROCEDURE — 99232 SBSQ HOSP IP/OBS MODERATE 35: CPT | Performed by: INTERNAL MEDICINE

## 2025-07-22 PROCEDURE — 94761 N-INVAS EAR/PLS OXIMETRY MLT: CPT

## 2025-07-22 PROCEDURE — 80048 BASIC METABOLIC PNL TOTAL CA: CPT | Performed by: HOSPITALIST

## 2025-07-22 PROCEDURE — 84100 ASSAY OF PHOSPHORUS: CPT | Performed by: HOSPITALIST

## 2025-07-22 PROCEDURE — 94799 UNLISTED PULMONARY SVC/PX: CPT

## 2025-07-22 PROCEDURE — 25010000002 ACETAZOLAMIDE PER 500 MG: Performed by: HOSPITALIST

## 2025-07-22 PROCEDURE — 85027 COMPLETE CBC AUTOMATED: CPT | Performed by: HOSPITALIST

## 2025-07-22 RX ORDER — POTASSIUM CHLORIDE 1500 MG/1
40 TABLET, EXTENDED RELEASE ORAL EVERY 4 HOURS
Status: COMPLETED | OUTPATIENT
Start: 2025-07-22 | End: 2025-07-22

## 2025-07-22 RX ORDER — ACETAZOLAMIDE 500 MG/5ML
500 INJECTION, POWDER, LYOPHILIZED, FOR SOLUTION INTRAVENOUS ONCE
Status: COMPLETED | OUTPATIENT
Start: 2025-07-22 | End: 2025-07-22

## 2025-07-22 RX ADMIN — POTASSIUM CHLORIDE 40 MEQ: 1500 TABLET, EXTENDED RELEASE ORAL at 20:51

## 2025-07-22 RX ADMIN — METOPROLOL SUCCINATE 12.5 MG: 25 TABLET, EXTENDED RELEASE ORAL at 08:53

## 2025-07-22 RX ADMIN — NYSTATIN 1 APPLICATION: 100000 OINTMENT TOPICAL at 08:55

## 2025-07-22 RX ADMIN — IPRATROPIUM BROMIDE AND ALBUTEROL SULFATE 3 ML: .5; 3 SOLUTION RESPIRATORY (INHALATION) at 20:12

## 2025-07-22 RX ADMIN — POTASSIUM CHLORIDE 40 MEQ: 1500 TABLET, EXTENDED RELEASE ORAL at 17:26

## 2025-07-22 RX ADMIN — IPRATROPIUM BROMIDE AND ALBUTEROL SULFATE 3 ML: .5; 3 SOLUTION RESPIRATORY (INHALATION) at 14:36

## 2025-07-22 RX ADMIN — POTASSIUM CHLORIDE 40 MEQ: 1500 TABLET, EXTENDED RELEASE ORAL at 06:34

## 2025-07-22 RX ADMIN — ANTI-FUNGAL POWDER MICONAZOLE NITRATE TALC FREE 1 APPLICATION: 1.42 POWDER TOPICAL at 20:52

## 2025-07-22 RX ADMIN — ACETAZOLAMIDE 500 MG: 500 INJECTION, POWDER, LYOPHILIZED, FOR SOLUTION INTRAVENOUS at 12:50

## 2025-07-22 RX ADMIN — NYSTATIN 1 APPLICATION: 100000 OINTMENT TOPICAL at 15:36

## 2025-07-22 RX ADMIN — Medication 10 ML: at 08:55

## 2025-07-22 RX ADMIN — RIVAROXABAN 20 MG: 20 TABLET, FILM COATED ORAL at 17:26

## 2025-07-22 RX ADMIN — ANTI-FUNGAL POWDER MICONAZOLE NITRATE TALC FREE 1 APPLICATION: 1.42 POWDER TOPICAL at 08:55

## 2025-07-22 RX ADMIN — NYSTATIN 1 APPLICATION: 100000 OINTMENT TOPICAL at 20:51

## 2025-07-22 RX ADMIN — IPRATROPIUM BROMIDE AND ALBUTEROL SULFATE 3 ML: .5; 3 SOLUTION RESPIRATORY (INHALATION) at 10:21

## 2025-07-22 RX ADMIN — MEDROXYPROGESTERONE ACETATE 10 MG: 10 TABLET ORAL at 08:52

## 2025-07-22 RX ADMIN — POTASSIUM CHLORIDE 40 MEQ: 1500 TABLET, EXTENDED RELEASE ORAL at 11:15

## 2025-07-22 NOTE — ANESTHESIA POSTPROCEDURE EVALUATION
"Patient: Emely Solomon    Procedure Summary       Date: 07/21/25 Room / Location: [facility] PACU    Anesthesia Start: 1255 Anesthesia Stop: 1334    Procedure: ADULT TRANSESOPHAGEAL ECHO (LYNETTE) W/ CONT IF NECESSARY PER PROTOCOL Diagnosis: (Valvular Disease)    Scheduled Providers: Brennan Rogers MD Provider: Nemo Crowder MD    Anesthesia Type: MAC ASA Status: 3            Anesthesia Type: MAC    Vitals  Vitals Value Taken Time   BP 97/68 07/21/25 14:00   Temp     Pulse 100 07/21/25 14:02   Resp 14 07/21/25 14:00   SpO2 88 % 07/21/25 14:02   Vitals shown include unfiled device data.        Post Anesthesia Care and Evaluation    Patient location during evaluation: bedside  Patient participation: complete - patient participated  Level of consciousness: awake  Pain management: adequate    Airway patency: patent  Anesthetic complications: No anesthetic complications    Cardiovascular status: acceptable  Respiratory status: acceptable  Hydration status: acceptable    Comments: /82 (BP Location: Left arm, Patient Position: Lying)   Pulse 85   Temp 36.4 °C (97.6 °F) (Oral)   Resp 18   Ht 160 cm (62.99\")   Wt 119 kg (262 lb)   SpO2 97%   BMI 46.42 kg/m²     "
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
## 2025-07-22 NOTE — PROGRESS NOTES
Name: Emely Solomon ADMIT: 2025   : 1960  PCP: Adilson Wilkerson MD    MRN: [id number] LOS: 6 days   AGE/SEX: 64 y.o. female  ROOM: [facility]/     Subjective   Subjective   Patient resting in bed.  3.2 L out yesterday.  Creatinine bumped up this morning.  No nausea or vomiting.  Discussed LYNETTE results.    Review of Systems  As above     Objective   Objective   Vital Signs  Temp:  [97.7 °F (36.5 °C)-98.6 °F (37 °C)] 98.6 °F (37 °C)  Heart Rate:  [] 84  Resp:  [14-20] 18  BP: ()/() 100/71  SpO2:  [87 %-100 %] 87 %  on  Flow (L/min) (Oxygen Therapy):  [4] 4;   Device (Oxygen Therapy): nasal cannula  Body mass index is 46.42 kg/m².  Physical Exam  Constitutional:       General: She is not in acute distress.     Appearance: She is obese. She is not ill-appearing.   Cardiovascular:      Rate and Rhythm: Normal rate and regular rhythm.   Pulmonary:      Effort: Pulmonary effort is normal. No respiratory distress.   Abdominal:      General: Abdomen is flat. There is no distension.      Tenderness: There is no abdominal tenderness.   Musculoskeletal:         General: No swelling or deformity. Normal range of motion.      Right lower leg: Edema present.      Left lower leg: Edema present.   Skin:     General: Skin is warm and dry.   Neurological:      General: No focal deficit present.      Mental Status: She is alert. Mental status is at baseline.         Results Review     I reviewed the patient's new clinical results.  Results from last 7 days   Lab Units 25  0321 25  0346 25  0353 25  0753 25  0303   WBC 10*3/mm3 6.63 4.52 4.58  --  4.76   HEMOGLOBIN g/dL 11.0* 10.1* 8.4* 8.0* 7.8*   PLATELETS 10*3/mm3 418 421 402  --  344     Results from last 7 days   Lab Units 25  0321 25  0347 25  1429 25  0353 25  1807 25  0303   SODIUM mmol/L 136 139  --  140  --  139   POTASSIUM mmol/L 3.3* 3.7 4.3 3.6   < > 3.0*   CHLORIDE  "mmol/L 90* 90*  --  90*  --  96*   CO2 mmol/L 36.0* 38.6*  --  39.0*  --  32.0*   BUN mg/dL 25.0* 19.0  --  16.0  --  21.0   CREATININE mg/dL 1.37* 1.12*  --  1.04*  --  1.14*   GLUCOSE mg/dL 109* 91  --  87  --  92    < > = values in this interval not displayed.   Estimated Creatinine Clearance: 51.1 mL/min (A) (by C-G formula based on SCr of 1.37 mg/dL (H)).  Results from last 7 days   Lab Units 07/16/25  0947   ALBUMIN g/dL 3.5   BILIRUBIN mg/dL 0.4   ALK PHOS U/L 95   AST (SGOT) U/L 24   ALT (SGPT) U/L 17     Results from last 7 days   Lab Units 07/22/25  0321 07/21/25  0347 07/20/25  0353 07/19/25  0303 07/17/25  0357 07/16/25  0947   CALCIUM mg/dL 9.4 9.6 8.8 8.4*   < > 9.3   ALBUMIN g/dL  --   --   --   --   --  3.5   MAGNESIUM mg/dL 2.1 1.7 1.6 1.7   < > 2.4   PHOSPHORUS mg/dL 4.0 4.0 2.8 3.1   < > 3.8    < > = values in this interval not displayed.     Results from last 7 days   Lab Units 07/16/25  0947   PROCALCITONIN ng/mL 0.11   LACTATE mmol/L 1.5     COVID19   Date Value Ref Range Status   05/04/2025 Not Detected Not Detected - Ref. Range Final   03/07/2022 Not Detected Not Detected - Ref. Range Final     No results found for: \"HGBA1C\", \"POCGLU\"      Adult Transesophageal Echo (LYNETTE) W/ Cont if Necessary Per Protocol  Addendum:   Left ventricular systolic function is normal. Left ventricular ejection  fraction appears to be 56 - 60%. Normal left ventricular cavity size  noted. Left ventricular wall thickness is consistent with mild concentric  hypertrophy. All left ventricular wall segments contract normally. Left  ventricular diastolic function was not assessed.     The right ventricular cavity is moderately dilated. Right ventricular  wall thickness is consistent with moderate hypertrophy. Mildly reduced  right ventricular systolic function noted.     The left atrial cavity is mildly dilated. No evidence of a left atrial  thrombus present. There is no spontaneous echo contrast present. Left  atrial " appendage was found to be singularly lobar in nature. The left  atrial appendage was visualized through multiple planes. No evidence of a  left atrial appendage thrombus was present. Interatrial septal aneurysm  present. Lipomatous hypertrophy of the interatrial septum present. Small  patent foramen ovale present with bi-directional shunting. Systolic flow  reversal in the pulmonary vein consistent with significant mitral  regurgitation.     Moderate mitral annular calcification is present. Severe mitral valve  regurgitation is present. No significant mitral valve stenosis is present.  There is a large perforation of P1. P2 and P3 appear diminuitive in 3D  imaging but appear to be mostly intact in 2D en face imaging.     There is moderate, (grade 3) plaque in the aortic arch present. There  is mild, (grade 2) plaque in the descending aorta present. The inferior  vena cava is dilated. Partial IVC inspiratory collapse of less than 50%  noted. The main pulmonary artery is mildly dilated.     The SVC has extensive thrombus noted; it is unclear if this is chronic  or acute.  Narrative:   Left ventricular systolic function is normal. Left ventricular ejection   fraction appears to be 56 - 60%. Normal left ventricular cavity size   noted. Left ventricular wall thickness is consistent with mild concentric   hypertrophy. All left ventricular wall segments contract normally. Left   ventricular diastolic function was not assessed.    The right ventricular cavity is moderately dilated. Right ventricular   wall thickness is consistent with moderate hypertrophy. Mildly reduced   right ventricular systolic function noted.    The left atrial cavity is mildly dilated. No evidence of a left atrial   thrombus present. There is no spontaneous echo contrast present. Left   atrial appendage was found to be singularly lobar in nature. The left   atrial appendage was visualized through multiple planes. No evidence of a   left atrial  appendage thrombus was present. Interatrial septal aneurysm   present. Lipomatous hypertrophy of the interatrial septum present. Small   patent foramen ovale present with bi-directional shunting. Systolic flow   reversal in the pulmonary vein consistent with significant mitral   regurgitation.    Moderate mitral annular calcification is present. Severe mitral valve   regurgitation is present. No significant mitral valve stenosis is present.   There is a large perforation of P1. P2 and P3 appear diminuitive in 3D   imaging but appear to be mostly intact in 2D en face imaging.    There is moderate, (grade 3) plaque in the aortic arch present. There   is mild, (grade 2) plaque in the descending aorta present. The inferior   vena cava is dilated. Partial IVC inspiratory collapse of less than 50%   noted. The main pulmonary artery is mildly dilated.    I reviewed the patient's daily medications.  Scheduled Medications  [Held by provider] furosemide, 80 mg, Intravenous, BID Diuretics  hydrocortisone-bacitracin-zinc oxide-nystatin, 1 Application, Topical, TID  ipratropium-albuterol, 3 mL, Nebulization, 4x Daily - RT  Lidocaine, 1 patch, Transdermal, Q24H  medroxyPROGESTERone, 10 mg, Oral, Daily  metoprolol succinate XL, 12.5 mg, Oral, Daily  miconazole, 1 Application, Topical, Q12H  potassium chloride ER, 40 mEq, Oral, Q4H  rivaroxaban, 20 mg, Oral, Daily With Dinner  sodium chloride, 10 mL, Intravenous, Q12H    Infusions   Diet  Diet: Cardiac; Healthy Heart (2-3 Na+); Fluid Consistency: Thin (IDDSI 0)         I have personally reviewed:  [x]  Laboratory   []  Microbiology   [x]  Radiology   []  EKG/Telemetry   [x]  Cardiology/Vascular   []  Pathology   [x]  Records     Assessment/Plan     Active Hospital Problems    Diagnosis  POA    **Syncope and collapse [R55]  Yes    Lipoedema [R60.9]  Unknown    Acute on chronic respiratory failure with hypoxia [J96.21]  Unknown    History of bacterial endocarditis [Z86.79]  Not  Applicable    Severe mitral regurgitation [I34.0]  Unknown    Acute on chronic heart failure with preserved ejection fraction (HFpEF) [I50.33]  Yes    History of pulmonary embolism [Z86.711]  Yes    Morbid obesity [E66.01]  Yes    Post-menopausal bleeding [N95.0]  Yes    Iron deficiency [E61.1]  Yes    History of DVT (deep vein thrombosis) [Z86.718]  Not Applicable    Lymphedema [I89.0]  Yes    Anemia, chronic disease [D63.8]  Yes    ARIEL on CPAP [G47.33]  Yes    Essential hypertension [I10]  Yes    Pulmonary hypertension [I27.20]  Yes    PFO (patent foramen ovale) [Q21.12]  Not Applicable    Paradoxical embolism [I74.9]  Yes      Resolved Hospital Problems    Diagnosis Date Resolved POA    Chronic diastolic CHF (congestive heart failure) [I50.32] 07/21/2025 Yes       64 y.o. female admitted with Syncope and collapse.    Syncope  Severe MR now (was mild on 5/26/25 echo)  History of mitral valve endocarditis (antibiotics completed 6/17/2025)  Acute hypoxic respiratory failure  CHF  Severe pulmonary hypertension  Not a good surgical candidate per cardiac surgery  BCx felt to be contaminant per ID worsening mitral valve regurgitation due to sequelae of endocarditis rather than active infection  Creatinine bumped this morning, will hold IV Lasix for now.  May be able to transition to oral tomorrow  LYNETTE yesterday with large perforation of P1 .  Discussed with cardiology and plan discussed with heart structural cardiology about possible candidacy of MitraClip     History of DVT/PE  SVC extensive thrombosis   xarelto for anticoagulation held d/t vaginal bleeding on admission (has intermittent bleeding). 2015 she developed an extensive DVT and PE, and then embolized through her PFO to the left leg arteries and required surgery. Continuing anticoagulation.      Postmenopausal bleeding  Blood loss anemia  Elevated   Has GYN/oncology procedure scheduled outpatient  Monitor for blood loss anemia may need transfusion       Right plantar heel blister  X-ray nothing to suggest osteomyelitis.  Status post excision by podiatry-serous fluid. No further intervention needed. Wound care     ARIEL  Chronic lymphedema  Immobility chronic    Xarelto (home med) for DVT prophylaxis.  Full code  Discussed with patient and nursing staff.  Anticipate discharge to SNU facility in 2-3 days.    Expected Discharge Date: 7/23/2025; Expected Discharge Time:       Alonzo Bunch MD  [facility]  07/22/25  09:07 EDT

## 2025-07-22 NOTE — PROGRESS NOTES
Consult Daily Progress Note  [facility]   07/22/25      Patient Name:  Emely Solomon  MRN:  [id number]   YOB: 1960  Age: 64 y.o.  Sex: female  LOS: 6    Reason for Consult:  Respiratory failure    Interval History:  No acute events overnight  -3.2 L over the past 24 hours  Breathing is stable  On 2 L nasal cannula  Denies any acute complaints  No chest pain  Sister is at bedside    Physical Exam:  Vitals:    07/22/25 0711   BP: 100/71   Pulse: 84   Resp: 18   Temp: 98.1 °F (36.7 °C)   SpO2: (!) 87%       Intake/Output    Intake/Output Summary (Last 24 hours) at 7/22/2025 1003  Last data filed at 7/22/2025 0900  Gross per 24 hour   Intake 210 ml   Output 3240 ml   Net -3030 ml     General: Alert, nontoxic, NAD  HEENT: NC/AT, EOMI, MMM  Neck: Supple, trachea midline  Cardiac: RRR, no murmur, gallops, rubs  Pulmonary: Decreased breath sounds at bases  GI: Soft, non-tender, non-distended, normal bowel sounds  Extremities: Warm, well perfused, 1+ LE edema, lymphedema  Skin: no visible rash  Neuro: CN II - XII grossly intact  Psychiatry: Normal mood and affect      Data Review:  Results from last 7 days   Lab Units 07/22/25  0321 07/21/25  0346 07/20/25  0353 07/19/25  0753 07/19/25  0303 07/19/25  0014 07/18/25  1609 07/18/25  0828 07/18/25  0308 07/17/25  1452 07/17/25  0357 07/16/25  0947   WBC 10*3/mm3 6.63 4.52 4.58  --  4.76  --   --   --  6.90  --  4.99 6.45   HEMOGLOBIN g/dL 11.0* 10.1* 8.4* 8.0* 7.8* 8.2* 7.8*   < > 7.8*   < > 7.3* 7.8*   PLATELETS 10*3/mm3 418 421 402  --  344  --   --   --  366  --  340 369    < > = values in this interval not displayed.     Results from last 7 days   Lab Units 07/22/25  0321 07/21/25  0347 07/20/25  1429 07/20/25  0353 07/19/25  1807 07/19/25  0303 07/18/25  0308 07/17/25  0357 07/16/25  0947   SODIUM mmol/L 136 139  --  140  --  139 141 137 137   POTASSIUM mmol/L 3.3* 3.7 4.3 3.6 3.5 3.0* 3.9 4.5 4.7   CHLORIDE mmol/L 90* 90*  --  90*  --   96* 104 103 103   CO2 mmol/L 36.0* 38.6*  --  39.0*  --  32.0* 26.1 25.0 21.3*   BUN mg/dL 25.0* 19.0  --  16.0  --  21.0 24.0* 24.0* 24.0*   CREATININE mg/dL 1.37* 1.12*  --  1.04*  --  1.14* 1.21* 1.27* 1.35*   GLUCOSE mg/dL 109* 91  --  87  --  92 95 97 96   CALCIUM mg/dL 9.4 9.6  --  8.8  --  8.4* 9.0 9.0 9.3   MAGNESIUM mg/dL 2.1 1.7  --  1.6  --  1.7 2.0 2.3 2.4   PHOSPHORUS mg/dL 4.0 4.0  --  2.8  --  3.1 3.8 4.2 3.8   Estimated Creatinine Clearance: 51.1 mL/min (A) (by C-G formula based on SCr of 1.37 mg/dL (H)).    Results from last 7 days   Lab Units 07/22/25  0321 07/21/25  0346 07/20/25  0353 07/17/25  0357 07/16/25  0947   AST (SGOT) U/L  --   --   --   --  24   ALT (SGPT) U/L  --   --   --   --  17   PROCALCITONIN ng/mL  --   --   --   --  0.11   LACTATE mmol/L  --   --   --   --  1.5   PLATELETS 10*3/mm3 418 421 402   < > 369    < > = values in this interval not displayed.       Results from last 7 days   Lab Units 07/18/25  1143 07/18/25  0647 07/18/25  0355 07/17/25  2107   PH, ARTERIAL pH units 7.386 7.334* 7.318* 7.299*   PCO2, ARTERIAL mm Hg 60.4* 65.5* 63.3* 61.0*   PO2 ART mm Hg 100.8* 115.5* 81.8 76.8*   HCO3 ART mmol/L 36.2* 34.9* 32.4* 30.0*       Result Review:  I have personally reviewed the results from the time of this admission to 7/22/2025 10:03 EDT and agree with these findings:  [x]  Laboratory list / accordion  [x]  Microbiology  [x]  Radiology  []  EKG/Telemetry   [x]  Cardiology/Vascular   []  Pathology  [x]  Old records  []  Other:    ASSESSMENT  /  PLAN:    Acute on chronic HFpEF  Pulmonary edema, acute  Severe pulmonary hypertension, RVSP>55, probably group 2 and possibly group 3.  Bilateral lower lobe atelectasis  Acute on chronic hypercapnic respiratory failure  Acute hypoxic respiratory failure, secondary to the above  Severe MR  ARIEL, on auto CPAP 15-20  History of DVT/PE  History of mitral valve endocarditis  Chronic lymphedema    -Patient presented and found to have CHF  exacerbation with pulmonary edema, severe pulmonary hypertension, acute on chronic respiratory failure  -Ongoing diuresis with Lasix, making significant urine output  - Metabolic alkalosis mildly improved, redose Diamox today  - NIPPV: settings reviewed and adjusted.  Use NIPPV at night and as needed during the day.  Eventually she may require outpatient NIPPV instead of her home CPAP due to significant hypercapnia.  Will reassess prior to DC.  -Continue bronchodilator therapy with DuoNebs  -Xarelto for history of DVT/PE  - Continue supplemental oxygen, SpO2 goal greater than 90%.  - LYNETTE performed to evaluate mitral valve with recent endocarditis and severe MR, unclear if she would tolerate a MitraClip and is being evaluated by Dr. Jimenez with structural heart.  - Guarded prognosis due to the severity of her underlying cardiac disease and pulmonary hypertension.  Agree with palliative consult.      Thank you for allowing us to participate in this patients care. Pulmonary will continue to follow.     Shady Blanco MD  [facility]  Pulmonary and Critical Care Medicine, Interventional Pulmonology    Parts of this note may be an electronic transcription/translation of spoken language to printed text using the Dragon dictation system.

## 2025-07-22 NOTE — PROGRESS NOTES
[facility]   Inpatient Palliative Care Follow up  Date: 2025   Reason for follow up: [facility]      Interval History    Information obtained from: patient, past medical records, and sister Lindsey at bedside, Latoya FAUSTIN    Follow up Information:   Follow up  today to review goals with this 64-year-old female with a primary diagnosis of diastolic heart failure with chronic respiratory failure and recent treatment for mitral valve endocarditis.  She was sent to the ER from her cardiologist appointment for syncopal episode.  She states prior to this visit she was in her normal state of health.  She was headed back to get an echocardiogram when she lost consciousness.  She denied any prodrome.  She states she had recently been in rehab after a complicated hospital stay where she completed IV antibiotics for bacterial endocarditis.  She states she got home and was just going for a routine follow-up appointment prior to this admission.     LYNETTE yesterday, states she spoke with dr Rogers earlier today, await recommendations for next steps.    Awake, alert and oriented, on 2L O2 NC, reports mild dyspnea at rest, denies other symptoms as noted below.       [place] Symptom Assessment Scale (ESAS): ESAS completed by Patient  Pain: 0 No pain  Tiredness: 0 No tiredness  Drowsiness: 0 No drowsiness  Nausea: 0 No nausea  Appetite: 0 No lack of appetite  Shortness of breath: 2  Depression: 0 No depression  Anxiety: 0 No anxiety  Best Wellbein Best wellbeing  Other(Constipation): 0 No constipation Last BM     Discussion of Patient/Family Treatment Preferences and Goals of Care: There was a voluntary discussion of advance planning and goals of care discussion. The following were present for the visit: patient and sister Lindsey at bedside with patient permission    Summary of Discussion:   Right now hard to say what she is worried most about, she and her sister both note she is a worrier. I inquired about any  "questions she may have after [facility] discussion with Dr Young last week, expressed I understand she has been very overwhelmed. She denies questions at present. Awaits to hear about next steps and if she will be eligible for a clip. Hopeful to discharge to rehab before return home with spouse at this point. She has two sisters and a brother. Both of her parents are alive and reside together in assisted living, so they have a hard time visiting. Father Dayne from her [facility]  [employer] came Sunday with eucharist. Denies additional spiritual needs at present. Open to ongoing support from palliative team as her plan of care evolves.      Physical Assessment   /71 (BP Location: Left arm, Patient Position: Lying)   Pulse 90   Temp 98.1 °F (36.7 °C) (Oral)   Resp 18   Ht 160 cm (62.99\")   Wt 119 kg (262 lb)   SpO2 92%   BMI 46.42 kg/m²    Palliative Performance Scale: Performance 30% based on the following measures: Ambulation: Totally bed bound, Activity and Evidence of Disease: Unable to do any work, extensive evidence of disease, Self-Care: Total care required,  Intake: Reduced, LOC: Full, drowsy or confusion  Physical Exam  Constitutional:       General: She is awake. She is not in acute distress.     Appearance: She is morbidly obese. She is ill-appearing.   Pulmonary:      Effort: Pulmonary effort is normal. No respiratory distress.      Comments: 2L O2  Skin:     General: Skin is warm and dry.      Findings: Bruising (scattered) present.   Neurological:      Mental Status: She is alert and oriented to person, place, and time.   Psychiatric:         Attention and Perception: Attention and perception normal.         Mood and Affect: Mood and affect normal.         Speech: Speech normal.         Behavior: Behavior normal. Behavior is cooperative.         Thought Content: Thought content normal.                Data (labs/images reviewed)    WBC   Date Value Ref Range Status   07/22/2025 6.63 3.40 - 10.80 " 10*3/mm3 Final     RBC   Date Value Ref Range Status   07/22/2025 4.20 3.77 - 5.28 10*6/mm3 Final     Hemoglobin   Date Value Ref Range Status   07/22/2025 11.0 (L) 12.0 - 15.9 g/dL Final     Hematocrit   Date Value Ref Range Status   07/22/2025 36.6 34.0 - 46.6 % Final     MCV   Date Value Ref Range Status   07/22/2025 87.1 79.0 - 97.0 fL Final     MCH   Date Value Ref Range Status   07/22/2025 26.2 (L) 26.6 - 33.0 pg Final     MCHC   Date Value Ref Range Status   07/22/2025 30.1 (L) 31.5 - 35.7 g/dL Final     RDW   Date Value Ref Range Status   07/22/2025 16.2 (H) 12.3 - 15.4 % Final     RDW-SD   Date Value Ref Range Status   07/22/2025 51.2 37.0 - 54.0 fl Final     MPV   Date Value Ref Range Status   07/22/2025 7.7 6.0 - 12.0 fL Final     Platelets   Date Value Ref Range Status   07/22/2025 418 140 - 450 10*3/mm3 Final       Lab Results   Component Value Date    GLUCOSE 109 (H) 07/22/2025    BUN 25.0 (H) 07/22/2025    CREATININE 1.37 (H) 07/22/2025     07/22/2025    K 3.3 (L) 07/22/2025    CL 90 (L) 07/22/2025    CALCIUM 9.4 07/22/2025    PROTEINTOT 8.0 07/16/2025    ALBUMIN 3.5 07/16/2025    ALT 17 07/16/2025    AST 24 07/16/2025    ALKPHOS 95 07/16/2025    BILITOT 0.4 07/16/2025    GLOB 4.5 07/16/2025    AGRATIO 0.8 07/16/2025    BCR 18.2 07/22/2025    ANIONGAP 10.0 07/22/2025    EGFR 42.9 (L) 07/22/2025       Adult Transesophageal Echo (LYNETTE) W/ Cont if Necessary Per Protocol  Addendum:   Left ventricular systolic function is normal. Left ventricular ejection  fraction appears to be 56 - 60%. Normal left ventricular cavity size  noted. Left ventricular wall thickness is consistent with mild concentric  hypertrophy. All left ventricular wall segments contract normally. Left  ventricular diastolic function was not assessed.     The right ventricular cavity is moderately dilated. Right ventricular  wall thickness is consistent with moderate hypertrophy. Mildly reduced  right ventricular systolic function  noted.     The left atrial cavity is mildly dilated. No evidence of a left atrial  thrombus present. There is no spontaneous echo contrast present. Left  atrial appendage was found to be singularly lobar in nature. The left  atrial appendage was visualized through multiple planes. No evidence of a  left atrial appendage thrombus was present. Interatrial septal aneurysm  present. Lipomatous hypertrophy of the interatrial septum present. Small  patent foramen ovale present with bi-directional shunting. Systolic flow  reversal in the pulmonary vein consistent with significant mitral  regurgitation.     Moderate mitral annular calcification is present. Severe mitral valve  regurgitation is present. No significant mitral valve stenosis is present.  There is a large perforation of P1. P2 and P3 appear diminuitive in 3D  imaging but appear to be mostly intact in 2D en face imaging.     There is moderate, (grade 3) plaque in the aortic arch present. There  is mild, (grade 2) plaque in the descending aorta present. The inferior  vena cava is dilated. Partial IVC inspiratory collapse of less than 50%  noted. The main pulmonary artery is mildly dilated.     The SVC has extensive thrombus noted; it is unclear if this is chronic  or acute.  Narrative:   Left ventricular systolic function is normal. Left ventricular ejection   fraction appears to be 56 - 60%. Normal left ventricular cavity size   noted. Left ventricular wall thickness is consistent with mild concentric   hypertrophy. All left ventricular wall segments contract normally. Left   ventricular diastolic function was not assessed.    The right ventricular cavity is moderately dilated. Right ventricular   wall thickness is consistent with moderate hypertrophy. Mildly reduced   right ventricular systolic function noted.    The left atrial cavity is mildly dilated. No evidence of a left atrial   thrombus present. There is no spontaneous echo contrast present. Left    atrial appendage was found to be singularly lobar in nature. The left   atrial appendage was visualized through multiple planes. No evidence of a   left atrial appendage thrombus was present. Interatrial septal aneurysm   present. Lipomatous hypertrophy of the interatrial septum present. Small   patent foramen ovale present with bi-directional shunting. Systolic flow   reversal in the pulmonary vein consistent with significant mitral   regurgitation.    Moderate mitral annular calcification is present. Severe mitral valve   regurgitation is present. No significant mitral valve stenosis is present.   There is a large perforation of P1. P2 and P3 appear diminuitive in 3D   imaging but appear to be mostly intact in 2D en face imaging.    There is moderate, (grade 3) plaque in the aortic arch present. There   is mild, (grade 2) plaque in the descending aorta present. The inferior   vena cava is dilated. Partial IVC inspiratory collapse of less than 50%   noted. The main pulmonary artery is mildly dilated.         Palliative Care Assessment and Recommendations  Summary/Assessment:Emely Solomon is a 64 y.o. female with a primary palliative care diagnosis of severe mitral regurgitation complicated by chronic respiratory failure and diastolic heart failure and severe pulmonary hypertension. Palliative care was consulted for goals of care discussion.     Prognosis and Palliative Performance Scale:  Performance 30% based on the following measures: Ambulation: Totally bed bound, Activity and Evidence of Disease: Unable to do any work, extensive evidence of disease, Self-Care: Total care required,  Intake: Reduced, LOC: Full, drowsy or confusion  Disease State: Deteriorating despite treatments  Goals of Care Treatment Preferences   Palliative Care Decision Making Capacity: intact  Healthcare Surrogate: Yes- name(s)- sister Katherine and brother Adilson  What is Most important to patient/family at this time: learning LYNETTE  results  Code Status FULL   Other Considerations regarding life sustaining treatments: plan to revisit after patient provided with LYNETTE results and recommendations  Advance Directives Present or Completed: [place] Living Will/HCS doc on file date 11/11/2020  Follow up: Patient open to ongoing follow up. Palliative care to follow up after discussion of LYNETTE results and subsequent recommendations  Discussed plan with: RN, patient, and family      I spent from 10:25 to 10:43 in advance care planning discussing the above goals of care (total 18 minutes).       Thank you for consulting palliative care. If you need to reach the provider on call for the palliative care team please call [phone]    Cyndie Monge, LUCÍA  7/22/2025 10:40 EDT

## 2025-07-22 NOTE — PLAN OF CARE
Goal Outcome Evaluation:  Plan of Care Reviewed With: patient        Lymphedema:  Patient BLE re-wrapped utilizing light compression.  RLE remain ankle to calf (1 layer of bandage, due to discomfort level), LLE toes to knee (3 layers of bandages).  Skin looks good and intact under bandages.  OT to address everyday this week and Nursing on Sat/Sun.  Cont OT as tolerated.

## 2025-07-23 LAB
ANION GAP SERPL CALCULATED.3IONS-SCNC: 13 MMOL/L (ref 5–15)
BUN SERPL-MCNC: 33 MG/DL (ref 8–23)
BUN/CREAT SERPL: 25.6 (ref 7–25)
CALCIUM SPEC-SCNC: 9.6 MG/DL (ref 8.6–10.5)
CHLORIDE SERPL-SCNC: 93 MMOL/L (ref 98–107)
CO2 SERPL-SCNC: 32 MMOL/L (ref 22–29)
CREAT SERPL-MCNC: 1.29 MG/DL (ref 0.57–1)
DEPRECATED RDW RBC AUTO: 51.1 FL (ref 37–54)
EGFRCR SERPLBLD CKD-EPI 2021: 46.2 ML/MIN/1.73
ERYTHROCYTE [DISTWIDTH] IN BLOOD BY AUTOMATED COUNT: 16 % (ref 12.3–15.4)
GLUCOSE SERPL-MCNC: 100 MG/DL (ref 65–99)
HCT VFR BLD AUTO: 36.2 % (ref 34–46.6)
HGB BLD-MCNC: 10.8 G/DL (ref 12–15.9)
MAGNESIUM SERPL-MCNC: 2.3 MG/DL (ref 1.6–2.4)
MCH RBC QN AUTO: 26.2 PG (ref 26.6–33)
MCHC RBC AUTO-ENTMCNC: 29.8 G/DL (ref 31.5–35.7)
MCV RBC AUTO: 87.9 FL (ref 79–97)
PHOSPHATE SERPL-MCNC: 3.2 MG/DL (ref 2.5–4.5)
PLATELET # BLD AUTO: 376 10*3/MM3 (ref 140–450)
PMV BLD AUTO: 8.1 FL (ref 6–12)
POTASSIUM SERPL-SCNC: 3.7 MMOL/L (ref 3.5–5.2)
RBC # BLD AUTO: 4.12 10*6/MM3 (ref 3.77–5.28)
SODIUM SERPL-SCNC: 138 MMOL/L (ref 136–145)
WBC NRBC COR # BLD AUTO: 6.03 10*3/MM3 (ref 3.4–10.8)

## 2025-07-23 PROCEDURE — 84100 ASSAY OF PHOSPHORUS: CPT | Performed by: HOSPITALIST

## 2025-07-23 PROCEDURE — 36415 COLL VENOUS BLD VENIPUNCTURE: CPT | Performed by: HOSPITALIST

## 2025-07-23 PROCEDURE — 97530 THERAPEUTIC ACTIVITIES: CPT

## 2025-07-23 PROCEDURE — 94799 UNLISTED PULMONARY SVC/PX: CPT

## 2025-07-23 PROCEDURE — 99232 SBSQ HOSP IP/OBS MODERATE 35: CPT | Performed by: NURSE PRACTITIONER

## 2025-07-23 PROCEDURE — 94664 DEMO&/EVAL PT USE INHALER: CPT

## 2025-07-23 PROCEDURE — 94761 N-INVAS EAR/PLS OXIMETRY MLT: CPT

## 2025-07-23 PROCEDURE — 85027 COMPLETE CBC AUTOMATED: CPT | Performed by: HOSPITALIST

## 2025-07-23 PROCEDURE — 83735 ASSAY OF MAGNESIUM: CPT | Performed by: HOSPITALIST

## 2025-07-23 PROCEDURE — 97110 THERAPEUTIC EXERCISES: CPT

## 2025-07-23 PROCEDURE — 80048 BASIC METABOLIC PNL TOTAL CA: CPT | Performed by: HOSPITALIST

## 2025-07-23 RX ORDER — FUROSEMIDE 40 MG/1
40 TABLET ORAL DAILY
Status: DISCONTINUED | OUTPATIENT
Start: 2025-07-23 | End: 2025-07-23

## 2025-07-23 RX ADMIN — IPRATROPIUM BROMIDE AND ALBUTEROL SULFATE 3 ML: .5; 3 SOLUTION RESPIRATORY (INHALATION) at 11:48

## 2025-07-23 RX ADMIN — NYSTATIN 1 APPLICATION: 100000 OINTMENT TOPICAL at 09:19

## 2025-07-23 RX ADMIN — Medication 10 ML: at 22:39

## 2025-07-23 RX ADMIN — NYSTATIN 1 APPLICATION: 100000 OINTMENT TOPICAL at 15:29

## 2025-07-23 RX ADMIN — NYSTATIN 1 APPLICATION: 100000 OINTMENT TOPICAL at 22:38

## 2025-07-23 RX ADMIN — FUROSEMIDE 60 MG: 20 TABLET ORAL at 12:14

## 2025-07-23 RX ADMIN — MEDROXYPROGESTERONE ACETATE 10 MG: 10 TABLET ORAL at 09:19

## 2025-07-23 RX ADMIN — METOPROLOL SUCCINATE 12.5 MG: 25 TABLET, EXTENDED RELEASE ORAL at 09:19

## 2025-07-23 RX ADMIN — RIVAROXABAN 20 MG: 20 TABLET, FILM COATED ORAL at 18:06

## 2025-07-23 RX ADMIN — IPRATROPIUM BROMIDE AND ALBUTEROL SULFATE 3 ML: .5; 3 SOLUTION RESPIRATORY (INHALATION) at 15:37

## 2025-07-23 RX ADMIN — ANTI-FUNGAL POWDER MICONAZOLE NITRATE TALC FREE 1 APPLICATION: 1.42 POWDER TOPICAL at 09:19

## 2025-07-23 RX ADMIN — IPRATROPIUM BROMIDE AND ALBUTEROL SULFATE 3 ML: .5; 3 SOLUTION RESPIRATORY (INHALATION) at 20:54

## 2025-07-23 RX ADMIN — ANTI-FUNGAL POWDER MICONAZOLE NITRATE TALC FREE 1 APPLICATION: 1.42 POWDER TOPICAL at 22:38

## 2025-07-23 RX ADMIN — IPRATROPIUM BROMIDE AND ALBUTEROL SULFATE 3 ML: .5; 3 SOLUTION RESPIRATORY (INHALATION) at 08:48

## 2025-07-23 RX ADMIN — Medication 10 ML: at 09:20

## 2025-07-23 NOTE — PROGRESS NOTES
Discharge Planning Assessment  [facility]     Patient Name: Emely Solomon  MRN: [id number]  Today's Date: 7/23/2025    Admit Date: 7/16/2025    Plan: [facility] referral pending   Discharge Needs Assessment    No documentation.                  Discharge Plan       Row Name 07/23/25 1530       Plan    Plan [facility] referral pending    Post Acute Provider List Nursing Home    Plan Comments Call placed to [facility] she stated she is following and she is waiting on [facility] to accept. Leonora FAUSTIN                  Continued Care and Services - Admitted Since 7/16/2025       Destination       Service Provider Request Status Services Address Phone Fax Patient Preferred    [facility] Considering -- [address] [place] [address] [phone] --                  Selected Continued Care - Episodes Includes continued care and service providers with selected services from the active episodes listed below      High Risk Care Management Episode start date: 7/17/2025 (Paused)   There are no active outsourced providers for this episode.                 Selected Continued Care - Prior Encounters Includes continued care and service providers with selected services from prior encounters from 4/17/2025 to 7/23/2025      Discharged on 6/3/2025 Admission date: 5/25/2025 - Discharge disposition: Skilled Nursing Facility (DC - External)      Destination       Service Provider Services Address Phone Fax Patient Preferred    [facility] [address] [place] [address] [phone] --                      Discharged on 5/24/2025 Admission date: 5/20/2025 - Discharge disposition: Skilled Nursing Facility (DC - External)      Destination       Service Provider Services Address Phone Fax Patient Preferred    [facility] Skilled Nursing [address]  [facility] [address] [phone] --                      Discharged on 5/10/2025 Admission date: 5/4/2025 - Discharge disposition: Skilled Nursing Facility (DC - External)      Destination       Service Provider Services Address Phone Fax Patient Preferred    [facility] [address] [facility] [address] [phone] --              Home Medical Care       Service Provider Services Address Phone Fax Patient Preferred    [facility] [address], [address] [address] [phone] --                          Expected Discharge Date and Time       Expected Discharge Date Expected Discharge Time    Jul 24, 2025            Demographic Summary    No documentation.                  Functional Status    No documentation.                  Psychosocial    No documentation.                  Abuse/Neglect    No documentation.                  Legal    No documentation.                  Substance Abuse    No documentation.                  Patient Forms    No documentation.                     Angelic Beckett, RN

## 2025-07-23 NOTE — THERAPY TREATMENT NOTE
Patient Name: Emely Solomon  : 1960    MRN: [id number]                              Today's Date: 2025       Admit Date: 2025    Visit Dx:     ICD-10-CM ICD-9-CM   1. Syncope and collapse  R55 780.2   2. Acute on chronic congestive heart failure, unspecified heart failure type  I50.9 428.0   3. Severe mitral regurgitation  I34.0 424.0   4. Chronic anemia  D64.9 285.9   5. STEFANIA (acute kidney injury)  N17.9 584.9     Patient Active Problem List   Diagnosis    PFO (patent foramen ovale)    Paradoxical embolism    Essential hypertension    Pulmonary hypertension    Bacteremia due to group B Streptococcus    ARIEL on CPAP    History of DVT (deep vein thrombosis)    Lymphedema    Anemia, chronic disease    Iron deficiency    Post-menopausal bleeding    Thickened endometrium    Generalized muscle weakness    Right bundle branch block (RBBB) with left anterior fascicular block (LAFB)    Iliac vein stenosis, right    History of pulmonary embolism    Morbid obesity    Acute on chronic heart failure with preserved ejection fraction (HFpEF)    Chronic respiratory failure with hypoxia    Syncope and collapse    Acute on chronic respiratory failure with hypoxia    History of bacterial endocarditis    Severe mitral regurgitation    Lipoedema     Past Medical History:   Diagnosis Date    Arthritis     Cellulitis     2017, with Group B Strep bacteremia and sepsis    Chronic deep vein thrombosis (DVT) of left popliteal vein 2015, 2016    Chronic diastolic congestive heart failure     COVID-19 virus infection 2020    Heart murmur     Hypertension     Iliac vein stenosis, right 05/15/2024    Lipoedema     Lymphedema     other    Morbid obesity     ARIEL on CPAP     PFO (patent foramen ovale)     Postthrombotic syndrome of left lower extremity without complications 2015    postphletibis    Pulmonary embolism 2016    Pulmonary hypertension     multifactorial (dCHF, obesity/ARIEL,  hx PE), mild by echo 1/2016    Right bundle branch block (RBBB) with left anterior fascicular block (LAFB)     Sepsis 09/18/2020    Type 2 myocardial infarction 05/20/2025     Past Surgical History:   Procedure Laterality Date    ARTERIOGRAM  07/2015    BRONCHOSCOPY N/A 07/21/2017    Procedure: BRONCHOSCOPY with wash;  Surgeon: Caleb Garcia MD;  Location: [facility] ENDOSCOPY;  Service:     COLONOSCOPY      COLONOSCOPY N/A 01/26/2023    Procedure: COLONOSCOPY to CECUM AND TERM ILEUM;  Surgeon: Jj Dean MD;  Location: [facility] ENDOSCOPY;  Service: Gastroenterology;  Laterality: N/A;  PRE OP -screening  POST OP -  NORMAL    D & C HYSTEROSCOPY N/A 03/08/2022    Procedure: DILATATION AND CURETTAGE with hysteroscopy;  Surgeon: Kaylah Land DO;  Location: [facility] OR;  Service: Gynecology Oncology;  Laterality: N/A;    DILATATION AND CURETTAGE  04/11/2011    OTHER SURGICAL HISTORY  09/2015    IVC filter    OTHER SURGICAL HISTORY      left LE revascularization    OTHER SURGICAL HISTORY      ALESSIA and LEV scan    THROMBECTOMY  07/2015      General Information       Row Name 07/23/25 1537          OT Time and Intention    Document Type therapy note (daily note)  -JR     Mode of Treatment occupational therapy;physical therapy;co-treatment  -       Row Name 07/23/25 1537          General Information    Patient Profile Reviewed yes  -JR       Row Name 07/23/25 1537          Cognition    Orientation Status (Cognition) oriented x 4  -JR       Row Name 07/23/25 1537          Safety Issues/Impairments Affecting Functional Mobility    Impairments Affecting Function (Mobility) balance;coordination;endurance/activity tolerance;shortness of breath;strength  -     Comment, Safety Issues/Impairments (Mobility) PT/OT cotreatment medically appropriate and necessary due to patient acuity level, to maximize therapeutic benefit due to impaired act tolerance, and for safety of patient and staff. Treatment focused on  progression of care and goals established in POC.  -JR               User Key  (r) = Recorded By, (t) = Taken By, (c) = Cosigned By      Initials Name Provider Type    Jack Perez OT Occupational Therapist                   Lymphedema       Row Name 07/22/25 1600             Skin Changes/Observations    Location/Assessment Lower Extremity  -JR      Lower Extremity Conditions bilateral:  -JR      Recorded by [JR] Jack Parra OT              Compression/Skin Care    Compression/Skin Care skin care;wrapping location;bandaging;compression garment;remove bandages  -JR      Skin Care washed/dried;lotion applied  -JR      Wrapping Location lower extremity  -JR      Wrapping Location LE right:;ankle;calf;left:;foot to knee  -JR      Bandage Layers cotton liner;padding/fluff layer;short-stretch bandages (comment size/quantity);cotton elastic stocking- single layer (comment size)  -JR      Bandaging Comments LLE 10 compression, artiflex 15. RLE 8 compression, artiflex 10  -JR      Bandaging Technique light compression  -JR      Recorded by [JR] Jack Parra OT                User Key  (r) = Recorded By, (t) = Taken By, (c) = Cosigned By      Initials Name Effective Dates    Jack Perez OT 07/24/24 -                    Mobility/ADL's       Row Name 07/23/25 1537          Bed Mobility    Supine-Sit [place] (Bed Mobility) minimum assist (75% patient effort);2 person assist  -JR     Sit-Supine [place] (Bed Mobility) minimum assist (75% patient effort);2 person assist  -JR     Assistive Device (Bed Mobility) bed rails;head of bed elevated  -       Row Name 07/23/25 1537          Transfers    Transfers sit-stand transfer  -JR       Row Name 07/23/25 1537          Sit-Stand Transfer    Sit-Stand [place] (Transfers) maximum assist (25% patient effort);2 person assist  -JR     Comment, (Sit-Stand Transfer) HHA x2.  -JR               User Key  (r) = Recorded By, (t) = Taken By, (c) = Cosigned By      Initials Name  Provider Type    Jack Perez OT Occupational Therapist                   Obj/Interventions       Row Name 07/23/25 1538          Sensory Assessment (Somatosensory)    Sensory Assessment (Somatosensory) sensation intact  -       Row Name 07/23/25 1538          Vision Assessment/Intervention    Visual Impairment/Limitations WFL  -[facility] Name 07/23/25 1538          Balance    Balance Assessment sitting static balance  -     Position, Sitting Balance sitting edge of bed  -               User Key  (r) = Recorded By, (t) = Taken By, (c) = Cosigned By      Initials Name Provider Type    JR Jack Parra OT Occupational Therapist                   Goals/Plan    No documentation.                  Clinical Impression       Row Name 07/23/25 1539          Pain Assessment    Pretreatment Pain Rating 0/10 - no pain  -     Posttreatment Pain Rating 0/10 - no pain  -       Row Name 07/23/25 1539          Plan of Care Review    Plan of Care Reviewed With patient  -     Progress improving  -     Outcome Evaluation Patient seen for OT/PT co-treat this pm.  Patient resting in bed upon arrival.  Patient agreeable to OOB activities.  Patient transitioned to EOB with Min A x2.  Once stabilized, CGA for sitting balance.  Patient with mild c/o dizziness and weakness with EOB sitting.  x2 STS from EOB with Max A x2 and HHA x2.  Cuing for upright posture and locking BLE knees out for stabilization.  Patient returned to supine with Mod A x2.  Cont to recommend SNF for d/c placement. Cont OT as tolerated.  -       Row Name 07/23/25 1539          Therapy Assessment/Plan (OT)    Rehab Potential (OT) good  -     Criteria for Skilled Therapeutic Interventions Met (OT) yes;skilled treatment is necessary  -     Therapy Frequency (OT) 5 times/wk  -       Row Name 07/23/25 1539          Therapy Plan Review/Discharge Plan (OT)    Anticipated Discharge Disposition (OT) skilled nursing facility  -       Row Name 07/23/25  1539          Vital Signs    Pre Patient Position Supine  -JR     Intra Patient Position Standing  -JR     Post Patient Position Supine  -JR       Row Name 07/23/25 1539          Positioning and Restraints    Pre-Treatment Position in bed  -JR     Post Treatment Position bed  -JR     In Bed notified nsg;call light within reach;encouraged to call for assist;exit alarm on  -JR               User Key  (r) = Recorded By, (t) = Taken By, (c) = Cosigned By      Initials Name Provider Type    Jack Perez, OT Occupational Therapist                   Outcome Measures       Row Name 07/23/25 1541          How much help from another is currently needed...    Putting on and taking off regular lower body clothing? 2  -JR     Bathing (including washing, rinsing, and drying) 2  -JR     Toileting (which includes using toilet bed pan or urinal) 2  -JR     Putting on and taking off regular upper body clothing 2  -JR     Taking care of personal grooming (such as brushing teeth) 3  -JR     Eating meals 3  -JR     AM-PAC 6 Clicks Score (OT) 14  -JR       Row Name 07/23/25 0916          How much help from another person do you currently need...    Turning from your back to your side while in flat bed without using bedrails? 3  -DS     Moving from lying on back to sitting on the side of a flat bed without bedrails? 3  -DS     Moving to and from a bed to a chair (including a wheelchair)? 2  -DS     Standing up from a chair using your arms (e.g., wheelchair, bedside chair)? 2  -DS     Climbing 3-5 steps with a railing? 1  -DS     To walk in hospital room? 2  -DS     AM-PAC 6 Clicks Score (PT) 13  -DS     Highest Level of Mobility Goal Move to Chair/Commode-4  -DS       Row Name 07/23/25 1541          Modified Davie Scale    Modified Davie Scale 4 - Moderately severe disability.  Unable to walk without assistance, and unable to attend to own bodily needs without assistance.  -       Row Name 07/23/25 1541          Functional Assessment     Outcome Measure Options AM-PAC 6 Clicks Daily Activity (OT);Modified [place]  -               User Key  (r) = Recorded By, (t) = Taken By, (c) = Cosigned By      Initials Name Provider Type    DS Latoya Weeks, RN Registered Nurse    Jack Perez, OT Occupational Therapist                      OT Recommendation and Plan  Planned Therapy Interventions (OT): activity tolerance training, adaptive equipment training, BADL retraining, functional balance retraining, neuromuscular control/coordination retraining, passive ROM/stretching, strengthening exercise, transfer/mobility retraining, ROM/therapeutic exercise  Therapy Frequency (OT): 5 times/wk  Plan of Care Review  Plan of Care Reviewed With: patient  Progress: improving  Outcome Evaluation: Patient seen for OT/PT co-treat this pm.  Patient resting in bed upon arrival.  Patient agreeable to OOB activities.  Patient transitioned to EOB with Min A x2.  Once stabilized, CGA for sitting balance.  Patient with mild c/o dizziness and weakness with EOB sitting.  x2 STS from EOB with Max A x2 and HHA x2.  Cuing for upright posture and locking BLE knees out for stabilization.  Patient returned to supine with Mod A x2.  Cont to recommend SNF for d/c placement. Cont OT as tolerated.     Time Calculation:   Evaluation Complexity (OT)  Review Occupational Profile/Medical/Therapy History Complexity: expanded/moderate complexity  Assessment, Occupational Performance/Identification of Deficit Complexity: 3-5 performance deficits  Clinical Decision Making Complexity (OT): detailed assessment/moderate complexity  Overall Complexity of Evaluation (OT): moderate complexity     Time Calculation- OT       Row Name 07/23/25 1549             Time Calculation- OT    OT Start Time 1338  -      OT Stop Time 1403  -      OT Time Calculation (min) 25 min  -      Total Timed Code Minutes- OT 25 minute(s)  -      OT Received On 07/23/25  -      OT - Next Appointment 07/24/25  -          Timed Charges    85411 - OT Therapeutic Activity Minutes 25  -JR         Total Minutes    Timed Charges Total Minutes 25  -JR       Total Minutes 25  -JR                User Key  (r) = Recorded By, (t) = Taken By, (c) = Cosigned By      Initials Name Provider Type    Jack Perez OT Occupational Therapist                  Therapy Charges for Today       Code Description Service Date Service Provider Modifiers Qty    [id number]  OT THERAPEUTIC ACT EA 15 MIN 7/23/2025 Jack Parra OT GO 2                 Jack Parra OT  7/23/2025

## 2025-07-23 NOTE — PLAN OF CARE
Goal Outcome Evaluation:  Plan of Care Reviewed With: patient        Progress: no change  Outcome Evaluation: Rested in bed overnight. Voiding per purewick. Wore Cpap over night. Denies pain.

## 2025-07-23 NOTE — PROGRESS NOTES
"    Patient Name: Emely Solomon  :1960  64 y.o.      Patient Care Team:  Adilson Wilkerson MD as PCP - General (Family Medicine)  [place] Segovia MD as Referring Physician (Obstetrics and Gynecology)  Brennan Rogers MD as Cardiologist (Cardiology)  Caleb Garcia MD as Consulting Physician (Pulmonary Disease)  Jess Sanz, RN as Ambulatory  ([facility] [place])    Chief Complaint: follow up CHF, MR    Interval History: BUN is up. H/H is stable. No active bleeding currently per her report.        Objective   Vital Signs  Temp:  [97.6 °F (36.4 °C)-98.1 °F (36.7 °C)] 98.1 °F (36.7 °C)  Heart Rate:  [71-90] 76  Resp:  [18-20] 20  BP: ()/(64-86) 127/76    Intake/Output Summary (Last 24 hours) at 2025 0949  Last data filed at 2025 0923  Gross per 24 hour   Intake 432 ml   Output 2450 ml   Net -2018 ml     Flowsheet Rows      Flowsheet Row First Filed Value   Admission Height 160 cm (63\") Documented at 2025 0020   Admission Weight 160 kg (351 lb 13.7 oz) Documented at 2025 0020            Physical Exam:   General Appearance:    Alert, cooperative, in no acute distress   Lungs:     Clear to auscultation.  Normal respiratory effort and rate.      Heart:    Regular rhythm and normal rate, normal S1 and S2, no murmurs, gallops or rubs.     Chest Wall:    No abnormalities observed   Abdomen:     Soft, nontender, positive bowel sounds.     Extremities:   no cyanosis, clubbing or edema.  No marked joint deformities.  Adequate musculoskeletal strength.       Results Review:    Results from last 7 days   Lab Units 25  0304   SODIUM mmol/L 138   POTASSIUM mmol/L 3.7   CHLORIDE mmol/L 93*   CO2 mmol/L 32.0*   BUN mg/dL 33.0*   CREATININE mg/dL 1.29*   GLUCOSE mg/dL 100*   CALCIUM mg/dL 9.6     Results from last 7 days   Lab Units 25  1102   HSTROP T ng/L 32*     Results from last 7 days   Lab Units 25  0304   WBC 10*3/mm3 6.03   HEMOGLOBIN g/dL 10.8* "   HEMATOCRIT % 36.2   PLATELETS 10*3/mm3 376         Results from last 7 days   Lab Units 07/23/25  0304   MAGNESIUM mg/dL 2.3                   Medication Review:   [Held by provider] furosemide, 80 mg, Intravenous, BID Diuretics  hydrocortisone-bacitracin-zinc oxide-nystatin, 1 Application, Topical, TID  ipratropium-albuterol, 3 mL, Nebulization, 4x Daily - RT  Lidocaine, 1 patch, Transdermal, Q24H  medroxyPROGESTERone, 10 mg, Oral, Daily  metoprolol succinate XL, 12.5 mg, Oral, Daily  miconazole, 1 Application, Topical, Q12H  rivaroxaban, 20 mg, Oral, Daily With Dinner  sodium chloride, 10 mL, Intravenous, Q12H              Assessment & Plan   Syncope , likely due to severe hypoxic / pulmonary hypertension  Acute on chronic HFpEF / severe pulmonary hypertension   Recent mitral valve endocarditis (group B strep) with severe mitral valve regurgitation. LYNETTE with perforation of P1 , possible damage of P2, P3. Unclear candidacy for MitraClip. Structural heart is reviewing.   History of extensive RLE DVT with paradoxical embolus through PFO.  She was on rivaroxaban as an outpatient but was held for uterine bleeding (requiring transfusion). Unfortunately, LYNETTE this admission shows nonocclusive thrombus in SVC. She is likely hypercoagulable from immobility, possible uterine cancer, and recent PICC line. She has to remain anticoagulated and rivaroxaban was restarted. She is not a candidate for general anesthesia (for GYN to address uterine bleeding) - plans to follow up with GYN as outpatient.   Morbid obesity / ARIEL / OHS / lymphedema   Chronic kidney disease     Diuretics held 7/22/25 due to worsening waste products. CO2 down trending. BUN up trending , no active bleeding per her report. Creatinine better.     Resume oral diuretics.     LUCÍA Conley  [facility]  07/23/25  09:49 EDT

## 2025-07-23 NOTE — PROGRESS NOTES
".        [facility]   Inpatient Palliative Care Follow up  Date: 7/23/2025   Reason for follow up: goals of care      Interval History    Information obtained from: patient, spouse/SO, and past medical records    Follow up Information: Mrs. Solomon is a 65 yo female with primary palliative diagnosis of diastolic heart failure with chronic respiratory failure who is being seen today to follow up on goals of care after her LYNETTE. She was sent from the cardiologist office after a syncopal episode. She tells me she was recently in rehab after being in the hospital for endocarditis. She reports today that \"I want the procedure if I can have it. \" I asked what Dr. Rogers had discussed and she stated \"She is asking another doctor if I can get the procedure.\" We discussed her state of health and that she is a poor surgical candidate and she understands this. Her spouse was at bedside and agreed. When asked about her wishes and goals of care she stated \"I want them to try everything and if there is no chance for me then let me go.\" She wants to remain a full code. I discussed following her outpatient with palliative care support through our heart connect program and both her and her spouse were interested in this. I will ask Israel to bring her information regarding outpatient PC support. She does tell me she has an appt with gyn on 7/30 and she plans on keeping that appointment.        Labelle Symptom Assessment Scale (ESAS): ESAS completed by Patient  Pain: 0 No pain  Tiredness: 3  Drowsiness: 0 No drowsiness  Nausea: 0 No nausea  Appetite: 0 No lack of appetite  Shortness of breath: 3  Depression: 1  Anxiety: 0 No anxiety  Best Wellbeing: 3  Other(Constipation): 0 No constipation last BM 7/22    Discussion of Patient/Family Treatment Preferences and Goals of Care: There was a voluntary discussion of advance planning and goals of care discussion. The following were present for the visit: patient, spouse.     Summary " "of Discussion: She is waiting to be told if she can have the clip done or not. She understands she is high risk but continues to want to remain full code and have \"anything done I can to help me.\" Discussed following her outpatient as I feel this would be very beneficial to her and her spouse and they would both like that. Will have Israel set them up with an appointment.         Physical Assessment   /76 (BP Location: Left arm, Patient Position: Lying)   Pulse 78   Temp 98.1 °F (36.7 °C) (Oral)   Resp 20   Ht 160 cm (62.99\")   Wt 117 kg (259 lb)   SpO2 98%   BMI 45.89 kg/m²    Palliative Performance Scale: Performance 30% based on the following measures: Ambulation: Totally bed bound, Activity and Evidence of Disease: Unable to do any work, extensive evidence of disease, Self-Care: Total care required,  Intake: Reduced, LOC: Full, drowsy or confusion  Physical Exam   General: awake in NAD  Obese and ill appearing  Pulmonary: normal effort, not wearing oxygen today  Cardiology: normal rate and rhythm, murmur present  Musculoskeletal: swelling to BLE          Data (labs/images reviewed)    WBC   Date Value Ref Range Status   07/23/2025 6.03 3.40 - 10.80 10*3/mm3 Final     RBC   Date Value Ref Range Status   07/23/2025 4.12 3.77 - 5.28 10*6/mm3 Final     Hemoglobin   Date Value Ref Range Status   07/23/2025 10.8 (L) 12.0 - 15.9 g/dL Final     Hematocrit   Date Value Ref Range Status   07/23/2025 36.2 34.0 - 46.6 % Final     MCV   Date Value Ref Range Status   07/23/2025 87.9 79.0 - 97.0 fL Final     MCH   Date Value Ref Range Status   07/23/2025 26.2 (L) 26.6 - 33.0 pg Final     MCHC   Date Value Ref Range Status   07/23/2025 29.8 (L) 31.5 - 35.7 g/dL Final     RDW   Date Value Ref Range Status   07/23/2025 16.0 (H) 12.3 - 15.4 % Final     RDW-SD   Date Value Ref Range Status   07/23/2025 51.1 37.0 - 54.0 fl Final     MPV   Date Value Ref Range Status   07/23/2025 8.1 6.0 - 12.0 fL Final     Platelets "   Date Value Ref Range Status   07/23/2025 376 140 - 450 10*3/mm3 Final       Lab Results   Component Value Date    GLUCOSE 100 (H) 07/23/2025    BUN 33.0 (H) 07/23/2025    CREATININE 1.29 (H) 07/23/2025     07/23/2025    K 3.7 07/23/2025    CL 93 (L) 07/23/2025    CALCIUM 9.6 07/23/2025    PROTEINTOT 8.0 07/16/2025    ALBUMIN 3.5 07/16/2025    ALT 17 07/16/2025    AST 24 07/16/2025    ALKPHOS 95 07/16/2025    BILITOT 0.4 07/16/2025    GLOB 4.5 07/16/2025    AGRATIO 0.8 07/16/2025    BCR 25.6 (H) 07/23/2025    ANIONGAP 13.0 07/23/2025    EGFR 46.2 (L) 07/23/2025       Adult Transesophageal Echo (LYNETTE) W/ Cont if Necessary Per Protocol  Addendum:   Left ventricular systolic function is normal. Left ventricular ejection  fraction appears to be 56 - 60%. Normal left ventricular cavity size  noted. Left ventricular wall thickness is consistent with mild concentric  hypertrophy. All left ventricular wall segments contract normally. Left  ventricular diastolic function was not assessed.     The right ventricular cavity is moderately dilated. Right ventricular  wall thickness is consistent with moderate hypertrophy. Mildly reduced  right ventricular systolic function noted.     The left atrial cavity is mildly dilated. No evidence of a left atrial  thrombus present. There is no spontaneous echo contrast present. Left  atrial appendage was found to be singularly lobar in nature. The left  atrial appendage was visualized through multiple planes. No evidence of a  left atrial appendage thrombus was present. Interatrial septal aneurysm  present. Lipomatous hypertrophy of the interatrial septum present. Small  patent foramen ovale present with bi-directional shunting. Systolic flow  reversal in the pulmonary vein consistent with significant mitral  regurgitation.     Moderate mitral annular calcification is present. Severe mitral valve  regurgitation is present. No significant mitral valve stenosis is present.  There is a  large perforation of P1. P2 and P3 appear diminuitive in 3D  imaging but appear to be mostly intact in 2D en face imaging.     There is moderate, (grade 3) plaque in the aortic arch present. There  is mild, (grade 2) plaque in the descending aorta present. The inferior  vena cava is dilated. Partial IVC inspiratory collapse of less than 50%  noted. The main pulmonary artery is mildly dilated.     The SVC has extensive thrombus noted; it is unclear if this is chronic  or acute.  Narrative:   Left ventricular systolic function is normal. Left ventricular ejection   fraction appears to be 56 - 60%. Normal left ventricular cavity size   noted. Left ventricular wall thickness is consistent with mild concentric   hypertrophy. All left ventricular wall segments contract normally. Left   ventricular diastolic function was not assessed.    The right ventricular cavity is moderately dilated. Right ventricular   wall thickness is consistent with moderate hypertrophy. Mildly reduced   right ventricular systolic function noted.    The left atrial cavity is mildly dilated. No evidence of a left atrial   thrombus present. There is no spontaneous echo contrast present. Left   atrial appendage was found to be singularly lobar in nature. The left   atrial appendage was visualized through multiple planes. No evidence of a   left atrial appendage thrombus was present. Interatrial septal aneurysm   present. Lipomatous hypertrophy of the interatrial septum present. Small   patent foramen ovale present with bi-directional shunting. Systolic flow   reversal in the pulmonary vein consistent with significant mitral   regurgitation.    Moderate mitral annular calcification is present. Severe mitral valve   regurgitation is present. No significant mitral valve stenosis is present.   There is a large perforation of P1. P2 and P3 appear diminuitive in 3D   imaging but appear to be mostly intact in 2D en face imaging.    There is moderate, (grade  3) plaque in the aortic arch present. There   is mild, (grade 2) plaque in the descending aorta present. The inferior   vena cava is dilated. Partial IVC inspiratory collapse of less than 50%   noted. The main pulmonary artery is mildly dilated.         Palliative Care Assessment and Recommendations  Prognosis and Palliative Performance Scale:  Performance 30% based on the following measures: Ambulation: Totally bed bound, Activity and Evidence of Disease: Unable to do any work, extensive evidence of disease, Self-Care: Total care required,  Intake: Reduced, LOC: Full, drowsy or confusion  Disease State: Deteriorating despite treatments  Goals of Care Treatment Preferences   Palliative Care Decision Making Capacity: intact  Healthcare Surrogate: Yes- name(s)- sister Katherine and brother Adilson  What is Most important to patient/family at this time: being able to have the clip procedure  Code Status FULL   Advance Directives Present or Completed: LW on file   Follow up: will follow up outpatient for support and continued goals of care discussion  Discussed plan with: MD, RN, patient, and family      I spent a total of 50 minutes today caring for patient including reviewing records, speaking with patient/family and collaborating with care team.       Thank you for consulting palliative care. If you need to reach the provider on call for the palliative care team please call [phone]    Rose Pereyra, LCUÍA  7/23/2025 13:07 EDT

## 2025-07-23 NOTE — PROGRESS NOTES
Consult Daily Progress Note  [facility]   07/23/25      Patient Name:  Emely Solomon  MRN:  [id number]   YOB: 1960  Age: 64 y.o.  Sex: female  LOS: 7    Reason for Consult:  Respiratory failure    Interval History:  No acute events overnight  Net -1.3 L over the past 24 hours  Metabolic alkalosis improved  Resting in bed comfortably  No chest pain or palpitations  No shortness of breath at rest  No nausea or vomiting  No family at bedside    Physical Exam:  Vitals:    07/23/25 1306   BP: 111/73   Pulse: 82   Resp: 18   Temp: 97.6 °F (36.4 °C)   SpO2: (!) 88%       Intake/Output    Intake/Output Summary (Last 24 hours) at 7/23/2025 1406  Last data filed at 7/23/2025 1306  Gross per 24 hour   Intake 642 ml   Output 1950 ml   Net -1308 ml     General: Alert, nontoxic, NAD  HEENT: NC/AT, EOMI, MMM  Neck: Supple, trachea midline  Cardiac: RRR, no murmur, gallops, rubs  Pulmonary: Decreased breath sounds at bases  GI: Soft, non-tender, non-distended, normal bowel sounds  Extremities: Warm, well perfused, 1+ LE edema, lymphedema  Skin: no visible rash  Neuro: CN II - XII grossly intact  Psychiatry: Normal mood and affect      Data Review:  Results from last 7 days   Lab Units 07/23/25  0304 07/22/25  0321 07/21/25  0346 07/20/25  0353 07/19/25  0753 07/19/25  0303 07/19/25  0014 07/18/25  0828 07/18/25  0308 07/17/25  1452 07/17/25  0357   WBC 10*3/mm3 6.03 6.63 4.52 4.58  --  4.76  --   --  6.90  --  4.99   HEMOGLOBIN g/dL 10.8* 11.0* 10.1* 8.4* 8.0* 7.8* 8.2*   < > 7.8*   < > 7.3*   PLATELETS 10*3/mm3 376 418 421 402  --  344  --   --  366  --  340    < > = values in this interval not displayed.     Results from last 7 days   Lab Units 07/23/25  0304 07/22/25  1442 07/22/25  0321 07/21/25  0347 07/20/25  1429 07/20/25  0353 07/19/25  1807 07/19/25  0303 07/18/25  0308 07/17/25  0357   SODIUM mmol/L 138  --  136 139  --  140  --  139 141 137   POTASSIUM mmol/L 3.7 3.5 3.3* 3.7 4.3 3.6 3.5  3.0* 3.9 4.5   CHLORIDE mmol/L 93*  --  90* 90*  --  90*  --  96* 104 103   CO2 mmol/L 32.0*  --  36.0* 38.6*  --  39.0*  --  32.0* 26.1 25.0   BUN mg/dL 33.0*  --  25.0* 19.0  --  16.0  --  21.0 24.0* 24.0*   CREATININE mg/dL 1.29*  --  1.37* 1.12*  --  1.04*  --  1.14* 1.21* 1.27*   GLUCOSE mg/dL 100*  --  109* 91  --  87  --  92 95 97   CALCIUM mg/dL 9.6  --  9.4 9.6  --  8.8  --  8.4* 9.0 9.0   MAGNESIUM mg/dL 2.3  --  2.1 1.7  --  1.6  --  1.7 2.0 2.3   PHOSPHORUS mg/dL 3.2  --  4.0 4.0  --  2.8  --  3.1 3.8 4.2   Estimated Creatinine Clearance: 53.7 mL/min (A) (by C-G formula based on SCr of 1.29 mg/dL (H)).    Results from last 7 days   Lab Units 07/23/25  0304 07/22/25  0321 07/21/25  0346   PLATELETS 10*3/mm3 376 418 421       Results from last 7 days   Lab Units 07/18/25  1143 07/18/25  0647 07/18/25  0355 07/17/25  2107   PH, ARTERIAL pH units 7.386 7.334* 7.318* 7.299*   PCO2, ARTERIAL mm Hg 60.4* 65.5* 63.3* 61.0*   PO2 ART mm Hg 100.8* 115.5* 81.8 76.8*   HCO3 ART mmol/L 36.2* 34.9* 32.4* 30.0*       Result Review:  I have personally reviewed the results from the time of this admission to 7/23/2025 14:06 EDT and agree with these findings:  [x]  Laboratory list / accordion  [x]  Microbiology  [x]  Radiology  []  EKG/Telemetry   [x]  Cardiology/Vascular   []  Pathology  [x]  Old records  []  Other:    ASSESSMENT  /  PLAN:    Acute on chronic HFpEF  Pulmonary edema, acute  Severe pulmonary hypertension, RVSP>55, probably group 2 and possibly group 3.  Bilateral lower lobe atelectasis  Acute on chronic hypercapnic respiratory failure  Acute hypoxic respiratory failure, secondary to the above  Severe MR  ARIEL, on auto CPAP 15-20  History of DVT/PE  History of mitral valve endocarditis  Chronic lymphedema    -Patient presented and found to have CHF exacerbation with pulmonary edema, severe pulmonary hypertension, acute on chronic respiratory failure  - Diuresis held yesterday, restarted at 60 mg p.o. daily  -  Significant improvement in metabolic alkalosis after Diamox  - NIPPV: settings reviewed and adjusted.  Use NIPPV at night and as needed during the day.  Eventually she may require outpatient NIPPV instead of her home CPAP due to significant hypercapnia.  Will reassess prior to DC.  -Continue bronchodilator therapy with DuoNebs  -Xarelto for history of DVT/PE  - Continue supplemental oxygen, SpO2 goal greater than 90%.  - LYNETTE performed to evaluate mitral valve with recent endocarditis and severe MR, unclear if she would tolerate a MitraClip and is being evaluated by Dr. Jimenez with structural heart.  - Guarded prognosis due to the severity of her underlying cardiac disease and pulmonary hypertension.  Agree with palliative consult.      Thank you for allowing us to participate in this patients care. Pulmonary will continue to follow.     Shady Blanco MD  [facility]  Pulmonary and Critical Care Medicine, Interventional Pulmonology    Parts of this note may be an electronic transcription/translation of spoken language to printed text using the Dragon dictation system.

## 2025-07-23 NOTE — PLAN OF CARE
Goal Outcome Evaluation:  Plan of Care Reviewed With: patient        Progress: improving  Outcome Evaluation: Patient seen for OT/PT co-treat this pm.  Patient resting in bed upon arrival.  Patient agreeable to OOB activities.  Patient transitioned to EOB with Min A x2.  Once stabilized, CGA for sitting balance.  Patient with mild c/o dizziness and weakness with EOB sitting.  x2 STS from EOB with Max A x2 and HHA x2.  Cuing for upright posture and locking BLE knees out for stabilization.  Patient returned to supine with Mod A x2.  Cont to recommend SNF for d/c placement. Cont OT as tolerated.    Anticipated Discharge Disposition (OT): skilled nursing facility

## 2025-07-23 NOTE — PLAN OF CARE
Goal Outcome Evaluation:  Plan of Care Reviewed With: patient        Progress: improving  Outcome Evaluation: Pt agreed to PT/OT session, pt dominique STS x2, MAX2, first attempt successful, pt had R knee issue /wkness 2nd attempt , slight dizziness limiting, may trial [id number]-[id number] next session, returned to bed MOD2, pt able to use rails to assist, plans for SNU at [place]    Anticipated Discharge Disposition (PT): skilled nursing facility

## 2025-07-23 NOTE — PROGRESS NOTES
Name: Emely Solomon ADMIT: 2025   : 1960  PCP: Adilson Wilkerson MD    MRN: [id number] LOS: 7 days   AGE/SEX: 64 y.o. female  ROOM: [facility]/     Subjective   Subjective   Patient resting in bed.  Ate all her lunch.  No nausea or vomiting.  No chest pain or palpitations.  Afebrile.    Review of Systems  As above     Objective   Objective   Vital Signs  Temp:  [97.6 °F (36.4 °C)-98.1 °F (36.7 °C)] 97.6 °F (36.4 °C)  Heart Rate:  [71-89] 82  Resp:  [18-20] 18  BP: ()/(64-86) 111/73  SpO2:  [88 %-100 %] 88 %  on  Flow (L/min) (Oxygen Therapy):  [2] 2;   Device (Oxygen Therapy): room air  Body mass index is 45.89 kg/m².  Physical Exam  Constitutional:       General: She is not in acute distress.     Appearance: She is obese. She is not ill-appearing.   Cardiovascular:      Rate and Rhythm: Normal rate and regular rhythm.   Pulmonary:      Effort: Pulmonary effort is normal. No respiratory distress.   Abdominal:      General: Abdomen is flat. There is no distension.      Tenderness: There is no abdominal tenderness.   Musculoskeletal:         General: No swelling or deformity. Normal range of motion.      Right lower leg: Edema present.      Left lower leg: Edema present.   Skin:     General: Skin is warm and dry.   Neurological:      General: No focal deficit present.      Mental Status: She is alert. Mental status is at baseline.         Results Review     I reviewed the patient's new clinical results.  Results from last 7 days   Lab Units 25  0304 25  0321 25  0346 25  0353   WBC 10*3/mm3 6.03 6.63 4.52 4.58   HEMOGLOBIN g/dL 10.8* 11.0* 10.1* 8.4*   PLATELETS 10*3/mm3 376 418 421 402     Results from last 7 days   Lab Units 25  0304 25  1442 25  0321 25  0347 25  1429 25  0353   SODIUM mmol/L 138  --  136 139  --  140   POTASSIUM mmol/L 3.7 3.5 3.3* 3.7   < > 3.6   CHLORIDE mmol/L 93*  --  90* 90*  --  90*   CO2 mmol/L 32.0*  --   "36.0* 38.6*  --  39.0*   BUN mg/dL 33.0*  --  25.0* 19.0  --  16.0   CREATININE mg/dL 1.29*  --  1.37* 1.12*  --  1.04*   GLUCOSE mg/dL 100*  --  109* 91  --  87    < > = values in this interval not displayed.   Estimated Creatinine Clearance: 53.7 mL/min (A) (by C-G formula based on SCr of 1.29 mg/dL (H)).        Results from last 7 days   Lab Units 07/23/25  0304 07/22/25  0321 07/21/25  0347 07/20/25  0353   CALCIUM mg/dL 9.6 9.4 9.6 8.8   MAGNESIUM mg/dL 2.3 2.1 1.7 1.6   PHOSPHORUS mg/dL 3.2 4.0 4.0 2.8           COVID19   Date Value Ref Range Status   05/04/2025 Not Detected Not Detected - Ref. Range Final   03/07/2022 Not Detected Not Detected - Ref. Range Final     No results found for: \"HGBA1C\", \"POCGLU\"      Adult Transesophageal Echo (LYNETTE) W/ Cont if Necessary Per Protocol  Addendum:   Left ventricular systolic function is normal. Left ventricular ejection  fraction appears to be 56 - 60%. Normal left ventricular cavity size  noted. Left ventricular wall thickness is consistent with mild concentric  hypertrophy. All left ventricular wall segments contract normally. Left  ventricular diastolic function was not assessed.     The right ventricular cavity is moderately dilated. Right ventricular  wall thickness is consistent with moderate hypertrophy. Mildly reduced  right ventricular systolic function noted.     The left atrial cavity is mildly dilated. No evidence of a left atrial  thrombus present. There is no spontaneous echo contrast present. Left  atrial appendage was found to be singularly lobar in nature. The left  atrial appendage was visualized through multiple planes. No evidence of a  left atrial appendage thrombus was present. Interatrial septal aneurysm  present. Lipomatous hypertrophy of the interatrial septum present. Small  patent foramen ovale present with bi-directional shunting. Systolic flow  reversal in the pulmonary vein consistent with significant mitral  regurgitation.     Moderate " mitral annular calcification is present. Severe mitral valve  regurgitation is present. No significant mitral valve stenosis is present.  There is a large perforation of P1. P2 and P3 appear diminuitive in 3D  imaging but appear to be mostly intact in 2D en face imaging.     There is moderate, (grade 3) plaque in the aortic arch present. There  is mild, (grade 2) plaque in the descending aorta present. The inferior  vena cava is dilated. Partial IVC inspiratory collapse of less than 50%  noted. The main pulmonary artery is mildly dilated.     The SVC has extensive thrombus noted; it is unclear if this is chronic  or acute.  Narrative:   Left ventricular systolic function is normal. Left ventricular ejection   fraction appears to be 56 - 60%. Normal left ventricular cavity size   noted. Left ventricular wall thickness is consistent with mild concentric   hypertrophy. All left ventricular wall segments contract normally. Left   ventricular diastolic function was not assessed.    The right ventricular cavity is moderately dilated. Right ventricular   wall thickness is consistent with moderate hypertrophy. Mildly reduced   right ventricular systolic function noted.    The left atrial cavity is mildly dilated. No evidence of a left atrial   thrombus present. There is no spontaneous echo contrast present. Left   atrial appendage was found to be singularly lobar in nature. The left   atrial appendage was visualized through multiple planes. No evidence of a   left atrial appendage thrombus was present. Interatrial septal aneurysm   present. Lipomatous hypertrophy of the interatrial septum present. Small   patent foramen ovale present with bi-directional shunting. Systolic flow   reversal in the pulmonary vein consistent with significant mitral   regurgitation.    Moderate mitral annular calcification is present. Severe mitral valve   regurgitation is present. No significant mitral valve stenosis is present.   There is a  large perforation of P1. P2 and P3 appear diminuitive in 3D   imaging but appear to be mostly intact in 2D en face imaging.    There is moderate, (grade 3) plaque in the aortic arch present. There   is mild, (grade 2) plaque in the descending aorta present. The inferior   vena cava is dilated. Partial IVC inspiratory collapse of less than 50%   noted. The main pulmonary artery is mildly dilated.    I reviewed the patient's daily medications.  Scheduled Medications  furosemide, 60 mg, Oral, Daily  hydrocortisone-bacitracin-zinc oxide-nystatin, 1 Application, Topical, TID  ipratropium-albuterol, 3 mL, Nebulization, 4x Daily - RT  Lidocaine, 1 patch, Transdermal, Q24H  medroxyPROGESTERone, 10 mg, Oral, Daily  metoprolol succinate XL, 12.5 mg, Oral, Daily  miconazole, 1 Application, Topical, Q12H  rivaroxaban, 20 mg, Oral, Daily With Dinner  sodium chloride, 10 mL, Intravenous, Q12H    Infusions   Diet  Diet: Cardiac; Healthy Heart (2-3 Na+); Fluid Consistency: Thin (IDDSI 0)         I have personally reviewed:  [x]  Laboratory   []  Microbiology   [x]  Radiology   []  EKG/Telemetry   [x]  Cardiology/Vascular   []  Pathology   [x]  Records     Assessment/Plan     Active Hospital Problems    Diagnosis  POA    **Syncope and collapse [R55]  Yes    Lipoedema [R60.9]  Unknown    Acute on chronic respiratory failure with hypoxia [J96.21]  Unknown    History of bacterial endocarditis [Z86.79]  Not Applicable    Severe mitral regurgitation [I34.0]  Unknown    Acute on chronic heart failure with preserved ejection fraction (HFpEF) [I50.33]  Yes    History of pulmonary embolism [Z86.711]  Yes    Morbid obesity [E66.01]  Yes    Post-menopausal bleeding [N95.0]  Yes    Iron deficiency [E61.1]  Yes    History of DVT (deep vein thrombosis) [Z86.718]  Not Applicable    Lymphedema [I89.0]  Yes    Anemia, chronic disease [D63.8]  Yes    ARIEL on CPAP [G47.33]  Yes    Essential hypertension [I10]  Yes    Pulmonary hypertension [I27.20]   Yes    PFO (patent foramen ovale) [Q21.12]  Not Applicable    Paradoxical embolism [I74.9]  Yes      Resolved Hospital Problems    Diagnosis Date Resolved POA    Chronic diastolic CHF (congestive heart failure) [I50.32] 07/21/2025 Yes       64 y.o. female admitted with Syncope and collapse.    Syncope  Severe MR now (was mild on 5/26/25 echo)  History of mitral valve endocarditis (antibiotics completed 6/17/2025)  Acute hypoxic respiratory failure  CHF  Severe pulmonary hypertension  Not a good surgical candidate per cardiac surgery  BCx felt to be contaminant per ID worsening mitral valve regurgitation due to sequelae of endocarditis rather than active infection  Now on oral diuretics.  LYNETTE yesterday with large perforation of P1 .  Discussed with cardiology and plan discussed with heart structural cardiology about possible candidacy of MitraClip     History of DVT/PE  SVC extensive thrombosis   xarelto for anticoagulation held d/t vaginal bleeding on admission (has intermittent bleeding). 2015 she developed an extensive DVT and PE, and then embolized through her PFO to the left leg arteries and required surgery. Continuing anticoagulation.      Postmenopausal bleeding  Blood loss anemia  Elevated   Has GYN/oncology procedure scheduled outpatient  Monitor for blood loss anemia may need transfusion      Right plantar heel blister  X-ray nothing to suggest osteomyelitis.  Status post excision by podiatry-serous fluid. No further intervention needed. Wound care     ARIEL  Chronic lymphedema  Immobility chronic    Xarelto (home med) for DVT prophylaxis.  Full code  Discussed with patient and nursing staff.  Anticipate discharge to SNU facility in 2-3 days.    Expected Discharge Date: 7/24/2025; Expected Discharge Time:       Alonzo Bunch MD  [facility]  07/23/25  13:56 EDT

## 2025-07-23 NOTE — THERAPY TREATMENT NOTE
Patient Name: Emely Solomon  : 1960    MRN: [id number]                              Today's Date: 2025       Admit Date: 2025    Visit Dx:     ICD-10-CM ICD-9-CM   1. Syncope and collapse  R55 780.2   2. Acute on chronic congestive heart failure, unspecified heart failure type  I50.9 428.0   3. Severe mitral regurgitation  I34.0 424.0   4. Chronic anemia  D64.9 285.9   5. STEFANIA (acute kidney injury)  N17.9 584.9     Patient Active Problem List   Diagnosis    PFO (patent foramen ovale)    Paradoxical embolism    Essential hypertension    Pulmonary hypertension    Bacteremia due to group B Streptococcus    ARIEL on CPAP    History of DVT (deep vein thrombosis)    Lymphedema    Anemia, chronic disease    Iron deficiency    Post-menopausal bleeding    Thickened endometrium    Generalized muscle weakness    Right bundle branch block (RBBB) with left anterior fascicular block (LAFB)    Iliac vein stenosis, right    History of pulmonary embolism    Morbid obesity    Acute on chronic heart failure with preserved ejection fraction (HFpEF)    Chronic respiratory failure with hypoxia    Syncope and collapse    Acute on chronic respiratory failure with hypoxia    History of bacterial endocarditis    Severe mitral regurgitation    Lipoedema     Past Medical History:   Diagnosis Date    Arthritis     Cellulitis     2017, with Group B Strep bacteremia and sepsis    Chronic deep vein thrombosis (DVT) of left popliteal vein 2015, 2016    Chronic diastolic congestive heart failure     COVID-19 virus infection 2020    Heart murmur     Hypertension     Iliac vein stenosis, right 05/15/2024    Lipoedema     Lymphedema     other    Morbid obesity     ARIEL on CPAP     PFO (patent foramen ovale)     Postthrombotic syndrome of left lower extremity without complications 2015    postphletibis    Pulmonary embolism 2016    Pulmonary hypertension     multifactorial (dCHF, obesity/ARIEL,  hx PE), mild by echo 1/2016    Right bundle branch block (RBBB) with left anterior fascicular block (LAFB)     Sepsis 09/18/2020    Type 2 myocardial infarction 05/20/2025     Past Surgical History:   Procedure Laterality Date    ARTERIOGRAM  07/2015    BRONCHOSCOPY N/A 07/21/2017    Procedure: BRONCHOSCOPY with wash;  Surgeon: Caleb Garcia MD;  Location: [facility] ENDOSCOPY;  Service:     COLONOSCOPY      COLONOSCOPY N/A 01/26/2023    Procedure: COLONOSCOPY to CECUM AND TERM ILEUM;  Surgeon: Jj Dean MD;  Location: [facility] ENDOSCOPY;  Service: Gastroenterology;  Laterality: N/A;  PRE OP -screening  POST OP -  NORMAL    D & C HYSTEROSCOPY N/A 03/08/2022    Procedure: DILATATION AND CURETTAGE with hysteroscopy;  Surgeon: Kaylah Land DO;  Location: [facility] OR;  Service: Gynecology Oncology;  Laterality: N/A;    DILATATION AND CURETTAGE  04/11/2011    OTHER SURGICAL HISTORY  09/2015    IVC filter    OTHER SURGICAL HISTORY      left LE revascularization    OTHER SURGICAL HISTORY      ALESSIA and LEV scan    THROMBECTOMY  07/2015      General Information       Row Name 07/23/25 1849          Physical Therapy Time and Intention    Document Type therapy note (daily note)  -     Mode of Treatment occupational therapy;physical therapy;co-treatment  -       Row Name 07/23/25 1849          General Information    Patient Profile Reviewed yes  -       Row Name 07/23/25 1849          Cognition    Orientation Status (Cognition) oriented x 4  -       Row Name 07/23/25 1849          Safety Issues/Impairments Affecting Functional Mobility    Impairments Affecting Function (Mobility) balance;coordination;endurance/activity tolerance;shortness of breath;strength  -     Comment, Safety Issues/Impairments (Mobility) PT/OT cotreatment appropriate due to pt acuity level and to maxmize therapeutic benefit and for safety of patient and staff  -               User Key  (r) = Recorded By, (t) = Taken By, (c) =  Cosigned By      Initials Name Provider Type    Raquel Matt PTA Physical Therapist Assistant                   Mobility       Row Name 07/23/25 1849          Bed Mobility    Supine-Sit [place] (Bed Mobility) minimum assist (75% patient effort);2 person assist  -     Sit-Supine [place] (Bed Mobility) minimum assist (75% patient effort);2 person assist  -JM     Assistive Device (Bed Mobility) bed rails;head of bed elevated  -       Row Name 07/23/25 1849          Sit-Stand Transfer    Sit-Stand [place] (Transfers) maximum assist (25% patient effort);2 person assist  -JM     Comment, (Sit-Stand Transfer) perf x2, 2nd attempt unsuccessful, R knee incr wkness limiting  -               User Key  (r) = Recorded By, (t) = Taken By, (c) = Cosigned By      Initials Name Provider Type    Raquel Matt PTA Physical Therapist Assistant                   Obj/Interventions       Row Name 07/23/25 1850          Motor Skills    Therapeutic Exercise --  APs, LAQs x10-12 reps  -               User Key  (r) = Recorded By, (t) = Taken By, (c) = Cosigned By      Initials Name Provider Type    Raquel Matt PTA Physical Therapist Assistant                   Goals/Plan    No documentation.                  Clinical Impression       Row Name 07/23/25 1851          Pain    Pretreatment Pain Rating 0/10 - no pain  -     Posttreatment Pain Rating 0/10 - no pain  -       Row Name 07/23/25 1851          Plan of Care Review    Plan of Care Reviewed With patient  -     Progress improving  -     Outcome Evaluation Pt agreed to PT/OT session, pt dominique STS x2, MAX2, first attempt successful, pt had R knee issue /wkness 2nd attempt , slight dizziness limiting, may trial marcus-rwx next session, returned to bed MOD2, pt able to use rails to assist, plans for SNU at [facility]       Row Name 07/23/25 1851          Therapy Assessment/Plan (PT)    Rehab Potential (PT) fair  -     Criteria for Skilled  Interventions Met (PT) yes  -       Row Name 07/23/25 1851          Vital Signs    O2 Delivery Pre Treatment room air  -       Row Name 07/23/25 1851          Positioning and Restraints    Pre-Treatment Position in bed  -     Post Treatment Position bed  -     In Bed fowlers;call light within reach;encouraged to call for assist;exit alarm on;notified ns  -               User Key  (r) = Recorded By, (t) = Taken By, (c) = Cosigned By      Initials Name Provider Type    Raquel Matt PTA Physical Therapist Assistant                   Outcome Measures       Row Name 07/23/25 1853 07/23/25 0916       How much help from another person do you currently need...    Turning from your back to your side while in flat bed without using bedrails? 3  - 3  -DS    Moving from lying on back to sitting on the side of a flat bed without bedrails? 2  - 3  -DS    Moving to and from a bed to a chair (including a wheelchair)? 2  - 2  -DS    Standing up from a chair using your arms (e.g., wheelchair, bedside chair)? 2  - 2  -DS    Climbing 3-5 steps with a railing? 1  - 1  -DS    To walk in hospital room? 1  - 2  -DS    AM-PAC 6 Clicks Score (PT) 11  - 13  -DS    Highest Level of Mobility Goal Move to Chair/Commode-4  -JM Move to Chair/Commode-4  -DS      Row Name 07/23/25 1541          Modified [place] Scale    Modified [place] Scale 4 - Moderately severe disability.  Unable to walk without assistance, and unable to attend to own bodily needs without assistance.  -       Row Name 07/23/25 1541          Functional Assessment    Outcome Measure Options AM-PAC 6 Clicks Daily Activity (OT);Modified [place]  -               User Key  (r) = Recorded By, (t) = Taken By, (c) = Cosigned By      Initials Name Provider Type    Raquel Matt PTA Physical Therapist Assistant    Latoya Alicea, RN Registered Nurse    Jack Perez, OT Occupational Therapist                                 Physical Therapy  Education       Title: PT OT SLP Therapies (Done)       Topic: Physical Therapy (Done)       Point: Mobility training (Done)       Learning Progress Summary            Patient Acceptance, E,TB,D, VU,NR by  at 7/23/2025 1854    Acceptance, E,TB,D, VU,NR by  at 7/19/2025 1545    Acceptance, E,D, VU,NR by MS at 7/18/2025 1535                      Point: Home exercise program (Done)       Learning Progress Summary            Patient Acceptance, E,TB,D, VU,NR by  at 7/23/2025 1854    Acceptance, E,TB,D, VU,NR by  at 7/19/2025 1545                      Point: Body mechanics (Done)       Learning Progress Summary            Patient Acceptance, E,TB,D, VU,NR by  at 7/23/2025 1854    Acceptance, E,TB,D, VU,NR by  at 7/19/2025 1545    Acceptance, E,D, VU,NR by MS at 7/18/2025 1535                      Point: Precautions (Done)       Learning Progress Summary            Patient Acceptance, E,TB,D, VU,NR by  at 7/23/2025 1854    Acceptance, E,TB,D, VU,NR by  at 7/19/2025 1545    Acceptance, E,D, VU,NR by MS at 7/18/2025 1535                                      User Key       Initials Effective Dates Name Provider Type Discipline     03/07/18 -  Raquel Todd, AZEB Physical Therapist Assistant PT    MS 06/16/21 -  Juan Weeks PT Physical Therapist PT     03/07/18 -  Davida Todd PTA Physical Therapist Assistant PT                  PT Recommendation and Plan     Progress: improving  Outcome Evaluation: Pt agreed to PT/OT session, pt dominique STS x2, MAX2, first attempt successful, pt had R knee issue /wkness 2nd attempt , slight dizziness limiting, may trial marcus-rwx next session, returned to bed MOD2, pt able to use rails to assist, plans for SNU at [place]     Time Calculation:         PT Charges       Row Name 07/23/25 1848             Time Calculation    Start Time 1338  -      Stop Time 1403  -      Time Calculation (min) 25 min  -      PT Received On 07/23/25  -      PT - Next Appointment  07/24/25  -PINA                User Key  (r) = Recorded By, (t) = Taken By, (c) = Cosigned By      Initials Name Provider Type    Raquel Matt PTA Physical Therapist Assistant                  Therapy Charges for Today       Code Description Service Date Service Provider Modifiers Qty    68989703960  PT THERAPEUTIC ACT EA 15 MIN 7/23/2025 Raquel Todd PTA GP 1    32863311413 HC PT THER PROC EA 15 MIN 7/23/2025 Raquel Todd PTA GP 1            PT G-Codes  Outcome Measure Options: AM-PAC 6 Clicks Daily Activity (OT), Modified [place]  AM-PAC 6 Clicks Score (PT): 11  AM-PAC 6 Clicks Score (OT): 14  Modified [place] Scale: 4 - Moderately severe disability.  Unable to walk without assistance, and unable to attend to own bodily needs without assistance.  PT Discharge Summary  Anticipated Discharge Disposition (PT): skilled nursing facility    Raquel Todd PTA  7/23/2025

## 2025-07-24 LAB
ALBUMIN SERPL-MCNC: 3.6 G/DL (ref 3.5–5.2)
ALBUMIN/GLOB SERPL: 0.8 G/DL
ALP SERPL-CCNC: 96 U/L (ref 39–117)
ALT SERPL W P-5'-P-CCNC: 44 U/L (ref 1–33)
ANION GAP SERPL CALCULATED.3IONS-SCNC: 11.7 MMOL/L (ref 5–15)
ANION GAP SERPL CALCULATED.3IONS-SCNC: 18.7 MMOL/L (ref 5–15)
AST SERPL-CCNC: 20 U/L (ref 1–32)
BILIRUB SERPL-MCNC: 0.5 MG/DL (ref 0–1.2)
BUN SERPL-MCNC: 34 MG/DL (ref 8–23)
BUN SERPL-MCNC: 35 MG/DL (ref 8–23)
BUN/CREAT SERPL: 28.3 (ref 7–25)
BUN/CREAT SERPL: 29.7 (ref 7–25)
CALCIUM SPEC-SCNC: 9.4 MG/DL (ref 8.6–10.5)
CALCIUM SPEC-SCNC: 9.4 MG/DL (ref 8.6–10.5)
CHLORIDE SERPL-SCNC: 95 MMOL/L (ref 98–107)
CHLORIDE SERPL-SCNC: 96 MMOL/L (ref 98–107)
CO2 SERPL-SCNC: 24.3 MMOL/L (ref 22–29)
CO2 SERPL-SCNC: 29.3 MMOL/L (ref 22–29)
CREAT SERPL-MCNC: 1.18 MG/DL (ref 0.57–1)
CREAT SERPL-MCNC: 1.2 MG/DL (ref 0.57–1)
DEPRECATED RDW RBC AUTO: 49.2 FL (ref 37–54)
EGFRCR SERPLBLD CKD-EPI 2021: 50.3 ML/MIN/1.73
EGFRCR SERPLBLD CKD-EPI 2021: 51.4 ML/MIN/1.73
ERYTHROCYTE [DISTWIDTH] IN BLOOD BY AUTOMATED COUNT: 15.8 % (ref 12.3–15.4)
GLOBULIN UR ELPH-MCNC: 4.8 GM/DL
GLUCOSE SERPL-MCNC: 82 MG/DL (ref 65–99)
GLUCOSE SERPL-MCNC: 89 MG/DL (ref 65–99)
HCT VFR BLD AUTO: 36 % (ref 34–46.6)
HGB BLD-MCNC: 11 G/DL (ref 12–15.9)
MAGNESIUM SERPL-MCNC: 2.6 MG/DL (ref 1.6–2.4)
MCH RBC QN AUTO: 26.4 PG (ref 26.6–33)
MCHC RBC AUTO-ENTMCNC: 30.6 G/DL (ref 31.5–35.7)
MCV RBC AUTO: 86.5 FL (ref 79–97)
PHOSPHATE SERPL-MCNC: 3.7 MG/DL (ref 2.5–4.5)
PLATELET # BLD AUTO: 392 10*3/MM3 (ref 140–450)
PMV BLD AUTO: 8 FL (ref 6–12)
POTASSIUM SERPL-SCNC: 3.4 MMOL/L (ref 3.5–5.2)
POTASSIUM SERPL-SCNC: 3.5 MMOL/L (ref 3.5–5.2)
POTASSIUM SERPL-SCNC: 3.7 MMOL/L (ref 3.5–5.2)
PROT SERPL-MCNC: 8.4 G/DL (ref 6–8.5)
RBC # BLD AUTO: 4.16 10*6/MM3 (ref 3.77–5.28)
SODIUM SERPL-SCNC: 137 MMOL/L (ref 136–145)
SODIUM SERPL-SCNC: 138 MMOL/L (ref 136–145)
WBC NRBC COR # BLD AUTO: 5.35 10*3/MM3 (ref 3.4–10.8)

## 2025-07-24 PROCEDURE — 94799 UNLISTED PULMONARY SVC/PX: CPT

## 2025-07-24 PROCEDURE — 84132 ASSAY OF SERUM POTASSIUM: CPT | Performed by: HOSPITALIST

## 2025-07-24 PROCEDURE — 94664 DEMO&/EVAL PT USE INHALER: CPT

## 2025-07-24 PROCEDURE — 36415 COLL VENOUS BLD VENIPUNCTURE: CPT | Performed by: HOSPITALIST

## 2025-07-24 PROCEDURE — 84100 ASSAY OF PHOSPHORUS: CPT | Performed by: HOSPITALIST

## 2025-07-24 PROCEDURE — 85027 COMPLETE CBC AUTOMATED: CPT | Performed by: HOSPITALIST

## 2025-07-24 PROCEDURE — 99232 SBSQ HOSP IP/OBS MODERATE 35: CPT | Performed by: NURSE PRACTITIONER

## 2025-07-24 PROCEDURE — 83735 ASSAY OF MAGNESIUM: CPT | Performed by: HOSPITALIST

## 2025-07-24 PROCEDURE — 99222 1ST HOSP IP/OBS MODERATE 55: CPT | Performed by: INTERNAL MEDICINE

## 2025-07-24 PROCEDURE — 80053 COMPREHEN METABOLIC PANEL: CPT | Performed by: HOSPITALIST

## 2025-07-24 PROCEDURE — 29581 APPL MULTLAYER CMPRN SYS LEG: CPT

## 2025-07-24 RX ORDER — POTASSIUM CHLORIDE 1500 MG/1
40 TABLET, EXTENDED RELEASE ORAL EVERY 4 HOURS
Status: COMPLETED | OUTPATIENT
Start: 2025-07-24 | End: 2025-07-24

## 2025-07-24 RX ADMIN — ANTI-FUNGAL POWDER MICONAZOLE NITRATE TALC FREE 1 APPLICATION: 1.42 POWDER TOPICAL at 21:00

## 2025-07-24 RX ADMIN — NYSTATIN 1 APPLICATION: 100000 OINTMENT TOPICAL at 08:39

## 2025-07-24 RX ADMIN — Medication 10 ML: at 21:00

## 2025-07-24 RX ADMIN — RIVAROXABAN 20 MG: 20 TABLET, FILM COATED ORAL at 16:46

## 2025-07-24 RX ADMIN — METOPROLOL SUCCINATE 12.5 MG: 25 TABLET, EXTENDED RELEASE ORAL at 08:39

## 2025-07-24 RX ADMIN — NYSTATIN 1 APPLICATION: 100000 OINTMENT TOPICAL at 21:00

## 2025-07-24 RX ADMIN — Medication 10 ML: at 08:40

## 2025-07-24 RX ADMIN — IPRATROPIUM BROMIDE AND ALBUTEROL SULFATE 3 ML: .5; 3 SOLUTION RESPIRATORY (INHALATION) at 11:32

## 2025-07-24 RX ADMIN — MEDROXYPROGESTERONE ACETATE 10 MG: 10 TABLET ORAL at 08:39

## 2025-07-24 RX ADMIN — FUROSEMIDE 60 MG: 20 TABLET ORAL at 08:39

## 2025-07-24 RX ADMIN — POTASSIUM CHLORIDE 40 MEQ: 1500 TABLET, EXTENDED RELEASE ORAL at 09:58

## 2025-07-24 RX ADMIN — POTASSIUM CHLORIDE 40 MEQ: 1500 TABLET, EXTENDED RELEASE ORAL at 06:44

## 2025-07-24 RX ADMIN — IPRATROPIUM BROMIDE AND ALBUTEROL SULFATE 3 ML: .5; 3 SOLUTION RESPIRATORY (INHALATION) at 08:18

## 2025-07-24 RX ADMIN — NYSTATIN 1 APPLICATION: 100000 OINTMENT TOPICAL at 16:47

## 2025-07-24 RX ADMIN — IPRATROPIUM BROMIDE AND ALBUTEROL SULFATE 3 ML: .5; 3 SOLUTION RESPIRATORY (INHALATION) at 19:44

## 2025-07-24 RX ADMIN — ANTI-FUNGAL POWDER MICONAZOLE NITRATE TALC FREE 1 APPLICATION: 1.42 POWDER TOPICAL at 08:39

## 2025-07-24 RX ADMIN — IPRATROPIUM BROMIDE AND ALBUTEROL SULFATE 3 ML: .5; 3 SOLUTION RESPIRATORY (INHALATION) at 15:34

## 2025-07-24 NOTE — PROGRESS NOTES
Name: Emely Solomon ADMIT: 2025   : 1960  PCP: Adilson Wilkerson MD    MRN: [id number] LOS: 8 days   AGE/SEX: 64 y.o. female  ROOM: [facility]     Subjective   Subjective   Feeling okay today. Tired. No N/V/D/abd pain. Tolerating diet. Voiding well. No F/C/NS. No SOA or CP or palp.       Objective   Objective   Vital Signs  Temp:  [97.5 °F (36.4 °C)-98.7 °F (37.1 °C)] 97.5 °F (36.4 °C)  Heart Rate:  [72-87] 78  Resp:  [18-20] 18  BP: (111-131)/(72-85) 131/82  SpO2:  [88 %-100 %] 94 %  on   ;   Device (Oxygen Therapy): room air  Body mass index is 45.89 kg/m².  Physical Exam  Vitals reviewed. Exam conducted with a chaperone present (Sister).   Constitutional:       General: She is not in acute distress.     Appearance: She is obese. She is ill-appearing (chronically). She is not toxic-appearing or diaphoretic.   HENT:      Head: Normocephalic.      Nose: Nose normal.      Mouth/Throat:      Mouth: Mucous membranes are moist.      Pharynx: Oropharynx is clear.   Eyes:      General: No scleral icterus.        Right eye: No discharge.         Left eye: No discharge.      Conjunctiva/sclera: Conjunctivae normal.   Cardiovascular:      Rate and Rhythm: Normal rate and regular rhythm.      Heart sounds: Murmur heard.   Pulmonary:      Effort: Pulmonary effort is normal. No respiratory distress.      Breath sounds: Normal breath sounds. No wheezing or rales.      Comments: Anteriorly   Abdominal:      General: Bowel sounds are normal. There is no distension.      Palpations: Abdomen is soft.      Tenderness: There is no abdominal tenderness.   Musculoskeletal:         General: Swelling present.      Cervical back: Neck supple.      Comments: NVI in BLEs  BLEs wrapped   Skin:     General: Skin is warm and dry.      Capillary Refill: Capillary refill takes less than 2 seconds.      Coloration: Skin is not jaundiced.   Neurological:      General: No focal deficit present.      Mental Status: She is alert  "and oriented to person, place, and time. Mental status is at baseline.   Psychiatric:         Mood and Affect: Mood normal.         Behavior: Behavior normal.       Results Review     I reviewed the patient's new clinical results.  Results from last 7 days   Lab Units 07/24/25  0329 07/23/25  0304 07/22/25 0321 07/21/25  0346   WBC 10*3/mm3 5.35 6.03 6.63 4.52   HEMOGLOBIN g/dL 11.0* 10.8* 11.0* 10.1*   PLATELETS 10*3/mm3 392 376 418 421     Results from last 7 days   Lab Units 07/24/25  0328 07/23/25  0304 07/22/25  1442 07/22/25 0321 07/21/25  0347   SODIUM mmol/L 138  137 138  --  136 139   POTASSIUM mmol/L 3.5  3.4* 3.7 3.5 3.3* 3.7   CHLORIDE mmol/L 95*  96* 93*  --  90* 90*   CO2 mmol/L 24.3  29.3* 32.0*  --  36.0* 38.6*   BUN mg/dL 34.0*  35.0* 33.0*  --  25.0* 19.0   CREATININE mg/dL 1.20*  1.18* 1.29*  --  1.37* 1.12*   GLUCOSE mg/dL 82  89 100*  --  109* 91   EGFR mL/min/1.73 50.3*  51.4* 46.2*  --  42.9* 54.7*     Results from last 7 days   Lab Units 07/24/25  0328   ALBUMIN g/dL 3.6   BILIRUBIN mg/dL 0.5   ALK PHOS U/L 96   AST (SGOT) U/L 20   ALT (SGPT) U/L 44*     Results from last 7 days   Lab Units 07/24/25  0328 07/23/25  0304 07/22/25 0321 07/21/25  0347   CALCIUM mg/dL 9.4  9.4 9.6 9.4 9.6   ALBUMIN g/dL 3.6  --   --   --    MAGNESIUM mg/dL 2.6* 2.3 2.1 1.7   PHOSPHORUS mg/dL 3.7 3.2 4.0 4.0       No results found for: \"HGBA1C\", \"POCGLU\"    No radiology results for the last day    I have personally reviewed all medications:  Scheduled Medications  furosemide, 60 mg, Oral, Daily  hydrocortisone-bacitracin-zinc oxide-nystatin, 1 Application, Topical, TID  ipratropium-albuterol, 3 mL, Nebulization, 4x Daily - RT  Lidocaine, 1 patch, Transdermal, Q24H  medroxyPROGESTERone, 10 mg, Oral, Daily  metoprolol succinate XL, 12.5 mg, Oral, Daily  miconazole, 1 Application, Topical, Q12H  rivaroxaban, 20 mg, Oral, Daily With Dinner  sodium chloride, 10 mL, Intravenous, Q12H    Infusions   " Diet  Diet: Cardiac; Healthy Heart (2-3 Na+); Fluid Consistency: Thin (IDDSI 0)    I have personally reviewed:  [x]  Laboratory   []  Microbiology   []  Radiology   [x]  EKG/Telemetry  []  Cardiology/Vascular   []  Pathology    [x]  Records       Assessment/Plan     Active Hospital Problems    Diagnosis  POA    **Syncope and collapse [R55]  Yes    Lipoedema [R60.9]  Unknown    Acute on chronic respiratory failure with hypoxia [J96.21]  Unknown    History of bacterial endocarditis [Z86.79]  Not Applicable    Severe mitral regurgitation [I34.0]  Unknown    Acute on chronic heart failure with preserved ejection fraction (HFpEF) [I50.33]  Yes    History of pulmonary embolism [Z86.711]  Yes    Morbid obesity [E66.01]  Yes    Post-menopausal bleeding [N95.0]  Yes    Iron deficiency [E61.1]  Yes    History of DVT (deep vein thrombosis) [Z86.718]  Not Applicable    Lymphedema [I89.0]  Yes    Anemia, chronic disease [D63.8]  Yes    ARIEL on CPAP [G47.33]  Yes    Essential hypertension [I10]  Yes    Pulmonary hypertension [I27.20]  Yes    PFO (patent foramen ovale) [Q21.12]  Not Applicable    Paradoxical embolism [I74.9]  Yes      Resolved Hospital Problems    Diagnosis Date Resolved POA    Chronic diastolic CHF (congestive heart failure) [I50.32] 07/21/2025 Yes       65yo woman with sent to ER by Cardiologist after syncopal episode during outpt Echo. She was hypoxic and edematous. She was admitted with acute on chronic diastolic CHF and severe MR.    Syncope  Severe MR now (was mild on 5/26/25 Echo)  History of mitral valve endocarditis (antibiotics completed 6/17/2025)  Acute hypoxic respiratory failure  Acute on chronic hypercapnic respiratory failure  Acute on chronic diastolic CHF  Severe pulmonary hypertension  Appreciate Card and Pulm attention to pt  Not a good surgical candidate per CTS  BCx felt to be contaminant per ID--worsening mitral valve regurgitation felt to be due to sequelae of endocarditis rather than active  infection  Now on oral diuretics, Cr tolerating  LYNETTE 7/21 with large perforation of P1 and possible damage to P2 and P3. Awaiting Structural Cardiology opinion regarding possible candidacy for MitraClip  Weaned to RA now     History of DVT/PE  SVC extensive thrombosis  Xarelto initially held d/t vaginal bleeding on admission (has intermittent bleeding).   In 2015 she developed an extensive DVT and PE, and then embolized through her PFO to the left leg arteries and required surgery.   Continue anticoagulation for now given her history.      Postmenopausal bleeding  Blood loss anemia  Elevated   Has GYN/Oncology appt scheduled for 7/30  Monitor for blood loss anemia, may need transfusion  Hgb so far stable here      Right plantar heel blister  X-ray did not suggest osteomyelitis.  S/p excision by Podiatry--serous fluid. No further intervention needed. Continue local wound care per Podiatry recs.    Hypokalemia  Replacing with protocol  Mg++ level not low     ARIEL  Using NIPPV at night and as needed during the day per Pulm  She may need NIPPV at home rather than CPAP--defer to Pulm    Chronic lymphedema  Immobility  Plan is SNF at [place]    Goals of care  Palliative has seen and followed here  Pt and  still want to pursue all tx options  Pallitus plans to follow as outpt      Xarelto (home med) for DVT prophylaxis.  Full code.  Discussed with patient and sister.  Anticipate discharge to SNU facility in 2-3 days.  Expected Discharge Date: 7/25/2025; Expected Discharge Time:       Jack Barrett MD  [facility]  07/24/25  10:31 EDT

## 2025-07-24 NOTE — PROGRESS NOTES
"    Patient Name: Emely Solomon  :1960  64 y.o.      Patient Care Team:  Adilson Wilkerson MD as PCP - General (Family Medicine)  [place] Segovia MD as Referring Physician (Obstetrics and Gynecology)  Brennan Rogers MD as Cardiologist (Cardiology)  Caleb Garcia MD as Consulting Physician (Pulmonary Disease)  Jess Sanz, RN as Ambulatory  ([facility])    Chief Complaint: follow up CHF, MR    Interval History:      Waste products continue to improve. BUN up a little more. She reports she was taking lasix at home but not sure how much and at one point was taking it every other day but no sure if that's what she was doing or not.     Objective   Vital Signs  Temp:  [97.6 °F (36.4 °C)-98.7 °F (37.1 °C)] 98.2 °F (36.8 °C)  Heart Rate:  [71-87] 77  Resp:  [18-20] 18  BP: (111-128)/(72-85) 128/85    Intake/Output Summary (Last 24 hours) at 2025 0744  Last data filed at 2025 0551  Gross per 24 hour   Intake 642 ml   Output 1400 ml   Net -758 ml     Flowsheet Rows      Flowsheet Row First Filed Value   Admission Height 160 cm (63\") Documented at 2025 0020   Admission Weight 160 kg (351 lb 13.7 oz) Documented at 2025 0020            Physical Exam:   General Appearance:    Alert, cooperative, in no acute distress   Lungs:     Clear to auscultation.  Normal respiratory effort and rate.      Heart:    Regular rhythm and normal rate, normal S1 and S2, no murmurs, gallops or rubs.     Chest Wall:    No abnormalities observed   Abdomen:     Soft, nontender, positive bowel sounds.     Extremities:   no cyanosis, clubbing or edema.  No marked joint deformities.  Adequate musculoskeletal strength.       Results Review:    Results from last 7 days   Lab Units 25  0328   SODIUM mmol/L 137   POTASSIUM mmol/L 3.4*   CHLORIDE mmol/L 96*   CO2 mmol/L 29.3*   BUN mg/dL 35.0*   CREATININE mg/dL 1.18*   GLUCOSE mg/dL 89   CALCIUM mg/dL 9.4           Results from last 7 days "   Lab Units 07/24/25  0329   WBC 10*3/mm3 5.35   HEMOGLOBIN g/dL 11.0*   HEMATOCRIT % 36.0   PLATELETS 10*3/mm3 392         Results from last 7 days   Lab Units 07/24/25  0328   MAGNESIUM mg/dL 2.6*                   Medication Review:   furosemide, 60 mg, Oral, Daily  hydrocortisone-bacitracin-zinc oxide-nystatin, 1 Application, Topical, TID  ipratropium-albuterol, 3 mL, Nebulization, 4x Daily - RT  Lidocaine, 1 patch, Transdermal, Q24H  medroxyPROGESTERone, 10 mg, Oral, Daily  metoprolol succinate XL, 12.5 mg, Oral, Daily  miconazole, 1 Application, Topical, Q12H  potassium chloride ER, 40 mEq, Oral, Q4H  rivaroxaban, 20 mg, Oral, Daily With Dinner  sodium chloride, 10 mL, Intravenous, Q12H              Assessment & Plan   Syncope , likely due to severe hypoxic / pulmonary hypertension  Acute on chronic HFpEF / severe pulmonary hypertension   Recent mitral valve endocarditis (group B strep) with severe mitral valve regurgitation. LYNETTE with perforation of P1 , possible damage of P2, P3. Unclear candidacy for MitraClip. Structural heart is reviewing.   History of extensive RLE DVT with paradoxical embolus through PFO.  She was on rivaroxaban as an outpatient but was held for uterine bleeding (requiring transfusion). Unfortunately, LYNETTE this admission shows nonocclusive thrombus in SVC. She is likely hypercoagulable from immobility, possible uterine cancer, and recent PICC line. She has to remain anticoagulated and rivaroxaban was restarted. She is not a candidate for general anesthesia (for GYN to address uterine bleeding) - plans to follow up with GYN as outpatient.   Morbid obesity / ARIEL / OHS / lymphedema   Chronic kidney disease     Diuretics held 7/22/25 due to worsening waste products. CO2 down trending. BUN up trending , no active bleeding per her report. Creatinine continues to improve. Oral lasix restarted yesterday. Adequate UOP. Volume status on physical exam is changing. Continue to monitor labs and UOP.      Awaiting structural heart recommendations however unlikely they will be able to help surgically.     Spoke with brother yesterday , will need rehab at discharge.    LUCÍA Conley  [facility]  07/24/25  07:44 EDT

## 2025-07-24 NOTE — PROGRESS NOTES
Consult Daily Progress Note  [facility]   07/24/25      Patient Name:  Emely Solomon  MRN:  [id number]   YOB: 1960  Age: 64 y.o.  Sex: female  LOS: 8    Reason for Consult:  Respiratory failure    Interval History:  No acute events overnight  Net negative fluid balance  Depressed on evaluation  Understands nothing further can be done from a cardiac standpoint for her valves  No chest pain or shortness of breath  No nausea vomiting      Physical Exam:  Vitals:    07/24/25 1539   BP:    Pulse: 73   Resp: 18   Temp:    SpO2:        Intake/Output    Intake/Output Summary (Last 24 hours) at 7/24/2025 1834  Last data filed at 7/24/2025 1740  Gross per 24 hour   Intake 720 ml   Output 2000 ml   Net -1280 ml     General: Alert, nontoxic, NAD  HEENT: NC/AT, EOMI, MMM  Neck: Supple, trachea midline  Cardiac: RRR, no murmur, gallops, rubs  Pulmonary: Decreased breath sounds at bases  GI: Soft, non-tender, non-distended, normal bowel sounds  Extremities: Warm, well perfused, 1+ LE edema, lymphedema  Skin: no visible rash  Neuro: CN II - XII grossly intact  Psychiatry: Normal mood and affect      Data Review:  Results from last 7 days   Lab Units 07/24/25  0329 07/23/25  0304 07/22/25  0321 07/21/25  0346 07/20/25  0353 07/19/25  0753 07/19/25  0303 07/18/25  0828 07/18/25  0308   WBC 10*3/mm3 5.35 6.03 6.63 4.52 4.58  --  4.76  --  6.90   HEMOGLOBIN g/dL 11.0* 10.8* 11.0* 10.1* 8.4* 8.0* 7.8*   < > 7.8*   PLATELETS 10*3/mm3 392 376 418 421 402  --  344  --  366    < > = values in this interval not displayed.     Results from last 7 days   Lab Units 07/24/25  1431 07/24/25  0328 07/23/25  0304 07/22/25  1442 07/22/25  0321 07/21/25  0347 07/20/25  1429 07/20/25  0353 07/19/25  1807 07/19/25  0303 07/18/25  0308   SODIUM mmol/L  --  138  137 138  --  136 139  --  140  --  139 141   POTASSIUM mmol/L 3.7 3.5  3.4* 3.7 3.5 3.3* 3.7 4.3 3.6   < > 3.0* 3.9   CHLORIDE mmol/L  --  95*  96* 93*  --   90* 90*  --  90*  --  96* 104   CO2 mmol/L  --  24.3  29.3* 32.0*  --  36.0* 38.6*  --  39.0*  --  32.0* 26.1   BUN mg/dL  --  34.0*  35.0* 33.0*  --  25.0* 19.0  --  16.0  --  21.0 24.0*   CREATININE mg/dL  --  1.20*  1.18* 1.29*  --  1.37* 1.12*  --  1.04*  --  1.14* 1.21*   GLUCOSE mg/dL  --  82  89 100*  --  109* 91  --  87  --  92 95   CALCIUM mg/dL  --  9.4  9.4 9.6  --  9.4 9.6  --  8.8  --  8.4* 9.0   MAGNESIUM mg/dL  --  2.6* 2.3  --  2.1 1.7  --  1.6  --  1.7 2.0   PHOSPHORUS mg/dL  --  3.7 3.2  --  4.0 4.0  --  2.8  --  3.1 3.8    < > = values in this interval not displayed.   Estimated Creatinine Clearance: 58.7 mL/min (A) (by C-G formula based on SCr of 1.18 mg/dL (H)).    Results from last 7 days   Lab Units 07/24/25  0329 07/24/25  0328 07/23/25  0304 07/22/25  0321   AST (SGOT) U/L  --  20  --   --    ALT (SGPT) U/L  --  44*  --   --    PLATELETS 10*3/mm3 392  --  376 418       Results from last 7 days   Lab Units 07/18/25  1143 07/18/25  0647 07/18/25  0355 07/17/25  2107   PH, ARTERIAL pH units 7.386 7.334* 7.318* 7.299*   PCO2, ARTERIAL mm Hg 60.4* 65.5* 63.3* 61.0*   PO2 ART mm Hg 100.8* 115.5* 81.8 76.8*   HCO3 ART mmol/L 36.2* 34.9* 32.4* 30.0*       Result Review:  I have personally reviewed the results from the time of this admission to 7/24/2025 18:34 EDT and agree with these findings:  [x]  Laboratory list / accordion  [x]  Microbiology  [x]  Radiology  []  EKG/Telemetry   [x]  Cardiology/Vascular   []  Pathology  [x]  Old records  []  Other:    ASSESSMENT  /  PLAN:    Acute on chronic HFpEF  Pulmonary edema, acute  Severe pulmonary hypertension, RVSP>55, probably group 2 and possibly group 3.  Bilateral lower lobe atelectasis  Acute on chronic hypercapnic respiratory failure  Acute hypoxic respiratory failure, secondary to the above  Severe MR  ARIEL, on auto CPAP 15-20  History of DVT/PE  History of mitral valve endocarditis  Chronic lymphedema    -Patient presented and found to have  CHF exacerbation with pulmonary edema, severe pulmonary hypertension, acute on chronic respiratory failure  - Diuresis with negative fluid balance  - Significant improvement in metabolic alkalosis after Diamox  -Continue bronchodilator therapy with DuoNebs  -Xarelto for history of DVT/PE  - Continue supplemental oxygen, SpO2 goal greater than 90%.  - Not a candidate for repair of her mitral valve per cardiology  - Guarded prognosis due to the severity of her underlying cardiac and valvular disease in addition to her pulmonary hypertension.  Ongoing palliative discussions.  - Can be discharged home with home CPAP.  - No further recommendations from pulmonary standpoint.    Thank you for allowing us to participate in this patients care. Pulmonary will sign off at this time.  Please contact us if further questions or concerns arise.    Shady Blanco MD  [facility]  Pulmonary and Critical Care Medicine, Interventional Pulmonology    Parts of this note may be an electronic transcription/translation of spoken language to printed text using the Dragon dictation system.

## 2025-07-24 NOTE — CONSULTS
Referring Provider:       Patient Care Team:  Adilson Wilkerson MD as PCP - General (Family Medicine)  Florida Segovia MD as Referring Physician (Obstetrics and Gynecology)  Brennan Rogers MD as Cardiologist (Cardiology)  Caleb Garcia MD as Consulting Physician (Pulmonary Disease)  Jess Sanz, RN as Ambulatory  ([facility])      Reason for Consultation:   Severe mitral valve regurgitation  History of mitral valve endocarditis    History of present illness: 64-year-old female with a medical history of native mitral valve endocarditis due to group B streptococcus, morbid obesity, obstructive sleep apnea on CPAP, history of pulmonary embolus and a DVT in 2015, lymphedema, and pulmonary hypertension who was in the hospital in May 2025.  She was evaluated by the surgical team and not felt to be a candidate for mitral valve replacement.  She underwent transesophageal echocardiogram on 7/21/2025.  Left ventricular size and function were normal with mild concentric LVH.  Her right ventricle is moderately dilated and mild to moderately reduced with good systolic function.  Patient noted to have perforation of P1 and insufficient P2 along with severe mitral valve regurgitation.  SVC noted to have thrombus.  Prior transthoracic echocardiogram noted RVSP of 69 mmHg.    As gynecologic appointment scheduled secondary to concern for uterine cancer with elevated .  Is tired but overall feels okay today.      Review of Systems  All other systems reviewed and negative.     Past Medical History:   Past Medical History:   Diagnosis Date    Arthritis     Cellulitis     11/2017, with Group B Strep bacteremia and sepsis    Chronic deep vein thrombosis (DVT) of left popliteal vein 12/17/2015 02/04/2016, 04/14/2016    Chronic diastolic congestive heart failure     COVID-19 virus infection 11/2020    Heart murmur     Hypertension     Iliac vein stenosis, right 05/15/2024    Lipoedema     Lymphedema      other    Morbid obesity     ARIEL on CPAP     PFO (patent foramen ovale)     Postthrombotic syndrome of left lower extremity without complications 12/17/2015    postphletibis    Pulmonary embolism 04/14/2016    Pulmonary hypertension     multifactorial (dCHF, obesity/ARIEL, hx PE), mild by echo 1/2016    Right bundle branch block (RBBB) with left anterior fascicular block (LAFB)     Sepsis 09/18/2020    Type 2 myocardial infarction 05/20/2025       Past Surgical History:   Past Surgical History:   Procedure Laterality Date    ARTERIOGRAM  07/2015    BRONCHOSCOPY N/A 07/21/2017    Procedure: BRONCHOSCOPY with wash;  Surgeon: Caleb Garcia MD;  Location: [facility] ENDOSCOPY;  Service:     COLONOSCOPY      COLONOSCOPY N/A 01/26/2023    Procedure: COLONOSCOPY to CECUM AND TERM ILEUM;  Surgeon: Jj Dean MD;  Location: [facility] ENDOSCOPY;  Service: Gastroenterology;  Laterality: N/A;  PRE OP -screening  POST OP -  NORMAL    D & C HYSTEROSCOPY N/A 03/08/2022    Procedure: DILATATION AND CURETTAGE with hysteroscopy;  Surgeon: Kaylah Land DO;  Location: [facility] MAIN OR;  Service: Gynecology Oncology;  Laterality: N/A;    DILATATION AND CURETTAGE  04/11/2011    OTHER SURGICAL HISTORY  09/2015    IVC filter    OTHER SURGICAL HISTORY      left LE revascularization    OTHER SURGICAL HISTORY      ALESSIA and LEV scan    THROMBECTOMY  07/2015       Family History:   Family History   Problem Relation Age of Onset    Breast cancer Mother     Hypertension Other     Ovarian cancer Neg Hx     Uterine cancer Neg Hx     Colon cancer Neg Hx     Malig Hyperthermia Neg Hx        Social History:   Social History     Tobacco Use    Smoking status: Never     Passive exposure: Never    Smokeless tobacco: Never   Vaping Use    Vaping status: Never Used   Substance Use Topics    Alcohol use: Not Currently     Comment: occassionally    Drug use: Never       Home Medications:   Medications Prior to Admission   Medication Sig Dispense Refill  Last Dose/Taking    acetaminophen (TYLENOL) 325 MG tablet Take 2 tablets by mouth Every 6 (Six) Hours As Needed for Mild Pain.   Taking As Needed    Emollient (Aquaphor Advanced Therapy) 41 % ointment Apply 1 Application topically to the appropriate area as directed 2 (Two) Times a Day.   Taking    ferrous sulfate 325 (65 FE) MG tablet Take 1 tablet by mouth Daily With Breakfast.   Taking    furosemide (LASIX) 40 MG tablet Take 1 tablet by mouth Daily.   Taking    guaiFENesin ER 1200 MG tablet sustained-release 12 hour Take 1 tablet by mouth 2 (Two) Times a Day As Needed.   Taking As Needed    ipratropium-albuterol (DUO-NEB) 0.5-2.5 mg/3 ml nebulizer Take 3 mL by nebulization 4 (Four) Times a Day.   Taking    Lidocaine 4 % Place 1 patch on the skin as directed by provider Daily. Apply to skin on upper back remove patch in 12 hours. Remove & Discard patch within 12 hours or as directed by MD   Taking    medroxyPROGESTERone (PROVERA) 10 MG tablet Take 1 tablet by mouth Daily.   Taking    melatonin 5 MG tablet tablet Take 1 tablet by mouth At Night As Needed (sleep).   Taking As Needed    metoprolol succinate XL (TOPROL-XL) 25 MG 24 hr tablet Take 0.5 tablets by mouth Daily.   Taking    rivaroxaban (Xarelto) 20 MG tablet Take 1 tablet by mouth Daily With Dinner. Indications: Atrial Fibrillation (Patient taking differently: Take 1 tablet by mouth Daily With Dinner.)   Taking Differently       Current Medications:   Current Facility-Administered Medications:     sennosides-docusate (PERICOLACE) 8.6-50 MG per tablet 2 tablet, 2 tablet, Oral, BID PRN, 2 tablet at 07/20/25 2024 **AND** polyethylene glycol (MIRALAX) packet 17 g, 17 g, Oral, Daily PRN **AND** bisacodyl (DULCOLAX) EC tablet 5 mg, 5 mg, Oral, Daily PRN **AND** bisacodyl (DULCOLAX) suppository 10 mg, 10 mg, Rectal, Daily PRN, Darwin Lozano MD    Calcium Replacement - Follow Nurse / BPA Driven Protocol, , Not Applicable, PRN, Darwin Lozano MD     furosemide (LASIX) tablet 60 mg, 60 mg, Oral, Daily, Samantha Sevilla, APRN, 60 mg at 07/24/25 0839    hydrocortisone-bacitracin-zinc oxide-nystatin (MAGIC BARRIER) ointment 1 Application, 1 Application, Topical, TID, Darwin Lozano MD, 1 Application at 07/24/25 0839    ipratropium-albuterol (DUO-NEB) nebulizer solution 3 mL, 3 mL, Nebulization, 4x Daily - RT, Darwin Lozano MD, 3 mL at 07/24/25 1132    Lidocaine 4 % 1 patch, 1 patch, Transdermal, Q24H, Darwin Lozano MD, 1 patch at 07/17/25 0854    Magnesium Standard Dose Replacement - Follow Nurse / BPA Driven Protocol, , Not Applicable, PRN, Darwin Lozano MD    medroxyPROGESTERone (PROVERA) tablet 10 mg, 10 mg, Oral, Daily, Darwin Lozano MD, 10 mg at 07/24/25 0839    melatonin tablet 5 mg, 5 mg, Oral, Nightly PRN, Darwin Lozano MD    metoprolol succinate XL (TOPROL-XL) 24 hr tablet 12.5 mg, 12.5 mg, Oral, Daily, Darwin Lozano MD, 12.5 mg at 07/24/25 0839    miconazole (MICOTIN) 2 % powder 1 Application, 1 Application, Topical, Q12H, Darwin Lozano MD, 1 Application at 07/24/25 0839    ondansetron (ZOFRAN) injection 4 mg, 4 mg, Intravenous, Q6H PRN, Darwin Lozano MD    Phosphorus Replacement - Follow Nurse / BPA Driven Protocol, , Not Applicable, PRN, Darwin Lozano MD    Potassium Replacement - Follow Nurse / BPA Driven Protocol, , Not Applicable, PRN, Darwin Lozano MD    rivaroxaban (XARELTO) tablet 20 mg, 20 mg, Oral, Daily With Dinner, Darwin Lozano MD, 20 mg at 07/23/25 1806    [COMPLETED] Insert Peripheral IV, , , Once **AND** sodium chloride 0.9 % flush 10 mL, 10 mL, Intravenous, PRN, Komal Najera MD    sodium chloride 0.9 % flush 10 mL, 10 mL, Intravenous, Q12H, Darwin Lozano MD, 10 mL at 07/24/25 0840    sodium chloride 0.9 % flush 10 mL, 10 mL, Intravenous, PRN, Darwin Lozano MD    sodium chloride 0.9 % infusion 40 mL, 40 mL, Intravenous, BRYCEN, Darwin Lozano MD     Allergies: Cephalexin and  "Clindamycin/lincomycin      Vital Signs   Temp:  [97.5 °F (36.4 °C)-98.7 °F (37.1 °C)] 97.8 °F (36.6 °C)  Heart Rate:  [72-87] 76  Resp:  [18-20] 18  BP: (115-131)/(72-85) 121/77  Flowsheet Rows      Flowsheet Row First Filed Value   Admission Height 160 cm (63\") Documented at 07/17/2025 0020   Admission Weight 160 kg (351 lb 13.7 oz) Documented at 07/17/2025 0020            General Appearance:    Alert, cooperative, in no acute distress.  Obese. Appears older than stated age.    Head:    Normocephalic, without obvious abnormality, atraumatic       Neck:   No adenopathy, supple, no thyromegaly, no carotid bruit, no    JVD   Lungs:     Clear to auscultation bilaterally, no wheezes, rales, or     rhonchi    Heart:    Normal rate, regular rhythm, 3 out of 6 systolic murmur, no rub, no gallop   Chest Wall:    No abnormalities observed   Abdomen:     Normal bowel sounds, soft, nontender, nondistended,            no rebound tenderness   Extremities:   No cyanosis, clubbing.  Legs wrapped   Pulses:   Pulses palpable and equal bilaterally   Skin:   No bleeding or rash   Lymph nodes:   No cervical adenopathy   Neurologic:   Cranial nerves 2 - 12 grossly intact, sensation intact               Results Review: I personally viewed and interpreted the patient's EKG/Telemetry data    Results from last 7 days   Lab Units 07/24/25  0329   WBC 10*3/mm3 5.35   HEMOGLOBIN g/dL 11.0*   HEMATOCRIT % 36.0   PLATELETS 10*3/mm3 392     Results from last 7 days   Lab Units 07/24/25  0328   SODIUM mmol/L 138  137   POTASSIUM mmol/L 3.5  3.4*   CHLORIDE mmol/L 95*  96*   CO2 mmol/L 24.3  29.3*   BUN mg/dL 34.0*  35.0*   CREATININE mg/dL 1.20*  1.18*   GLUCOSE mg/dL 82  89   CALCIUM mg/dL 9.4  9.4     Lab Results   Lab Value Date/Time    TROPONINT 32 (H) 07/16/2025 1102    TROPONINT 34 (H) 07/16/2025 0947    TROPONINT 26 (H) 05/25/2025 1533    TROPONINT 27 (H) 05/25/2025 1344    TROPONINT 81 (C) 05/04/2025 1036    TROPONINT 72 (C) " 05/04/2025 0849    TROPONINT <0.010 06/07/2020 0201    TROPONINT <0.01 07/13/2015 1111    TROPONINT <0.01 07/12/2015 2203           Assessment & Plan   1.  Syncope  2.  Acute hypoxic respiratory failure  3.  Severe pulmonary hypertension  4.  History of mitral valve endocarditis with severe mitral valve regurgitation  5.  History of extensive right lower extremity DVT and nonocclusive thrombus SVC  6.  Morbid obesity  7.  Chronic kidney disease  8.  Chronic lymphedema and immobility  9.  Postmenopausal bleeding/elevated : Concern for uterine cancer    -Unfortunately I do not think there is anything about available offer here. With her history of mitral valve endocarditis there is a large perforation of P1 and her P2 segment looks to be extremely small.  Her degree of mitral valve regurgitation is severe and a broad and I do not think we have enough tissue to grab here.  - Procedurally I do not think we have any options for her severe mitral valve regurgitation and if you combine that with her obesity, likely severe pulmonary hypertension, I do not think she is a candidate to move forward with MitraClip.  Unfortunate situation but I do agree with palliative care involved    I discussed the patient's findings and my recommendations with the patient

## 2025-07-24 NOTE — PLAN OF CARE
Goal Outcome Evaluation:        Lymphedema:  Patient BLE re-wrapped utilizing light compression.  RLE remain ankle to calf (1 layer of bandage, due to discomfort level), LLE toes to knee (3 layers of bandages).  Skin looks good and intact under bandages.  OT to address everyday this week and Nursing on Sat/Sun.  Cont OT as tolerated.

## 2025-07-24 NOTE — PROGRESS NOTES
Continued Stay Note  [facility]     Patient Name: Emely Solomon  MRN: [id number]  Today's Date: 7/24/2025    Admit Date: 7/16/2025    Plan: [facility]  SNF   Discharge Plan       Row Name 07/24/25 1152       Plan    Plan [facility]    Patient/Family in Agreement with Plan yes    Plan Comments Spoke with [facility]le[facility]ne [facility] can accept patient.  Spoke with patient at bedside, [facility]  Packet in CCP office.  Galina FAUSTIN                 Expected Discharge Date and Time       Expected Discharge Date Expected Discharge Time    Jul 25, 2025               Becky S. [place], RN

## 2025-07-24 NOTE — PROGRESS NOTES
[facility]   Palliative Care Social Work  Initial Assessment  Emely Solomon   1960   Date: 7/24/2025         Who Provided Information: patient        Palliative Care Social Work Plan: SW met with patient at bedside this afternoon for information on outpatient [facility] support. See CCP note that patient will go to [facility] at discharge. Reviewed [facility] with patient and she is agreeable for [facility] to reach out once she discharges from [facility] to schedule an initial appointment. Inpatient palliative team will sign off for now. Please re-consult if needed. [facility] to follow-up outpatient when appropriate.          Follow Up Needed: [facility] to reach out for scheduling when appropriate for additional outpatient support.       NEENA Talley  7/24/2025 14:07 EDT

## 2025-07-25 LAB
ANION GAP SERPL CALCULATED.3IONS-SCNC: 12.8 MMOL/L (ref 5–15)
BUN SERPL-MCNC: 34 MG/DL (ref 8–23)
BUN/CREAT SERPL: 29.6 (ref 7–25)
CALCIUM SPEC-SCNC: 9.8 MG/DL (ref 8.6–10.5)
CHLORIDE SERPL-SCNC: 99 MMOL/L (ref 98–107)
CO2 SERPL-SCNC: 27.2 MMOL/L (ref 22–29)
CREAT SERPL-MCNC: 1.15 MG/DL (ref 0.57–1)
DEPRECATED RDW RBC AUTO: 51 FL (ref 37–54)
EGFRCR SERPLBLD CKD-EPI 2021: 53 ML/MIN/1.73
ERYTHROCYTE [DISTWIDTH] IN BLOOD BY AUTOMATED COUNT: 16.1 % (ref 12.3–15.4)
GLUCOSE SERPL-MCNC: 86 MG/DL (ref 65–99)
HCT VFR BLD AUTO: 39.3 % (ref 34–46.6)
HGB BLD-MCNC: 12.1 G/DL (ref 12–15.9)
MAGNESIUM SERPL-MCNC: 2.8 MG/DL (ref 1.6–2.4)
MCH RBC QN AUTO: 26.9 PG (ref 26.6–33)
MCHC RBC AUTO-ENTMCNC: 30.8 G/DL (ref 31.5–35.7)
MCV RBC AUTO: 87.3 FL (ref 79–97)
PHOSPHATE SERPL-MCNC: 3.4 MG/DL (ref 2.5–4.5)
PLATELET # BLD AUTO: 398 10*3/MM3 (ref 140–450)
PMV BLD AUTO: 8.1 FL (ref 6–12)
POTASSIUM SERPL-SCNC: 3.7 MMOL/L (ref 3.5–5.2)
RBC # BLD AUTO: 4.5 10*6/MM3 (ref 3.77–5.28)
SODIUM SERPL-SCNC: 139 MMOL/L (ref 136–145)
WBC NRBC COR # BLD AUTO: 4.78 10*3/MM3 (ref 3.4–10.8)

## 2025-07-25 PROCEDURE — 85027 COMPLETE CBC AUTOMATED: CPT | Performed by: HOSPITALIST

## 2025-07-25 PROCEDURE — 80048 BASIC METABOLIC PNL TOTAL CA: CPT | Performed by: HOSPITALIST

## 2025-07-25 PROCEDURE — 36415 COLL VENOUS BLD VENIPUNCTURE: CPT | Performed by: HOSPITALIST

## 2025-07-25 PROCEDURE — 84100 ASSAY OF PHOSPHORUS: CPT | Performed by: HOSPITALIST

## 2025-07-25 PROCEDURE — 83735 ASSAY OF MAGNESIUM: CPT | Performed by: HOSPITALIST

## 2025-07-25 PROCEDURE — 94799 UNLISTED PULMONARY SVC/PX: CPT

## 2025-07-25 PROCEDURE — 94664 DEMO&/EVAL PT USE INHALER: CPT

## 2025-07-25 PROCEDURE — 94761 N-INVAS EAR/PLS OXIMETRY MLT: CPT

## 2025-07-25 RX ORDER — FUROSEMIDE 20 MG/1
60 TABLET ORAL DAILY
Start: 2025-07-26

## 2025-07-25 RX ADMIN — IPRATROPIUM BROMIDE AND ALBUTEROL SULFATE 3 ML: .5; 3 SOLUTION RESPIRATORY (INHALATION) at 15:45

## 2025-07-25 RX ADMIN — IPRATROPIUM BROMIDE AND ALBUTEROL SULFATE 3 ML: .5; 3 SOLUTION RESPIRATORY (INHALATION) at 08:13

## 2025-07-25 RX ADMIN — IPRATROPIUM BROMIDE AND ALBUTEROL SULFATE 3 ML: .5; 3 SOLUTION RESPIRATORY (INHALATION) at 12:45

## 2025-07-25 RX ADMIN — MEDROXYPROGESTERONE ACETATE 10 MG: 10 TABLET ORAL at 08:35

## 2025-07-25 RX ADMIN — NYSTATIN 1 APPLICATION: 100000 OINTMENT TOPICAL at 08:36

## 2025-07-25 RX ADMIN — Medication 10 ML: at 08:36

## 2025-07-25 RX ADMIN — IPRATROPIUM BROMIDE AND ALBUTEROL SULFATE 3 ML: .5; 3 SOLUTION RESPIRATORY (INHALATION) at 20:27

## 2025-07-25 RX ADMIN — ANTI-FUNGAL POWDER MICONAZOLE NITRATE TALC FREE 1 APPLICATION: 1.42 POWDER TOPICAL at 08:36

## 2025-07-25 RX ADMIN — METOPROLOL SUCCINATE 12.5 MG: 25 TABLET, EXTENDED RELEASE ORAL at 08:35

## 2025-07-25 RX ADMIN — ANTI-FUNGAL POWDER MICONAZOLE NITRATE TALC FREE 1 APPLICATION: 1.42 POWDER TOPICAL at 20:16

## 2025-07-25 RX ADMIN — NYSTATIN 1 APPLICATION: 100000 OINTMENT TOPICAL at 20:16

## 2025-07-25 RX ADMIN — FUROSEMIDE 60 MG: 20 TABLET ORAL at 08:35

## 2025-07-25 RX ADMIN — RIVAROXABAN 20 MG: 20 TABLET, FILM COATED ORAL at 18:37

## 2025-07-25 NOTE — DISCHARGE SUMMARY
Patient Name: Emely Solomon  : 1960  MRN: [id number]    Date of Admission: 2025  Date of Discharge:  2025  Primary Care Physician: Adilson Wilkerson MD      Chief Complaint:   Syncope      Discharge Diagnoses     Active Hospital Problems    Diagnosis  POA    **Syncope and collapse [R55]  Yes    Lipoedema [R60.9]  Yes    Acute on chronic respiratory failure with hypoxia [J96.21]  Yes    History of bacterial endocarditis [Z86.79]  Not Applicable    Severe mitral regurgitation [I34.0]  Yes    Acute on chronic heart failure with preserved ejection fraction (HFpEF) [I50.33]  Yes    History of pulmonary embolism [Z86.711]  Yes    Morbid obesity [E66.01]  Yes    Post-menopausal bleeding [N95.0]  Yes    Iron deficiency [E61.1]  Yes    History of DVT (deep vein thrombosis) [Z86.718]  Not Applicable    Lymphedema [I89.0]  Yes    Anemia, chronic disease [D63.8]  Yes    ARIEL on CPAP [G47.33]  Yes    Essential hypertension [I10]  Yes    Pulmonary hypertension [I27.20]  Yes    PFO (patent foramen ovale) [Q21.12]  Not Applicable    Paradoxical embolism [I74.9]  Yes      Resolved Hospital Problems    Diagnosis Date Resolved POA    Chronic diastolic CHF (congestive heart failure) [I50.32] 2025 Yes        Hospital Course     Very pleasant 63yo woman with sent to ER by Cardiologist after syncopal episode during outpt Echo. She was hypoxic and edematous. She was admitted with acute on chronic diastolic CHF and severe MR. Please see below for details of admission by problem:      Syncope  Severe MR now (was mild on 25 Echo)  History of mitral valve endocarditis (antibiotics completed 2025)  Acute hypoxic respiratory failure  Acute on chronic hypercapnic respiratory failure  Acute on chronic diastolic CHF  Severe pulmonary hypertension  Appreciate Card and Pulm attention to pt  Not a good surgical candidate per CTS  BCx felt to be contaminant per ID--worsening mitral valve regurgitation felt  to be due to sequelae of endocarditis rather than active infection  Now on oral diuretics, Cr tolerating  LYNETTE 7/21 with large perforation of P1 and possible damage to P2 and P3.   Structural Cardiology providers all agree that she is not a candidate for MitraClip procedure.  Dr. Rogers has referred to Dr. Ramirez at [facility]  Weaned to RA now     History of DVT/PE  SVC extensive thrombosis  Xarelto initially held d/t vaginal bleeding on admission (has intermittent bleeding).   In 2015 she developed an extensive DVT and PE, and then embolized through her PFO to the left leg arteries and required surgery.   Continued anticoagulation for now given her history.      Postmenopausal bleeding  Blood loss anemia  Elevated   [facility] with Dr. De Jesus  Hgb so far stable here      Right plantar heel blister  X-ray did not suggest osteomyelitis.  S/p excision by Podiatry--serous fluid. No further intervention needed. Continue local wound care per Podiatry recs.  F/u with Dr. Kendrick at next available appt     Hypokalemia  Replaced with protocol  Mg++ level not low     ARIEL  Okay to continue CPAP at dc per Pulm  F/u with Pulm as outpt in 1 month     Chronic lymphedema  Immobility  [place]NF at [place]     Goals of care  Palliative has seen and followed here  Pt and  still want to pursue all tx options for now  Pallitus plans to follow as outpt        Xarelto (home med) sufficed for DVT prophylaxis.  Full code confirmed.  Discussed with patient and CCP.  Discharge to SNU facility this afternoon.    Day of Discharge     Subjective:  Feeling okay again today. No N/V/D/abd pain. Tolerating diet. Voiding well. No F/C/NS. No SOA or CP or palp. Amenable to discharge to SNF this afternoon.    Physical Exam:  Temp:  [97.8 °F (36.6 °C)-98.8 °F (37.1 °C)] 98 °F (36.7 °C)  Heart Rate:  [71-76] 74  Resp:  [18-20] 18  BP: (117-128)/(77-84) 117/77  Body mass index is 45.89 kg/m².  Physical Exam  Vitals  reviewed.  Constitutional:       General: She is not in acute distress.     Appearance: She is obese. She is ill-appearing (chronically). She is not toxic-appearing or diaphoretic.   Cardiovascular:      Rate and Rhythm: Normal rate and regular rhythm.      Heart sounds: Murmur heard.   Pulmonary:      Effort: Pulmonary effort is normal. No respiratory distress.      Breath sounds: Normal breath sounds. No wheezing or rales.      Comments: Anteriorly   Abdominal:      General: Bowel sounds are normal. There is no distension.      Palpations: Abdomen is soft.      Tenderness: There is no abdominal tenderness.   Musculoskeletal:         General: Swelling present.      Cervical back: Neck supple.      Comments: NVI in BLEs  BLEs wrapped   Skin:     General: Skin is warm and dry.      Capillary Refill: Capillary refill takes less than 2 seconds.      Coloration: Skin is not jaundiced.   Neurological:      General: No focal deficit present.      Mental Status: She is alert and oriented to person, place, and time. Mental status is at baseline.   Psychiatric:         Mood and Affect: Mood normal.         Behavior: Behavior normal.         Consultants     Consult Orders (all) (From admission, onward)       Start     Ordered    07/24/25 1443  Inpatient Structural Heart Consult  Once        Provider:  (Not yet assigned)    07/24/25 1442    07/18/25 1801  Inpatient Infectious Diseases Consult  Once        Specialty:  Infectious Diseases  Provider:  Tania Nunez MD    07/18/25 1800    07/18/25 1550  Inpatient Infectious Diseases Consult  Once,   Status:  Canceled        Specialty:  Infectious Diseases  Provider:  Wendy Champion MD    07/18/25 1549    07/18/25 0849  Inpatient Cardiothoracic Surgery Consult  Once        Specialty:  Cardiothoracic Surgery  Provider:  Jr Tanner Martinez MD    07/18/25 0849    07/17/25 1711  Inpatient Pulmonology Consult  Once        Specialty:  Pulmonary Disease  Provider:  Sung Ramos  DO Ghanshyam    07/17/25 1710    07/17/25 1317  Inpatient Palliative Care Team Consult  Once        Provider:  (Not yet assigned)    07/17/25 1316    07/16/25 1736  Inpatient Case Management  Consult  Once        Provider:  (Not yet assigned)    07/16/25 1735    07/16/25 1604  Inpatient Cardiology Consult  Once        Specialty:  Cardiology  Provider:  (Not yet assigned)    07/16/25 1604    07/16/25 1603  Inpatient Podiatry Consult  Once        Provider:  Tristin Morrow DPM    07/16/25 1602    07/16/25 1102  [facility] (on-call MD unless specified) Details  Once        Specialty:  Hospitalist  Provider:  Darwin Lozano MD    07/16/25 1101                  Procedures     * Surgery not found *    Imaging Results (All)       Procedure Component Value Units Date/Time    XR Foot 3+ View Right [[id number]] Collected: 07/16/25 2104     Updated: 07/16/25 2109    Narrative:      XR FOOT 3+ VW RIGHT-     Clinical: Wound along the plantar surface of the heel fat pad     FINDINGS: There is ankle and foot edema. No soft tissue gas or  radiopaque foreign body is demonstrated.     There is a moderate size calcaneal spur. There is bone hypertrophy at  the Achilles insertion. No cortical bone destruction of the calcaneus to  suggest osteomyelitis. No forefoot abnormality is demonstrated. Midfoot  articulations preserved. The remainder is unremarkable.     This report was finalized on 7/16/2025 9:06 PM by Dr. Dhruv Sanchez M.D  on Workstation: [id number]       XR Chest 1 View [[id number]] Collected: 07/16/25 0929     Updated: 07/16/25 0933    Narrative:      XR CHEST 1 VW-     HISTORY: Female who is 64 years-old, syncope, congestive heart failure     TECHNIQUE: Frontal view of the chest     COMPARISON: 7/11/2025     FINDINGS: The heart is enlarged. Pulmonary vasculature is mildly  congested. Aorta is tortuous. Small right basilar atelectasis or  infiltrate, follow-up suggested. There may be minimal right  pleural  effusion. No pneumothorax. No acute osseous process.       Impression:      As described.     This report was finalized on 7/16/2025 9:30 AM by Dr. Damon Chavez M.D on Workstation: [id number]             Results for orders placed during the hospital encounter of 07/24/24    Venous w Reflux Lower Extremity - Unilateral CAR 07/24/2024  4:40 PM    Interpretation Summary    Right saphenofemoral junction reflux with mild proximal saphenous vein reflux with poorly visualized greater saphenous medial branch.  Short segment of varicosity noted in the medial calf.  Lesser saphenous vein nonvisualized.  Poorly visualized deep vein structures without DVT noted.    Results for orders placed during the hospital encounter of 07/16/25    Adult Transesophageal Echo (LYNETTE) W/ Cont if Necessary Per Protocol 07/21/2025  5:25 PM    Interpretation Summary    Left ventricular systolic function is normal. Left ventricular ejection fraction appears to be 56 - 60%. Normal left ventricular cavity size noted. Left ventricular wall thickness is consistent with mild concentric hypertrophy. All left ventricular wall segments contract normally. Left ventricular diastolic function was not assessed.    The right ventricular cavity is moderately dilated. Right ventricular wall thickness is consistent with moderate hypertrophy. Mildly reduced right ventricular systolic function noted.    The left atrial cavity is mildly dilated. No evidence of a left atrial thrombus present. There is no spontaneous echo contrast present. Left atrial appendage was found to be singularly lobar in nature. The left atrial appendage was visualized through multiple planes. No evidence of a left atrial appendage thrombus was present. Interatrial septal aneurysm present. Lipomatous hypertrophy of the interatrial septum present. Small patent foramen ovale present with bi-directional shunting. Systolic flow reversal in the pulmonary vein consistent with significant  mitral regurgitation.    Moderate mitral annular calcification is present. Severe mitral valve regurgitation is present. No significant mitral valve stenosis is present. There is a large perforation of P1. P2 and P3 appear diminuitive in 3D imaging but appear to be mostly intact in 2D en face imaging.    There is moderate, (grade 3) plaque in the aortic arch present. There is mild, (grade 2) plaque in the descending aorta present. The inferior vena cava is dilated. Partial IVC inspiratory collapse of less than 50% noted. The main pulmonary artery is mildly dilated.    The SVC has extensive thrombus noted; it is unclear if this is chronic or acute.    Pertinent Labs     Results from last 7 days   Lab Units 07/25/25  0413 07/24/25  0329 07/23/25  0304 07/22/25  0321   WBC 10*3/mm3 4.78 5.35 6.03 6.63   HEMOGLOBIN g/dL 12.1 11.0* 10.8* 11.0*   PLATELETS 10*3/mm3 398 392 376 418     Results from last 7 days   Lab Units 07/25/25  0413 07/24/25  1431 07/24/25  0328 07/23/25  0304 07/22/25  1442 07/22/25  0321   SODIUM mmol/L 139  --  138  137 138  --  136   POTASSIUM mmol/L 3.7 3.7 3.5  3.4* 3.7   < > 3.3*   CHLORIDE mmol/L 99  --  95*  96* 93*  --  90*   CO2 mmol/L 27.2  --  24.3  29.3* 32.0*  --  36.0*   BUN mg/dL 34.0*  --  34.0*  35.0* 33.0*  --  25.0*   CREATININE mg/dL 1.15*  --  1.20*  1.18* 1.29*  --  1.37*   GLUCOSE mg/dL 86  --  82  89 100*  --  109*   EGFR mL/min/1.73 53.0*  --  50.3*  51.4* 46.2*  --  42.9*    < > = values in this interval not displayed.     Results from last 7 days   Lab Units 07/24/25  0328   ALBUMIN g/dL 3.6   BILIRUBIN mg/dL 0.5   ALK PHOS U/L 96   AST (SGOT) U/L 20   ALT (SGPT) U/L 44*     Results from last 7 days   Lab Units 07/25/25  0413 07/24/25  0328 07/23/25  0304 07/22/25  0321   CALCIUM mg/dL 9.8 9.4  9.4 9.6 9.4   ALBUMIN g/dL  --  3.6  --   --    MAGNESIUM mg/dL 2.8* 2.6* 2.3 2.1   PHOSPHORUS mg/dL 3.4 3.7 3.2 4.0       Results from last 7 days   Lab Units  "07/21/25  0347   PROBNP pg/mL 1,269.0*           Invalid input(s): \"LDLCALC\"          Test Results Pending at Discharge     Pending Results       None              Discharge Details        Discharge Medications        Changes to Medications        Instructions Start Date   furosemide 20 MG tablet  Commonly known as: LASIX  What changed:   medication strength  how much to take   60 mg, Oral, Daily   Start Date: July 26, 2025            Continue These Medications        Instructions Start Date   acetaminophen 325 MG tablet  Commonly known as: TYLENOL   650 mg, Oral, Every 6 Hours PRN      ferrous sulfate 325 (65 FE) MG tablet   325 mg, Oral, Daily With Breakfast      ipratropium-albuterol 0.5-2.5 mg/3 ml nebulizer  Commonly known as: DUO-NEB   3 mL, Nebulization, 4 Times Daily - RT      Lidocaine 4 %   1 patch, Transdermal, Every 24 Hours Scheduled, Apply to skin on upper back remove patch in 12 hours. Remove & Discard patch within 12 hours or as directed by MD      medroxyPROGESTERone 10 MG tablet  Commonly known as: PROVERA   10 mg, Oral, Daily      melatonin 5 MG tablet tablet   5 mg, Oral, Nightly PRN      metoprolol succinate XL 25 MG 24 hr tablet  Commonly known as: TOPROL-XL   12.5 mg, Oral, Daily      rivaroxaban 20 MG tablet  Commonly known as: Xarelto   20 mg, Oral, Daily With Dinner             Stop These Medications      Aquaphor Advanced Therapy 41 % ointment     guaiFENesin ER 1200 MG tablet sustained-release 12 hour              Allergies   Allergen Reactions    Cephalexin Hives and Rash     Diffuse rash on cephalexin 1 g PO q6h on 6/17/20  Tolerated zosyn and PCN G September 2020 without issue    Clindamycin/Lincomycin Hives       Discharge Disposition:  Skilled Nursing Facility (DC - External)      Discharge Diet:  Diet Order   Procedures    Diet: Cardiac; Healthy Heart (2-3 Na+); Fluid Consistency: Thin (IDDSI 0)       Discharge Activity:   As tolerated    CODE STATUS:    Code Status and Medical " Interventions: CPR (Attempt to Resuscitate); Full Support   Ordered at: 07/16/25 1252     Code Status (Patient has no pulse and is not breathing):    CPR (Attempt to Resuscitate)     Medical Interventions (Patient has pulse or is breathing):    Full Support       Future Appointments   Date Time Provider Department Center   8/20/2025  9:30 AM Brennan Rogers MD [id number]   9/30/2025  9:30 AM Adilson Wilkerson MD [facility] PC DR PRANAV JENSEN     Additional Instructions for the Follow-ups that You Need to Schedule       Discharge Follow-up with PCP   As directed       Currently Documented PCP:    Adilson Wilkerson MD    PCP Phone Number:    [phone]     Follow Up Details: Dr. Wilkerson (PCP) on September 30th        Discharge Follow-up with Specialty: Dr. De Jesus ([facility]) on August 14th   As directed      Specialty: Dr. De Jesus ([facility]) on August 14th        Discharge Follow-up with Specified Provider: Dr. Blanco (Pulm); 1 Month   As directed      To: Dr. Blanco (Pulm)   Follow Up: 1 Month        Discharge Follow-up with Specified Provider: Dr. Rogers (Card) on August 20th   As directed      To: Dr. Rogers (Card) on August 20th        Discharge Follow-up with Specified Provider: Dr. Kendrick (Podiatry) at next available appt   As directed      To: Dr. Kendrick (Podiatry) at next available appt               Contact information for follow-up providers       Damián Ramirez MD .    Specialty: Cardiothoracic Surgery  Contact information:  [address], [address]  [facility] [address] [phone]               Adilson Wilkerson MD .    Specialty: Family Medicine  Why: Dr. Wilkerson (PCP) on September 30th  Contact information:  [address] [facility] [id number] [phone]                       Contact information for after-discharge care       Destination       [facility] .    Service: Skilled Nursing  Contact information:  [address]  [facility] [address]  [phone]                                   Additional Instructions for the Follow-ups that You Need to Schedule       Discharge Follow-up with PCP   As directed       Currently Documented PCP:    Adilson Wilkerson MD    PCP Phone Number:    [phone]     Follow Up Details: Dr. Wilkerson (PCP) on September 30th        Discharge Follow-up with Specialty: Dr. De Jesus ([facility]) on August 14th   As directed      Specialty: Dr. De Jesus ([facility]) on August 14th        Discharge Follow-up with Specified Provider: Dr. Blanco (Pulm); 1 Month   As directed      To: Dr. Blanco (Pulm)   Follow Up: 1 Month        Discharge Follow-up with Specified Provider: Dr. Rogers (Card) on August 20th   As directed      To: Dr. Rogers (Card) on August 20th        Discharge Follow-up with Specified Provider: Dr. Kendrick (Podiatry) at next available appt   As directed      To: Dr. Kendrick (Podiatry) at next available appt            Time Spent on Discharge:  Greater than 30 minutes      Jack Barrett MD  [facility]  07/25/25  12:32 EDT

## 2025-07-25 NOTE — PROGRESS NOTES
Continued Stay Note  [facility]     Patient Name: Emely Solomon  MRN: [id number]  Today's Date: 7/25/2025    Admit Date: 7/16/2025    Plan: [facility] SNF   Discharge Plan       Row Name 07/25/25 1622       Plan    Plan [facility] SNF    Plan Comments Received another call from [facility] that they can accept patient today to skilled bed.  [facility] initiated.  Packet to KOBI Mojica RN      Row Name 07/25/25 1457       Plan    [facility] when financial information completed by spouse    Plan Comments Received call from [facility] that she was told now that patient cannot come until financial information is completed per spouse.  Hoping that it will be completed and patient can admit tomorrow.  Admitting MD aware.  [facility]  Galina FAUSTIN      Row Name 07/25/25 1439       Plan    [facility]    Patient/Family in Agreement with Plan yes    Plan Comments [facility].  Unable to reach patient's .  Spoke with brother Adilson Cintron by telephone and they are aware of DC.  RN will call report and then will initiate EMS transport.  Packet to KOBI Mojica RN                 Expected Discharge Date and Time       Expected Discharge Date Expected Discharge Time    Jul 25, 2025               Becky S. [place], RN

## 2025-07-25 NOTE — PROGRESS NOTES
Continued Stay Note  [facility]     Patient Name: Emely Solomon  MRN: [id number]  Today's Date: 7/25/2025    Admit Date: 7/16/2025    Plan: [facility] SNF when financial information completed by spouse   Discharge Plan       Row Name 07/25/25 1457       Plan    Plan [facility] SNF when financial information completed by spouse    Plan Comments Received call from [facility] that she was told now that patient cannot come until financial information is completed per spouse.  Hoping that it will be completed and patient can admit tomorrow.  Admitting MD aware.  [facility]  Galina FAUSTIN      Row Name 07/25/25 1439       Plan    Plan [facility]    Patient/Family in Agreement with Plan yes    Plan Comments Per [facility] skilled bed available.  Unable to reach patient's .  Spoke with brother Adilson Cintron by telephone and they are aware of DC.  RN will call report and then will initiate EMS transport.  Packet to RAMIN.  Galina FAUSTIN                 Expected Discharge Date and Time       Expected Discharge Date Expected Discharge Time    Jul 25, 2025               Becky S. [place], RN

## 2025-07-25 NOTE — PROGRESS NOTES
Continued Stay Note  [facility]     Patient Name: Emely Solomon  MRN: [id number]  Today's Date: 7/25/2025    Admit Date: 7/16/2025    Plan: [facility] SN[facility]   Discharge Plan       Row Name 07/25/25 1439       Plan    [facility]    Patient/Family in Agreement with Plan yes    Plan Comments Per [facility] skilled bed available.  Unable to reach patient's .  Spoke with brother Adilson Cintron by telephone and they are aware of DC.  RN will call report and then will initiate EMS transport.  Packet to RN.  Galina FAUSTIN                    Expected Discharge Date and Time       Expected Discharge Date Expected Discharge Time    Jul 25, 2025               Becky S. [place]eni[place], RN

## 2025-07-25 NOTE — PROGRESS NOTES
Noted plans for d/c to rehab. Agree with med list. She has f/u with us and I will reach out to Dr Ramirez about the consult.

## 2025-07-25 NOTE — NURSING NOTE
Patient is awaiting EMS transport to be discharged to [facility] rehab. Discharge instructions have been completed and given to patient for follow up appointment information and placed in discharge packet. IV removed from patient. Patient belongings gathered in belonging bags in room, including home CPAP. Cell phone at bedside with patient. Patient has been communicating with her  on her plan for discharge. Patient stable at this time.

## 2025-07-26 VITALS
HEIGHT: 63 IN | DIASTOLIC BLOOD PRESSURE: 72 MMHG | WEIGHT: 265 LBS | TEMPERATURE: 98.5 F | HEART RATE: 73 BPM | OXYGEN SATURATION: 97 % | BODY MASS INDEX: 46.95 KG/M2 | SYSTOLIC BLOOD PRESSURE: 129 MMHG | RESPIRATION RATE: 18 BRPM

## 2025-07-26 LAB
ANION GAP SERPL CALCULATED.3IONS-SCNC: 13.2 MMOL/L (ref 5–15)
BUN SERPL-MCNC: 30 MG/DL (ref 8–23)
BUN/CREAT SERPL: 22.2 (ref 7–25)
CALCIUM SPEC-SCNC: 9.5 MG/DL (ref 8.6–10.5)
CHLORIDE SERPL-SCNC: 99 MMOL/L (ref 98–107)
CO2 SERPL-SCNC: 26.8 MMOL/L (ref 22–29)
CREAT SERPL-MCNC: 1.35 MG/DL (ref 0.57–1)
DEPRECATED RDW RBC AUTO: 50 FL (ref 37–54)
EGFRCR SERPLBLD CKD-EPI 2021: 43.7 ML/MIN/1.73
ERYTHROCYTE [DISTWIDTH] IN BLOOD BY AUTOMATED COUNT: 16.3 % (ref 12.3–15.4)
GLUCOSE SERPL-MCNC: 87 MG/DL (ref 65–99)
HCT VFR BLD AUTO: 38.5 % (ref 34–46.6)
HGB BLD-MCNC: 11.6 G/DL (ref 12–15.9)
MAGNESIUM SERPL-MCNC: 2.6 MG/DL (ref 1.6–2.4)
MCH RBC QN AUTO: 26.2 PG (ref 26.6–33)
MCHC RBC AUTO-ENTMCNC: 30.1 G/DL (ref 31.5–35.7)
MCV RBC AUTO: 86.9 FL (ref 79–97)
PHOSPHATE SERPL-MCNC: 3.7 MG/DL (ref 2.5–4.5)
PLATELET # BLD AUTO: 399 10*3/MM3 (ref 140–450)
PMV BLD AUTO: 8.4 FL (ref 6–12)
POTASSIUM SERPL-SCNC: 3.4 MMOL/L (ref 3.5–5.2)
RBC # BLD AUTO: 4.43 10*6/MM3 (ref 3.77–5.28)
SODIUM SERPL-SCNC: 139 MMOL/L (ref 136–145)
WBC NRBC COR # BLD AUTO: 4.87 10*3/MM3 (ref 3.4–10.8)

## 2025-07-26 PROCEDURE — 80048 BASIC METABOLIC PNL TOTAL CA: CPT | Performed by: HOSPITALIST

## 2025-07-26 PROCEDURE — 85027 COMPLETE CBC AUTOMATED: CPT | Performed by: HOSPITALIST

## 2025-07-26 PROCEDURE — 94761 N-INVAS EAR/PLS OXIMETRY MLT: CPT

## 2025-07-26 PROCEDURE — 94664 DEMO&/EVAL PT USE INHALER: CPT

## 2025-07-26 PROCEDURE — 94799 UNLISTED PULMONARY SVC/PX: CPT

## 2025-07-26 PROCEDURE — 84100 ASSAY OF PHOSPHORUS: CPT | Performed by: HOSPITALIST

## 2025-07-26 PROCEDURE — 36415 COLL VENOUS BLD VENIPUNCTURE: CPT | Performed by: HOSPITALIST

## 2025-07-26 PROCEDURE — 83735 ASSAY OF MAGNESIUM: CPT | Performed by: HOSPITALIST

## 2025-07-26 RX ADMIN — IPRATROPIUM BROMIDE AND ALBUTEROL SULFATE 3 ML: .5; 3 SOLUTION RESPIRATORY (INHALATION) at 07:16

## 2025-07-26 RX ADMIN — MEDROXYPROGESTERONE ACETATE 10 MG: 10 TABLET ORAL at 08:01

## 2025-07-26 RX ADMIN — ANTI-FUNGAL POWDER MICONAZOLE NITRATE TALC FREE 1 APPLICATION: 1.42 POWDER TOPICAL at 08:01

## 2025-07-26 RX ADMIN — NYSTATIN 1 APPLICATION: 100000 OINTMENT TOPICAL at 08:01

## 2025-07-26 RX ADMIN — FUROSEMIDE 60 MG: 20 TABLET ORAL at 08:02

## 2025-07-26 RX ADMIN — METOPROLOL SUCCINATE 12.5 MG: 25 TABLET, EXTENDED RELEASE ORAL at 08:02

## 2025-07-26 NOTE — NURSING NOTE
Pt. was discharge to [facility] and supposed to pick of via EMS between 1am-3am. Pt. Want to stay tonight and go in the morning. This RN notified A, ok to stay. EMS was called to cancel the transport order and ask to reschedule for tomorrow. EMS mention they will only schedule in the morning. This RN called [facility] rehab and let them know about the delay. Family was called and notified about the update.

## 2025-07-28 NOTE — PROGRESS NOTES
Case Management Discharge Note      Final Note: DC'd to skilled bed at [facility] 7/26 via [facility]    Provided Post Acute Provider List?: Yes  Post Acute Provider List: Nursing Home  N/A Provider List Comment: Was recently at [facility] and would like to return  Delivered To: Support Person  Support Person:  Ghanshyam  Method of Delivery: In person    Selected Continued Care - Discharged on 7/26/2025 Admission date: 7/16/2025 - Discharge disposition: Skilled Nursing Facility (DC - External)      Destination Coordination complete.      Service Provider Services Address Phone Fax Patient Preferred    [facility] [address] [address] [phone] --                              Selected Continued Care - Episodes Includes continued care and service providers with selected services from the active episodes listed below      High Risk Care Management Episode start date: 7/17/2025 (Paused)   There are no active outsourced providers for this episode.                 Selected Continued Care - Prior Encounters Includes continued care and service providers with selected services from prior encounters from 4/17/2025 to 7/26/2025      Discharged on 6/3/2025 Admission date: 5/25/2025 - Discharge disposition: Skilled Nursing Facility (DC - External)      Destination       Service Provider Services Address Phone Fax Patient Preferred    [facility] [address] [address] [phone] --                      Discharged on 5/24/2025 Admission date: 5/20/2025 - Discharge disposition: Skilled Nursing Facility (DC - External)      Destination       Service Provider Services Address Phone Fax Patient Preferred    [facility] [address] [address] [phone] --                      Discharged on  5/10/2025 Admission date: 5/4/2025 - Discharge disposition: Skilled Nursing Facility (DC - External)      Destination       Service Provider Services Address Phone Fax Patient Preferred    [facility] [address] [address] [phone] --              Home Medical Care       Service Provider Services Address Phone Fax Patient Preferred    [facility] [address], [address] [address] [phone] --                          Transportation Services  Transportation: Ambulance  Ambulance: [facility]    Final Discharge Disposition Code: 03 - skilled nursing facility (SNF)

## 2025-08-15 ENCOUNTER — PATIENT OUTREACH (OUTPATIENT)
Dept: CASE MANAGEMENT | Facility: OTHER | Age: 65
End: 2025-08-15
Payer: MEDICARE

## 2025-08-20 ENCOUNTER — OFFICE VISIT (OUTPATIENT)
Dept: CARDIOLOGY | Age: 65
End: 2025-08-20
Payer: MEDICARE

## 2025-08-20 VITALS
WEIGHT: 293 LBS | HEIGHT: 63 IN | SYSTOLIC BLOOD PRESSURE: 122 MMHG | HEART RATE: 92 BPM | BODY MASS INDEX: 51.91 KG/M2 | DIASTOLIC BLOOD PRESSURE: 76 MMHG

## 2025-08-20 DIAGNOSIS — I74.9 PARADOXICAL EMBOLISM: ICD-10-CM

## 2025-08-20 DIAGNOSIS — I50.32 CHRONIC HEART FAILURE WITH PRESERVED EJECTION FRACTION: Primary | ICD-10-CM

## 2025-08-20 DIAGNOSIS — Z86.718 HISTORY OF DVT (DEEP VEIN THROMBOSIS): ICD-10-CM

## 2025-08-20 DIAGNOSIS — G47.33 OSA ON CPAP: ICD-10-CM

## 2025-08-20 DIAGNOSIS — Z86.79 HISTORY OF BACTERIAL ENDOCARDITIS: ICD-10-CM

## 2025-08-20 DIAGNOSIS — J96.11 CHRONIC RESPIRATORY FAILURE WITH HYPOXIA: ICD-10-CM

## 2025-08-20 DIAGNOSIS — Q21.12 PFO (PATENT FORAMEN OVALE): ICD-10-CM

## 2025-08-20 DIAGNOSIS — I10 ESSENTIAL HYPERTENSION: ICD-10-CM

## 2025-08-20 DIAGNOSIS — Z86.711 HISTORY OF PULMONARY EMBOLISM: ICD-10-CM

## 2025-08-20 DIAGNOSIS — I27.20 PULMONARY HYPERTENSION: ICD-10-CM

## 2025-08-20 DIAGNOSIS — E66.01 MORBID OBESITY: ICD-10-CM

## 2025-08-20 DIAGNOSIS — I34.0 SEVERE MITRAL REGURGITATION: ICD-10-CM

## 2025-08-20 PROBLEM — J96.21 ACUTE ON CHRONIC RESPIRATORY FAILURE WITH HYPOXIA: Status: RESOLVED | Noted: 2025-07-16 | Resolved: 2025-08-20

## 2025-08-20 PROBLEM — R55 SYNCOPE AND COLLAPSE: Status: RESOLVED | Noted: 2025-07-16 | Resolved: 2025-08-20

## 2025-08-20 PROBLEM — B95.1 BACTEREMIA DUE TO GROUP B STREPTOCOCCUS: Status: RESOLVED | Noted: 2017-11-13 | Resolved: 2025-08-20

## 2025-08-20 PROBLEM — R78.81 BACTEREMIA DUE TO GROUP B STREPTOCOCCUS: Status: RESOLVED | Noted: 2017-11-13 | Resolved: 2025-08-20

## 2025-08-22 ENCOUNTER — PATIENT OUTREACH (OUTPATIENT)
Dept: CASE MANAGEMENT | Facility: OTHER | Age: 65
End: 2025-08-22
Payer: MEDICARE

## 2025-08-28 ENCOUNTER — PATIENT OUTREACH (OUTPATIENT)
Dept: CASE MANAGEMENT | Facility: OTHER | Age: 65
End: 2025-08-28
Payer: MEDICARE

## (undated) DEVICE — ADAPT CLN BIOGUARD AIR/H2O DISP

## (undated) DEVICE — SINGLE USE BIOPSY VALVE MAJ-210: Brand: SINGLE USE BIOPSY VALVE (STERILE)

## (undated) DEVICE — TRAP,MUCUS SPECIMEN, 80CC: Brand: MEDLINE

## (undated) DEVICE — MSK AIRWY LARYNG LMA UNIQUE STD PK SZ4

## (undated) DEVICE — GOWN,SIRUS,NON REINFRCD,LARGE,SET IN SL: Brand: MEDLINE

## (undated) DEVICE — SENSR O2 OXIMAX FNGR A/ 18IN NONSTR

## (undated) DEVICE — DRAPE,REIN 53X77,STERILE: Brand: MEDLINE

## (undated) DEVICE — TUBING, SUCTION, 1/4" X 10', STRAIGHT: Brand: MEDLINE

## (undated) DEVICE — SOL NACL 0.9PCT 1000ML

## (undated) DEVICE — VITAL SIGNS™ JACKSON-REES CIRCUITS: Brand: VITAL SIGNS™

## (undated) DEVICE — ST IRR CYSTO W/SPK 77IN LF

## (undated) DEVICE — TOWEL,OR,DSP,ST,BLUE,STD,4/PK,20PK/CS: Brand: MEDLINE

## (undated) DEVICE — LN SMPL O2 NASL/ORL SMART/CAPNOLINE PLS A/

## (undated) DEVICE — DRAPE,CHEST,FENES,15X10,STERIL: Brand: MEDLINE

## (undated) DEVICE — LN SMPL CO2 SHTRM SD STREAM W/M LUER

## (undated) DEVICE — KT ORCA ORCAPOD DISP STRL

## (undated) DEVICE — BITEBLOCK OMNI BLOC

## (undated) DEVICE — 1000ML,PRESSURE INFUSER W/STOPCOCK: Brand: MEDLINE

## (undated) DEVICE — GLV SURG BIOGEL LTX PF 5 1/2

## (undated) DEVICE — SINGLE USE SUCTION VALVE MAJ-209: Brand: SINGLE USE SUCTION VALVE (STERILE)

## (undated) DEVICE — DRAPE,UNDERBUTTOCKS,PCH,STERILE: Brand: MEDLINE

## (undated) DEVICE — ADAPT SWVL FIBROPTIC BRONCH

## (undated) DEVICE — LEGGINGS, PAIR, CLEAR, STERILE: Brand: MEDLINE

## (undated) DEVICE — TBG 02 CRUSH RESIST LF CLR 7FT

## (undated) DEVICE — CANN O2 ETCO2 FITS ALL CONN CO2 SMPL A/ 7IN DISP LF

## (undated) DEVICE — LOU D & C HYSTEROSCOPY: Brand: MEDLINE INDUSTRIES, INC.